# Patient Record
Sex: FEMALE | Race: WHITE | NOT HISPANIC OR LATINO | Employment: OTHER | URBAN - METROPOLITAN AREA
[De-identification: names, ages, dates, MRNs, and addresses within clinical notes are randomized per-mention and may not be internally consistent; named-entity substitution may affect disease eponyms.]

---

## 2017-01-02 ENCOUNTER — HOSPITAL ENCOUNTER (OUTPATIENT)
Dept: CARDIAC REHAB | Facility: CLINIC | Age: 82
End: 2017-01-02
Attending: PHYSICIAN ASSISTANT
Payer: COMMERCIAL

## 2017-01-02 PROCEDURE — 40000116 ZZH STATISTIC OP CR VISIT

## 2017-01-02 PROCEDURE — 93798 PHYS/QHP OP CAR RHAB W/ECG: CPT

## 2017-01-03 ENCOUNTER — TELEPHONE (OUTPATIENT)
Dept: INTERNAL MEDICINE | Facility: CLINIC | Age: 82
End: 2017-01-03

## 2017-01-03 DIAGNOSIS — R30.0 DYSURIA: Primary | ICD-10-CM

## 2017-01-03 NOTE — TELEPHONE ENCOUNTER
Reason for Call:  Other Specimen cup & a hat    Detailed comments: Pts daughter Radha is requesting a specimen cup & hat, please advise    Phone Number Patient can be reached at: Cell number on file:    Telephone Information:   Mobile [phone]         Best Time:    Can we leave a detailed message on this number? YES    Call taken on 1/3/2017 at 2:58 PM by Cristy Beltran

## 2017-01-03 NOTE — TELEPHONE ENCOUNTER
Patient started Bactrim yesterday, a Rx that Dr. Meyer prescribed earlier.  They are seeing Dr. Meyer next week and want to be able to pick a hat and urine cup for her to leave a specimen at home as she has a hard time urinating here.  Daughter will  hat and cup at lab as that is what they have done before.

## 2017-01-04 DIAGNOSIS — E03.9 ACQUIRED HYPOTHYROIDISM: Primary | ICD-10-CM

## 2017-01-04 RX ORDER — LEVOTHYROXINE SODIUM 50 UG/1
50 TABLET ORAL DAILY
Qty: 90 TABLET | Refills: 1 | Status: SHIPPED | OUTPATIENT
Start: 2017-01-04 | End: 2017-07-11

## 2017-01-04 RX ORDER — LEVOTHYROXINE SODIUM 50 UG/1
TABLET ORAL
Qty: 1 TABLET | Refills: 0 | Status: SHIPPED | OUTPATIENT
Start: 2017-01-04 | End: 2017-01-12

## 2017-01-06 ENCOUNTER — HOSPITAL ENCOUNTER (OUTPATIENT)
Dept: CARDIAC REHAB | Facility: CLINIC | Age: 82
End: 2017-01-06
Attending: PHYSICIAN ASSISTANT
Payer: COMMERCIAL

## 2017-01-06 PROCEDURE — 93798 PHYS/QHP OP CAR RHAB W/ECG: CPT

## 2017-01-06 PROCEDURE — 40000116 ZZH STATISTIC OP CR VISIT

## 2017-01-09 ENCOUNTER — HOSPITAL ENCOUNTER (OUTPATIENT)
Dept: CARDIAC REHAB | Facility: CLINIC | Age: 82
End: 2017-01-09
Attending: PHYSICIAN ASSISTANT
Payer: COMMERCIAL

## 2017-01-09 PROCEDURE — 40000116 ZZH STATISTIC OP CR VISIT: Performed by: REHABILITATION PRACTITIONER

## 2017-01-09 PROCEDURE — 93798 PHYS/QHP OP CAR RHAB W/ECG: CPT | Performed by: REHABILITATION PRACTITIONER

## 2017-01-11 ENCOUNTER — HOSPITAL ENCOUNTER (OUTPATIENT)
Dept: CARDIAC REHAB | Facility: CLINIC | Age: 82
End: 2017-01-11
Attending: PHYSICIAN ASSISTANT
Payer: COMMERCIAL

## 2017-01-11 PROCEDURE — 93798 PHYS/QHP OP CAR RHAB W/ECG: CPT

## 2017-01-11 PROCEDURE — 40000116 ZZH STATISTIC OP CR VISIT

## 2017-01-12 ENCOUNTER — OFFICE VISIT (OUTPATIENT)
Dept: INTERNAL MEDICINE | Facility: CLINIC | Age: 82
End: 2017-01-12
Payer: COMMERCIAL

## 2017-01-12 VITALS
HEIGHT: 67 IN | HEART RATE: 83 BPM | WEIGHT: 156.6 LBS | DIASTOLIC BLOOD PRESSURE: 62 MMHG | TEMPERATURE: 97 F | OXYGEN SATURATION: 99 % | BODY MASS INDEX: 24.58 KG/M2 | SYSTOLIC BLOOD PRESSURE: 122 MMHG

## 2017-01-12 DIAGNOSIS — R30.0 DYSURIA: ICD-10-CM

## 2017-01-12 DIAGNOSIS — M05.9 SEROPOSITIVE RHEUMATOID ARTHRITIS (H): Primary | ICD-10-CM

## 2017-01-12 LAB
ALBUMIN UR-MCNC: NEGATIVE MG/DL
APPEARANCE UR: CLEAR
BILIRUB UR QL STRIP: NEGATIVE
COLOR UR AUTO: YELLOW
GLUCOSE UR STRIP-MCNC: NEGATIVE MG/DL
HGB UR QL STRIP: NEGATIVE
KETONES UR STRIP-MCNC: NEGATIVE MG/DL
LEUKOCYTE ESTERASE UR QL STRIP: NEGATIVE
NITRATE UR QL: NEGATIVE
PH UR STRIP: 6 PH (ref 5–7)
SP GR UR STRIP: 1.01 (ref 1–1.03)
URN SPEC COLLECT METH UR: NORMAL
UROBILINOGEN UR STRIP-ACNC: 0.2 EU/DL (ref 0.2–1)

## 2017-01-12 PROCEDURE — 99213 OFFICE O/P EST LOW 20 MIN: CPT | Performed by: INTERNAL MEDICINE

## 2017-01-12 PROCEDURE — 81003 URINALYSIS AUTO W/O SCOPE: CPT | Performed by: INTERNAL MEDICINE

## 2017-01-12 RX ORDER — MINOCYCLINE HYDROCHLORIDE 100 MG/1
100 CAPSULE ORAL DAILY
Qty: 30 CAPSULE | Refills: 0 | Status: SHIPPED | OUTPATIENT
Start: 2017-01-12 | End: 2017-01-31

## 2017-01-12 ASSESSMENT — PAIN SCALES - GENERAL: PAINLEVEL: NO PAIN (0)

## 2017-01-12 NOTE — PROGRESS NOTES
CHIEF COMPLAINT:    The patient is a pleasant 89-year-old female presents today following conclusion of a course of antibiotics for recurrent urinary tract infection. Repeat urinary analysis is negative. She notes that she has is quite frequently and records to confirm this. We had previously discussed chronic suppressive antibiotic therapy. She cannot use salt flow due to her concurrent use of methotrexate. We've decided to pursue minocycline. She does have a history of seropositive rheumatoid arthritis in the therapy does seem to be maintaining. With respect to her heart disease, she's had a previousTAVR with excellent results. She has no edema or shortness of breath with exertion. She denies any recurrent cardiac arrhythmia at this time.                         PAST, FAMILY,SOCIAL HISTORY:     Medical  History:   has a past medical history of Seropositive rheumatoid arthritis (H); Secondary Sjogren's syndrome (H); Chronic migraine; Stroke (H) (05/2013); Osteoarthritis; Osteopenia; Hypothyroidism; PAC (premature atrial contraction); PVC (premature ventricular contraction); Hypertension; Anxiety; Hyperlipidaemia; Anxiety; PUD (peptic ulcer disease); Syncope (2014); Orthostatic hypotension; Shortness of breath; Difficulty in walking(719.7); Numbness and tingling; Seizures (H); Aortic stenosis; Need for SBE (subacute bacterial endocarditis) prophylaxis; and Myocardial infarction (H).     Surgical History:   has past surgical history that includes Mouth surgery (2011); Eye surgery (1999); cataract iol, rt/lt (Bilateral, 2000); Hysterectomy total abdominal (1970); TOTAL HIP ARTHROPLASTY (Right, 2010); TOTAL HIP ARTHROPLASTY (Left, 2014); TRANSCATHETER AORTIC VALVE IMPLANT ANEST (N/A, 8/3/2016); and Heart Cath Femoral Cannulization With Open Standby Repair Aortic Valve (N/A, 8/3/2016).     Social History:   reports that she has never smoked. She has never used smokeless tobacco. She reports that she does not drink  alcohol or use illicit drugs.     Family History:  family history includes Hyperlipidemia in her mother. There is no history of Family History Negative.            MEDICATIONS  Current Outpatient Prescriptions   Medication Sig Dispense Refill     minocycline (MINOCIN/DYNACIN) 100 MG capsule Take 1 capsule (100 mg) by mouth daily 30 capsule 0     levothyroxine (SYNTHROID/LEVOTHROID) 50 MCG tablet Take 1 tablet (50 mcg) by mouth daily Except tuesday 90 tablet 1     mirtazapine (REMERON) 30 MG tablet Take 1 pills at bedtime 90 tablet 0     levETIRAcetam (KEPPRA) 500 MG tablet Take 1 tablet (500 mg) by mouth daily take 1000 mg at night 1 tablet 0     atorvastatin (LIPITOR) 40 MG tablet Take 1 tablet (40 mg) by mouth daily Reports taking 1 tablet 0     Cranberry 200 MG CAPS Take 3 capsules (600 mg) by mouth 2 times daily 1 capsule 0     famotidine (PEPCID) 40 MG tablet TAKE ONE TABLET BY MOUTH AT BEDTIME 30 tablet 9     metoprolol (LOPRESSOR) 25 MG tablet Reports taking 0.5 tablet  tablet 1     isosorbide mononitrate (IMDUR) 30 MG 24 hr tablet Pt reports taking 30 mg daily 30 tablet 1     nitroglycerin (NITROSTAT) 0.4 MG SL tablet Pt reports taking - -  -For chest pain place 1 tablet under the tongue every 5 minutes for 3 doses. If symptoms persist 5 minutes after 1st dose call 911. 25 tablet 0     folic acid (FOLVITE) 1 MG tablet TAKE ONE TABLET BY MOUTH ONCE DAILY 90 tablet 1     aspirin EC 81 MG EC tablet Take 1 tablet (81 mg) by mouth daily       methotrexate 2.5 MG tablet Take 4 tablets (10 mg) by mouth once a week Wed 52 tablet 3     clopidogrel (PLAVIX) 75 MG tablet TAKE ONE TABLET BY MOUTH ONCE DAILY 90 tablet 3     predniSONE (DELTASONE) 5 MG tablet TAKE 1 TABLET BY MOUTH EVERY OTHER DAY, ALTERNATING WITH ONE-HALF TABLET EVERY OTHER DAY. 80 tablet 3     acetaminophen (TYLENOL ARTHRITIS PAIN) 650 MG CR tablet Take 2 tablets (1,300 mg) by mouth every 8 hours as needed for mild pain       calcium carbonate  (TUMS) 500 MG chewable tablet Take 2-4 tablets (1,000-2,000 mg) by mouth as needed for heartburn       docusate sodium (COLACE) 100 MG capsule Take 200 mg by mouth daily 2 capsules       multivitamin, therapeutic with minerals (MULTI-VITAMIN) TABS Take 1 tablet by mouth daily       Lactobacillus (FLORAJEN ACIDOPHILUS) CAPS Take 1 capsule by mouth daily       [DISCONTINUED] levothyroxine (SYNTHROID/LEVOTHROID) 50 MCG tablet Reports taking 50 mcg daily 1 tablet 0         --------------------------------------------------------------------------------------------------------------------                          REVIEW OF SYSTEMS:         LUNGS: Pt denies: cough,excess sputum, hemoptysis, or shortness of breath.   HEART: Pt denies: chest pain, arrythmia, syncope, tachy or bradyarrhythmia or excess edema.   GI: Pt denies: nausea, vomitting, diarrhea, constipation, melena, or hematochezia.   NEURO: Pt denies: seizures, strokes, diplopia, weakness, paraesthesias, or paralysis.                          EXAMINATION:          Vital Signs:  B/P: 122/62, T: 97, P: 83, R: Data Unavailable, BMI: Body mass index is 24.72 kg/(m^2).   Constitutional: The patient appears to be in no acute distress. The patient appears to be adequately hydrated. No acute respiratory or hemodynamic distress is noted at this time.   LUNGS: clear bilaterally, airflow is brisk, no intercostal retraction or stridor is noted. No coughing is noted during visit.   HEART:  regular without rubs, clicks, gallops, or murmurs. PMI is nondisplaced. Upstrokes are brisk. S1,S2 are heard.   GI: Abdomen is soft, without rebound, guarding or tenderness. Bowel sounds are appropriate. No renal bruits are heard.    URINARY: Be's punch is negative. No suprapubic tenderness is noted with palpation.                        DECISION MAKING:       #1 seropositive rheumatoid arthritis   Restart methotrexate   Follow-up with rheumatology as scheduled  #2 recurrent urinary tract  infection/cystitis   Minocycline 100 mg once daily  #3 status post aortic valve replacement   Continue Plavix and aspirin  #4                         FOLLOW UP    I have asked the patient to make an appointment for follow up with me in 3 months or sooner as needed  I have carefully explained the diagnosis and treatment options with the patient. The patient has displayed an understanding of the above, and all subsequent questions were answered.     DO DELMA Pineda    Portions of this note were produced using Tanium  Although every attempt at real-time proof reading has been made, occasional grammar/syntax errors may have been missed.

## 2017-01-13 ENCOUNTER — HOSPITAL ENCOUNTER (OUTPATIENT)
Dept: CARDIAC REHAB | Facility: CLINIC | Age: 82
End: 2017-01-13
Attending: PHYSICIAN ASSISTANT
Payer: COMMERCIAL

## 2017-01-13 VITALS — WEIGHT: 157 LBS | HEIGHT: 67 IN | BODY MASS INDEX: 24.64 KG/M2

## 2017-01-13 PROCEDURE — 40000116 ZZH STATISTIC OP CR VISIT

## 2017-01-13 PROCEDURE — 93798 PHYS/QHP OP CAR RHAB W/ECG: CPT

## 2017-01-13 ASSESSMENT — 6 MINUTE WALK TEST (6MWT)
FEMALE CALC: 1137.51
PREDICTED: 1325.71
MALE CALC: 1317.67
GENDER SELECTION: FEMALE
TOTAL DISTANCE WALKED (FT): 350
TOTAL DISTANCE WALKED (FT): 450

## 2017-01-13 NOTE — PROGRESS NOTES
01/13/17 1500   Session   Session Discharge Note   Certified through this date 01/13/17   Cardiac Rehab Assessment   Cardiac Rehab Assessment Your patient was discharged from the cardiac rehab program today because wished to be done. During cardiac rehab this patient made significant gains in exercise tolerance. Initially patient tolerated 19 minutes at a peak of 2.4METs, and is now tolerating 26 minutes at a peak of 2.8 METs. Patient also increased 6-minute walk test by 28% (an increase of 100 feet). The patient improved his/her weight by 5%, PHQ-9 depression screening by 100%, [facility] quality of life screening by 5% and Rate your Plate dietary screening by 13%. Please see progress that was made towards goals at the bottom of this treatment plan.  Barriers to progression were none. Today the patient was given instructions on frequency, intensity, and duration for continued home exercise.  Your patient plans to continue using the biodex at the apartment complex 6x a week for 20 minutes as tolerated by fatigue for continued aerobic home exercise.    General Information  Gillian Burk  3/19/1927  I have established, reviewed and made necessary changes to the individualized treatment plan and exercise prescription for this patient.    Physician Name (printed): ________________________   Date: _______  Time: ______    Physician Signature: ___________________________________________       Treatment Diagnosis [id number]   Date of Treatment Diagnosis 08/03/16   Significant Past CV History None   Comorbidities None   Other Medical History Past Medical History   Diagnosis Date     Seropositive rheumatoid arthritis (H)      Secondary Sjogren's syndrome (H)      Chronic migraine      Stroke (H) 05/2013     with visual field cut, left occipital lobe     Osteoarthritis      axial, and preipheral     Osteopenia      Hypothyroidism      PAC (premature atrial contraction)      PVC (premature ventricular contraction)       "Hypertension      Anxiety      Hyperlipidaemia      Anxiety      PUD (peptic ulcer disease)      Syncope 2014     due to orthostatic hypotension     Orthostatic hypotension      wears compression stockings     Shortness of breath      Difficulty in walking(719.7)      Numbness and tingling      decreased sensation in toes     Seizures (H)      after stroke (partial onset)     Aortic stenosis      s/p TAVR 8/17/16     Need for SBE (subacute bacterial endocarditis) prophylaxis      Myocardial infarction (H)         Lead up symptoms Chest pain and SOB on exertion   Hospital Location [facility]   Hospital Discharge Date 08/06/16   Signs and Symptoms Post Hospital Discharge Angina  (Angina is better)   Outpatient Cardiac Rehab Start Date 09/30/16   Primary Physician Dr. Meyer   Primary Physician Follow Up Completed   Surgeon Owen Schultz   Cardiologist Owen Schultz   Cardiologist Follow Up Scheduled   Ejection Fraction 60-65%   Risk Stratification Low   Summary of Cath Report   Summary of Cath Report Not Applicable   Living and Work Status    Living Arrangements and Social Status assisted living   Support System Live alone;Live alone, family in area;Check-in help as needed   Return to Employment Retired   Preventative Medications   CMS recommended medications Antiplatelets;Influenza vaccination;Pneumonia vaccination;Lipid Lowering;Anticoagulants   Falls Screen   Have you fallen two or more times in the past year? No   Pain   Patient Currently in Pain No   Physical Assessments   Incisions Not assessed   Edema None   Right Lung Sounds not assessed   Left Lung Sounds not assessed   Limitations Arthritis   Individualized Treatment Plan   Monitored Sessions Scheduled 36   Monitored Sessions Attended 24   Oxygen   Supplemental Oxygen needed No   Nutrition Management - Weight Management   Assessment Discharge   Age 89   Weight 71.215 kg (157 lb)   Height 1.695 m (5' 6.73\")   BMI (Calculated) 24.84 "   Initial Rate Your Plate Score. Dietary tool to assess eating patterns. Scores range from 24 to 72. The higher the score the healthier the eating pattern. 56   Discharge Rate Your Plate Score 63   Nutrition Management - Lipids   Lipids Labs Not Available  (7/15)   Prescribed Lipid Medication Yes   Statin Intensity Low Intensity   Lipid Comments Labs from 7/2015   Nutrition Management - Diabetes   Diabetes No   Nutrition Management Summary   Dietary Recommendations Low Fat;Low Cholesterol;Low Sodium   Stages of Change for Diet Compliance Action   Interventions Planned Attend Nutrition Education Class(es)   Interventions In Progress or Completed Attended Nutrition Class(es)   Nutrition Summary Comments Pt has attended 1/2 classes   Nutrition Target Outcome Weight loss .5-1 lb/week (if BMI > 25)   Psychosocial Management   Psychosocial Assessment Discharge   Is there history of clinical depression or increased risk of depression? No previous history   Current Level of Stress per Patient Report Mild   Current Coping Skills Uses Stress Management/Relaxation Techniques;Has Positive Support System   Initial Patient Health Questionnaire -9 Score (PHQ-9) for depression. 5-9 Minimal symptoms, 10-14 Minor depression, 15-19 Major depression, moderately severe, > 20 Major depression, severe  2   Discharge PHQ-9 Score for Depression 0   Initial [employer] COOP Survey score.  Quality of Life:   If total score > 25 review individual areas where patient rated a 4 or 5.  Consider patients current medical condition and what role that plays on the score.   Adjust treatment protocol to improve areas of concern.  Consider the following:  PHQ9 score, DASI, and re-assessment within the next 30 days to assist with developing treatments.  22   Reassessment [employer] COOP Survey Score 21   Stages of Change Action   Interventions Planned Patient to verbalize understanding of behavioral assessment results;Patient to verbalize understanding of  negative impact of stress to personal health   Interventions In Progress or Completed Patient verbalizes understanding of behavioral assessment scores;Patient verbalizes understanding of negative impact of stress to personal health   Patient Goal No   Psychosocial Comments Pt has attended the class on stress and relaxation   Psychosocial Target Outcome Identify absence or presence of depression using valid screening tool   Other Core Components - Hypertension   History of or Diagnosis of Hypertension No   Other Core Components - Tobacco   History of Tobacco Use Never   Other Core Components Summary   Interventions Planned None   Activity/Exercise History   Activity/Exercise Assessment Discharge   Activity/Exercise Status prior to event? Sedentary  (Tries to be as active as she can)   Number of Days Currently participating in Moderate Physical Activity? 3   Number of Days Currently performing  Aerobic Exercise (including rehab)? 6   Number of Minutes per Session Currently of Aerobic Exercise (average)? 20   Current Stage of Change (Physical Activity) Preparation   Current Stage of Change (Aerobic Exercise) Preparation   Patient Goals Goal #1;Goal #2   Goal #1 Description Pt would like to be able to walk down to cardiac rehab without SOB or fatigue.   Goal #1 Target Date 12/05/16   Goal #1 Date Met 01/13/17   Goal #1 Progress Towards Goal 12/20 only mild dyspnea when patient reaches cardiac rehab facility.  1/13 Pt is able to walk down to rehab with little to no SOB   Goal #2 Description Pt wants to increase the amount of time she spends on the NS at from to 20 minutes   Goal #2 Target Date 11/14/16   Goal #2 Date Met 01/13/17   Goal #2 Progress Towards Goal 12/20 completing 15 minutes in 2 bouts (10 minutes and 5 minutes) 1/18 Pt has been able to demonstrate that she was able to successful demonstrate that she was able to complete 20 minutes on the NS on a number of occassions.   Activity/Exercise Target Outcome An  Accumulation of 150  Minutes of Aerobic Activity per Week   Exercise Assessment   6 Minute Walk Predicted - Gender Selection Female   6 Minute Walk Predicted (Male) 1317.67   6 Minute Walk Predicted (Female) 1137.51   Initial 6 Minute Walk Distance (Feet) 350 ft   Discharge 6 Minute Walk Distance (Feet) 450   Resting HR 77 bpm   Exercise  bpm   Post Exercise HR 73 bpm   Resting /60 mmHg   Exercise /62 mmHg   Post Exercise /60 mmHg   Effort Rating 5   Current MET Level 2.8   MET Level Goal 4+   ECG Rhythm Sinus rhythm   Ectopy PACs   Current Symptoms Fatigue   Limitations/Restrictions None   Exercise Prescription   Mode Treadmill;Nustep;Weights   Duration/Time 30-45 min   Frequency 3 days week   THR (85% of age predicted max HR) 111.35   OMNI Effort Rating (0-10 Scale) 4-6/10   Progression Total exercise time of 20-30 minutes;Progress peak intensity by 1/2 MET per week;Continuous bouts   Recommended Home Exercise   Type of Exercise Walking;Other (comments)  (Nustep)   Frequency (days per week) 2-3   Duration (minutes per session) 15-30 min   Effort Rating Recommended 4-6/10   30 Day Exercise Plan Continue to walk to the fitness room and use the NuStep for 5 minutes    Current Home Exercise   Type of Exercise Walking  (NuStep)   Frequency (days per week) 6   Duration (minutes per session) 20   Follow-up/On-going Support   Provider follow-up needed on the following No follow-up needed   Learning Assessment   Learner Patient;Spouse/Significant Other  (Daughter )   Primary Language English   Preferred Learning Style Listening;Reading;Demonstration;Pictures/Video   Barriers to Learning No barriers noted   Patient Education   Education recommended Anatomy and Physiology of the Heart;Exercise Principles;Medication Overview;Nutrition;Risk Factors   Education classes attended Risk Factors;Nutrition;Stress Management;Blood Pressure

## 2017-01-17 NOTE — ADDENDUM NOTE
Encounter addended by: Linda Steel  on: 1/17/2017 11:26 AM<BR>     Documentation filed: Charges VN, Episodes

## 2017-01-19 NOTE — PROGRESS NOTES
Quick Note:    Dear Gillian, your recent test results are attached.   Your urine sample was normal.    Feel free to contact me via the office or My Chart if you have any questions regarding the above.    Sincerely,  DO DELMA Pineda    ______

## 2017-01-23 DIAGNOSIS — G40.909 SEIZURE DISORDER (H): Primary | ICD-10-CM

## 2017-01-23 RX ORDER — LEVETIRACETAM 500 MG/1
500 TABLET ORAL DAILY
Qty: 1 TABLET | Refills: 0 | Status: SHIPPED | OUTPATIENT
Start: 2017-01-23 | End: 2017-02-28

## 2017-01-23 NOTE — TELEPHONE ENCOUNTER
keppra      Last Office Visit with [facility], [facility] or [facility] prescribing provider: 1/12/17  Future Office visit:       Routing refill request to provider for review/approval because:  Medication is reported/historical

## 2017-01-26 DIAGNOSIS — R55 SYNCOPE AND COLLAPSE: Primary | ICD-10-CM

## 2017-01-26 PROCEDURE — 80177 DRUG SCRN QUAN LEVETIRACETAM: CPT | Mod: 90 | Performed by: INTERNAL MEDICINE

## 2017-01-26 PROCEDURE — 99000 SPECIMEN HANDLING OFFICE-LAB: CPT | Performed by: INTERNAL MEDICINE

## 2017-01-26 PROCEDURE — 36415 COLL VENOUS BLD VENIPUNCTURE: CPT | Performed by: INTERNAL MEDICINE

## 2017-01-27 LAB — LEVETIRACETAM SERPL-MCNC: 30.7 UG/ML

## 2017-02-01 NOTE — PROGRESS NOTES
Quick Note:    Dear Gillian, your recent test results are attached.   Your Keppra level was normal.    Feel free to contact me via the office or My Chart if you have any questions regarding the above.    Sincerely,  Augusto eMyer,  FACOI    ______

## 2017-02-06 DIAGNOSIS — R30.0 DYSURIA: Primary | ICD-10-CM

## 2017-02-06 NOTE — TELEPHONE ENCOUNTER
minocycline      Last Written Prescription Date: 02/06/17  Last Fill Quantity: 30,  # refills: 0   Last Office Visit with [facility], [facility] or [facility] prescribing provider: 01/12/17

## 2017-02-09 ENCOUNTER — HOSPITAL ENCOUNTER (OUTPATIENT)
Dept: CARDIOLOGY | Facility: CLINIC | Age: 82
Discharge: HOME | End: 2017-02-09
Attending: PHYSICIAN ASSISTANT | Admitting: PHYSICIAN ASSISTANT
Payer: COMMERCIAL

## 2017-02-09 ENCOUNTER — OFFICE VISIT (OUTPATIENT)
Dept: CARDIOLOGY | Facility: CLINIC | Age: 82
End: 2017-02-09
Attending: PHYSICIAN ASSISTANT
Payer: COMMERCIAL

## 2017-02-09 VITALS
SYSTOLIC BLOOD PRESSURE: 132 MMHG | HEART RATE: 84 BPM | WEIGHT: 152 LBS | DIASTOLIC BLOOD PRESSURE: 62 MMHG | HEIGHT: 66 IN | BODY MASS INDEX: 24.43 KG/M2

## 2017-02-09 DIAGNOSIS — Z95.2 S/P TAVR (TRANSCATHETER AORTIC VALVE REPLACEMENT): ICD-10-CM

## 2017-02-09 DIAGNOSIS — R07.9 CHEST PAIN, UNSPECIFIED TYPE: ICD-10-CM

## 2017-02-09 DIAGNOSIS — Z95.2 S/P TAVR (TRANSCATHETER AORTIC VALVE REPLACEMENT): Primary | ICD-10-CM

## 2017-02-09 PROCEDURE — 99214 OFFICE O/P EST MOD 30 MIN: CPT | Mod: 25 | Performed by: PHYSICIAN ASSISTANT

## 2017-02-09 PROCEDURE — 93306 TTE W/DOPPLER COMPLETE: CPT | Mod: 26 | Performed by: INTERNAL MEDICINE

## 2017-02-09 PROCEDURE — 93306 TTE W/DOPPLER COMPLETE: CPT

## 2017-02-09 PROCEDURE — 93000 ELECTROCARDIOGRAM COMPLETE: CPT | Performed by: PHYSICIAN ASSISTANT

## 2017-02-09 RX ORDER — ISOSORBIDE MONONITRATE 60 MG/1
60 TABLET, EXTENDED RELEASE ORAL DAILY
Qty: 90 TABLET | Refills: 3 | Status: SHIPPED | OUTPATIENT
Start: 2017-02-09 | End: 2018-01-05 | Stop reason: DRUGHIGH

## 2017-02-09 RX ORDER — MINOCYCLINE HYDROCHLORIDE 100 MG/1
CAPSULE ORAL
Qty: 30 CAPSULE | Refills: 0 | Status: SHIPPED | OUTPATIENT
Start: 2017-02-09 | End: 2017-04-04

## 2017-02-09 NOTE — TELEPHONE ENCOUNTER
Routing refill request to provider for review/approval because:  Drug not on the [facility] refill protocol for this diagnosis  Gala Woods RN

## 2017-02-09 NOTE — MR AVS SNAPSHOT
After Visit Summary   2/9/2017    Gillian Burk    MRN: [id number]           Patient Information     Date Of Birth          3/19/1927        Visit Information        Provider Department      2/9/2017 2:30 PM Nita Shafer PA-C [facility]        Today's Diagnoses     Chest pain, unspecified type    -  1     S/P TAVR (transcatheter aortic valve replacement)           Care Instructions    Thank you for your  Heart Care visit today. Your provider has recommended the following:    Medication Changes:  INCREASE your Imdur to 60mg daily. You can use up your 30mg tabs by taking two at the same time if you wish. The new bottle will be 60mg tabs so just take one tabs daily.  Recommendations:  Breathing/lung test.  Please call [phone] to scheduled at [facility]  Follow-up:  At the 1 year tamara, we will remind you.                [facility] HEART CARE        Follow-ups after your visit        Future tests that were ordered for you today     Open Future Orders        Priority Expected Expires Ordered    General PFT Lab (Please always keep checked) Routine 2/13/2017 2/9/2018 2/9/2017    Pulmonary Function Test Routine 2/9/2017 2/9/2018 2/9/2017            Who to contact     If you have questions or need follow up information about today's clinic visit or your schedule please contact [facility] directly at [phone].  Normal or non-critical lab and imaging results will be communicated to you by MyChart, letter or phone within 4 business days after the clinic has received the results. If you do not hear from us within 7 days, please contact the clinic through MyChart or phone. If you have a critical or abnormal lab result, we will notify you by phone as soon as possible.  Submit refill requests through [employer] or call your pharmacy and they will forward the refill request to us. Please allow 3  "business days for your refill to be completed.          Additional Information About Your Visit        MyChart Information     [employer] gives you secure access to your electronic health record. If you see a primary care provider, you can also send messages to your care team and make appointments. If you have questions, please call your primary care clinic.  If you do not have a primary care provider, please call [phone] and they will assist you.        Care EveryWhere ID     This is your Care EveryWhere ID. This could be used by other organizations to access your [place] medical records  [id number]        Your Vitals Were     Pulse Height BMI (Body Mass Index)             84 1.676 m (5' 6\") 24.55 kg/m2          Blood Pressure from Last 3 Encounters:   02/09/17 132/62   01/12/17 122/62   12/19/16 118/62    Weight from Last 3 Encounters:   02/09/17 68.947 kg (152 lb)   01/13/17 71.215 kg (157 lb)   01/12/17 71.033 kg (156 lb 9.6 oz)              We Performed the Following     EKG 12-lead complete w/read - Clinics     Follow-Up with Cardiac Advanced Practice Provider          Today's Medication Changes          These changes are accurate as of: 2/9/17  3:15 PM.  If you have any questions, ask your nurse or doctor.               These medicines have changed or have updated prescriptions.        Dose/Directions    isosorbide mononitrate 60 MG 24 hr tablet   Commonly known as:  IMDUR   This may have changed:    - medication strength  - how much to take  - how to take this  - when to take this  - additional instructions   Used for:  Chest pain, unspecified type   Changed by:  Nita Shafer PA-C        Dose:  60 mg   Take 1 tablet (60 mg) by mouth daily   Quantity:  90 tablet   Refills:  3            Where to get your medicines      These medications were sent to [facility] [address] [place] [address] - [address]  [address] [facility] [address]     Phone:  [phone]    - isosorbide mononitrate " 60 MG 24 hr tablet             Primary Care Provider Office Phone # Fax #    Augusto Meyer [phone]       [address] [place] [address]        Thank you!     Thank you for choosing [facility] [place]  for your care. Our goal is always to provide you with excellent care. Hearing back from our patients is one way we can continue to improve our services. Please take a few minutes to complete the written survey that you may receive in the mail after your visit with us. Thank you!             Your Updated Medication List - Protect others around you: Learn how to safely use, store and throw away your medicines at www.disposemymeds.org.          This list is accurate as of: 2/9/17  3:15 PM.  Always use your most recent med list.                   Brand Name Dispense Instructions for use    aspirin 81 MG EC tablet      Take 1 tablet (81 mg) by mouth daily       atorvastatin 40 MG tablet    LIPITOR    1 tablet    Take 1 tablet (40 mg) by mouth daily Reports taking       calcium carbonate 500 MG chewable tablet    TUMS     Take 2-4 tablets (1,000-2,000 mg) by mouth as needed for heartburn       clopidogrel 75 MG tablet    PLAVIX    90 tablet    TAKE ONE TABLET BY MOUTH ONCE DAILY       Cranberry 200 MG Caps     1 capsule    Take 3 capsules (600 mg) by mouth 2 times daily       docusate sodium 100 MG capsule    COLACE     Take 200 mg by mouth daily 2 capsules       famotidine 40 MG tablet    PEPCID    30 tablet    TAKE ONE TABLET BY MOUTH AT BEDTIME       FLORAJEN ACIDOPHILUS Caps      Take 1 capsule by mouth daily       folic acid 1 MG tablet    FOLVITE    90 tablet    TAKE ONE TABLET BY MOUTH ONCE DAILY       isosorbide mononitrate 60 MG 24 hr tablet    IMDUR    90 tablet    Take 1 tablet (60 mg) by mouth daily       levETIRAcetam 500 MG tablet    KEPPRA    1 tablet    Take 1 tablet (500 mg) by mouth daily take 1000 mg at night        levothyroxine 50 MCG tablet    SYNTHROID/LEVOTHROID    90 tablet    Take 1 tablet (50 mcg) by mouth daily Except tuesday       methotrexate 2.5 MG tablet CHEMO     52 tablet    Take 4 tablets (10 mg) by mouth once a week Wed       metoprolol 25 MG tablet    LOPRESSOR    180 tablet    Reports taking 0.5 tablet BID       minocycline 100 MG capsule    MINOCIN/DYNACIN    30 capsule    TAKE ONE CAPSULE BY MOUTH ONCE DAILY       mirtazapine 30 MG tablet    REMERON    90 tablet    Take 1 pills at bedtime       Multi-vitamin Tabs tablet      Take 1 tablet by mouth daily       nitroglycerin 0.4 MG sublingual tablet    NITROSTAT    25 tablet    Pt reports taking - -  -For chest pain place 1 tablet under the tongue every 5 minutes for 3 doses. If symptoms persist 5 minutes after 1st dose call 911.       predniSONE 5 MG tablet    DELTASONE    80 tablet    TAKE 1 TABLET BY MOUTH EVERY OTHER DAY, ALTERNATING WITH ONE-HALF TABLET EVERY OTHER DAY.       TYLENOL ARTHRITIS PAIN 650 MG CR tablet   Generic drug:  acetaminophen      Take 2 tablets (1,300 mg) by mouth every 8 hours as needed for mild pain

## 2017-02-09 NOTE — PROGRESS NOTES
HISTORY OF PRESENT ILLNESS:  Ms. Burk is a pleasant 89-year-old female who presents to the Cardiology office today for a 6-month post-TAVR followup.      The patient's cardiovascular history includes hypertension, hyperlipidemia, history of CVAs and TIAs, severe aortic stenosis, status post TAVR with a [employer] Treva valve in 08/2016.  She also has a known chronic left bundle branch block and first degree AV block.        Since I last evaluated the patient in September, she unfortunately had a prolonged episode of chest discomfort for which she sought evaluation (at an outside hospital) and was found to have a non-STEMI.  She underwent repeat coronary angiography and again was found to have mild to moderate nonobstructive coronary artery disease.  She was placed on Imdur and reportedly initially had an improvement in her chest discomfort.  She followed up with Dr. Farfan in December and no medication changes were made at that time.        The patient tells me today that she continues to have chest discomfort on a regular basis.  Before her TAVR, she had chest heaviness and dyspnea with exertion.  Now she does not get chest discomfort or dyspnea with exertion but does notice that after she does more moderate strenuous exertion, she feels the chest discomfort once she sits down to rest.  She also tells me that she has noticed it when she wakes up in the middle of night and uses the restroom and comes back to bed, the discomfort again starts once she is again lying down (not with activity).  For the past 3 nights she is propping herself up when she gets back to bed and feels that this has prevented the chest discomfort from occurring.  Again, she does not complain of any dyspnea or shortness of breath.        Earlier this month, she also had 1 episode of presumed syncope.  This was in the setting of an upper respiratory illness which she had been battling.  She approximately forgot to take her medications  for a couple of days, and on the third day when she took her medications, shortly after taking the medication and she had an episode where she believes she transiently lost consciousness while sitting, although she did not fall off of her chair.  She called the nurse at her living facility and they decided that it was most likely she was dehydrated.  She did not seek any evaluation at that time.  She has not had any further syncopal or presyncopal type episodes.  The patient does state in the more distant past, she had had recurrent syncope, has not had any episodes for about the past 3 years or so.        Her other concern today is that she feels as though she has been having anxiety/panic attacks.  Sometimes these are related to the chest discomfort she gets but can occur independently as well.      CURRENT CARDIAC MEDICATIONS:   1.  Imdur 30 mg daily.   2.  Atorvastatin 40 mg daily.   3.  Metoprolol 25 mg half tablet b.i.d.   4.  Sublingual nitro p.r.n.   5.  Aspirin 81 mg daily.   6.  Plavix 75 mg daily.      The remainder of her medications, allergies and review of systems were reviewed and as are documented separately.      PHYSICAL EXAMINATION:   GENERAL:  The patient is a pleasant 89-year-old female who appears her stated age.  She is in no apparent distress.   VITAL SIGNS:  Her blood pressure is 132/62, pulse 84, weight is 152 pounds.   LUNGS:  Clear to auscultation bilaterally, although she does seem to have some decreased air movement.   CARDIAC:  Reveals a regular rate and rhythm.  An occasional ectopic beat was noted.  She did have a 2/6 systolic ejection murmur heard best at the upper left sternal border.   ABDOMEN:  Soft, nontender, nondistended.   LOWER EXTREMITIES:  Show no evidence of edema.   NEUROLOGIC:  Alert and oriented.      She did have an echocardiogram which showed a normal LV size.  Her ejection fraction is estimated at 50%-55%.  No regional wall motion abnormalities were noted.  She has  mild to moderate mitral regurgitation along with mild to moderate mitral stenosis.  She is noted to have a prosthetic aortic valve which appears to be opening well.  The mean gradient is 9.  The echo reader states that compared to September, the aortic gradient along with the MR and MS are stable.  It is noted that the LV function may has decreased slightly.      She had an EKG performed during today's office visit.  This showed sinus rhythm with a left bundle branch block.      ASSESSMENT:   1.  History of severe aortic stenosis, status post TAVR with a 27 mm Treva tissue valve on 08/03/2016.  Her followup echocardiogram shows stable gradients across the valve.  I would say her overall functional class is consistent with New York Heart Association class II.   2.  Ongoing chest discomfort which is quite atypical.  Initially I was concerned that maybe her discomfort is related to GERD.  However, she states that she has had GERD in the past and this is not similar to her symptoms.  I do see that she is on Pepcid on a daily basis.  She did have some response to Imdur after being placed on this after her myocardial infarction.  At this point, I have recommended trying an increased dose to see if this helps her symptomatology at all.  I also recommended that we do PFTs just to ensure that we are not missing any pulmonary disease that may be causing the symptomatology.  My other thought would be that this may be related to anxiety/panic attacks; however, these symptoms do not always occur with her other symptoms which are more typical for anxiety/panic attacks.   3.  Hypertension, well controlled.   4.  History of frequent PACs and PVCs.  I think it is most likely that her recent episode of syncope occurred in the setting of poor p.o. intake; however, if the patient has any further presyncopal or syncopal type symptoms, I would not hesitate to reassess her heart rhythm with an event monitor.      PLAN:   1.  The patient  was instructed to increase her isosorbide mononitrate to 60 mg daily.   2.  PFTs were ordered for the patient to perform in the near future.   3.  The patient was instructed to follow up in the TAVR Clinic in 2017.  In the interim, we will contact her after we have the results of her pulmonary function tests to see if there has been any benefit the increased dose of Imdur and give her any further recommendations in regards to followup at that time.  She has been seen at our [facility] office and we could certainly arrange closer follow up there as necessary.  4.  I also asked her to follow up closely with her PCP to discuss her anxiety concerns.        JUNG OTERO PA-C             D: 2017 15:58   T: 2017 16:46   MT: [id number]      Name:     LUCIO HANNON   MRN:      [id number]        Account:      [id number]   :      1927           Service Date: 2017      Document: [id number]

## 2017-02-09 NOTE — LETTER
2/9/2017    Augusto Meyer, [address] [place] [address]    RE: Gillian Burk       Dear Colleague,    I had the pleasure of seeing Gillian Burk in the [facility].    Ms. Burk is a pleasant 89-year-old female who presents to the Cardiology office today for a 6-month post-TAVR followup.      The patient's cardiovascular history includes hypertension, hyperlipidemia, history of CVAs and TIAs, severe aortic stenosis, status post TAVR with a [employer] Treva valve in 08/2016.  She also has a known chronic left bundle branch block and first degree AV block.        Since I last evaluated the patient in September, she unfortunately had a prolonged episode of chest discomfort for which she sought evaluation (at an outside hospital) and was found to have a non-STEMI.  She underwent repeat coronary angiography and again was found to have mild to moderate nonobstructive coronary artery disease.  She was placed on Imdur and reportedly initially had an improvement in her chest discomfort.  She followed up with Dr. Farfan in December and no medication changes were made at that time.        The patient tells me today that she continues to have chest discomfort on a regular basis.  Before her TAVR, she had chest heaviness and dyspnea with exertion.  Now she does not get chest discomfort or dyspnea with exertion but does notice that after she does more moderate strenuous exertion, she feels the chest discomfort once she sits down to rest.  She also tells me that she has noticed it when she wakes up in the middle of night and uses the restroom and comes back to bed, the discomfort again starts once she is again lying down (not with activity).  For the past 3 nights she is propping herself up when she gets back to bed and feels that this has prevented the chest discomfort from occurring.  Again, she does not complain of any dyspnea or  shortness of breath.        Earlier this month, she also had 1 episode of presumed syncope.  This was in the setting of an upper respiratory illness which she had been battling.  She approximately forgot to take her medications for a couple of days, and on the third day when she took her medications, shortly after taking the medication and she had an episode where she believes she transiently lost consciousness while sitting, although she did not fall off of her chair.  She called the nurse at her living facility and they decided that it was most likely she was dehydrated.  She did not seek any evaluation at that time.  She has not had any further syncopal or presyncopal type episodes.  The patient does state in the more distant past, she had had recurrent syncope, has not had any episodes for about the past 3 years or so.        Her other concern today is that she feels as though she has been having anxiety/panic attacks.  Sometimes these are related to the chest discomfort she gets but can occur independently as well.      CURRENT CARDIAC MEDICATIONS:   1.  Imdur 30 mg daily.   2.  Atorvastatin 40 mg daily.   3.  Metoprolol 25 mg half tablet b.i.d.   4.  Sublingual nitro p.r.n.   5.  Aspirin 81 mg daily.   6.  Plavix 75 mg daily.      The remainder of her medications, allergies and review of systems were reviewed and as are documented separately.      PHYSICAL EXAMINATION:   GENERAL:  The patient is a pleasant 89-year-old female who appears her stated age.  She is in no apparent distress.   VITAL SIGNS:  Her blood pressure is 132/62, pulse 84, weight is 152 pounds.   LUNGS:  Clear to auscultation bilaterally, although she does seem to have some decreased air movement.   CARDIAC:  Reveals a regular rate and rhythm.  An occasional ectopic beat was noted.  She did have a 2/6 systolic ejection murmur heard best at the upper left sternal border.   ABDOMEN:  Soft, nontender, nondistended.   LOWER EXTREMITIES:  Show no  evidence of edema.   NEUROLOGIC:  Alert and oriented.      She did have an echocardiogram which showed a normal LV size.  Her ejection fraction is estimated at 50%-55%.  No regional wall motion abnormalities were noted.  She has mild to moderate mitral regurgitation along with mild to moderate mitral stenosis.  She is noted to have a prosthetic aortic valve which appears to be opening well.  The mean gradient is 9.  The echo reader states that compared to September, the aortic gradient along with the MR and MS are stable.  It is noted that the LV function may has decreased slightly.      She had an EKG performed during today's office visit.  This showed sinus rhythm with a left bundle branch block.      ASSESSMENT:   1.  History of severe aortic stenosis, status post TAVR with a 27 mm Treva tissue valve on 08/03/2016.  Her followup echocardiogram shows stable gradients across the valve.  I would say her overall functional class is consistent with New York Heart Association class II.   2.  Ongoing chest discomfort which is quite atypical.  Initially I was concerned that maybe her discomfort is related to GERD.  However, she states that she has had GERD in the past and this is not similar to her symptoms.  I do see that she is on Pepcid on a daily basis.  She did have some response to Imdur after being placed on this after her myocardial infarction.  At this point, I have recommended trying an increased dose to see if this helps her symptomatology at all.  I also recommended that we do PFTs just to ensure that we are not missing any pulmonary disease that may be causing the symptomatology.  My other thought would be that this may be related to anxiety/panic attacks; however, these symptoms do not always occur with her other symptoms which are more typical for anxiety/panic attacks.   3.  Hypertension, well controlled.   4.  History of frequent PACs and PVCs.  I think it is most likely that her recent episode of  syncope occurred in the setting of poor p.o. intake; however, if the patient has any further presyncopal or syncopal type symptoms, I would not hesitate to reassess her heart rhythm with an event monitor.      PLAN:   1.  The patient was instructed to increase her isosorbide mononitrate to 60 mg daily.   2.  PFTs were ordered for the patient to perform in the near future.   3.  The patient was instructed to follow up in the TAVR Clinic in 08/2017.  In the interim, we will contact her after we have the results of her pulmonary function tests to see if there has been any benefit the increased dose of Imdur and give her any further recommendations in regards to followup at that time.  She has been seen at our [facility] office and we could certainly arrange closer follow up there as necessary.  4.  I also asked her to follow up closely with her PCP to discuss her anxiety concerns.    Again, thank you for allowing me to participate in the care of your patient.      Sincerely,    Nita Shafer PA-C     [facility]

## 2017-02-09 NOTE — PATIENT INSTRUCTIONS
Thank you for your [facility] visit today. Your provider has recommended the following:    Medication Changes:  INCREASE your Imdur to 60mg daily. You can use up your 30mg tabs by taking two at the same time if you wish. The new bottle will be 60mg tabs so just take one tabs daily.  Recommendations:  Breathing/lung test.  Please call [phone] to scheduled at [facility]  Follow-up:  At the 1 year tamara, we will remind you.                [facility]

## 2017-02-10 ENCOUNTER — RESEARCH ENCOUNTER (OUTPATIENT)
Dept: CARDIOLOGY | Facility: CLINIC | Age: 82
End: 2017-02-10

## 2017-02-11 NOTE — PROGRESS NOTES
"REPRISE III TAVR Trial: 09 FEB 2017. Met with Subject and daughter for 6 MO Visit following ECHO. Arrived walking with a cane in good spirits. Quality of Life questionnaires completed without assistance. Has had recurrent UTIs and 2 hospitalizations since 30 day visit. Describes a fall on the ice about 2 weeks ago. Bruise on back resulted, but otherwise no injuries. States that she had a cold and the flu last week. She admits not taking her pills for 2 days, then, \"passed out\" for a few seconds while sitting and brushing her teeth. She got herself back to bed, then called the staff at the assisted living center. She recovered without issues and decided not to go to the hospital; has had no further episodes. Next visit at 12 months between July and 02 SEP 2017.  "

## 2017-02-14 NOTE — PROGRESS NOTES
Please see separate dictation for HPI, PHYSICAL EXAM AND IMPRESSION/PLAN.    CURRENT MEDICATIONS:  Current Outpatient Prescriptions   Medication Sig Dispense Refill     minocycline (MINOCIN/DYNACIN) 100 MG capsule TAKE ONE CAPSULE BY MOUTH ONCE DAILY 30 capsule 0     isosorbide mononitrate (IMDUR) 30 MG 24 hr tablet Take 1 tablet (30 mg) by mouth daily 90 tablet 3     levETIRAcetam (KEPPRA) 500 MG tablet Take 1 tablet (500 mg) by mouth daily take 1000 mg at night 1 tablet 0     levothyroxine (SYNTHROID/LEVOTHROID) 50 MCG tablet Take 1 tablet (50 mcg) by mouth daily Except tuesday 90 tablet 1     mirtazapine (REMERON) 30 MG tablet Take 1 pills at bedtime 90 tablet 0     atorvastatin (LIPITOR) 40 MG tablet Take 1 tablet (40 mg) by mouth daily Reports taking 1 tablet 0     Cranberry 200 MG CAPS Take 3 capsules (600 mg) by mouth 2 times daily 1 capsule 0     famotidine (PEPCID) 40 MG tablet TAKE ONE TABLET BY MOUTH AT BEDTIME 30 tablet 9     metoprolol (LOPRESSOR) 25 MG tablet Reports taking 0.5 tablet  tablet 1     nitroglycerin (NITROSTAT) 0.4 MG SL tablet Pt reports taking - -  -For chest pain place 1 tablet under the tongue every 5 minutes for 3 doses. If symptoms persist 5 minutes after 1st dose call 911. 25 tablet 0     folic acid (FOLVITE) 1 MG tablet TAKE ONE TABLET BY MOUTH ONCE DAILY 90 tablet 1     aspirin EC 81 MG EC tablet Take 1 tablet (81 mg) by mouth daily       methotrexate 2.5 MG tablet Take 4 tablets (10 mg) by mouth once a week Wed 52 tablet 3     clopidogrel (PLAVIX) 75 MG tablet TAKE ONE TABLET BY MOUTH ONCE DAILY 90 tablet 3     predniSONE (DELTASONE) 5 MG tablet TAKE 1 TABLET BY MOUTH EVERY OTHER DAY, ALTERNATING WITH ONE-HALF TABLET EVERY OTHER DAY. 80 tablet 3     acetaminophen (TYLENOL ARTHRITIS PAIN) 650 MG CR tablet Take 2 tablets (1,300 mg) by mouth every 8 hours as needed for mild pain       calcium carbonate (TUMS) 500 MG chewable tablet Take 2-4 tablets (1,000-2,000 mg) by mouth  as needed for heartburn       docusate sodium (COLACE) 100 MG capsule Take 200 mg by mouth daily 2 capsules       multivitamin, therapeutic with minerals (MULTI-VITAMIN) TABS Take 1 tablet by mouth daily       Lactobacillus (FLORAJEN ACIDOPHILUS) CAPS Take 1 capsule by mouth daily         ALLERGIES:     Allergies   Allergen Reactions     Caffeine Other (See Comments)     Triggers your Meniere's disease     Hydrocodone Other (See Comments)     hallucinations     Ibuprofen GI Disturbance     Penicillins Hives     Tramadol Other (See Comments)     Seizure, was taking remeron along with tramadol     Metal [Staples] Rash       PAST MEDICAL HISTORY:  Past Medical History   Diagnosis Date     Anxiety      Anxiety      Aortic stenosis      s/p TAVR 8/17/16     Chronic migraine      Difficulty in walking(719.7)      Hyperlipidaemia      Hypertension      Hypothyroidism      Myocardial infarction (H)      Need for SBE (subacute bacterial endocarditis) prophylaxis      Numbness and tingling      decreased sensation in toes     Orthostatic hypotension      wears compression stockings     Osteoarthritis      axial, and preipheral     Osteopenia      PAC (premature atrial contraction)      PUD (peptic ulcer disease)      PVC (premature ventricular contraction)      Secondary Sjogren's syndrome (H)      Seizures (H)      after stroke (partial onset)     Seropositive rheumatoid arthritis (H)      Shortness of breath      Stroke (H) 05/2013     with visual field cut, left occipital lobe     Syncope 2014     due to orthostatic hypotension       PAST SURGICAL HISTORY:  Past Surgical History   Procedure Laterality Date     Mouth surgery  2011     jaw surgery due to fracture     Eye surgery  1999     macular pucker eye surgery     Cataract iol, rt/lt Bilateral 2000     Hysterectomy total abdominal  1970     ovaries out     C total hip arthroplasty Right 2010     C total hip arthroplasty Left 2014     Transcatheter aortic valve implant  anesthesia N/A 8/3/2016     Procedure: TRANSCATHETER AORTIC VALVE IMPLANT ANESTHESIA;  Surgeon: GENERIC ANESTHESIA PROVIDER;  Location: [facility] OR     Heart cath femoral cannulization with open standby repair aortic valve N/A 8/3/2016     Procedure: HEART CATH FEMORAL CANNULIZATION WITH OPEN STANDBY REPAIR AORTIC VALVE;  Surgeon: Owen Schultz MD;  Location: [facility] OR       SOCIAL HISTORY:  Social History     Social History     Marital status:      Spouse name: N/A     Number of children: 4     Years of education: N/A     Occupational History      Retired     Social History Main Topics     Smoking status: Never Smoker     Smokeless tobacco: Never Used     Alcohol use No     Drug use: No     Sexual activity: No     Other Topics Concern     Special Diet Yes     low salt      Exercise Yes     semi recument bike 15 mins daily      Social History Narrative    .  Lives in assisted living. Independent. Has help with making bed and changing sheets.    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 .     4 children - 1 with RA           FAMILY HISTORY:  Family History   Problem Relation Age of Onset     Family History Negative No family hx of      Hyperlipidemia Mother        Review of Systems:  Skin:  Negative       Eyes:  Positive for glasses    ENT:  Positive for hearing loss    Respiratory:  Positive for dyspnea on exertion;shortness of breath     Cardiovascular:    Positive for;palpitations;chest pain;fatigue  (occasional pain/pressure in center of chest)  Gastroenterology: Negative      Genitourinary:  not assessed      Musculoskeletal:  Positive for arthritis    Neurologic:  Negative      Psychiatric:  Negative      Heme/Lymph/Imm:  Negative      Endocrine:  Negative         Reviewed. Remainder of the note dictated.    Nita Shafer PA-C

## 2017-02-20 ENCOUNTER — OFFICE VISIT (OUTPATIENT)
Dept: INTERNAL MEDICINE | Facility: CLINIC | Age: 82
End: 2017-02-20
Payer: COMMERCIAL

## 2017-02-20 VITALS
WEIGHT: 156.8 LBS | BODY MASS INDEX: 25.31 KG/M2 | DIASTOLIC BLOOD PRESSURE: 54 MMHG | OXYGEN SATURATION: 100 % | HEART RATE: 94 BPM | TEMPERATURE: 97.4 F | SYSTOLIC BLOOD PRESSURE: 122 MMHG

## 2017-02-20 DIAGNOSIS — F41.9 ANXIETY: Primary | ICD-10-CM

## 2017-02-20 DIAGNOSIS — Z86.73 HISTORY OF CVA (CEREBROVASCULAR ACCIDENT): ICD-10-CM

## 2017-02-20 DIAGNOSIS — I25.10 CORONARY ARTERY DISEASE INVOLVING NATIVE CORONARY ARTERY OF NATIVE HEART WITHOUT ANGINA PECTORIS: ICD-10-CM

## 2017-02-20 DIAGNOSIS — Z95.2 S/P TAVR (TRANSCATHETER AORTIC VALVE REPLACEMENT): ICD-10-CM

## 2017-02-20 PROCEDURE — 99214 OFFICE O/P EST MOD 30 MIN: CPT | Performed by: INTERNAL MEDICINE

## 2017-02-20 RX ORDER — ALPRAZOLAM 0.25 MG
0.25 TABLET ORAL 2 TIMES DAILY PRN
Qty: 28 TABLET | Refills: 0 | Status: SHIPPED | OUTPATIENT
Start: 2017-02-20 | End: 2018-04-12

## 2017-02-20 ASSESSMENT — ANXIETY QUESTIONNAIRES
IF YOU CHECKED OFF ANY PROBLEMS ON THIS QUESTIONNAIRE, HOW DIFFICULT HAVE THESE PROBLEMS MADE IT FOR YOU TO DO YOUR WORK, TAKE CARE OF THINGS AT HOME, OR GET ALONG WITH OTHER PEOPLE: NOT DIFFICULT AT ALL
6. BECOMING EASILY ANNOYED OR IRRITABLE: SEVERAL DAYS
GAD7 TOTAL SCORE: 4
2. NOT BEING ABLE TO STOP OR CONTROL WORRYING: NOT AT ALL
3. WORRYING TOO MUCH ABOUT DIFFERENT THINGS: SEVERAL DAYS
5. BEING SO RESTLESS THAT IT IS HARD TO SIT STILL: NOT AT ALL
7. FEELING AFRAID AS IF SOMETHING AWFUL MIGHT HAPPEN: NOT AT ALL
1. FEELING NERVOUS, ANXIOUS, OR ON EDGE: SEVERAL DAYS

## 2017-02-20 ASSESSMENT — PATIENT HEALTH QUESTIONNAIRE - PHQ9: 5. POOR APPETITE OR OVEREATING: SEVERAL DAYS

## 2017-02-20 ASSESSMENT — PAIN SCALES - GENERAL: PAINLEVEL: MODERATE PAIN (4)

## 2017-02-20 NOTE — NURSING NOTE
"Chief Complaint   Patient presents with     Anxiety       Initial /54 (BP Location: Right arm, Patient Position: Chair, Cuff Size: Adult Regular)  Pulse 94  Temp 97.4  F (36.3  C) (Temporal)  Wt 156 lb 12.8 oz (71.1 kg)  SpO2 100%  BMI 25.31 kg/m2 Estimated body mass index is 25.31 kg/(m^2) as calculated from the following:    Height as of 2/9/17: 5' 6\" (1.676 m).    Weight as of this encounter: 156 lb 12.8 oz (71.1 kg).  Medication Reconciliation: complete   Lela HOLLEY      "

## 2017-02-20 NOTE — PROGRESS NOTES
Chief Complaint   Patient presents with     Anxiety     CHIEF COMPLAINT:    The patient is a very pleasant 89-year-old female who is anticipating a flight to [place]. She is quite concerned about the entire situation. This is making her very nervous. She is not really afraid of flying which she is worried about missing the flight and all the trials and tribulations associated with periportal activity. She is also worried that she might go down there and have a heart attack. She has gone down there before and had a heart attack. I've explained to her that is not relieved [place] the usual heart attack it which is the timing. After a lengthy discussion, she notes that she really would like something to use just on an as needed basis for anxiety. Her daughter who is with her states that this would be quite helpful. After some discussion, we've decided to use a very low dose benzodiazepine on a very infrequent basis. She will take the first dose while supervised by her family members. She'll continue the Remeron at bedtime for her anxiety and sleep at night. She does have several questions regarding her medications. She has read the package inserts that the pharmacist was helpful enough to give her. They are highlighted in yellow and all the discrepancies have been explained.                         PAST, FAMILY,SOCIAL HISTORY:     Medical  History:   has a past medical history of Anxiety; Anxiety; Aortic stenosis; Chronic migraine; Difficulty in walking(719.7); Hyperlipidaemia; Hypertension; Hypothyroidism; Myocardial infarction (H); Need for SBE (subacute bacterial endocarditis) prophylaxis; Numbness and tingling; Orthostatic hypotension; Osteoarthritis; Osteopenia; PAC (premature atrial contraction); PUD (peptic ulcer disease); PVC (premature ventricular contraction); Secondary Sjogren's syndrome (H); Seizures (H); Seropositive rheumatoid arthritis (H); Shortness of breath; Stroke (H) (05/2013); and Syncope  (2014).     Surgical History:   has a past surgical history that includes Mouth surgery (2011); Eye surgery (1999); cataract iol, rt/lt (Bilateral, 2000); Hysterectomy total abdominal (1970); TOTAL HIP ARTHROPLASTY (Right, 2010); TOTAL HIP ARTHROPLASTY (Left, 2014); TRANSCATHETER AORTIC VALVE IMPLANT ANEST (N/A, 8/3/2016); and Heart Cath Femoral Cannulization With Open Standby Repair Aortic Valve (N/A, 8/3/2016).     Social History:   reports that she has never smoked. She has never used smokeless tobacco. She reports that she does not drink alcohol or use illicit drugs.     Family History:  family history includes Hyperlipidemia in her mother. There is no history of Family History Negative.            MEDICATIONS  Current Outpatient Prescriptions   Medication Sig Dispense Refill     ALPRAZolam (XANAX) 0.25 MG tablet Take 1 tablet (0.25 mg) by mouth 2 times daily as needed for anxiety 28 tablet 0     minocycline (MINOCIN/DYNACIN) 100 MG capsule TAKE ONE CAPSULE BY MOUTH ONCE DAILY 30 capsule 0     isosorbide mononitrate (IMDUR) 60 MG 24 hr tablet Take 1 tablet (60 mg) by mouth daily 90 tablet 3     levETIRAcetam (KEPPRA) 500 MG tablet Take 1 tablet (500 mg) by mouth daily take 1000 mg at night 1 tablet 0     levothyroxine (SYNTHROID/LEVOTHROID) 50 MCG tablet Take 1 tablet (50 mcg) by mouth daily Except tuesday 90 tablet 1     mirtazapine (REMERON) 30 MG tablet Take 1 pills at bedtime 90 tablet 0     atorvastatin (LIPITOR) 40 MG tablet Take 1 tablet (40 mg) by mouth daily Reports taking 1 tablet 0     Cranberry 200 MG CAPS Take 3 capsules (600 mg) by mouth 2 times daily 1 capsule 0     famotidine (PEPCID) 40 MG tablet TAKE ONE TABLET BY MOUTH AT BEDTIME 30 tablet 9     metoprolol (LOPRESSOR) 25 MG tablet Reports taking 0.5 tablet  tablet 1     nitroglycerin (NITROSTAT) 0.4 MG SL tablet Pt reports taking - -  -For chest pain place 1 tablet under the tongue every 5 minutes for 3 doses. If symptoms persist 5  minutes after 1st dose call 911. 25 tablet 0     folic acid (FOLVITE) 1 MG tablet TAKE ONE TABLET BY MOUTH ONCE DAILY 90 tablet 1     aspirin EC 81 MG EC tablet Take 1 tablet (81 mg) by mouth daily       methotrexate 2.5 MG tablet Take 4 tablets (10 mg) by mouth once a week Wed 52 tablet 3     clopidogrel (PLAVIX) 75 MG tablet TAKE ONE TABLET BY MOUTH ONCE DAILY 90 tablet 3     predniSONE (DELTASONE) 5 MG tablet TAKE 1 TABLET BY MOUTH EVERY OTHER DAY, ALTERNATING WITH ONE-HALF TABLET EVERY OTHER DAY. 80 tablet 3     acetaminophen (TYLENOL ARTHRITIS PAIN) 650 MG CR tablet Take 2 tablets (1,300 mg) by mouth every 8 hours as needed for mild pain       calcium carbonate (TUMS) 500 MG chewable tablet Take 2-4 tablets (1,000-2,000 mg) by mouth as needed for heartburn       docusate sodium (COLACE) 100 MG capsule Take 200 mg by mouth daily 2 capsules       multivitamin, therapeutic with minerals (MULTI-VITAMIN) TABS Take 1 tablet by mouth daily       Lactobacillus (FLORAJEN ACIDOPHILUS) CAPS Take 1 capsule by mouth daily           --------------------------------------------------------------------------------------------------------------------                          REVIEW OF SYSTEMS:         LUNGS: Pt denies: cough,excess sputum, hemoptysis, or shortness of breath.   HEART: Pt denies: chest pain, arrythmia, syncope, tachy or bradyarrhythmia or excess edema.   GI: Pt denies: nausea, vomitting, diarrhea, constipation, melena, or hematochezia.   NEURO: Pt denies: seizures, strokes, diplopia, weakness, paraesthesias, or paralysis.   SKIN: Pt denies: itching, rashes, discoloration, or specific lesions of concern. Denies recent hair loss.   NEURO: Pt denies: seizures, strokes, diplopia, weakness, paraesthesias, or paralysis.                          EXAMINATION:       /54 (BP Location: Right arm, Patient Position: Chair, Cuff Size: Adult Regular)  Pulse 94  Temp 97.4  F (36.3  C) (Temporal)  Wt 156 lb 12.8 oz (71.1  kg)  SpO2 100%  BMI 25.31 kg/m2   Constitutional: The patient appears to be in no acute distress. The patient appears to be adequately hydrated. No acute respiratory or hemodynamic distress is noted at this time.   LUNGS: clear bilaterally, airflow is brisk, no intercostal retraction or stridor is noted. No coughing is noted during visit.   HEART:  regular without rubs, clicks, gallops, or murmurs. PMI is nondisplaced. Upstrokes are brisk. S1,S2 are heard.   GI: Abdomen is soft, without rebound, guarding or tenderness. Bowel sounds are appropriate. No renal bruits are heard.    NEURO: Pt is alert and appropriate. No neurologic lateralization is noted. Cranial nerves 2-12 are intact. Peripheral sensory and motor function are grossly normal   PSYCH: The patient appears grossly appropriate. Maintains good eye contact, does not have any jittery or atypical motion. Displays appropriate affect.                        DECISION MAKIN. Anxiety  Recommend the occasional use of the alprazolam. She will take the first dose under close supervision.  - ALPRAZolam (XANAX) 0.25 MG tablet; Take 1 tablet (0.25 mg) by mouth 2 times daily as needed for anxiety  Dispense: 28 tablet; Refill: 0    2. Coronary artery disease involving native coronary artery of native heart without angina pectoris  Continue current medications including the isosorbide mononitrate, aspirin, atorvastatin,  Beta blocker due to occasional hypotension    3. S/P TAVR (transcatheter aortic valve replacement)  Follow closely. As above      4. History of CVA (cerebrovascular accident) -   Continue aspirin and statin                           FOLLOW UP    I have asked the patient to make an appointment for follow up with me in a couple weeks after she determines the response to the medication        I have carefully explained the diagnosis and treatment options with the patient. The patient has displayed an understanding of the above, and all subsequent  questions were answered.     35  minutes were spent with patient and greater than 50% of the time was spent counseling patient and coordinating,as well as educating the patient on their disease/health    DO DELMA Pineda    Portions of this note were produced using [employer] App  Although every attempt at real-time proof reading has been made, occasional grammar/syntax errors may have been missed.

## 2017-02-20 NOTE — MR AVS SNAPSHOT
After Visit Summary   2/20/2017    Gillian Burk    MRN: [id number]           Patient Information     Date Of Birth          3/19/1927        Visit Information        Provider Department      2/20/2017 1:20 PM Augusto Meyer DO [facility]        Today's Diagnoses     Anxiety    -  1    Coronary artery disease involving native coronary artery of native heart without angina pectoris        S/P TAVR (transcatheter aortic valve replacement)        History of CVA (cerebrovascular accident) - 2013           Follow-ups after your visit        Your next 10 appointments already scheduled     Mar 01, 2017  2:00 PM CST   Pulmonary Function with [facility] RESPIRATORY THERAPY   [facility] ([facility])    [address] [place] [id number] [phone]           No Inhalers for 6 hours prior to test No Smoking 2 hours prior to test              Who to contact     If you have questions or need follow up information about today's clinic visit or your schedule please contact [facility] directly at [phone].  Normal or non-critical lab and imaging results will be communicated to you by [employer], letter or phone within 4 business days after the clinic has received the results. If you do not hear from us within 7 days, please contact the clinic through [employer] or phone. If you have a critical or abnormal lab result, we will notify you by phone as soon as possible.  Submit refill requests through [employer] or call your pharmacy and they will forward the refill request to us. Please allow 3 business days for your refill to be completed.          Additional Information About Your Visit        [employer] Information     [employer] gives you secure access to your electronic health record. If you see a primary care provider, you can also send messages to your care team and make appointments. If you have questions, please call your  primary care clinic.  If you do not have a primary care provider, please call [phone] and they will assist you.        Care EveryWhere ID     This is your Care EveryWhere ID. This could be used by other organizations to access your [facility] medical records  [id number]        Your Vitals Were     Pulse Temperature Pulse Oximetry BMI (Body Mass Index)          94 97.4  F (36.3  C) (Temporal) 100% 25.31 kg/m2         Blood Pressure from Last 3 Encounters:   02/20/17 122/54   02/09/17 132/62   01/12/17 122/62    Weight from Last 3 Encounters:   02/20/17 156 lb 12.8 oz (71.1 kg)   02/09/17 152 lb (68.9 kg)   01/13/17 157 lb (71.2 kg)              Today, you had the following     No orders found for display         Today's Medication Changes          These changes are accurate as of: 2/20/17  2:02 PM.  If you have any questions, ask your nurse or doctor.               Start taking these medicines.        Dose/Directions    ALPRAZolam 0.25 MG tablet   Commonly known as:  XANAX   Used for:  Anxiety   Started by:  Augusto Meyer DO        Dose:  0.25 mg   Take 1 tablet (0.25 mg) by mouth 2 times daily as needed for anxiety   Quantity:  28 tablet   Refills:  0            Where to get your medicines      Some of these will need a paper prescription and others can be bought over the counter.  Ask your nurse if you have questions.     Bring a paper prescription for each of these medications     ALPRAZolam 0.25 MG tablet                Primary Care Provider Office Phone # Fax #    Augusto Meyer [phone]       [address] [place] [address]        Thank you!     Thank you for choosing [facility]  for your care. Our goal is always to provide you with excellent care. Hearing back from our patients is one way we can continue to improve our services. Please take a few minutes to complete the written survey that you may receive in the mail  after your visit with us. Thank you!             Your Updated Medication List - Protect others around you: Learn how to safely use, store and throw away your medicines at www.disposemymeds.org.          This list is accurate as of: 2/20/17  2:02 PM.  Always use your most recent med list.                   Brand Name Dispense Instructions for use    ALPRAZolam 0.25 MG tablet    XANAX    28 tablet    Take 1 tablet (0.25 mg) by mouth 2 times daily as needed for anxiety       aspirin 81 MG EC tablet      Take 1 tablet (81 mg) by mouth daily       atorvastatin 40 MG tablet    LIPITOR    1 tablet    Take 1 tablet (40 mg) by mouth daily Reports taking       calcium carbonate 500 MG chewable tablet    TUMS     Take 2-4 tablets (1,000-2,000 mg) by mouth as needed for heartburn       clopidogrel 75 MG tablet    PLAVIX    90 tablet    TAKE ONE TABLET BY MOUTH ONCE DAILY       Cranberry 200 MG Caps     1 capsule    Take 3 capsules (600 mg) by mouth 2 times daily       docusate sodium 100 MG capsule    COLACE     Take 200 mg by mouth daily 2 capsules       famotidine 40 MG tablet    PEPCID    30 tablet    TAKE ONE TABLET BY MOUTH AT BEDTIME       FLORAJEN ACIDOPHILUS Caps      Take 1 capsule by mouth daily       folic acid 1 MG tablet    FOLVITE    90 tablet    TAKE ONE TABLET BY MOUTH ONCE DAILY       isosorbide mononitrate 60 MG 24 hr tablet    IMDUR    90 tablet    Take 1 tablet (60 mg) by mouth daily       levETIRAcetam 500 MG tablet    KEPPRA    1 tablet    Take 1 tablet (500 mg) by mouth daily take 1000 mg at night       levothyroxine 50 MCG tablet    SYNTHROID/LEVOTHROID    90 tablet    Take 1 tablet (50 mcg) by mouth daily Except tuesday       methotrexate 2.5 MG tablet CHEMO     52 tablet    Take 4 tablets (10 mg) by mouth once a week Wed       metoprolol 25 MG tablet    LOPRESSOR    180 tablet    Reports taking 0.5 tablet BID       minocycline 100 MG capsule    MINOCIN/DYNACIN    30 capsule    TAKE ONE CAPSULE BY MOUTH  ONCE DAILY       mirtazapine 30 MG tablet    REMERON    90 tablet    Take 1 pills at bedtime       Multi-vitamin Tabs tablet      Take 1 tablet by mouth daily       nitroglycerin 0.4 MG sublingual tablet    NITROSTAT    25 tablet    Pt reports taking - -  -For chest pain place 1 tablet under the tongue every 5 minutes for 3 doses. If symptoms persist 5 minutes after 1st dose call 911.       predniSONE 5 MG tablet    DELTASONE    80 tablet    TAKE 1 TABLET BY MOUTH EVERY OTHER DAY, ALTERNATING WITH ONE-HALF TABLET EVERY OTHER DAY.       TYLENOL ARTHRITIS PAIN 650 MG CR tablet   Generic drug:  acetaminophen      Take 2 tablets (1,300 mg) by mouth every 8 hours as needed for mild pain

## 2017-02-21 ASSESSMENT — ANXIETY QUESTIONNAIRES: GAD7 TOTAL SCORE: 4

## 2017-02-28 DIAGNOSIS — G40.909 SEIZURE DISORDER (H): ICD-10-CM

## 2017-03-01 ENCOUNTER — HOSPITAL ENCOUNTER (OUTPATIENT)
Dept: RESPIRATORY THERAPY | Facility: CLINIC | Age: 82
Discharge: HOME | End: 2017-03-01
Attending: PHYSICIAN ASSISTANT | Admitting: PHYSICIAN ASSISTANT
Payer: COMMERCIAL

## 2017-03-01 DIAGNOSIS — R07.9 CHEST PAIN, UNSPECIFIED TYPE: ICD-10-CM

## 2017-03-01 DIAGNOSIS — M05.79 RHEUMATOID ARTHRITIS INVOLVING MULTIPLE SITES WITH POSITIVE RHEUMATOID FACTOR (H): ICD-10-CM

## 2017-03-01 DIAGNOSIS — Z79.899 ENCOUNTER FOR LONG-TERM (CURRENT) USE OF MEDICATIONS: Primary | ICD-10-CM

## 2017-03-01 LAB
ALT SERPL W P-5'-P-CCNC: 18 U/L (ref 0–50)
AST SERPL W P-5'-P-CCNC: 19 U/L (ref 0–45)
BASOPHILS # BLD AUTO: 0 10E9/L (ref 0–0.2)
BASOPHILS NFR BLD AUTO: 0.5 %
CREAT SERPL-MCNC: 1.09 MG/DL (ref 0.52–1.04)
CRP SERPL-MCNC: <2.9 MG/L (ref 0–8)
DIFFERENTIAL METHOD BLD: ABNORMAL
EOSINOPHIL # BLD AUTO: 0 10E9/L (ref 0–0.7)
EOSINOPHIL NFR BLD AUTO: 0.5 %
ERYTHROCYTE [DISTWIDTH] IN BLOOD BY AUTOMATED COUNT: 17.1 % (ref 10–15)
GFR SERPL CREATININE-BSD FRML MDRD: 47 ML/MIN/1.7M2
HCT VFR BLD AUTO: 24.6 % (ref 35–47)
HGB BLD-MCNC: 7.6 G/DL (ref 11.7–15.7)
IMM GRANULOCYTES # BLD: 0 10E9/L (ref 0–0.4)
IMM GRANULOCYTES NFR BLD: 0.2 %
LYMPHOCYTES # BLD AUTO: 0.9 10E9/L (ref 0.8–5.3)
LYMPHOCYTES NFR BLD AUTO: 14.4 %
MCH RBC QN AUTO: 27.2 PG (ref 26.5–33)
MCHC RBC AUTO-ENTMCNC: 30.9 G/DL (ref 31.5–36.5)
MCV RBC AUTO: 88 FL (ref 78–100)
MONOCYTES # BLD AUTO: 0.4 10E9/L (ref 0–1.3)
MONOCYTES NFR BLD AUTO: 5.5 %
NEUTROPHILS # BLD AUTO: 5.1 10E9/L (ref 1.6–8.3)
NEUTROPHILS NFR BLD AUTO: 78.9 %
PLATELET # BLD AUTO: 305 10E9/L (ref 150–450)
RBC # BLD AUTO: 2.79 10E12/L (ref 3.8–5.2)
WBC # BLD AUTO: 6.5 10E9/L (ref 4–11)

## 2017-03-01 PROCEDURE — 82565 ASSAY OF CREATININE: CPT | Performed by: INTERNAL MEDICINE

## 2017-03-01 PROCEDURE — 84460 ALANINE AMINO (ALT) (SGPT): CPT | Performed by: INTERNAL MEDICINE

## 2017-03-01 PROCEDURE — 85025 COMPLETE CBC W/AUTO DIFF WBC: CPT | Performed by: INTERNAL MEDICINE

## 2017-03-01 PROCEDURE — 94010 BREATHING CAPACITY TEST: CPT

## 2017-03-01 PROCEDURE — 86140 C-REACTIVE PROTEIN: CPT | Performed by: INTERNAL MEDICINE

## 2017-03-01 PROCEDURE — 84450 TRANSFERASE (AST) (SGOT): CPT | Performed by: INTERNAL MEDICINE

## 2017-03-01 PROCEDURE — 94729 DIFFUSING CAPACITY: CPT

## 2017-03-01 PROCEDURE — 36415 COLL VENOUS BLD VENIPUNCTURE: CPT | Performed by: INTERNAL MEDICINE

## 2017-03-01 PROCEDURE — 94726 PLETHYSMOGRAPHY LUNG VOLUMES: CPT

## 2017-03-01 NOTE — PROGRESS NOTES
The FVC, FEV1, FEV1/FVC ratio and LZZ25-28% are within normal limits.  The inspiratory flow rates are reduced.  The airway resistance is normal.  Lung volumes are within normal limits.  The reduced diffusing capacity indicates a moderate loss of functional alveolar capillary surface.  However, the diffusing capacity was not corrected for the patient's hemoglobin.    The diffusion defect is consistent with a pulmonary vascular process.  Anemia cannot be excluded as a potential cause of the diffusion defect without correcting the observed diffusing capacity for hemoglobin.  In view of the severity of the diffusion defect, studies with exercise would be helpful to evaluate the presence of hypoxemia.    IMPRESSION:  Moderate Diffusion Defect -Pulmonary Vascular        This preliminary report should not be used clinically unless reviewed and signed by a physician.

## 2017-03-01 NOTE — PROGRESS NOTES
Pre-Bronch    Post-Bronch      Actual Pred %Pred  Actual %Pred %Chng    ---- SPIROMETRY ----                    FVC (L)  2.37 2.45 96              FEV1 (L)  1.94 1.82 106              FEV1/FVC (%) 82 76                FEV1/SVC (%) 87 74                FEV1/FEV6 (%) 82 76                FEF Max (L/sec) 4.46 4.05 110              FEF 25-75% (L/sec) 2.21 1.37 160              FIVC (L)  2.09                  FIF Max (L/sec) 2.16 4.11 52              Expiratory Time (sec) 7.94                  ----LUNG MECHANICS                     MVV (L/min)   79                MEP (cmH2O)                    MIP (cmH2O)                    ---- LUNG VOLUMES ----                    SVC (L) 2.24 2.45 91              IC (L) 2.03 2.37 85              ERV (L) 0.22 0.08 271              FRC(Pleth) (L) 2.81 2.85 98              RV (Pleth) (L) 2.59 2.47 104              TLC (Pleth) (L) 4.83 5.27 91              RV/TLC (Pleth) (%) 54 50                ---- DIFFUSION ----                    DLCOunc (ml/min/mmHg) 9.48 19.12 49              DLCOcor (ml/min/mmHg)   19.12                DL/VA (ml/min/mmHg/L) 2.55 3.55                VA (L) 3.72 5.39 68              IVC (L) 2.16                  Hgb (gm/dL)   12-18                ---- BLOOD GASES ----

## 2017-03-01 NOTE — TELEPHONE ENCOUNTER
Keppra       Last Written Prescription Date: 1/23/2017  Last Fill Quantity: 1,  # refills: 0   Last Office Visit with G, [facility] or [facility] prescribing provider: 2/20/2017

## 2017-03-02 NOTE — DISCHARGE INSTRUCTIONS
Thank you for completing pulmonary function testing today.  All results will be scanned into your epic results for your doctor to review.  Please resume taking all your current prescribed medications and diet as directed by your provider.   If you have not heard from your provider about your testing within two weeks and do not have a follow-up appointment scheduled with them please contact your provider about any questions you have concerning your testing.   Thank you  [facility]

## 2017-03-03 ENCOUNTER — APPOINTMENT (OUTPATIENT)
Dept: GENERAL RADIOLOGY | Facility: CLINIC | Age: 82
End: 2017-03-03
Attending: FAMILY MEDICINE
Payer: COMMERCIAL

## 2017-03-03 ENCOUNTER — HOSPITAL ENCOUNTER (OUTPATIENT)
Facility: CLINIC | Age: 82
Setting detail: OBSERVATION
Discharge: HOME | End: 2017-03-04
Attending: EMERGENCY MEDICINE | Admitting: FAMILY MEDICINE
Payer: COMMERCIAL

## 2017-03-03 DIAGNOSIS — K92.2 GASTROINTESTINAL HEMORRHAGE, UNSPECIFIED GASTROINTESTINAL HEMORRHAGE TYPE: ICD-10-CM

## 2017-03-03 DIAGNOSIS — K92.2 GI BLEED: ICD-10-CM

## 2017-03-03 DIAGNOSIS — R42 LIGHTHEADEDNESS: ICD-10-CM

## 2017-03-03 DIAGNOSIS — R07.9 ACUTE CHEST PAIN: ICD-10-CM

## 2017-03-03 DIAGNOSIS — D50.9 IRON DEFICIENCY ANEMIA, UNSPECIFIED IRON DEFICIENCY ANEMIA TYPE: ICD-10-CM

## 2017-03-03 LAB
ALBUMIN SERPL-MCNC: 3.5 G/DL (ref 3.4–5)
ALBUMIN UR-MCNC: NEGATIVE MG/DL
ALP SERPL-CCNC: 85 U/L (ref 40–150)
ALT SERPL W P-5'-P-CCNC: 24 U/L (ref 0–50)
ANION GAP SERPL CALCULATED.3IONS-SCNC: 12 MMOL/L (ref 3–14)
APPEARANCE UR: CLEAR
AST SERPL W P-5'-P-CCNC: 22 U/L (ref 0–45)
BASE EXCESS BLDV CALC-SCNC: 3.5 MMOL/L
BASOPHILS # BLD AUTO: 0 10E9/L (ref 0–0.2)
BASOPHILS NFR BLD AUTO: 0.6 %
BILIRUB SERPL-MCNC: 0.3 MG/DL (ref 0.2–1.3)
BILIRUB UR QL STRIP: NEGATIVE
BUN SERPL-MCNC: 15 MG/DL (ref 7–30)
CALCIUM SERPL-MCNC: 8.5 MG/DL (ref 8.5–10.1)
CHLORIDE SERPL-SCNC: 104 MMOL/L (ref 94–109)
CO2 SERPL-SCNC: 25 MMOL/L (ref 20–32)
COLOR UR AUTO: YELLOW
CREAT SERPL-MCNC: 0.9 MG/DL (ref 0.52–1.04)
DIFFERENTIAL METHOD BLD: ABNORMAL
EOSINOPHIL # BLD AUTO: 0.1 10E9/L (ref 0–0.7)
EOSINOPHIL NFR BLD AUTO: 1.6 %
ERYTHROCYTE [DISTWIDTH] IN BLOOD BY AUTOMATED COUNT: 17.2 % (ref 10–15)
GFR SERPL CREATININE-BSD FRML MDRD: 59 ML/MIN/1.7M2
GLUCOSE SERPL-MCNC: 96 MG/DL (ref 70–99)
GLUCOSE UR STRIP-MCNC: NEGATIVE MG/DL
HCO3 BLDV-SCNC: 26 MMOL/L (ref 21–28)
HCT VFR BLD AUTO: 26.2 % (ref 35–47)
HEMOCCULT STL QL: POSITIVE
HGB BLD-MCNC: 8.3 G/DL (ref 11.7–15.7)
HGB UR QL STRIP: NEGATIVE
IMM GRANULOCYTES # BLD: 0 10E9/L (ref 0–0.4)
IMM GRANULOCYTES NFR BLD: 0.1 %
INR PPP: 0.97 (ref 0.86–1.14)
KETONES UR STRIP-MCNC: NEGATIVE MG/DL
LEUKOCYTE ESTERASE UR QL STRIP: NEGATIVE
LYMPHOCYTES # BLD AUTO: 1.9 10E9/L (ref 0.8–5.3)
LYMPHOCYTES NFR BLD AUTO: 28 %
MCH RBC QN AUTO: 27.8 PG (ref 26.5–33)
MCHC RBC AUTO-ENTMCNC: 31.7 G/DL (ref 31.5–36.5)
MCV RBC AUTO: 88 FL (ref 78–100)
MONOCYTES # BLD AUTO: 0.5 10E9/L (ref 0–1.3)
MONOCYTES NFR BLD AUTO: 7.2 %
NEUTROPHILS # BLD AUTO: 4.3 10E9/L (ref 1.6–8.3)
NEUTROPHILS NFR BLD AUTO: 62.5 %
NITRATE UR QL: NEGATIVE
NON-SQ EPI CELLS #/AREA URNS LPF: ABNORMAL /LPF
O2/TOTAL GAS SETTING VFR VENT: 26 %
PCO2 BLDV: 31 MM HG (ref 40–50)
PH BLDV: 7.54 PH (ref 7.32–7.43)
PH UR STRIP: 7.5 PH (ref 5–7)
PLATELET # BLD AUTO: 349 10E9/L (ref 150–450)
PO2 BLDV: 17 MM HG (ref 25–47)
POTASSIUM SERPL-SCNC: 3.7 MMOL/L (ref 3.4–5.3)
PROT SERPL-MCNC: 7.3 G/DL (ref 6.8–8.8)
RBC # BLD AUTO: 2.99 10E12/L (ref 3.8–5.2)
RBC #/AREA URNS AUTO: ABNORMAL /HPF (ref 0–2)
SODIUM SERPL-SCNC: 141 MMOL/L (ref 133–144)
SP GR UR STRIP: 1.01 (ref 1–1.03)
TROPONIN I SERPL-MCNC: 0.06 UG/L (ref 0–0.04)
TROPONIN I SERPL-MCNC: 0.07 UG/L (ref 0–0.04)
TROPONIN I SERPL-MCNC: 0.07 UG/L (ref 0–0.04)
URN SPEC COLLECT METH UR: ABNORMAL
UROBILINOGEN UR STRIP-ACNC: 0.2 EU/DL (ref 0.2–1)
WBC # BLD AUTO: 6.8 10E9/L (ref 4–11)
WBC #/AREA URNS AUTO: ABNORMAL /HPF (ref 0–2)

## 2017-03-03 PROCEDURE — 84484 ASSAY OF TROPONIN QUANT: CPT | Performed by: EMERGENCY MEDICINE

## 2017-03-03 PROCEDURE — 81001 URINALYSIS AUTO W/SCOPE: CPT | Performed by: EMERGENCY MEDICINE

## 2017-03-03 PROCEDURE — 99285 EMERGENCY DEPT VISIT HI MDM: CPT | Mod: 25

## 2017-03-03 PROCEDURE — 93010 ELECTROCARDIOGRAM REPORT: CPT | Performed by: EMERGENCY MEDICINE

## 2017-03-03 PROCEDURE — G0378 HOSPITAL OBSERVATION PER HR: HCPCS

## 2017-03-03 PROCEDURE — 93005 ELECTROCARDIOGRAM TRACING: CPT

## 2017-03-03 PROCEDURE — 36415 COLL VENOUS BLD VENIPUNCTURE: CPT | Performed by: FAMILY MEDICINE

## 2017-03-03 PROCEDURE — 85610 PROTHROMBIN TIME: CPT | Performed by: FAMILY MEDICINE

## 2017-03-03 PROCEDURE — 71010 XR CHEST PORT 1 VW: CPT | Mod: TC

## 2017-03-03 PROCEDURE — 99285 EMERGENCY DEPT VISIT HI MDM: CPT | Mod: 25 | Performed by: EMERGENCY MEDICINE

## 2017-03-03 PROCEDURE — 84484 ASSAY OF TROPONIN QUANT: CPT | Performed by: FAMILY MEDICINE

## 2017-03-03 PROCEDURE — 84484 ASSAY OF TROPONIN QUANT: CPT | Mod: 91 | Performed by: FAMILY MEDICINE

## 2017-03-03 PROCEDURE — 82272 OCCULT BLD FECES 1-3 TESTS: CPT | Performed by: EMERGENCY MEDICINE

## 2017-03-03 PROCEDURE — 82803 BLOOD GASES ANY COMBINATION: CPT | Performed by: EMERGENCY MEDICINE

## 2017-03-03 PROCEDURE — 80053 COMPREHEN METABOLIC PANEL: CPT | Performed by: FAMILY MEDICINE

## 2017-03-03 PROCEDURE — 25000132 ZZH RX MED GY IP 250 OP 250 PS 637: Performed by: FAMILY MEDICINE

## 2017-03-03 PROCEDURE — 99219 ZZC INITIAL OBSERVATION CARE,LEVL II: CPT | Performed by: FAMILY MEDICINE

## 2017-03-03 PROCEDURE — 85025 COMPLETE CBC W/AUTO DIFF WBC: CPT | Performed by: FAMILY MEDICINE

## 2017-03-03 RX ORDER — LORAZEPAM 2 MG/ML
.5-1 INJECTION INTRAMUSCULAR EVERY 4 HOURS PRN
Status: DISCONTINUED | OUTPATIENT
Start: 2017-03-03 | End: 2017-03-04 | Stop reason: HOSPADM

## 2017-03-03 RX ORDER — NITROGLYCERIN 0.4 MG/1
0.4 TABLET SUBLINGUAL EVERY 5 MIN PRN
Status: DISCONTINUED | OUTPATIENT
Start: 2017-03-03 | End: 2017-03-04 | Stop reason: HOSPADM

## 2017-03-03 RX ORDER — NALOXONE HYDROCHLORIDE 0.4 MG/ML
.1-.4 INJECTION, SOLUTION INTRAMUSCULAR; INTRAVENOUS; SUBCUTANEOUS
Status: DISCONTINUED | OUTPATIENT
Start: 2017-03-03 | End: 2017-03-04 | Stop reason: HOSPADM

## 2017-03-03 RX ORDER — FAMOTIDINE 10 MG
40 TABLET ORAL AT BEDTIME
Status: DISCONTINUED | OUTPATIENT
Start: 2017-03-03 | End: 2017-03-04 | Stop reason: HOSPADM

## 2017-03-03 RX ORDER — LEVETIRACETAM 500 MG/1
500 TABLET ORAL EVERY MORNING
Status: DISCONTINUED | OUTPATIENT
Start: 2017-03-04 | End: 2017-03-04 | Stop reason: HOSPADM

## 2017-03-03 RX ORDER — LORAZEPAM 2 MG/ML
1 INJECTION INTRAMUSCULAR ONCE
Status: DISCONTINUED | OUTPATIENT
Start: 2017-03-03 | End: 2017-03-03

## 2017-03-03 RX ORDER — LORAZEPAM 0.5 MG/1
.5-1 TABLET ORAL EVERY 4 HOURS PRN
Status: DISCONTINUED | OUTPATIENT
Start: 2017-03-03 | End: 2017-03-04 | Stop reason: HOSPADM

## 2017-03-03 RX ORDER — CALCIUM CARBONATE 500 MG/1
TABLET, CHEWABLE ORAL 3 TIMES DAILY PRN
Status: DISCONTINUED | OUTPATIENT
Start: 2017-03-03 | End: 2017-03-04 | Stop reason: HOSPADM

## 2017-03-03 RX ORDER — LEVOTHYROXINE SODIUM 50 UG/1
50 TABLET ORAL DAILY
Status: DISCONTINUED | OUTPATIENT
Start: 2017-03-04 | End: 2017-03-04 | Stop reason: HOSPADM

## 2017-03-03 RX ORDER — ACETAMINOPHEN 325 MG/1
650 TABLET ORAL EVERY 4 HOURS PRN
Status: DISCONTINUED | OUTPATIENT
Start: 2017-03-03 | End: 2017-03-04 | Stop reason: HOSPADM

## 2017-03-03 RX ORDER — ISOSORBIDE MONONITRATE 30 MG/1
60 TABLET, EXTENDED RELEASE ORAL DAILY
Status: DISCONTINUED | OUTPATIENT
Start: 2017-03-04 | End: 2017-03-04 | Stop reason: HOSPADM

## 2017-03-03 RX ORDER — LEVETIRACETAM 500 MG/1
1000 TABLET ORAL AT BEDTIME
Status: DISCONTINUED | OUTPATIENT
Start: 2017-03-03 | End: 2017-03-04 | Stop reason: HOSPADM

## 2017-03-03 RX ORDER — PREDNISONE 5 MG/1
5 TABLET ORAL DAILY
Status: DISCONTINUED | OUTPATIENT
Start: 2017-03-04 | End: 2017-03-04 | Stop reason: HOSPADM

## 2017-03-03 RX ORDER — LEVETIRACETAM 500 MG/1
TABLET ORAL
Qty: 90 TABLET | Refills: 6 | Status: SHIPPED | OUTPATIENT
Start: 2017-03-03 | End: 2017-03-13

## 2017-03-03 RX ORDER — ASPIRIN 81 MG/1
324 TABLET, CHEWABLE ORAL ONCE
Status: DISCONTINUED | OUTPATIENT
Start: 2017-03-03 | End: 2017-03-03

## 2017-03-03 RX ORDER — ALPRAZOLAM 0.25 MG
0.25 TABLET ORAL 2 TIMES DAILY PRN
Status: DISCONTINUED | OUTPATIENT
Start: 2017-03-03 | End: 2017-03-04 | Stop reason: HOSPADM

## 2017-03-03 RX ORDER — MIRTAZAPINE 15 MG/1
30 TABLET, FILM COATED ORAL AT BEDTIME
Status: DISCONTINUED | OUTPATIENT
Start: 2017-03-03 | End: 2017-03-04 | Stop reason: HOSPADM

## 2017-03-03 RX ADMIN — METOPROLOL TARTRATE 12.5 MG: 25 TABLET ORAL at 21:17

## 2017-03-03 RX ADMIN — LEVETIRACETAM 1000 MG: 500 TABLET ORAL at 21:17

## 2017-03-03 RX ADMIN — FAMOTIDINE 40 MG: 10 TABLET, FILM COATED ORAL at 21:17

## 2017-03-03 RX ADMIN — MIRTAZAPINE 30 MG: 15 TABLET, FILM COATED ORAL at 21:16

## 2017-03-03 NOTE — ED NOTES
Brought to ED via EMS with concerns for chest pain; dizziness, sudden onset at 1030 at [facility]. Elda Vu RN

## 2017-03-03 NOTE — IP AVS SNAPSHOT
` ` Patient Information     Patient Name Sex     Gillian Burk ([id number]) Female 3/19/1927       Room Bed    269 269-01      Patient Demographics     Address Phone E-mail Address    [address] [place] [address] [address]  [facility] [address] [phone] (Home)  [phone] (Work)  [phone] (Mobile) [email or web address]      Patient Ethnicity & Race     Ethnic Group Patient Race    American White      Emergency Contact(s)     Name Relation Home Work Mobile    Hanna Vera Daughter   [phone]    vonda vera Relative [phone] none [phone]    nevaeh vera Grandchild [phone] none [phone]    filipe vera Grandchild [phone] none [phone]      Documents on File        Status Date Received Description       Documents for the Patient    Consent for Services - Hospital/Clinic Received () 06/10/14     Consent for EHR Access Received 06/10/14     Privacy Notice - [place] Received 06/10/14     Insurance Card Received () 06/10/14 BCBS    External Medication Information Consent Accepted 06/10/14     Patient ID Received 06/10/14 [place] LIFET[place]    [facility] Specified Other       Advance Directives and Living Will Received 14 LIVING WILL 14    Advance Directives and Living Will Received 14 VALIDATION OF AD 14    Advance Directives and Living Will Received 14 HEALTH CARE POWER OF  14    Advance Directives and Living Will Received 14 VALIDATION OF AD 14    Insurance Card Received 06/10/14 MEDICARE    HIM GORDO Authorization - File Only   [id number]    HIM GORDO Authorization  14 [facility] 14    HIM [place]ORDO Authorization - File Only  14 SELF - 14    Business/Insurance/Care Coordination/Health Form - Patient   MedicareBlue-Approval-12/03/14    Insurance Card Received () 01/27/15 bcbs     Business/Insurance/Care Coordination/Health Form - Patient    BCBS-SecureBlue-Care Coordination Note-01/29/15    Business/Insurance/Care Coordination/Health Form - Patient  05/21/15 BCBS- APPROVAL OF MEDICARE RX - 05/20/15    Business/Insurance/Care Coordination/Health Form - Patient  06/15/15 BCBS- HIGH RISK MED/NITROFURANTN- 05/27/15    Consent for Services - Hospital/Clinic Received () 07/07/15     Business/Insurance/Care Coordination/Health Form - Patient  07/28/15 BCBS- HIGH RISK MED NOTED/NITROFURANTN- 07/22/15    HIM [place]ORDO Authorization  08/04/15 BCBS [place]    Business/Insurance/Care Coordination/Health Form - Patient  10/05/15 BCBS- SECUREBLUE PLAN- 08/27/15    Advance Directives and Living Will Received 12/17/15 POLST 12/15/15    Consent for Services/Privacy Notice - Hospital/Clinic Received () 16     Business/Insurance/Care Coordination/Health Form - Patient  16 BCBS- APPROVAL/NITROFURANTOIN- 16 - 17    Insurance Card Received 16 BCBS    Consent for Services - [facility] Received 16     Consent for Services/Privacy Notice - Hospital/Clinic-Esign Received 17     HIM [place]ORDO Authorization - File Only  16 [facility] - 6/10/16    HIM GORDO Authorization  07/15/16 [facility]    HIM GORDO Authorization  16     Business/Insurance/Care Coordination/Health Form - Patient  08/15/16 [place]BS OF [place], CARE TRANSITION PROVIDER NOTIFICATION, 2016    HIM GORDO Authorization  16     Business/Insurance/Care Coordination/Health Form - Patient  16 BCBS - SECUREBLUE PROGRAM - 16    HIM GORDO Authorization  16     Insurance Card Received 17 [facility]     Insurance Card Received (Deleted) 16        Documents for the Encounter    CMS IM for Patient Signature       Discharge Attachment   IRON SUCROSE SOLUTION FOR INJECTION (ENGLISH)    Discharge Attachment   GASTROINTESTINAL (GI) BLEEDING, WHEN YOU HAVE (ENGLISH)    ECG   ECG Report      Admission Information     Attending Provider  Admitting Provider Admission Type Admission Date/Time     Anselmo Valentine MD Emergency 03/03/17  1114    Discharge Date Hospital Service Auth/Cert Status Service Area    03/04/17 Hospitalist [facility]    Unit Room/Bed Admission Status       PH 2A MEDICAL SURGICAL 269/269-01 Discharged (Confirmed)       Admission     Complaint    GI bleed      Hospital Account     Name Acct ID Class Status Primary Coverage    Gillian Burk [id number] Observation Discharged/Not Billed BLUE PLUS - BLUE PLUS SECURE BLUE            Guarantor Account (for Hospital Account #[id number])     Name Relation to Pt Service Area Active? Acct Type    Gillian Burk Self FCS Yes Personal/Family    Address Phone          [address]  [place] [address] [phone](H)              Coverage Information (for Hospital Account #[id number])     F/O Payor/Plan Precert #    BLUE PLUS/BLUE PLUS SECURE BLUE     Subscriber Subscriber #    Gillian Burk [id number]    Address Phone    [address] [address]  [place] [address] [phone]

## 2017-03-03 NOTE — ED NOTES
"PT BIB ambulance c/o chest pressure/SOB this am.  Just prior to ambulance arrival pain changed to a pain 7/10 that resolved once ambulance arrived.  Pt was given 1 NTg and ASA 324mg enroute by medics w/IV to LAC.  Pt a/o x 3.  W/o c/o pain upon arrival to ED.  MD present.  Denies any n/v/diaphoresis/f/c/radiation to arm-neck-jaw.  Pt states she has had this pain b/4 \"many times.\"  Daughter present.     "

## 2017-03-03 NOTE — IP AVS SNAPSHOT
"          [address] MEDICAL SURGICAL: [phone]                                              INTERAGENCY TRANSFER FORM - PHYSICIAN ORDERS   3/3/2017                    Hospital Admission Date: 3/3/2017  LUCIO HANNON   : 3/19/1927  Sex: Female        Attending Provider: (none)    Allergies:  Caffeine, Hydrocodone, Ibuprofen, Penicillins, Tramadol, Metal [Staples]    Infection:  None   Service:  HOSPITALIST    Ht:  1.676 m (5' 6\")   Wt:  68.5 kg (151 lb 0.2 oz)   Admission Wt:  69.4 kg (153 lb)    BMI:  24.37 kg/m 2   BSA:  1.79 m 2            Patient PCP Information     Provider PCP Type    Augusto Meyer DO General      ED Clinical Impression     Diagnosis Description Comment Added By Time Added    Iron deficiency anemia, unspecified iron deficiency anemia type [D50.9] Iron deficiency anemia, unspecified iron deficiency anemia type [D50.9]  Terrance Lea MD 3/3/2017  4:45 PM    Gastrointestinal hemorrhage, unspecified gastrointestinal hemorrhage type [K92.2] Gastrointestinal hemorrhage, unspecified gastrointestinal hemorrhage type [K92.2]  Terrance Lea MD 3/3/2017  4:45 PM    Lightheadedness [R42] Lightheadedness [R42]  Terrance Lea MD 3/3/2017  4:45 PM    Acute chest pain [R07.9] Acute chest pain [R07.9]  Terrance Lea MD 3/3/2017  4:45 PM    GI bleed [K92.2] GI bleed [K92.2]  Terrance Lea MD 3/3/2017  4:58 PM      Hospital Problems as of 3/4/2017              Priority Class Noted POA    Hypertension goal BP (blood pressure) < 140/90   2014 Yes    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   2014 Yes    History of CVA (cerebrovascular accident) - 2014 Yes    Esophageal reflux   2014 Yes    Hypothyroidism due to acquired atrophy of thyroid   2016 Yes    S/P TAVR (transcatheter aortic valve replacement)   2016 Yes    Coronary artery disease involving native coronary artery - NSTEMI  with moderate RCA disease treated " medically   12/3/2016 Yes    Iron deficiency anemia   12/3/2016 Yes    * (Principal)GI bleed   3/3/2017 Yes      Non-Hospital Problems as of 3/4/2017              Priority Class Noted    Transient ischemic attack (TIA), and cerebral infarction without residual deficits   6/18/2014    Seropositive rheumatoid arthritis (H)   Unknown    PAC (premature atrial contraction)   7/24/2014    PVC's (premature ventricular contractions)   7/24/2014    Anxiety   7/24/2014    Hyperlipidemia with target LDL less than 130   4/6/2015    Hyponatremia   7/7/2015    Intermediate coronary syndrome   9/1/2015    Aortic valve disorder   9/2/2015    Orthostatic hypotension   10/7/2015    Aortic stenosis, severe - s/p TAVR   8/3/2016    Advanced directives, counseling/discussion   9/22/2016    Aortic stenosis   Unknown    Need for SBE (subacute bacterial endocarditis) prophylaxis   Unknown    Acquired hypothyroidism   11/24/2016    TIA (transient ischemic attack)   12/3/2016    Non-rheumatic mitral valve stenosis - mild to moderate 11/16 echo   12/3/2016    CKD (chronic kidney disease) stage 3, GFR 30-59 ml/min   12/3/2016    Hypokalemia   12/3/2016    Elevated troponin   12/3/2016    Acute cystitis without hematuria   12/4/2016    Hypotension   12/4/2016      Code Status History     Date Active Date Inactive Code Status Order ID Comments User Context    3/4/2017  6:12 PM  DNR [id number]  Alicia Anthony MD Outpatient    3/3/2017  8:19 PM 3/4/2017  6:12 PM DNR [id number]  Anselmo Valentine MD Inpatient    12/4/2016  2:12 PM 3/3/2017  8:19 PM DNR [id number]  Tom Moncada MD Outpatient    12/3/2016 10:10 PM 12/4/2016  2:12 PM DNR [id number]  Johan Reaves MD Inpatient    8/3/2016  2:20 PM 8/6/2016  6:54 PM Full Code [id number]  Ryan Wilson MD Inpatient         Medication Review      START taking        Dose / Directions Comments    omeprazole 40 MG capsule   Commonly known as:  priLOSEC   Used for:   Gastrointestinal hemorrhage, unspecified gastrointestinal hemorrhage type        Dose:  40 mg   Take 1 capsule (40 mg) by mouth daily Take 30-60 minutes before a meal.   Quantity:  30 capsule   Refills:  0          CONTINUE these medications which may have CHANGED, or have new prescriptions. If we are uncertain of the size of tablets/capsules you have at home, strength may be listed as something that might have changed.        Dose / Directions Comments    levETIRAcetam 500 MG tablet   Commonly known as:  KEPPRA   This may have changed:  See the new instructions.   Used for:  Seizure disorder (H)        TAKE ONE TABLET BY MOUTH ONCE DAILY AND TWO AT BEDTIME   Quantity:  90 tablet   Refills:  6          CONTINUE these medications which have NOT CHANGED        Dose / Directions Comments    ALPRAZolam 0.25 MG tablet   Commonly known as:  XANAX   Used for:  Anxiety        Dose:  0.25 mg   Take 1 tablet (0.25 mg) by mouth 2 times daily as needed for anxiety   Quantity:  28 tablet   Refills:  0        aspirin 81 MG EC tablet   Used for:  S/P TAVR (transcatheter aortic valve replacement)        Dose:  81 mg   Take 1 tablet (81 mg) by mouth daily   Refills:  0        atorvastatin 40 MG tablet   Commonly known as:  LIPITOR   Used for:  Hyperlipidemia with target LDL less than 130        Dose:  40 mg   Take 1 tablet (40 mg) by mouth daily Reports taking   Quantity:  1 tablet   Refills:  0        calcium carbonate 500 MG chewable tablet   Commonly known as:  TUMS        Dose:  2-4 chew tab   Take 2-4 tablets (1,000-2,000 mg) by mouth as needed for heartburn   Refills:  0        clopidogrel 75 MG tablet   Commonly known as:  PLAVIX   Used for:  History of CVA (cerebrovascular accident)        TAKE ONE TABLET BY MOUTH ONCE DAILY   Quantity:  90 tablet   Refills:  3        Cranberry 200 MG Caps        Dose:  3 capsule   Take 3 capsules (600 mg) by mouth 2 times daily   Quantity:  1 capsule   Refills:  0        docusate sodium 100  MG capsule   Commonly known as:  COLACE        Dose:  200 mg   Take 200 mg by mouth daily 2 capsules   Refills:  0        FLORAJEN ACIDOPHILUS Caps        Dose:  1 capsule   Take 1 capsule by mouth daily   Refills:  0        folic acid 1 MG tablet   Commonly known as:  FOLVITE   Used for:  Seropositive rheumatoid arthritis (H)        TAKE ONE TABLET BY MOUTH ONCE DAILY   Quantity:  90 tablet   Refills:  1        isosorbide mononitrate 60 MG 24 hr tablet   Commonly known as:  IMDUR   Used for:  Chest pain, unspecified type        Dose:  60 mg   Take 1 tablet (60 mg) by mouth daily   Quantity:  90 tablet   Refills:  3        levothyroxine 50 MCG tablet   Commonly known as:  SYNTHROID/LEVOTHROID   Used for:  Acquired hypothyroidism        Dose:  50 mcg   Take 1 tablet (50 mcg) by mouth daily Except tuesday   Quantity:  90 tablet   Refills:  1        methotrexate 2.5 MG tablet CHEMO   Used for:  Seropositive rheumatoid arthritis (H)        Dose:  10 mg   Take 4 tablets (10 mg) by mouth once a week Wed   Quantity:  52 tablet   Refills:  3        metoprolol 25 MG tablet   Commonly known as:  LOPRESSOR        Reports taking 0.5 tablet BID   Quantity:  180 tablet   Refills:  1    Pt reports       minocycline 100 MG capsule   Commonly known as:  MINOCIN/DYNACIN   Used for:  Dysuria        TAKE ONE CAPSULE BY MOUTH ONCE DAILY   Quantity:  30 capsule   Refills:  0        mirtazapine 30 MG tablet   Commonly known as:  REMERON   Used for:  Sleep initiation disorder        Take 1 pills at bedtime   Quantity:  90 tablet   Refills:  0        Multi-vitamin Tabs tablet        Dose:  1 tablet   Take 1 tablet by mouth daily   Refills:  0        nitroglycerin 0.4 MG sublingual tablet   Commonly known as:  NITROSTAT        Pt reports taking - -  -For chest pain place 1 tablet under the tongue every 5 minutes for 3 doses. If symptoms persist 5 minutes after 1st dose call 911.   Quantity:  25 tablet   Refills:  0    Pt reports        predniSONE 5 MG tablet   Commonly known as:  DELTASONE   Used for:  Seropositive rheumatoid arthritis (H)        TAKE 1 TABLET BY MOUTH EVERY OTHER DAY, ALTERNATING WITH ONE-HALF TABLET EVERY OTHER DAY.   Quantity:  80 tablet   Refills:  3        TYLENOL ARTHRITIS PAIN 650 MG CR tablet   Generic drug:  acetaminophen        Dose:  1300 mg   Take 2 tablets (1,300 mg) by mouth every 8 hours as needed for mild pain   Refills:  0          STOP taking     famotidine 40 MG tablet   Commonly known as:  PEPCID                   Summary of Visit     Reason for your hospital stay       Chest pain which has improved and you have not had any further episodes during this hospitalization.  It was found that you had lost a lot of blood over the past three months and your story and a stool sample has shown us that the blood is being lost in your GI system.  You are not losing any large amounts of blood right now and will be able to return home today after you are done getting your IV iron to help your hemoglobin (amount of blood in your body) recover.  You will need to have two procedures performed (upper endoscopy which is also called an EGD as well as a colonoscopy) to look at your GI system further and hopefully figure out where the bleeding is coming from.  You should receive a call on Monday as to when these tests will happen next week.      Please remember that if you have more of the black or maroon stools and are having symptoms such as chest pain or lightheadedness as well, you do need to come back to the emergency department to be rechecked sooner.             After Care     Activity       Your activity upon discharge: activity as tolerated       Diet       Follow this diet upon discharge: Regular             Referrals     GASTROENTEROLOGY ADULT REF PROCEDURE ONLY       Referral to [facility]    Last Lab Result: Creatinine (mg/dL)       Date                     Value                 03/03/2017                0.90             ----------  Body mass index is 24.37 kg/(m^2).     Needed:  No  Language:  English    Patient will be contacted to schedule procedure.     Please be aware that coverage of these services is subject to the terms and limitations of your health insurance plan.  Call member services at your health plan with any benefit or coverage questions.  Any procedures must be performed at a [facility] OR coordinated by your clinic's referral office.    Please bring the following with you to your appointment:    (1) Any X-Rays, CTs or MRIs which have been performed.  Contact the facility where they were done to arrange for  prior to your scheduled appointment.    (2) List of current medications   (3) This referral request   (4) Any documents/labs given to you for this referral             Your next 10 appointments already scheduled     Mar 08, 2017  3:20 PM CST   Office Visit with Augusto Meyer,    [facility] ([facility])    [address] [facility] [address] [phone]           Bring a current list of meds and any records pertaining to this visit.  For Physicals, please bring immunization records and any forms needing to be filled out.  Please arrive 10 minutes early to complete paperwork.              Follow-Up Appointment Instructions     Future Labs/Procedures    Follow-up and recommended labs and tests      Comments:    Follow up with primary care provider, Augusto Meyer, within 7 days for hospital follow- up.    Follow up this upcoming week with EGD and colonoscopy - you should receive a call from [facility] on Monday with a time and date.      Follow-Up Appointment Instructions     Follow-up and recommended labs and tests        Follow up with primary care provider, Augusto Meyer, within 7 days for hospital follow- up.    Follow up this upcoming week with EGD and colonoscopy - you should  receive a call from [facility] on Monday with a time and date.             Statement of Approval     Ordered          03/04/17 1814  I have reviewed and agree with all the recommendations and orders detailed in this document.  EFFECTIVE NOW     Approved and electronically signed by:  Alicia Anthony MD

## 2017-03-03 NOTE — ED PROVIDER NOTES
History     Chief Complaint   Patient presents with     Chest Pain     HPI  Gillian Burk is a 89 year old female who presents from [facility] with complaints of chest pressure and dizziness.  Symptoms began around 10:30 this morning and her mild to moderate in intensity.  Patient denies headache.  No visual change.  No difficulty speech or swallowing.  She denies neck pain.  She denies cough, fever or chills.  She does feel short of breath.  No abdominal pain, nausea or vomiting.  No diarrhea or dysuria.  Patient denies significant weight gain.  She does wear compressive leg stockings for peripheral edema.  She does have significant cardiac disease, hypertension, hyperlipidemia, has had aortic valve replacement.  She did have a cardiac echo approximately 3 weeks ago.  She never smoked.  Denies history of COPD.  She does have anxiety.  Patient was given aspirin.  Nitroglycerin in route with no change in her symptoms.    Patient recently was noted to be anemic with a hemoglobin of 7.6.  She did have iron studies which showed low iron but normal iron-binding capacity.  She also normal ferritin, B12, and folate.  Previous hemoglobin in 12/3 was 10.5.  Patient was given oral iron supplementation but due to GI upset this was discontinued.  Try to eat more red meat.  Patient did have a coronary angiogram 11/9/16 showing mild diffuse disease in the LAD of 20-30%, left circumflex with less than 10% lesions, and the right coronary artery with questionable proximal lesion but no obstructive flow.  Please see the report for complete details.  Patient was admitted December 3 for TIA-like symptoms.  At that time her troponin was similar to today at 0.069    I have reviewed the Medications, Allergies, Past Medical and Surgical History, and Social History in the Epic system.    Review of Systems all other systems were reviewed and are negative.  Past Medical History   Diagnosis Date     Anxiety      Anxiety      Aortic  stenosis      s/p TAVR 8/17/16     Chronic migraine      Difficulty in walking(719.7)      Hyperlipidaemia      Hypertension      Hypothyroidism      Myocardial infarction (H)      Need for SBE (subacute bacterial endocarditis) prophylaxis      Numbness and tingling      decreased sensation in toes     Orthostatic hypotension      wears compression stockings     Osteoarthritis      axial, and preipheral     Osteopenia      PAC (premature atrial contraction)      PUD (peptic ulcer disease)      PVC (premature ventricular contraction)      Secondary Sjogren's syndrome (H)      Seizures (H)      after stroke (partial onset)     Seropositive rheumatoid arthritis (H)      Shortness of breath      Stroke (H) 05/2013     with visual field cut, left occipital lobe     Syncope 2014     due to orthostatic hypotension     Patient Active Problem List   Diagnosis     Seropositive rheumatoid arthritis (H)     Hypothyroidism     Hypertension goal BP (blood pressure) < 140/90     Anxiety     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     History of CVA (cerebrovascular accident) - 2013     Esophageal reflux     PAC (premature atrial contraction)     PVC's (premature ventricular contractions)     Hyperlipidemia with target LDL less than 130     Hyponatremia     Orthostatic hypotension     Aortic valve disorder     Intermediate coronary syndrome     Transient ischemic attack (TIA), and cerebral infarction without residual deficits     Hypothyroidism due to acquired atrophy of thyroid     Aortic stenosis, severe - s/p TAVR     S/P TAVR (transcatheter aortic valve replacement)     Advanced directives, counseling/discussion     Aortic stenosis     Need for SBE (subacute bacterial endocarditis) prophylaxis     Acquired hypothyroidism     TIA (transient ischemic attack)     Non-rheumatic mitral valve stenosis - mild to moderate 11/16 echo     Coronary artery disease involving native coronary artery - NSTEMI 11/16 with  moderate RCA disease treated medically     CKD (chronic kidney disease) stage 3, GFR 30-59 ml/min     Hypokalemia     Elevated troponin     Anemia     Acute cystitis without hematuria     Hypotension     Current Facility-Administered Medications   Medication     sodium chloride (PF) 0.9% PF flush 3 mL     sodium chloride (PF) 0.9% PF flush 3 mL     aspirin chewable tablet 324 mg     LORazepam (ATIVAN) injection 1 mg     Current Outpatient Prescriptions   Medication     isosorbide mononitrate (IMDUR) 60 MG 24 hr tablet     levothyroxine (SYNTHROID/LEVOTHROID) 50 MCG tablet     mirtazapine (REMERON) 30 MG tablet     atorvastatin (LIPITOR) 40 MG tablet     Cranberry 200 MG CAPS     famotidine (PEPCID) 40 MG tablet     metoprolol (LOPRESSOR) 25 MG tablet     nitroglycerin (NITROSTAT) 0.4 MG SL tablet     folic acid (FOLVITE) 1 MG tablet     aspirin EC 81 MG EC tablet     methotrexate 2.5 MG tablet     clopidogrel (PLAVIX) 75 MG tablet     predniSONE (DELTASONE) 5 MG tablet     acetaminophen (TYLENOL ARTHRITIS PAIN) 650 MG CR tablet     calcium carbonate (TUMS) 500 MG chewable tablet     docusate sodium (COLACE) 100 MG capsule     multivitamin, therapeutic with minerals (MULTI-VITAMIN) TABS     Lactobacillus (FLORAJEN ACIDOPHILUS) CAPS     levETIRAcetam (KEPPRA) 500 MG tablet     ALPRAZolam (XANAX) 0.25 MG tablet     minocycline (MINOCIN/DYNACIN) 100 MG capsule        Allergies   Allergen Reactions     Caffeine Other (See Comments)     Triggers your Meniere's disease     Hydrocodone Other (See Comments)     hallucinations     Ibuprofen GI Disturbance     Penicillins Hives     Tramadol Other (See Comments)     Seizure, was taking remeron along with tramadol     Metal [Staples] Rash     Social History     Social History     Marital status:      Spouse name: N/A     Number of children: 4     Years of education: N/A     Occupational History      Retired     Social History Main Topics     Smoking status: Never Smoker      Smokeless tobacco: Never Used     Alcohol use No     Drug use: No     Sexual activity: No     Other Topics Concern     Special Diet Yes     low salt      Exercise Yes     semi recument bike 15 mins daily      Social History Narrative    .  Lives in assisted living. Independent. Has help with making bed and changing sheets.    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 .     4 children - 1 with RA         Family History   Problem Relation Age of Onset     Family History Negative No family hx of      Hyperlipidemia Mother      Physical Exam   BP: (!) 174/96 (Simultaneous filing. User may not have seen previous data.)  Pulse: 96  Heart Rate: 89  Temp: 97.5  F (36.4  C)  Resp: 18  Weight: 69.4 kg (153 lb)  SpO2: 98 %  Physical Exam general alert anxious female in moderate distress.  HEENT shows no scleral injection.  Patient wears bilateral hearing needs.  Nasal passes are patent.  Orally there is no lesion and speech is clear.  Neck is supple without JVD or stridor.  Lungs reveal basilar crackles otherwise no adventitious sounds.  Cardiac regular rate without murmur.  Patient's abdomen is obese and has active bowel sounds.  On palpation she does not have localizing tenderness, masses, or organomegaly.  Extremities wearing AVIS stockings without pitting edema.  Neurologically nonfocal motor and sensory standpoint.  Rectal exam reveals no tender lesions.  She has maroon-colored guaiac-positive stool    ED Course     ED Course     Procedures       I personally reviewed the patient's EKG.  It shows a left bundle branch block.  Rate of 92 bpm.  Conduction defect consistent with a left bundle branch.  Compared to EKG done February this 9th no acute changes are noted.  Final diagnosis: left bundle branch block     Results for orders placed or performed during the hospital encounter of 03/03/17   XR Chest Port 1 View    Narrative    CHEST PORTABLE ONE VIEW  3/3/2017 11:43 AM     HISTORY: Extreme  dyspnea.    COMPARISON: Chest x-rays dated 12/3/2016.    FINDINGS:  Probable cardiac valve replacement is projected over the  heart. Cardiac silhouette remains mildly enlarged. Prominence of the  thoracic aorta is again noted with mild ectasia of the ascending  aorta. This could represent post stenotic dilatation. Lungs are  grossly clear. No pneumothorax or significant pleural fluid  collection. No infiltrate.      Impression    IMPRESSION:   1. 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  2. No evidence for pulmonary edema or significant pleural fluid  collection to suggest congestive heart failure.  3. No pneumonia is identified. Etiology for patient's dyspnea is  otherwise not definitely seen.         Critical Care time:  none               Labs Ordered and Resulted from Time of ED Arrival Up to the Time of Departure from the ED   CBC WITH PLATELETS DIFFERENTIAL - Abnormal; Notable for the following:        Result Value    RBC Count 2.99 (*)     Hemoglobin 8.3 (*)     Hematocrit 26.2 (*)     RDW 17.2 (*)     All other components within normal limits   TROPONIN I - Abnormal; Notable for the following:     Troponin I ES 0.070 (*)     All other components within normal limits   COMPREHENSIVE METABOLIC PANEL - Abnormal; Notable for the following:     GFR Estimate 59 (*)     All other components within normal limits   BLOOD GAS VENOUS - Abnormal; Notable for the following:     Ph Venous 7.54 (*)     PCO2 Venous 31 (*)     PO2 Venous 17 (*)     All other components within normal limits   UA WITH MICROSCOPIC - Abnormal; Notable for the following:     pH Urine 7.5 (*)     All other components within normal limits   TROPONIN I - Abnormal; Notable for the following:     Troponin I ES 0.067 (*)     All other components within normal limits   OCCULT BLOOD STOOL - Abnormal; Notable for the following:     Occult Blood Positive (*)     All other components within normal limits   INR     IV was established  "and blood work was obtained.  EKG was reviewed as above.  Patient had no change in her symptoms with nitroglycerin.  She had O2 placed and was given some Ativan due to her history of anxiety.  Before the patient received the Ativan she was quite calm and this was not given. Awaiting blood work.  Chest x-ray was ordered.  Discussed results patient's blood work, EKG and chest x-ray.  Assessments & Plan (with Medical Decision Making)   Patient is a 89 -year-old female brought from [facility] after a sudden onset of generalized weakness, lightheadedness, chest pressure and shortness of breath.  No vomiting or diarrhea.  No fever or chills.  Patient does have a complex medical history including cardiovascular disease.  She had an NSTEMI last year with angiogram described above.  Question if she had a right coronary lesion causing her symptoms but there is no significant stenosis.  She is on Plavix and aspirin.  She also has aortic stenosis and is status post valve replacement.  She's also been recently evaluated for anemia with iron studies showing iron deficiency but otherwise normal B12, folate, and iron binding capacity.  Patient states she had a colonoscopy a number of years ago which she thought was normal.  She feels this is been more than 10 years ago.  She denies unusual bleeding or bruising.  Her stools have been dark at times.  She is on daily folic acid.  She does take daily Pepcid.  On exam today the patient was afebrile and not hypoxic.  She did not have orthostatic changes in pulse or blood pressure.  She is however on beta blocker so this may be masking some of the effects on her pulse.  She had a nonsurgical abdomen but rectal exam revealed maroon-colored stool that was guaiac positive.  No other significant bruising is noted on her skin exam.  Patient had a \"spell\" and was worked up for TIA in December of last year and imaging study showed an old occipital infarct otherwise no acute findings.  " Patient's EKG shows a left bundle branch block and is unchanged from previous.  She does have anemia with hemoglobin 8.3 and this is down from 10.5 and December.  Venous blood gas was abnormal showing a low CO2 and O2 levels but she was not hypoxic with oximetry.  Patient was given nitroglycerin in route by paramedics without improvement in her symptoms.  With 02 therapy in the department she had resolution of her chest tightness and shortness of breath.  Her symptoms today may be multifactorial including cardiac disease, anemia and GI bleed.  Doubt TIA or CVA.  Patient will be admitted for further observation and cardiac monitoring.  Serial troponins and hemoglobins.  She'll probably need colonoscopy at some point to assess for GI bleeding.  Spoke with Dr. Valentine from the hospital service and he will assume care on admission.   I have reviewed the nursing notes.    I have reviewed the findings, diagnosis, plan and need for follow up with the patient.    New Prescriptions    No medications on file       Final diagnoses:   Iron deficiency anemia, unspecified iron deficiency anemia type   Gastrointestinal hemorrhage, unspecified gastrointestinal hemorrhage type   Lightheadedness   Acute chest pain       3/3/2017   [facility] EMERGENCY DEPARTMENT     Terrance Lea MD  03/03/17 2251

## 2017-03-03 NOTE — TELEPHONE ENCOUNTER
Prescription approved per [facility] Refill Protocol.  Madison Kulkarni RN

## 2017-03-03 NOTE — IP AVS SNAPSHOT
` `           [address] SURGICAL: [phone]            Medication Administration Report for Gillian Burk as of 03/06/17 1114   Legend:    Given Hold Not Given Due Canceled Entry Other Actions    Time Time (Time) Time  Time-Action       Inactive    Active    Linked        Medications 02/26/17 02/27/17 02/28/17 03/01/17 03/02/17 03/03/17 03/04/17   Completed Medications      Dose: 300 mg Freq: ONCE Route: IV  Last Dose: 300 mg (03/04/17 1659)  Start: 03/04/17 1515   End: 03/04/17 1829          1659 (300 mg)-New Bag          Discontinued Medications  Medications 02/26/17 02/27/17 02/28/17 03/01/17 03/02/17 03/03/17 03/04/17         Dose: 650 mg Freq: EVERY 4 HOURS PRN Route: PO  PRN Reason: mild pain  Start: 03/03/17 2019   End: 03/04/17 2200   Admin Instructions: Maximum acetaminophen dose from all sources = 75 mg/kg/day not to exceed 4 grams/day.           2200-Med Discontinued         Dose: 0.25 mg Freq: 2 TIMES DAILY PRN Route: PO  PRN Reason: anxiety  Start: 03/03/17 2015   End: 03/04/17 2200   Admin Instructions: Avoid taking with grapefruit juice           2200-Med Discontinued         Dose: 324 mg Freq: ONCE Route: PO  Start: 03/03/17 1114   End: 03/03/17 2019   Admin Instructions: Give if patient has not received today and no allergy to ASA or NSAIDS          (1145)-Not Given [C]       2019-Med Discontinued          Dose: 1,000-2,000 mg Freq: 3 TIMES DAILY PRN Route: PO  PRN Reason: heartburn  Start: 03/03/17 2015   End: 03/04/17 2200 2200-Med Discontinued         Dose: 50 mg Freq: ONCE PRN Route: IV  PRN Reason: other  PRN Comment: hives, itching, rash, flushing, swollen lips/tongue, or respiratory distress associated with IV iron hypersensitivity.  Start: 03/04/17 1512   End: 03/04/17 2200   Admin Instructions: Can be given with ranitidine. Discontinue when parenteral iron therapy complete.           2200-Med Discontinued         Dose: 0.3 mg Freq: EVERY 3 MIN PRN  Route: IM  PRN Comment: hypotension or airway obstruction associated with IV iron hypersensitivity.  Start: 03/04/17 1512   End: 03/04/17 2200   Admin Instructions: Up to a maximum of 2 doses.  Administer in the anterior or lateral thigh.  Discontinue when IV iron therapy complete.  Not for direct undiluted intravenous injection. (1 mg/mL = 1:1,000 concentration). Protect from light.           2200-Med Discontinued         Dose: 40 mg Freq: AT BEDTIME Route: PO  Start: 03/03/17 2100   End: 03/04/17 2200         2117 (40 mg)-Given        2200-Med Discontinued         Dose: 60 mg Freq: DAILY Route: PO  Start: 03/04/17 0900   End: 03/04/17 2200   Admin Instructions: DO NOT CRUSH. Can split tablet in half along score tamara.           0803 (60 mg)-Given       2200-Med Discontinued         Dose: 1,000 mg Freq: AT BEDTIME Route: PO  Start: 03/03/17 2100   End: 03/04/17 2200         2117 (1,000 mg)-Given        2200-Med Discontinued         Dose: 500 mg Freq: EVERY MORNING Route: PO  Start: 03/04/17 0900   End: 03/04/17 2200          0803 (500 mg)-Given       2200-Med Discontinued         Dose: 50 mcg Freq: DAILY Route: PO  Start: 03/04/17 0730   End: 03/04/17 2200          0659 (50 mcg)-Given       2200-Med Discontinued         Dose: 0.5-1 mg Freq: EVERY 4 HOURS PRN Route: IV  PRN Reason: anxiety  Start: 03/03/17 1821   End: 03/04/17 2200   Admin Instructions: For IV PUSH: Dilute with equal volume of NS.           2200-Med Discontinued      Or    Dose: 0.5-1 mg Freq: EVERY 4 HOURS PRN Route: PO  PRN Reason: anxiety  Start: 03/03/17 1821   End: 03/04/17 2200          2200-Med Discontinued         Dose: 1 mg Freq: ONCE Route: IV  Start: 03/03/17 1128   End: 03/03/17 2019   Admin Instructions: For IV PUSH: Dilute with equal volume of NS.          (0920)-Not Given [C]       2019-Med Discontinued          Dose: 125 mg Freq: ONCE PRN Route: IV  PRN Comment: respiratory distress associated with hypersensitivity reaction to  iron.  Start: 03/04/17 1512   End: 03/04/17 2200   Admin Instructions: Discontinue when parenteral iron therapy complete  Doses greater than 125 mg need to be in at least 50 mL IVPB           2200-Med Discontinued         Dose: 12.5 mg Freq: 2 TIMES DAILY Route: PO  Start: 03/03/17 2100   End: 03/04/17 2200         2117 (12.5 mg)-Given        0803 (12.5 mg)-Given       2200-Med Discontinued         Dose: 30 mg Freq: AT BEDTIME Route: PO  Start: 03/03/17 2100   End: 03/04/17 2200         2116 (30 mg)-Given        2200-Med Discontinued         Dose: 0.1-0.4 mg Freq: EVERY 2 MIN PRN Route: IV  PRN Reason: opioid reversal  Start: 03/03/17 1821   End: 03/04/17 2200   Admin Instructions: For respiratory rate LESS than or EQUAL to 8.  Partial reversal dose:  0.1 mg titrated q 2 minutes for Analgesia Side Effects Monitoring Sedation Level of 3 (frequently drowsy, arousable, drifts to sleep during conversation).Full reversal dose:  0.4 mg bolus for Analgesia Side Effects Monitoring Sedation Level of 4 (somnolent, minimal or no response to stimulation).           2200-Med Discontinued         Dose: 0.4 mg Freq: EVERY 5 MIN PRN Route: SL  PRN Reason: chest pain  Start: 03/03/17 2017   End: 03/04/17 2200 2200-Med Discontinued         Dose: 5 mg Freq: DAILY Route: PO  Start: 03/04/17 0900   End: 03/04/17 2200          0803 (5 mg)-Given       2200-Med Discontinued         Dose: 50 mg Freq: ONCE PRN Route: IV  PRN Comment: hives, itching, rash associated with IV iron hypersensitivity.    Start: 03/04/17 1512   End: 03/04/17 2200   Admin Instructions: Can be given with diphenhydramine. Discontinue when parenteral iron therapy complete.           2200-Med Discontinued         Dose: 3 mL Freq: EVERY 8 HOURS Route: IK  Start: 03/03/17 1114   End: 03/04/17 2200   Admin Instructions: And Q1H PRN, to lock peripheral IV dormant line.            2116 (3 mL)-Given               0536 (3 mL)-Given       (1200)-Not Given               2200-Med Discontinued         Dose: 3 mL Freq: EVERY 1 HOUR PRN Route: IK  PRN Reason: line flush  Start: 03/03/17 1112   End: 03/04/17 2200   Admin Instructions: for peripheral IV flush post IV meds           2200-Med Discontinued    Medications 02/26/17 02/27/17 02/28/17 03/01/17 03/02/17 03/03/17 03/04/17

## 2017-03-03 NOTE — IP AVS SNAPSHOT
MRN:[id number]                      After Visit Summary   3/3/2017    Gillian Burk    MRN: [id number]           Thank you!     Thank you for choosing [facility] for your care. Our goal is always to provide you with excellent care. Hearing back from our patients is one way we can continue to improve our services. Please take a few minutes to complete the written survey that you may receive in the mail after you visit with us. Thank you!        Patient Information     Date Of Birth          3/19/1927        About your hospital stay     You were admitted on:  March 3, 2017 You last received care in the:  [address]    You were discharged on:  March 4, 2017        Reason for your hospital stay       Chest pain which has improved and you have not had any further episodes during this hospitalization.  It was found that you had lost a lot of blood over the past three months and your story and a stool sample has shown us that the blood is being lost in your GI system.  You are not losing any large amounts of blood right now and will be able to return home today after you are done getting your IV iron to help your hemoglobin (amount of blood in your body) recover.  You will need to have two procedures performed (upper endoscopy which is also called an EGD as well as a colonoscopy) to look at your GI system further and hopefully figure out where the bleeding is coming from.  You should receive a call on Monday as to when these tests will happen next week.      Please remember that if you have more of the black or maroon stools and are having symptoms such as chest pain or lightheadedness as well, you do need to come back to the emergency department to be rechecked sooner.                  Who to Call     For medical emergencies, please call 911.  For non-urgent questions about your medical care, please call your primary care provider or clinic, [phone]          Attending  Provider     Provider Specialty    Terrance Lea MD Emergency Medicine    Valentine, Anselmo Dinh MD Family Practice       Primary Care Provider Office Phone # Fax #    Augusto Meyer [phone]       [address] [place] [address]        After Care Instructions     Activity       Your activity upon discharge: activity as tolerated            Diet       Follow this diet upon discharge: Regular                  Follow-up Appointments     Follow-up and recommended labs and tests        Follow up with primary care provider, Augusto Meyer, within 7 days for hospital follow- up.    Follow up this upcoming week with EGD and colonoscopy - you should receive a call from [facility] on Monday with a time and date.                  Additional Services     GASTROENTEROLOGY ADULT REF PROCEDURE ONLY       Referral to [facility]    Last Lab Result: Creatinine (mg/dL)       Date                     Value                 03/03/2017               0.90             ----------  Body mass index is 24.37 kg/(m^2).     Needed:  No  Language:  English    Patient will be contacted to schedule procedure.     Please be aware that coverage of these services is subject to the terms and limitations of your health insurance plan.  Call member services at your health plan with any benefit or coverage questions.  Any procedures must be performed at a [facility] facility OR coordinated by your clinic's referral office.    Please bring the following with you to your appointment:    (1) Any X-Rays, CTs or MRIs which have been performed.  Contact the facility where they were done to arrange for  prior to your scheduled appointment.    (2) List of current medications   (3) This referral request   (4) Any documents/labs given to you for this referral                  Pending Results     No orders found for last 3 day(s).           "  Statement of Approval     Ordered          03/04/17 1814  I have reviewed and agree with all the recommendations and orders detailed in this document.  EFFECTIVE NOW     Approved and electronically signed by:  Alicia Anthony MD             Admission Information     Date & Time Provider Department Dept. Phone    3/3/2017 Anselmo Valentine MD [address] [facility] [phone]      Your Vitals Were     Blood Pressure Pulse Temperature Respirations Height Weight    150/65 (BP Location: Left arm) 78 96  F (35.6  C) (Oral) 18 1.676 m (5' 6\") 68.5 kg (151 lb 0.2 oz)    Pulse Oximetry BMI (Body Mass Index)                97% 24.37 kg/m2          [employer] Information     [employer] gives you secure access to your electronic health record. If you see a primary care provider, you can also send messages to your care team and make appointments. If you have questions, please call your primary care clinic.  If you do not have a primary care provider, please call [phone] and they will assist you.        Care EveryWhere ID     This is your Care EveryWhere ID. This could be used by other organizations to access your [facility] records  [id number]           Review of your medicines      START taking        Dose / Directions    omeprazole 40 MG capsule   Commonly known as:  priLOSEC   Used for:  Gastrointestinal hemorrhage, unspecified gastrointestinal hemorrhage type        Dose:  40 mg   Start taking on:  3/5/2017   Take 1 capsule (40 mg) by mouth daily Take 30-60 minutes before a meal.   Quantity:  30 capsule   Refills:  0         CONTINUE these medicines which may have CHANGED, or have new prescriptions. If we are uncertain of the size of tablets/capsules you have at home, strength may be listed as something that might have changed.        Dose / Directions    levETIRAcetam 500 MG tablet   Commonly known as:  KEPPRA   This may have changed:  See the new instructions.   Used for:  " Seizure disorder (H)        TAKE ONE TABLET BY MOUTH ONCE DAILY AND TWO AT BEDTIME   Quantity:  90 tablet   Refills:  6         CONTINUE these medicines which have NOT CHANGED        Dose / Directions    ALPRAZolam 0.25 MG tablet   Commonly known as:  XANAX   Used for:  Anxiety        Dose:  0.25 mg   Take 1 tablet (0.25 mg) by mouth 2 times daily as needed for anxiety   Quantity:  28 tablet   Refills:  0       aspirin 81 MG EC tablet   Used for:  S/P TAVR (transcatheter aortic valve replacement)        Dose:  81 mg   Take 1 tablet (81 mg) by mouth daily   Refills:  0       atorvastatin 40 MG tablet   Commonly known as:  LIPITOR   Used for:  Hyperlipidemia with target LDL less than 130        Dose:  40 mg   Take 1 tablet (40 mg) by mouth daily Reports taking   Quantity:  1 tablet   Refills:  0       calcium carbonate 500 MG chewable tablet   Commonly known as:  TUMS        Dose:  2-4 chew tab   Take 2-4 tablets (1,000-2,000 mg) by mouth as needed for heartburn   Refills:  0       clopidogrel 75 MG tablet   Commonly known as:  PLAVIX   Used for:  History of CVA (cerebrovascular accident)        TAKE ONE TABLET BY MOUTH ONCE DAILY   Quantity:  90 tablet   Refills:  3       Cranberry 200 MG Caps        Dose:  3 capsule   Take 3 capsules (600 mg) by mouth 2 times daily   Quantity:  1 capsule   Refills:  0       docusate sodium 100 MG capsule   Commonly known as:  COLACE        Dose:  200 mg   Take 200 mg by mouth daily 2 capsules   Refills:  0       FLORAJEN ACIDOPHILUS Caps        Dose:  1 capsule   Take 1 capsule by mouth daily   Refills:  0       folic acid 1 MG tablet   Commonly known as:  FOLVITE   Used for:  Seropositive rheumatoid arthritis (H)        TAKE ONE TABLET BY MOUTH ONCE DAILY   Quantity:  90 tablet   Refills:  1       isosorbide mononitrate 60 MG 24 hr tablet   Commonly known as:  IMDUR   Used for:  Chest pain, unspecified type        Dose:  60 mg   Take 1 tablet (60 mg) by mouth daily   Quantity:  90  tablet   Refills:  3       levothyroxine 50 MCG tablet   Commonly known as:  SYNTHROID/LEVOTHROID   Used for:  Acquired hypothyroidism        Dose:  50 mcg   Take 1 tablet (50 mcg) by mouth daily Except tuesday   Quantity:  90 tablet   Refills:  1       methotrexate 2.5 MG tablet CHEMO   Used for:  Seropositive rheumatoid arthritis (H)        Dose:  10 mg   Take 4 tablets (10 mg) by mouth once a week Wed   Quantity:  52 tablet   Refills:  3       metoprolol 25 MG tablet   Commonly known as:  LOPRESSOR        Reports taking 0.5 tablet BID   Quantity:  180 tablet   Refills:  1       minocycline 100 MG capsule   Commonly known as:  MINOCIN/DYNACIN   Used for:  Dysuria        TAKE ONE CAPSULE BY MOUTH ONCE DAILY   Quantity:  30 capsule   Refills:  0       mirtazapine 30 MG tablet   Commonly known as:  REMERON   Used for:  Sleep initiation disorder        Take 1 pills at bedtime   Quantity:  90 tablet   Refills:  0       Multi-vitamin Tabs tablet        Dose:  1 tablet   Take 1 tablet by mouth daily   Refills:  0       nitroglycerin 0.4 MG sublingual tablet   Commonly known as:  NITROSTAT        Pt reports taking - -  -For chest pain place 1 tablet under the tongue every 5 minutes for 3 doses. If symptoms persist 5 minutes after 1st dose call 911.   Quantity:  25 tablet   Refills:  0       predniSONE 5 MG tablet   Commonly known as:  DELTASONE   Used for:  Seropositive rheumatoid arthritis (H)        TAKE 1 TABLET BY MOUTH EVERY OTHER DAY, ALTERNATING WITH ONE-HALF TABLET EVERY OTHER DAY.   Quantity:  80 tablet   Refills:  3       TYLENOL ARTHRITIS PAIN 650 MG CR tablet   Generic drug:  acetaminophen        Dose:  1300 mg   Take 2 tablets (1,300 mg) by mouth every 8 hours as needed for mild pain   Refills:  0         STOP taking     famotidine 40 MG tablet   Commonly known as:  PEPCID                Where to get your medicines      These medications were sent to [facility] [address] [place] [address] - [address]   [address] [facility] [address]     Phone:  [phone]     levETIRAcetam 500 MG tablet    omeprazole 40 MG capsule                Protect others around you: Learn how to safely use, store and throw away your medicines at www.disposemymeds.org.             Medication List: This is a list of all your medications and when to take them. Check marks below indicate your daily home schedule. Keep this list as a reference.      Medications           Morning Afternoon Evening Bedtime As Needed    ALPRAZolam 0.25 MG tablet   Commonly known as:  XANAX   Take 1 tablet (0.25 mg) by mouth 2 times daily as needed for anxiety                                   aspirin 81 MG EC tablet   Take 1 tablet (81 mg) by mouth daily                                   atorvastatin 40 MG tablet   Commonly known as:  LIPITOR   Take 1 tablet (40 mg) by mouth daily Reports taking                                   calcium carbonate 500 MG chewable tablet   Commonly known as:  TUMS   Take 2-4 tablets (1,000-2,000 mg) by mouth as needed for heartburn                                   clopidogrel 75 MG tablet   Commonly known as:  PLAVIX   TAKE ONE TABLET BY MOUTH ONCE DAILY                                   Cranberry 200 MG Caps   Take 3 capsules (600 mg) by mouth 2 times daily                                      docusate sodium 100 MG capsule   Commonly known as:  COLACE   Take 200 mg by mouth daily 2 capsules                                   FLORAJEN ACIDOPHILUS Caps   Take 1 capsule by mouth daily                                   folic acid 1 MG tablet   Commonly known as:  FOLVITE   TAKE ONE TABLET BY MOUTH ONCE DAILY                                   isosorbide mononitrate 60 MG 24 hr tablet   Commonly known as:  IMDUR   Take 1 tablet (60 mg) by mouth daily   Last time this was given:  60 mg on 3/4/2017  8:03 AM                                   levETIRAcetam 500 MG tablet   Commonly known as:  KEPPRA   TAKE ONE TABLET BY MOUTH ONCE DAILY  AND TWO AT BEDTIME   Last time this was given:  500 mg on 3/4/2017  8:03 AM                                      levothyroxine 50 MCG tablet   Commonly known as:  SYNTHROID/LEVOTHROID   Take 1 tablet (50 mcg) by mouth daily Except tuesday   Last time this was given:  50 mcg on 3/4/2017  6:59 AM                                   methotrexate 2.5 MG tablet CHEMO   Take 4 tablets (10 mg) by mouth once a week Wed                                metoprolol 25 MG tablet   Commonly known as:  LOPRESSOR   Reports taking 0.5 tablet BID   Last time this was given:  12.5 mg on 3/4/2017  8:03 AM                                   minocycline 100 MG capsule   Commonly known as:  MINOCIN/DYNACIN   TAKE ONE CAPSULE BY MOUTH ONCE DAILY                                   mirtazapine 30 MG tablet   Commonly known as:  REMERON   Take 1 pills at bedtime   Last time this was given:  30 mg on 3/3/2017  9:16 PM                                   Multi-vitamin Tabs tablet   Take 1 tablet by mouth daily                                   nitroglycerin 0.4 MG sublingual tablet   Commonly known as:  NITROSTAT   Pt reports taking - -  -For chest pain place 1 tablet under the tongue every 5 minutes for 3 doses. If symptoms persist 5 minutes after 1st dose call 911.                                   omeprazole 40 MG capsule   Commonly known as:  priLOSEC   Take 1 capsule (40 mg) by mouth daily Take 30-60 minutes before a meal.   Start taking on:  3/5/2017                                   predniSONE 5 MG tablet   Commonly known as:  DELTASONE   TAKE 1 TABLET BY MOUTH EVERY OTHER DAY, ALTERNATING WITH ONE-HALF TABLET EVERY OTHER DAY.   Last time this was given:  5 mg on 3/4/2017  8:03 AM                                   TYLENOL ARTHRITIS PAIN 650 MG CR tablet   Take 2 tablets (1,300 mg) by mouth every 8 hours as needed for mild pain   Generic drug:  acetaminophen                                             More Information        Patient  Education    Iron Sucrose Chewable tablet    Iron Sucrose Solution for injection  Iron Sucrose Solution for injection  What is this medicine?  IRON SUCROSE (LIVIA rojas) is an iron complex. Iron is used to make healthy red blood cells, which carry oxygen and nutrients throughout the body. This medicine is used to treat iron deficiency anemia in people with chronic kidney disease.  This medicine may be used for other purposes; ask your health care provider or pharmacist if you have questions.  What should I tell my health care provider before I take this medicine?  They need to know if you have any of these conditions:    anemia not caused by low iron levels    heart disease    high levels of iron in the blood    kidney disease    liver disease    an unusual or allergic reaction to iron, other medicines, foods, dyes, or preservatives    pregnant or trying to get pregnant    breast-feeding  How should I use this medicine?  This medicine is for infusion into a vein. It is given by a health care professional in a hospital or clinic setting.  Talk to your pediatrician regarding the use of this medicine in children. While this drug may be prescribed for children as young as 2 years for selected conditions, precautions do apply.  Overdosage: If you think you have taken too much of this medicine contact a poison control center or emergency room at once.  NOTE: This medicine is only for you. Do not share this medicine with others.  What if I miss a dose?  It is important not to miss your dose. Call your doctor or health care professional if you are unable to keep an appointment.  What may interact with this medicine?  Do not take this medicine with any of the following medications:    deferoxamine    dimercaprol    other iron products  This medicine may also interact with the following medications:    chloramphenicol    deferasirox  This list may not describe all possible interactions. Give your health care provider a  list of all the medicines, herbs, non-prescription drugs, or dietary supplements you use. Also tell them if you smoke, drink alcohol, or use illegal drugs. Some items may interact with your medicine.  What should I watch for while using this medicine?  Visit your doctor or healthcare professional regularly. Tell your doctor or healthcare professional if your symptoms do not start to get better or if they get worse. You may need blood work done while you are taking this medicine.  You may need to follow a special diet. Talk to your doctor. Foods that contain iron include: whole grains/cereals, dried fruits, beans, or peas, leafy green vegetables, and organ meats (liver, kidney).  What side effects may I notice from receiving this medicine?  Side effects that you should report to your doctor or health care professional as soon as possible:    allergic reactions like skin rash, itching or hives, swelling of the face, lips, or tongue    breathing problems    changes in blood pressure    cough    fast, irregular heartbeat    feeling faint or lightheaded, falls    fever or chills    flushing, sweating, or hot feelings    joint or muscle aches/pains    seizures    swelling of the ankles or feet    unusually weak or tired  Side effects that usually do not require medical attention (report to your doctor or health care professional if they continue or are bothersome):    diarrhea    feeling achy    headache    irritation at site where injected    nausea, vomiting    stomach upset    tiredness  This list may not describe all possible side effects. Call your doctor for medical advice about side effects. You may report side effects to FDA at 4-851-FDA-6592.  Where should I keep my medicine?  This drug is given in a hospital or clinic and will not be stored at home.  NOTE:This sheet is a summary. It may not cover all possible information. If you have questions about this medicine, talk to your doctor, pharmacist, or health care  provider. Copyright  2016 [employer]                When You Have Gastrointestinal (GI) Bleeding  Blood in vomit or stool can be a sign of gastrointestinal (GI) bleeding. GI bleeding can be scary, though the cause of the bleeding may not be serious. You should always see a doctor if GI bleeding occurs.  The GI tract    The GI tract is the path through which food travels in the body. Food passes from the mouth down the esophagus (the tube from the mouth to the stomach). Food begins to break down in the stomach. It then moves through the duodenum, the first part of the small intestine. Nutrients are absorbed as food travels through the small intestine. What is left passes into the colon (large intestine) as waste. The colon removes water from the waste. Waste continues from the colon to the rectum (where stool is stored). Waste then leaves the body through the anus.  Causes of GI bleeding  GI bleeding can be caused by many different problems. Some of the more common causes include:    Hemorrhoids (swollen veins in the anus)    Varices (swollen veins in the esophagus)    Ulcer (sore on the lining of the GI tract)    Cuts or scrapes in the mouth or throat    Infection (bacteria or parasites)    Food allergies, such as gluten    Medications    Inflammation (swelling or irritation of the lining of the GI tract, such as gastritis or esophagitis)    Colitis (Crohn's disease or ulcerative colitis)    Cancer (tumors or polyps)    Diverticula (abnormal pouches in the colon)    Tears in the esophagus or anus    Nosebleed    Angiodysplasia, abnormal blood vessels in the GI tract  Diagnosing the cause of blood in stool  If blood is coming out in your stool, it may signal a lower GI tract problem. Bleeding from the GI tract can be bright red, or it may look dark and tarry. Occult blood can t be seen with the eye, but can be found in the stool on tests. To determine the cause, tests that may be ordered include:    Blood  tests    Hemoccult test: checks a stool sample for blood    Stool culture: checks a stool sample for bacteria or parasites    X-ray, ultrasound, or CT scan: imaging tests that take pictures of the digestive tract    Colonoscopy or sigmoidoscopy: a test during which a flexible tube with a camera is inserted through the anus into the rectum to view the inside of your colon. This lets the doctor do a biopsy (take a tiny tissue sample and treat a bleeding source).  Diagnosing the cause of blood in vomit  Vomiting blood or a coffee ground material may signal an upper GI tract problem. To find the cause, tests that may be done include:    Upper Endoscopy: a test during which a flexible tube with a camera is inserted through the mouth and throat to see inside the upper GI tract. This lets the doctor do a biopsy (take a tiny tissue sample and treat a bleeding source).    Nasogastric lavage: which can distinguish upper versus lower GI bleeding    X-ray, ultrasound, or CT scan: tests that take pictures of the digestive tract    Upper GI series: X-rays of the upper part of the GI tract taken from inside the body    Enteroscopy: sending a flexible tube or a small, swallowed capsule camer into the small intestine      Call your health care provider right away if you have any of the following:    Bleeding from the mouth or anus that can t be stopped    Fever of 100.4 F (38.0 ) or higher    Bleeding accompanied by lightheadedness or dizziness    Signs of dehydration (dry, sticky mouth; decreased urine output; very dark urine)    Abdominal pain     7221-5421 [employer]. [address] [place], [place] [id number]. All rights reserved. This information is not intended as a substitute for professional medical care. Always follow your healthcare professional's instructions.

## 2017-03-03 NOTE — IP AVS SNAPSHOT
[address] [facility]    [address] [place] [address]    Phone:  [phone]                                       After Visit Summary   3/3/2017    Gillian Burk    MRN: [id number]           After Visit Summary Signature Page     I have received my discharge instructions, and my questions have been answered. I have discussed any challenges I see with this plan with the nurse or doctor.    ..........................................................................................................................................  Patient/Patient Representative Signature      ..........................................................................................................................................  Patient Representative Print Name and Relationship to Patient    ..................................................               ................................................  Date                                            Time    ..........................................................................................................................................  Reviewed by Signature/Title    ...................................................              ..............................................  Date                                                            Time

## 2017-03-03 NOTE — ED NOTES
Belongings:  Shoes/support socks, pants/shirt, bra/underpants, watch, hearing aides both ears, glasses, dentures uppers/lowers.

## 2017-03-03 NOTE — ED NOTES
ED Nursing criteria listed below was addressed during verbal handoff:     Abnormal vitals: No  Abnormal results: Yes-hemoglobin/trop.  Med Reconciliation completed: Yes  Meds given in ED: No  Any Overdue Meds: No  Core Measures: N/A  Isolation: N/A  Special needs: Yes-hearing aides/dentures up/down. Stand by assist to BR.   Skin assessment: Yes-buttocks w/o inflammation/breakdown.    Observation Patient  Education provided: Yes@[id number]

## 2017-03-03 NOTE — IP AVS SNAPSHOT
"    [address] SURGICAL: [phone]                                              INTERAGENCY TRANSFER FORM - LAB / IMAGING / EKG / EMG RESULTS   3/3/2017                    Hospital Admission Date: 3/3/2017  LUCIO HANNON   : 3/19/1927  Sex: Female        Attending Provider: (none)    Allergies:  Caffeine, Hydrocodone, Ibuprofen, Penicillins, Tramadol, Metal [Staples]    Infection:  None   Service:  HOSPITALIST    Ht:  1.676 m (5' 6\")   Wt:  68.5 kg (151 lb 0.2 oz)   Admission Wt:  69.4 kg (153 lb)    BMI:  24.37 kg/m 2   BSA:  1.79 m 2            Patient PCP Information     Provider PCP Type    Augusto Meyer DO General         Lab Results - 3 Days      Hemoglobin [[id number]] (Abnormal)  Resulted: 17 1222, Result status: Final result    Ordering provider: Alicia Anthony MD  17 0930 Resulting lab: [facility]    Specimen Information    Type Source Collected On   Blood  17 1211          Components       Value Reference Range Flag Lab   Hemoglobin 7.7 11.7 - 15.7 g/dL L [facility]            CBC with platelets differential [[id number]] (Abnormal)  Resulted: 17 0656, Result status: Final result    Ordering provider: Terrance Lea MD  17 0001 Resulting lab: [facility]    Specimen Information    Type Source Collected On   Blood  17 0615          Components       Value Reference Range Flag Lab   WBC 4.5 4.0 - 11.0 10e9/L  [facility]   RBC Count 2.65 3.8 - 5.2 10e12/L L [facility]   Hemoglobin 7.4 11.7 - 15.7 g/dL L [facility]   Hematocrit 23.5 35.0 - 47.0 % L [facility]   MCV 89 78 - 100 fl  [facility]   MCH 27.9 26.5 - 33.0 pg  [facility]   MCHC 31.5 31.5 - 36.5 g/dL  [facility]   RDW 17.4 10.0 - 15.0 % H [facility]   Platelet Count 303 150 - 450 10e9/L  [facility]   Diff Method Automated Method   [facility]   % Neutrophils 45.1 %  [facility]   % Lymphocytes 34.8 %  [facility]   % Monocytes 15.5 %  [facility]   % Eosinophils 3.5 " %  [facility]   % Basophils 0.9 %  [facility]   % Immature Granulocytes 0.2 %  [facility]   Absolute Neutrophil 2.0 1.6 - 8.3 10e9/L  [facility]   Absolute Lymphocytes 1.6 0.8 - 5.3 10e9/L  [facility]   Absolute Monocytes 0.7 0.0 - 1.3 10e9/L  [facility]   Absolute Eosinophils 0.2 0.0 - 0.7 10e9/L  [facility]   Absolute Basophils 0.0 0.0 - 0.2 10e9/L  [facility]   Abs Immature Granulocytes 0.0 0 - 0.4 10e9/L  [facility]            Troponin I [[id number]] (Abnormal)  Resulted: 03/04/17 0408, Result status: Final result    Ordering provider: Anselmo Valentine MD  03/03/17 2237 Resulting lab: [facility]    Specimen Information    Type Source Collected On   Blood  03/04/17 0334          Components       Value Reference Range Flag Lab   Troponin I ES 0.054 0.000 - 0.045 ug/L H [facility]   Comment:         The 99th percentile for upper reference range is 0.045 ug/L.  Troponin values   in   the range of 0.045 - 0.120 ug/L may be associated with risks of adverse   clinical events.              Troponin I [[id number]] (Abnormal)  Resulted: 03/03/17 2208, Result status: Final result    Ordering provider: Anselmo Valentine MD  03/03/17 1919 Resulting lab: [facility]    Specimen Information    Type Source Collected On   Blood  03/03/17 2141          Components       Value Reference Range Flag Lab   Troponin I ES 0.059 0.000 - 0.045 ug/L H [facility]   Comment:         The 99th percentile for upper reference range is 0.045 ug/L.  Troponin values   in   the range of 0.045 - 0.120 ug/L may be associated with risks of adverse   clinical events.              Occult blood stool [[id number]] (Abnormal)  Resulted: 03/03/17 1621, Result status: Final result    Ordering provider: Terrance Lea MD  03/03/17 1615 Resulting lab: [facility]    Specimen Information    Type Source Collected On   Stool Stool 03/03/17 1615          Components       Value Reference Range Flag Lab   Occult Blood Positive  NEG A [facility]            Troponin I (second draw) [[id number]] (Abnormal)  Resulted: 03/03/17 1606, Result status: Final result    Ordering provider: Terrance Lea MD  03/03/17 1510 Resulting lab: [facility]    Specimen Information    Type Source Collected On   Blood  03/03/17 1515          Components       Value Reference Range Flag Lab   Troponin I ES 0.067 0.000 - 0.045 ug/L H [facility]   Comment:         The 99th percentile for upper reference range is 0.045 ug/L.  Troponin values   in   the range of 0.045 - 0.120 ug/L may be associated with risks of adverse   clinical events.              UA with Microscopic [[id number]] (Abnormal)  Resulted: 03/03/17 1224, Result status: Final result    Ordering provider: Terrance Lea MD  03/03/17 1214 Resulting lab: [facility]    Specimen Information    Type Source Collected On   Urine  03/03/17 1139          Components       Value Reference Range Flag Lab   Color Urine Yellow   [facility]   Appearance Urine Clear   [facility]   Glucose Urine Negative NEG mg/dL  [facility]   Bilirubin Urine Negative NEG  [facility]   Ketones Urine Negative NEG mg/dL  [facility]   Specific [place] Urine 1.010 1.003 - 1.035  [facility]   pH Urine 7.5 5.0 - 7.0 pH H [facility]   Protein Albumin Urine Negative NEG mg/dL  [facility]   Urobilinogen Urine 0.2 0.2 - 1.0 EU/dL  [facility]   Nitrite Urine Negative NEG  [facility]   Blood Urine Negative NEG  [facility]   Leukocyte Esterase Urine Negative NEG  [facility]   Source Unspecified Urine   [facility]   WBC Urine -- 0 - 2 /HPF  [facility]   Result:         O - 2  Urine was tested unconcentrated because <10 ml was received.     RBC Urine -- 0 - 2 /HPF  [facility]   Result:         O - 2  Urine was tested unconcentrated because <10 ml was received.     Squamous Epithelial /LPF Urine -- FEW /LPF  [facility]   Result:         Few  Urine was tested unconcentrated because <10 ml was received.              Troponin I [[id number]] (Abnormal)  Resulted:  03/03/17 1207, Result status: Final result    Ordering provider: Shad Martin MD  03/03/17 1113 Resulting lab: [facility]    Specimen Information    Type Source Collected On   Blood  03/03/17 1123          Components       Value Reference Range Flag Lab   Troponin I ES 0.070 0.000 - 0.045 ug/L H [facility]   Comment:         The 99th percentile for upper reference range is 0.045 ug/L.  Troponin values   in   the range of 0.045 - 0.120 ug/L may be associated with risks of adverse   clinical events.              Comprehensive metabolic panel [[id number]] (Abnormal)  Resulted: 03/03/17 1152, Result status: Final result    Ordering provider: Shad Martin MD  03/03/17 1113 Resulting lab: [facility]    Specimen Information    Type Source Collected On   Blood  03/03/17 1123          Components       Value Reference Range Flag Lab   Sodium 141 133 - 144 mmol/L  [facility]   Potassium 3.7 3.4 - 5.3 mmol/L  [facility]   Chloride 104 94 - 109 mmol/L  [facility]   Carbon Dioxide 25 20 - 32 mmol/L  [facility]   Anion Gap 12 3 - 14 mmol/L  [facility]   Glucose 96 70 - 99 mg/dL  [facility]   Urea Nitrogen 15 7 - 30 mg/dL  [facility]   Creatinine 0.90 0.52 - 1.04 mg/dL  [facility]   GFR Estimate 59 >60 mL/min/1.7m2 L [facility]   Comment:  Non  GFR Calc   GFR Estimate If Black 71 >60 mL/min/1.7m2  [facility]   Comment:  African American GFR Calc   Calcium 8.5 8.5 - 10.1 mg/dL  [facility]   Bilirubin Total 0.3 0.2 - 1.3 mg/dL  [facility]   Albumin 3.5 3.4 - 5.0 g/dL  [facility]   Protein Total 7.3 6.8 - 8.8 g/dL  [facility]   Alkaline Phosphatase 85 40 - 150 U/L  [facility]   ALT 24 0 - 50 U/L  [facility]   AST 22 0 - 45 U/L  [facility]            INR [[id number]]  Resulted: 03/03/17 1142, Result status: Final result    Ordering provider: Shad Martin MD  03/03/17 1113 Resulting lab: [facility]    Specimen Information    Type Source Collected On   Blood  03/03/17 1121           Components       Value Reference Range Flag Lab   INR 0.97 0.86 - 1.14  [facility]            Blood gas venous [[id number]] (Abnormal)  Resulted: 03/03/17 1133, Result status: Final result    Ordering provider: Terrance Lea MD  03/03/17 1124 Resulting lab: [facility]    Specimen Information    Type Source Collected On   Blood  03/03/17 1123          Components       Value Reference Range Flag Lab   Ph Venous 7.54 7.32 - 7.43 pH H [facility]   PCO2 Venous 31 40 - 50 mm Hg L [facility]   PO2 Venous 17 25 - 47 mm Hg L [facility]   Bicarbonate Venous 26 21 - 28 mmol/L  [facility]   Base Excess Venous 3.5 mmol/L  [facility]   Comment:  Abnormal Result, Ref range: -7.7 to 1.9   FIO2 26   [facility]            CBC with platelets differential [[id number]] (Abnormal)  Resulted: 03/03/17 1133, Result status: Final result    Ordering provider: Shad Martin MD  03/03/17 1113 Resulting lab: [facility]    Specimen Information    Type Source Collected On   Blood  03/03/17 1123          Components       Value Reference Range Flag Lab   WBC 6.8 4.0 - 11.0 10e9/L  [facility]   RBC Count 2.99 3.8 - 5.2 10e12/L L [facility]   Hemoglobin 8.3 11.7 - 15.7 g/dL L [facility]   Hematocrit 26.2 35.0 - 47.0 % L [facility]   MCV 88 78 - 100 fl  [facility]   MCH 27.8 26.5 - 33.0 pg  [facility]   MCHC 31.7 31.5 - 36.5 g/dL  [facility]   RDW 17.2 10.0 - 15.0 % H [facility]   Platelet Count 349 150 - 450 10e9/L  [facility]   Diff Method Automated Method   [facility]   % Neutrophils 62.5 %  [facility]   % Lymphocytes 28.0 %  [facility]   % Monocytes 7.2 %  [facility]   % Eosinophils 1.6 %  [facility]   % Basophils 0.6 %  [facility]   % Immature Granulocytes 0.1 %  [facility]   Absolute Neutrophil 4.3 1.6 - 8.3 10e9/L  [facility]   Absolute Lymphocytes 1.9 0.8 - 5.3 10e9/L  [facility]   Absolute Monocytes 0.5 0.0 - 1.3 10e9/L  [facility]   Absolute Eosinophils 0.1 0.0 - 0.7 10e9/L  [facility]   Absolute Basophils 0.0 0.0 - 0.2 10e9/L  [facility]   Abs Immature  Granulocytes 0.0 0 - 0.4 10e9/L  [facility]            Testing Performed By     Lab - Abbreviation Name Director Address Valid Date Range    22 - [facility] Unknown [address]   [place] [address] 05/08/15 1057 - Present            Unresulted Labs     None         Imaging Results - 3 Days      XR Chest Port 1 View [[id number]]  Resulted: 03/03/17 1233, Result status: Final result    Ordering provider: Shad Martin MD  03/03/17 1113 Resulted by: Maximino Friedman MD    Performed: 03/03/17 1137 - 03/03/17 1143 Resulting lab: RADIOLOGY RESULTS    Narrative:       CHEST PORTABLE ONE VIEW  3/3/2017 11:43 AM     HISTORY: Extreme dyspnea.    COMPARISON: Chest x-rays dated 12/3/2016.    FINDINGS:  Probable cardiac valve replacement is projected over the  heart. Cardiac silhouette remains mildly enlarged. Prominence of the  thoracic aorta is again noted with mild ectasia of the ascending  aorta. This could represent post stenotic dilatation. Lungs are  grossly clear. No pneumothorax or significant pleural fluid  collection. No infiltrate.      Impression:       IMPRESSION:   1. 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  2. No evidence for pulmonary edema or significant pleural fluid  collection to suggest congestive heart failure.  3. No pneumonia is identified. Etiology for patient's dyspnea is  otherwise not definitely seen.    MAXIMINO FRIEDMAN MD      Testing Performed By     Lab - Abbreviation Name Director Address Valid Date Range    104 - Rad [facility] RADIOLOGY RESULTS Unknown Unknown 02/16/05 1553 - Present            Encounter-Level Documents:     There are no encounter-level documents.      Order-Level Documents:     There are no order-level documents.

## 2017-03-04 VITALS
OXYGEN SATURATION: 97 % | BODY MASS INDEX: 24.27 KG/M2 | WEIGHT: 151.01 LBS | HEIGHT: 66 IN | RESPIRATION RATE: 18 BRPM | HEART RATE: 78 BPM | SYSTOLIC BLOOD PRESSURE: 152 MMHG | TEMPERATURE: 96 F | DIASTOLIC BLOOD PRESSURE: 80 MMHG

## 2017-03-04 LAB
BASOPHILS # BLD AUTO: 0 10E9/L (ref 0–0.2)
BASOPHILS NFR BLD AUTO: 0.9 %
DIFFERENTIAL METHOD BLD: ABNORMAL
EOSINOPHIL # BLD AUTO: 0.2 10E9/L (ref 0–0.7)
EOSINOPHIL NFR BLD AUTO: 3.5 %
ERYTHROCYTE [DISTWIDTH] IN BLOOD BY AUTOMATED COUNT: 17.4 % (ref 10–15)
HCT VFR BLD AUTO: 23.5 % (ref 35–47)
HGB BLD-MCNC: 7.4 G/DL (ref 11.7–15.7)
HGB BLD-MCNC: 7.7 G/DL (ref 11.7–15.7)
IMM GRANULOCYTES # BLD: 0 10E9/L (ref 0–0.4)
IMM GRANULOCYTES NFR BLD: 0.2 %
LYMPHOCYTES # BLD AUTO: 1.6 10E9/L (ref 0.8–5.3)
LYMPHOCYTES NFR BLD AUTO: 34.8 %
MCH RBC QN AUTO: 27.9 PG (ref 26.5–33)
MCHC RBC AUTO-ENTMCNC: 31.5 G/DL (ref 31.5–36.5)
MCV RBC AUTO: 89 FL (ref 78–100)
MONOCYTES # BLD AUTO: 0.7 10E9/L (ref 0–1.3)
MONOCYTES NFR BLD AUTO: 15.5 %
NEUTROPHILS # BLD AUTO: 2 10E9/L (ref 1.6–8.3)
NEUTROPHILS NFR BLD AUTO: 45.1 %
PLATELET # BLD AUTO: 303 10E9/L (ref 150–450)
RBC # BLD AUTO: 2.65 10E12/L (ref 3.8–5.2)
TROPONIN I SERPL-MCNC: 0.05 UG/L (ref 0–0.04)
WBC # BLD AUTO: 4.5 10E9/L (ref 4–11)

## 2017-03-04 PROCEDURE — 36415 COLL VENOUS BLD VENIPUNCTURE: CPT | Performed by: FAMILY MEDICINE

## 2017-03-04 PROCEDURE — 25000128 H RX IP 250 OP 636: Performed by: FAMILY MEDICINE

## 2017-03-04 PROCEDURE — 96374 THER/PROPH/DIAG INJ IV PUSH: CPT

## 2017-03-04 PROCEDURE — 25000132 ZZH RX MED GY IP 250 OP 250 PS 637: Performed by: FAMILY MEDICINE

## 2017-03-04 PROCEDURE — 85018 HEMOGLOBIN: CPT | Performed by: FAMILY MEDICINE

## 2017-03-04 PROCEDURE — 84484 ASSAY OF TROPONIN QUANT: CPT | Performed by: FAMILY MEDICINE

## 2017-03-04 PROCEDURE — 25000125 ZZHC RX 250: Performed by: FAMILY MEDICINE

## 2017-03-04 PROCEDURE — 85025 COMPLETE CBC W/AUTO DIFF WBC: CPT | Performed by: FAMILY MEDICINE

## 2017-03-04 PROCEDURE — 99217 ZZC OBSERVATION CARE DISCHARGE: CPT | Performed by: FAMILY MEDICINE

## 2017-03-04 PROCEDURE — G0378 HOSPITAL OBSERVATION PER HR: HCPCS

## 2017-03-04 RX ORDER — OMEPRAZOLE 40 MG/1
40 CAPSULE, DELAYED RELEASE ORAL DAILY
Qty: 30 CAPSULE | Refills: 0 | Status: SHIPPED | OUTPATIENT
Start: 2017-03-05 | End: 2017-07-18

## 2017-03-04 RX ORDER — METHYLPREDNISOLONE SODIUM SUCCINATE 125 MG/2ML
125 INJECTION, POWDER, LYOPHILIZED, FOR SOLUTION INTRAMUSCULAR; INTRAVENOUS
Status: DISCONTINUED | OUTPATIENT
Start: 2017-03-04 | End: 2017-03-04 | Stop reason: HOSPADM

## 2017-03-04 RX ORDER — DIPHENHYDRAMINE HYDROCHLORIDE 50 MG/ML
50 INJECTION INTRAMUSCULAR; INTRAVENOUS
Status: DISCONTINUED | OUTPATIENT
Start: 2017-03-04 | End: 2017-03-04 | Stop reason: HOSPADM

## 2017-03-04 RX ADMIN — LEVETIRACETAM 500 MG: 500 TABLET ORAL at 08:03

## 2017-03-04 RX ADMIN — METOPROLOL TARTRATE 12.5 MG: 25 TABLET ORAL at 08:03

## 2017-03-04 RX ADMIN — PREDNISONE 5 MG: 5 TABLET ORAL at 08:03

## 2017-03-04 RX ADMIN — IRON SUCROSE 300 MG: 20 INJECTION, SOLUTION INTRAVENOUS at 16:59

## 2017-03-04 RX ADMIN — LEVOTHYROXINE SODIUM 50 MCG: 50 TABLET ORAL at 06:59

## 2017-03-04 RX ADMIN — ISOSORBIDE MONONITRATE 60 MG: 30 TABLET, EXTENDED RELEASE ORAL at 08:03

## 2017-03-04 NOTE — PLAN OF CARE
Problem: Goal Outcome Summary  Goal: Goal Outcome Summary  Outcome: Improving  VSS on RA.  Afebrile.  Denying any chest discomfort/pressure.  LS clear.  A&Ox4.  Bruising on arms.  Saline locked .  Tele continues to be BBB.  Troponins trending down. Ate toast this evening tolerated well- advanced to reg tray for Bfast.

## 2017-03-04 NOTE — PROGRESS NOTES
.S-(situation): Patient registered to Observation. Patient arrived to room 269 via cat from ED    B-(background): Chest pain    A-(assessment): Pt up to room 269 via cart from ED, ambulated self to bed.  Pt accompanied by daughter Hanna.  AO x 4.  Skin intact, no open areas noted.  Currently denies any chest pain/pressure. Pt states she gets chest pain frequently after activity while at rest.  She was told that nitro wouldn't relieve her pain and that is they started imdur. Tele placed.    R-(recommendations): Orders and observation goals reviewed with patient and daughter    Nursing Observation criteria listed below was met:    Skin issues/needs documented:NA  Isolation needs addressed, if appropriate: NA  Fall Prevention: Education given and documented: NA  Education Assessment documented:Yes  Education Documented (Pre-existing chronic infection such as, MRSA/VRE need education on admission): Yes  New medication patient education completed and documented (Possible Side Effects of Common Medications handout): Yes  Home medications if not able to send immediately home with family stored here: NA  Reminder note placed in discharge instructions: NA  Patient has discharge needs (If yes, please explain): No

## 2017-03-04 NOTE — PLAN OF CARE
Problem: Goal Outcome Summary  Goal: Goal Outcome Summary  Outcome: Improving  VSS, denies any discomfort, adequate I and O, up with SBA, possible D/C today.

## 2017-03-04 NOTE — H&P
[facility]    History and Physical  Hospitalist       Date of Admission:  3/3/2017  Date of Service (when I saw the patient): 03/03/17    Assessment & Plan   Gillian Burk is a 89 year old female who presents with chest pain and dizziness that occurred at home. She is a resident at [facility] living and the pain was reminiscent of pain that she's had in the past with cardiac ischemia. He's had a number of these episodes over the past few months. History is significant for aortic valve replacement (TAVR) in August of last year. In November 2016 had angiogram showing mild diffuse disease being treated medically. Revelation in the emergency department initially left bundle branch block and her troponin is mildly elevated at 0.070 which is chronic for her. Of some new concern is hemoglobin which is lower than baseline. It was noted to be 7.6, 2 days ago and today is 8.3 with her describing having maroon colored stools. Patient is taking Plavix chronically with a history of a stroke and recently has been started on aspirin. Lab work in December was consistent with iron deficiency anemia, and she was unable to tolerate oral iron. Patient will be registered observation to follow her hemoglobin overnight make sure that this is stable. Well hold her aspirin and Plavix at this time. She will need some type of scoping in the future to look at are: possibly her stomach looking for source of bleed. Continue the Pepcid that she is taking. Will monitor on cardiac telemetry overnight with troponin studies to rule out any cardiac ischemia. She will need ongoing outpatient management by cardiology.    Principal Problem:    GI bleed    Assessment: presenting with weakness dizziness and maroon colored stools. Hemoglobin 2 days ago was down 2 1/2 g from previous. She is currently on Plavix and aspirin with history of cardiac problems and previous stroke. At this time is medically stable  and the bleeding does not appear to be acutely worse.    Plan: hold the Plavix and aspirin at this time. We'll need outpatient management and probable colonoscopy, possible EGD in the future. Continue Said that she is currently on.  Active Problems:    Iron deficiency anemia    Assessment: likely due to blood loss secondary to G.I. bleed    Plan: when able, will start I replacement therapy. Follow hemoglobin overnight    Hypertension goal BP (blood pressure) < 140/90    Assessment: stable    Plan: continue her Lopressor and Imdur    Esophageal reflux    Assessment: noted in past, taking Pepcid    Plan: continue, may need EGD in future    Hypothyroidism due to acquired atrophy of thyroid    Assessment: stable taking replacement    Plan: continue    S/P TAVR (transcatheter aortic valve replacement)    Assessment: stable    Plan: continue cardiac follow-up    Coronary artery disease involving native coronary artery - NSTEMI 11/16 with moderate RCA disease treated medically    Assessment: as above. 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    Anselmo Valentine MD    Primary Care Physician   Augusto Meyer    Chief Complaint   Chest pain this evening    History is obtained from the patient, electronic health record, emergency department physician and patient's daughter    History of Present Illness   Gillian Burk is a 89 year old female  who presents with some chest discomfort that she experienced over at [facility]. Describe underneath the breast area radiating into the back. She's felt a little bit dizzy, mildly short of breath. It was not associate with nausea vomiting or diaphoresis. States that it's similar to pain she's had in the past when she's had ischemic pain. Past history significant for CAD, with recent angiogram in 11 2016 showing diffuse disease being treated medically. She's also status post TAVR in August 2016 with history of severe aortic stenosis. She then followed by cardiology with several episodes of chest pain over the past couple of months. They've recently increased her Imdur dosing. She also states his she's been having loose dark stools, almost maroon in color. She's had some mild left upper quadrant abdominal discomfort. She denies any history of previous G.I. bleeding. Hemoglobin 2 days ago was low its 7.6 with her baseline hemoglobin at 10.5. She had lab work in December showing low iron and iron saturation consistent with iron deficiency anemia. At that time could not tolerate oral iron placement. Patient is taking Plavix chronically with history of stroke in the past. Recently aspirin low dose has been added to her regimen. Chest pain is resolved now with a single dose of nitroglycerin. She states she otherwise feels well. She denies any fever, chills, cough. She denies any urinary symptoms..    Past Medical History    I have reviewed this patient's medical history and updated it with pertinent information if needed.   Past Medical History   Diagnosis Date     Anxiety      Anxiety      Aortic stenosis      s/p TAVR 8/17/16     Chronic migraine      Difficulty in walking(719.7)      Hyperlipidaemia      Hypertension      Hypothyroidism      Myocardial infarction (H)      Need for SBE (subacute bacterial endocarditis) prophylaxis      Numbness and tingling      decreased sensation in toes      Orthostatic hypotension      wears compression stockings     Osteoarthritis      axial, and preipheral     Osteopenia      PAC (premature atrial contraction)      PUD (peptic ulcer disease)      PVC (premature ventricular contraction)      Secondary Sjogren's syndrome (H)      Seizures (H)      after stroke (partial onset)     Seropositive rheumatoid arthritis (H)      Shortness of breath      Stroke (H) 05/2013     with visual field cut, left occipital lobe     Syncope 2014     due to orthostatic hypotension       Past Surgical History   I have reviewed this patient's surgical history and updated it with pertinent information if needed.  Past Surgical History   Procedure Laterality Date     Mouth surgery  2011     jaw surgery due to fracture     Eye surgery  1999     macular pucker eye surgery     Cataract iol, rt/lt Bilateral 2000     Hysterectomy total abdominal  1970     ovaries out     C total hip arthroplasty Right 2010     C total hip arthroplasty Left 2014     Transcatheter aortic valve implant anesthesia N/A 8/3/2016     Procedure: TRANSCATHETER AORTIC VALVE IMPLANT ANESTHESIA;  Surgeon: GENERIC ANESTHESIA PROVIDER;  Location: [facility] OR     Heart cath femoral cannulization with open standby repair aortic valve N/A 8/3/2016     Procedure: HEART CATH FEMORAL CANNULIZATION WITH OPEN STANDBY REPAIR AORTIC VALVE;  Surgeon: Owen Schultz MD;  Location: [facility] OR       Prior to Admission Medications   Prior to Admission Medications   Prescriptions Last Dose Informant Patient Reported? Taking?   ALPRAZolam (XANAX) 0.25 MG tablet   No No   Sig: Take 1 tablet (0.25 mg) by mouth 2 times daily as needed for anxiety   Cranberry 200 MG CAPS 3/3/2017 at 8am  Yes Yes   Sig: Take 3 capsules (600 mg) by mouth 2 times daily   Lactobacillus (FLORAJEN ACIDOPHILUS) CAPS 3/3/2017 at 8am  Yes Yes   Sig: Take 1 capsule by mouth daily   acetaminophen (TYLENOL ARTHRITIS PAIN) 650 MG CR tablet Past Week at Unknown time  Yes Yes    Sig: Take 2 tablets (1,300 mg) by mouth every 8 hours as needed for mild pain   aspirin EC 81 MG EC tablet 3/3/2017 at 8am  No Yes   Sig: Take 1 tablet (81 mg) by mouth daily   atorvastatin (LIPITOR) 40 MG tablet 3/3/2017 at 8am  Yes Yes   Sig: Take 1 tablet (40 mg) by mouth daily Reports taking   calcium carbonate (TUMS) 500 MG chewable tablet Past Week at Unknown time  Yes Yes   Sig: Take 2-4 tablets (1,000-2,000 mg) by mouth as needed for heartburn   clopidogrel (PLAVIX) 75 MG tablet 3/3/2017 at 8am  No Yes   Sig: TAKE ONE TABLET BY MOUTH ONCE DAILY   docusate sodium (COLACE) 100 MG capsule 3/2/2017 at 9pm  Yes Yes   Sig: Take 200 mg by mouth daily 2 capsules   famotidine (PEPCID) 40 MG tablet 3/2/2017 at 9pm  No Yes   Sig: TAKE ONE TABLET BY MOUTH AT BEDTIME   folic acid (FOLVITE) 1 MG tablet 3/2/2017 at 9pm  No Yes   Sig: TAKE ONE TABLET BY MOUTH ONCE DAILY   isosorbide mononitrate (IMDUR) 60 MG 24 hr tablet 3/3/2017 at 8am  No Yes   Sig: Take 1 tablet (60 mg) by mouth daily   levETIRAcetam (KEPPRA) 500 MG tablet   No No   Sig: TAKE ONE TABLET BY MOUTH ONCE DAILY AND TWO AT BEDTIME   levothyroxine (SYNTHROID/LEVOTHROID) 50 MCG tablet 3/3/2017 at 8am  No Yes   Sig: Take 1 tablet (50 mcg) by mouth daily Except tuesday   methotrexate 2.5 MG tablet 3/3/2017 at 8am  No Yes   Sig: Take 4 tablets (10 mg) by mouth once a week Wed   metoprolol (LOPRESSOR) 25 MG tablet 3/3/2017 at 8am  Yes Yes   Sig: Reports taking 0.5 tablet BID   minocycline (MINOCIN/DYNACIN) 100 MG capsule Unknown at Unknown time  No No   Sig: TAKE ONE CAPSULE BY MOUTH ONCE DAILY   mirtazapine (REMERON) 30 MG tablet 3/2/2017 at 9pm  No Yes   Sig: Take 1 pills at bedtime   multivitamin, therapeutic with minerals (MULTI-VITAMIN) TABS 3/2/2017 at 9pm  Yes Yes   Sig: Take 1 tablet by mouth daily   nitroglycerin (NITROSTAT) 0.4 MG SL tablet 3/3/2017 at 8am  Yes Yes   Sig: Pt reports taking - -  -For chest pain place 1 tablet under the tongue every 5  minutes for 3 doses. If symptoms persist 5 minutes after 1st dose call 911.   predniSONE (DELTASONE) 5 MG tablet 3/3/2017 at 8am  No Yes   Sig: TAKE 1 TABLET BY MOUTH EVERY OTHER DAY, ALTERNATING WITH ONE-HALF TABLET EVERY OTHER DAY.      Facility-Administered Medications: None     Allergies   Allergies   Allergen Reactions     Caffeine Other (See Comments)     Triggers your Meniere's disease     Hydrocodone Other (See Comments)     hallucinations     Ibuprofen GI Disturbance     Penicillins Hives     Tramadol Other (See Comments)     Seizure, was taking remeron along with tramadol     Metal [Staples] Rash       Social History   I have reviewed this patient's social history and updated it with pertinent information if needed. Gillian Burk  reports that she has never smoked. She has never used smokeless tobacco. She reports that she does not drink alcohol or use illicit drugs.    Family History   I have reviewed this patient's family history and updated it with pertinent information if needed.   Family History   Problem Relation Age of Onset     Family History Negative No family hx of      Hyperlipidemia Mother        Review of Systems   The 10 point Review of Systems is negative other than noted in the HPI or here.     Physical Exam   Temp: 96.7  F (35.9  C) Temp src: Oral BP: 163/77 Pulse: 81 Heart Rate: 81 Resp: 16 SpO2: 99 % O2 Device: None (Room air)    Vital Signs with Ranges  Temp:  [96.7  F (35.9  C)-97.5  F (36.4  C)] 96.7  F (35.9  C)  Pulse:  [81-96] 81  Heart Rate:  [67-91] 81  Resp:  [9-53] 16  BP: (134-180)/(63-96) 163/77  SpO2:  [96 %-100 %] 99 %  151 lbs .24 oz    EXAM:  Constitutional: alert, no distress and cooperative   Cardiovascular: PMI normal. No lifts, heaves, or thrills. RRR. No murmurs, clicks gallops or rub  Respiratory: Percussion normal. Good diaphragmatic excursion. Lungs clear  Psychiatric: mentation appears normal and affect normal/bright  Head: Normocephalic. No masses,  lesions, tenderness or abnormalities  Neck: Neck supple. No adenopathy. Thyroid symmetric, normal size,  ENT: ENT exam normal, no neck nodes or sinus tenderness  Abdomen: Abdomen soft, non-tender. BS normal. No masses, organomegaly  NEURO:Reflexes normal and symmetric. Sensation grossly WNL.  SKIN: from bruising bilaterally on both arms  LYMPH: Normal cervical lymph nodes  JOINT/EXTREMITIES: extremities normal- no gross deformities noted and normal muscle tone     Data   Data reviewed today:  I personally reviewed the EKG tracing showing Left bundle branch block, heart rate 82 and the chest x-ray image(s) showing No signs of infiltrate..    Recent Labs  Lab 03/03/17  1515 03/03/17  1123 03/01/17  1428   WBC  --  6.8 6.5   HGB  --  8.3* 7.6*   MCV  --  88 88   PLT  --  349 305   INR  --  0.97  --    NA  --  141  --    POTASSIUM  --  3.7  --    CHLORIDE  --  104  --    CO2  --  25  --    BUN  --  15  --    CR  --  0.90 1.09*   ANIONGAP  --  12  --    CHEYANNE  --  8.5  --    GLC  --  96  --    ALBUMIN  --  3.5  --    PROTTOTAL  --  7.3  --    BILITOTAL  --  0.3  --    ALKPHOS  --  85  --    ALT  --  24 18   AST  --  22 19   TROPI 0.067* 0.070*  --        Recent Results (from the past 24 hour(s))   XR Chest Port 1 View    Narrative    CHEST PORTABLE ONE VIEW  3/3/2017 11:43 AM     HISTORY: Extreme dyspnea.    COMPARISON: Chest x-rays dated 12/3/2016.    FINDINGS:  Probable cardiac valve replacement is projected over the  heart. Cardiac silhouette remains mildly enlarged. Prominence of the  thoracic aorta is again noted with mild ectasia of the ascending  aorta. This could represent post stenotic dilatation. Lungs are  grossly clear. No pneumothorax or significant pleural fluid  collection. No infiltrate.      Impression    IMPRESSION:   1. 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  2. No evidence for pulmonary edema or significant pleural fluid  collection to suggest congestive heart failure.  3. No  pneumonia is identified. Etiology for patient's dyspnea is  otherwise not definitely seen.    CA FRIEDMAN MD

## 2017-03-05 NOTE — DISCHARGE SUMMARY
[facility] Discharge Summary    Gillian Burk MRN# [id number]   Age: 89 year old YOB: 1927     Date of Admission:  3/3/2017  Date of Discharge::  3/4/2017  Admitting Physician:  Anselmo [facility] MD  Discharge Physician:  Alicia [facility] MD    Home clinic: [facility]          Admission Diagnoses:   Lightheadedness [R42]  GI bleed [K92.2]  Acute chest pain [R07.9]  Gastrointestinal hemorrhage, unspecified gastrointestinal hemorrhage type [K92.2]  Iron deficiency anemia, unspecified iron deficiency anemia type [D50.9]          Discharge Diagnosis:   Principal Problem:    GI bleed    Assessment: Patient presented with a 3 g hemoglobin drop over the past 3 months and was monitored for serial hemoglobins. Hemoglobins have remained stable in the mid 7 range and patient has not had any significant symptoms of acute GI bleed, however occult blood stool is positive.  Patient is hemodynamically stable. She has received a one-time dose of IV Venofer during this observation stay.    Plan:  Patient will be discharged back to her assisted living facility and has been set up for an EGD and colonoscopy to be performed at [facility] next week secondary to patient's cardiac history.  After discussion with Dr. Keating, GI provider on-call from [facility], patient will continue with her aspirin and Plavix secondary to her heart and stroke history. Patient will also continue omeprazole daily at time of discharge. If patient does have any recurrent bleeding associated with lightheadedness, dizziness, chest pain, or other symptoms and presents to the emergency department, would need to consider transfer to [facility] for more urgent evaluation.     Active Problems:    Hypothyroidism due to acquired atrophy of thyroid    Assessment:  chronic and stable    Plan:  discharged without changed home regimen       S/P TAVR (transcatheter aortic valve replacement)     Assessment:  stable, sees cardiology regularly    Plan:  continuing aspirin and Plavix at time of discharge along with chronic medications.       Coronary artery disease involving native coronary artery - NSTEMI 11/16 with moderate RCA disease treated medically    Assessment:  previous history, follow with cardiology    Plan:  discharge without changed home regimen.       Iron deficiency anemia    Assessment:  Likely secondary to GI bleed as above    Plan:  IV Venofer x1 was given. No further oral iron will be given until after GI evaluation complete.      Hypertension goal BP (blood pressure) < 140/90    Assessment:  Blood pressure stable    Plan:  discharge but changed home regimen      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    Assessment:  on Keppra    Plan:  continue without change at time of discharge      History of CVA (cerebrovascular accident) - 2013    Assessment:  occurred while on aspirin alone, therefore Plavix was added    Plan:  continue with both aspirin and Plavix at time of discharge       Esophageal reflux    Assessment:  normally on Prevacid, now with acute GI bleed    Plan:  Discharge with omeprazole and close GI follow-up as above           Procedures:   No procedures performed during this admission          Medications Prior to Admission:     Prescriptions Prior to Admission   Medication Sig Dispense Refill Last Dose     isosorbide mononitrate (IMDUR) 60 MG 24 hr tablet Take 1 tablet (60 mg) by mouth daily 90 tablet 3 3/3/2017 at 8am     levothyroxine (SYNTHROID/LEVOTHROID) 50 MCG tablet Take 1 tablet (50 mcg) by mouth daily Except tuesday 90 tablet 1 3/3/2017 at 8am     mirtazapine (REMERON) 30 MG tablet Take 1 pills at bedtime 90 tablet 0 3/2/2017 at 9pm     atorvastatin (LIPITOR) 40 MG tablet Take 1 tablet (40 mg) by mouth daily Reports taking 1 tablet 0 3/3/2017 at 8am     Cranberry 200 MG CAPS Take 3 capsules (600 mg) by mouth 2 times daily 1 capsule 0 3/3/2017 at 8am      metoprolol (LOPRESSOR) 25 MG tablet Reports taking 0.5 tablet  tablet 1 3/3/2017 at 8am     nitroglycerin (NITROSTAT) 0.4 MG SL tablet Pt reports taking - -  -For chest pain place 1 tablet under the tongue every 5 minutes for 3 doses. If symptoms persist 5 minutes after 1st dose call 911. 25 tablet 0 3/3/2017 at 8am     folic acid (FOLVITE) 1 MG tablet TAKE ONE TABLET BY MOUTH ONCE DAILY 90 tablet 1 3/2/2017 at 9pm     aspirin EC 81 MG EC tablet Take 1 tablet (81 mg) by mouth daily   3/3/2017 at 8am     methotrexate 2.5 MG tablet Take 4 tablets (10 mg) by mouth once a week Wed 52 tablet 3 3/3/2017 at 8am     clopidogrel (PLAVIX) 75 MG tablet TAKE ONE TABLET BY MOUTH ONCE DAILY 90 tablet 3 3/3/2017 at 8am     predniSONE (DELTASONE) 5 MG tablet TAKE 1 TABLET BY MOUTH EVERY OTHER DAY, ALTERNATING WITH ONE-HALF TABLET EVERY OTHER DAY. 80 tablet 3 3/3/2017 at 8am     acetaminophen (TYLENOL ARTHRITIS PAIN) 650 MG CR tablet Take 2 tablets (1,300 mg) by mouth every 8 hours as needed for mild pain   Past Week at Unknown time     calcium carbonate (TUMS) 500 MG chewable tablet Take 2-4 tablets (1,000-2,000 mg) by mouth as needed for heartburn   Past Week at Unknown time     docusate sodium (COLACE) 100 MG capsule Take 200 mg by mouth daily 2 capsules   3/2/2017 at 9pm     multivitamin, therapeutic with minerals (MULTI-VITAMIN) TABS Take 1 tablet by mouth daily   3/2/2017 at 9pm     Lactobacillus (FLORAJEN ACIDOPHILUS) CAPS Take 1 capsule by mouth daily   3/3/2017 at 8am     levETIRAcetam (KEPPRA) 500 MG tablet TAKE ONE TABLET BY MOUTH ONCE DAILY AND TWO AT BEDTIME 90 tablet 6      ALPRAZolam (XANAX) 0.25 MG tablet Take 1 tablet (0.25 mg) by mouth 2 times daily as needed for anxiety 28 tablet 0      minocycline (MINOCIN/DYNACIN) 100 MG capsule TAKE ONE CAPSULE BY MOUTH ONCE DAILY 30 capsule 0 Unknown at Unknown time     [DISCONTINUED] famotidine (PEPCID) 40 MG tablet TAKE ONE TABLET BY MOUTH AT BEDTIME 30 tablet 9  3/2/2017 at 9pm             Discharge Medications:     Current Discharge Medication List      START taking these medications    Details   omeprazole (PRILOSEC) 40 MG capsule Take 1 capsule (40 mg) by mouth daily Take 30-60 minutes before a meal.  Qty: 30 capsule, Refills: 0    Associated Diagnoses: Gastrointestinal hemorrhage, unspecified gastrointestinal hemorrhage type         CONTINUE these medications which have NOT CHANGED    Details   isosorbide mononitrate (IMDUR) 60 MG 24 hr tablet Take 1 tablet (60 mg) by mouth daily  Qty: 90 tablet, Refills: 3    Associated Diagnoses: Chest pain, unspecified type      levothyroxine (SYNTHROID/LEVOTHROID) 50 MCG tablet Take 1 tablet (50 mcg) by mouth daily Except tuesday  Qty: 90 tablet, Refills: 1    Associated Diagnoses: Acquired hypothyroidism      mirtazapine (REMERON) 30 MG tablet Take 1 pills at bedtime  Qty: 90 tablet, Refills: 0    Associated Diagnoses: Sleep initiation disorder      atorvastatin (LIPITOR) 40 MG tablet Take 1 tablet (40 mg) by mouth daily Reports taking  Qty: 1 tablet, Refills: 0    Associated Diagnoses: Hyperlipidemia with target LDL less than 130      Cranberry 200 MG CAPS Take 3 capsules (600 mg) by mouth 2 times daily  Qty: 1 capsule, Refills: 0      metoprolol (LOPRESSOR) 25 MG tablet Reports taking 0.5 tablet BID  Qty: 180 tablet, Refills: 1    Comments: Pt reports      nitroglycerin (NITROSTAT) 0.4 MG SL tablet Pt reports taking - -  -For chest pain place 1 tablet under the tongue every 5 minutes for 3 doses. If symptoms persist 5 minutes after 1st dose call 911.  Qty: 25 tablet, Refills: 0    Comments: Pt reports      folic acid (FOLVITE) 1 MG tablet TAKE ONE TABLET BY MOUTH ONCE DAILY  Qty: 90 tablet, Refills: 1    Associated Diagnoses: Seropositive rheumatoid arthritis (H)      aspirin EC 81 MG EC tablet Take 1 tablet (81 mg) by mouth daily    Associated Diagnoses: S/P TAVR (transcatheter aortic valve replacement)      methotrexate 2.5 MG  tablet Take 4 tablets (10 mg) by mouth once a week Wed  Qty: 52 tablet, Refills: 3    Associated Diagnoses: Seropositive rheumatoid arthritis (H)      clopidogrel (PLAVIX) 75 MG tablet TAKE ONE TABLET BY MOUTH ONCE DAILY  Qty: 90 tablet, Refills: 3    Associated Diagnoses: History of CVA (cerebrovascular accident)      predniSONE (DELTASONE) 5 MG tablet TAKE 1 TABLET BY MOUTH EVERY OTHER DAY, ALTERNATING WITH ONE-HALF TABLET EVERY OTHER DAY.  Qty: 80 tablet, Refills: 3    Associated Diagnoses: Seropositive rheumatoid arthritis (H)      acetaminophen (TYLENOL ARTHRITIS PAIN) 650 MG CR tablet Take 2 tablets (1,300 mg) by mouth every 8 hours as needed for mild pain      calcium carbonate (TUMS) 500 MG chewable tablet Take 2-4 tablets (1,000-2,000 mg) by mouth as needed for heartburn      docusate sodium (COLACE) 100 MG capsule Take 200 mg by mouth daily 2 capsules      multivitamin, therapeutic with minerals (MULTI-VITAMIN) TABS Take 1 tablet by mouth daily      Lactobacillus (FLORAJEN ACIDOPHILUS) CAPS Take 1 capsule by mouth daily      levETIRAcetam (KEPPRA) 500 MG tablet TAKE ONE TABLET BY MOUTH ONCE DAILY AND TWO AT BEDTIME  Qty: 90 tablet, Refills: 6    Associated Diagnoses: Seizure disorder (H)      ALPRAZolam (XANAX) 0.25 MG tablet Take 1 tablet (0.25 mg) by mouth 2 times daily as needed for anxiety  Qty: 28 tablet, Refills: 0    Associated Diagnoses: Anxiety      minocycline (MINOCIN/DYNACIN) 100 MG capsule TAKE ONE CAPSULE BY MOUTH ONCE DAILY  Qty: 30 capsule, Refills: 0    Associated Diagnoses: Dysuria         STOP taking these medications       famotidine (PEPCID) 40 MG tablet Comments:   Reason for Stopping:                     Consultations:   Consultation during this admission received from Dr. Keating, GI provider           Brief History of Illness:   Patient is an 89-year-old female who presented to the emergency room with an episode of chest discomfort and a known cardiac history. In the emergency room  she was not found to have any EKG changes and initial troponin was negative, however on evaluation it was noted that she had had a 3 g drop in her hemoglobin over the past 3 months and occult was positive for blood. Decision was made to observe the patient for serial hemoglobins as well as serial troponins to evaluate urgency of workup further.          Hospital Course:    During the patient's hospitalization she had no further episodes of chest pain even with exertion. Hemoglobin remained stable in the mid 7 range and vitals remained stable. Patient did have 1 formed nonbloody stool. Serial troponins showed patient's ongoing mild chronic elevation but no significant change concerning for non-STEMI. Following discussion with GI provider, patient was given a one-time dose of IV Venofer and will have close follow-up with EGD and colonoscopy in the [place] setting given her cardiac history. She is discharged back to her assisted living facility in stable condition.          Physical Exam:   Vitals were reviewed  Constitutional:   awake, alert, cooperative, no apparent distress, and appears stated age     Lungs:   No increased work of breathing, good air exchange, clear to auscultation bilaterally, no crackles or wheezing     Cardiovascular:   Normal apical impulse, regular rate and rhythm, normal S1 and S2, no S3 or S4, and no murmur noted     Abdomen:   Bowel sounds present, abdomen soft.  Mild tenderness on palpation of the left upper quadrant with small amount of tenderness in epigastric region.  No guarding or rebound tenderness noted.  No other areas of tenderness noted     Musculoskeletal:   no lower extremity pitting edema present     Neurologic:   Awake, alert, oriented to name, place and time.     Skin:   Patient does appear slightly pale but no diaphoresis and unchanged from time of admission.  No redness, warmth, or swelling of skin noted              Discharge Instructions and Follow-Up:   Discharge diet:  Regular   Discharge activity: Activity as tolerated   Discharge follow-up: Follow up with primary care provider within 7 days  Follow up for EGD and colonoscopy next week           Discharge Disposition:   Discharged back to [facility]      Attestation:  I have reviewed today's vital signs, notes, medications, labs and imaging.    More than 30 minutes was spent on this discharge.      Alicia Anthony MD    Note: Chart documentation done in part with Dragon Voice Recognition software. Although reviewed after completion, some word and grammatical errors may remain.

## 2017-03-05 NOTE — PROGRESS NOTES
S-(situation): Patient discharged to [facility] via wheelchair with daughter.    B-(background): Patient admitted to [facility] for GIB.    A-(assessment): AOx3. VSSA. Sating WNL RA. HGB stable. IV Iron given per order.     R-(recommendations): Discharge instructions reviewed with patient and daughter. Listed belongings gathered and returned to patient.          Discharge Nursing Criteria:     Care Plan and Patient education resolved: Yes    New Medications- pt has been educated about purpose and side effects: Not Applicable    Vaccines  Pneumonia Vaccine verified at discharge: Yes  Influenza status verified at discharge:  Yes      MISC  Prescriptions if needed, hard copies sent with patient  NA  Home and hospital aquired medications returned to patient: NA  Medication Bin checked and emptied on discharge Yes  Patient reports post-discharge pain management plan is effective: Yes

## 2017-03-06 ENCOUNTER — TELEPHONE (OUTPATIENT)
Dept: INTERNAL MEDICINE | Facility: CLINIC | Age: 82
End: 2017-03-06

## 2017-03-06 NOTE — TELEPHONE ENCOUNTER
Inpatient Visit Date: 3/04/17  Diagnosis / Reason for Visit: Iron Deficiency Anemia, Unspecified Iron Deficiency Anemia Type

## 2017-03-06 NOTE — TELEPHONE ENCOUNTER
"Hospital/TCU/ED for chronic condition Discharge Protocol    \"Hi, my name is Madison Kulkarni, a registered nurse, and I am calling from [facility].  I am calling to follow up and see how things are going for you after your recent emergency visit/hospital/TCU stay.\"    Tell me how you are doing now that you are home?\" Spoke with daughter, Gillian is doing well.      Discharge Instructions    \"Let's review your discharge instructions.  What is/are the follow-up recommendations?  Pt. Response: No questions    \"Has an appointment with your primary care provider been scheduled?\"   Yes. (confirm)    \"When you see the provider, I would recommend that you bring your medications with you.\"    Medications    \"Tell me what changed about your medicines when you discharged?\"    Changes to chronic meds?    0-1    \"What questions do you have about your medications?\"    None     New diagnoses of heart failure, COPD, diabetes, or MI?    No              Medication reconciliation completed? No, due to Daughter refused, she did not have any questions and is managing the meds herself.  Was MTM referral placed (*Make sure to put transitions as reason for referral)?   No    Call Summary    \"What questions or concerns do you have about your recent visit and your follow-up care?\"     none    \"If you have questions or things don't continue to improve, we encourage you contact us through the main clinic number (give number).  Even if the clinic is not open, triage nurses are available 24/7 to help you.     We would like you to know that our clinic has extended hours (provide information).  We also have urgent care (provide details on closest location and hours/contact info)\"      \"Thank you for your time and take care!\"             "

## 2017-03-13 ENCOUNTER — OFFICE VISIT (OUTPATIENT)
Dept: INTERNAL MEDICINE | Facility: CLINIC | Age: 82
End: 2017-03-13
Payer: COMMERCIAL

## 2017-03-13 VITALS
DIASTOLIC BLOOD PRESSURE: 64 MMHG | OXYGEN SATURATION: 98 % | WEIGHT: 155 LBS | TEMPERATURE: 97.4 F | HEART RATE: 98 BPM | BODY MASS INDEX: 25.02 KG/M2 | SYSTOLIC BLOOD PRESSURE: 152 MMHG

## 2017-03-13 DIAGNOSIS — D50.0 IRON DEFICIENCY ANEMIA DUE TO CHRONIC BLOOD LOSS: ICD-10-CM

## 2017-03-13 DIAGNOSIS — M05.9 SEROPOSITIVE RHEUMATOID ARTHRITIS (H): Primary | ICD-10-CM

## 2017-03-13 DIAGNOSIS — G40.909 SEIZURE DISORDER (H): ICD-10-CM

## 2017-03-13 DIAGNOSIS — K92.2 GASTROINTESTINAL HEMORRHAGE, UNSPECIFIED GASTROINTESTINAL HEMORRHAGE TYPE: ICD-10-CM

## 2017-03-13 DIAGNOSIS — N18.30 CKD (CHRONIC KIDNEY DISEASE) STAGE 3, GFR 30-59 ML/MIN (H): ICD-10-CM

## 2017-03-13 LAB
ERYTHROCYTE [DISTWIDTH] IN BLOOD BY AUTOMATED COUNT: 20.1 % (ref 10–15)
FERRITIN SERPL-MCNC: 192 NG/ML (ref 8–252)
HCT VFR BLD AUTO: 29.6 % (ref 35–47)
HGB BLD-MCNC: 8.9 G/DL (ref 11.7–15.7)
MCH RBC QN AUTO: 27.7 PG (ref 26.5–33)
MCHC RBC AUTO-ENTMCNC: 30.1 G/DL (ref 31.5–36.5)
MCV RBC AUTO: 92 FL (ref 78–100)
PLATELET # BLD AUTO: 302 10E9/L (ref 150–450)
RBC # BLD AUTO: 3.21 10E12/L (ref 3.8–5.2)
WBC # BLD AUTO: 6.4 10E9/L (ref 4–11)

## 2017-03-13 PROCEDURE — 82728 ASSAY OF FERRITIN: CPT | Performed by: INTERNAL MEDICINE

## 2017-03-13 PROCEDURE — 36415 COLL VENOUS BLD VENIPUNCTURE: CPT | Performed by: INTERNAL MEDICINE

## 2017-03-13 PROCEDURE — 85027 COMPLETE CBC AUTOMATED: CPT | Performed by: INTERNAL MEDICINE

## 2017-03-13 PROCEDURE — 99495 TRANSJ CARE MGMT MOD F2F 14D: CPT | Performed by: INTERNAL MEDICINE

## 2017-03-13 RX ORDER — FERROUS SULFATE 325(65) MG
325 TABLET ORAL
Qty: 30 TABLET | Refills: 0 | Status: SHIPPED | OUTPATIENT
Start: 2017-03-13 | End: 2017-04-11

## 2017-03-13 RX ORDER — LEVETIRACETAM 500 MG/1
TABLET ORAL
Qty: 180 TABLET | Refills: 0 | COMMUNITY
Start: 2017-03-13 | End: 2018-02-26

## 2017-03-13 ASSESSMENT — PAIN SCALES - GENERAL: PAINLEVEL: NO PAIN (0)

## 2017-03-13 NOTE — MR AVS SNAPSHOT
After Visit Summary   3/13/2017    Gillian Burk    MRN: [id number]           Patient Information     Date Of Birth          3/19/1927        Visit Information        Provider Department      3/13/2017 3:40 PM Augusto Meyer DO [facility]        Today's Diagnoses     Seropositive rheumatoid arthritis (H)    -  1    Gastrointestinal hemorrhage, unspecified gastrointestinal hemorrhage type        Iron deficiency anemia due to chronic blood loss        Seizure disorder (H)        CKD (chronic kidney disease) stage 3, GFR 30-59 ml/min           Follow-ups after your visit        Who to contact     If you have questions or need follow up information about today's clinic visit or your schedule please contact [facility] directly at [phone].  Normal or non-critical lab and imaging results will be communicated to you by U[employer], letter or phone within 4 business days after the clinic has received the results. If you do not hear from us within 7 days, please contact the clinic through Uanbai[employer] or phone. If you have a critical or abnormal lab result, we will notify you by phone as soon as possible.  Submit refill requests through [employer] or call your pharmacy and they will forward the refill request to us. Please allow 3 business days for your refill to be completed.          Additional Information About Your Visit        MyChart Information     [employer] gives you secure access to your electronic health record. If you see a primary care provider, you can also send messages to your care team and make appointments. If you have questions, please call your primary care clinic.  If you do not have a primary care provider, please call [phone] and they will assist you.        Care EveryWhere ID     This is your Care EveryWhere ID. This could be used by other organizations to access your [place] medical records  [id number]        Your Vitals Were     Pulse  Temperature Pulse Oximetry BMI (Body Mass Index)          98 97.4  F (36.3  C) (Temporal) 98% 25.02 kg/m2         Blood Pressure from Last 3 Encounters:   03/13/17 152/64   03/04/17 152/80   02/20/17 122/54    Weight from Last 3 Encounters:   03/13/17 155 lb (70.3 kg)   03/03/17 151 lb 0.2 oz (68.5 kg)   02/20/17 156 lb 12.8 oz (71.1 kg)              We Performed the Following     CBC with platelets     Ferritin          Today's Medication Changes          These changes are accurate as of: 3/13/17 11:59 PM.  If you have any questions, ask your nurse or doctor.               These medicines have changed or have updated prescriptions.        Dose/Directions    ferrous sulfate 325 (65 FE) MG tablet   Commonly known as:  IRON SUPPLEMENT   This may have changed:    - medication strength  - when to take this   Used for:  Gastrointestinal hemorrhage, unspecified gastrointestinal hemorrhage type   Changed by:  Augusto Meyer DO        Dose:  325 mg   Take 1 tablet (325 mg) by mouth daily (with breakfast)   Quantity:  30 tablet   Refills:  0       levETIRAcetam 500 MG tablet   Commonly known as:  KEPPRA   This may have changed:  See the new instructions.   Used for:  Seizure disorder (H)   Changed by:  Augusto Meyer DO        TAKE ONE TABLET BY MOUTH ONCE DAILY AND one AT BEDTIME   Quantity:  180 tablet   Refills:  0            Where to get your medicines      These medications were sent to [facility] [address] [place] [address] [address]  [address] [facility] [address]     Phone:  [phone]     ferrous sulfate 325 (65 FE) MG tablet                Primary Care Provider Office Phone # Fax #    Augusto Meyer [phone]       [address]   [facility] [address]        Thank you!     Thank you for choosing [facility]  for your care. Our goal is always to provide you with excellent care. Hearing back from our patients is  one way we can continue to improve our services. Please take a few minutes to complete the written survey that you may receive in the mail after your visit with us. Thank you!             Your Updated Medication List - Protect others around you: Learn how to safely use, store and throw away your medicines at www.disposemymeds.org.          This list is accurate as of: 3/13/17 11:59 PM.  Always use your most recent med list.                   Brand Name Dispense Instructions for use    ALPRAZolam 0.25 MG tablet    XANAX    28 tablet    Take 1 tablet (0.25 mg) by mouth 2 times daily as needed for anxiety       aspirin 81 MG EC tablet      Take 1 tablet (81 mg) by mouth daily       atorvastatin 40 MG tablet    LIPITOR    1 tablet    Take 1 tablet (40 mg) by mouth daily Reports taking       calcium carbonate 500 MG chewable tablet    TUMS     Take 2-4 tablets (1,000-2,000 mg) by mouth as needed for heartburn       clopidogrel 75 MG tablet    PLAVIX    90 tablet    TAKE ONE TABLET BY MOUTH ONCE DAILY       Cranberry 200 MG Caps     1 capsule    Take 3 capsules (600 mg) by mouth 2 times daily       docusate sodium 100 MG capsule    COLACE     Take 200 mg by mouth daily 2 capsules       ferrous sulfate 325 (65 FE) MG tablet    IRON SUPPLEMENT    30 tablet    Take 1 tablet (325 mg) by mouth daily (with breakfast)       FLORAJEN ACIDOPHILUS Caps      Take 1 capsule by mouth daily       folic acid 1 MG tablet    FOLVITE    90 tablet    TAKE ONE TABLET BY MOUTH ONCE DAILY       isosorbide mononitrate 60 MG 24 hr tablet    IMDUR    90 tablet    Take 1 tablet (60 mg) by mouth daily       levETIRAcetam 500 MG tablet    KEPPRA    180 tablet    TAKE ONE TABLET BY MOUTH ONCE DAILY AND one AT BEDTIME       levothyroxine 50 MCG tablet    SYNTHROID/LEVOTHROID    90 tablet    Take 1 tablet (50 mcg) by mouth daily Except tuesday       methotrexate 2.5 MG tablet CHEMO     52 tablet    Take 4 tablets (10 mg) by mouth once a week Wed        metoprolol 25 MG tablet    LOPRESSOR    180 tablet    Reports taking 0.5 tablet BID       minocycline 100 MG capsule    MINOCIN/DYNACIN    30 capsule    TAKE ONE CAPSULE BY MOUTH ONCE DAILY       mirtazapine 30 MG tablet    REMERON    90 tablet    TAKE ONE TABLET BY MOUTH AT BEDTIME       Multi-vitamin Tabs tablet      Take 1 tablet by mouth daily       nitroglycerin 0.4 MG sublingual tablet    NITROSTAT    25 tablet    Pt reports taking - -  -For chest pain place 1 tablet under the tongue every 5 minutes for 3 doses. If symptoms persist 5 minutes after 1st dose call 911.       omeprazole 40 MG capsule    priLOSEC    30 capsule    Take 1 capsule (40 mg) by mouth daily Take 30-60 minutes before a meal.       predniSONE 5 MG tablet    DELTASONE    80 tablet    TAKE 1 TABLET BY MOUTH EVERY OTHER DAY, ALTERNATING WITH ONE-HALF TABLET EVERY OTHER DAY.       TYLENOL ARTHRITIS PAIN 650 MG CR tablet   Generic drug:  acetaminophen      Take 2 tablets (1,300 mg) by mouth every 8 hours as needed for mild pain

## 2017-03-13 NOTE — NURSING NOTE
"Chief Complaint   Patient presents with     Hospital F/U       Initial /64 (BP Location: Left arm, Patient Position: Chair, Cuff Size: Adult Regular)  Pulse 98  Temp 97.4  F (36.3  C) (Temporal)  Wt 155 lb (70.3 kg)  SpO2 98%  BMI 25.02 kg/m2 Estimated body mass index is 25.02 kg/(m^2) as calculated from the following:    Height as of 3/3/17: 5' 6\" (1.676 m).    Weight as of this encounter: 155 lb (70.3 kg).  Medication Reconciliation: complete   Lela HOLLEY      "

## 2017-03-13 NOTE — PROGRESS NOTES
SUBJECTIVE:                                                    Gillian Burk is a 89 year old female who presents to clinic today for the following health issues:        Hospital Follow-up Visit:    Hospital/Nursing Home/IP Rehab Facility: [facility]  Date of Admission: 3/3/2017  Date of Discharge: 3/4/17  Reason(s) for Admission: Iron Anemia  The patient was recently hospitalized with iron deficiency anemia. At the time of her admission, the hemoglobin had dropped the 7.6 and she was minimally symptomatic the time of her presentation.  She did have a little bit of lightheadedness.  she was given IV iron during her hospitalization and stabilize.  She is subsequently discharged home with instructions to set up gastrointestinal endoscopy.          Problems taking medications regularly:  None       Medication changes since discharge: None       Problems adhering to non-medication therapy:  None    Summary of hospitalization:  [facility] discharge summary reviewed  Diagnostic Tests/Treatments reviewed.  Follow up needed: none  Other Healthcare Providers Involved in Patient s Care:         None  Update since discharge: improved.     Post Discharge Medication Reconciliation: discharge medications reconciled, continue medications without change.  Plan of care communicated with patient     Coding guidelines for this visit:  Type of Medical   Decision Making Face-to-Face Visit       within 7 Days of discharge Face-to-Face Visit        within 14 days of discharge   Moderate Complexity [id number]   High Complexity [id number]              Problem list and histories reviewed & adjusted, as indicated.  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     History of CVA (cerebrovascular accident) - 2013      Esophageal reflux     PAC (premature atrial contraction)     PVC's (premature ventricular contractions)     Hyperlipidemia with target LDL less than 130     Hyponatremia     Orthostatic hypotension     Aortic valve disorder     Intermediate coronary syndrome     Transient ischemic attack (TIA), and cerebral infarction without residual deficits     Hypothyroidism due to acquired atrophy of thyroid     Aortic stenosis, severe - s/p TAVR     S/P TAVR (transcatheter aortic valve replacement)     Advanced directives, counseling/discussion     Aortic stenosis     Need for SBE (subacute bacterial endocarditis) prophylaxis     Acquired hypothyroidism     TIA (transient ischemic attack)     Non-rheumatic mitral valve stenosis - mild to moderate 11/16 echo     Coronary artery disease involving native coronary artery - NSTEMI 11/16 with moderate RCA disease treated medically     CKD (chronic kidney disease) stage 3, GFR 30-59 ml/min     Hypokalemia     Elevated troponin     Iron deficiency anemia     Acute cystitis without hematuria     Hypotension     GI bleed     Past Surgical History:   Procedure Laterality Date     C TOTAL HIP ARTHROPLASTY Right 2010     C TOTAL HIP ARTHROPLASTY Left 2014     CATARACT IOL, RT/LT Bilateral 2000     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     HEART CATH FEMORAL CANNULIZATION WITH OPEN STANDBY REPAIR AORTIC VALVE N/A 8/3/2016    Procedure: HEART CATH FEMORAL CANNULIZATION WITH OPEN STANDBY REPAIR AORTIC VALVE;  Surgeon: Owen Schultz MD;  Location: [facility] OR     HYSTERECTOMY TOTAL ABDOMINAL  1970    ovaries out     MOUTH SURGERY  2011    jaw surgery due to fracture     TRANSCATHETER AORTIC VALVE IMPLANT ANESTHESIA N/A 8/3/2016    Procedure: TRANSCATHETER AORTIC VALVE IMPLANT ANESTHESIA;  Surgeon: GENERIC ANESTHESIA PROVIDER;  Location: [facility]       Social History   Substance Use Topics     Smoking status: Never Smoker     Smokeless tobacco: Never Used     Alcohol use No      Family History   Problem Relation Age of Onset     Family History Negative No family hx of      Hyperlipidemia Mother          Current Outpatient Prescriptions   Medication Sig Dispense Refill     levETIRAcetam (KEPPRA) 500 MG tablet TAKE ONE TABLET BY MOUTH ONCE DAILY AND one AT BEDTIME 180 tablet 0     ferrous sulfate (IRON SUPPLEMENT) 325 (65 FE) MG tablet Take 1 tablet (325 mg) by mouth daily (with breakfast) 30 tablet 0     mirtazapine (REMERON) 30 MG tablet TAKE ONE TABLET BY MOUTH AT BEDTIME 90 tablet 0     omeprazole (PRILOSEC) 40 MG capsule Take 1 capsule (40 mg) by mouth daily Take 30-60 minutes before a meal. 30 capsule 0     ALPRAZolam (XANAX) 0.25 MG tablet Take 1 tablet (0.25 mg) by mouth 2 times daily as needed for anxiety 28 tablet 0     minocycline (MINOCIN/DYNACIN) 100 MG capsule TAKE ONE CAPSULE BY MOUTH ONCE DAILY 30 capsule 0     isosorbide mononitrate (IMDUR) 60 MG 24 hr tablet Take 1 tablet (60 mg) by mouth daily 90 tablet 3     levothyroxine (SYNTHROID/LEVOTHROID) 50 MCG tablet Take 1 tablet (50 mcg) by mouth daily Except tuesday 90 tablet 1     atorvastatin (LIPITOR) 40 MG tablet Take 1 tablet (40 mg) by mouth daily Reports taking 1 tablet 0     Cranberry 200 MG CAPS Take 3 capsules (600 mg) by mouth 2 times daily 1 capsule 0     metoprolol (LOPRESSOR) 25 MG tablet Reports taking 0.5 tablet  tablet 1     nitroglycerin (NITROSTAT) 0.4 MG SL tablet Pt reports taking - -  -For chest pain place 1 tablet under the tongue every 5 minutes for 3 doses. If symptoms persist 5 minutes after 1st dose call 911. 25 tablet 0     aspirin EC 81 MG EC tablet Take 1 tablet (81 mg) by mouth daily       methotrexate 2.5 MG tablet Take 4 tablets (10 mg) by mouth once a week Wed 52 tablet 3     clopidogrel (PLAVIX) 75 MG tablet TAKE ONE TABLET BY MOUTH ONCE DAILY 90 tablet 3     predniSONE (DELTASONE) 5 MG tablet TAKE 1 TABLET BY MOUTH EVERY OTHER DAY, ALTERNATING WITH ONE-HALF TABLET EVERY OTHER DAY. 80  tablet 3     acetaminophen (TYLENOL ARTHRITIS PAIN) 650 MG CR tablet Take 2 tablets (1,300 mg) by mouth every 8 hours as needed for mild pain       calcium carbonate (TUMS) 500 MG chewable tablet Take 2-4 tablets (1,000-2,000 mg) by mouth as needed for heartburn       docusate sodium (COLACE) 100 MG capsule Take 200 mg by mouth daily 2 capsules       multivitamin, therapeutic with minerals (MULTI-VITAMIN) TABS Take 1 tablet by mouth daily       Lactobacillus (FLORAJEN ACIDOPHILUS) CAPS Take 1 capsule by mouth daily       folic acid (FOLVITE) 1 MG tablet TAKE ONE TABLET BY MOUTH ONCE DAILY 90 tablet 1       Reviewed and updated as needed this visit by clinical staff  Tobacco  Allergies  Soc Hx      Reviewed and updated as needed this visit by Provider         ROS:  C: NEGATIVE for fever, chills, change in weight  I: NEGATIVE for worrisome rashes, moles or lesions  E: NEGATIVE for vision changes or irritation  E/M: NEGATIVE for ear, mouth and throat problems  R: NEGATIVE for significant cough or SOB  B: NEGATIVE for masses, tenderness or discharge  CV: NEGATIVE for chest pain, palpitations or peripheral edema  GI: NEGATIVE for nausea, abdominal pain, heartburn, or change in bowel habits  : NEGATIVE for frequency, dysuria, or hematuria  M: NEGATIVE for significant arthralgias or myalgia  NEURO: POSITIVE for lightheadedness and occasional unsteady on her feet  E: NEGATIVE for temperature intolerance, skin/hair changes  H: NEGATIVE for bleeding problems  P: NEGATIVE for changes in mood or affect    OBJECTIVE:                                                    /64 (BP Location: Left arm, Patient Position: Chair, Cuff Size: Adult Regular)  Pulse 98  Temp 97.4  F (36.3  C) (Temporal)  Wt 155 lb (70.3 kg)  SpO2 98%  BMI 25.02 kg/m2  Body mass index is 25.02 kg/(m^2).  GENERAL: healthy, alert and no distress  EYES: Eyes grossly normal to inspection, PERRL and conjunctivae and sclerae normal  HENT: ear canals  and TM's normal, nose and mouth without ulcers or lesions  RESP: lungs clear to auscultation - no rales, rhonchi or wheezes  CV: regular rate and rhythm, normal S1 S2, no S3 or S4, no murmur, click or rub, no peripheral edema and peripheral pulses strong  ABDOMEN: soft, nontender, no hepatosplenomegaly, no masses and bowel sounds normal  MS: no gross musculoskeletal defects noted, no edema  SKIN: no suspicious lesions or rashes  NEURO: Normal strength and tone, mentation intact and speech normal  PSYCH: mentation appears normal, affect normal/bright    Diagnostic Test Results:  As noted in the EMR including CBC, ferritin, Keppra level.     ASSESSMENT/PLAN:                                                          ICD-10-CM    1. Seropositive rheumatoid arthritis (H) M05.9    2. Gastrointestinal hemorrhage, unspecified gastrointestinal hemorrhage type K92.2 CBC with platelets     ferrous sulfate (IRON SUPPLEMENT) 325 (65 FE) MG tablet     Ferritin   3. Iron deficiency anemia due to chronic blood loss D50.0    4. Seizure disorder (H) G40.909 levETIRAcetam (KEPPRA) 500 MG tablet   5. CKD (chronic kidney disease) stage 3, GFR 30-59 ml/min N18.3      Will take the opportunity start patient on oral iron supplementation.  Will check repeat CBC and ferritin for baseline on her iron status.  Endoscopy's have been scheduled.   At this time she is stable and in no acute distress.                        FOLLOW UP   I have asked the patient to make an appointment for followup with me In one week.    Augusto Meyer,   [facility]

## 2017-03-15 NOTE — PROGRESS NOTES
Dear Gillian, your recent test results are attached.   Your hemoglobin is improving and is now up to 8.9 from the previous 7.7.              Feel free to contact me via the office or My Chart if you have any questions regarding the above.    Sincerely,  Augusto Meyer, DO [facility]

## 2017-03-20 LAB
DLCOUNC-%PRED-PRE: 49 %
DLCOUNC-PRE: 9.48 ML/MIN/MMHG
DLCOUNC-PRED: 19.12 ML/MIN/MMHG
ERV-%PRED-PRE: 271 %
ERV-PRE: 0.22 L
ERV-PRED: 0.08 L
EXPTIME-PRE: 7.1 SEC
FEF2575-%PRED-PRE: 164 %
FEF2575-PRE: 2.26 L/SEC
FEF2575-PRED: 1.37 L/SEC
FEFMAX-%PRED-PRE: 110 %
FEFMAX-PRE: 4.46 L/SEC
FEFMAX-PRED: 4.05 L/SEC
FEV1-%PRED-PRE: 103 %
FEV1-PRE: 1.89 L
FEV1FEV6-PRE: 84 %
FEV1FEV6-PRED: 76 %
FEV1FVC-PRE: 84 %
FEV1FVC-PRED: 76 %
FEV1SVC-PRE: 84 %
FEV1SVC-PRED: 74 %
FIFMAX-PRE: 1.91 L/SEC
FRCPLETH-%PRED-PRE: 98 %
FRCPLETH-PRE: 2.81 L
FRCPLETH-PRED: 2.85 L
FVC-%PRED-PRE: 91 %
FVC-PRE: 2.24 L
FVC-PRED: 2.45 L
GAW-%PRED-PRE: 58 %
GAW-PRE: 0.61 L/S/CMH2O
GAW-PRED: 1.03 L/S/CMH2O
IC-%PRED-PRE: 85 %
IC-PRE: 2.03 L
IC-PRED: 2.37 L
RVPLETH-%PRED-PRE: 104 %
RVPLETH-PRE: 2.59 L
RVPLETH-PRED: 2.47 L
SGAW-%PRED-PRE: 243 %
SGAW-PRE: 0.24 1/CMH2O*S
SGAW-PRED: 0.1 1/CMH2O*S
SRAW-%PRED-PRE: 86 %
SRAW-PRE: 4.14 CMH2O*S
SRAW-PRED: 4.76 CMH2O*S
TLCPLETH-%PRED-PRE: 91 %
TLCPLETH-PRE: 4.83 L
TLCPLETH-PRED: 5.27 L
VA-%PRED-PRE: 68 %
VA-PRE: 3.72 L
VC-%PRED-PRE: 91 %
VC-PRE: 2.24 L
VC-PRED: 2.45 L

## 2017-03-29 DIAGNOSIS — M05.9 SEROPOSITIVE RHEUMATOID ARTHRITIS (H): ICD-10-CM

## 2017-03-30 RX ORDER — FOLIC ACID 1 MG/1
TABLET ORAL
Qty: 90 TABLET | Refills: 1 | Status: SHIPPED | OUTPATIENT
Start: 2017-03-30 | End: 2017-09-21

## 2017-03-30 NOTE — TELEPHONE ENCOUNTER
Prescription approved per [facility] Refill Protocol.    Martina Kamara RN

## 2017-03-30 NOTE — TELEPHONE ENCOUNTER
folic acid (FOLVITE) 1 MG tablet      Last Written Prescription Date: 9/14/16  Last Fill Quantity: 90,  # refills: 1   Last Office Visit with [facility], [facility] or [facility] prescribing provider: 3/13/17

## 2017-04-04 ENCOUNTER — TELEPHONE (OUTPATIENT)
Dept: INTERNAL MEDICINE | Facility: CLINIC | Age: 82
End: 2017-04-04

## 2017-04-04 DIAGNOSIS — K92.2 GI BLEED: Primary | ICD-10-CM

## 2017-04-04 DIAGNOSIS — R30.0 DYSURIA: ICD-10-CM

## 2017-04-04 NOTE — TELEPHONE ENCOUNTER
minocycline (MINOCIN/DYNACIN) 100 MG capsule      Last Written Prescription Date: 2/9/17  Last Fill Quantity: 30,  # refills: 0   Last Office Visit with [facility], [facility] or [facility] prescribing provider: 3/13/17

## 2017-04-04 NOTE — TELEPHONE ENCOUNTER
Reason for Call: Request for an order or referral:    Order or referral being requested: Hemaglobin    Date needed: as soon as possible    Has the patient been seen by the PCP for this problem? YES    Additional comments: Pt was seen a couple weeks ago and hasn't heard when she should do her hemaglobin again? Please call and advise on when that should be.     Phone number Patient can be reached at:  Other phone number:      Best Time:  anytime    Can we leave a detailed message on this number?  YES    Call taken on 4/4/2017 at 4:03 PM by Leighann Kulkarni

## 2017-04-05 RX ORDER — MINOCYCLINE HYDROCHLORIDE 100 MG/1
CAPSULE ORAL
Qty: 30 CAPSULE | Refills: 10 | Status: SHIPPED | OUTPATIENT
Start: 2017-04-05 | End: 2017-06-15

## 2017-04-05 NOTE — TELEPHONE ENCOUNTER
Prescription approved per [facility] Refill Protocol.    Bradly Newell RN, BSN

## 2017-04-10 ENCOUNTER — OFFICE VISIT (OUTPATIENT)
Dept: INTERNAL MEDICINE | Facility: CLINIC | Age: 82
End: 2017-04-10
Payer: COMMERCIAL

## 2017-04-10 VITALS
DIASTOLIC BLOOD PRESSURE: 72 MMHG | SYSTOLIC BLOOD PRESSURE: 142 MMHG | WEIGHT: 156 LBS | BODY MASS INDEX: 25.18 KG/M2 | TEMPERATURE: 97.4 F | OXYGEN SATURATION: 96 % | HEART RATE: 89 BPM

## 2017-04-10 DIAGNOSIS — E03.4 HYPOTHYROIDISM DUE TO ACQUIRED ATROPHY OF THYROID: ICD-10-CM

## 2017-04-10 DIAGNOSIS — I25.10 CORONARY ARTERY DISEASE INVOLVING NATIVE CORONARY ARTERY OF NATIVE HEART WITHOUT ANGINA PECTORIS: ICD-10-CM

## 2017-04-10 DIAGNOSIS — E03.9 ACQUIRED HYPOTHYROIDISM: ICD-10-CM

## 2017-04-10 DIAGNOSIS — K27.4 GASTROINTESTINAL HEMORRHAGE ASSOCIATED WITH PEPTIC ULCER: ICD-10-CM

## 2017-04-10 DIAGNOSIS — Z95.2 S/P TAVR (TRANSCATHETER AORTIC VALVE REPLACEMENT): Primary | ICD-10-CM

## 2017-04-10 LAB
BASOPHILS # BLD AUTO: 0 10E9/L (ref 0–0.2)
BASOPHILS NFR BLD AUTO: 0.3 %
DIFFERENTIAL METHOD BLD: ABNORMAL
EOSINOPHIL # BLD AUTO: 0.1 10E9/L (ref 0–0.7)
EOSINOPHIL NFR BLD AUTO: 1.8 %
ERYTHROCYTE [DISTWIDTH] IN BLOOD BY AUTOMATED COUNT: 21.2 % (ref 10–15)
FERRITIN SERPL-MCNC: 111 NG/ML (ref 8–252)
HCT VFR BLD AUTO: 30.9 % (ref 35–47)
HGB BLD-MCNC: 9.6 G/DL (ref 11.7–15.7)
IMM GRANULOCYTES # BLD: 0 10E9/L (ref 0–0.4)
IMM GRANULOCYTES NFR BLD: 0.3 %
LYMPHOCYTES # BLD AUTO: 1.9 10E9/L (ref 0.8–5.3)
LYMPHOCYTES NFR BLD AUTO: 26 %
MCH RBC QN AUTO: 28.8 PG (ref 26.5–33)
MCHC RBC AUTO-ENTMCNC: 31.1 G/DL (ref 31.5–36.5)
MCV RBC AUTO: 93 FL (ref 78–100)
MONOCYTES # BLD AUTO: 0.6 10E9/L (ref 0–1.3)
MONOCYTES NFR BLD AUTO: 8.8 %
NEUTROPHILS # BLD AUTO: 4.6 10E9/L (ref 1.6–8.3)
NEUTROPHILS NFR BLD AUTO: 62.8 %
PLATELET # BLD AUTO: 310 10E9/L (ref 150–450)
RBC # BLD AUTO: 3.33 10E12/L (ref 3.8–5.2)
WBC # BLD AUTO: 7.2 10E9/L (ref 4–11)

## 2017-04-10 PROCEDURE — 36415 COLL VENOUS BLD VENIPUNCTURE: CPT | Performed by: INTERNAL MEDICINE

## 2017-04-10 PROCEDURE — 99214 OFFICE O/P EST MOD 30 MIN: CPT | Performed by: INTERNAL MEDICINE

## 2017-04-10 PROCEDURE — 82728 ASSAY OF FERRITIN: CPT | Performed by: INTERNAL MEDICINE

## 2017-04-10 PROCEDURE — 85025 COMPLETE CBC W/AUTO DIFF WBC: CPT | Performed by: INTERNAL MEDICINE

## 2017-04-10 ASSESSMENT — PAIN SCALES - GENERAL: PAINLEVEL: NO PAIN (0)

## 2017-04-10 NOTE — MR AVS SNAPSHOT
After Visit Summary   4/10/2017    Gillian Burk    MRN: [id number]           Patient Information     Date Of Birth          3/19/1927        Visit Information        Provider Department      4/10/2017 10:40 AM Augusto Meyer DO [facility]        Today's Diagnoses     S/P TAVR (transcatheter aortic valve replacement)    -  1    Gastrointestinal hemorrhage associated with peptic ulcer        Coronary artery disease involving native coronary artery of native heart without angina pectoris        Hypothyroidism due to acquired atrophy of thyroid        Acquired hypothyroidism           Follow-ups after your visit        Who to contact     If you have questions or need follow up information about today's clinic visit or your schedule please contact [facility] directly at [phone].  Normal or non-critical lab and imaging results will be communicated to you by MyChart, letter or phone within 4 business days after the clinic has received the results. If you do not hear from us within 7 days, please contact the clinic through [employer] or phone. If you have a critical or abnormal lab result, we will notify you by phone as soon as possible.  Submit refill requests through [employer] or call your pharmacy and they will forward the refill request to us. Please allow 3 business days for your refill to be completed.          Additional Information About Your Visit        MyChart Information     [employer] gives you secure access to your electronic health record. If you see a primary care provider, you can also send messages to your care team and make appointments. If you have questions, please call your primary care clinic.  If you do not have a primary care provider, please call [phone] and they will assist you.        Care EveryWhere ID     This is your Care EveryWhere ID. This could be used by other organizations to access your [facility]  records  [id number]        Your Vitals Were     Pulse Temperature Pulse Oximetry BMI (Body Mass Index)          89 97.4  F (36.3  C) (Temporal) 96% 25.18 kg/m2         Blood Pressure from Last 3 Encounters:   04/10/17 142/72   03/13/17 152/64   03/04/17 152/80    Weight from Last 3 Encounters:   04/10/17 156 lb (70.8 kg)   03/13/17 155 lb (70.3 kg)   03/03/17 151 lb 0.2 oz (68.5 kg)              We Performed the Following     CBC with platelets differential     Ferritin        Primary Care Provider Office Phone # Fax #    Augusto Meyer,  [phone]       [address] [place] [address]        Thank you!     Thank you for choosing [facility]  for your care. Our goal is always to provide you with excellent care. Hearing back from our patients is one way we can continue to improve our services. Please take a few minutes to complete the written survey that you may receive in the mail after your visit with us. Thank you!             Your Updated Medication List - Protect others around you: Learn how to safely use, store and throw away your medicines at www.disposemymeds.org.          This list is accurate as of: 4/10/17 11:59 PM.  Always use your most recent med list.                   Brand Name Dispense Instructions for use    ALPRAZolam 0.25 MG tablet    XANAX    28 tablet    Take 1 tablet (0.25 mg) by mouth 2 times daily as needed for anxiety       aspirin 81 MG EC tablet      Take 1 tablet (81 mg) by mouth daily       atorvastatin 40 MG tablet    LIPITOR    1 tablet    Take 1 tablet (40 mg) by mouth daily Reports taking       calcium carbonate 500 MG chewable tablet    TUMS     Take 2-4 tablets (1,000-2,000 mg) by mouth as needed for heartburn       clopidogrel 75 MG tablet    PLAVIX    90 tablet    TAKE ONE TABLET BY MOUTH ONCE DAILY       Cranberry 200 MG Caps     1 capsule    Take 3 capsules (600 mg) by mouth 2 times daily       docusate  sodium 100 MG capsule    COLACE     Take 200 mg by mouth daily 2 capsules       FLORAJEN ACIDOPHILUS Caps      Take 1 capsule by mouth daily       folic acid 1 MG tablet    FOLVITE    90 tablet    TAKE ONE TABLET BY MOUTH ONCE DAILY       isosorbide mononitrate 60 MG 24 hr tablet    IMDUR    90 tablet    Take 1 tablet (60 mg) by mouth daily       levETIRAcetam 500 MG tablet    KEPPRA    180 tablet    TAKE ONE TABLET BY MOUTH ONCE DAILY AND one AT BEDTIME       levothyroxine 50 MCG tablet    SYNTHROID/LEVOTHROID    90 tablet    Take 1 tablet (50 mcg) by mouth daily Except tuesday       methotrexate 2.5 MG tablet CHEMO     52 tablet    Take 4 tablets (10 mg) by mouth once a week Wed       metoprolol 25 MG tablet    LOPRESSOR    180 tablet    Reports taking 0.5 tablet BID       minocycline 100 MG capsule    MINOCIN/DYNACIN    30 capsule    TAKE ONE CAPSULE BY MOUTH ONCE DAILY       mirtazapine 30 MG tablet    REMERON    90 tablet    TAKE ONE TABLET BY MOUTH AT BEDTIME       Multi-vitamin Tabs tablet      Take 1 tablet by mouth daily       nitroglycerin 0.4 MG sublingual tablet    NITROSTAT    25 tablet    Pt reports taking - -  -For chest pain place 1 tablet under the tongue every 5 minutes for 3 doses. If symptoms persist 5 minutes after 1st dose call 911.       omeprazole 40 MG capsule    priLOSEC    30 capsule    Take 1 capsule (40 mg) by mouth daily Take 30-60 minutes before a meal.       predniSONE 5 MG tablet    DELTASONE    80 tablet    TAKE 1 TABLET BY MOUTH EVERY OTHER DAY, ALTERNATING WITH ONE-HALF TABLET EVERY OTHER DAY.       TYLENOL ARTHRITIS PAIN 650 MG CR tablet   Generic drug:  acetaminophen      Take 2 tablets (1,300 mg) by mouth every 8 hours as needed for mild pain

## 2017-04-10 NOTE — NURSING NOTE
"Chief Complaint   Patient presents with     RECHECK       Initial /72 (BP Location: Right arm, Patient Position: Chair, Cuff Size: Adult Regular)  Pulse 89  Temp 97.4  F (36.3  C) (Temporal)  Wt 156 lb (70.8 kg)  SpO2 96%  BMI 25.18 kg/m2 Estimated body mass index is 25.18 kg/(m^2) as calculated from the following:    Height as of 3/3/17: 5' 6\" (1.676 m).    Weight as of this encounter: 156 lb (70.8 kg).  Medication Reconciliation: complete   Lela HOLLEY      "

## 2017-04-10 NOTE — PROGRESS NOTES
Chief Complaint   Patient presents with     RECHECK                   Chief Complaint           The patient is a pleasant 89-year-old female who presents today for follow-up of a recent G.I. Bleed. Her most recent hemoglobin was 8.9.  She denies any significant dyspnea on exertion.  Her pallor has improved.  She denies any melena or hematochezia. She's eating foods of the complication at this time.  She has no epigastric pain or emesis.  She has a concurrent history of rheumatoid arthritis, prior CVA with minimal sequelae, hyperlipidemia,andshe is status post transcatheter aortic valve replacement.  She's done quite well from this and has had no complications.                       PAST, FAMILY,SOCIAL HISTORY:     Medical  History:   has a past medical history of Anxiety; Anxiety; Aortic stenosis; Chronic migraine; Difficulty in walking(719.7); Hyperlipidaemia; Hypertension; Hypothyroidism; Myocardial infarction (H); Need for SBE (subacute bacterial endocarditis) prophylaxis; Numbness and tingling; Orthostatic hypotension; Osteoarthritis; Osteopenia; PAC (premature atrial contraction); PUD (peptic ulcer disease); PVC (premature ventricular contraction); Secondary Sjogren's syndrome (H); Seizures (H); Seropositive rheumatoid arthritis (H); Shortness of breath; Stroke (H) (05/2013); and Syncope (2014).     Surgical History:   has a past surgical history that includes Mouth surgery (2011); Eye surgery (1999); cataract iol, rt/lt (Bilateral, 2000); Hysterectomy total abdominal (1970); TOTAL HIP ARTHROPLASTY (Right, 2010); TOTAL HIP ARTHROPLASTY (Left, 2014); TRANSCATHETER AORTIC VALVE IMPLANT ANEST (N/A, 8/3/2016); and Heart Cath Femoral Cannulization With Open Standby Repair Aortic Valve (N/A, 8/3/2016).     Social History:   reports that she has never smoked. She has never used smokeless tobacco. She reports that she does not drink alcohol or use illicit drugs.     Family History:  family history includes  Hyperlipidemia in her mother. There is no history of Family History Negative.            MEDICATIONS  Current Outpatient Prescriptions   Medication Sig Dispense Refill     minocycline (MINOCIN/DYNACIN) 100 MG capsule TAKE ONE CAPSULE BY MOUTH ONCE DAILY 30 capsule 10     folic acid (FOLVITE) 1 MG tablet TAKE ONE TABLET BY MOUTH ONCE DAILY 90 tablet 1     levETIRAcetam (KEPPRA) 500 MG tablet TAKE ONE TABLET BY MOUTH ONCE DAILY AND one AT BEDTIME 180 tablet 0     mirtazapine (REMERON) 30 MG tablet TAKE ONE TABLET BY MOUTH AT BEDTIME 90 tablet 0     omeprazole (PRILOSEC) 40 MG capsule Take 1 capsule (40 mg) by mouth daily Take 30-60 minutes before a meal. 30 capsule 0     ALPRAZolam (XANAX) 0.25 MG tablet Take 1 tablet (0.25 mg) by mouth 2 times daily as needed for anxiety 28 tablet 0     isosorbide mononitrate (IMDUR) 60 MG 24 hr tablet Take 1 tablet (60 mg) by mouth daily 90 tablet 3     levothyroxine (SYNTHROID/LEVOTHROID) 50 MCG tablet Take 1 tablet (50 mcg) by mouth daily Except tuesday 90 tablet 1     atorvastatin (LIPITOR) 40 MG tablet Take 1 tablet (40 mg) by mouth daily Reports taking 1 tablet 0     Cranberry 200 MG CAPS Take 3 capsules (600 mg) by mouth 2 times daily 1 capsule 0     metoprolol (LOPRESSOR) 25 MG tablet Reports taking 0.5 tablet  tablet 1     nitroglycerin (NITROSTAT) 0.4 MG SL tablet Pt reports taking - -  -For chest pain place 1 tablet under the tongue every 5 minutes for 3 doses. If symptoms persist 5 minutes after 1st dose call 911. 25 tablet 0     aspirin EC 81 MG EC tablet Take 1 tablet (81 mg) by mouth daily       methotrexate 2.5 MG tablet Take 4 tablets (10 mg) by mouth once a week Wed 52 tablet 3     clopidogrel (PLAVIX) 75 MG tablet TAKE ONE TABLET BY MOUTH ONCE DAILY 90 tablet 3     predniSONE (DELTASONE) 5 MG tablet TAKE 1 TABLET BY MOUTH EVERY OTHER DAY, ALTERNATING WITH ONE-HALF TABLET EVERY OTHER DAY. 80 tablet 3     acetaminophen (TYLENOL ARTHRITIS PAIN) 650 MG CR tablet  Take 2 tablets (1,300 mg) by mouth every 8 hours as needed for mild pain       calcium carbonate (TUMS) 500 MG chewable tablet Take 2-4 tablets (1,000-2,000 mg) by mouth as needed for heartburn       docusate sodium (COLACE) 100 MG capsule Take 200 mg by mouth daily 2 capsules       multivitamin, therapeutic with minerals (MULTI-VITAMIN) TABS Take 1 tablet by mouth daily       Lactobacillus (FLORAJEN ACIDOPHILUS) CAPS Take 1 capsule by mouth daily       sulfamethoxazole-trimethoprim (BACTRIM DS/SEPTRA DS) 800-160 MG per tablet Take 1 tablet by mouth 2 times daily 14 tablet 0     Ferrous Sulfate (IRON) 325 (65 FE) MG tablet TAKE ONE TABLET BY MOUTH ONCE DAILY WITH BREAKFAST 30 tablet 11         --------------------------------------------------------------------------------------------------------------------                  Review of Systems     LUNGS: Pt denies: cough,excess sputum, hemoptysis, or shortness of breath.   HEART: Pt denies: chest pain, arrythmia, syncope, tachy or bradyarrhythmia.   GI: Pt denies: nausea, vomitting, diarrhea, constipation, melena, or hematochezia.   NEURO: Pt denies: seizures, strokes, diplopia, weakness, paraesthesias, or paralysis.   SKIN: Pt denies: itching, rashes, discoloration, or specific lesions of concern. Denies recent hair loss.                        Examination       Vital Signs:  B/P: 142/72, T: 97.4, P: 89, R: Data Unavailable, BMI: Body mass index is 25.18 kg/(m^2).   Constitutional: The patient appears to be in no acute distress. The patient appears to be adequately hydrated. No acute respiratory or hemodynamic distress is noted at this time.   LUNGS: clear bilaterally, airflow is brisk, no intercostal retraction or stridor is noted. No coughing is noted during visit.   HEART:  regular without rubs, clicks, gallops, or murmurs. PMI is nondisplaced. Upstrokes are brisk. S1,S2 are heard.   GI: Abdomen is soft, without rebound, guarding or tenderness. Bowel sounds are  appropriate. No renal bruits are heard.    NEURO: Pt is alert and appropriate. No neurologic lateralization is noted. Cranial nerves 2-12 are intact. Peripheral sensory and motor function are grossly normal.    SKIN:  warm and dry. No erythema, or rashes are noted. No specific lesions of concern are noted.                        Decision-Making        1. GI bleed  Check CBC and our status  - CBC with platelets differential  - Ferritin    2. Coronary artery disease involving native coronary artery of native heart without angina pectoris  Currently chest pain free, continue long-acting nitrates, statin, beta-blocker    3. S/P TAVR (transcatheter aortic valve replacement)  Doing well.    4. Hypothyroidism due to acquired atrophy of thyroid  stable                             FOLLOW UP   I have asked the patient to make an appointment for followup with me in three months or as needed.  Will check hemoglobin in the interim.        I have carefully explained the diagnosis and treatment options with the patient. The patient has displayed an understanding of the above, and all subsequent questions were answered.               DO DELMA Pineda    Portions of this note were produced using [employer]  Although every attempt at real-time proof reading has been made, occasional grammar/syntax errors may have been missed.

## 2017-04-11 DIAGNOSIS — K92.2 GASTROINTESTINAL HEMORRHAGE, UNSPECIFIED GASTROINTESTINAL HEMORRHAGE TYPE: ICD-10-CM

## 2017-04-12 RX ORDER — PNV NO.95/FERROUS FUM/FOLIC AC 28MG-0.8MG
TABLET ORAL
Qty: 30 TABLET | Refills: 11 | Status: SHIPPED | OUTPATIENT
Start: 2017-04-12 | End: 2018-10-12

## 2017-04-12 NOTE — TELEPHONE ENCOUNTER
Iron        Last Written Prescription Date: 3/13/17  Last Fill Quantity: 30,    # refills: 0  Last Office Visit with [facility] or [facility] prescribing provider:  4/10/16      Lab Results   Component Value Date    WBC 7.2 04/10/2017     Lab Results   Component Value Date    RBC 3.33 04/10/2017     Lab Results   Component Value Date    HGB 9.6 04/10/2017     Lab Results   Component Value Date    HCT 30.9 04/10/2017     No components found for: MCT  Lab Results   Component Value Date    MCV 93 04/10/2017     Lab Results   Component Value Date    MCH 28.8 04/10/2017     Lab Results   Component Value Date    MCHC 31.1 04/10/2017     Lab Results   Component Value Date    RDW 21.2 04/10/2017     Lab Results   Component Value Date     04/10/2017     Lab Results   Component Value Date    AST 22 03/03/2017     Lab Results   Component Value Date    ALT 24 03/03/2017     Creatinine   Date Value Ref Range Status   03/03/2017 0.90 0.52 - 1.04 mg/dL Final

## 2017-04-12 NOTE — TELEPHONE ENCOUNTER
Prescription approved per [facility].  Madison Kulkarni RN

## 2017-04-12 NOTE — PROGRESS NOTES
Dear Gillian, your recent test results are attached.  Your hemoglobin has improved and is now up to 9.8.  Your iron studies are improving as well.              Feel free to contact me via the office or My Chart if you have any questions regarding the above.    Sincerely,  DO SATHISH PinedaOI

## 2017-04-17 ENCOUNTER — TELEPHONE (OUTPATIENT)
Dept: INTERNAL MEDICINE | Facility: CLINIC | Age: 82
End: 2017-04-17

## 2017-04-17 DIAGNOSIS — N30.00 ACUTE CYSTITIS WITHOUT HEMATURIA: Primary | ICD-10-CM

## 2017-04-17 RX ORDER — SULFAMETHOXAZOLE/TRIMETHOPRIM 800-160 MG
1 TABLET ORAL 2 TIMES DAILY
Qty: 14 TABLET | Refills: 0 | Status: SHIPPED | OUTPATIENT
Start: 2017-04-17 | End: 2017-06-15

## 2017-04-17 NOTE — TELEPHONE ENCOUNTER
The patient should not take her methotrexate this week and should have a urine sample checked again next week.    Thank you    Betsy

## 2017-04-17 NOTE — TELEPHONE ENCOUNTER
Reason for call:  Patient reporting a symptom    Symptom or request: UTI - Cloudy urine and feeling lousy    Duration (how long have symptoms been present): over weekend    Have you been treated for this before? Yes    Additional comments: Would like antibiotic sent to [facility]    Phone Number patient can be reached at:  [phone] (N)    Best Time:  any    Can we leave a detailed message on this number:  YES    Call taken on 4/17/2017 at 10:47 AM by Laura Yeager

## 2017-04-25 ENCOUNTER — MYC MEDICAL ADVICE (OUTPATIENT)
Dept: INTERNAL MEDICINE | Facility: CLINIC | Age: 82
End: 2017-04-25

## 2017-04-25 DIAGNOSIS — N30.00 ACUTE CYSTITIS WITHOUT HEMATURIA: ICD-10-CM

## 2017-04-25 LAB
ALBUMIN UR-MCNC: NEGATIVE MG/DL
APPEARANCE UR: CLEAR
BILIRUB UR QL STRIP: NEGATIVE
COLOR UR AUTO: NORMAL
GLUCOSE UR STRIP-MCNC: NEGATIVE MG/DL
HGB UR QL STRIP: NEGATIVE
KETONES UR STRIP-MCNC: NEGATIVE MG/DL
LEUKOCYTE ESTERASE UR QL STRIP: NEGATIVE
NITRATE UR QL: NEGATIVE
PH UR STRIP: 5 PH (ref 5–7)
SP GR UR STRIP: 1.01 (ref 1–1.03)
URN SPEC COLLECT METH UR: NORMAL
UROBILINOGEN UR STRIP-MCNC: 0 MG/DL (ref 0–2)

## 2017-04-25 PROCEDURE — 81003 URINALYSIS AUTO W/O SCOPE: CPT | Performed by: INTERNAL MEDICINE

## 2017-04-27 NOTE — PROGRESS NOTES
Dear Gillian, your recent test results are attached.  Your urine sample was normal.    Feel free to contact me via the office or My Chart if you have any questions regarding the above.    Sincerely,  Auugsto Meyer DO FACOI

## 2017-05-02 ENCOUNTER — TELEPHONE (OUTPATIENT)
Dept: CARDIOLOGY | Facility: CLINIC | Age: 82
End: 2017-05-02

## 2017-05-02 DIAGNOSIS — Z95.2 S/P TAVR (TRANSCATHETER AORTIC VALVE REPLACEMENT): Primary | ICD-10-CM

## 2017-05-03 ENCOUNTER — TELEPHONE (OUTPATIENT)
Dept: INTERNAL MEDICINE | Facility: CLINIC | Age: 82
End: 2017-05-03

## 2017-05-03 NOTE — TELEPHONE ENCOUNTER
Per Dr. Meyer she should have her blood pressure checked twice weekly. Home care notified.Lela HOLLEY

## 2017-05-03 NOTE — TELEPHONE ENCOUNTER
Reason for Call:  Other call back    Detailed comments:  from [facility] called stating she has faxed over blood pressure order for Dr. Meyer to clarify how patient should be getting her Blood pressure checked/how often.  states Dr. Meyer is signing the order, but not clarifying; which she needs his to clarify.  Please advise.     Phone Number Patient can be reached at: Other phone number:   # [phone]    Best Time: anytime    Can we leave a detailed message on this number? YES    Call taken on 5/3/2017 at 1:55 PM by Maira Broussard

## 2017-05-09 DIAGNOSIS — D50.0 IRON DEFICIENCY ANEMIA DUE TO CHRONIC BLOOD LOSS: Primary | ICD-10-CM

## 2017-05-09 LAB — HGB BLD-MCNC: 9 G/DL (ref 11.7–15.7)

## 2017-05-09 PROCEDURE — 36415 COLL VENOUS BLD VENIPUNCTURE: CPT | Performed by: INTERNAL MEDICINE

## 2017-05-09 PROCEDURE — 85018 HEMOGLOBIN: CPT | Performed by: INTERNAL MEDICINE

## 2017-05-15 NOTE — PROGRESS NOTES
Dear Gillian, your recent test results are attached.   Your hemoglobin is stable at 9.0.    Feel free to contact me via the office or My Chart if you have any questions regarding the above.  Sincerely,  Augusto Meyer,  FACOI

## 2017-06-05 DIAGNOSIS — N30.00 ACUTE CYSTITIS WITHOUT HEMATURIA: Primary | ICD-10-CM

## 2017-06-05 RX ORDER — SULFAMETHOXAZOLE AND TRIMETHOPRIM 400; 80 MG/1; MG/1
1 TABLET ORAL 2 TIMES DAILY
Qty: 14 TABLET | Refills: 0 | Status: SHIPPED | OUTPATIENT
Start: 2017-06-05 | End: 2017-06-15

## 2017-06-05 NOTE — TELEPHONE ENCOUNTER
The patient will need to stop her methotrexate while on the Bactrim.    She will also need to have a repeat urine test if she does not have complete resolution of her symptoms upon completion of the antibiotic.        Betsy

## 2017-06-05 NOTE — TELEPHONE ENCOUNTER
Reason for Call:  Medication or medication refill:    Do you use a [facility]?  Name of the pharmacy and phone number for the current request:  [facility] in [place]    Name of the medication requested: anything for UTI    Other request: patient's daughter called stating patient has cloudy urine, and is not feeling well; wondering if Dr. pan can send an RX for this to pharmacy for the patient. Please advise.     Can we leave a detailed message on this number? YES    Phone number patient can be reached at: Other phone number: Davina  # [phone]    Best Time: anytime    Call taken on 6/5/2017 at 12:07 PM by Maira Broussard

## 2017-06-13 DIAGNOSIS — N30.00 ACUTE CYSTITIS WITHOUT HEMATURIA: ICD-10-CM

## 2017-06-13 LAB
ALBUMIN UR-MCNC: NEGATIVE MG/DL
APPEARANCE UR: CLEAR
BILIRUB UR QL STRIP: NEGATIVE
COLOR UR AUTO: NORMAL
GLUCOSE UR STRIP-MCNC: NEGATIVE MG/DL
HGB UR QL STRIP: NEGATIVE
KETONES UR STRIP-MCNC: NEGATIVE MG/DL
LEUKOCYTE ESTERASE UR QL STRIP: NEGATIVE
NITRATE UR QL: NEGATIVE
PH UR STRIP: 5 PH (ref 5–7)
SP GR UR STRIP: 1 (ref 1–1.03)
URN SPEC COLLECT METH UR: NORMAL
UROBILINOGEN UR STRIP-MCNC: 0 MG/DL (ref 0–2)

## 2017-06-13 PROCEDURE — 81003 URINALYSIS AUTO W/O SCOPE: CPT | Performed by: INTERNAL MEDICINE

## 2017-06-15 ENCOUNTER — OFFICE VISIT (OUTPATIENT)
Dept: INTERNAL MEDICINE | Facility: CLINIC | Age: 82
End: 2017-06-15
Payer: COMMERCIAL

## 2017-06-15 VITALS
DIASTOLIC BLOOD PRESSURE: 72 MMHG | SYSTOLIC BLOOD PRESSURE: 138 MMHG | OXYGEN SATURATION: 97 % | WEIGHT: 152 LBS | RESPIRATION RATE: 14 BRPM | HEART RATE: 95 BPM | TEMPERATURE: 96.7 F | BODY MASS INDEX: 24.53 KG/M2

## 2017-06-15 DIAGNOSIS — Z95.2 S/P TAVR (TRANSCATHETER AORTIC VALVE REPLACEMENT): ICD-10-CM

## 2017-06-15 DIAGNOSIS — Z86.73 HISTORY OF CVA (CEREBROVASCULAR ACCIDENT): ICD-10-CM

## 2017-06-15 DIAGNOSIS — G56.21 LESION OF RIGHT ULNAR NERVE: Primary | ICD-10-CM

## 2017-06-15 PROCEDURE — 99214 OFFICE O/P EST MOD 30 MIN: CPT | Performed by: INTERNAL MEDICINE

## 2017-06-15 RX ORDER — PREDNISONE 20 MG/1
TABLET ORAL
Qty: 20 TABLET | Refills: 0 | Status: SHIPPED | OUTPATIENT
Start: 2017-06-15 | End: 2017-08-17

## 2017-06-15 ASSESSMENT — PAIN SCALES - GENERAL: PAINLEVEL: MODERATE PAIN (5)

## 2017-06-15 NOTE — PROGRESS NOTES
SUBJECTIVE:                                                    Gillian Burk is a 89 year old female who presents to clinic today for the following health issues:    Joint Pain     Onset: Friday    Description:   Location: Right forarm and hand and 2 fingers  Character: Dull ache    Intensity: 5/10    Progression of Symptoms: same    Accompanying Signs & Symptoms:  Other symptoms: numbness and swelling   History:   Previous similar pain: no       Precipitating factors:   Trauma or overuse: YES    Alleviating factors:  Improved by: acetaminophen       Therapies Tried and outcome: one tylenol a day helped alittle didnt take one today          CHIEF COMPLAINT:    The patient is a pleasant 89-year-old female who presents today with discomfort in her fourth and fifth finger of the right arm. She also trauma or injury to the arm. It is somewhat reproducible with palpation over the ulnar groove. She notes that she does sleep with her arm tucked underneath her. The symptoms have only been present since last Friday. The contralateral side is unaffected. No other neurologic compromise or lateralization is present. Patient does have a history of previous stroke. No significant sequelae were noted at that time. She also has a history of aortic stenosis status post TAPVR and doing quite well. She's had no chest pain or symptoms of endocarditis.                       PAST, FAMILY,SOCIAL HISTORY:     Medical  History:   has a past medical history of Anxiety; Anxiety; Aortic stenosis; Chronic migraine; Difficulty in walking(719.7); Hyperlipidaemia; Hypertension; Hypothyroidism; Myocardial infarction (H); Need for SBE (subacute bacterial endocarditis) prophylaxis; Numbness and tingling; Orthostatic hypotension; Osteoarthritis; Osteopenia; PAC (premature atrial contraction); PUD (peptic ulcer disease); PVC (premature ventricular contraction); Secondary Sjogren's syndrome (H); Seizures (H); Seropositive rheumatoid arthritis  (H); Shortness of breath; Stroke (H) (05/2013); and Syncope (2014).     Surgical History:   has a past surgical history that includes Mouth surgery (2011); Eye surgery (1999); cataract iol, rt/lt (Bilateral, 2000); Hysterectomy total abdominal (1970); TOTAL HIP ARTHROPLASTY (Right, 2010); TOTAL HIP ARTHROPLASTY (Left, 2014); TRANSCATHETER AORTIC VALVE IMPLANT ANEST (N/A, 8/3/2016); and Heart Cath Femoral Cannulization With Open Standby Repair Aortic Valve (N/A, 8/3/2016).     Social History:   reports that she has never smoked. She has never used smokeless tobacco. She reports that she does not drink alcohol or use illicit drugs.     Family History:  family history includes Hyperlipidemia in her mother. There is no history of Family History Negative.            MEDICATIONS  Current Outpatient Prescriptions   Medication Sig Dispense Refill     predniSONE (DELTASONE) 20 MG tablet Take 3 tabs (60 mg) by mouth daily x 3 days, 2 tabs (40 mg) daily x 3 days, 1 tab (20 mg) daily x 3 days, then 1/2 tab (10 mg) x 3 days. 20 tablet 0     mirtazapine (REMERON) 30 MG tablet TAKE ONE TABLET BY MOUTH AT BEDTIME 90 tablet 2     Ferrous Sulfate (IRON) 325 (65 FE) MG tablet TAKE ONE TABLET BY MOUTH ONCE DAILY WITH BREAKFAST 30 tablet 11     folic acid (FOLVITE) 1 MG tablet TAKE ONE TABLET BY MOUTH ONCE DAILY 90 tablet 1     levETIRAcetam (KEPPRA) 500 MG tablet TAKE ONE TABLET BY MOUTH ONCE DAILY AND one AT BEDTIME 180 tablet 0     ALPRAZolam (XANAX) 0.25 MG tablet Take 1 tablet (0.25 mg) by mouth 2 times daily as needed for anxiety 28 tablet 0     isosorbide mononitrate (IMDUR) 60 MG 24 hr tablet Take 1 tablet (60 mg) by mouth daily 90 tablet 3     levothyroxine (SYNTHROID/LEVOTHROID) 50 MCG tablet Take 1 tablet (50 mcg) by mouth daily Except tuesday 90 tablet 1     atorvastatin (LIPITOR) 40 MG tablet Take 1 tablet (40 mg) by mouth daily Reports taking 1 tablet 0     Cranberry 200 MG CAPS Take 3 capsules (600 mg) by mouth 2 times  daily 1 capsule 0     metoprolol (LOPRESSOR) 25 MG tablet Reports taking 0.5 tablet  tablet 1     aspirin EC 81 MG EC tablet Take 1 tablet (81 mg) by mouth daily       methotrexate 2.5 MG tablet Take 4 tablets (10 mg) by mouth once a week Wed 52 tablet 3     clopidogrel (PLAVIX) 75 MG tablet TAKE ONE TABLET BY MOUTH ONCE DAILY 90 tablet 3     predniSONE (DELTASONE) 5 MG tablet TAKE 1 TABLET BY MOUTH EVERY OTHER DAY, ALTERNATING WITH ONE-HALF TABLET EVERY OTHER DAY. 80 tablet 3     acetaminophen (TYLENOL ARTHRITIS PAIN) 650 MG CR tablet Take 2 tablets (1,300 mg) by mouth every 8 hours as needed for mild pain       calcium carbonate (TUMS) 500 MG chewable tablet Take 2-4 tablets (1,000-2,000 mg) by mouth as needed for heartburn       docusate sodium (COLACE) 100 MG capsule Take 200 mg by mouth daily 2 capsules       multivitamin, therapeutic with minerals (MULTI-VITAMIN) TABS Take 1 tablet by mouth daily       Lactobacillus (FLORAJEN ACIDOPHILUS) CAPS Take 1 capsule by mouth daily       omeprazole (PRILOSEC) 40 MG capsule Take 1 capsule (40 mg) by mouth daily Take 30-60 minutes before a meal. (Patient not taking: Reported on 6/15/2017) 30 capsule 0     nitroglycerin (NITROSTAT) 0.4 MG SL tablet Pt reports taking - -  -For chest pain place 1 tablet under the tongue every 5 minutes for 3 doses. If symptoms persist 5 minutes after 1st dose call 911. 25 tablet 0         --------------------------------------------------------------------------------------------------------------------                          REVIEW OF SYSTEMS:         LUNGS: Pt denies: cough,excess sputum, hemoptysis, or shortness of breath.   HEART: Pt denies: chest pain, arrythmia, syncope, tachy or bradyarrhythmia or excess edema.   GI: Pt denies: nausea, vomitting, diarrhea, constipation, melena, or hematochezia.   NEURO: Pt denies: seizures, recurrent strokes, diplopia, weakness,  or paralysis. Does have the ulnar paresthesias on the hand as  noted.   SKIN: Pt denies: itching, rashes, discoloration, or specific lesions of concern. Denies recent hair loss.                          EXAMINATION:         /72 (BP Location: Left arm, Patient Position: Chair, Cuff Size: Adult Regular)  Pulse 95  Temp 96.7  F (35.9  C) (Temporal)  Resp 14  Wt 152 lb (68.9 kg)  SpO2 97%  BMI 24.53 kg/m2   Constitutional: The patient appears to be in no acute distress. The patient appears to be adequately hydrated. No acute respiratory or hemodynamic distress is noted at this time.   LUNGS: clear bilaterally, airflow is brisk, no intercostal retraction or stridor is noted. No coughing is noted during visit.   HEART:  regular without rubs, clicks, gallops, or murmurs. PMI is nondisplaced. Upstrokes are brisk. S1,S2 are heard.   GI: Abdomen is soft, without rebound, guarding or tenderness. Bowel sounds are appropriate. No renal bruits are heard.    NEURO: Pt is alert and appropriate. No neurologic lateralization is noted. Cranial nerves 2-12 are intact. Peripheral sensory and motor function are grossly normal. Minimal weakness to the ulnar distribution of the right hand is noted. Some mild paresthesias are present. Symptoms are reproducible with bending of the elbow for more than a couple minutes                          DECISION MAKIN. Lesion of right ulnar nerve  Patient has some mild neuritis. Would recommend night splints to the elbow to avoid bending for extended periods of time and will do a short course of prednisone for the inflammation  - predniSONE (DELTASONE) 20 MG tablet; Take 3 tabs (60 mg) by mouth daily x 3 days, 2 tabs (40 mg) daily x 3 days, 1 tab (20 mg) daily x 3 days, then 1/2 tab (10 mg) x 3 days.  Dispense: 20 tablet; Refill: 0    2. S/P TAVR (transcatheter aortic valve replacement)  Currently stable and doing well    3. History of CVA (cerebrovascular accident)  No signs of recurrence or significant sequela                              FOLLOW UP    I have asked the patient to make an appointment for follow up with me in 2 weeks            I have carefully explained the diagnosis and treatment options with the patient. The patient has displayed an understanding of the above, and all subsequent questions were answered.         DO DELMA Pineda    Portions of this note were produced using [employer]  Although every attempt at real-time proof reading has been made, occasional grammar/syntax errors may have been missed.

## 2017-06-15 NOTE — MR AVS SNAPSHOT
After Visit Summary   6/15/2017    Gillian Burk    MRN: [id number]           Patient Information     Date Of Birth          3/19/1927        Visit Information        Provider Department      6/15/2017 3:00 PM Augusto Meyer DO [facility]        Today's Diagnoses     Lesion of right ulnar nerve    -  1    S/P TAVR (transcatheter aortic valve replacement)        History of CVA (cerebrovascular accident)           Follow-ups after your visit        Your next 10 appointments already scheduled     Aug 17, 2017 10:10 AM CDT   LAB with [facility]   [facility] ([facility])    [address]  [place] [address] [phone]           Patient must bring picture ID.  Patient should be prepared to give a urine specimen  Please do not eat 10-12 hours before your appointment if you are coming in fasting for labs on lipids, cholesterol, or glucose (sugar).  Pregnant women should follow their Care Team instructions. Water with medications is okay. Do not drink coffee or other fluids.   If you have concerns about taking  your medications, please ask at office or if scheduling via "[employer] send a message by clicking on Secure Messaging, Message Your Care Team.            Aug 17, 2017 10:30 AM CDT   Ech Complete with [id number]   [facility] CV Echocardiography ([facility])    [address]  [address]  [place] [id number] [phone]           1.  Please bring or wear a comfortable two-piece outfit. 2.  You may eat, drink and take your normal medicines. 3.  For any questions that cannot be answered, please contact the ordering physician            Aug 17, 2017 12:00 PM CDT   Heart Cath Heart Valve Fluoro with SHCVR1   [facility] Cardiac Catheterization Lab ([facility])    [address]  [place] [facility] [id number] [phone]             Aug 17, 2017  1:50 PM CDT   TAVR Return with Matt Mccracken PA-C   [facility] [place] ([facility])    [address] [place], [place] [id number]  [facility] [address] [phone]              Who to contact     If you have questions or need follow up information about today's clinic visit or your schedule please contact [facility] directly at [phone].  Normal or non-critical lab and imaging results will be communicated to you by [employer], letter or phone within 4 business days after the clinic has received the results. If you do not hear from us within 7 days, please contact the clinic through [employer] or phone. If you have a critical or abnormal lab result, we will notify you by phone as soon as possible.  Submit refill requests through [employer] or call your pharmacy and they will forward the refill request to us. Please allow 3 business days for your refill to be completed.          Additional Information About Your Visit        [email or web address] Information     [employer] gives you secure access to your electronic health record. If you see a primary care provider, you can also send messages to your care team and make appointments. If you have questions, please call your primary care clinic.  If you do not have a primary care provider, please call [phone] and they will assist you.        Care EveryWhere ID     This is your Care EveryWhere ID. This could be used by other organizations to access your [facility] medical records  [id number]        Your Vitals Were     Pulse Temperature Respirations Pulse Oximetry BMI (Body Mass Index)       95 96.7  F (35.9  C) (Temporal) 14 97% 24.53 kg/m2        Blood Pressure from Last 3 Encounters:   06/15/17 138/72   04/10/17 142/72   03/13/17 152/64    Weight from Last 3 Encounters:   06/15/17 152 lb (68.9 kg)   04/10/17 156 lb (70.8 kg)   03/13/17 155 lb (70.3 kg)              Today, you had the following     No orders found  for display         Today's Medication Changes          These changes are accurate as of: 6/15/17 11:59 PM.  If you have any questions, ask your nurse or doctor.               These medicines have changed or have updated prescriptions.        Dose/Directions    * predniSONE 5 MG tablet   Commonly known as:  DELTASONE   This may have changed:  Another medication with the same name was added. Make sure you understand how and when to take each.   Used for:  Seropositive rheumatoid arthritis (H)   Changed by:  Monika Iqbal MD        TAKE 1 TABLET BY MOUTH EVERY OTHER DAY, ALTERNATING WITH ONE-HALF TABLET EVERY OTHER DAY.   Quantity:  80 tablet   Refills:  3       * predniSONE 20 MG tablet   Commonly known as:  DELTASONE   This may have changed:  You were already taking a medication with the same name, and this prescription was added. Make sure you understand how and when to take each.   Used for:  Lesion of right ulnar nerve   Changed by:  Augusto Meyer, DO        Take 3 tabs (60 mg) by mouth daily x 3 days, 2 tabs (40 mg) daily x 3 days, 1 tab (20 mg) daily x 3 days, then 1/2 tab (10 mg) x 3 days.   Quantity:  20 tablet   Refills:  0       * Notice:  This list has 2 medication(s) that are the same as other medications prescribed for you. Read the directions carefully, and ask your doctor or other care provider to review them with you.      Stop taking these medicines if you haven't already. Please contact your care team if you have questions.     minocycline 100 MG capsule   Commonly known as:  MINOCIN/DYNACIN   Stopped by:  Augusto Meyer DO           sulfamethoxazole-trimethoprim 400-80 MG per tablet   Commonly known as:  BACTRIM/SEPTRA   Stopped by:  Augusto Meyer DO           sulfamethoxazole-trimethoprim 800-160 MG per tablet   Commonly known as:  BACTRIM DS/SEPTRA DS   Stopped by:  Augusto Meyer DO                Where to get your medicines       These medications were sent to [facility] [address] [place], [place] - [address]  [address], [facility] [address]     Phone:  [phone]     predniSONE 20 MG tablet                Primary Care Provider Office Phone # Fax #    Augusto Meyer [phone]       [address]   [place] [address]        Thank you!     Thank you for choosing [facility]  for your care. Our goal is always to provide you with excellent care. Hearing back from our patients is one way we can continue to improve our services. Please take a few minutes to complete the written survey that you may receive in the mail after your visit with us. Thank you!             Your Updated Medication List - Protect others around you: Learn how to safely use, store and throw away your medicines at www.disposemymeds.org.          This list is accurate as of: 6/15/17 11:59 PM.  Always use your most recent med list.                   Brand Name Dispense Instructions for use    ALPRAZolam 0.25 MG tablet    XANAX    28 tablet    Take 1 tablet (0.25 mg) by mouth 2 times daily as needed for anxiety       aspirin 81 MG EC tablet      Take 1 tablet (81 mg) by mouth daily       atorvastatin 40 MG tablet    LIPITOR    1 tablet    Take 1 tablet (40 mg) by mouth daily Reports taking       calcium carbonate 500 MG chewable tablet    TUMS     Take 2-4 tablets (1,000-2,000 mg) by mouth as needed for heartburn       clopidogrel 75 MG tablet    PLAVIX    90 tablet    TAKE ONE TABLET BY MOUTH ONCE DAILY       Cranberry 200 MG Caps     1 capsule    Take 3 capsules (600 mg) by mouth 2 times daily       docusate sodium 100 MG capsule    COLACE     Take 200 mg by mouth daily 2 capsules       FLORAJEN ACIDOPHILUS Caps      Take 1 capsule by mouth daily       folic acid 1 MG tablet    FOLVITE    90 tablet    TAKE ONE TABLET BY MOUTH ONCE DAILY       iron 325 (65 FE) MG tablet     30 tablet    TAKE ONE  TABLET BY MOUTH ONCE DAILY WITH BREAKFAST       isosorbide mononitrate 60 MG 24 hr tablet    IMDUR    90 tablet    Take 1 tablet (60 mg) by mouth daily       levETIRAcetam 500 MG tablet    KEPPRA    180 tablet    TAKE ONE TABLET BY MOUTH ONCE DAILY AND one AT BEDTIME       levothyroxine 50 MCG tablet    SYNTHROID/LEVOTHROID    90 tablet    Take 1 tablet (50 mcg) by mouth daily Except tuesday       methotrexate 2.5 MG tablet CHEMO     52 tablet    Take 4 tablets (10 mg) by mouth once a week Wed       metoprolol 25 MG tablet    LOPRESSOR    180 tablet    Reports taking 0.5 tablet BID       mirtazapine 30 MG tablet    REMERON    90 tablet    TAKE ONE TABLET BY MOUTH AT BEDTIME       Multi-vitamin Tabs tablet      Take 1 tablet by mouth daily       nitroglycerin 0.4 MG sublingual tablet    NITROSTAT    25 tablet    Pt reports taking - -  -For chest pain place 1 tablet under the tongue every 5 minutes for 3 doses. If symptoms persist 5 minutes after 1st dose call 911.       omeprazole 40 MG capsule    priLOSEC    30 capsule    Take 1 capsule (40 mg) by mouth daily Take 30-60 minutes before a meal.       * predniSONE 5 MG tablet    DELTASONE    80 tablet    TAKE 1 TABLET BY MOUTH EVERY OTHER DAY, ALTERNATING WITH ONE-HALF TABLET EVERY OTHER DAY.       * predniSONE 20 MG tablet    DELTASONE    20 tablet    Take 3 tabs (60 mg) by mouth daily x 3 days, 2 tabs (40 mg) daily x 3 days, 1 tab (20 mg) daily x 3 days, then 1/2 tab (10 mg) x 3 days.       TYLENOL ARTHRITIS PAIN 650 MG CR tablet   Generic drug:  acetaminophen      Take 2 tablets (1,300 mg) by mouth every 8 hours as needed for mild pain       * Notice:  This list has 2 medication(s) that are the same as other medications prescribed for you. Read the directions carefully, and ask your doctor or other care provider to review them with you.

## 2017-06-15 NOTE — NURSING NOTE
"Chief Complaint   Patient presents with     Arm Injury       Initial /72 (BP Location: Left arm, Patient Position: Chair, Cuff Size: Adult Regular)  Pulse 95  Temp 96.7  F (35.9  C) (Temporal)  Resp 14  Wt 152 lb (68.9 kg)  SpO2 97%  BMI 24.53 kg/m2 Estimated body mass index is 24.53 kg/(m^2) as calculated from the following:    Height as of 3/3/17: 5' 6\" (1.676 m).    Weight as of this encounter: 152 lb (68.9 kg).  Medication Reconciliation: complete     Cathy Anderson MA 6/15/2017        "

## 2017-06-19 NOTE — PROGRESS NOTES
Dear Gillian, your recent test results are attached.  Your urine sample was normal.      Feel free to contact me via the office or My Chart if you have any questions regarding the above.  Sincerely,  Augusto Meyer DO [facility]

## 2017-06-30 ENCOUNTER — TELEPHONE (OUTPATIENT)
Dept: INTERNAL MEDICINE | Facility: CLINIC | Age: 82
End: 2017-06-30

## 2017-06-30 NOTE — TELEPHONE ENCOUNTER
She gets chest pains when she lays down. Mylanta does help. Right now it is not helping as well. Tums also does not help. They are wanting something stronger. She is currently out of town. Lela HOLLEY

## 2017-06-30 NOTE — TELEPHONE ENCOUNTER
The patient should have just concluded her prednisone burst recently. This will help significantly. She should continue to use the times her Mylanta as needed. She should restart her omeprazole 20 mg twice daily. This can be purchased over-the-counter at any local drugstore.    Thank you      Betsy

## 2017-06-30 NOTE — TELEPHONE ENCOUNTER
Daughter called back. Info given. Nurse was not able to take call at this time. She would still like to speak with her. Please call back when possible. She can be reached at [phone].  Thank you,  Renée Matthews   for [facility]

## 2017-06-30 NOTE — TELEPHONE ENCOUNTER
Reason for Call:  Medication or medication refill:    Do you use a [facility]?  Name of [place] [facility] [place] [place]    Name of the medication requested:     Other request: patient is currently taking Mylanta at night and is not sleeping very well. Daughter is requesting something stronger or if there is a suggestion for an OTC please call, currently they are at a family reunion requesting to [facility] to [facility] in [place], [place]    Can we leave a detailed message on this number? YES    Phone number patient can be reached at: Cell number on file:    Telephone Information:   Mobile [phone]       Best Time: any    Call taken on 6/30/2017 at 8:36 AM by Ade Sousa

## 2017-07-11 DIAGNOSIS — E03.9 ACQUIRED HYPOTHYROIDISM: ICD-10-CM

## 2017-07-11 NOTE — TELEPHONE ENCOUNTER
levothyroxine (SYNTHROID/LEVOTHROID) 50 MCG tablet     Last Written Prescription Date: 1/4/17  Last Quantity: 90, # refills: 1  Last Office Visit with [facility], [facility] or [facility] prescribing provider: 6/13/17        TSH   Date Value Ref Range Status   05/03/2016 3.37 0.40 - 4.00 mU/L Final

## 2017-07-13 ENCOUNTER — TELEPHONE (OUTPATIENT)
Dept: INTERNAL MEDICINE | Facility: CLINIC | Age: 82
End: 2017-07-13

## 2017-07-13 RX ORDER — LEVOTHYROXINE SODIUM 50 UG/1
TABLET ORAL
Qty: 30 TABLET | Refills: 0 | Status: SHIPPED | OUTPATIENT
Start: 2017-07-13 | End: 2017-08-08

## 2017-07-13 NOTE — TELEPHONE ENCOUNTER
Reason for Call:  Same Day Appointment, Requested Provider:  Augusto Meyer D.O.    PCP: Augusto Meyer    Reason for visit: stomach issue f/u    Duration of symptoms: a bit ago    Have you been treated for this in the past? Yes    Additional comments: Pt was supposed to f/u with Betsy after she was home from vacation. Can you work her in on Monday 7/17/17 in [place]? [employer]yt[employer] works. Please call back and advise.     Can we leave a detailed message on this number? YES    Phone number patient can be reached at: Other phone number:      Best Time: anytime    Call taken on 7/13/2017 at 8:03 AM by Leighann Tavarez

## 2017-07-13 NOTE — TELEPHONE ENCOUNTER
Routing refill request to provider for review/approval because:  Labs not current:  TSH    Simin Bueno, RN  [facility]

## 2017-07-17 ENCOUNTER — TELEPHONE (OUTPATIENT)
Dept: INTERNAL MEDICINE | Facility: CLINIC | Age: 82
End: 2017-07-17

## 2017-07-17 DIAGNOSIS — R30.0 DYSURIA: Primary | ICD-10-CM

## 2017-07-17 DIAGNOSIS — R30.0 DYSURIA: ICD-10-CM

## 2017-07-17 LAB
ALBUMIN UR-MCNC: NEGATIVE MG/DL
APPEARANCE UR: CLEAR
BILIRUB UR QL STRIP: NEGATIVE
COLOR UR AUTO: ABNORMAL
GLUCOSE UR STRIP-MCNC: NEGATIVE MG/DL
HGB UR QL STRIP: NEGATIVE
KETONES UR STRIP-MCNC: NEGATIVE MG/DL
LEUKOCYTE ESTERASE UR QL STRIP: ABNORMAL
NITRATE UR QL: NEGATIVE
PH UR STRIP: 8 PH (ref 5–7)
RBC #/AREA URNS AUTO: <1 /HPF (ref 0–2)
SP GR UR STRIP: 1 (ref 1–1.03)
URN SPEC COLLECT METH UR: ABNORMAL
UROBILINOGEN UR STRIP-MCNC: 0 MG/DL (ref 0–2)
WBC #/AREA URNS AUTO: 47 /HPF (ref 0–2)

## 2017-07-17 PROCEDURE — 87086 URINE CULTURE/COLONY COUNT: CPT | Performed by: INTERNAL MEDICINE

## 2017-07-17 PROCEDURE — 81001 URINALYSIS AUTO W/SCOPE: CPT | Performed by: INTERNAL MEDICINE

## 2017-07-17 RX ORDER — SULFAMETHOXAZOLE AND TRIMETHOPRIM 400; 80 MG/1; MG/1
1 TABLET ORAL 2 TIMES DAILY
Qty: 14 TABLET | Refills: 0 | Status: SHIPPED | OUTPATIENT
Start: 2017-07-17 | End: 2017-07-18

## 2017-07-17 NOTE — TELEPHONE ENCOUNTER
Reason for call:  Patient reporting a symptom    Symptom or request: UTI symptoms, daughter states Gillian didn't want to wait until appt Tues, please advise if something could be prescribed?      Duration (how long have symptoms been present): unsure    Have you been treated for this before? Yes    Additional comments:     Phone Number patient can be reached at:  Home number on file [phone] (home)    Best Time:      Can we leave a detailed message on this number:  YES    Call taken on 7/17/2017 at 10:53 AM by Cristy Beltran

## 2017-07-18 ENCOUNTER — OFFICE VISIT (OUTPATIENT)
Dept: FAMILY MEDICINE | Facility: OTHER | Age: 82
End: 2017-07-18
Payer: COMMERCIAL

## 2017-07-18 VITALS
BODY MASS INDEX: 24.75 KG/M2 | HEIGHT: 66 IN | HEART RATE: 80 BPM | RESPIRATION RATE: 16 BRPM | SYSTOLIC BLOOD PRESSURE: 138 MMHG | WEIGHT: 154 LBS | DIASTOLIC BLOOD PRESSURE: 80 MMHG

## 2017-07-18 DIAGNOSIS — I25.10 CORONARY ARTERY DISEASE INVOLVING NATIVE CORONARY ARTERY OF NATIVE HEART WITHOUT ANGINA PECTORIS: ICD-10-CM

## 2017-07-18 DIAGNOSIS — M05.9 SEROPOSITIVE RHEUMATOID ARTHRITIS (H): ICD-10-CM

## 2017-07-18 DIAGNOSIS — R30.0 DYSURIA: ICD-10-CM

## 2017-07-18 DIAGNOSIS — Z95.2 S/P TAVR (TRANSCATHETER AORTIC VALVE REPLACEMENT): ICD-10-CM

## 2017-07-18 DIAGNOSIS — K21.00 GASTROESOPHAGEAL REFLUX DISEASE WITH ESOPHAGITIS: Primary | ICD-10-CM

## 2017-07-18 DIAGNOSIS — I10 HYPERTENSION GOAL BP (BLOOD PRESSURE) < 140/90: ICD-10-CM

## 2017-07-18 PROCEDURE — 99214 OFFICE O/P EST MOD 30 MIN: CPT | Performed by: INTERNAL MEDICINE

## 2017-07-18 RX ORDER — SULFAMETHOXAZOLE AND TRIMETHOPRIM 400; 80 MG/1; MG/1
1 TABLET ORAL 2 TIMES DAILY
Qty: 14 TABLET | Refills: 0 | Status: SHIPPED | OUTPATIENT
Start: 2017-07-18 | End: 2017-08-17

## 2017-07-18 RX ORDER — LANSOPRAZOLE 30 MG/1
30 CAPSULE, DELAYED RELEASE ORAL DAILY
Qty: 30 CAPSULE | Refills: 3 | Status: SHIPPED | OUTPATIENT
Start: 2017-07-18 | End: 2018-04-06

## 2017-07-18 ASSESSMENT — PAIN SCALES - GENERAL: PAINLEVEL: NO PAIN (0)

## 2017-07-18 NOTE — PROGRESS NOTES
SUBJECTIVE:                                                    Gillian Burk is a 89 year old female who presents to clinic today for the following health issues:      ABDOMINAL PAIN     Onset: several months    Description:   Character: Burning  Location: epigastric region  Radiation: None    Intensity: moderate, severe    Progression of Symptoms:  worsening    Accompanying Signs & Symptoms:  Fever/Chills?: no   Gas/Bloating: no   Nausea: no   Vomitting: no   Diarrhea?: no   Constipation:no   Dysuria or Hematuria: no    History:   Trauma: no   Previous similar pain: no    Previous tests done: none    Precipitating factors:   Does the pain change with:     Food: no      BM: no     Urination: no     Alleviating factors:  None    Therapies Tried and outcome: Omeprazole does help    LMP:  not applicable         CHIEF COMPLAINT:    The patient is a pleasant 89-year-old female is been having some epigastric pain. She was recently placed on Prilosec and this does seem to have helped significantly. She is on Plavix and as such, we're concerned regarding possible interactions. I've recommended using an H2 blocker but this did not seem to help at all. Subsequently, I will recommend another proton pump inhibitor though these are not covered on her insurance, we will have to see what the price is and if it is affordable. She is under a great deal of stress, her grandson recently committed suicide and she is mourning this. She does have good family support in the family has good support from outside. Finally, she's recently had a urinary tract infection and notes that she's had some frequency and dysuria. It has improved today, she has increased her fluid intake. She is questioning whether or not she will need to take the antibiotic. I have suggested that we await the culture results. She does have frequent urinary tract infections.                       PAST, FAMILY,SOCIAL HISTORY:     Medical  History:   has a past  medical history of Anxiety; Anxiety; Aortic stenosis; Chronic migraine; Difficulty in walking(719.7); Hyperlipidaemia; Hypertension; Hypothyroidism; Myocardial infarction (H); Need for SBE (subacute bacterial endocarditis) prophylaxis; Numbness and tingling; Orthostatic hypotension; Osteoarthritis; Osteopenia; PAC (premature atrial contraction); PUD (peptic ulcer disease); PVC (premature ventricular contraction); Secondary Sjogren's syndrome (H); Seizures (H); Seropositive rheumatoid arthritis (H); Shortness of breath; Stroke (H) (05/2013); and Syncope (2014).     Surgical History:   has a past surgical history that includes Mouth surgery (2011); Eye surgery (1999); cataract iol, rt/lt (Bilateral, 2000); Hysterectomy total abdominal (1970); TOTAL HIP ARTHROPLASTY (Right, 2010); TOTAL HIP ARTHROPLASTY (Left, 2014); TRANSCATHETER AORTIC VALVE IMPLANT ANEST (N/A, 8/3/2016); and Heart Cath Femoral Cannulization With Open Standby Repair Aortic Valve (N/A, 8/3/2016).     Social History:   reports that she has never smoked. She has never used smokeless tobacco. She reports that she does not drink alcohol or use illicit drugs.     Family History:  family history includes Hyperlipidemia in her mother. There is no history of Family History Negative.            MEDICATIONS  Current Outpatient Prescriptions   Medication Sig Dispense Refill     sulfamethoxazole-trimethoprim (BACTRIM/SEPTRA) 400-80 MG per tablet Take 1 tablet by mouth 2 times daily 14 tablet 0     LANsoprazole (PREVACID) 30 MG CR capsule Take 1 capsule (30 mg) by mouth daily Take 30-60 minutes before a meal. 30 capsule 3     levothyroxine (SYNTHROID/LEVOTHROID) 50 MCG tablet TAKE ONE TABLET BY MOUTH ONCE DAILY EXCEPT  TUESDAY 30 tablet 0     predniSONE (DELTASONE) 20 MG tablet Take 3 tabs (60 mg) by mouth daily x 3 days, 2 tabs (40 mg) daily x 3 days, 1 tab (20 mg) daily x 3 days, then 1/2 tab (10 mg) x 3 days. 20 tablet 0     mirtazapine (REMERON) 30 MG tablet  TAKE ONE TABLET BY MOUTH AT BEDTIME 90 tablet 2     Ferrous Sulfate (IRON) 325 (65 FE) MG tablet TAKE ONE TABLET BY MOUTH ONCE DAILY WITH BREAKFAST 30 tablet 11     folic acid (FOLVITE) 1 MG tablet TAKE ONE TABLET BY MOUTH ONCE DAILY 90 tablet 1     levETIRAcetam (KEPPRA) 500 MG tablet TAKE ONE TABLET BY MOUTH ONCE DAILY AND one AT BEDTIME 180 tablet 0     isosorbide mononitrate (IMDUR) 60 MG 24 hr tablet Take 1 tablet (60 mg) by mouth daily 90 tablet 3     atorvastatin (LIPITOR) 40 MG tablet Take 1 tablet (40 mg) by mouth daily Reports taking 1 tablet 0     Cranberry 200 MG CAPS Take 3 capsules (600 mg) by mouth 2 times daily 1 capsule 0     metoprolol (LOPRESSOR) 25 MG tablet Reports taking 0.5 tablet  tablet 1     aspirin EC 81 MG EC tablet Take 1 tablet (81 mg) by mouth daily       methotrexate 2.5 MG tablet Take 4 tablets (10 mg) by mouth once a week Wed 52 tablet 3     clopidogrel (PLAVIX) 75 MG tablet TAKE ONE TABLET BY MOUTH ONCE DAILY 90 tablet 3     predniSONE (DELTASONE) 5 MG tablet TAKE 1 TABLET BY MOUTH EVERY OTHER DAY, ALTERNATING WITH ONE-HALF TABLET EVERY OTHER DAY. 80 tablet 3     docusate sodium (COLACE) 100 MG capsule Take 200 mg by mouth daily 2 capsules       multivitamin, therapeutic with minerals (MULTI-VITAMIN) TABS Take 1 tablet by mouth daily       Lactobacillus (FLORAJEN ACIDOPHILUS) CAPS Take 1 capsule by mouth daily       ALPRAZolam (XANAX) 0.25 MG tablet Take 1 tablet (0.25 mg) by mouth 2 times daily as needed for anxiety 28 tablet 0     nitroglycerin (NITROSTAT) 0.4 MG SL tablet Pt reports taking - -  -For chest pain place 1 tablet under the tongue every 5 minutes for 3 doses. If symptoms persist 5 minutes after 1st dose call 911. 25 tablet 0     acetaminophen (TYLENOL ARTHRITIS PAIN) 650 MG CR tablet Take 2 tablets (1,300 mg) by mouth every 8 hours as needed for mild pain       calcium carbonate (TUMS) 500 MG chewable tablet Take 2-4 tablets (1,000-2,000 mg) by mouth as needed  "for heartburn           --------------------------------------------------------------------------------------------------------------------                          REVIEW OF SYSTEMS:       LUNGS: Pt denies: cough,excess sputum, hemoptysis, or shortness of breath.   HEART: Pt denies: chest pain, arrythmia, syncope, tachy or bradyarrhythmia or excess edema.   GI: Pt denies: nausea, vomitting, diarrhea, constipation, melena, or hematochezia. She does have some epigastric pain when she reclines at night. This is improved significantly with use of proton pump inhibitor.   NEURO: Pt denies: seizures, strokes, diplopia, weakness, paraesthesias, or paralysis.   SKIN: Pt denies: itching, rashes, discoloration, or specific lesions of concern. Denies recent hair loss.                            EXAMINATION:         /80  Pulse 80  Resp 16  Ht 5' 6\" (1.676 m)  Wt 154 lb (69.9 kg)  BMI 24.86 kg/m2   Constitutional: The patient appears to be in no acute distress. The patient appears to be adequately hydrated. No acute respiratory or hemodynamic distress is noted at this time.   LUNGS: clear bilaterally, airflow is brisk, no intercostal retraction or stridor is noted. No coughing is noted during visit.   HEART:  regular without rubs, clicks, gallops, or murmurs. PMI is nondisplaced. Upstrokes are brisk. S1,S2 are heard. She has a history of a TAVR and this is working quite well. She has no significant murmurs at this time.   GI: Abdomen is soft, without rebound, guarding. Bowel sounds are appropriate. No renal bruits are heard. Some minor epigastric tenderness is noted with palpation.   NEURO: Pt is alert and appropriate. No neurologic lateralization is noted. Cranial nerves 2-12 are intact. Peripheral sensory and motor function are grossly normal   MS: Moderate crepitance is noted in the old joints. No acute deformity is present. Muscle strength is appropriate and equal bilaterally. No acute joint erythema or swelling " is present. Evidence of chronic rheumatoid arthritis at present.                          DECISION MAKIN. Dysuria  Patient will continue fluid intake and start the antibiotic  - sulfamethoxazole-trimethoprim (BACTRIM/SEPTRA) 400-80 MG per tablet; Take 1 tablet by mouth 2 times daily  Dispense: 14 tablet; Refill: 0    2. Gastroesophageal reflux disease with esophagitis  Discontinue Prilosec in favor of Prevacid  - LANsoprazole (PREVACID) 30 MG CR capsule; Take 1 capsule (30 mg) by mouth daily Take 30-60 minutes before a meal.  Dispense: 30 capsule; Refill: 3    3. Hypertension goal BP (blood pressure) < 140/90  Continue current medication    4. Seropositive rheumatoid arthritis (H)  Support, hold off on methotrexate while on soft    5. S/P TAVR (transcatheter aortic valve replacement)  Doing well    6. Coronary artery disease involving native coronary artery of native heart without angina pectoris  Currently stable with the use of long-acting nitrate                               FOLLOW UP    I have asked the patient to make an appointment for follow up with me in 3 months or sooner as needed    Have offered the patient and her daughter counseling. They note that they have adequate support in place for their recent loss    I have carefully explained the diagnosis and treatment options with the patient. The patient has displayed an understanding of the above, and all subsequent questions were answered.               DO DELMA Pineda    Portions of this note were produced using [employer]  Although every attempt at real-time proof reading has been made, occasional grammar/syntax errors may have been missed.

## 2017-07-18 NOTE — MR AVS SNAPSHOT
After Visit Summary   7/18/2017    Gillian Burk    MRN: [id number]           Patient Information     Date Of Birth          3/19/1927        Visit Information        Provider Department      7/18/2017 11:00 AM Augusto Meyer DO [facility]        Today's Diagnoses     Gastroesophageal reflux disease with esophagitis    -  1    Dysuria        Hypertension goal BP (blood pressure) < 140/90        Seropositive rheumatoid arthritis (H)        S/P TAVR (transcatheter aortic valve replacement)        Coronary artery disease involving native coronary artery of native heart without angina pectoris           Follow-ups after your visit        Your next 10 appointments already scheduled     Aug 17, 2017 10:10 AM CDT   LAB with [facility]   [facility]YS[facility] ([facility])    [address]  [facility] [id number] [phone]           Patient must bring picture ID. Patient should be prepared to give a urine specimen  Please do not eat 10-12 hours before your appointment if you are coming in fasting for labs on lipids, cholesterol, or glucose (sugar). Pregnant women should follow their Care Team instructions. Water with medications is okay. Do not drink coffee or other fluids. If you have concerns about taking  your medications, please ask at office or if scheduling via [employer], send a message by clicking on Secure Messaging, Message Your Care Team.            Aug 17, 2017 10:30 AM CDT   Ech Complete with [id number]   [facility] CV Echocardiography (Cardiovascular Imaging at [facility])    [address]  [address]  [place] [facility] [id number] [phone]           1. Please bring or wear a comfortable two-piece outfit. 2. You may eat, drink and take your normal medicines. 3. For any questions that cannot be answered, please contact the ordering physician            Aug 17, 2017 12:00 PM CDT   Heart  "Cath Heart Valve Fluoro with SHCVR1   [facility] Cardiac Catheter[facility] Lab ([facility])    [address]  [place] [address] [phone]            Aug 17, 2017  1:50 PM CDT   TAVR Return with Matt Mccracken PA-C   [facility] PHYSICIANS HEART AT [place] ([facility])    [address]  [place] [address] [phone]              Who to contact     If you have questions or need follow up information about today's clinic visit or your schedule please contact [facility] directly at [phone].  Normal or non-critical lab and imaging results will be communicated to you by MyChart, letter or phone within 4 business days after the clinic has received the results. If you do not hear from us within 7 days, please contact the clinic through [employer] or phone. If you have a critical or abnormal lab result, we will notify you by phone as soon as possible.  Submit refill requests through [employer] or call your pharmacy and they will forward the refill request to us. Please allow 3 business days for your refill to be completed.          Additional Information About Your Visit        [employer] Information     [employer] gives you secure access to your electronic health record. If you see a primary care provider, you can also send messages to your care team and make appointments. If you have questions, please call your primary care clinic.  If you do not have a primary care provider, please call [phone] and they will assist you.        Care EveryWhere ID     This is your Care EveryWhere ID. This could be used by other organizations to access your [facility] medical records  [id number]        Your Vitals Were     Pulse Respirations Height BMI (Body Mass Index)          80 16 5' 6\" (1.676 m) 24.86 kg/m2         Blood Pressure from Last 3 Encounters:   07/18/17 138/80   06/15/17 138/72   04/10/17 142/72    Weight from Last 3 Encounters: "   07/18/17 154 lb (69.9 kg)   06/15/17 152 lb (68.9 kg)   04/10/17 156 lb (70.8 kg)              Today, you had the following     No orders found for display         Today's Medication Changes          These changes are accurate as of: 7/18/17 11:59 PM.  If you have any questions, ask your nurse or doctor.               Start taking these medicines.        Dose/Directions    LANsoprazole 30 MG CR capsule   Commonly known as:  PREVACID   Used for:  Gastroesophageal reflux disease with esophagitis   Started by:  Augusto Meyer DO        Dose:  30 mg   Take 1 capsule (30 mg) by mouth daily Take 30-60 minutes before a meal.   Quantity:  30 capsule   Refills:  3         Stop taking these medicines if you haven't already. Please contact your care team if you have questions.     omeprazole 40 MG capsule   Commonly known as:  priLOSEC   Stopped by:  Augusto Meyer DO                Where to get your medicines      These medications were sent to [facility] [address] [place] [address] [address]  [address] [place] [address]     Phone:  [phone]     LANsoprazole 30 MG CR capsule    sulfamethoxazole-trimethoprim 400-80 MG per tablet                Primary Care Provider Office Phone # Fax #    Augusto Meyer [phone]       [address] [address]        Equal Access to Services     [place] AH: Hadii veronica kim hadasho Soomaali, waaxda luqadaha, qaybta kaalmada adeegyada, zack ayers. So [facility] [place] [phone].    ATENCIÓN: Si habla español, tiene a granger disposición servicios gratuitos de asistencia lingüística. Geraldine al [phone].    We comply with applicable federal civil rights laws and [place] laws. We do not discriminate on the basis of race, color, national origin, age, disability sex, sexual orientation or gender identity.            Thank you!     Thank you for choosing [facility]  [facility]  for your care. Our goal is always to provide you with excellent care. Hearing back from our patients is one way we can continue to improve our services. Please take a few minutes to complete the written survey that you may receive in the mail after your visit with us. Thank you!             Your Updated Medication List - Protect others around you: Learn how to safely use, store and throw away your medicines at www.disposemymeds.org.          This list is accurate as of: 7/18/17 11:59 PM.  Always use your most recent med list.                   Brand Name Dispense Instructions for use Diagnosis    ALPRAZolam 0.25 MG tablet    XANAX    28 tablet    Take 1 tablet (0.25 mg) by mouth 2 times daily as needed for anxiety    Anxiety       aspirin 81 MG EC tablet      Take 1 tablet (81 mg) by mouth daily    S/P TAVR (transcatheter aortic valve replacement)       atorvastatin 40 MG tablet    LIPITOR    1 tablet    Take 1 tablet (40 mg) by mouth daily Reports taking    Hyperlipidemia with target LDL less than 130       calcium carbonate 500 MG chewable tablet    TUMS     Take 2-4 tablets (1,000-2,000 mg) by mouth as needed for heartburn        Cranberry 200 MG Caps     1 capsule    Take 3 capsules (600 mg) by mouth 2 times daily        docusate sodium 100 MG capsule    COLACE     Take 200 mg by mouth daily 2 capsules        FLORAJEN ACIDOPHILUS Caps      Take 1 capsule by mouth daily        folic acid 1 MG tablet    FOLVITE    90 tablet    TAKE ONE TABLET BY MOUTH ONCE DAILY    Seropositive rheumatoid arthritis (H)       iron 325 (65 FE) MG tablet     30 tablet    TAKE ONE TABLET BY MOUTH ONCE DAILY WITH BREAKFAST    Gastrointestinal hemorrhage, unspecified gastrointestinal hemorrhage type       isosorbide mononitrate 60 MG 24 hr tablet    IMDUR    90 tablet    Take 1 tablet (60 mg) by mouth daily    Chest pain, unspecified type       LANsoprazole 30 MG CR capsule    PREVACID    30 capsule    Take 1 capsule (30 mg) by  mouth daily Take 30-60 minutes before a meal.    Gastroesophageal reflux disease with esophagitis       levETIRAcetam 500 MG tablet    KEPPRA    180 tablet    TAKE ONE TABLET BY MOUTH ONCE DAILY AND one AT BEDTIME    Seizure disorder (H)       levothyroxine 50 MCG tablet    SYNTHROID/LEVOTHROID    30 tablet    TAKE ONE TABLET BY MOUTH ONCE DAILY EXCEPT  TUESDAY    Acquired hypothyroidism       methotrexate 2.5 MG tablet CHEMO     52 tablet    Take 4 tablets (10 mg) by mouth once a week Wed    Seropositive rheumatoid arthritis (H)       metoprolol 25 MG tablet    LOPRESSOR    180 tablet    Reports taking 0.5 tablet BID        mirtazapine 30 MG tablet    REMERON    90 tablet    TAKE ONE TABLET BY MOUTH AT BEDTIME    Sleep initiation disorder       Multi-vitamin Tabs tablet      Take 1 tablet by mouth daily        nitroGLYcerin 0.4 MG sublingual tablet    NITROSTAT    25 tablet    Pt reports taking - -  -For chest pain place 1 tablet under the tongue every 5 minutes for 3 doses. If symptoms persist 5 minutes after 1st dose call 911.        * predniSONE 5 MG tablet    DELTASONE    80 tablet    TAKE 1 TABLET BY MOUTH EVERY OTHER DAY, ALTERNATING WITH ONE-HALF TABLET EVERY OTHER DAY.    Seropositive rheumatoid arthritis (H)       * predniSONE 20 MG tablet    DELTASONE    20 tablet    Take 3 tabs (60 mg) by mouth daily x 3 days, 2 tabs (40 mg) daily x 3 days, 1 tab (20 mg) daily x 3 days, then 1/2 tab (10 mg) x 3 days.    Lesion of right ulnar nerve       sulfamethoxazole-trimethoprim 400-80 MG per tablet    BACTRIM/SEPTRA    14 tablet    Take 1 tablet by mouth 2 times daily    Dysuria       TYLENOL ARTHRITIS PAIN 650 MG CR tablet   Generic drug:  acetaminophen      Take 2 tablets (1,300 mg) by mouth every 8 hours as needed for mild pain        * Notice:  This list has 2 medication(s) that are the same as other medications prescribed for you. Read the directions carefully, and ask your doctor or other care provider to  review them with you.

## 2017-07-18 NOTE — NURSING NOTE
"Chief Complaint   Patient presents with     Abdominal Pain     Several months       Initial /80  Pulse 80  Resp 16  Ht 5' 6\" (1.676 m)  Wt 154 lb (69.9 kg)  BMI 24.86 kg/m2 Estimated body mass index is 24.86 kg/(m^2) as calculated from the following:    Height as of this encounter: 5' 6\" (1.676 m).    Weight as of this encounter: 154 lb (69.9 kg).  Medication Reconciliation: complete   Lynda Wagner CMA (AAMA)   "

## 2017-07-19 LAB
BACTERIA SPEC CULT: NO GROWTH
MICRO REPORT STATUS: NORMAL
SPECIMEN SOURCE: NORMAL

## 2017-07-25 DIAGNOSIS — Z86.73 HISTORY OF CVA (CEREBROVASCULAR ACCIDENT): ICD-10-CM

## 2017-07-25 DIAGNOSIS — M05.79 RHEUMATOID ARTHRITIS OF MULTIPLE SITES WITHOUT ORGAN OR SYSTEM INVOLVEMENT WITH POSITIVE RHEUMATOID FACTOR (H): ICD-10-CM

## 2017-07-25 DIAGNOSIS — K27.4 GASTROINTESTINAL HEMORRHAGE ASSOCIATED WITH PEPTIC ULCER: Primary | ICD-10-CM

## 2017-07-25 DIAGNOSIS — Z79.899 LONG TERM USE OF DRUG: ICD-10-CM

## 2017-07-25 LAB
ALT SERPL W P-5'-P-CCNC: 21 U/L (ref 0–50)
AST SERPL W P-5'-P-CCNC: 18 U/L (ref 0–45)
BASOPHILS # BLD AUTO: 0 10E9/L (ref 0–0.2)
BASOPHILS NFR BLD AUTO: 0.5 %
CREAT SERPL-MCNC: 0.97 MG/DL (ref 0.52–1.04)
CRP SERPL-MCNC: <2.9 MG/L (ref 0–8)
DIFFERENTIAL METHOD BLD: ABNORMAL
EOSINOPHIL # BLD AUTO: 0.1 10E9/L (ref 0–0.7)
EOSINOPHIL NFR BLD AUTO: 1.3 %
ERYTHROCYTE [DISTWIDTH] IN BLOOD BY AUTOMATED COUNT: 15.9 % (ref 10–15)
ERYTHROCYTE [SEDIMENTATION RATE] IN BLOOD BY WESTERGREN METHOD: 60 MM/H (ref 0–30)
GFR SERPL CREATININE-BSD FRML MDRD: 54 ML/MIN/1.7M2
HCT VFR BLD AUTO: 34.2 % (ref 35–47)
HGB BLD-MCNC: 10.5 G/DL (ref 11.7–15.7)
HGB BLD-MCNC: 10.5 G/DL (ref 11.7–15.7)
IMM GRANULOCYTES # BLD: 0 10E9/L (ref 0–0.4)
IMM GRANULOCYTES NFR BLD: 0.3 %
LYMPHOCYTES # BLD AUTO: 1.1 10E9/L (ref 0.8–5.3)
LYMPHOCYTES NFR BLD AUTO: 17.5 %
MCH RBC QN AUTO: 29.8 PG (ref 26.5–33)
MCHC RBC AUTO-ENTMCNC: 30.7 G/DL (ref 31.5–36.5)
MCV RBC AUTO: 97 FL (ref 78–100)
MONOCYTES # BLD AUTO: 0.6 10E9/L (ref 0–1.3)
MONOCYTES NFR BLD AUTO: 9.6 %
NEUTROPHILS # BLD AUTO: 4.3 10E9/L (ref 1.6–8.3)
NEUTROPHILS NFR BLD AUTO: 70.8 %
PLATELET # BLD AUTO: 272 10E9/L (ref 150–450)
RBC # BLD AUTO: 3.52 10E12/L (ref 3.8–5.2)
WBC # BLD AUTO: 6.1 10E9/L (ref 4–11)

## 2017-07-25 PROCEDURE — 86140 C-REACTIVE PROTEIN: CPT | Performed by: INTERNAL MEDICINE

## 2017-07-25 PROCEDURE — 85025 COMPLETE CBC W/AUTO DIFF WBC: CPT | Performed by: INTERNAL MEDICINE

## 2017-07-25 PROCEDURE — 36415 COLL VENOUS BLD VENIPUNCTURE: CPT | Performed by: INTERNAL MEDICINE

## 2017-07-25 PROCEDURE — 85652 RBC SED RATE AUTOMATED: CPT | Performed by: INTERNAL MEDICINE

## 2017-07-25 PROCEDURE — 84450 TRANSFERASE (AST) (SGOT): CPT | Performed by: INTERNAL MEDICINE

## 2017-07-25 PROCEDURE — 82565 ASSAY OF CREATININE: CPT | Performed by: INTERNAL MEDICINE

## 2017-07-25 PROCEDURE — 84460 ALANINE AMINO (ALT) (SGPT): CPT | Performed by: INTERNAL MEDICINE

## 2017-07-25 RX ORDER — CLOPIDOGREL BISULFATE 75 MG/1
75 TABLET ORAL DAILY
Qty: 90 TABLET | Refills: 3 | Status: SHIPPED | OUTPATIENT
Start: 2017-07-25 | End: 2018-08-21

## 2017-07-25 NOTE — PROGRESS NOTES
Dear Gillian, your recent test results are attached.  Your urine culture was negative.  No bacteria growth was noted.  The urine sample demonstrated findings consistent with a modest infection.  This may be contamination.  You should complete the antibiotics as prescribed.    Feel free to contact me via the office or My Chart if you have any questions regarding the above.  Sincerely,  Augusto Meyer, DO FACOI

## 2017-07-25 NOTE — TELEPHONE ENCOUNTER
clopidogrel (PLAVIX) 75 MG tablet           Last Written Prescription Date: 7/13/16  Last Fill Quantity: 90, # refills: 3    Last Office Visit with [facility] or [facility] prescribing provider:  7/18/17   Future Office Visit:       Lab Results   Component Value Date    WBC 6.1 07/25/2017     Lab Results   Component Value Date    RBC 3.52 07/25/2017     Lab Results   Component Value Date    HGB 10.5 07/25/2017    HGB 10.5 07/25/2017     Lab Results   Component Value Date    HCT 34.2 07/25/2017     No components found for: MCT  Lab Results   Component Value Date    MCV 97 07/25/2017     Lab Results   Component Value Date    MCH 29.8 07/25/2017     Lab Results   Component Value Date    MCHC 30.7 07/25/2017     Lab Results   Component Value Date    RDW 15.9 07/25/2017     Lab Results   Component Value Date     07/25/2017     Lab Results   Component Value Date    AST 18 07/25/2017     Lab Results   Component Value Date    ALT 21 07/25/2017     Creatinine   Date Value Ref Range Status   07/25/2017 0.97 0.52 - 1.04 mg/dL Final   ]

## 2017-07-25 NOTE — TELEPHONE ENCOUNTER
"CC: follow-up hypertension    History:  Edilma Oseguera is a 73 y.o. female who presents today for follow-up for evaluation of the above:  She reports she has been doing well without any acute illness. She does need refills of her BP meds, which she report have been doing well for her. She reports no headache, dizziness or side effects. She also continues on her Lipitor for her lipids and Vitamin D for her deficiency.    ROS:  Review of Systems   Constitutional: Negative for chills and fever.   HENT: Negative for congestion and sore throat.    Respiratory: Negative for cough and shortness of breath.    Cardiovascular: Negative for chest pain and palpitations.   Gastrointestinal: Negative for abdominal pain, constipation and nausea.   Musculoskeletal: Negative for back pain and gait problem.       Ms. Oseguera  reports that she has never smoked. She does not have any smokeless tobacco history on file. She reports that she does not drink alcohol or use illicit drugs.      Current Outpatient Prescriptions:   •  atenolol-chlorthalidone (TENORETIC) 50-25 MG per tablet, Take 0.5-1 tablets by mouth Daily., Disp: 30 tablet, Rfl: 5  •  atorvastatin (LIPITOR) 40 MG tablet, Take 1 tablet by mouth Daily., Disp: 30 tablet, Rfl: 5  •  Cholecalciferol (VITAMIN D3) 5000 UNITS capsule capsule, Take 1 capsule by mouth daily., Disp: 30 capsule, Rfl: 3  •  lisinopril (PRINIVIL,ZESTRIL) 20 MG tablet, Take 1 tablet by mouth Daily., Disp: 30 tablet, Rfl: 5  •  potassium chloride (K-DUR) 10 MEQ CR tablet, Take 1 tablet by mouth Daily., Disp: 30 tablet, Rfl: 5      OBJECTIVE:  Visit Vitals   • /82 (BP Location: Left arm)   • Pulse 69   • Resp 16   • Ht 63\" (160 cm)   • Wt 126 lb 1.6 oz (57.2 kg)   • SpO2 98%   • BMI 22.34 kg/m2      Physical Exam   Constitutional: She is oriented to person, place, and time. She appears well-nourished. No distress.   Cardiovascular: Normal rate, regular rhythm and normal heart sounds.    No murmur " Prescription approved per [facility] Refill Protocol.    Martina Kamara RN    heard.  Pulmonary/Chest: Effort normal and breath sounds normal. She has no wheezes.   Abdominal: Soft. There is no tenderness.   Neurological: She is alert and oriented to person, place, and time. Gait normal.   Psychiatric: She has a normal mood and affect.       Assessment/Plan    Diagnoses and all orders for this visit:    Essential hypertension  -     atenolol-chlorthalidone (TENORETIC) 50-25 MG per tablet; Take 0.5-1 tablets by mouth Daily.  -     lisinopril (PRINIVIL,ZESTRIL) 20 MG tablet; Take 1 tablet by mouth Daily.  -     Comprehensive Metabolic Panel; Future  -     Lipid Panel; Future  Fair control, BP goal for age is <150/90 per JNC 8 guidelines and continue current medications. Her BP has been well controlled at previous appointments on review of paper records (130s/80s). She also reports similar numbers when she checks on a regular basis at [facility]. We will continue to monitor clinically and adjust pharmacologic therapy as needed in the future if readings remain uncontrolled.    Mixed hyperlipidemia  -     atorvastatin (LIPITOR) 40 MG tablet; Take 1 tablet by mouth Daily.  -     Lipid Panel; Future  Continue Lipitor, which represents moderate-high intensity therapy per ACC/AHA guidelines.    Vitamin D deficiency  -     Vitamin D 25 Hydroxy; Future  Continue supplement and we will recheck value at her f/u appt.    Hypokalemia  -     potassium chloride (K-DUR) 10 MEQ CR tablet; Take 1 tablet by mouth Daily.  Continue K+ supplement. K+ was normal when last checked in 7/2016.    We did discuss items per health maintenance tab. She declines all immunizations, Mammogram, colonoscopy (& Stool cards). She plans to get a Tdap at her pharmacy.    An After Visit Summary was printed and given to the patient at discharge.  Return in about 6 months (around 7/4/2017) for Recheck. Sooner if problems arise.         David Browne D.O. 1/4/2017

## 2017-07-26 NOTE — PROGRESS NOTES
Dear Gillian, your recent test results are attached.   Your hemoglobin is up from the previous 9.0 up to 10.5. This is a marked improvement.    Feel free to contact me via the office or My Chart if you have any questions regarding the above.  Sincerely,  Augusto Meyer, DO BOWEROI

## 2017-08-01 ENCOUNTER — TRANSFERRED RECORDS (OUTPATIENT)
Dept: HEALTH INFORMATION MANAGEMENT | Facility: CLINIC | Age: 82
End: 2017-08-01

## 2017-08-03 ENCOUNTER — RADIANT APPOINTMENT (OUTPATIENT)
Dept: GENERAL RADIOLOGY | Facility: CLINIC | Age: 82
End: 2017-08-03
Attending: ORTHOPAEDIC SURGERY
Payer: COMMERCIAL

## 2017-08-03 ENCOUNTER — OFFICE VISIT (OUTPATIENT)
Dept: ORTHOPEDICS | Facility: CLINIC | Age: 82
End: 2017-08-03
Payer: COMMERCIAL

## 2017-08-03 VITALS — WEIGHT: 154 LBS | HEIGHT: 66 IN | TEMPERATURE: 97.9 F | BODY MASS INDEX: 24.75 KG/M2

## 2017-08-03 DIAGNOSIS — M25.561 RIGHT KNEE PAIN, UNSPECIFIED CHRONICITY: Primary | ICD-10-CM

## 2017-08-03 DIAGNOSIS — M17.11 PRIMARY OSTEOARTHRITIS OF RIGHT KNEE: ICD-10-CM

## 2017-08-03 DIAGNOSIS — M25.561 RIGHT KNEE PAIN, UNSPECIFIED CHRONICITY: ICD-10-CM

## 2017-08-03 PROCEDURE — 73564 X-RAY EXAM KNEE 4 OR MORE: CPT | Mod: TC

## 2017-08-03 PROCEDURE — 99213 OFFICE O/P EST LOW 20 MIN: CPT | Mod: 25 | Performed by: ORTHOPAEDIC SURGERY

## 2017-08-03 PROCEDURE — 20610 DRAIN/INJ JOINT/BURSA W/O US: CPT | Mod: RT | Performed by: ORTHOPAEDIC SURGERY

## 2017-08-03 ASSESSMENT — PAIN SCALES - GENERAL: PAINLEVEL: NO PAIN (0)

## 2017-08-03 NOTE — MR AVS SNAPSHOT
After Visit Summary   8/3/2017    Gillian Burk    MRN: [id number]           Patient Information     Date Of Birth          3/19/1927        Visit Information        Provider Department      8/3/2017 1:20 PM Mateusz Copeland MD [facility]        Today's Diagnoses     Right knee pain, unspecified chronicity    -  1    Primary osteoarthritis of right knee           Follow-ups after your visit        Your next 10 appointments already scheduled     Aug 17, 2017 10:10 AM CDT   LAB with [facility]   [facility] ([facility])    [address]  [facility] [address] [phone]           Patient must bring picture ID. Patient should be prepared to give a urine specimen  Please do not eat 10-12 hours before your appointment if you are coming in fasting for labs on lipids, cholesterol, or glucose (sugar). Pregnant women should follow their Care Team instructions. Water with medications is okay. Do not drink coffee or other fluids. If you have concerns about taking  your medications, please ask at office or if scheduling via [employer], send a message by clicking on Secure Messaging, Message Your Care Team.            Aug 17, 2017 10:30 AM CDT   Ech Complete with [id number]   [facility]chocardi[facility] ([facility])    [address] [place] [address]  [address]  [facility] [id number] [phone]           1. Please bring or wear a comfortable two-piece outfit. 2. You may eat, drink and take your normal medicines. 3. For any questions that cannot be answered, please contact the ordering physician            Aug 17, 2017 12:00 PM CDT   Heart Cath Heart Valve Fluoro with SHCVR1   [facility] ([facility])    [facility] [address]  [facility] [address] [phone]            Aug 17, 2017  1:50 PM CDT   TAVR Return with Matt Mccracken  "KATTY   [facility] [place] ([facility])    [id number]  [place] [address] [phone]              Who to contact     If you have questions or need follow up information about today's clinic visit or your schedule please contact [facility] directly at [phone].  Normal or non-critical lab and imaging results will be communicated to you by MyChart, letter or phone within 4 business days after the clinic has received the results. If you do not hear from us within 7 days, please contact the clinic through [employer] or phone. If you have a critical or abnormal lab result, we will notify you by phone as soon as possible.  Submit refill requests through [employer] or call your pharmacy and they will forward the refill request to us. Please allow 3 business days for your refill to be completed.          Additional Information About Your Visit        [employer] Information     [employer] gives you secure access to your electronic health record. If you see a primary care provider, you can also send messages to your care team and make appointments. If you have questions, please call your primary care clinic.  If you do not have a primary care provider, please call [phone] and they will assist you.        Care EveryWhere ID     This is your Care EveryWhere ID. This could be used by other organizations to access your [facility] medical records  [id number]        Your Vitals Were     Temperature Height BMI (Body Mass Index)             97.9  F (36.6  C) (Temporal) 1.676 m (5' 6\") 24.86 kg/m2          Blood Pressure from Last 3 Encounters:   07/18/17 138/80   06/15/17 138/72   04/10/17 142/72    Weight from Last 3 Encounters:   08/03/17 69.9 kg (154 lb)   07/18/17 69.9 kg (154 lb)   06/15/17 68.9 kg (152 lb)              We Performed the Following     C SYNVISC PER 1 MG     Large Joint/Bursa injection and/or drainage (Shoulder, Knee)          Today's " Medication Changes          These changes are accurate as of: 8/3/17  3:02 PM.  If you have any questions, ask your nurse or doctor.               Start taking these medicines.        Dose/Directions    Hylan 16 MG/2ML Sosy   Commonly known as:  SYNVISC   Used for:  Primary osteoarthritis of right knee   Started by:  Mateusz Copeland MD        Dose:  16 mg   16 mg by INTRA-ARTICULAR route once for 1 dose   Quantity:  2 mL   Refills:  0            Where to get your medicines      Some of these will need a paper prescription and others can be bought over the counter.  Ask your nurse if you have questions.     You don't need a prescription for these medications     Hylan 16 MG/2ML Sosy                Primary Care Provider Office Phone # Fax #    Augusto Fish Meyer,  [phone]       [address] [place] [address]        Equal Access to Services     RIVER TRENT : Hadii aad ku hadasho Soomaali, waaxda luqadaha, qaybta kaalmada adeegyada, zack gillespie hayaraseli oglesby . So [facility] [phone].    ATENCIÓN: Si habla español, tiene a granger disposición servicios gratuitos de asistencia lingüística. Geraldine al [phone].    We comply with applicable federal civil rights laws and [place] laws. We do not discriminate on the basis of race, color, national origin, age, disability sex, sexual orientation or gender identity.            Thank you!     Thank you for choosing [facility]  for your care. Our goal is always to provide you with excellent care. Hearing back from our patients is one way we can continue to improve our services. Please take a few minutes to complete the written survey that you may receive in the mail after your visit with us. Thank you!             Your Updated Medication List - Protect others around you: Learn how to safely use, store and throw away your medicines at www.disposemymeds.org.          This list is accurate as of:  8/3/17  3:02 PM.  Always use your most recent med list.                   Brand Name Dispense Instructions for use Diagnosis    ALPRAZolam 0.25 MG tablet    XANAX    28 tablet    Take 1 tablet (0.25 mg) by mouth 2 times daily as needed for anxiety    Anxiety       aspirin 81 MG EC tablet      Take 1 tablet (81 mg) by mouth daily    S/P TAVR (transcatheter aortic valve replacement)       atorvastatin 40 MG tablet    LIPITOR    1 tablet    Take 1 tablet (40 mg) by mouth daily Reports taking    Hyperlipidemia with target LDL less than 130       calcium carbonate 500 MG chewable tablet    TUMS     Take 2-4 tablets (1,000-2,000 mg) by mouth as needed for heartburn        clopidogrel 75 MG tablet    PLAVIX    90 tablet    Take 1 tablet (75 mg) by mouth daily    History of CVA (cerebrovascular accident)       Cranberry 200 MG Caps     1 capsule    Take 3 capsules (600 mg) by mouth 2 times daily        docusate sodium 100 MG capsule    COLACE     Take 200 mg by mouth daily 2 capsules        FLORAJEN ACIDOPHILUS Caps      Take 1 capsule by mouth daily        folic acid 1 MG tablet    FOLVITE    90 tablet    TAKE ONE TABLET BY MOUTH ONCE DAILY    Seropositive rheumatoid arthritis (H)       Hylan 16 MG/2ML Sosy    SYNVISC    2 mL    16 mg by INTRA-ARTICULAR route once for 1 dose    Primary osteoarthritis of right knee       iron 325 (65 FE) MG tablet     30 tablet    TAKE ONE TABLET BY MOUTH ONCE DAILY WITH BREAKFAST    Gastrointestinal hemorrhage, unspecified gastrointestinal hemorrhage type       isosorbide mononitrate 60 MG 24 hr tablet    IMDUR    90 tablet    Take 1 tablet (60 mg) by mouth daily    Chest pain, unspecified type       LANsoprazole 30 MG CR capsule    PREVACID    30 capsule    Take 1 capsule (30 mg) by mouth daily Take 30-60 minutes before a meal.    Gastroesophageal reflux disease with esophagitis       levETIRAcetam 500 MG tablet    KEPPRA    180 tablet    TAKE ONE TABLET BY MOUTH ONCE DAILY AND one AT  BEDTIME    Seizure disorder (H)       levothyroxine 50 MCG tablet    SYNTHROID/LEVOTHROID    30 tablet    TAKE ONE TABLET BY MOUTH ONCE DAILY EXCEPT  TUESDAY    Acquired hypothyroidism       methotrexate 2.5 MG tablet CHEMO     52 tablet    Take 4 tablets (10 mg) by mouth once a week Wed    Seropositive rheumatoid arthritis (H)       metoprolol 25 MG tablet    LOPRESSOR    180 tablet    Reports taking 0.5 tablet BID        mirtazapine 30 MG tablet    REMERON    90 tablet    TAKE ONE TABLET BY MOUTH AT BEDTIME    Sleep initiation disorder       Multi-vitamin Tabs tablet      Take 1 tablet by mouth daily        nitroGLYcerin 0.4 MG sublingual tablet    NITROSTAT    25 tablet    Pt reports taking - -  -For chest pain place 1 tablet under the tongue every 5 minutes for 3 doses. If symptoms persist 5 minutes after 1st dose call 911.        * predniSONE 5 MG tablet    DELTASONE    80 tablet    TAKE 1 TABLET BY MOUTH EVERY OTHER DAY, ALTERNATING WITH ONE-HALF TABLET EVERY OTHER DAY.    Seropositive rheumatoid arthritis (H)       * predniSONE 20 MG tablet    DELTASONE    20 tablet    Take 3 tabs (60 mg) by mouth daily x 3 days, 2 tabs (40 mg) daily x 3 days, 1 tab (20 mg) daily x 3 days, then 1/2 tab (10 mg) x 3 days.    Lesion of right ulnar nerve       sulfamethoxazole-trimethoprim 400-80 MG per tablet    BACTRIM/SEPTRA    14 tablet    Take 1 tablet by mouth 2 times daily    Dysuria       TYLENOL ARTHRITIS PAIN 650 MG CR tablet   Generic drug:  acetaminophen      Take 2 tablets (1,300 mg) by mouth every 8 hours as needed for mild pain        * Notice:  This list has 2 medication(s) that are the same as other medications prescribed for you. Read the directions carefully, and ask your doctor or other care provider to review them with you.

## 2017-08-03 NOTE — LETTER
"    8/3/2017         RE: Gillian Burk  [address]  [place] [address]        Dear Colleague,    Thank you for referring your patient, Gillian Burk, to the [facility]. Please see a copy of my visit note below.    HISTORY OF PRESENT ILLNESS:    Gillian Burk is a 89 year old female who is seen in follow up for   Chief Complaint   Patient presents with     RECHECK     NEW ISSUE: Right knee.  She has had pain for many years.   Present symptoms: Patient is being seen for the first time in our facility for right knee osteoarthritis. Patient was seen a little more than 1 year ago to have her hip arthroplasty from 5 years ago re-x-rayed on routine visits. At that time patient stated that she had her rheumatologist routinely give her cortisone injections which are no longer effective. Patient is hopeful to get what she describes as the three-part injection that may help her. She reports due to her age she is not likely to obtain surgery.  Treatments tried to this point: Previous cortisone injections which have lost effectiveness  Family present: Daughter  Patient evaluation done with Dr. Copeland    Physical Exam:  Vitals: Temp 97.9  F (36.6  C) (Temporal)  Ht 1.676 m (5' 6\")  Wt 69.9 kg (154 lb)  BMI 24.86 kg/m2  BMI= Body mass index is 24.86 kg/(m^2).  Constitutional: healthy, alert and no acute distress   Psychiatric: mentation appears normal and affect normal/bright  NEURO: no focal deficits  SKIN: no excoriation or erythema. No signs of infection.  JOINT/EXTREMITIES:  Affected extremity pulses are easily palpable.  Right Knee Exam: Inspection: small effusion  Tender: medial joint line, lateral joint line at the anterior portion also tender but not as significant.  Active Range of Motion: Patient still maintains a full extension and flexion greater than 120   GAIT: Mildly antalgic.  Does utilize a cane for balance    IMAGING INTERPRETATION: X-ray images are " available which reveal of the right knee, significant bone-on-bone medial compartment osteoarthritis/degeneration. On the lateral view osteophytes are present and seen on the patella. The merchant view, patient does have some narrowing and slight lateral tilting irregularity of the kneecap  Independent visualization of the images was performed.     ASSESSMENT:  Chief Complaint   Patient presents with     RECHECK     NEW ISSUE: Right knee.  She has had pain for many years.       ICD-10-CM    1. Right knee pain, unspecified chronicity M25.561 XR Knee Right G/E 4 Views   2. Primary osteoarthritis of right knee M17.11      Patient with significant osteoarthritis of the right knee. She maintains good range of motion and relatively good function in her gait. She does utilize a cane    Plan:   Cortisone is no longer effective for the patient. Patient is advised that with this information it is unlikely that Synvisc will be of a long-lasting benefit either with the condition and severity of her osteoarthritis. With her age, surgery is not an option per the patient and she is hopeful to try the Synvisc injections. Patient was given explanation of both the one part and three-part series of Synvisc. With this information patient chooses the one part injection in hopes to see if it doesn't work for her. If it is helpful she may consider three-part in her future. Patient is advised she would not be able to get a repeat injection until 6 months from today's date. Patient is also advised may take up to 6 weeks for benefit to be realized. With this information patient would like to proceed    After risks and benefits were explained and questions answered, the patient agreed to undergo the recommended procedure .   Using aseptic technique the skin was prepped with betadine swabs, then sprayed with ethyl chloride for topical anesthesia.  The right  Knee  lateral infrapatellar: joint space was then infiltrated with 3cc of 1% Lidocaine  without epinephrine, 3 cc of Marcaine half percent and 6ml of Synvisc One.  There were no complications and the patient tolerated the procedure well.    Return to clinic as needed    Scribed by  Veronica Sorto PA-C   8/3/2017  1:57 PM      I attest I have seen and evaluated the patient.  I agree with above impression and plan.    Mateusz Copeland MD    Again, thank you for allowing me to participate in the care of your patient.        Sincerely,        Mateusz Copeland MD

## 2017-08-03 NOTE — PROGRESS NOTES
"HISTORY OF PRESENT ILLNESS:    Gillian Burk is a 89 year old female who is seen in follow up for   Chief Complaint   Patient presents with     RECHECK     NEW ISSUE: Right knee.  She has had pain for many years.   Present symptoms: Patient is being seen for the first time in our facility for right knee osteoarthritis. Patient was seen a little more than 1 year ago to have her hip arthroplasty from 5 years ago re-x-rayed on routine visits. At that time patient stated that she had her rheumatologist routinely give her cortisone injections which are no longer effective. Patient is hopeful to get what she describes as the three-part injection that may help her. She reports due to her age she is not likely to obtain surgery.  Treatments tried to this point: Previous cortisone injections which have lost effectiveness  Family present: Daughter  Patient evaluation done with Dr. Copeland    Physical Exam:  Vitals: Temp 97.9  F (36.6  C) (Temporal)  Ht 1.676 m (5' 6\")  Wt 69.9 kg (154 lb)  BMI 24.86 kg/m2  BMI= Body mass index is 24.86 kg/(m^2).  Constitutional: healthy, alert and no acute distress   Psychiatric: mentation appears normal and affect normal/bright  NEURO: no focal deficits  SKIN: no excoriation or erythema. No signs of infection.  JOINT/EXTREMITIES:  Affected extremity pulses are easily palpable.  Right Knee Exam: Inspection: small effusion  Tender: medial joint line, lateral joint line at the anterior portion also tender but not as significant.  Active Range of Motion: Patient still maintains a full extension and flexion greater than 120   GAIT: Mildly antalgic.  Does utilize a cane for balance    IMAGING INTERPRETATION: X-ray images are available which reveal of the right knee, significant bone-on-bone medial compartment osteoarthritis/degeneration. On the lateral view osteophytes are present and seen on the patella. The merchant view, patient does have some narrowing and slight lateral tilting " irregularity of the kneecap  Independent visualization of the images was performed.     ASSESSMENT:  Chief Complaint   Patient presents with     RECHECK     NEW ISSUE: Right knee.  She has had pain for many years.       ICD-10-CM    1. Right knee pain, unspecified chronicity M25.561 XR Knee Right G/E 4 Views   2. Primary osteoarthritis of right knee M17.11      Patient with significant osteoarthritis of the right knee. She maintains good range of motion and relatively good function in her gait. She does utilize a cane    Plan:   Cortisone is no longer effective for the patient. Patient is advised that with this information it is unlikely that Synvisc will be of a long-lasting benefit either with the condition and severity of her osteoarthritis. With her age, surgery is not an option per the patient and she is hopeful to try the Synvisc injections. Patient was given explanation of both the one part and three-part series of Synvisc. With this information patient chooses the one part injection in hopes to see if it doesn't work for her. If it is helpful she may consider three-part in her future. Patient is advised she would not be able to get a repeat injection until 6 months from today's date. Patient is also advised may take up to 6 weeks for benefit to be realized. With this information patient would like to proceed    After risks and benefits were explained and questions answered, the patient agreed to undergo the recommended procedure .   Using aseptic technique the skin was prepped with betadine swabs, then sprayed with ethyl chloride for topical anesthesia.  The right  Knee  lateral infrapatellar: joint space was then infiltrated with 3cc of 1% Lidocaine without epinephrine, 3 cc of Marcaine half percent and 6ml of Synvisc One.  There were no complications and the patient tolerated the procedure well.    Return to clinic as needed    Scribed by  Veronica Sorto PA-C   8/3/2017  1:57 PM      I attest I have seen and  evaluated the patient.  I agree with above impression and plan.    Mateusz Copeland MD

## 2017-08-03 NOTE — PROGRESS NOTES
Appropriate assistive devices provided during their visit. yes (Yes, No, N/A) cane (list device)    Exam table and/or cart  placed in the lowest position. yes (Yes, No, N/A)    Brakes on tables/carts/wheelchairs used at all times. yes (Yes, No, N/A)    Non slip footwear applied. na (Yes, No, NA)    Patient was accompanied by staff throughout visit. eys (Yes, No, N/A)    Equipment safety straps used. na (Yes, No, N/A)    Assist with toileting. na (Yes, No, N/A)  Beata Wakefield  8/3/2017  1:15 PM

## 2017-08-03 NOTE — NURSING NOTE
"Chief Complaint   Patient presents with     RECHECK     NEW ISSUE: Right knee.  She has had pain for many years.       Initial Temp 97.9  F (36.6  C) (Temporal)  Ht 1.676 m (5' 6\")  Wt 69.9 kg (154 lb)  BMI 24.86 kg/m2 Estimated body mass index is 24.86 kg/(m^2) as calculated from the following:    Height as of this encounter: 1.676 m (5' 6\").    Weight as of this encounter: 69.9 kg (154 lb).  Medication Reconciliation: complete   LEYDI Claros  "

## 2017-08-08 DIAGNOSIS — M05.9 SEROPOSITIVE RHEUMATOID ARTHRITIS (H): ICD-10-CM

## 2017-08-08 DIAGNOSIS — E03.9 ACQUIRED HYPOTHYROIDISM: ICD-10-CM

## 2017-08-08 RX ORDER — PREDNISONE 5 MG/1
TABLET ORAL
Qty: 23 TABLET | Refills: 13 | Status: SHIPPED | OUTPATIENT
Start: 2017-08-08 | End: 2023-01-01

## 2017-08-08 NOTE — TELEPHONE ENCOUNTER
Synthroid     Last Written Prescription Date: 7-  Last Quantity: 30, # refills: 0  Last Office Visit with [facility] or [facility] prescribing provider: 7-        TSH   Date Value Ref Range Status   05/03/2016 3.37 0.40 - 4.00 mU/L Final

## 2017-08-08 NOTE — TELEPHONE ENCOUNTER
predniSONE (DELTASONE) 20 MG tablet      Last Written Prescription Date:  6/15/17  Last Fill Quantity: 20,   # refills: 0  Last Office Visit with [facility], [facility] or [facility] prescribing provider: 7/18/17  Future Office visit:       Routing refill request to provider for review/approval because:  Drug not on the [facility], [facility] or [facility] refill protocol or controlled substance

## 2017-08-09 RX ORDER — LEVOTHYROXINE SODIUM 50 UG/1
TABLET ORAL
Qty: 30 TABLET | Refills: 0 | Status: SHIPPED | OUTPATIENT
Start: 2017-08-09 | End: 2017-08-17 | Stop reason: DRUGHIGH

## 2017-08-09 NOTE — TELEPHONE ENCOUNTER
Routing refill request to provider for review/approval because:  Labs not current:  TSH    Simin Bueno, RN  [employer]

## 2017-08-10 ENCOUNTER — TELEPHONE (OUTPATIENT)
Dept: INTERNAL MEDICINE | Facility: CLINIC | Age: 82
End: 2017-08-10

## 2017-08-10 DIAGNOSIS — R30.0 DYSURIA: Primary | ICD-10-CM

## 2017-08-10 PROCEDURE — 81003 URINALYSIS AUTO W/O SCOPE: CPT | Performed by: INTERNAL MEDICINE

## 2017-08-10 NOTE — TELEPHONE ENCOUNTER
Reason for Call: Request for an order or referral:    Order or referral being requested: lab-UA-kale UTI    Date needed: as soon as possible    Has the patient been seen by the PCP for this problem? YES    Additional comments: [facility] is bringing a UA sample to [facility] lab now. Please place order.    Phone number Patient can be reached at:      Best Time:      Can we leave a detailed message on this number?  YES    Call taken on 8/10/2017 at 2:01 PM by Mercy Cooper

## 2017-08-11 DIAGNOSIS — R30.0 DYSURIA: ICD-10-CM

## 2017-08-11 LAB
ALBUMIN UR-MCNC: NEGATIVE MG/DL
APPEARANCE UR: CLEAR
BILIRUB UR QL STRIP: NEGATIVE
COLOR UR AUTO: NORMAL
GLUCOSE UR STRIP-MCNC: NEGATIVE MG/DL
HGB UR QL STRIP: NEGATIVE
KETONES UR STRIP-MCNC: NEGATIVE MG/DL
LEUKOCYTE ESTERASE UR QL STRIP: NEGATIVE
NITRATE UR QL: NEGATIVE
PH UR STRIP: 6 PH (ref 5–7)
SP GR UR STRIP: 1.01 (ref 1–1.03)
URN SPEC COLLECT METH UR: NORMAL
UROBILINOGEN UR STRIP-MCNC: 0 MG/DL (ref 0–2)

## 2017-08-17 ENCOUNTER — HOSPITAL ENCOUNTER (OUTPATIENT)
Dept: CARDIOLOGY | Facility: CLINIC | Age: 82
End: 2017-08-17
Attending: INTERNAL MEDICINE
Payer: COMMERCIAL

## 2017-08-17 ENCOUNTER — RESEARCH ENCOUNTER (OUTPATIENT)
Dept: CARDIOLOGY | Facility: CLINIC | Age: 82
End: 2017-08-17

## 2017-08-17 ENCOUNTER — HOSPITAL ENCOUNTER (OUTPATIENT)
Dept: CARDIOLOGY | Facility: CLINIC | Age: 82
Discharge: HOME | End: 2017-08-17
Attending: INTERNAL MEDICINE | Admitting: INTERNAL MEDICINE
Payer: COMMERCIAL

## 2017-08-17 ENCOUNTER — OFFICE VISIT (OUTPATIENT)
Dept: CARDIOLOGY | Facility: CLINIC | Age: 82
End: 2017-08-17
Attending: INTERNAL MEDICINE

## 2017-08-17 VITALS
DIASTOLIC BLOOD PRESSURE: 64 MMHG | HEART RATE: 78 BPM | SYSTOLIC BLOOD PRESSURE: 138 MMHG | WEIGHT: 150.1 LBS | BODY MASS INDEX: 24.12 KG/M2 | HEIGHT: 66 IN

## 2017-08-17 DIAGNOSIS — Z95.2 S/P TAVR (TRANSCATHETER AORTIC VALVE REPLACEMENT): ICD-10-CM

## 2017-08-17 DIAGNOSIS — E78.5 HYPERLIPIDEMIA WITH TARGET LDL LESS THAN 130: ICD-10-CM

## 2017-08-17 DIAGNOSIS — I42.9 CARDIOMYOPATHY, UNSPECIFIED (H): ICD-10-CM

## 2017-08-17 DIAGNOSIS — I25.10 CORONARY ARTERY DISEASE INVOLVING NATIVE CORONARY ARTERY OF NATIVE HEART WITHOUT ANGINA PECTORIS: ICD-10-CM

## 2017-08-17 DIAGNOSIS — Z95.2 S/P TAVR (TRANSCATHETER AORTIC VALVE REPLACEMENT): Primary | ICD-10-CM

## 2017-08-17 LAB
ANION GAP SERPL CALCULATED.3IONS-SCNC: 10.9 MMOL/L (ref 6–17)
BUN SERPL-MCNC: 17 MG/DL (ref 7–30)
CALCIUM SERPL-MCNC: 8.8 MG/DL (ref 8.5–10.5)
CHLORIDE SERPL-SCNC: 98 MMOL/L (ref 98–107)
CO2 SERPL-SCNC: 28 MMOL/L (ref 23–29)
CREAT SERPL-MCNC: 1.14 MG/DL (ref 0.7–1.3)
ERYTHROCYTE [DISTWIDTH] IN BLOOD BY AUTOMATED COUNT: 15.3 % (ref 10–15)
GFR SERPL CREATININE-BSD FRML MDRD: 45 ML/MIN/1.7M2
GLUCOSE SERPL-MCNC: 101 MG/DL (ref 70–105)
HCT VFR BLD AUTO: 33.2 % (ref 35–47)
HGB BLD-MCNC: 11 G/DL (ref 11.7–15.7)
MCH RBC QN AUTO: 31.1 PG (ref 26.5–33)
MCHC RBC AUTO-ENTMCNC: 33.1 G/DL (ref 31.5–36.5)
MCV RBC AUTO: 94 FL (ref 78–100)
PLATELET # BLD AUTO: 234 10E9/L (ref 150–450)
POTASSIUM SERPL-SCNC: 3.9 MMOL/L (ref 3.5–5.1)
RBC # BLD AUTO: 3.54 10E12/L (ref 3.8–5.2)
SODIUM SERPL-SCNC: 133 MMOL/L (ref 136–145)
WBC # BLD AUTO: 6.9 10E9/L (ref 4–11)

## 2017-08-17 PROCEDURE — 85027 COMPLETE CBC AUTOMATED: CPT | Performed by: INTERNAL MEDICINE

## 2017-08-17 PROCEDURE — 76000 FLUOROSCOPY <1 HR PHYS/QHP: CPT

## 2017-08-17 PROCEDURE — 93000 ELECTROCARDIOGRAM COMPLETE: CPT | Performed by: PHYSICIAN ASSISTANT

## 2017-08-17 PROCEDURE — 80048 BASIC METABOLIC PNL TOTAL CA: CPT | Performed by: INTERNAL MEDICINE

## 2017-08-17 PROCEDURE — 99214 OFFICE O/P EST MOD 30 MIN: CPT | Mod: 25 | Performed by: PHYSICIAN ASSISTANT

## 2017-08-17 PROCEDURE — 93306 TTE W/DOPPLER COMPLETE: CPT | Mod: 26 | Performed by: INTERNAL MEDICINE

## 2017-08-17 PROCEDURE — 93306 TTE W/DOPPLER COMPLETE: CPT

## 2017-08-17 PROCEDURE — 36415 COLL VENOUS BLD VENIPUNCTURE: CPT | Performed by: INTERNAL MEDICINE

## 2017-08-17 RX ORDER — MINOCYCLINE HYDROCHLORIDE 100 MG/1
CAPSULE ORAL DAILY
COMMUNITY
End: 2017-09-06

## 2017-08-17 RX ORDER — FAMOTIDINE 40 MG/1
40 TABLET, FILM COATED ORAL DAILY
Status: ON HOLD | COMMUNITY
End: 2017-11-16

## 2017-08-17 RX ORDER — METOPROLOL SUCCINATE 25 MG/1
25 TABLET, EXTENDED RELEASE ORAL DAILY
Qty: 90 TABLET | Refills: 3 | Status: SHIPPED | OUTPATIENT
Start: 2017-08-17 | End: 2017-11-13

## 2017-08-17 RX ORDER — LEVOTHYROXINE SODIUM 50 UG/1
50 TABLET ORAL DAILY
COMMUNITY
End: 2017-08-29

## 2017-08-17 NOTE — PROGRESS NOTES
History of Present Illness:   This is a very delightful 89-year-old female who presents to [facility] clinic for one year post TAVR follow-up.    The patient's cardiovascular history includes hypertension, hyperlipidemia, GERD, CVA in 2013 for which she's been on chronic Plavix therapy, severe aortic stenosis, status post TAVR with a [employer] Treva valve in each/2016.  She also has a known chronic left bundle branch block and first-degree AV block.    In September 2016 she had prolonged episodes of chest discomfort for which she sought evaluation at an outside hospital and was found to have a non-STEMI.  Repeat coronary angiogram at that time demonstrated mild-to-moderate nonobstructive coronary artery disease.  She was placed on Imdur which improved her symptoms greatly.    She was seen by my colleague Nita for 6 month post TAVR follow-up in 2/2017 at which time she had reports of continued chest discomfort.  Her Imdur was up titrated to 60 mg daily.  This felt to be somewhat atypical in presentation and there is question of whether this represented an exacerbation of her reflux as she has history of GERD.     More recently in 3/2017 she was seen in emergency department with recurrent chest discomfort.  Her troponins were trended and they remained negative.  Her hemoglobin was noted to be low.  She was also having reports black stools.  Patient received iron transfusion.  She was referred to gastroenterology through [facility] for colonoscopy.  This was normal.  Patient was started on oral iron supplement by her primary care provider with improvement of her hemoglobin.    Since that time the patient has done very well.  She denies any shortness of breath, orthopnea, PND, weight gain, or abdominal distention.     Patient was also started on omeprazole for her chest discomfort when she was admitted in March 2017 which patient states has helped quite a bit with her chest discomfort.    Physical  examination:  General-NAD  Neck- Normal JVP  Resp- CTA, rafael  Cardiac-RRR with 2/6 ROSARIO over the aortic area   Abdomen- Soft nontender   Extremities- no edema, wearing compression stockings    Assessment and Plan:   This is a delightful 89-year-old female who presents to structural heart clinic for one year post TAVR follow-up.    Her past medical history is notable for hypertension, hyperlipidemia, GERD, CVA in 2013 for which she's been on chronic Plavix therapy, severe aortic stenosis, status post TAVR with a [employer] Treva valve in 8/2016, known chronic left bundle branch block, and intermittent atypical chest discomfort.    She pursue be doing quite well from a cardiac standpoint.  She denies any symptoms of recurrent chest discomfort.  Her chest discomfort has resolved significantly with the initiation of omeprazole suggesting this may have been more GERD related.  She will continue her Plavix as this has been placed due to her CVA in 2013 following multiple TIAs.    Her echocardiogram from today demonstrates stable aortic valve function with normal gradients.  Her left ventricular ejection fraction unfortunately has decreased significantly to 35-40% which had been 50-55% in 2/2017.  We will switch her Lopressor to Toprol-XL for cardiac myopathy therapy.  I will discuss her case with Dr. Farfan for further recommendations.    The patient's functional status is most consistent with NYHA class II.    Her 12-lead ECG demonstrates normal sinus rhythm with left bundle branch block.    Her hemoglobin is stable.  She has mild renal insufficiency and borderline hyponatremia based on today's basic metabolic panel.    Thank you for allowing me to participate in the care of this patient today.    This note was completed in part using Dragon voice recognition software. Although reviewed after completion, some word and grammatical errors may occur.    Orders this Visit:  Orders Placed This Encounter   Procedures      EKG 12-lead complete w/read - Clinics (performed today)     Orders Placed This Encounter   Medications     famotidine (PEPCID) 40 MG tablet     Sig: Take 40 mg by mouth daily     levothyroxine (SYNTHROID) 50 MCG tablet     Sig: Take 50 mcg by mouth daily     minocycline (MINOCIN/DYNACIN) 100 MG capsule     Sig: Take by mouth daily     Calcium & Magnesium Carbonates (MYLANTA PO)     Si tblsp every 4 hours as needed     Medications Discontinued During This Encounter   Medication Reason     levothyroxine (SYNTHROID/LEVOTHROID) 50 MCG tablet Dose adjustment     predniSONE (DELTASONE) 20 MG tablet Therapy completed     sulfamethoxazole-trimethoprim (BACTRIM/SEPTRA) 400-80 MG per tablet Therapy completed         Encounter Diagnoses   Name Primary?     S/P TAVR (transcatheter aortic valve replacement)      CAD (coronary artery disease) Yes     Hyperlipidemia with target LDL less than 130        CURRENT MEDICATIONS:  Current Outpatient Prescriptions   Medication Sig Dispense Refill     famotidine (PEPCID) 40 MG tablet Take 40 mg by mouth daily       levothyroxine (SYNTHROID) 50 MCG tablet Take 50 mcg by mouth daily       minocycline (MINOCIN/DYNACIN) 100 MG capsule Take by mouth daily       Calcium & Magnesium Carbonates (MYLANTA PO) 2 tblsp every 4 hours as needed       predniSONE (DELTASONE) 5 MG tablet TAKE ONE TABLET BY MOUTH EVERY OTHER DAY ALTERNATING  WITH  1/2  TABLET  EVERY  OTHER  DAY 23 tablet 13     clopidogrel (PLAVIX) 75 MG tablet Take 1 tablet (75 mg) by mouth daily 90 tablet 3     LANsoprazole (PREVACID) 30 MG CR capsule Take 1 capsule (30 mg) by mouth daily Take 30-60 minutes before a meal. 30 capsule 3     mirtazapine (REMERON) 30 MG tablet TAKE ONE TABLET BY MOUTH AT BEDTIME 90 tablet 2     Ferrous Sulfate (IRON) 325 (65 FE) MG tablet TAKE ONE TABLET BY MOUTH ONCE DAILY WITH BREAKFAST 30 tablet 11     folic acid (FOLVITE) 1 MG tablet TAKE ONE TABLET BY MOUTH ONCE DAILY 90 tablet 1     levETIRAcetam  (KEPPRA) 500 MG tablet TAKE ONE TABLET BY MOUTH ONCE DAILY AND one AT BEDTIME 180 tablet 0     ALPRAZolam (XANAX) 0.25 MG tablet Take 1 tablet (0.25 mg) by mouth 2 times daily as needed for anxiety 28 tablet 0     isosorbide mononitrate (IMDUR) 60 MG 24 hr tablet Take 1 tablet (60 mg) by mouth daily 90 tablet 3     atorvastatin (LIPITOR) 40 MG tablet Take 1 tablet (40 mg) by mouth daily Reports taking 1 tablet 0     Cranberry 200 MG CAPS Take 3 capsules (600 mg) by mouth 2 times daily 1 capsule 0     metoprolol (LOPRESSOR) 25 MG tablet Reports taking 0.5 tablet  tablet 1     nitroglycerin (NITROSTAT) 0.4 MG SL tablet Pt reports taking - -  -For chest pain place 1 tablet under the tongue every 5 minutes for 3 doses. If symptoms persist 5 minutes after 1st dose call 911. 25 tablet 0     aspirin EC 81 MG EC tablet Take 1 tablet (81 mg) by mouth daily       methotrexate 2.5 MG tablet Take 4 tablets (10 mg) by mouth once a week Wed 52 tablet 3     acetaminophen (TYLENOL ARTHRITIS PAIN) 650 MG CR tablet Take 2 tablets (1,300 mg) by mouth every 8 hours as needed for mild pain       calcium carbonate (TUMS) 500 MG chewable tablet Take 2-4 tablets (1,000-2,000 mg) by mouth as needed for heartburn       docusate sodium (COLACE) 100 MG capsule Take 200 mg by mouth daily 2 capsules       multivitamin, therapeutic with minerals (MULTI-VITAMIN) TABS Take 1 tablet by mouth daily       Lactobacillus (FLORAJEN ACIDOPHILUS) CAPS Take 1 capsule by mouth daily       [DISCONTINUED] levothyroxine (SYNTHROID/LEVOTHROID) 50 MCG tablet TAKE ONE TABLET BY MOUTH ONCE DAILY EXCEPT  TUESDAY (Patient taking differently: No sig reported) 30 tablet 0       ALLERGIES     Allergies   Allergen Reactions     Caffeine Other (See Comments)     Triggers your Meniere's disease     Hydrocodone Other (See Comments)     hallucinations     Ibuprofen GI Disturbance     Penicillins Hives     Tramadol Other (See Comments)     Seizure, was taking remeron  along with tramadol     Metal [Staples] Rash       PAST MEDICAL HISTORY:  Past Medical History:   Diagnosis Date     Anxiety      Anxiety      Aortic stenosis     s/p TAVR 8/17/16     Chronic migraine      Difficulty in walking(719.7)      Hyperlipidaemia      Hypertension      Hypothyroidism      Myocardial infarction (H)      Need for SBE (subacute bacterial endocarditis) prophylaxis      Numbness and tingling     decreased sensation in toes     Orthostatic hypotension     wears compression stockings     Osteoarthritis     axial, and preipheral     Osteopenia      PAC (premature atrial contraction)      PUD (peptic ulcer disease)      PVC (premature ventricular contraction)      Secondary Sjogren's syndrome (H)      Seizures (H)     after stroke (partial onset)     Seropositive rheumatoid arthritis (H)      Shortness of breath      Stroke (H) 05/2013    with visual field cut, left occipital lobe     Syncope 2014    due to orthostatic hypotension       PAST SURGICAL HISTORY:  Past Surgical History:   Procedure Laterality Date     C TOTAL HIP ARTHROPLASTY Right 2010     C TOTAL HIP ARTHROPLASTY Left 2014     CATARACT IOL, RT/LT Bilateral 2000     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     HEART CATH FEMORAL CANNULIZATION WITH OPEN STANDBY REPAIR AORTIC VALVE N/A 8/3/2016    Procedure: HEART CATH FEMORAL CANNULIZATION WITH OPEN STANDBY REPAIR AORTIC VALVE;  Surgeon: Owen Schultz MD;  Location: [facility] OR     HYSTERECTOMY TOTAL ABDOMINAL  1970    ovaries out     MOUTH SURGERY  2011    jaw surgery due to fracture     TRANSCATHETER AORTIC VALVE IMPLANT ANESTHESIA N/A 8/3/2016    Procedure: TRANSCATHETER AORTIC VALVE IMPLANT ANESTHESIA;  Surgeon: GENERIC ANESTHESIA PROVIDER;  Location: [facility] OR       FAMILY HISTORY:  Family History   Problem Relation Age of Onset     Hyperlipidemia Mother      Family History Negative No family hx of        SOCIAL HISTORY:  Social History     Social History     Marital  "status:      Spouse name: N/A     Number of children: 4     Years of education: N/A     Occupational History      Retired     Social History Main Topics     Smoking status: Never Smoker     Smokeless tobacco: Never Used     Alcohol use No     Drug use: No     Sexual activity: No     Other Topics Concern     Special Diet Yes     low salt      Exercise Yes     semi recument bike 15 mins daily      Social History Narrative    .  Lives in assisted living. Independent. Has help with making bed and changing sheets.    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 .     4 children - 1 with RA           Review of Systems:  Skin:  Negative     Eyes:  Positive for glasses  ENT:  Negative    Respiratory:  Negative    Cardiovascular:    edema;chest pain;Positive for  Gastroenterology: Positive for heartburn  Genitourinary:  not assessed    Musculoskeletal:  Positive for arthritis  Neurologic:  Negative    Psychiatric:  Positive for anxiety  Heme/Lymph/Imm:  Negative    Endocrine:  Positive for thyroid disorder    Physical Exam:  Vitals: /64  Pulse 78  Ht 1.676 m (5' 6\")  Wt 68.1 kg (150 lb 1.6 oz)  BMI 24.23 kg/m2   Please refer to dictation for physical exam    Recent Lab Results:  LIPID RESULTS:  Lab Results   Component Value Date    CHOL 172 07/07/2015    HDL 75 07/07/2015    LDL 69 07/07/2015    TRIG 142 07/07/2015    CHOLHDLRATIO 2.3 07/07/2015       LIVER ENZYME RESULTS:  Lab Results   Component Value Date    AST 18 07/25/2017    ALT 21 07/25/2017       CBC RESULTS:  Lab Results   Component Value Date    WBC 6.9 08/17/2017    RBC 3.54 (L) 08/17/2017    HGB 11.0 (L) 08/17/2017    HCT 33.2 (L) 08/17/2017    MCV 94 08/17/2017    MCH 31.1 08/17/2017    MCHC 33.1 08/17/2017    RDW 15.3 (H) 08/17/2017     08/17/2017       BMP RESULTS:  Lab Results   Component Value Date     (L) 08/17/2017    POTASSIUM 3.9 08/17/2017    CHLORIDE 98 08/17/2017    CO2 28 08/17/2017    ANIONGAP " 10.9 08/17/2017     08/17/2017    BUN 17 08/17/2017    CR 1.14 08/17/2017    GFRESTIMATED 45 (L) 08/17/2017    GFRESTBLACK 54 (L) 08/17/2017    CHEYANNE 8.8 08/17/2017        A1C RESULTS:  No results found for: A1C    INR RESULTS:  Lab Results   Component Value Date    INR 0.97 03/03/2017    INR 1.05 07/29/2016           Matt Mccracken PA-C   August 17, 2017

## 2017-08-17 NOTE — LETTER
8/17/2017    Augusto Meyer, DO  [address] [place] [address]    RE: Gillian Burk       Dear Colleague,    I had the pleasure of seeing Gillian Burk in the [facility].    History of Present Illness:   This is a very delightful 89-year-old female who presents to structural heart clinic for one year post TAVR follow-up.    The patient's cardiovascular history includes hypertension, hyperlipidemia, GERD, CVA in 2013 for which she's been on chronic Plavix therapy, severe aortic stenosis, status post TAVR with a [employer] Treva valve in each/2016.  She also has a known chronic left bundle branch block and first-degree AV block.    In September 2016 she had prolonged episodes of chest discomfort for which she sought evaluation at an outside hospital and was found to have a non-STEMI.  Repeat coronary angiogram at that time demonstrated mild-to-moderate nonobstructive coronary artery disease.  She was placed on Imdur which improved her symptoms greatly.    She was seen by my colleague Nita for 6 month post TAVR follow-up in 2/2017 at which time she had reports of continued chest discomfort.  Her Imdur was up titrated to 60 mg daily.  This felt to be somewhat atypical in presentation and there is question of whether this represented an exacerbation of her reflux as she has history of GERD.     More recently in 3/2017 she was seen in emergency department with recurrent chest discomfort.  Her troponins were trended and they remained negative.  Her hemoglobin was noted to be low.  She was also having reports black stools.  Patient received iron transfusion.  She was referred to gastroenterology through [facility] for colonoscopy.  This was normal.  Patient was started on oral iron supplement by her primary care provider with improvement of her hemoglobin.    Since that time the patient has done very well.  She denies any shortness of breath,  orthopnea, PND, weight gain, or abdominal distention.     Patient was also started on omeprazole for her chest discomfort when she was admitted in March 2017 which patient states has helped quite a bit with her chest discomfort.    Physical examination:  General-NAD  Neck- Normal JVP  Resp- CTA, rafael  Cardiac-RRR with 2/6 ROSARIO over the aortic area   Abdomen- Soft nontender   Extremities- no edema, wearing compression stockings    Assessment and Plan:   This is a delightful 89-year-old female who presents to structural heart clinic for one year post TAVR follow-up.    Her past medical history is notable for hypertension, hyperlipidemia, GERD, CVA in 2013 for which she's been on chronic Plavix therapy, severe aortic stenosis, status post TAVR with a [employer] Treva valve in 8/2016, known chronic left bundle branch block, and intermittent atypical chest discomfort.    She pursue be doing quite well from a cardiac standpoint.  She denies any symptoms of recurrent chest discomfort.  Her chest discomfort has resolved significantly with the initiation of omeprazole suggesting this may have been more GERD related.  She will continue her Plavix as this has been placed due to her CVA in 2013 following multiple TIAs.    Her echocardiogram from today demonstrates stable aortic valve function with normal gradients.  Her left ventricular ejection fraction unfortunately has decreased significantly to 35-40% which had been 50-55% in 2/2017.  We will switch her Lopressor to Toprol-XL for cardiac myopathy therapy.  I will discuss her case with Dr. Farfan for further recommendations.    The patient's functional status is most consistent with NYHA class II.    Her 12-lead ECG demonstrates normal sinus rhythm with left bundle branch block.    Her hemoglobin is stable.  She has mild renal insufficiency and borderline hyponatremia based on today's basic metabolic panel.    Thank you for allowing me to participate in the care of this  patient today.    This note was completed in part using Dragon voice recognition software. Although reviewed after completion, some word and grammatical errors may occur.    Orders this Visit:  Orders Placed This Encounter   Procedures     EKG 12-lead complete w/read - Clinics (performed today)     Orders Placed This Encounter   Medications     famotidine (PEPCID) 40 MG tablet     Sig: Take 40 mg by mouth daily     levothyroxine (SYNTHROID) 50 MCG tablet     Sig: Take 50 mcg by mouth daily     minocycline (MINOCIN/DYNACIN) 100 MG capsule     Sig: Take by mouth daily     Calcium & Magnesium Carbonates (MYLANTA PO)     Si tblsp every 4 hours as needed     Medications Discontinued During This Encounter   Medication Reason     levothyroxine (SYNTHROID/LEVOTHROID) 50 MCG tablet Dose adjustment     predniSONE (DELTASONE) 20 MG tablet Therapy completed     sulfamethoxazole-trimethoprim (BACTRIM/SEPTRA) 400-80 MG per tablet Therapy completed         Encounter Diagnoses   Name Primary?     S/P TAVR (transcatheter aortic valve replacement)      CAD (coronary artery disease) Yes     Hyperlipidemia with target LDL less than 130        CURRENT MEDICATIONS:  Current Outpatient Prescriptions   Medication Sig Dispense Refill     famotidine (PEPCID) 40 MG tablet Take 40 mg by mouth daily       levothyroxine (SYNTHROID) 50 MCG tablet Take 50 mcg by mouth daily       minocycline (MINOCIN/DYNACIN) 100 MG capsule Take by mouth daily       Calcium & Magnesium Carbonates (MYLANTA PO) 2 tblsp every 4 hours as needed       predniSONE (DELTASONE) 5 MG tablet TAKE ONE TABLET BY MOUTH EVERY OTHER DAY ALTERNATING  WITH  1/2  TABLET  EVERY  OTHER  DAY 23 tablet 13     clopidogrel (PLAVIX) 75 MG tablet Take 1 tablet (75 mg) by mouth daily 90 tablet 3     LANsoprazole (PREVACID) 30 MG CR capsule Take 1 capsule (30 mg) by mouth daily Take 30-60 minutes before a meal. 30 capsule 3     mirtazapine (REMERON) 30 MG tablet TAKE ONE TABLET BY MOUTH  AT BEDTIME 90 tablet 2     Ferrous Sulfate (IRON) 325 (65 FE) MG tablet TAKE ONE TABLET BY MOUTH ONCE DAILY WITH BREAKFAST 30 tablet 11     folic acid (FOLVITE) 1 MG tablet TAKE ONE TABLET BY MOUTH ONCE DAILY 90 tablet 1     levETIRAcetam (KEPPRA) 500 MG tablet TAKE ONE TABLET BY MOUTH ONCE DAILY AND one AT BEDTIME 180 tablet 0     ALPRAZolam (XANAX) 0.25 MG tablet Take 1 tablet (0.25 mg) by mouth 2 times daily as needed for anxiety 28 tablet 0     isosorbide mononitrate (IMDUR) 60 MG 24 hr tablet Take 1 tablet (60 mg) by mouth daily 90 tablet 3     atorvastatin (LIPITOR) 40 MG tablet Take 1 tablet (40 mg) by mouth daily Reports taking 1 tablet 0     Cranberry 200 MG CAPS Take 3 capsules (600 mg) by mouth 2 times daily 1 capsule 0     metoprolol (LOPRESSOR) 25 MG tablet Reports taking 0.5 tablet  tablet 1     nitroglycerin (NITROSTAT) 0.4 MG SL tablet Pt reports taking - -  -For chest pain place 1 tablet under the tongue every 5 minutes for 3 doses. If symptoms persist 5 minutes after 1st dose call 911. 25 tablet 0     aspirin EC 81 MG EC tablet Take 1 tablet (81 mg) by mouth daily       methotrexate 2.5 MG tablet Take 4 tablets (10 mg) by mouth once a week Wed 52 tablet 3     acetaminophen (TYLENOL ARTHRITIS PAIN) 650 MG CR tablet Take 2 tablets (1,300 mg) by mouth every 8 hours as needed for mild pain       calcium carbonate (TUMS) 500 MG chewable tablet Take 2-4 tablets (1,000-2,000 mg) by mouth as needed for heartburn       docusate sodium (COLACE) 100 MG capsule Take 200 mg by mouth daily 2 capsules       multivitamin, therapeutic with minerals (MULTI-VITAMIN) TABS Take 1 tablet by mouth daily       Lactobacillus (FLORAJEN ACIDOPHILUS) CAPS Take 1 capsule by mouth daily       [DISCONTINUED] levothyroxine (SYNTHROID/LEVOTHROID) 50 MCG tablet TAKE ONE TABLET BY MOUTH ONCE DAILY EXCEPT  TUESDAY (Patient taking differently: No sig reported) 30 tablet 0       ALLERGIES     Allergies   Allergen Reactions      Caffeine Other (See Comments)     Triggers your Meniere's disease     Hydrocodone Other (See Comments)     hallucinations     Ibuprofen GI Disturbance     Penicillins Hives     Tramadol Other (See Comments)     Seizure, was taking remeron along with tramadol     Metal [Staples] Rash       PAST MEDICAL HISTORY:  Past Medical History:   Diagnosis Date     Anxiety      Anxiety      Aortic stenosis     s/p TAVR 8/17/16     Chronic migraine      Difficulty in walking(719.7)      Hyperlipidaemia      Hypertension      Hypothyroidism      Myocardial infarction (H)      Need for SBE (subacute bacterial endocarditis) prophylaxis      Numbness and tingling     decreased sensation in toes     Orthostatic hypotension     wears compression stockings     Osteoarthritis     axial, and preipheral     Osteopenia      PAC (premature atrial contraction)      PUD (peptic ulcer disease)      PVC (premature ventricular contraction)      Secondary Sjogren's syndrome (H)      Seizures (H)     after stroke (partial onset)     Seropositive rheumatoid arthritis (H)      Shortness of breath      Stroke (H) 05/2013    with visual field cut, left occipital lobe     Syncope 2014    due to orthostatic hypotension       PAST SURGICAL HISTORY:  Past Surgical History:   Procedure Laterality Date     C TOTAL HIP ARTHROPLASTY Right 2010     C TOTAL HIP ARTHROPLASTY Left 2014     CATARACT IOL, RT/LT Bilateral 2000     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     HEART CATH FEMORAL CANNULIZATION WITH OPEN STANDBY REPAIR AORTIC VALVE N/A 8/3/2016    Procedure: HEART CATH FEMORAL CANNULIZATION WITH OPEN STANDBY REPAIR AORTIC VALVE;  Surgeon: Owen Schultz MD;  Location: [facility] OR     HYSTERECTOMY TOTAL ABDOMINAL  1970    ovaries out     MOUTH SURGERY  2011    jaw surgery due to fracture     TRANSCATHETER AORTIC VALVE IMPLANT ANESTHESIA N/A 8/3/2016    Procedure: TRANSCATHETER AORTIC VALVE IMPLANT ANESTHESIA;  Surgeon: GENERIC ANESTHESIA  "PROVIDER;  Location:  OR       FAMILY HISTORY:  Family History   Problem Relation Age of Onset     Hyperlipidemia Mother      Family History Negative No family hx of        SOCIAL HISTORY:  Social History     Social History     Marital status:      Spouse name: N/A     Number of children: 4     Years of education: N/A     Occupational History      Retired     Social History Main Topics     Smoking status: Never Smoker     Smokeless tobacco: Never Used     Alcohol use No     Drug use: No     Sexual activity: No     Other Topics Concern     Special Diet Yes     low salt      Exercise Yes     semi recument bike 15 mins daily      Social History Narrative    .  Lives in assisted living. Independent. Has help with making bed and changing sheets.    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 .     4 children - 1 with RA           Review of Systems:  Skin:  Negative     Eyes:  Positive for glasses  ENT:  Negative    Respiratory:  Negative    Cardiovascular:    edema;chest pain;Positive for  Gastroenterology: Positive for heartburn  Genitourinary:  not assessed    Musculoskeletal:  Positive for arthritis  Neurologic:  Negative    Psychiatric:  Positive for anxiety  Heme/Lymph/Imm:  Negative    Endocrine:  Positive for thyroid disorder    Physical Exam:  Vitals: /64  Pulse 78  Ht 1.676 m (5' 6\")  Wt 68.1 kg (150 lb 1.6 oz)  BMI 24.23 kg/m2   Please refer to dictation for physical exam    Recent Lab Results:  LIPID RESULTS:  Lab Results   Component Value Date    CHOL 172 07/07/2015    HDL 75 07/07/2015    LDL 69 07/07/2015    TRIG 142 07/07/2015    CHOLHDLRATIO 2.3 07/07/2015       LIVER ENZYME RESULTS:  Lab Results   Component Value Date    AST 18 07/25/2017    ALT 21 07/25/2017       CBC RESULTS:  Lab Results   Component Value Date    WBC 6.9 08/17/2017    RBC 3.54 (L) 08/17/2017    HGB 11.0 (L) 08/17/2017    HCT 33.2 (L) 08/17/2017    MCV 94 08/17/2017    MCH 31.1 08/17/2017 "    MCHC 33.1 08/17/2017    RDW 15.3 (H) 08/17/2017     08/17/2017       BMP RESULTS:  Lab Results   Component Value Date     (L) 08/17/2017    POTASSIUM 3.9 08/17/2017    CHLORIDE 98 08/17/2017    CO2 28 08/17/2017    ANIONGAP 10.9 08/17/2017     08/17/2017    BUN 17 08/17/2017    CR 1.14 08/17/2017    GFRESTIMATED 45 (L) 08/17/2017    GFRESTBLACK 54 (L) 08/17/2017    CHEYANNE 8.8 08/17/2017        A1C RESULTS:  No results found for: A1C    INR RESULTS:  Lab Results   Component Value Date    INR 0.97 03/03/2017    INR 1.05 07/29/2016     Thank you for allowing me to participate in the care of your patient.    Sincerely,     Matt Mccracken PA-C     [facility]

## 2017-08-17 NOTE — MR AVS SNAPSHOT
After Visit Summary   8/17/2017    Gillian Burk    MRN: [id number]           Patient Information     Date Of Birth          3/19/1927        Visit Information        Provider Department      8/17/2017 1:50 PM Matt Mccracken PA-C [facility] [place]        Today's Diagnoses     Coronary artery disease involving native coronary artery of native heart without angina pectoris    -  1    S/P TAVR (transcatheter aortic valve replacement)        Hyperlipidemia with target LDL less than 130        Cardiomyopathy, unspecified (H)          Care Instructions    Today's Plan:   1) Stop current Metoprolol.   2) Start Metoprolol (Toprol)- this is a once a day medication. Take one tablet one time daily.   3) Follow up in [place] with Dr. Lee.     If you have questions or concerns please call my nurse at [phone].     Scheduling phone number: [phone]  Reminder: Please bring in all current medications, over the counter supplements and vitamin bottles to your next appointment.    It was a pleasure seeing you today!     Matt Mccracken  8/17/2017              Follow-ups after your visit        Who to contact     If you have questions or need follow up information about today's clinic visit or your schedule please contact [facility] directly at [phone].  Normal or non-critical lab and imaging results will be communicated to you by MyChart, letter or phone within 4 business days after the clinic has received the results. If you do not hear from us within 7 days, please contact the clinic through MyChart or phone. If you have a critical or abnormal lab result, we will notify you by phone as soon as possible.  Submit refill requests through fishfishme or call your pharmacy and they will forward the refill request to us. Please allow 3 business days for your refill to be completed.          Additional Information  "About Your Visit        IDINCUhart Information     [employer]book gives you secure access to your electronic health record. If you see a primary care provider, you can also send messages to your care team and make appointments. If you have questions, please call your primary care clinic.  If you do not have a primary care provider, please call [phone] and they will assist you.        Care EveryWhere ID     This is your Care EveryWhere ID. This could be used by other organizations to access your [place] medical records  [id number]        Your Vitals Were     Pulse Height BMI (Body Mass Index)             78 1.676 m (5' 6\") 24.23 kg/m2          Blood Pressure from Last 3 Encounters:   08/17/17 138/64   07/18/17 138/80   06/15/17 138/72    Weight from Last 3 Encounters:   08/17/17 68.1 kg (150 lb 1.6 oz)   08/03/17 69.9 kg (154 lb)   07/18/17 69.9 kg (154 lb)              We Performed the Following     EKG 12-lead complete w/read - Clinics (performed today)     Follow-Up with Cardiac Advanced Practice Provider          Today's Medication Changes          These changes are accurate as of: 8/17/17  2:46 PM.  If you have any questions, ask your nurse or doctor.               Start taking these medicines.        Dose/Directions    metoprolol 25 MG 24 hr tablet   Commonly known as:  TOPROL-XL   Used for:  Cardiomyopathy, unspecified (H)   Started by:  Matt Mccracken PA-C        Dose:  25 mg   Take 1 tablet (25 mg) by mouth daily   Quantity:  90 tablet   Refills:  3         These medicines have changed or have updated prescriptions.        Dose/Directions    SYNTHROID 50 MCG tablet   This may have changed:  Another medication with the same name was removed. Continue taking this medication, and follow the directions you see here.   Generic drug:  levothyroxine   Changed by:  Matt Mccracken PA-C        Dose:  50 mcg   Take 50 mcg by mouth daily   Refills:  0         Stop taking these medicines if you haven't " already. Please contact your care team if you have questions.     metoprolol 25 MG tablet   Commonly known as:  LOPRESSOR   Stopped by:  Matt Mccracken PA-C                Where to get your medicines      These medications were sent to [facility] [address] [place] [address] - [address]  [address], [facility] [address]     Phone:  [phone]     metoprolol 25 MG 24 hr tablet                Primary Care Provider Office Phone # Fax #    Augusto Meyer,  [phone]       [address]   [facility] [address]        Equal Access to Services     [facility]: Hadii aad ku hadasho Soomaali, waaxda luqadaha, qaybta kaalmada adeegyada, waxsriram gillespie hayaraseli oglesby . So [facility] [phone].    ATENCIÓN: Si habla español, tiene a granger disposición servicios gratuitos de asistencia lingüística. [facility] [phone].    We comply with applicable federal civil rights laws and [place] laws. We do not discriminate on the basis of race, color, national origin, age, disability sex, sexual orientation or gender identity.            Thank you!     Thank you for choosing [facility] [place]  for your care. Our goal is always to provide you with excellent care. Hearing back from our patients is one way we can continue to improve our services. Please take a few minutes to complete the written survey that you may receive in the mail after your visit with us. Thank you!             Your Updated Medication List - Protect others around you: Learn how to safely use, store and throw away your medicines at www.disposemymeds.org.          This list is accurate as of: 8/17/17  2:46 PM.  Always use your most recent med list.                   Brand Name Dispense Instructions for use Diagnosis    ALPRAZolam 0.25 MG tablet    XANAX    28 tablet    Take 1 tablet (0.25 mg) by mouth 2 times daily as needed for anxiety    Anxiety       aspirin 81 MG EC tablet       Take 1 tablet (81 mg) by mouth daily    S/P TAVR (transcatheter aortic valve replacement)       atorvastatin 40 MG tablet    LIPITOR    1 tablet    Take 1 tablet (40 mg) by mouth daily Reports taking    Hyperlipidemia with target LDL less than 130       calcium carbonate 500 MG chewable tablet    TUMS     Take 2-4 tablets (1,000-2,000 mg) by mouth as needed for heartburn        clopidogrel 75 MG tablet    PLAVIX    90 tablet    Take 1 tablet (75 mg) by mouth daily    History of CVA (cerebrovascular accident)       Cranberry 200 MG Caps     1 capsule    Take 3 capsules (600 mg) by mouth 2 times daily        docusate sodium 100 MG capsule    COLACE     Take 200 mg by mouth daily 2 capsules        famotidine 40 MG tablet    PEPCID     Take 40 mg by mouth daily        FLORAJEN ACIDOPHILUS Caps      Take 1 capsule by mouth daily        folic acid 1 MG tablet    FOLVITE    90 tablet    TAKE ONE TABLET BY MOUTH ONCE DAILY    Seropositive rheumatoid arthritis (H)       iron 325 (65 FE) MG tablet     30 tablet    TAKE ONE TABLET BY MOUTH ONCE DAILY WITH BREAKFAST    Gastrointestinal hemorrhage, unspecified gastrointestinal hemorrhage type       isosorbide mononitrate 60 MG 24 hr tablet    IMDUR    90 tablet    Take 1 tablet (60 mg) by mouth daily    Chest pain, unspecified type       LANsoprazole 30 MG CR capsule    PREVACID    30 capsule    Take 1 capsule (30 mg) by mouth daily Take 30-60 minutes before a meal.    Gastroesophageal reflux disease with esophagitis       levETIRAcetam 500 MG tablet    KEPPRA    180 tablet    TAKE ONE TABLET BY MOUTH ONCE DAILY AND one AT BEDTIME    Seizure disorder (H)       methotrexate 2.5 MG tablet CHEMO     52 tablet    Take 4 tablets (10 mg) by mouth once a week Wed    Seropositive rheumatoid arthritis (H)       metoprolol 25 MG 24 hr tablet    TOPROL-XL    90 tablet    Take 1 tablet (25 mg) by mouth daily    Cardiomyopathy, unspecified (H)       minocycline 100 MG capsule     MINOCIN/DYNACIN     Take by mouth daily        mirtazapine 30 MG tablet    REMERON    90 tablet    TAKE ONE TABLET BY MOUTH AT BEDTIME    Sleep initiation disorder       Multi-vitamin Tabs tablet      Take 1 tablet by mouth daily        MYLANTA PO      2 tblsp every 4 hours as needed        nitroGLYcerin 0.4 MG sublingual tablet    NITROSTAT    25 tablet    Pt reports taking - -  -For chest pain place 1 tablet under the tongue every 5 minutes for 3 doses. If symptoms persist 5 minutes after 1st dose call 911.        predniSONE 5 MG tablet    DELTASONE    23 tablet    TAKE ONE TABLET BY MOUTH EVERY OTHER DAY ALTERNATING  WITH  1/2  TABLET  EVERY  OTHER  DAY    Seropositive rheumatoid arthritis (H)       SYNTHROID 50 MCG tablet   Generic drug:  levothyroxine      Take 50 mcg by mouth daily        TYLENOL ARTHRITIS PAIN 650 MG CR tablet   Generic drug:  acetaminophen      Take 2 tablets (1,300 mg) by mouth every 8 hours as needed for mild pain

## 2017-08-17 NOTE — PROGRESS NOTES
"One Year REPRISE III TAVR Follow-Up Visit:  Arrived in wheelchair with daughter. Looks well. Explained that wheelchair is used for long distances, but walks with a cane otherwise. No complaints of pain or discomfort at the moment. Has been dealing with GERD, much better now. Recently had Synvisc for right knee (8/3/2017) with improvement. No dysuria complaints. No hospitalizations since 03-04 March 2017 for GI issues and iron deficiency. Hemoglobin was 10.5 on 7/25/2017 and 11.0 today. Had labs, ECHO, Rotational Xray in the Cath Lab to check for valve placement, Quality of Life Questionnaires, Gait Speed to walk 5 meters, ECG, VS, Ht and Wt, visit with CAROL Gutierrez and finally, the one-year neuro exam with Dr. Ching. Very pleasant and cooperative. Is glad to be feeling better at this time point; no dyspnea unless doing something \"strenuous.\" The next visit is the 2 Year Follow-Up to be scheduled closer to the visit window.   "

## 2017-08-17 NOTE — PATIENT INSTRUCTIONS
Today's Plan:   1) Stop current Metoprolol.   2) Start Metoprolol (Toprol)- this is a once a day medication. Take one tablet one time daily.   3) Follow up in [place] with Dr. Lee.     If you have questions or concerns please call my nurse at [phone].     Scheduling phone number: [phone]  Reminder: Please bring in all current medications, over the counter supplements and vitamin bottles to your next appointment.    It was a pleasure seeing you today!     Matt Mccracken  8/17/2017

## 2017-08-19 NOTE — PROGRESS NOTES
Dear Gillian, your recent test results are attached.   the year and sample is normal.    Feel free to contact me via the office or My Chart if you have any questions regarding the above.  Sincerely,  Augusto Meyer,  FACOI

## 2017-08-22 ENCOUNTER — DOCUMENTATION ONLY (OUTPATIENT)
Dept: CARDIOLOGY | Facility: CLINIC | Age: 82
End: 2017-08-22

## 2017-08-22 DIAGNOSIS — I50.22 CHRONIC SYSTOLIC CONGESTIVE HEART FAILURE (H): Primary | ICD-10-CM

## 2017-08-22 NOTE — PROGRESS NOTES
I discussed patient's case with Dr. Farfan who recommends EP referral for consideration of right ventricular pacing given patient's recent EF reduction. Please update patient.     Thanks!     Aybike

## 2017-08-23 NOTE — PROGRESS NOTES
Attempted to call pt with recommendations from Matt Mccracken PA-C, left message for pt to call back. [place] RN

## 2017-08-24 ENCOUNTER — HOSPITAL LABORATORY (OUTPATIENT)
Facility: OTHER | Age: 82
End: 2017-08-24

## 2017-08-24 DIAGNOSIS — N30.01 ACUTE CYSTITIS WITH HEMATURIA: Primary | ICD-10-CM

## 2017-08-25 LAB
ALBUMIN UR-MCNC: NEGATIVE MG/DL
APPEARANCE UR: ABNORMAL
BACTERIA #/AREA URNS HPF: ABNORMAL /HPF
BILIRUB UR QL STRIP: NEGATIVE
COLOR UR AUTO: YELLOW
GLUCOSE UR STRIP-MCNC: NEGATIVE MG/DL
HGB UR QL STRIP: ABNORMAL
KETONES UR STRIP-MCNC: NEGATIVE MG/DL
LEUKOCYTE ESTERASE UR QL STRIP: ABNORMAL
NITRATE UR QL: NEGATIVE
PH UR STRIP: 5 PH (ref 5–7)
RBC #/AREA URNS AUTO: 1 /HPF (ref 0–2)
SOURCE: ABNORMAL
SP GR UR STRIP: 1.01 (ref 1–1.03)
UROBILINOGEN UR STRIP-MCNC: 0 MG/DL (ref 0–2)
WBC #/AREA URNS AUTO: >182 /HPF (ref 0–2)
WBC CLUMPS #/AREA URNS HPF: PRESENT /HPF

## 2017-08-25 NOTE — PROGRESS NOTES
Called pt with recommendations from Matt Mccracken PA-C. Pt requests that we call her daughter, Hanna Gonzalez to schedule apt as she will need to bring her. Will message scheduling to call pt's daughter to schedule. LP[place] RN

## 2017-08-26 RX ORDER — CIPROFLOXACIN 250 MG/1
250 TABLET, FILM COATED ORAL 2 TIMES DAILY
Qty: 14 TABLET | Refills: 0 | OUTPATIENT
Start: 2017-08-26 | End: 2017-09-06

## 2017-08-26 NOTE — PROGRESS NOTES
Dear Gillian, your recent test results are attached.   your urine samples show the presence of an infection.  I have sent a prescription for an antibiotic to the [facility] pharmacy.    Feel free to contact me via the office or My Chart if you have any questions regarding the above.  Sincerely,  DO DELMA Pineda

## 2017-08-27 ENCOUNTER — TELEPHONE (OUTPATIENT)
Dept: FAMILY MEDICINE | Facility: CLINIC | Age: 82
End: 2017-08-27

## 2017-08-27 LAB
BACTERIA SPEC CULT: NORMAL
SPECIMEN SOURCE: NORMAL

## 2017-08-27 NOTE — TELEPHONE ENCOUNTER
I see Rx in her med list but [facility] never received it, so called it in today as written by Dr. Meyer.   [facility] notified.   Monika Iqbal MD

## 2017-08-29 DIAGNOSIS — E03.4 HYPOTHYROIDISM DUE TO ACQUIRED ATROPHY OF THYROID: Primary | ICD-10-CM

## 2017-08-29 RX ORDER — LEVOTHYROXINE SODIUM 50 UG/1
50 TABLET ORAL DAILY
Qty: 30 TABLET | Refills: 3 | Status: SHIPPED | OUTPATIENT
Start: 2017-08-29 | End: 2018-01-02

## 2017-08-29 NOTE — TELEPHONE ENCOUNTER
Levothyroxine      Last Written Prescription Date:  Pt reported  Last Fill Quantity: n/a,   # refills: n/a  Last Office Visit with [facility], [facility] or [facility] prescribing provider: 7/18/2017  Future Office visit:       Routing refill request to provider for review/approval because:  Medication is reported/historical    Instructions on medication says: take one tablet by mouth once daily except Tuesday

## 2017-09-06 ENCOUNTER — OFFICE VISIT (OUTPATIENT)
Dept: FAMILY MEDICINE | Facility: OTHER | Age: 82
End: 2017-09-06
Payer: COMMERCIAL

## 2017-09-06 VITALS
SYSTOLIC BLOOD PRESSURE: 130 MMHG | HEART RATE: 93 BPM | WEIGHT: 149 LBS | TEMPERATURE: 97.2 F | DIASTOLIC BLOOD PRESSURE: 92 MMHG | BODY MASS INDEX: 24.05 KG/M2 | OXYGEN SATURATION: 97 %

## 2017-09-06 DIAGNOSIS — K21.00 GASTROESOPHAGEAL REFLUX DISEASE WITH ESOPHAGITIS: ICD-10-CM

## 2017-09-06 DIAGNOSIS — I49.3 PVC'S (PREMATURE VENTRICULAR CONTRACTIONS): Primary | ICD-10-CM

## 2017-09-06 DIAGNOSIS — I20.0 INTERMEDIATE CORONARY SYNDROME (H): ICD-10-CM

## 2017-09-06 DIAGNOSIS — Z95.2 S/P TAVR (TRANSCATHETER AORTIC VALVE REPLACEMENT): ICD-10-CM

## 2017-09-06 PROCEDURE — 99213 OFFICE O/P EST LOW 20 MIN: CPT | Performed by: INTERNAL MEDICINE

## 2017-09-06 ASSESSMENT — PAIN SCALES - GENERAL: PAINLEVEL: NO PAIN (0)

## 2017-09-06 NOTE — PROGRESS NOTES
SUBJECTIVE:   Gillian Burk is a 89 year old female who presents to clinic today for the following health issues:    GERD/Heartburn      Duration: since June    Description (location/character/radiation): middle of chest, sometimes down further    Intensity:  moderate, severe    Accompanying signs and symptoms:  food getting stuck: no   nausea/vomiting/blood: no   abdominal pain: YES  black/tarry or bloody stools: no :    History (similar episodes/previous evaluation): None    Precipitating or alleviating factors:  worse with fatty foods and spicy foods.  current NSAID/Aspirin use: no     Therapies tried and outcome: Prevacid, antacids and mylanta- helps sometimes        Problem list and histories reviewed & adjusted, as indicated.  Additional history: as documented      Reviewed and updated as needed this visit by clinical staffTobacco  Allergies  Soc Hx      Reviewed and updated as needed this visit by Provider    CHIEF COMPLAINT:    The patient is a pleasant 89-year-old female who we've been treating for some epigastric discomfort. She is currently on a proton pump inhibitor as well as an H2 blocker. She notes that the epigastric pain seems to be somewhat worse. Initially starting the Protonix, she was pain-free. She notes that it's more toward the evening. Her daughter is present with her and notes that she's also had some discomfort radiating into the arm. This is atypical for her. It is noted that she is currently being evaluated by cardiology. She does have a history of prior TAVR and has been doing quite well. She does have a history of a non-ST elevated myocardial infarction last year. She underwent heart catheter at that time which demonstrated a less than 50% right coronary artery. She notes that her chest discomfort is usually in the evening. We have discussed moving her Imdur to a little later in the day. She will now try taking it at noon. She will also move her Protonix to about that same  time as she notes that taking it prior to her evening meal does not seem to be as helpful.                         PAST, FAMILY,SOCIAL HISTORY:     Medical  History:   has a past medical history of Anxiety; Anxiety; Aortic stenosis; Chronic migraine; Difficulty in walking(719.7); Hyperlipidaemia; Hypertension; Hypothyroidism; Myocardial infarction (H); Need for SBE (subacute bacterial endocarditis) prophylaxis; Numbness and tingling; Orthostatic hypotension; Osteoarthritis; Osteopenia; PAC (premature atrial contraction); PUD (peptic ulcer disease); PVC (premature ventricular contraction); Secondary Sjogren's syndrome (H); Seizures (H); Seropositive rheumatoid arthritis (H); Shortness of breath; Stroke (H) (05/2013); and Syncope (2014).     Surgical History:   has a past surgical history that includes Mouth surgery (2011); Eye surgery (1999); cataract iol, rt/lt (Bilateral, 2000); Hysterectomy total abdominal (1970); TOTAL HIP ARTHROPLASTY (Right, 2010); TOTAL HIP ARTHROPLASTY (Left, 2014); TRANSCATHETER AORTIC VALVE IMPLANT ANEST (N/A, 8/3/2016); and Heart Cath Femoral Cannulization With Open Standby Repair Aortic Valve (N/A, 8/3/2016).     Social History:   reports that she has never smoked. She has never used smokeless tobacco. She reports that she does not drink alcohol or use illicit drugs.     Family History:  family history includes Hyperlipidemia in her mother. There is no history of Family History Negative.            MEDICATIONS  Current Outpatient Prescriptions   Medication Sig Dispense Refill     levothyroxine (SYNTHROID) 50 MCG tablet Take 1 tablet (50 mcg) by mouth daily 30 tablet 3     famotidine (PEPCID) 40 MG tablet Take 40 mg by mouth daily       Calcium & Magnesium Carbonates (MYLANTA PO) 2 tblsp every 4 hours as needed       metoprolol (TOPROL-XL) 25 MG 24 hr tablet Take 1 tablet (25 mg) by mouth daily 90 tablet 3     predniSONE (DELTASONE) 5 MG tablet TAKE ONE TABLET BY MOUTH EVERY OTHER DAY  ALTERNATING  WITH  1/2  TABLET  EVERY  OTHER  DAY 23 tablet 13     clopidogrel (PLAVIX) 75 MG tablet Take 1 tablet (75 mg) by mouth daily 90 tablet 3     LANsoprazole (PREVACID) 30 MG CR capsule Take 1 capsule (30 mg) by mouth daily Take 30-60 minutes before a meal. 30 capsule 3     mirtazapine (REMERON) 30 MG tablet TAKE ONE TABLET BY MOUTH AT BEDTIME 90 tablet 2     Ferrous Sulfate (IRON) 325 (65 FE) MG tablet TAKE ONE TABLET BY MOUTH ONCE DAILY WITH BREAKFAST 30 tablet 11     folic acid (FOLVITE) 1 MG tablet TAKE ONE TABLET BY MOUTH ONCE DAILY 90 tablet 1     levETIRAcetam (KEPPRA) 500 MG tablet TAKE ONE TABLET BY MOUTH ONCE DAILY AND one AT BEDTIME 180 tablet 0     ALPRAZolam (XANAX) 0.25 MG tablet Take 1 tablet (0.25 mg) by mouth 2 times daily as needed for anxiety 28 tablet 0     isosorbide mononitrate (IMDUR) 60 MG 24 hr tablet Take 1 tablet (60 mg) by mouth daily 90 tablet 3     atorvastatin (LIPITOR) 40 MG tablet Take 1 tablet (40 mg) by mouth daily Reports taking 1 tablet 0     nitroglycerin (NITROSTAT) 0.4 MG SL tablet Pt reports taking - -  -For chest pain place 1 tablet under the tongue every 5 minutes for 3 doses. If symptoms persist 5 minutes after 1st dose call 911. 25 tablet 0     aspirin EC 81 MG EC tablet Take 1 tablet (81 mg) by mouth daily       methotrexate 2.5 MG tablet Take 4 tablets (10 mg) by mouth once a week Wed 52 tablet 3     acetaminophen (TYLENOL ARTHRITIS PAIN) 650 MG CR tablet Take 2 tablets (1,300 mg) by mouth every 8 hours as needed for mild pain       calcium carbonate (TUMS) 500 MG chewable tablet Take 2-4 tablets (1,000-2,000 mg) by mouth as needed for heartburn       docusate sodium (COLACE) 100 MG capsule Take 200 mg by mouth daily 2 capsules       multivitamin, therapeutic with minerals (MULTI-VITAMIN) TABS Take 1 tablet by mouth daily       Lactobacillus (FLORAJEN ACIDOPHILUS) CAPS Take 1 capsule by mouth daily            --------------------------------------------------------------------------------------------------------------------                          REVIEW OF SYSTEMS:         LUNGS: Pt denies: cough,excess sputum, hemoptysis, or shortness of breath.   HEART: Pt denies:  arrythmia, syncope, tachy or bradyarrhythmia or excess edema. Does have questionable chest discomfort. Minimal radiation to the shoulder and arms is noted.   GI: Pt denies: nausea, vomitting, diarrhea, constipation, melena, or hematochezia. Does have epigastric tenderness intermittently as noted.   NEURO: Pt denies: seizures, strokes, diplopia, weakness, paraesthesias, or paralysis.   SKIN: Pt denies: itching, rashes, discoloration, or specific lesions of concern. Denies recent hair loss.                          EXAMINATION:         BP (!) 130/92 (BP Location: Right arm, Patient Position: Chair, Cuff Size: Adult Regular)  Pulse 93  Temp 97.2  F (36.2  C) (Temporal)  Wt 149 lb (67.6 kg)  SpO2 97%  BMI 24.05 kg/m2   Constitutional: The patient appears to be in no acute distress. The patient appears to be adequately hydrated. No acute respiratory or hemodynamic distress is noted at this time.   LUNGS: clear bilaterally, airflow is brisk, no intercostal retraction or stridor is noted. No coughing is noted during visit.   HEART:  regular without rubs, clicks, gallops, or murmurs. PMI is nondisplaced. Upstrokes are brisk. S1,S2 are heard.   GI: Abdomen is soft, without rebound, guarding or tenderness. Bowel sounds are appropriate. No renal bruits are heard.                         DECISION MAKIN. Gastroesophageal reflux disease with esophagitis  Continue PPI, H2 blocker  Take PPI earlier in the day  Avoid heavy meals  Avoid laying down after meals  Discontinue minocycline, may upset stomach  2. PVC's (premature ventricular contractions)  Follow-up with cardiology/electrophysiology    3. Intermediate coronary syndrome  Move nitrate to  noon  Continue beta blocker, do not escalate dose for fear of orthostatic hypotension/bradycardia    Await electrophysiology input    4. S/P TAVR (transcatheter aortic valve replacement)  Follow up with cardiology, currently stable, echocardiogram reviewed                               FOLLOW UP    I have asked the patient to make an appointment for follow up with me following her cardiac workup        I have carefully explained the diagnosis and treatment options with the patient. The patient has displayed an understanding of the above, and all subsequent questions were answered.         DO DELMA Pineda    Portions of this note were produced using [employer]  Although every attempt at real-time proof reading has been made, occasional grammar/syntax errors may have been missed.

## 2017-09-06 NOTE — NURSING NOTE
"Chief Complaint   Patient presents with     Gastric Problem       Initial BP (!) 130/92 (BP Location: Right arm, Patient Position: Chair, Cuff Size: Adult Regular)  Pulse 93  Temp 97.2  F (36.2  C) (Temporal)  Wt 149 lb (67.6 kg)  SpO2 97%  BMI 24.05 kg/m2 Estimated body mass index is 24.05 kg/(m^2) as calculated from the following:    Height as of 8/17/17: 5' 6\" (1.676 m).    Weight as of this encounter: 149 lb (67.6 kg).  Medication Reconciliation: complete  Lela HOLLEY    "

## 2017-09-06 NOTE — MR AVS SNAPSHOT
After Visit Summary   9/6/2017    Gillian Burk    MRN: [id number]           Patient Information     Date Of Birth          3/19/1927        Visit Information        Provider Department      9/6/2017 2:00 PM Augusto Meyer DO [facility]        Today's Diagnoses     PVC's (premature ventricular contractions)    -  1    Gastroesophageal reflux disease with esophagitis        Intermediate coronary syndrome        S/P TAVR (transcatheter aortic valve replacement)           Follow-ups after your visit        Who to contact     If you have questions or need follow up information about today's clinic visit or your schedule please contact [facility] directly at [phone].  Normal or non-critical lab and imaging results will be communicated to you by MyChart, letter or phone within 4 business days after the clinic has received the results. If you do not hear from us within 7 days, please contact the clinic through [employer] or phone. If you have a critical or abnormal lab result, we will notify you by phone as soon as possible.  Submit refill requests through [employer] or call your pharmacy and they will forward the refill request to us. Please allow 3 business days for your refill to be completed.          Additional Information About Your Visit        MyChart Information     [employer] gives you secure access to your electronic health record. If you see a primary care provider, you can also send messages to your care team and make appointments. If you have questions, please call your primary care clinic.  If you do not have a primary care provider, please call [phone] and they will assist you.        Care EveryWhere ID     This is your Care EveryWhere ID. This could be used by other organizations to access your [facility] medical records  [id number]        Your Vitals Were     Pulse Temperature Pulse Oximetry BMI (Body Mass Index)          93 97.2  F (36.2  C)  (Temporal) 97% 24.05 kg/m2         Blood Pressure from Last 3 Encounters:   09/21/17 124/58   09/06/17 (!) 130/92   08/17/17 138/64    Weight from Last 3 Encounters:   09/21/17 145 lb 12.8 oz (66.1 kg)   09/06/17 149 lb (67.6 kg)   08/17/17 150 lb 1.6 oz (68.1 kg)              Today, you had the following     No orders found for display         Today's Medication Changes          These changes are accurate as of: 9/6/17 11:59 PM.  If you have any questions, ask your nurse or doctor.               Stop taking these medicines if you haven't already. Please contact your care team if you have questions.     minocycline 100 MG capsule   Commonly known as:  MINOCIN/DYNACIN   Stopped by:  Augusto Meyer DO                    Primary Care Provider Office Phone # Fax #    Augusto Meyer [phone]        [facility] [address] [place] [address]        Equal Access to Services     [facility]: Hadii veronica ku hadasho Soomaali, waaxda luqadaha, qaybta kaalmada adeegyada, waxay idiin hayaraseli oglesby . So [facility] [phone].    ATENCIÓN: Si habla español, tiene a granger disposición servicios gratuitos de asistencia lingüística. Llame al [phone].    We comply with applicable federal civil rights laws and [place] laws. We do not discriminate on the basis of race, color, national origin, age, disability, sex, sexual orientation, or gender identity.            Thank you!     Thank you for choosing [facility]  for your care. Our goal is always to provide you with excellent care. Hearing back from our patients is one way we can continue to improve our services. Please take a few minutes to complete the written survey that you may receive in the mail after your visit with us. Thank you!             Your Updated Medication List - Protect others around you: Learn how to safely use, store and throw away your medicines at www.disposemymeds.org.          This list is accurate  as of: 9/6/17 11:59 PM.  Always use your most recent med list.                   Brand Name Dispense Instructions for use Diagnosis    ALPRAZolam 0.25 MG tablet    XANAX    28 tablet    Take 1 tablet (0.25 mg) by mouth 2 times daily as needed for anxiety    Anxiety       aspirin 81 MG EC tablet      Take 1 tablet (81 mg) by mouth daily    S/P TAVR (transcatheter aortic valve replacement)       atorvastatin 40 MG tablet    LIPITOR    1 tablet    Take 1 tablet (40 mg) by mouth daily Reports taking    Hyperlipidemia with target LDL less than 130       calcium carbonate 500 MG chewable tablet    TUMS     Take 2-4 tablets (1,000-2,000 mg) by mouth as needed for heartburn        clopidogrel 75 MG tablet    PLAVIX    90 tablet    Take 1 tablet (75 mg) by mouth daily    History of CVA (cerebrovascular accident)       docusate sodium 100 MG capsule    COLACE     Take 200 mg by mouth daily 2 capsules        famotidine 40 MG tablet    PEPCID     Take 40 mg by mouth daily        FLORAJEN ACIDOPHILUS Caps      Take 1 capsule by mouth daily        iron 325 (65 FE) MG tablet     30 tablet    TAKE ONE TABLET BY MOUTH ONCE DAILY WITH BREAKFAST    Gastrointestinal hemorrhage, unspecified gastrointestinal hemorrhage type       isosorbide mononitrate 60 MG 24 hr tablet    IMDUR    90 tablet    Take 1 tablet (60 mg) by mouth daily    Chest pain, unspecified type       LANsoprazole 30 MG CR capsule    PREVACID    30 capsule    Take 1 capsule (30 mg) by mouth daily Take 30-60 minutes before a meal.    Gastroesophageal reflux disease with esophagitis       levETIRAcetam 500 MG tablet    KEPPRA    180 tablet    TAKE ONE TABLET BY MOUTH ONCE DAILY AND one AT BEDTIME    Seizure disorder (H)       levothyroxine 50 MCG tablet    SYNTHROID    30 tablet    Take 1 tablet (50 mcg) by mouth daily    Hypothyroidism due to acquired atrophy of thyroid       methotrexate 2.5 MG tablet CHEMO     52 tablet    Take 4 tablets (10 mg) by mouth once a week  Wed    Seropositive rheumatoid arthritis (H)       metoprolol 25 MG 24 hr tablet    TOPROL-XL    90 tablet    Take 1 tablet (25 mg) by mouth daily    Cardiomyopathy, unspecified       mirtazapine 30 MG tablet    REMERON    90 tablet    TAKE ONE TABLET BY MOUTH AT BEDTIME    Sleep initiation disorder       Multi-vitamin Tabs tablet      Take 1 tablet by mouth daily        MYLANTA PO      2 tblsp every 4 hours as needed        nitroGLYcerin 0.4 MG sublingual tablet    NITROSTAT    25 tablet    Pt reports taking - -  -For chest pain place 1 tablet under the tongue every 5 minutes for 3 doses. If symptoms persist 5 minutes after 1st dose call 911.        predniSONE 5 MG tablet    DELTASONE    23 tablet    TAKE ONE TABLET BY MOUTH EVERY OTHER DAY ALTERNATING  WITH  1/2  TABLET  EVERY  OTHER  DAY    Seropositive rheumatoid arthritis (H)       TYLENOL ARTHRITIS PAIN 650 MG CR tablet   Generic drug:  acetaminophen      Take 2 tablets (1,300 mg) by mouth every 8 hours as needed for mild pain

## 2017-09-08 ENCOUNTER — TELEPHONE (OUTPATIENT)
Dept: INTERNAL MEDICINE | Facility: CLINIC | Age: 82
End: 2017-09-08

## 2017-09-08 DIAGNOSIS — R07.9 CHEST PAIN, UNSPECIFIED TYPE: ICD-10-CM

## 2017-09-08 RX ORDER — ISOSORBIDE MONONITRATE 30 MG/1
30 TABLET, EXTENDED RELEASE ORAL DAILY
Qty: 30 TABLET | Refills: 0 | Status: SHIPPED | OUTPATIENT
Start: 2017-09-08 | End: 2017-11-13

## 2017-09-08 NOTE — TELEPHONE ENCOUNTER
Reason for Call:  Medication or medication refill:    Do you use a [facility]?  Name of the pharmacy and phone number for the current request:  [facility] - [phone]    Name of the medication requested: IMDUR    Other request: patients daughter Tayler is calling stating that Dr. Meyer changed the way she is taking her IMDUR and is having fairly severe chest pains-states that they have had the nurse at [facility] in there to check on her, declined triage and adamant to speak with Dr. Meyer about what plan b is for the IMDUR     Can we leave a detailed message on this number? YES    Phone number patient can be reached at: Other phone number:  [phone]    Best Time: any    Call taken on 9/8/2017 at 8:09 AM by Ade Sousa

## 2017-09-08 NOTE — TELEPHONE ENCOUNTER
"Will increase isosorbide mononitrate 90 mg. Blood pressure should tolerate this. Patient is having breakthrough chest pain. Instructed family that twice daily dosing is not recommended as nitroglycerin requires a \"vacation.\". I believe they understand this reasoning.    Betsy  "

## 2017-09-08 NOTE — TELEPHONE ENCOUNTER
She started taking Imdur later, 1st day she stayed in bed until she took her imdur and it went well. 2nd day she delayed and had severe chest before she took it. They are wondering if she can get twice a day dosing of Imdur or try something else? .  Are there concerns for her taking twice a day dose? Lela HOLLEY      Per Dr. Meyer if she takes it twice a day it may stop working. He will increase her dose to 90 mg total ( he is adding a 30 mg dose) she can take this in the morning when she gets up.  If the pain continues she should go to the ER.     Tayler notified. Lela HOLLEY

## 2017-09-08 NOTE — TELEPHONE ENCOUNTER
Tayler would like a call back asap. She is heading back home to [place]. Would like to address this issue before she leaves.

## 2017-09-20 ENCOUNTER — TELEPHONE (OUTPATIENT)
Dept: FAMILY MEDICINE | Facility: OTHER | Age: 82
End: 2017-09-20

## 2017-09-20 ENCOUNTER — MYC MEDICAL ADVICE (OUTPATIENT)
Dept: FAMILY MEDICINE | Facility: OTHER | Age: 82
End: 2017-09-20

## 2017-09-20 DIAGNOSIS — N30.00 ACUTE CYSTITIS WITHOUT HEMATURIA: Primary | ICD-10-CM

## 2017-09-20 DIAGNOSIS — R30.0 DYSURIA: ICD-10-CM

## 2017-09-20 DIAGNOSIS — R30.0 DYSURIA: Primary | ICD-10-CM

## 2017-09-20 LAB
ALBUMIN UR-MCNC: NEGATIVE MG/DL
APPEARANCE UR: ABNORMAL
BACTERIA #/AREA URNS HPF: ABNORMAL /HPF
BILIRUB UR QL STRIP: NEGATIVE
COLOR UR AUTO: ABNORMAL
GLUCOSE UR STRIP-MCNC: NEGATIVE MG/DL
HGB UR QL STRIP: ABNORMAL
KETONES UR STRIP-MCNC: NEGATIVE MG/DL
LEUKOCYTE ESTERASE UR QL STRIP: ABNORMAL
MUCOUS THREADS #/AREA URNS LPF: PRESENT /LPF
NITRATE UR QL: NEGATIVE
PH UR STRIP: 7 PH (ref 5–7)
RBC #/AREA URNS AUTO: 5 /HPF (ref 0–2)
SOURCE: ABNORMAL
SP GR UR STRIP: 1 (ref 1–1.03)
SQUAMOUS #/AREA URNS AUTO: <1 /HPF (ref 0–1)
UROBILINOGEN UR STRIP-MCNC: 0 MG/DL (ref 0–2)
WBC #/AREA URNS AUTO: 146 /HPF (ref 0–2)
WBC CLUMPS #/AREA URNS HPF: PRESENT /HPF

## 2017-09-20 PROCEDURE — 81001 URINALYSIS AUTO W/SCOPE: CPT | Performed by: INTERNAL MEDICINE

## 2017-09-20 PROCEDURE — 87186 SC STD MICRODIL/AGAR DIL: CPT | Performed by: INTERNAL MEDICINE

## 2017-09-20 PROCEDURE — 87185 SC STD ENZYME DETCJ PER NZM: CPT | Performed by: INTERNAL MEDICINE

## 2017-09-20 PROCEDURE — 87088 URINE BACTERIA CULTURE: CPT | Performed by: INTERNAL MEDICINE

## 2017-09-20 PROCEDURE — 87086 URINE CULTURE/COLONY COUNT: CPT | Performed by: INTERNAL MEDICINE

## 2017-09-20 RX ORDER — SULFAMETHOXAZOLE/TRIMETHOPRIM 800-160 MG
1 TABLET ORAL 2 TIMES DAILY
Qty: 28 TABLET | Refills: 0 | Status: SHIPPED | OUTPATIENT
Start: 2017-09-20 | End: 2017-10-04

## 2017-09-20 NOTE — TELEPHONE ENCOUNTER
I have called a prescription for an antibiotic pharmacy. The patient should discontinue her methotrexate for the next 2 weeks. She should repeat the urine sample upon completion of the antibiotics.        Thank you Betsy

## 2017-09-21 ENCOUNTER — OFFICE VISIT (OUTPATIENT)
Dept: CARDIOLOGY | Facility: CLINIC | Age: 82
End: 2017-09-21
Payer: COMMERCIAL

## 2017-09-21 VITALS
OXYGEN SATURATION: 97 % | DIASTOLIC BLOOD PRESSURE: 58 MMHG | WEIGHT: 145.8 LBS | SYSTOLIC BLOOD PRESSURE: 124 MMHG | HEART RATE: 98 BPM | RESPIRATION RATE: 13 BRPM | HEIGHT: 66 IN | BODY MASS INDEX: 23.43 KG/M2

## 2017-09-21 DIAGNOSIS — M05.9 SEROPOSITIVE RHEUMATOID ARTHRITIS (H): ICD-10-CM

## 2017-09-21 DIAGNOSIS — I50.22 CHRONIC SYSTOLIC CONGESTIVE HEART FAILURE (H): ICD-10-CM

## 2017-09-21 PROCEDURE — 99214 OFFICE O/P EST MOD 30 MIN: CPT | Performed by: INTERNAL MEDICINE

## 2017-09-21 RX ORDER — FOLIC ACID 1 MG/1
1000 TABLET ORAL DAILY
Qty: 90 TABLET | Refills: 1 | Status: SHIPPED | OUTPATIENT
Start: 2017-09-21 | End: 2018-03-20

## 2017-09-21 ASSESSMENT — PAIN SCALES - GENERAL: PAINLEVEL: NO PAIN (0)

## 2017-09-21 NOTE — MR AVS SNAPSHOT
After Visit Summary   9/21/2017    Gillian Burk    MRN: [id number]           Patient Information     Date Of Birth          3/19/1927        Visit Information        Provider Department      9/21/2017 1:00 PM Maki Griffith MD [facility]        Today's Diagnoses     Chronic systolic congestive heart failure (H)           Follow-ups after your visit        Future tests that were ordered for you today     Open Future Orders        Priority Expected Expires Ordered    *UA reflex to Microscopic and Culture ([place]; [facility]; [facility]; [facility]; [facility]; [facility]; [place]; [place]) Routine  10/20/2017 9/20/2017            Who to contact     If you have questions or need follow up information about today's clinic visit or your schedule please contact [facility] directly at [phone].  Normal or non-critical lab and imaging results will be communicated to you by MyChart, letter or phone within 4 business days after the clinic has received the results. If you do not hear from us within 7 days, please contact the clinic through [employer] or phone. If you have a critical or abnormal lab result, we will notify you by phone as soon as possible.  Submit refill requests through [employer] or call your pharmacy and they will forward the refill request to us. Please allow 3 business days for your refill to be completed.          Additional Information About Your Visit        MyChart Information     [employer] gives you secure access to your electronic health record. If you see a primary care provider, you can also send messages to your care team and make appointments. If you have questions, please call your primary care clinic.  If you do not have a primary care provider, please call [phone] and they will assist you.        Care EveryWhere ID     This is your Care EveryWhere ID. This could be used by other organizations to access your [facility]  "medical records  [id number]        Your Vitals Were     Pulse Respirations Height Pulse Oximetry BMI (Body Mass Index)       98 13 1.676 m (5' 6\") 97% 23.53 kg/m2        Blood Pressure from Last 3 Encounters:   09/21/17 124/58   09/06/17 (!) 130/92   08/17/17 138/64    Weight from Last 3 Encounters:   09/21/17 66.1 kg (145 lb 12.8 oz)   09/06/17 67.6 kg (149 lb)   08/17/17 68.1 kg (150 lb 1.6 oz)              We Performed the Following     Follow-Up with Electrophysiologist        Primary Care Provider Office Phone # Fax #    Augusto Fish Meyer,  [phone] [address] [place] [address]        Equal Access to Services     LUPE TRENT : Hadii aad ku hadasho Soalfali, waaxda luqadaha, qaybta kaalmada adeegyaponcho, zack oglesby . [facility] [phone].    ATENCIÓN: Si habla español, tiene a granger disposición servicios gratuitos de asistencia lingüística. Geraldine al [phone].    We comply with applicable federal civil rights laws and [place] laws. We do not discriminate on the basis of race, color, national origin, age, disability sex, sexual orientation or gender identity.            Thank you!     Thank you for choosing [facility]  for your care. Our goal is always to provide you with excellent care. Hearing back from our patients is one way we can continue to improve our services. Please take a few minutes to complete the written survey that you may receive in the mail after your visit with us. Thank you!             Your Updated Medication List - Protect others around you: Learn how to safely use, store and throw away your medicines at www.disposemymeds.org.          This list is accurate as of: 9/21/17  1:27 PM.  Always use your most recent med list.                   Brand Name Dispense Instructions for use Diagnosis    ALPRAZolam 0.25 MG tablet    XANAX    28 tablet    Take 1 tablet (0.25 mg) by mouth 2 times daily as needed for " anxiety    Anxiety       aspirin 81 MG EC tablet      Take 1 tablet (81 mg) by mouth daily    S/P TAVR (transcatheter aortic valve replacement)       atorvastatin 40 MG tablet    LIPITOR    1 tablet    Take 1 tablet (40 mg) by mouth daily Reports taking    Hyperlipidemia with target LDL less than 130       calcium carbonate 500 MG chewable tablet    TUMS     Take 2-4 tablets (1,000-2,000 mg) by mouth as needed for heartburn        clopidogrel 75 MG tablet    PLAVIX    90 tablet    Take 1 tablet (75 mg) by mouth daily    History of CVA (cerebrovascular accident)       docusate sodium 100 MG capsule    COLACE     Take 200 mg by mouth daily 2 capsules        famotidine 40 MG tablet    PEPCID     Take 40 mg by mouth daily        FLORAJEN ACIDOPHILUS Caps      Take 1 capsule by mouth daily        folic acid 1 MG tablet    FOLVITE    90 tablet    TAKE ONE TABLET BY MOUTH ONCE DAILY    Seropositive rheumatoid arthritis (H)       iron 325 (65 FE) MG tablet     30 tablet    TAKE ONE TABLET BY MOUTH ONCE DAILY WITH BREAKFAST    Gastrointestinal hemorrhage, unspecified gastrointestinal hemorrhage type       * isosorbide mononitrate 60 MG 24 hr tablet    IMDUR    90 tablet    Take 1 tablet (60 mg) by mouth daily    Chest pain, unspecified type       * isosorbide mononitrate 30 MG 24 hr tablet    IMDUR    30 tablet    Take 1 tablet (30 mg) by mouth daily    Chest pain, unspecified type       LANsoprazole 30 MG CR capsule    PREVACID    30 capsule    Take 1 capsule (30 mg) by mouth daily Take 30-60 minutes before a meal.    Gastroesophageal reflux disease with esophagitis       levETIRAcetam 500 MG tablet    KEPPRA    180 tablet    TAKE ONE TABLET BY MOUTH ONCE DAILY AND one AT BEDTIME    Seizure disorder (H)       levothyroxine 50 MCG tablet    SYNTHROID    30 tablet    Take 1 tablet (50 mcg) by mouth daily    Hypothyroidism due to acquired atrophy of thyroid       methotrexate 2.5 MG tablet CHEMO     52 tablet    Take 4 tablets  (10 mg) by mouth once a week Wed    Seropositive rheumatoid arthritis (H)       metoprolol 25 MG 24 hr tablet    TOPROL-XL    90 tablet    Take 1 tablet (25 mg) by mouth daily    Cardiomyopathy, unspecified (H)       mirtazapine 30 MG tablet    REMERON    90 tablet    TAKE ONE TABLET BY MOUTH AT BEDTIME    Sleep initiation disorder       Multi-vitamin Tabs tablet      Take 1 tablet by mouth daily        MYLANTA PO      2 tblsp every 4 hours as needed        nitroGLYcerin 0.4 MG sublingual tablet    NITROSTAT    25 tablet    Pt reports taking - -  -For chest pain place 1 tablet under the tongue every 5 minutes for 3 doses. If symptoms persist 5 minutes after 1st dose call 911.        predniSONE 5 MG tablet    DELTASONE    23 tablet    TAKE ONE TABLET BY MOUTH EVERY OTHER DAY ALTERNATING  WITH  1/2  TABLET  EVERY  OTHER  DAY    Seropositive rheumatoid arthritis (H)       sulfamethoxazole-trimethoprim 800-160 MG per tablet    BACTRIM DS/SEPTRA DS    28 tablet    Take 1 tablet by mouth 2 times daily for 14 days    Acute cystitis without hematuria       TYLENOL ARTHRITIS PAIN 650 MG CR tablet   Generic drug:  acetaminophen      Take 2 tablets (1,300 mg) by mouth every 8 hours as needed for mild pain        * Notice:  This list has 2 medication(s) that are the same as other medications prescribed for you. Read the directions carefully, and ask your doctor or other care provider to review them with you.

## 2017-09-21 NOTE — TELEPHONE ENCOUNTER
Last Written Prescription Date: 3/30/2017  Last Fill Quantity: 90,  # refills: 0   Last Office Visit with G, [facility] or [facility] prescribing provider: 9/6/2017

## 2017-09-21 NOTE — PROGRESS NOTES
HISTORY OF PRESENT ILLNESS:  I saw Ms. Burk for evaluation of possible BiV pacemaker implantation.  She is a 89-year-old white female with a history of aortic valve stenosis.  She had transcatheter aortic valve replacement last year.  It is my understanding that her LV ejection fraction was about 50% and now is about 40%.  The patient came in with her 2 daughters who stated that she was admitted to a hospital heart attack in [place] in 11/2016.  She underwent cardiac catheterization with intervention.  Unfortunately, I could not find the procedure reports.      For the last few weeks, the patient has been complaining of intermittent chest discomfort.  The chest discomfort appeared to improve with Prevacid for some time but now she is complaining recurrence of chest discomfort.  She is currently taking Imdur 60 mg once a day.  It is my understanding she had a coronary angiography on 08/17 but I could not find the procedure report.      Overall, at the present time she has no shortness of breath.  With walking, she does not develop chest pain.  She has no dizziness, syncope or near-syncope in the recent past.  She has not had any ER visit or hospitalization for CHF decompensation.  She stated that she is somewhat fatigued but she is still able to walk with a cane without difficulty.      The patient had a ZIO Patch monitor in early August that showed 29 episodes of atrial tachycardia with the longest lasting up to 21 seconds.      She did not show any evidence of AV block or asystole.      PHYSICAL EXAMINATION:   VITAL SIGNS:  Blood pressure was 124/58, heart rate 98 beats per minute, body weight 145 pounds.   HEENT:  She wears eyeglasses and hearing aids.   LUNGS:  Clear.   CARDIAC:  Rhythm was regular and heart sounds were normal without murmur.   ABDOMEN:  Examination showed no hepatomegaly.   EXTREMITIES:  There was no pedal edema.      Her recent echocardiography reported ejection fraction of 35%-40%.  She  was found to have severe mitral valve calcification with mild mitral stenosis and moderate regurgitation.      ASSESSMENT AND RECOMMENDATIONS:  Ms. Burk is a 89-year-old white female with a history of coronary artery disease and post TAVR placement.  Her ejection fraction is 35%-40% per echocardiography and the EKG does show left bundle branch block.  She has some mild fatigue but no shortness of breath or other symptoms of congestive heart failure.  At this point, I am not sure how much benefit she will receive from a BiV pacing pacemaker.  Since the BiV pacemaker implantation is an invasive procedure, I have recommended observation for a few more months.  If she remains stable, I would continue medical therapy.  If she has CHF decompensation, I may then reconsider BiV pacemaker implantation.      cc:      [place] [address]         LOREE LOCK MD             D: 2017 13:34   T: 2017 14:11   MT: [id number]      Name:     LUCIO BURK   MRN:      [id number]        Account:      [id number]   :      1927           Service Date: 2017      Document: [id number]

## 2017-09-21 NOTE — LETTER
9/21/2017      RE: Gillian TIFFANY Wm  [address]    [place] [address]       Dear Colleague,    Thank you for the opportunity to participate in the care of your patient, Gillian Burk, at the [facility]. Please see a copy of my visit note below.    HPI and Plan:     No orders of the defined types were placed in this encounter.      No orders of the defined types were placed in this encounter.      There are no discontinued medications.      Encounter Diagnosis   Name Primary?     Chronic systolic congestive heart failure (H)        CURRENT MEDICATIONS:  Current Outpatient Prescriptions   Medication Sig Dispense Refill     sulfamethoxazole-trimethoprim (BACTRIM DS/SEPTRA DS) 800-160 MG per tablet Take 1 tablet by mouth 2 times daily for 14 days 28 tablet 0     isosorbide mononitrate (IMDUR) 30 MG 24 hr tablet Take 1 tablet (30 mg) by mouth daily 30 tablet 0     levothyroxine (SYNTHROID) 50 MCG tablet Take 1 tablet (50 mcg) by mouth daily 30 tablet 3     famotidine (PEPCID) 40 MG tablet Take 40 mg by mouth daily       Calcium & Magnesium Carbonates (MYLANTA PO) 2 tblsp every 4 hours as needed       metoprolol (TOPROL-XL) 25 MG 24 hr tablet Take 1 tablet (25 mg) by mouth daily 90 tablet 3     predniSONE (DELTASONE) 5 MG tablet TAKE ONE TABLET BY MOUTH EVERY OTHER DAY ALTERNATING  WITH  1/2  TABLET  EVERY  OTHER  DAY 23 tablet 13     clopidogrel (PLAVIX) 75 MG tablet Take 1 tablet (75 mg) by mouth daily 90 tablet 3     LANsoprazole (PREVACID) 30 MG CR capsule Take 1 capsule (30 mg) by mouth daily Take 30-60 minutes before a meal. 30 capsule 3     mirtazapine (REMERON) 30 MG tablet TAKE ONE TABLET BY MOUTH AT BEDTIME 90 tablet 2     Ferrous Sulfate (IRON) 325 (65 FE) MG tablet TAKE ONE TABLET BY MOUTH ONCE DAILY WITH BREAKFAST 30 tablet 11     folic acid (FOLVITE) 1 MG tablet TAKE ONE TABLET BY MOUTH ONCE DAILY 90 tablet 1      levETIRAcetam (KEPPRA) 500 MG tablet TAKE ONE TABLET BY MOUTH ONCE DAILY AND one AT BEDTIME 180 tablet 0     isosorbide mononitrate (IMDUR) 60 MG 24 hr tablet Take 1 tablet (60 mg) by mouth daily 90 tablet 3     atorvastatin (LIPITOR) 40 MG tablet Take 1 tablet (40 mg) by mouth daily Reports taking 1 tablet 0     nitroglycerin (NITROSTAT) 0.4 MG SL tablet Pt reports taking - -  -For chest pain place 1 tablet under the tongue every 5 minutes for 3 doses. If symptoms persist 5 minutes after 1st dose call 911. 25 tablet 0     aspirin EC 81 MG EC tablet Take 1 tablet (81 mg) by mouth daily       methotrexate 2.5 MG tablet Take 4 tablets (10 mg) by mouth once a week Wed 52 tablet 3     acetaminophen (TYLENOL ARTHRITIS PAIN) 650 MG CR tablet Take 2 tablets (1,300 mg) by mouth every 8 hours as needed for mild pain       calcium carbonate (TUMS) 500 MG chewable tablet Take 2-4 tablets (1,000-2,000 mg) by mouth as needed for heartburn       docusate sodium (COLACE) 100 MG capsule Take 200 mg by mouth daily 2 capsules       multivitamin, therapeutic with minerals (MULTI-VITAMIN) TABS Take 1 tablet by mouth daily       Lactobacillus (FLORAJEN ACIDOPHILUS) CAPS Take 1 capsule by mouth daily       ALPRAZolam (XANAX) 0.25 MG tablet Take 1 tablet (0.25 mg) by mouth 2 times daily as needed for anxiety 28 tablet 0       ALLERGIES     Allergies   Allergen Reactions     Caffeine Other (See Comments)     Triggers your Meniere's disease     Hydrocodone Other (See Comments)     hallucinations     Ibuprofen GI Disturbance     Penicillins Hives     Tramadol Other (See Comments)     Seizure, was taking remeron along with tramadol     Metal [Staples] Rash       PAST MEDICAL HISTORY:  Past Medical History:   Diagnosis Date     Aortic stenosis     s/p TAVR 8/17/16     Chronic migraine      Hyperlipidaemia      Hypertension      Hypothyroidism      Myocardial infarction (H)      Need for SBE (subacute bacterial endocarditis) prophylaxis       Orthostatic hypotension     wears compression stockings     PUD (peptic ulcer disease)      Secondary Sjogren's syndrome (H)      Seizures (H)     after stroke (partial onset)     Seropositive rheumatoid arthritis (H)      Stroke (H) 05/2013    with visual field cut, left occipital lobe     Syncope 2014    due to orthostatic hypotension       PAST SURGICAL HISTORY:  Past Surgical History:   Procedure Laterality Date     C TOTAL HIP ARTHROPLASTY Right 2010     C TOTAL HIP ARTHROPLASTY Left 2014     CATARACT IOL, RT/LT Bilateral 2000     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     HEART CATH FEMORAL CANNULIZATION WITH OPEN STANDBY REPAIR AORTIC VALVE N/A 8/3/2016    Procedure: HEART CATH FEMORAL CANNULIZATION WITH OPEN STANDBY REPAIR AORTIC VALVE;  Surgeon: Owen Schultz MD;  Location: [facility] OR     HYSTERECTOMY TOTAL ABDOMINAL  1970    ovaries out     MOUTH SURGERY  2011    jaw surgery due to fracture     TRANSCATHETER AORTIC VALVE IMPLANT ANESTHESIA N/A 8/3/2016    Procedure: TRANSCATHETER AORTIC VALVE IMPLANT ANESTHESIA;  Surgeon: GENERIC ANESTHESIA PROVIDER;  Location: [facility] OR       FAMILY HISTORY:  Family History   Problem Relation Age of Onset     Hyperlipidemia Mother      Family History Negative No family hx of        SOCIAL HISTORY:  Social History     Social History     Marital status:      Spouse name: N/A     Number of children: 4     Years of education: N/A     Occupational History      Retired     Social History Main Topics     Smoking status: Never Smoker     Smokeless tobacco: Never Used     Alcohol use No     Drug use: No     Sexual activity: No     Other Topics Concern     Special Diet Yes     low salt      Exercise Yes     semi recument bike 15 mins daily      Social History Narrative    .  Lives in assisted living. Independent. Has help with making bed and changing sheets.    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 .     4 children - 1 with RA      "      Review of Systems:  Skin:  Positive for bruising     Eyes:  Positive for glasses    ENT:  Negative hearing loss wears hearing aids in both ears  Respiratory:  Positive for dyspnea on exertion;shortness of breath     Cardiovascular:    edema;chest pain;Positive for;lightheadedness;dizziness;palpitations    Gastroenterology: Positive for heartburn protonix, tums  ( more frequently having episodes)  Genitourinary:  not assessed      Musculoskeletal:  Positive for arthritis low back pain   Neurologic:  Negative numbness or tingling of feet toes,  has had this since 2006  Psychiatric:  Positive for anxiety    Heme/Lymph/Imm:  Negative easy bruising    Endocrine:  Positive for thyroid disorder      Physical Exam:  Vitals: /58 (BP Location: Right arm, Patient Position: Right side, Cuff Size: Adult Regular)  Pulse 98  Resp 13  Ht 1.676 m (5' 6\")  Wt 66.1 kg (145 lb 12.8 oz)  SpO2 97%  BMI 23.53 kg/m2    Constitutional:  cooperative;alert and oriented;well developed;well nourished        Skin:  warm and dry to the touch        Head:  normocephalic        Eyes:  pupils equal and round;sclera white        ENT:  no pallor or cyanosis        Neck:  carotid pulses are full and equal bilaterally;JVP normal transmitted murmur      Chest:  clear to auscultation;normal symmetry          Cardiac: regular rhythm       systolic ejection murmur;grade 3;RUSB          Abdomen:  not assessed this visit        Vascular: pulses full and equal, no bruits auscultated                                        Extremities and Back:  no edema;no deformities, clubbing, cyanosis, erythema observed   bilateral LE edema;trace     Compression stockings in place    Neurological:  no gross motor deficits;affect appropriate          HISTORY OF PRESENT ILLNESS:  I saw Ms. Burk for evaluation of possible BiV pacemaker implantation.  She is a 89-year-old white female with a history of aortic valve stenosis.  She had transcatheter aortic " valve replacement last year.  It is my understanding that her LV ejection fraction was about 50% and now is about 40%.  The patient came in with her 2 daughters who stated that she was admitted to a hospital heart attack in [place] in 11/2016.  She underwent cardiac catheterization with intervention.  Unfortunately, I could not find the procedure reports.      For the last few weeks, the patient has been complaining of intermittent chest discomfort.  The chest discomfort appeared to improve with Prevacid for some time but now she is complaining recurrence of chest discomfort.  She is currently taking Imdur 60 mg once a day.  It is my understanding she had a coronary angiography on 08/17 but I could not find the procedure report.      Overall, at the present time she has no shortness of breath.  With walking, she does not develop chest pain.  She has no dizziness, syncope or near-syncope in the recent past.  She has not had any ER visit or hospitalization for CHF decompensation.  She stated that she is somewhat fatigued but she is still able to walk with a cane without difficulty.      The patient had a ZIO Patch monitor in early August that showed 29 episodes of atrial tachycardia with the longest lasting up to 21 seconds.      She did not show any evidence of AV block or asystole.      PHYSICAL EXAMINATION:   VITAL SIGNS:  Blood pressure was 124/58, heart rate 98 beats per minute, body weight 145 pounds.   HEENT:  She wears eyeglasses and hearing aids.   LUNGS:  Clear.   CARDIAC:  Rhythm was regular and heart sounds were normal without murmur.   ABDOMEN:  Examination showed no hepatomegaly.   EXTREMITIES:  There was no pedal edema.      Her recent echocardiography reported ejection fraction of 35%-40%.  She was found to have severe mitral valve calcification with mild mitral stenosis and moderate regurgitation.      ASSESSMENT AND RECOMMENDATIONS:  Ms. Burk is a 89-year-old white female with a history of  coronary artery disease and post TAVR placement.  Her ejection fraction is 35%-40% per echocardiography and the EKG does show left bundle branch block.  She has some mild fatigue but no shortness of breath or other symptoms of congestive heart failure.  At this point, I am not sure how much benefit she will receive from a BiV pacing pacemaker.  Since the BiV pacemaker implantation is an invasive procedure, I have recommended observation for a few more months.  If she remains stable, I would continue medical therapy.  If she has CHF decompensation, I may then reconsider BiV pacemaker implantation.      Maki Griffith MD    CC:      Augusto Meyer DO    [facility]   [address] [place] [address]      Matt Mccracken PA-C   HEART [facility]  [address] [place] [address]

## 2017-09-21 NOTE — LETTER
9/21/2017    Augusto Meyer, DO  [address] [place] [address]      RE: Gillian ALLEN Wm       Dear Colleague,    I had the pleasure of seeing Gillian Burk in the [facility].    I saw Ms. Burk for evaluation of possible BiV pacemaker implantation.  She is a 89-year-old white female with a history of aortic valve stenosis.  She had transcatheter aortic valve replacement last year.  It is my understanding that her LV ejection fraction was about 50% and now is about 40%.  The patient came in with her 2 daughters who stated that she was admitted to a hospital heart attack in [place] in 11/2016.  She underwent cardiac catheterization with intervention.  Unfortunately, I could not find the procedure reports.      For the last few weeks, the patient has been complaining of intermittent chest discomfort.  The chest discomfort appeared to improve with Prevacid for some time but now she is complaining recurrence of chest discomfort.  She is currently taking Imdur 60 mg once a day.  It is my understanding she had a coronary angiography on 08/17 but I could not find the procedure report.      Overall, at the present time she has no shortness of breath.  With walking, she does not develop chest pain.  She has no dizziness, syncope or near-syncope in the recent past.  She has not had any ER visit or hospitalization for CHF decompensation.  She stated that she is somewhat fatigued but she is still able to walk with a cane without difficulty.      The patient had a ZIO Patch monitor in early August that showed 29 episodes of atrial tachycardia with the longest lasting up to 21 seconds.      She did not show any evidence of AV block or asystole.      PHYSICAL EXAMINATION:   VITAL SIGNS:  Blood pressure was 124/58, heart rate 98 beats per minute, body weight 145 pounds.   HEENT:  She wears eyeglasses and hearing aids.   LUNGS:  Clear.   CARDIAC:  Rhythm was regular and  heart sounds were normal without murmur.   ABDOMEN:  Examination showed no hepatomegaly.   EXTREMITIES:  There was no pedal edema.      Her recent echocardiography reported ejection fraction of 35%-40%.  She was found to have severe mitral valve calcification with mild mitral stenosis and moderate regurgitation.     Outpatient Encounter Prescriptions as of 9/21/2017   Medication Sig Dispense Refill     sulfamethoxazole-trimethoprim (BACTRIM DS/SEPTRA DS) 800-160 MG per tablet Take 1 tablet by mouth 2 times daily for 14 days 28 tablet 0     isosorbide mononitrate (IMDUR) 30 MG 24 hr tablet Take 1 tablet (30 mg) by mouth daily 30 tablet 0     levothyroxine (SYNTHROID) 50 MCG tablet Take 1 tablet (50 mcg) by mouth daily 30 tablet 3     famotidine (PEPCID) 40 MG tablet Take 40 mg by mouth daily       Calcium & Magnesium Carbonates (MYLANTA PO) 2 tblsp every 4 hours as needed       metoprolol (TOPROL-XL) 25 MG 24 hr tablet Take 1 tablet (25 mg) by mouth daily 90 tablet 3     predniSONE (DELTASONE) 5 MG tablet TAKE ONE TABLET BY MOUTH EVERY OTHER DAY ALTERNATING  WITH  1/2  TABLET  EVERY  OTHER  DAY 23 tablet 13     clopidogrel (PLAVIX) 75 MG tablet Take 1 tablet (75 mg) by mouth daily 90 tablet 3     LANsoprazole (PREVACID) 30 MG CR capsule Take 1 capsule (30 mg) by mouth daily Take 30-60 minutes before a meal. 30 capsule 3     mirtazapine (REMERON) 30 MG tablet TAKE ONE TABLET BY MOUTH AT BEDTIME 90 tablet 2     Ferrous Sulfate (IRON) 325 (65 FE) MG tablet TAKE ONE TABLET BY MOUTH ONCE DAILY WITH BREAKFAST 30 tablet 11     [DISCONTINUED] folic acid (FOLVITE) 1 MG tablet TAKE ONE TABLET BY MOUTH ONCE DAILY 90 tablet 1     levETIRAcetam (KEPPRA) 500 MG tablet TAKE ONE TABLET BY MOUTH ONCE DAILY AND one AT BEDTIME 180 tablet 0     isosorbide mononitrate (IMDUR) 60 MG 24 hr tablet Take 1 tablet (60 mg) by mouth daily 90 tablet 3     atorvastatin (LIPITOR) 40 MG tablet Take 1 tablet (40 mg) by mouth daily Reports taking 1  tablet 0     nitroglycerin (NITROSTAT) 0.4 MG SL tablet Pt reports taking - -  -For chest pain place 1 tablet under the tongue every 5 minutes for 3 doses. If symptoms persist 5 minutes after 1st dose call 911. 25 tablet 0     aspirin EC 81 MG EC tablet Take 1 tablet (81 mg) by mouth daily       methotrexate 2.5 MG tablet Take 4 tablets (10 mg) by mouth once a week Wed 52 tablet 3     acetaminophen (TYLENOL ARTHRITIS PAIN) 650 MG CR tablet Take 2 tablets (1,300 mg) by mouth every 8 hours as needed for mild pain       calcium carbonate (TUMS) 500 MG chewable tablet Take 2-4 tablets (1,000-2,000 mg) by mouth as needed for heartburn       docusate sodium (COLACE) 100 MG capsule Take 200 mg by mouth daily 2 capsules       multivitamin, therapeutic with minerals (MULTI-VITAMIN) TABS Take 1 tablet by mouth daily       Lactobacillus (FLORAJEN ACIDOPHILUS) CAPS Take 1 capsule by mouth daily       ALPRAZolam (XANAX) 0.25 MG tablet Take 1 tablet (0.25 mg) by mouth 2 times daily as needed for anxiety 28 tablet 0     No facility-administered encounter medications on file as of 9/21/2017.       ASSESSMENT AND RECOMMENDATIONS:  Ms. Burk is a 89-year-old white female with a history of coronary artery disease and post TAVR placement.  Her ejection fraction is 35%-40% per echocardiography and the EKG does show left bundle branch block.  She has some mild fatigue but no shortness of breath or other symptoms of congestive heart failure.  At this point, I am not sure how much benefit she will receive from a BiV pacing pacemaker.  Since the BiV pacemaker implantation is an invasive procedure, I have recommended observation for a few more months.  If she remains stable, I would continue medical therapy.  If she has CHF decompensation, I may then reconsider BiV pacemaker implantation.      Again, thank you for allowing me to participate in the care of your patient.      Sincerely,    Maki Griffith MD     [facility]  [facility]

## 2017-09-21 NOTE — PROGRESS NOTES
HPI and Plan:   See dictation    No orders of the defined types were placed in this encounter.      No orders of the defined types were placed in this encounter.      There are no discontinued medications.      Encounter Diagnosis   Name Primary?     Chronic systolic congestive heart failure (H)        CURRENT MEDICATIONS:  Current Outpatient Prescriptions   Medication Sig Dispense Refill     sulfamethoxazole-trimethoprim (BACTRIM DS/SEPTRA DS) 800-160 MG per tablet Take 1 tablet by mouth 2 times daily for 14 days 28 tablet 0     isosorbide mononitrate (IMDUR) 30 MG 24 hr tablet Take 1 tablet (30 mg) by mouth daily 30 tablet 0     levothyroxine (SYNTHROID) 50 MCG tablet Take 1 tablet (50 mcg) by mouth daily 30 tablet 3     famotidine (PEPCID) 40 MG tablet Take 40 mg by mouth daily       Calcium & Magnesium Carbonates (MYLANTA PO) 2 tblsp every 4 hours as needed       metoprolol (TOPROL-XL) 25 MG 24 hr tablet Take 1 tablet (25 mg) by mouth daily 90 tablet 3     predniSONE (DELTASONE) 5 MG tablet TAKE ONE TABLET BY MOUTH EVERY OTHER DAY ALTERNATING  WITH  1/2  TABLET  EVERY  OTHER  DAY 23 tablet 13     clopidogrel (PLAVIX) 75 MG tablet Take 1 tablet (75 mg) by mouth daily 90 tablet 3     LANsoprazole (PREVACID) 30 MG CR capsule Take 1 capsule (30 mg) by mouth daily Take 30-60 minutes before a meal. 30 capsule 3     mirtazapine (REMERON) 30 MG tablet TAKE ONE TABLET BY MOUTH AT BEDTIME 90 tablet 2     Ferrous Sulfate (IRON) 325 (65 FE) MG tablet TAKE ONE TABLET BY MOUTH ONCE DAILY WITH BREAKFAST 30 tablet 11     folic acid (FOLVITE) 1 MG tablet TAKE ONE TABLET BY MOUTH ONCE DAILY 90 tablet 1     levETIRAcetam (KEPPRA) 500 MG tablet TAKE ONE TABLET BY MOUTH ONCE DAILY AND one AT BEDTIME 180 tablet 0     isosorbide mononitrate (IMDUR) 60 MG 24 hr tablet Take 1 tablet (60 mg) by mouth daily 90 tablet 3     atorvastatin (LIPITOR) 40 MG tablet Take 1 tablet (40 mg) by mouth daily Reports taking 1 tablet 0     nitroglycerin  (NITROSTAT) 0.4 MG SL tablet Pt reports taking - -  -For chest pain place 1 tablet under the tongue every 5 minutes for 3 doses. If symptoms persist 5 minutes after 1st dose call 911. 25 tablet 0     aspirin EC 81 MG EC tablet Take 1 tablet (81 mg) by mouth daily       methotrexate 2.5 MG tablet Take 4 tablets (10 mg) by mouth once a week Wed 52 tablet 3     acetaminophen (TYLENOL ARTHRITIS PAIN) 650 MG CR tablet Take 2 tablets (1,300 mg) by mouth every 8 hours as needed for mild pain       calcium carbonate (TUMS) 500 MG chewable tablet Take 2-4 tablets (1,000-2,000 mg) by mouth as needed for heartburn       docusate sodium (COLACE) 100 MG capsule Take 200 mg by mouth daily 2 capsules       multivitamin, therapeutic with minerals (MULTI-VITAMIN) TABS Take 1 tablet by mouth daily       Lactobacillus (FLORAJEN ACIDOPHILUS) CAPS Take 1 capsule by mouth daily       ALPRAZolam (XANAX) 0.25 MG tablet Take 1 tablet (0.25 mg) by mouth 2 times daily as needed for anxiety 28 tablet 0       ALLERGIES     Allergies   Allergen Reactions     Caffeine Other (See Comments)     Triggers your Meniere's disease     Hydrocodone Other (See Comments)     hallucinations     Ibuprofen GI Disturbance     Penicillins Hives     Tramadol Other (See Comments)     Seizure, was taking remeron along with tramadol     Metal [Staples] Rash       PAST MEDICAL HISTORY:  Past Medical History:   Diagnosis Date     Aortic stenosis     s/p TAVR 8/17/16     Chronic migraine      Hyperlipidaemia      Hypertension      Hypothyroidism      Myocardial infarction (H)      Need for SBE (subacute bacterial endocarditis) prophylaxis      Orthostatic hypotension     wears compression stockings     PUD (peptic ulcer disease)      Secondary Sjogren's syndrome (H)      Seizures (H)     after stroke (partial onset)     Seropositive rheumatoid arthritis (H)      Stroke (H) 05/2013    with visual field cut, left occipital lobe     Syncope 2014    due to orthostatic  hypotension       PAST SURGICAL HISTORY:  Past Surgical History:   Procedure Laterality Date     C TOTAL HIP ARTHROPLASTY Right 2010     C TOTAL HIP ARTHROPLASTY Left 2014     CATARACT IOL, RT/LT Bilateral 2000     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     HEART CATH FEMORAL CANNULIZATION WITH OPEN STANDBY REPAIR AORTIC VALVE N/A 8/3/2016    Procedure: HEART CATH FEMORAL CANNULIZATION WITH OPEN STANDBY REPAIR AORTIC VALVE;  Surgeon: Owen Schultz MD;  Location: [facility] OR     HYSTERECTOMY TOTAL ABDOMINAL  1970    ovaries out     MOUTH SURGERY  2011    jaw surgery due to fracture     TRANSCATHETER AORTIC VALVE IMPLANT ANESTHESIA N/A 8/3/2016    Procedure: TRANSCATHETER AORTIC VALVE IMPLANT ANESTHESIA;  Surgeon: GENERIC ANESTHESIA PROVIDER;  Location: [facility] OR       FAMILY HISTORY:  Family History   Problem Relation Age of Onset     Hyperlipidemia Mother      Family History Negative No family hx of        SOCIAL HISTORY:  Social History     Social History     Marital status:      Spouse name: N/A     Number of children: 4     Years of education: N/A     Occupational History      Retired     Social History Main Topics     Smoking status: Never Smoker     Smokeless tobacco: Never Used     Alcohol use No     Drug use: No     Sexual activity: No     Other Topics Concern     Special Diet Yes     low salt      Exercise Yes     semi recument bike 15 mins daily      Social History Narrative    .  Lives in assisted living. Independent. Has help with making bed and changing sheets.    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 .     4 children - 1 with RA           Review of Systems:  Skin:  Positive for bruising     Eyes:  Positive for glasses    ENT:  Negative hearing loss wears hearing aids in both ears  Respiratory:  Positive for dyspnea on exertion;shortness of breath     Cardiovascular:    edema;chest pain;Positive for;lightheadedness;dizziness;palpitations   "  Gastroenterology: Positive for heartburn protonix, tums  ( more frequently having episodes)  Genitourinary:  not assessed      Musculoskeletal:  Positive for arthritis low back pain   Neurologic:  Negative numbness or tingling of feet toes,  has had this since 2006  Psychiatric:  Positive for anxiety    Heme/Lymph/Imm:  Negative easy bruising    Endocrine:  Positive for thyroid disorder      Physical Exam:  Vitals: /58 (BP Location: Right arm, Patient Position: Right side, Cuff Size: Adult Regular)  Pulse 98  Resp 13  Ht 1.676 m (5' 6\")  Wt 66.1 kg (145 lb 12.8 oz)  SpO2 97%  BMI 23.53 kg/m2    Constitutional:  cooperative;alert and oriented;well developed;well nourished        Skin:  warm and dry to the touch        Head:  normocephalic        Eyes:  pupils equal and round;sclera white        ENT:  no pallor or cyanosis        Neck:  carotid pulses are full and equal bilaterally;JVP normal transmitted murmur      Chest:  clear to auscultation;normal symmetry          Cardiac: regular rhythm       systolic ejection murmur;grade 3;RUSB          Abdomen:  not assessed this visit        Vascular: pulses full and equal, no bruits auscultated                                        Extremities and Back:  no edema;no deformities, clubbing, cyanosis, erythema observed   bilateral LE edema;trace     Compression stockings in place    Neurological:  no gross motor deficits;affect appropriate              CC  Matt Mccracken PA-C   HE[facility]ARE  [id number] [id number]              "

## 2017-09-22 LAB
BACTERIA SPEC CULT: ABNORMAL
SPECIMEN SOURCE: ABNORMAL

## 2017-09-25 RX ORDER — NITROFURANTOIN 25; 75 MG/1; MG/1
100 CAPSULE ORAL 2 TIMES DAILY
Qty: 14 CAPSULE | Refills: 0 | Status: SHIPPED | OUTPATIENT
Start: 2017-09-25 | End: 2017-11-13

## 2017-09-26 ENCOUNTER — MYC MEDICAL ADVICE (OUTPATIENT)
Dept: FAMILY MEDICINE | Facility: OTHER | Age: 82
End: 2017-09-26

## 2017-09-26 NOTE — PROGRESS NOTES
Dear Gillian, your recent test results are attached.  The culture of your ear and suggest that it may be resistant to the antibiotic you are on.  A prescription for a different antibiotics/nitrofurantoin has been called to the [facility] pharmacy.  You should start this as soon as possible and we should recheck your urine in about 10 days.    Feel free to contact me via the office or My Chart if you have any questions regarding the above.  Sincerely,  Augusto Meyer DO FACOI

## 2017-09-26 NOTE — PROGRESS NOTES
A prescription for a different antibiotic has been called to the [facility] pharmacy.  Cultures suggest that she may be resistant to the current antibiotic.  She should recheck the urine test in about 10 days.  Betsy

## 2017-10-06 DIAGNOSIS — R30.0 DYSURIA: ICD-10-CM

## 2017-10-06 LAB
ALBUMIN UR-MCNC: NEGATIVE MG/DL
APPEARANCE UR: CLEAR
BILIRUB UR QL STRIP: NEGATIVE
COLOR UR AUTO: YELLOW
GLUCOSE UR STRIP-MCNC: NEGATIVE MG/DL
HGB UR QL STRIP: NEGATIVE
KETONES UR STRIP-MCNC: NEGATIVE MG/DL
LEUKOCYTE ESTERASE UR QL STRIP: NEGATIVE
NITRATE UR QL: NEGATIVE
PH UR STRIP: 6 PH (ref 5–7)
SOURCE: NORMAL
SP GR UR STRIP: 1.01 (ref 1–1.03)
UROBILINOGEN UR STRIP-MCNC: 0 MG/DL (ref 0–2)

## 2017-10-06 PROCEDURE — 81003 URINALYSIS AUTO W/O SCOPE: CPT | Performed by: INTERNAL MEDICINE

## 2017-10-10 DIAGNOSIS — K21.9 GASTROESOPHAGEAL REFLUX DISEASE WITHOUT ESOPHAGITIS: ICD-10-CM

## 2017-10-10 RX ORDER — FAMOTIDINE 40 MG/1
TABLET, FILM COATED ORAL
Qty: 30 TABLET | Refills: 9 | Status: SHIPPED | OUTPATIENT
Start: 2017-10-10 | End: 2018-04-06

## 2017-10-10 NOTE — TELEPHONE ENCOUNTER
famotidine (PEPCID) 40 MG tablet      Last Written Prescription Date:  ?  Last Fill Quantity: ?,   # refills: ?  Last Office Visit with [facility], [facility] or [facility] prescribing provider: 9/6/17  Future Office visit:       Routing refill request to provider for review/approval because:  Medication is reported/historical

## 2017-10-11 ENCOUNTER — TELEPHONE (OUTPATIENT)
Dept: INTERNAL MEDICINE | Facility: CLINIC | Age: 82
End: 2017-10-11

## 2017-10-11 ENCOUNTER — MYC MEDICAL ADVICE (OUTPATIENT)
Dept: FAMILY MEDICINE | Facility: OTHER | Age: 82
End: 2017-10-11

## 2017-10-11 DIAGNOSIS — N30.00 ACUTE CYSTITIS WITHOUT HEMATURIA: Primary | ICD-10-CM

## 2017-10-11 RX ORDER — SULFAMETHOXAZOLE/TRIMETHOPRIM 800-160 MG
1 TABLET ORAL 2 TIMES DAILY
Qty: 20 TABLET | Refills: 0 | Status: SHIPPED | OUTPATIENT
Start: 2017-10-11 | End: 2017-11-13

## 2017-10-11 NOTE — TELEPHONE ENCOUNTER
Patient called and gave verbal consent to speak to her daughter Hanna about the following issue.  Patient was prescribed Bactrim due to new symptoms of a UTI today.  They would like to know why Bactrim when the last UTI, 3 weeks ago,the patient had was found to be resistant to Bactrim.  Provider please advise.  Franklyn Archuleta MA

## 2017-10-11 NOTE — TELEPHONE ENCOUNTER
I have replied to pt's [employer] message with message below. Rocío Burch CMA ([facility])

## 2017-10-11 NOTE — TELEPHONE ENCOUNTER
Since the patient's urinary symptoms resolved completely, I'm assuming that this is a second infection. Additionally, we will need culture results to request preauthorization for the nitrofurantoin. If the patient would like to come in for a urinary analysis and culture, that would be a good idea. Otherwise, I would recommend the Bactrim as stated.    Betsy

## 2017-10-11 NOTE — TELEPHONE ENCOUNTER
I have sent a prescription for an antibiotic to the pharmacy in [place]. The patient should not take her methotrexate this week while she is on the antibiotic.  She should check her urine upon completion of the antibiotic.  Orders have been placed.  viviane

## 2017-10-12 DIAGNOSIS — N30.00 ACUTE CYSTITIS WITHOUT HEMATURIA: ICD-10-CM

## 2017-10-12 LAB
ALBUMIN UR-MCNC: 100 MG/DL
AMORPH CRY #/AREA URNS HPF: ABNORMAL /HPF
APPEARANCE UR: ABNORMAL
BACTERIA #/AREA URNS HPF: ABNORMAL /HPF
BILIRUB UR QL STRIP: NEGATIVE
COLOR UR AUTO: ABNORMAL
GLUCOSE UR STRIP-MCNC: NEGATIVE MG/DL
HGB UR QL STRIP: ABNORMAL
KETONES UR STRIP-MCNC: NEGATIVE MG/DL
LEUKOCYTE ESTERASE UR QL STRIP: ABNORMAL
NITRATE UR QL: POSITIVE
PH UR STRIP: 5 PH (ref 5–7)
RBC #/AREA URNS AUTO: 4 /HPF (ref 0–2)
SOURCE: ABNORMAL
SP GR UR STRIP: 1.01 (ref 1–1.03)
TRANS CELLS #/AREA URNS HPF: 6 /HPF
UROBILINOGEN UR STRIP-MCNC: 0 MG/DL (ref 0–2)
WBC #/AREA URNS AUTO: >182 /HPF (ref 0–2)
WBC CLUMPS #/AREA URNS HPF: PRESENT /HPF

## 2017-10-12 PROCEDURE — 87086 URINE CULTURE/COLONY COUNT: CPT | Performed by: INTERNAL MEDICINE

## 2017-10-12 PROCEDURE — 87186 SC STD MICRODIL/AGAR DIL: CPT | Performed by: INTERNAL MEDICINE

## 2017-10-12 PROCEDURE — 87088 URINE BACTERIA CULTURE: CPT | Performed by: INTERNAL MEDICINE

## 2017-10-12 PROCEDURE — 81001 URINALYSIS AUTO W/SCOPE: CPT | Performed by: INTERNAL MEDICINE

## 2017-10-15 LAB
BACTERIA SPEC CULT: ABNORMAL
Lab: ABNORMAL
SPECIMEN SOURCE: ABNORMAL

## 2017-10-19 ENCOUNTER — MYC MEDICAL ADVICE (OUTPATIENT)
Dept: FAMILY MEDICINE | Facility: OTHER | Age: 82
End: 2017-10-19

## 2017-10-19 DIAGNOSIS — N39.0 RECURRENT UTI: Primary | ICD-10-CM

## 2017-10-19 NOTE — PROGRESS NOTES
Dear Gillian, your recent test results are attached.  Your urine culture suggests an infection with bacteria that is sensitive to the antibiotics that you were started on.    Feel free to contact me via the office or My Chart if you have any questions regarding the above.  Sincerely,  DO DELMA Pineda

## 2017-10-20 NOTE — TELEPHONE ENCOUNTER
I have replied to pt's Qian Xiaoâ€™er message. Rocío Burch CMA ([facility])

## 2017-10-30 ENCOUNTER — TRANSFERRED RECORDS (OUTPATIENT)
Dept: HEALTH INFORMATION MANAGEMENT | Facility: CLINIC | Age: 82
End: 2017-10-30

## 2017-10-31 ENCOUNTER — CARE COORDINATION (OUTPATIENT)
Dept: CARDIOLOGY | Facility: CLINIC | Age: 82
End: 2017-10-31

## 2017-10-31 NOTE — PROGRESS NOTES
"Per YVON Mcgregor, \"     I think I provided the names of the [place] cardiologists, so I would recommend any of them. Thanks!     Matt      Reviewed this information with patient/daughter who state understanding.   "

## 2017-11-01 DIAGNOSIS — E78.5 HYPERLIPIDEMIA WITH TARGET LDL LESS THAN 130: ICD-10-CM

## 2017-11-02 RX ORDER — ATORVASTATIN CALCIUM 40 MG/1
40 TABLET, FILM COATED ORAL DAILY
Qty: 90 TABLET | Refills: 0 | Status: SHIPPED | OUTPATIENT
Start: 2017-11-02 | End: 2018-01-30

## 2017-11-02 NOTE — TELEPHONE ENCOUNTER
Routing refill request to provider for review/approval because:  Labs not current:  FLP  Medication is reported/historical    Simin Bueno RN  [facility]

## 2017-11-02 NOTE — TELEPHONE ENCOUNTER
Requested Prescriptions   Pending Prescriptions Disp Refills     atorvastatin (LIPITOR) 40 MG tablet 1 tablet 0     Sig: Take 1 tablet (40 mg) by mouth daily Reports taking    Statins Protocol Failed    11/1/2017  9:40 AM       Failed - LDL on file in past 12 months    Recent Labs   Lab Test  07/07/15   1202   LDL  69            Passed - No abnormal creatine kinase in past 12 months    No lab results found.         Passed - Recent or future visit with authorizing provider    Patient had office visit in the last year or has a visit in the next 30 days with authorizing provider.  See chart review.              Passed - Patient is age 18 or older       Passed - No active pregnancy on record       Passed - No positive pregnancy test in past 12 months

## 2017-11-13 ENCOUNTER — OFFICE VISIT (OUTPATIENT)
Dept: CARDIOLOGY | Facility: CLINIC | Age: 82
End: 2017-11-13
Payer: COMMERCIAL

## 2017-11-13 VITALS
HEIGHT: 66 IN | BODY MASS INDEX: 23.29 KG/M2 | OXYGEN SATURATION: 96 % | SYSTOLIC BLOOD PRESSURE: 140 MMHG | WEIGHT: 144.9 LBS | HEART RATE: 86 BPM | DIASTOLIC BLOOD PRESSURE: 64 MMHG

## 2017-11-13 DIAGNOSIS — Z23 NEED FOR PROPHYLACTIC VACCINATION AND INOCULATION AGAINST INFLUENZA: ICD-10-CM

## 2017-11-13 DIAGNOSIS — E78.5 HYPERLIPIDEMIA WITH TARGET LDL LESS THAN 130: ICD-10-CM

## 2017-11-13 DIAGNOSIS — I10 BENIGN ESSENTIAL HYPERTENSION: ICD-10-CM

## 2017-11-13 DIAGNOSIS — Z95.2 S/P TAVR (TRANSCATHETER AORTIC VALVE REPLACEMENT): ICD-10-CM

## 2017-11-13 DIAGNOSIS — I25.10 CORONARY ARTERY DISEASE INVOLVING NATIVE CORONARY ARTERY OF NATIVE HEART WITHOUT ANGINA PECTORIS: Primary | ICD-10-CM

## 2017-11-13 DIAGNOSIS — I50.22 CHRONIC SYSTOLIC CONGESTIVE HEART FAILURE (H): ICD-10-CM

## 2017-11-13 DIAGNOSIS — R07.9 CHEST PAIN, UNSPECIFIED TYPE: ICD-10-CM

## 2017-11-13 LAB
ALBUMIN SERPL-MCNC: 3.6 G/DL (ref 3.4–5)
ALP SERPL-CCNC: 67 U/L (ref 40–150)
ALT SERPL W P-5'-P-CCNC: 24 U/L (ref 0–50)
ANION GAP SERPL CALCULATED.3IONS-SCNC: 7 MMOL/L (ref 3–14)
AST SERPL W P-5'-P-CCNC: 21 U/L (ref 0–45)
BILIRUB SERPL-MCNC: 0.2 MG/DL (ref 0.2–1.3)
BUN SERPL-MCNC: 17 MG/DL (ref 7–30)
CALCIUM SERPL-MCNC: 8.4 MG/DL (ref 8.5–10.1)
CHLORIDE SERPL-SCNC: 102 MMOL/L (ref 94–109)
CO2 SERPL-SCNC: 28 MMOL/L (ref 20–32)
CREAT SERPL-MCNC: 1.15 MG/DL (ref 0.52–1.04)
ERYTHROCYTE [DISTWIDTH] IN BLOOD BY AUTOMATED COUNT: 15.9 % (ref 10–15)
GFR SERPL CREATININE-BSD FRML MDRD: 44 ML/MIN/1.7M2
GLUCOSE SERPL-MCNC: 103 MG/DL (ref 70–99)
HCT VFR BLD AUTO: 35.3 % (ref 35–47)
HGB BLD-MCNC: 11.3 G/DL (ref 11.7–15.7)
INR PPP: 0.91 (ref 0.86–1.14)
MCH RBC QN AUTO: 31 PG (ref 26.5–33)
MCHC RBC AUTO-ENTMCNC: 32 G/DL (ref 31.5–36.5)
MCV RBC AUTO: 97 FL (ref 78–100)
PLATELET # BLD AUTO: 238 10E9/L (ref 150–450)
POTASSIUM SERPL-SCNC: 4.2 MMOL/L (ref 3.4–5.3)
PROT SERPL-MCNC: 7.2 G/DL (ref 6.8–8.8)
RBC # BLD AUTO: 3.65 10E12/L (ref 3.8–5.2)
SODIUM SERPL-SCNC: 137 MMOL/L (ref 133–144)
WBC # BLD AUTO: 6.7 10E9/L (ref 4–11)

## 2017-11-13 PROCEDURE — 36415 COLL VENOUS BLD VENIPUNCTURE: CPT | Performed by: INTERNAL MEDICINE

## 2017-11-13 PROCEDURE — 90662 IIV NO PRSV INCREASED AG IM: CPT | Performed by: INTERNAL MEDICINE

## 2017-11-13 PROCEDURE — 85610 PROTHROMBIN TIME: CPT | Performed by: INTERNAL MEDICINE

## 2017-11-13 PROCEDURE — 85027 COMPLETE CBC AUTOMATED: CPT | Performed by: INTERNAL MEDICINE

## 2017-11-13 PROCEDURE — G0008 ADMIN INFLUENZA VIRUS VAC: HCPCS | Performed by: INTERNAL MEDICINE

## 2017-11-13 PROCEDURE — 99214 OFFICE O/P EST MOD 30 MIN: CPT | Mod: 25 | Performed by: INTERNAL MEDICINE

## 2017-11-13 PROCEDURE — 80053 COMPREHEN METABOLIC PANEL: CPT | Performed by: INTERNAL MEDICINE

## 2017-11-13 RX ORDER — ISOSORBIDE MONONITRATE 30 MG/1
60 TABLET, EXTENDED RELEASE ORAL DAILY
Qty: 60 TABLET | Refills: 11 | Status: ON HOLD | OUTPATIENT
Start: 2017-11-13 | End: 2017-11-16

## 2017-11-13 RX ORDER — METOPROLOL SUCCINATE 25 MG/1
50 TABLET, EXTENDED RELEASE ORAL DAILY
Qty: 180 TABLET | Refills: 3 | Status: SHIPPED | OUTPATIENT
Start: 2017-11-13 | End: 2017-12-14

## 2017-11-13 NOTE — LETTER
11/13/2017    Augusto Meyer, DO  [address] [place] [address]      RE: Gillian Burk       Dear Colleague,    I had the pleasure of seeing Gillian Burk in the [facility].    HISTORY OF PRESENT ILLNESS:  I had the opportunity to see Ms. Gillian Burk in Cardiology Clinic today at the [facility] in [place] for evaluation of progressive chest pain symptoms.  As you recall, Ms. Burk is a 89-year-old woman who had progressively severe aortic stenosis with symptoms of exertional chest pain and shortness of breath who eventually underwent a transcatheter aortic valve replacement in 08/2016.  Prior to that, she had undergone a coronary angiogram in 06/2016 which showed mild nonocclusive coronary artery disease.  She continued to have some chest discomfort after her TAVR procedure which accelerated in 11/2016, resulting in a hospitalization in [place], [place] where she ruled in for a nontransmural myocardial infarction.  She underwent coronary angiography there and I have reviewed that report, dated 11/09/2016.  The LAD and circumflex had only mild nonocclusive disease.  The right coronary artery was difficult to engage and difficult to visualize because of shadowing from the TAVR valve.  It was a moderate-sized codominant vessel with some haziness at the bifurcation of an acute marginal branch where there was thought to be no significant obstruction to flow with only about a 50% narrowing at most.  ASHLEY 3 flow was present and no intervention was performed.  She was started on long-acting nitrates at that point and her chest pain improved.  Since then, her nitrates have been progressively increased and she is now on 90 mg of isosorbide mononitrate daily.  Her chest pain continues to progress as well.  It has now become so frequent that it is occurring multiple times a day.      She describes her chest discomfort as a central  retrosternal chest aching and pressure with radiation down both arms and into her hands.  Her chest discomfort occurs after she gets up to walk and takes more than 5 minutes to resolve after she sits down.  For that reason, she commonly uses a nitroglycerin sublingual tablet which relieves the pain immediately.  The duration of exercise required to produce these episodes of chest discomfort have become progressively less over the course of the last several months.  She now has been experiencing onset of chest discomfort after walking from her chair to the bathroom and back at home.  These episodes typically occur in the evening and she takes her 90 mg of Imdur first thing in the morning at 6:00 a.m.  She has no problems with shortness of breath, lightheadedness, palpitations or syncope.  She does have some chest discomfort at night after lying down flat.  Typically this resolves after sitting up.  She does not have any of this discomfort after meals specifically.      She is on longstanding aspirin and clopidogrel because of a history of stroke in 2013.  She is not on anticoagulation.  She has been taking metoprolol XL 25 mg daily in addition to the isosorbide mononitrate 90 mg daily and has had blood pressures typically in the range of 130/80 with heart rates in the 80s-90s.      PHYSICAL EXAMINATION:  On examination today her blood pressure is 140/64, heart rate 86 and weight 144 pounds.  Her lungs are clear.  Heart rhythm is regular.  She has perhaps a very soft 1/6 systolic ejection type murmur at the base without radiation.  She has no carotid bruits and she has no edema with lower extremity compression stockings in place.      IMPRESSIONS:  Ms. Gillian Burk is a 89-year-old woman who is extremely fit and active for her age with no signs of any cognitive slowing who has undergone a TAVR procedure for treatment of aortic stenosis in 08/2016 and suffered a small nontransmural infarction in 11/2016, possibly  due to a thrombotic lesion in the right coronary artery which was treated medically at the time.  She has  cardiomyopathy with an ejection fraction of 35-40%, although I do not see a specific reason for that.  It appears that this is primarily due to anterior and anteroseptal wall motion abnormality, but she has never had any infarction that has been recognized other than perhaps a small nontransmural infarction in 11/2016.        Her primary complaint now is of steadily progressive typical exertional angina which has become so frequent that she is requiring multiple nitroglycerin tablets per day just to walk around her home.  Based on her description of this chest discomfort, I believe this is classic angina.  I do not think that doing noninvasive testing is appropriate.  I have suggested that she pursue coronary angiography for further evaluation and described that procedure to her and the risks in detail including the possibilities of renal failure, bleeding, heart attack, stroke, emergency surgery and death.  She understands these issues and agrees to proceed.  She understands the possibility of implantation of an intracoronary stent.      If no coronary artery disease is identified on that study, then I will have her return to visit with Dr. Meyer to explore the possibility that this might be a GI related issue.      In the meantime, I have increased her Imdur to 60 mg p.o. b.i.d. taken at 6:00 a.m. and 2:00 p.m.  I have also increased her metoprolol succinate to 50 mg daily.  She will continue to take her nitroglycerin sublingual tablets as needed.  I repeated some laboratory studies which have not been done since August, including a comprehensive metabolic panel and CBC.      I will have her return to follow up with us here in [place] in 1 month following that procedure.        Outpatient Encounter Prescriptions as of 11/13/2017   Medication Sig Dispense Refill     [DISCONTINUED] metoprolol (TOPROL-XL)  25 MG 24 hr tablet Take 2 tablets (50 mg) by mouth daily 180 tablet 3     [DISCONTINUED] isosorbide mononitrate (IMDUR) 30 MG 24 hr tablet Take 2 tablets (60 mg) by mouth daily At 2 pm 60 tablet 11     atorvastatin (LIPITOR) 40 MG tablet Take 1 tablet (40 mg) by mouth daily Reports taking 90 tablet 0     famotidine (PEPCID) 40 MG tablet TAKE ONE TABLET BY MOUTH AT BEDTIME 30 tablet 9     folic acid (FOLVITE) 1 MG tablet Take 1 tablet (1,000 mcg) by mouth daily 90 tablet 1     levothyroxine (SYNTHROID) 50 MCG tablet Take 1 tablet (50 mcg) by mouth daily 30 tablet 3     Calcium & Magnesium Carbonates (MYLANTA PO) 2 tblsp every 4 hours as needed       [DISCONTINUED] famotidine (PEPCID) 40 MG tablet Take 40 mg by mouth daily       predniSONE (DELTASONE) 5 MG tablet TAKE ONE TABLET BY MOUTH EVERY OTHER DAY ALTERNATING  WITH  1/2  TABLET  EVERY  OTHER  DAY 23 tablet 13     clopidogrel (PLAVIX) 75 MG tablet Take 1 tablet (75 mg) by mouth daily 90 tablet 3     LANsoprazole (PREVACID) 30 MG CR capsule Take 1 capsule (30 mg) by mouth daily Take 30-60 minutes before a meal. 30 capsule 3     mirtazapine (REMERON) 30 MG tablet TAKE ONE TABLET BY MOUTH AT BEDTIME 90 tablet 2     Ferrous Sulfate (IRON) 325 (65 FE) MG tablet TAKE ONE TABLET BY MOUTH ONCE DAILY WITH BREAKFAST 30 tablet 11     levETIRAcetam (KEPPRA) 500 MG tablet TAKE ONE TABLET BY MOUTH ONCE DAILY AND one AT BEDTIME 180 tablet 0     ALPRAZolam (XANAX) 0.25 MG tablet Take 1 tablet (0.25 mg) by mouth 2 times daily as needed for anxiety 28 tablet 0     isosorbide mononitrate (IMDUR) 60 MG 24 hr tablet Take 1 tablet (60 mg) by mouth daily 90 tablet 3     [DISCONTINUED] nitroglycerin (NITROSTAT) 0.4 MG SL tablet Pt reports taking - -  -For chest pain place 1 tablet under the tongue every 5 minutes for 3 doses. If symptoms persist 5 minutes after 1st dose call 911. 25 tablet 0     aspirin EC 81 MG EC tablet Take 1 tablet (81 mg) by mouth daily       methotrexate 2.5 MG  tablet Take 4 tablets (10 mg) by mouth once a week Wed 52 tablet 3     acetaminophen (TYLENOL ARTHRITIS PAIN) 650 MG CR tablet Take 2 tablets (1,300 mg) by mouth every 8 hours as needed for mild pain       calcium carbonate (TUMS) 500 MG chewable tablet Take 2-4 tablets (1,000-2,000 mg) by mouth as needed for heartburn       docusate sodium (COLACE) 100 MG capsule Take 200 mg by mouth daily 2 capsules       multivitamin, therapeutic with minerals (MULTI-VITAMIN) TABS Take 1 tablet by mouth daily       Lactobacillus (FLORAJEN ACIDOPHILUS) CAPS Take 1 capsule by mouth daily       [DISCONTINUED] sulfamethoxazole-trimethoprim (BACTRIM DS/SEPTRA DS) 800-160 MG per tablet Take 1 tablet by mouth 2 times daily 20 tablet 0     [DISCONTINUED] nitroFURantoin, macrocrystal-monohydrate, (MACROBID) 100 MG capsule Take 1 capsule (100 mg) by mouth 2 times daily 14 capsule 0     [DISCONTINUED] isosorbide mononitrate (IMDUR) 30 MG 24 hr tablet Take 1 tablet (30 mg) by mouth daily 30 tablet 0     [DISCONTINUED] metoprolol (TOPROL-XL) 25 MG 24 hr tablet Take 1 tablet (25 mg) by mouth daily 90 tablet 3     No facility-administered encounter medications on file as of 11/13/2017.        Again, thank you for allowing me to participate in the care of your patient.      Sincerely,    MARGAUX WILLIAMSON MD     [facility]

## 2017-11-13 NOTE — PROGRESS NOTES
HISTORY OF PRESENT ILLNESS:  I had the opportunity to see Ms. Gillian Burk in Cardiology Clinic today at the [facility] in [place] for evaluation of progressive chest pain symptoms.  As you recall, Ms. Burk is a 89-year-old woman who had progressively severe aortic stenosis with symptoms of exertional chest pain and shortness of breath who eventually underwent a transcatheter aortic valve replacement in 08/2016.  Prior to that, she had undergone a coronary angiogram in 06/2016 which showed mild nonocclusive coronary artery disease.  She continued to have some chest discomfort after her TAVR procedure which accelerated in 11/2016, resulting in a hospitalization in [place], [place] where she ruled in for a nontransmural myocardial infarction.  She underwent coronary angiography there and I have reviewed that report, dated 11/09/2016.  The LAD and circumflex had only mild nonocclusive disease.  The right coronary artery was difficult to engage and difficult to visualize because of shadowing from the TAVR valve.  It was a moderate-sized codominant vessel with some haziness at the bifurcation of an acute marginal branch where there was thought to be no significant obstruction to flow with only about a 50% narrowing at most.  ASHLEY 3 flow was present and no intervention was performed.  She was started on long-acting nitrates at that point and her chest pain improved.  Since then, her nitrates have been progressively increased and she is now on 90 mg of isosorbide mononitrate daily.  Her chest pain continues to progress as well.  It has now become so frequent that it is occurring multiple times a day.      She describes her chest discomfort as a central retrosternal chest aching and pressure with radiation down both arms and into her hands.  Her chest discomfort occurs after she gets up to walk and takes more than 5 minutes to resolve after she sits down.  For that reason, she commonly  uses a nitroglycerin sublingual tablet which relieves the pain immediately.  The duration of exercise required to produce these episodes of chest discomfort have become progressively less over the course of the last several months.  She now has been experiencing onset of chest discomfort after walking from her chair to the bathroom and back at home.  These episodes typically occur in the evening and she takes her 90 mg of Imdur first thing in the morning at 6:00 a.m.  She has no problems with shortness of breath, lightheadedness, palpitations or syncope.  She does have some chest discomfort at night after lying down flat.  Typically this resolves after sitting up.  She does not have any of this discomfort after meals specifically.      She is on longstanding aspirin and clopidogrel because of a history of stroke in 2013.  She is not on anticoagulation.  She has been taking metoprolol XL 25 mg daily in addition to the isosorbide mononitrate 90 mg daily and has had blood pressures typically in the range of 130/80 with heart rates in the 80s-90s.      PHYSICAL EXAMINATION:  On examination today her blood pressure is 140/64, heart rate 86 and weight 144 pounds.  Her lungs are clear.  Heart rhythm is regular.  She has perhaps a very soft 1/6 systolic ejection type murmur at the base without radiation.  She has no carotid bruits and she has no edema with lower extremity compression stockings in place.      IMPRESSIONS:  Ms. Gillian Burk is a 89-year-old woman who is extremely fit and active for her age with no signs of any cognitive slowing who has undergone a TAVR procedure for treatment of aortic stenosis in 08/2016 and suffered a small nontransmural infarction in 11/2016, possibly due to a thrombotic lesion in the right coronary artery which was treated medically at the time.  She has  cardiomyopathy with an ejection fraction of 35-40%, although I do not see a specific reason for that.  It appears that this is  primarily due to anterior and anteroseptal wall motion abnormality, but she has never had any infarction that has been recognized other than perhaps a small nontransmural infarction in 2016.        Her primary complaint now is of steadily progressive typical exertional angina which has become so frequent that she is requiring multiple nitroglycerin tablets per day just to walk around her home.  Based on her description of this chest discomfort, I believe this is classic angina.  I do not think that doing noninvasive testing is appropriate.  I have suggested that she pursue coronary angiography for further evaluation and described that procedure to her and the risks in detail including the possibilities of renal failure, bleeding, heart attack, stroke, emergency surgery and death.  She understands these issues and agrees to proceed.  She understands the possibility of implantation of an intracoronary stent.      If no coronary artery disease is identified on that study, then I will have her return to visit with Dr. Meyer to explore the possibility that this might be a GI related issue.      In the meantime, I have increased her Imdur to 60 mg p.o. b.i.d. taken at 6:00 a.m. and 2:00 p.m.  I have also increased her metoprolol succinate to 50 mg daily.  She will continue to take her nitroglycerin sublingual tablets as needed.  I repeated some laboratory studies which have not been done since August, including a comprehensive metabolic panel and CBC.      I will have her return to follow up with us here in [place] in 1 month following that procedure.      cc:   Augusto Meyer, [place] [address]         MARGAUX WILLIAMSON MD, [facility]             D: 2017 15:09   T: 2017 16:06   MT: MIRELLA      Name:     LUCIO HANNON   MRN:      [id number]        Account:      [id number]   :      1927           Service Date: 2017       Document: [id number]

## 2017-11-13 NOTE — PROGRESS NOTES

## 2017-11-13 NOTE — PROGRESS NOTES
HPI and Plan:   See dictation    Orders Placed This Encounter   Procedures     Comprehensive metabolic panel     CBC with platelets     INR     Follow-Up with Cardiologist       Orders Placed This Encounter   Medications     metoprolol (TOPROL-XL) 25 MG 24 hr tablet     Sig: Take 2 tablets (50 mg) by mouth daily     Dispense:  180 tablet     Refill:  3     isosorbide mononitrate (IMDUR) 30 MG 24 hr tablet     Sig: Take 2 tablets (60 mg) by mouth daily At 2 pm     Dispense:  60 tablet     Refill:  11       Medications Discontinued During This Encounter   Medication Reason     nitroFURantoin, macrocrystal-monohydrate, (MACROBID) 100 MG capsule Therapy completed     sulfamethoxazole-trimethoprim (BACTRIM DS/SEPTRA DS) 800-160 MG per tablet Therapy completed     metoprolol (TOPROL-XL) 25 MG 24 hr tablet Reorder     isosorbide mononitrate (IMDUR) 30 MG 24 hr tablet Reorder         Encounter Diagnoses   Name Primary?     Chronic systolic congestive heart failure (H)      Hyperlipidemia with target LDL less than 130      S/P TAVR (transcatheter aortic valve replacement)      Chest pain, unspecified type      Coronary artery disease involving native coronary artery of native heart without angina pectoris Yes     Benign essential hypertension        CURRENT MEDICATIONS:  Current Outpatient Prescriptions   Medication Sig Dispense Refill     metoprolol (TOPROL-XL) 25 MG 24 hr tablet Take 2 tablets (50 mg) by mouth daily 180 tablet 3     isosorbide mononitrate (IMDUR) 30 MG 24 hr tablet Take 2 tablets (60 mg) by mouth daily At 2 pm 60 tablet 11     atorvastatin (LIPITOR) 40 MG tablet Take 1 tablet (40 mg) by mouth daily Reports taking 90 tablet 0     famotidine (PEPCID) 40 MG tablet TAKE ONE TABLET BY MOUTH AT BEDTIME 30 tablet 9     folic acid (FOLVITE) 1 MG tablet Take 1 tablet (1,000 mcg) by mouth daily 90 tablet 1     levothyroxine (SYNTHROID) 50 MCG tablet Take 1 tablet (50 mcg) by mouth daily 30 tablet 3     famotidine  (PEPCID) 40 MG tablet Take 40 mg by mouth daily       Calcium & Magnesium Carbonates (MYLANTA PO) 2 tblsp every 4 hours as needed       predniSONE (DELTASONE) 5 MG tablet TAKE ONE TABLET BY MOUTH EVERY OTHER DAY ALTERNATING  WITH  1/2  TABLET  EVERY  OTHER  DAY 23 tablet 13     clopidogrel (PLAVIX) 75 MG tablet Take 1 tablet (75 mg) by mouth daily 90 tablet 3     LANsoprazole (PREVACID) 30 MG CR capsule Take 1 capsule (30 mg) by mouth daily Take 30-60 minutes before a meal. 30 capsule 3     mirtazapine (REMERON) 30 MG tablet TAKE ONE TABLET BY MOUTH AT BEDTIME 90 tablet 2     Ferrous Sulfate (IRON) 325 (65 FE) MG tablet TAKE ONE TABLET BY MOUTH ONCE DAILY WITH BREAKFAST 30 tablet 11     levETIRAcetam (KEPPRA) 500 MG tablet TAKE ONE TABLET BY MOUTH ONCE DAILY AND one AT BEDTIME 180 tablet 0     ALPRAZolam (XANAX) 0.25 MG tablet Take 1 tablet (0.25 mg) by mouth 2 times daily as needed for anxiety 28 tablet 0     isosorbide mononitrate (IMDUR) 60 MG 24 hr tablet Take 1 tablet (60 mg) by mouth daily 90 tablet 3     nitroglycerin (NITROSTAT) 0.4 MG SL tablet Pt reports taking - -  -For chest pain place 1 tablet under the tongue every 5 minutes for 3 doses. If symptoms persist 5 minutes after 1st dose call 911. 25 tablet 0     aspirin EC 81 MG EC tablet Take 1 tablet (81 mg) by mouth daily       methotrexate 2.5 MG tablet Take 4 tablets (10 mg) by mouth once a week Wed 52 tablet 3     acetaminophen (TYLENOL ARTHRITIS PAIN) 650 MG CR tablet Take 2 tablets (1,300 mg) by mouth every 8 hours as needed for mild pain       calcium carbonate (TUMS) 500 MG chewable tablet Take 2-4 tablets (1,000-2,000 mg) by mouth as needed for heartburn       docusate sodium (COLACE) 100 MG capsule Take 200 mg by mouth daily 2 capsules       multivitamin, therapeutic with minerals (MULTI-VITAMIN) TABS Take 1 tablet by mouth daily       Lactobacillus (FLORAJEN ACIDOPHILUS) CAPS Take 1 capsule by mouth daily       [DISCONTINUED] isosorbide  mononitrate (IMDUR) 30 MG 24 hr tablet Take 1 tablet (30 mg) by mouth daily 30 tablet 0     [DISCONTINUED] metoprolol (TOPROL-XL) 25 MG 24 hr tablet Take 1 tablet (25 mg) by mouth daily 90 tablet 3       ALLERGIES     Allergies   Allergen Reactions     Caffeine Other (See Comments)     Triggers your Meniere's disease     Hydrocodone Other (See Comments)     hallucinations     Ibuprofen GI Disturbance     Penicillins Hives     Tramadol Other (See Comments)     Seizure, was taking remeron along with tramadol     Metal [Staples] Rash       PAST MEDICAL HISTORY:  Past Medical History:   Diagnosis Date     Aortic stenosis     s/p TAVR 8/17/16     Chronic migraine      Hyperlipidaemia      Hypertension      Hypothyroidism      Myocardial infarction      Need for SBE (subacute bacterial endocarditis) prophylaxis      Orthostatic hypotension     wears compression stockings     PUD (peptic ulcer disease)      Secondary Sjogren's syndrome (H)      Seizures (H)     after stroke (partial onset)     Seropositive rheumatoid arthritis (H)      Stroke (H) 05/2013    with visual field cut, left occipital lobe     Syncope 2014    due to orthostatic hypotension       PAST SURGICAL HISTORY:  Past Surgical History:   Procedure Laterality Date     C TOTAL HIP ARTHROPLASTY Right 2010     C TOTAL HIP ARTHROPLASTY Left 2014     CATARACT IOL, RT/LT Bilateral 2000     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     HEART CATH FEMORAL CANNULIZATION WITH OPEN STANDBY REPAIR AORTIC VALVE N/A 8/3/2016    Procedure: HEART CATH FEMORAL CANNULIZATION WITH OPEN STANDBY REPAIR AORTIC VALVE;  Surgeon: Owen Schultz MD;  Location: [facility] OR     HYSTERECTOMY TOTAL ABDOMINAL  1970    ovaries out     MOUTH SURGERY  2011    jaw surgery due to fracture     TRANSCATHETER AORTIC VALVE IMPLANT ANESTHESIA N/A 8/3/2016    Procedure: TRANSCATHETER AORTIC VALVE IMPLANT ANESTHESIA;  Surgeon: GENERIC ANESTHESIA PROVIDER;  Location: [facility] OR       [facility]  "HISTORY:  Family History   Problem Relation Age of Onset     Hyperlipidemia Mother      Family History Negative No family hx of        SOCIAL HISTORY:  Social History     Social History     Marital status:      Spouse name: N/A     Number of children: 4     Years of education: N/A     Occupational History      Retired     Social History Main Topics     Smoking status: Never Smoker     Smokeless tobacco: Never Used     Alcohol use No     Drug use: No     Sexual activity: No     Other Topics Concern     Special Diet Yes     low salt      Exercise Yes     semi recument bike 15 mins daily      Social History Narrative    .  Lives in assisted living. Independent. Has help with making bed and changing sheets.    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 .     4 children - 1 with RA           Review of Systems:  Skin:  Positive for bruising     Eyes:  Positive for glasses    ENT:  Negative      Respiratory:  Positive for dyspnea on exertion;shortness of breath hasn't been doing a lot to tell if shortness of breath is better or worse    Cardiovascular:  lightheadedness;dizziness;Negative for;palpitations Positive for;chest pain;fatigue;edema;palpitations a lot of chest discomfort and more frequent, in november has used nitro x6, each month nitro use has increased, \"chest pain, along with tightness and heaviness\" Pain in both arms with the chest heaviness,   Gastroenterology: Negative      Genitourinary:  Negative      Musculoskeletal:  Positive for arthritis low back pain   Neurologic:  Positive for numbness or tingling of feet toes,  has had this since 2006  Psychiatric:  Positive for   occ.  Heme/Lymph/Imm:  Negative      Endocrine:  Positive for thyroid disorder      Physical Exam:  Vitals: /64 (BP Location: Right arm, Patient Position: Fowlers, Cuff Size: Adult Large)  Pulse 86  Ht 1.676 m (5' 6\")  Wt 65.7 kg (144 lb 14.4 oz)  SpO2 96%  BMI 23.39 kg/m2    Constitutional:  " cooperative, alert and oriented, well developed, well nourished, in no acute distress        Skin:  warm and dry to the touch, no apparent skin lesions or masses noted          Head:  normocephalic, no masses or lesions        Eyes:  pupils equal and round;conjunctivae and lids unremarkable        Lymph:      ENT:  no pallor or cyanosis        Neck:  carotid pulses are full and equal bilaterally, JVP normal, no carotid bruit        Respiratory:  clear to auscultation         Cardiac: regular rhythm       systolic murmur;LUSB;grade 1                                                 GI:  abdomen soft;BS normoactive        Extremities and Muscular Skeletal:  no deformities, clubbing, cyanosis, erythema observed;no edema              Neurological:  no gross motor deficits;affect appropriate        Psych:  affect appropriate, oriented to time, person and place        CC  No referring provider defined for this encounter.

## 2017-11-13 NOTE — LETTER
11/13/2017      RE: Gillian TIFFANY Wm  [address] [facility] [place]    [facility] [address]       Dear Colleague,    Thank you for the opportunity to participate in the care of your patient, Gillian Burk, at the [facility]. Please see a copy of my visit note below.    HPI and Plan:   See dictation    Orders Placed This Encounter   Procedures     Comprehensive metabolic panel     CBC with platelets     INR     Follow-Up with Cardiologist       Orders Placed This Encounter   Medications     metoprolol (TOPROL-XL) 25 MG 24 hr tablet     Sig: Take 2 tablets (50 mg) by mouth daily     Dispense:  180 tablet     Refill:  3     isosorbide mononitrate (IMDUR) 30 MG 24 hr tablet     Sig: Take 2 tablets (60 mg) by mouth daily At 2 pm     Dispense:  60 tablet     Refill:  11       Medications Discontinued During This Encounter   Medication Reason     nitroFURantoin, macrocrystal-monohydrate, (MACROBID) 100 MG capsule Therapy completed     sulfamethoxazole-trimethoprim (BACTRIM DS/SEPTRA DS) 800-160 MG per tablet Therapy completed     metoprolol (TOPROL-XL) 25 MG 24 hr tablet Reorder     isosorbide mononitrate (IMDUR) 30 MG 24 hr tablet Reorder         Encounter Diagnoses   Name Primary?     Chronic systolic congestive heart failure (H)      Hyperlipidemia with target LDL less than 130      S/P TAVR (transcatheter aortic valve replacement)      Chest pain, unspecified type      Coronary artery disease involving native coronary artery of native heart without angina pectoris Yes     Benign essential hypertension        CURRENT MEDICATIONS:  Current Outpatient Prescriptions   Medication Sig Dispense Refill     metoprolol (TOPROL-XL) 25 MG 24 hr tablet Take 2 tablets (50 mg) by mouth daily 180 tablet 3     isosorbide mononitrate (IMDUR) 30 MG 24 hr tablet Take 2 tablets (60 mg) by mouth daily At 2 pm 60 tablet 11     atorvastatin  (LIPITOR) 40 MG tablet Take 1 tablet (40 mg) by mouth daily Reports taking 90 tablet 0     famotidine (PEPCID) 40 MG tablet TAKE ONE TABLET BY MOUTH AT BEDTIME 30 tablet 9     folic acid (FOLVITE) 1 MG tablet Take 1 tablet (1,000 mcg) by mouth daily 90 tablet 1     levothyroxine (SYNTHROID) 50 MCG tablet Take 1 tablet (50 mcg) by mouth daily 30 tablet 3     famotidine (PEPCID) 40 MG tablet Take 40 mg by mouth daily       Calcium & Magnesium Carbonates (MYLANTA PO) 2 tblsp every 4 hours as needed       predniSONE (DELTASONE) 5 MG tablet TAKE ONE TABLET BY MOUTH EVERY OTHER DAY ALTERNATING  WITH  1/2  TABLET  EVERY  OTHER  DAY 23 tablet 13     clopidogrel (PLAVIX) 75 MG tablet Take 1 tablet (75 mg) by mouth daily 90 tablet 3     LANsoprazole (PREVACID) 30 MG CR capsule Take 1 capsule (30 mg) by mouth daily Take 30-60 minutes before a meal. 30 capsule 3     mirtazapine (REMERON) 30 MG tablet TAKE ONE TABLET BY MOUTH AT BEDTIME 90 tablet 2     Ferrous Sulfate (IRON) 325 (65 FE) MG tablet TAKE ONE TABLET BY MOUTH ONCE DAILY WITH BREAKFAST 30 tablet 11     levETIRAcetam (KEPPRA) 500 MG tablet TAKE ONE TABLET BY MOUTH ONCE DAILY AND one AT BEDTIME 180 tablet 0     ALPRAZolam (XANAX) 0.25 MG tablet Take 1 tablet (0.25 mg) by mouth 2 times daily as needed for anxiety 28 tablet 0     isosorbide mononitrate (IMDUR) 60 MG 24 hr tablet Take 1 tablet (60 mg) by mouth daily 90 tablet 3     nitroglycerin (NITROSTAT) 0.4 MG SL tablet Pt reports taking - -  -For chest pain place 1 tablet under the tongue every 5 minutes for 3 doses. If symptoms persist 5 minutes after 1st dose call 911. 25 tablet 0     aspirin EC 81 MG EC tablet Take 1 tablet (81 mg) by mouth daily       methotrexate 2.5 MG tablet Take 4 tablets (10 mg) by mouth once a week Wed 52 tablet 3     acetaminophen (TYLENOL ARTHRITIS PAIN) 650 MG CR tablet Take 2 tablets (1,300 mg) by mouth every 8 hours as needed for mild pain       calcium carbonate (TUMS) 500 MG chewable  tablet Take 2-4 tablets (1,000-2,000 mg) by mouth as needed for heartburn       docusate sodium (COLACE) 100 MG capsule Take 200 mg by mouth daily 2 capsules       multivitamin, therapeutic with minerals (MULTI-VITAMIN) TABS Take 1 tablet by mouth daily       Lactobacillus (FLORAJEN ACIDOPHILUS) CAPS Take 1 capsule by mouth daily       [DISCONTINUED] isosorbide mononitrate (IMDUR) 30 MG 24 hr tablet Take 1 tablet (30 mg) by mouth daily 30 tablet 0     [DISCONTINUED] metoprolol (TOPROL-XL) 25 MG 24 hr tablet Take 1 tablet (25 mg) by mouth daily 90 tablet 3       ALLERGIES     Allergies   Allergen Reactions     Caffeine Other (See Comments)     Triggers your Meniere's disease     Hydrocodone Other (See Comments)     hallucinations     Ibuprofen GI Disturbance     Penicillins Hives     Tramadol Other (See Comments)     Seizure, was taking remeron along with tramadol     Metal [Staples] Rash       PAST MEDICAL HISTORY:  Past Medical History:   Diagnosis Date     Aortic stenosis     s/p TAVR 8/17/16     Chronic migraine      Hyperlipidaemia      Hypertension      Hypothyroidism      Myocardial infarction      Need for SBE (subacute bacterial endocarditis) prophylaxis      Orthostatic hypotension     wears compression stockings     PUD (peptic ulcer disease)      Secondary Sjogren's syndrome (H)      Seizures (H)     after stroke (partial onset)     Seropositive rheumatoid arthritis (H)      Stroke (H) 05/2013    with visual field cut, left occipital lobe     Syncope 2014    due to orthostatic hypotension       PAST SURGICAL HISTORY:  Past Surgical History:   Procedure Laterality Date     C TOTAL HIP ARTHROPLASTY Right 2010     C TOTAL HIP ARTHROPLASTY Left 2014     CATARACT IOL, RT/LT Bilateral 2000     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     HEART CATH FEMORAL CANNULIZATION WITH OPEN STANDBY REPAIR AORTIC VALVE N/A 8/3/2016    Procedure: HEART CATH FEMORAL CANNULIZATION WITH OPEN STANDBY REPAIR AORTIC VALVE;  " Surgeon: Owen Schultz MD;  Location: [facility] OR     HYSTERECTOMY TOTAL ABDOMINAL  1970    ovaries out     MOUTH SURGERY  2011    jaw surgery due to fracture     TRANSCATHETER AORTIC VALVE IMPLANT ANESTHESIA N/A 8/3/2016    Procedure: TRANSCATHETER AORTIC VALVE IMPLANT ANESTHESIA;  Surgeon: GENERIC ANESTHESIA PROVIDER;  Location: [facility] OR       FAMILY HISTORY:  Family History   Problem Relation Age of Onset     Hyperlipidemia Mother      Family History Negative No family hx of        SOCIAL HISTORY:  Social History     Social History     Marital status:      Spouse name: N/A     Number of children: 4     Years of education: N/A     Occupational History      Retired     Social History Main Topics     Smoking status: Never Smoker     Smokeless tobacco: Never Used     Alcohol use No     Drug use: No     Sexual activity: No     Other Topics Concern     Special Diet Yes     low salt      Exercise Yes     semi recument bike 15 mins daily      Social History Narrative    .  Lives in assisted living. Independent. Has help with making bed and changing sheets.    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 .     4 children - 1 with RA           Review of Systems:  Skin:  Positive for bruising     Eyes:  Positive for glasses    ENT:  Negative      Respiratory:  Positive for dyspnea on exertion;shortness of breath hasn't been doing a lot to tell if shortness of breath is better or worse    Cardiovascular:  lightheadedness;dizziness;Negative for;palpitations Positive for;chest pain;fatigue;edema;palpitations a lot of chest discomfort and more frequent, in november has used nitro x6, each month nitro use has increased, \"chest pain, along with tightness and heaviness\" Pain in both arms with the chest heaviness,   Gastroenterology: Negative      Genitourinary:  Negative      Musculoskeletal:  Positive for arthritis low back pain   Neurologic:  Positive for numbness or tingling of feet toes,  " "has had this since 2006  Psychiatric:  Positive for   occ.  Heme/Lymph/Imm:  Negative      Endocrine:  Positive for thyroid disorder      Physical Exam:  Vitals: /64 (BP Location: Right arm, Patient Position: Fowlers, Cuff Size: Adult Large)  Pulse 86  Ht 1.676 m (5' 6\")  Wt 65.7 kg (144 lb 14.4 oz)  SpO2 96%  BMI 23.39 kg/m2    Constitutional:  cooperative, alert and oriented, well developed, well nourished, in no acute distress        Skin:  warm and dry to the touch, no apparent skin lesions or masses noted          Head:  normocephalic, no masses or lesions        Eyes:  pupils equal and round;conjunctivae and lids unremarkable        Lymph:      ENT:  no pallor or cyanosis        Neck:  carotid pulses are full and equal bilaterally, JVP normal, no carotid bruit        Respiratory:  clear to auscultation         Cardiac: regular rhythm       systolic murmur;LUSB;grade 1                                                 GI:  abdomen soft;BS normoactive        Extremities and Muscular Skeletal:  no deformities, clubbing, cyanosis, erythema observed;no edema              Neurological:  no gross motor deficits;affect appropriate        Psych:  affect appropriate, oriented to time, person and place        CC  No referring provider defined for this encounter.                HISTORY OF PRESENT ILLNESS:  I had the opportunity to see Ms. Gillian Burk in Cardiology Clinic today at the [facility] in [place] for evaluation of progressive chest pain symptoms.  As you recall, Ms. Burk is a 89-year-old woman who had progressively severe aortic stenosis with symptoms of exertional chest pain and shortness of breath who eventually underwent a transcatheter aortic valve replacement in 08/2016.  Prior to that, she had undergone a coronary angiogram in 06/2016 which showed mild nonocclusive coronary artery disease.  She continued to have some chest discomfort after her TAVR procedure " which accelerated in 11/2016, resulting in a hospitalization in [place], [place] where she ruled in for a nontransmural myocardial infarction.  She underwent coronary angiography there and I have reviewed that report, dated 11/09/2016.  The LAD and circumflex had only mild nonocclusive disease.  The right coronary artery was difficult to engage and difficult to visualize because of shadowing from the TAVR valve.  It was a moderate-sized codominant vessel with some haziness at the bifurcation of an acute marginal branch where there was thought to be no significant obstruction to flow with only about a 50% narrowing at most.  ASHLEY 3 flow was present and no intervention was performed.  She was started on long-acting nitrates at that point and her chest pain improved.  Since then, her nitrates have been progressively increased and she is now on 90 mg of isosorbide mononitrate daily.  Her chest pain continues to progress as well.  It has now become so frequent that it is occurring multiple times a day.      She describes her chest discomfort as a central retrosternal chest aching and pressure with radiation down both arms and into her hands.  Her chest discomfort occurs after she gets up to walk and takes more than 5 minutes to resolve after she sits down.  For that reason, she commonly uses a nitroglycerin sublingual tablet which relieves the pain immediately.  The duration of exercise required to produce these episodes of chest discomfort have become progressively less over the course of the last several months.  She now has been experiencing onset of chest discomfort after walking from her chair to the bathroom and back at home.  These episodes typically occur in the evening and she takes her 90 mg of Imdur first thing in the morning at 6:00 a.m.  She has no problems with shortness of breath, lightheadedness, palpitations or syncope.  She does have some chest discomfort at night after lying down flat.  Typically this  resolves after sitting up.  She does not have any of this discomfort after meals specifically.      She is on longstanding aspirin and clopidogrel because of a history of stroke in 2013.  She is not on anticoagulation.  She has been taking metoprolol XL 25 mg daily in addition to the isosorbide mononitrate 90 mg daily and has had blood pressures typically in the range of 130/80 with heart rates in the 80s-90s.      PHYSICAL EXAMINATION:  On examination today her blood pressure is 140/64, heart rate 86 and weight 144 pounds.  Her lungs are clear.  Heart rhythm is regular.  She has perhaps a very soft 1/6 systolic ejection type murmur at the base without radiation.  She has no carotid bruits and she has no edema with lower extremity compression stockings in place.      IMPRESSIONS:  Ms. Gillian Burk is a 89-year-old woman who is extremely fit and active for her age with no signs of any cognitive slowing who has undergone a TAVR procedure for treatment of aortic stenosis in 08/2016 and suffered a small nontransmural infarction in 11/2016, possibly due to a thrombotic lesion in the right coronary artery which was treated medically at the time.  She has  cardiomyopathy with an ejection fraction of 35-40%, although I do not see a specific reason for that.  It appears that this is primarily due to anterior and anteroseptal wall motion abnormality, but she has never had any infarction that has been recognized other than perhaps a small nontransmural infarction in 11/2016.        Her primary complaint now is of steadily progressive typical exertional angina which has become so frequent that she is requiring multiple nitroglycerin tablets per day just to walk around her home.  Based on her description of this chest discomfort, I believe this is classic angina.  I do not think that doing noninvasive testing is appropriate.  I have suggested that she pursue coronary angiography for further evaluation and described that  procedure to her and the risks in detail including the possibilities of renal failure, bleeding, heart attack, stroke, emergency surgery and death.  She understands these issues and agrees to proceed.  She understands the possibility of implantation of an intracoronary stent.      If no coronary artery disease is identified on that study, then I will have her return to visit with Dr. Meyer to explore the possibility that this might be a GI related issue.      In the meantime, I have increased her Imdur to 60 mg p.o. b.i.d. taken at 6:00 a.m. and 2:00 p.m.  I have also increased her metoprolol succinate to 50 mg daily.  She will continue to take her nitroglycerin sublingual tablets as needed.  I repeated some laboratory studies which have not been done since August, including a comprehensive metabolic panel and CBC.      I will have her return to follow up with us here in [place] in 1 month following that procedure.      cc:   Augusto Meyer, [place] [address]         MARGAUX WILLIAMSON MD, Merged with [facility]             D: 2017 15:09   T: 2017 16:06   MT: MIRELLA      Name:     LUCIO HANNON   MRN:      [id number]        Account:      [id number]   :      [id number]           Service Date: 2017      Document: [id number]        Injectable Influenza Immunization Documentation    1.  Is the person to be vaccinated sick today?   No    2. Does the person to be vaccinated have an allergy to a component   of the vaccine?   No  Egg Allergy Algorithm Link    3. Has the person to be vaccinated ever had a serious reaction   to influenza vaccine in the past?   No    4. Has the person to be vaccinated ever had Guillain-Barré syndrome?   No    Form completed by Zuleyma Sebastian RN           Please do not hesitate to contact me if you have any questions/concerns.     Sincerely,     MARGAUX WILLIAMSON MD

## 2017-11-13 NOTE — PATIENT INSTRUCTIONS
Increase metoprolol XL from 25 mg daily to 50 mg daily.     Increase Isosorbide mononitrate (Imdur) to 60 mg in am and 60 mg at about 2 pm.

## 2017-11-13 NOTE — MR AVS SNAPSHOT
After Visit Summary   11/13/2017    Gillian Burk    MRN: [id number]           Patient Information     Date Of Birth          3/19/1927        Visit Information        Provider Department      11/13/2017 2:00 PM Jones Lee MD [facility]        Today's Diagnoses     Coronary artery disease involving native coronary artery of native heart without angina pectoris    -  1    Chronic systolic congestive heart failure (H)        Hyperlipidemia with target LDL less than 130        S/P TAVR (transcatheter aortic valve replacement)        Chest pain, unspecified type        Benign essential hypertension          Care Instructions    Increase metoprolol XL from 25 mg daily to 50 mg daily.     Increase Isosorbide mononitrate (Imdur) to 60 mg in am and 60 mg at about 2 pm.               Follow-ups after your visit        Additional Services     Follow-Up with Cardiologist                 Future tests that were ordered for you today     Open Future Orders        Priority Expected Expires Ordered    Comprehensive metabolic panel Routine 11/13/2017 11/13/2018 11/13/2017    CBC with platelets Routine 11/13/2017 11/13/2018 11/13/2017    Follow-Up with Cardiologist Routine 12/13/2017 11/13/2018 11/13/2017    Cardiac Cath: Coronary Angiography Adult Order Routine 11/15/2017 11/13/2018 11/13/2017            Who to contact     If you have questions or need follow up information about today's clinic visit or your schedule please contact [facility] directly at [phone].  Normal or non-critical lab and imaging results will be communicated to you by MyChart, letter or phone within 4 business days after the clinic has received the results. If you do not hear from us within 7 days, please contact the clinic through MyChart or phone. If you have a critical or abnormal lab result, we will notify you by phone as soon as  "possible.  Submit refill requests through [facility] or call your pharmacy and they will forward the refill request to us. Please allow 3 business days for your refill to be completed.          Additional Information About Your Visit        [email or web address] Information     [facility] gives you secure access to your electronic health record. If you see a primary care provider, you can also send messages to your care team and make appointments. If you have questions, please call your primary care clinic.  If you do not have a primary care provider, please call [phone] and they will assist you.        Care EveryWhere ID     This is your Care EveryWhere ID. This could be used by other organizations to access your [place] medical records  [id number]        Your Vitals Were     Pulse Height Pulse Oximetry BMI (Body Mass Index)          86 1.676 m (5' 6\") 96% 23.39 kg/m2         Blood Pressure from Last 3 Encounters:   11/13/17 140/64   09/21/17 124/58   09/06/17 (!) 130/92    Weight from Last 3 Encounters:   11/13/17 65.7 kg (144 lb 14.4 oz)   09/21/17 66.1 kg (145 lb 12.8 oz)   09/06/17 67.6 kg (149 lb)              We Performed the Following     INR          Today's Medication Changes          These changes are accurate as of: 11/13/17  2:55 PM.  If you have any questions, ask your nurse or doctor.               These medicines have changed or have updated prescriptions.        Dose/Directions    * isosorbide mononitrate 60 MG 24 hr tablet   Commonly known as:  IMDUR   This may have changed:  Another medication with the same name was changed. Make sure you understand how and when to take each.   Used for:  Chest pain, unspecified type        Dose:  60 mg   Take 1 tablet (60 mg) by mouth daily   Quantity:  90 tablet   Refills:  3       * isosorbide mononitrate 30 MG 24 hr tablet   Commonly known as:  IMDUR   This may have changed:    - how much to take  - additional instructions   Used for:  Chest pain, unspecified type        " Dose:  60 mg   Take 2 tablets (60 mg) by mouth daily At 2 pm   Quantity:  60 tablet   Refills:  11       metoprolol 25 MG 24 hr tablet   Commonly known as:  TOPROL-XL   This may have changed:  how much to take   Used for:  Chest pain, unspecified type        Dose:  50 mg   Take 2 tablets (50 mg) by mouth daily   Quantity:  180 tablet   Refills:  3       * Notice:  This list has 2 medication(s) that are the same as other medications prescribed for you. Read the directions carefully, and ask your doctor or other care provider to review them with you.         Where to get your medicines      These medications were sent to [facility] [address] [place] [address] - [address]  [address], [facility] [address]     Phone:  [phone]     isosorbide mononitrate 30 MG 24 hr tablet    metoprolol 25 MG 24 hr tablet                Primary Care Provider Office Phone # Fax #    Augusto Meridalaina,  [phone]       [address] [facility]   [facility] [address]        Equal Access to Services     LUPE TRENT AH: Hadii veronica ku hadasho Soomaali, waaxda luqadaha, qaybta kaalmada adeegyada, waxay idiin hayjusn blanche oglesby . So [facility] [phone].    ATENCIÓN: Si habla español, tiene a granger disposición servicios gratuitos de asistencia lingüística. [facility] [phone].    We comply with applicable federal civil rights laws and [place] laws. We do not discriminate on the basis of race, color, national origin, age, disability, sex, sexual orientation, or gender identity.            Thank you!     Thank you for choosing [facility]  for your care. Our goal is always to provide you with excellent care. Hearing back from our patients is one way we can continue to improve our services. Please take a few minutes to complete the written survey that you may receive in the mail after your visit with us. Thank you!             Your Updated Medication List - Protect others  around you: Learn how to safely use, store and throw away your medicines at www.disposemymeds.org.          This list is accurate as of: 11/13/17  2:55 PM.  Always use your most recent med list.                   Brand Name Dispense Instructions for use Diagnosis    ALPRAZolam 0.25 MG tablet    XANAX    28 tablet    Take 1 tablet (0.25 mg) by mouth 2 times daily as needed for anxiety    Anxiety       aspirin 81 MG EC tablet      Take 1 tablet (81 mg) by mouth daily    S/P TAVR (transcatheter aortic valve replacement)       atorvastatin 40 MG tablet    LIPITOR    90 tablet    Take 1 tablet (40 mg) by mouth daily Reports taking    Hyperlipidemia with target LDL less than 130       calcium carbonate 500 MG chewable tablet    TUMS     Take 2-4 tablets (1,000-2,000 mg) by mouth as needed for heartburn        clopidogrel 75 MG tablet    PLAVIX    90 tablet    Take 1 tablet (75 mg) by mouth daily    History of CVA (cerebrovascular accident)       docusate sodium 100 MG capsule    COLACE     Take 200 mg by mouth daily 2 capsules        * famotidine 40 MG tablet    PEPCID     Take 40 mg by mouth daily        * famotidine 40 MG tablet    PEPCID    30 tablet    TAKE ONE TABLET BY MOUTH AT BEDTIME    Gastroesophageal reflux disease without esophagitis       FLORAJEN ACIDOPHILUS Caps      Take 1 capsule by mouth daily        folic acid 1 MG tablet    FOLVITE    90 tablet    Take 1 tablet (1,000 mcg) by mouth daily    Seropositive rheumatoid arthritis (H)       iron 325 (65 FE) MG tablet     30 tablet    TAKE ONE TABLET BY MOUTH ONCE DAILY WITH BREAKFAST    Gastrointestinal hemorrhage, unspecified gastrointestinal hemorrhage type       * isosorbide mononitrate 60 MG 24 hr tablet    IMDUR    90 tablet    Take 1 tablet (60 mg) by mouth daily    Chest pain, unspecified type       * isosorbide mononitrate 30 MG 24 hr tablet    IMDUR    60 tablet    Take 2 tablets (60 mg) by mouth daily At 2 pm    Chest pain, unspecified type        LANsoprazole 30 MG CR capsule    PREVACID    30 capsule    Take 1 capsule (30 mg) by mouth daily Take 30-60 minutes before a meal.    Gastroesophageal reflux disease with esophagitis       levETIRAcetam 500 MG tablet    KEPPRA    180 tablet    TAKE ONE TABLET BY MOUTH ONCE DAILY AND one AT BEDTIME    Seizure disorder (H)       levothyroxine 50 MCG tablet    SYNTHROID    30 tablet    Take 1 tablet (50 mcg) by mouth daily    Hypothyroidism due to acquired atrophy of thyroid       methotrexate 2.5 MG tablet CHEMO     52 tablet    Take 4 tablets (10 mg) by mouth once a week Wed    Seropositive rheumatoid arthritis (H)       metoprolol 25 MG 24 hr tablet    TOPROL-XL    180 tablet    Take 2 tablets (50 mg) by mouth daily    Chest pain, unspecified type       mirtazapine 30 MG tablet    REMERON    90 tablet    TAKE ONE TABLET BY MOUTH AT BEDTIME    Sleep initiation disorder       Multi-vitamin Tabs tablet      Take 1 tablet by mouth daily        MYLANTA PO      2 tblsp every 4 hours as needed        nitroGLYcerin 0.4 MG sublingual tablet    NITROSTAT    25 tablet    Pt reports taking - -  -For chest pain place 1 tablet under the tongue every 5 minutes for 3 doses. If symptoms persist 5 minutes after 1st dose call 911.        predniSONE 5 MG tablet    DELTASONE    23 tablet    TAKE ONE TABLET BY MOUTH EVERY OTHER DAY ALTERNATING  WITH  1/2  TABLET  EVERY  OTHER  DAY    Seropositive rheumatoid arthritis (H)       TYLENOL ARTHRITIS PAIN 650 MG CR tablet   Generic drug:  acetaminophen      Take 2 tablets (1,300 mg) by mouth every 8 hours as needed for mild pain        * Notice:  This list has 4 medication(s) that are the same as other medications prescribed for you. Read the directions carefully, and ask your doctor or other care provider to review them with you.

## 2017-11-15 ENCOUNTER — TELEPHONE (OUTPATIENT)
Dept: CARDIOLOGY | Facility: CLINIC | Age: 82
End: 2017-11-15

## 2017-11-15 RX ORDER — LIDOCAINE 40 MG/G
CREAM TOPICAL
Status: CANCELLED | OUTPATIENT
Start: 2017-11-15

## 2017-11-15 RX ORDER — POTASSIUM CHLORIDE 1500 MG/1
20 TABLET, EXTENDED RELEASE ORAL
Status: CANCELLED | OUTPATIENT
Start: 2017-11-15

## 2017-11-15 RX ORDER — SODIUM CHLORIDE 9 MG/ML
INJECTION, SOLUTION INTRAVENOUS CONTINUOUS
Status: CANCELLED | OUTPATIENT
Start: 2017-11-15

## 2017-11-15 NOTE — TELEPHONE ENCOUNTER
Contacted patient to go over angiogram prep instructions. She is scheduled for a left heart cath on 11/16/17 for a diagnoses of chest pain. She will be NPO after midnight tonight, 11/15/17. She will take her medications (to include asa) with small sips of water the morning of the procedure. She is not on any anticoagulant or diuretics and is not diabetic.She will hold her NTG the morning of the procedure but will take her Imdur. She denies any allergies to contrast dye. Her daughters will bring her to the procedure and will remain with her 24 hours post procedure. Confirmed arrival/procedure times as well as location ([facility]). Pt is aware of where to check in at first floor check in desk. She verbalized understanding and had no further questions. I gave her my name and CORE number should she have any questions before 4:30 today. Cath orders entered in EPIC. Alicia Pisano RN 2:52 PM 11/15/17

## 2017-11-16 ENCOUNTER — APPOINTMENT (OUTPATIENT)
Dept: CARDIOLOGY | Facility: CLINIC | Age: 82
End: 2017-11-16
Attending: INTERNAL MEDICINE
Payer: COMMERCIAL

## 2017-11-16 ENCOUNTER — HOSPITAL ENCOUNTER (OUTPATIENT)
Facility: CLINIC | Age: 82
Discharge: HOME | End: 2017-11-16
Attending: INTERNAL MEDICINE | Admitting: INTERNAL MEDICINE
Payer: COMMERCIAL

## 2017-11-16 VITALS
DIASTOLIC BLOOD PRESSURE: 82 MMHG | WEIGHT: 141 LBS | HEIGHT: 66 IN | OXYGEN SATURATION: 97 % | TEMPERATURE: 96.9 F | HEART RATE: 75 BPM | RESPIRATION RATE: 18 BRPM | SYSTOLIC BLOOD PRESSURE: 123 MMHG | BODY MASS INDEX: 22.66 KG/M2

## 2017-11-16 DIAGNOSIS — R07.9 CHEST PAIN, UNSPECIFIED TYPE: ICD-10-CM

## 2017-11-16 DIAGNOSIS — I25.10 CORONARY ARTERY DISEASE INVOLVING NATIVE CORONARY ARTERY OF NATIVE HEART WITHOUT ANGINA PECTORIS: ICD-10-CM

## 2017-11-16 PROCEDURE — 27210946 ZZH KIT HC TOTES DISP CR8

## 2017-11-16 PROCEDURE — 40000065 ZZH STATISTIC EKG NON-CHARGEABLE

## 2017-11-16 PROCEDURE — 93571 IV DOP VEL&/PRESS C FLO 1ST: CPT | Mod: 52

## 2017-11-16 PROCEDURE — 27211089 ZZH KIT ACIST INJECTOR CR3

## 2017-11-16 PROCEDURE — 40000235 ZZH STATISTIC TELEMETRY

## 2017-11-16 PROCEDURE — C1769 GUIDE WIRE: HCPCS

## 2017-11-16 PROCEDURE — 25000125 ZZHC RX 250: Performed by: INTERNAL MEDICINE

## 2017-11-16 PROCEDURE — 25000128 H RX IP 250 OP 636: Performed by: INTERNAL MEDICINE

## 2017-11-16 PROCEDURE — 93005 ELECTROCARDIOGRAM TRACING: CPT

## 2017-11-16 PROCEDURE — 27210856 ZZH ACCESS HEART CATH CR2

## 2017-11-16 PROCEDURE — 27210795 ZZH PAD DEFIB QUICK CR4

## 2017-11-16 PROCEDURE — 93454 CORONARY ARTERY ANGIO S&I: CPT | Mod: 26 | Performed by: INTERNAL MEDICINE

## 2017-11-16 PROCEDURE — B2111ZZ FLUOROSCOPY OF MULTIPLE CORONARY ARTERIES USING LOW OSMOLAR CONTRAST: ICD-10-PCS | Performed by: INTERNAL MEDICINE

## 2017-11-16 PROCEDURE — 93571 IV DOP VEL&/PRESS C FLO 1ST: CPT | Mod: 26 | Performed by: INTERNAL MEDICINE

## 2017-11-16 PROCEDURE — 4A033BC MEASUREMENT OF ARTERIAL PRESSURE, CORONARY, PERCUTANEOUS APPROACH: ICD-10-PCS | Performed by: INTERNAL MEDICINE

## 2017-11-16 PROCEDURE — 93454 CORONARY ARTERY ANGIO S&I: CPT

## 2017-11-16 PROCEDURE — 27210910 ZZH NEEDLE CR6

## 2017-11-16 PROCEDURE — 99152 MOD SED SAME PHYS/QHP 5/>YRS: CPT | Mod: GC | Performed by: INTERNAL MEDICINE

## 2017-11-16 PROCEDURE — 27210914 ZZH SHEATH CR8

## 2017-11-16 PROCEDURE — 93010 ELECTROCARDIOGRAM REPORT: CPT | Performed by: INTERNAL MEDICINE

## 2017-11-16 PROCEDURE — 27210787 ZZH MANIFOLD CR2

## 2017-11-16 PROCEDURE — 40000852 ZZH STATISTIC HEART CATH LAB OR EP LAB

## 2017-11-16 PROCEDURE — C1887 CATHETER, GUIDING: HCPCS

## 2017-11-16 PROCEDURE — 99152 MOD SED SAME PHYS/QHP 5/>YRS: CPT

## 2017-11-16 PROCEDURE — 99153 MOD SED SAME PHYS/QHP EA: CPT

## 2017-11-16 RX ORDER — MORPHINE SULFATE 2 MG/ML
1-2 INJECTION, SOLUTION INTRAMUSCULAR; INTRAVENOUS EVERY 5 MIN PRN
Status: DISCONTINUED | OUTPATIENT
Start: 2017-11-16 | End: 2017-11-16 | Stop reason: HOSPADM

## 2017-11-16 RX ORDER — LIDOCAINE 40 MG/G
CREAM TOPICAL
Status: DISCONTINUED | OUTPATIENT
Start: 2017-11-16 | End: 2017-11-16 | Stop reason: HOSPADM

## 2017-11-16 RX ORDER — ADENOSINE 3 MG/ML
12-12000 INJECTION, SOLUTION INTRAVENOUS
Status: DISCONTINUED | OUTPATIENT
Start: 2017-11-16 | End: 2017-11-16 | Stop reason: HOSPADM

## 2017-11-16 RX ORDER — CLOPIDOGREL BISULFATE 75 MG/1
75 TABLET ORAL
Status: DISCONTINUED | OUTPATIENT
Start: 2017-11-16 | End: 2017-11-16 | Stop reason: HOSPADM

## 2017-11-16 RX ORDER — NIFEDIPINE 10 MG/1
10 CAPSULE ORAL
Status: DISCONTINUED | OUTPATIENT
Start: 2017-11-16 | End: 2017-11-16 | Stop reason: HOSPADM

## 2017-11-16 RX ORDER — NALOXONE HYDROCHLORIDE 0.4 MG/ML
0.4 INJECTION, SOLUTION INTRAMUSCULAR; INTRAVENOUS; SUBCUTANEOUS EVERY 5 MIN PRN
Status: DISCONTINUED | OUTPATIENT
Start: 2017-11-16 | End: 2017-11-16 | Stop reason: HOSPADM

## 2017-11-16 RX ORDER — METOPROLOL TARTRATE 1 MG/ML
5 INJECTION, SOLUTION INTRAVENOUS EVERY 5 MIN PRN
Status: DISCONTINUED | OUTPATIENT
Start: 2017-11-16 | End: 2017-11-16 | Stop reason: HOSPADM

## 2017-11-16 RX ORDER — HYDRALAZINE HYDROCHLORIDE 20 MG/ML
10-20 INJECTION INTRAMUSCULAR; INTRAVENOUS
Status: DISCONTINUED | OUTPATIENT
Start: 2017-11-16 | End: 2017-11-16 | Stop reason: HOSPADM

## 2017-11-16 RX ORDER — FLUMAZENIL 0.1 MG/ML
0.2 INJECTION, SOLUTION INTRAVENOUS
Status: DISCONTINUED | OUTPATIENT
Start: 2017-11-16 | End: 2017-11-16 | Stop reason: HOSPADM

## 2017-11-16 RX ORDER — PROMETHAZINE HYDROCHLORIDE 25 MG/ML
6.25-25 INJECTION, SOLUTION INTRAMUSCULAR; INTRAVENOUS EVERY 4 HOURS PRN
Status: DISCONTINUED | OUTPATIENT
Start: 2017-11-16 | End: 2017-11-16 | Stop reason: HOSPADM

## 2017-11-16 RX ORDER — PROTAMINE SULFATE 10 MG/ML
25-100 INJECTION, SOLUTION INTRAVENOUS EVERY 5 MIN PRN
Status: DISCONTINUED | OUTPATIENT
Start: 2017-11-16 | End: 2017-11-16 | Stop reason: HOSPADM

## 2017-11-16 RX ORDER — PROTAMINE SULFATE 10 MG/ML
1-5 INJECTION, SOLUTION INTRAVENOUS
Status: DISCONTINUED | OUTPATIENT
Start: 2017-11-16 | End: 2017-11-16 | Stop reason: HOSPADM

## 2017-11-16 RX ORDER — LIDOCAINE HYDROCHLORIDE 10 MG/ML
1-10 INJECTION, SOLUTION EPIDURAL; INFILTRATION; INTRACAUDAL; PERINEURAL
Status: COMPLETED | OUTPATIENT
Start: 2017-11-16 | End: 2017-11-16

## 2017-11-16 RX ORDER — SODIUM CHLORIDE 9 MG/ML
INJECTION, SOLUTION INTRAVENOUS CONTINUOUS
Status: DISCONTINUED | OUTPATIENT
Start: 2017-11-16 | End: 2017-11-16 | Stop reason: HOSPADM

## 2017-11-16 RX ORDER — FENTANYL CITRATE 50 UG/ML
25-50 INJECTION, SOLUTION INTRAMUSCULAR; INTRAVENOUS
Status: DISCONTINUED | OUTPATIENT
Start: 2017-11-16 | End: 2017-11-16 | Stop reason: HOSPADM

## 2017-11-16 RX ORDER — POTASSIUM CHLORIDE 1500 MG/1
20 TABLET, EXTENDED RELEASE ORAL
Status: DISCONTINUED | OUTPATIENT
Start: 2017-11-16 | End: 2017-11-16 | Stop reason: HOSPADM

## 2017-11-16 RX ORDER — BUPIVACAINE HYDROCHLORIDE 2.5 MG/ML
1-10 INJECTION, SOLUTION EPIDURAL; INFILTRATION; INTRACAUDAL
Status: DISCONTINUED | OUTPATIENT
Start: 2017-11-16 | End: 2017-11-16 | Stop reason: HOSPADM

## 2017-11-16 RX ORDER — EPINEPHRINE 1 MG/ML
0.3 INJECTION, SOLUTION, CONCENTRATE INTRAVENOUS
Status: DISCONTINUED | OUTPATIENT
Start: 2017-11-16 | End: 2017-11-16 | Stop reason: HOSPADM

## 2017-11-16 RX ORDER — IOPAMIDOL 755 MG/ML
170 INJECTION, SOLUTION INTRAVASCULAR ONCE
Status: COMPLETED | OUTPATIENT
Start: 2017-11-16 | End: 2017-11-16

## 2017-11-16 RX ORDER — PRASUGREL 10 MG/1
10-60 TABLET, FILM COATED ORAL
Status: DISCONTINUED | OUTPATIENT
Start: 2017-11-16 | End: 2017-11-16 | Stop reason: HOSPADM

## 2017-11-16 RX ORDER — CLOPIDOGREL 300 MG/1
300-600 TABLET, FILM COATED ORAL
Status: DISCONTINUED | OUTPATIENT
Start: 2017-11-16 | End: 2017-11-16 | Stop reason: HOSPADM

## 2017-11-16 RX ORDER — POTASSIUM CHLORIDE 7.45 MG/ML
10 INJECTION INTRAVENOUS
Status: DISCONTINUED | OUTPATIENT
Start: 2017-11-16 | End: 2017-11-16 | Stop reason: HOSPADM

## 2017-11-16 RX ORDER — NALOXONE HYDROCHLORIDE 0.4 MG/ML
.2-.4 INJECTION, SOLUTION INTRAMUSCULAR; INTRAVENOUS; SUBCUTANEOUS
Status: DISCONTINUED | OUTPATIENT
Start: 2017-11-16 | End: 2017-11-16 | Stop reason: HOSPADM

## 2017-11-16 RX ORDER — ENALAPRILAT 1.25 MG/ML
1.25-2.5 INJECTION INTRAVENOUS
Status: DISCONTINUED | OUTPATIENT
Start: 2017-11-16 | End: 2017-11-16 | Stop reason: HOSPADM

## 2017-11-16 RX ORDER — LORAZEPAM 2 MG/ML
.5-2 INJECTION INTRAMUSCULAR EVERY 4 HOURS PRN
Status: DISCONTINUED | OUTPATIENT
Start: 2017-11-16 | End: 2017-11-16 | Stop reason: HOSPADM

## 2017-11-16 RX ORDER — METHYLPREDNISOLONE SODIUM SUCCINATE 125 MG/2ML
125 INJECTION, POWDER, LYOPHILIZED, FOR SOLUTION INTRAMUSCULAR; INTRAVENOUS
Status: DISCONTINUED | OUTPATIENT
Start: 2017-11-16 | End: 2017-11-16 | Stop reason: HOSPADM

## 2017-11-16 RX ORDER — DOBUTAMINE HYDROCHLORIDE 200 MG/100ML
2-20 INJECTION INTRAVENOUS CONTINUOUS PRN
Status: DISCONTINUED | OUTPATIENT
Start: 2017-11-16 | End: 2017-11-16 | Stop reason: HOSPADM

## 2017-11-16 RX ORDER — NITROGLYCERIN 20 MG/100ML
.07-2 INJECTION INTRAVENOUS CONTINUOUS PRN
Status: DISCONTINUED | OUTPATIENT
Start: 2017-11-16 | End: 2017-11-16 | Stop reason: HOSPADM

## 2017-11-16 RX ORDER — ONDANSETRON 2 MG/ML
4 INJECTION INTRAMUSCULAR; INTRAVENOUS EVERY 4 HOURS PRN
Status: DISCONTINUED | OUTPATIENT
Start: 2017-11-16 | End: 2017-11-16 | Stop reason: HOSPADM

## 2017-11-16 RX ORDER — ATROPINE SULFATE 0.1 MG/ML
.5-1 INJECTION INTRAVENOUS
Status: DISCONTINUED | OUTPATIENT
Start: 2017-11-16 | End: 2017-11-16 | Stop reason: HOSPADM

## 2017-11-16 RX ORDER — ASPIRIN 81 MG/1
81-324 TABLET, CHEWABLE ORAL
Status: DISCONTINUED | OUTPATIENT
Start: 2017-11-16 | End: 2017-11-16 | Stop reason: HOSPADM

## 2017-11-16 RX ORDER — SODIUM NITROPRUSSIDE 25 MG/ML
100-200 INJECTION INTRAVENOUS
Status: DISCONTINUED | OUTPATIENT
Start: 2017-11-16 | End: 2017-11-16 | Stop reason: HOSPADM

## 2017-11-16 RX ORDER — ASPIRIN 325 MG
325 TABLET ORAL
Status: DISCONTINUED | OUTPATIENT
Start: 2017-11-16 | End: 2017-11-16 | Stop reason: HOSPADM

## 2017-11-16 RX ORDER — NITROGLYCERIN 0.4 MG/1
0.4 TABLET SUBLINGUAL EVERY 5 MIN PRN
Status: DISCONTINUED | OUTPATIENT
Start: 2017-11-16 | End: 2017-11-16 | Stop reason: HOSPADM

## 2017-11-16 RX ORDER — AMLODIPINE BESYLATE 2.5 MG/1
2.5 TABLET ORAL DAILY
Qty: 90 TABLET | Refills: 1 | Status: SHIPPED | OUTPATIENT
Start: 2017-11-16 | End: 2017-12-14

## 2017-11-16 RX ORDER — NITROGLYCERIN 5 MG/ML
100-200 VIAL (ML) INTRAVENOUS
Status: DISCONTINUED | OUTPATIENT
Start: 2017-11-16 | End: 2017-11-16 | Stop reason: HOSPADM

## 2017-11-16 RX ORDER — NITROGLYCERIN 5 MG/ML
100-500 VIAL (ML) INTRAVENOUS
Status: COMPLETED | OUTPATIENT
Start: 2017-11-16 | End: 2017-11-16

## 2017-11-16 RX ORDER — HYDROCODONE BITARTRATE AND ACETAMINOPHEN 5; 325 MG/1; MG/1
1-2 TABLET ORAL EVERY 4 HOURS PRN
Status: DISCONTINUED | OUTPATIENT
Start: 2017-11-16 | End: 2017-11-16 | Stop reason: HOSPADM

## 2017-11-16 RX ORDER — VERAPAMIL HYDROCHLORIDE 2.5 MG/ML
1-2.5 INJECTION, SOLUTION INTRAVENOUS
Status: COMPLETED | OUTPATIENT
Start: 2017-11-16 | End: 2017-11-16

## 2017-11-16 RX ORDER — DOPAMINE HYDROCHLORIDE 160 MG/100ML
2-20 INJECTION, SOLUTION INTRAVENOUS CONTINUOUS PRN
Status: DISCONTINUED | OUTPATIENT
Start: 2017-11-16 | End: 2017-11-16 | Stop reason: HOSPADM

## 2017-11-16 RX ORDER — NICARDIPINE HYDROCHLORIDE 2.5 MG/ML
100 INJECTION INTRAVENOUS
Status: DISCONTINUED | OUTPATIENT
Start: 2017-11-16 | End: 2017-11-16 | Stop reason: HOSPADM

## 2017-11-16 RX ORDER — ACETAMINOPHEN 325 MG/1
325-650 TABLET ORAL EVERY 4 HOURS PRN
Status: DISCONTINUED | OUTPATIENT
Start: 2017-11-16 | End: 2017-11-16 | Stop reason: HOSPADM

## 2017-11-16 RX ORDER — POTASSIUM CHLORIDE 29.8 MG/ML
20 INJECTION INTRAVENOUS
Status: DISCONTINUED | OUTPATIENT
Start: 2017-11-16 | End: 2017-11-16 | Stop reason: HOSPADM

## 2017-11-16 RX ORDER — PHENYLEPHRINE HCL IN 0.9% NACL 1 MG/10 ML
20-100 SYRINGE (ML) INTRAVENOUS
Status: DISCONTINUED | OUTPATIENT
Start: 2017-11-16 | End: 2017-11-16 | Stop reason: HOSPADM

## 2017-11-16 RX ORDER — FUROSEMIDE 10 MG/ML
20-100 INJECTION INTRAMUSCULAR; INTRAVENOUS
Status: DISCONTINUED | OUTPATIENT
Start: 2017-11-16 | End: 2017-11-16 | Stop reason: HOSPADM

## 2017-11-16 RX ORDER — LIDOCAINE HYDROCHLORIDE 10 MG/ML
30 INJECTION, SOLUTION EPIDURAL; INFILTRATION; INTRACAUDAL; PERINEURAL
Status: DISCONTINUED | OUTPATIENT
Start: 2017-11-16 | End: 2017-11-16 | Stop reason: HOSPADM

## 2017-11-16 RX ORDER — HEPARIN SODIUM 1000 [USP'U]/ML
1000-10000 INJECTION, SOLUTION INTRAVENOUS; SUBCUTANEOUS EVERY 5 MIN PRN
Status: DISCONTINUED | OUTPATIENT
Start: 2017-11-16 | End: 2017-11-16 | Stop reason: HOSPADM

## 2017-11-16 RX ORDER — ATROPINE SULFATE 0.1 MG/ML
0.5 INJECTION INTRAVENOUS EVERY 5 MIN PRN
Status: DISCONTINUED | OUTPATIENT
Start: 2017-11-16 | End: 2017-11-16 | Stop reason: HOSPADM

## 2017-11-16 RX ORDER — NALOXONE HYDROCHLORIDE 0.4 MG/ML
.1-.4 INJECTION, SOLUTION INTRAMUSCULAR; INTRAVENOUS; SUBCUTANEOUS
Status: DISCONTINUED | OUTPATIENT
Start: 2017-11-16 | End: 2017-11-16 | Stop reason: HOSPADM

## 2017-11-16 RX ORDER — DIPHENHYDRAMINE HYDROCHLORIDE 50 MG/ML
25-50 INJECTION INTRAMUSCULAR; INTRAVENOUS
Status: DISCONTINUED | OUTPATIENT
Start: 2017-11-16 | End: 2017-11-16 | Stop reason: HOSPADM

## 2017-11-16 RX ORDER — DEXTROSE MONOHYDRATE 25 G/50ML
12.5-5 INJECTION, SOLUTION INTRAVENOUS EVERY 30 MIN PRN
Status: DISCONTINUED | OUTPATIENT
Start: 2017-11-16 | End: 2017-11-16 | Stop reason: HOSPADM

## 2017-11-16 RX ADMIN — Medication 5000 UNITS: at 08:58

## 2017-11-16 RX ADMIN — SODIUM CHLORIDE: 9 INJECTION, SOLUTION INTRAVENOUS at 07:15

## 2017-11-16 RX ADMIN — NITROGLYCERIN 200 MCG: 5 INJECTION, SOLUTION INTRAVENOUS at 09:49

## 2017-11-16 RX ADMIN — LIDOCAINE HYDROCHLORIDE 10 MG: 10 INJECTION, SOLUTION EPIDURAL; INFILTRATION; INTRACAUDAL; PERINEURAL at 08:50

## 2017-11-16 RX ADMIN — IOPAMIDOL 170 ML: 755 INJECTION, SOLUTION INTRAVASCULAR at 10:15

## 2017-11-16 RX ADMIN — VERAPAMIL HYDROCHLORIDE 2.5 MG: 2.5 INJECTION, SOLUTION INTRAVENOUS at 08:56

## 2017-11-16 RX ADMIN — FENTANYL CITRATE 100 MCG: 50 INJECTION, SOLUTION INTRAMUSCULAR; INTRAVENOUS at 10:00

## 2017-11-16 RX ADMIN — Medication 1000 UNITS: at 09:44

## 2017-11-16 RX ADMIN — NITROGLYCERIN 200 MCG: 5 INJECTION, SOLUTION INTRAVENOUS at 08:57

## 2017-11-16 RX ADMIN — MIDAZOLAM HYDROCHLORIDE 2 MG: 1 INJECTION, SOLUTION INTRAMUSCULAR; INTRAVENOUS at 10:00

## 2017-11-16 NOTE — DISCHARGE INSTRUCTIONS
Cardiac Angiogram Discharge Instructions - Radial    After you go home:      Have an adult stay with you until tomorrow.    Drink extra fluids for 2 days.    You may resume your normal diet.    No smoking       For 24 hours - due to the sedation you received:    Relax and take it easy.    Do NOT make any important or legal decisions.    Do NOT drive or operate machines at home or at work.    Do NOT drink alcohol.    Care of Wrist Puncture Site:      For the first 24 hrs - check the puncture site every 1-2 hours while awake.    It is normal to have soreness at the puncture site and mild tingling in your hand for up to 3 days.    Remove the bandaid after 24 hours. If there is minor oozing, apply another bandaid and remove it after 12 hours.    You may shower tomorrow.  Do NOT take a bath, or use a hot tub or pool for at least 3 days. Do NOT scrub the site. Do not use lotion or powder near the puncture site.           Activity:        For 2 days:     do not use your hand or arm to support your weight (such as rising from a chair)     do not bend your wrist (such as lifting a garage door).    do not lift more than 5 pounds or exercise your arm (such as tennis, golf or bowling).    Do NOT do any heavy activity such as exercise, lifting, or straining.     Bleeding:      If you start bleeding from the site in your wrist, sit down and press firmly on/above the site for 10 minutes.     Once bleeding stops, keep arm still for 2 hours.     Call [facility] as soon as you can.       Call 911 right away if you have heavy bleeding or bleeding that does not stop.      Medicines:      If you are taking antiplatelet medications such as Plavix, Brilinta or Effient, do not stop taking it until you talk to your cardiologist.        If you are on Metformin (Glucophage), do not restart it until you have blood tests (within 2 to 3 days after discharge).  After you have your blood drawn, you may restart the Metformin.     Take your  medications, including blood thinners, unless your provider tells you not to.  If you take Coumadin (Warfarin), have your INR checked by your provider in  3-5 days. Call your clinic to schedule this.    If you have stopped any medicines, check with your provider about when to restart them.    Follow Up Appointments:      Follow up with [facility] Nurse Practitioner at [facility] of patient preference in 7-10 days.    Call the clinic if:      You have a large or growing hard lump around the site.    The site is red, swollen, hot or tender.    Blood or fluid is draining from the site.    You have chills or a fever greater than 101 F (38 C).    Your arm turns feels numb, cool or changes color.    You have hives, a rash or unusual itching.    Any questions or concerns.          [facility] [place]:    [phone] [facility] (7 days a week)

## 2017-11-16 NOTE — PROCEDURES
CARDIAC CATH REPORT:     PROCEDURES PERFORMED:   Retrograde left radial artery access  Coronary Angiography  Physiologic Assessment (iFR) of the LAD  Attempted iFR of the RCA    PHYSICIANS:  Attending Physician: Mal Workman MD  Interventional Cardiology Fellow: John Velez MD    INDICATION:  Gillian Burk is a 89 year old female with H/o non-obstructive CAD, AS s/p aortic stenosis, HTN and CHF coming in for evaluation of chest pain. She presents on an elective basis. The clinical presentation was Stable Angina (CCS III).      DESCRIPTION:  1. Consent obtained with discussion of risks.  All questions were answered.  2. Sterile prep and procedure.  3. Location with Sheaths:   Lf Radial Arterial  6 Fr  10 cm [short]  4. Access: Local anesthetic with lidocaine.  A micropuncture 21 guage needle was used to establish vascular access using a modified Seldinger technique.  5. Diagnostic Catheters:   4 Fr  JR4, JL 4 and AR 1 . It was difficult to selectively engage the RCA. Also there was difficulty to pass the wire into the RCA due to possible strut from the angy valve.  It was difficult to selectively   6. Guiding Catheters:  5 F ERAD left  6. Estimated blood loss: < 25 ml    MEDICATIONS:  The procedure was performed under conscious sedation for 75 minutes from 08:50 to 10:00.  The patient was assessed immediately before the first sedation medication was administered.  Midazolam 2 mg and Fentanyl 100 mcg were administered.  Heart rate, BP, respiration, oxygen saturation and patient responses were monitored throughout the procedure with the assistance of the RN under my supervision.  >> IV UFH   U was administered to achieve anticoagulation.  >> Antiplatelet Therapy: ASA 81 mg     Procedures:    CORONARY ANGIOGRAM:   1. Both coronary arteries arise from their respective cusps.  2. Dominance: Right  3. The LM gives  is without angiographic evidence of disease.   4. LAD: Type 3 [LAD supplies the entire apex].. The  LAD gives rise to septal perforators. There is 60-70% stenosis of the mid to distal LAD.    5. LCX gives rise to medium to large caliber OM1 which bifurcates to 2 medium size branches. There is minimal irregularities in the circumflex artery and its branches.  6. RCA gives rise to  very high medium size acute marginal branch ( close to the ostium), PL branches and supplies PDA.  There is 40% stenosis of the ostium of the RCA.  7. Ramus: There is a medium caliber and long ramus with multiple tiny branches along the course. There is 40% stenosis of the mid ramus.     FUNCTIONAL ASSESSMENT:   Adequate anticoagulation was was obtained with IV UFH.   RCA ostium: A [place] V[place]ata wire attempted to cross into the RCA. It was unsuccessful due to angulation of the catheter.  Mid LAD: A [place] Verrata wire was passed across the mid LAD lesion.  iFR was performed which was 0.80.      Sheath Removal:  The left radial sheath was manually removed in the cardiac catheterization laboratory. TR band applied.    Contrast: Isovue, 170 ml     Fluoroscopy Time: 29 min    COMPLICATIONS:  1. None    SUMMARY:   Single vessel obstructive CAD - LAD  - positive iFR of mid LAD  - 40% stenosis of ostial RCA    PLAN:   >> ASA 81 mg qd and Plavix 75 mg qd   >> Due to contrast limit, will schedule PCI of the mid LAD on a later date.  >> To add norvasc for antianginal. Patient is already metoprolol and Imdur  >> Due to difficulty in passing 6F catheter through the left radial due to arterial tortuosity at the level of elbow, recommend femoral approach  >> Bedrest per protocol.  >> Continued medical management and lifestyle modification for cardiovascular risk factor optimization.   >>. Discharge today per protocol    The attending interventional cardiologist was present and supervised all critical aspects the procedure.    John Velez MD  Interventional cardiology Fellow    Mal Workman MD  Cardiology Staff

## 2017-11-16 NOTE — IP AVS SNAPSHOT
[place] [address]    [place] [address]    Phone:  [phone]                                       After Visit Summary   11/16/2017    Gillian Burk    MRN: [id number]           After Visit Summary Signature Page     I have received my discharge instructions, and my questions have been answered. I have discussed any challenges I see with this plan with the nurse or doctor.    ..........................................................................................................................................  Patient/Patient Representative Signature      ..........................................................................................................................................  Patient Representative Print Name and Relationship to Patient    ..................................................               ................................................  Date                                            Time    ..........................................................................................................................................  Reviewed by Signature/Title    ...................................................              ..............................................  Date                                                            Time

## 2017-11-16 NOTE — PROGRESS NOTES
Care Suites Discharge Summary    Discharge Criteria:   Discharge Criteria met per MD orders: Yes.   Vital signs stable.     Pt demonstrates ability to ambulate safely: Yes.  (See discharge questionnaire for additional information)    Discharge instructions & education:   Discharge instructions reviewed with patient and family. Patient verbalizes understanding.   Patient education provided:  Left radial home care instructions for cardiac angiogram provided    Medications:   Patient will be discharging on new medications- Yes. Rx for Norvasc provided, Patient verbalizes reason for use, start date, and side effects Yes.    Items returned to patient:   Home and hospital acquired medications returned to patient Yes   Listed belongings gathered and returned to patient: Yes    Patient discharged to home with DTR.    Fabrizio Brown

## 2017-11-16 NOTE — IP AVS SNAPSHOT
MRN:[id number]                      After Visit Summary   11/16/2017    Gillian Burk    MRN: [id number]           Visit Information        Department      11/16/2017  6:39 AM [facility]          Review of your medicines      START taking        Dose / Directions    amLODIPine 2.5 MG tablet   Commonly known as:  NORVASC   Used for:  Chest pain, unspecified type        Dose:  2.5 mg   Take 1 tablet (2.5 mg) by mouth daily   Quantity:  90 tablet   Refills:  1         CONTINUE these medicines which may have CHANGED, or have new prescriptions. If we are uncertain of the size of tablets/capsules you have at home, strength may be listed as something that might have changed.        Dose / Directions    isosorbide mononitrate 60 MG 24 hr tablet   Commonly known as:  IMDUR   This may have changed:  Another medication with the same name was removed. Continue taking this medication, and follow the directions you see here.   Used for:  Chest pain, unspecified type        Dose:  60 mg   Take 1 tablet (60 mg) by mouth daily   Quantity:  90 tablet   Refills:  3         CONTINUE these medicines which have NOT CHANGED        Dose / Directions    ALPRAZolam 0.25 MG tablet   Commonly known as:  XANAX   Used for:  Anxiety        Dose:  0.25 mg   Take 1 tablet (0.25 mg) by mouth 2 times daily as needed for anxiety   Quantity:  28 tablet   Refills:  0       aspirin 81 MG EC tablet   Used for:  S/P TAVR (transcatheter aortic valve replacement)        Dose:  81 mg   Take 1 tablet (81 mg) by mouth daily   Refills:  0       atorvastatin 40 MG tablet   Commonly known as:  LIPITOR   Used for:  Hyperlipidemia with target LDL less than 130        Dose:  40 mg   Take 1 tablet (40 mg) by mouth daily Reports taking   Quantity:  90 tablet   Refills:  0       calcium carbonate 500 MG chewable tablet   Commonly known as:  TUMS        Dose:  2-4 chew tab   Take 2-4 tablets (1,000-2,000 mg) by mouth as needed  for heartburn   Refills:  0       clopidogrel 75 MG tablet   Commonly known as:  PLAVIX   Used for:  History of CVA (cerebrovascular accident)        Dose:  75 mg   Take 1 tablet (75 mg) by mouth daily   Quantity:  90 tablet   Refills:  3       docusate sodium 100 MG capsule   Commonly known as:  COLACE        Dose:  200 mg   Take 200 mg by mouth daily 2 capsules   Refills:  0       famotidine 40 MG tablet   Commonly known as:  PEPCID   Used for:  Gastroesophageal reflux disease without esophagitis        TAKE ONE TABLET BY MOUTH AT BEDTIME   Quantity:  30 tablet   Refills:  9       FLORAJEN ACIDOPHILUS Caps        Dose:  1 capsule   Take 1 capsule by mouth daily   Refills:  0       folic acid 1 MG tablet   Commonly known as:  FOLVITE   Used for:  Seropositive rheumatoid arthritis (H)        Dose:  1000 mcg   Take 1 tablet (1,000 mcg) by mouth daily   Quantity:  90 tablet   Refills:  1       iron 325 (65 FE) MG tablet   Used for:  Gastrointestinal hemorrhage, unspecified gastrointestinal hemorrhage type        TAKE ONE TABLET BY MOUTH ONCE DAILY WITH BREAKFAST   Quantity:  30 tablet   Refills:  11       LANsoprazole 30 MG CR capsule   Commonly known as:  PREVACID   Used for:  Gastroesophageal reflux disease with esophagitis        Dose:  30 mg   Take 1 capsule (30 mg) by mouth daily Take 30-60 minutes before a meal.   Quantity:  30 capsule   Refills:  3       levETIRAcetam 500 MG tablet   Commonly known as:  KEPPRA   Used for:  Seizure disorder (H)        TAKE ONE TABLET BY MOUTH ONCE DAILY AND one AT BEDTIME   Quantity:  180 tablet   Refills:  0       levothyroxine 50 MCG tablet   Commonly known as:  SYNTHROID   Used for:  Hypothyroidism due to acquired atrophy of thyroid        Dose:  50 mcg   Take 1 tablet (50 mcg) by mouth daily   Quantity:  30 tablet   Refills:  3       methotrexate 2.5 MG tablet CHEMO   Used for:  Seropositive rheumatoid arthritis (H)        Dose:  10 mg   Take 4 tablets (10 mg) by mouth once  a week Wed   Quantity:  52 tablet   Refills:  3       metoprolol 25 MG 24 hr tablet   Commonly known as:  TOPROL-XL   Used for:  Chest pain, unspecified type        Dose:  50 mg   Take 2 tablets (50 mg) by mouth daily   Quantity:  180 tablet   Refills:  3       mirtazapine 30 MG tablet   Commonly known as:  REMERON   Used for:  Sleep initiation disorder        TAKE ONE TABLET BY MOUTH AT BEDTIME   Quantity:  90 tablet   Refills:  2       Multi-vitamin Tabs tablet        Dose:  1 tablet   Take 1 tablet by mouth daily   Refills:  0       MYLANTA PO        2 tblsp every 4 hours as needed   Refills:  0       nitroGLYcerin 0.4 MG sublingual tablet   Commonly known as:  NITROSTAT        Pt reports taking - -  -For chest pain place 1 tablet under the tongue every 5 minutes for 3 doses. If symptoms persist 5 minutes after 1st dose call 911.   Quantity:  25 tablet   Refills:  0       predniSONE 5 MG tablet   Commonly known as:  DELTASONE   Used for:  Seropositive rheumatoid arthritis (H)        TAKE ONE TABLET BY MOUTH EVERY OTHER DAY ALTERNATING  WITH  1/2  TABLET  EVERY  OTHER  DAY   Quantity:  23 tablet   Refills:  13       TYLENOL ARTHRITIS PAIN 650 MG CR tablet   Generic drug:  acetaminophen        Dose:  1300 mg   Take 2 tablets (1,300 mg) by mouth every 8 hours as needed for mild pain   Refills:  0            Where to get your medicines      Some of these will need a paper prescription and others can be bought over the counter. Ask your nurse if you have questions.     Bring a paper prescription for each of these medications     amLODIPine 2.5 MG tablet               Prescriptions were sent or printed at these locations (1 Prescription)                   [place] [address]   [facility] [address] [facility] [address]    Telephone:  [phone]   Fax:  [phone]   Hours:                  Printed at Department/Unit printer (1 of 1)         amLODIPine (NORVASC) 2.5 MG tablet                  Protect others around you: Learn how to safely use, store and throw away your medicines at www.disposemymeds.org.         Follow-ups after your visit        Your next 10 appointments already scheduled     Nov 28, 2017  1:15 PM CST   Return Visit with ADRIÁN Minor CNP   [facility] ([facility])    [address] [place] [id number]   [phone]               Care Instructions        After Care Instructions     Discharge Instructions - IF on Metformin (Glucophage or Glucovance) or Metformin containing medications       IF on Metformin (Glucophage or Glucovance) or Metformin containing medications , schedule a Basic Metabolic Panel at [facility] or Primary Clinic in 48 - 72 hours post procedure and PRIOR TO resuming the Metformin or Metformin containing medications.  Hold Metformin (Glucophage or Glucovance) or Metformin containing medications until after the Basic Metabolic Panel on the 2nd or 3rd day following the procedure.  May resume after blood draw is complete.                  Further instructions from your care team       Cardiac Angiogram Discharge Instructions - Radial    After you go home:      Have an adult stay with you until tomorrow.    Drink extra fluids for 2 days.    You may resume your normal diet.    No smoking       For 24 hours - due to the sedation you received:    Relax and take it easy.    Do NOT make any important or legal decisions.    Do NOT drive or operate machines at home or at work.    Do NOT drink alcohol.    Care of Wrist Puncture Site:      For the first 24 hrs - check the puncture site every 1-2 hours while awake.    It is normal to have soreness at the puncture site and mild tingling in your hand for up to 3 days.    Remove the bandaid after 24 hours. If there is minor oozing, apply another bandaid and remove it after 12 hours.    You may shower tomorrow.  Do NOT take a bath, or use a hot tub or pool  for at least 3 days. Do NOT scrub the site. Do not use lotion or powder near the puncture site.           Activity:        For 2 days:     do not use your hand or arm to support your weight (such as rising from a chair)     do not bend your wrist (such as lifting a garage door).    do not lift more than 5 pounds or exercise your arm (such as tennis, golf or bowling).    Do NOT do any heavy activity such as exercise, lifting, or straining.     Bleeding:      If you start bleeding from the site in your wrist, sit down and press firmly on/above the site for 10 minutes.     Once bleeding stops, keep arm still for 2 hours.     Call [facility] as soon as you can.       Call 911 right away if you have heavy bleeding or bleeding that does not stop.      Medicines:      If you are taking antiplatelet medications such as Plavix, Brilinta or Effient, do not stop taking it until you talk to your cardiologist.        If you are on Metformin (Glucophage), do not restart it until you have blood tests (within 2 to 3 days after discharge).  After you have your blood drawn, you may restart the Metformin.     Take your medications, including blood thinners, unless your provider tells you not to.  If you take Coumadin (Warfarin), have your INR checked by your provider in  3-5 days. Call your clinic to schedule this.    If you have stopped any medicines, check with your provider about when to restart them.    Follow Up Appointments:      Follow up with [facility] Nurse Practitioner at [facility] of patient preference in 7-10 days.    Call the clinic if:      You have a large or growing hard lump around the site.    The site is red, swollen, hot or tender.    Blood or fluid is draining from the site.    You have chills or a fever greater than 101 F (38 C).    Your arm turns feels numb, cool or changes color.    You have hives, a rash or unusual itching.    Any questions or concerns.          [facility]  "at [place]:    [phone] [facility] (7 days a week)             Additional Information About Your Visit        [employer] Information     [employer] gives you secure access to your electronic health record. If you see a primary care provider, you can also send messages to your care team and make appointments. If you have questions, please call your primary care clinic.  If you do not have a primary care provider, please call [phone] and they will assist you.        Care EveryWhere ID     This is your Care EveryWhere ID. This could be used by other organizations to access your [facility] medical records  [id number]        Your Vitals Were     Blood Pressure Pulse Temperature Respirations Height Weight    148/77 75 96.9  F (36.1  C) (Oral) 18 1.676 m (5' 6\") 64 kg (141 lb)    Pulse Oximetry BMI (Body Mass Index)                97% 22.76 kg/m2           Primary Care Provider Office Phone # Fax #    Augusto Fish DO [place] [phone]      Equal Access to Services     RIVER TRENT : Hadii aad ku hadasho Soomaali, waaxda luqadaha, qaybta kaalmada adeegyada, waxay idiin hayjusn blanche oglesby . So [facility] [phone].    ATENCIÓN: Si habla español, tiene a granger disposición servicios gratuitos de asistencia lingüística. Llame al [phone].    We comply with applicable federal civil rights laws and [place] laws. We do not discriminate on the basis of race, color, national origin, age, disability, sex, sexual orientation, or gender identity.            Thank you!     Thank you for choosing [facility] for your care. Our goal is always to provide you with excellent care. Hearing back from our patients is one way we can continue to improve our services. Please take a few minutes to complete the written survey that you may receive in the mail after you visit with us. Thank you!             Medication List: This is a list of all your medications and when to take them. Check marks below indicate your daily " home schedule. Keep this list as a reference.      Medications           Morning Afternoon Evening Bedtime As Needed    ALPRAZolam 0.25 MG tablet   Commonly known as:  XANAX   Take 1 tablet (0.25 mg) by mouth 2 times daily as needed for anxiety                                amLODIPine 2.5 MG tablet   Commonly known as:  NORVASC   Take 1 tablet (2.5 mg) by mouth daily                                aspirin 81 MG EC tablet   Take 1 tablet (81 mg) by mouth daily                                atorvastatin 40 MG tablet   Commonly known as:  LIPITOR   Take 1 tablet (40 mg) by mouth daily Reports taking                                calcium carbonate 500 MG chewable tablet   Commonly known as:  TUMS   Take 2-4 tablets (1,000-2,000 mg) by mouth as needed for heartburn                                clopidogrel 75 MG tablet   Commonly known as:  PLAVIX   Take 1 tablet (75 mg) by mouth daily                                docusate sodium 100 MG capsule   Commonly known as:  COLACE   Take 200 mg by mouth daily 2 capsules                                famotidine 40 MG tablet   Commonly known as:  PEPCID   TAKE ONE TABLET BY MOUTH AT BEDTIME                                FLORAJEN ACIDOPHILUS Caps   Take 1 capsule by mouth daily                                folic acid 1 MG tablet   Commonly known as:  FOLVITE   Take 1 tablet (1,000 mcg) by mouth daily                                iron 325 (65 FE) MG tablet   TAKE ONE TABLET BY MOUTH ONCE DAILY WITH BREAKFAST                                isosorbide mononitrate 60 MG 24 hr tablet   Commonly known as:  IMDUR   Take 1 tablet (60 mg) by mouth daily                                LANsoprazole 30 MG CR capsule   Commonly known as:  PREVACID   Take 1 capsule (30 mg) by mouth daily Take 30-60 minutes before a meal.                                levETIRAcetam 500 MG tablet   Commonly known as:  KEPPRA   TAKE ONE TABLET BY MOUTH ONCE DAILY AND one AT BEDTIME                                 levothyroxine 50 MCG tablet   Commonly known as:  SYNTHROID   Take 1 tablet (50 mcg) by mouth daily                                methotrexate 2.5 MG tablet CHEMO   Take 4 tablets (10 mg) by mouth once a week Wed                                metoprolol 25 MG 24 hr tablet   Commonly known as:  TOPROL-XL   Take 2 tablets (50 mg) by mouth daily                                mirtazapine 30 MG tablet   Commonly known as:  REMERON   TAKE ONE TABLET BY MOUTH AT BEDTIME                                Multi-vitamin Tabs tablet   Take 1 tablet by mouth daily                                MYLANTA PO   2 tblsp every 4 hours as needed                                nitroGLYcerin 0.4 MG sublingual tablet   Commonly known as:  NITROSTAT   Pt reports taking - -  -For chest pain place 1 tablet under the tongue every 5 minutes for 3 doses. If symptoms persist 5 minutes after 1st dose call 911.                                predniSONE 5 MG tablet   Commonly known as:  DELTASONE   TAKE ONE TABLET BY MOUTH EVERY OTHER DAY ALTERNATING  WITH  1/2  TABLET  EVERY  OTHER  DAY                                TYLENOL ARTHRITIS PAIN 650 MG CR tablet   Take 2 tablets (1,300 mg) by mouth every 8 hours as needed for mild pain   Generic drug:  acetaminophen

## 2017-11-17 DIAGNOSIS — I25.10 CORONARY ARTERY DISEASE INVOLVING NATIVE CORONARY ARTERY OF NATIVE HEART WITHOUT ANGINA PECTORIS: Primary | ICD-10-CM

## 2017-11-17 RX ORDER — NITROGLYCERIN 0.4 MG/1
TABLET SUBLINGUAL
Qty: 25 TABLET | Refills: 0 | Status: SHIPPED | OUTPATIENT
Start: 2017-11-17 | End: 2017-11-27

## 2017-11-17 NOTE — TELEPHONE ENCOUNTER
Received message from pt's daughter (Carley) wanting to speak with a nurse about pt's access site. Pt had Angiogram for dx of chest pain on 11/16/17. Contacted patient's daughter, she said the top of her mother's hand is swollen. Pt was instructed yesterday prior to discharge that if she had any discomfort she could alternate heat and cold to access site. Dtr said pt denies any s/s of infection. No oozing or lump at site. Site is intact but reveals lots of bruising from her knuckles to half up her to left elbow. Daughter mentioned that they initially had a hard time accessing the site which they both knew to expect bruising. The nurse had marked the pt's arm from left wrist to mid arm. I advised her to apply cold compress 20 minutes on/off. No heavy lifting. If swelling worsens or if she develops s/s of infection then go to the ED. Daughter verbalized understanding. Alicia Pisano RN 12:43 PM 11/17/17

## 2017-11-17 NOTE — TELEPHONE ENCOUNTER
Nitroglycer 0.4 MG      Last Written Prescription Date:  11-14-16  Last Fill Quantity: 25,   # refills: 0  Future Office visit:    Next 5 appointments (look out 90 days)     Nov 28, 2017  1:15 PM CST   Return Visit with ADRIÁN Minor CNP   [facility] ([facility])    [address] [place] [id number]   [phone]                   Routing refill request to provider for review/approval because:  Drug not on the [facility], [facility] or  Health refill protocol or controlled substance

## 2017-11-21 ENCOUNTER — MYC MEDICAL ADVICE (OUTPATIENT)
Dept: FAMILY MEDICINE | Facility: OTHER | Age: 82
End: 2017-11-21

## 2017-11-21 DIAGNOSIS — Z79.899 LONG TERM USE OF DRUG: ICD-10-CM

## 2017-11-21 DIAGNOSIS — M05.79 RHEUMATOID ARTHRITIS WITH RHEUMATOID FACTOR OF MULTIPLE SITES WITHOUT ORGAN OR SYSTEMS INVOLVEMENT (H): Primary | ICD-10-CM

## 2017-11-21 LAB
ALT SERPL W P-5'-P-CCNC: 21 U/L (ref 0–50)
AST SERPL W P-5'-P-CCNC: 20 U/L (ref 0–45)
BASOPHILS # BLD AUTO: 0.1 10E9/L (ref 0–0.2)
BASOPHILS NFR BLD AUTO: 0.6 %
CREAT SERPL-MCNC: 1.01 MG/DL (ref 0.52–1.04)
CRP SERPL-MCNC: <2.9 MG/L (ref 0–8)
DIFFERENTIAL METHOD BLD: ABNORMAL
EOSINOPHIL # BLD AUTO: 0.2 10E9/L (ref 0–0.7)
EOSINOPHIL NFR BLD AUTO: 2.4 %
ERYTHROCYTE [DISTWIDTH] IN BLOOD BY AUTOMATED COUNT: 15.9 % (ref 10–15)
GFR SERPL CREATININE-BSD FRML MDRD: 51 ML/MIN/1.7M2
HCT VFR BLD AUTO: 35 % (ref 35–47)
HGB BLD-MCNC: 11.7 G/DL (ref 11.7–15.7)
IMM GRANULOCYTES # BLD: 0 10E9/L (ref 0–0.4)
IMM GRANULOCYTES NFR BLD: 0.3 %
INTERPRETATION ECG - MUSE: NORMAL
LYMPHOCYTES # BLD AUTO: 2 10E9/L (ref 0.8–5.3)
LYMPHOCYTES NFR BLD AUTO: 22.8 %
MCH RBC QN AUTO: 32.4 PG (ref 26.5–33)
MCHC RBC AUTO-ENTMCNC: 33.4 G/DL (ref 31.5–36.5)
MCV RBC AUTO: 97 FL (ref 78–100)
MONOCYTES # BLD AUTO: 0.8 10E9/L (ref 0–1.3)
MONOCYTES NFR BLD AUTO: 9.2 %
NEUTROPHILS # BLD AUTO: 5.7 10E9/L (ref 1.6–8.3)
NEUTROPHILS NFR BLD AUTO: 64.7 %
PLATELET # BLD AUTO: 279 10E9/L (ref 150–450)
RBC # BLD AUTO: 3.61 10E12/L (ref 3.8–5.2)
WBC # BLD AUTO: 8.8 10E9/L (ref 4–11)

## 2017-11-21 PROCEDURE — 84450 TRANSFERASE (AST) (SGOT): CPT | Performed by: INTERNAL MEDICINE

## 2017-11-21 PROCEDURE — 36415 COLL VENOUS BLD VENIPUNCTURE: CPT | Performed by: INTERNAL MEDICINE

## 2017-11-21 PROCEDURE — 84460 ALANINE AMINO (ALT) (SGPT): CPT | Performed by: INTERNAL MEDICINE

## 2017-11-21 PROCEDURE — 86140 C-REACTIVE PROTEIN: CPT | Performed by: INTERNAL MEDICINE

## 2017-11-21 PROCEDURE — 82565 ASSAY OF CREATININE: CPT | Performed by: INTERNAL MEDICINE

## 2017-11-21 PROCEDURE — 85025 COMPLETE CBC W/AUTO DIFF WBC: CPT | Performed by: INTERNAL MEDICINE

## 2017-11-27 DIAGNOSIS — I25.10 CORONARY ARTERY DISEASE INVOLVING NATIVE CORONARY ARTERY OF NATIVE HEART WITHOUT ANGINA PECTORIS: ICD-10-CM

## 2017-11-28 ENCOUNTER — OFFICE VISIT (OUTPATIENT)
Dept: CARDIOLOGY | Facility: CLINIC | Age: 82
End: 2017-11-28
Payer: COMMERCIAL

## 2017-11-28 VITALS
OXYGEN SATURATION: 97 % | DIASTOLIC BLOOD PRESSURE: 68 MMHG | HEIGHT: 66 IN | HEART RATE: 80 BPM | SYSTOLIC BLOOD PRESSURE: 142 MMHG | WEIGHT: 144.8 LBS | BODY MASS INDEX: 23.27 KG/M2

## 2017-11-28 DIAGNOSIS — R07.9 CHEST PAIN, UNSPECIFIED TYPE: ICD-10-CM

## 2017-11-28 DIAGNOSIS — I25.10 CORONARY ARTERY DISEASE INVOLVING NATIVE CORONARY ARTERY OF NATIVE HEART WITHOUT ANGINA PECTORIS: ICD-10-CM

## 2017-11-28 PROCEDURE — 99214 OFFICE O/P EST MOD 30 MIN: CPT | Performed by: NURSE PRACTITIONER

## 2017-11-28 NOTE — MR AVS SNAPSHOT
After Visit Summary   11/28/2017    Gillian Burk    MRN: [id number]           Patient Information     Date Of Birth          3/19/1927        Visit Information        Provider Department      11/28/2017 1:15 PM Estelle Martinez APRN CNP [facility]        Today's Diagnoses     Chest pain, unspecified type        Coronary artery disease involving native coronary artery of native heart without angina pectoris          Care Instructions    1) Return for staged intervention of the LAD  2) Follow up post procedure          Follow-ups after your visit        Additional Services     Follow-Up with Cardiac Advanced Practice Provider                 Future tests that were ordered for you today     Open Future Orders        Priority Expected Expires Ordered    Follow-Up with Cardiac Advanced Practice Provider Routine 12/19/2017 11/28/2018 11/28/2017    Cardiac Cath: Coronary Angiography Adult Order Routine 12/5/2017 11/28/2018 11/28/2017            Who to contact     If you have questions or need follow up information about today's clinic visit or your schedule please contact [facility] directly at [phone].  Normal or non-critical lab and imaging results will be communicated to you by [employer], letter or phone within 4 business days after the clinic has received the results. If you do not hear from us within 7 days, please contact the clinic through [employer] or phone. If you have a critical or abnormal lab result, we will notify you by phone as soon as possible.  Submit refill requests through [employer] or call your pharmacy and they will forward the refill request to us. Please allow 3 business days for your refill to be completed.          Additional Information About Your Visit        MyChart Information     [employer] gives you secure access to your electronic health record. If you see a primary care provider, you can  "also send messages to your care team and make appointments. If you have questions, please call your primary care clinic.  If you do not have a primary care provider, please call [phone] and they will assist you.        Care EveryWhere ID     This is your Care EveryWhere ID. This could be used by other organizations to access your [place] medical records  [id number]        Your Vitals Were     Pulse Height Pulse Oximetry BMI (Body Mass Index)          80 1.676 m (5' 6\") 97% 23.37 kg/m2         Blood Pressure from Last 3 Encounters:   11/28/17 142/68   11/16/17 123/82   11/13/17 140/64    Weight from Last 3 Encounters:   11/28/17 65.7 kg (144 lb 12.8 oz)   11/16/17 64 kg (141 lb)   11/13/17 65.7 kg (144 lb 14.4 oz)              We Performed the Following     Follow-Up with Cardiologist        Primary Care Provider Office Phone # Fax #    Augusto Fish Meyer [phone]       [address] [place] [address]        Equal Access to Services     LUPE TRENT : Hadii aad ku hadasho Soomaali, waaxda luqadaha, qaybta kaalmada adeshamiryaponcho, zack ayers. So [facility] [phone].    ATENCIÓN: Si habla español, tiene a granger disposición servicios gratuitos de asistencia lingüística. [facility] [phone].    We comply with applicable federal civil rights laws and [place] laws. We do not discriminate on the basis of race, color, national origin, age, disability, sex, sexual orientation, or gender identity.            Thank you!     Thank you for choosing [facility]  for your care. Our goal is always to provide you with excellent care. Hearing back from our patients is one way we can continue to improve our services. Please take a few minutes to complete the written survey that you may receive in the mail after your visit with us. Thank you!             Your Updated Medication List - Protect others around you: Learn how to safely " use, store and throw away your medicines at www.disposemymeds.org.          This list is accurate as of: 11/28/17  1:53 PM.  Always use your most recent med list.                   Brand Name Dispense Instructions for use Diagnosis    ALPRAZolam 0.25 MG tablet    XANAX    28 tablet    Take 1 tablet (0.25 mg) by mouth 2 times daily as needed for anxiety    Anxiety       amLODIPine 2.5 MG tablet    NORVASC    90 tablet    Take 1 tablet (2.5 mg) by mouth daily    Chest pain, unspecified type       aspirin 81 MG EC tablet      Take 1 tablet (81 mg) by mouth daily    S/P TAVR (transcatheter aortic valve replacement)       atorvastatin 40 MG tablet    LIPITOR    90 tablet    Take 1 tablet (40 mg) by mouth daily Reports taking    Hyperlipidemia with target LDL less than 130       calcium carbonate 500 MG chewable tablet    TUMS     Take 2-4 tablets (1,000-2,000 mg) by mouth as needed for heartburn        clopidogrel 75 MG tablet    PLAVIX    90 tablet    Take 1 tablet (75 mg) by mouth daily    History of CVA (cerebrovascular accident)       docusate sodium 100 MG capsule    COLACE     Take 200 mg by mouth daily 2 capsules        famotidine 40 MG tablet    PEPCID    30 tablet    TAKE ONE TABLET BY MOUTH AT BEDTIME    Gastroesophageal reflux disease without esophagitis       FLORAJEN ACIDOPHILUS Caps      Take 1 capsule by mouth daily        folic acid 1 MG tablet    FOLVITE    90 tablet    Take 1 tablet (1,000 mcg) by mouth daily    Seropositive rheumatoid arthritis (H)       iron 325 (65 FE) MG tablet     30 tablet    TAKE ONE TABLET BY MOUTH ONCE DAILY WITH BREAKFAST    Gastrointestinal hemorrhage, unspecified gastrointestinal hemorrhage type       isosorbide mononitrate 60 MG 24 hr tablet    IMDUR    90 tablet    Take 1 tablet (60 mg) by mouth daily    Chest pain, unspecified type       LANsoprazole 30 MG CR capsule    PREVACID    30 capsule    Take 1 capsule (30 mg) by mouth daily Take 30-60 minutes before a meal.     Gastroesophageal reflux disease with esophagitis       levETIRAcetam 500 MG tablet    KEPPRA    180 tablet    TAKE ONE TABLET BY MOUTH ONCE DAILY AND one AT BEDTIME    Seizure disorder (H)       levothyroxine 50 MCG tablet    SYNTHROID    30 tablet    Take 1 tablet (50 mcg) by mouth daily    Hypothyroidism due to acquired atrophy of thyroid       methotrexate 2.5 MG tablet CHEMO     52 tablet    Take 4 tablets (10 mg) by mouth once a week Wed    Seropositive rheumatoid arthritis (H)       metoprolol 25 MG 24 hr tablet    TOPROL-XL    180 tablet    Take 2 tablets (50 mg) by mouth daily    Chest pain, unspecified type       mirtazapine 30 MG tablet    REMERON    90 tablet    TAKE ONE TABLET BY MOUTH AT BEDTIME    Sleep initiation disorder       Multi-vitamin Tabs tablet      Take 1 tablet by mouth daily        MYLANTA PO      2 tblsp every 4 hours as needed        nitroGLYcerin 0.4 MG sublingual tablet    NITROSTAT    25 tablet    Pt reports taking - -  -For chest pain place 1 tablet under the tongue every 5 minutes for 3 doses. If symptoms persist 5 minutes after 1st dose call 911.    Coronary artery disease involving native coronary artery of native heart without angina pectoris       predniSONE 5 MG tablet    DELTASONE    23 tablet    TAKE ONE TABLET BY MOUTH EVERY OTHER DAY ALTERNATING  WITH  1/2  TABLET  EVERY  OTHER  DAY    Seropositive rheumatoid arthritis (H)       TYLENOL ARTHRITIS PAIN 650 MG CR tablet   Generic drug:  acetaminophen      Take 2 tablets (1,300 mg) by mouth every 8 hours as needed for mild pain

## 2017-11-28 NOTE — LETTER
11/28/2017    Augusto Meyer, DO  [address] [place] [address]    RE: Gillian Burk       Dear Colleague,    I had the pleasure of seeing Gillian Burk in the [facility].    Cardiology Clinic Progress Note  Gillian Burk MRN# [id number]   YOB: 1927 Age: 89 year old     Reason for visit: Follow up coronary angiogram           Assessment and Plan:     1. Coronary artery disease    Recent coronary angiogram revealed a 60-70% hemodynamically significant mid to distal LAD lesion    Due to contrast dye load, it is recommended that she return  for a staged intervention of her LAD    She very little chest discomfort since the increase of her metoprolol, imdur and additional of amlodipine    Return for staged intervention with Dr. Workman    2. Severe aortic stenosis    S/p TAVR in August of 2016    3. Cardiomyopathy, presumed ischemic    EF 35-40%    Compensated    4. Hyperlipidemia, LDL goal <70    Will need repeat FLP prior to next visit as last one was in 2015    Continue Lipitor 40 mg daily         History of Presenting Illness:    Gillian Burk is a very pleasant 89 year old patient of Dr. Lee who presents today follow-up today post coronary angiogram. She has a past medical history significant for severe aortic stenosis status post transcatheter aortic valve replacement in August 2016.  Her coronary angiogram prior to the valve replacement showed mild nonocclusive disease. She had a nontransmural myocardial infarction in November 2016 possibly due to a thrombotic lesion of the RCA that was treated medically in [place]. The LAD and the circumflex were found to have mild nonocclusive disease.  The RCA was difficult to engage and visualize due to the shadowing of the TAVR. Since that time she has been progressively titrating up nitrates and requiring sublingual nitroglycerin. She also has a cardiomyopathy with an ejection fraction  of 35-40%.     When she saw Dr. Lee due to this steadily progressive typical exertional anginal symptoms requiring multiple nitroglycerin tablet she recommended invasive coronary angiography. He also increased Imdur to 60 mg BID to be taken at 6 AM and 2 PM he also increased her metoprolol.     She underwent a coronary angiogram on 11/16/2017 by a left radial approach. She was found to have a 60-70% stenosis of the mid to distal LAD.  An iFR was obtained which was positive at 0.80. She was also noted to have a 40% and 40% stenosis of the mid ramus. Due to the contrast limit, the LAD was not intervened upon.  It was recommended that she return for a staged PCI of the mid LAD.  She was discharged on Plavix and aspirin and Norvasc was added as an additional anti-anginal. There was difficulty engaging the  RCA and there was difficulty passing due to a possible strut from the Treva valve. Due to the difficultly of passing the left radial artery due to arterial tortuosity upon her return a femoral approach is recommended for her staged intervention.    Today in clinic she reports that she has had very little chest discomfort since her coronary angiogram and the addition of Norvasc. Discussed risks and benefits of repeat coronary angiogram with including the possibilities of renal failure, bleeding, heart attack, stroke, emergency surgery and death. She understands the risks and is willing to proceed.           This note was completed in part using Dragon voice recognition software. Although reviewed after completion, some word and grammatical errors may occur       Review of Systems:   Review of Systems:  Skin:  Positive for bruising   Eyes:  Positive for glasses  ENT:  Negative    Respiratory:  Negative    Cardiovascular:    Positive for;chest pain  Gastroenterology: Negative    Genitourinary:  Negative    Musculoskeletal:  Positive for arthritis  Neurologic:  Positive for numbness or tingling of feet  Psychiatric:   "Positive for anxiety  Heme/Lymph/Imm:  Negative    Endocrine:  Positive for thyroid disorder              Physical Exam:     Vitals: /68 (BP Location: Right arm, Patient Position: Fowlers, Cuff Size: Adult Large)  Pulse 80  Ht 1.676 m (5' 6\")  Wt 65.7 kg (144 lb 12.8 oz)  SpO2 97%  BMI 23.37 kg/m2  Constitutional:  cooperative, alert and oriented, well developed, well nourished, in no acute distress        Skin:  warm and dry to the touch, no apparent skin lesions or masses noted        Head:  normocephalic, no masses or lesions        Eyes:  pupils equal and round;conjunctivae and lids unremarkable        ENT:  no pallor or cyanosis        Neck:           Chest:  clear to auscultation        Cardiac: regular rhythm       systolic murmur;LUSB;grade 1          Abdomen:  abdomen soft        Vascular: pulses full and equal, no bruits auscultated                                      Extremities and Back:  no edema   left upper extremity diffusely ecchymotic with tenderness on palpation. No bruit     Neurological:  no gross motor deficits;affect appropriate                 Medications:     Current Outpatient Prescriptions   Medication Sig Dispense Refill     amLODIPine (NORVASC) 2.5 MG tablet Take 1 tablet (2.5 mg) by mouth daily 90 tablet 1     metoprolol (TOPROL-XL) 25 MG 24 hr tablet Take 2 tablets (50 mg) by mouth daily 180 tablet 3     atorvastatin (LIPITOR) 40 MG tablet Take 1 tablet (40 mg) by mouth daily Reports taking 90 tablet 0     famotidine (PEPCID) 40 MG tablet TAKE ONE TABLET BY MOUTH AT BEDTIME 30 tablet 9     folic acid (FOLVITE) 1 MG tablet Take 1 tablet (1,000 mcg) by mouth daily 90 tablet 1     levothyroxine (SYNTHROID) 50 MCG tablet Take 1 tablet (50 mcg) by mouth daily 30 tablet 3     Calcium & Magnesium Carbonates (MYLANTA PO) 2 tblsp every 4 hours as needed       predniSONE (DELTASONE) 5 MG tablet TAKE ONE TABLET BY MOUTH EVERY OTHER DAY ALTERNATING  WITH  1/2  TABLET  EVERY  OTHER  DAY " 23 tablet 13     clopidogrel (PLAVIX) 75 MG tablet Take 1 tablet (75 mg) by mouth daily 90 tablet 3     LANsoprazole (PREVACID) 30 MG CR capsule Take 1 capsule (30 mg) by mouth daily Take 30-60 minutes before a meal. 30 capsule 3     mirtazapine (REMERON) 30 MG tablet TAKE ONE TABLET BY MOUTH AT BEDTIME 90 tablet 2     Ferrous Sulfate (IRON) 325 (65 FE) MG tablet TAKE ONE TABLET BY MOUTH ONCE DAILY WITH BREAKFAST 30 tablet 11     levETIRAcetam (KEPPRA) 500 MG tablet TAKE ONE TABLET BY MOUTH ONCE DAILY AND one AT BEDTIME 180 tablet 0     ALPRAZolam (XANAX) 0.25 MG tablet Take 1 tablet (0.25 mg) by mouth 2 times daily as needed for anxiety 28 tablet 0     isosorbide mononitrate (IMDUR) 60 MG 24 hr tablet Take 1 tablet (60 mg) by mouth daily 90 tablet 3     aspirin EC 81 MG EC tablet Take 1 tablet (81 mg) by mouth daily       methotrexate 2.5 MG tablet Take 4 tablets (10 mg) by mouth once a week Wed 52 tablet 3     acetaminophen (TYLENOL ARTHRITIS PAIN) 650 MG CR tablet Take 2 tablets (1,300 mg) by mouth every 8 hours as needed for mild pain       calcium carbonate (TUMS) 500 MG chewable tablet Take 2-4 tablets (1,000-2,000 mg) by mouth as needed for heartburn       docusate sodium (COLACE) 100 MG capsule Take 200 mg by mouth daily 2 capsules       multivitamin, therapeutic with minerals (MULTI-VITAMIN) TABS Take 1 tablet by mouth daily       Lactobacillus (FLORAJEN ACIDOPHILUS) CAPS Take 1 capsule by mouth daily       nitroGLYcerin (NITROSTAT) 0.4 MG sublingual tablet Pt reports taking - -  -For chest pain place 1 tablet under the tongue every 5 minutes for 3 doses. If symptoms persist 5 minutes after 1st dose call 911. (Patient not taking: Reported on 11/28/2017) 25 tablet 0           Family History   Problem Relation Age of Onset     Hyperlipidemia Mother      Family History Negative No family hx of        Social History     Social History     Marital status:      Spouse name: N/A     Number of children: 4      Years of education: N/A     Occupational History      Retired     Social History Main Topics     Smoking status: Never Smoker     Smokeless tobacco: Never Used     Alcohol use No     Drug use: No     Sexual activity: No     Other Topics Concern     Special Diet Yes     low salt      Exercise Yes     semi recument bike 15 mins daily      Social History Narrative    .  Lives in assisted living. Independent. Has help with making bed and changing sheets.    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 .     4 children - 1 with RA                Past Medical History:     Past Medical History:   Diagnosis Date     Aortic stenosis     s/p TAVR 8/17/16     Chronic migraine      Hyperlipidaemia      Hypertension      Hypothyroidism      Myocardial infarction      Need for SBE (subacute bacterial endocarditis) prophylaxis      Orthostatic hypotension     wears compression stockings     PUD (peptic ulcer disease)      Secondary Sjogren's syndrome (H)      Seizures (H)     after stroke (partial onset)     Seropositive rheumatoid arthritis (H)      Stroke (H) 05/2013    with visual field cut, left occipital lobe     Syncope 2014    due to orthostatic hypotension              Past Surgical History:     Past Surgical History:   Procedure Laterality Date     C TOTAL HIP ARTHROPLASTY Right 2010     C TOTAL HIP ARTHROPLASTY Left 2014     CATARACT IOL, RT/LT Bilateral 2000     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     HEART CATH FEMORAL CANNULIZATION WITH OPEN STANDBY REPAIR AORTIC VALVE N/A 8/3/2016    Procedure: HEART CATH FEMORAL CANNULIZATION WITH OPEN STANDBY REPAIR AORTIC VALVE;  Surgeon: Owen Schultz MD;  Location: [facility] OR     HYSTERECTOMY TOTAL ABDOMINAL  1970    ovaries out     MOUTH SURGERY  2011    jaw surgery due to fracture     TRANSCATHETER AORTIC VALVE IMPLANT ANESTHESIA N/A 8/3/2016    Procedure: TRANSCATHETER AORTIC VALVE IMPLANT ANESTHESIA;  Surgeon: GENERIC  ANESTHESIA PROVIDER;  Location: [facility] OR              Allergies:   Caffeine; Hydrocodone; Ibuprofen; Penicillins; Tramadol; and Metal [staples]       Data:   All laboratory data reviewed:    LAST CHOLESTEROL:  Lab Results   Component Value Date    CHOL 172 07/07/2015     Lab Results   Component Value Date    HDL 75 07/07/2015     Lab Results   Component Value Date    LDL 69 07/07/2015     Lab Results   Component Value Date    TRIG 142 07/07/2015     Lab Results   Component Value Date    CHOLHDLRATIO 2.3 07/07/2015       LAST BMP:  Lab Results   Component Value Date     11/13/2017      Lab Results   Component Value Date    POTASSIUM 4.2 11/13/2017     Lab Results   Component Value Date    CHLORIDE 102 11/13/2017     Lab Results   Component Value Date    CHEYANNE 8.4 11/13/2017     Lab Results   Component Value Date    CO2 28 11/13/2017     Lab Results   Component Value Date    BUN 17 11/13/2017     Lab Results   Component Value Date    CR 1.01 11/21/2017     Lab Results   Component Value Date     11/13/2017       LAST CBC:  Lab Results   Component Value Date    WBC 8.8 11/21/2017     Lab Results   Component Value Date    RBC 3.61 11/21/2017     Lab Results   Component Value Date    HGB 11.7 11/21/2017     Lab Results   Component Value Date    HCT 35.0 11/21/2017     Lab Results   Component Value Date    MCV 97 11/21/2017     Lab Results   Component Value Date    MCH 32.4 11/21/2017     Lab Results   Component Value Date    MCHC 33.4 11/21/2017     Lab Results   Component Value Date    RDW 15.9 11/21/2017     Lab Results   Component Value Date     11/21/2017       Thank you for allowing me to participate in the care of your patient.    Sincerely,     ADRIÁN LAM [facility]

## 2017-11-28 NOTE — PROGRESS NOTES
Cardiology Clinic Progress Note  Gillian Burk MRN# [id number]   YOB: 1927 Age: 89 year old     Reason for visit: Follow up coronary angiogram           Assessment and Plan:     1. Coronary artery disease    Recent coronary angiogram revealed a 60-70% hemodynamically significant mid to distal LAD lesion    Due to contrast dye load, it is recommended that she return  for a staged intervention of her LAD    She very little chest discomfort since the increase of her metoprolol, imdur and additional of amlodipine    Return for staged intervention with Dr. Workman    2. Severe aortic stenosis    S/p TAVR in August of 2016    3. Cardiomyopathy, presumed ischemic    EF 35-40%    Compensated    4. Hyperlipidemia, LDL goal <70    Will need repeat FLP prior to next visit as last one was in 2015    Continue Lipitor 40 mg daily         History of Presenting Illness:    Gillian Burk is a very pleasant 89 year old patient of Dr. Lee who presents today follow-up today post coronary angiogram. She has a past medical history significant for severe aortic stenosis status post transcatheter aortic valve replacement in August 2016.  Her coronary angiogram prior to the valve replacement showed mild nonocclusive disease. She had a nontransmural myocardial infarction in November 2016 possibly due to a thrombotic lesion of the RCA that was treated medically in [place]. The LAD and the circumflex were found to have mild nonocclusive disease.  The RCA was difficult to engage and visualize due to the shadowing of the TAVR. Since that time she has been progressively titrating up nitrates and requiring sublingual nitroglycerin. She also has a cardiomyopathy with an ejection fraction of 35-40%.     When she saw Dr. Lee due to this steadily progressive typical exertional anginal symptoms requiring multiple nitroglycerin tablet she recommended invasive coronary angiography. He also increased Imdur to 60 mg BID to be  taken at 6 AM and 2 PM he also increased her metoprolol.     She underwent a coronary angiogram on 11/16/2017 by a left radial approach. She was found to have a 60-70% stenosis of the mid to distal LAD.  An iFR was obtained which was positive at 0.80. She was also noted to have a 40% and 40% stenosis of the mid ramus. Due to the contrast limit, the LAD was not intervened upon.  It was recommended that she return for a staged PCI of the mid LAD.  She was discharged on Plavix and aspirin and Norvasc was added as an additional anti-anginal. There was difficulty engaging the  RCA and there was difficulty passing due to a possible strut from the Treva valve. Due to the difficultly of passing the left radial artery due to arterial tortuosity upon her return a femoral approach is recommended for her staged intervention.    Today in clinic she reports that she has had very little chest discomfort since her coronary angiogram and the addition of Norvasc. Discussed risks and benefits of repeat coronary angiogram with including the possibilities of renal failure, bleeding, heart attack, stroke, emergency surgery and death. She understands the risks and is willing to proceed.           This note was completed in part using Dragon voice recognition software. Although reviewed after completion, some word and grammatical errors may occur       Review of Systems:   Review of Systems:  Skin:  Positive for bruising   Eyes:  Positive for glasses  ENT:  Negative    Respiratory:  Negative    Cardiovascular:    Positive for;chest pain  Gastroenterology: Negative    Genitourinary:  Negative    Musculoskeletal:  Positive for arthritis  Neurologic:  Positive for numbness or tingling of feet  Psychiatric:  Positive for anxiety  Heme/Lymph/Imm:  Negative    Endocrine:  Positive for thyroid disorder              Physical Exam:     Vitals: /68 (BP Location: Right arm, Patient Position: Fowlers, Cuff Size: Adult Large)  Pulse 80  Ht  "1.676 m (5' 6\")  Wt 65.7 kg (144 lb 12.8 oz)  SpO2 97%  BMI 23.37 kg/m2  Constitutional:  cooperative, alert and oriented, well developed, well nourished, in no acute distress        Skin:  warm and dry to the touch, no apparent skin lesions or masses noted        Head:  normocephalic, no masses or lesions        Eyes:  pupils equal and round;conjunctivae and lids unremarkable        ENT:  no pallor or cyanosis        Neck:           Chest:  clear to auscultation        Cardiac: regular rhythm       systolic murmur;LUSB;grade 1          Abdomen:  abdomen soft        Vascular: pulses full and equal, no bruits auscultated                                      Extremities and Back:  no edema   left upper extremity diffusely ecchymotic with tenderness on palpation. No bruit     Neurological:  no gross motor deficits;affect appropriate                 Medications:     Current Outpatient Prescriptions   Medication Sig Dispense Refill     amLODIPine (NORVASC) 2.5 MG tablet Take 1 tablet (2.5 mg) by mouth daily 90 tablet 1     metoprolol (TOPROL-XL) 25 MG 24 hr tablet Take 2 tablets (50 mg) by mouth daily 180 tablet 3     atorvastatin (LIPITOR) 40 MG tablet Take 1 tablet (40 mg) by mouth daily Reports taking 90 tablet 0     famotidine (PEPCID) 40 MG tablet TAKE ONE TABLET BY MOUTH AT BEDTIME 30 tablet 9     folic acid (FOLVITE) 1 MG tablet Take 1 tablet (1,000 mcg) by mouth daily 90 tablet 1     levothyroxine (SYNTHROID) 50 MCG tablet Take 1 tablet (50 mcg) by mouth daily 30 tablet 3     Calcium & Magnesium Carbonates (MYLANTA PO) 2 tblsp every 4 hours as needed       predniSONE (DELTASONE) 5 MG tablet TAKE ONE TABLET BY MOUTH EVERY OTHER DAY ALTERNATING  WITH  1/2  TABLET  EVERY  OTHER  DAY 23 tablet 13     clopidogrel (PLAVIX) 75 MG tablet Take 1 tablet (75 mg) by mouth daily 90 tablet 3     LANsoprazole (PREVACID) 30 MG CR capsule Take 1 capsule (30 mg) by mouth daily Take 30-60 minutes before a meal. 30 capsule 3     " mirtazapine (REMERON) 30 MG tablet TAKE ONE TABLET BY MOUTH AT BEDTIME 90 tablet 2     Ferrous Sulfate (IRON) 325 (65 FE) MG tablet TAKE ONE TABLET BY MOUTH ONCE DAILY WITH BREAKFAST 30 tablet 11     levETIRAcetam (KEPPRA) 500 MG tablet TAKE ONE TABLET BY MOUTH ONCE DAILY AND one AT BEDTIME 180 tablet 0     ALPRAZolam (XANAX) 0.25 MG tablet Take 1 tablet (0.25 mg) by mouth 2 times daily as needed for anxiety 28 tablet 0     isosorbide mononitrate (IMDUR) 60 MG 24 hr tablet Take 1 tablet (60 mg) by mouth daily 90 tablet 3     aspirin EC 81 MG EC tablet Take 1 tablet (81 mg) by mouth daily       methotrexate 2.5 MG tablet Take 4 tablets (10 mg) by mouth once a week Wed 52 tablet 3     acetaminophen (TYLENOL ARTHRITIS PAIN) 650 MG CR tablet Take 2 tablets (1,300 mg) by mouth every 8 hours as needed for mild pain       calcium carbonate (TUMS) 500 MG chewable tablet Take 2-4 tablets (1,000-2,000 mg) by mouth as needed for heartburn       docusate sodium (COLACE) 100 MG capsule Take 200 mg by mouth daily 2 capsules       multivitamin, therapeutic with minerals (MULTI-VITAMIN) TABS Take 1 tablet by mouth daily       Lactobacillus (FLORAJEN ACIDOPHILUS) CAPS Take 1 capsule by mouth daily       nitroGLYcerin (NITROSTAT) 0.4 MG sublingual tablet Pt reports taking - -  -For chest pain place 1 tablet under the tongue every 5 minutes for 3 doses. If symptoms persist 5 minutes after 1st dose call 911. (Patient not taking: Reported on 11/28/2017) 25 tablet 0           Family History   Problem Relation Age of Onset     Hyperlipidemia Mother      Family History Negative No family hx of        Social History     Social History     Marital status:      Spouse name: N/A     Number of children: 4     Years of education: N/A     Occupational History      Retired     Social History Main Topics     Smoking status: Never Smoker     Smokeless tobacco: Never Used     Alcohol use No     Drug use: No     Sexual activity: No     Other  Topics Concern     Special Diet Yes     low salt      Exercise Yes     semi recument bike 15 mins daily      Social History Narrative    .  Lives in assisted living. Independent. Has help with making bed and changing sheets.    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 .     4 children - 1 with RA                Past Medical History:     Past Medical History:   Diagnosis Date     Aortic stenosis     s/p TAVR 8/17/16     Chronic migraine      Hyperlipidaemia      Hypertension      Hypothyroidism      Myocardial infarction      Need for SBE (subacute bacterial endocarditis) prophylaxis      Orthostatic hypotension     wears compression stockings     PUD (peptic ulcer disease)      Secondary Sjogren's syndrome (H)      Seizures (H)     after stroke (partial onset)     Seropositive rheumatoid arthritis (H)      Stroke (H) 05/2013    with visual field cut, left occipital lobe     Syncope 2014    due to orthostatic hypotension              Past Surgical History:     Past Surgical History:   Procedure Laterality Date     C TOTAL HIP ARTHROPLASTY Right 2010     C TOTAL HIP ARTHROPLASTY Left 2014     CATARACT IOL, RT/LT Bilateral 2000     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     HEART CATH FEMORAL CANNULIZATION WITH OPEN STANDBY REPAIR AORTIC VALVE N/A 8/3/2016    Procedure: HEART CATH FEMORAL CANNULIZATION WITH OPEN STANDBY REPAIR AORTIC VALVE;  Surgeon: Owen Schultz MD;  Location: [facility] OR     HYSTERECTOMY TOTAL ABDOMINAL  1970    ovaries out     MOUTH SURGERY  2011    jaw surgery due to fracture     TRANSCATHETER AORTIC VALVE IMPLANT ANESTHESIA N/A 8/3/2016    Procedure: TRANSCATHETER AORTIC VALVE IMPLANT ANESTHESIA;  Surgeon: GENERIC ANESTHESIA PROVIDER;  Location: [facility] OR              Allergies:   Caffeine; Hydrocodone; Ibuprofen; Penicillins; Tramadol; and Metal [staples]       Data:   All laboratory data reviewed:    LAST CHOLESTEROL:  Lab Results   Component Value Date     CHOL 172 07/07/2015     Lab Results   Component Value Date    HDL 75 07/07/2015     Lab Results   Component Value Date    LDL 69 07/07/2015     Lab Results   Component Value Date    TRIG 142 07/07/2015     Lab Results   Component Value Date    CHOLHDLRATIO 2.3 07/07/2015       LAST BMP:  Lab Results   Component Value Date     11/13/2017      Lab Results   Component Value Date    POTASSIUM 4.2 11/13/2017     Lab Results   Component Value Date    CHLORIDE 102 11/13/2017     Lab Results   Component Value Date    CHEYANNE 8.4 11/13/2017     Lab Results   Component Value Date    CO2 28 11/13/2017     Lab Results   Component Value Date    BUN 17 11/13/2017     Lab Results   Component Value Date    CR 1.01 11/21/2017     Lab Results   Component Value Date     11/13/2017       LAST CBC:  Lab Results   Component Value Date    WBC 8.8 11/21/2017     Lab Results   Component Value Date    RBC 3.61 11/21/2017     Lab Results   Component Value Date    HGB 11.7 11/21/2017     Lab Results   Component Value Date    HCT 35.0 11/21/2017     Lab Results   Component Value Date    MCV 97 11/21/2017     Lab Results   Component Value Date    MCH 32.4 11/21/2017     Lab Results   Component Value Date    MCHC 33.4 11/21/2017     Lab Results   Component Value Date    RDW 15.9 11/21/2017     Lab Results   Component Value Date     11/21/2017         ADRIÁN LAM, CNP

## 2017-11-29 RX ORDER — NITROGLYCERIN 0.4 MG/1
TABLET SUBLINGUAL
Qty: 25 TABLET | Refills: 0 | Status: SHIPPED | OUTPATIENT
Start: 2017-11-29 | End: 2018-12-24

## 2017-11-29 NOTE — TELEPHONE ENCOUNTER
Routing refill request to provider for review/approval because:  Labs out of range:  BP    Simin Bueno, RN  [facility]

## 2017-11-29 NOTE — TELEPHONE ENCOUNTER
Requested Prescriptions   Pending Prescriptions Disp Refills     nitroGLYcerin (NITROSTAT) 0.4 MG sublingual tablet [Pharmacy Med Name: NITROGLYCER 0.4MG   SUB] 25 tablet 0     Sig: DISSOLVE ONE TABLET UNDER THE TONGUE EVERY 5 MINUTES AS NEEDED FOR CHEST PAIN.  DO NOT EXCEED A TOTAL OF 3 DOSES IN 15 MINUTES    Nitrates Failed    11/27/2017  3:54 PM       Failed - Sublingual nitro order needs review    If refill exceeds 1 bottle per month, please forward request to provider.          Passed - Blood pressure under 140/90    BP Readings from Last 3 Encounters:   11/28/17 142/68   11/16/17 123/82   11/13/17 140/64                Passed - Pt is not on erectile dysfunction medications       Passed - Recent or future visit with authorizing provider's specialty    Patient had office visit in the last year or has a visit in the next 30 days with authorizing provider.  See chart review.              Passed - Patient is age 18 or older

## 2017-11-30 ENCOUNTER — MYC MEDICAL ADVICE (OUTPATIENT)
Dept: FAMILY MEDICINE | Facility: OTHER | Age: 82
End: 2017-11-30

## 2017-11-30 ENCOUNTER — TELEPHONE (OUTPATIENT)
Dept: INTERNAL MEDICINE | Facility: CLINIC | Age: 82
End: 2017-11-30

## 2017-11-30 DIAGNOSIS — R30.0 DYSURIA: ICD-10-CM

## 2017-11-30 DIAGNOSIS — R30.0 DYSURIA: Primary | ICD-10-CM

## 2017-11-30 LAB
ALBUMIN UR-MCNC: NEGATIVE MG/DL
APPEARANCE UR: ABNORMAL
BACTERIA #/AREA URNS HPF: ABNORMAL /HPF
BILIRUB UR QL STRIP: NEGATIVE
COLOR UR AUTO: ABNORMAL
GLUCOSE UR STRIP-MCNC: NEGATIVE MG/DL
HGB UR QL STRIP: NEGATIVE
KETONES UR STRIP-MCNC: NEGATIVE MG/DL
LEUKOCYTE ESTERASE UR QL STRIP: ABNORMAL
MUCOUS THREADS #/AREA URNS LPF: PRESENT /LPF
NITRATE UR QL: NEGATIVE
PH UR STRIP: 7 PH (ref 5–7)
RBC #/AREA URNS AUTO: 2 /HPF (ref 0–2)
SOURCE: ABNORMAL
SP GR UR STRIP: 1 (ref 1–1.03)
UROBILINOGEN UR STRIP-MCNC: 0 MG/DL (ref 0–2)
WBC #/AREA URNS AUTO: >182 /HPF (ref 0–2)

## 2017-11-30 PROCEDURE — 87186 SC STD MICRODIL/AGAR DIL: CPT | Performed by: INTERNAL MEDICINE

## 2017-11-30 PROCEDURE — 87086 URINE CULTURE/COLONY COUNT: CPT | Performed by: INTERNAL MEDICINE

## 2017-11-30 PROCEDURE — 87088 URINE BACTERIA CULTURE: CPT | Performed by: INTERNAL MEDICINE

## 2017-11-30 PROCEDURE — 81001 URINALYSIS AUTO W/SCOPE: CPT | Performed by: INTERNAL MEDICINE

## 2017-11-30 RX ORDER — LEVOFLOXACIN 250 MG/1
250 TABLET, FILM COATED ORAL DAILY
Qty: 7 TABLET | Refills: 0 | Status: SHIPPED | OUTPATIENT
Start: 2017-11-30 | End: 2017-12-07

## 2017-11-30 NOTE — TELEPHONE ENCOUNTER
Reason for Call: Request for an order or referral:    Order or referral being requested: UA    Date needed: as soon as possible    Has the patient been seen by the PCP for this problem? NO    Additional comments: Hanna is wondering if you can order a UA for Gillian to see if she has a UTI. She is having symptoms. Dr. Winters is out of office. Please Hanna back and advise when the order's been placed.     Phone number Patient can be reached at: [phone]      Best Time:  anytime    Can we leave a detailed message on this number?  YES    Call taken on 11/30/2017 at 1:16 PM by Leighann Tavarez

## 2017-11-30 NOTE — PROGRESS NOTES
Dear Gillian, your recent test results are attached.  Your urine sample demonstrates a urinary tract infection.  I have sent an antibiotic prescription to [facility] pharmacy.  You should recheck the urine sample upon completion of the antibiotic.    Feel free to contact me via the office or My Chart if you have any questions regarding the above.  Sincerely,  Augusto Meyer,  FACOI

## 2017-12-03 LAB
BACTERIA SPEC CULT: ABNORMAL
Lab: ABNORMAL
SPECIMEN SOURCE: ABNORMAL

## 2017-12-06 ENCOUNTER — CARE COORDINATION (OUTPATIENT)
Dept: CARDIOLOGY | Facility: CLINIC | Age: 82
End: 2017-12-06

## 2017-12-06 RX ORDER — POTASSIUM CHLORIDE 1500 MG/1
20 TABLET, EXTENDED RELEASE ORAL
Status: CANCELLED | OUTPATIENT
Start: 2017-12-06

## 2017-12-06 RX ORDER — SODIUM CHLORIDE 9 MG/ML
INJECTION, SOLUTION INTRAVENOUS CONTINUOUS
Status: CANCELLED | OUTPATIENT
Start: 2017-12-06

## 2017-12-06 RX ORDER — LIDOCAINE 40 MG/G
CREAM TOPICAL
Status: CANCELLED | OUTPATIENT
Start: 2017-12-06

## 2017-12-06 NOTE — PROGRESS NOTES
Contacted patient to go over angiogram prep. Pt is scheduled for left heart cath on 12/7/17 for a staged intervention of LAD. Prep instructions reviewed. Denies contrast allergies. Pt is not diabetic and is not take any anticoagulants or diuretics. She has a family member that will bring her to the procedure and remain with her 24 hours post procedure. Confirm arrival/procedure times and location ([facility]). She had no further questions. C.O.R.E. number given if she has any further questions or concerns. Angiogram orders entered in EPIC. Alicia Pisano RN 4:11 PM 12/06/17

## 2017-12-07 ENCOUNTER — HOSPITAL ENCOUNTER (OUTPATIENT)
Facility: CLINIC | Age: 82
Discharge: HOME | End: 2017-12-07
Attending: INTERNAL MEDICINE | Admitting: INTERNAL MEDICINE
Payer: COMMERCIAL

## 2017-12-07 VITALS
WEIGHT: 143.2 LBS | HEIGHT: 66 IN | DIASTOLIC BLOOD PRESSURE: 86 MMHG | BODY MASS INDEX: 23.01 KG/M2 | RESPIRATION RATE: 16 BRPM | OXYGEN SATURATION: 99 % | SYSTOLIC BLOOD PRESSURE: 156 MMHG | HEART RATE: 93 BPM | TEMPERATURE: 97.6 F

## 2017-12-07 DIAGNOSIS — I25.10 CORONARY ARTERY DISEASE INVOLVING NATIVE CORONARY ARTERY OF NATIVE HEART WITHOUT ANGINA PECTORIS: ICD-10-CM

## 2017-12-07 LAB
ANION GAP SERPL CALCULATED.3IONS-SCNC: 6 MMOL/L (ref 3–14)
APTT PPP: 27 SEC (ref 22–37)
BUN SERPL-MCNC: 19 MG/DL (ref 7–30)
CALCIUM SERPL-MCNC: 8.7 MG/DL (ref 8.5–10.1)
CHLORIDE SERPL-SCNC: 107 MMOL/L (ref 94–109)
CO2 SERPL-SCNC: 27 MMOL/L (ref 20–32)
CREAT SERPL-MCNC: 1 MG/DL (ref 0.52–1.04)
ERYTHROCYTE [DISTWIDTH] IN BLOOD BY AUTOMATED COUNT: 16 % (ref 10–15)
GFR SERPL CREATININE-BSD FRML MDRD: 52 ML/MIN/1.7M2
GLUCOSE SERPL-MCNC: 95 MG/DL (ref 70–99)
HCT VFR BLD AUTO: 34.7 % (ref 35–47)
HGB BLD-MCNC: 11.5 G/DL (ref 11.7–15.7)
INR PPP: 1.04 (ref 0.86–1.14)
INTERPRETATION ECG - MUSE: NORMAL
MCH RBC QN AUTO: 31.2 PG (ref 26.5–33)
MCHC RBC AUTO-ENTMCNC: 33.1 G/DL (ref 31.5–36.5)
MCV RBC AUTO: 94 FL (ref 78–100)
PLATELET # BLD AUTO: 238 10E9/L (ref 150–450)
POTASSIUM SERPL-SCNC: 3.9 MMOL/L (ref 3.4–5.3)
RBC # BLD AUTO: 3.69 10E12/L (ref 3.8–5.2)
SODIUM SERPL-SCNC: 140 MMOL/L (ref 133–144)
WBC # BLD AUTO: 6.7 10E9/L (ref 4–11)

## 2017-12-07 PROCEDURE — 93005 ELECTROCARDIOGRAM TRACING: CPT

## 2017-12-07 PROCEDURE — 85027 COMPLETE CBC AUTOMATED: CPT | Performed by: INTERNAL MEDICINE

## 2017-12-07 PROCEDURE — 93010 ELECTROCARDIOGRAM REPORT: CPT | Performed by: INTERNAL MEDICINE

## 2017-12-07 PROCEDURE — 36415 COLL VENOUS BLD VENIPUNCTURE: CPT | Performed by: INTERNAL MEDICINE

## 2017-12-07 PROCEDURE — 40000852 ZZH STATISTIC HEART CATH LAB OR EP LAB

## 2017-12-07 PROCEDURE — 40000235 ZZH STATISTIC TELEMETRY

## 2017-12-07 PROCEDURE — 27210914 ZZH SHEATH CR8

## 2017-12-07 PROCEDURE — 25000132 ZZH RX MED GY IP 250 OP 250 PS 637: Performed by: INTERNAL MEDICINE

## 2017-12-07 PROCEDURE — 85730 THROMBOPLASTIN TIME PARTIAL: CPT | Performed by: INTERNAL MEDICINE

## 2017-12-07 PROCEDURE — 25000128 H RX IP 250 OP 636: Performed by: INTERNAL MEDICINE

## 2017-12-07 PROCEDURE — 85610 PROTHROMBIN TIME: CPT | Performed by: INTERNAL MEDICINE

## 2017-12-07 PROCEDURE — 27210856 ZZH ACCESS HEART CATH CR2

## 2017-12-07 PROCEDURE — 80048 BASIC METABOLIC PNL TOTAL CA: CPT | Performed by: INTERNAL MEDICINE

## 2017-12-07 RX ORDER — LIDOCAINE 40 MG/G
CREAM TOPICAL
Status: DISCONTINUED | OUTPATIENT
Start: 2017-12-07 | End: 2017-12-07 | Stop reason: HOSPADM

## 2017-12-07 RX ORDER — SODIUM CHLORIDE 9 MG/ML
INJECTION, SOLUTION INTRAVENOUS CONTINUOUS
Status: DISCONTINUED | OUTPATIENT
Start: 2017-12-07 | End: 2017-12-07 | Stop reason: HOSPADM

## 2017-12-07 RX ORDER — POTASSIUM CHLORIDE 1500 MG/1
20 TABLET, EXTENDED RELEASE ORAL
Status: COMPLETED | OUTPATIENT
Start: 2017-12-07 | End: 2017-12-07

## 2017-12-07 RX ORDER — NITROFURANTOIN 25; 75 MG/1; MG/1
100 CAPSULE ORAL 2 TIMES DAILY
Qty: 14 CAPSULE | Refills: 0 | Status: SHIPPED | OUTPATIENT
Start: 2017-12-07 | End: 2018-01-16

## 2017-12-07 RX ADMIN — SODIUM CHLORIDE: 9 INJECTION, SOLUTION INTRAVENOUS at 07:27

## 2017-12-07 RX ADMIN — POTASSIUM CHLORIDE 20 MEQ: 1500 TABLET, EXTENDED RELEASE ORAL at 08:22

## 2017-12-07 NOTE — PROGRESS NOTES
Received VM from pt that was dated 12/6/17 at 1609.    Note that she is currently at [facility] for LHC. Called pt and LVM asking her to call us back if she has any questions/concerns.    Starla Le [facility] RN 9:05 AM 12/07/17  [phone]

## 2017-12-07 NOTE — PROGRESS NOTES
Procedure cancelled, Dr. Workman her to discuss with pt and family. Pt dc'd to home,  belongings sent with pt. Escorted ir to dc door.

## 2017-12-08 NOTE — PROGRESS NOTES
Dear Gillian, your recent test results are attached.  The year and sample demonstrates an infection with Staphylococcus.  It would appear that the bacteria may be resistant to the antibiotic that you were placed on.  I would like to change that antibiotic two nitrofurantoin.  I have sent a prescription for this to your pharmacy.  Again, please recheck your urine upon completing this antibiotic.  Feel free to contact me via the office or My Chart if you have any questions regarding the above.  Sincerely,  Augusto Meyer DO FACOI

## 2017-12-12 ENCOUNTER — OFFICE VISIT (OUTPATIENT)
Dept: FAMILY MEDICINE | Facility: OTHER | Age: 82
End: 2017-12-12
Payer: COMMERCIAL

## 2017-12-12 VITALS
WEIGHT: 141.7 LBS | OXYGEN SATURATION: 92 % | HEART RATE: 106 BPM | BODY MASS INDEX: 22.87 KG/M2 | TEMPERATURE: 98.1 F | RESPIRATION RATE: 16 BRPM | SYSTOLIC BLOOD PRESSURE: 118 MMHG | DIASTOLIC BLOOD PRESSURE: 64 MMHG

## 2017-12-12 DIAGNOSIS — J01.01 ACUTE RECURRENT MAXILLARY SINUSITIS: Primary | ICD-10-CM

## 2017-12-12 DIAGNOSIS — I25.10 CORONARY ARTERY DISEASE INVOLVING NATIVE CORONARY ARTERY OF NATIVE HEART WITHOUT ANGINA PECTORIS: ICD-10-CM

## 2017-12-12 DIAGNOSIS — I10 HYPERTENSION GOAL BP (BLOOD PRESSURE) < 140/90: ICD-10-CM

## 2017-12-12 DIAGNOSIS — I35.0 AORTIC STENOSIS, SEVERE: ICD-10-CM

## 2017-12-12 PROCEDURE — 99214 OFFICE O/P EST MOD 30 MIN: CPT | Performed by: INTERNAL MEDICINE

## 2017-12-12 RX ORDER — AZITHROMYCIN 250 MG/1
TABLET, FILM COATED ORAL
Qty: 6 TABLET | Refills: 0 | Status: SHIPPED | OUTPATIENT
Start: 2017-12-12 | End: 2017-12-19

## 2017-12-12 ASSESSMENT — PAIN SCALES - GENERAL: PAINLEVEL: NO PAIN (0)

## 2017-12-12 NOTE — PROGRESS NOTES
SUBJECTIVE:   Gillian Burk is a 89 year old female who presents to clinic today for the following health issues:    Acute Illness   Acute illness concerns: cough, fever  Onset: x 3 days    Fever: YES    Chills/Sweats: YES    Headache (location?): no    Sinus Pressure:no    Conjunctivitis:  no    Ear Pain: no    Rhinorrhea: YES    Congestion: YES    Sore Throat: no-scratchy     Cough: YES    Wheeze: no    Decreased Appetite: YES    Nausea: YES    Vomiting: no    Diarrhea:  no    Dysuria/Freq.: no    Fatigue/Achiness: YES    Sick/Strep Exposure: no     Therapies Tried and outcome: tylenol      CHIEF COMPLAINT:    The patient is a pleasant 89-year-old female who presents today with her daughter. The patient notes that she has been feeling poor for about 3 days. She has nasal congestion, significant posterior nasal drainage and occasional cough. Assessment increased fatigue and has had a slight decrease in her appetite. She notes that she is taking fluids adequately. She notes that the sinus drainage occurs more when she is trying to lay down. She's had some intermittent chills but no sweating. A little bit of nausea is associated with the posterior nasal drainage. She notes that she did have one episode where she coughed up some blood-tinged sputum but this was first thing in the morning and appears to have been primarily sinus in origin.                       PAST, FAMILY,SOCIAL HISTORY:     Medical  History:   has a past medical history of Aortic stenosis; Chronic migraine; Hyperlipidaemia; Hypertension; Hypothyroidism; Myocardial infarction; Need for SBE (subacute bacterial endocarditis) prophylaxis; Orthostatic hypotension; PUD (peptic ulcer disease); Secondary Sjogren's syndrome (H); Seizures (H); Seropositive rheumatoid arthritis (H); Stroke (H) (05/2013); and Syncope (2014).     Surgical History:   has a past surgical history that includes Mouth surgery (2011); Eye surgery (1999); cataract iol, rt/lt  (Bilateral, 2000); Hysterectomy total abdominal (1970); TOTAL HIP ARTHROPLASTY (Right, 2010); TOTAL HIP ARTHROPLASTY (Left, 2014); TRANSCATHETER AORTIC VALVE IMPLANT ANEST (N/A, 8/3/2016); and Heart Cath Femoral Cannulization With Open Standby Repair Aortic Valve (N/A, 8/3/2016).     Social History:   reports that she has never smoked. She has never used smokeless tobacco. She reports that she does not drink alcohol or use illicit drugs.     Family History:  family history includes Hyperlipidemia in her mother. There is no history of Family History Negative.            MEDICATIONS  Current Outpatient Prescriptions   Medication Sig Dispense Refill     azithromycin (ZITHROMAX) 250 MG tablet Two tablets first day, then one tablet daily for four days. 6 tablet 0     TRIMETHOPRIM PO        nitroFURantoin, macrocrystal-monohydrate, (MACROBID) 100 MG capsule Take 1 capsule (100 mg) by mouth 2 times daily 14 capsule 0     amLODIPine (NORVASC) 2.5 MG tablet Take 1 tablet (2.5 mg) by mouth daily 90 tablet 1     metoprolol (TOPROL-XL) 25 MG 24 hr tablet Take 2 tablets (50 mg) by mouth daily 180 tablet 3     atorvastatin (LIPITOR) 40 MG tablet Take 1 tablet (40 mg) by mouth daily Reports taking 90 tablet 0     famotidine (PEPCID) 40 MG tablet TAKE ONE TABLET BY MOUTH AT BEDTIME 30 tablet 9     folic acid (FOLVITE) 1 MG tablet Take 1 tablet (1,000 mcg) by mouth daily 90 tablet 1     levothyroxine (SYNTHROID) 50 MCG tablet Take 1 tablet (50 mcg) by mouth daily 30 tablet 3     predniSONE (DELTASONE) 5 MG tablet TAKE ONE TABLET BY MOUTH EVERY OTHER DAY ALTERNATING  WITH  1/2  TABLET  EVERY  OTHER  DAY 23 tablet 13     clopidogrel (PLAVIX) 75 MG tablet Take 1 tablet (75 mg) by mouth daily 90 tablet 3     LANsoprazole (PREVACID) 30 MG CR capsule Take 1 capsule (30 mg) by mouth daily Take 30-60 minutes before a meal. 30 capsule 3     mirtazapine (REMERON) 30 MG tablet TAKE ONE TABLET BY MOUTH AT BEDTIME 90 tablet 2     Ferrous Sulfate  (IRON) 325 (65 FE) MG tablet TAKE ONE TABLET BY MOUTH ONCE DAILY WITH BREAKFAST 30 tablet 11     levETIRAcetam (KEPPRA) 500 MG tablet TAKE ONE TABLET BY MOUTH ONCE DAILY AND one AT BEDTIME 180 tablet 0     ALPRAZolam (XANAX) 0.25 MG tablet Take 1 tablet (0.25 mg) by mouth 2 times daily as needed for anxiety 28 tablet 0     isosorbide mononitrate (IMDUR) 60 MG 24 hr tablet Take 1 tablet (60 mg) by mouth daily 90 tablet 3     aspirin EC 81 MG EC tablet Take 1 tablet (81 mg) by mouth daily       methotrexate 2.5 MG tablet Take 4 tablets (10 mg) by mouth once a week Wed 52 tablet 3     acetaminophen (TYLENOL ARTHRITIS PAIN) 650 MG CR tablet Take 2 tablets (1,300 mg) by mouth every 8 hours as needed for mild pain       calcium carbonate (TUMS) 500 MG chewable tablet Take 2-4 tablets (1,000-2,000 mg) by mouth as needed for heartburn       docusate sodium (COLACE) 100 MG capsule Take 200 mg by mouth daily 2 capsules       multivitamin, therapeutic with minerals (MULTI-VITAMIN) TABS Take 1 tablet by mouth daily       Lactobacillus (FLORAJEN ACIDOPHILUS) CAPS Take 1 capsule by mouth daily       nitroGLYcerin (NITROSTAT) 0.4 MG sublingual tablet DISSOLVE ONE TABLET UNDER THE TONGUE EVERY 5 MINUTES AS NEEDED FOR CHEST PAIN.  DO NOT EXCEED A TOTAL OF 3 DOSES IN 15 MINUTES (Patient not taking: Reported on 12/12/2017) 25 tablet 0     Calcium & Magnesium Carbonates (MYLANTA PO) 2 tblsp every 4 hours as needed           --------------------------------------------------------------------------------------------------------------------                          REVIEW OF SYSTEMS:         LUNGS: Pt denies:  shortness of breath at rest. She does have dyspnea upon exertion.   HEART: Pt denies: chest pain, arrythmia, syncope, tachy or bradyarrhythmia or excess edema.   GI: Pt denies: nausea, vomitting, diarrhea, constipation, melena, or hematochezia.   NEURO: Pt denies: seizures, strokes, diplopia, weakness, paraesthesias, or  paralysis.   SKIN: Pt denies: itching, rashes, discoloration, or specific lesions of concern. Denies recent hair loss.                          EXAMINATION:         /64  Pulse 106  Temp 98.1  F (36.7  C) (Temporal)  Resp 16  Wt 141 lb 11.2 oz (64.3 kg)  SpO2 92%  BMI 22.87 kg/m2   Constitutional: The patient appears to be in mild to moderate  acute distress. The patient appears to be adequately hydrated. No acute respiratory or hemodynamic distress is noted at this time.   LUNGS: clear bilaterally, airflow is brisk, no intercostal retraction or stridor is noted. No coughing is noted during visit.   HEART:  regular without rubs, clicks, gallops, or murmurs. PMI is nondisplaced. Upstrokes are brisk. S1,S2 are heard.   GI: Abdomen is soft, without rebound, guarding or tenderness. Bowel sounds are appropriate. No renal bruits are heard.               ENT: Nasal mucosa is markedly edematous, thickened yellow exudates are seen. Incomplete obstruction is present. Pharynx is clear of exudates, no significant cervical adenopathy is present. Tympanic membranes show no infection or evidence of perforation. Thyroid is not nodular or enlarged.                        DECISION MAKIN. Acute recurrent maxillary sinusitis  Recommend fluids, rest, over-the-counter nonsedating antihistamine as needed  - azithromycin (ZITHROMAX) 250 MG tablet; Two tablets first day, then one tablet daily for four days.  Dispense: 6 tablet; Refill: 0    2. Hypertension goal BP (blood pressure) < 140/90  Currently controlled, continue current medications    3. Aortic stenosis, severe - s/p TAVR  Stable without signs of congestive heart failure    4. Coronary artery disease involving native coronary artery of native heart without angina pectoris  Stable at this time. No recent chest pain                               FOLLOW UP    I have asked the patient to make an appointment for follow up with me in one week if not markedly  improved        I have carefully explained the diagnosis and treatment options with the patient. The patient has displayed an understanding of the above, and all subsequent questions were answered.         DO DELMA Pineda    Portions of this note were produced using [employer]  Although every attempt at real-time proof reading has been made, occasional grammar/syntax errors may have been missed.

## 2017-12-12 NOTE — MR AVS SNAPSHOT
After Visit Summary   12/12/2017    Gillian Burk    MRN: [id number]           Patient Information     Date Of Birth          3/19/1927        Visit Information        Provider Department      12/12/2017 8:40 AM Augusto Meyer DO [facility]        Today's Diagnoses     Acute recurrent maxillary sinusitis    -  1    Hypertension goal BP (blood pressure) < 140/90        Aortic stenosis, severe - s/p TAVR        Coronary artery disease involving native coronary artery of native heart without angina pectoris           Follow-ups after your visit        Who to contact     If you have questions or need follow up information about today's clinic visit or your schedule please contact [facility] directly at [phone].  Normal or non-critical lab and imaging results will be communicated to you by Aloric[employer], letter or phone within 4 business days after the clinic has received the results. If you do not hear from us within 7 days, please contact the clinic through Aloric[employer] or phone. If you have a critical or abnormal lab result, we will notify you by phone as soon as possible.  Submit refill requests through [employer] or call your pharmacy and they will forward the refill request to us. Please allow 3 business days for your refill to be completed.          Additional Information About Your Visit        MyChart Information     [employer] gives you secure access to your electronic health record. If you see a primary care provider, you can also send messages to your care team and make appointments. If you have questions, please call your primary care clinic.  If you do not have a primary care provider, please call [phone] and they will assist you.        Care EveryWhere ID     This is your Care EveryWhere ID. This could be used by other organizations to access your [facility] medical records  [id number]        Your Vitals Were     Pulse Temperature Respirations Pulse  Oximetry BMI (Body Mass Index)       106 98.1  F (36.7  C) (Temporal) 16 92% 22.87 kg/m2        Blood Pressure from Last 3 Encounters:   12/14/17 136/74   12/12/17 118/64   12/07/17 156/86    Weight from Last 3 Encounters:   12/14/17 142 lb (64.4 kg)   12/12/17 141 lb 11.2 oz (64.3 kg)   12/07/17 143 lb 3.2 oz (65 kg)              Today, you had the following     No orders found for display         Today's Medication Changes          These changes are accurate as of: 12/12/17 11:59 PM.  If you have any questions, ask your nurse or doctor.               Start taking these medicines.        Dose/Directions    azithromycin 250 MG tablet   Commonly known as:  ZITHROMAX   Used for:  Acute recurrent maxillary sinusitis   Started by:  Augusto Meyer, DO        Two tablets first day, then one tablet daily for four days.   Quantity:  6 tablet   Refills:  0            Where to get your medicines      These medications were sent to [address]  [address] [facility] [address]     Phone:  [phone]     azithromycin 250 MG tablet                Primary Care Provider Office Phone # Fax #    Augusto Meyer [phone]       [address] [facility]   [facility] [place] [address]        Equal Access to Services     RIVER TRENT AH: Hadivana kim hadasho Soalfali, waaxda luqadaha, qaybta kaalmada adeegyada, zack ayers. So [facility] [phone].    ATENCIÓN: Si habla español, tiene a granger disposición servicios gratuitos de asistencia lingüística. Geraldine al [phone].    We comply with applicable federal civil rights laws and [place] laws. We do not discriminate on the basis of race, color, national origin, age, disability, sex, sexual orientation, or gender identity.            Thank you!     Thank you for choosing [facility]  for your care. Our goal is always to provide you with excellent care. Hearing back from our  patients is one way we can continue to improve our services. Please take a few minutes to complete the written survey that you may receive in the mail after your visit with us. Thank you!             Your Updated Medication List - Protect others around you: Learn how to safely use, store and throw away your medicines at www.disposemymeds.org.          This list is accurate as of: 12/12/17 11:59 PM.  Always use your most recent med list.                   Brand Name Dispense Instructions for use Diagnosis    ALPRAZolam 0.25 MG tablet    XANAX    28 tablet    Take 1 tablet (0.25 mg) by mouth 2 times daily as needed for anxiety    Anxiety       aspirin 81 MG EC tablet      Take 1 tablet (81 mg) by mouth daily    S/P TAVR (transcatheter aortic valve replacement)       atorvastatin 40 MG tablet    LIPITOR    90 tablet    Take 1 tablet (40 mg) by mouth daily Reports taking    Hyperlipidemia with target LDL less than 130       azithromycin 250 MG tablet    ZITHROMAX    6 tablet    Two tablets first day, then one tablet daily for four days.    Acute recurrent maxillary sinusitis       calcium carbonate 500 MG chewable tablet    TUMS     Take 2-4 tablets (1,000-2,000 mg) by mouth as needed for heartburn        clopidogrel 75 MG tablet    PLAVIX    90 tablet    Take 1 tablet (75 mg) by mouth daily    History of CVA (cerebrovascular accident)       docusate sodium 100 MG capsule    COLACE     Take 200 mg by mouth daily 2 capsules        famotidine 40 MG tablet    PEPCID    30 tablet    TAKE ONE TABLET BY MOUTH AT BEDTIME    Gastroesophageal reflux disease without esophagitis       FLORAJEN ACIDOPHILUS Caps      Take 1 capsule by mouth daily        folic acid 1 MG tablet    FOLVITE    90 tablet    Take 1 tablet (1,000 mcg) by mouth daily    Seropositive rheumatoid arthritis (H)       iron 325 (65 FE) MG tablet     30 tablet    TAKE ONE TABLET BY MOUTH ONCE DAILY WITH BREAKFAST    Gastrointestinal hemorrhage, unspecified  gastrointestinal hemorrhage type       isosorbide mononitrate 60 MG 24 hr tablet    IMDUR    90 tablet    Take 1 tablet (60 mg) by mouth daily    Chest pain, unspecified type       LANsoprazole 30 MG CR capsule    PREVACID    30 capsule    Take 1 capsule (30 mg) by mouth daily Take 30-60 minutes before a meal.    Gastroesophageal reflux disease with esophagitis       levETIRAcetam 500 MG tablet    KEPPRA    180 tablet    TAKE ONE TABLET BY MOUTH ONCE DAILY AND one AT BEDTIME    Seizure disorder (H)       levothyroxine 50 MCG tablet    SYNTHROID    30 tablet    Take 1 tablet (50 mcg) by mouth daily    Hypothyroidism due to acquired atrophy of thyroid       methotrexate 2.5 MG tablet CHEMO     52 tablet    Take 4 tablets (10 mg) by mouth once a week Wed    Seropositive rheumatoid arthritis (H)       mirtazapine 30 MG tablet    REMERON    90 tablet    TAKE ONE TABLET BY MOUTH AT BEDTIME    Sleep initiation disorder       Multi-vitamin Tabs tablet      Take 1 tablet by mouth daily        MYLANTA PO      2 tblsp every 4 hours as needed        nitroFURantoin (macrocrystal-monohydrate) 100 MG capsule    MACROBID    14 capsule    Take 1 capsule (100 mg) by mouth 2 times daily    Dysuria       nitroGLYcerin 0.4 MG sublingual tablet    NITROSTAT    25 tablet    DISSOLVE ONE TABLET UNDER THE TONGUE EVERY 5 MINUTES AS NEEDED FOR CHEST PAIN.  DO NOT EXCEED A TOTAL OF 3 DOSES IN 15 MINUTES    Coronary artery disease involving native coronary artery of native heart without angina pectoris       predniSONE 5 MG tablet    DELTASONE    23 tablet    TAKE ONE TABLET BY MOUTH EVERY OTHER DAY ALTERNATING  WITH  1/2  TABLET  EVERY  OTHER  DAY    Seropositive rheumatoid arthritis (H)       TRIMETHOPRIM PO           TYLENOL ARTHRITIS PAIN 650 MG CR tablet   Generic drug:  acetaminophen      Take 2 tablets (1,300 mg) by mouth every 8 hours as needed for mild pain

## 2017-12-12 NOTE — NURSING NOTE
"Chief Complaint   Patient presents with     Cough     x 3 days     Fever       Initial /64  Pulse 106  Temp 98.1  F (36.7  C) (Temporal)  Resp 16  Wt 141 lb 11.2 oz (64.3 kg)  SpO2 92%  BMI 22.87 kg/m2 Estimated body mass index is 22.87 kg/(m^2) as calculated from the following:    Height as of 12/7/17: 5' 6\" (1.676 m).    Weight as of this encounter: 141 lb 11.2 oz (64.3 kg).  Medication Reconciliation: complete  Rocío Burch CMA (AAMA)      "
20.47

## 2017-12-14 ENCOUNTER — OFFICE VISIT (OUTPATIENT)
Dept: CARDIOLOGY | Facility: CLINIC | Age: 82
End: 2017-12-14
Payer: COMMERCIAL

## 2017-12-14 VITALS
BODY MASS INDEX: 22.82 KG/M2 | SYSTOLIC BLOOD PRESSURE: 136 MMHG | OXYGEN SATURATION: 92 % | HEIGHT: 66 IN | DIASTOLIC BLOOD PRESSURE: 74 MMHG | HEART RATE: 106 BPM | WEIGHT: 142 LBS

## 2017-12-14 DIAGNOSIS — I10 BENIGN ESSENTIAL HYPERTENSION: ICD-10-CM

## 2017-12-14 DIAGNOSIS — Z95.2 S/P TAVR (TRANSCATHETER AORTIC VALVE REPLACEMENT): ICD-10-CM

## 2017-12-14 DIAGNOSIS — I25.10 CORONARY ARTERY DISEASE INVOLVING NATIVE CORONARY ARTERY OF NATIVE HEART WITHOUT ANGINA PECTORIS: Primary | ICD-10-CM

## 2017-12-14 DIAGNOSIS — I20.89 CHRONIC STABLE ANGINA (H): ICD-10-CM

## 2017-12-14 DIAGNOSIS — R07.9 CHEST PAIN, UNSPECIFIED TYPE: ICD-10-CM

## 2017-12-14 DIAGNOSIS — E78.5 HYPERLIPIDEMIA WITH TARGET LDL LESS THAN 130: ICD-10-CM

## 2017-12-14 PROCEDURE — 99214 OFFICE O/P EST MOD 30 MIN: CPT | Performed by: INTERNAL MEDICINE

## 2017-12-14 RX ORDER — METOPROLOL SUCCINATE 25 MG/1
TABLET, EXTENDED RELEASE ORAL
Qty: 270 TABLET | Refills: 3 | Status: SHIPPED | OUTPATIENT
Start: 2017-12-14 | End: 2018-06-12

## 2017-12-14 RX ORDER — AMLODIPINE BESYLATE 2.5 MG/1
2.5 TABLET ORAL DAILY
Qty: 90 TABLET | Refills: 3 | Status: SHIPPED | OUTPATIENT
Start: 2017-12-14 | End: 2018-06-12

## 2017-12-14 NOTE — PROGRESS NOTES
HPI and Plan:   See dictation    Orders Placed This Encounter   Procedures     Follow-Up with Cardiologist     Follow-Up with Cardiac Advanced Practice Provider       Orders Placed This Encounter   Medications     metoprolol (TOPROL-XL) 25 MG 24 hr tablet     Sig: Take 2 tablets (50 mg) po qam and 1 tablet (25 mg) po qpm     Dispense:  270 tablet     Refill:  3     amLODIPine (NORVASC) 2.5 MG tablet     Sig: Take 1 tablet (2.5 mg) by mouth daily     Dispense:  90 tablet     Refill:  3       Medications Discontinued During This Encounter   Medication Reason     metoprolol (TOPROL-XL) 25 MG 24 hr tablet Reorder     amLODIPine (NORVASC) 2.5 MG tablet Reorder         Encounter Diagnoses   Name Primary?     Coronary artery disease involving native coronary artery of native heart without angina pectoris Yes     Benign essential hypertension      Hyperlipidemia with target LDL less than 130      S/P TAVR (transcatheter aortic valve replacement)      Chronic stable angina (H)      Chest pain, unspecified type        CURRENT MEDICATIONS:  Current Outpatient Prescriptions   Medication Sig Dispense Refill     metoprolol (TOPROL-XL) 25 MG 24 hr tablet Take 2 tablets (50 mg) po qam and 1 tablet (25 mg) po qpm 270 tablet 3     amLODIPine (NORVASC) 2.5 MG tablet Take 1 tablet (2.5 mg) by mouth daily 90 tablet 3     azithromycin (ZITHROMAX) 250 MG tablet Two tablets first day, then one tablet daily for four days. 6 tablet 0     TRIMETHOPRIM PO        nitroFURantoin, macrocrystal-monohydrate, (MACROBID) 100 MG capsule Take 1 capsule (100 mg) by mouth 2 times daily 14 capsule 0     atorvastatin (LIPITOR) 40 MG tablet Take 1 tablet (40 mg) by mouth daily Reports taking 90 tablet 0     folic acid (FOLVITE) 1 MG tablet Take 1 tablet (1,000 mcg) by mouth daily 90 tablet 1     levothyroxine (SYNTHROID) 50 MCG tablet Take 1 tablet (50 mcg) by mouth daily 30 tablet 3     Calcium & Magnesium Carbonates (MYLANTA PO) 2 tblsp every 4 hours as  needed       predniSONE (DELTASONE) 5 MG tablet TAKE ONE TABLET BY MOUTH EVERY OTHER DAY ALTERNATING  WITH  1/2  TABLET  EVERY  OTHER  DAY 23 tablet 13     clopidogrel (PLAVIX) 75 MG tablet Take 1 tablet (75 mg) by mouth daily 90 tablet 3     LANsoprazole (PREVACID) 30 MG CR capsule Take 1 capsule (30 mg) by mouth daily Take 30-60 minutes before a meal. 30 capsule 3     mirtazapine (REMERON) 30 MG tablet TAKE ONE TABLET BY MOUTH AT BEDTIME 90 tablet 2     Ferrous Sulfate (IRON) 325 (65 FE) MG tablet TAKE ONE TABLET BY MOUTH ONCE DAILY WITH BREAKFAST 30 tablet 11     levETIRAcetam (KEPPRA) 500 MG tablet TAKE ONE TABLET BY MOUTH ONCE DAILY AND one AT BEDTIME 180 tablet 0     ALPRAZolam (XANAX) 0.25 MG tablet Take 1 tablet (0.25 mg) by mouth 2 times daily as needed for anxiety 28 tablet 0     isosorbide mononitrate (IMDUR) 60 MG 24 hr tablet Take 1 tablet (60 mg) by mouth daily 90 tablet 3     aspirin EC 81 MG EC tablet Take 1 tablet (81 mg) by mouth daily       methotrexate 2.5 MG tablet Take 4 tablets (10 mg) by mouth once a week Wed 52 tablet 3     acetaminophen (TYLENOL ARTHRITIS PAIN) 650 MG CR tablet Take 2 tablets (1,300 mg) by mouth every 8 hours as needed for mild pain       calcium carbonate (TUMS) 500 MG chewable tablet Take 2-4 tablets (1,000-2,000 mg) by mouth as needed for heartburn       docusate sodium (COLACE) 100 MG capsule Take 200 mg by mouth daily 2 capsules       multivitamin, therapeutic with minerals (MULTI-VITAMIN) TABS Take 1 tablet by mouth daily       Lactobacillus (FLORAJEN ACIDOPHILUS) CAPS Take 1 capsule by mouth daily       nitroGLYcerin (NITROSTAT) 0.4 MG sublingual tablet DISSOLVE ONE TABLET UNDER THE TONGUE EVERY 5 MINUTES AS NEEDED FOR CHEST PAIN.  DO NOT EXCEED A TOTAL OF 3 DOSES IN 15 MINUTES (Patient not taking: Reported on 12/12/2017) 25 tablet 0     [DISCONTINUED] amLODIPine (NORVASC) 2.5 MG tablet Take 1 tablet (2.5 mg) by mouth daily 90 tablet 1     [DISCONTINUED] metoprolol  (TOPROL-XL) 25 MG 24 hr tablet Take 2 tablets (50 mg) by mouth daily 180 tablet 3     famotidine (PEPCID) 40 MG tablet TAKE ONE TABLET BY MOUTH AT BEDTIME 30 tablet 9       ALLERGIES     Allergies   Allergen Reactions     Caffeine Other (See Comments)     Triggers your Meniere's disease     Hydrocodone Other (See Comments)     hallucinations     Ibuprofen GI Disturbance     Penicillins Hives     Tramadol Other (See Comments)     Seizure, was taking remeron along with tramadol     Metal [Staples] Rash       PAST MEDICAL HISTORY:  Past Medical History:   Diagnosis Date     Aortic stenosis     s/p TAVR 8/17/16     Chronic migraine      Hyperlipidaemia      Hypertension      Hypothyroidism      Myocardial infarction      Need for SBE (subacute bacterial endocarditis) prophylaxis      Orthostatic hypotension     wears compression stockings     PUD (peptic ulcer disease)      Secondary Sjogren's syndrome (H)      Seizures (H)     after stroke (partial onset)     Seropositive rheumatoid arthritis (H)      Stroke (H) 05/2013    with visual field cut, left occipital lobe     Syncope 2014    due to orthostatic hypotension       PAST SURGICAL HISTORY:  Past Surgical History:   Procedure Laterality Date     C TOTAL HIP ARTHROPLASTY Right 2010     C TOTAL HIP ARTHROPLASTY Left 2014     CATARACT IOL, RT/LT Bilateral 2000     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     HEART CATH FEMORAL CANNULIZATION WITH OPEN STANDBY REPAIR AORTIC VALVE N/A 8/3/2016    Procedure: HEART CATH FEMORAL CANNULIZATION WITH OPEN STANDBY REPAIR AORTIC VALVE;  Surgeon: Owen Schultz MD;  Location: [facility] OR     HYSTERECTOMY TOTAL ABDOMINAL  1970    ovaries out     MOUTH SURGERY  2011    jaw surgery due to fracture     TRANSCATHETER AORTIC VALVE IMPLANT ANESTHESIA N/A 8/3/2016    Procedure: TRANSCATHETER AORTIC VALVE IMPLANT ANESTHESIA;  Surgeon: GENERIC ANESTHESIA PROVIDER;  Location: [facility] OR       FAMILY HISTORY:  Family History   Problem  "Relation Age of Onset     Hyperlipidemia Mother      Family History Negative No family hx of        SOCIAL HISTORY:  Social History     Social History     Marital status:      Spouse name: N/A     Number of children: 4     Years of education: N/A     Occupational History      Retired     Social History Main Topics     Smoking status: Never Smoker     Smokeless tobacco: Never Used     Alcohol use No     Drug use: No     Sexual activity: No     Other Topics Concern     Special Diet Yes     low salt      Exercise Yes     semi recument bike 15 mins daily      Social History Narrative    .  Lives in assisted living. Independent. Has help with making bed and changing sheets.    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 .     4 children - 1 with RA           Review of Systems:  Skin:  Positive for bruising     Eyes:  Positive for glasses    ENT:  Positive for hearing loss    Respiratory:  Positive for dyspnea on exertion;shortness of breath;cough     Cardiovascular:  lightheadedness;dizziness;Negative for;palpitations;chest pain      Gastroenterology: Negative      Genitourinary:  Positive for urinary tract infection    Musculoskeletal:  Positive for arthritis low back pain   Neurologic:  Positive for numbness or tingling of feet    Psychiatric:  Positive for      Heme/Lymph/Imm:  Negative      Endocrine:  Positive for thyroid disorder      Physical Exam:  Vitals: /74 (BP Location: Right arm, Patient Position: Fowlers, Cuff Size: Adult Regular)  Pulse 106  Ht 1.676 m (5' 6\")  Wt 64.4 kg (142 lb)  SpO2 92%  BMI 22.92 kg/m2    Constitutional:  cooperative, alert and oriented, well developed, well nourished, in no acute distress        Skin:  warm and dry to the touch, no apparent skin lesions or masses noted          Head:  normocephalic, no masses or lesions        Eyes:  pupils equal and round;conjunctivae and lids unremarkable        Lymph:      ENT:  no pallor or cyanosis    "     Neck:  carotid pulses are full and equal bilaterally, JVP normal, no carotid bruit        Respiratory:    fine rales;basal rales       Cardiac: regular rhythm frequent premature beats     systolic murmur;LUSB;grade 1                                                 GI:  abdomen soft;BS normoactive        Extremities and Muscular Skeletal:  no deformities, clubbing, cyanosis, erythema observed;no edema              Neurological:  no gross motor deficits;affect appropriate        Psych:  affect appropriate, oriented to time, person and place        CC  No referring provider defined for this encounter.

## 2017-12-14 NOTE — LETTER
2017         Augusto Meyer DO    [address] [address]      RE:   Gillian Burk    MRN:  [id number]   : 1927      Dear Dr. Meyer:       I had the opportunity to see Ms. Burk in Cardiology Clinic today at the [facility] in [place] for reevaluation of chronic stable angina.  She was having frequent episodes of exertional chest discomfort and a typical description of angina.  I saw her for consultation on 2017 and recommended coronary angiography.  This was completed on 2017 and demonstrated a moderately severe blockage within the nop-bu-[id number] LAD.  Because she is 89 years old and our dye limit is quite low, she was brought back for a staged procedure for angioplasty and stenting a few weeks later.  However, in the meantime, she had started amlodipine and increased some other medications and was not having any recurrent angina at that point.  We decided that she should not undergo stenting of her LAD if she is not having any recurring angina symptoms.  She was sent back home again with the same medications and is now following up with me today.      She seems to be doing pretty well from a cardiac standpoint.  She has an upper respiratory infection currently but she is not having any problems with breathing or chest discomfort.  She has had only 1 episode of chest discomfort since I saw her in early November.  That occurred when she was walking for a fairly long distance outside in the cold air on the icy parking lot and she developed some brief chest discomfort that resolved when she sat down to rest.  She did not require nitroglycerin.      Her history also includes severe aortic stenosis and transcatheter aortic valve replacement on 2016.  She has hypertension and dyslipidemia.  Her cholesterol numbers have been under excellent control.      PHYSICAL EXAMINATION:     VITALS SIGNS:   Today, her blood pressure was 136/74, heart rate 106 and weight 142 pounds.     HEART:  Rhythm is regular with frequent extrasystoles and soft systolic cardiac murmur.     LUNGS:  Demonstrate some fibrotic-sounding crackles in the bases     IMPRESSIONS:  Ms. Gillian Burk is a 89-year-old woman with coronary artery disease and symptoms of chronic stable angina.  By cardiac catheterization, she had a moderately severe stenosis within the lbc-[id number] LAD of about 70%-80%.  It was on a sharp bend and it was a bit difficult to evaluate conclusively.  Her medications were adjusted and her angina resolved.  She did not undergo a stenting procedure.      Her heart rates are running a bit high today and they generally run in the 80s to 90s.  Her blood pressure has not been too low either.  I think we still have room to continue to adjust her medications.  I suggested that she increase her metoprolol to try to gain better control of her heart rate and to help maximize the benefit for treatment of her cardiomyopathy.  Her ejection fraction was last estimated at 40% by echocardiogram in 08/2017.  I will have her take 50 mg of metoprolol XL in the morning and 25 mg at night.  She will continue taking amlodipine 2.5 mg a day and isosorbide mononitrate twice daily as well, 30 mg in the morning and 60 mg in the afternoon.      I will have her follow up with me again in Cardiology Clinic in about three months for reevaluation.     Outpatient Encounter Prescriptions as of 12/14/2017   Medication Sig Dispense Refill     metoprolol (TOPROL-XL) 25 MG 24 hr tablet Take 2 tablets (50 mg) po qam and 1 tablet (25 mg) po qpm 270 tablet 3     amLODIPine (NORVASC) 2.5 MG tablet Take 1 tablet (2.5 mg) by mouth daily 90 tablet 3     azithromycin (ZITHROMAX) 250 MG tablet Two tablets first day, then one tablet daily for four days. 6 tablet 0     TRIMETHOPRIM PO        nitroFURantoin, macrocrystal-monohydrate, (MACROBID) 100 MG capsule Take  1 capsule (100 mg) by mouth 2 times daily 14 capsule 0     atorvastatin (LIPITOR) 40 MG tablet Take 1 tablet (40 mg) by mouth daily Reports taking 90 tablet 0     folic acid (FOLVITE) 1 MG tablet Take 1 tablet (1,000 mcg) by mouth daily 90 tablet 1     levothyroxine (SYNTHROID) 50 MCG tablet Take 1 tablet (50 mcg) by mouth daily 30 tablet 3     Calcium & Magnesium Carbonates (MYLANTA PO) 2 tblsp every 4 hours as needed       predniSONE (DELTASONE) 5 MG tablet TAKE ONE TABLET BY MOUTH EVERY OTHER DAY ALTERNATING  WITH  1/2  TABLET  EVERY  OTHER  DAY 23 tablet 13     clopidogrel (PLAVIX) 75 MG tablet Take 1 tablet (75 mg) by mouth daily 90 tablet 3     LANsoprazole (PREVACID) 30 MG CR capsule Take 1 capsule (30 mg) by mouth daily Take 30-60 minutes before a meal. 30 capsule 3     mirtazapine (REMERON) 30 MG tablet TAKE ONE TABLET BY MOUTH AT BEDTIME 90 tablet 2     Ferrous Sulfate (IRON) 325 (65 FE) MG tablet TAKE ONE TABLET BY MOUTH ONCE DAILY WITH BREAKFAST 30 tablet 11     levETIRAcetam (KEPPRA) 500 MG tablet TAKE ONE TABLET BY MOUTH ONCE DAILY AND one AT BEDTIME 180 tablet 0     ALPRAZolam (XANAX) 0.25 MG tablet Take 1 tablet (0.25 mg) by mouth 2 times daily as needed for anxiety 28 tablet 0     isosorbide mononitrate (IMDUR) 60 MG 24 hr tablet Take 1 tablet (60 mg) by mouth daily 90 tablet 3     aspirin EC 81 MG EC tablet Take 1 tablet (81 mg) by mouth daily       methotrexate 2.5 MG tablet Take 4 tablets (10 mg) by mouth once a week Wed 52 tablet 3     acetaminophen (TYLENOL ARTHRITIS PAIN) 650 MG CR tablet Take 2 tablets (1,300 mg) by mouth every 8 hours as needed for mild pain       calcium carbonate (TUMS) 500 MG chewable tablet Take 2-4 tablets (1,000-2,000 mg) by mouth as needed for heartburn       docusate sodium (COLACE) 100 MG capsule Take 200 mg by mouth daily 2 capsules       multivitamin, therapeutic with minerals (MULTI-VITAMIN) TABS Take 1 tablet by mouth daily       Lactobacillus (FLORAJEN  ACIDOPHILUS) CAPS Take 1 capsule by mouth daily       nitroGLYcerin (NITROSTAT) 0.4 MG sublingual tablet DISSOLVE ONE TABLET UNDER THE TONGUE EVERY 5 MINUTES AS NEEDED FOR CHEST PAIN.  DO NOT EXCEED A TOTAL OF 3 DOSES IN 15 MINUTES (Patient not taking: Reported on 12/12/2017) 25 tablet 0     [DISCONTINUED] amLODIPine (NORVASC) 2.5 MG tablet Take 1 tablet (2.5 mg) by mouth daily 90 tablet 1     [DISCONTINUED] metoprolol (TOPROL-XL) 25 MG 24 hr tablet Take 2 tablets (50 mg) by mouth daily 180 tablet 3     famotidine (PEPCID) 40 MG tablet TAKE ONE TABLET BY MOUTH AT BEDTIME 30 tablet 9     No facility-administered encounter medications on file as of 12/14/2017.        Again, thank you for allowing me to participate in the care of your patient.      Sincerely,    MARGAUX WILLIAMSON MD     [facility]

## 2017-12-14 NOTE — MR AVS SNAPSHOT
After Visit Summary   12/14/2017    Gillian Burk    MRN: [id number]           Patient Information     Date Of Birth          3/19/1927        Visit Information        Provider Department      12/14/2017 1:00 PM Jones Lee MD [facility]        Today's Diagnoses     Coronary artery disease involving native coronary artery of native heart without angina pectoris    -  1    Benign essential hypertension        Hyperlipidemia with target LDL less than 130        S/P TAVR (transcatheter aortic valve replacement)        Chronic stable angina (H)        Chest pain, unspecified type           Follow-ups after your visit        Additional Services     Follow-Up with Cardiac Advanced Practice Provider           Follow-Up with Cardiologist                 Future tests that were ordered for you today     Open Future Orders        Priority Expected Expires Ordered    Follow-Up with Cardiologist Routine 6/12/2018 12/14/2018 12/14/2017    Follow-Up with Cardiac Advanced Practice Provider Routine 3/14/2018 12/14/2018 12/14/2017            Who to contact     If you have questions or need follow up information about today's clinic visit or your schedule please contact [facility] directly at [phone].  Normal or non-critical lab and imaging results will be communicated to you by [employer] [facility] [employer] [facility], letter or phone within 4 business days after the clinic has received the results. If you do not hear from us within 7 days, please contact the clinic through [employer] [facility] [employer] [facility] or phone. If you have a critical or abnormal lab result, we will notify you by phone as soon as possible.  Submit refill requests through [employer] or call your pharmacy and they will forward the refill request to us. Please allow 3 business days for your refill to be completed.          Additional Information About Your Visit        MyChart Information     [employer] gives you  "secure access to your electronic health record. If you see a primary care provider, you can also send messages to your care team and make appointments. If you have questions, please call your primary care clinic.  If you do not have a primary care provider, please call [phone] and they will assist you.        Care EveryWhere ID     This is your Care EveryWhere ID. This could be used by other organizations to access your [facility] medical records  [id number]        Your Vitals Were     Pulse Height Pulse Oximetry BMI (Body Mass Index)          106 1.676 m (5' 6\") 92% 22.92 kg/m2         Blood Pressure from Last 3 Encounters:   12/14/17 136/74   12/12/17 118/64   12/07/17 156/86    Weight from Last 3 Encounters:   12/14/17 64.4 kg (142 lb)   12/12/17 64.3 kg (141 lb 11.2 oz)   12/07/17 65 kg (143 lb 3.2 oz)              We Performed the Following     Follow-Up with Cardiac Advanced Practice Provider          Today's Medication Changes          These changes are accurate as of: 12/14/17  1:29 PM.  If you have any questions, ask your nurse or doctor.               These medicines have changed or have updated prescriptions.        Dose/Directions    metoprolol 25 MG 24 hr tablet   Commonly known as:  TOPROL-XL   This may have changed:    - how much to take  - how to take this  - when to take this  - additional instructions   Used for:  Chest pain, unspecified type        Take 2 tablets (50 mg) po qam and 1 tablet (25 mg) po qpm   Quantity:  270 tablet   Refills:  3            Where to get your medicines      These medications were sent to [facility] [address] [place] [address] - [address]  [address] [facility] [address]     Phone:  [phone]     amLODIPine 2.5 MG tablet    metoprolol 25 MG 24 hr tablet                Primary Care Provider Office Phone # Fax #    Augusto Meyer [phone]       [address] [place] [address]        Equal Access to Services     FIIRO " GAAR : Hadii aad ku hadchrystalo Loganali, waaxda luqadaha, qaybta kaalmada adelester, zack coriein hayaajamar mancia renaldotai oglesby . So [facility] [phone].    ATENCIÓN: Si habla español, tiene a granger disposición servicios gratuitos de asistencia lingüística. Llame al [phone].    We comply with applicable federal civil rights laws and [place] laws. We do not discriminate on the basis of race, color, national origin, age, disability, sex, sexual orientation, or gender identity.            Thank you!     Thank you for choosing [facility]  for your care. Our goal is always to provide you with excellent care. Hearing back from our patients is one way we can continue to improve our services. Please take a few minutes to complete the written survey that you may receive in the mail after your visit with us. Thank you!             Your Updated Medication List - Protect others around you: Learn how to safely use, store and throw away your medicines at www.disposemymeds.org.          This list is accurate as of: 12/14/17  1:29 PM.  Always use your most recent med list.                   Brand Name Dispense Instructions for use Diagnosis    ALPRAZolam 0.25 MG tablet    XANAX    28 tablet    Take 1 tablet (0.25 mg) by mouth 2 times daily as needed for anxiety    Anxiety       amLODIPine 2.5 MG tablet    NORVASC    90 tablet    Take 1 tablet (2.5 mg) by mouth daily    Chest pain, unspecified type       aspirin 81 MG EC tablet      Take 1 tablet (81 mg) by mouth daily    S/P TAVR (transcatheter aortic valve replacement)       atorvastatin 40 MG tablet    LIPITOR    90 tablet    Take 1 tablet (40 mg) by mouth daily Reports taking    Hyperlipidemia with target LDL less than 130       azithromycin 250 MG tablet    ZITHROMAX    6 tablet    Two tablets first day, then one tablet daily for four days.    Acute recurrent maxillary sinusitis       calcium carbonate 500 MG chewable tablet    TUMS      Take 2-4 tablets (1,000-2,000 mg) by mouth as needed for heartburn        clopidogrel 75 MG tablet    PLAVIX    90 tablet    Take 1 tablet (75 mg) by mouth daily    History of CVA (cerebrovascular accident)       docusate sodium 100 MG capsule    COLACE     Take 200 mg by mouth daily 2 capsules        famotidine 40 MG tablet    PEPCID    30 tablet    TAKE ONE TABLET BY MOUTH AT BEDTIME    Gastroesophageal reflux disease without esophagitis       FLORAJEN ACIDOPHILUS Caps      Take 1 capsule by mouth daily        folic acid 1 MG tablet    FOLVITE    90 tablet    Take 1 tablet (1,000 mcg) by mouth daily    Seropositive rheumatoid arthritis (H)       iron 325 (65 FE) MG tablet     30 tablet    TAKE ONE TABLET BY MOUTH ONCE DAILY WITH BREAKFAST    Gastrointestinal hemorrhage, unspecified gastrointestinal hemorrhage type       isosorbide mononitrate 60 MG 24 hr tablet    IMDUR    90 tablet    Take 1 tablet (60 mg) by mouth daily    Chest pain, unspecified type       LANsoprazole 30 MG CR capsule    PREVACID    30 capsule    Take 1 capsule (30 mg) by mouth daily Take 30-60 minutes before a meal.    Gastroesophageal reflux disease with esophagitis       levETIRAcetam 500 MG tablet    KEPPRA    180 tablet    TAKE ONE TABLET BY MOUTH ONCE DAILY AND one AT BEDTIME    Seizure disorder (H)       levothyroxine 50 MCG tablet    SYNTHROID    30 tablet    Take 1 tablet (50 mcg) by mouth daily    Hypothyroidism due to acquired atrophy of thyroid       methotrexate 2.5 MG tablet CHEMO     52 tablet    Take 4 tablets (10 mg) by mouth once a week Wed    Seropositive rheumatoid arthritis (H)       metoprolol 25 MG 24 hr tablet    TOPROL-XL    270 tablet    Take 2 tablets (50 mg) po qam and 1 tablet (25 mg) po qpm    Chest pain, unspecified type       mirtazapine 30 MG tablet    REMERON    90 tablet    TAKE ONE TABLET BY MOUTH AT BEDTIME    Sleep initiation disorder       Multi-vitamin Tabs tablet      Take 1 tablet by mouth daily         MYLANTA PO      2 tblsp every 4 hours as needed        nitroFURantoin (macrocrystal-monohydrate) 100 MG capsule    MACROBID    14 capsule    Take 1 capsule (100 mg) by mouth 2 times daily    Dysuria       nitroGLYcerin 0.4 MG sublingual tablet    NITROSTAT    25 tablet    DISSOLVE ONE TABLET UNDER THE TONGUE EVERY 5 MINUTES AS NEEDED FOR CHEST PAIN.  DO NOT EXCEED A TOTAL OF 3 DOSES IN 15 MINUTES    Coronary artery disease involving native coronary artery of native heart without angina pectoris       predniSONE 5 MG tablet    DELTASONE    23 tablet    TAKE ONE TABLET BY MOUTH EVERY OTHER DAY ALTERNATING  WITH  1/2  TABLET  EVERY  OTHER  DAY    Seropositive rheumatoid arthritis (H)       TRIMETHOPRIM PO           TYLENOL ARTHRITIS PAIN 650 MG CR tablet   Generic drug:  acetaminophen      Take 2 tablets (1,300 mg) by mouth every 8 hours as needed for mild pain

## 2017-12-14 NOTE — PROGRESS NOTES
2017         Augusto Meyer DO    [address] [address]      RE:   Gillian Burk    MRN:  [id number]   : 1927      Dear Dr. Meyer:       I had the opportunity to see Ms. Burk in Cardiology Clinic today at the [facility] in [place] for reevaluation of chronic stable angina.  She was having frequent episodes of exertional chest discomfort and a typical description of angina.  I saw her for consultation on 2017 and recommended coronary angiography.  This was completed on 2017 and demonstrated a moderately severe blockage within the kli-xb-fqbncy LAD.  Because she is 89 years old and our dye limit is quite low, she was brought back for a staged procedure for angioplasty and stenting a few weeks later.  However, in the meantime, she had started amlodipine and increased some other medications and was not having any recurrent angina at that point.  We decided that she should not undergo stenting of her LAD if she is not having any recurring angina symptoms.  She was sent back home again with the same medications and is now following up with me today.      She seems to be doing pretty well from a cardiac standpoint.  She has an upper respiratory infection currently but she is not having any problems with breathing or chest discomfort.  She has had only 1 episode of chest discomfort since I saw her in early November.  That occurred when she was walking for a fairly long distance outside in the cold air on the icy parking lot and she developed some brief chest discomfort that resolved when she sat down to rest.  She did not require nitroglycerin.      Her history also includes severe aortic stenosis and transcatheter aortic valve replacement on 2016.  She has hypertension and dyslipidemia.  Her cholesterol numbers have been under excellent control.      PHYSICAL EXAMINATION:     VITALS SIGNS:   Today, her blood pressure was 136/74, heart rate 106 and weight 142 pounds.     HEART:  Rhythm is regular with frequent extrasystoles and soft systolic cardiac murmur.     LUNGS:  Demonstrate some fibrotic-sounding crackles in the bases     IMPRESSIONS:  Ms. Lucio Burk is a 89-year-old woman with coronary artery disease and symptoms of chronic stable angina.  By cardiac catheterization, she had a moderately severe stenosis within the kpq-nk-chpfrc LAD of about 70%-80%.  It was on a sharp bend and it was a bit difficult to evaluate conclusively.  Her medications were adjusted and her angina resolved.  She did not undergo a stenting procedure.      Her heart rates are running a bit high today and they generally run in the 80s to 90s.  Her blood pressure has not been too low either.  I think we still have room to continue to adjust her medications.  I suggested that she increase her metoprolol to try to gain better control of her heart rate and to help maximize the benefit for treatment of her cardiomyopathy.  Her ejection fraction was last estimated at 40% by echocardiogram in 2017.  I will have her take 50 mg of metoprolol XL in the morning and 25 mg at night.  She will continue taking amlodipine 2.5 mg a day and isosorbide mononitrate twice daily as well, 30 mg in the morning and 60 mg in the afternoon.      I will have her follow up with me again in Cardiology Clinic in about three months for reevaluation.      Sincerely,          Margaux Lee MD, FACC         MARGAUX LEE MD, FACC             D: 2017 13:37   T: 2017 14:26   MT: VERONIKA      Name:     LUCIO BURK   MRN:      [id number]        Account:      [id number]   :      1927           Service Date: 2017      Document: [id number]

## 2017-12-19 ENCOUNTER — TELEPHONE (OUTPATIENT)
Dept: FAMILY MEDICINE | Facility: OTHER | Age: 82
End: 2017-12-19

## 2017-12-19 DIAGNOSIS — J01.01 ACUTE RECURRENT MAXILLARY SINUSITIS: ICD-10-CM

## 2017-12-19 RX ORDER — AZITHROMYCIN 250 MG/1
TABLET, FILM COATED ORAL
Qty: 6 TABLET | Refills: 0 | Status: SHIPPED | OUTPATIENT
Start: 2017-12-19 | End: 2018-01-16

## 2017-12-19 NOTE — TELEPHONE ENCOUNTER
Reason for Call:  Other call back    Detailed comments: Hanna is calling wanting to give update. Gillian was starting to feel better after Dr. Meyer prescribed Zpak, but now again she is not feeling real hot. She has the same symptoms. She is leaving on Thursday to [place] and hoping to feel better. Hanna is hoping maybe for another Zpak or whatever you think. Please call her back and advise. They use [facility] in [place] for pharmacy.     Phone Number Patient can be reached at: Other phone number:      Best Time: anytime    Can we leave a detailed message on this number? YES    Call taken on 12/19/2017 at 11:05 AM by Leighann Tavarez

## 2017-12-19 NOTE — TELEPHONE ENCOUNTER
Pt notified of message below. Looks like script needs to be signed by provider. Please sign order. Rocío Burch CMA ([facility])

## 2017-12-19 NOTE — TELEPHONE ENCOUNTER
azithromycin (ZITHROMAX) 250 MG tablet 6 tablet 0 12/19/2017  No      Sig: Take 1 pill daily until gone.     Script filled today already.  Franklyn Archuleta MA

## 2017-12-19 NOTE — TELEPHONE ENCOUNTER
Called to follow up on medication, at Pharmacy.  Advised of message below.  Thank you,  Cristy Beltran  Patient Representative

## 2017-12-20 NOTE — TELEPHONE ENCOUNTER
Routing refill request to provider for review/approval because:  Drug not on the [facility] refill protocol     Bradly Newell RN, BSN

## 2017-12-21 RX ORDER — AZITHROMYCIN 250 MG/1
TABLET, FILM COATED ORAL
Qty: 6 TABLET | Refills: 0 | Status: SHIPPED | OUTPATIENT
Start: 2017-12-21 | End: 2018-01-16

## 2017-12-27 ENCOUNTER — CARE COORDINATION (OUTPATIENT)
Dept: CARDIOLOGY | Facility: CLINIC | Age: 82
End: 2017-12-27

## 2017-12-27 DIAGNOSIS — R07.9 CHEST PAIN: Primary | ICD-10-CM

## 2017-12-27 NOTE — PROGRESS NOTES
"Received VM from pt's daughter, Carley, who reports that pt is taking Imdur 60mg, one tab at 6:00am and Imdur 30mg two tabs at 2:00pm.  She reports it is easier for pt to swallow the 30mg tabs and is wondering if it would be possible for pt to take two 30mg tabs for the AM dose as well.     Per chart review, pt was seen most recently by Dr. Lee on 12/14/17 and OV note states, \"She will continue taking amlodipine 2.5 mg a day and isosorbide mononitrate twice daily as well, 30 mg in the morning and 60 mg in the afternoon.\"  However, Dr. Lee's previous OV note from 11/13/17 states, \"I have increased her Imdur to 60 mg p.o. b.i.d. taken at 6:00 a.m. and 2:00 p.m.\"  I do not find any notes between 11/13 and 12/14 indicating that pt was advised to decrease Imdur to 30mg in the Am and 60mg in the PM, as OV note from 12/14 suggests.      Called and spoke with pt.  Reviewed that Dr. Lee's most recent OV note from 12/14 she is to continue Imdur 30mg in the AM and 60mg in the PM.  Pt states she does not remember Dr. Lee changing the Imdur dose on 12/14.  Reviewed with pt that Imdur dose she is currently taking (60mg BID) appears to be correct and will confirm with Dr. Lee when he is back in clinic next week if pt should continue taking Imdur 60mg BID before sending update Rx for 30mg tabs, 2 tabs BID.  Pt verbalized understanding and agrees with this plan.  Reviewed that it is OK for her to take two 30mg Imdur tabs BID instead of one 60mg tab in the AM and two 30mg tabs in the PM if it is easier for her to swallow the 30mg tabs.  Pt states she has enough 30mg Imdur pills to last for a while so will await response.     Routed to Dr. Lee for review. SHERRY Ramos 12:20 PM 12/27/2017    "

## 2018-01-02 DIAGNOSIS — E03.4 HYPOTHYROIDISM DUE TO ACQUIRED ATROPHY OF THYROID: ICD-10-CM

## 2018-01-03 ENCOUNTER — MYC MEDICAL ADVICE (OUTPATIENT)
Dept: FAMILY MEDICINE | Facility: OTHER | Age: 83
End: 2018-01-03

## 2018-01-03 RX ORDER — LEVOTHYROXINE SODIUM 50 UG/1
TABLET ORAL
Qty: 30 TABLET | Refills: 3 | Status: SHIPPED | OUTPATIENT
Start: 2018-01-03 | End: 2018-05-15

## 2018-01-03 NOTE — TELEPHONE ENCOUNTER
Routing refill request to provider for review/approval because:  Labs not current:  TSH    Simin Bueno, RN  [facility]

## 2018-01-03 NOTE — TELEPHONE ENCOUNTER
Last Written Prescription Date:  8/29/17  Last Fill Quantity: 30,  # refills: 3   Last Office Visit with [facility] or [facility] prescribing provider:  12/12/17   Future Office Visit:     Requested Prescriptions   Pending Prescriptions Disp Refills     levothyroxine (SYNTHROID/LEVOTHROID) 50 MCG tablet [Pharmacy Med Name: LEVOTHYROXIN 50MCG  TAB] 30 tablet 3     Sig: TAKE ONE TABLET BY MOUTH ONCE DAILY    Thyroid Protocol Failed    1/2/2018  6:50 PM       Failed - Normal TSH on file in past 12 months    Recent Labs   Lab Test  05/03/16   1427   TSH  3.37             Passed - Patient is 12 years or older       Passed - Recent or future visit with authorizing provider's specialty    Patient had office visit in the last year or has a visit in the next 30 days with authorizing provider.  See chart review.              Passed - No active pregnancy on record    If patient is pregnant or has had a positive pregnancy test, please check TSH.         Passed - No positive pregnancy test in past 12 months    If patient is pregnant or has had a positive pregnancy test, please check TSH.

## 2018-01-04 NOTE — PROGRESS NOTES
Received VM from pt's daughter, Carley, who states they are still waiting to hear back regarding if pt can take two 30mg Imdur tabs rather than one 60mg tab.      Called Carley, no answer, LVM informing her that spoke with pt on 12/27 and advised that taking two 30mg Imdur tabs is OK; however, still waiting on reponse from Dr. Lee to clarify dose, that was stated as 30mg in AM and 60mg in PM from 12/14 OV note, but was 60mg BID previously.  Discussed that will plan to reach out to Dr. Lee tomorrow for clarification and call Carley back with update.  Richard, RN 4:48 PM 1/4/2018

## 2018-01-05 RX ORDER — ISOSORBIDE MONONITRATE 30 MG/1
60 TABLET, EXTENDED RELEASE ORAL 2 TIMES DAILY
Qty: 360 TABLET | Refills: 3 | Status: ON HOLD | OUTPATIENT
Start: 2018-01-05 | End: 2018-07-25

## 2018-01-05 NOTE — PROGRESS NOTES
Received call from pt's daughter, Carley, who reports that pt will run out of Imdur 30mg tabs next week and request a refill sent to [facility] in [facility].  Discussed that did not have a chance to clarify with Dr. Heard today, but will sent requested Imdur 30mg Rx for 2 tabs BID to pharmacy, as it appears Imdur dose stated in most recent OV note is an error.  Carley states her sister Jennifer, who was at most recent Dr. Lee OV with pt, did not recall Imdur dose change either.  They both believe, along with pt, that correct dose is Imdur 60mg BID.  Discussed with Carley that will call if Imdur dose change is recommended by Dr. Lee when he reviews.  Carley requested that any update be called to her Maggie.  SHERRY Ramos 5:08 PM 1/5/2018

## 2018-01-08 NOTE — TELEPHONE ENCOUNTER
Much ado about nothing. I wanted her on the same dose that she was taking, since it was working. It was reported to me wrong, or I misunderstood. It doesn't matter. She should stay on the same dose. 60 mg bid. Doesn't matter whether it is one 60mg tab or two 30 mg tabs twice daily. Please refill her Rx. Thank you. EE

## 2018-01-09 NOTE — PROGRESS NOTES
Called and spoke with pt's daughter, Jennifer.  Reviewed that Dr. Lee confirmed he would like pt to take Imdur 60mg BID and taking two, 30mg Imdur tabs BID.  Jennifer confirms that she already picked up Imdur 30mg Rx that was sent last week.  SHERRY Ramos 8:27 AM 1/9/2018

## 2018-01-16 ENCOUNTER — MYC MEDICAL ADVICE (OUTPATIENT)
Dept: FAMILY MEDICINE | Facility: OTHER | Age: 83
End: 2018-01-16

## 2018-01-16 DIAGNOSIS — N30.00 ACUTE CYSTITIS WITHOUT HEMATURIA: Primary | ICD-10-CM

## 2018-01-16 RX ORDER — CEFUROXIME AXETIL 250 MG/1
250 TABLET ORAL 2 TIMES DAILY
Qty: 20 TABLET | Refills: 0 | Status: SHIPPED | OUTPATIENT
Start: 2018-01-16 | End: 2018-02-20

## 2018-01-16 NOTE — TELEPHONE ENCOUNTER
Dr. Meyer, the RX has not been signed. It is still pended. Can you sign the RX?    Will route to provider to advise.     Simin Bueno RN  [facility]

## 2018-01-16 NOTE — TELEPHONE ENCOUNTER
A prescription for an antibiotic has been called to the [facility] pharmacy in [place].  She should return and had a urinary analysis performed upon conclusion of the antibiotic.    Betsy

## 2018-01-23 DIAGNOSIS — G40.909 SEIZURE DISORDER (H): ICD-10-CM

## 2018-01-23 RX ORDER — LEVETIRACETAM 500 MG/1
TABLET ORAL
Qty: 90 TABLET | Refills: 6 | Status: SHIPPED | OUTPATIENT
Start: 2018-01-23 | End: 2018-02-26

## 2018-01-23 NOTE — TELEPHONE ENCOUNTER
Routing refill request to provider for review/approval because:  Medication is reported/historical    Simin Bueno, RN  [facility]

## 2018-01-23 NOTE — TELEPHONE ENCOUNTER
"Requested Prescriptions   Pending Prescriptions Disp Refills     levETIRAcetam (KEPPRA) 500 MG tablet [Pharmacy Med Name: LevETIRAcetam 500MG TAB] 90 tablet 6     Sig: TAKE ONE TABLET BY MOUTH ONCE DAILY AND TWO AT BEDTIME    Anticonvulsants Protocol  Failed    1/23/2018  1:13 PM       Failed - Review Authorizing provider's last note.     Refer to last progress notes: confirm request is for original authorizing provider (cannot be through other providers).         Passed - No active pregnancy on record       Passed - No positive pregnancy test in last 12 months       Passed - Recent or future visit with authorizing provider    Patient had office visit in the last 6 months or has a visit in the next 30 days with authorizing provider.  See \"Patient Info\" tab in inbasket, or \"Choose Columns\" in Meds & Orders section of the refill encounter.              Last Written Prescription Date:  3/13/17  Last Fill Quantity: 180,  # refills: 0   Last Office Visit with [facility] or [facility] prescribing provider:  12/12/17   Future Office Visit:       "

## 2018-01-30 ENCOUNTER — MYC MEDICAL ADVICE (OUTPATIENT)
Dept: FAMILY MEDICINE | Facility: OTHER | Age: 83
End: 2018-01-30

## 2018-01-30 DIAGNOSIS — E78.5 HYPERLIPIDEMIA WITH TARGET LDL LESS THAN 130: ICD-10-CM

## 2018-01-30 DIAGNOSIS — G40.909 SEIZURE DISORDER (H): Primary | ICD-10-CM

## 2018-01-30 NOTE — TELEPHONE ENCOUNTER
"Requested Prescriptions   Pending Prescriptions Disp Refills     atorvastatin (LIPITOR) 40 MG tablet [Pharmacy Med Name: ATORVASTATIN 40MG   TAB] 90 tablet 0     Sig: TAKE ONE TABLET BY MOUTH ONCE DAILY    Statins Protocol Failed    1/30/2018 11:57 AM       Failed - LDL on file in past 12 months    Recent Labs   Lab Test  07/07/15   1202   LDL  69            Passed - No abnormal creatine kinase in past 12 months    No lab results found.         Passed - Recent or future visit with authorizing provider    Patient had office visit in the last year or has a visit in the next 30 days with authorizing provider.  See \"Patient Info\" tab in inbasket, or \"Choose Columns\" in Meds & Orders section of the refill encounter.            Passed - Patient is age 18 or older       Passed - No active pregnancy on record       Passed - No positive pregnancy test in past 12 months          Last Written Prescription Date:  11/2/17  Last Fill Quantity: 90,  # refills: 0   Last Office Visit with [facility] prescribing provider:  12/12/17   Future Office Visit:       "

## 2018-01-31 RX ORDER — ATORVASTATIN CALCIUM 40 MG/1
TABLET, FILM COATED ORAL
Qty: 90 TABLET | Refills: 0 | Status: SHIPPED | OUTPATIENT
Start: 2018-01-31 | End: 2018-08-07

## 2018-01-31 NOTE — TELEPHONE ENCOUNTER
Routing refill request to provider for review/approval because:  Labs not current:  MICHELET Bueno, RN  [facility]

## 2018-02-05 DIAGNOSIS — G40.909 SEIZURE DISORDER (H): ICD-10-CM

## 2018-02-05 PROCEDURE — 80177 DRUG SCRN QUAN LEVETIRACETAM: CPT | Mod: 90 | Performed by: INTERNAL MEDICINE

## 2018-02-05 PROCEDURE — 99000 SPECIMEN HANDLING OFFICE-LAB: CPT | Performed by: INTERNAL MEDICINE

## 2018-02-05 PROCEDURE — 36415 COLL VENOUS BLD VENIPUNCTURE: CPT | Performed by: INTERNAL MEDICINE

## 2018-02-07 LAB — LEVETIRACETAM SERPL-MCNC: 26 UG/ML (ref 12–46)

## 2018-02-12 ENCOUNTER — OFFICE VISIT (OUTPATIENT)
Dept: ORTHOPEDICS | Facility: CLINIC | Age: 83
End: 2018-02-12
Payer: COMMERCIAL

## 2018-02-12 ENCOUNTER — TELEPHONE (OUTPATIENT)
Dept: FAMILY MEDICINE | Facility: OTHER | Age: 83
End: 2018-02-12

## 2018-02-12 VITALS — WEIGHT: 146 LBS | HEIGHT: 66 IN | BODY MASS INDEX: 23.46 KG/M2

## 2018-02-12 DIAGNOSIS — N39.0 RECURRENT URINARY TRACT INFECTION: Primary | ICD-10-CM

## 2018-02-12 DIAGNOSIS — M17.11 PRIMARY OSTEOARTHRITIS OF RIGHT KNEE: Primary | ICD-10-CM

## 2018-02-12 PROCEDURE — 20610 DRAIN/INJ JOINT/BURSA W/O US: CPT | Mod: RT | Performed by: ORTHOPAEDIC SURGERY

## 2018-02-12 ASSESSMENT — PAIN SCALES - GENERAL: PAINLEVEL: SEVERE PAIN (6)

## 2018-02-12 NOTE — MR AVS SNAPSHOT
After Visit Summary   2/12/2018    Gillian Burk    MRN: [id number]           Patient Information     Date Of Birth          3/19/1927        Visit Information        Provider Department      2/12/2018 2:00 PM Mateusz Copeland MD [facility]        Today's Diagnoses     Primary osteoarthritis of right knee    -  1       Follow-ups after your visit        Your next 10 appointments already scheduled     Feb 19, 2018  2:10 PM CST   Return Visit with Mateusz Copeland MD   [facility]    [address] [place] [id number] [phone]            Feb 26, 2018  1:50 PM CST   Return Visit with Mateusz Copeland MD   [facility] ([address] [address] [phone]              Who to contact     If you have questions or need follow up information about today's clinic visit or your schedule please contact [facility] directly at [phone].  Normal or non-critical lab and imaging results will be communicated to you by Mebelramahart, letter or phone within 4 business days after the clinic has received the results. If you do not hear from us within 7 days, please contact the clinic through Link_[employer] or phone. If you have a critical or abnormal lab result, we will notify you by phone as soon as possible.  Submit refill requests through [employer] or call your pharmacy and they will forward the refill request to us. Please allow 3 business days for your refill to be completed.          Additional Information About Your Visit        Mebelramahart Information     [employer] gives you secure access to your electronic health record. If you see a primary care provider, you can also send messages to your care team and make appointments. If you have questions, please call your primary care clinic.  If you do not have a primary care provider, please call [phone] and  "they will assist you.        Care EveryWhere ID     This is your Care EveryWhere ID. This could be used by other organizations to access your [place] medical records  [id number]        Your Vitals Were     Height BMI (Body Mass Index)                1.676 m (5' 6\") 23.57 kg/m2           Blood Pressure from Last 3 Encounters:   12/14/17 136/74   12/12/17 118/64   12/07/17 156/86    Weight from Last 3 Encounters:   02/12/18 66.2 kg (146 lb)   12/14/17 64.4 kg (142 lb)   12/12/17 64.3 kg (141 lb 11.2 oz)              We Performed the Following     HC ARTHROCENTESIS ASPIR&/INJ MAJOR JT/BURSA W/US        Primary Care Provider Office Phone # Fax #    Augusto MeridaDO laina [phone] [address] [place] [address]        Equal Access to Services     LUPE TRENT : Hadii aad ku hadasho Soomaali, waaxda luqadaha, qaybta kaalmada adeegyada, waxay coriein hayjusn blanche oglesby . So [facility] [phone].    ATENCIÓN: Si habla español, tiene a granger disposición servicios gratuitos de asistencia lingüística. Llame al [phone].    We comply with applicable federal civil rights laws and [place] laws. We do not discriminate on the basis of race, color, national origin, age, disability, sex, sexual orientation, or gender identity.            Thank you!     Thank you for choosing [facility]  for your care. Our goal is always to provide you with excellent care. Hearing back from our patients is one way we can continue to improve our services. Please take a few minutes to complete the written survey that you may receive in the mail after your visit with us. Thank you!             Your Updated Medication List - Protect others around you: Learn how to safely use, store and throw away your medicines at www.disposemymeds.org.          This list is accurate as of 2/12/18  2:21 PM.  Always use your most recent med list.                   Brand Name Dispense Instructions for use " Diagnosis    ALPRAZolam 0.25 MG tablet    XANAX    28 tablet    Take 1 tablet (0.25 mg) by mouth 2 times daily as needed for anxiety    Anxiety       amLODIPine 2.5 MG tablet    NORVASC    90 tablet    Take 1 tablet (2.5 mg) by mouth daily    Chest pain, unspecified type       aspirin 81 MG EC tablet      Take 1 tablet (81 mg) by mouth daily    S/P TAVR (transcatheter aortic valve replacement)       atorvastatin 40 MG tablet    LIPITOR    90 tablet    TAKE ONE TABLET BY MOUTH ONCE DAILY    Hyperlipidemia with target LDL less than 130       calcium carbonate 500 MG chewable tablet    TUMS     Take 2-4 tablets (1,000-2,000 mg) by mouth as needed for heartburn        cefuroxime 250 MG tablet    CEFTIN    20 tablet    Take 1 tablet (250 mg) by mouth 2 times daily    Acute cystitis without hematuria       clopidogrel 75 MG tablet    PLAVIX    90 tablet    Take 1 tablet (75 mg) by mouth daily    History of CVA (cerebrovascular accident)       docusate sodium 100 MG capsule    COLACE     Take 200 mg by mouth daily 2 capsules        famotidine 40 MG tablet    PEPCID    30 tablet    TAKE ONE TABLET BY MOUTH AT BEDTIME    Gastroesophageal reflux disease without esophagitis       FLORAJEN ACIDOPHILUS Caps      Take 1 capsule by mouth daily        folic acid 1 MG tablet    FOLVITE    90 tablet    Take 1 tablet (1,000 mcg) by mouth daily    Seropositive rheumatoid arthritis (H)       iron 325 (65 FE) MG tablet     30 tablet    TAKE ONE TABLET BY MOUTH ONCE DAILY WITH BREAKFAST    Gastrointestinal hemorrhage, unspecified gastrointestinal hemorrhage type       isosorbide mononitrate 30 MG 24 hr tablet    IMDUR    360 tablet    Take 2 tablets (60 mg) by mouth 2 times daily    Chest pain       LANsoprazole 30 MG CR capsule    PREVACID    30 capsule    Take 1 capsule (30 mg) by mouth daily Take 30-60 minutes before a meal.    Gastroesophageal reflux disease with esophagitis       * levETIRAcetam 500 MG tablet    KEPPRA    180 tablet     TAKE ONE TABLET BY MOUTH ONCE DAILY AND one AT BEDTIME    Seizure disorder (H)       * levETIRAcetam 500 MG tablet    KEPPRA    90 tablet    TAKE ONE TABLET BY MOUTH ONCE DAILY AND TWO AT BEDTIME    Seizure disorder (H)       levothyroxine 50 MCG tablet    SYNTHROID/LEVOTHROID    30 tablet    TAKE ONE TABLET BY MOUTH ONCE DAILY    Hypothyroidism due to acquired atrophy of thyroid       methotrexate 2.5 MG tablet CHEMO     52 tablet    Take 4 tablets (10 mg) by mouth once a week Wed    Seropositive rheumatoid arthritis (H)       metoprolol succinate 25 MG 24 hr tablet    TOPROL-XL    270 tablet    Take 2 tablets (50 mg) po qam and 1 tablet (25 mg) po qpm    Chest pain, unspecified type       mirtazapine 30 MG tablet    REMERON    90 tablet    TAKE ONE TABLET BY MOUTH AT BEDTIME    Sleep initiation disorder       Multi-vitamin Tabs tablet      Take 1 tablet by mouth daily        MYLANTA PO      2 tblsp every 4 hours as needed        nitroGLYcerin 0.4 MG sublingual tablet    NITROSTAT    25 tablet    DISSOLVE ONE TABLET UNDER THE TONGUE EVERY 5 MINUTES AS NEEDED FOR CHEST PAIN.  DO NOT EXCEED A TOTAL OF 3 DOSES IN 15 MINUTES    Coronary artery disease involving native coronary artery of native heart without angina pectoris       predniSONE 5 MG tablet    DELTASONE    23 tablet    TAKE ONE TABLET BY MOUTH EVERY OTHER DAY ALTERNATING  WITH  1/2  TABLET  EVERY  OTHER  DAY    Seropositive rheumatoid arthritis (H)       TRIMETHOPRIM PO           TYLENOL ARTHRITIS PAIN 650 MG CR tablet   Generic drug:  acetaminophen      Take 2 tablets (1,300 mg) by mouth every 8 hours as needed for mild pain        * Notice:  This list has 2 medication(s) that are the same as other medications prescribed for you. Read the directions carefully, and ask your doctor or other care provider to review them with you.

## 2018-02-12 NOTE — LETTER
2/12/2018         RE: Gillian Burk  [address]  [place] [address]        Dear Colleague,    Thank you for referring your patient, Gillain Burk, to the [facility]. Please see a copy of my visit note below.    Patient presented today with a request for a Synvisc 3 series of injections.  She has had a Synvisc 1 injection which worked very well but for only a short time.  She understood that Synvisc 3 may work longer.    Her x-ray was briefly reviewed.    She was accompanied by her daughter.    We thought it was appropriate to proceed.    After risks and benefits were explained and questions answered, the patient agreed to undergo the recommended proceedure .     Using aseptic technique the left  Knee  lateral infrapatellar: joint space was infiltrated with a 3 mL Synvisc and 5cc of 1% Lidocaine without.  There were no complications and the patient tolerated the procedure well.      She will return in 1 week for repeat injection.    Again, thank you for allowing me to participate in the care of your patient.        Sincerely,        Mateusz Copeland MD

## 2018-02-12 NOTE — NURSING NOTE
"Chief Complaint   Patient presents with     RECHECK     Right knee pain.  Would like Synvisc today, 3 part.       Initial Ht 1.676 m (5' 6\")  Wt 66.2 kg (146 lb)  BMI 23.57 kg/m2 Estimated body mass index is 23.57 kg/(m^2) as calculated from the following:    Height as of this encounter: 1.676 m (5' 6\").    Weight as of this encounter: 66.2 kg (146 lb).  Medication Reconciliation: complete   LEYDI Claros  "

## 2018-02-14 NOTE — PROGRESS NOTES
Dear Gillian, your recent test results are attached.  The Keppra level is normal at 26.  No medication adjustments are necessary.  Feel free to contact me via the office or My Chart if you have any questions regarding the above.  Sincerely,  Augusto Meyer,  FACOI

## 2018-02-15 DIAGNOSIS — N39.0 RECURRENT URINARY TRACT INFECTION: ICD-10-CM

## 2018-02-15 LAB
ALBUMIN UR-MCNC: NEGATIVE MG/DL
APPEARANCE UR: ABNORMAL
BACTERIA #/AREA URNS HPF: ABNORMAL /HPF
BILIRUB UR QL STRIP: NEGATIVE
COLOR UR AUTO: YELLOW
GLUCOSE UR STRIP-MCNC: NEGATIVE MG/DL
HGB UR QL STRIP: NEGATIVE
KETONES UR STRIP-MCNC: NEGATIVE MG/DL
LEUKOCYTE ESTERASE UR QL STRIP: ABNORMAL
MUCOUS THREADS #/AREA URNS LPF: PRESENT /LPF
NITRATE UR QL: NEGATIVE
PH UR STRIP: 7 PH (ref 5–7)
RBC #/AREA URNS AUTO: 4 /HPF (ref 0–2)
SOURCE: ABNORMAL
SP GR UR STRIP: 1 (ref 1–1.03)
UROBILINOGEN UR STRIP-MCNC: 0 MG/DL (ref 0–2)
WBC #/AREA URNS AUTO: >182 /HPF (ref 0–2)
WBC CLUMPS #/AREA URNS HPF: PRESENT /HPF

## 2018-02-15 PROCEDURE — 87186 SC STD MICRODIL/AGAR DIL: CPT | Performed by: UROLOGY

## 2018-02-15 PROCEDURE — 81001 URINALYSIS AUTO W/SCOPE: CPT | Performed by: UROLOGY

## 2018-02-15 PROCEDURE — 87088 URINE BACTERIA CULTURE: CPT | Performed by: UROLOGY

## 2018-02-15 PROCEDURE — 87086 URINE CULTURE/COLONY COUNT: CPT | Performed by: UROLOGY

## 2018-02-18 LAB
BACTERIA SPEC CULT: ABNORMAL
Lab: ABNORMAL
SPECIMEN SOURCE: ABNORMAL

## 2018-02-19 ENCOUNTER — OFFICE VISIT (OUTPATIENT)
Dept: ORTHOPEDICS | Facility: CLINIC | Age: 83
End: 2018-02-19
Payer: COMMERCIAL

## 2018-02-19 ENCOUNTER — TRANSFERRED RECORDS (OUTPATIENT)
Dept: HEALTH INFORMATION MANAGEMENT | Facility: CLINIC | Age: 83
End: 2018-02-19

## 2018-02-19 VITALS — BODY MASS INDEX: 23.46 KG/M2 | HEIGHT: 66 IN | WEIGHT: 146 LBS

## 2018-02-19 DIAGNOSIS — Z79.899 LONG TERM USE OF DRUG: ICD-10-CM

## 2018-02-19 DIAGNOSIS — M05.79 RHEUMATOID ARTHRITIS WITH RHEUMATOID FACTOR OF MULTIPLE SITES WITHOUT ORGAN OR SYSTEMS INVOLVEMENT (H): ICD-10-CM

## 2018-02-19 DIAGNOSIS — M17.11 PRIMARY OSTEOARTHRITIS OF RIGHT KNEE: Primary | ICD-10-CM

## 2018-02-19 LAB
ALT SERPL W P-5'-P-CCNC: 24 U/L (ref 0–50)
AST SERPL W P-5'-P-CCNC: 22 U/L (ref 0–45)
BASOPHILS # BLD AUTO: 0.1 10E9/L (ref 0–0.2)
BASOPHILS NFR BLD AUTO: 0.7 %
CREAT SERPL-MCNC: 1.15 MG/DL (ref 0.52–1.04)
CRP SERPL-MCNC: <2.9 MG/L (ref 0–8)
DIFFERENTIAL METHOD BLD: ABNORMAL
EOSINOPHIL # BLD AUTO: 0.1 10E9/L (ref 0–0.7)
EOSINOPHIL NFR BLD AUTO: 1.6 %
ERYTHROCYTE [DISTWIDTH] IN BLOOD BY AUTOMATED COUNT: 15.2 % (ref 10–15)
GFR SERPL CREATININE-BSD FRML MDRD: 44 ML/MIN/1.7M2
HCT VFR BLD AUTO: 37.8 % (ref 35–47)
HGB BLD-MCNC: 11.9 G/DL (ref 11.7–15.7)
IMM GRANULOCYTES # BLD: 0 10E9/L (ref 0–0.4)
IMM GRANULOCYTES NFR BLD: 0.3 %
LYMPHOCYTES # BLD AUTO: 1 10E9/L (ref 0.8–5.3)
LYMPHOCYTES NFR BLD AUTO: 14.3 %
MCH RBC QN AUTO: 30.7 PG (ref 26.5–33)
MCHC RBC AUTO-ENTMCNC: 31.5 G/DL (ref 31.5–36.5)
MCV RBC AUTO: 98 FL (ref 78–100)
MONOCYTES # BLD AUTO: 0.5 10E9/L (ref 0–1.3)
MONOCYTES NFR BLD AUTO: 7.4 %
NEUTROPHILS # BLD AUTO: 5.1 10E9/L (ref 1.6–8.3)
NEUTROPHILS NFR BLD AUTO: 75.7 %
PLATELET # BLD AUTO: 225 10E9/L (ref 150–450)
RBC # BLD AUTO: 3.87 10E12/L (ref 3.8–5.2)
WBC # BLD AUTO: 6.7 10E9/L (ref 4–11)

## 2018-02-19 PROCEDURE — 20610 DRAIN/INJ JOINT/BURSA W/O US: CPT | Mod: RT | Performed by: PHYSICIAN ASSISTANT

## 2018-02-19 PROCEDURE — 85025 COMPLETE CBC W/AUTO DIFF WBC: CPT | Performed by: INTERNAL MEDICINE

## 2018-02-19 PROCEDURE — 84450 TRANSFERASE (AST) (SGOT): CPT | Performed by: INTERNAL MEDICINE

## 2018-02-19 PROCEDURE — 84460 ALANINE AMINO (ALT) (SGPT): CPT | Performed by: INTERNAL MEDICINE

## 2018-02-19 PROCEDURE — 36415 COLL VENOUS BLD VENIPUNCTURE: CPT | Performed by: INTERNAL MEDICINE

## 2018-02-19 PROCEDURE — 86140 C-REACTIVE PROTEIN: CPT | Performed by: INTERNAL MEDICINE

## 2018-02-19 PROCEDURE — 82565 ASSAY OF CREATININE: CPT | Performed by: INTERNAL MEDICINE

## 2018-02-19 NOTE — PROGRESS NOTES
Risks and benefits were explained and questions answered.  Patient reports she is beginning to feel relief from the Synvisc series already.  Patient agreed to undergo the recommended procedure and will be receiving part 2 of 3 part series.   Using aseptic technique the skin was prepped with betadine swabs, then sprayed with ethyl chloride for topical anesthesia.  The right  Knee  lateral infrapatellar: joint space was then infiltrated with 5cc of 1% Lidocaine without epinephrine and 2ml of Synvisc.  There were no complications and the patient tolerated the procedure well.

## 2018-02-19 NOTE — NURSING NOTE
"Chief Complaint   Patient presents with     RECHECK     Right knee pain.  Synvisc #2.       Initial Ht 1.676 m (5' 6\")  Wt 66.2 kg (146 lb)  BMI 23.57 kg/m2 Estimated body mass index is 23.57 kg/(m^2) as calculated from the following:    Height as of this encounter: 1.676 m (5' 6\").    Weight as of this encounter: 66.2 kg (146 lb).  Medication Reconciliation: complete   LEYDI Claros  "

## 2018-02-19 NOTE — LETTER
2/19/2018         RE: Gillian Burk  [address]  [place] [address]        Dear Colleague,    Thank you for referring your patient, Gillian Burk, to the [facility]. Please see a copy of my visit note below.    Risks and benefits were explained and questions answered.  Patient reports she is beginning to feel relief from the Synvisc series already.  Patient agreed to undergo the recommended procedure and will be receiving part 2 of 3 part series.   Using aseptic technique the skin was prepped with betadine swabs, then sprayed with ethyl chloride for topical anesthesia.  The right  Knee  lateral infrapatellar: joint space was then infiltrated with 5cc of 1% Lidocaine without epinephrine and 2ml of Synvisc.  There were no complications and the patient tolerated the procedure well.    Again, thank you for allowing me to participate in the care of your patient.        Sincerely,        Mateusz Copeland MD

## 2018-02-19 NOTE — MR AVS SNAPSHOT
After Visit Summary   2/19/2018    Gillian Burk    MRN: [id number]           Patient Information     Date Of Birth          3/19/1927        Visit Information        Provider Department      2/19/2018 2:10 PM Mateusz Copeland MD [facility]        Today's Diagnoses     Primary osteoarthritis of right knee    -  1       Follow-ups after your visit        Your next 10 appointments already scheduled     Feb 26, 2018  1:50 PM CST   Return Visit with Mateusz Copeland MD   [facility] ([facility]    [address] [place] [id number] [phone]              Who to contact     If you have questions or need follow up information about today's clinic visit or your schedule please contact [facility] directly at [phone].  Normal or non-critical lab and imaging results will be communicated to you by MyChart, letter or phone within 4 business days after the clinic has received the results. If you do not hear from us within 7 days, please contact the clinic through MyChart or phone. If you have a critical or abnormal lab result, we will notify you by phone as soon as possible.  Submit refill requests through [employer] or call your pharmacy and they will forward the refill request to us. Please allow 3 business days for your refill to be completed.          Additional Information About Your Visit        MyChart Information     [employer] gives you secure access to your electronic health record. If you see a primary care provider, you can also send messages to your care team and make appointments. If you have questions, please call your primary care clinic.  If you do not have a primary care provider, please call [phone] and they will assist you.        Care EveryWhere ID     This is your Care EveryWhere ID. This could be used by other organizations to access your [place] medical records  [id number]        Your Vitals  "Were     Height BMI (Body Mass Index)                1.676 m (5' 6\") 23.57 kg/m2           Blood Pressure from Last 3 Encounters:   12/14/17 136/74   12/12/17 118/64   12/07/17 156/86    Weight from Last 3 Encounters:   02/19/18 66.2 kg (146 lb)   02/12/18 66.2 kg (146 lb)   12/14/17 64.4 kg (142 lb)              We Performed the Following     C Synvisc per 1 MG  []     Large Joint/Bursa injection and/or drainage (Shoulder, Knee)          Today's Medication Changes          These changes are accurate as of 2/19/18  3:19 PM.  If you have any questions, ask your nurse or doctor.               These medicines have changed or have updated prescriptions.        Dose/Directions    * SYNVISC 16 MG/2ML Sosy   This may have changed:  Another medication with the same name was added. Make sure you understand how and when to take each.   Used for:  Primary osteoarthritis of right knee   Generic drug:  Hylan   Changed by:  Mateusz Copeland MD        Dose:  16 mg   16 mg by INTRA-ARTICULAR route once   Quantity:  2 mL   Refills:  0       * Hylan 16 MG/2ML Sosy   Commonly known as:  SYNVISC   This may have changed:  You were already taking a medication with the same name, and this prescription was added. Make sure you understand how and when to take each.   Used for:  Primary osteoarthritis of right knee   Changed by:  Mateusz Copeland MD        Dose:  16 mg   16 mg by INTRA-ARTICULAR route once for 1 dose   Quantity:  2 mL   Refills:  0       * Notice:  This list has 2 medication(s) that are the same as other medications prescribed for you. Read the directions carefully, and ask your doctor or other care provider to review them with you.         Where to get your medicines      Some of these will need a paper prescription and others can be bought over the counter.  Ask your nurse if you have questions.     You don't need a prescription for these medications     Hylan 16 MG/2ML Sosy                Primary Care " Provider Office Phone # Fax #    Augusto Meyer [phone] [address] [place] [address]        Equal Access to Services     RIVER TRENT : Hadii veronica ku hadchrystalo Soomaali, waaxda luqadaha, qaybta kaalmada adeegyada, zack stockton laHortenciaaraseli ayers. So [facility] [phone].    ATENCIÓN: Si habla español, tiene a granger disposición servicios gratuitos de asistencia lingüística. Llame al [phone].    We comply with applicable federal civil rights laws and [place] laws. We do not discriminate on the basis of race, color, national origin, age, disability, sex, sexual orientation, or gender identity.            Thank you!     Thank you for choosing [facility]  for your care. Our goal is always to provide you with excellent care. Hearing back from our patients is one way we can continue to improve our services. Please take a few minutes to complete the written survey that you may receive in the mail after your visit with us. Thank you!             Your Updated Medication List - Protect others around you: Learn how to safely use, store and throw away your medicines at www.disposemymeds.org.          This list is accurate as of 2/19/18  3:19 PM.  Always use your most recent med list.                   Brand Name Dispense Instructions for use Diagnosis    ALPRAZolam 0.25 MG tablet    XANAX    28 tablet    Take 1 tablet (0.25 mg) by mouth 2 times daily as needed for anxiety    Anxiety       amLODIPine 2.5 MG tablet    NORVASC    90 tablet    Take 1 tablet (2.5 mg) by mouth daily    Chest pain, unspecified type       aspirin 81 MG EC tablet      Take 1 tablet (81 mg) by mouth daily    S/P TAVR (transcatheter aortic valve replacement)       atorvastatin 40 MG tablet    LIPITOR    90 tablet    TAKE ONE TABLET BY MOUTH ONCE DAILY    Hyperlipidemia with target LDL less than 130       calcium carbonate 500 MG chewable tablet    TUMS     Take 2-4 tablets (1,000-2,000  mg) by mouth as needed for heartburn        cefuroxime 250 MG tablet    CEFTIN    20 tablet    Take 1 tablet (250 mg) by mouth 2 times daily    Acute cystitis without hematuria       clopidogrel 75 MG tablet    PLAVIX    90 tablet    Take 1 tablet (75 mg) by mouth daily    History of CVA (cerebrovascular accident)       docusate sodium 100 MG capsule    COLACE     Take 200 mg by mouth daily 2 capsules        famotidine 40 MG tablet    PEPCID    30 tablet    TAKE ONE TABLET BY MOUTH AT BEDTIME    Gastroesophageal reflux disease without esophagitis       FLORAJEN ACIDOPHILUS Caps      Take 1 capsule by mouth daily        folic acid 1 MG tablet    FOLVITE    90 tablet    Take 1 tablet (1,000 mcg) by mouth daily    Seropositive rheumatoid arthritis (H)       iron 325 (65 FE) MG tablet     30 tablet    TAKE ONE TABLET BY MOUTH ONCE DAILY WITH BREAKFAST    Gastrointestinal hemorrhage, unspecified gastrointestinal hemorrhage type       isosorbide mononitrate 30 MG 24 hr tablet    IMDUR    360 tablet    Take 2 tablets (60 mg) by mouth 2 times daily    Chest pain       LANsoprazole 30 MG CR capsule    PREVACID    30 capsule    Take 1 capsule (30 mg) by mouth daily Take 30-60 minutes before a meal.    Gastroesophageal reflux disease with esophagitis       * levETIRAcetam 500 MG tablet    KEPPRA    180 tablet    TAKE ONE TABLET BY MOUTH ONCE DAILY AND one AT BEDTIME    Seizure disorder (H)       * levETIRAcetam 500 MG tablet    KEPPRA    90 tablet    TAKE ONE TABLET BY MOUTH ONCE DAILY AND TWO AT BEDTIME    Seizure disorder (H)       levothyroxine 50 MCG tablet    SYNTHROID/LEVOTHROID    30 tablet    TAKE ONE TABLET BY MOUTH ONCE DAILY    Hypothyroidism due to acquired atrophy of thyroid       methotrexate 2.5 MG tablet CHEMO     52 tablet    Take 4 tablets (10 mg) by mouth once a week Wed    Seropositive rheumatoid arthritis (H)       metoprolol succinate 25 MG 24 hr tablet    TOPROL-XL    270 tablet    Take 2 tablets (50 mg)  po qam and 1 tablet (25 mg) po qpm    Chest pain, unspecified type       mirtazapine 30 MG tablet    REMERON    90 tablet    TAKE ONE TABLET BY MOUTH AT BEDTIME    Sleep initiation disorder       Multi-vitamin Tabs tablet      Take 1 tablet by mouth daily        MYLANTA PO      2 tblsp every 4 hours as needed        nitroGLYcerin 0.4 MG sublingual tablet    NITROSTAT    25 tablet    DISSOLVE ONE TABLET UNDER THE TONGUE EVERY 5 MINUTES AS NEEDED FOR CHEST PAIN.  DO NOT EXCEED A TOTAL OF 3 DOSES IN 15 MINUTES    Coronary artery disease involving native coronary artery of native heart without angina pectoris       predniSONE 5 MG tablet    DELTASONE    23 tablet    TAKE ONE TABLET BY MOUTH EVERY OTHER DAY ALTERNATING  WITH  1/2  TABLET  EVERY  OTHER  DAY    Seropositive rheumatoid arthritis (H)       * SYNVISC 16 MG/2ML Sosy   Generic drug:  Hylan     2 mL    16 mg by INTRA-ARTICULAR route once    Primary osteoarthritis of right knee       * Hylan 16 MG/2ML Sosy    SYNVISC    2 mL    16 mg by INTRA-ARTICULAR route once for 1 dose    Primary osteoarthritis of right knee       TRIMETHOPRIM PO           TYLENOL ARTHRITIS PAIN 650 MG CR tablet   Generic drug:  acetaminophen      Take 2 tablets (1,300 mg) by mouth every 8 hours as needed for mild pain        * Notice:  This list has 4 medication(s) that are the same as other medications prescribed for you. Read the directions carefully, and ask your doctor or other care provider to review them with you.

## 2018-02-20 ENCOUNTER — OFFICE VISIT (OUTPATIENT)
Dept: FAMILY MEDICINE | Facility: OTHER | Age: 83
End: 2018-02-20
Payer: COMMERCIAL

## 2018-02-20 VITALS
WEIGHT: 142.5 LBS | HEART RATE: 100 BPM | RESPIRATION RATE: 16 BRPM | DIASTOLIC BLOOD PRESSURE: 58 MMHG | TEMPERATURE: 100.6 F | OXYGEN SATURATION: 90 % | SYSTOLIC BLOOD PRESSURE: 108 MMHG | BODY MASS INDEX: 23 KG/M2

## 2018-02-20 DIAGNOSIS — R05.9 COUGH: ICD-10-CM

## 2018-02-20 DIAGNOSIS — J06.9 VIRAL UPPER RESPIRATORY TRACT INFECTION: Primary | ICD-10-CM

## 2018-02-20 LAB
FLUAV+FLUBV AG SPEC QL: NEGATIVE
FLUAV+FLUBV AG SPEC QL: NEGATIVE
SPECIMEN SOURCE: NORMAL

## 2018-02-20 PROCEDURE — 87804 INFLUENZA ASSAY W/OPTIC: CPT | Performed by: INTERNAL MEDICINE

## 2018-02-20 PROCEDURE — 99213 OFFICE O/P EST LOW 20 MIN: CPT | Performed by: INTERNAL MEDICINE

## 2018-02-20 ASSESSMENT — PAIN SCALES - GENERAL: PAINLEVEL: NO PAIN (0)

## 2018-02-20 NOTE — PROGRESS NOTES
SUBJECTIVE:   Gillian Burk is a 89 year old female who presents to clinic today for the following health issues:    Acute Illness   Acute illness concerns: fever, cough  Onset: last night    Fever: YES    Chills/Sweats: YES    Headache (location?): no    Sinus Pressure:no    Conjunctivitis:  no    Ear Pain: no    Rhinorrhea: YES    Congestion: YES  Sore Throat: no- once in a while   Cough: YES    Wheeze: no    Decreased Appetite: YES    Nausea: YES    Vomiting: no    Diarrhea:  no    Dysuria/Freq.: YES-is on Macrobid    Fatigue/Achiness: YES    Sick/Strep Exposure: YES     Therapies Tried and outcome: none          Chief Complaint:  Gillian Burk is a 89 year old female, who presents today for evaluation of fever, cough and myalgias. Briefly, patient developed some dysuria consistent with previous UTI infections a few days ago. She started yesterday on Macrobid. Last night she woke up with fever, chills or myalgia and nonproductive cough this continued throughout the day today and is concerned about influenza. She has not had any chest pain or shortness of breath. Appetite has been good. She denies any abdominal pain nausea vomiting. She does not have any back pain. Patient does not report any other concerns or complaints at this time.    Allergies:     Allergies   Allergen Reactions     Caffeine Other (See Comments)     Triggers your Meniere's disease     Hydrocodone Other (See Comments)     hallucinations     Ibuprofen GI Disturbance     Penicillins Hives     Tramadol Other (See Comments)     Seizure, was taking remeron along with tramadol     Metal [Staples] Rash       Medical History:   Past Medical History:   Diagnosis Date     Aortic stenosis     s/p TAVR 8/17/16     Chronic migraine      Hyperlipidaemia      Hypertension      Hypothyroidism      Myocardial infarction      Need for SBE (subacute bacterial endocarditis) prophylaxis      Orthostatic hypotension     wears compression stockings      PUD (peptic ulcer disease)      Secondary Sjogren's syndrome (H)      Seizures (H)     after stroke (partial onset)     Seropositive rheumatoid arthritis (H)      Stroke (H) 05/2013    with visual field cut, left occipital lobe     Syncope 2014    due to orthostatic hypotension       Current Medications:   Current Outpatient Prescriptions   Medication Sig Dispense Refill     Nitrofurantoin Monohyd Macro (MACROBID PO)        atorvastatin (LIPITOR) 40 MG tablet TAKE ONE TABLET BY MOUTH ONCE DAILY 90 tablet 0     isosorbide mononitrate (IMDUR) 30 MG 24 hr tablet Take 2 tablets (60 mg) by mouth 2 times daily 360 tablet 3     levothyroxine (SYNTHROID/LEVOTHROID) 50 MCG tablet TAKE ONE TABLET BY MOUTH ONCE DAILY 30 tablet 3     metoprolol (TOPROL-XL) 25 MG 24 hr tablet Take 2 tablets (50 mg) po qam and 1 tablet (25 mg) po qpm 270 tablet 3     amLODIPine (NORVASC) 2.5 MG tablet Take 1 tablet (2.5 mg) by mouth daily 90 tablet 3     TRIMETHOPRIM PO        famotidine (PEPCID) 40 MG tablet TAKE ONE TABLET BY MOUTH AT BEDTIME 30 tablet 9     folic acid (FOLVITE) 1 MG tablet Take 1 tablet (1,000 mcg) by mouth daily 90 tablet 1     predniSONE (DELTASONE) 5 MG tablet TAKE ONE TABLET BY MOUTH EVERY OTHER DAY ALTERNATING  WITH  1/2  TABLET  EVERY  OTHER  DAY 23 tablet 13     clopidogrel (PLAVIX) 75 MG tablet Take 1 tablet (75 mg) by mouth daily 90 tablet 3     mirtazapine (REMERON) 30 MG tablet TAKE ONE TABLET BY MOUTH AT BEDTIME 90 tablet 2     Ferrous Sulfate (IRON) 325 (65 FE) MG tablet TAKE ONE TABLET BY MOUTH ONCE DAILY WITH BREAKFAST 30 tablet 11     levETIRAcetam (KEPPRA) 500 MG tablet TAKE ONE TABLET BY MOUTH ONCE DAILY AND one AT BEDTIME 180 tablet 0     aspirin EC 81 MG EC tablet Take 1 tablet (81 mg) by mouth daily       methotrexate 2.5 MG tablet Take 4 tablets (10 mg) by mouth once a week Wed 52 tablet 3     acetaminophen (TYLENOL ARTHRITIS PAIN) 650 MG CR tablet Take 2 tablets (1,300 mg) by mouth every 8 hours as  needed for mild pain       calcium carbonate (TUMS) 500 MG chewable tablet Take 2-4 tablets (1,000-2,000 mg) by mouth as needed for heartburn       docusate sodium (COLACE) 100 MG capsule Take 200 mg by mouth daily 2 capsules       multivitamin, therapeutic with minerals (MULTI-VITAMIN) TABS Take 1 tablet by mouth daily       Lactobacillus (FLORAJEN ACIDOPHILUS) CAPS Take 1 capsule by mouth daily       Hylan (SYNVISC) 16 MG/2ML SOSY 16 mg by INTRA-ARTICULAR route once 2 mL 0     levETIRAcetam (KEPPRA) 500 MG tablet TAKE ONE TABLET BY MOUTH ONCE DAILY AND TWO AT BEDTIME 90 tablet 6     nitroGLYcerin (NITROSTAT) 0.4 MG sublingual tablet DISSOLVE ONE TABLET UNDER THE TONGUE EVERY 5 MINUTES AS NEEDED FOR CHEST PAIN.  DO NOT EXCEED A TOTAL OF 3 DOSES IN 15 MINUTES (Patient not taking: Reported on 2/20/2018) 25 tablet 0     Calcium & Magnesium Carbonates (MYLANTA PO) 2 tblsp every 4 hours as needed       LANsoprazole (PREVACID) 30 MG CR capsule Take 1 capsule (30 mg) by mouth daily Take 30-60 minutes before a meal. (Patient not taking: Reported on 2/20/2018) 30 capsule 3     ALPRAZolam (XANAX) 0.25 MG tablet Take 1 tablet (0.25 mg) by mouth 2 times daily as needed for anxiety (Patient not taking: Reported on 2/20/2018) 28 tablet 0       Surgical History:  Past Surgical History:   Procedure Laterality Date     C TOTAL HIP ARTHROPLASTY Right 2010     C TOTAL HIP ARTHROPLASTY Left 2014     CATARACT IOL, RT/LT Bilateral 2000     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     HEART CATH FEMORAL CANNULIZATION WITH OPEN STANDBY REPAIR AORTIC VALVE N/A 8/3/2016    Procedure: HEART CATH FEMORAL CANNULIZATION WITH OPEN STANDBY REPAIR AORTIC VALVE;  Surgeon: Owen Schultz MD;  Location: [facility] OR     HYSTERECTOMY TOTAL ABDOMINAL  1970    ovaries out     MOUTH SURGERY  2011    jaw surgery due to fracture     TRANSCATHETER AORTIC VALVE IMPLANT ANESTHESIA N/A 8/3/2016    Procedure: TRANSCATHETER AORTIC VALVE IMPLANT  ANESTHESIA;  Surgeon: GENERIC ANESTHESIA PROVIDER;  Location: [facility] OR       Social/Family History:  Social History   Substance Use Topics     Smoking status: Never Smoker     Smokeless tobacco: Never Used     Alcohol use No     Family History   Problem Relation Age of Onset     Hyperlipidemia Mother      Family History Negative No family hx of        ROS:   Constitutional: The patient denies significant weight gain, weight loss, or cold intolerance.  SKIN: Pt denies: itching, rashes, discoloration, or specific lesions of concern. Denies recent hair loss.   ENT: Pt denies: sore throat, significant nasal congestion, epistaxis, difficulty swallowing.    HEART: Pt denies: chest pain, arrhythmia, syncope, tachy or bradyarrhythmia or excess edema.   LUNGS: Pt denies: excess sputum, hemoptysis, or shortness of breath.   GI: Pt denies: nausea, vomiting, diarrhea.    Physical Exam:  /58  Pulse 100  Temp 100.6  F (38.1  C) (Temporal)  Resp 16  Wt 142 lb 8 oz (64.6 kg)  SpO2 90%  BMI 23 kg/m2     Constitutional: The patient appears to be in no acute distress. The patient appears to be adequately hydrated. No acute respiratory or hemodynamic distress is noted at this time.              ENT: Pharynx is clear of exudates, no significant cervical adenopathy is present.    LUNGS: right lung field is clear to auscultation. Left lower lobe with slight crackles otherwise clear. No coughing is noted during visit.   HEART:  regular without rubs, clicks, gallops, or murmurs. Upstrokes are brisk. S1,S2 are heard.   GI: Abdomen is soft, without rebound, guarding or tenderness.     Decision Makin. Viral upper respiratory tract infection  Tylenol, fluids, rest.  - Influenza A/B antigen  2. Cough  Most likely viral in etiology. I discussed with patient and daughter that if her cough worsens, she becomes short of breath, she develops chest pain, or develops any new or concerning symptoms to return for evaluation.   - Influenza  A/B antigen    Follow up:  I have asked the patient to schedule a follow up appointment with me in 3 months, or sooner if conditions fail to improve as expected.      I have carefully explained the diagnosis and treatment options with the patient. The patient has displayed an understanding of the above, and all subsequent questions were answered.          This patient has been interviewed, examined, diagnosed, and informed of the above by me personally.  Medical records and available pertinent information has been reviewed by me personally.  All decisions and discussion have been between myself and the patient/family.  This was done in the presence of Jose shah , who acted as a medical scribe and recorded the events above.  No diagnosis or decision making was made by the above-mentioned scribe.  The patient, and or his/her ensurors will not be billed for the presence or actions of this scribe.  The information recorded by the scribe has been reviewed by me and found to be accurate.            DO DELMA Pineda    Portions of this note were produced using [employer]  Although every attempt at real-time proof reading has been made, occasional grammar/syntax errors may have been missed.

## 2018-02-20 NOTE — MR AVS SNAPSHOT
After Visit Summary   2/20/2018    Gillian Burk    MRN: [id number]           Patient Information     Date Of Birth          3/19/1927        Visit Information        Provider Department      2/20/2018 10:20 AM Augusto Meyer DO [facility]        Today's Diagnoses     Viral upper respiratory tract infection    -  1    Cough           Follow-ups after your visit        Your next 10 appointments already scheduled     Feb 27, 2018  9:40 AM CST   [facility] Visit with Mateusz Copeland MD   [facility]    [address] [place] [id number] [phone]              Who to contact     If you have questions or need follow up information about today's clinic visit or your schedule please contact [facility] directly at [phone].  Normal or non-critical lab and imaging results will be communicated to you by MyChart, letter or phone within 4 business days after the clinic has received the results. If you do not hear from us within 7 days, please contact the clinic through MyChart or phone. If you have a critical or abnormal lab result, we will notify you by phone as soon as possible.  Submit refill requests through [employer] or call your pharmacy and they will forward the refill request to us. Please allow 3 business days for your refill to be completed.          Additional Information About Your Visit        MyChart Information     [employer] gives you secure access to your electronic health record. If you see a primary care provider, you can also send messages to your care team and make appointments. If you have questions, please call your primary care clinic.  If you do not have a primary care provider, please call [phone] and they will assist you.        Care EveryWhere ID     This is your Care EveryWhere ID. This could be used by other organizations to access your [facility] medical records  [id number]         Your Vitals Were     Pulse Temperature Respirations Pulse Oximetry BMI (Body Mass Index)       100 100.6  F (38.1  C) (Temporal) 16 90% 23 kg/m2        Blood Pressure from Last 3 Encounters:   02/26/18 132/82   02/20/18 108/58   12/14/17 136/74    Weight from Last 3 Encounters:   02/26/18 144 lb (65.3 kg)   02/20/18 142 lb 8 oz (64.6 kg)   02/19/18 146 lb (66.2 kg)              We Performed the Following     Influenza A/B antigen        Primary Care Provider Office Phone # Fax #    Augustopérez Meridalaina, [phone] [address] [place] [address]        Equal Access to Services     [place] : Regino de la pazo Soshayy, waaxda luqadaha, qaybta kaalmada adeegyada, waxsriram oglesby . So [facility] [phone].    ATENCIÓN: Si habla español, tiene a granger disposición servicios gratuitos de asistencia lingüística. [facility] [phone].    We comply with applicable federal civil rights laws and [place] laws. We do not discriminate on the basis of race, color, national origin, age, disability, sex, sexual orientation, or gender identity.            Thank you!     Thank you for choosing [facility]  for your care. Our goal is always to provide you with excellent care. Hearing back from our patients is one way we can continue to improve our services. Please take a few minutes to complete the written survey that you may receive in the mail after your visit with us. Thank you!             Your Updated Medication List - Protect others around you: Learn how to safely use, store and throw away your medicines at www.disposemymeds.org.          This list is accurate as of 2/20/18 11:59 PM.  Always use your most recent med list.                   Brand Name Dispense Instructions for use Diagnosis    ALPRAZolam 0.25 MG tablet    XANAX    28 tablet    Take 1 tablet (0.25 mg) by mouth 2 times daily as needed for anxiety    Anxiety       amLODIPine 2.5 MG  tablet    NORVASC    90 tablet    Take 1 tablet (2.5 mg) by mouth daily    Chest pain, unspecified type       aspirin 81 MG EC tablet      Take 1 tablet (81 mg) by mouth daily    S/P TAVR (transcatheter aortic valve replacement)       atorvastatin 40 MG tablet    LIPITOR    90 tablet    TAKE ONE TABLET BY MOUTH ONCE DAILY    Hyperlipidemia with target LDL less than 130       calcium carbonate 500 MG chewable tablet    TUMS     Take 2-4 tablets (1,000-2,000 mg) by mouth as needed for heartburn        clopidogrel 75 MG tablet    PLAVIX    90 tablet    Take 1 tablet (75 mg) by mouth daily    History of CVA (cerebrovascular accident)       docusate sodium 100 MG capsule    COLACE     Take 200 mg by mouth daily 2 capsules        famotidine 40 MG tablet    PEPCID    30 tablet    TAKE ONE TABLET BY MOUTH AT BEDTIME    Gastroesophageal reflux disease without esophagitis       FLORAJEN ACIDOPHILUS Caps      Take 1 capsule by mouth daily        folic acid 1 MG tablet    FOLVITE    90 tablet    Take 1 tablet (1,000 mcg) by mouth daily    Seropositive rheumatoid arthritis (H)       iron 325 (65 FE) MG tablet     30 tablet    TAKE ONE TABLET BY MOUTH ONCE DAILY WITH BREAKFAST    Gastrointestinal hemorrhage, unspecified gastrointestinal hemorrhage type       isosorbide mononitrate 30 MG 24 hr tablet    IMDUR    360 tablet    Take 2 tablets (60 mg) by mouth 2 times daily    Chest pain       LANsoprazole 30 MG CR capsule    PREVACID    30 capsule    Take 1 capsule (30 mg) by mouth daily Take 30-60 minutes before a meal.    Gastroesophageal reflux disease with esophagitis       levothyroxine 50 MCG tablet    SYNTHROID/LEVOTHROID    30 tablet    TAKE ONE TABLET BY MOUTH ONCE DAILY    Hypothyroidism due to acquired atrophy of thyroid       methotrexate 2.5 MG tablet CHEMO     52 tablet    Take 4 tablets (10 mg) by mouth once a week Wed    Seropositive rheumatoid arthritis (H)       metoprolol succinate 25 MG 24 hr tablet    TOPROL-XL     270 tablet    Take 2 tablets (50 mg) po qam and 1 tablet (25 mg) po qpm    Chest pain, unspecified type       mirtazapine 30 MG tablet    REMERON    90 tablet    TAKE ONE TABLET BY MOUTH AT BEDTIME    Sleep initiation disorder       Multi-vitamin Tabs tablet      Take 1 tablet by mouth daily        MYLANTA PO      2 tblsp every 4 hours as needed        nitroGLYcerin 0.4 MG sublingual tablet    NITROSTAT    25 tablet    DISSOLVE ONE TABLET UNDER THE TONGUE EVERY 5 MINUTES AS NEEDED FOR CHEST PAIN.  DO NOT EXCEED A TOTAL OF 3 DOSES IN 15 MINUTES    Coronary artery disease involving native coronary artery of native heart without angina pectoris       predniSONE 5 MG tablet    DELTASONE    23 tablet    TAKE ONE TABLET BY MOUTH EVERY OTHER DAY ALTERNATING  WITH  1/2  TABLET  EVERY  OTHER  DAY    Seropositive rheumatoid arthritis (H)       TRIMETHOPRIM PO           TYLENOL ARTHRITIS PAIN 650 MG CR tablet   Generic drug:  acetaminophen      Take 2 tablets (1,300 mg) by mouth every 8 hours as needed for mild pain

## 2018-02-20 NOTE — NURSING NOTE
"Chief Complaint   Patient presents with     Fever     started last night     Cough       Initial /58  Pulse 100  Temp 100.6  F (38.1  C) (Temporal)  Resp 16  Wt 142 lb 8 oz (64.6 kg)  SpO2 90%  BMI 23 kg/m2 Estimated body mass index is 23 kg/(m^2) as calculated from the following:    Height as of 2/19/18: 5' 6\" (1.676 m).    Weight as of this encounter: 142 lb 8 oz (64.6 kg).  Medication Reconciliation: complete  Rocío Burch CMA (AAMA)    "

## 2018-02-23 ENCOUNTER — MYC MEDICAL ADVICE (OUTPATIENT)
Dept: FAMILY MEDICINE | Facility: OTHER | Age: 83
End: 2018-02-23

## 2018-02-23 NOTE — TELEPHONE ENCOUNTER
Gillian Burk is a 89 year old female who calls with blood in her stool x1.    NURSING ASSESSMENT:  Description:  Blood in her stool today x1. Stool is loose but not diarrhea.  Onset/duration:  today  Precip. factors:  Denies abd pain. Had had cdiff before and feels it may have returned.  Associated symptoms:  Denies black tarry, or dark stools. Denies lightheadedness or dizziness today.  Improves/worsens symptoms:     Pain scale (0-10)   0/10  LMP/preg/breast feeding:     Last exam/Treatment:     Allergies:   Allergies   Allergen Reactions     Caffeine Other (See Comments)     Triggers your Meniere's disease     Hydrocodone Other (See Comments)     hallucinations     Ibuprofen GI Disturbance     Penicillins Hives     Tramadol Other (See Comments)     Seizure, was taking remeron along with tramadol     Metal [Staples] Rash       RECOMMENDED DISPOSITION:  See in 72 hours - appt made for Monday.   Will comply with recommendation: Yes  If further questions/concerns or if symptoms do not improve, worsen or new symptoms develop, call your PCP or [facility] as soon as possible.      Guideline used:  Telephone Triage Protocols for Nurses, Fifth Edition, Charisse Kulkarni RN

## 2018-02-26 ENCOUNTER — OFFICE VISIT (OUTPATIENT)
Dept: FAMILY MEDICINE | Facility: OTHER | Age: 83
End: 2018-02-26
Payer: COMMERCIAL

## 2018-02-26 VITALS
DIASTOLIC BLOOD PRESSURE: 82 MMHG | SYSTOLIC BLOOD PRESSURE: 132 MMHG | OXYGEN SATURATION: 94 % | BODY MASS INDEX: 23.24 KG/M2 | WEIGHT: 144 LBS | HEART RATE: 80 BPM | TEMPERATURE: 98 F

## 2018-02-26 DIAGNOSIS — K92.1 BLOOD IN STOOL: ICD-10-CM

## 2018-02-26 DIAGNOSIS — Z95.2 S/P TAVR (TRANSCATHETER AORTIC VALVE REPLACEMENT): ICD-10-CM

## 2018-02-26 DIAGNOSIS — N30.00 ACUTE CYSTITIS WITHOUT HEMATURIA: Primary | ICD-10-CM

## 2018-02-26 DIAGNOSIS — G40.909 SEIZURE DISORDER (H): ICD-10-CM

## 2018-02-26 PROCEDURE — 99214 OFFICE O/P EST MOD 30 MIN: CPT | Performed by: INTERNAL MEDICINE

## 2018-02-26 RX ORDER — LEVETIRACETAM 500 MG/1
500 TABLET ORAL 2 TIMES DAILY
Qty: 90 TABLET | Refills: 6 | COMMUNITY
Start: 2018-02-26 | End: 2020-07-08

## 2018-02-26 RX ORDER — NITROFURANTOIN 25; 75 MG/1; MG/1
100 CAPSULE ORAL 2 TIMES DAILY
Qty: 14 CAPSULE | Refills: 0 | Status: SHIPPED | OUTPATIENT
Start: 2018-02-26 | End: 2019-03-20

## 2018-02-26 NOTE — MR AVS SNAPSHOT
After Visit Summary   2/26/2018    Gillian Burk    MRN: [id number]           Patient Information     Date Of Birth          3/19/1927        Visit Information        Provider Department      2/26/2018 3:00 PM Augusto Meyer DO [facility]        Today's Diagnoses     Acute cystitis without hematuria    -  1    Blood in stool        Seizure disorder (H)        S/P TAVR (transcatheter aortic valve replacement)           Follow-ups after your visit        Follow-up notes from your care team     Return in about 1 month (around 3/26/2018).      Your next 10 appointments already scheduled     Mar 27, 2018  2:15 PM CDT   Return Visit with Matt Mccracken PA-C   [facility]    [address] [place] [address] [phone]              Who to contact     If you have questions or need follow up information about today's clinic visit or your schedule please contact [facility] directly at [phone].  Normal or non-critical lab and imaging results will be communicated to you by [employer], letter or phone within 4 business days after the clinic has received the results. If you do not hear from us within 7 days, please contact the clinic through [employer] or phone. If you have a critical or abnormal lab result, we will notify you by phone as soon as possible.  Submit refill requests through [employer] or call your pharmacy and they will forward the refill request to us. Please allow 3 business days for your refill to be completed.          Additional Information About Your Visit        [employer] Information     [employer] gives you secure access to your electronic health record. If you see a primary care provider, you can also send messages to your care team and make appointments. If you have questions, please call your primary care clinic.  If you do not have a primary care provider, please  call [phone] and they will assist you.        Care EveryWhere ID     This is your Care EveryWhere ID. This could be used by other organizations to access your [place] medical records  [id number]        Your Vitals Were     Pulse Temperature Pulse Oximetry BMI (Body Mass Index)          80 98  F (36.7  C) (Tympanic) 94% 23.24 kg/m2         Blood Pressure from Last 3 Encounters:   02/26/18 132/82   02/20/18 108/58   12/14/17 136/74    Weight from Last 3 Encounters:   02/27/18 144 lb (65.3 kg)   02/26/18 144 lb (65.3 kg)   02/20/18 142 lb 8 oz (64.6 kg)              We Performed the Following     HEART FAILURE ACTION PLAN          Today's Medication Changes          These changes are accurate as of 2/26/18 11:59 PM.  If you have any questions, ask your nurse or doctor.               Start taking these medicines.        Dose/Directions    nitroFURantoin (macrocrystal-monohydrate) 100 MG capsule   Commonly known as:  MACROBID   Used for:  Acute cystitis without hematuria   Started by:  Augusto Meyer DO        Dose:  100 mg   Take 1 capsule (100 mg) by mouth 2 times daily   Quantity:  14 capsule   Refills:  0         These medicines have changed or have updated prescriptions.        Dose/Directions    levETIRAcetam 500 MG tablet   Commonly known as:  KEPPRA   This may have changed:  See the new instructions.   Used for:  Seizure disorder (H)   Changed by:  Augusto Meyer DO        Dose:  500 mg   Take 1 tablet (500 mg) by mouth 2 times daily   Quantity:  90 tablet   Refills:  6            Where to get your medicines      These medications were sent to [facility] [address] [place], [place] - [address]  [address] [facility] [address]     Phone:  [phone]     nitroFURantoin (macrocrystal-monohydrate) 100 MG capsule                Primary Care Provider Office Phone # Fax #    Augusto Meyer [phone]       [address]   [facility] [address]         Equal Access to Services     [facility] : Hadii aad ku hadchrystalcl Loganali, waaxda luqadaha, qaybta kaalmaponcho coelho, zack ayers. So [facility] [phone].    ATENCIÓN: Si habla adi, tiene a granger disposición servicios gratuitos de asistencia lingüística. [facility] al [phone].    We comply with applicable federal civil rights laws and [place] laws. We do not discriminate on the basis of race, color, national origin, age, disability, sex, sexual orientation, or gender identity.            Thank you!     Thank you for choosing [facility]  for your care. Our goal is always to provide you with excellent care. Hearing back from our patients is one way we can continue to improve our services. Please take a few minutes to complete the written survey that you may receive in the mail after your visit with us. Thank you!             Your Updated Medication List - Protect others around you: Learn how to safely use, store and throw away your medicines at www.disposemymeds.org.          This list is accurate as of 2/26/18 11:59 PM.  Always use your most recent med list.                   Brand Name Dispense Instructions for use Diagnosis    ALPRAZolam 0.25 MG tablet    XANAX    28 tablet    Take 1 tablet (0.25 mg) by mouth 2 times daily as needed for anxiety    Anxiety       amLODIPine 2.5 MG tablet    NORVASC    90 tablet    Take 1 tablet (2.5 mg) by mouth daily    Chest pain, unspecified type       aspirin 81 MG EC tablet      Take 1 tablet (81 mg) by mouth daily    S/P TAVR (transcatheter aortic valve replacement)       atorvastatin 40 MG tablet    LIPITOR    90 tablet    TAKE ONE TABLET BY MOUTH ONCE DAILY    Hyperlipidemia with target LDL less than 130       calcium carbonate 500 MG chewable tablet    TUMS     Take 2-4 tablets (1,000-2,000 mg) by mouth as needed for heartburn        clopidogrel 75 MG tablet    PLAVIX    90 tablet    Take 1 tablet (75 mg) by mouth daily     History of CVA (cerebrovascular accident)       docusate sodium 100 MG capsule    COLACE     Take 200 mg by mouth daily 2 capsules        famotidine 40 MG tablet    PEPCID    30 tablet    TAKE ONE TABLET BY MOUTH AT BEDTIME    Gastroesophageal reflux disease without esophagitis       FLORAJEN ACIDOPHILUS Caps      Take 1 capsule by mouth daily        iron 325 (65 FE) MG tablet     30 tablet    TAKE ONE TABLET BY MOUTH ONCE DAILY WITH BREAKFAST    Gastrointestinal hemorrhage, unspecified gastrointestinal hemorrhage type       isosorbide mononitrate 30 MG 24 hr tablet    IMDUR    360 tablet    Take 2 tablets (60 mg) by mouth 2 times daily    Chest pain       LANsoprazole 30 MG CR capsule    PREVACID    30 capsule    Take 1 capsule (30 mg) by mouth daily Take 30-60 minutes before a meal.    Gastroesophageal reflux disease with esophagitis       levETIRAcetam 500 MG tablet    KEPPRA    90 tablet    Take 1 tablet (500 mg) by mouth 2 times daily    Seizure disorder (H)       levothyroxine 50 MCG tablet    SYNTHROID/LEVOTHROID    30 tablet    TAKE ONE TABLET BY MOUTH ONCE DAILY    Hypothyroidism due to acquired atrophy of thyroid       methotrexate 2.5 MG tablet CHEMO     52 tablet    Take 4 tablets (10 mg) by mouth once a week Wed    Seropositive rheumatoid arthritis (H)       metoprolol succinate 25 MG 24 hr tablet    TOPROL-XL    270 tablet    Take 2 tablets (50 mg) po qam and 1 tablet (25 mg) po qpm    Chest pain, unspecified type       Multi-vitamin Tabs tablet      Take 1 tablet by mouth daily        MYLANTA PO      2 tblsp every 4 hours as needed        nitroFURantoin (macrocrystal-monohydrate) 100 MG capsule    MACROBID    14 capsule    Take 1 capsule (100 mg) by mouth 2 times daily    Acute cystitis without hematuria       nitroGLYcerin 0.4 MG sublingual tablet    NITROSTAT    25 tablet    DISSOLVE ONE TABLET UNDER THE TONGUE EVERY 5 MINUTES AS NEEDED FOR CHEST PAIN.  DO NOT EXCEED A TOTAL OF 3 DOSES IN 15 MINUTES     Coronary artery disease involving native coronary artery of native heart without angina pectoris       predniSONE 5 MG tablet    DELTASONE    23 tablet    TAKE ONE TABLET BY MOUTH EVERY OTHER DAY ALTERNATING  WITH  1/2  TABLET  EVERY  OTHER  DAY    Seropositive rheumatoid arthritis (H)       TRIMETHOPRIM PO           TYLENOL ARTHRITIS PAIN 650 MG CR tablet   Generic drug:  acetaminophen      Take 2 tablets (1,300 mg) by mouth every 8 hours as needed for mild pain

## 2018-02-26 NOTE — LETTER
My Heart Failure Action Plan   Name: Gillian Burk    YOB: 1927   Date: 2/26/2018    My doctor: Augusto Meyer     [address] [address] [phone]  My Diagnosis:    My Ejection Fraction:     My Exercise Goal: 30 minutes daily  .     My Weight Goal:   Wt Readings from Last 2 Encounters:   02/26/18 144 lb (65.3 kg)   02/20/18 142 lb 8 oz (64.6 kg)     Weigh yourself daily using the same scale. If you gain more than 2 pounds in 24 hours or 5 pounds in a week     My Diet Goal:   Emergency Room Visits:    Our goal is to improve your quality of life and help you avoid a visit to the emergency room or hospital.  If we work together, we can achieve this goal. But, if you feel you need to call 911 or go to the emergency room, please do so.  If you go to the emergency room, please bring your list of medicines and your daily weight chart with you.       GREEN ZONE     Doing well today    Weight gained is no more than 2 pounds a day or 5 pounds a week.    No swelling in feet, ankles, legs or stomach.    No more swelling than usual.    No more trouble breathing than usual.    No change in my sleep.    No other problems. Actions:    I am doing fine.  I will take my medicine, follow my diet, see my doctor, exercise, and watch for symptoms.           YELLOW ZONE         Having a bad day or flare up    Weight gain of more than 2 pounds in one day or 5 pounds in one week.    New swelling in ankle, leg, knee or thigh.    Bloating in belly, pants feel tighter.    Swelling in hands or face.    Coughing or trouble breathing while walking or talking.    Harder to breathe last night.    Have trouble sleeping, wake up short of breath.    Much more tired than usual.    Not eating.    Pain in my chest or bad leg cramps.    Feel weak or dizzy. Actions:    I need to take action and call my doctor or nurse today.                 RED ZONE         Need medical  care now    Weight gain of 5 pounds overnight.    Chest pain or pressure that does not go away.    Feel less alert.    Wheezing or have trouble breathing when at rest.    Cannot sleep lying down.    Cannot take my water pill.    Pass out or faint. Actions:    I need to call my doctor or nurse now!    Call 911 if I have chest pain or cannot breathe.        Electronically signed by: Lela Canela, February 26, 2018

## 2018-02-26 NOTE — PROGRESS NOTES
Chief Complaint   Patient presents with     RECHECK     Chief Complaint:  Gillian Burk is a 89 year old female who presents today for evaluation of hematochezia. Briefly, the patient was seen here last week for evaluation of fever and cough after recent diagnosis of UTI. Her influenza came back negative and the etiology of her fever was thought to be due to her UTI with secondary URI. Since being seen, the patient completed her course of antibiotics and was feeling much better but still complained of some fatigue. On Friday 2/23 she noted having one episode of hematochezia described as passing a small clot of blood. She has not had any recurrent episodes since that time. She has not had any abdominal pain, nausea or vomiting. She denies any dysuria or hematuria. She did have a similar episode of hematochezia in January 2016 where she underwent thorough evaluation which did not find a source of her bleeding. She has not had any repeated episodes until Friday. She denies any diarrhea or melena recently. Ms. Burk main concern today is that she is healthy enough to take a car trip at the end of the week to [place] with her sister.     Allergies:  Allergies   Allergen Reactions     Caffeine Other (See Comments)     Triggers your Meniere's disease     Hydrocodone Other (See Comments)     hallucinations     Ibuprofen GI Disturbance     Penicillins Hives     Tramadol Other (See Comments)     Seizure, was taking remeron along with tramadol     Metal [Staples] Rash       Medical History:   Past Medical History:   Diagnosis Date     Aortic stenosis     s/p TAVR 8/17/16     Chronic migraine      Hyperlipidaemia      Hypertension      Hypothyroidism      Myocardial infarction      Need for SBE (subacute bacterial endocarditis) prophylaxis      Orthostatic hypotension     wears compression stockings     PUD (peptic ulcer disease)      Secondary Sjogren's syndrome (H)      Seizures (H)     after stroke (partial onset)      Seropositive rheumatoid arthritis (H)      Stroke (H) 05/2013    with visual field cut, left occipital lobe     Syncope 2014    due to orthostatic hypotension       Current Medications:   Current Outpatient Prescriptions   Medication Sig Dispense Refill     levETIRAcetam (KEPPRA) 500 MG tablet Take 1 tablet (500 mg) by mouth 2 times daily 90 tablet 6     nitroFURantoin, macrocrystal-monohydrate, (MACROBID) 100 MG capsule Take 1 capsule (100 mg) by mouth 2 times daily 14 capsule 0     atorvastatin (LIPITOR) 40 MG tablet TAKE ONE TABLET BY MOUTH ONCE DAILY 90 tablet 0     isosorbide mononitrate (IMDUR) 30 MG 24 hr tablet Take 2 tablets (60 mg) by mouth 2 times daily 360 tablet 3     levothyroxine (SYNTHROID/LEVOTHROID) 50 MCG tablet TAKE ONE TABLET BY MOUTH ONCE DAILY 30 tablet 3     metoprolol (TOPROL-XL) 25 MG 24 hr tablet Take 2 tablets (50 mg) po qam and 1 tablet (25 mg) po qpm 270 tablet 3     amLODIPine (NORVASC) 2.5 MG tablet Take 1 tablet (2.5 mg) by mouth daily 90 tablet 3     TRIMETHOPRIM PO        nitroGLYcerin (NITROSTAT) 0.4 MG sublingual tablet DISSOLVE ONE TABLET UNDER THE TONGUE EVERY 5 MINUTES AS NEEDED FOR CHEST PAIN.  DO NOT EXCEED A TOTAL OF 3 DOSES IN 15 MINUTES 25 tablet 0     famotidine (PEPCID) 40 MG tablet TAKE ONE TABLET BY MOUTH AT BEDTIME 30 tablet 9     folic acid (FOLVITE) 1 MG tablet Take 1 tablet (1,000 mcg) by mouth daily 90 tablet 1     Calcium & Magnesium Carbonates (MYLANTA PO) 2 tblsp every 4 hours as needed       predniSONE (DELTASONE) 5 MG tablet TAKE ONE TABLET BY MOUTH EVERY OTHER DAY ALTERNATING  WITH  1/2  TABLET  EVERY  OTHER  DAY 23 tablet 13     clopidogrel (PLAVIX) 75 MG tablet Take 1 tablet (75 mg) by mouth daily 90 tablet 3     LANsoprazole (PREVACID) 30 MG CR capsule Take 1 capsule (30 mg) by mouth daily Take 30-60 minutes before a meal. 30 capsule 3     mirtazapine (REMERON) 30 MG tablet TAKE ONE TABLET BY MOUTH AT BEDTIME 90 tablet 2     Ferrous Sulfate (IRON)  325 (65 FE) MG tablet TAKE ONE TABLET BY MOUTH ONCE DAILY WITH BREAKFAST 30 tablet 11     ALPRAZolam (XANAX) 0.25 MG tablet Take 1 tablet (0.25 mg) by mouth 2 times daily as needed for anxiety 28 tablet 0     aspirin EC 81 MG EC tablet Take 1 tablet (81 mg) by mouth daily       methotrexate 2.5 MG tablet Take 4 tablets (10 mg) by mouth once a week Wed 52 tablet 3     acetaminophen (TYLENOL ARTHRITIS PAIN) 650 MG CR tablet Take 2 tablets (1,300 mg) by mouth every 8 hours as needed for mild pain       calcium carbonate (TUMS) 500 MG chewable tablet Take 2-4 tablets (1,000-2,000 mg) by mouth as needed for heartburn       docusate sodium (COLACE) 100 MG capsule Take 200 mg by mouth daily 2 capsules       multivitamin, therapeutic with minerals (MULTI-VITAMIN) TABS Take 1 tablet by mouth daily       Lactobacillus (FLORAJEN ACIDOPHILUS) CAPS Take 1 capsule by mouth daily       [DISCONTINUED] levETIRAcetam (KEPPRA) 500 MG tablet TAKE ONE TABLET BY MOUTH ONCE DAILY AND TWO AT BEDTIME 90 tablet 6     [DISCONTINUED] levETIRAcetam (KEPPRA) 500 MG tablet TAKE ONE TABLET BY MOUTH ONCE DAILY AND one AT BEDTIME 180 tablet 0       Surgical History:  Past Surgical History:   Procedure Laterality Date     C TOTAL HIP ARTHROPLASTY Right 2010     C TOTAL HIP ARTHROPLASTY Left 2014     CATARACT IOL, RT/LT Bilateral 2000     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     HEART CATH FEMORAL CANNULIZATION WITH OPEN STANDBY REPAIR AORTIC VALVE N/A 8/3/2016    Procedure: HEART CATH FEMORAL CANNULIZATION WITH OPEN STANDBY REPAIR AORTIC VALVE;  Surgeon: Owen Schultz MD;  Location: [facility] OR     HYSTERECTOMY TOTAL ABDOMINAL  1970    ovaries out     MOUTH SURGERY  2011    jaw surgery due to fracture     TRANSCATHETER AORTIC VALVE IMPLANT ANESTHESIA N/A 8/3/2016    Procedure: TRANSCATHETER AORTIC VALVE IMPLANT ANESTHESIA;  Surgeon: GENERIC ANESTHESIA PROVIDER;  Location: [facility]       Social/Family History:  Social History   Substance  Use Topics     Smoking status: Never Smoker     Smokeless tobacco: Never Used     Alcohol use No     Family History   Problem Relation Age of Onset     Hyperlipidemia Mother      Family History Negative No family hx of        ROS:   Constitutional: The patient denies significant weight gain, weight loss.   ENT: Pt denies: sore throat, significant nasal congestion, epistaxis, difficulty swallowing.    HEART: Pt denies: chest pain, arrythmia, syncope, tachy or bradyarrhythmia or excess edema.   LUNGS: Pt denies: excess sputum, hemoptysis, or shortness of breath.   GI: Pt denies: nausea, vomiting, diarrhea, constipation or melena.   NEURO: Pt denies: paraesthesias, or paralysis.    Physical Exam:  /82 (BP Location: Left arm, Patient Position: Chair, Cuff Size: Adult Regular)  Pulse 80  Temp 98  F (36.7  C) (Tympanic)  Wt 144 lb (65.3 kg)  SpO2 94%  BMI 23.24 kg/m2     Constitutional: The patient appears to be in no acute distress. The patient appears to be adequately hydrated. No acute respiratory or hemodynamic distress is noted at this time.              ENT:  Pharynx is clear of exudates.   LUNGS: clear bilaterally, airflow is brisk, no intercostal retraction or stridor is noted. No coughing is noted during visit.   HEART:  regular without rubs, clicks, gallops, or murmurs. Upstrokes are brisk. S1,S2 are heard.   GI: Abdomen is soft, without rebound, guarding or tenderness.    Decision Makin. Blood in stool   Given that the patient had only one episode of a small amount of blood, we will continue to observe. I do not feel laboratory or imaging studies are warranted at this time. Patient will return if she has any repeated episodes. Patient voiced her understanding of this.     2. Acute cystitis without hematuria  Patient's symptoms have largely improved, however she is still feeling somewhat fatigued. Given that she will be leaving for [place] at the end of the week, I will provide her with a  prescription for another course of Macrobid for which she can take if her symptoms return while she is in [place].   - nitroFURantoin, macrocrystal-monohydrate, (MACROBID) 100 MG capsule; Take 1 capsule (100 mg) by mouth 2 times daily  Dispense: 14 capsule; Refill: 0    3. Seizure disorder (H)  Stable. No changes, refill sent.   - levETIRAcetam (KEPPRA) 500 MG tablet; Take 1 tablet (500 mg) by mouth 2 times daily  Dispense: 90 tablet; Refill: 6    4. S/P TAVR (transcatheter aortic valve replacement)  Stable. Heart failure action plan given to patient.   - HEART FAILURE ACTION PLAN      Follow up:  I have asked the patient to schedule a follow up appointment with me in 4-6 weeks, or sooner if conditions fail to improve as expected.      I have carefully explained the diagnosis and treatment options with the patient. The patient has displayed an understanding of the above, and all subsequent questions were answered.    This patient has been interviewed, examined, diagnosed, and informed of the above by me personally.  Medical records and available pertinent information has been reviewed by me personally.  All decisions and discussion have been between myself and the patient/family.  This was done in the presence of Mau Dowling  , who acted as a medical scribe and recorded the events above.  No diagnosis or decision making was made by the above-mentioned scribe.  The patient, and or his/her ensurors will not be billed for the presence or actions of this scribe.  The information recorded by the scribe has been reviewed by me and found to be accurate.            DO DELMA Pineda    Portions of this note were produced using [employer]  Although every attempt at real-time proof reading has been made, occasional grammar/syntax errors may have been missed.

## 2018-02-26 NOTE — NURSING NOTE
"Chief Complaint   Patient presents with     RECHECK       Initial /82 (BP Location: Left arm, Patient Position: Chair, Cuff Size: Adult Regular)  Pulse 80  Temp 98  F (36.7  C) (Tympanic)  Wt 144 lb (65.3 kg)  SpO2 94%  BMI 23.24 kg/m2 Estimated body mass index is 23.24 kg/(m^2) as calculated from the following:    Height as of 2/19/18: 5' 6\" (1.676 m).    Weight as of this encounter: 144 lb (65.3 kg).  Medication Reconciliation: complete  Lela HOLLEY    "

## 2018-02-27 ENCOUNTER — OFFICE VISIT (OUTPATIENT)
Dept: ORTHOPEDICS | Facility: CLINIC | Age: 83
End: 2018-02-27
Payer: COMMERCIAL

## 2018-02-27 VITALS — HEIGHT: 66 IN | WEIGHT: 144 LBS | BODY MASS INDEX: 23.14 KG/M2

## 2018-02-27 DIAGNOSIS — M17.11 PRIMARY OSTEOARTHRITIS OF RIGHT KNEE: Primary | ICD-10-CM

## 2018-02-27 DIAGNOSIS — G47.09 SLEEP INITIATION DISORDER: ICD-10-CM

## 2018-02-27 PROCEDURE — 20610 DRAIN/INJ JOINT/BURSA W/O US: CPT | Mod: RT | Performed by: PHYSICIAN ASSISTANT

## 2018-02-27 NOTE — LETTER
2/27/2018         RE: Gillian Burk  [address]  [place] [address]        Dear Colleague,    Thank you for referring your patient, Gillian Burk, to the [facility]. Please see a copy of my visit note below.    Risks and benefits were explained and questions answered.  Patient agreed to undergo the recommended procedure and will be receiving part 3 of 3 part series.   Using aseptic technique the skin was prepped with betadine swabs, then sprayed with ethyl chloride for topical anesthesia.  The right  Knee  lateral infrapatellar: joint space was then infiltrated with 5cc of 1% Lidocaine without epinephrine and 2ml of Synvisc.  There were no complications and the patient tolerated the procedure well.    Again, thank you for allowing me to participate in the care of your patient.        Sincerely,        Mateusz Copeland MD

## 2018-02-27 NOTE — MR AVS SNAPSHOT
"              After Visit Summary   2/27/2018    Gillian Burk    MRN: [id number]           Patient Information     Date Of Birth          3/19/1927        Visit Information        Provider Department      2/27/2018 9:40 AM Mateusz Copeland MD [facility]        Today's Diagnoses     Primary osteoarthritis of right knee    -  1       Follow-ups after your visit        Who to contact     If you have questions or need follow up information about today's clinic visit or your schedule please contact [facility] directly at [phone].  Normal or non-critical lab and imaging results will be communicated to you by Conformityhart, letter or phone within 4 business days after the clinic has received the results. If you do not hear from us within 7 days, please contact the clinic through Booxmediat or phone. If you have a critical or abnormal lab result, we will notify you by phone as soon as possible.  Submit refill requests through [employer] or call your pharmacy and they will forward the refill request to us. Please allow 3 business days for your refill to be completed.          Additional Information About Your Visit        MyChart Information     [employer] gives you secure access to your electronic health record. If you see a primary care provider, you can also send messages to your care team and make appointments. If you have questions, please call your primary care clinic.  If you do not have a primary care provider, please call [phone] and they will assist you.        Care EveryWhere ID     This is your Care EveryWhere ID. This could be used by other organizations to access your [facility] medical records  [id number]        Your Vitals Were     Height BMI (Body Mass Index)                1.676 m (5' 6\") 23.24 kg/m2           Blood Pressure from Last 3 Encounters:   02/26/18 132/82   02/20/18 108/58   12/14/17 136/74    Weight from Last 3 Encounters:   02/27/18 65.3 kg (144 lb) "   02/26/18 65.3 kg (144 lb)   02/20/18 64.6 kg (142 lb 8 oz)              Today, you had the following     No orders found for display       Primary Care Provider Office Phone # Fax #    Augusto Meyer [phone] [address] [place] [address]        Equal Access to Services     [facility] : Hadii aad ku hadasho Soomaali, waaxda luqadaha, qaybta kaalmada adeegyada, waxay idiin hayaan adeeg kharash la'aan . So [facility] [phone].    ATENCIÓN: Si habla español, tiene a granger disposición servicios gratuitos de asistencia lingüística. Llame al [phone].    We comply with applicable federal civil rights laws and [place] laws. We do not discriminate on the basis of race, color, national origin, age, disability, sex, sexual orientation, or gender identity.            Thank you!     Thank you for choosing [facility]  for your care. Our goal is always to provide you with excellent care. Hearing back from our patients is one way we can continue to improve our services. Please take a few minutes to complete the written survey that you may receive in the mail after your visit with us. Thank you!             Your Updated Medication List - Protect others around you: Learn how to safely use, store and throw away your medicines at www.disposemymeds.org.          This list is accurate as of 2/27/18 12:56 PM.  Always use your most recent med list.                   Brand Name Dispense Instructions for use Diagnosis    ALPRAZolam 0.25 MG tablet    XANAX    28 tablet    Take 1 tablet (0.25 mg) by mouth 2 times daily as needed for anxiety    Anxiety       amLODIPine 2.5 MG tablet    NORVASC    90 tablet    Take 1 tablet (2.5 mg) by mouth daily    Chest pain, unspecified type       aspirin 81 MG EC tablet      Take 1 tablet (81 mg) by mouth daily    S/P TAVR (transcatheter aortic valve replacement)       atorvastatin 40 MG tablet    LIPITOR    90 tablet    TAKE ONE TABLET BY  MOUTH ONCE DAILY    Hyperlipidemia with target LDL less than 130       calcium carbonate 500 MG chewable tablet    TUMS     Take 2-4 tablets (1,000-2,000 mg) by mouth as needed for heartburn        clopidogrel 75 MG tablet    PLAVIX    90 tablet    Take 1 tablet (75 mg) by mouth daily    History of CVA (cerebrovascular accident)       docusate sodium 100 MG capsule    COLACE     Take 200 mg by mouth daily 2 capsules        famotidine 40 MG tablet    PEPCID    30 tablet    TAKE ONE TABLET BY MOUTH AT BEDTIME    Gastroesophageal reflux disease without esophagitis       FLORAJEN ACIDOPHILUS Caps      Take 1 capsule by mouth daily        folic acid 1 MG tablet    FOLVITE    90 tablet    Take 1 tablet (1,000 mcg) by mouth daily    Seropositive rheumatoid arthritis (H)       iron 325 (65 FE) MG tablet     30 tablet    TAKE ONE TABLET BY MOUTH ONCE DAILY WITH BREAKFAST    Gastrointestinal hemorrhage, unspecified gastrointestinal hemorrhage type       isosorbide mononitrate 30 MG 24 hr tablet    IMDUR    360 tablet    Take 2 tablets (60 mg) by mouth 2 times daily    Chest pain       LANsoprazole 30 MG CR capsule    PREVACID    30 capsule    Take 1 capsule (30 mg) by mouth daily Take 30-60 minutes before a meal.    Gastroesophageal reflux disease with esophagitis       levETIRAcetam 500 MG tablet    KEPPRA    90 tablet    Take 1 tablet (500 mg) by mouth 2 times daily    Seizure disorder (H)       levothyroxine 50 MCG tablet    SYNTHROID/LEVOTHROID    30 tablet    TAKE ONE TABLET BY MOUTH ONCE DAILY    Hypothyroidism due to acquired atrophy of thyroid       methotrexate 2.5 MG tablet CHEMO     52 tablet    Take 4 tablets (10 mg) by mouth once a week Wed    Seropositive rheumatoid arthritis (H)       metoprolol succinate 25 MG 24 hr tablet    TOPROL-XL    270 tablet    Take 2 tablets (50 mg) po qam and 1 tablet (25 mg) po qpm    Chest pain, unspecified type       mirtazapine 30 MG tablet    REMERON    90 tablet    TAKE ONE  TABLET BY MOUTH AT BEDTIME    Sleep initiation disorder       Multi-vitamin Tabs tablet      Take 1 tablet by mouth daily        MYLANTA PO      2 tblsp every 4 hours as needed        nitroFURantoin (macrocrystal-monohydrate) 100 MG capsule    MACROBID    14 capsule    Take 1 capsule (100 mg) by mouth 2 times daily    Acute cystitis without hematuria       nitroGLYcerin 0.4 MG sublingual tablet    NITROSTAT    25 tablet    DISSOLVE ONE TABLET UNDER THE TONGUE EVERY 5 MINUTES AS NEEDED FOR CHEST PAIN.  DO NOT EXCEED A TOTAL OF 3 DOSES IN 15 MINUTES    Coronary artery disease involving native coronary artery of native heart without angina pectoris       predniSONE 5 MG tablet    DELTASONE    23 tablet    TAKE ONE TABLET BY MOUTH EVERY OTHER DAY ALTERNATING  WITH  1/2  TABLET  EVERY  OTHER  DAY    Seropositive rheumatoid arthritis (H)       TRIMETHOPRIM PO           TYLENOL ARTHRITIS PAIN 650 MG CR tablet   Generic drug:  acetaminophen      Take 2 tablets (1,300 mg) by mouth every 8 hours as needed for mild pain

## 2018-02-27 NOTE — PROGRESS NOTES
Risks and benefits were explained and questions answered.  Patient agreed to undergo the recommended procedure and will be receiving part 3 of 3 part series.   Using aseptic technique the skin was prepped with betadine swabs, then sprayed with ethyl chloride for topical anesthesia.  The right  Knee  lateral infrapatellar: joint space was then infiltrated with 5cc of 1% Lidocaine without epinephrine and 2ml of Synvisc.  There were no complications and the patient tolerated the procedure well.

## 2018-02-27 NOTE — NURSING NOTE
"Chief Complaint   Patient presents with     RECHECK     Right knee.  Synvisc #3       Initial Ht 1.676 m (5' 6\")  Wt 65.3 kg (144 lb)  BMI 23.24 kg/m2 Estimated body mass index is 23.24 kg/(m^2) as calculated from the following:    Height as of this encounter: 1.676 m (5' 6\").    Weight as of this encounter: 65.3 kg (144 lb).  Medication Reconciliation: complete   LEYDI Claros  "

## 2018-02-27 NOTE — TELEPHONE ENCOUNTER
"Requested Prescriptions   Pending Prescriptions Disp Refills     mirtazapine (REMERON) 30 MG tablet [Pharmacy Med Name: MIRTAZAPINE 30MG    TAB] 90 tablet 2     Sig: TAKE ONE TABLET BY MOUTH AT BEDTIME    Atypical Antidepressants Protocol Failed    2/27/2018  3:46 PM       Failed - No positive pregnancy test in past 12 mos       Passed - Recent or future visit with authorizing provider's specialty    Patient had office visit in the last year or has a visit in the next 30 days with authorizing provider.  See \"Patient Info\" tab in inbasket, or \"Choose Columns\" in Meds & Orders section of the refill encounter.            Passed - Patient is age 18 or older       Passed - No active pregnancy on record          Last Written Prescription Date:  6-9-17  Last Fill Quantity: 90,  # refills: 2   Last Office Visit with [facility], P or [facility] prescribing provider:  2/26/17   Future Office Visit:       "

## 2018-03-01 RX ORDER — MIRTAZAPINE 30 MG/1
TABLET, FILM COATED ORAL
Qty: 90 TABLET | Refills: 2 | Status: SHIPPED | OUTPATIENT
Start: 2018-03-01 | End: 2018-12-11

## 2018-03-20 DIAGNOSIS — M05.9 SEROPOSITIVE RHEUMATOID ARTHRITIS (H): ICD-10-CM

## 2018-03-20 NOTE — TELEPHONE ENCOUNTER
"Requested Prescriptions   Pending Prescriptions Disp Refills     folic acid (FOLVITE) 1 MG tablet [Pharmacy Med Name: FOLIC ACID 1MG      TAB] 90 tablet 1     Sig: TAKE ONE TABLET BY MOUTH ONCE DAILY    Vitamin Supplements (Adult) Protocol Passed    3/20/2018  9:23 AM       Passed - High dose Vitamin D not ordered       Passed - Recent (12 mo) or future (30 days) visit within the authorizing provider's specialty    Patient had office visit in the last 12 months or has a visit in the next 30 days with authorizing provider or within the authorizing provider's specialty.  See \"Patient Info\" tab in inbasket, or \"Choose Columns\" in Meds & Orders section of the refill encounter.            Last Written Prescription Date:  9/21/17  Last Fill Quantity: 90,  # refills: 1   Last office visit: 2/26/18     Future Office Visit:   Next 5 appointments (look out 90 days)     Mar 27, 2018  2:15 PM CDT   Return Visit with Matt Mccracken PA-C   [facility] ([address] [address] [phone]                   "

## 2018-03-21 RX ORDER — FOLIC ACID 1 MG/1
TABLET ORAL
Qty: 90 TABLET | Refills: 1 | Status: SHIPPED | OUTPATIENT
Start: 2018-03-21 | End: 2018-09-25

## 2018-03-22 ENCOUNTER — TRANSFERRED RECORDS (OUTPATIENT)
Dept: HEALTH INFORMATION MANAGEMENT | Facility: CLINIC | Age: 83
End: 2018-03-22

## 2018-03-22 DIAGNOSIS — N30.00 ACUTE CYSTITIS WITHOUT HEMATURIA: Primary | ICD-10-CM

## 2018-03-23 ENCOUNTER — HOSPITAL ENCOUNTER (OUTPATIENT)
Dept: ULTRASOUND IMAGING | Facility: CLINIC | Age: 83
Discharge: HOME | End: 2018-03-23
Attending: UROLOGY | Admitting: UROLOGY
Payer: COMMERCIAL

## 2018-03-23 DIAGNOSIS — N30.00 ACUTE CYSTITIS WITHOUT HEMATURIA: ICD-10-CM

## 2018-03-23 PROCEDURE — 76770 US EXAM ABDO BACK WALL COMP: CPT

## 2018-03-27 ENCOUNTER — OFFICE VISIT (OUTPATIENT)
Dept: CARDIOLOGY | Facility: CLINIC | Age: 83
End: 2018-03-27
Payer: COMMERCIAL

## 2018-03-27 VITALS
HEIGHT: 66 IN | DIASTOLIC BLOOD PRESSURE: 72 MMHG | OXYGEN SATURATION: 93 % | WEIGHT: 143.6 LBS | HEART RATE: 82 BPM | SYSTOLIC BLOOD PRESSURE: 144 MMHG | BODY MASS INDEX: 23.08 KG/M2

## 2018-03-27 DIAGNOSIS — E78.5 HYPERLIPIDEMIA WITH TARGET LDL LESS THAN 130: ICD-10-CM

## 2018-03-27 DIAGNOSIS — I50.22 CHRONIC SYSTOLIC CONGESTIVE HEART FAILURE (H): ICD-10-CM

## 2018-03-27 DIAGNOSIS — I25.10 CORONARY ARTERY DISEASE INVOLVING NATIVE CORONARY ARTERY OF NATIVE HEART WITHOUT ANGINA PECTORIS: ICD-10-CM

## 2018-03-27 DIAGNOSIS — I25.5 ISCHEMIC CARDIOMYOPATHY: Primary | ICD-10-CM

## 2018-03-27 DIAGNOSIS — Z95.2 S/P TAVR (TRANSCATHETER AORTIC VALVE REPLACEMENT): ICD-10-CM

## 2018-03-27 DIAGNOSIS — I10 BENIGN ESSENTIAL HYPERTENSION: ICD-10-CM

## 2018-03-27 PROCEDURE — 99214 OFFICE O/P EST MOD 30 MIN: CPT | Performed by: PHYSICIAN ASSISTANT

## 2018-03-27 RX ORDER — LISINOPRIL 2.5 MG/1
2.5 TABLET ORAL DAILY
Qty: 30 TABLET | Refills: 1 | Status: SHIPPED | OUTPATIENT
Start: 2018-03-27 | End: 2018-05-15

## 2018-03-27 NOTE — MR AVS SNAPSHOT
After Visit Summary   3/27/2018    Gillian Burk    MRN: [id number]           Patient Information     Date Of Birth          3/19/1927        Visit Information        Provider Department      3/27/2018 2:15 PM Matt Mccracken PA-C [facility]        Today's Diagnoses     Coronary artery disease involving native coronary artery of native heart without angina pectoris          Care Instructions    Today's Plan:   1) Continue with current dose of metoprolol. Your heart rate/pulse is much better with the increase in metoprolol.   2) Follow up with Dr. Lee in June.       If you have questions or concerns please call clinic at [phone].    Please call [phone] for scheduling.       It was a pleasure seeing you today!     Reminder: Please bring in all current medications, over the counter supplements and vitamin bottles to your next appointment.    Matt Mccracken  3/27/2018            Follow-ups after your visit        Who to contact     If you have questions or need follow up information about today's clinic visit or your schedule please contact [facility] directly at [phone].  Normal or non-critical lab and imaging results will be communicated to you by MyChart, letter or phone within 4 business days after the clinic has received the results. If you do not hear from us within 7 days, please contact the clinic through [employer] or phone. If you have a critical or abnormal lab result, we will notify you by phone as soon as possible.  Submit refill requests through [email or web address] or call your pharmacy and they will forward the refill request to us. Please allow 3 business days for your refill to be completed.          Additional Information About Your Visit        MyChart Information     [email or web address] gives you secure access to your electronic health record. If you see a primary care provider, you can also  "send messages to your care team and make appointments. If you have questions, please call your primary care clinic.  If you do not have a primary care provider, please call [phone] and they will assist you.        Care EveryWhere ID     This is your Care EveryWhere ID. This could be used by other organizations to access your [facility] medical records  [id number]        Your Vitals Were     Pulse Height Pulse Oximetry BMI (Body Mass Index)          82 1.676 m (5' 6\") 93% 23.18 kg/m2         Blood Pressure from Last 3 Encounters:   03/27/18 144/72   02/26/18 132/82   02/20/18 108/58    Weight from Last 3 Encounters:   03/27/18 65.1 kg (143 lb 9.6 oz)   02/27/18 65.3 kg (144 lb)   02/26/18 65.3 kg (144 lb)              We Performed the Following     Follow-Up with Cardiac Advanced Practice Provider        Primary Care Provider Office Phone # Fax #    Augusto Fish Meyer,  [phone] [address] [place] [address]        Equal Access to Services     [facility] : Hadii veronica ku hadasho Soomaali, waaxda luqadaha, qaybta kaalmada adeshamiryaponcho, zack ayers. So [facility] [phone].    ATENCIÓN: Si habla español, tiene a granger disposición servicios gratuitos de asistencia lingüística. [facility] [phone].    We comply with applicable federal civil rights laws and [place] laws. We do not discriminate on the basis of race, color, national origin, age, disability, sex, sexual orientation, or gender identity.            Thank you!     Thank you for choosing [facility]  for your care. Our goal is always to provide you with excellent care. Hearing back from our patients is one way we can continue to improve our services. Please take a few minutes to complete the written survey that you may receive in the mail after your visit with us. Thank you!             Your Updated Medication List - Protect others around you: Learn how " to safely use, store and throw away your medicines at www.disposemymeds.org.          This list is accurate as of 3/27/18  2:22 PM.  Always use your most recent med list.                   Brand Name Dispense Instructions for use Diagnosis    ALPRAZolam 0.25 MG tablet    XANAX    28 tablet    Take 1 tablet (0.25 mg) by mouth 2 times daily as needed for anxiety    Anxiety       amLODIPine 2.5 MG tablet    NORVASC    90 tablet    Take 1 tablet (2.5 mg) by mouth daily    Chest pain, unspecified type       aspirin 81 MG EC tablet      Take 1 tablet (81 mg) by mouth daily    S/P TAVR (transcatheter aortic valve replacement)       atorvastatin 40 MG tablet    LIPITOR    90 tablet    TAKE ONE TABLET BY MOUTH ONCE DAILY    Hyperlipidemia with target LDL less than 130       calcium carbonate 500 MG chewable tablet    TUMS     Take 2-4 tablets (1,000-2,000 mg) by mouth as needed for heartburn        clopidogrel 75 MG tablet    PLAVIX    90 tablet    Take 1 tablet (75 mg) by mouth daily    History of CVA (cerebrovascular accident)       docusate sodium 100 MG capsule    COLACE     Take 200 mg by mouth daily 2 capsules        famotidine 40 MG tablet    PEPCID    30 tablet    TAKE ONE TABLET BY MOUTH AT BEDTIME    Gastroesophageal reflux disease without esophagitis       FLORAJEN ACIDOPHILUS Caps      Take 1 capsule by mouth daily        folic acid 1 MG tablet    FOLVITE    90 tablet    TAKE ONE TABLET BY MOUTH ONCE DAILY    Seropositive rheumatoid arthritis (H)       iron 325 (65 FE) MG tablet     30 tablet    TAKE ONE TABLET BY MOUTH ONCE DAILY WITH BREAKFAST    Gastrointestinal hemorrhage, unspecified gastrointestinal hemorrhage type       isosorbide mononitrate 30 MG 24 hr tablet    IMDUR    360 tablet    Take 2 tablets (60 mg) by mouth 2 times daily    Chest pain       LANsoprazole 30 MG CR capsule    PREVACID    30 capsule    Take 1 capsule (30 mg) by mouth daily Take 30-60 minutes before a meal.    Gastroesophageal reflux  disease with esophagitis       levETIRAcetam 500 MG tablet    KEPPRA    90 tablet    Take 1 tablet (500 mg) by mouth 2 times daily    Seizure disorder (H)       levothyroxine 50 MCG tablet    SYNTHROID/LEVOTHROID    30 tablet    TAKE ONE TABLET BY MOUTH ONCE DAILY    Hypothyroidism due to acquired atrophy of thyroid       methotrexate 2.5 MG tablet CHEMO     52 tablet    Take 4 tablets (10 mg) by mouth once a week Wed    Seropositive rheumatoid arthritis (H)       metoprolol succinate 25 MG 24 hr tablet    TOPROL-XL    270 tablet    Take 2 tablets (50 mg) po qam and 1 tablet (25 mg) po qpm    Chest pain, unspecified type       mirtazapine 30 MG tablet    REMERON    90 tablet    TAKE ONE TABLET BY MOUTH AT BEDTIME    Sleep initiation disorder       Multi-vitamin Tabs tablet      Take 1 tablet by mouth daily        MYLANTA PO      2 tblsp every 4 hours as needed        nitroFURantoin (macrocrystal-monohydrate) 100 MG capsule    MACROBID    14 capsule    Take 1 capsule (100 mg) by mouth 2 times daily    Acute cystitis without hematuria       nitroGLYcerin 0.4 MG sublingual tablet    NITROSTAT    25 tablet    DISSOLVE ONE TABLET UNDER THE TONGUE EVERY 5 MINUTES AS NEEDED FOR CHEST PAIN.  DO NOT EXCEED A TOTAL OF 3 DOSES IN 15 MINUTES    Coronary artery disease involving native coronary artery of native heart without angina pectoris       predniSONE 5 MG tablet    DELTASONE    23 tablet    TAKE ONE TABLET BY MOUTH EVERY OTHER DAY ALTERNATING  WITH  1/2  TABLET  EVERY  OTHER  DAY    Seropositive rheumatoid arthritis (H)       SYNVISC 16 MG/2ML Sosy   Generic drug:  Hylan     2 mL    16 mg by INTRA-ARTICULAR route once    Primary osteoarthritis of right knee       TRIMETHOPRIM PO           TYLENOL ARTHRITIS PAIN 650 MG CR tablet   Generic drug:  acetaminophen      Take 2 tablets (1,300 mg) by mouth every 8 hours as needed for mild pain

## 2018-03-27 NOTE — PATIENT INSTRUCTIONS
Today's Plan:   1) Continue with current dose of metoprolol. Your heart rate/pulse is much better with the increase in metoprolol.   2) Follow up with Dr. Lee in June.   3) We will add lisinopril to help your heart pump better. Come back in 1-2 weeks for non-fasting lab prior.       If you have questions or concerns please call clinic at [phone].    Please call [phone] for scheduling.       It was a pleasure seeing you today!     Reminder: Please bring in all current medications, over the counter supplements and vitamin bottles to your next appointment.    Matt Mccracken  3/27/2018

## 2018-03-27 NOTE — PROGRESS NOTES
Primary Cardiologist: Dr. Lee/Estelle Martinez NP    History of Present Illness:   This is a very pleasant 89-year-old female who presents to cardiology clinic for three-month follow-up.  Her past medical history is notable for coronary artery disease (coronary angiogram revealed 60-70% hemodynamically significant mid to distal LAD lesion, due to contrast dye load she was recommended to return for staged intervention of this lesion.  She however had resolution of her symptoms with the addition of amlodipine and therefore staged intervention was not performed), history of severe aortic valve stenosis (status post TAVR in 8/2016 ([place] scientific Treva Valve), most recent echocardiogram shows normal gradients and no evidence of significant AI), ischemic cardiomyopathy (EF 35-40%, switch to long-acting beta-blocker during TAVR follow-up visit, not on ACE inhibitor), history of CVA in 2013 (on chronic Plavix therapy), hypertension, hyperlipidemia, GERD, known left bundle branch block, and hypothyroidism.    During her last visit with Dr. Lee her daytime metoprolol dose was increased to 50 mg.  She returns to clinic today for reevaluation of her symptoms.  She tells me that she is doing well.  She denies any chest discomfort, shortness of breath, palpitations, significant weight gain, peripheral edema.  She states that they check her blood pressure regularly at her residence and her systolic blood pressures consistently in the 110s with diastolic in the 70s.  Today's blood pressure was unusually high for her.    Assessment and plan:  This is a very pleasant 89-year-old female who presents to cardiology clinic for three-month follow-up.  Her past medical history is notable for coronary artery disease (coronary angiogram revealed 60-70% hemodynamically significant mid to distal LAD lesion, due to contrast dye load she was recommended to return for staged intervention of this lesion.  She however had resolution of her  symptoms with the addition of amlodipine and therefore staged intervention was not performed), history of severe aortic valve stenosis (status post TAVR in 8/2016 ([place] scientific Treva Valve), most recent echocardiogram shows normal gradients and no evidence of significant AI), ischemic cardiomyopathy (EF 35-40%, switch to long-acting beta-blocker during TAVR follow-up visit, not on ACE inhibitor), history of CVA in 2013 (on chronic Plavix therapy), hypertension, hyperlipidemia, GERD, known left bundle branch block, and hypothyroidism.    Her pulse is much better on the higher dose of metoprolol.  We will continue with this.  Reviewing her medication list I noticed that she is not on ACE or ARB.  Given her reduced ejection fraction she would benefit from this.  I believe she has room in her blood pressure to tolerate this.  I will send a prescription for lisinopril 2.5 mg daily.  I did discuss this in great detail with her and her daughter.  She will come back in 1-2 weeks for repeat basic metabolic panel.    Thank you for allowing me to participate in the care of this pleasant patient today.    This note was completed in part using Dragon voice recognition software. Although reviewed after completion, some word and grammatical errors may occur.    Orders this Visit:  No orders of the defined types were placed in this encounter.    No orders of the defined types were placed in this encounter.    There are no discontinued medications.      Encounter Diagnosis   Name Primary?     Coronary artery disease involving native coronary artery of native heart without angina pectoris        CURRENT MEDICATIONS:  Current Outpatient Prescriptions   Medication Sig Dispense Refill     folic acid (FOLVITE) 1 MG tablet TAKE ONE TABLET BY MOUTH ONCE DAILY 90 tablet 1     mirtazapine (REMERON) 30 MG tablet TAKE ONE TABLET BY MOUTH AT BEDTIME 90 tablet 2     Hylan (SYNVISC) 16 MG/2ML SOSY 16 mg by INTRA-ARTICULAR route once 2 mL 0      levETIRAcetam (KEPPRA) 500 MG tablet Take 1 tablet (500 mg) by mouth 2 times daily 90 tablet 6     nitroFURantoin, macrocrystal-monohydrate, (MACROBID) 100 MG capsule Take 1 capsule (100 mg) by mouth 2 times daily 14 capsule 0     atorvastatin (LIPITOR) 40 MG tablet TAKE ONE TABLET BY MOUTH ONCE DAILY 90 tablet 0     isosorbide mononitrate (IMDUR) 30 MG 24 hr tablet Take 2 tablets (60 mg) by mouth 2 times daily 360 tablet 3     levothyroxine (SYNTHROID/LEVOTHROID) 50 MCG tablet TAKE ONE TABLET BY MOUTH ONCE DAILY 30 tablet 3     metoprolol (TOPROL-XL) 25 MG 24 hr tablet Take 2 tablets (50 mg) po qam and 1 tablet (25 mg) po qpm 270 tablet 3     amLODIPine (NORVASC) 2.5 MG tablet Take 1 tablet (2.5 mg) by mouth daily 90 tablet 3     famotidine (PEPCID) 40 MG tablet TAKE ONE TABLET BY MOUTH AT BEDTIME 30 tablet 9     Calcium & Magnesium Carbonates (MYLANTA PO) 2 tblsp every 4 hours as needed       predniSONE (DELTASONE) 5 MG tablet TAKE ONE TABLET BY MOUTH EVERY OTHER DAY ALTERNATING  WITH  1/2  TABLET  EVERY  OTHER  DAY 23 tablet 13     clopidogrel (PLAVIX) 75 MG tablet Take 1 tablet (75 mg) by mouth daily 90 tablet 3     LANsoprazole (PREVACID) 30 MG CR capsule Take 1 capsule (30 mg) by mouth daily Take 30-60 minutes before a meal. 30 capsule 3     Ferrous Sulfate (IRON) 325 (65 FE) MG tablet TAKE ONE TABLET BY MOUTH ONCE DAILY WITH BREAKFAST 30 tablet 11     ALPRAZolam (XANAX) 0.25 MG tablet Take 1 tablet (0.25 mg) by mouth 2 times daily as needed for anxiety 28 tablet 0     aspirin EC 81 MG EC tablet Take 1 tablet (81 mg) by mouth daily       methotrexate 2.5 MG tablet Take 4 tablets (10 mg) by mouth once a week Wed 52 tablet 3     acetaminophen (TYLENOL ARTHRITIS PAIN) 650 MG CR tablet Take 2 tablets (1,300 mg) by mouth every 8 hours as needed for mild pain       calcium carbonate (TUMS) 500 MG chewable tablet Take 2-4 tablets (1,000-2,000 mg) by mouth as needed for heartburn       docusate sodium (COLACE) 100  MG capsule Take 200 mg by mouth daily 2 capsules       multivitamin, therapeutic with minerals (MULTI-VITAMIN) TABS Take 1 tablet by mouth daily       Lactobacillus (FLORAJEN ACIDOPHILUS) CAPS Take 1 capsule by mouth daily       TRIMETHOPRIM PO        nitroGLYcerin (NITROSTAT) 0.4 MG sublingual tablet DISSOLVE ONE TABLET UNDER THE TONGUE EVERY 5 MINUTES AS NEEDED FOR CHEST PAIN.  DO NOT EXCEED A TOTAL OF 3 DOSES IN 15 MINUTES (Patient not taking: Reported on 3/27/2018) 25 tablet 0       ALLERGIES     Allergies   Allergen Reactions     Caffeine Other (See Comments)     Triggers your Meniere's disease     Hydrocodone Other (See Comments)     hallucinations     Ibuprofen GI Disturbance     Penicillins Hives     Tramadol Other (See Comments)     Seizure, was taking remeron along with tramadol     Metal [Staples] Rash       PAST MEDICAL HISTORY:  Past Medical History:   Diagnosis Date     Aortic stenosis     s/p TAVR 8/17/16     Chronic migraine      Hyperlipidaemia      Hypertension      Hypothyroidism      Myocardial infarction      Need for SBE (subacute bacterial endocarditis) prophylaxis      Orthostatic hypotension     wears compression stockings     PUD (peptic ulcer disease)      Secondary Sjogren's syndrome (H)      Seizures (H)     after stroke (partial onset)     Seropositive rheumatoid arthritis (H)      Stroke (H) 05/2013    with visual field cut, left occipital lobe     Syncope 2014    due to orthostatic hypotension       PAST SURGICAL HISTORY:  Past Surgical History:   Procedure Laterality Date     C TOTAL HIP ARTHROPLASTY Right 2010     C TOTAL HIP ARTHROPLASTY Left 2014     CATARACT IOL, RT/LT Bilateral 2000     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     HEART CATH FEMORAL CANNULIZATION WITH OPEN STANDBY REPAIR AORTIC VALVE N/A 8/3/2016    Procedure: HEART CATH FEMORAL CANNULIZATION WITH OPEN STANDBY REPAIR AORTIC VALVE;  Surgeon: Owen Schultz MD;  Location: [facility] OR     HYSTERECTOMY  "TOTAL ABDOMINAL  1970    ovaries out     MOUTH SURGERY  2011    jaw surgery due to fracture     TRANSCATHETER AORTIC VALVE IMPLANT ANESTHESIA N/A 8/3/2016    Procedure: TRANSCATHETER AORTIC VALVE IMPLANT ANESTHESIA;  Surgeon: GENERIC ANESTHESIA PROVIDER;  Location: [facility]       FAMILY HISTORY:  Family History   Problem Relation Age of Onset     Hyperlipidemia Mother      Family History Negative No family hx of        SOCIAL HISTORY:  Social History     Social History     Marital status:      Spouse name: N/A     Number of children: 4     Years of education: N/A     Occupational History      Retired     Social History Main Topics     Smoking status: Never Smoker     Smokeless tobacco: Never Used     Alcohol use No     Drug use: No     Sexual activity: No     Other Topics Concern     Special Diet Yes     low salt      Exercise Yes     semi recument bike 15 mins daily      Social History Narrative    .  Lives in assisted living. Independent. Has help with making bed and changing sheets.    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 .     4 children - 1 with RA           Review of Systems:  Skin:  Negative     Eyes:  Positive for glasses  ENT:  Positive for hearing loss  Respiratory:  Negative    Cardiovascular:  Negative for;palpitations;lightheadedness;dizziness;edema Positive for;chest pain  Gastroenterology: Negative    Genitourinary:  Positive for urinary tract infection  Musculoskeletal:  Positive for arthritis  Neurologic:  Positive for numbness or tingling of feet  Psychiatric:  Positive for anxiety  Heme/Lymph/Imm:  Negative    Endocrine:  Positive for thyroid disorder    Physical Exam:  Vitals: /72 (BP Location: Right arm, Patient Position: Fowlers, Cuff Size: Adult Regular)  Pulse 82  Ht 1.676 m (5' 6\")  Wt 65.1 kg (143 lb 9.6 oz)  SpO2 93%  BMI 23.18 kg/m2     GEN:  NAD  NECK: No JVD  C/V:  Regular rate and rhythm with 1/6 ROSARIO at RUSB.  RESP: Clear to auscultation " bilaterally without wheezing, rales, or rhonchi.  GI: Abdomen soft, nontender, nondistended.    EXTREM: No LE edema.   NEURO: Alert and oriented, cooperative. No obvious focal deficits.   PSYCH: Normal affect.  SKIN: Warm and dry.       Recent Lab Results:  LIPID RESULTS:  Lab Results   Component Value Date    CHOL 172 07/07/2015    HDL 75 07/07/2015    LDL 69 07/07/2015    TRIG 142 07/07/2015    CHOLHDLRATIO 2.3 07/07/2015       LIVER ENZYME RESULTS:  Lab Results   Component Value Date    AST 22 02/19/2018    ALT 24 02/19/2018       CBC RESULTS:  Lab Results   Component Value Date    WBC 6.7 02/19/2018    RBC 3.87 02/19/2018    HGB 11.9 02/19/2018    HCT 37.8 02/19/2018    MCV 98 02/19/2018    MCH 30.7 02/19/2018    MCHC 31.5 02/19/2018    RDW 15.2 (H) 02/19/2018     02/19/2018       BMP RESULTS:  Lab Results   Component Value Date     12/07/2017    POTASSIUM 3.9 12/07/2017    CHLORIDE 107 12/07/2017    CO2 27 12/07/2017    ANIONGAP 6 12/07/2017    GLC 95 12/07/2017    BUN 19 12/07/2017    CR 1.15 (H) 02/19/2018    GFRESTIMATED 44 (L) 02/19/2018    GFRESTBLACK 54 (L) 02/19/2018    CHEYANNE 8.7 12/07/2017        A1C RESULTS:  No results found for: A1C    INR RESULTS:  Lab Results   Component Value Date    INR 1.04 12/07/2017    INR 0.91 11/13/2017           Matt Mccracken PA-C   March 27, 2018

## 2018-03-27 NOTE — LETTER
3/27/2018    Augusto Fish Betsy, DO  [address] [place] [address]    RE: Gillian Burk       Dear Colleague,    I had the pleasure of seeing Gillian Burk in the [facility].    Primary Cardiologist: Dr. Lee/Estelle Martinez NP    History of Present Illness:   This is a very pleasant 89-year-old female who presents to cardiology clinic for three-month follow-up.  Her past medical history is notable for coronary artery disease (coronary angiogram revealed 60-70% hemodynamically significant mid to distal LAD lesion, due to contrast dye load she was recommended to return for staged intervention of this lesion.  She however had resolution of her symptoms with the addition of amlodipine and therefore staged intervention was not performed), history of severe aortic valve stenosis (status post TAVR in 8/2016 ([place] scientific Treva Valve), most recent echocardiogram shows normal gradients and no evidence of significant AI), ischemic cardiomyopathy (EF 35-40%, switch to long-acting beta-blocker during TAVR follow-up visit, not on ACE inhibitor), history of CVA in 2013 (on chronic Plavix therapy), hypertension, hyperlipidemia, GERD, known left bundle branch block, and hypothyroidism.    During her last visit with Dr. Lee her daytime metoprolol dose was increased to 50 mg.  She returns to clinic today for reevaluation of her symptoms.  She tells me that she is doing well.  She denies any chest discomfort, shortness of breath, palpitations, significant weight gain, peripheral edema.  She states that they check her blood pressure regularly at her residence and her systolic blood pressures consistently in the 110s with diastolic in the 70s.  Today's blood pressure was unusually high for her.    Assessment and plan:  This is a very pleasant 89-year-old female who presents to cardiology clinic for three-month follow-up.  Her past medical history is notable for coronary  artery disease (coronary angiogram revealed 60-70% hemodynamically significant mid to distal LAD lesion, due to contrast dye load she was recommended to return for staged intervention of this lesion.  She however had resolution of her symptoms with the addition of amlodipine and therefore staged intervention was not performed), history of severe aortic valve stenosis (status post TAVR in 8/2016 ([place] scientific Treva Valve), most recent echocardiogram shows normal gradients and no evidence of significant AI), ischemic cardiomyopathy (EF 35-40%, switch to long-acting beta-blocker during TAVR follow-up visit, not on ACE inhibitor), history of CVA in 2013 (on chronic Plavix therapy), hypertension, hyperlipidemia, GERD, known left bundle branch block, and hypothyroidism.    Her pulse is much better on the higher dose of metoprolol.  We will continue with this.  Reviewing her medication list I noticed that she is not on ACE or ARB.  Given her reduced ejection fraction she would benefit from this.  I believe she has room in her blood pressure to tolerate this.  I will send a prescription for lisinopril 2.5 mg daily.  I did discuss this in great detail with her and her daughter.  She will come back in 1-2 weeks for repeat basic metabolic panel.    Thank you for allowing me to participate in the care of this pleasant patient today.    This note was completed in part using Dragon voice recognition software. Although reviewed after completion, some word and grammatical errors may occur.    Orders this Visit:  No orders of the defined types were placed in this encounter.    No orders of the defined types were placed in this encounter.    There are no discontinued medications.      Encounter Diagnosis   Name Primary?     Coronary artery disease involving native coronary artery of native heart without angina pectoris        CURRENT MEDICATIONS:  Current Outpatient Prescriptions   Medication Sig Dispense Refill     folic acid  (FOLVITE) 1 MG tablet TAKE ONE TABLET BY MOUTH ONCE DAILY 90 tablet 1     mirtazapine (REMERON) 30 MG tablet TAKE ONE TABLET BY MOUTH AT BEDTIME 90 tablet 2     Hylan (SYNVISC) 16 MG/2ML SOSY 16 mg by INTRA-ARTICULAR route once 2 mL 0     levETIRAcetam (KEPPRA) 500 MG tablet Take 1 tablet (500 mg) by mouth 2 times daily 90 tablet 6     nitroFURantoin, macrocrystal-monohydrate, (MACROBID) 100 MG capsule Take 1 capsule (100 mg) by mouth 2 times daily 14 capsule 0     atorvastatin (LIPITOR) 40 MG tablet TAKE ONE TABLET BY MOUTH ONCE DAILY 90 tablet 0     isosorbide mononitrate (IMDUR) 30 MG 24 hr tablet Take 2 tablets (60 mg) by mouth 2 times daily 360 tablet 3     levothyroxine (SYNTHROID/LEVOTHROID) 50 MCG tablet TAKE ONE TABLET BY MOUTH ONCE DAILY 30 tablet 3     metoprolol (TOPROL-XL) 25 MG 24 hr tablet Take 2 tablets (50 mg) po qam and 1 tablet (25 mg) po qpm 270 tablet 3     amLODIPine (NORVASC) 2.5 MG tablet Take 1 tablet (2.5 mg) by mouth daily 90 tablet 3     famotidine (PEPCID) 40 MG tablet TAKE ONE TABLET BY MOUTH AT BEDTIME 30 tablet 9     Calcium & Magnesium Carbonates (MYLANTA PO) 2 tblsp every 4 hours as needed       predniSONE (DELTASONE) 5 MG tablet TAKE ONE TABLET BY MOUTH EVERY OTHER DAY ALTERNATING  WITH  1/2  TABLET  EVERY  OTHER  DAY 23 tablet 13     clopidogrel (PLAVIX) 75 MG tablet Take 1 tablet (75 mg) by mouth daily 90 tablet 3     LANsoprazole (PREVACID) 30 MG CR capsule Take 1 capsule (30 mg) by mouth daily Take 30-60 minutes before a meal. 30 capsule 3     Ferrous Sulfate (IRON) 325 (65 FE) MG tablet TAKE ONE TABLET BY MOUTH ONCE DAILY WITH BREAKFAST 30 tablet 11     ALPRAZolam (XANAX) 0.25 MG tablet Take 1 tablet (0.25 mg) by mouth 2 times daily as needed for anxiety 28 tablet 0     aspirin EC 81 MG EC tablet Take 1 tablet (81 mg) by mouth daily       methotrexate 2.5 MG tablet Take 4 tablets (10 mg) by mouth once a week Wed 52 tablet 3     acetaminophen (TYLENOL ARTHRITIS PAIN) 650 MG CR  tablet Take 2 tablets (1,300 mg) by mouth every 8 hours as needed for mild pain       calcium carbonate (TUMS) 500 MG chewable tablet Take 2-4 tablets (1,000-2,000 mg) by mouth as needed for heartburn       docusate sodium (COLACE) 100 MG capsule Take 200 mg by mouth daily 2 capsules       multivitamin, therapeutic with minerals (MULTI-VITAMIN) TABS Take 1 tablet by mouth daily       Lactobacillus (FLORAJEN ACIDOPHILUS) CAPS Take 1 capsule by mouth daily       TRIMETHOPRIM PO        nitroGLYcerin (NITROSTAT) 0.4 MG sublingual tablet DISSOLVE ONE TABLET UNDER THE TONGUE EVERY 5 MINUTES AS NEEDED FOR CHEST PAIN.  DO NOT EXCEED A TOTAL OF 3 DOSES IN 15 MINUTES (Patient not taking: Reported on 3/27/2018) 25 tablet 0       ALLERGIES     Allergies   Allergen Reactions     Caffeine Other (See Comments)     Triggers your Meniere's disease     Hydrocodone Other (See Comments)     hallucinations     Ibuprofen GI Disturbance     Penicillins Hives     Tramadol Other (See Comments)     Seizure, was taking remeron along with tramadol     Metal [Staples] Rash       PAST MEDICAL HISTORY:  Past Medical History:   Diagnosis Date     Aortic stenosis     s/p TAVR 8/17/16     Chronic migraine      Hyperlipidaemia      Hypertension      Hypothyroidism      Myocardial infarction      Need for SBE (subacute bacterial endocarditis) prophylaxis      Orthostatic hypotension     wears compression stockings     PUD (peptic ulcer disease)      Secondary Sjogren's syndrome (H)      Seizures (H)     after stroke (partial onset)     Seropositive rheumatoid arthritis (H)      Stroke (H) 05/2013    with visual field cut, left occipital lobe     Syncope 2014    due to orthostatic hypotension       PAST SURGICAL HISTORY:  Past Surgical History:   Procedure Laterality Date     C TOTAL HIP ARTHROPLASTY Right 2010     C TOTAL HIP ARTHROPLASTY Left 2014     CATARACT IOL, RT/LT Bilateral 2000     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     HEART  CATH FEMORAL CANNULIZATION WITH OPEN STANDBY REPAIR AORTIC VALVE N/A 8/3/2016    Procedure: HEART CATH FEMORAL CANNULIZATION WITH OPEN STANDBY REPAIR AORTIC VALVE;  Surgeon: Owen Schultz MD;  Location: [facility] OR     HYSTERECTOMY TOTAL ABDOMINAL  1970    ovaries out     MOUTH SURGERY  2011    jaw surgery due to fracture     TRANSCATHETER AORTIC VALVE IMPLANT ANESTHESIA N/A 8/3/2016    Procedure: TRANSCATHETER AORTIC VALVE IMPLANT ANESTHESIA;  Surgeon: GENERIC ANESTHESIA PROVIDER;  Location: [facility] OR       FAMILY HISTORY:  Family History   Problem Relation Age of Onset     Hyperlipidemia Mother      Family History Negative No family hx of        SOCIAL HISTORY:  Social History     Social History     Marital status:      Spouse name: N/A     Number of children: 4     Years of education: N/A     Occupational History      Retired     Social History Main Topics     Smoking status: Never Smoker     Smokeless tobacco: Never Used     Alcohol use No     Drug use: No     Sexual activity: No     Other Topics Concern     Special Diet Yes     low salt      Exercise Yes     semi recument bike 15 mins daily      Social History Narrative    .  Lives in assisted living. Independent. Has help with making bed and changing sheets.    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 .     4 children - 1 with RA           Review of Systems:  Skin:  Negative     Eyes:  Positive for glasses  ENT:  Positive for hearing loss  Respiratory:  Negative    Cardiovascular:  Negative for;palpitations;lightheadedness;dizziness;edema Positive for;chest pain  Gastroenterology: Negative    Genitourinary:  Positive for urinary tract infection  Musculoskeletal:  Positive for arthritis  Neurologic:  Positive for numbness or tingling of feet  Psychiatric:  Positive for anxiety  Heme/Lymph/Imm:  Negative    Endocrine:  Positive for thyroid disorder    Physical Exam:  Vitals: /72 (BP Location: Right arm, Patient  "Position: Fowlers, Cuff Size: Adult Regular)  Pulse 82  Ht 1.676 m (5' 6\")  Wt 65.1 kg (143 lb 9.6 oz)  SpO2 93%  BMI 23.18 kg/m2     GEN:  NAD  NECK: No JVD  C/V:  Regular rate and rhythm with 1/6 ROSARIO at RUSB.  RESP: Clear to auscultation bilaterally without wheezing, rales, or rhonchi.  GI: Abdomen soft, nontender, nondistended.    EXTREM: No LE edema.   NEURO: Alert and oriented, cooperative. No obvious focal deficits.   PSYCH: Normal affect.  SKIN: Warm and dry.       Recent Lab Results:  LIPID RESULTS:  Lab Results   Component Value Date    CHOL 172 07/07/2015    HDL 75 07/07/2015    LDL 69 07/07/2015    TRIG 142 07/07/2015    CHOLHDLRATIO 2.3 07/07/2015       LIVER ENZYME RESULTS:  Lab Results   Component Value Date    AST 22 02/19/2018    ALT 24 02/19/2018       CBC RESULTS:  Lab Results   Component Value Date    WBC 6.7 02/19/2018    RBC 3.87 02/19/2018    HGB 11.9 02/19/2018    HCT 37.8 02/19/2018    MCV 98 02/19/2018    MCH 30.7 02/19/2018    MCHC 31.5 02/19/2018    RDW 15.2 (H) 02/19/2018     02/19/2018       BMP RESULTS:  Lab Results   Component Value Date     12/07/2017    POTASSIUM 3.9 12/07/2017    CHLORIDE 107 12/07/2017    CO2 27 12/07/2017    ANIONGAP 6 12/07/2017    GLC 95 12/07/2017    BUN 19 12/07/2017    CR 1.15 (H) 02/19/2018    GFRESTIMATED 44 (L) 02/19/2018    GFRESTBLACK 54 (L) 02/19/2018    CHEYANNE 8.7 12/07/2017        A1C RESULTS:  No results found for: A1C    INR RESULTS:  Lab Results   Component Value Date    INR 1.04 12/07/2017    INR 0.91 11/13/2017           Matt Mccracken PA-C   March 27, 2018       Thank you for allowing me to participate in the care of your patient.      Sincerely,     Matt Mccracken PA-C     [facility]        "

## 2018-03-30 ENCOUNTER — TELEPHONE (OUTPATIENT)
Dept: FAMILY MEDICINE | Facility: OTHER | Age: 83
End: 2018-03-30

## 2018-03-30 ENCOUNTER — RADIANT APPOINTMENT (OUTPATIENT)
Dept: GENERAL RADIOLOGY | Facility: OTHER | Age: 83
End: 2018-03-30
Attending: INTERNAL MEDICINE
Payer: COMMERCIAL

## 2018-03-30 ENCOUNTER — NURSE TRIAGE (OUTPATIENT)
Dept: NURSING | Facility: CLINIC | Age: 83
End: 2018-03-30

## 2018-03-30 ENCOUNTER — OFFICE VISIT (OUTPATIENT)
Dept: FAMILY MEDICINE | Facility: OTHER | Age: 83
End: 2018-03-30
Payer: COMMERCIAL

## 2018-03-30 VITALS
BODY MASS INDEX: 22.44 KG/M2 | TEMPERATURE: 98.3 F | OXYGEN SATURATION: 94 % | HEART RATE: 94 BPM | SYSTOLIC BLOOD PRESSURE: 118 MMHG | WEIGHT: 139 LBS | DIASTOLIC BLOOD PRESSURE: 60 MMHG

## 2018-03-30 DIAGNOSIS — Z86.73 TRANSIENT ISCHEMIC ATTACK (TIA), AND CEREBRAL INFARCTION WITHOUT RESIDUAL DEFICITS(V12.54): ICD-10-CM

## 2018-03-30 DIAGNOSIS — I25.10 CORONARY ARTERY DISEASE INVOLVING NATIVE CORONARY ARTERY OF NATIVE HEART WITHOUT ANGINA PECTORIS: ICD-10-CM

## 2018-03-30 DIAGNOSIS — Z95.2 S/P TAVR (TRANSCATHETER AORTIC VALVE REPLACEMENT): ICD-10-CM

## 2018-03-30 DIAGNOSIS — R05.9 COUGH: Primary | ICD-10-CM

## 2018-03-30 DIAGNOSIS — R06.02 SOB (SHORTNESS OF BREATH): ICD-10-CM

## 2018-03-30 DIAGNOSIS — M05.9 SEROPOSITIVE RHEUMATOID ARTHRITIS (H): ICD-10-CM

## 2018-03-30 DIAGNOSIS — R05.9 COUGH: ICD-10-CM

## 2018-03-30 PROCEDURE — 99214 OFFICE O/P EST MOD 30 MIN: CPT | Performed by: INTERNAL MEDICINE

## 2018-03-30 PROCEDURE — 71046 X-RAY EXAM CHEST 2 VIEWS: CPT | Mod: FY

## 2018-03-30 RX ORDER — TORSEMIDE 10 MG/1
10 TABLET ORAL DAILY
Qty: 10 TABLET | Refills: 0 | Status: SHIPPED | OUTPATIENT
Start: 2018-03-30 | End: 2018-10-12

## 2018-03-30 NOTE — TELEPHONE ENCOUNTER
Reason for call:  Patient reporting a symptom    Symptom or request: Temperature per nurses Julio Césarevita Hagen, lungs sound congested, hasn't made it out of bed last few day    Duration (how long have symptoms been present): 2 days    Have you been treated for this before? No    Additional comments: Per daughter - Julio César Hagen nurses think she may have pneumonia    Phone Number patient can be reached at:  [phone]    Best Time:  any    Can we leave a detailed message on this number:  YES    Call taken on 3/30/2018 at 8:45 AM by Laura Yeager

## 2018-03-30 NOTE — PROGRESS NOTES
SUBJECTIVE:   Gillian Burk is a 89 year old female who presents to clinic today for the following health issues:    RESPIRATORY SYMPTOMS      Duration: 4 days    Description  nasal congestion, cough, wheezing and nausea    Severity: moderate    Accompanying signs and symptoms: None    History (predisposing factors):  none    Precipitating or alleviating factors: None    Therapies tried and outcome:  none                       Chief Complaint    The patient is a pleasant 89-year-old female who presents today with persistent cough over the last 4 days.  She has had some wheezing and some mild shortness of breath.  She denies any upper respiratory symptoms.  She denies any purulence in her sputum.  She does have a concurrent history of rheumatoid arthritis but notes that this has been fairly well controlled.  She continues to take low-dose prednisone for management of rheumatoid arthritis as well as methotrexate.  These both being relative immune suppressants.  She has had a previous history of TIA and stroke.  This is left with very little sequelae.  She is appropriately on Plavix.  She has history of cardiac ischemia.  She is on beta-blockers as well as nitrates.                       PAST, FAMILY,SOCIAL HISTORY:     Medical  History:   has a past medical history of Aortic stenosis; Chronic migraine; Hyperlipidaemia; Hypertension; Hypothyroidism; Myocardial infarction; Need for SBE (subacute bacterial endocarditis) prophylaxis; Orthostatic hypotension; PUD (peptic ulcer disease); Secondary Sjogren's syndrome (H); Seizures (H); Seropositive rheumatoid arthritis (H); Stroke (H) (05/2013); and Syncope (2014).     Surgical History:   has a past surgical history that includes Mouth surgery (2011); Eye surgery (1999); cataract iol, rt/lt (Bilateral, 2000); Hysterectomy total abdominal (1970); TOTAL HIP ARTHROPLASTY (Right, 2010); TOTAL HIP ARTHROPLASTY (Left, 2014); TRANSCATHETER AORTIC VALVE IMPLANT ANEST (N/A,  8/3/2016); and Heart Cath Femoral Cannulization With Open Standby Repair Aortic Valve (N/A, 8/3/2016).     Social History:   reports that she has never smoked. She has never used smokeless tobacco. She reports that she does not drink alcohol or use illicit drugs.     Family History:  family history includes Hyperlipidemia in her mother. There is no history of Family History Negative.            MEDICATIONS  Current Outpatient Prescriptions   Medication Sig Dispense Refill     acetaminophen (TYLENOL ARTHRITIS PAIN) 650 MG CR tablet Take 2 tablets (1,300 mg) by mouth every 8 hours as needed for mild pain       amLODIPine (NORVASC) 2.5 MG tablet Take 1 tablet (2.5 mg) by mouth daily 90 tablet 3     aspirin EC 81 MG EC tablet Take 1 tablet (81 mg) by mouth daily       atorvastatin (LIPITOR) 40 MG tablet TAKE ONE TABLET BY MOUTH ONCE DAILY 90 tablet 0     Calcium & Magnesium Carbonates (MYLANTA PO) 2 tblsp every 4 hours as needed       calcium carbonate (TUMS) 500 MG chewable tablet Take 2-4 tablets (1,000-2,000 mg) by mouth as needed for heartburn       clopidogrel (PLAVIX) 75 MG tablet Take 1 tablet (75 mg) by mouth daily 90 tablet 3     docusate sodium (COLACE) 100 MG capsule Take 200 mg by mouth daily 2 capsules       Ferrous Sulfate (IRON) 325 (65 FE) MG tablet TAKE ONE TABLET BY MOUTH ONCE DAILY WITH BREAKFAST 30 tablet 11     folic acid (FOLVITE) 1 MG tablet TAKE ONE TABLET BY MOUTH ONCE DAILY 90 tablet 1     Hylan (SYNVISC) 16 MG/2ML SOSY 16 mg by INTRA-ARTICULAR route once 2 mL 0     isosorbide mononitrate (IMDUR) 30 MG 24 hr tablet Take 2 tablets (60 mg) by mouth 2 times daily 360 tablet 3     Lactobacillus (FLORAJEN ACIDOPHILUS) CAPS Take 1 capsule by mouth daily       levETIRAcetam (KEPPRA) 500 MG tablet Take 1 tablet (500 mg) by mouth 2 times daily 90 tablet 6     levothyroxine (SYNTHROID/LEVOTHROID) 50 MCG tablet TAKE ONE TABLET BY MOUTH ONCE DAILY 30 tablet 3     lisinopril (PRINIVIL/ZESTRIL) 2.5 MG tablet  Take 1 tablet (2.5 mg) by mouth daily 30 tablet 1     methotrexate 2.5 MG tablet Take 4 tablets (10 mg) by mouth once a week Wed 52 tablet 3     metoprolol (TOPROL-XL) 25 MG 24 hr tablet Take 2 tablets (50 mg) po qam and 1 tablet (25 mg) po qpm 270 tablet 3     mirtazapine (REMERON) 30 MG tablet TAKE ONE TABLET BY MOUTH AT BEDTIME 90 tablet 2     multivitamin, therapeutic with minerals (MULTI-VITAMIN) TABS Take 1 tablet by mouth daily       nitroFURantoin, macrocrystal-monohydrate, (MACROBID) 100 MG capsule Take 1 capsule (100 mg) by mouth 2 times daily 14 capsule 0     nitroGLYcerin (NITROSTAT) 0.4 MG sublingual tablet DISSOLVE ONE TABLET UNDER THE TONGUE EVERY 5 MINUTES AS NEEDED FOR CHEST PAIN.  DO NOT EXCEED A TOTAL OF 3 DOSES IN 15 MINUTES 25 tablet 0     predniSONE (DELTASONE) 5 MG tablet TAKE ONE TABLET BY MOUTH EVERY OTHER DAY ALTERNATING  WITH  1/2  TABLET  EVERY  OTHER  DAY 23 tablet 13     torsemide (DEMADEX) 10 MG tablet Take 1 tablet (10 mg) by mouth daily 10 tablet 0     ALPRAZolam (XANAX) 0.25 MG tablet TAKE ONE TABLET BY MOUTH TWICE DAILY AS NEEDED FOR ANXIETY 28 tablet 0         --------------------------------------------------------------------------------------------------------------------                              Review of Systems     LUNGS: Pt denies: Hemoptysis.  She has had some shortness of breath, increased cough, but no sputum production is noted.   HEART: Pt denies: chest pain, arrythmia, syncope, tachy or bradyarrhythmia.  History of TAVR noted.  Also has a history of coronary artery disease which is stable.  She has had no recent chest pain   GI: Pt denies: nausea, vomitting, diarrhea, constipation, melena, or hematochezia.   NEURO: Pt denies: seizures, strokes, diplopia, weakness, paraesthesias, or paralysis.   SKIN: Pt denies: itching, rashes, discoloration, or specific lesions of concern. Denies recent hair loss.                                     Examination      /60 (BP  Location: Left arm, Patient Position: Chair, Cuff Size: Adult Regular)  Pulse 94  Temp 98.3  F (36.8  C) (Tympanic)  Wt 139 lb (63 kg)  SpO2 94%  BMI 22.44 kg/m2   Constitutional: The patient appears to be in mild to moderate acute distress. The patient appears to be adequately hydrated. No acute respiratory or hemodynamic distress is noted at this time.   LUNGS: Some dependent rales are noted with decreased breath sounds bilaterally, airflow is brisk, no intercostal retraction or stridor is noted.  Frequent coughing is noted during visit.   HEART:  regular without rubs, clicks, gallops, or murmurs. PMI is nondisplaced. Upstrokes are brisk. S1,S2 are heard.   GI: Abdomen is soft, without rebound, guarding or tenderness. Bowel sounds are appropriate. No renal bruits are heard.   NEURO: Pt is alert and appropriate. No neurologic lateralization is noted. Cranial nerves 2-12 are intact. Peripheral sensory and motor function are grossly normal.    SKIN:  warm and dry. No erythema, or rashes are noted. No specific lesions of concern are noted.                                           Decision Making    1. Cough  Suspect mild congestive heart failure  - XR Chest 2 Views; Future    2. SOB (shortness of breath)  Increase diuretic  - torsemide (DEMADEX) 10 MG tablet; Take 1 tablet (10 mg) by mouth daily  Dispense: 10 tablet; Refill: 0    3. Seropositive rheumatoid arthritis (H)  Follow with rheumatology and continue current medication    4. Transient ischemic attack (TIA), and cerebral infarction without residual deficits  Continue Plavix    5. S/P TAVR (transcatheter aortic valve replacement)  Currently doing well.    6. Coronary artery disease involving native coronary artery of native heart without angina pectoris  Continue medication including beta-blocker, diuretic and nitrates                        FOLLOW UP   I have asked the patient to make an appointment for followup with me in 2 weeks        I have carefully  explained the diagnosis and treatment options to the patient.  The patient has displayed an understanding of the above, and all subsequent questions were answered.      DO DELMA Pineda    Portions of this note were produced using [employer]  Although every attempt at real-time proof reading has been made, occasional grammar/syntax errors may have been missed.

## 2018-03-30 NOTE — TELEPHONE ENCOUNTER
Rn called daughter.  Information same as what what given to appointments.  No further info available. Appt made.  Madison Kulkarni RN

## 2018-03-30 NOTE — MR AVS SNAPSHOT
After Visit Summary   3/30/2018    Gillian Burk    MRN: [id number]           Patient Information     Date Of Birth          3/19/1927        Visit Information        Provider Department      3/30/2018 3:20 PM Augusto Meyer DO [facility]        Today's Diagnoses     Cough    -  1    SOB (shortness of breath)        Seropositive rheumatoid arthritis (H)        Transient ischemic attack (TIA), and cerebral infarction without residual deficits        S/P TAVR (transcatheter aortic valve replacement)        Coronary artery disease involving native coronary artery of native heart without angina pectoris           Follow-ups after your visit        Your next 10 appointments already scheduled     Jun 12, 2018 11:30 AM CDT   Return Visit with Jones Lee MD   [facility] ([facility])    [address] [place] [address] [phone]              Who to contact     If you have questions or need follow up information about today's clinic visit or your schedule please contact [facility] directly at [phone].  Normal or non-critical lab and imaging results will be communicated to you by [email or web address], letter or phone within 4 business days after the clinic has received the results. If you do not hear from us within 7 days, please contact the clinic through SocialBrowset or phone. If you have a critical or abnormal lab result, we will notify you by phone as soon as possible.  Submit refill requests through SCONTO DIGITALE or call your pharmacy and they will forward the refill request to us. Please allow 3 business days for your refill to be completed.          Additional Information About Your Visit        [email or web address] Information     SCONTO DIGITALE gives you secure access to your electronic health record. If you see a primary care provider, you can also send messages to your care team and make appointments. If you have  questions, please call your primary care clinic.  If you do not have a primary care provider, please call [phone] and they will assist you.        Care EveryWhere ID     This is your Care EveryWhere ID. This could be used by other organizations to access your [place] medical records  [id number]        Your Vitals Were     Pulse Temperature Pulse Oximetry BMI (Body Mass Index)          94 98.3  F (36.8  C) (Tympanic) 94% 22.44 kg/m2         Blood Pressure from Last 3 Encounters:   04/06/18 130/70   03/30/18 118/60   03/27/18 144/72    Weight from Last 3 Encounters:   04/06/18 137 lb (62.1 kg)   03/30/18 139 lb (63 kg)   03/27/18 143 lb 9.6 oz (65.1 kg)                 Today's Medication Changes          These changes are accurate as of 3/30/18 11:59 PM.  If you have any questions, ask your nurse or doctor.               Start taking these medicines.        Dose/Directions    torsemide 10 MG tablet   Commonly known as:  DEMADEX   Used for:  SOB (shortness of breath)   Started by:  Augusto Meyer DO        Dose:  10 mg   Take 1 tablet (10 mg) by mouth daily   Quantity:  10 tablet   Refills:  0         Stop taking these medicines if you haven't already. Please contact your care team if you have questions.     famotidine 40 MG tablet   Commonly known as:  PEPCID   Stopped by:  Augusto Meyer DO           LANsoprazole 30 MG CR capsule   Commonly known as:  PREVACID   Stopped by:  Augusto Meyer DO           TRIMETHOPRIM PO   Stopped by:  Augusto Meyer DO                Where to get your medicines      These medications were sent to [facility] [address] [place] [address] - [address]  [address]  [facility] [address]     Phone:  [phone]     torsemide 10 MG tablet                Primary Care Provider Office Phone # Fax #    Augusto Meyer [phone] [address] [place] [address]        Equal Access to Services      [facility]: Hadii aad ku sowmya Goodrich, waaxda luqadaha, qaybta kaalmada adelester, zack verna herminiajamar gonzaleserictai oglesby . So [facility] [phone].    ATENCIÓN: Si habla español, tiene a granger disposición servicios gratuitos de asistencia lingüística. Llame al [phone].    We comply with applicable federal civil rights laws and [place] laws. We do not discriminate on the basis of race, color, national origin, age, disability, sex, sexual orientation, or gender identity.            Thank you!     Thank you for choosing [facility]  for your care. Our goal is always to provide you with excellent care. Hearing back from our patients is one way we can continue to improve our services. Please take a few minutes to complete the written survey that you may receive in the mail after your visit with us. Thank you!             Your Updated Medication List - Protect others around you: Learn how to safely use, store and throw away your medicines at www.disposemymeds.org.          This list is accurate as of 3/30/18 11:59 PM.  Always use your most recent med list.                   Brand Name Dispense Instructions for use Diagnosis    amLODIPine 2.5 MG tablet    NORVASC    90 tablet    Take 1 tablet (2.5 mg) by mouth daily    Chest pain, unspecified type       aspirin 81 MG EC tablet      Take 1 tablet (81 mg) by mouth daily    S/P TAVR (transcatheter aortic valve replacement)       atorvastatin 40 MG tablet    LIPITOR    90 tablet    TAKE ONE TABLET BY MOUTH ONCE DAILY    Hyperlipidemia with target LDL less than 130       calcium carbonate 500 MG chewable tablet    TUMS     Take 2-4 tablets (1,000-2,000 mg) by mouth as needed for heartburn        clopidogrel 75 MG tablet    PLAVIX    90 tablet    Take 1 tablet (75 mg) by mouth daily    History of CVA (cerebrovascular accident)       docusate sodium 100 MG capsule    COLACE     Take 200 mg by mouth daily 2 capsules        FLORAJEN ACIDOPHILUS Caps       Take 1 capsule by mouth daily        folic acid 1 MG tablet    FOLVITE    90 tablet    TAKE ONE TABLET BY MOUTH ONCE DAILY    Seropositive rheumatoid arthritis (H)       iron 325 (65 Fe) MG tablet     30 tablet    TAKE ONE TABLET BY MOUTH ONCE DAILY WITH BREAKFAST    Gastrointestinal hemorrhage, unspecified gastrointestinal hemorrhage type       isosorbide mononitrate 30 MG 24 hr tablet    IMDUR    360 tablet    Take 2 tablets (60 mg) by mouth 2 times daily    Chest pain       levETIRAcetam 500 MG tablet    KEPPRA    90 tablet    Take 1 tablet (500 mg) by mouth 2 times daily    Seizure disorder (H)       levothyroxine 50 MCG tablet    SYNTHROID/LEVOTHROID    30 tablet    TAKE ONE TABLET BY MOUTH ONCE DAILY    Hypothyroidism due to acquired atrophy of thyroid       lisinopril 2.5 MG tablet    PRINIVIL/Zestril    30 tablet    Take 1 tablet (2.5 mg) by mouth daily    Coronary artery disease involving native coronary artery of native heart without angina pectoris, Ischemic cardiomyopathy       methotrexate 2.5 MG tablet CHEMO     52 tablet    Take 4 tablets (10 mg) by mouth once a week Wed    Seropositive rheumatoid arthritis (H)       metoprolol succinate 25 MG 24 hr tablet    TOPROL-XL    270 tablet    Take 2 tablets (50 mg) po qam and 1 tablet (25 mg) po qpm    Chest pain, unspecified type       mirtazapine 30 MG tablet    REMERON    90 tablet    TAKE ONE TABLET BY MOUTH AT BEDTIME    Sleep initiation disorder       Multi-vitamin Tabs tablet      Take 1 tablet by mouth daily        MYLANTA PO      2 tblsp every 4 hours as needed        nitroFURantoin (macrocrystal-monohydrate) 100 MG capsule    MACROBID    14 capsule    Take 1 capsule (100 mg) by mouth 2 times daily    Acute cystitis without hematuria       nitroGLYcerin 0.4 MG sublingual tablet    NITROSTAT    25 tablet    DISSOLVE ONE TABLET UNDER THE TONGUE EVERY 5 MINUTES AS NEEDED FOR CHEST PAIN.  DO NOT EXCEED A TOTAL OF 3 DOSES IN 15 MINUTES    Coronary artery  disease involving native coronary artery of native heart without angina pectoris       predniSONE 5 MG tablet    DELTASONE    23 tablet    TAKE ONE TABLET BY MOUTH EVERY OTHER DAY ALTERNATING  WITH  1/2  TABLET  EVERY  OTHER  DAY    Seropositive rheumatoid arthritis (H)       SYNVISC 16 MG/2ML Sosy   Generic drug:  Hylan     2 mL    16 mg by INTRA-ARTICULAR route once    Primary osteoarthritis of right knee       torsemide 10 MG tablet    DEMADEX    10 tablet    Take 1 tablet (10 mg) by mouth daily    SOB (shortness of breath)       TYLENOL ARTHRITIS PAIN 650 MG CR tablet   Generic drug:  acetaminophen      Take 2 tablets (1,300 mg) by mouth every 8 hours as needed for mild pain

## 2018-03-30 NOTE — TELEPHONE ENCOUNTER
"Daughter Jennifer called in for patient, consent to communicate on file, she states patient was seen in clinic today and she was supposed to get a water pill and antibiotic sent to the pharmacy, only the water pill is at the pharmacy now, she also had message from clinic staff at 5:02 pm to call them back, triage nurse attempted clinic number and back line with no success. Notes in epic are note completed/signed off at this time. Paged on call Dr. Matthews via smart web at 5:19 pm \" Call Mya POWELL -[phone] re: Gillian Burk, [birth year], seen in clinic today and note not yet completed and daughter states water pill and abx were to be sent abx is not yet at pharmacy, not in epic\" Dr. Matthews called back at 5:24 pm and stated it does not appear that provider has any reason or documentation about sending antibiotic, nothing in the chart and he will not prescribe at this time. He states just the Demadex at this time. Called mir Rodriguez back and left message to call back to update her on findings.     Reason for Disposition    [1] Prescription not at pharmacy AND [2] was prescribed today by PCP    Additional Information    Negative: Drug overdose and nurse unable to answer question    Negative: Caller requesting information not related to medicine    Negative: Caller requesting a prescription for Strep throat and has a positive culture result    Negative: Rash while taking a medication or within 3 days of stopping it    Negative: Immunization reaction suspected    Negative: [1] Asthma and [2] having symptoms of asthma (cough, wheezing, etc)    Negative: MORE THAN A DOUBLE DOSE of a prescription or over-the-counter (OTC) drug    Negative: [1] DOUBLE DOSE (an extra dose or lesser amount) of over-the-counter (OTC) drug AND [2] any symptoms (e.g., dizziness, nausea, pain, sleepiness)    Negative: [1] DOUBLE DOSE (an extra dose or lesser amount) of prescription drug AND [2] any symptoms (e.g., dizziness, nausea, " "pain, sleepiness)    Negative: Took another person's prescription drug    Negative: [1] DOUBLE DOSE (an extra dose or lesser amount) of prescription drug AND [2] NO symptoms (Exception: a double dose of antibiotics)    Negative: Diabetes drug error or overdose (e.g., insulin or extra dose)    Negative: [1] Request for URGENT new prescription or refill of \"essential\" medication (i.e., likelihood of harm to patient if not taken) AND [2] triager unable to fill per unit policy    Protocols used: MEDICATION QUESTION CALL-ADULT-    Mya Loaiza RN, BSN  [facility]    "

## 2018-03-30 NOTE — NURSING NOTE
"Chief Complaint   Patient presents with     URI       Initial /60 (BP Location: Left arm, Patient Position: Chair, Cuff Size: Adult Regular)  Pulse 94  Temp 98.3  F (36.8  C) (Tympanic)  Wt 139 lb (63 kg)  SpO2 94%  BMI 22.44 kg/m2 Estimated body mass index is 22.44 kg/(m^2) as calculated from the following:    Height as of 3/27/18: 5' 6\" (1.676 m).    Weight as of this encounter: 139 lb (63 kg).  Medication Reconciliation: complete  Lela HOLLEY    "

## 2018-04-02 ENCOUNTER — MYC MEDICAL ADVICE (OUTPATIENT)
Dept: FAMILY MEDICINE | Facility: OTHER | Age: 83
End: 2018-04-02

## 2018-04-04 DIAGNOSIS — I25.10 CORONARY ARTERY DISEASE INVOLVING NATIVE CORONARY ARTERY OF NATIVE HEART WITHOUT ANGINA PECTORIS: ICD-10-CM

## 2018-04-04 DIAGNOSIS — I25.5 ISCHEMIC CARDIOMYOPATHY: ICD-10-CM

## 2018-04-04 LAB
ANION GAP SERPL CALCULATED.3IONS-SCNC: 7 MMOL/L (ref 3–14)
BUN SERPL-MCNC: 14 MG/DL (ref 7–30)
CALCIUM SERPL-MCNC: 8.6 MG/DL (ref 8.5–10.1)
CHLORIDE SERPL-SCNC: 98 MMOL/L (ref 94–109)
CO2 SERPL-SCNC: 30 MMOL/L (ref 20–32)
CREAT SERPL-MCNC: 0.82 MG/DL (ref 0.52–1.04)
GFR SERPL CREATININE-BSD FRML MDRD: 65 ML/MIN/1.7M2
GLUCOSE SERPL-MCNC: 92 MG/DL (ref 70–99)
POTASSIUM SERPL-SCNC: 3.7 MMOL/L (ref 3.4–5.3)
SODIUM SERPL-SCNC: 135 MMOL/L (ref 133–144)

## 2018-04-04 PROCEDURE — 80048 BASIC METABOLIC PNL TOTAL CA: CPT | Performed by: PHYSICIAN ASSISTANT

## 2018-04-04 PROCEDURE — 36415 COLL VENOUS BLD VENIPUNCTURE: CPT | Performed by: PHYSICIAN ASSISTANT

## 2018-04-05 NOTE — PROGRESS NOTES
Dear Gillian, your recent test results are attached.  Chest x-ray mild congestive heart failure.  Feel free to contact me via the office or My Chart if you have any questions regarding the above.  Sincerely,  Augusto Meyer, DO FACOI

## 2018-04-06 ENCOUNTER — OFFICE VISIT (OUTPATIENT)
Dept: FAMILY MEDICINE | Facility: OTHER | Age: 83
End: 2018-04-06
Payer: COMMERCIAL

## 2018-04-06 VITALS
SYSTOLIC BLOOD PRESSURE: 130 MMHG | OXYGEN SATURATION: 94 % | BODY MASS INDEX: 22.11 KG/M2 | TEMPERATURE: 98 F | WEIGHT: 137 LBS | DIASTOLIC BLOOD PRESSURE: 70 MMHG | HEART RATE: 60 BPM

## 2018-04-06 DIAGNOSIS — I10 HYPERTENSION GOAL BP (BLOOD PRESSURE) < 140/90: Primary | ICD-10-CM

## 2018-04-06 DIAGNOSIS — R53.83 FATIGUE, UNSPECIFIED TYPE: ICD-10-CM

## 2018-04-06 PROCEDURE — 99213 OFFICE O/P EST LOW 20 MIN: CPT | Performed by: INTERNAL MEDICINE

## 2018-04-06 NOTE — MR AVS SNAPSHOT
After Visit Summary   4/6/2018    Gillian Burk    MRN: [id number]           Patient Information     Date Of Birth          3/19/1927        Visit Information        Provider Department      4/6/2018 9:20 AM Augusto Meyer DO [facility]        Today's Diagnoses     Hypertension goal BP (blood pressure) < 140/90    -  1    Fatigue, unspecified type           Follow-ups after your visit        Follow-up notes from your care team     Return in about 4 months (around 8/6/2018).      Your next 10 appointments already scheduled     Jun 12, 2018 11:30 AM CDT   Return Visit with Jones Lee MD   [facility] ([facility])    [address] [place] [address] [phone]              Who to contact     If you have questions or need follow up information about today's clinic visit or your schedule please contact [facility] directly at [phone].  Normal or non-critical lab and imaging results will be communicated to you by Appeon Corporationhart, letter or phone within 4 business days after the clinic has received the results. If you do not hear from us within 7 days, please contact the clinic through SplashCastt or phone. If you have a critical or abnormal lab result, we will notify you by phone as soon as possible.  Submit refill requests through Zipnosis or call your pharmacy and they will forward the refill request to us. Please allow 3 business days for your refill to be completed.          Additional Information About Your Visit        MyChart Information     Zipnosis gives you secure access to your electronic health record. If you see a primary care provider, you can also send messages to your care team and make appointments. If you have questions, please call your primary care clinic.  If you do not have a primary care provider, please call [phone] and they will assist you.        Care EveryWhere ID      This is your Care EveryWhere ID. This could be used by other organizations to access your [place] medical records  [id number]        Your Vitals Were     Pulse Temperature Pulse Oximetry BMI (Body Mass Index)          60 98  F (36.7  C) (Temporal) 94% 22.11 kg/m2         Blood Pressure from Last 3 Encounters:   04/28/18 148/70   04/06/18 130/70   03/30/18 118/60    Weight from Last 3 Encounters:   04/06/18 137 lb (62.1 kg)   03/30/18 139 lb (63 kg)   03/27/18 143 lb 9.6 oz (65.1 kg)              Today, you had the following     No orders found for display       Primary Care Provider Office Phone # Fax #    Augusto Meridalaina,  [phone]        [address] [place] [address]        Equal Access to Services     [facility]: Hadii aad ku hadasho Soshayy, waaxda luqadaha, qaybta kaalmada adeegyada, zack oglesby . So [facility] [phone].    ATENCIÓN: Si habla español, tiene a granger disposición servicios gratuitos de asistencia lingüística. Llame al [phone].    We comply with applicable federal civil rights laws and [place] laws. We do not discriminate on the basis of race, color, national origin, age, disability, sex, sexual orientation, or gender identity.            Thank you!     Thank you for choosing [facility]  for your care. Our goal is always to provide you with excellent care. Hearing back from our patients is one way we can continue to improve our services. Please take a few minutes to complete the written survey that you may receive in the mail after your visit with us. Thank you!             Your Updated Medication List - Protect others around you: Learn how to safely use, store and throw away your medicines at www.disposemymeds.org.          This list is accurate as of 4/6/18 11:59 PM.  Always use your most recent med list.                   Brand Name Dispense Instructions for use Diagnosis    amLODIPine 2.5 MG tablet     NORVASC    90 tablet    Take 1 tablet (2.5 mg) by mouth daily    Chest pain, unspecified type       aspirin 81 MG EC tablet      Take 1 tablet (81 mg) by mouth daily    S/P TAVR (transcatheter aortic valve replacement)       atorvastatin 40 MG tablet    LIPITOR    90 tablet    TAKE ONE TABLET BY MOUTH ONCE DAILY    Hyperlipidemia with target LDL less than 130       calcium carbonate 500 MG chewable tablet    TUMS     Take 2-4 tablets (1,000-2,000 mg) by mouth as needed for heartburn        clopidogrel 75 MG tablet    PLAVIX    90 tablet    Take 1 tablet (75 mg) by mouth daily    History of CVA (cerebrovascular accident)       docusate sodium 100 MG capsule    COLACE     Take 200 mg by mouth daily 2 capsules        FLORAJEN ACIDOPHILUS Caps      Take 1 capsule by mouth daily        folic acid 1 MG tablet    FOLVITE    90 tablet    TAKE ONE TABLET BY MOUTH ONCE DAILY    Seropositive rheumatoid arthritis (H)       iron 325 (65 Fe) MG tablet     30 tablet    TAKE ONE TABLET BY MOUTH ONCE DAILY WITH BREAKFAST    Gastrointestinal hemorrhage, unspecified gastrointestinal hemorrhage type       isosorbide mononitrate 30 MG 24 hr tablet    IMDUR    360 tablet    Take 2 tablets (60 mg) by mouth 2 times daily    Chest pain       levETIRAcetam 500 MG tablet    KEPPRA    90 tablet    Take 1 tablet (500 mg) by mouth 2 times daily    Seizure disorder (H)       levothyroxine 50 MCG tablet    SYNTHROID/LEVOTHROID    30 tablet    TAKE ONE TABLET BY MOUTH ONCE DAILY    Hypothyroidism due to acquired atrophy of thyroid       lisinopril 2.5 MG tablet    PRINIVIL/Zestril    30 tablet    Take 1 tablet (2.5 mg) by mouth daily    Coronary artery disease involving native coronary artery of native heart without angina pectoris, Ischemic cardiomyopathy       methotrexate 2.5 MG tablet CHEMO     52 tablet    Take 4 tablets (10 mg) by mouth once a week Wed    Seropositive rheumatoid arthritis (H)       metoprolol succinate 25 MG 24 hr tablet     TOPROL-XL    270 tablet    Take 2 tablets (50 mg) po qam and 1 tablet (25 mg) po qpm    Chest pain, unspecified type       mirtazapine 30 MG tablet    REMERON    90 tablet    TAKE ONE TABLET BY MOUTH AT BEDTIME    Sleep initiation disorder       Multi-vitamin Tabs tablet      Take 1 tablet by mouth daily        MYLANTA PO      2 tblsp every 4 hours as needed        nitroFURantoin (macrocrystal-monohydrate) 100 MG capsule    MACROBID    14 capsule    Take 1 capsule (100 mg) by mouth 2 times daily    Acute cystitis without hematuria       nitroGLYcerin 0.4 MG sublingual tablet    NITROSTAT    25 tablet    DISSOLVE ONE TABLET UNDER THE TONGUE EVERY 5 MINUTES AS NEEDED FOR CHEST PAIN.  DO NOT EXCEED A TOTAL OF 3 DOSES IN 15 MINUTES    Coronary artery disease involving native coronary artery of native heart without angina pectoris       predniSONE 5 MG tablet    DELTASONE    23 tablet    TAKE ONE TABLET BY MOUTH EVERY OTHER DAY ALTERNATING  WITH  1/2  TABLET  EVERY  OTHER  DAY    Seropositive rheumatoid arthritis (H)       SYNVISC 16 MG/2ML Sosy   Generic drug:  Hylan     2 mL    16 mg by INTRA-ARTICULAR route once    Primary osteoarthritis of right knee       torsemide 10 MG tablet    DEMADEX    10 tablet    Take 1 tablet (10 mg) by mouth daily    SOB (shortness of breath)       TYLENOL ARTHRITIS PAIN 650 MG CR tablet   Generic drug:  acetaminophen      Take 2 tablets (1,300 mg) by mouth every 8 hours as needed for mild pain

## 2018-04-06 NOTE — PROGRESS NOTES
Chief Complaint   Patient presents with     Cough     f/u   CHIEF COMPLAINT:    The patient is a pleasant 89-year-old female who presents today for follow-up of her cough. She is doing better. Her breathing is improved, her weight is down slightly and this may be the result of a short course of loop diuretic. She is on an antibiotic and has no further purulent symptoms. Overall, she is feeling better. In the interim, she saw her cardiologist group and was placed on lisinopril. We will watch this for possibly causing cough as she had been previously on an ARB. She notes that she is now sleeping adequately, the Remeron does seem to be helping with that. She's been on for some time but questions whether it may be causing her some daytime fatigue. I have recommended she discontinues it briefly. If she has withdrawal symptoms, she will reinitiated.                         PAST, FAMILY,SOCIAL HISTORY:     Medical  History:   has a past medical history of Aortic stenosis; Chronic migraine; Hyperlipidaemia; Hypertension; Hypothyroidism; Myocardial infarction; Need for SBE (subacute bacterial endocarditis) prophylaxis; Orthostatic hypotension; PUD (peptic ulcer disease); Secondary Sjogren's syndrome (H); Seizures (H); Seropositive rheumatoid arthritis (H); Stroke (H) (05/2013); and Syncope (2014).     Surgical History:   has a past surgical history that includes Mouth surgery (2011); Eye surgery (1999); cataract iol, rt/lt (Bilateral, 2000); Hysterectomy total abdominal (1970); TOTAL HIP ARTHROPLASTY (Right, 2010); TOTAL HIP ARTHROPLASTY (Left, 2014); TRANSCATHETER AORTIC VALVE IMPLANT ANEST (N/A, 8/3/2016); and Heart Cath Femoral Cannulization With Open Standby Repair Aortic Valve (N/A, 8/3/2016).     Social History:   reports that she has never smoked. She has never used smokeless tobacco. She reports that she does not drink alcohol or use illicit drugs.     Family History:  family history includes Hyperlipidemia in her  mother. There is no history of Family History Negative.            MEDICATIONS  Current Outpatient Prescriptions   Medication Sig Dispense Refill     torsemide (DEMADEX) 10 MG tablet Take 1 tablet (10 mg) by mouth daily 10 tablet 0     lisinopril (PRINIVIL/ZESTRIL) 2.5 MG tablet Take 1 tablet (2.5 mg) by mouth daily 30 tablet 1     folic acid (FOLVITE) 1 MG tablet TAKE ONE TABLET BY MOUTH ONCE DAILY 90 tablet 1     mirtazapine (REMERON) 30 MG tablet TAKE ONE TABLET BY MOUTH AT BEDTIME 90 tablet 2     Hylan (SYNVISC) 16 MG/2ML SOSY 16 mg by INTRA-ARTICULAR route once 2 mL 0     levETIRAcetam (KEPPRA) 500 MG tablet Take 1 tablet (500 mg) by mouth 2 times daily 90 tablet 6     nitroFURantoin, macrocrystal-monohydrate, (MACROBID) 100 MG capsule Take 1 capsule (100 mg) by mouth 2 times daily 14 capsule 0     atorvastatin (LIPITOR) 40 MG tablet TAKE ONE TABLET BY MOUTH ONCE DAILY 90 tablet 0     isosorbide mononitrate (IMDUR) 30 MG 24 hr tablet Take 2 tablets (60 mg) by mouth 2 times daily 360 tablet 3     levothyroxine (SYNTHROID/LEVOTHROID) 50 MCG tablet TAKE ONE TABLET BY MOUTH ONCE DAILY 30 tablet 3     metoprolol (TOPROL-XL) 25 MG 24 hr tablet Take 2 tablets (50 mg) po qam and 1 tablet (25 mg) po qpm 270 tablet 3     amLODIPine (NORVASC) 2.5 MG tablet Take 1 tablet (2.5 mg) by mouth daily 90 tablet 3     nitroGLYcerin (NITROSTAT) 0.4 MG sublingual tablet DISSOLVE ONE TABLET UNDER THE TONGUE EVERY 5 MINUTES AS NEEDED FOR CHEST PAIN.  DO NOT EXCEED A TOTAL OF 3 DOSES IN 15 MINUTES 25 tablet 0     Calcium & Magnesium Carbonates (MYLANTA PO) 2 tblsp every 4 hours as needed       predniSONE (DELTASONE) 5 MG tablet TAKE ONE TABLET BY MOUTH EVERY OTHER DAY ALTERNATING  WITH  1/2  TABLET  EVERY  OTHER  DAY 23 tablet 13     clopidogrel (PLAVIX) 75 MG tablet Take 1 tablet (75 mg) by mouth daily 90 tablet 3     Ferrous Sulfate (IRON) 325 (65 FE) MG tablet TAKE ONE TABLET BY MOUTH ONCE DAILY WITH BREAKFAST 30 tablet 11      ALPRAZolam (XANAX) 0.25 MG tablet Take 1 tablet (0.25 mg) by mouth 2 times daily as needed for anxiety 28 tablet 0     aspirin EC 81 MG EC tablet Take 1 tablet (81 mg) by mouth daily       methotrexate 2.5 MG tablet Take 4 tablets (10 mg) by mouth once a week Wed 52 tablet 3     acetaminophen (TYLENOL ARTHRITIS PAIN) 650 MG CR tablet Take 2 tablets (1,300 mg) by mouth every 8 hours as needed for mild pain       calcium carbonate (TUMS) 500 MG chewable tablet Take 2-4 tablets (1,000-2,000 mg) by mouth as needed for heartburn       docusate sodium (COLACE) 100 MG capsule Take 200 mg by mouth daily 2 capsules       multivitamin, therapeutic with minerals (MULTI-VITAMIN) TABS Take 1 tablet by mouth daily       Lactobacillus (FLORAJEN ACIDOPHILUS) CAPS Take 1 capsule by mouth daily           --------------------------------------------------------------------------------------------------------------------                          REVIEW OF SYSTEMS:         LUNGS: Pt denies: excess sputum, hemoptysis, or shortness of breath. Cough is markedly improved.   HEART: Pt denies: chest pain, arrythmia, syncope, tachy or bradyarrhythmia or excess edema.   GI: Pt denies: nausea, vomitting, diarrhea, constipation, melena, or hematochezia.   NEURO: Pt denies: seizures, strokes, diplopia, weakness, paraesthesias, or paralysis.   SKIN: Pt denies: itching, rashes, discoloration, or specific lesions of concern. Denies recent hair loss.                            EXAMINATION:         /70 (BP Location: Left arm, Patient Position: Chair, Cuff Size: Adult Regular)  Pulse 60  Temp 98  F (36.7  C) (Temporal)  Wt 137 lb (62.1 kg)  SpO2 94%  BMI 22.11 kg/m2   Constitutional: The patient appears to be in no acute distress. The patient appears to be adequately hydrated. No acute respiratory or hemodynamic distress is noted at this time.   LUNGS: clear bilaterally, airflow is brisk, no intercostal retraction or stridor is noted. No  coughing is noted during visit.   HEART:  regular without rubs, clicks, gallops, or murmurs. PMI is nondisplaced. Upstrokes are brisk. S1,S2 are heard.   GI: Abdomen is soft, without rebound, guarding or tenderness. Bowel sounds are appropriate. No renal bruits are heard.    NEURO: Pt is alert and appropriate. No neurologic lateralization is noted. Cranial nerves 2-12 are intact. Peripheral sensory and motor function are grossly normal   SKIN:  warm and dry. No erythema, or rashes are noted. No specific lesions of concern are noted.                           DECISION MAKIN. Hypertension goal BP (blood pressure) < 140/90  Continue current medication    2. Fatigue, unspecified type  Follow closely                               FOLLOW UP    I have asked the patient to make an appointment for follow up with me in 3 months or sooner as needed        I have carefully explained the diagnosis and treatment options with the patient. The patient has displayed an understanding of the above, and all subsequent questions were answered.         DO DELMA Pineda    Portions of this note were produced using [employer]  Although every attempt at real-time proof reading has been made, occasional grammar/syntax errors may have been missed.

## 2018-04-06 NOTE — NURSING NOTE
"Chief Complaint   Patient presents with     Cough     f/u       Initial /70 (BP Location: Left arm, Patient Position: Chair, Cuff Size: Adult Regular)  Pulse 60  Temp 98  F (36.7  C) (Temporal)  Wt 137 lb (62.1 kg)  SpO2 94%  BMI 22.11 kg/m2 Estimated body mass index is 22.11 kg/(m^2) as calculated from the following:    Height as of 3/27/18: 5' 6\" (1.676 m).    Weight as of this encounter: 137 lb (62.1 kg).  Medication Reconciliation: complete  Lela HOLLEY    "

## 2018-04-12 DIAGNOSIS — F41.9 ANXIETY: ICD-10-CM

## 2018-04-12 NOTE — TELEPHONE ENCOUNTER
ALPRAZolam       Last Written Prescription Date:  2/20/17  Last Fill Quantity: 28,   # refills: 0  Last Office Visit: 4/6/18  Future Office visit:    Next 5 appointments (look out 90 days)     Jun 12, 2018 11:30 AM CDT   Return Visit with Jones Lee MD   [facility] ([facility])    [address] [place] [id number] [phone]                   Routing refill request to provider for review/approval because:  Drug not on the [facility], [facility] or  Health refill protocol or controlled substance'

## 2018-04-13 RX ORDER — ALPRAZOLAM 0.25 MG
TABLET ORAL
Qty: 28 TABLET | Refills: 0 | Status: SHIPPED | OUTPATIENT
Start: 2018-04-13 | End: 2018-12-24

## 2018-04-25 ENCOUNTER — MYC MEDICAL ADVICE (OUTPATIENT)
Dept: CARDIOLOGY | Facility: CLINIC | Age: 83
End: 2018-04-25

## 2018-04-25 NOTE — TELEPHONE ENCOUNTER
I left message for pt to call back to discuss my chart message. Vijay POWELL April 25, 2018, 11:17 AM

## 2018-04-26 NOTE — TELEPHONE ENCOUNTER
Pt called back and said she has not had any further episodes of chest pain and is starting to feel improved from her flu like illness. Pt denied any shortness of breath. I told her that she should call or mychart with update if she has any further episodes of chest pain. Vijay POWELL April 26, 2018, 2:05 PM

## 2018-04-28 ENCOUNTER — OFFICE VISIT (OUTPATIENT)
Dept: URGENT CARE | Facility: RETAIL CLINIC | Age: 83
End: 2018-04-28
Payer: COMMERCIAL

## 2018-04-28 VITALS
OXYGEN SATURATION: 95 % | TEMPERATURE: 97.2 F | SYSTOLIC BLOOD PRESSURE: 148 MMHG | HEART RATE: 89 BPM | DIASTOLIC BLOOD PRESSURE: 70 MMHG

## 2018-04-28 DIAGNOSIS — Z87.440 H/O RECURRENT URINARY TRACT INFECTION: ICD-10-CM

## 2018-04-28 DIAGNOSIS — N39.0 RECURRENT UTI: ICD-10-CM

## 2018-04-28 DIAGNOSIS — R30.0 DYSURIA: Primary | ICD-10-CM

## 2018-04-28 LAB
APPEARANCE UR: ABNORMAL
BILIRUB UR QL: ABNORMAL
COLOR UR: YELLOW
GLUCOSE URINE: ABNORMAL MG/DL
HGB UR QL: ABNORMAL
KETONES UR QL: ABNORMAL MG/DL
LEUKOCYTE ESTERASE URINE: ABNORMAL
NITRITE UR QL STRIP: ABNORMAL
PH UR STRIP: 7 PH (ref 5–7)
PROTEIN ALBUMIN URINE: ABNORMAL MG/DL
SOURCE: ABNORMAL
SP GR UR STRIP: 1.01 (ref 1–1.03)
UROBILINOGEN UR QL STRIP: 0.2 EU/DL (ref 0.2–1)

## 2018-04-28 PROCEDURE — 87186 SC STD MICRODIL/AGAR DIL: CPT | Performed by: NURSE PRACTITIONER

## 2018-04-28 PROCEDURE — 87086 URINE CULTURE/COLONY COUNT: CPT | Performed by: NURSE PRACTITIONER

## 2018-04-28 PROCEDURE — 87088 URINE BACTERIA CULTURE: CPT | Performed by: NURSE PRACTITIONER

## 2018-04-28 PROCEDURE — 99203 OFFICE O/P NEW LOW 30 MIN: CPT | Performed by: NURSE PRACTITIONER

## 2018-04-28 PROCEDURE — 81002 URINALYSIS NONAUTO W/O SCOPE: CPT | Mod: QW | Performed by: NURSE PRACTITIONER

## 2018-04-28 RX ORDER — CIPROFLOXACIN 250 MG/1
250 TABLET, FILM COATED ORAL 2 TIMES DAILY
Qty: 14 TABLET | Refills: 0 | Status: SHIPPED | OUTPATIENT
Start: 2018-04-28 | End: 2018-05-05

## 2018-04-28 NOTE — PROGRESS NOTES
SUBJECTIVE:  Gillian Burk is a 89 year old female  who  presents today for a possible UTI.   Symptoms of dysuria, urgency, frequency, suprapubic pain and pressure and voiding in small amounts have been going on for 3-4 day(s).  Hematuria no.  sudden onsetand mild and moderate.       This patient does have a history of urinary tract infections.  Is currently on Macrobid.  Patient denies long duration, rigors, flank pain, temperature > 101 degrees F. and Vomiting, significant nausea or diarrhea or vaginal discharge, vaginal odor, vaginal itching and dyspareunia (pain in labia/pelvis).    PAST MEDICAL HISTORY:   Past Medical History:   Diagnosis Date     Aortic stenosis     s/p TAVR 8/17/16     Chronic migraine      Hyperlipidaemia      Hypertension      Hypothyroidism      Myocardial infarction      Need for SBE (subacute bacterial endocarditis) prophylaxis      Orthostatic hypotension     wears compression stockings     PUD (peptic ulcer disease)      Secondary Sjogren's syndrome (H)      Seizures (H)     after stroke (partial onset)     Seropositive rheumatoid arthritis (H)      Stroke (H) 05/2013    with visual field cut, left occipital lobe     Syncope 2014    due to orthostatic hypotension       PAST SURGICAL HISTORY:   Past Surgical History:   Procedure Laterality Date     C TOTAL HIP ARTHROPLASTY Right 2010     C TOTAL HIP ARTHROPLASTY Left 2014     CATARACT IOL, RT/LT Bilateral 2000     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     HEART CATH FEMORAL CANNULIZATION WITH OPEN STANDBY REPAIR AORTIC VALVE N/A 8/3/2016    Procedure: HEART CATH FEMORAL CANNULIZATION WITH OPEN STANDBY REPAIR AORTIC VALVE;  Surgeon: Owen Schultz MD;  Location: [facility] OR     HYSTERECTOMY TOTAL ABDOMINAL  1970    ovaries out     MOUTH SURGERY  2011    jaw surgery due to fracture     TRANSCATHETER AORTIC VALVE IMPLANT ANESTHESIA N/A 8/3/2016    Procedure: TRANSCATHETER AORTIC VALVE IMPLANT ANESTHESIA;  Surgeon:  GENERIC ANESTHESIA PROVIDER;  Location: [facility] OR       FAMILY HISTORY:   Family History   Problem Relation Age of Onset     Hyperlipidemia Mother      Family History Negative No family hx of        SOCIAL HISTORY:   Social History   Substance Use Topics     Smoking status: Never Smoker     Smokeless tobacco: Never Used     Alcohol use No         Current Outpatient Prescriptions   Medication     acetaminophen (TYLENOL ARTHRITIS PAIN) 650 MG CR tablet     ALPRAZolam (XANAX) 0.25 MG tablet     amLODIPine (NORVASC) 2.5 MG tablet     aspirin EC 81 MG EC tablet     atorvastatin (LIPITOR) 40 MG tablet     Calcium & Magnesium Carbonates (MYLANTA PO)     calcium carbonate (TUMS) 500 MG chewable tablet     clopidogrel (PLAVIX) 75 MG tablet     docusate sodium (COLACE) 100 MG capsule     Ferrous Sulfate (IRON) 325 (65 FE) MG tablet     folic acid (FOLVITE) 1 MG tablet     isosorbide mononitrate (IMDUR) 30 MG 24 hr tablet     Lactobacillus (FLORAJEN ACIDOPHILUS) CAPS     levETIRAcetam (KEPPRA) 500 MG tablet     levothyroxine (SYNTHROID/LEVOTHROID) 50 MCG tablet     lisinopril (PRINIVIL/ZESTRIL) 2.5 MG tablet     methotrexate 2.5 MG tablet     metoprolol (TOPROL-XL) 25 MG 24 hr tablet     mirtazapine (REMERON) 30 MG tablet     multivitamin, therapeutic with minerals (MULTI-VITAMIN) TABS     nitroFURantoin, macrocrystal-monohydrate, (MACROBID) 100 MG capsule     predniSONE (DELTASONE) 5 MG tablet     torsemide (DEMADEX) 10 MG tablet     Hylan (SYNVISC) 16 MG/2ML SOSY     nitroGLYcerin (NITROSTAT) 0.4 MG sublingual tablet     No current facility-administered medications for this visit.         ROS:   Review of systems negative except as stated above.    OBJECTIVE:  Vitals:    04/28/18 1144   BP: 148/70   Pulse: 89   Temp: 97.2  F (36.2  C)   TempSrc: Temporal   SpO2: 95%     GENERAL APPEARANCE: healthy, alert and no distress  RESP: lungs clear to auscultation - no rales, rhonchi or wheezes  CV: regular rates and rhythm, normal S1 S2,  no murmur noted  ABDOMEN: soft, normal bowel sounds, tenderness moderate LLQ  BACK: No CVA tenderness  SKIN: no suspicious lesions or rashes    ASSESSMENT:    Dysuria  Recurrent UTI  H/O recurrent urinary tract infection      PLAN:  Current Outpatient Prescriptions   Medication     acetaminophen (TYLENOL ARTHRITIS PAIN) 650 MG CR tablet     ALPRAZolam (XANAX) 0.25 MG tablet     amLODIPine (NORVASC) 2.5 MG tablet     aspirin EC 81 MG EC tablet     atorvastatin (LIPITOR) 40 MG tablet     Calcium & Magnesium Carbonates (MYLANTA PO)     calcium carbonate (TUMS) 500 MG chewable tablet     ciprofloxacin (CIPRO) 250 MG tablet     clopidogrel (PLAVIX) 75 MG tablet     docusate sodium (COLACE) 100 MG capsule     Ferrous Sulfate (IRON) 325 (65 FE) MG tablet     folic acid (FOLVITE) 1 MG tablet     isosorbide mononitrate (IMDUR) 30 MG 24 hr tablet     Lactobacillus (FLORAJEN ACIDOPHILUS) CAPS     levETIRAcetam (KEPPRA) 500 MG tablet     levothyroxine (SYNTHROID/LEVOTHROID) 50 MCG tablet     lisinopril (PRINIVIL/ZESTRIL) 2.5 MG tablet     methotrexate 2.5 MG tablet     metoprolol (TOPROL-XL) 25 MG 24 hr tablet     mirtazapine (REMERON) 30 MG tablet     multivitamin, therapeutic with minerals (MULTI-VITAMIN) TABS     nitroFURantoin, macrocrystal-monohydrate, (MACROBID) 100 MG capsule     predniSONE (DELTASONE) 5 MG tablet     torsemide (DEMADEX) 10 MG tablet     Hylan (SYNVISC) 16 MG/2ML SOSY     nitroGLYcerin (NITROSTAT) 0.4 MG sublingual tablet     No current facility-administered medications for this visit.      Drink plenty of fluids.    Prevention and treatment of UTI's discussed.  Signs and symptoms of pyelonephritis mentioned.    Discussed affect of antibiotics on birth control & the need to use alternative forms of birth control.  Handouts offered & plan of care reviewed.  Take a probiotic or eat yogurt while on antibiotics.  Urine culture pending. Pt will be contacted if change in treatment plan is indicated.  If  symptoms do not improve in a few days or if at anytime she is worse she will follow up with her primary care provider and enc appointment to eval vag discharge and these symptoms.    Tom Choudhary MSN, APRN, Family NP-C  [facility]

## 2018-04-28 NOTE — NURSING NOTE
"Chief Complaint   Patient presents with     Dysuria     dysuria x 3-4 days she states she has frequent UTI's       Initial /70  Pulse 89  Temp 97.2  F (36.2  C) (Temporal)  SpO2 95% Estimated body mass index is 22.11 kg/(m^2) as calculated from the following:    Height as of 3/27/18: 5' 6\" (1.676 m).    Weight as of 4/6/18: 137 lb (62.1 kg).  Medication Reconciliation: complete     Jessica Sundet      "

## 2018-04-28 NOTE — MR AVS SNAPSHOT
After Visit Summary   4/28/2018    Gillian Burk    MRN: [id number]           Patient Information     Date Of Birth          3/19/1927        Visit Information        Provider Department      4/28/2018 12:00 PM Tom Choudhary APRN [facility]        Today's Diagnoses     Dysuria    -  1    Recurrent UTI        H/O recurrent urinary tract infection           Follow-ups after your visit        Your next 10 appointments already scheduled     Jun 12, 2018 11:30 AM CDT   Return Visit with Jones Lee MD   [facility] ([facility])    [address] [place] [id number] [phone]              Who to contact     You can reach your care team any time of the day by calling [phone].  Notification of test results:  If you have an abnormal lab result, we will notify you by phone as soon as possible.         Additional Information About Your Visit        MyChart Information     [employer] gives you secure access to your electronic health record. If you see a primary care provider, you can also send messages to your care team and make appointments. If you have questions, please call your primary care clinic.  If you do not have a primary care provider, please call [phone] and they will assist you.        Care EveryWhere ID     This is your Care EveryWhere ID. This could be used by other organizations to access your [place] [facility] medical records  [id number]        Your Vitals Were     Pulse Temperature Pulse Oximetry             89 97.2  F (36.2  C) (Temporal) 95%          Blood Pressure from Last 3 Encounters:   04/28/18 148/70   04/06/18 130/70   03/30/18 118/60    Weight from Last 3 Encounters:   04/06/18 137 lb (62.1 kg)   03/30/18 139 lb (63 kg)   03/27/18 143 lb 9.6 oz (65.1 kg)              We Performed the Following     HCL U/A, W/O MICRO, NON AUTO     Urine Culture Aerobic Bacterial           Today's Medication Changes          These changes are accurate as of 4/28/18 12:03 PM.  If you have any questions, ask your nurse or doctor.               Start taking these medicines.        Dose/Directions    ciprofloxacin 250 MG tablet   Commonly known as:  CIPRO   Used for:  Recurrent UTI, H/O recurrent urinary tract infection   Started by:  Tom Choudhary APRN CNP        Dose:  250 mg   Take 1 tablet (250 mg) by mouth 2 times daily for 7 days   Quantity:  14 tablet   Refills:  0            Where to get your medicines      These medications were sent to [facility] [address] [place] [address] - [address]  [address], [facility] [address]     Phone:  [phone]     ciprofloxacin 250 MG tablet                Primary Care Provider Office Phone # Fax #    Augusto Fish Meyer,  [phone] [address] [facility]   [facility] [place] [place] [address]        Equal Access to Services     RIVER [facility] : Hadii veronica kim hadasho Soomaali, waaxda luqadaha, qaybta kaalmada adeegyada, zack oglesby . So [facility] [phone].    ATENCIÓN: Si habla español, tiene a granger disposición servicios gratuitos de asistencia lingüística. Llame al [phone].    We comply with applicable federal civil rights laws and [place] laws. We do not discriminate on the basis of race, color, national origin, age, disability, sex, sexual orientation, or gender identity.            Thank you!     Thank you for choosing [facility]  for your care. Our goal is always to provide you with excellent care. Hearing back from our patients is one way we can continue to improve our services. Please take a few minutes to complete the written survey that you may receive in the mail after your visit with us. Thank you!             Your Updated Medication List - Protect others around you: Learn how to safely use, store and throw away your medicines at www.disposemymeds.org.          This list is accurate  as of 4/28/18 12:03 PM.  Always use your most recent med list.                   Brand Name Dispense Instructions for use Diagnosis    ALPRAZolam 0.25 MG tablet    XANAX    28 tablet    TAKE ONE TABLET BY MOUTH TWICE DAILY AS NEEDED FOR ANXIETY    Anxiety       amLODIPine 2.5 MG tablet    NORVASC    90 tablet    Take 1 tablet (2.5 mg) by mouth daily    Chest pain, unspecified type       aspirin 81 MG EC tablet      Take 1 tablet (81 mg) by mouth daily    S/P TAVR (transcatheter aortic valve replacement)       atorvastatin 40 MG tablet    LIPITOR    90 tablet    TAKE ONE TABLET BY MOUTH ONCE DAILY    Hyperlipidemia with target LDL less than 130       calcium carbonate 500 MG chewable tablet    TUMS     Take 2-4 tablets (1,000-2,000 mg) by mouth as needed for heartburn        ciprofloxacin 250 MG tablet    CIPRO    14 tablet    Take 1 tablet (250 mg) by mouth 2 times daily for 7 days    Recurrent UTI, H/O recurrent urinary tract infection       clopidogrel 75 MG tablet    PLAVIX    90 tablet    Take 1 tablet (75 mg) by mouth daily    History of CVA (cerebrovascular accident)       docusate sodium 100 MG capsule    COLACE     Take 200 mg by mouth daily 2 capsules        FLORAJEN ACIDOPHILUS Caps      Take 1 capsule by mouth daily        folic acid 1 MG tablet    FOLVITE    90 tablet    TAKE ONE TABLET BY MOUTH ONCE DAILY    Seropositive rheumatoid arthritis (H)       iron 325 (65 Fe) MG tablet     30 tablet    TAKE ONE TABLET BY MOUTH ONCE DAILY WITH BREAKFAST    Gastrointestinal hemorrhage, unspecified gastrointestinal hemorrhage type       isosorbide mononitrate 30 MG 24 hr tablet    IMDUR    360 tablet    Take 2 tablets (60 mg) by mouth 2 times daily    Chest pain       levETIRAcetam 500 MG tablet    KEPPRA    90 tablet    Take 1 tablet (500 mg) by mouth 2 times daily    Seizure disorder (H)       levothyroxine 50 MCG tablet    SYNTHROID/LEVOTHROID    30 tablet    TAKE ONE TABLET BY MOUTH ONCE DAILY     Hypothyroidism due to acquired atrophy of thyroid       lisinopril 2.5 MG tablet    PRINIVIL/Zestril    30 tablet    Take 1 tablet (2.5 mg) by mouth daily    Coronary artery disease involving native coronary artery of native heart without angina pectoris, Ischemic cardiomyopathy       methotrexate 2.5 MG tablet CHEMO     52 tablet    Take 4 tablets (10 mg) by mouth once a week Wed    Seropositive rheumatoid arthritis (H)       metoprolol succinate 25 MG 24 hr tablet    TOPROL-XL    270 tablet    Take 2 tablets (50 mg) po qam and 1 tablet (25 mg) po qpm    Chest pain, unspecified type       mirtazapine 30 MG tablet    REMERON    90 tablet    TAKE ONE TABLET BY MOUTH AT BEDTIME    Sleep initiation disorder       Multi-vitamin Tabs tablet      Take 1 tablet by mouth daily        MYLANTA PO      2 tblsp every 4 hours as needed        nitroFURantoin (macrocrystal-monohydrate) 100 MG capsule    MACROBID    14 capsule    Take 1 capsule (100 mg) by mouth 2 times daily    Acute cystitis without hematuria       nitroGLYcerin 0.4 MG sublingual tablet    NITROSTAT    25 tablet    DISSOLVE ONE TABLET UNDER THE TONGUE EVERY 5 MINUTES AS NEEDED FOR CHEST PAIN.  DO NOT EXCEED A TOTAL OF 3 DOSES IN 15 MINUTES    Coronary artery disease involving native coronary artery of native heart without angina pectoris       predniSONE 5 MG tablet    DELTASONE    23 tablet    TAKE ONE TABLET BY MOUTH EVERY OTHER DAY ALTERNATING  WITH  1/2  TABLET  EVERY  OTHER  DAY    Seropositive rheumatoid arthritis (H)       SYNVISC 16 MG/2ML Sosy   Generic drug:  Hylan     2 mL    16 mg by INTRA-ARTICULAR route once    Primary osteoarthritis of right knee       torsemide 10 MG tablet    DEMADEX    10 tablet    Take 1 tablet (10 mg) by mouth daily    SOB (shortness of breath)       TYLENOL ARTHRITIS PAIN 650 MG CR tablet   Generic drug:  acetaminophen      Take 2 tablets (1,300 mg) by mouth every 8 hours as needed for mild pain

## 2018-04-30 LAB
BACTERIA SPEC CULT: ABNORMAL
Lab: ABNORMAL
SPECIMEN SOURCE: ABNORMAL

## 2018-05-03 ENCOUNTER — TRANSFERRED RECORDS (OUTPATIENT)
Dept: HEALTH INFORMATION MANAGEMENT | Facility: CLINIC | Age: 83
End: 2018-05-03

## 2018-05-15 DIAGNOSIS — I25.5 ISCHEMIC CARDIOMYOPATHY: ICD-10-CM

## 2018-05-15 DIAGNOSIS — E03.4 HYPOTHYROIDISM DUE TO ACQUIRED ATROPHY OF THYROID: ICD-10-CM

## 2018-05-15 DIAGNOSIS — I25.10 CORONARY ARTERY DISEASE INVOLVING NATIVE CORONARY ARTERY OF NATIVE HEART WITHOUT ANGINA PECTORIS: ICD-10-CM

## 2018-05-15 RX ORDER — LEVOTHYROXINE SODIUM 50 UG/1
TABLET ORAL
Qty: 30 TABLET | Refills: 3 | Status: SHIPPED | OUTPATIENT
Start: 2018-05-15 | End: 2018-09-11

## 2018-05-15 RX ORDER — LISINOPRIL 2.5 MG/1
2.5 TABLET ORAL DAILY
Qty: 30 TABLET | Refills: 11 | Status: SHIPPED | OUTPATIENT
Start: 2018-05-15 | End: 2018-06-12

## 2018-05-15 NOTE — TELEPHONE ENCOUNTER
"Last Written Prescription Date:  1/03/2018  Last Fill Quantity: 30,  # refills: 3  Last office visit: 4/6/2018 with prescribing provider:  Dr. Meyer   Future Office Visit:   Next 5 appointments (look out 90 days)     Jun 12, 2018 11:30 AM CDT   Return Visit with Jones Lee MD   [facility] ([facility])    [address] [place] [id number] [phone]                 Requested Prescriptions   Pending Prescriptions Disp Refills     levothyroxine (SYNTHROID/LEVOTHROID) 50 MCG tablet [Pharmacy Med Name: LEVOTHYROXIN 50MCG  TAB] 30 tablet 3     Sig: TAKE ONE TABLET BY MOUTH ONCE DAILY    Thyroid Protocol Failed    5/15/2018  9:59 AM       Failed - Normal TSH on file in past 12 months    Recent Labs   Lab Test  05/03/16   1427   TSH  3.37             Passed - Patient is 12 years or older       Passed - Recent (12 mo) or future (30 days) visit within the authorizing provider's specialty    Patient had office visit in the last 12 months or has a visit in the next 30 days with authorizing provider or within the authorizing provider's specialty.  See \"Patient Info\" tab in inbasket, or \"Choose Columns\" in Meds & Orders section of the refill encounter.           Passed - No active pregnancy on record    If patient is pregnant or has had a positive pregnancy test, please check TSH.         Passed - No positive pregnancy test in past 12 months    If patient is pregnant or has had a positive pregnancy test, please check TSH.            "

## 2018-05-15 NOTE — TELEPHONE ENCOUNTER
Routing refill request to provider for review/approval because:  Labs not current:  TSH  Patient needs to be seen because it has been more than 1 year since last office visit where this med/dx were addressed     Laura Dyer RN

## 2018-05-18 ENCOUNTER — MYC MEDICAL ADVICE (OUTPATIENT)
Dept: FAMILY MEDICINE | Facility: OTHER | Age: 83
End: 2018-05-18

## 2018-05-18 DIAGNOSIS — I10 HYPERTENSION GOAL BP (BLOOD PRESSURE) < 140/90: Primary | ICD-10-CM

## 2018-05-21 RX ORDER — LOSARTAN POTASSIUM 25 MG/1
12.5 TABLET ORAL DAILY
Qty: 30 TABLET | Refills: 1 | Status: SHIPPED | OUTPATIENT
Start: 2018-05-21 | End: 2018-09-13

## 2018-05-29 DIAGNOSIS — Z95.2 S/P TAVR (TRANSCATHETER AORTIC VALVE REPLACEMENT): Primary | ICD-10-CM

## 2018-05-30 ENCOUNTER — TRANSFERRED RECORDS (OUTPATIENT)
Dept: HEALTH INFORMATION MANAGEMENT | Facility: CLINIC | Age: 83
End: 2018-05-30

## 2018-06-12 ENCOUNTER — OFFICE VISIT (OUTPATIENT)
Dept: CARDIOLOGY | Facility: CLINIC | Age: 83
End: 2018-06-12
Payer: COMMERCIAL

## 2018-06-12 VITALS
HEIGHT: 66 IN | HEART RATE: 76 BPM | WEIGHT: 133.1 LBS | DIASTOLIC BLOOD PRESSURE: 80 MMHG | BODY MASS INDEX: 21.39 KG/M2 | SYSTOLIC BLOOD PRESSURE: 140 MMHG | OXYGEN SATURATION: 93 %

## 2018-06-12 DIAGNOSIS — I20.89 CHRONIC STABLE ANGINA (H): Primary | ICD-10-CM

## 2018-06-12 DIAGNOSIS — E78.5 HYPERLIPIDEMIA WITH TARGET LDL LESS THAN 130: ICD-10-CM

## 2018-06-12 DIAGNOSIS — I25.10 CORONARY ARTERY DISEASE INVOLVING NATIVE CORONARY ARTERY OF NATIVE HEART WITHOUT ANGINA PECTORIS: ICD-10-CM

## 2018-06-12 DIAGNOSIS — I10 BENIGN ESSENTIAL HYPERTENSION: ICD-10-CM

## 2018-06-12 DIAGNOSIS — R07.9 CHEST PAIN, UNSPECIFIED TYPE: ICD-10-CM

## 2018-06-12 DIAGNOSIS — I35.9 AORTIC VALVE DISORDER: ICD-10-CM

## 2018-06-12 PROCEDURE — 99214 OFFICE O/P EST MOD 30 MIN: CPT | Performed by: INTERNAL MEDICINE

## 2018-06-12 RX ORDER — AMLODIPINE BESYLATE 5 MG/1
5 TABLET ORAL DAILY
Qty: 90 TABLET | Refills: 3 | Status: SHIPPED | OUTPATIENT
Start: 2018-06-12 | End: 2019-06-12

## 2018-06-12 RX ORDER — METOPROLOL SUCCINATE 50 MG/1
50 TABLET, EXTENDED RELEASE ORAL 2 TIMES DAILY
Qty: 180 TABLET | Refills: 3 | Status: SHIPPED | OUTPATIENT
Start: 2018-06-12 | End: 2019-06-19

## 2018-06-12 NOTE — LETTER
2018      Augusto Meyer,   [address] [place] [id number]      RE: Gillian Burk       Dear Colleague,    I had the pleasure of seeing Gillian Burk in the [facility].    Service Date: 2018      Augustopérez Whitten DO B[place]y    [address] [address]      RE: Gillian Burk   MRN: [id number]   : 1927      Dear Dr. Meyer:       I had the opportunity to see Ms. Gillian Burk in Cardiology Clinic today at the [facility] in [place] for reevaluation of aortic valve disease and coronary artery disease.  As you know, she is a 89-year-old woman who had severe aortic stenosis and underwent transcatheter aortic valve replacement in .  She developed frequent episodes of exertional angina in  and I saw her for consultation in 2017.  I recommended coronary angiography which was completed on 2017 and demonstrated a moderately severe blockage within the [id number] LAD.  Unfortunately, revascularization could not be performed on the same day due to restrictions regarding the allowable dye load.  A staged procedure was scheduled for a few weeks later and she was started on increased antianginal medical therapy.  With those medication changes, her angina improved significantly and she decided not to pursue additional angiogram procedures and stenting.  Since then, we have continued to manage her coronary disease medically and she has done very well.  On her current medication program, she does not have angina as long as she remembers to take her medications as prescribed.  She was having some angina when she was taking Imdur 90 mg in the morning but when we split that dose and gave her 60 mg in the morning and another 60 mg before 2 p.m. in the afternoon, she had complete resolution of her angina.  She now only has angina if she forgets the afternoon  dose and even then sometimes does not experience the angina.  She only forgets her afternoon dose once a month or so.  Her angina seems quite rare.  Overall, she otherwise seems to be doing well.  She is not experiencing any shortness of breath, lightheadedness, palpitations or syncope issues.      PHYSICAL EXAMINATION:    VITALS SIGNS:  She tells me her blood pressure usually runs in the 130s/70s.  Today, it was 140/80 with a heart rate of 76 and weight 133 pounds.     LUNGS:  Clear.     CARDIAC:  Heart rhythm is regular.  She has a very soft systolic ejection murmur at the base and no carotid bruits.      IMPRESSIONS:  Ms. Lucio Burk is a 89-year-old woman who has undergone transcatheter aortic valve replacement for treatment of aortic stenosis in 2016 with a 27 mm Treva tissue valve.  She is doing very well now with no recurrent aortic stenosis symptoms.  However, she had development of exertional angina in  and was found to have a moderately severe mid-LAD stenosis which we have been treating medically.  She has been doing very well for the most part without significant angina on her current medication program.  I suggested that we increase her amlodipine from 2.5 to 5 mg daily and increase her metoprolol XL from 75 mg a day to 100 mg a day in divided doses, 50 mg b.i.d.  Hopefully, that will help prevent angina even if she forgets her afternoon isosorbide dose.      She has TAVR follow up in July and I will plan to see her back again in Cardiology Clinic in [place] in 1 year.      Sincerely,          Margaux Lee MD, FACC         MARGAUX LEE MD, FACC             D: 2018   T: 2018   MT: VERONIKA      Name:     LUCIO BURK   MRN:      [id number]        Account:      [id number]   :      1927           Service Date: 2018      Document: [id number]         Outpatient Encounter Prescriptions as of 2018   Medication Sig Dispense Refill     acetaminophen (TYLENOL  ARTHRITIS PAIN) 650 MG CR tablet Take 2 tablets (1,300 mg) by mouth every 8 hours as needed for mild pain       ALPRAZolam (XANAX) 0.25 MG tablet TAKE ONE TABLET BY MOUTH TWICE DAILY AS NEEDED FOR ANXIETY 28 tablet 0     amLODIPine (NORVASC) 5 MG tablet Take 1 tablet (5 mg) by mouth daily 90 tablet 3     aspirin EC 81 MG EC tablet Take 1 tablet (81 mg) by mouth daily       atorvastatin (LIPITOR) 40 MG tablet TAKE ONE TABLET BY MOUTH ONCE DAILY 90 tablet 0     Calcium & Magnesium Carbonates (MYLANTA PO) 2 tblsp every 4 hours as needed       calcium carbonate (TUMS) 500 MG chewable tablet Take 2-4 tablets (1,000-2,000 mg) by mouth as needed for heartburn       clopidogrel (PLAVIX) 75 MG tablet Take 1 tablet (75 mg) by mouth daily 90 tablet 3     Cranberry 1000 MG CAPS        docusate sodium (COLACE) 100 MG capsule Take 200 mg by mouth daily 2 capsules       Ferrous Sulfate (IRON) 325 (65 FE) MG tablet TAKE ONE TABLET BY MOUTH ONCE DAILY WITH BREAKFAST 30 tablet 11     folic acid (FOLVITE) 1 MG tablet TAKE ONE TABLET BY MOUTH ONCE DAILY 90 tablet 1     Hylan (SYNVISC) 16 MG/2ML SOSY 16 mg by INTRA-ARTICULAR route once 2 mL 0     isosorbide mononitrate (IMDUR) 30 MG 24 hr tablet Take 2 tablets (60 mg) by mouth 2 times daily 360 tablet 3     Lactobacillus (FLORAJEN ACIDOPHILUS) CAPS Take 1 capsule by mouth daily       levETIRAcetam (KEPPRA) 500 MG tablet Take 1 tablet (500 mg) by mouth 2 times daily 90 tablet 6     levothyroxine (SYNTHROID/LEVOTHROID) 50 MCG tablet TAKE ONE TABLET BY MOUTH ONCE DAILY 30 tablet 3     losartan (COZAAR) 25 MG tablet Take 0.5 tablets (12.5 mg) by mouth daily 30 tablet 1     methotrexate 2.5 MG tablet Take 4 tablets (10 mg) by mouth once a week Wed 52 tablet 3     metoprolol succinate (TOPROL-XL) 50 MG 24 hr tablet Take 1 tablet (50 mg) by mouth 2 times daily 180 tablet 3     mirtazapine (REMERON) 30 MG tablet TAKE ONE TABLET BY MOUTH AT BEDTIME 90 tablet 2     multivitamin, therapeutic with  minerals (MULTI-VITAMIN) TABS Take 1 tablet by mouth daily       nitroFURantoin, macrocrystal-monohydrate, (MACROBID) 100 MG capsule Take 1 capsule (100 mg) by mouth 2 times daily 14 capsule 0     predniSONE (DELTASONE) 5 MG tablet TAKE ONE TABLET BY MOUTH EVERY OTHER DAY ALTERNATING  WITH  1/2  TABLET  EVERY  OTHER  DAY 23 tablet 13     torsemide (DEMADEX) 10 MG tablet Take 1 tablet (10 mg) by mouth daily 10 tablet 0     nitroGLYcerin (NITROSTAT) 0.4 MG sublingual tablet DISSOLVE ONE TABLET UNDER THE TONGUE EVERY 5 MINUTES AS NEEDED FOR CHEST PAIN.  DO NOT EXCEED A TOTAL OF 3 DOSES IN 15 MINUTES (Patient not taking: Reported on 4/28/2018) 25 tablet 0     [DISCONTINUED] amLODIPine (NORVASC) 2.5 MG tablet Take 1 tablet (2.5 mg) by mouth daily 90 tablet 3     [DISCONTINUED] lisinopril (PRINIVIL/ZESTRIL) 2.5 MG tablet Take 1 tablet (2.5 mg) by mouth daily 30 tablet 11     [DISCONTINUED] metoprolol (TOPROL-XL) 25 MG 24 hr tablet Take 2 tablets (50 mg) po qam and 1 tablet (25 mg) po qpm 270 tablet 3     No facility-administered encounter medications on file as of 6/12/2018.        Again, thank you for allowing me to participate in the care of your patient.      Sincerely,    MARGAUX WILLIAMSON MD     [facility]

## 2018-06-12 NOTE — PROGRESS NOTES
Service Date: 2018      Augusto Meyer, [place] [address]      RE: Gillian Burk   MRN: [id number]   : 1927      Dear Dr. Meyer:       I had the opportunity to see Ms. Gillian Burk in Cardiology Clinic today at the [facility] in [place] for reevaluation of aortic valve disease and coronary artery disease.  As you know, she is a 89-year-old woman who had severe aortic stenosis and underwent transcatheter aortic valve replacement in .  She developed frequent episodes of exertional angina in  and I saw her for consultation in 2017.  I recommended coronary angiography which was completed on 2017 and demonstrated a moderately severe blockage within the [id number] LAD.  Unfortunately, revascularization could not be performed on the same day due to restrictions regarding the allowable dye load.  A staged procedure was scheduled for a few weeks later and she was started on increased antianginal medical therapy.  With those medication changes, her angina improved significantly and she decided not to pursue additional angiogram procedures and stenting.  Since then, we have continued to manage her coronary disease medically and she has done very well.  On her current medication program, she does not have angina as long as she remembers to take her medications as prescribed.  She was having some angina when she was taking Imdur 90 mg in the morning but when we split that dose and gave her 60 mg in the morning and another 60 mg before 2 p.m. in the afternoon, she had complete resolution of her angina.  She now only has angina if she forgets the afternoon dose and even then sometimes does not experience the angina.  She only forgets her afternoon dose once a month or so.  Her angina seems quite rare.  Overall, she otherwise seems to be doing well.  She is not experiencing any shortness of  breath, lightheadedness, palpitations or syncope issues.      PHYSICAL EXAMINATION:    VITALS SIGNS:  She tells me her blood pressure usually runs in the 130s/70s.  Today, it was 140/80 with a heart rate of 76 and weight 133 pounds.     LUNGS:  Clear.     CARDIAC:  Heart rhythm is regular.  She has a very soft systolic ejection murmur at the base and no carotid bruits.      IMPRESSIONS:  Ms. Lucio Burk is a 89-year-old woman who has undergone transcatheter aortic valve replacement for treatment of aortic stenosis in 2016 with a 27 mm Terva tissue valve.  She is doing very well now with no recurrent aortic stenosis symptoms.  However, she had development of exertional angina in 2017 and was found to have a moderately severe mid-LAD stenosis which we have been treating medically.  She has been doing very well for the most part without significant angina on her current medication program.  I suggested that we increase her amlodipine from 2.5 to 5 mg daily and increase her metoprolol XL from 75 mg a day to 100 mg a day in divided doses, 50 mg b.i.d.  Hopefully, that will help prevent angina even if she forgets her afternoon isosorbide dose.      She has TAVR follow up in July and I will plan to see her back again in Cardiology Clinic in [place] in 1 year.      Sincerely,          Margaux Lee MD, FACC         MARGAUX LEE MD, FACC             D: 2018   T: 2018   MT: VERONIKA      Name:     LUCIO BURK   MRN:      [id number]        Account:      [id number]   :      1927           Service Date: 2018      Document: [id number]

## 2018-06-12 NOTE — PROGRESS NOTES
HPI and Plan:   See dictation    Orders Placed This Encounter   Procedures     Basic metabolic panel     Lipid Profile     ALT     Follow-Up with Cardiologist       Orders Placed This Encounter   Medications     Cranberry 1000 MG CAPS     amLODIPine (NORVASC) 5 MG tablet     Sig: Take 1 tablet (5 mg) by mouth daily     Dispense:  90 tablet     Refill:  3     metoprolol succinate (TOPROL-XL) 50 MG 24 hr tablet     Sig: Take 1 tablet (50 mg) by mouth 2 times daily     Dispense:  180 tablet     Refill:  3       Medications Discontinued During This Encounter   Medication Reason     lisinopril (PRINIVIL/ZESTRIL) 2.5 MG tablet Stopped by Patient     amLODIPine (NORVASC) 2.5 MG tablet Reorder     metoprolol (TOPROL-XL) 25 MG 24 hr tablet Reorder         Encounter Diagnoses   Name Primary?     Coronary artery disease involving native coronary artery of native heart without angina pectoris      Chest pain, unspecified type      Benign essential hypertension      Hyperlipidemia with target LDL less than 130      Chronic stable angina (H) Yes     Aortic valve disorder        CURRENT MEDICATIONS:  Current Outpatient Prescriptions   Medication Sig Dispense Refill     acetaminophen (TYLENOL ARTHRITIS PAIN) 650 MG CR tablet Take 2 tablets (1,300 mg) by mouth every 8 hours as needed for mild pain       ALPRAZolam (XANAX) 0.25 MG tablet TAKE ONE TABLET BY MOUTH TWICE DAILY AS NEEDED FOR ANXIETY 28 tablet 0     amLODIPine (NORVASC) 5 MG tablet Take 1 tablet (5 mg) by mouth daily 90 tablet 3     aspirin EC 81 MG EC tablet Take 1 tablet (81 mg) by mouth daily       atorvastatin (LIPITOR) 40 MG tablet TAKE ONE TABLET BY MOUTH ONCE DAILY 90 tablet 0     Calcium & Magnesium Carbonates (MYLANTA PO) 2 tblsp every 4 hours as needed       calcium carbonate (TUMS) 500 MG chewable tablet Take 2-4 tablets (1,000-2,000 mg) by mouth as needed for heartburn       clopidogrel (PLAVIX) 75 MG tablet Take 1 tablet (75 mg) by mouth daily 90 tablet 3      Cranberry 1000 MG CAPS        docusate sodium (COLACE) 100 MG capsule Take 200 mg by mouth daily 2 capsules       Ferrous Sulfate (IRON) 325 (65 FE) MG tablet TAKE ONE TABLET BY MOUTH ONCE DAILY WITH BREAKFAST 30 tablet 11     folic acid (FOLVITE) 1 MG tablet TAKE ONE TABLET BY MOUTH ONCE DAILY 90 tablet 1     Hylan (SYNVISC) 16 MG/2ML SOSY 16 mg by INTRA-ARTICULAR route once 2 mL 0     isosorbide mononitrate (IMDUR) 30 MG 24 hr tablet Take 2 tablets (60 mg) by mouth 2 times daily 360 tablet 3     Lactobacillus (FLORAJEN ACIDOPHILUS) CAPS Take 1 capsule by mouth daily       levETIRAcetam (KEPPRA) 500 MG tablet Take 1 tablet (500 mg) by mouth 2 times daily 90 tablet 6     levothyroxine (SYNTHROID/LEVOTHROID) 50 MCG tablet TAKE ONE TABLET BY MOUTH ONCE DAILY 30 tablet 3     losartan (COZAAR) 25 MG tablet Take 0.5 tablets (12.5 mg) by mouth daily 30 tablet 1     methotrexate 2.5 MG tablet Take 4 tablets (10 mg) by mouth once a week Wed 52 tablet 3     metoprolol succinate (TOPROL-XL) 50 MG 24 hr tablet Take 1 tablet (50 mg) by mouth 2 times daily 180 tablet 3     mirtazapine (REMERON) 30 MG tablet TAKE ONE TABLET BY MOUTH AT BEDTIME 90 tablet 2     multivitamin, therapeutic with minerals (MULTI-VITAMIN) TABS Take 1 tablet by mouth daily       nitroFURantoin, macrocrystal-monohydrate, (MACROBID) 100 MG capsule Take 1 capsule (100 mg) by mouth 2 times daily 14 capsule 0     predniSONE (DELTASONE) 5 MG tablet TAKE ONE TABLET BY MOUTH EVERY OTHER DAY ALTERNATING  WITH  1/2  TABLET  EVERY  OTHER  DAY 23 tablet 13     torsemide (DEMADEX) 10 MG tablet Take 1 tablet (10 mg) by mouth daily 10 tablet 0     nitroGLYcerin (NITROSTAT) 0.4 MG sublingual tablet DISSOLVE ONE TABLET UNDER THE TONGUE EVERY 5 MINUTES AS NEEDED FOR CHEST PAIN.  DO NOT EXCEED A TOTAL OF 3 DOSES IN 15 MINUTES (Patient not taking: Reported on 4/28/2018) 25 tablet 0     [DISCONTINUED] amLODIPine (NORVASC) 2.5 MG tablet Take 1 tablet (2.5 mg) by mouth daily 90  tablet 3     [DISCONTINUED] metoprolol (TOPROL-XL) 25 MG 24 hr tablet Take 2 tablets (50 mg) po qam and 1 tablet (25 mg) po qpm 270 tablet 3       ALLERGIES     Allergies   Allergen Reactions     Caffeine Other (See Comments)     Triggers your Meniere's disease     Hydrocodone Other (See Comments)     hallucinations     Ibuprofen GI Disturbance     Penicillins Hives     Tramadol Other (See Comments)     Seizure, was taking remeron along with tramadol     Metal [Staples] Rash       PAST MEDICAL HISTORY:  Past Medical History:   Diagnosis Date     Aortic stenosis     s/p TAVR 8/17/16     Chronic migraine      Hyperlipidaemia      Hypertension      Hypothyroidism      Myocardial infarction      Need for SBE (subacute bacterial endocarditis) prophylaxis      Orthostatic hypotension     wears compression stockings     PUD (peptic ulcer disease)      Secondary Sjogren's syndrome (H)      Seizures (H)     after stroke (partial onset)     Seropositive rheumatoid arthritis (H)      Stroke (H) 05/2013    with visual field cut, left occipital lobe     Syncope 2014    due to orthostatic hypotension       PAST SURGICAL HISTORY:  Past Surgical History:   Procedure Laterality Date     C TOTAL HIP ARTHROPLASTY Right 2010     C TOTAL HIP ARTHROPLASTY Left 2014     CATARACT IOL, RT/LT Bilateral 2000     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     HEART CATH FEMORAL CANNULIZATION WITH OPEN STANDBY REPAIR AORTIC VALVE N/A 8/3/2016    Procedure: HEART CATH FEMORAL CANNULIZATION WITH OPEN STANDBY REPAIR AORTIC VALVE;  Surgeon: Owen Schultz MD;  Location: [facility] OR     HYSTERECTOMY TOTAL ABDOMINAL  1970    ovaries out     MOUTH SURGERY  2011    jaw surgery due to fracture     TRANSCATHETER AORTIC VALVE IMPLANT ANESTHESIA N/A 8/3/2016    Procedure: TRANSCATHETER AORTIC VALVE IMPLANT ANESTHESIA;  Surgeon: GENERIC ANESTHESIA PROVIDER;  Location: [facility] OR       FAMILY HISTORY:  Family History   Problem Relation Age of Onset      "Hyperlipidemia Mother      Family History Negative No family hx of        SOCIAL HISTORY:  Social History     Social History     Marital status:      Spouse name: N/A     Number of children: 4     Years of education: N/A     Occupational History      Retired     Social History Main Topics     Smoking status: Never Smoker     Smokeless tobacco: Never Used     Alcohol use No     Drug use: No     Sexual activity: No     Other Topics Concern     Special Diet Yes     low salt      Exercise Yes     semi recument bike 15 mins daily      Social History Narrative    .  Lives in assisted living. Independent. Has help with making bed and changing sheets.    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 .     4 children - 1 with RA           Review of Systems:  Skin:  Negative       Eyes:  Positive for glasses    ENT:  Positive for hearing loss    Respiratory:  Positive for cough;mucoid expectorant     Cardiovascular:  Negative for;palpitations;chest pain;lightheadedness;dizziness edema;Positive for    Gastroenterology: Negative      Genitourinary:  Negative      Musculoskeletal:  Positive for arthritis low back pain   Neurologic:  Positive for numbness or tingling of feet toes,  has had this since 2006  Psychiatric:  Positive for anxiety occ.  Heme/Lymph/Imm:  Negative      Endocrine:  Positive for thyroid disorder      Physical Exam:  Vitals: /80 (BP Location: Right arm, Patient Position: Fowlers, Cuff Size: Adult Regular)  Pulse 76  Ht 1.676 m (5' 6\")  Wt 60.4 kg (133 lb 1.6 oz)  SpO2 93%  BMI 21.48 kg/m2    Constitutional:  cooperative, alert and oriented, well developed, well nourished, in no acute distress        Skin:  warm and dry to the touch, no apparent skin lesions or masses noted          Head:  normocephalic, no masses or lesions        Eyes:  pupils equal and round;conjunctivae and lids unremarkable        Lymph:No Cervical lymphadenopathy present     ENT:  no pallor or " cyanosis        Neck:  carotid pulses are full and equal bilaterally, JVP normal, no carotid bruit        Respiratory:    fine rales;basal rales       Cardiac: regular rhythm frequent premature beats     systolic murmur;LUSB;grade 1                                                 GI:  abdomen soft;BS normoactive        Extremities and Muscular Skeletal:  no deformities, clubbing, cyanosis, erythema observed;no edema              Neurological:  no gross motor deficits;affect appropriate        Psych:  affect appropriate, oriented to time, person and place        CC  Jones Lee MD  [address]  [place] [id number]

## 2018-06-12 NOTE — MR AVS SNAPSHOT
After Visit Summary   6/12/2018    Gillian Burk    MRN: [id number]           Patient Information     Date Of Birth          3/19/1927        Visit Information        Provider Department      6/12/2018 11:30 AM Jones Lee MD [facility]        Today's Diagnoses     Chronic stable angina (H)    -  1    Coronary artery disease involving native coronary artery of native heart without angina pectoris        Chest pain, unspecified type        Benign essential hypertension        Hyperlipidemia with target LDL less than 130        Aortic valve disorder           Follow-ups after your visit        Additional Services     Follow-Up with Cardiac Advanced Practice Provider           Follow-Up with Cardiologist                 Your next 10 appointments already scheduled     Jul 25, 2018 10:00 AM CDT   Ech Complete with [id number]   [facility] CV Echocardiography (Cardiovascular Imaging at [facility])    [address]  [address]  [facility] [id number] [phone]           1. Please bring or wear a comfortable two-piece outfit. 2. You may eat, drink and take your normal medicines. 3. For any questions that cannot be answered, please contact the ordering physician            Jul 25, 2018 12:00 PM CDT   Heart Cath Heart Valve Fluoro with SHCVR1   [facility] Cardiac Catheterization Lab ([facility])    [address] [facility] [id number] [phone]            Jul 25, 2018 12:30 PM CDT   Return Visit with ADRIÁN Millard CNP   [facility] ([facility])    [address]  [facility] [id number] [phone] OPT 2              Future tests that were ordered for you today     Open Future Orders        Priority Expected Expires Ordered    Follow-Up with Cardiac Advanced Practice Provider Routine 12/9/2018 6/12/2019 6/12/2018    Basic  "metabolic panel Routine 6/12/2019 6/13/2019 6/12/2018    Lipid Profile Routine 6/12/2019 6/12/2019 6/12/2018    ALT Routine 6/12/2019 6/12/2019 6/12/2018    Follow-Up with Cardiologist Routine 6/12/2019 6/13/2019 6/12/2018    Echocardiogram Routine 12/9/2018 6/12/2019 6/12/2018            Who to contact     If you have questions or need follow up information about today's clinic visit or your schedule please contact [facility] directly at [phone].  Normal or non-critical lab and imaging results will be communicated to you by MyChart, letter or phone within 4 business days after the clinic has received the results. If you do not hear from us within 7 days, please contact the clinic through thinkingphonest or phone. If you have a critical or abnormal lab result, we will notify you by phone as soon as possible.  Submit refill requests through [employer] or call your pharmacy and they will forward the refill request to us. Please allow 3 business days for your refill to be completed.          Additional Information About Your Visit        [employer] Information     [employer] gives you secure access to your electronic health record. If you see a primary care provider, you can also send messages to your care team and make appointments. If you have questions, please call your primary care clinic.  If you do not have a primary care provider, please call [phone] and they will assist you.        Care EveryWhere ID     This is your Care EveryWhere ID. This could be used by other organizations to access your [place] medical records  [id number]        Your Vitals Were     Pulse Height Pulse Oximetry BMI (Body Mass Index)          76 1.676 m (5' 6\") 93% 21.48 kg/m2         Blood Pressure from Last 3 Encounters:   06/12/18 140/80   04/28/18 148/70   04/06/18 130/70    Weight from Last 3 Encounters:   06/12/18 60.4 kg (133 lb 1.6 oz)   04/06/18 62.1 kg (137 lb)   03/30/18 63 kg (139 lb)    "           We Performed the Following     Follow-Up with Cardiologist          Today's Medication Changes          These changes are accurate as of 6/12/18 12:14 PM.  If you have any questions, ask your nurse or doctor.               These medicines have changed or have updated prescriptions.        Dose/Directions    amLODIPine 5 MG tablet   Commonly known as:  NORVASC   This may have changed:    - medication strength  - how much to take   Used for:  Chest pain, unspecified type        Dose:  5 mg   Take 1 tablet (5 mg) by mouth daily   Quantity:  90 tablet   Refills:  3       metoprolol succinate 50 MG 24 hr tablet   Commonly known as:  TOPROL-XL   This may have changed:    - medication strength  - how much to take  - how to take this  - when to take this  - additional instructions   Used for:  Chest pain, unspecified type        Dose:  50 mg   Take 1 tablet (50 mg) by mouth 2 times daily   Quantity:  180 tablet   Refills:  3            Where to get your medicines      These medications were sent to [facility] [address] [place] [address] [address]  [address] [facility] [address]     Phone:  [phone]     amLODIPine 5 MG tablet    metoprolol succinate 50 MG 24 hr tablet                Primary Care Provider Office Phone # Fax #    Augustopérez Meyer,  [phone]       [address] [facility] [facility]   [facility] [address]        Equal Access to Services     RIVER TRENT AH: Regino de la pazo Soomaali, waaxda luqadaha, qaybta kaalmada adeegyada, zack ayers. So [facility] [phone].    ATENCIÓN: Si habla español, tiene a grangre disposición servicios gratuitos de asistencia lingüística. Geraldine al [phone].    We comply with applicable federal civil rights laws and [place] laws. We do not discriminate on the basis of race, color, national origin, age, disability, sex, sexual orientation, or gender identity.            Thank you!     Thank you for choosing [facility]  [facility]  for your care. Our goal is always to provide you with excellent care. Hearing back from our patients is one way we can continue to improve our services. Please take a few minutes to complete the written survey that you may receive in the mail after your visit with us. Thank you!             Your Updated Medication List - Protect others around you: Learn how to safely use, store and throw away your medicines at www.disposemymeds.org.          This list is accurate as of 6/12/18 12:14 PM.  Always use your most recent med list.                   Brand Name Dispense Instructions for use Diagnosis    ALPRAZolam 0.25 MG tablet    XANAX    28 tablet    TAKE ONE TABLET BY MOUTH TWICE DAILY AS NEEDED FOR ANXIETY    Anxiety       amLODIPine 5 MG tablet    NORVASC    90 tablet    Take 1 tablet (5 mg) by mouth daily    Chest pain, unspecified type       aspirin 81 MG EC tablet      Take 1 tablet (81 mg) by mouth daily    S/P TAVR (transcatheter aortic valve replacement)       atorvastatin 40 MG tablet    LIPITOR    90 tablet    TAKE ONE TABLET BY MOUTH ONCE DAILY    Hyperlipidemia with target LDL less than 130       calcium carbonate 500 MG chewable tablet    TUMS     Take 2-4 tablets (1,000-2,000 mg) by mouth as needed for heartburn        clopidogrel 75 MG tablet    PLAVIX    90 tablet    Take 1 tablet (75 mg) by mouth daily    History of CVA (cerebrovascular accident)       Cranberry 1000 MG Caps           docusate sodium 100 MG capsule    COLACE     Take 200 mg by mouth daily 2 capsules        FLORAJEN ACIDOPHILUS Caps      Take 1 capsule by mouth daily        folic acid 1 MG tablet    FOLVITE    90 tablet    TAKE ONE TABLET BY MOUTH ONCE DAILY    Seropositive rheumatoid arthritis (H)       iron 325 (65 Fe) MG tablet     30 tablet    TAKE ONE TABLET BY MOUTH ONCE DAILY WITH BREAKFAST    Gastrointestinal hemorrhage, unspecified gastrointestinal hemorrhage type       isosorbide  mononitrate 30 MG 24 hr tablet    IMDUR    360 tablet    Take 2 tablets (60 mg) by mouth 2 times daily    Chest pain       levETIRAcetam 500 MG tablet    KEPPRA    90 tablet    Take 1 tablet (500 mg) by mouth 2 times daily    Seizure disorder (H)       levothyroxine 50 MCG tablet    SYNTHROID/LEVOTHROID    30 tablet    TAKE ONE TABLET BY MOUTH ONCE DAILY    Hypothyroidism due to acquired atrophy of thyroid       losartan 25 MG tablet    COZAAR    30 tablet    Take 0.5 tablets (12.5 mg) by mouth daily    Hypertension goal BP (blood pressure) < 140/90       methotrexate 2.5 MG tablet CHEMO     52 tablet    Take 4 tablets (10 mg) by mouth once a week Wed    Seropositive rheumatoid arthritis (H)       metoprolol succinate 50 MG 24 hr tablet    TOPROL-XL    180 tablet    Take 1 tablet (50 mg) by mouth 2 times daily    Chest pain, unspecified type       mirtazapine 30 MG tablet    REMERON    90 tablet    TAKE ONE TABLET BY MOUTH AT BEDTIME    Sleep initiation disorder       Multi-vitamin Tabs tablet      Take 1 tablet by mouth daily        MYLANTA PO      2 tblsp every 4 hours as needed        nitroFURantoin (macrocrystal-monohydrate) 100 MG capsule    MACROBID    14 capsule    Take 1 capsule (100 mg) by mouth 2 times daily    Acute cystitis without hematuria       nitroGLYcerin 0.4 MG sublingual tablet    NITROSTAT    25 tablet    DISSOLVE ONE TABLET UNDER THE TONGUE EVERY 5 MINUTES AS NEEDED FOR CHEST PAIN.  DO NOT EXCEED A TOTAL OF 3 DOSES IN 15 MINUTES    Coronary artery disease involving native coronary artery of native heart without angina pectoris       predniSONE 5 MG tablet    DELTASONE    23 tablet    TAKE ONE TABLET BY MOUTH EVERY OTHER DAY ALTERNATING  WITH  1/2  TABLET  EVERY  OTHER  DAY    Seropositive rheumatoid arthritis (H)       SYNVISC 16 MG/2ML Sosy   Generic drug:  Hylan     2 mL    16 mg by INTRA-ARTICULAR route once    Primary osteoarthritis of right knee       torsemide 10 MG tablet    DEMADEX     10 tablet    Take 1 tablet (10 mg) by mouth daily    SOB (shortness of breath)       TYLENOL ARTHRITIS PAIN 650 MG CR tablet   Generic drug:  acetaminophen      Take 2 tablets (1,300 mg) by mouth every 8 hours as needed for mild pain

## 2018-06-12 NOTE — LETTER
6/12/2018    Augusto Meyer, DO  [address] [place] [address]    RE: Gillian Burk       Dear Colleague,    I had the pleasure of seeing Gillian Burk in the [facility].    HPI and Plan:   See dictation    Orders Placed This Encounter   Procedures     Basic metabolic panel     Lipid Profile     ALT     Follow-Up with Cardiologist       Orders Placed This Encounter   Medications     Cranberry 1000 MG CAPS     amLODIPine (NORVASC) 5 MG tablet     Sig: Take 1 tablet (5 mg) by mouth daily     Dispense:  90 tablet     Refill:  3     metoprolol succinate (TOPROL-XL) 50 MG 24 hr tablet     Sig: Take 1 tablet (50 mg) by mouth 2 times daily     Dispense:  180 tablet     Refill:  3       Medications Discontinued During This Encounter   Medication Reason     lisinopril (PRINIVIL/ZESTRIL) 2.5 MG tablet Stopped by Patient     amLODIPine (NORVASC) 2.5 MG tablet Reorder     metoprolol (TOPROL-XL) 25 MG 24 hr tablet Reorder         Encounter Diagnoses   Name Primary?     Coronary artery disease involving native coronary artery of native heart without angina pectoris      Chest pain, unspecified type      Benign essential hypertension      Hyperlipidemia with target LDL less than 130      Chronic stable angina (H) Yes     Aortic valve disorder        CURRENT MEDICATIONS:  Current Outpatient Prescriptions   Medication Sig Dispense Refill     acetaminophen (TYLENOL ARTHRITIS PAIN) 650 MG CR tablet Take 2 tablets (1,300 mg) by mouth every 8 hours as needed for mild pain       ALPRAZolam (XANAX) 0.25 MG tablet TAKE ONE TABLET BY MOUTH TWICE DAILY AS NEEDED FOR ANXIETY 28 tablet 0     amLODIPine (NORVASC) 5 MG tablet Take 1 tablet (5 mg) by mouth daily 90 tablet 3     aspirin EC 81 MG EC tablet Take 1 tablet (81 mg) by mouth daily       atorvastatin (LIPITOR) 40 MG tablet TAKE ONE TABLET BY MOUTH ONCE DAILY 90 tablet 0     Calcium & Magnesium Carbonates (MYLANTA PO) 2 tblsp  every 4 hours as needed       calcium carbonate (TUMS) 500 MG chewable tablet Take 2-4 tablets (1,000-2,000 mg) by mouth as needed for heartburn       clopidogrel (PLAVIX) 75 MG tablet Take 1 tablet (75 mg) by mouth daily 90 tablet 3     Cranberry 1000 MG CAPS        docusate sodium (COLACE) 100 MG capsule Take 200 mg by mouth daily 2 capsules       Ferrous Sulfate (IRON) 325 (65 FE) MG tablet TAKE ONE TABLET BY MOUTH ONCE DAILY WITH BREAKFAST 30 tablet 11     folic acid (FOLVITE) 1 MG tablet TAKE ONE TABLET BY MOUTH ONCE DAILY 90 tablet 1     Hylan (SYNVISC) 16 MG/2ML SOSY 16 mg by INTRA-ARTICULAR route once 2 mL 0     isosorbide mononitrate (IMDUR) 30 MG 24 hr tablet Take 2 tablets (60 mg) by mouth 2 times daily 360 tablet 3     Lactobacillus (FLORAJEN ACIDOPHILUS) CAPS Take 1 capsule by mouth daily       levETIRAcetam (KEPPRA) 500 MG tablet Take 1 tablet (500 mg) by mouth 2 times daily 90 tablet 6     levothyroxine (SYNTHROID/LEVOTHROID) 50 MCG tablet TAKE ONE TABLET BY MOUTH ONCE DAILY 30 tablet 3     losartan (COZAAR) 25 MG tablet Take 0.5 tablets (12.5 mg) by mouth daily 30 tablet 1     methotrexate 2.5 MG tablet Take 4 tablets (10 mg) by mouth once a week Wed 52 tablet 3     metoprolol succinate (TOPROL-XL) 50 MG 24 hr tablet Take 1 tablet (50 mg) by mouth 2 times daily 180 tablet 3     mirtazapine (REMERON) 30 MG tablet TAKE ONE TABLET BY MOUTH AT BEDTIME 90 tablet 2     multivitamin, therapeutic with minerals (MULTI-VITAMIN) TABS Take 1 tablet by mouth daily       nitroFURantoin, macrocrystal-monohydrate, (MACROBID) 100 MG capsule Take 1 capsule (100 mg) by mouth 2 times daily 14 capsule 0     predniSONE (DELTASONE) 5 MG tablet TAKE ONE TABLET BY MOUTH EVERY OTHER DAY ALTERNATING  WITH  1/2  TABLET  EVERY  OTHER  DAY 23 tablet 13     torsemide (DEMADEX) 10 MG tablet Take 1 tablet (10 mg) by mouth daily 10 tablet 0     nitroGLYcerin (NITROSTAT) 0.4 MG sublingual tablet DISSOLVE ONE TABLET UNDER THE TONGUE  EVERY 5 MINUTES AS NEEDED FOR CHEST PAIN.  DO NOT EXCEED A TOTAL OF 3 DOSES IN 15 MINUTES (Patient not taking: Reported on 4/28/2018) 25 tablet 0     [DISCONTINUED] amLODIPine (NORVASC) 2.5 MG tablet Take 1 tablet (2.5 mg) by mouth daily 90 tablet 3     [DISCONTINUED] metoprolol (TOPROL-XL) 25 MG 24 hr tablet Take 2 tablets (50 mg) po qam and 1 tablet (25 mg) po qpm 270 tablet 3       ALLERGIES     Allergies   Allergen Reactions     Caffeine Other (See Comments)     Triggers your Meniere's disease     Hydrocodone Other (See Comments)     hallucinations     Ibuprofen GI Disturbance     Penicillins Hives     Tramadol Other (See Comments)     Seizure, was taking remeron along with tramadol     Metal [Staples] Rash       PAST MEDICAL HISTORY:  Past Medical History:   Diagnosis Date     Aortic stenosis     s/p TAVR 8/17/16     Chronic migraine      Hyperlipidaemia      Hypertension      Hypothyroidism      Myocardial infarction      Need for SBE (subacute bacterial endocarditis) prophylaxis      Orthostatic hypotension     wears compression stockings     PUD (peptic ulcer disease)      Secondary Sjogren's syndrome (H)      Seizures (H)     after stroke (partial onset)     Seropositive rheumatoid arthritis (H)      Stroke (H) 05/2013    with visual field cut, left occipital lobe     Syncope 2014    due to orthostatic hypotension       PAST SURGICAL HISTORY:  Past Surgical History:   Procedure Laterality Date     C TOTAL HIP ARTHROPLASTY Right 2010     C TOTAL HIP ARTHROPLASTY Left 2014     CATARACT IOL, RT/LT Bilateral 2000     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     HEART CATH FEMORAL CANNULIZATION WITH OPEN STANDBY REPAIR AORTIC VALVE N/A 8/3/2016    Procedure: HEART CATH FEMORAL CANNULIZATION WITH OPEN STANDBY REPAIR AORTIC VALVE;  Surgeon: Owen Schultz MD;  Location: [facility] OR     HYSTERECTOMY TOTAL ABDOMINAL  1970    ovaries out     MOUTH SURGERY  2011    jaw surgery due to fracture      "TRANSCATHETER AORTIC VALVE IMPLANT ANESTHESIA N/A 8/3/2016    Procedure: TRANSCATHETER AORTIC VALVE IMPLANT ANESTHESIA;  Surgeon: GENERIC ANESTHESIA PROVIDER;  Location: [facility] OR       FAMILY HISTORY:  Family History   Problem Relation Age of Onset     Hyperlipidemia Mother      Family History Negative No family hx of        SOCIAL HISTORY:  Social History     Social History     Marital status:      Spouse name: N/A     Number of children: 4     Years of education: N/A     Occupational History      Retired     Social History Main Topics     Smoking status: Never Smoker     Smokeless tobacco: Never Used     Alcohol use No     Drug use: No     Sexual activity: No     Other Topics Concern     Special Diet Yes     low salt      Exercise Yes     semi recument bike 15 mins daily      Social History Narrative    .  Lives in assisted living. Independent. Has help with making bed and changing sheets.    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 .     4 children - 1 with RA           Review of Systems:  Skin:  Negative       Eyes:  Positive for glasses    ENT:  Positive for hearing loss    Respiratory:  Positive for cough;mucoid expectorant     Cardiovascular:  Negative for;palpitations;chest pain;lightheadedness;dizziness edema;Positive for    Gastroenterology: Negative      Genitourinary:  Negative      Musculoskeletal:  Positive for arthritis low back pain   Neurologic:  Positive for numbness or tingling of feet toes,  has had this since 2006  Psychiatric:  Positive for anxiety occ.  Heme/Lymph/Imm:  Negative      Endocrine:  Positive for thyroid disorder      Physical Exam:  Vitals: /80 (BP Location: Right arm, Patient Position: Fowlers, Cuff Size: Adult Regular)  Pulse 76  Ht 1.676 m (5' 6\")  Wt 60.4 kg (133 lb 1.6 oz)  SpO2 93%  BMI 21.48 kg/m2    Constitutional:  cooperative, alert and oriented, well developed, well nourished, in no acute distress        Skin:  warm and dry " to the touch, no apparent skin lesions or masses noted          Head:  normocephalic, no masses or lesions        Eyes:  pupils equal and round;conjunctivae and lids unremarkable        Lymph:No Cervical lymphadenopathy present     ENT:  no pallor or cyanosis        Neck:  carotid pulses are full and equal bilaterally, JVP normal, no carotid bruit        Respiratory:    fine rales;basal rales       Cardiac: regular rhythm frequent premature beats     systolic murmur;LUSB;grade 1                                                 GI:  abdomen soft;BS normoactive        Extremities and Muscular Skeletal:  no deformities, clubbing, cyanosis, erythema observed;no edema              Neurological:  no gross motor deficits;affect appropriate        Psych:  affect appropriate, oriented to time, person and place        CC  Jones Lee MD  [address]  [place] [address]                Thank you for allowing me to participate in the care of your patient.      Sincerely,     JONES LEE MD     [facility]    cc:   Jones Lee MD  [address]  [place] [address]

## 2018-07-24 DIAGNOSIS — Z53.9 ERRONEOUS ENCOUNTER--DISREGARD: Primary | ICD-10-CM

## 2018-07-25 ENCOUNTER — OFFICE VISIT (OUTPATIENT)
Dept: CARDIOLOGY | Facility: CLINIC | Age: 83
End: 2018-07-25
Payer: COMMERCIAL

## 2018-07-25 ENCOUNTER — HOSPITAL ENCOUNTER (OUTPATIENT)
Dept: CARDIOLOGY | Facility: CLINIC | Age: 83
Discharge: HOME | End: 2018-07-25
Attending: INTERNAL MEDICINE | Admitting: INTERNAL MEDICINE
Payer: COMMERCIAL

## 2018-07-25 ENCOUNTER — APPOINTMENT (OUTPATIENT)
Dept: CARDIOLOGY | Facility: CLINIC | Age: 83
End: 2018-07-25
Attending: INTERNAL MEDICINE
Payer: COMMERCIAL

## 2018-07-25 ENCOUNTER — HOSPITAL ENCOUNTER (OUTPATIENT)
Facility: CLINIC | Age: 83
Discharge: HOME | End: 2018-07-25
Attending: INTERNAL MEDICINE | Admitting: INTERNAL MEDICINE
Payer: COMMERCIAL

## 2018-07-25 VITALS
BODY MASS INDEX: 21.05 KG/M2 | WEIGHT: 131 LBS | HEIGHT: 66 IN | SYSTOLIC BLOOD PRESSURE: 138 MMHG | DIASTOLIC BLOOD PRESSURE: 74 MMHG | HEART RATE: 80 BPM

## 2018-07-25 DIAGNOSIS — Z53.9 ERRONEOUS ENCOUNTER--DISREGARD: Primary | ICD-10-CM

## 2018-07-25 DIAGNOSIS — E78.5 HYPERLIPIDEMIA WITH TARGET LDL LESS THAN 130: ICD-10-CM

## 2018-07-25 DIAGNOSIS — I35.0 NONRHEUMATIC AORTIC VALVE STENOSIS: Primary | ICD-10-CM

## 2018-07-25 DIAGNOSIS — Z95.2 S/P TAVR (TRANSCATHETER AORTIC VALVE REPLACEMENT): ICD-10-CM

## 2018-07-25 DIAGNOSIS — I25.10 CORONARY ARTERY DISEASE INVOLVING NATIVE CORONARY ARTERY OF NATIVE HEART WITHOUT ANGINA PECTORIS: ICD-10-CM

## 2018-07-25 DIAGNOSIS — I10 HYPERTENSION GOAL BP (BLOOD PRESSURE) < 140/90: ICD-10-CM

## 2018-07-25 PROCEDURE — 93306 TTE W/DOPPLER COMPLETE: CPT

## 2018-07-25 PROCEDURE — 93306 TTE W/DOPPLER COMPLETE: CPT | Mod: 26 | Performed by: INTERNAL MEDICINE

## 2018-07-25 PROCEDURE — 76000 FLUOROSCOPY <1 HR PHYS/QHP: CPT | Mod: XE

## 2018-07-25 PROCEDURE — 99214 OFFICE O/P EST MOD 30 MIN: CPT | Performed by: NURSE PRACTITIONER

## 2018-07-25 RX ORDER — ISOSORBIDE MONONITRATE 30 MG/1
60 TABLET, EXTENDED RELEASE ORAL DAILY
Qty: 360 TABLET | Refills: 3 | Status: ON HOLD | OUTPATIENT
Start: 2018-07-25 | End: 2019-08-06 | Stop reason: DRUGHIGH

## 2018-07-25 NOTE — PATIENT INSTRUCTIONS
Thanks for coming into [facility] clinic today.    Doing well on a TAVR standpoint  Echo confirms mean gradient of 8mm Hg, normal for your valve.   Continue on current medical therapy.   May take additional Imdur in the pm if needed for chest pain  Follow up with Dr. Lee as scheduled.         Please call my nurse at [phone] with any questions or concerns.    Scheduling phone number: [phone]  Reminder: Please bring in all current medications, over the counter supplements and vitamin bottles to your next appointment.

## 2018-07-25 NOTE — MR AVS SNAPSHOT
After Visit Summary   7/25/2018    Gillian Burk    MRN: [id number]           Patient Information     Date Of Birth          3/19/1927        Visit Information        Provider Department      7/25/2018 9:45 AM STEPHANE RESEARCH NURSE [facility]        Today's Diagnoses     ERRONEOUS ENCOUNTER--DISREGARD    -  1       Follow-ups after your visit        Who to contact     If you have questions or need follow up information about today's clinic visit or your schedule please contact [facility]   [facility] directly at [phone].  Normal or non-critical lab and imaging results will be communicated to you by Pinguphart, letter or phone within 4 business days after the clinic has received the results. If you do not hear from us within 7 days, please contact the clinic through Twirl TV or phone. If you have a critical or abnormal lab result, we will notify you by phone as soon as possible.  Submit refill requests through Twirl TV or call your pharmacy and they will forward the refill request to us. Please allow 3 business days for your refill to be completed.          Additional Information About Your Visit        MyChart Information     Twirl TV gives you secure access to your electronic health record. If you see a primary care provider, you can also send messages to your care team and make appointments. If you have questions, please call your primary care clinic.  If you do not have a primary care provider, please call [phone] and they will assist you.        Care EveryWhere ID     This is your Care EveryWhere ID. This could be used by other organizations to access your [place] medical records  [id number]         Blood Pressure from Last 3 Encounters:   07/25/18 138/74   06/12/18 140/80   04/28/18 148/70    Weight from Last 3 Encounters:   07/25/18 59.4 kg (131 lb)   06/12/18 60.4 kg (133 lb 1.6 oz)   04/06/18 62.1 kg (137 lb)               Today, you had the following     No orders found for display       Primary Care Provider Office Phone # Fax #    Augusto Meyer, [phone]       [address] [place] [address]        Equal Access to Services     RIVER TRENT : Hadii aad ku hadasho Soomaali, waaxda luqadaha, qaybta kaalmada adeegyada, waxay idiin hayaan adeshamir stock[place] lamando . So [facility] [phone].    ATENCIÓN: Si habla español, tiene a granger disposición servicios gratuitos de asistencia lingüística. Llame al [phone].    We comply with applicable federal civil rights laws and [place] laws. We do not discriminate on the basis of race, color, national origin, age, disability, sex, sexual orientation, or gender identity.            Thank you!     Thank you for choosing [facility]  for your care. Our goal is always to provide you with excellent care. Hearing back from our patients is one way we can continue to improve our services. Please take a few minutes to complete the written survey that you may receive in the mail after your visit with us. Thank you!             Your Updated Medication List - Protect others around you: Learn how to safely use, store and throw away your medicines at www.disposemymeds.org.          This list is accurate as of 7/25/18 10:56 AM.  Always use your most recent med list.                   Brand Name Dispense Instructions for use Diagnosis    ALPRAZolam 0.25 MG tablet    XANAX    28 tablet    TAKE ONE TABLET BY MOUTH TWICE DAILY AS NEEDED FOR ANXIETY    Anxiety       amLODIPine 5 MG tablet    NORVASC    90 tablet    Take 1 tablet (5 mg) by mouth daily    Chest pain, unspecified type       aspirin 81 MG EC tablet      Take 1 tablet (81 mg) by mouth daily    S/P TAVR (transcatheter aortic valve replacement)       atorvastatin 40 MG tablet    LIPITOR    90 tablet    TAKE ONE TABLET BY MOUTH ONCE DAILY    Hyperlipidemia with target LDL less than  130       calcium carbonate 500 MG chewable tablet    TUMS     Take 2-4 tablets (1,000-2,000 mg) by mouth as needed for heartburn        clopidogrel 75 MG tablet    PLAVIX    90 tablet    Take 1 tablet (75 mg) by mouth daily    History of CVA (cerebrovascular accident)       Cranberry 1000 MG Caps           docusate sodium 100 MG capsule    COLACE     Take 200 mg by mouth daily 2 capsules        FLORAJEN ACIDOPHILUS Caps      Take 1 capsule by mouth daily        folic acid 1 MG tablet    FOLVITE    90 tablet    TAKE ONE TABLET BY MOUTH ONCE DAILY    Seropositive rheumatoid arthritis (H)       iron 325 (65 Fe) MG tablet     30 tablet    TAKE ONE TABLET BY MOUTH ONCE DAILY WITH BREAKFAST    Gastrointestinal hemorrhage, unspecified gastrointestinal hemorrhage type       isosorbide mononitrate 30 MG 24 hr tablet    IMDUR    360 tablet    Take 2 tablets (60 mg) by mouth daily        levETIRAcetam 500 MG tablet    KEPPRA    90 tablet    Take 1 tablet (500 mg) by mouth 2 times daily    Seizure disorder (H)       levothyroxine 50 MCG tablet    SYNTHROID/LEVOTHROID    30 tablet    TAKE ONE TABLET BY MOUTH ONCE DAILY    Hypothyroidism due to acquired atrophy of thyroid       losartan 25 MG tablet    COZAAR    30 tablet    Take 0.5 tablets (12.5 mg) by mouth daily    Hypertension goal BP (blood pressure) < 140/90       methotrexate 2.5 MG tablet CHEMO     52 tablet    Take 4 tablets (10 mg) by mouth once a week Wed    Seropositive rheumatoid arthritis (H)       metoprolol succinate 50 MG 24 hr tablet    TOPROL-XL    180 tablet    Take 1 tablet (50 mg) by mouth 2 times daily    Chest pain, unspecified type       mirtazapine 30 MG tablet    REMERON    90 tablet    TAKE ONE TABLET BY MOUTH AT BEDTIME    Sleep initiation disorder       Multi-vitamin Tabs tablet      Take 1 tablet by mouth daily        MYLANTA PO      2 tblsp every 4 hours as needed        nitroFURantoin (macrocrystal-monohydrate) 100 MG capsule    MACROBID    14  capsule    Take 1 capsule (100 mg) by mouth 2 times daily    Acute cystitis without hematuria       nitroGLYcerin 0.4 MG sublingual tablet    NITROSTAT    25 tablet    DISSOLVE ONE TABLET UNDER THE TONGUE EVERY 5 MINUTES AS NEEDED FOR CHEST PAIN.  DO NOT EXCEED A TOTAL OF 3 DOSES IN 15 MINUTES    Coronary artery disease involving native coronary artery of native heart without angina pectoris       predniSONE 5 MG tablet    DELTASONE    23 tablet    TAKE ONE TABLET BY MOUTH EVERY OTHER DAY ALTERNATING  WITH  1/2  TABLET  EVERY  OTHER  DAY    Seropositive rheumatoid arthritis (H)       SYNVISC 16 MG/2ML Sosy   Generic drug:  Hylan     2 mL    16 mg by INTRA-ARTICULAR route once    Primary osteoarthritis of right knee       torsemide 10 MG tablet    DEMADEX    10 tablet    Take 1 tablet (10 mg) by mouth daily    SOB (shortness of breath)       TYLENOL ARTHRITIS PAIN 650 MG CR tablet   Generic drug:  acetaminophen      Take 2 tablets (1,300 mg) by mouth every 8 hours as needed for mild pain

## 2018-07-25 NOTE — MR AVS SNAPSHOT
After Visit Summary   7/25/2018    Gillian Burk    MRN: [id number]           Patient Information     Date Of Birth          3/19/1927        Visit Information        Provider Department      7/25/2018 12:30 PM Elizabeth Gonzalez APRN CNP [facility]   [place]        Today's Diagnoses     Other chest pain    -  1      Care Instructions    Thanks for coming into [facility] today.    Doing well on a TAVR standpoint  Echo confirms mean gradient of 8mm Hg, normal for your valve.   Continue on current medical therapy.   May take additional Imdur in the pm if needed for chest pain  Follow up with Dr. Lee as scheduled.         Please call my nurse at [phone] with any questions or concerns.    Scheduling phone number: [phone]  Reminder: Please bring in all current medications, over the counter supplements and vitamin bottles to your next appointment.                Follow-ups after your visit        Who to contact     If you have questions or need follow up information about today's clinic visit or your schedule please contact [facility] directly at [phone].  Normal or non-critical lab and imaging results will be communicated to you by Synergy Hubhart, letter or phone within 4 business days after the clinic has received the results. If you do not hear from us within 7 days, please contact the clinic through Zafut or phone. If you have a critical or abnormal lab result, we will notify you by phone as soon as possible.  Submit refill requests through Ankota or call your pharmacy and they will forward the refill request to us. Please allow 3 business days for your refill to be completed.          Additional Information About Your Visit        MyChart Information     Ankota gives you secure access to your electronic health record. If you see a primary care provider, you can also send messages to your care  "team and make appointments. If you have questions, please call your primary care clinic.  If you do not have a primary care provider, please call [phone] and they will assist you.        Care EveryWhere ID     This is your Care EveryWhere ID. This could be used by other organizations to access your [place] medical records  [id number]        Your Vitals Were     Pulse Height BMI (Body Mass Index)             80 1.676 m (5' 6\") 21.14 kg/m2          Blood Pressure from Last 3 Encounters:   07/25/18 138/74   06/12/18 140/80   04/28/18 148/70    Weight from Last 3 Encounters:   07/25/18 59.4 kg (131 lb)   06/12/18 60.4 kg (133 lb 1.6 oz)   04/06/18 62.1 kg (137 lb)              Today, you had the following     No orders found for display         Today's Medication Changes      Notice     This visit is during an admission. Changes to the med list made in this visit will be reflected in the After Visit Summary of the admission.             Primary Care Provider Office Phone # Fax #    Augustoyusuf Meridalaina,  [phone]       [address]   [place] [address]        Equal Access to Services     RIVER TRENT AH: Hadii veronica de la pazo Soshayy, waaxda luqadaha, qaybta kaalmada adeegyada, zack ayers. So [facility] [phone].    ATENCIÓN: Si habla español, tiene a granger disposición servicios gratuitos de asistencia lingüística. Llame al [phone].    We comply with applicable federal civil rights laws and [place] laws. We do not discriminate on the basis of race, color, national origin, age, disability, sex, sexual orientation, or gender identity.            Thank you!     Thank you for choosing [facility]  for your care. Our goal is always to provide you with excellent care. Hearing back from our patients is one way we can continue to improve our services. Please take a few minutes to complete the written survey that you may " receive in the mail after your visit with us. Thank you!             Your Updated Medication List - Protect others around you: Learn how to safely use, store and throw away your medicines at www.disposemymeds.org.      Notice     This visit is during an admission. Changes to the med list made in this visit will be reflected in the After Visit Summary of the admission.

## 2018-07-25 NOTE — PROGRESS NOTES
[facility]  Post-TAVR Clinic Visit      HPI    Gillian Burk is a very pleasant 89-year-old woman who comes to the office today accompanied by her daughter for her annual transcatheter aortic valve replacement follow up.     She carries a past medical history notable for coronary artery disease, known mid to distal LAD lesion 60-70% medically managed due to significant symptom improvement after the increase in isosorbide, aortic stenosis status post TAVR (2016) with a 27 mm angy tissue valve, CVA in 2013 (on chronic Plavix and aspirin), ischemic cardiomyopathy, hypertension, hyperlipidemia, GERD, left bundle branch block, and hypothyroidism.    She is feeling well on a cardiac standpoint, denies chest pain, shortness of breath, palpitations, PND, orthopnea, syncope, presyncope, or lower extremity edema.  She states her chest pain has resolved since increasing her isosorbide to 60 mg in the a.m. and 60 mg at 2 PM.  Over the past 3 weeks, she has started to taper her p.m. isosorbide dose to every 2 days, without any reoccurrence of chest pain.  She plans to stop taking the pm dose and use only as needed.     Today for follow up echo showed   The left ventricle is normal in size.  The visual ejection fraction is estimated at 35-40%.  There is moderate global hypokinesia of the left ventricle.  Septal motion is consistent with conduction abnormality.  Grade I or early diastolic dysfunction.  There is moderate to severe mitral annular calcification.  There is mild to moderate (1-2+) mitral regurgitation.  There is mild mitral stenosis, mean gradient 4.5 mmHg.  There is mild (1+) tricuspid regurgitation.  The right ventricular systolic pressure is approximated at 30mmHg plus the  right atrial pressure.  There is a bioprosthetic aortic valve post TAVR, mean gradient 8 mmHg, normal.   Compared to prior study, there is no significant change.    Her blood pressure is well controlled at 138/74, heart rate  "80.  She lives in an assisted living facility who reports stable blood pressures in the 130s over 70s.  She is well controlled on amlodipine 5 mg, isosorbide, losartan 12.5 mg, and metoprolol XL 50 mg twice daily.  She continues on low-dose torsemide, creatinine stable at 0.82  She is due for a fasting lipid panel, last LDL at goal 69.  Continues on 40 mg of Lipitor daily.    She admits her activity level has significantly reduced over the past few months due to her \"lack of ambition\".    She states she does have a recumbent bicycle in her basement which she plans to start using on a regular basis.  Eating well, sleeping well, compliant with all medications.    NYHA CLASS: Class 2      Assessment and Plan   1.  Aortic stenosis   - S/p TAVR (8/2016) with a 27 mm Treva tissue valve.  Follow-up echo confirms a mean gradient of 8 mmHg, SEAN 3.0 cm   -Continue on Plavix, aspirin, Toprol, losartan, isosorbide, diuretics, and statin.  -Encourage activity  -Annual echo for evaluation    2.  Coronary artery disease  -Stable mid to distal LAD lesion (70%)  -Angina symptoms well controlled on isosorbide  -Continue on current medical therapy    3.  Ischemic cardiomyopathy  -Echo confirms EF 35-40%, unchanged from previous  -Continue on torsemide, BB, and ARB    4.  Hypertension  -Mildly elevated today at 138/74, home pressures are stable in the low 130s over 70s.  -Continue current medical therapy    5.  Hyperlipidemia  -Last LDL at goal, due for lipids, continue on statin        PAST MEDICAL HISTORY:  Past Medical History:   Diagnosis Date     Aortic stenosis     s/p TAVR 8/17/16     Chronic migraine      Hyperlipidaemia      Hypertension      Hypothyroidism      Myocardial infarction      Need for SBE (subacute bacterial endocarditis) prophylaxis      Orthostatic hypotension     wears compression stockings     PUD (peptic ulcer disease)      Secondary Sjogren's syndrome (H)      Seizures (H)     after stroke (partial onset) "     Seropositive rheumatoid arthritis (H)      Stroke (H) 05/2013    with visual field cut, left occipital lobe     Syncope 2014    due to orthostatic hypotension       CURRENT MEDICATIONS:  Current Outpatient Prescriptions   Medication Sig Dispense Refill     isosorbide mononitrate (IMDUR) 30 MG 24 hr tablet Take 2 tablets (60 mg) by mouth daily 360 tablet 3       PAST SURGICAL HISTORY:  Past Surgical History:   Procedure Laterality Date     C TOTAL HIP ARTHROPLASTY Right 2010     C TOTAL HIP ARTHROPLASTY Left 2014     CATARACT IOL, RT/LT Bilateral 2000     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     HEART CATH FEMORAL CANNULIZATION WITH OPEN STANDBY REPAIR AORTIC VALVE N/A 8/3/2016    Procedure: HEART CATH FEMORAL CANNULIZATION WITH OPEN STANDBY REPAIR AORTIC VALVE;  Surgeon: Owen Schultz MD;  Location: [facility] OR     HYSTERECTOMY TOTAL ABDOMINAL  1970    ovaries out     MOUTH SURGERY  2011    jaw surgery due to fracture     TRANSCATHETER AORTIC VALVE IMPLANT ANESTHESIA N/A 8/3/2016    Procedure: TRANSCATHETER AORTIC VALVE IMPLANT ANESTHESIA;  Surgeon: GENERIC ANESTHESIA PROVIDER;  Location: [facility] OR       ALLERGIES     Allergies   Allergen Reactions     Caffeine Other (See Comments)     Triggers your Meniere's disease     Hydrocodone Other (See Comments)     hallucinations     Ibuprofen GI Disturbance     Penicillins Hives     Tramadol Other (See Comments)     Seizure, was taking remeron along with tramadol     Metal [Staples] Rash       FAMILY HISTORY:  Family History   Problem Relation Age of Onset     Hyperlipidemia Mother      Family History Negative No family hx of        SOCIAL HISTORY:  Social History     Social History     Marital status:      Spouse name: N/A     Number of children: 4     Years of education: N/A     Occupational History      Retired     Social History Main Topics     Smoking status: Never Smoker     Smokeless tobacco: Never Used     Alcohol use No     Drug use: No      "Sexual activity: No     Other Topics Concern     Special Diet Yes     low salt      Exercise Yes     semi recument bike 15 mins daily      Social History Narrative    .  Lives in assisted living. Independent. Has help with making bed and changing sheets.    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 .     4 children - 1 with RA             Review of Systems:  Skin:  Negative     Eyes:  Positive for glasses  ENT:  Positive for hearing loss  Respiratory:  Negative    Cardiovascular:  Negative for;palpitations;lightheadedness;dizziness;edema;chest pain    Gastroenterology: Negative    Genitourinary:  Positive for urinary tract infection  Musculoskeletal:  Positive for arthritis  Neurologic:  Positive for numbness or tingling of feet  Psychiatric:  Positive for anxiety  Heme/Lymph/Imm:  Negative    Endocrine:  Positive for thyroid disorder    EXAM:  /74  Pulse 80  Ht 1.676 m (5' 6\")  Wt 59.4 kg (131 lb)  BMI 21.14 kg/m2  Physical Exam:  Vitals: /74  Pulse 80  Ht 1.676 m (5' 6\")  Wt 59.4 kg (131 lb)  BMI 21.14 kg/m2    Constitutional:  cooperative, alert and oriented, well developed, well nourished, in no acute distress        Skin:  warm and dry to the touch, no apparent skin lesions or masses noted        Head:  normocephalic, no masses or lesions        Eyes:  pupils equal and round, conjunctivae and lids unremarkable, sclera white, no xanthalasma, EOMS intact, no nystagmus        ENT:  no pallor or cyanosis, dentition good        Neck:  carotid pulses are full and equal bilaterally, JVP normal, no carotid bruit        Chest:  normal breath sounds, clear to auscultation, normal A-P diameter, normal symmetry, normal respiratory excursion, no use of accessory muscles        Cardiac: regular rhythm;normal S1 and S2       systolic murmur          Abdomen:  abdomen soft;non-tender        Vascular:                                        Extremities and Back:  no deformities, " clubbing, cyanosis, erythema observed;no edema        Neurological:  no gross motor deficits   walks with cane    Labs:  LIPID RESULTS:  Lab Results   Component Value Date    CHOL 172 07/07/2015    HDL 75 07/07/2015    LDL 69 07/07/2015    TRIG 142 07/07/2015    CHOLHDLRATIO 2.3 07/07/2015       LIVER ENZYME RESULTS:  Lab Results   Component Value Date    AST 22 02/19/2018    ALT 24 02/19/2018       CBC RESULTS:  Lab Results   Component Value Date    WBC 6.7 02/19/2018    RBC 3.87 02/19/2018    HGB 11.9 02/19/2018    HCT 37.8 02/19/2018    MCV 98 02/19/2018    MCH 30.7 02/19/2018    MCHC 31.5 02/19/2018    RDW 15.2 (H) 02/19/2018     02/19/2018       BMP RESULTS:  Lab Results   Component Value Date     04/04/2018    POTASSIUM 3.7 04/04/2018    CHLORIDE 98 04/04/2018    CO2 30 04/04/2018    ANIONGAP 7 04/04/2018    GLC 92 04/04/2018    BUN 14 04/04/2018    CR 0.82 04/04/2018    GFRESTIMATED 65 04/04/2018    GFRESTBLACK 79 04/04/2018    CHEYANNE 8.6 04/04/2018        A1C RESULTS:  No results found for: A1C    INR RESULTS:  Lab Results   Component Value Date    INR 1.04 12/07/2017    INR 0.91 11/13/2017       ADRIÁN Millard, CNP  Pager [phone]  7/25/2018

## 2018-07-25 NOTE — LETTER
7/25/2018    Augusto Meyer, DO  [address] [place] [address]    RE: Gillian Burk       Dear Colleague,    I had the pleasure of seeing Gillian Burk in the [facility].        STRUCTURAL HEART CARE  Post-TAVR Clinic Visit      HPI    Gillian Burk is a very pleasant 89-year-old woman who comes to the office today accompanied by her daughter for her annual transcatheter aortic valve replacement follow up.     She carries a past medical history notable for coronary artery disease, known mid to distal LAD lesion 60-70% medically managed due to significant symptom improvement after the increase in isosorbide, aortic stenosis status post TAVR (2016) with a 27 mm angy tissue valve, CVA in 2013 (on chronic Plavix and aspirin), ischemic cardiomyopathy, hypertension, hyperlipidemia, GERD, left bundle branch block, and hypothyroidism.    She is feeling well on a cardiac standpoint, denies chest pain, shortness of breath, palpitations, PND, orthopnea, syncope, presyncope, or lower extremity edema.  She states her chest pain has resolved since increasing her isosorbide to 60 mg in the a.m. and 60 mg at 2 PM.  Over the past 3 weeks, she has started to taper her p.m. isosorbide dose to every 2 days, without any reoccurrence of chest pain.  She plans to stop taking the pm dose and use only as needed.     Today for follow up echo showed   The left ventricle is normal in size.  The visual ejection fraction is estimated at 35-40%.  There is moderate global hypokinesia of the left ventricle.  Septal motion is consistent with conduction abnormality.  Grade I or early diastolic dysfunction.  There is moderate to severe mitral annular calcification.  There is mild to moderate (1-2+) mitral regurgitation.  There is mild mitral stenosis, mean gradient 4.5 mmHg.  There is mild (1+) tricuspid regurgitation.  The right ventricular systolic pressure is approximated at 30mmHg  "plus the  right atrial pressure.  There is a bioprosthetic aortic valve post TAVR, mean gradient 8 mmHg, normal.   Compared to prior study, there is no significant change.    Her blood pressure is well controlled at 138/74, heart rate 80.  She lives in an assisted living facility who reports stable blood pressures in the 130s over 70s.  She is well controlled on amlodipine 5 mg, isosorbide, losartan 12.5 mg, and metoprolol XL 50 mg twice daily.  She continues on low-dose torsemide, creatinine stable at 0.82  She is due for a fasting lipid panel, last LDL at goal 69.  Continues on 40 mg of Lipitor daily.    She admits her activity level has significantly reduced over the past few months due to her \"lack of ambition\".    She states she does have a recumbent bicycle in her basement which she plans to start using on a regular basis.  Eating well, sleeping well, compliant with all medications.    NYHA CLASS: Class 2      Assessment and Plan   1.  Aortic stenosis   - S/p TAVR (8/2016) with a 27 mm Treva tissue valve.  Follow-up echo confirms a mean gradient of 8 mmHg, SEAN 3.0 cm   -Continue on Plavix, aspirin, Toprol, losartan, isosorbide, diuretics, and statin.  -Encourage activity  -Annual echo for evaluation    2.  Coronary artery disease  -Stable mid to distal LAD lesion (70%)  -Angina symptoms well controlled on isosorbide  -Continue on current medical therapy    3.  Ischemic cardiomyopathy  -Echo confirms EF 35-40%, unchanged from previous  -Continue on torsemide, BB, and ARB    4.  Hypertension  -Mildly elevated today at 138/74, home pressures are stable in the low 130s over 70s.  -Continue current medical therapy    5.  Hyperlipidemia  -Last LDL at goal, due for lipids, continue on statin        PAST MEDICAL HISTORY:  Past Medical History:   Diagnosis Date     Aortic stenosis     s/p TAVR 8/17/16     Chronic migraine      Hyperlipidaemia      Hypertension      Hypothyroidism      Myocardial infarction      Need " for SBE (subacute bacterial endocarditis) prophylaxis      Orthostatic hypotension     wears compression stockings     PUD (peptic ulcer disease)      Secondary Sjogren's syndrome (H)      Seizures (H)     after stroke (partial onset)     Seropositive rheumatoid arthritis (H)      Stroke (H) 05/2013    with visual field cut, left occipital lobe     Syncope 2014    due to orthostatic hypotension       CURRENT MEDICATIONS:  Current Outpatient Prescriptions   Medication Sig Dispense Refill     isosorbide mononitrate (IMDUR) 30 MG 24 hr tablet Take 2 tablets (60 mg) by mouth daily 360 tablet 3       PAST SURGICAL HISTORY:  Past Surgical History:   Procedure Laterality Date     C TOTAL HIP ARTHROPLASTY Right 2010     C TOTAL HIP ARTHROPLASTY Left 2014     CATARACT IOL, RT/LT Bilateral 2000     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     HEART CATH FEMORAL CANNULIZATION WITH OPEN STANDBY REPAIR AORTIC VALVE N/A 8/3/2016    Procedure: HEART CATH FEMORAL CANNULIZATION WITH OPEN STANDBY REPAIR AORTIC VALVE;  Surgeon: Owen Schultz MD;  Location: [facility] OR     HYSTERECTOMY TOTAL ABDOMINAL  1970    ovaries out     MOUTH SURGERY  2011    jaw surgery due to fracture     TRANSCATHETER AORTIC VALVE IMPLANT ANESTHESIA N/A 8/3/2016    Procedure: TRANSCATHETER AORTIC VALVE IMPLANT ANESTHESIA;  Surgeon: GENERIC ANESTHESIA PROVIDER;  Location: [facility] OR       ALLERGIES     Allergies   Allergen Reactions     Caffeine Other (See Comments)     Triggers your Meniere's disease     Hydrocodone Other (See Comments)     hallucinations     Ibuprofen GI Disturbance     Penicillins Hives     Tramadol Other (See Comments)     Seizure, was taking remeron along with tramadol     Metal [Staples] Rash       FAMILY HISTORY:  Family History   Problem Relation Age of Onset     Hyperlipidemia Mother      Family History Negative No family hx of        SOCIAL HISTORY:  Social History     Social History     Marital status:      Spouse name:  "N/A     Number of children: 4     Years of education: N/A     Occupational History      Retired     Social History Main Topics     Smoking status: Never Smoker     Smokeless tobacco: Never Used     Alcohol use No     Drug use: No     Sexual activity: No     Other Topics Concern     Special Diet Yes     low salt      Exercise Yes     semi recument bike 15 mins daily      Social History Narrative    .  Lives in assisted living. Independent. Has help with making bed and changing sheets.    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 .     4 children - 1 with RA             Review of Systems:  Skin:  Negative     Eyes:  Positive for glasses  ENT:  Positive for hearing loss  Respiratory:  Negative    Cardiovascular:  Negative for;palpitations;lightheadedness;dizziness;edema;chest pain    Gastroenterology: Negative    Genitourinary:  Positive for urinary tract infection  Musculoskeletal:  Positive for arthritis  Neurologic:  Positive for numbness or tingling of feet  Psychiatric:  Positive for anxiety  Heme/Lymph/Imm:  Negative    Endocrine:  Positive for thyroid disorder    EXAM:  /74  Pulse 80  Ht 1.676 m (5' 6\")  Wt 59.4 kg (131 lb)  BMI 21.14 kg/m2  Physical Exam:  Vitals: /74  Pulse 80  Ht 1.676 m (5' 6\")  Wt 59.4 kg (131 lb)  BMI 21.14 kg/m2    Constitutional:  cooperative, alert and oriented, well developed, well nourished, in no acute distress        Skin:  warm and dry to the touch, no apparent skin lesions or masses noted        Head:  normocephalic, no masses or lesions        Eyes:  pupils equal and round, conjunctivae and lids unremarkable, sclera white, no xanthalasma, EOMS intact, no nystagmus        ENT:  no pallor or cyanosis, dentition good        Neck:  carotid pulses are full and equal bilaterally, JVP normal, no carotid bruit        Chest:  normal breath sounds, clear to auscultation, normal A-P diameter, normal symmetry, normal respiratory excursion, no " use of accessory muscles        Cardiac: regular rhythm;normal S1 and S2       systolic murmur          Abdomen:  abdomen soft;non-tender        Vascular:                                        Extremities and Back:  no deformities, clubbing, cyanosis, erythema observed;no edema        Neurological:  no gross motor deficits   walks with cane    Labs:  LIPID RESULTS:  Lab Results   Component Value Date    CHOL 172 07/07/2015    HDL 75 07/07/2015    LDL 69 07/07/2015    TRIG 142 07/07/2015    CHOLHDLRATIO 2.3 07/07/2015       LIVER ENZYME RESULTS:  Lab Results   Component Value Date    AST 22 02/19/2018    ALT 24 02/19/2018       CBC RESULTS:  Lab Results   Component Value Date    WBC 6.7 02/19/2018    RBC 3.87 02/19/2018    HGB 11.9 02/19/2018    HCT 37.8 02/19/2018    MCV 98 02/19/2018    MCH 30.7 02/19/2018    MCHC 31.5 02/19/2018    RDW 15.2 (H) 02/19/2018     02/19/2018       BMP RESULTS:  Lab Results   Component Value Date     04/04/2018    POTASSIUM 3.7 04/04/2018    CHLORIDE 98 04/04/2018    CO2 30 04/04/2018    ANIONGAP 7 04/04/2018    GLC 92 04/04/2018    BUN 14 04/04/2018    CR 0.82 04/04/2018    GFRESTIMATED 65 04/04/2018    GFRESTBLACK 79 04/04/2018    CHEYANNE 8.6 04/04/2018        A1C RESULTS:  No results found for: A1C    INR RESULTS:  Lab Results   Component Value Date    INR 1.04 12/07/2017    INR 0.91 11/13/2017       ADRIÁN Millard, CNP  Pager [phone]  7/25/2018    Thank you for allowing me to participate in the care of your patient.    Sincerely,     ADRIÁN Millard CNP     [facility]

## 2018-07-27 ENCOUNTER — ALLIED HEALTH/NURSE VISIT (OUTPATIENT)
Dept: RESEARCH | Facility: CLINIC | Age: 83
End: 2018-07-27
Payer: COMMERCIAL

## 2018-07-27 DIAGNOSIS — Z00.6 EXAMINATION OF PARTICIPANT IN CLINICAL TRIAL: Primary | ICD-10-CM

## 2018-07-27 NOTE — PROGRESS NOTES
"Subject seen for M24 FU Reprise III study on 7/25/2018  . Doing well. Wt. 131 #, Ht 5'6\". TTE and rotational Xray done, mRS completed by Abi Garza. NYHA class II. No new SAEs, SADE's or device deficiencies since last visit.     9/6/17 Subject reported intermittent CP radiating to her arms since 6/2017, Imdur dose was changed to 12n.   11/3/17 metoprolol increased to 50 mg every day. Imdur increased to 60mg BID.   12/14/17 metoprolol increased to 50mg QAM and 25mg at bedtime.  11/16/17 angiogram done-60-70% stenosis note in mid to distal LAD. Referred for staged PCI secondary to ongoing CP. Sxs resolved and PCI was cancelled.   Recurrent UTIs with + UCs requiring ABX on 8/24/17, 9/20/17, 10/12/17, 11/30/17, 2/15/18 and 4/28/18. Evaluated by urology-cysto done 10/17-referred for renal US. Dx with chronic cystitis.   2/19/18 started series of Synvisc to R knee secondary to arthritic pain-completed 2/27/17.  3/30/18-seen for c/o cough X 4 days, CXR done-dx with mild CHF, no change in medications.  Subject to FU in 1 year per protocol.  "

## 2018-07-27 NOTE — PROGRESS NOTES
"Subject seen for M24 FU Reprise III study. Doing well. Wt. 131 #, Ht 5'6\". TTE and rotational Xray done, mRS completed by Abi Garza. NYHA class II. No new SAEs, SADE's or device deficiencies since last visit.     9/6/17 Subject reported intermittent CP radiating to her arms since 6/2017, Imdur dose was changed to 12n.   11/3/17 metoprolol increased to 50 mg every day. Imdur increased to 60mg BID.   12/14/17 metoprolol increased to 50mg QAM and 25mg at bedtime.  11/16/17 angiogram done-60-70% stenosis note in mid to distal LAD. Referred for staged PCI secondary to ongoing CP. Sxs resolved and PCI was cancelled.   Recurrent UTIs with + UCs requiring ABX on 8/24/17, 9/20/17, 10/12/17, 11/30/17, 2/15/18 and 4/28/18. Evaluated by urology-cysto done 10/17-referred for renal US. Dx with chronic cystitis.   2/19/18 started series of Synvisc to R knee secondary to arthritic pain-completed 2/27/17.  3/30/18-seen for c/o cough X 4 days, CXR done-dx with mild CHF, no change in medications.  Subject to FU in 1 year per protocol.  "

## 2018-07-27 NOTE — MR AVS SNAPSHOT
After Visit Summary   7/27/2018    Gillian Burk    MRN: [id number]           Patient Information     Date Of Birth          3/19/1927        Visit Information        Provider Department      7/27/2018 9:53 AM Specialty, Provider [facility], [place],  Clinical Research        Today's Diagnoses     Examination of participant in clinical trial    -  1       Follow-ups after your visit        Who to contact     If you have questions or need follow up information about today's clinic visit or your schedule please contact [facility],  CLINICAL RESEARCH directly at [phone].  Normal or non-critical lab and imaging results will be communicated to you by [employer], letter or phone within 4 business days after the clinic has received the results. If you do not hear from us within 7 days, please contact the clinic through needmade or phone. If you have a critical or abnormal lab result, we will notify you by phone as soon as possible.  Submit refill requests through needmade or call your pharmacy and they will forward the refill request to us. Please allow 3 business days for your refill to be completed.          Additional Information About Your Visit        MyChart Information     needmade gives you secure access to your electronic health record. If you see a primary care provider, you can also send messages to your care team and make appointments. If you have questions, please call your primary care clinic.  If you do not have a primary care provider, please call [phone] and they will assist you.        Care EveryWhere ID     This is your Care EveryWhere ID. This could be used by other organizations to access your [place] medical records  [id number]         Blood Pressure from Last 3 Encounters:   07/25/18 138/74   06/12/18 140/80   04/28/18 148/70    Weight from Last 3 Encounters:   07/25/18 59.4 kg (131 lb)   06/12/18 60.4 kg (133 lb 1.6 oz)   04/06/18 62.1 kg (137 lb)              Today, you  had the following     No orders found for display         Today's Medication Changes      Notice     This visit is during an admission. Changes to the med list made in this visit will be reflected in the After Visit Summary of the admission.             Primary Care Provider Office Phone # Fax #    Augusto Meyer,  [phone]       [address] [place] [address]        Equal Access to Services     [facility]: Hadii aad ku hadasho Soomaali, waaxda luqadaha, qaybta kaalmada adeegyada, waxay idiin hayaan adeeg kharash la'aan . So [facility] [phone].    ATENCIÓN: Si habla español, tiene a granger disposición servicios gratuitos de asistencia lingüística. Llame al [phone].    We comply with applicable federal civil rights laws and [place] laws. We do not discriminate on the basis of race, color, national origin, age, disability, sex, sexual orientation, or gender identity.            Thank you!     Thank you for choosing [facility]  for your care. Our goal is always to provide you with excellent care. Hearing back from our patients is one way we can continue to improve our services. Please take a few minutes to complete the written survey that you may receive in the mail after your visit with us. Thank you!             Your Updated Medication List - Protect others around you: Learn how to safely use, store and throw away your medicines at www.disposemymeds.org.      Notice     This visit is during an admission. Changes to the med list made in this visit will be reflected in the After Visit Summary of the admission.

## 2018-08-03 ENCOUNTER — MYC MEDICAL ADVICE (OUTPATIENT)
Dept: FAMILY MEDICINE | Facility: OTHER | Age: 83
End: 2018-08-03

## 2018-08-03 DIAGNOSIS — N30.00 ACUTE CYSTITIS WITHOUT HEMATURIA: Primary | ICD-10-CM

## 2018-08-03 RX ORDER — CIPROFLOXACIN 250 MG/1
250 TABLET, FILM COATED ORAL 2 TIMES DAILY
Qty: 14 TABLET | Refills: 0 | Status: SHIPPED | OUTPATIENT
Start: 2018-08-03 | End: 2018-10-12

## 2018-08-07 DIAGNOSIS — E78.5 HYPERLIPIDEMIA WITH TARGET LDL LESS THAN 130: ICD-10-CM

## 2018-08-07 DIAGNOSIS — K21.9 GASTROESOPHAGEAL REFLUX DISEASE WITHOUT ESOPHAGITIS: ICD-10-CM

## 2018-08-07 RX ORDER — FAMOTIDINE 40 MG/1
TABLET, FILM COATED ORAL
Qty: 30 TABLET | Refills: 9 | Status: SHIPPED | OUTPATIENT
Start: 2018-08-07 | End: 2019-06-25

## 2018-08-07 RX ORDER — ATORVASTATIN CALCIUM 40 MG/1
TABLET, FILM COATED ORAL
Qty: 90 TABLET | Refills: 0 | Status: SHIPPED | OUTPATIENT
Start: 2018-08-07 | End: 2018-11-13

## 2018-08-07 NOTE — TELEPHONE ENCOUNTER
"Requested Prescriptions   Pending Prescriptions Disp Refills     famotidine (PEPCID) 40 MG tablet [Pharmacy Med Name: FAMOTIDINE 40MG TAB] 30 tablet 9        Last Written Prescription Date:  10/10/2017  Last Fill Quantity: 30,   # refills: 9  Last Office Visit: 4/06/2018  Future Office visit:       Routing refill request to provider for review/approval because:  Drug not active on patient's medication list   Sig: TAKE ONE TABLET BY MOUTH AT BEDTIME    H2 Blockers Protocol Passed    8/7/2018  9:57 AM       Passed - Patient is age 12 or older       Passed - Recent (12 mo) or future (30 days) visit within the authorizing provider's specialty    Patient had office visit in the last 12 months or has a visit in the next 30 days with authorizing provider or within the authorizing provider's specialty.  See \"Patient Info\" tab in inbasket, or \"Choose Columns\" in Meds & Orders section of the refill encounter.            atorvastatin (LIPITOR) 40 MG tablet [Pharmacy Med Name: ATORVASTATIN 40MG   TAB] 90 tablet 0    Last Written Prescription Date:  1/31/2018  Last Fill Quantity: 90,  # refills: 0   Last office visit: 4/6/2018 with prescribing provider:  Dr. Meyer   Future Office Visit:     Sig: TAKE ONE TABLET BY MOUTH ONCE DAILY    Statins Protocol Failed    8/7/2018  9:57 AM       Failed - LDL on file in past 12 months    Recent Labs   Lab Test  07/07/15   1202   LDL  69            Passed - No abnormal creatine kinase in past 12 months    Recent Labs   Lab Test  08/04/16   0012   CKT  127               Passed - Recent (12 mo) or future (30 days) visit within the authorizing provider's specialty    Patient had office visit in the last 12 months or has a visit in the next 30 days with authorizing provider or within the authorizing provider's specialty.  See \"Patient Info\" tab in inbasket, or \"Choose Columns\" in Meds & Orders section of the refill encounter.           Passed - Patient is age 18 or older       Passed - No " active pregnancy on record       Passed - No positive pregnancy test in past 12 months

## 2018-08-13 ENCOUNTER — CARE COORDINATION (OUTPATIENT)
Dept: CARDIOLOGY | Facility: CLINIC | Age: 83
End: 2018-08-13

## 2018-08-13 NOTE — PROGRESS NOTES
Patient's daughter Carley Loco called and left  stating that pt has been off her 2pm dose of Imdur but is having breakthrough chest pain during the night. Returned the call, daughter states that pt has been at her house in [place] for the past week. Reports that pt quit taking 2pm dose of Imdur 60 mg on 7/23.  Pt told daughter she did fine with just the 60 mg QAM for a couple of weeks. Then on 8/9, while at daughter's house, she awoke during the night with chest pain and took a SL nitroglycerin which relieved her chest pain. States this morning pt woke up around 0400 with chest pain and left arm pain, took a SL nitroglycerin.  At 0515 patient felt like she needed another so she awoke Carley. She took a 2nd nitroglycerin at 0520 and BP 5 minutes after was 185/92. Recheck another 5 minutes later BP was 179/94. Patient then took Imdur 60 mg at 0620 and her BP at that time was 180/92. She laid down to rest, then awoke at 0750 and BP was then 134/72 and she stated she felt much better. Instructed daughter to have pt go back on Imdur 60 mg BID. Pt to continue to take SL nitroglycerin PRN as well, and if she needs a 3rd dose to go directly to ED or call 911. Pt has not been weighing herself but they do not notice any signs/sx of swelling or edema. Said no SOB during the chest pain episodes and can sleep with usual amount of pillows. I told daughter we will call at the end of this week for an update on sx after going back to the BID dosing of Imdur. If pt continues to have breakthrough chest pain and/or needs PRN Nitroglycerin we may need to see if Dr. Lee wants pt to go back to the cath lab for the LAD lesion. Daughter verbalized understanding and is in agreement with plan. Dtr states pt will be at her house until Friday and then going back to her home in [place] over this weekend.

## 2018-08-16 NOTE — PROGRESS NOTES
Pt called back and stated that she has not had any further episodes of chest pain since 8/16 and she is feeling better. She did resume taking the imdur 60 mg BID. Pt will call us if she has any further episodes. Vijay POWELL August 16, 2018, 4:11 PM

## 2018-08-16 NOTE — PROGRESS NOTES
I left message for pt to call back with an update on how she is feeling and whether she has had any further episodes of chest pain. Vijay POWELL August 16, 2018, 11:31 AM

## 2018-08-21 DIAGNOSIS — Z86.73 HISTORY OF CVA (CEREBROVASCULAR ACCIDENT): ICD-10-CM

## 2018-08-21 NOTE — TELEPHONE ENCOUNTER
"Requested Prescriptions   Pending Prescriptions Disp Refills     clopidogrel (PLAVIX) 75 MG tablet [Pharmacy Med Name: CLOPIDOGREL 75MG    TAB] 30 tablet 11    Last Written Prescription Date:  7/25/17  Last Fill Quantity: 90,  # refills: 3   Last office visit: 4/6/2018 with prescribing provider:  4/6/18   Future Office Visit:     Sig: TAKE ONE TABLET BY MOUTH ONCE DAILY    Plavix Failed    8/21/2018 12:06 PM       Failed - Recent (12 mo) or future (30 days) visit within the authorizing provider's specialty    Patient had office visit in the last 12 months or has a visit in the next 30 days with authorizing provider or within the authorizing provider's specialty.  See \"Patient Info\" tab in inbasket, or \"Choose Columns\" in Meds & Orders section of the refill encounter.           Passed - No active PPI on record unless is Protonix       Passed - Normal HGB on file in past 12 months    Recent Labs   Lab Test  02/19/18   1414   HGB  11.9              Passed - Normal Platelets on file in past 12 months    Recent Labs   Lab Test  02/19/18   1414   PLT  225              Passed - Patient is age 18 or older       Passed - No active pregnancy on record       Passed - No positive pregnancy test in past 12 months          "

## 2018-08-22 NOTE — TELEPHONE ENCOUNTER
Routing refill request to provider for review/approval because:  Patient needs to be seen because:  Per last office visit note.    Noelle Serrano RN

## 2018-08-23 RX ORDER — CLOPIDOGREL BISULFATE 75 MG/1
TABLET ORAL
Qty: 30 TABLET | Refills: 11 | Status: SHIPPED | OUTPATIENT
Start: 2018-08-23 | End: 2019-09-03

## 2018-08-27 ENCOUNTER — OFFICE VISIT (OUTPATIENT)
Dept: FAMILY MEDICINE | Facility: OTHER | Age: 83
End: 2018-08-27
Payer: COMMERCIAL

## 2018-08-27 ENCOUNTER — MYC MEDICAL ADVICE (OUTPATIENT)
Dept: FAMILY MEDICINE | Facility: OTHER | Age: 83
End: 2018-08-27

## 2018-08-27 VITALS
TEMPERATURE: 97.7 F | SYSTOLIC BLOOD PRESSURE: 122 MMHG | HEART RATE: 64 BPM | WEIGHT: 130 LBS | DIASTOLIC BLOOD PRESSURE: 68 MMHG | OXYGEN SATURATION: 94 % | BODY MASS INDEX: 20.98 KG/M2 | RESPIRATION RATE: 20 BRPM

## 2018-08-27 DIAGNOSIS — T14.8XXA BRUISE: Primary | ICD-10-CM

## 2018-08-27 PROCEDURE — 99207 ZZC NO BILLABLE SERVICE THIS VISIT: CPT | Performed by: INTERNAL MEDICINE

## 2018-08-27 ASSESSMENT — PAIN SCALES - GENERAL: PAINLEVEL: NO PAIN (0)

## 2018-08-27 NOTE — MR AVS SNAPSHOT
After Visit Summary   8/27/2018    Gillian Burk    MRN: [id number]           Patient Information     Date Of Birth          3/19/1927        Visit Information        Provider Department      8/27/2018 9:00 AM Augusto Meyer DO [facility]        Today's Diagnoses     Bruise    -  1       Follow-ups after your visit        Who to contact     If you have questions or need follow up information about today's clinic visit or your schedule please contact [facility] directly at [phone].  Normal or non-critical lab and imaging results will be communicated to you by [employer], letter or phone within 4 business days after the clinic has received the results. If you do not hear from us within 7 days, please contact the clinic through [employer] or phone. If you have a critical or abnormal lab result, we will notify you by phone as soon as possible.  Submit refill requests through [facility] or call your pharmacy and they will forward the refill request to us. Please allow 3 business days for your refill to be completed.          Additional Information About Your Visit        MyChart Information     [facility] gives you secure access to your electronic health record. If you see a primary care provider, you can also send messages to your care team and make appointments. If you have questions, please call your primary care clinic.  If you do not have a primary care provider, please call [phone] and they will assist you.        Care EveryWhere ID     This is your Care EveryWhere ID. This could be used by other organizations to access your [place] medical records  [id number]        Your Vitals Were     Pulse Temperature Respirations Pulse Oximetry Breastfeeding? BMI (Body Mass Index)    64 97.7  F (36.5  C) (Temporal) 20 94% No 20.98 kg/m2       Blood Pressure from Last 3 Encounters:   08/27/18 122/68   07/25/18 138/74   06/12/18 140/80    Weight from Last 3 Encounters:    08/27/18 130 lb (59 kg)   07/25/18 131 lb (59.4 kg)   06/12/18 133 lb 1.6 oz (60.4 kg)              Today, you had the following     No orders found for display       Primary Care Provider Office Phone # Fax #    Augusto Meyer [phone]        [address] [place] [address]        Equal Access to Services     RIVER TRENT : Hadii aad ku hadasho Soomaali, waaxda luqadaha, qaybta kaalmada adeegyada, waxay idiin hayaan adeeg kharash la'araseli . So [facility] [phone].    ATENCIÓN: Si habla español, tiene a granger disposición servicios gratuitos de asistencia lingüística. Llame al [phone].    We comply with applicable federal civil rights laws and [place] laws. We do not discriminate on the basis of race, color, national origin, age, disability, sex, sexual orientation, or gender identity.            Thank you!     Thank you for choosing [facility]  for your care. Our goal is always to provide you with excellent care. Hearing back from our patients is one way we can continue to improve our services. Please take a few minutes to complete the written survey that you may receive in the mail after your visit with us. Thank you!             Your Updated Medication List - Protect others around you: Learn how to safely use, store and throw away your medicines at www.disposemymeds.org.          This list is accurate as of 8/27/18 10:08 AM.  Always use your most recent med list.                   Brand Name Dispense Instructions for use Diagnosis    ALPRAZolam 0.25 MG tablet    XANAX    28 tablet    TAKE ONE TABLET BY MOUTH TWICE DAILY AS NEEDED FOR ANXIETY    Anxiety       amLODIPine 5 MG tablet    NORVASC    90 tablet    Take 1 tablet (5 mg) by mouth daily    Chest pain, unspecified type       aspirin 81 MG EC tablet      Take 1 tablet (81 mg) by mouth daily    S/P TAVR (transcatheter aortic valve replacement)       atorvastatin 40 MG tablet    LIPITOR    90 tablet    TAKE  ONE TABLET BY MOUTH ONCE DAILY    Hyperlipidemia with target LDL less than 130       calcium carbonate 500 MG chewable tablet    TUMS     Take 2-4 tablets (1,000-2,000 mg) by mouth as needed for heartburn        ciprofloxacin 250 MG tablet    CIPRO    14 tablet    Take 1 tablet (250 mg) by mouth 2 times daily    Acute cystitis without hematuria       clopidogrel 75 MG tablet    PLAVIX    30 tablet    TAKE ONE TABLET BY MOUTH ONCE DAILY    History of CVA (cerebrovascular accident)       Cranberry 1000 MG Caps           docusate sodium 100 MG capsule    COLACE     Take 200 mg by mouth daily 2 capsules        famotidine 40 MG tablet    PEPCID    30 tablet    TAKE ONE TABLET BY MOUTH AT BEDTIME    Gastroesophageal reflux disease without esophagitis       FLORAJEN ACIDOPHILUS Caps      Take 1 capsule by mouth daily        folic acid 1 MG tablet    FOLVITE    90 tablet    TAKE ONE TABLET BY MOUTH ONCE DAILY    Seropositive rheumatoid arthritis (H)       iron 325 (65 Fe) MG tablet     30 tablet    TAKE ONE TABLET BY MOUTH ONCE DAILY WITH BREAKFAST    Gastrointestinal hemorrhage, unspecified gastrointestinal hemorrhage type       isosorbide mononitrate 30 MG 24 hr tablet    IMDUR    360 tablet    Take 2 tablets (60 mg) by mouth daily        levETIRAcetam 500 MG tablet    KEPPRA    90 tablet    Take 1 tablet (500 mg) by mouth 2 times daily    Seizure disorder (H)       levothyroxine 50 MCG tablet    SYNTHROID/LEVOTHROID    30 tablet    TAKE ONE TABLET BY MOUTH ONCE DAILY    Hypothyroidism due to acquired atrophy of thyroid       losartan 25 MG tablet    COZAAR    30 tablet    Take 0.5 tablets (12.5 mg) by mouth daily    Hypertension goal BP (blood pressure) < 140/90       methotrexate 2.5 MG tablet CHEMO     52 tablet    Take 4 tablets (10 mg) by mouth once a week Wed    Seropositive rheumatoid arthritis (H)       metoprolol succinate 50 MG 24 hr tablet    TOPROL-XL    180 tablet    Take 1 tablet (50 mg) by mouth 2 times  daily    Chest pain, unspecified type       mirtazapine 30 MG tablet    REMERON    90 tablet    TAKE ONE TABLET BY MOUTH AT BEDTIME    Sleep initiation disorder       Multi-vitamin Tabs tablet      Take 1 tablet by mouth daily        MYLANTA PO      2 tblsp every 4 hours as needed        nitroFURantoin (macrocrystal-monohydrate) 100 MG capsule    MACROBID    14 capsule    Take 1 capsule (100 mg) by mouth 2 times daily    Acute cystitis without hematuria       nitroGLYcerin 0.4 MG sublingual tablet    NITROSTAT    25 tablet    DISSOLVE ONE TABLET UNDER THE TONGUE EVERY 5 MINUTES AS NEEDED FOR CHEST PAIN.  DO NOT EXCEED A TOTAL OF 3 DOSES IN 15 MINUTES    Coronary artery disease involving native coronary artery of native heart without angina pectoris       predniSONE 5 MG tablet    DELTASONE    23 tablet    TAKE ONE TABLET BY MOUTH EVERY OTHER DAY ALTERNATING  WITH  1/2  TABLET  EVERY  OTHER  DAY    Seropositive rheumatoid arthritis (H)       SYNVISC 16 MG/2ML injection   Generic drug:  hylan     2 mL    16 mg by INTRA-ARTICULAR route once    Primary osteoarthritis of right knee       torsemide 10 MG tablet    DEMADEX    10 tablet    Take 1 tablet (10 mg) by mouth daily    SOB (shortness of breath)       TYLENOL ARTHRITIS PAIN 650 MG CR tablet   Generic drug:  acetaminophen      Take 2 tablets (1,300 mg) by mouth every 8 hours as needed for mild pain

## 2018-08-27 NOTE — PROGRESS NOTES
"Chief Complaint   Patient presents with     Rash     left arm     Patient was seen in the hallway. Has a small bruise on her arm. It is not a \"bulls eye rash\".    No charge for this visit.    Medication  "

## 2018-09-11 DIAGNOSIS — E03.4 HYPOTHYROIDISM DUE TO ACQUIRED ATROPHY OF THYROID: ICD-10-CM

## 2018-09-11 NOTE — TELEPHONE ENCOUNTER
"Requested Prescriptions   Pending Prescriptions Disp Refills     levothyroxine (SYNTHROID/LEVOTHROID) 50 MCG tablet [Pharmacy Med Name: LEVOTHYROXIN 50MCG  TAB] 30 tablet 3    Last Written Prescription Date:  5/15/18  Last Fill Quantity: 30,  # refills: 3   Last office visit: 8/27/2018 with prescribing provider:  8/27/18   Future Office Visit:     Sig: TAKE 1 TABLET BY MOUTH ONCE DAILY    Thyroid Protocol Failed    9/11/2018  3:55 PM       Failed - Normal TSH on file in past 12 months    Recent Labs   Lab Test  05/03/16   1427   TSH  3.37             Passed - Patient is 12 years or older       Passed - Recent (12 mo) or future (30 days) visit within the authorizing provider's specialty    Patient had office visit in the last 12 months or has a visit in the next 30 days with authorizing provider or within the authorizing provider's specialty.  See \"Patient Info\" tab in inbasket, or \"Choose Columns\" in Meds & Orders section of the refill encounter.           Passed - No active pregnancy on record    If patient is pregnant or has had a positive pregnancy test, please check TSH.         Passed - No positive pregnancy test in past 12 months    If patient is pregnant or has had a positive pregnancy test, please check TSH.            "

## 2018-09-12 RX ORDER — LEVOTHYROXINE SODIUM 50 UG/1
TABLET ORAL
Qty: 30 TABLET | Refills: 3 | Status: SHIPPED | OUTPATIENT
Start: 2018-09-12 | End: 2019-01-15

## 2018-09-12 NOTE — TELEPHONE ENCOUNTER
Routing refill request to provider for review/approval because:  Labs not current:  TSH    TSH   Date Value Ref Range Status   05/03/2016 3.37 0.40 - 4.00 mU/L Final     Noelle Serrano RN

## 2018-09-13 DIAGNOSIS — I10 HYPERTENSION GOAL BP (BLOOD PRESSURE) < 140/90: ICD-10-CM

## 2018-09-13 RX ORDER — LOSARTAN POTASSIUM 25 MG/1
12.5 TABLET ORAL DAILY
Qty: 30 TABLET | Refills: 1 | Status: SHIPPED | OUTPATIENT
Start: 2018-09-13 | End: 2019-01-15

## 2018-09-13 NOTE — TELEPHONE ENCOUNTER
Losartan       Last Written Prescription Date:  5/21/18  Last Fill Quantity: 30,   # refills: 1  Last Office Visit: 8/27/18  Future Office visit:       Routing refill request to provider for review/approval because:  Drug not on the [facility], P or [facility] refill protocol or controlled substance

## 2018-09-18 DIAGNOSIS — Z79.899 OTHER LONG TERM (CURRENT) DRUG THERAPY: ICD-10-CM

## 2018-09-18 DIAGNOSIS — M05.79 RHEUMATOID ARTHRITIS WITH RHEUMATOID FACTOR OF MULTIPLE SITES WITHOUT ORGAN OR SYSTEMS INVOLVEMENT (H): Primary | ICD-10-CM

## 2018-09-18 LAB
ALT SERPL W P-5'-P-CCNC: 18 U/L (ref 0–50)
AST SERPL W P-5'-P-CCNC: 21 U/L (ref 0–45)
BASOPHILS # BLD AUTO: 0.1 10E9/L (ref 0–0.2)
BASOPHILS NFR BLD AUTO: 0.7 %
CRP SERPL-MCNC: 11.3 MG/L (ref 0–8)
DIFFERENTIAL METHOD BLD: ABNORMAL
EOSINOPHIL NFR BLD AUTO: 1.6 %
ERYTHROCYTE [DISTWIDTH] IN BLOOD BY AUTOMATED COUNT: 15.6 % (ref 10–15)
HCT VFR BLD AUTO: 36.2 % (ref 35–47)
HGB BLD-MCNC: 11.5 G/DL (ref 11.7–15.7)
IMM GRANULOCYTES # BLD: 0 10E9/L (ref 0–0.4)
IMM GRANULOCYTES NFR BLD: 0.3 %
LYMPHOCYTES # BLD AUTO: 1.3 10E9/L (ref 0.8–5.3)
LYMPHOCYTES NFR BLD AUTO: 17.8 %
MCH RBC QN AUTO: 30.3 PG (ref 26.5–33)
MCHC RBC AUTO-ENTMCNC: 31.8 G/DL (ref 31.5–36.5)
MCV RBC AUTO: 95 FL (ref 78–100)
MONOCYTES # BLD AUTO: 0.5 10E9/L (ref 0–1.3)
MONOCYTES NFR BLD AUTO: 6.8 %
NEUTROPHILS # BLD AUTO: 5.5 10E9/L (ref 1.6–8.3)
NEUTROPHILS NFR BLD AUTO: 72.8 %
NRBC # BLD AUTO: 0 10*3/UL
NRBC BLD AUTO-RTO: 0 /100
PLATELET # BLD AUTO: 263 10E9/L (ref 150–450)
RBC # BLD AUTO: 3.8 10E12/L (ref 3.8–5.2)
WBC # BLD AUTO: 7.5 10E9/L (ref 4–11)

## 2018-09-18 PROCEDURE — 36415 COLL VENOUS BLD VENIPUNCTURE: CPT | Performed by: INTERNAL MEDICINE

## 2018-09-18 PROCEDURE — 86140 C-REACTIVE PROTEIN: CPT | Performed by: INTERNAL MEDICINE

## 2018-09-18 PROCEDURE — 84460 ALANINE AMINO (ALT) (SGPT): CPT | Performed by: INTERNAL MEDICINE

## 2018-09-18 PROCEDURE — 85025 COMPLETE CBC W/AUTO DIFF WBC: CPT | Performed by: INTERNAL MEDICINE

## 2018-09-18 PROCEDURE — 84450 TRANSFERASE (AST) (SGOT): CPT | Performed by: INTERNAL MEDICINE

## 2018-09-25 DIAGNOSIS — M05.9 SEROPOSITIVE RHEUMATOID ARTHRITIS (H): ICD-10-CM

## 2018-09-25 NOTE — TELEPHONE ENCOUNTER
"Requested Prescriptions   Pending Prescriptions Disp Refills     folic acid (FOLVITE) 1 MG tablet [Pharmacy Med Name: FOLIC ACID 1MG      TAB] 90 tablet 1     Sig: TAKE 1 TABLET BY MOUTH ONCE DAILY    Vitamin Supplements (Adult) Protocol Passed    9/25/2018  1:31 PM       Passed - High dose Vitamin D not ordered       Passed - Recent (12 mo) or future (30 days) visit within the authorizing provider's specialty    Patient had office visit in the last 12 months or has a visit in the next 30 days with authorizing provider or within the authorizing provider's specialty.  See \"Patient Info\" tab in inbasket, or \"Choose Columns\" in Meds & Orders section of the refill encounter.            Last Written Prescription Date:  3/21/18  Last Fill Quantity: 90,  # refills: 1   Last office visit: 8/27/2018 with prescribing provider:  8/27/18   Future Office Visit:      "

## 2018-09-26 RX ORDER — FOLIC ACID 1 MG/1
TABLET ORAL
Qty: 90 TABLET | Refills: 1 | Status: SHIPPED | OUTPATIENT
Start: 2018-09-26 | End: 2019-04-02

## 2018-09-26 NOTE — TELEPHONE ENCOUNTER
Prescription approved per [facility] Refill Protocol.    AUTUMN BustilloN, RN  [facility]

## 2018-10-12 ENCOUNTER — TRANSFERRED RECORDS (OUTPATIENT)
Dept: HEALTH INFORMATION MANAGEMENT | Facility: CLINIC | Age: 83
End: 2018-10-12

## 2018-10-12 ENCOUNTER — HOSPITAL ENCOUNTER (EMERGENCY)
Facility: CLINIC | Age: 83
Discharge: ANOTHER HOSPITAL | End: 2018-10-12
Attending: EMERGENCY MEDICINE | Admitting: EMERGENCY MEDICINE
Payer: COMMERCIAL

## 2018-10-12 VITALS
DIASTOLIC BLOOD PRESSURE: 94 MMHG | OXYGEN SATURATION: 97 % | BODY MASS INDEX: 22.08 KG/M2 | RESPIRATION RATE: 20 BRPM | SYSTOLIC BLOOD PRESSURE: 166 MMHG | WEIGHT: 136.8 LBS | TEMPERATURE: 98 F

## 2018-10-12 DIAGNOSIS — H34.11 CENTRAL RETINAL ARTERY OCCLUSION OF RIGHT EYE: ICD-10-CM

## 2018-10-12 LAB
ALBUMIN SERPL-MCNC: 2.8 G/DL (ref 3.4–5)
ALP SERPL-CCNC: 73 U/L (ref 40–150)
ALT SERPL W P-5'-P-CCNC: 20 U/L (ref 0–50)
ANION GAP SERPL CALCULATED.3IONS-SCNC: 6 MMOL/L (ref 3–14)
APTT PPP: 26 SEC (ref 22–37)
AST SERPL W P-5'-P-CCNC: 21 U/L (ref 0–45)
BASOPHILS # BLD AUTO: 0.1 10E9/L (ref 0–0.2)
BASOPHILS NFR BLD AUTO: 0.7 %
BILIRUB SERPL-MCNC: 0.2 MG/DL (ref 0.2–1.3)
BUN SERPL-MCNC: 15 MG/DL (ref 7–30)
CALCIUM SERPL-MCNC: 8.1 MG/DL (ref 8.5–10.1)
CHLORIDE SERPL-SCNC: 104 MMOL/L (ref 94–109)
CO2 SERPL-SCNC: 26 MMOL/L (ref 20–32)
CREAT SERPL-MCNC: 0.67 MG/DL (ref 0.52–1.04)
CRP SERPL-MCNC: 12.4 MG/L (ref 0–8)
DIFFERENTIAL METHOD BLD: ABNORMAL
EOSINOPHIL NFR BLD AUTO: 1.3 %
ERYTHROCYTE [DISTWIDTH] IN BLOOD BY AUTOMATED COUNT: 15.3 % (ref 10–15)
ERYTHROCYTE [SEDIMENTATION RATE] IN BLOOD BY WESTERGREN METHOD: 95 MM/H (ref 0–30)
GFR SERPL CREATININE-BSD FRML MDRD: 83 ML/MIN/1.7M2
GLUCOSE SERPL-MCNC: 94 MG/DL (ref 70–99)
HCT VFR BLD AUTO: 33.8 % (ref 35–47)
HGB BLD-MCNC: 11.2 G/DL (ref 11.7–15.7)
IMM GRANULOCYTES # BLD: 0 10E9/L (ref 0–0.4)
IMM GRANULOCYTES NFR BLD: 0.3 %
INR PPP: 1.08 (ref 0.86–1.14)
LYMPHOCYTES # BLD AUTO: 1.4 10E9/L (ref 0.8–5.3)
LYMPHOCYTES NFR BLD AUTO: 20.4 %
MCH RBC QN AUTO: 31 PG (ref 26.5–33)
MCHC RBC AUTO-ENTMCNC: 33.1 G/DL (ref 31.5–36.5)
MCV RBC AUTO: 94 FL (ref 78–100)
MONOCYTES # BLD AUTO: 0.6 10E9/L (ref 0–1.3)
MONOCYTES NFR BLD AUTO: 8 %
NEUTROPHILS # BLD AUTO: 4.8 10E9/L (ref 1.6–8.3)
NEUTROPHILS NFR BLD AUTO: 69.3 %
NRBC # BLD AUTO: 0 10*3/UL
NRBC BLD AUTO-RTO: 0 /100
PLATELET # BLD AUTO: 260 10E9/L (ref 150–450)
POTASSIUM SERPL-SCNC: 3.5 MMOL/L (ref 3.4–5.3)
PROT SERPL-MCNC: 8.6 G/DL (ref 6.8–8.8)
RBC # BLD AUTO: 3.61 10E12/L (ref 3.8–5.2)
SODIUM SERPL-SCNC: 136 MMOL/L (ref 133–144)
WBC # BLD AUTO: 6.9 10E9/L (ref 4–11)

## 2018-10-12 PROCEDURE — 86140 C-REACTIVE PROTEIN: CPT | Performed by: EMERGENCY MEDICINE

## 2018-10-12 PROCEDURE — 99285 EMERGENCY DEPT VISIT HI MDM: CPT | Mod: Z6 | Performed by: EMERGENCY MEDICINE

## 2018-10-12 PROCEDURE — 85652 RBC SED RATE AUTOMATED: CPT | Performed by: EMERGENCY MEDICINE

## 2018-10-12 PROCEDURE — 85025 COMPLETE CBC W/AUTO DIFF WBC: CPT | Performed by: EMERGENCY MEDICINE

## 2018-10-12 PROCEDURE — 80053 COMPREHEN METABOLIC PANEL: CPT | Performed by: EMERGENCY MEDICINE

## 2018-10-12 PROCEDURE — 99285 EMERGENCY DEPT VISIT HI MDM: CPT | Mod: 25 | Performed by: EMERGENCY MEDICINE

## 2018-10-12 PROCEDURE — 85610 PROTHROMBIN TIME: CPT | Performed by: EMERGENCY MEDICINE

## 2018-10-12 PROCEDURE — 85730 THROMBOPLASTIN TIME PARTIAL: CPT | Performed by: EMERGENCY MEDICINE

## 2018-10-12 ASSESSMENT — ENCOUNTER SYMPTOMS: HEADACHES: 0

## 2018-10-12 NOTE — ED PROVIDER NOTES
"  History     Chief Complaint   Patient presents with     Eye Problem     The history is provided by the patient and a relative.     Gillian Burk is a 89 year old female who presents to the emergency department with acute onset of right eye vision loss. The patient went to [facility] [employer] today to get new glasses and when she was witting in the waiting room she says \"my eyes started to go funny\". She thought that it was just because the mirror in the lobby was reflecting the light into her eye. She got her new glasses and went home. While at home, her vision wasn't getting any better. She then noticed that she lost the central vision in her right eye. She is only able to see \"around the edges\". She put her old glasses back on because she thought it was from the new glasses. The patient then called her optometrist back and told him about her symptoms. He told her that she needed to come to the ED.    Problem List:    Patient Active Problem List    Diagnosis Date Noted     Primary osteoarthritis of right knee 08/03/2017     Priority: Medium     GI bleed 03/03/2017     Priority: Medium     Acute cystitis without hematuria 12/04/2016     Priority: Medium     Hypotension 12/04/2016     Priority: Medium     TIA (transient ischemic attack) 12/03/2016     Priority: Medium     Non-rheumatic mitral valve stenosis - mild to moderate 11/16 echo 12/03/2016     Priority: Medium     Coronary artery disease involving native coronary artery - NSTEMI 11/16 with moderate RCA disease treated medically 12/03/2016     Priority: Medium     CKD (chronic kidney disease) stage 3, GFR 30-59 ml/min (H) 12/03/2016     Priority: Medium     Hypokalemia 12/03/2016     Priority: Medium     Elevated troponin 12/03/2016     Priority: Medium     Iron deficiency anemia 12/03/2016     Priority: Medium     Acquired hypothyroidism 11/24/2016     Priority: Medium     Aortic stenosis      Priority: Medium     s/p TAVR 8/17/16       Need for SBE " (subacute bacterial endocarditis) prophylaxis      Priority: Medium     Advanced directives, counseling/discussion 09/22/2016     Priority: Medium     Was addressed at appt. Will bring in and will have scanned. 9/22/2016  Cathy Anderson MA         S/P TAVR (transcatheter aortic valve replacement) 08/05/2016     Priority: Medium     Aortic stenosis, severe - s/p TAVR 08/03/2016     Priority: Medium     Hypothyroidism due to acquired atrophy of thyroid 05/25/2016     Priority: Medium     Orthostatic hypotension 10/07/2015     Priority: Medium     2013 with syncope       Aortic valve disorder 09/02/2015     Priority: Medium     Intermediate coronary syndrome 09/01/2015     Priority: Medium     Hyponatremia 07/07/2015     Priority: Medium     Hyperlipidemia with target LDL less than 130 04/06/2015     Priority: Medium     Anxiety 07/24/2014     Priority: Medium     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 07/24/2014     Priority: Medium     History of CVA (cerebrovascular accident) - 2013 07/24/2014     Priority: Medium     Esophageal reflux 07/24/2014     Priority: Medium     PAC (premature atrial contraction) 07/24/2014     Priority: Medium     PVC's (premature ventricular contractions) 07/24/2014     Priority: Medium     Hypertension goal BP (blood pressure) < 140/90 07/01/2014     Priority: Medium     Seropositive rheumatoid arthritis (H)      Priority: Medium     Transient ischemic attack (TIA), and cerebral infarction without residual deficits 06/18/2014     Priority: Medium        Past Medical History:    Past Medical History:   Diagnosis Date     Aortic stenosis      Chronic migraine      Hyperlipidaemia      Hypertension      Hypothyroidism      Myocardial infarction (H)      Need for SBE (subacute bacterial endocarditis) prophylaxis      Orthostatic hypotension      PUD (peptic ulcer disease)      Secondary Sjogren's syndrome (H)      Seizures (H)      Seropositive rheumatoid arthritis (H)       Stroke (H) 05/2013     Syncope 2014       Past Surgical History:    Past Surgical History:   Procedure Laterality Date     C TOTAL HIP ARTHROPLASTY Right 2010     C TOTAL HIP ARTHROPLASTY Left 2014     CATARACT IOL, RT/LT Bilateral 2000     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     HEART CATH FEMORAL CANNULIZATION WITH OPEN STANDBY REPAIR AORTIC VALVE N/A 8/3/2016    Procedure: HEART CATH FEMORAL CANNULIZATION WITH OPEN STANDBY REPAIR AORTIC VALVE;  Surgeon: Owen Schultz MD;  Location: [facility] OR     HYSTERECTOMY TOTAL ABDOMINAL  1970    ovaries out     MOUTH SURGERY  2011    jaw surgery due to fracture     TRANSCATHETER AORTIC VALVE IMPLANT ANESTHESIA N/A 8/3/2016    Procedure: TRANSCATHETER AORTIC VALVE IMPLANT ANESTHESIA;  Surgeon: GENERIC ANESTHESIA PROVIDER;  Location: [facility] OR       Family History:    Family History   Problem Relation Age of Onset     Hyperlipidemia Mother      Family History Negative No family hx of        Social History:  Marital Status:   [5]  Social History   Substance Use Topics     Smoking status: Never Smoker     Smokeless tobacco: Never Used     Alcohol use No        Medications:      acetaminophen (TYLENOL ARTHRITIS PAIN) 650 MG CR tablet   ALPRAZolam (XANAX) 0.25 MG tablet   amLODIPine (NORVASC) 5 MG tablet   aspirin EC 81 MG EC tablet   atorvastatin (LIPITOR) 40 MG tablet   Calcium & Magnesium Carbonates (MYLANTA PO)   calcium carbonate (TUMS) 500 MG chewable tablet   clopidogrel (PLAVIX) 75 MG tablet   Cranberry 1000 MG CAPS   docusate sodium (COLACE) 100 MG capsule   famotidine (PEPCID) 40 MG tablet   folic acid (FOLVITE) 1 MG tablet   Hylan (SYNVISC) 16 MG/2ML SOSY   isosorbide mononitrate (IMDUR) 30 MG 24 hr tablet   Lactobacillus (FLORAJEN ACIDOPHILUS) CAPS   levETIRAcetam (KEPPRA) 500 MG tablet   levothyroxine (SYNTHROID/LEVOTHROID) 50 MCG tablet   losartan (COZAAR) 25 MG tablet   methotrexate 2.5 MG tablet   metoprolol succinate (TOPROL-XL) 50 MG 24  hr tablet   mirtazapine (REMERON) 30 MG tablet   multivitamin, therapeutic with minerals (MULTI-VITAMIN) TABS   nitroFURantoin, macrocrystal-monohydrate, (MACROBID) 100 MG capsule   nitroGLYcerin (NITROSTAT) 0.4 MG sublingual tablet   predniSONE (DELTASONE) 5 MG tablet         Review of Systems   Eyes: Positive for visual disturbance (Acute loss of central vision in the right eye.  Minimal peripheral vision at this time.).   Neurological: Negative for headaches.   All other systems reviewed and are negative.      Physical Exam   BP: (!) 175/97  Heart Rate: 89  Temp: 98  F (36.7  C)  Resp: 20  Weight: 62.1 kg (136 lb 12.8 oz)  SpO2: 95 %      Physical Exam   Constitutional: She is oriented to person, place, and time. She appears well-developed. No distress.   HENT:   Head: Atraumatic.   Mouth/Throat: Oropharynx is clear and moist.   Eyes: EOM are normal.   Acute loss of arterial flow in the right eye.  There is one vein evident but no evidence of arterial vasculature at this time.  The left eye has some AV narrowing but otherwise normal flow.   Neck: Normal range of motion. Neck supple.   Cardiovascular: Normal rate, normal heart sounds and intact distal pulses.    Pulmonary/Chest: Effort normal and breath sounds normal.   Musculoskeletal: Normal range of motion.   Neurological: She is alert and oriented to person, place, and time. No cranial nerve deficit. She exhibits normal muscle tone. Coordination normal.   Skin: Skin is warm.   Psychiatric: She has a normal mood and affect.   Nursing note and vitals reviewed.      ED Course     ED Course     Procedures          The patient has stroke symptoms:           ED Stroke specific documentation           NIHSS PDF          Protocol PDF     Patient last known well time: 1600   ED Provider first to bedside at: 1638      National Institutes of Health Stroke Scale (Baseline)  Time Performed: 1640      Score    Level of consciousness: (0)   Alert, keenly responsive    LOC  questions: (0)   Answers both questions correctly    LOC commands: (0)   Performs both tasks correctly    Best gaze: (0)   Normal    Visual: (1) acute central vision loss in the right eye    Facial palsy: (0)   Normal symmetrical movements    Motor arm (left): (0)   No drift    Motor arm (right): (0)   No drift    Motor leg (left): (0)   No drift    Motor leg (right): (0)   No drift    Limb ataxia: (0)   Absent    Sensory: (0)   Normal- no sensory loss    Best language: (0)   Normal- no aphasia    Dysarthria: (0)   Normal    Extinction and inattention: (0)   No abnormality        Total Score:  1        Stroke Mimics were considered (including migraine headache, seizure disorder, hypoglycemia (or hyperglycemia), head or spinal trauma, CNS infection, Toxin ingestion and shock state (e.g. sepsis) .    1705: I discussed the case with  from [facility] who basically stated that the patient was going to be blind in that eye and to work her up for giant cell arteritis.  He does not think that there is any salvageable measure that can be done at this point.    1710: I spoke with Dr. Cordell Kulkarni from neurology at the [facility] who recommended I talk to the stroke service as this type of occlusion would be considered a stroke.    1712: I spoke with Dr. Tejada from the [facility] Stroke Service who recommended against doing thrombolytics as the patient is 89 and has an aortic valve replacement but instead stated that thrombolytics typically are not very beneficial and acute central retinal artery occlusion.  Rather, he recommended hyperbaric oxygen therapy which is apparently the mainstay for this.  He recommended contacting [facility] as they are the only facility in the area that has hyperbaric therapy.    1712: I spoke with Dr. Lynn from [facility] emergency room who accepted the patient in transfer recommended to get her down to the center as soon as possible so that this therapy can be  initiated expeditiously.  We will have her go by air care to facilitate therapy.             Results for orders placed or performed during the hospital encounter of 10/12/18 (from the past 24 hour(s))   CBC with platelets differential   Result Value Ref Range    WBC 6.9 4.0 - 11.0 10e9/L    RBC Count 3.61 (L) 3.8 - 5.2 10e12/L    Hemoglobin 11.2 (L) 11.7 - 15.7 g/dL    Hematocrit 33.8 (L) 35.0 - 47.0 %    MCV 94 78 - 100 fl    MCH 31.0 26.5 - 33.0 pg    MCHC 33.1 31.5 - 36.5 g/dL    RDW 15.3 (H) 10.0 - 15.0 %    Platelet Count 260 150 - 450 10e9/L    Diff Method Automated Method     % Neutrophils 69.3 %    % Lymphocytes 20.4 %    % Monocytes 8.0 %    % Eosinophils 1.3 %    % Basophils 0.7 %    % Immature Granulocytes 0.3 %    Nucleated RBCs 0 0 /100    Absolute Neutrophil 4.8 1.6 - 8.3 10e9/L    Absolute Lymphocytes 1.4 0.8 - 5.3 10e9/L    Absolute Monocytes 0.6 0.0 - 1.3 10e9/L    Absolute Basophils 0.1 0.0 - 0.2 10e9/L    Abs Immature Granulocytes 0.0 0 - 0.4 10e9/L    Absolute Nucleated RBC 0.0    Erythrocyte sedimentation rate auto   Result Value Ref Range    Sed Rate 95 (H) 0 - 30 mm/h   INR   Result Value Ref Range    INR 1.08 0.86 - 1.14   Partial thromboplastin time   Result Value Ref Range    PTT 26 22 - 37 sec       Medications - No data to display    Assessments & Plan (with Medical Decision Making)  Gillian Burk is a 89-year-old female presenting to the ED for evaluation of acute onset of loss of central vision in the right eye.  She was at her optometrist when this occurred getting fitted for new glasses.  She denies any headache, weakness, tingling, or other neurologic deficits.  Her exam is significant for loss of perfusion to the retina with complete occlusion of the central retinal artery on the right.  She has no central vision in that eye and minimal at best peripheral vision.  The patient is 2 years out from an aortic valve replacement that was performed by transcatheter placement.  She  has been on Plavix for this at that time.  The plan is to get her to [facility] for expedited care and hyperbaric oxygen therapy which is the mainstay for acute central retinal artery occlusion.patient and family are in agreement with this plan.     I have reviewed the nursing notes.    I have reviewed the findings, diagnosis, plan and need for follow up with the patient.       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 It was created on their behalf by Yuni Hermosillo, a trained medical scribe. The creation of this record is based on the provider's personal observations and the statements of the patient. This document has been checked and approved by the attending provider.  Note: Chart documentation done in part with Dragon Voice Recognition software. Although reviewed after completion, some word and grammatical errors may remain.  10/12/2018   [facility] EMERGENCY DEPARTMENT     Mick Alvarez MD  10/12/18 1743       Mick Alvarez MD  10/12/18 1742

## 2018-10-12 NOTE — ED NOTES
Blood drawn with IV start. Blood erroneously recorded as collected at 1310. Blood was actually collected at 1710. Lab notified.

## 2018-10-12 NOTE — ED TRIAGE NOTES
Pt comes in with complaints of R eye vision loss. Pt was a the eye doctor when this happened. Pt denies HA or weakness.

## 2018-10-13 LAB
ALT SERPL-CCNC: 14 IU/L
AST SERPL-CCNC: 20 IU/L (ref 5–40)
CHOLEST SERPL-MCNC: 129 MG/DL (ref 0–200)
CREAT SERPL-MCNC: 0.97 MG/DL (ref 0.5–1)
GFR SERPL CREATININE-BSD FRML MDRD: 54 ML/MIN/1.73M2
GLUCOSE SERPL-MCNC: 89 MG/DL (ref 70–100)
HBA1C MFR BLD: 5.5 % (ref 4–6)
HDLC SERPL-MCNC: 55 MG/DL (ref 40–60)
INR PPP: 1.1 (ref 0.8–1.2)
LDLC SERPL CALC-MCNC: 59 MG/DL
POTASSIUM SERPL-SCNC: 3.5 MMOL/L (ref 3.5–5.3)
TRIGL SERPL-MCNC: 73 MG/DL

## 2018-10-16 ENCOUNTER — TELEPHONE (OUTPATIENT)
Dept: FAMILY MEDICINE | Facility: OTHER | Age: 83
End: 2018-10-16

## 2018-10-16 ENCOUNTER — MEDICAL CORRESPONDENCE (OUTPATIENT)
Dept: HEALTH INFORMATION MANAGEMENT | Facility: CLINIC | Age: 83
End: 2018-10-16

## 2018-10-16 NOTE — TELEPHONE ENCOUNTER
Patient called to schedule an appointment for a hospital follow-up or appeared on a report showing that they were recently discharged from the hospital.    Patient was admitted to:  [facility]   Discharged date: 10/17/18  Reason for hospital admission:  Stroke   Does patient have future appointment scheduled with provider? Yes    Date of future appointment:  10/23/18    This information will be used to help the care team plan for the patients upcoming visit.  The triage RN may determine that a follow up call is necessary and reach out to the patient via the phone number listed in the chart.     Please route this message on routine priority to the Triage RN pool.

## 2018-10-16 NOTE — TELEPHONE ENCOUNTER
Reason for Call: Work in Oct 23rd  Appointment, Requested Provider:  Augusto Meyer DO    PCP: Augusto Meyer    Reason for visit: Patient had stroke on 10.12 and was hospitalized, will need to be worked in for follow up visit on Tuesday, Oct 23rd.    Duration of symptoms:     Have you been treated for this in the past? No    Additional comments:     Can we leave a detailed message on this number? YES    Phone number patient can be reached at: [phone]    Best Time:     Call taken on 10/16/2018 at 1:32 PM by Tanya Henry

## 2018-10-16 NOTE — TELEPHONE ENCOUNTER
I have attempted to contact this patient by phone with the following results: left message to return my call on answering machine.  Natasha Canales MA     10/16/2018

## 2018-10-17 NOTE — TELEPHONE ENCOUNTER
Called and spoke to the patient. She said she is still in the hospital. She said they told her she had a stroke in her eye. She has been getting hyperbaric treatments and had her last one this morning. Patient said she is doing pretty good. She said they want her to have an echo done before she leaves. She said she will be getting that done and will hopefully be discharged this afternoon. She is scheduled for a follow up next week and will call with any questions or concerns in the meantime.     ERNSETINE Bustillo, RN  [facility]

## 2018-10-23 ENCOUNTER — OFFICE VISIT (OUTPATIENT)
Dept: FAMILY MEDICINE | Facility: OTHER | Age: 83
End: 2018-10-23
Payer: COMMERCIAL

## 2018-10-23 VITALS
TEMPERATURE: 98.7 F | OXYGEN SATURATION: 96 % | DIASTOLIC BLOOD PRESSURE: 68 MMHG | BODY MASS INDEX: 21.48 KG/M2 | HEART RATE: 86 BPM | RESPIRATION RATE: 16 BRPM | WEIGHT: 133.1 LBS | SYSTOLIC BLOOD PRESSURE: 134 MMHG

## 2018-10-23 DIAGNOSIS — I16.0 HYPERTENSIVE URGENCY: Primary | ICD-10-CM

## 2018-10-23 DIAGNOSIS — M05.9 SEROPOSITIVE RHEUMATOID ARTHRITIS (H): ICD-10-CM

## 2018-10-23 DIAGNOSIS — I25.10 CORONARY ARTERY DISEASE INVOLVING NATIVE CORONARY ARTERY OF NATIVE HEART WITHOUT ANGINA PECTORIS: ICD-10-CM

## 2018-10-23 DIAGNOSIS — Z95.2 S/P TAVR (TRANSCATHETER AORTIC VALVE REPLACEMENT): ICD-10-CM

## 2018-10-23 DIAGNOSIS — I63.9 CEREBROVASCULAR ACCIDENT (CVA), UNSPECIFIED MECHANISM (H): ICD-10-CM

## 2018-10-23 DIAGNOSIS — N18.30 CKD (CHRONIC KIDNEY DISEASE) STAGE 3, GFR 30-59 ML/MIN (H): ICD-10-CM

## 2018-10-23 PROCEDURE — 99495 TRANSJ CARE MGMT MOD F2F 14D: CPT | Performed by: INTERNAL MEDICINE

## 2018-10-23 ASSESSMENT — PAIN SCALES - GENERAL: PAINLEVEL: NO PAIN (0)

## 2018-10-23 NOTE — PROGRESS NOTES
SUBJECTIVE:   Gillian Burk is a 89 year old female who presents to clinic today for the following health issues:  Patient was recently hospitalized for evaluation of CVA.  He had significant right eye visual changes which appear to be the result of a posterior infarct.  She has no other significant neurologic sequelae at this time.    Hospital Follow-up Visit:    Hospital/Nursing Home/IP Rehab Facility: [facility]  Date of Admission: 10/12/2018  Date of Discharge: 10/17/2018  Reason(s) for Admission: stroke            Problems taking medications regularly:  None       Medication changes since discharge: None       Problems adhering to non-medication therapy:  None    Summary of hospitalization:  [facility] discharge summary reviewed  CareEverywhere information obtained and reviewed  Diagnostic Tests/Treatments reviewed.  Follow up needed: none  Other Healthcare Providers Involved in Patient s Care:         None  Update since discharge: stable.     Post Discharge Medication Reconciliation: discharge medications reconciled, continue medications without change.  Plan of care communicated with patient and family     Coding guidelines for this visit:  Type of Medical   Decision Making Face-to-Face Visit       within 7 Days of discharge Face-to-Face Visit        within 14 days of discharge   Moderate Complexity [id number]   High Complexity [id number]                  Problem list and histories reviewed & adjusted, as indicated.  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     History of CVA (cerebrovascular accident) - 2013     Esophageal reflux     PAC (premature atrial contraction)     PVC's (premature ventricular contractions)     Hyperlipidemia with target LDL less than 130     Hyponatremia     Orthostatic  hypotension     Aortic valve disorder     Intermediate coronary syndrome     Transient ischemic attack (TIA), and cerebral infarction without residual deficits     Hypothyroidism due to acquired atrophy of thyroid     Aortic stenosis, severe - s/p TAVR     S/P TAVR (transcatheter aortic valve replacement)     Advanced directives, counseling/discussion     Aortic stenosis     Need for SBE (subacute bacterial endocarditis) prophylaxis     Acquired hypothyroidism     TIA (transient ischemic attack)     Non-rheumatic mitral valve stenosis - mild to moderate 11/16 echo     Coronary artery disease involving native coronary artery - NSTEMI 11/16 with moderate RCA disease treated medically     CKD (chronic kidney disease) stage 3, GFR 30-59 ml/min (H)     Hypokalemia     Elevated troponin     Iron deficiency anemia     Acute cystitis without hematuria     Hypotension     GI bleed     Primary osteoarthritis of right knee     Past Surgical History:   Procedure Laterality Date     C TOTAL HIP ARTHROPLASTY Right 2010     C TOTAL HIP ARTHROPLASTY Left 2014     CATARACT IOL, RT/LT Bilateral 2000     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     HEART CATH FEMORAL CANNULIZATION WITH OPEN STANDBY REPAIR AORTIC VALVE N/A 8/3/2016    Procedure: HEART CATH FEMORAL CANNULIZATION WITH OPEN STANDBY REPAIR AORTIC VALVE;  Surgeon: Owen Schultz MD;  Location: [facility] OR     HYSTERECTOMY TOTAL ABDOMINAL  1970    ovaries out     MOUTH SURGERY  2011    jaw surgery due to fracture     TRANSCATHETER AORTIC VALVE IMPLANT ANESTHESIA N/A 8/3/2016    Procedure: TRANSCATHETER AORTIC VALVE IMPLANT ANESTHESIA;  Surgeon: GENERIC ANESTHESIA PROVIDER;  Location: [facility] OR       Social History   Substance Use Topics     Smoking status: Never Smoker     Smokeless tobacco: Never Used     Alcohol use No     Family History   Problem Relation Age of Onset     Hyperlipidemia Mother      Family History Negative No family hx of          Current Outpatient  Prescriptions   Medication Sig Dispense Refill     acetaminophen (TYLENOL ARTHRITIS PAIN) 650 MG CR tablet Take 2 tablets (1,300 mg) by mouth every 8 hours as needed for mild pain       ALPRAZolam (XANAX) 0.25 MG tablet TAKE ONE TABLET BY MOUTH TWICE DAILY AS NEEDED FOR ANXIETY 28 tablet 0     amLODIPine (NORVASC) 5 MG tablet Take 1 tablet (5 mg) by mouth daily 90 tablet 3     aspirin EC 81 MG EC tablet Take 1 tablet (81 mg) by mouth daily       atorvastatin (LIPITOR) 40 MG tablet TAKE ONE TABLET BY MOUTH ONCE DAILY 90 tablet 0     Calcium & Magnesium Carbonates (MYLANTA PO) 2 tblsp every 4 hours as needed       calcium carbonate (TUMS) 500 MG chewable tablet Take 2-4 tablets (1,000-2,000 mg) by mouth as needed for heartburn       clopidogrel (PLAVIX) 75 MG tablet TAKE ONE TABLET BY MOUTH ONCE DAILY 30 tablet 11     Cranberry 1000 MG CAPS        docusate sodium (COLACE) 100 MG capsule Take 200 mg by mouth daily 2 capsules       famotidine (PEPCID) 40 MG tablet TAKE ONE TABLET BY MOUTH AT BEDTIME 30 tablet 9     folic acid (FOLVITE) 1 MG tablet TAKE 1 TABLET BY MOUTH ONCE DAILY 90 tablet 1     Hylan (SYNVISC) 16 MG/2ML SOSY 16 mg by INTRA-ARTICULAR route once 2 mL 0     isosorbide mononitrate (IMDUR) 30 MG 24 hr tablet Take 2 tablets (60 mg) by mouth daily 360 tablet 3     Lactobacillus (FLORAJEN ACIDOPHILUS) CAPS Take 1 capsule by mouth daily       levETIRAcetam (KEPPRA) 500 MG tablet Take 1 tablet (500 mg) by mouth 2 times daily 90 tablet 6     levothyroxine (SYNTHROID/LEVOTHROID) 50 MCG tablet TAKE 1 TABLET BY MOUTH ONCE DAILY 30 tablet 3     losartan (COZAAR) 25 MG tablet Take 0.5 tablets (12.5 mg) by mouth daily 30 tablet 1     methotrexate 2.5 MG tablet Take 4 tablets (10 mg) by mouth once a week Wed 52 tablet 3     metoprolol succinate (TOPROL-XL) 50 MG 24 hr tablet Take 1 tablet (50 mg) by mouth 2 times daily 180 tablet 3     mirtazapine (REMERON) 30 MG tablet TAKE ONE TABLET BY MOUTH AT BEDTIME 90 tablet 2      multivitamin, therapeutic with minerals (MULTI-VITAMIN) TABS Take 1 tablet by mouth daily       nitroFURantoin, macrocrystal-monohydrate, (MACROBID) 100 MG capsule Take 1 capsule (100 mg) by mouth 2 times daily (Patient taking differently: Take 100 mg by mouth daily ) 14 capsule 0     nitroGLYcerin (NITROSTAT) 0.4 MG sublingual tablet DISSOLVE ONE TABLET UNDER THE TONGUE EVERY 5 MINUTES AS NEEDED FOR CHEST PAIN.  DO NOT EXCEED A TOTAL OF 3 DOSES IN 15 MINUTES 25 tablet 0     predniSONE (DELTASONE) 5 MG tablet TAKE ONE TABLET BY MOUTH EVERY OTHER DAY ALTERNATING  WITH  1/2  TABLET  EVERY  OTHER  DAY 23 tablet 13     Allergies   Allergen Reactions     Caffeine Other (See Comments)     Triggers your Meniere's disease     Hydrocodone Other (See Comments)     hallucinations     Ibuprofen GI Disturbance     Penicillins Hives     Tramadol Other (See Comments)     Seizure, was taking remeron along with tramadol     Metal [Staples] Rash       Reviewed and updated as needed this visit by clinical staff  Tobacco  Allergies  Meds  Med Hx  Surg Hx  Fam Hx  Soc Hx      Reviewed and updated as needed this visit by Provider         ROS:  CONSTITUTIONAL: NEGATIVE for fever, chills, change in weight  INTEGUMENTARY/SKIN: NEGATIVE for worrisome rashes, moles or lesions  EYES: Patient has near complete vision loss in her right eye.  ENT/MOUTH: NEGATIVE for ear, mouth and throat problems  RESP: NEGATIVE for significant cough or SOB  CV: NEGATIVE for chest pain, palpitations or peripheral edema  GI: NEGATIVE for nausea, abdominal pain, heartburn, or change in bowel habits  : NEGATIVE for frequency, dysuria, or hematuria  MUSCULOSKELETAL: NEGATIVE for significant arthralgias or myalgia  NEURO: NEGATIVE for weakness, dizziness or paresthesias  ENDOCRINE: NEGATIVE for temperature intolerance, skin/hair changes  HEME: NEGATIVE for bleeding problems  PSYCHIATRIC: NEGATIVE for changes in mood or affect    OBJECTIVE:     /68   Pulse 86  Temp 98.7  F (37.1  C) (Temporal)  Resp 16  Wt 133 lb 1.6 oz (60.4 kg)  SpO2 96%  BMI 21.48 kg/m2  Body mass index is 21.48 kg/(m^2).  GENERAL: healthy, alert and no distress  EYES: Pupils equal round and reactive.  Patient denies perceivable vision on the right.  Fundal examination is unremarkable.  HENT: ear canals and TM's normal, nose and mouth without ulcers or lesions  NECK: no adenopathy, no asymmetry, masses, or scars and thyroid normal to palpation  RESP: lungs clear to auscultation - no rales, rhonchi or wheezes  CV: regular rate and rhythm, normal S1 S2, no S3 or S4, no murmur, click or rub, no peripheral edema and peripheral pulses strong  ABDOMEN: soft, nontender, no hepatosplenomegaly, no masses and bowel sounds normal  MS: no gross musculoskeletal defects noted, no edema  SKIN: no suspicious lesions or rashes  NEURO: Normal strength and tone, mentation intact and speech normal  PSYCH: mentation appears normal, affect normal/bright    Diagnostic Test Results:  none     ASSESSMENT/PLAN:           ICD-10-CM    1. Hypertensive urgency I16.0    2. Cerebrovascular accident (CVA), unspecified mechanism (H) I63.9    3. CKD (chronic kidney disease) stage 3, GFR 30-59 ml/min (H) N18.3    4. Coronary artery disease involving native coronary artery of native heart without angina pectoris I25.10    5. S/P TAVR (transcatheter aortic valve replacement) Z95.2    6. Seropositive rheumatoid arthritis (H) M05.9                                 FOLLOW UP   I have asked the patient to make an appointment for followup with me in 1 month    [facility]

## 2018-10-24 NOTE — PROGRESS NOTES
ENT Consultation    Gillian Burk who is a 89 year old female seen in consultation that is self referred.      History of Present Illness - Gillian Burk is a 89 year old female who presents with a possible scab on the tympanic membrane. She had a hyperbaric chamber treatment for a blood clot in the eye. Patient is unable to hear from the right ear.       Gillian IS NOT a smoker/uses chewing tobacco.       Past Medical History -   Past Medical History:   Diagnosis Date     Aortic stenosis     s/p TAVR 8/17/16     Chronic migraine      Hyperlipidaemia      Hypertension      Hypothyroidism      Myocardial infarction (H)      Need for SBE (subacute bacterial endocarditis) prophylaxis      Orthostatic hypotension     wears compression stockings     PUD (peptic ulcer disease)      Secondary Sjogren's syndrome (H)      Seizures (H)     after stroke (partial onset)     Seropositive rheumatoid arthritis (H)      Stroke (H) 05/2013    with visual field cut, left occipital lobe     Syncope 2014    due to orthostatic hypotension       Current Medications -   Current Outpatient Prescriptions:      acetaminophen (TYLENOL ARTHRITIS PAIN) 650 MG CR tablet, Take 2 tablets (1,300 mg) by mouth every 8 hours as needed for mild pain, Disp: , Rfl:      ALPRAZolam (XANAX) 0.25 MG tablet, TAKE ONE TABLET BY MOUTH TWICE DAILY AS NEEDED FOR ANXIETY, Disp: 28 tablet, Rfl: 0     amLODIPine (NORVASC) 5 MG tablet, Take 1 tablet (5 mg) by mouth daily, Disp: 90 tablet, Rfl: 3     aspirin EC 81 MG EC tablet, Take 1 tablet (81 mg) by mouth daily, Disp: , Rfl:      atorvastatin (LIPITOR) 40 MG tablet, TAKE ONE TABLET BY MOUTH ONCE DAILY, Disp: 90 tablet, Rfl: 0     Calcium & Magnesium Carbonates (MYLANTA PO), 2 tblsp every 4 hours as needed, Disp: , Rfl:      calcium carbonate (TUMS) 500 MG chewable tablet, Take 2-4 tablets (1,000-2,000 mg) by mouth as needed for heartburn, Disp: , Rfl:      clopidogrel (PLAVIX) 75 MG tablet, TAKE ONE  TABLET BY MOUTH ONCE DAILY, Disp: 30 tablet, Rfl: 11     Cranberry 1000 MG CAPS, , Disp: , Rfl:      docusate sodium (COLACE) 100 MG capsule, Take 200 mg by mouth daily 2 capsules, Disp: , Rfl:      famotidine (PEPCID) 40 MG tablet, TAKE ONE TABLET BY MOUTH AT BEDTIME, Disp: 30 tablet, Rfl: 9     folic acid (FOLVITE) 1 MG tablet, TAKE 1 TABLET BY MOUTH ONCE DAILY, Disp: 90 tablet, Rfl: 1     Hylan (SYNVISC) 16 MG/2ML SOSY, 16 mg by INTRA-ARTICULAR route once, Disp: 2 mL, Rfl: 0     isosorbide mononitrate (IMDUR) 30 MG 24 hr tablet, Take 2 tablets (60 mg) by mouth daily, Disp: 360 tablet, Rfl: 3     Lactobacillus (FLORAJEN ACIDOPHILUS) CAPS, Take 1 capsule by mouth daily, Disp: , Rfl:      levETIRAcetam (KEPPRA) 500 MG tablet, Take 1 tablet (500 mg) by mouth 2 times daily, Disp: 90 tablet, Rfl: 6     levothyroxine (SYNTHROID/LEVOTHROID) 50 MCG tablet, TAKE 1 TABLET BY MOUTH ONCE DAILY, Disp: 30 tablet, Rfl: 3     losartan (COZAAR) 25 MG tablet, Take 0.5 tablets (12.5 mg) by mouth daily, Disp: 30 tablet, Rfl: 1     methotrexate 2.5 MG tablet, Take 4 tablets (10 mg) by mouth once a week Wed, Disp: 52 tablet, Rfl: 3     metoprolol succinate (TOPROL-XL) 50 MG 24 hr tablet, Take 1 tablet (50 mg) by mouth 2 times daily, Disp: 180 tablet, Rfl: 3     mirtazapine (REMERON) 30 MG tablet, TAKE ONE TABLET BY MOUTH AT BEDTIME, Disp: 90 tablet, Rfl: 2     multivitamin, therapeutic with minerals (MULTI-VITAMIN) TABS, Take 1 tablet by mouth daily, Disp: , Rfl:      nitroFURantoin, macrocrystal-monohydrate, (MACROBID) 100 MG capsule, Take 1 capsule (100 mg) by mouth 2 times daily (Patient taking differently: Take 100 mg by mouth daily ), Disp: 14 capsule, Rfl: 0     nitroGLYcerin (NITROSTAT) 0.4 MG sublingual tablet, DISSOLVE ONE TABLET UNDER THE TONGUE EVERY 5 MINUTES AS NEEDED FOR CHEST PAIN.  DO NOT EXCEED A TOTAL OF 3 DOSES IN 15 MINUTES, Disp: 25 tablet, Rfl: 0     predniSONE (DELTASONE) 5 MG tablet, TAKE ONE TABLET BY MOUTH  EVERY OTHER DAY ALTERNATING  WITH  1/2  TABLET  EVERY  OTHER  DAY, Disp: 23 tablet, Rfl: 13    Allergies -   Allergies   Allergen Reactions     Caffeine Other (See Comments)     Triggers your Meniere's disease     Hydrocodone Other (See Comments)     hallucinations     Ibuprofen GI Disturbance     Penicillins Hives     Tramadol Other (See Comments)     Seizure, was taking remeron along with tramadol     Metal [Staples] Rash       Social History -   Social History     Social History     Marital status:      Spouse name: N/A     Number of children: 4     Years of education: N/A     Occupational History      Retired     Social History Main Topics     Smoking status: Never Smoker     Smokeless tobacco: Never Used     Alcohol use No     Drug use: No     Sexual activity: No     Other Topics Concern     Special Diet Yes     low salt      Exercise Yes     semi recument bike 15 mins daily      Social History Narrative    .  Lives in assisted living. Independent. Has help with making bed and changing sheets.    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 .     4 children - 1 with RA           Family History -   Family History   Problem Relation Age of Onset     Hyperlipidemia Mother      Family History Negative No family hx of        Review of Systems - As per HPI and PMHx, otherwise review of system review of the head and neck negative.    Physical Exam  /74 (BP Location: Right arm, Cuff Size: Adult Regular)  Pulse 82  Wt 59.9 kg (132 lb)  SpO2 92%  BMI 21.31 kg/m2  BMI: Body mass index is 21.31 kg/(m^2).    General - The patient is well nourished and well developed, and appears to have good nutritional status.  Alert and oriented to person and place, answers questions and cooperates with examination appropriately.    SKIN - No suspicious lesions or rashes.  Respiration - No respiratory distress.  Head and Face - Normocephalic and atraumatic, with no gross asymmetry noted of the  contour of the facial features.  The facial nerve is intact, with strong symmetric movements.    Voice and Breathing - The patient was breathing comfortably without the use of accessory muscles. The patients voice was clear and strong, and had appropriate pitch and quality.    Ears - Bilateral pinna and EACs with normal appearing overlying skin.  Bony landmarks of the ossicular chain are normal. The tympanic membrane on the left is normal in appearance. There is a scab of dried blood in the right ear.  No retraction, perforation, or masses.  No fluid or purulence was seen in the external canal or the middle ear.     Eyes - Extraocular movements intact.  Sclera were not icteric or injected, conjunctiva were pink and moist.    Mouth - Examination of the oral cavity showed pink, healthy oral mucosa. No lesions or ulcerations noted.  The tongue was mobile and midline, and the dentition were in good condition.      Throat - The walls of the oropharynx were smooth, pink, moist, symmetric, and had no lesions or ulcerations.  The tonsillar pillars and soft palate were symmetric.  The uvula was midline on elevation.    Neck - Normal midline excursion of the laryngotracheal complex during swallowing.  Full range of motion on passive movement.  Palpation of the occipital, submental, submandibular, internal jugular chain, and supraclavicular nodes did not demonstrate any abnormal lymph nodes or masses.  The carotid pulse was palpable bilaterally.  Palpation of the thyroid was soft and smooth, with no nodules or goiter appreciated.  The trachea was mobile and midline.    Nose - External contour is symmetric, no gross deflection or scars.  Nasal mucosa is pink and moist with no abnormal mucus.  The septum was midline and non-obstructive, turbinates of normal size and position.  No polyps, masses, or purulence noted on examination.    Neuro - Nonfocal neuro exam is normal, CN 2 through 12 intact, normal gait and muscle  tone.      Performed in clinic today:  Ears - On examination of the ears, I found that both ears were impacted with cerumen. I positioned the patient in the examination chair in a semi-supine position. I used the magnified speculumOn the right side, I began by using a cerumen loop to gently lift the edges of the cerumen mass away from the walls of the external canal.  Once I did this, I was able to use curette to  pull/suction away fragments of wax and debris. I removed all the wax and debri.  The tympanic membrane was intact, no sign of perforation or middle ear effusion.    Procedure - Myringotomy with aspiration    Procedure - After discussion of the risks and benefits of myringotomy, informed consent was signed and placed in the chart.  I began with the left side.  I proceeded to position the patient in a semi-supine position in the examination chair.  Using the binocular surgical microscope, I then proceeded to clean the canal of cerumen and squamous debris.  I was able to see the tympanic membrane.  Using a small suction tip, I applied a tiny coating of phenol onto the tympanic membrane.  After visualizing a good sharee, I then proceeded to use a myringotomy knife to make a radially oriented incision in the tympanic membrane.  The middle ear was clear. I then began on the right side. Using the binocular surgical microscope, I then proceeded to clean the canal of cerumen and squamous debris.  I was able to see the tympanic membrane.  Using a small suction tip, I applied a tiny coating of phenol onto the tympanic membrane.  After visualizing a good sharee, I then proceeded to use a myringotomy knife to make a radially oriented incision in the tympanic membrane.  The middle ear was clear.      Assessment/Plan - The patient was instructed on water precautions and given a prescription for otic antibiotic drops to use for three days.  I will see them in 4 weeks for follow up and repeat audiogram.  The patient was  instructed to call in or return sooner if there was any drainage from the ear.      This document serves as a record of the services and decisions personally performed and made by Dr. Keith Muñoz MD. It was created on his behalf by Vidhi Spring, a trained medical scribe. The creation of this document is based the provider's statements to the medical scribe.  Vidhi Spring 10/25/2018    Provider:   The information in this document, created by the medical scribe for me, accurately reflects the services I personally performed and the decisions made by me. I have reviewed and approved this document for accuracy prior to leaving the patient care area.  Dr. Keith Muñoz MD 10/25/2018    Keith Muñoz MD.

## 2018-10-25 ENCOUNTER — OFFICE VISIT (OUTPATIENT)
Dept: OTOLARYNGOLOGY | Facility: CLINIC | Age: 83
End: 2018-10-25
Payer: COMMERCIAL

## 2018-10-25 VITALS
HEART RATE: 82 BPM | WEIGHT: 132 LBS | DIASTOLIC BLOOD PRESSURE: 74 MMHG | BODY MASS INDEX: 21.31 KG/M2 | OXYGEN SATURATION: 92 % | SYSTOLIC BLOOD PRESSURE: 144 MMHG

## 2018-10-25 DIAGNOSIS — H65.03 BILATERAL ACUTE SEROUS OTITIS MEDIA, RECURRENCE NOT SPECIFIED: ICD-10-CM

## 2018-10-25 DIAGNOSIS — H61.21 IMPACTED CERUMEN OF RIGHT EAR: Primary | ICD-10-CM

## 2018-10-25 PROCEDURE — 69420 INCISION OF EARDRUM: CPT | Mod: 50 | Performed by: OTOLARYNGOLOGY

## 2018-10-25 PROCEDURE — 99213 OFFICE O/P EST LOW 20 MIN: CPT | Mod: 25 | Performed by: OTOLARYNGOLOGY

## 2018-10-25 RX ORDER — OFLOXACIN 3 MG/ML
5 SOLUTION AURICULAR (OTIC) 2 TIMES DAILY
Qty: 4 ML | Refills: 0 | Status: SHIPPED | OUTPATIENT
Start: 2018-10-25 | End: 2019-03-20

## 2018-10-25 NOTE — PATIENT INSTRUCTIONS
General Scheduling Information  To schedule your CT/MRI scan, please contact [facility] at [phone] OR [facility] at [phone]    To schedule your Surgery, please contact our Specialty Schedulers at [phone]      ENT Clinic Locations Clinic Hours Telephone Number     [facility] [place]  [address] [facility]  [place] [address]     2nd & 4th Thursday:           8:00am - 12:00pm   To schedule/reschedule an appointment with   Dr. Muñoz,   please contact our   Specialty Scheduling Department at:     [phone]       [facility]  [address] [place] [address] [place] [address]   Monday:             8:00am -- 4:30 pm      1st, 3rd & 5th Thursday:           8:00am - 12:00pm      Maggy [address] [address]   Wednesday:       9:00 -- 4:30 pm

## 2018-10-25 NOTE — MR AVS SNAPSHOT
After Visit Summary   10/25/2018    Gillian Burk    MRN: [id number]           Patient Information     Date Of Birth          3/19/1927        Visit Information        Provider Department      10/25/2018 9:45 AM Keith Muñoz MD [facility] [place]        Today's Diagnoses     Impacted cerumen of right ear    -  1      Care Instructions    General Scheduling Information  To schedule your CT/MRI scan, please contact [facility] at [phone] OR [facility] at [phone]    To schedule your Surgery, please contact our Specialty Schedulers at [phone]      ENT Clinic Locations Clinic Hours Telephone Number     [facility]  [address]  [place] [address]     2nd & 4th Thursday:           8:00am - 12:00pm   To schedule/reschedule an appointment with   Dr. Muñoz,   please contact our   Specialty Scheduling Department at:     [phone]       [facility]  [address] [place] [address] [place] [address]   Monday:             8:00am -- 4:30 pm      1st, 3rd & 5th Thursday:           8:00am - 12:00pm      [address] [place] [address]   Wednesday:       9:00 -- 4:30 pm                    Follow-ups after your visit        Follow-up notes from your care team     Return in about 2 weeks (around 11/8/2018) for Follow up.      Who to contact     If you have questions or need follow up information about today's clinic visit or your schedule please contact [facility] directly at [phone].  Normal or non-critical lab and imaging results will be communicated to you by MyChart, letter or phone within 4 business days after the clinic has received the results. If you do not hear from us within 7 days, please contact the clinic through MyChart or phone. If you have a critical or abnormal lab result, we will notify you by phone as soon as possible.  Submit refill requests through [employer] or call your pharmacy and they will  forward the refill request to us. Please allow 3 business days for your refill to be completed.          Additional Information About Your Visit        [employer] Information     [employer] gives you secure access to your electronic health record. If you see a primary care provider, you can also send messages to your care team and make appointments. If you have questions, please call your primary care clinic.  If you do not have a primary care provider, please call [phone] and they will assist you.        Care EveryWhere ID     This is your Care EveryWhere ID. This could be used by other organizations to access your [facility] medical records  [id number]        Your Vitals Were     Pulse Pulse Oximetry BMI (Body Mass Index)             82 92% 21.31 kg/m2          Blood Pressure from Last 3 Encounters:   10/25/18 144/74   10/23/18 134/68   10/12/18 (!) 166/94    Weight from Last 3 Encounters:   10/25/18 59.9 kg (132 lb)   10/23/18 60.4 kg (133 lb 1.6 oz)   10/12/18 62.1 kg (136 lb 12.8 oz)              Today, you had the following     No orders found for display         Today's Medication Changes          These changes are accurate as of 10/25/18 10:52 AM.  If you have any questions, ask your nurse or doctor.               These medicines have changed or have updated prescriptions.        Dose/Directions    nitroFURantoin (macrocrystal-monohydrate) 100 MG capsule   Commonly known as:  MACROBID   This may have changed:  when to take this   Used for:  Acute cystitis without hematuria        Dose:  100 mg   Take 1 capsule (100 mg) by mouth 2 times daily   Quantity:  14 capsule   Refills:  0                Primary Care Provider Office Phone # Fax #    Augusto Fish Meyer [phone]       [address] [place] [address]        Equal Access to Services     [place] AH: Regino Goodrich, kash rodriguez, jimmy kajennifer coelho, zack ayers. So  [facility] [phone].    ATENCIÓN: Si enrico joshi, tiene a granger disposición servicios gratuitos de asistencia lingüística. Geraldine luna [phone].    We comply with applicable federal civil rights laws and [place] laws. We do not discriminate on the basis of race, color, national origin, age, disability, sex, sexual orientation, or gender identity.            Thank you!     Thank you for choosing [facility]  for your care. Our goal is always to provide you with excellent care. Hearing back from our patients is one way we can continue to improve our services. Please take a few minutes to complete the written survey that you may receive in the mail after your visit with us. Thank you!             Your Updated Medication List - Protect others around you: Learn how to safely use, store and throw away your medicines at www.disposemymeds.org.          This list is accurate as of 10/25/18 10:52 AM.  Always use your most recent med list.                   Brand Name Dispense Instructions for use Diagnosis    ALPRAZolam 0.25 MG tablet    XANAX    28 tablet    TAKE ONE TABLET BY MOUTH TWICE DAILY AS NEEDED FOR ANXIETY    Anxiety       amLODIPine 5 MG tablet    NORVASC    90 tablet    Take 1 tablet (5 mg) by mouth daily    Chest pain, unspecified type       aspirin 81 MG EC tablet      Take 1 tablet (81 mg) by mouth daily    S/P TAVR (transcatheter aortic valve replacement)       atorvastatin 40 MG tablet    LIPITOR    90 tablet    TAKE ONE TABLET BY MOUTH ONCE DAILY    Hyperlipidemia with target LDL less than 130       calcium carbonate 500 MG chewable tablet    TUMS     Take 2-4 tablets (1,000-2,000 mg) by mouth as needed for heartburn        clopidogrel 75 MG tablet    PLAVIX    30 tablet    TAKE ONE TABLET BY MOUTH ONCE DAILY    History of CVA (cerebrovascular accident)       Cranberry 1000 MG Caps           docusate sodium 100 MG capsule    COLACE     Take 200 mg by mouth daily 2 capsules        famotidine 40  MG tablet    PEPCID    30 tablet    TAKE ONE TABLET BY MOUTH AT BEDTIME    Gastroesophageal reflux disease without esophagitis       FLORAJEN ACIDOPHILUS Caps      Take 1 capsule by mouth daily        folic acid 1 MG tablet    FOLVITE    90 tablet    TAKE 1 TABLET BY MOUTH ONCE DAILY    Seropositive rheumatoid arthritis (H)       isosorbide mononitrate 30 MG 24 hr tablet    IMDUR    360 tablet    Take 2 tablets (60 mg) by mouth daily        levETIRAcetam 500 MG tablet    KEPPRA    90 tablet    Take 1 tablet (500 mg) by mouth 2 times daily    Seizure disorder (H)       levothyroxine 50 MCG tablet    SYNTHROID/LEVOTHROID    30 tablet    TAKE 1 TABLET BY MOUTH ONCE DAILY    Hypothyroidism due to acquired atrophy of thyroid       losartan 25 MG tablet    COZAAR    30 tablet    Take 0.5 tablets (12.5 mg) by mouth daily    Hypertension goal BP (blood pressure) < 140/90       methotrexate 2.5 MG tablet CHEMO     52 tablet    Take 4 tablets (10 mg) by mouth once a week Wed    Seropositive rheumatoid arthritis (H)       metoprolol succinate 50 MG 24 hr tablet    TOPROL-XL    180 tablet    Take 1 tablet (50 mg) by mouth 2 times daily    Chest pain, unspecified type       mirtazapine 30 MG tablet    REMERON    90 tablet    TAKE ONE TABLET BY MOUTH AT BEDTIME    Sleep initiation disorder       Multi-vitamin Tabs tablet      Take 1 tablet by mouth daily        MYLANTA PO      2 tblsp every 4 hours as needed        nitroFURantoin (macrocrystal-monohydrate) 100 MG capsule    MACROBID    14 capsule    Take 1 capsule (100 mg) by mouth 2 times daily    Acute cystitis without hematuria       nitroGLYcerin 0.4 MG sublingual tablet    NITROSTAT    25 tablet    DISSOLVE ONE TABLET UNDER THE TONGUE EVERY 5 MINUTES AS NEEDED FOR CHEST PAIN.  DO NOT EXCEED A TOTAL OF 3 DOSES IN 15 MINUTES    Coronary artery disease involving native coronary artery of native heart without angina pectoris       predniSONE 5 MG tablet    DELTASONE    23 tablet     TAKE ONE TABLET BY MOUTH EVERY OTHER DAY ALTERNATING  WITH  1/2  TABLET  EVERY  OTHER  DAY    Seropositive rheumatoid arthritis (H)       SYNVISC 16 MG/2ML injection   Generic drug:  hylan     2 mL    16 mg by INTRA-ARTICULAR route once    Primary osteoarthritis of right knee       TYLENOL ARTHRITIS PAIN 650 MG CR tablet   Generic drug:  acetaminophen      Take 2 tablets (1,300 mg) by mouth every 8 hours as needed for mild pain

## 2018-10-25 NOTE — LETTER
10/25/2018         RE: Gillian Burk  [address]  [place] [address]        Dear Colleague,    Thank you for referring your patient, Gillian Burk, to the [facility]. Please see a copy of my visit note below.    ENT Consultation    Gillian Burk who is a 89 year old female seen in consultation that is self referred.      History of Present Illness - Gillian Burk is a 89 year old female who presents with a possible scab on the tympanic membrane. She had a hyperbaric chamber treatment for a blood clot in the eye. Patient is unable to hear from the right ear.       Gillian IS NOT a smoker/uses chewing tobacco.       Past Medical History -   Past Medical History:   Diagnosis Date     Aortic stenosis     s/p TAVR 8/17/16     Chronic migraine      Hyperlipidaemia      Hypertension      Hypothyroidism      Myocardial infarction (H)      Need for SBE (subacute bacterial endocarditis) prophylaxis      Orthostatic hypotension     wears compression stockings     PUD (peptic ulcer disease)      Secondary Sjogren's syndrome (H)      Seizures (H)     after stroke (partial onset)     Seropositive rheumatoid arthritis (H)      Stroke (H) 05/2013    with visual field cut, left occipital lobe     Syncope 2014    due to orthostatic hypotension       Current Medications -   Current Outpatient Prescriptions:      acetaminophen (TYLENOL ARTHRITIS PAIN) 650 MG CR tablet, Take 2 tablets (1,300 mg) by mouth every 8 hours as needed for mild pain, Disp: , Rfl:      ALPRAZolam (XANAX) 0.25 MG tablet, TAKE ONE TABLET BY MOUTH TWICE DAILY AS NEEDED FOR ANXIETY, Disp: 28 tablet, Rfl: 0     amLODIPine (NORVASC) 5 MG tablet, Take 1 tablet (5 mg) by mouth daily, Disp: 90 tablet, Rfl: 3     aspirin EC 81 MG EC tablet, Take 1 tablet (81 mg) by mouth daily, Disp: , Rfl:      atorvastatin (LIPITOR) 40 MG tablet, TAKE ONE TABLET BY MOUTH ONCE DAILY, Disp: 90 tablet, Rfl: 0     Calcium &  Magnesium Carbonates (MYLANTA PO), 2 tblsp every 4 hours as needed, Disp: , Rfl:      calcium carbonate (TUMS) 500 MG chewable tablet, Take 2-4 tablets (1,000-2,000 mg) by mouth as needed for heartburn, Disp: , Rfl:      clopidogrel (PLAVIX) 75 MG tablet, TAKE ONE TABLET BY MOUTH ONCE DAILY, Disp: 30 tablet, Rfl: 11     Cranberry 1000 MG CAPS, , Disp: , Rfl:      docusate sodium (COLACE) 100 MG capsule, Take 200 mg by mouth daily 2 capsules, Disp: , Rfl:      famotidine (PEPCID) 40 MG tablet, TAKE ONE TABLET BY MOUTH AT BEDTIME, Disp: 30 tablet, Rfl: 9     folic acid (FOLVITE) 1 MG tablet, TAKE 1 TABLET BY MOUTH ONCE DAILY, Disp: 90 tablet, Rfl: 1     Hylan (SYNVISC) 16 MG/2ML SOSY, 16 mg by INTRA-ARTICULAR route once, Disp: 2 mL, Rfl: 0     isosorbide mononitrate (IMDUR) 30 MG 24 hr tablet, Take 2 tablets (60 mg) by mouth daily, Disp: 360 tablet, Rfl: 3     Lactobacillus (FLORAJEN ACIDOPHILUS) CAPS, Take 1 capsule by mouth daily, Disp: , Rfl:      levETIRAcetam (KEPPRA) 500 MG tablet, Take 1 tablet (500 mg) by mouth 2 times daily, Disp: 90 tablet, Rfl: 6     levothyroxine (SYNTHROID/LEVOTHROID) 50 MCG tablet, TAKE 1 TABLET BY MOUTH ONCE DAILY, Disp: 30 tablet, Rfl: 3     losartan (COZAAR) 25 MG tablet, Take 0.5 tablets (12.5 mg) by mouth daily, Disp: 30 tablet, Rfl: 1     methotrexate 2.5 MG tablet, Take 4 tablets (10 mg) by mouth once a week Wed, Disp: 52 tablet, Rfl: 3     metoprolol succinate (TOPROL-XL) 50 MG 24 hr tablet, Take 1 tablet (50 mg) by mouth 2 times daily, Disp: 180 tablet, Rfl: 3     mirtazapine (REMERON) 30 MG tablet, TAKE ONE TABLET BY MOUTH AT BEDTIME, Disp: 90 tablet, Rfl: 2     multivitamin, therapeutic with minerals (MULTI-VITAMIN) TABS, Take 1 tablet by mouth daily, Disp: , Rfl:      nitroFURantoin, macrocrystal-monohydrate, (MACROBID) 100 MG capsule, Take 1 capsule (100 mg) by mouth 2 times daily (Patient taking differently: Take 100 mg by mouth daily ), Disp: 14 capsule, Rfl: 0      nitroGLYcerin (NITROSTAT) 0.4 MG sublingual tablet, DISSOLVE ONE TABLET UNDER THE TONGUE EVERY 5 MINUTES AS NEEDED FOR CHEST PAIN.  DO NOT EXCEED A TOTAL OF 3 DOSES IN 15 MINUTES, Disp: 25 tablet, Rfl: 0     predniSONE (DELTASONE) 5 MG tablet, TAKE ONE TABLET BY MOUTH EVERY OTHER DAY ALTERNATING  WITH  1/2  TABLET  EVERY  OTHER  DAY, Disp: 23 tablet, Rfl: 13    Allergies -   Allergies   Allergen Reactions     Caffeine Other (See Comments)     Triggers your Meniere's disease     Hydrocodone Other (See Comments)     hallucinations     Ibuprofen GI Disturbance     Penicillins Hives     Tramadol Other (See Comments)     Seizure, was taking remeron along with tramadol     Metal [Staples] Rash       Social History -   Social History     Social History     Marital status:      Spouse name: N/A     Number of children: 4     Years of education: N/A     Occupational History      Retired     Social History Main Topics     Smoking status: Never Smoker     Smokeless tobacco: Never Used     Alcohol use No     Drug use: No     Sexual activity: No     Other Topics Concern     Special Diet Yes     low salt      Exercise Yes     semi recument bike 15 mins daily      Social History Narrative    .  Lives in assisted living. Independent. Has help with making bed and changing sheets.    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 .     4 children - 1 with RA           Family History -   Family History   Problem Relation Age of Onset     Hyperlipidemia Mother      Family History Negative No family hx of        Review of Systems - As per HPI and PMHx, otherwise review of system review of the head and neck negative.    Physical Exam  /74 (BP Location: Right arm, Cuff Size: Adult Regular)  Pulse 82  Wt 59.9 kg (132 lb)  SpO2 92%  BMI 21.31 kg/m2  BMI: Body mass index is 21.31 kg/(m^2).    General - The patient is well nourished and well developed, and appears to have good nutritional status.  Alert  and oriented to person and place, answers questions and cooperates with examination appropriately.    SKIN - No suspicious lesions or rashes.  Respiration - No respiratory distress.  Head and Face - Normocephalic and atraumatic, with no gross asymmetry noted of the contour of the facial features.  The facial nerve is intact, with strong symmetric movements.    Voice and Breathing - The patient was breathing comfortably without the use of accessory muscles. The patients voice was clear and strong, and had appropriate pitch and quality.    Ears - Bilateral pinna and EACs with normal appearing overlying skin.  Bony landmarks of the ossicular chain are normal. The tympanic membrane on the left is normal in appearance. There is a scab of dried blood in the right ear.  No retraction, perforation, or masses.  No fluid or purulence was seen in the external canal or the middle ear.     Eyes - Extraocular movements intact.  Sclera were not icteric or injected, conjunctiva were pink and moist.    Mouth - Examination of the oral cavity showed pink, healthy oral mucosa. No lesions or ulcerations noted.  The tongue was mobile and midline, and the dentition were in good condition.      Throat - The walls of the oropharynx were smooth, pink, moist, symmetric, and had no lesions or ulcerations.  The tonsillar pillars and soft palate were symmetric.  The uvula was midline on elevation.    Neck - Normal midline excursion of the laryngotracheal complex during swallowing.  Full range of motion on passive movement.  Palpation of the occipital, submental, submandibular, internal jugular chain, and supraclavicular nodes did not demonstrate any abnormal lymph nodes or masses.  The carotid pulse was palpable bilaterally.  Palpation of the thyroid was soft and smooth, with no nodules or goiter appreciated.  The trachea was mobile and midline.    Nose - External contour is symmetric, no gross deflection or scars.  Nasal mucosa is pink and  moist with no abnormal mucus.  The septum was midline and non-obstructive, turbinates of normal size and position.  No polyps, masses, or purulence noted on examination.    Neuro - Nonfocal neuro exam is normal, CN 2 through 12 intact, normal gait and muscle tone.      Performed in clinic today:  Ears - On examination of the ears, I found that both ears were impacted with cerumen. I positioned the patient in the examination chair in a semi-supine position. I used the magnified speculumOn the right side, I began by using a cerumen loop to gently lift the edges of the cerumen mass away from the walls of the external canal.  Once I did this, I was able to use curette to  pull/suction away fragments of wax and debris. I removed all the wax and debri.  The tympanic membrane was intact, no sign of perforation or middle ear effusion.    Procedure - Myringotomy with aspiration    Procedure - After discussion of the risks and benefits of myringotomy, informed consent was signed and placed in the chart.  I began with the left side.  I proceeded to position the patient in a semi-supine position in the examination chair.  Using the binocular surgical microscope, I then proceeded to clean the canal of cerumen and squamous debris.  I was able to see the tympanic membrane.  Using a small suction tip, I applied a tiny coating of phenol onto the tympanic membrane.  After visualizing a good sharee, I then proceeded to use a myringotomy knife to make a radially oriented incision in the tympanic membrane.  The middle ear was clear. I then began on the right side. Using the binocular surgical microscope, I then proceeded to clean the canal of cerumen and squamous debris.  I was able to see the tympanic membrane.  Using a small suction tip, I applied a tiny coating of phenol onto the tympanic membrane.  After visualizing a good sharee, I then proceeded to use a myringotomy knife to make a radially oriented incision in the tympanic  membrane.  The middle ear was clear.      Assessment/Plan - The patient was instructed on water precautions and given a prescription for otic antibiotic drops to use for three days.  I will see them in 4 weeks for follow up and repeat audiogram.  The patient was instructed to call in or return sooner if there was any drainage from the ear.      This document serves as a record of the services and decisions personally performed and made by Dr. Keith Muñoz MD. It was created on his behalf by Vidhi Spring, a trained medical scribe. The creation of this document is based the provider's statements to the medical scribe.  Vidhi Spring 10/25/2018    Provider:   The information in this document, created by the medical scribe for me, accurately reflects the services I personally performed and the decisions made by me. I have reviewed and approved this document for accuracy prior to leaving the patient care area.  Dr. Keith Muñoz MD 10/25/2018    Keith Muñoz MD.      Again, thank you for allowing me to participate in the care of your patient.        Sincerely,        Keith Muñoz MD, MD

## 2018-11-09 NOTE — PROGRESS NOTES
History of Present Illness - Gillian Burk is a 89 year old female presenting in clinic today for a recheck on Patient presents with:  RECHECK  Ear Problem    Patient is feeling much better and states that she is hearing better since the myringotomy without tube placement. She currently does not have any symptoms.       Body mass index is 21.47 kg/(m^2).    BP Readings from Last 1 Encounters:   11/12/18 144/74     BP noted to be well controlled today in office.     Gillian IS NOT a smoker/uses chewing tobacco.    Past Medical History -   Past Medical History:   Diagnosis Date     Aortic stenosis     s/p TAVR 8/17/16     Chronic migraine      Hyperlipidaemia      Hypertension      Hypothyroidism      Myocardial infarction (H)      Need for SBE (subacute bacterial endocarditis) prophylaxis      Orthostatic hypotension     wears compression stockings     PUD (peptic ulcer disease)      Secondary Sjogren's syndrome (H)      Seizures (H)     after stroke (partial onset)     Seropositive rheumatoid arthritis (H)      Stroke (H) 05/2013    with visual field cut, left occipital lobe     Syncope 2014    due to orthostatic hypotension       Current Medications -   Current Outpatient Prescriptions:      acetaminophen (TYLENOL ARTHRITIS PAIN) 650 MG CR tablet, Take 2 tablets (1,300 mg) by mouth every 8 hours as needed for mild pain, Disp: , Rfl:      ALPRAZolam (XANAX) 0.25 MG tablet, TAKE ONE TABLET BY MOUTH TWICE DAILY AS NEEDED FOR ANXIETY, Disp: 28 tablet, Rfl: 0     amLODIPine (NORVASC) 5 MG tablet, Take 1 tablet (5 mg) by mouth daily, Disp: 90 tablet, Rfl: 3     aspirin EC 81 MG EC tablet, Take 1 tablet (81 mg) by mouth daily, Disp: , Rfl:      atorvastatin (LIPITOR) 40 MG tablet, TAKE ONE TABLET BY MOUTH ONCE DAILY, Disp: 90 tablet, Rfl: 0     Calcium & Magnesium Carbonates (MYLANTA PO), 2 tblsp every 4 hours as needed, Disp: , Rfl:      calcium carbonate (TUMS) 500 MG chewable tablet, Take 2-4 tablets (1,000-2,000  mg) by mouth as needed for heartburn, Disp: , Rfl:      clopidogrel (PLAVIX) 75 MG tablet, TAKE ONE TABLET BY MOUTH ONCE DAILY, Disp: 30 tablet, Rfl: 11     Cranberry 1000 MG CAPS, , Disp: , Rfl:      docusate sodium (COLACE) 100 MG capsule, Take 200 mg by mouth daily 2 capsules, Disp: , Rfl:      famotidine (PEPCID) 40 MG tablet, TAKE ONE TABLET BY MOUTH AT BEDTIME, Disp: 30 tablet, Rfl: 9     folic acid (FOLVITE) 1 MG tablet, TAKE 1 TABLET BY MOUTH ONCE DAILY, Disp: 90 tablet, Rfl: 1     Hylan (SYNVISC) 16 MG/2ML SOSY, 16 mg by INTRA-ARTICULAR route once, Disp: 2 mL, Rfl: 0     isosorbide mononitrate (IMDUR) 30 MG 24 hr tablet, Take 2 tablets (60 mg) by mouth daily, Disp: 360 tablet, Rfl: 3     Lactobacillus (FLORAJEN ACIDOPHILUS) CAPS, Take 1 capsule by mouth daily, Disp: , Rfl:      levETIRAcetam (KEPPRA) 500 MG tablet, Take 1 tablet (500 mg) by mouth 2 times daily, Disp: 90 tablet, Rfl: 6     levothyroxine (SYNTHROID/LEVOTHROID) 50 MCG tablet, TAKE 1 TABLET BY MOUTH ONCE DAILY, Disp: 30 tablet, Rfl: 3     losartan (COZAAR) 25 MG tablet, Take 0.5 tablets (12.5 mg) by mouth daily, Disp: 30 tablet, Rfl: 1     methotrexate 2.5 MG tablet, Take 4 tablets (10 mg) by mouth once a week Wed, Disp: 52 tablet, Rfl: 3     metoprolol succinate (TOPROL-XL) 50 MG 24 hr tablet, Take 1 tablet (50 mg) by mouth 2 times daily, Disp: 180 tablet, Rfl: 3     mirtazapine (REMERON) 30 MG tablet, TAKE ONE TABLET BY MOUTH AT BEDTIME, Disp: 90 tablet, Rfl: 2     multivitamin, therapeutic with minerals (MULTI-VITAMIN) TABS, Take 1 tablet by mouth daily, Disp: , Rfl:      nitroFURantoin, macrocrystal-monohydrate, (MACROBID) 100 MG capsule, Take 1 capsule (100 mg) by mouth 2 times daily (Patient taking differently: Take 100 mg by mouth daily ), Disp: 14 capsule, Rfl: 0     nitroGLYcerin (NITROSTAT) 0.4 MG sublingual tablet, DISSOLVE ONE TABLET UNDER THE TONGUE EVERY 5 MINUTES AS NEEDED FOR CHEST PAIN.  DO NOT EXCEED A TOTAL OF 3 DOSES IN 15  MINUTES, Disp: 25 tablet, Rfl: 0     predniSONE (DELTASONE) 5 MG tablet, TAKE ONE TABLET BY MOUTH EVERY OTHER DAY ALTERNATING  WITH  1/2  TABLET  EVERY  OTHER  DAY, Disp: 23 tablet, Rfl: 13    Allergies -   Allergies   Allergen Reactions     Caffeine Other (See Comments)     Triggers your Meniere's disease     Hydrocodone Other (See Comments)     hallucinations     Ibuprofen GI Disturbance     Penicillins Hives     Tramadol Other (See Comments)     Seizure, was taking remeron along with tramadol     Metal [Staples] Rash       Social History -   Social History     Social History     Marital status:      Spouse name: N/A     Number of children: 4     Years of education: N/A     Occupational History      Retired     Social History Main Topics     Smoking status: Never Smoker     Smokeless tobacco: Never Used     Alcohol use No     Drug use: No     Sexual activity: No     Other Topics Concern     Special Diet Yes     low salt      Exercise Yes     semi recument bike 15 mins daily      Social History Narrative    .  Lives in assisted living. Independent. Has help with making bed and changing sheets.    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 .     4 children - 1 with RA           Family History -   Family History   Problem Relation Age of Onset     Hyperlipidemia Mother      Family History Negative No family hx of        Review of Systems - As per HPI and PMHx, otherwise review of system review of the head and neck negative.    Physical Exam  /74  Pulse 85  Wt 60.3 kg (133 lb)  SpO2 94%  BMI 21.47 kg/m2  BMI: Body mass index is 21.47 kg/(m^2).    General - The patient is well nourished and well developed, and appears to have good nutritional status.  Alert and oriented to person and place, answers questions and cooperates with examination appropriately.    SKIN - No suspicious lesions or rashes.  Respiration - No respiratory distress.  Head and Face - Normocephalic and  atraumatic, with no gross asymmetry noted of the contour of the facial features.  The facial nerve is intact, with strong symmetric movements.    Voice and Breathing - The patient was breathing comfortably without the use of accessory muscles. The patients voice was clear and strong, and had appropriate pitch and quality.    Ears - Bilateral pinna and EACs with normal appearing overlying skin.  Bony landmarks of the ossicular chain are normal. The tympanic membranes are normal in appearance. No retraction, perforation, or masses.  No fluid or purulence was seen in the external canal or the middle ear. The right tympanic membrane healed well after the myringotomy.     Eyes - Extraocular movements intact.  Sclera were not icteric or injected, conjunctiva were pink and moist.    Mouth - Examination of the oral cavity showed pink, healthy oral mucosa. No lesions or ulcerations noted.  The tongue was mobile and midline, and the dentition were in good condition.      Throat - The walls of the oropharynx were smooth, pink, moist, symmetric, and had no lesions or ulcerations.  The tonsillar pillars and soft palate were symmetric. The uvula was midline on elevation.    Neck - Normal midline excursion of the laryngotracheal complex during swallowing.  Full range of motion on passive movement.  Palpation of the occipital, submental, submandibular, internal jugular chain, and supraclavicular nodes did not demonstrate any abnormal lymph nodes or masses.  The carotid pulse was palpable bilaterally.  Palpation of the thyroid was soft and smooth, with no nodules or goiter appreciated.  The trachea was mobile and midline.    Nose - External contour is symmetric, no gross deflection or scars.  Nasal mucosa is pink and moist with no abnormal mucus.  The septum was midline and non-obstructive, turbinates of normal size and position.  No polyps, masses, or purulence noted on examination.    Neuro - Nonfocal neuro exam is normal, CN 2  through 12 intact, normal gait and muscle tone.      Performed in clinic today:  none      A/P - Gillian Burk is a 89 year old female Patient presents with:  RECHECK  Ear Problem  Patient is doing well after her myringotomy fluid was resolved she is asymptomatic.  Gillian should follow up as needed.      At Gillian next appointment they will not need a hearing test.      This document serves as a record of the services and decisions personally performed and made by Dr. Keith Muñoz MD. It was created on his behalf by Vidhi Spring, a trained medical scribe. The creation of this document is based the provider's statements to the medical scribe.  Vidhi Spring 11/12/2018    Provider:   The information in this document, created by the medical scribe for me, accurately reflects the services I personally performed and the decisions made by me. I have reviewed and approved this document for accuracy prior to leaving the patient care area.  Dr. Keith Muñoz MD 11/12/2018    Keith Muñoz MD

## 2018-11-12 ENCOUNTER — OFFICE VISIT (OUTPATIENT)
Dept: AUDIOLOGY | Facility: CLINIC | Age: 83
End: 2018-11-12
Payer: COMMERCIAL

## 2018-11-12 ENCOUNTER — OFFICE VISIT (OUTPATIENT)
Dept: OTOLARYNGOLOGY | Facility: CLINIC | Age: 83
End: 2018-11-12
Payer: COMMERCIAL

## 2018-11-12 VITALS
BODY MASS INDEX: 21.47 KG/M2 | DIASTOLIC BLOOD PRESSURE: 74 MMHG | WEIGHT: 133 LBS | SYSTOLIC BLOOD PRESSURE: 144 MMHG | OXYGEN SATURATION: 94 % | HEART RATE: 85 BPM

## 2018-11-12 DIAGNOSIS — Z98.890 HISTORY OF MYRINGOTOMY: Primary | ICD-10-CM

## 2018-11-12 DIAGNOSIS — H90.3 SENSORINEURAL HEARING LOSS, BILATERAL: ICD-10-CM

## 2018-11-12 PROCEDURE — 92557 COMPREHENSIVE HEARING TEST: CPT | Performed by: AUDIOLOGIST

## 2018-11-12 PROCEDURE — 99207 ZZC NO CHARGE LOS: CPT | Performed by: AUDIOLOGIST

## 2018-11-12 PROCEDURE — 92567 TYMPANOMETRY: CPT | Performed by: AUDIOLOGIST

## 2018-11-12 PROCEDURE — 99213 OFFICE O/P EST LOW 20 MIN: CPT | Performed by: OTOLARYNGOLOGY

## 2018-11-12 NOTE — MR AVS SNAPSHOT
After Visit Summary   11/12/2018    Gillian Burk    MRN: [id number]           Patient Information     Date Of Birth          3/19/1927        Visit Information        Provider Department      11/12/2018 10:15 AM Keith Muñoz MD [facility]        Today's Diagnoses     Unilateral sensorineural hearing loss    -  1       Follow-ups after your visit        Additional Services     AUDIOLOGY ADULT REFERRAL                 Your next 10 appointments already scheduled     Nov 29, 2018  9:45 AM CST   L/Vision Eval with Nita Everett OT   [facility] ([facility])    [address] [place] [id number] [phone]              Who to contact     If you have questions or need follow up information about today's clinic visit or your schedule please contact [facility] directly at [phone].  Normal or non-critical lab and imaging results will be communicated to you by [employer], letter or phone within 4 business days after the clinic has received the results. If you do not hear from us within 7 days, please contact the clinic through [employer] or phone. If you have a critical or abnormal lab result, we will notify you by phone as soon as possible.  Submit refill requests through [facility] [employer] [facility] or call your pharmacy and they will forward the refill request to us. Please allow 3 business days for your refill to be completed.          Additional Information About Your Visit        MyChart Information     [facility] gives you secure access to your electronic health record. If you see a primary care provider, you can also send messages to your care team and make appointments. If you have questions, please call your primary care clinic.  If you do not have a primary care provider, please call [phone] and they will assist you.        Care EveryWhere ID     This is your Care EveryWhere ID. This could be used by other  organizations to access your [facility] medical records  [id number]        Your Vitals Were     Pulse Pulse Oximetry BMI (Body Mass Index)             85 94% 21.47 kg/m2          Blood Pressure from Last 3 Encounters:   11/12/18 144/74   10/25/18 144/74   10/23/18 134/68    Weight from Last 3 Encounters:   11/12/18 60.3 kg (133 lb)   10/25/18 59.9 kg (132 lb)   10/23/18 60.4 kg (133 lb 1.6 oz)              We Performed the Following     AUDIOLOGY ADULT REFERRAL          Today's Medication Changes          These changes are accurate as of 11/12/18 11:48 AM.  If you have any questions, ask your nurse or doctor.               These medicines have changed or have updated prescriptions.        Dose/Directions    nitroFURantoin (macrocrystal-monohydrate) 100 MG capsule   Commonly known as:  MACROBID   This may have changed:  when to take this   Used for:  Acute cystitis without hematuria        Dose:  100 mg   Take 1 capsule (100 mg) by mouth 2 times daily   Quantity:  14 capsule   Refills:  0                Primary Care Provider Office Phone # Fax #    Augusto Meridalaina,  [phone]       [address] [place] [address]        Equal Access to Services     RIVER TRENT AH: Hadii veronica kim hadasho Soomaali, waaxda luqadaha, qaybta kaalmada adeegyada, zack ayers. So [facility] [phone].    ATENCIÓN: Si habla español, tiene a granger disposición servicios gratuitos de asistencia lingüística. Llame al [phone].    We comply with applicable federal civil rights laws and [place] laws. We do not discriminate on the basis of race, color, national origin, age, disability, sex, sexual orientation, or gender identity.            Thank you!     Thank you for choosing [facility]  for your care. Our goal is always to provide you with excellent care. Hearing back from our patients is one way we can continue to improve our services. Please take a few minutes to  complete the written survey that you may receive in the mail after your visit with us. Thank you!             Your Updated Medication List - Protect others around you: Learn how to safely use, store and throw away your medicines at www.disposemymeds.org.          This list is accurate as of 11/12/18 11:48 AM.  Always use your most recent med list.                   Brand Name Dispense Instructions for use Diagnosis    ALPRAZolam 0.25 MG tablet    XANAX    28 tablet    TAKE ONE TABLET BY MOUTH TWICE DAILY AS NEEDED FOR ANXIETY    Anxiety       amLODIPine 5 MG tablet    NORVASC    90 tablet    Take 1 tablet (5 mg) by mouth daily    Chest pain, unspecified type       aspirin 81 MG EC tablet      Take 1 tablet (81 mg) by mouth daily    S/P TAVR (transcatheter aortic valve replacement)       atorvastatin 40 MG tablet    LIPITOR    90 tablet    TAKE ONE TABLET BY MOUTH ONCE DAILY    Hyperlipidemia with target LDL less than 130       calcium carbonate 500 MG chewable tablet    TUMS     Take 2-4 tablets (1,000-2,000 mg) by mouth as needed for heartburn        clopidogrel 75 MG tablet    PLAVIX    30 tablet    TAKE ONE TABLET BY MOUTH ONCE DAILY    History of CVA (cerebrovascular accident)       Cranberry 1000 MG Caps           docusate sodium 100 MG capsule    COLACE     Take 200 mg by mouth daily 2 capsules        famotidine 40 MG tablet    PEPCID    30 tablet    TAKE ONE TABLET BY MOUTH AT BEDTIME    Gastroesophageal reflux disease without esophagitis       FLORAJEN ACIDOPHILUS Caps      Take 1 capsule by mouth daily        folic acid 1 MG tablet    FOLVITE    90 tablet    TAKE 1 TABLET BY MOUTH ONCE DAILY    Seropositive rheumatoid arthritis (H)       isosorbide mononitrate 30 MG 24 hr tablet    IMDUR    360 tablet    Take 2 tablets (60 mg) by mouth daily        levETIRAcetam 500 MG tablet    KEPPRA    90 tablet    Take 1 tablet (500 mg) by mouth 2 times daily    Seizure disorder (H)       levothyroxine 50 MCG tablet     SYNTHROID/LEVOTHROID    30 tablet    TAKE 1 TABLET BY MOUTH ONCE DAILY    Hypothyroidism due to acquired atrophy of thyroid       losartan 25 MG tablet    COZAAR    30 tablet    Take 0.5 tablets (12.5 mg) by mouth daily    Hypertension goal BP (blood pressure) < 140/90       methotrexate 2.5 MG tablet CHEMO     52 tablet    Take 4 tablets (10 mg) by mouth once a week Wed    Seropositive rheumatoid arthritis (H)       metoprolol succinate 50 MG 24 hr tablet    TOPROL-XL    180 tablet    Take 1 tablet (50 mg) by mouth 2 times daily    Chest pain, unspecified type       mirtazapine 30 MG tablet    REMERON    90 tablet    TAKE ONE TABLET BY MOUTH AT BEDTIME    Sleep initiation disorder       Multi-vitamin Tabs tablet      Take 1 tablet by mouth daily        MYLANTA PO      2 tblsp every 4 hours as needed        nitroFURantoin (macrocrystal-monohydrate) 100 MG capsule    MACROBID    14 capsule    Take 1 capsule (100 mg) by mouth 2 times daily    Acute cystitis without hematuria       nitroGLYcerin 0.4 MG sublingual tablet    NITROSTAT    25 tablet    DISSOLVE ONE TABLET UNDER THE TONGUE EVERY 5 MINUTES AS NEEDED FOR CHEST PAIN.  DO NOT EXCEED A TOTAL OF 3 DOSES IN 15 MINUTES    Coronary artery disease involving native coronary artery of native heart without angina pectoris       predniSONE 5 MG tablet    DELTASONE    23 tablet    TAKE ONE TABLET BY MOUTH EVERY OTHER DAY ALTERNATING  WITH  1/2  TABLET  EVERY  OTHER  DAY    Seropositive rheumatoid arthritis (H)       SYNVISC 16 MG/2ML injection   Generic drug:  hylan     2 mL    16 mg by INTRA-ARTICULAR route once    Primary osteoarthritis of right knee       TYLENOL ARTHRITIS PAIN 650 MG CR tablet   Generic drug:  acetaminophen      Take 2 tablets (1,300 mg) by mouth every 8 hours as needed for mild pain

## 2018-11-12 NOTE — LETTER
11/12/2018         RE: Gillian Burk  [address]  [address]  [place] [facility] [address]        Dear Colleague,    Thank you for referring your patient, Gillian Burk, to the [facility]. Please see a copy of my visit note below.    History of Present Illness - Gillian Burk is a 89 year old female presenting in clinic today for a recheck on Patient presents with:  RECHECK  Ear Problem    Patient is feeling much better and states that she is hearing better since the myringotomy without tube placement. She currently does not have any symptoms.       Body mass index is 21.47 kg/(m^2).    BP Readings from Last 1 Encounters:   11/12/18 144/74     BP noted to be well controlled today in office.     Gillian IS NOT a smoker/uses chewing tobacco.    Past Medical History -   Past Medical History:   Diagnosis Date     Aortic stenosis     s/p TAVR 8/17/16     Chronic migraine      Hyperlipidaemia      Hypertension      Hypothyroidism      Myocardial infarction (H)      Need for SBE (subacute bacterial endocarditis) prophylaxis      Orthostatic hypotension     wears compression stockings     PUD (peptic ulcer disease)      Secondary Sjogren's syndrome (H)      Seizures (H)     after stroke (partial onset)     Seropositive rheumatoid arthritis (H)      Stroke (H) 05/2013    with visual field cut, left occipital lobe     Syncope 2014    due to orthostatic hypotension       Current Medications -   Current Outpatient Prescriptions:      acetaminophen (TYLENOL ARTHRITIS PAIN) 650 MG CR tablet, Take 2 tablets (1,300 mg) by mouth every 8 hours as needed for mild pain, Disp: , Rfl:      ALPRAZolam (XANAX) 0.25 MG tablet, TAKE ONE TABLET BY MOUTH TWICE DAILY AS NEEDED FOR ANXIETY, Disp: 28 tablet, Rfl: 0     amLODIPine (NORVASC) 5 MG tablet, Take 1 tablet (5 mg) by mouth daily, Disp: 90 tablet, Rfl: 3     aspirin EC 81 MG EC tablet, Take 1 tablet (81 mg) by mouth daily, Disp: , Rfl:      atorvastatin  (LIPITOR) 40 MG tablet, TAKE ONE TABLET BY MOUTH ONCE DAILY, Disp: 90 tablet, Rfl: 0     Calcium & Magnesium Carbonates (MYLANTA PO), 2 tblsp every 4 hours as needed, Disp: , Rfl:      calcium carbonate (TUMS) 500 MG chewable tablet, Take 2-4 tablets (1,000-2,000 mg) by mouth as needed for heartburn, Disp: , Rfl:      clopidogrel (PLAVIX) 75 MG tablet, TAKE ONE TABLET BY MOUTH ONCE DAILY, Disp: 30 tablet, Rfl: 11     Cranberry 1000 MG CAPS, , Disp: , Rfl:      docusate sodium (COLACE) 100 MG capsule, Take 200 mg by mouth daily 2 capsules, Disp: , Rfl:      famotidine (PEPCID) 40 MG tablet, TAKE ONE TABLET BY MOUTH AT BEDTIME, Disp: 30 tablet, Rfl: 9     folic acid (FOLVITE) 1 MG tablet, TAKE 1 TABLET BY MOUTH ONCE DAILY, Disp: 90 tablet, Rfl: 1     Hylan (SYNVISC) 16 MG/2ML SOSY, 16 mg by INTRA-ARTICULAR route once, Disp: 2 mL, Rfl: 0     isosorbide mononitrate (IMDUR) 30 MG 24 hr tablet, Take 2 tablets (60 mg) by mouth daily, Disp: 360 tablet, Rfl: 3     Lactobacillus (FLORAJEN ACIDOPHILUS) CAPS, Take 1 capsule by mouth daily, Disp: , Rfl:      levETIRAcetam (KEPPRA) 500 MG tablet, Take 1 tablet (500 mg) by mouth 2 times daily, Disp: 90 tablet, Rfl: 6     levothyroxine (SYNTHROID/LEVOTHROID) 50 MCG tablet, TAKE 1 TABLET BY MOUTH ONCE DAILY, Disp: 30 tablet, Rfl: 3     losartan (COZAAR) 25 MG tablet, Take 0.5 tablets (12.5 mg) by mouth daily, Disp: 30 tablet, Rfl: 1     methotrexate 2.5 MG tablet, Take 4 tablets (10 mg) by mouth once a week Wed, Disp: 52 tablet, Rfl: 3     metoprolol succinate (TOPROL-XL) 50 MG 24 hr tablet, Take 1 tablet (50 mg) by mouth 2 times daily, Disp: 180 tablet, Rfl: 3     mirtazapine (REMERON) 30 MG tablet, TAKE ONE TABLET BY MOUTH AT BEDTIME, Disp: 90 tablet, Rfl: 2     multivitamin, therapeutic with minerals (MULTI-VITAMIN) TABS, Take 1 tablet by mouth daily, Disp: , Rfl:      nitroFURantoin, macrocrystal-monohydrate, (MACROBID) 100 MG capsule, Take 1 capsule (100 mg) by mouth 2 times  daily (Patient taking differently: Take 100 mg by mouth daily ), Disp: 14 capsule, Rfl: 0     nitroGLYcerin (NITROSTAT) 0.4 MG sublingual tablet, DISSOLVE ONE TABLET UNDER THE TONGUE EVERY 5 MINUTES AS NEEDED FOR CHEST PAIN.  DO NOT EXCEED A TOTAL OF 3 DOSES IN 15 MINUTES, Disp: 25 tablet, Rfl: 0     predniSONE (DELTASONE) 5 MG tablet, TAKE ONE TABLET BY MOUTH EVERY OTHER DAY ALTERNATING  WITH  1/2  TABLET  EVERY  OTHER  DAY, Disp: 23 tablet, Rfl: 13    Allergies -   Allergies   Allergen Reactions     Caffeine Other (See Comments)     Triggers your Meniere's disease     Hydrocodone Other (See Comments)     hallucinations     Ibuprofen GI Disturbance     Penicillins Hives     Tramadol Other (See Comments)     Seizure, was taking remeron along with tramadol     Metal [Staples] Rash       Social History -   Social History     Social History     Marital status:      Spouse name: N/A     Number of children: 4     Years of education: N/A     Occupational History      Retired     Social History Main Topics     Smoking status: Never Smoker     Smokeless tobacco: Never Used     Alcohol use No     Drug use: No     Sexual activity: No     Other Topics Concern     Special Diet Yes     low salt      Exercise Yes     semi recument bike 15 mins daily      Social History Narrative    .  Lives in assisted living. Independent. Has help with making bed and changing sheets.    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 .     4 children - 1 with RA           Family History -   Family History   Problem Relation Age of Onset     Hyperlipidemia Mother      Family History Negative No family hx of        Review of Systems - As per HPI and PMHx, otherwise review of system review of the head and neck negative.    Physical Exam  /74  Pulse 85  Wt 60.3 kg (133 lb)  SpO2 94%  BMI 21.47 kg/m2  BMI: Body mass index is 21.47 kg/(m^2).    General - The patient is well nourished and well developed, and  appears to have good nutritional status.  Alert and oriented to person and place, answers questions and cooperates with examination appropriately.    SKIN - No suspicious lesions or rashes.  Respiration - No respiratory distress.  Head and Face - Normocephalic and atraumatic, with no gross asymmetry noted of the contour of the facial features.  The facial nerve is intact, with strong symmetric movements.    Voice and Breathing - The patient was breathing comfortably without the use of accessory muscles. The patients voice was clear and strong, and had appropriate pitch and quality.    Ears - Bilateral pinna and EACs with normal appearing overlying skin.  Bony landmarks of the ossicular chain are normal. The tympanic membranes are normal in appearance. No retraction, perforation, or masses.  No fluid or purulence was seen in the external canal or the middle ear. The right tympanic membrane healed well after the myringotomy.     Eyes - Extraocular movements intact.  Sclera were not icteric or injected, conjunctiva were pink and moist.    Mouth - Examination of the oral cavity showed pink, healthy oral mucosa. No lesions or ulcerations noted.  The tongue was mobile and midline, and the dentition were in good condition.      Throat - The walls of the oropharynx were smooth, pink, moist, symmetric, and had no lesions or ulcerations.  The tonsillar pillars and soft palate were symmetric. The uvula was midline on elevation.    Neck - Normal midline excursion of the laryngotracheal complex during swallowing.  Full range of motion on passive movement.  Palpation of the occipital, submental, submandibular, internal jugular chain, and supraclavicular nodes did not demonstrate any abnormal lymph nodes or masses.  The carotid pulse was palpable bilaterally.  Palpation of the thyroid was soft and smooth, with no nodules or goiter appreciated.  The trachea was mobile and midline.    Nose - External contour is symmetric, no gross  deflection or scars.  Nasal mucosa is pink and moist with no abnormal mucus.  The septum was midline and non-obstructive, turbinates of normal size and position.  No polyps, masses, or purulence noted on examination.    Neuro - Nonfocal neuro exam is normal, CN 2 through 12 intact, normal gait and muscle tone.      Performed in clinic today:  none      A/P - Gillian Burk is a 89 year old female Patient presents with:  RECHECK  Ear Problem  Patient is doing well after her myringotomy fluid was resolved she is asymptomatic.  Gillian should follow up as needed.      At Gillian next appointment they will not need a hearing test.      This document serves as a record of the services and decisions personally performed and made by Dr. Keith Muñoz MD. It was created on his behalf by Vidhi Spring, a trained medical scribe. The creation of this document is based the provider's statements to the medical scribe.  Vidhi Spring 11/12/2018    Provider:   The information in this document, created by the medical scribe for me, accurately reflects the services I personally performed and the decisions made by me. I have reviewed and approved this document for accuracy prior to leaving the patient care area.  Dr. Keith Muñoz MD 11/12/2018    Keith Muñoz MD          Again, thank you for allowing me to participate in the care of your patient.        Sincerely,        Keith Muñoz MD, MD

## 2018-11-12 NOTE — MR AVS SNAPSHOT
After Visit Summary   11/12/2018    Gillian Burk    MRN: [id number]           Patient Information     Date Of Birth          3/19/1927        Visit Information        Provider Department      11/12/2018 9:30 AM Niurka Estrada [facility]        Today's Diagnoses     Sensorineural hearing loss, bilateral           Follow-ups after your visit        Your next 10 appointments already scheduled     Nov 29, 2018  9:45 AM CST   L/Vision Eval with Nita Everett, OT   [facility] ([facility])    [address] [place] [id number] [phone]              Who to contact     If you have questions or need follow up information about today's clinic visit or your schedule please contact [facility] directly at [phone].  Normal or non-critical lab and imaging results will be communicated to you by [facility], letter or phone within 4 business days after the clinic has received the results. If you do not hear from us within 7 days, please contact the clinic through [employer] or phone. If you have a critical or abnormal lab result, we will notify you by phone as soon as possible.  Submit refill requests through [facility] or call your pharmacy and they will forward the refill request to us. Please allow 3 business days for your refill to be completed.          Additional Information About Your Visit        MyChart Information     [facility] gives you secure access to your electronic health record. If you see a primary care provider, you can also send messages to your care team and make appointments. If you have questions, please call your primary care clinic.  If you do not have a primary care provider, please call [phone] and they will assist you.        Care EveryWhere ID     This is your Care EveryWhere ID. This could be used by other organizations to access your [facility] medical records  [id number]          Blood Pressure from Last 3 Encounters:   10/25/18 144/74   10/23/18 134/68   10/12/18 (!) 166/94    Weight from Last 3 Encounters:   10/25/18 132 lb (59.9 kg)   10/23/18 133 lb 1.6 oz (60.4 kg)   10/12/18 136 lb 12.8 oz (62.1 kg)              We Performed the Following     AUDIOGRAM/TYMPANOGRAM - INTERFACE     COMPREHENSIVE HEARING TEST     TYMPANOMETRY          Today's Medication Changes          These changes are accurate as of 11/12/18 10:37 AM.  If you have any questions, ask your nurse or doctor.               These medicines have changed or have updated prescriptions.        Dose/Directions    nitroFURantoin (macrocrystal-monohydrate) 100 MG capsule   Commonly known as:  MACROBID   This may have changed:  when to take this   Used for:  Acute cystitis without hematuria        Dose:  100 mg   Take 1 capsule (100 mg) by mouth 2 times daily   Quantity:  14 capsule   Refills:  0                Primary Care Provider Office Phone # Fax #    Augusto Fish Meyer,  [phone]       [address] [place] [address] [address] [place] [address]        Equal Access to Services     LUPE TRENT AH: Hadii veronica kim hadasho Soomaali, waaxda luqadaha, qaybta kaalmada adeegyaponcho, zack oglesby . So [facility] [phone].    ATENCIÓN: Si habla español, tiene a granger disposición servicios gratuitos de asistencia lingüística. [facility] [phone].    We comply with applicable federal civil rights laws and [place] laws. We do not discriminate on the basis of race, color, national origin, age, disability, sex, sexual orientation, or gender identity.            Thank you!     Thank you for choosing [facility]  for your care. Our goal is always to provide you with excellent care. Hearing back from our patients is one way we can continue to improve our services. Please take a few minutes to complete the written survey that you may receive in the mail after your visit with us. Thank you!              Your Updated Medication List - Protect others around you: Learn how to safely use, store and throw away your medicines at www.disposemymeds.org.          This list is accurate as of 11/12/18 10:37 AM.  Always use your most recent med list.                   Brand Name Dispense Instructions for use Diagnosis    ALPRAZolam 0.25 MG tablet    XANAX    28 tablet    TAKE ONE TABLET BY MOUTH TWICE DAILY AS NEEDED FOR ANXIETY    Anxiety       amLODIPine 5 MG tablet    NORVASC    90 tablet    Take 1 tablet (5 mg) by mouth daily    Chest pain, unspecified type       aspirin 81 MG EC tablet      Take 1 tablet (81 mg) by mouth daily    S/P TAVR (transcatheter aortic valve replacement)       atorvastatin 40 MG tablet    LIPITOR    90 tablet    TAKE ONE TABLET BY MOUTH ONCE DAILY    Hyperlipidemia with target LDL less than 130       calcium carbonate 500 MG chewable tablet    TUMS     Take 2-4 tablets (1,000-2,000 mg) by mouth as needed for heartburn        clopidogrel 75 MG tablet    PLAVIX    30 tablet    TAKE ONE TABLET BY MOUTH ONCE DAILY    History of CVA (cerebrovascular accident)       Cranberry 1000 MG Caps           docusate sodium 100 MG capsule    COLACE     Take 200 mg by mouth daily 2 capsules        famotidine 40 MG tablet    PEPCID    30 tablet    TAKE ONE TABLET BY MOUTH AT BEDTIME    Gastroesophageal reflux disease without esophagitis       FLORAJEN ACIDOPHILUS Caps      Take 1 capsule by mouth daily        folic acid 1 MG tablet    FOLVITE    90 tablet    TAKE 1 TABLET BY MOUTH ONCE DAILY    Seropositive rheumatoid arthritis (H)       isosorbide mononitrate 30 MG 24 hr tablet    IMDUR    360 tablet    Take 2 tablets (60 mg) by mouth daily        levETIRAcetam 500 MG tablet    KEPPRA    90 tablet    Take 1 tablet (500 mg) by mouth 2 times daily    Seizure disorder (H)       levothyroxine 50 MCG tablet    SYNTHROID/LEVOTHROID    30 tablet    TAKE 1 TABLET BY MOUTH ONCE DAILY    Hypothyroidism due to acquired  atrophy of thyroid       losartan 25 MG tablet    COZAAR    30 tablet    Take 0.5 tablets (12.5 mg) by mouth daily    Hypertension goal BP (blood pressure) < 140/90       methotrexate 2.5 MG tablet CHEMO     52 tablet    Take 4 tablets (10 mg) by mouth once a week Wed    Seropositive rheumatoid arthritis (H)       metoprolol succinate 50 MG 24 hr tablet    TOPROL-XL    180 tablet    Take 1 tablet (50 mg) by mouth 2 times daily    Chest pain, unspecified type       mirtazapine 30 MG tablet    REMERON    90 tablet    TAKE ONE TABLET BY MOUTH AT BEDTIME    Sleep initiation disorder       Multi-vitamin Tabs tablet      Take 1 tablet by mouth daily        MYLANTA PO      2 tblsp every 4 hours as needed        nitroFURantoin (macrocrystal-monohydrate) 100 MG capsule    MACROBID    14 capsule    Take 1 capsule (100 mg) by mouth 2 times daily    Acute cystitis without hematuria       nitroGLYcerin 0.4 MG sublingual tablet    NITROSTAT    25 tablet    DISSOLVE ONE TABLET UNDER THE TONGUE EVERY 5 MINUTES AS NEEDED FOR CHEST PAIN.  DO NOT EXCEED A TOTAL OF 3 DOSES IN 15 MINUTES    Coronary artery disease involving native coronary artery of native heart without angina pectoris       predniSONE 5 MG tablet    DELTASONE    23 tablet    TAKE ONE TABLET BY MOUTH EVERY OTHER DAY ALTERNATING  WITH  1/2  TABLET  EVERY  OTHER  DAY    Seropositive rheumatoid arthritis (H)       SYNVISC 16 MG/2ML injection   Generic drug:  hylan     2 mL    16 mg by INTRA-ARTICULAR route once    Primary osteoarthritis of right knee       TYLENOL ARTHRITIS PAIN 650 MG CR tablet   Generic drug:  acetaminophen      Take 2 tablets (1,300 mg) by mouth every 8 hours as needed for mild pain

## 2018-11-12 NOTE — PROGRESS NOTES
AUDIOLOGY REPORT     SUMMARY: Audiology visit completed. See audiogram for results.     RECOMMENDATIONS: Follow-up with ENT    Lisa Domínguez Licensed Audiologist #[id number]

## 2018-11-13 DIAGNOSIS — E78.5 HYPERLIPIDEMIA WITH TARGET LDL LESS THAN 130: ICD-10-CM

## 2018-11-14 RX ORDER — ATORVASTATIN CALCIUM 40 MG/1
TABLET, FILM COATED ORAL
Qty: 90 TABLET | Refills: 0 | Status: SHIPPED | OUTPATIENT
Start: 2018-11-14 | End: 2019-02-12

## 2018-11-14 NOTE — TELEPHONE ENCOUNTER
"Requested Prescriptions   Pending Prescriptions Disp Refills     atorvastatin (LIPITOR) 40 MG tablet [Pharmacy Med Name: ATORVASTATIN 40MG   TAB] 90 tablet 0    Last Written Prescription Date:  8/7/2018  Last Fill Quantity: 90,  # refills: 0   Last office visit: 10/23/2018 with prescribing provider:  Betsy   Future Office Visit:     Sig: TAKE 1 TABLET BY MOUTH ONCE DAILY    Statins Protocol Passed    11/13/2018 11:00 AM       Passed - LDL on file in past 12 months    Recent Labs   Lab Test 10/13/18   LDL  59          Passed - No abnormal creatine kinase in past 12 months    Recent Labs   Lab Test  08/04/16   0012   CKT  127               Passed - Recent (12 mo) or future (30 days) visit within the authorizing provider's specialty    Patient had office visit in the last 12 months or has a visit in the next 30 days with authorizing provider or within the authorizing provider's specialty.  See \"Patient Info\" tab in inbasket, or \"Choose Columns\" in Meds & Orders section of the refill encounter.           Passed - Patient is age 18 or older       Passed - No active pregnancy on record       Passed - No positive pregnancy test in past 12 months          "

## 2018-11-14 NOTE — TELEPHONE ENCOUNTER
Prescription approved per [facility] Refill Protocol.    Erica Cuadra R.N. Primary Care

## 2018-11-29 ENCOUNTER — HOSPITAL ENCOUNTER (OUTPATIENT)
Dept: OCCUPATIONAL THERAPY | Facility: CLINIC | Age: 83
Setting detail: THERAPIES SERIES
End: 2018-11-29
Attending: NURSE PRACTITIONER
Payer: COMMERCIAL

## 2018-11-29 PROCEDURE — 97535 SELF CARE MNGMENT TRAINING: CPT | Mod: GO | Performed by: OCCUPATIONAL THERAPIST

## 2018-11-29 PROCEDURE — 40000249 ZZH STATISTIC VISIT LOW VISION CLINIC: Performed by: OCCUPATIONAL THERAPIST

## 2018-11-29 PROCEDURE — 97167 OT EVAL HIGH COMPLEX 60 MIN: CPT | Mod: GO | Performed by: OCCUPATIONAL THERAPIST

## 2018-11-29 ASSESSMENT — ACTIVITIES OF DAILY LIVING (ADL)
ADL_EQUIPMENT_USED: GRAB BAR
PRIOR_ADL/IADL_STATUS: INDEPENDENT PRIOR TO ONSET

## 2018-12-01 NOTE — PROGRESS NOTES
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  Patient reported at first unable to see anything with the right eye.  She reported was very excited when she could see the ceiling light in her hospital room at end of stay.  She does report this date only being able to see contrast (light and dark), shapes and frames in doorways /tv, but not identify objects within her space using the right eye only.  Gillian reported she is having difficulty with locating items and objects on her right side and at times central vision.  She stated has frequently poor contents outside of a glass (when pouring liquids).  Overall decreased safety and functional independence with BADL/IADLs due to visual loss.   Prior ADL/IADL status Independent prior to onset   Others present at visit Child (hilton)  (daughter, Yasmeen)   Patient/family Goals Statement \"Increase my function; due to vision loss\"   General information comments Patient appeared with no distress this date, able to ambulate with safety CGA down the rehab hallway.  Cooperative and pleasant with all OT tasks.  PMH: syncope, seizures, stroke, RA, Sjogren's Syndrome, orthostatic hypotension, HTN, myocardial infraction, chronic " migraine, aortic stenosis.   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  Family drives pt to appointments and community needs.  Patient did not drive IND.  Gillian enjoys: reading, use of computer (Facebook and email), reading from a Matt, hand work ie; croteching and embroidery.  Likes playing cards with family.  Has done wordsearch and crosswords (not her favorite).  In the past she has played the piano and has a keyboard.   Pain Assessment   Pain Reported No   Fall Risk Screen   Fall screen completed by OT   Have you fallen 2 or more times in the past year? No  (denied)   Cognitive/Behavioral   Communication Intact   Cognitive Status Intact for evaluation process   Behavior Appropriate   Patient/family aware of diagnosis Yes   How well do you understand your eye condition? Well   Vision related restrictions on visiting with family/friends None   Reported emotional impact of visual impairment Moderate   Adjustment to disability Fair   Cognitive/Behavioral Comment age appropraite level   Physical Status/Equipment   Physical Status Weakness   Mobility equipment used Support cane   ADL Equipment used Grab bar  (walk in shower and folding shower bench)   Physical Status/Equipment comments Pt does not have a walker, or wheelchair   Visual Report   Functional Complaints Reading;Writing;Homemaking;Safety in mobility   Visual Complaints Phantom vision;Visual fatigue;Difficulty maintaining focus;Light sensitivity   Complaints comments Gillian reported; reading is very difficult  for her and she has not read since stroke occurred (she enjoys reading mysteries and romanse fictional books).   Calin Pavon Symptoms? No   Magnifier (strength and type) yes, handheld and standing; with LED Unknown magnification   Technology Computer   Lighting and Glare   Is your lighting adequate? No/ at home   Is glare a problem? Yes/ indoors;Yes/ outdoors   Are you satisfied with your sunglasses? Yes   Sunglass filter color Gray   Visual Acuity   Acuity both eyes together Defer to physician note findings for recent acuity   Contrast Sensitivity   Contrast sensitivity (score/25) 20 slightly below norm   Preferred Retinal Locus   Right eye eccentric viewing position Left;Inferior   Left eye eccentric viewing position Left;Right;Inferior   MN Read   Smallest print size read 1.25 M   Critical print size enlarged font size ie; 24+ and bold print   Words per minute at critical print size norm at base line with normal font size of [employer] materials   Visual Skills for Reading   Test print size 1.5M   Reading accuracy unable at 1.5M   Reading speed baseline  (language skills, decreased visual attention and endurance)   Functional Reading Screen   Current optical aids used Magnifier   Identifies medication bottles Yes  (with magnification use)   Reads bills No   Reads recipes No   Reads directions No   Reading screen comments Further reading screen to be completed at another OT session   Dynavision: Evaluation of visual skills/search of extra personal space via 5X4 foot computerized light board with 64 stimuli.  The user reacts as quickly as possible by striking the lights as they turn on in random succession.   Dynavision Cascade (not tested this date)   Education   Learner Patient;Family   Readiness Eager   Method Booklet/handout;Explanation;Demonstration   Response Verbalizes understanding   Education Notes Further education for visual loss and improve safety and function for daily tasks/activities to be completed    Clinical Impression, OT Eval   Criteria for Skilled Therapeutic Interventions Met yes;treatment indicated   Therapy  Diagnosis: Impaired ADL/IADL with deficits in Reading based ADL;Written communication;Eating;Home management;Financial management;Dressing;Grooming   Assessment of Occupational Performance 5 or more Performance Deficits   Identified Performance Deficits dressing,grooming, home simple mgmt, meal prep/clean up (drinks, am meal), financial mgmt, laundry   Clinical Decision Making (Complexity) High complexity   Clinical Impression Comments Gillian will benefit from OT services for education and training on compensatory strategies for visual environment and safety, along with resources and assitive devices to implement for right eye functional loss.   OT Visual Rehabilitation Evaluation Plan   Therapy Plan Occupational therapy intervention   Planned Interventions Visual field loss awareness;Visual skills training for safety in mobility;Visual skills training for location of objects;Visual skills training for near tasks;Instruction in environmental adaptations for glare;Instruction in environmental adaptations for contrast;Instruction in environmental adaptations for lighting;Computer modifications;Instruction in community resources;Optical device/ADL device instruction and training   Frequency / Duration 1x week x 12 weeks   Risks and Benefits of Treatment have been explained. Yes   Patient, Family in agreement with plan of care Yes   GOALS   Goals Visual Field;Near Vision;Environmental Modification;Resource Education;7   Goal 1 - Near Vision   Goal Description: Near Vision Patient will verbalize awareness of visual field Loss and demonstrate improved use of visual skills/adaptive equipment for increased independence in reading-based activities of daily living, written communication and detail ADL tasks.   Target Date 03/01/19   Goal 2 - Visual field   Goal Description: Visual field Patient will  verbalize awareness of visual field loss and demonstrate improved use of visual skills for increased safety/ independence in locating objects/obstacles and in navigation   Target Date 03/01/19   Goal 3 - Environmental modification   Goal Description: Environment modification Patient will demonstrate understanding of the impact of lighting, contrast and glare on ADL activities, in conjunction with environmentally-based ADL modifications   Target Date 03/01/19   Goal 4 - Resource education   Goal Description: Resource education Patient will verbalize awareness of community resources for the following:;Access to large print materials;Access to low vision devices   Target Date 03/01/19   Total Evaluation Time   Total Evaluation Time 30       Thank you for referring Gillian  To OT services to assist with improve strategies and functional visual awareness for daily tasks/activities.    If you have any questions or concerns, please contact me at [phone].    Nita Everett MA, OTR/L  [facility]

## 2018-12-11 ENCOUNTER — TRANSFERRED RECORDS (OUTPATIENT)
Dept: HEALTH INFORMATION MANAGEMENT | Facility: CLINIC | Age: 83
End: 2018-12-11

## 2018-12-11 DIAGNOSIS — G40.909 SEIZURE DISORDER (H): ICD-10-CM

## 2018-12-11 DIAGNOSIS — G47.09 SLEEP INITIATION DISORDER: ICD-10-CM

## 2018-12-12 ENCOUNTER — HOSPITAL ENCOUNTER (OUTPATIENT)
Dept: OCCUPATIONAL THERAPY | Facility: CLINIC | Age: 83
Setting detail: THERAPIES SERIES
End: 2018-12-12
Attending: NURSE PRACTITIONER
Payer: COMMERCIAL

## 2018-12-12 PROCEDURE — 97112 NEUROMUSCULAR REEDUCATION: CPT | Mod: GO | Performed by: OCCUPATIONAL THERAPIST

## 2018-12-12 PROCEDURE — 97535 SELF CARE MNGMENT TRAINING: CPT | Mod: GO | Performed by: OCCUPATIONAL THERAPIST

## 2018-12-13 RX ORDER — LEVETIRACETAM 500 MG/1
TABLET ORAL
Qty: 270 TABLET | Refills: 1 | Status: SHIPPED | OUTPATIENT
Start: 2018-12-13 | End: 2019-03-20

## 2018-12-13 RX ORDER — MIRTAZAPINE 30 MG/1
TABLET, FILM COATED ORAL
Qty: 90 TABLET | Refills: 1 | Status: SHIPPED | OUTPATIENT
Start: 2018-12-13 | End: 2019-06-25

## 2018-12-13 NOTE — TELEPHONE ENCOUNTER
Routing refill request to provider for review/approval because:  Labs out of range:  CBC  Medication is reported/historical    AUTUMN BustilloN, RN  [facility]

## 2018-12-13 NOTE — TELEPHONE ENCOUNTER
"Requested Prescriptions   Pending Prescriptions Disp Refills     levETIRAcetam (KEPPRA) 500 MG tablet [Pharmacy Med Name: LevETIRAcetam 500MG TAB] 90 tablet 6    Last Written Prescription Date:  NA  Last Fill Quantity: NA,  # refills: NA   Last office visit: 6/15/2017 with prescribing provider:  10/23/18   Future Office Visit:     Sig: TAKE ONE TABLET BY MOUTH ONCE DAILY AND TWO AT BEDTIME    Anti-Seizure Meds Protocol  Failed - 12/11/2018  3:25 PM       Failed - Review Authorizing provider's last note.     Refer to last progress notes: confirm request is for original authorizing provider (cannot be through other providers).         Passed - Recent (12 mo) or future (30 days) visit within the authorizing provider's specialty    Patient had office visit in the last 12 months or has a visit in the next 30 days with authorizing provider or within the authorizing provider's specialty.  See \"Patient Info\" tab in inbasket, or \"Choose Columns\" in Meds & Orders section of the refill encounter.             Passed - Normal CBC on file in past 26 months    Recent Labs   Lab Test 10/12/18  1710   WBC 6.9   RBC 3.61*   HGB 11.2*   HCT 33.8*                   Passed - Normal serum creatinine on file in past 26 months    Recent Labs   Lab Test 10/13/18  06/09/16  1455   CR 0.97   < >  --    CREAT  --   --  1.0    < > = values in this interval not displayed.            Passed - Normal ALT or AST on file in past 26 months    Recent Labs   Lab Test 10/13/18   ALT 14     Recent Labs   Lab Test 10/13/18   AST 20            Passed - Normal platelet count on file in past 26 months    Recent Labs   Lab Test 10/12/18  1710                 Passed - No active pregnancy on record       Passed - No positive pregnancy test in last 12 months        mirtazapine (REMERON) 30 MG tablet [Pharmacy Med Name: MIRTAZAPINE 30MG    TAB] 90 tablet 2    Last Written Prescription Date:  3/1/18  Last Fill Quantity: 90,  # refills: 2   Last " "office visit: 6/15/2017 with prescribing provider:  10/23/18   Future Office Visit:     Sig: TAKE 1 TABLET BY MOUTH ONCE DAILY AT BEDTIME    Atypical Antidepressants Protocol Passed - 12/11/2018  3:25 PM       Passed - Recent (12 mo) or future (30 days) visit within the authorizing provider's specialty    Patient had office visit in the last 12 months or has a visit in the next 30 days with authorizing provider or within the authorizing provider's specialty.  See \"Patient Info\" tab in inbasket, or \"Choose Columns\" in Meds & Orders section of the refill encounter.             Passed - Patient is age 18 or older       Passed - No active pregnancy on record       Passed - No positive pregnancy test in past 12 mos        "

## 2018-12-19 ENCOUNTER — HOSPITAL ENCOUNTER (OUTPATIENT)
Dept: OCCUPATIONAL THERAPY | Facility: CLINIC | Age: 83
Setting detail: THERAPIES SERIES
End: 2018-12-19
Attending: NURSE PRACTITIONER
Payer: COMMERCIAL

## 2018-12-19 PROCEDURE — 97530 THERAPEUTIC ACTIVITIES: CPT | Mod: GO | Performed by: OCCUPATIONAL THERAPIST

## 2018-12-19 PROCEDURE — 97535 SELF CARE MNGMENT TRAINING: CPT | Mod: GO | Performed by: OCCUPATIONAL THERAPIST

## 2018-12-24 DIAGNOSIS — F41.9 ANXIETY: ICD-10-CM

## 2018-12-24 DIAGNOSIS — I25.10 CORONARY ARTERY DISEASE INVOLVING NATIVE CORONARY ARTERY OF NATIVE HEART WITHOUT ANGINA PECTORIS: ICD-10-CM

## 2018-12-27 NOTE — TELEPHONE ENCOUNTER
Routing refill request to provider for review/approval because:  Labs out of range:  BP    Xanax      Last Written Prescription Date:  4/13/18  Last Fill Quantity: 28,   # refills: 0  Last Office Visit: 10/23/18  Future Office visit:       Routing refill request to provider for review/approval because:  Drug not on the [facility], P or [facility] refill protocol or controlled substance

## 2018-12-27 NOTE — TELEPHONE ENCOUNTER
"Requested Prescriptions   Pending Prescriptions Disp Refills     nitroGLYcerin (NITROSTAT) 0.4 MG sublingual tablet [Pharmacy Med Name: NITROGLYCER 0.4MG   SUB] 25 tablet 0    Last Written Prescription Date:  11/29/17  Last Fill Quantity: 25,  # refills: 0   Last office visit: 6/15/2017 with prescribing provider:  10/23/18   Future Office Visit:     Sig: DISSOLVE ONE TABLET UNDER THE TONGUE EVERY 5 MINUTES AS NEEDED FOR CHEST PAIN.  DO NOT EXCEED A TOTAL OF 3 DOSES IN 15 MINUTES    Nitrates Failed - 12/24/2018  9:02 AM       Failed - Blood pressure under 140/90 in past 12 months    BP Readings from Last 3 Encounters:   11/12/18 144/74   10/25/18 144/74   10/23/18 134/68                Failed - Sublingual nitro order needs review    If refill exceeds 1 bottle per month, please forward request to provider.          Passed - Pt is not on erectile dysfunction medications       Passed - Recent (12 mo) or future (30 days) visit within the authorizing provider's specialty    Patient had office visit in the last 12 months or has a visit in the next 30 days with authorizing provider or within the authorizing provider's specialty.  See \"Patient Info\" tab in inbasket, or \"Choose Columns\" in Meds & Orders section of the refill encounter.             Passed - Patient is age 18 or older        ALPRAZolam (XANAX) 0.25 MG tablet [Pharmacy Med Name: ALPRAZolam 0.25MG   TAB] 28 tablet 0     Sig: TAKE 1 TABLET BY MOUTH TWICE DAILY AS NEEDED FOR ANXIETY    There is no refill protocol information for this order        "

## 2018-12-28 RX ORDER — NITROGLYCERIN 0.4 MG/1
TABLET SUBLINGUAL
Qty: 25 TABLET | Refills: 0 | Status: SHIPPED | OUTPATIENT
Start: 2018-12-28 | End: 2019-07-02

## 2018-12-28 RX ORDER — ALPRAZOLAM 0.25 MG
TABLET ORAL
Qty: 28 TABLET | Refills: 0 | Status: SHIPPED | OUTPATIENT
Start: 2018-12-28 | End: 2019-07-02

## 2019-01-04 ENCOUNTER — HOSPITAL ENCOUNTER (OUTPATIENT)
Dept: OCCUPATIONAL THERAPY | Facility: CLINIC | Age: 84
Setting detail: THERAPIES SERIES
End: 2019-01-04
Attending: NURSE PRACTITIONER
Payer: COMMERCIAL

## 2019-01-04 PROCEDURE — 97530 THERAPEUTIC ACTIVITIES: CPT | Mod: GO | Performed by: OCCUPATIONAL THERAPIST

## 2019-01-09 ENCOUNTER — HOSPITAL ENCOUNTER (OUTPATIENT)
Dept: OCCUPATIONAL THERAPY | Facility: CLINIC | Age: 84
Setting detail: THERAPIES SERIES
End: 2019-01-09
Attending: NURSE PRACTITIONER
Payer: COMMERCIAL

## 2019-01-09 PROCEDURE — 97530 THERAPEUTIC ACTIVITIES: CPT | Mod: GO | Performed by: OCCUPATIONAL THERAPIST

## 2019-01-15 DIAGNOSIS — E03.4 HYPOTHYROIDISM DUE TO ACQUIRED ATROPHY OF THYROID: ICD-10-CM

## 2019-01-15 DIAGNOSIS — I10 HYPERTENSION GOAL BP (BLOOD PRESSURE) < 140/90: ICD-10-CM

## 2019-01-16 ENCOUNTER — HOSPITAL ENCOUNTER (OUTPATIENT)
Dept: OCCUPATIONAL THERAPY | Facility: CLINIC | Age: 84
Setting detail: THERAPIES SERIES
End: 2019-01-16
Attending: NURSE PRACTITIONER
Payer: COMMERCIAL

## 2019-01-16 PROCEDURE — 97530 THERAPEUTIC ACTIVITIES: CPT | Mod: GO | Performed by: OCCUPATIONAL THERAPIST

## 2019-01-16 RX ORDER — LEVOTHYROXINE SODIUM 50 UG/1
TABLET ORAL
Qty: 90 TABLET | Refills: 1 | Status: SHIPPED | OUTPATIENT
Start: 2019-01-16 | End: 2019-07-16

## 2019-01-16 RX ORDER — LOSARTAN POTASSIUM 25 MG/1
TABLET ORAL
Qty: 45 TABLET | Refills: 1 | Status: SHIPPED | OUTPATIENT
Start: 2019-01-16 | End: 2019-06-04

## 2019-01-16 NOTE — TELEPHONE ENCOUNTER
"Requested Prescriptions   Pending Prescriptions Disp Refills     levothyroxine (SYNTHROID/LEVOTHROID) 50 MCG tablet [Pharmacy Med Name: LEVOTHYROXIN 50MCG  TAB] 30 tablet 3    Last Written Prescription Date:  9/12/18  Last Fill Quantity: 30,  # refills: 3   Last office visit: 10/23/2018 with prescribing provider:  10/23/18   Future Office Visit:     Sig: TAKE 1 TABLET BY MOUTH ONCE DAILY    Thyroid Protocol Failed - 1/15/2019  9:31 AM       Failed - Normal TSH on file in past 12 months    Recent Labs   Lab Test 05/03/16  1427   TSH 3.37             Passed - Patient is 12 years or older       Passed - Recent (12 mo) or future (30 days) visit within the authorizing provider's specialty    Patient had office visit in the last 12 months or has a visit in the next 30 days with authorizing provider or within the authorizing provider's specialty.  See \"Patient Info\" tab in inbasket, or \"Choose Columns\" in Meds & Orders section of the refill encounter.             Passed - Medication is active on med list       Passed - No active pregnancy on record    If patient is pregnant or has had a positive pregnancy test, please check TSH.         Passed - No positive pregnancy test in past 12 months    If patient is pregnant or has had a positive pregnancy test, please check TSH.          losartan (COZAAR) 25 MG tablet [Pharmacy Med Name: LOSARTAN 25MG   TAB] 30 tablet 1    Last Written Prescription Date:  9/13/18  Last Fill Quantity: 30,  # refills: 1   Last office visit: 10/23/2018 with prescribing provider:  10/23/18   Future Office Visit:     Sig: TAKE 1/2 (ONE-HALF) TABLET BY MOUTH ONCE DAILY    Angiotensin-II Receptors Failed - 1/15/2019  9:31 AM       Failed - Blood pressure under 140/90 in past 12 months    BP Readings from Last 3 Encounters:   11/12/18 144/74   10/25/18 144/74   10/23/18 134/68                Passed - Recent (12 mo) or future (30 days) visit within the authorizing provider's specialty    Patient had office " "visit in the last 12 months or has a visit in the next 30 days with authorizing provider or within the authorizing provider's specialty.  See \"Patient Info\" tab in inbasket, or \"Choose Columns\" in Meds & Orders section of the refill encounter.             Passed - Medication is active on med list       Passed - Patient is age 18 or older       Passed - No active pregnancy on record       Passed - Normal serum creatinine on file in past 12 months    Recent Labs   Lab Test 10/13/18  06/09/16  1455   CR 0.97   < >  --    CREAT  --   --  1.0    < > = values in this interval not displayed.            Passed - Normal serum potassium on file in past 12 months    Recent Labs   Lab Test 10/13/18   POTASSIUM 3.5                   Passed - No positive pregnancy test in past 12 months        "

## 2019-01-16 NOTE — TELEPHONE ENCOUNTER
Routing refill request to provider for review/approval because:  Labs out of range:  BP  Labs not current:  TSH    AUTUMN BustilloN, RN  [facility]

## 2019-01-16 NOTE — PROGRESS NOTES
Outpatient Occupational Therapy Discharge Note     Patient: Gillian Burk  : 3/19/1927    Beginning/End Dates of Reporting Period:  18 to 2019  Patient was seen for 6 OT treatment sessions since SOC.    Referring Provider: Dr Bismark Ruiz    Therapy Diagnosis: central retinal artery occulation    Client Self Report:  Patient agreed to OT discharge this date.  She will continue with all visual accommodations and visual activities per recommendations.    Objective Measurements:          No formal discharge assessments completed.                Goals:   Refer to OT visual daily note for details and findings of goal objectives and outcomes    Plan:  Discharge from therapy.      Reason for Discharge: Patient has met all goals.  No further expectation of progress.      Discharge Plan: Patient to continue home program.      Thank you for referring Gillian  To OT services to assist with visual rehab and safety.    If you have any questions or concerns, please contact me at [phone].    Nita Everett MA, OTR/L  [facility]

## 2019-02-12 DIAGNOSIS — E78.5 HYPERLIPIDEMIA WITH TARGET LDL LESS THAN 130: ICD-10-CM

## 2019-02-13 RX ORDER — ATORVASTATIN CALCIUM 40 MG/1
40 TABLET, FILM COATED ORAL DAILY
Qty: 90 TABLET | Refills: 0 | Status: SHIPPED | OUTPATIENT
Start: 2019-02-13 | End: 2019-05-14

## 2019-02-13 NOTE — TELEPHONE ENCOUNTER
"Requested Prescriptions   Pending Prescriptions Disp Refills     atorvastatin (LIPITOR) 40 MG tablet [Pharmacy Med Name: ATORVASTATIN 40MG   TAB] 90 tablet 0    Last Written Prescription Date:  11/14/18  Last Fill Quantity: 90,  # refills: 0   Last office visit: 10/23/2018 with prescribing provider:     Future Office Visit:     Sig: TAKE 1 TABLET BY MOUTH ONCE DAILY    Statins Protocol Passed - 2/12/2019  3:57 PM       Passed - LDL on file in past 12 months    Recent Labs   Lab Test 10/13/18   LDL 59          Passed - No abnormal creatine kinase in past 12 months    Recent Labs   Lab Test 08/04/16  0012              Passed - Recent (12 mo) or future (30 days) visit within the authorizing provider's specialty    Patient had office visit in the last 12 months or has a visit in the next 30 days with authorizing provider or within the authorizing provider's specialty.  See \"Patient Info\" tab in inbasket, or \"Choose Columns\" in Meds & Orders section of the refill encounter.             Passed - Medication is active on med list       Passed - Patient is age 18 or older       Passed - No active pregnancy on record       Passed - No positive pregnancy test in past 12 months      Prescription approved per [facility] Refill Protocol.  Erica Cuadra RN        "

## 2019-02-19 ENCOUNTER — MYC MEDICAL ADVICE (OUTPATIENT)
Dept: FAMILY MEDICINE | Facility: OTHER | Age: 84
End: 2019-02-19

## 2019-02-19 DIAGNOSIS — G40.909 SEIZURE DISORDER (H): Primary | ICD-10-CM

## 2019-02-26 DIAGNOSIS — G40.909 SEIZURE DISORDER (H): ICD-10-CM

## 2019-02-26 DIAGNOSIS — M05.79 SEROPOSITIVE RHEUMATOID ARTHRITIS OF MULTIPLE SITES (H): Primary | ICD-10-CM

## 2019-02-26 DIAGNOSIS — Z79.899 DRUG THERAPY: ICD-10-CM

## 2019-02-26 LAB
ALT SERPL W P-5'-P-CCNC: 19 U/L (ref 0–50)
AST SERPL W P-5'-P-CCNC: 23 U/L (ref 0–45)
BASOPHILS # BLD AUTO: 0.1 10E9/L (ref 0–0.2)
BASOPHILS NFR BLD AUTO: 0.8 %
CREAT SERPL-MCNC: 0.98 MG/DL (ref 0.52–1.04)
CRP SERPL-MCNC: 7.2 MG/L (ref 0–8)
DIFFERENTIAL METHOD BLD: ABNORMAL
EOSINOPHIL NFR BLD AUTO: 1.8 %
ERYTHROCYTE [DISTWIDTH] IN BLOOD BY AUTOMATED COUNT: 16.4 % (ref 10–15)
GFR SERPL CREATININE-BSD FRML MDRD: 50 ML/MIN/{1.73_M2}
HCT VFR BLD AUTO: 34.3 % (ref 35–47)
HGB BLD-MCNC: 11 G/DL (ref 11.7–15.7)
IMM GRANULOCYTES # BLD: 0 10E9/L (ref 0–0.4)
IMM GRANULOCYTES NFR BLD: 0.5 %
LYMPHOCYTES # BLD AUTO: 1.5 10E9/L (ref 0.8–5.3)
LYMPHOCYTES NFR BLD AUTO: 21 %
MCH RBC QN AUTO: 29.8 PG (ref 26.5–33)
MCHC RBC AUTO-ENTMCNC: 32.1 G/DL (ref 31.5–36.5)
MCV RBC AUTO: 93 FL (ref 78–100)
MONOCYTES # BLD AUTO: 0.5 10E9/L (ref 0–1.3)
MONOCYTES NFR BLD AUTO: 7.3 %
NEUTROPHILS # BLD AUTO: 5 10E9/L (ref 1.6–8.3)
NEUTROPHILS NFR BLD AUTO: 68.6 %
NRBC # BLD AUTO: 0 10*3/UL
NRBC BLD AUTO-RTO: 0 /100
PLATELET # BLD AUTO: 273 10E9/L (ref 150–450)
RBC # BLD AUTO: 3.69 10E12/L (ref 3.8–5.2)
WBC # BLD AUTO: 7.3 10E9/L (ref 4–11)

## 2019-02-26 PROCEDURE — 85025 COMPLETE CBC W/AUTO DIFF WBC: CPT | Performed by: INTERNAL MEDICINE

## 2019-02-26 PROCEDURE — 82565 ASSAY OF CREATININE: CPT | Performed by: INTERNAL MEDICINE

## 2019-02-26 PROCEDURE — 84460 ALANINE AMINO (ALT) (SGPT): CPT | Performed by: INTERNAL MEDICINE

## 2019-02-26 PROCEDURE — 80177 DRUG SCRN QUAN LEVETIRACETAM: CPT | Mod: 90 | Performed by: INTERNAL MEDICINE

## 2019-02-26 PROCEDURE — 36415 COLL VENOUS BLD VENIPUNCTURE: CPT | Performed by: INTERNAL MEDICINE

## 2019-02-26 PROCEDURE — 84450 TRANSFERASE (AST) (SGOT): CPT | Performed by: INTERNAL MEDICINE

## 2019-02-26 PROCEDURE — 86140 C-REACTIVE PROTEIN: CPT | Performed by: INTERNAL MEDICINE

## 2019-02-26 PROCEDURE — 99000 SPECIMEN HANDLING OFFICE-LAB: CPT | Performed by: INTERNAL MEDICINE

## 2019-02-28 LAB — LEVETIRACETAM SERPL-MCNC: 27 UG/ML (ref 12–46)

## 2019-03-03 NOTE — RESULT ENCOUNTER NOTE
Dear Gillian, your recent test results are attached.    The Keppra level is normal.    Feel free to contact me via the office or My Chart if you have any questions regarding the above.  Sincerely,  Augusto Meyer,  FACOI

## 2019-03-15 ENCOUNTER — TELEPHONE (OUTPATIENT)
Dept: FAMILY MEDICINE | Facility: OTHER | Age: 84
End: 2019-03-15

## 2019-03-15 DIAGNOSIS — M13.80 SERONEGATIVE ARTHRITIS: Primary | ICD-10-CM

## 2019-03-15 RX ORDER — PREDNISONE 50 MG/1
50 TABLET ORAL DAILY
Qty: 5 TABLET | Refills: 0 | Status: SHIPPED | OUTPATIENT
Start: 2019-03-15 | End: 2020-09-15

## 2019-03-15 NOTE — TELEPHONE ENCOUNTER
I have taken the liberty of sending a prescription to the [facility] pharmacy in [place] for a short prednisone burst.  This should alleviate her immediate symptoms.    Betsy

## 2019-03-15 NOTE — TELEPHONE ENCOUNTER
Reason for call:  Patient reporting a symptom    Symptom or request: arthritis flare up     Duration (how long have symptoms been present): 2 days     Have you been treated for this before? No    Additional comments: Pt cannot get a hold of her rheumatologist. She states they are closed today. She is wondering if you can do anything for her today?     Phone Number patient can be reached at:  Home number on file [phone] (home)    Best Time:  Any     Can we leave a detailed message on this number:  YES    Call taken on 3/15/2019 at 8:39 AM by Massiel Prieto

## 2019-03-15 NOTE — TELEPHONE ENCOUNTER
Spoke with patient and informed of message below. Patient understood and will  script.     Dee Benitez MA

## 2019-03-19 ENCOUNTER — TELEPHONE (OUTPATIENT)
Dept: FAMILY MEDICINE | Facility: OTHER | Age: 84
End: 2019-03-19

## 2019-03-19 NOTE — TELEPHONE ENCOUNTER
Patients daughter called back and scheduled an appt for Gillian tomorrow with Dr. Meyer,       Thank you,  Tomasa Dodd   for [facility]

## 2019-03-19 NOTE — TELEPHONE ENCOUNTER
I have attempted to contact pt to assist with scheduling an appt. I left a message for her to return my call. Okay to use Dr Cristina smith. Rocío Burch CMA ([facility])

## 2019-03-19 NOTE — TELEPHONE ENCOUNTER
Reason for Call:  Work in Appointment, Requested Provider:  Augusto Meyer DO    PCP: Augusto Meyer    Reason for visit: Follow up back pain    Duration of symptoms: 6 days    Have you been treated for this in the past? Yes    Additional comments: Please advise if patient can be worked in to follow up on pt's back pain. It is not getting any better.     Can we leave a detailed message on this number? YES    Phone number patient can be reached at: Home number on file [phone] (home)    Best Time: any    Call taken on 3/19/2019 at 3:00 PM by Tanya Henry

## 2019-03-20 ENCOUNTER — OFFICE VISIT (OUTPATIENT)
Dept: FAMILY MEDICINE | Facility: OTHER | Age: 84
End: 2019-03-20
Payer: COMMERCIAL

## 2019-03-20 ENCOUNTER — ANCILLARY PROCEDURE (OUTPATIENT)
Dept: GENERAL RADIOLOGY | Facility: OTHER | Age: 84
End: 2019-03-20
Attending: INTERNAL MEDICINE
Payer: COMMERCIAL

## 2019-03-20 VITALS
SYSTOLIC BLOOD PRESSURE: 126 MMHG | TEMPERATURE: 98.5 F | OXYGEN SATURATION: 97 % | DIASTOLIC BLOOD PRESSURE: 62 MMHG | HEART RATE: 86 BPM | RESPIRATION RATE: 16 BRPM

## 2019-03-20 DIAGNOSIS — M54.50 LUMBAR PAIN: Primary | ICD-10-CM

## 2019-03-20 DIAGNOSIS — Z95.2 S/P TAVR (TRANSCATHETER AORTIC VALVE REPLACEMENT): ICD-10-CM

## 2019-03-20 DIAGNOSIS — G40.909 SEIZURE DISORDER (H): ICD-10-CM

## 2019-03-20 DIAGNOSIS — I20.89 CHRONIC STABLE ANGINA (H): ICD-10-CM

## 2019-03-20 DIAGNOSIS — I50.22 CHRONIC SYSTOLIC CONGESTIVE HEART FAILURE (H): ICD-10-CM

## 2019-03-20 DIAGNOSIS — I10 HYPERTENSION GOAL BP (BLOOD PRESSURE) < 140/90: ICD-10-CM

## 2019-03-20 DIAGNOSIS — M05.9 SEROPOSITIVE RHEUMATOID ARTHRITIS (H): ICD-10-CM

## 2019-03-20 PROCEDURE — 99214 OFFICE O/P EST MOD 30 MIN: CPT | Performed by: INTERNAL MEDICINE

## 2019-03-20 PROCEDURE — 72100 X-RAY EXAM L-S SPINE 2/3 VWS: CPT | Mod: FY

## 2019-03-20 RX ORDER — GABAPENTIN 100 MG/1
100 CAPSULE ORAL 3 TIMES DAILY
Qty: 42 CAPSULE | Refills: 0 | Status: SHIPPED | OUTPATIENT
Start: 2019-03-20 | End: 2019-03-25

## 2019-03-20 ASSESSMENT — PAIN SCALES - GENERAL: PAINLEVEL: EXTREME PAIN (8)

## 2019-03-20 NOTE — NURSING NOTE
Height and weight were not taken today because patient was in a wheelchair. Natasha Canales MA     3/20/2019

## 2019-03-20 NOTE — PROGRESS NOTES
SUBJECTIVE:   Gillian Burk is a 89 year old female who presents to clinic today for the following health issues:      Chief Complaint   Patient presents with     Follow Up     back pain. Completed prednisone, still having pain                        Chief Complaint         The patient is a pleasant 89-year-old female who presents today with some back pain in the upper lumbar area.  She recently concluded a course of prednisone which brought about only modest improvement.  She does have a history of seropositive rheumatoid arthritis in multiple sites and this did help her peripheral arthritis somewhat.  She has no radicular findings or weakness in the legs.  She notes that the pain is specifically isolated to her mid back.  She has an extensive medical history commensurate with her age.  Certainly surgical intervention is not an option.                       PAST, FAMILY,SOCIAL HISTORY:     Medical  History:   has a past medical history of Aortic stenosis, Chronic migraine, Hyperlipidaemia, Hypertension, Hypothyroidism, Myocardial infarction (H), Need for SBE (subacute bacterial endocarditis) prophylaxis, Orthostatic hypotension, PUD (peptic ulcer disease), Secondary Sjogren's syndrome (H), Seizures (H), Seropositive rheumatoid arthritis (H), Stroke (H) (05/2013), and Syncope (2014).     Surgical History:   has a past surgical history that includes Mouth surgery (2011); Eye surgery (1999); cataract iol, rt/lt (Bilateral, 2000); Hysterectomy total abdominal (1970); TOTAL HIP ARTHROPLASTY (Right, 2010); TOTAL HIP ARTHROPLASTY (Left, 2014); TRANSCATHETER AORTIC VALVE IMPLANT ANEST (N/A, 8/3/2016); and Heart Cath Femoral Cannulization With Open Standby Repair Aortic Valve (N/A, 8/3/2016).     Social History:   reports that  has never smoked. she has never used smokeless tobacco. She reports that she does not drink alcohol or use drugs.     Family History:  family history includes Hyperlipidemia in her  mother.            MEDICATIONS  Current Outpatient Medications   Medication Sig Dispense Refill     acetaminophen (TYLENOL ARTHRITIS PAIN) 650 MG CR tablet Take 2 tablets (1,300 mg) by mouth every 8 hours as needed for mild pain       ALPRAZolam (XANAX) 0.25 MG tablet TAKE 1 TABLET BY MOUTH TWICE DAILY AS NEEDED FOR ANXIETY 28 tablet 0     amLODIPine (NORVASC) 5 MG tablet Take 1 tablet (5 mg) by mouth daily 90 tablet 3     aspirin EC 81 MG EC tablet Take 1 tablet (81 mg) by mouth daily       atorvastatin (LIPITOR) 40 MG tablet Take 1 tablet (40 mg) by mouth daily Follow up visit due per provider. 90 tablet 0     calcium carbonate (TUMS) 500 MG chewable tablet Take 2-4 tablets (1,000-2,000 mg) by mouth as needed for heartburn       clopidogrel (PLAVIX) 75 MG tablet TAKE ONE TABLET BY MOUTH ONCE DAILY 30 tablet 11     Cranberry 1000 MG CAPS        docusate sodium (COLACE) 100 MG capsule Take 200 mg by mouth daily 2 capsules       famotidine (PEPCID) 40 MG tablet TAKE ONE TABLET BY MOUTH AT BEDTIME 30 tablet 9     folic acid (FOLVITE) 1 MG tablet TAKE 1 TABLET BY MOUTH ONCE DAILY 90 tablet 1     gabapentin (NEURONTIN) 100 MG capsule Take 1 capsule (100 mg) by mouth 3 times daily 42 capsule 0     Lactobacillus (FLORAJEN ACIDOPHILUS) CAPS Take 1 capsule by mouth daily       levETIRAcetam (KEPPRA) 500 MG tablet Take 1 tablet (500 mg) by mouth 2 times daily 90 tablet 6     levothyroxine (SYNTHROID/LEVOTHROID) 50 MCG tablet TAKE 1 TABLET BY MOUTH ONCE DAILY 90 tablet 1     losartan (COZAAR) 25 MG tablet TAKE 1/2 (ONE-HALF) TABLET BY MOUTH ONCE DAILY 45 tablet 1     methotrexate 2.5 MG tablet Take 4 tablets (10 mg) by mouth once a week Wed 52 tablet 3     metoprolol succinate (TOPROL-XL) 50 MG 24 hr tablet Take 1 tablet (50 mg) by mouth 2 times daily 180 tablet 3     mirtazapine (REMERON) 30 MG tablet TAKE 1 TABLET BY MOUTH ONCE DAILY AT BEDTIME 90 tablet 1     predniSONE (DELTASONE) 5 MG tablet TAKE ONE TABLET BY MOUTH  EVERY OTHER DAY ALTERNATING  WITH  1/2  TABLET  EVERY  OTHER  DAY 23 tablet 13     predniSONE (DELTASONE) 50 MG tablet Take 50 mg by mouth daily. 5 tablet 0     Hylan (SYNVISC) 16 MG/2ML SOSY 16 mg by INTRA-ARTICULAR route once 2 mL 0     isosorbide mononitrate (IMDUR) 30 MG 24 hr tablet Take 2 tablets (60 mg) by mouth daily 360 tablet 3     multivitamin, therapeutic with minerals (MULTI-VITAMIN) TABS Take 1 tablet by mouth daily       nitroGLYcerin (NITROSTAT) 0.4 MG sublingual tablet DISSOLVE ONE TABLET UNDER THE TONGUE EVERY 5 MINUTES AS NEEDED FOR CHEST PAIN.  DO NOT EXCEED A TOTAL OF 3 DOSES IN 15 MINUTES (Patient not taking: Reported on 3/20/2019) 25 tablet 0         --------------------------------------------------------------------------------------------------------------------                              Review of Systems     LUNGS: Pt denies: cough, excess sputum, hemoptysis, or shortness of breath.   HEART: Pt denies: chest pain, arrhythmia, syncope, tachy or bradyarrhythmia.   GI: Pt denies: nausea, vomiting, diarrhea, constipation, melena, or hematochezia.   NEURO: Pt denies: seizures, strokes, diplopia, weakness, paraesthesias, or paralysis.                                     Examination      /62 (BP Location: Left arm, Patient Position: Sitting, Cuff Size: Adult Regular)   Pulse 86   Temp 98.5  F (36.9  C) (Temporal)   Resp 16   SpO2 97%    Constitutional: The patient appears to be in no acute distress. The patient appears to be adequately hydrated. No acute respiratory or hemodynamic distress is noted at this time.   LUNGS: clear bilaterally, airflow is brisk, no intercostal retraction or stridor is noted. No coughing is noted during visit.   HEART:  regular without rubs, clicks, gallops, or murmurs. PMI is nondisplaced. Upstrokes are brisk. S1,S2 are heard.   GI: Abdomen is soft, without rebound, guarding or tenderness. Bowel sounds are appropriate. No renal bruits are heard.   NEURO:  Pt is alert and appropriate. No neurologic lateralization is noted. Cranial nerves 2-12 are intact. Peripheral sensory and motor function are grossly normal.    MS: Moderate crepitance is noted in the extremities. No deformity is present. Muscle strength is appropriate and equal bilaterally. No acute joint erythema or swelling is present.  Point tenderness in the upper lumbar area is noted.  Decreased paraspinal spasm is now noted.                                             Decision Making  1. Lumbar pain  Suspect probable compression fracture.  X-rays will be obtained.  We will adjust gabapentin dosage.  If pain continues, neurosurgical consultation for possible injection will be entertained.  Kyphoplasty???  - XR Lumbar Spine 2/3 Views  - gabapentin (NEURONTIN) 100 MG capsule; Take 1 capsule (100 mg) by mouth 3 times daily  Dispense: 42 capsule; Refill: 0    2. Hypertension goal BP (blood pressure) < 140/90  Currently controlled   3. Seizure disorder (H)  Seizure-free, continueKeppra and now gabapentin      4. Chronic stable angina (H)  Stable.    5. Chronic systolic congestive heart failure (H)  Stable.    6. Seropositive rheumatoid arthritis (H)  Briefly improved with steroid burst    7. S/P TAVR (transcatheter aortic valve replacement)  Working well.                             FOLLOW UP   I have asked the patient to make an appointment for followup with me as predicated by her pain.            I have carefully explained the diagnosis and treatment options to the patient.  The patient has displayed an understanding of the above, and all subsequent questions were answered.      DO DELMA Pineda    Portions of this note were produced using [employer]  Although every attempt at real-time proof reading has been made, occasional grammar/syntax errors may have been missed.

## 2019-03-24 PROBLEM — I50.22 CHRONIC SYSTOLIC CONGESTIVE HEART FAILURE (H): Status: ACTIVE | Noted: 2019-03-24

## 2019-03-24 PROBLEM — I20.89 CHRONIC STABLE ANGINA (H): Status: ACTIVE | Noted: 2019-03-24

## 2019-03-24 NOTE — RESULT ENCOUNTER NOTE
Dear Gillian, your recent test results are attached.  X-ray of the lumbar spine demonstrates multilevel degenerative disc disease, scoliosis, hardening of the arteries, and a new anterior compression fracture at T11 that was not seen previously.    Feel free to contact me via the office or My Chart if you have any questions regarding the above.  Sincerely,  Augusto Meyer, DO FACOI

## 2019-03-25 ENCOUNTER — TELEPHONE (OUTPATIENT)
Dept: FAMILY MEDICINE | Facility: OTHER | Age: 84
End: 2019-03-25

## 2019-03-25 DIAGNOSIS — M54.50 LUMBAR PAIN: ICD-10-CM

## 2019-03-25 RX ORDER — GABAPENTIN 100 MG/1
200 CAPSULE ORAL 3 TIMES DAILY
Qty: 180 CAPSULE | Refills: 0 | Status: SHIPPED | OUTPATIENT
Start: 2019-03-25 | End: 2019-04-16

## 2019-03-25 NOTE — TELEPHONE ENCOUNTER
I have sent a prescription for an increased dose of gabapentin to her pharmacy.  If this does not work, I would recommend neurosurgical consultation.    Betsy

## 2019-03-25 NOTE — TELEPHONE ENCOUNTER
Reason for Call:  Other FYI     Detailed comments: Hanna calling stating the medication has giver her some relief but not a lot. Still experiencing back pain. Seems clear in her mind still. Wondering where you would like her to go from here. Please call and advise    Phone Number Patient can be reached at: Cell number on file:    Telephone Information:   Mobile [phone]       Best Time: any     Can we leave a detailed message on this number? YES    Call taken on 3/25/2019 at 8:43 AM by Ania Kulkarni

## 2019-04-02 DIAGNOSIS — M05.9 SEROPOSITIVE RHEUMATOID ARTHRITIS (H): ICD-10-CM

## 2019-04-03 RX ORDER — FOLIC ACID 1 MG/1
TABLET ORAL
Qty: 90 TABLET | Refills: 1 | Status: SHIPPED | OUTPATIENT
Start: 2019-04-03 | End: 2019-10-08

## 2019-04-03 NOTE — TELEPHONE ENCOUNTER
"Requested Prescriptions   Pending Prescriptions Disp Refills     folic acid (FOLVITE) 1 MG tablet [Pharmacy Med Name: FOLIC ACID 1MG      TAB] 90 tablet 1    Last Written Prescription Date:  9/26/18  Last Fill Quantity: 90,  # refills: 1   Last office visit: 3/20/2019 with prescribing provider:  3/20/19   Future Office Visit:     Sig: TAKE 1 TABLET BY MOUTH ONCE DAILY    Vitamin Supplements (Adult) Protocol Passed - 4/2/2019  1:30 PM       Passed - High dose Vitamin D not ordered       Passed - Recent (12 mo) or future (30 days) visit within the authorizing provider's specialty    Patient had office visit in the last 12 months or has a visit in the next 30 days with authorizing provider or within the authorizing provider's specialty.  See \"Patient Info\" tab in inbasket, or \"Choose Columns\" in Meds & Orders section of the refill encounter.             Passed - Medication is active on med list        "

## 2019-04-03 NOTE — TELEPHONE ENCOUNTER
Prescription approved per [facility] Refill Protocol.    AUTUMN BustilloN, RN  [facility]

## 2019-04-11 ENCOUNTER — TELEPHONE (OUTPATIENT)
Dept: FAMILY MEDICINE | Facility: OTHER | Age: 84
End: 2019-04-11

## 2019-04-11 NOTE — TELEPHONE ENCOUNTER
The patient has a tender bump on the dorsal aspect of her foot, it is probably not a DVT.  It is probably a small hematoma.  I would recommend:  Moist heat and elevation.  Tylenol for discomfort.  Office visit if symptoms worsen or continue.    Thank you.  Betsy

## 2019-04-11 NOTE — TELEPHONE ENCOUNTER
Reason for call:  Patient reporting a symptom    Symptom or request: Dhara -nurse from [facility] called. Stated pt has a bump/bruise on top part of right foot. Tender to the touch. Good circulation. Denies any injury. Pt is nervous she has a blood clot.     Duration (how long have symptoms been present): reported to nurse today    Have you been treated for this before? No    Additional comments:      Phone Number patient can be reached at:  Other phone number:  [phone]    Best Time:  anytime    Can we leave a detailed message on this number:  YES    Call taken on 4/11/2019 at 9:18 AM by Mercy Cooper

## 2019-04-16 DIAGNOSIS — M54.50 LUMBAR PAIN: ICD-10-CM

## 2019-04-16 RX ORDER — GABAPENTIN 100 MG/1
CAPSULE ORAL
Qty: 180 CAPSULE | Refills: 0 | Status: SHIPPED | OUTPATIENT
Start: 2019-04-16 | End: 2019-09-12

## 2019-04-16 NOTE — TELEPHONE ENCOUNTER
Gabapentin 100 MG       Last Written Prescription Date:  3/25/19  Last Fill Quantity: 180,   # refills: 0  Last Office Visit: 3/20/19  Future Office visit:       Routing refill request to provider for review/approval because:  Drug not on the [facility], [facility] or [facility] refill protocol or controlled substance

## 2019-04-26 ENCOUNTER — HOSPITAL ENCOUNTER (OUTPATIENT)
Facility: CLINIC | Age: 84
Setting detail: OBSERVATION
Discharge: HOME | End: 2019-04-27
Attending: NURSE PRACTITIONER | Admitting: FAMILY MEDICINE
Payer: COMMERCIAL

## 2019-04-26 ENCOUNTER — APPOINTMENT (OUTPATIENT)
Dept: CT IMAGING | Facility: CLINIC | Age: 84
End: 2019-04-26
Attending: NURSE PRACTITIONER
Payer: COMMERCIAL

## 2019-04-26 DIAGNOSIS — R82.90 NONSPECIFIC FINDING ON EXAMINATION OF URINE: ICD-10-CM

## 2019-04-26 DIAGNOSIS — G40.109 LOCALIZATION-RELATED FOCAL EPILEPSY WITH SIMPLE PARTIAL SEIZURES (H): ICD-10-CM

## 2019-04-26 DIAGNOSIS — R01.1 UNDIAGNOSED CARDIAC MURMURS: ICD-10-CM

## 2019-04-26 DIAGNOSIS — I44.7 BLOCK, BUNDLE BRANCH, LEFT: ICD-10-CM

## 2019-04-26 DIAGNOSIS — R79.89 ELEVATED TROPONIN: ICD-10-CM

## 2019-04-26 DIAGNOSIS — R79.89 ELEVATED TROPONIN LEVEL: Primary | ICD-10-CM

## 2019-04-26 DIAGNOSIS — I20.89 CHRONIC STABLE ANGINA (H): ICD-10-CM

## 2019-04-26 DIAGNOSIS — R32 URINARY INCONTINENCE, UNSPECIFIED TYPE: ICD-10-CM

## 2019-04-26 DIAGNOSIS — G40.109 LOCALIZATION-RELATED EPILEPSY (H): ICD-10-CM

## 2019-04-26 DIAGNOSIS — Z79.899 DRUG THERAPY: ICD-10-CM

## 2019-04-26 PROBLEM — N39.42 URINARY INCONTINENCE WITHOUT SENSORY AWARENESS: Status: ACTIVE | Noted: 2019-04-26

## 2019-04-26 LAB
ALBUMIN SERPL-MCNC: 2.9 G/DL (ref 3.4–5)
ALBUMIN UR-MCNC: NEGATIVE MG/DL
ALP SERPL-CCNC: 101 U/L (ref 40–150)
ALT SERPL W P-5'-P-CCNC: 18 U/L (ref 0–50)
ANION GAP SERPL CALCULATED.3IONS-SCNC: 8 MMOL/L (ref 3–14)
APPEARANCE UR: ABNORMAL
APTT PPP: 27 SEC (ref 22–37)
AST SERPL W P-5'-P-CCNC: 21 U/L (ref 0–45)
BASOPHILS # BLD AUTO: 0.1 10E9/L (ref 0–0.2)
BASOPHILS NFR BLD AUTO: 0.5 %
BILIRUB SERPL-MCNC: 0.4 MG/DL (ref 0.2–1.3)
BILIRUB UR QL STRIP: NEGATIVE
BUN SERPL-MCNC: 18 MG/DL (ref 7–30)
CALCIUM SERPL-MCNC: 7.9 MG/DL (ref 8.5–10.1)
CHLORIDE SERPL-SCNC: 105 MMOL/L (ref 94–109)
CO2 SERPL-SCNC: 26 MMOL/L (ref 20–32)
COLOR UR AUTO: YELLOW
CREAT SERPL-MCNC: 0.77 MG/DL (ref 0.52–1.04)
DIFFERENTIAL METHOD BLD: ABNORMAL
EOSINOPHIL NFR BLD AUTO: 0.8 %
ERYTHROCYTE [DISTWIDTH] IN BLOOD BY AUTOMATED COUNT: 16.4 % (ref 10–15)
GFR SERPL CREATININE-BSD FRML MDRD: 67 ML/MIN/{1.73_M2}
GLUCOSE SERPL-MCNC: 105 MG/DL (ref 70–99)
GLUCOSE UR STRIP-MCNC: NEGATIVE MG/DL
HCT VFR BLD AUTO: 35.4 % (ref 35–47)
HGB BLD-MCNC: 11.5 G/DL (ref 11.7–15.7)
HGB UR QL STRIP: NEGATIVE
HYALINE CASTS #/AREA URNS LPF: 1 /LPF (ref 0–2)
IMM GRANULOCYTES # BLD: 0 10E9/L (ref 0–0.4)
IMM GRANULOCYTES NFR BLD: 0.3 %
INR PPP: 1.11 (ref 0.86–1.14)
KETONES UR STRIP-MCNC: NEGATIVE MG/DL
LEUKOCYTE ESTERASE UR QL STRIP: ABNORMAL
LIPASE SERPL-CCNC: 166 U/L (ref 73–393)
LYMPHOCYTES # BLD AUTO: 1.2 10E9/L (ref 0.8–5.3)
LYMPHOCYTES NFR BLD AUTO: 13.2 %
MCH RBC QN AUTO: 30.3 PG (ref 26.5–33)
MCHC RBC AUTO-ENTMCNC: 32.5 G/DL (ref 31.5–36.5)
MCV RBC AUTO: 93 FL (ref 78–100)
MONOCYTES # BLD AUTO: 0.5 10E9/L (ref 0–1.3)
MONOCYTES NFR BLD AUTO: 5.5 %
MUCOUS THREADS #/AREA URNS LPF: PRESENT /LPF
NEUTROPHILS # BLD AUTO: 7.4 10E9/L (ref 1.6–8.3)
NEUTROPHILS NFR BLD AUTO: 79.7 %
NITRATE UR QL: NEGATIVE
NRBC # BLD AUTO: 0 10*3/UL
NRBC BLD AUTO-RTO: 0 /100
PH UR STRIP: 7 PH (ref 5–7)
PLATELET # BLD AUTO: 306 10E9/L (ref 150–450)
POTASSIUM SERPL-SCNC: 3.5 MMOL/L (ref 3.4–5.3)
PROT SERPL-MCNC: 8.7 G/DL (ref 6.8–8.8)
RBC # BLD AUTO: 3.8 10E12/L (ref 3.8–5.2)
RBC #/AREA URNS AUTO: 2 /HPF (ref 0–2)
SODIUM SERPL-SCNC: 139 MMOL/L (ref 133–144)
SOURCE: ABNORMAL
SP GR UR STRIP: 1.01 (ref 1–1.03)
SQUAMOUS #/AREA URNS AUTO: <1 /HPF (ref 0–1)
TROPONIN I SERPL-MCNC: 0.05 UG/L (ref 0–0.04)
TROPONIN I SERPL-MCNC: 0.05 UG/L (ref 0–0.04)
TROPONIN I SERPL-MCNC: 0.06 UG/L (ref 0–0.04)
TROPONIN I SERPL-MCNC: 0.08 UG/L (ref 0–0.04)
UROBILINOGEN UR STRIP-MCNC: 0 MG/DL (ref 0–2)
WBC # BLD AUTO: 9.3 10E9/L (ref 4–11)
WBC #/AREA URNS AUTO: 220 /HPF (ref 0–5)
WBC CLUMPS #/AREA URNS HPF: PRESENT /HPF

## 2019-04-26 PROCEDURE — 84484 ASSAY OF TROPONIN QUANT: CPT | Performed by: NURSE PRACTITIONER

## 2019-04-26 PROCEDURE — G0378 HOSPITAL OBSERVATION PER HR: HCPCS

## 2019-04-26 PROCEDURE — 70450 CT HEAD/BRAIN W/O DYE: CPT

## 2019-04-26 PROCEDURE — 85025 COMPLETE CBC W/AUTO DIFF WBC: CPT | Performed by: NURSE PRACTITIONER

## 2019-04-26 PROCEDURE — 93005 ELECTROCARDIOGRAM TRACING: CPT | Performed by: FAMILY MEDICINE

## 2019-04-26 PROCEDURE — 99285 EMERGENCY DEPT VISIT HI MDM: CPT | Mod: 25 | Performed by: FAMILY MEDICINE

## 2019-04-26 PROCEDURE — 25000132 ZZH RX MED GY IP 250 OP 250 PS 637: Performed by: NURSE PRACTITIONER

## 2019-04-26 PROCEDURE — 83690 ASSAY OF LIPASE: CPT | Performed by: NURSE PRACTITIONER

## 2019-04-26 PROCEDURE — 36415 COLL VENOUS BLD VENIPUNCTURE: CPT | Performed by: NURSE PRACTITIONER

## 2019-04-26 PROCEDURE — 80053 COMPREHEN METABOLIC PANEL: CPT | Performed by: NURSE PRACTITIONER

## 2019-04-26 PROCEDURE — 99220 ZZC INITIAL OBSERVATION CARE,LEVL III: CPT | Performed by: NURSE PRACTITIONER

## 2019-04-26 PROCEDURE — 93010 ELECTROCARDIOGRAM REPORT: CPT | Mod: Z6 | Performed by: FAMILY MEDICINE

## 2019-04-26 PROCEDURE — 87086 URINE CULTURE/COLONY COUNT: CPT | Performed by: FAMILY MEDICINE

## 2019-04-26 PROCEDURE — 85610 PROTHROMBIN TIME: CPT | Performed by: NURSE PRACTITIONER

## 2019-04-26 PROCEDURE — 85730 THROMBOPLASTIN TIME PARTIAL: CPT | Performed by: NURSE PRACTITIONER

## 2019-04-26 PROCEDURE — 81001 URINALYSIS AUTO W/SCOPE: CPT | Performed by: NURSE PRACTITIONER

## 2019-04-26 RX ORDER — LEVOTHYROXINE SODIUM 50 UG/1
50 TABLET ORAL
Status: DISCONTINUED | OUTPATIENT
Start: 2019-04-27 | End: 2019-04-27 | Stop reason: HOSPADM

## 2019-04-26 RX ORDER — LIDOCAINE 40 MG/G
CREAM TOPICAL
Status: DISCONTINUED | OUTPATIENT
Start: 2019-04-26 | End: 2019-04-27 | Stop reason: HOSPADM

## 2019-04-26 RX ORDER — CLOPIDOGREL BISULFATE 75 MG/1
75 TABLET ORAL DAILY
Status: DISCONTINUED | OUTPATIENT
Start: 2019-04-27 | End: 2019-04-27 | Stop reason: HOSPADM

## 2019-04-26 RX ORDER — ALUMINA, MAGNESIA, AND SIMETHICONE 2400; 2400; 240 MG/30ML; MG/30ML; MG/30ML
30 SUSPENSION ORAL EVERY 4 HOURS PRN
Status: DISCONTINUED | OUTPATIENT
Start: 2019-04-26 | End: 2019-04-27 | Stop reason: HOSPADM

## 2019-04-26 RX ORDER — ISOSORBIDE MONONITRATE 30 MG/1
60 TABLET, EXTENDED RELEASE ORAL 2 TIMES DAILY
Status: DISCONTINUED | OUTPATIENT
Start: 2019-04-26 | End: 2019-04-27 | Stop reason: HOSPADM

## 2019-04-26 RX ORDER — NITROGLYCERIN 0.4 MG/1
0.4 TABLET SUBLINGUAL EVERY 5 MIN PRN
Status: DISCONTINUED | OUTPATIENT
Start: 2019-04-26 | End: 2019-04-26

## 2019-04-26 RX ORDER — MIRTAZAPINE 15 MG/1
30 TABLET, FILM COATED ORAL AT BEDTIME
Status: DISCONTINUED | OUTPATIENT
Start: 2019-04-26 | End: 2019-04-27 | Stop reason: HOSPADM

## 2019-04-26 RX ORDER — ACETAMINOPHEN 650 MG/1
650 SUPPOSITORY RECTAL EVERY 4 HOURS PRN
Status: DISCONTINUED | OUTPATIENT
Start: 2019-04-26 | End: 2019-04-27 | Stop reason: HOSPADM

## 2019-04-26 RX ORDER — AMLODIPINE BESYLATE 5 MG/1
5 TABLET ORAL DAILY
Status: DISCONTINUED | OUTPATIENT
Start: 2019-04-27 | End: 2019-04-27 | Stop reason: HOSPADM

## 2019-04-26 RX ORDER — ISOSORBIDE MONONITRATE 30 MG/1
60 TABLET, EXTENDED RELEASE ORAL DAILY
Status: DISCONTINUED | OUTPATIENT
Start: 2019-04-27 | End: 2019-04-26

## 2019-04-26 RX ORDER — LEVETIRACETAM 500 MG/1
500 TABLET ORAL 2 TIMES DAILY
Status: DISCONTINUED | OUTPATIENT
Start: 2019-04-26 | End: 2019-04-27 | Stop reason: HOSPADM

## 2019-04-26 RX ORDER — PREDNISONE 5 MG/1
5 TABLET ORAL DAILY
Status: DISCONTINUED | OUTPATIENT
Start: 2019-04-27 | End: 2019-04-27 | Stop reason: HOSPADM

## 2019-04-26 RX ORDER — NITROGLYCERIN 0.4 MG/1
0.4 TABLET SUBLINGUAL EVERY 5 MIN PRN
Status: DISCONTINUED | OUTPATIENT
Start: 2019-04-26 | End: 2019-04-27 | Stop reason: HOSPADM

## 2019-04-26 RX ORDER — ACETAMINOPHEN 325 MG/1
650 TABLET ORAL EVERY 4 HOURS PRN
Status: DISCONTINUED | OUTPATIENT
Start: 2019-04-26 | End: 2019-04-27 | Stop reason: HOSPADM

## 2019-04-26 RX ORDER — NITROFURANTOIN 25; 75 MG/1; MG/1
100 CAPSULE ORAL AT BEDTIME
Status: DISCONTINUED | OUTPATIENT
Start: 2019-04-26 | End: 2019-04-27 | Stop reason: HOSPADM

## 2019-04-26 RX ORDER — METOPROLOL SUCCINATE 50 MG/1
50 TABLET, EXTENDED RELEASE ORAL 2 TIMES DAILY
Status: DISCONTINUED | OUTPATIENT
Start: 2019-04-26 | End: 2019-04-27 | Stop reason: HOSPADM

## 2019-04-26 RX ORDER — GABAPENTIN 100 MG/1
100 CAPSULE ORAL 3 TIMES DAILY
Status: DISCONTINUED | OUTPATIENT
Start: 2019-04-26 | End: 2019-04-27 | Stop reason: HOSPADM

## 2019-04-26 RX ORDER — NITROFURANTOIN 25; 75 MG/1; MG/1
100 CAPSULE ORAL ONCE
COMMUNITY
End: 2020-10-29

## 2019-04-26 RX ORDER — FAMOTIDINE 20 MG/1
40 TABLET, FILM COATED ORAL AT BEDTIME
Status: DISCONTINUED | OUTPATIENT
Start: 2019-04-26 | End: 2019-04-27 | Stop reason: HOSPADM

## 2019-04-26 RX ADMIN — GABAPENTIN 100 MG: 100 CAPSULE ORAL at 20:38

## 2019-04-26 RX ADMIN — FAMOTIDINE 40 MG: 20 TABLET ORAL at 20:38

## 2019-04-26 RX ADMIN — LEVETIRACETAM 500 MG: 500 TABLET, FILM COATED ORAL at 20:43

## 2019-04-26 RX ADMIN — MIRTAZAPINE 30 MG: 15 TABLET, FILM COATED ORAL at 20:38

## 2019-04-26 RX ADMIN — METOPROLOL SUCCINATE 50 MG: 50 TABLET, EXTENDED RELEASE ORAL at 20:38

## 2019-04-26 RX ADMIN — NITROFURANTOIN MONOHYDRATE/MACROCRYSTALLINE 100 MG: 25; 75 CAPSULE ORAL at 20:38

## 2019-04-26 ASSESSMENT — ENCOUNTER SYMPTOMS
DIAPHORESIS: 0
HEMATURIA: 0
ARTHRALGIAS: 1
BLOOD IN STOOL: 0
BRUISES/BLEEDS EASILY: 1
FACIAL ASYMMETRY: 0
RESPIRATORY NEGATIVE: 1
DIZZINESS: 0
LIGHT-HEADEDNESS: 0
NUMBNESS: 0
HEADACHES: 0
CONSTIPATION: 0
CONFUSION: 0
FEVER: 0
CHILLS: 0
CARDIOVASCULAR NEGATIVE: 1
WEAKNESS: 0
BACK PAIN: 1
SPEECH DIFFICULTY: 0
ALLERGIC/IMMUNOLOGIC COMMENTS: AGE
TREMORS: 0
FLANK PAIN: 0
DIARRHEA: 0
DYSURIA: 0
NAUSEA: 0

## 2019-04-26 ASSESSMENT — MIFFLIN-ST. JEOR
SCORE: 1052.71
SCORE: 1020.51

## 2019-04-26 NOTE — ED TRIAGE NOTES
"Lives at [place]. States did not feel well this am. Not able to explain any better. States was sitting on edge of bed and noted she was incont of urine at 0800. States \" that's when I know I must have blacked out\". No fall or injury. Pt alert and oriented. No pain. No dizziness or lightheadedness. Pt weaning off gapapentin.  "

## 2019-04-26 NOTE — LETTER
Transition Communication Hand-off for Care Transitions to Next Level of Care Provider    Name: Gillian Burk  : 3/19/1927  MRN #: [id number]  Primary Care Provider: Augusto Meyer  Primary Care MD Name: Dr. 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]  Elevated troponin level [R74.8]  Admit Date/Time: 2019 10:29 AM  Discharge Date: 2019  Payor Source: Payor: BLUE PLUS / Plan: BLUE PLUS ADVANTAGE DUAL [employer] / Product Type: Medicare /     Readmission Assessment Measure (COLIN) Risk Score/category: high         Reason for Communication Hand-off Referral: Fragility    Concern for non-adherence with plan of care:   Y/N no  Discharge Needs Assessment:  Needs      Most Recent Value   Equipment Currently Used at Home  cane, straight        Follow-up specialty is recommended: Yes  Cardiology    Follow-up plan:    Future Appointments   Date Time Provider Department Center   2019  1:00 PM Augusto Meyer DO [facility] MEDIC         Referrals     Future Labs/Procedures    CARDIOLOGY EVAL ADULT REFERRAL     Comments:    Preferred location:  [facility] [phone]   [email or web address]    Please be aware that coverage of these services is subject to the terms and limitations of your health insurance plan.  Call member services at your health plan with any benefit or coverage questions.      Please bring the following to your appointment:  Any x-rays, CTs or MRIs which have been performed. Contact the facility where they were done to arrange for  prior to your scheduled appointment.    List of current medications  This referral request   Any documents/labs given to you for this referral          Key  Recommendations:  Pt lives at [facility] and is independent. She does have the standard housekeeping and meals provided, however, her daughter sets up all her medications in a pill box and she takes them accordingly. Pt does use a cane to get around. She does also have a life line which she wears around her neck. Daughter states she will be bringing pt back home and does provide transportation to appts.  No current needs at this time per pt.       Tonya LEYVAN, RN, PHN  RN Care Coordinator  Care Transitions Department  [facility] [phone]  [facility] [phone]    AVS/Discharge Summary is the source of truth; this is a helpful guide for improved communication of patient story

## 2019-04-26 NOTE — ED PROVIDER NOTES
History     Chief Complaint   Patient presents with     Syncope     HPI  Gillian Burk is a 89 year old female who lives independently at senior assisted [facility] facility presenting with her daughter via EMS post ?near syncopal/black out episode at approx 0800 this morning. Patient reports she was sitting at the side of her bed getting herself dressed when she noted she was incontinent of urine.  She does not recall having the urge to go and does not recall sensing she was urinating. All of a sudden she was soaked of urine. She does have cramping pressure in lower abdomen as if she has to defecate but is not able.  She denies headache, change in vision, extremity weakness, dizziness, chest pain, dyspnea.  She has resolving lower back pain from T11 compression fracture which occurred in March and reports 3 days ago she started to wean herself off of Gabapentin from 200mg TID to 100mg BID.     She reports her lower back pain is getting better and she denies foot drop and perineal sensation changes.   PMHx + TIA and CVAs, partial seizures post stroke, CHF, Chronic stable angina, CKD, and hx orthostatic hypotension.     PCP: Augusto Meyer     Allergies:  Allergies   Allergen Reactions     Penicillins Hives     Caffeine Other (See Comments) and Unknown     Triggers your Meniere's disease     Hydrocodone Other (See Comments) and Unknown     hallucinations     Ibuprofen GI Disturbance and Unknown     Tramadol Other (See Comments)     Seizure, was taking remeron along with tramadol     Metal [Staples] Rash       Problem List:    Patient Active Problem List    Diagnosis Date Noted     Chronic systolic congestive heart failure (H) 03/24/2019     Priority: Medium     Chronic stable angina (H) 03/24/2019     Priority: Medium     Primary osteoarthritis of right knee 08/03/2017     Priority: Medium     GI bleed 03/03/2017     Priority: Medium     Acute cystitis without hematuria 12/04/2016     Priority: Medium      Hypotension 12/04/2016     Priority: Medium     TIA (transient ischemic attack) 12/03/2016     Priority: Medium     Non-rheumatic mitral valve stenosis - mild to moderate 11/16 echo 12/03/2016     Priority: Medium     Coronary artery disease involving native coronary artery - NSTEMI 11/16 with moderate RCA disease treated medically 12/03/2016     Priority: Medium     CKD (chronic kidney disease) stage 3, GFR 30-59 ml/min (H) 12/03/2016     Priority: Medium     Hypokalemia 12/03/2016     Priority: Medium     Elevated troponin 12/03/2016     Priority: Medium     Iron deficiency anemia 12/03/2016     Priority: Medium     Acquired hypothyroidism 11/24/2016     Priority: Medium     Aortic stenosis      Priority: Medium     s/p TAVR 8/17/16       Need for SBE (subacute bacterial endocarditis) prophylaxis      Priority: Medium     Advanced directives, counseling/discussion 09/22/2016     Priority: Medium     Was addressed at appt. Will bring in and will have scanned. 9/22/2016  Cathy Anderson MA         S/P TAVR (transcatheter aortic valve replacement) 08/05/2016     Priority: Medium     Aortic stenosis, severe - s/p TAVR 08/03/2016     Priority: Medium     Hypothyroidism due to acquired atrophy of thyroid 05/25/2016     Priority: Medium     Orthostatic hypotension 10/07/2015     Priority: Medium     2013 with syncope       Aortic valve disorder 09/02/2015     Priority: Medium     Intermediate coronary syndrome 09/01/2015     Priority: Medium     Hyponatremia 07/07/2015     Priority: Medium     Hyperlipidemia with target LDL less than 130 04/06/2015     Priority: Medium     Anxiety 07/24/2014     Priority: Medium     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 07/24/2014     Priority: Medium     History of CVA (cerebrovascular accident) - 2013 07/24/2014     Priority: Medium     Esophageal reflux 07/24/2014     Priority: Medium     PAC (premature atrial contraction) 07/24/2014     Priority: Medium      PVC's (premature ventricular contractions) 07/24/2014     Priority: Medium     Hypertension goal BP (blood pressure) < 140/90 07/01/2014     Priority: Medium     Seropositive rheumatoid arthritis (H)      Priority: Medium     Transient ischemic attack (TIA), and cerebral infarction without residual deficits 06/18/2014     Priority: Medium        Past Medical History:    Past Medical History:   Diagnosis Date     Aortic stenosis      Chronic migraine      Hyperlipidaemia      Hypertension      Hypothyroidism      Myocardial infarction (H)      Need for SBE (subacute bacterial endocarditis) prophylaxis      Orthostatic hypotension      PUD (peptic ulcer disease)      Secondary Sjogren's syndrome (H)      Seizures (H)      Seropositive rheumatoid arthritis (H)      Stroke (H) 05/2013     Syncope 2014       Past Surgical History:    Past Surgical History:   Procedure Laterality Date     C TOTAL HIP ARTHROPLASTY Right 2010     C TOTAL HIP ARTHROPLASTY Left 2014     CATARACT IOL, RT/LT Bilateral 2000     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     HEART CATH FEMORAL CANNULIZATION WITH OPEN STANDBY REPAIR AORTIC VALVE N/A 8/3/2016    Procedure: HEART CATH FEMORAL CANNULIZATION WITH OPEN STANDBY REPAIR AORTIC VALVE;  Surgeon: Owen Schultz MD;  Location: [facility] OR     HYSTERECTOMY TOTAL ABDOMINAL  1970    ovaries out     MOUTH SURGERY  2011    jaw surgery due to fracture     TRANSCATHETER AORTIC VALVE IMPLANT ANESTHESIA N/A 8/3/2016    Procedure: TRANSCATHETER AORTIC VALVE IMPLANT ANESTHESIA;  Surgeon: GENERIC ANESTHESIA PROVIDER;  Location: [facility] OR       Family History:    Family History   Problem Relation Age of Onset     Hyperlipidemia Mother      Family History Negative No family hx of        Social History:  Marital Status:   [5]  Social History     Tobacco Use     Smoking status: Never Smoker     Smokeless tobacco: Never Used   Substance Use Topics     Alcohol use: No     Alcohol/week: 0.0 oz      Drug use: No        Medications:      acetaminophen (TYLENOL ARTHRITIS PAIN) 650 MG CR tablet   ALPRAZolam (XANAX) 0.25 MG tablet   amLODIPine (NORVASC) 5 MG tablet   aspirin EC 81 MG EC tablet   atorvastatin (LIPITOR) 40 MG tablet   calcium carbonate (TUMS) 500 MG chewable tablet   clopidogrel (PLAVIX) 75 MG tablet   Cranberry 1000 MG CAPS   docusate sodium (COLACE) 100 MG capsule   famotidine (PEPCID) 40 MG tablet   folic acid (FOLVITE) 1 MG tablet   gabapentin (NEURONTIN) 100 MG capsule   Hylan (SYNVISC) 16 MG/2ML SOSY   isosorbide mononitrate (IMDUR) 30 MG 24 hr tablet   Lactobacillus (FLORAJEN ACIDOPHILUS) CAPS   levETIRAcetam (KEPPRA) 500 MG tablet   levothyroxine (SYNTHROID/LEVOTHROID) 50 MCG tablet   losartan (COZAAR) 25 MG tablet   methotrexate 2.5 MG tablet   metoprolol succinate (TOPROL-XL) 50 MG 24 hr tablet   mirtazapine (REMERON) 30 MG tablet   multivitamin, therapeutic with minerals (MULTI-VITAMIN) TABS   nitroGLYcerin (NITROSTAT) 0.4 MG sublingual tablet   predniSONE (DELTASONE) 5 MG tablet   predniSONE (DELTASONE) 50 MG tablet         Review of Systems   Constitutional: Negative for chills, diaphoresis and fever.   HENT: Negative.    Eyes: Negative for visual disturbance.   Respiratory: Negative.    Cardiovascular: Negative.    Gastrointestinal: Negative for blood in stool, constipation, diarrhea and nausea.   Endocrine: Positive for cold intolerance.   Genitourinary: Negative for dysuria, flank pain and hematuria.   Musculoskeletal: Positive for arthralgias, back pain and gait problem.        Uses cane to assist with ambulation   Skin: Negative.    Allergic/Immunologic: Positive for immunocompromised state.        Age   Neurological: Negative for dizziness, tremors, facial asymmetry, speech difficulty, weakness, light-headedness, numbness and headaches.   Hematological: Bruises/bleeds easily.   Psychiatric/Behavioral: Negative for confusion.       Physical Exam   BP: 175/89  Pulse: 74  Temp: 96.3  " F (35.7  C)  Resp: 16  Height: 167.6 cm (5' 6\")  Weight: 62.6 kg (138 lb)  SpO2: 93 %      Physical Exam   Constitutional: She is oriented to person, place, and time. She appears well-developed and well-nourished. She is active and cooperative.  Non-toxic appearance. She does not have a sickly appearance. She does not appear ill. No distress.   Very pleasant 89 yo female whom appears quite healthy for her age   HENT:   Head: Normocephalic and atraumatic.   Mouth/Throat: Oropharynx is clear and moist.   Eyes: Pupils are equal, round, and reactive to light. Conjunctivae and EOM are normal.   Neck: Normal range of motion and phonation normal. Neck supple. Normal carotid pulses and no JVD present. Carotid bruit is not present. No thyromegaly present.   Cardiovascular: Normal rate, regular rhythm, S1 normal, S2 normal and intact distal pulses.  Occasional extrasystoles are present.   Murmur heard.   Systolic murmur is present with a grade of 3/6.  Pulmonary/Chest: Effort normal and breath sounds normal.   Abdominal: Soft. Normal appearance and bowel sounds are normal. There is tenderness in the suprapubic area.       Genitourinary: Rectum normal.   Musculoskeletal: She exhibits no edema.   Neurological: She is alert and oriented to person, place, and time. She has normal strength. No cranial nerve deficit or sensory deficit. Coordination normal. GCS eye subscore is 4. GCS verbal subscore is 5. GCS motor subscore is 6.   Reflex Scores:       Patellar reflexes are 1+ on the right side and 1+ on the left side.  Rectal tone intact; perineal sensation intact   Skin: Skin is warm and dry. She is not diaphoretic.   Psychiatric: She has a normal mood and affect. Her behavior is normal.   Nursing note and vitals reviewed.      ED Course  Discussed with patient symptoms could have been a partial seizure as she has had in the past which could have been caused by drastic weaning from Gabapentin. Also possible bladder spasm related " to UTI. She currently is neurologically intact.  Will proceed with EKG, labs, head CT and evaluate urine to eval for possible stroke, UTI, cardiac event, electrolyte diisturbance. Patient and daughter amenable to POC.     1210: Discussed with patient and her daughter troponin is not completely negative at 0.046 (norm limit 0.045) and in chart review her last elevated troponin was 3/4/18 when had occular stroke and was 0.054.  Patient without chest pain. Will repeat in 2 hours from initial draw. Her EKG is consistent with EKG 10/13/2018.  She is a patient of [facility] and see's Dr. Lee here at [facility].  Appears she was to have cardiac cath end of 2018 for LAD lesion but was not performed as her chest pain resolved via medical management.   Also discussed pyuria. Patient is on daily Macrobid in such do not suspect infection as no significant bacteria noted on microscopy and would consider urinalysis as sterile pyuria.     1425: Troponin 0.052. Discussed with patient. Will page her cardiology group regarding troponin as it may be troponin leak. Patient is asymptomatic for chest pain, nausea, diaphoresis.     1450: Discussed with Cardiologist, Dr. JESSICA Chavarria and he recommends observation stay with serial troponin. If remain stable and does not significantly increase suspect troponin leak.  If significantly elevated recommends heparin and transfer for evaluation and management.  Otherwise suspect troponin leak and can follow up with Dr. Lee as outpatient.    1515: Discussed with ANJU Lisa and she will admit to observation stay here at [facility].     Patient and her daughter amenable to current POC.  Suspect she may have had partial seizure this morning as cause of sudden incontinence vs significant bladder spasm.         EKG rhythm strip    Date/Time: 4/26/2019 3:19 PM  Performed by: Pushpa Baldwin APRN CNP  Authorized by: Pushpa Baldwin APRN CNP    Comparison: compared with previous ECG from 10/13/2018  Comparison to previous ECG: LBBB  Rhythm: bundle branch block  Rhythm comments: LBBB  Rate: normal  BPM: 73  Conduction: left bundle branch block  ST Depression: aVL  Clinical impression: abnormal ECG                     Critical Care time:  none               Results for orders placed or performed during the hospital encounter of 04/26/19 (from the past 24 hour(s))   CBC with platelets differential   Result Value Ref Range    WBC 9.3 4.0 - 11.0 10e9/L    RBC Count 3.80 3.8 - 5.2 10e12/L    Hemoglobin 11.5 (L) 11.7 - 15.7 g/dL    Hematocrit 35.4 35.0 - 47.0 %    MCV 93 78 - 100 fl    MCH 30.3 26.5 - 33.0 pg    MCHC 32.5 31.5 - 36.5 g/dL    RDW 16.4 (H) 10.0 - 15.0 %    Platelet Count 306 150 - 450 10e9/L    Diff Method Automated Method     % Neutrophils 79.7 %    % Lymphocytes 13.2 %    % Monocytes 5.5 %    % Eosinophils 0.8 %    % Basophils 0.5 %    % Immature Granulocytes 0.3 %    Nucleated RBCs 0 0 /100    Absolute Neutrophil 7.4 1.6 - 8.3 10e9/L    Absolute Lymphocytes 1.2 0.8 - 5.3 10e9/L    Absolute Monocytes 0.5 0.0 - 1.3 10e9/L    Absolute Basophils 0.1 0.0 - 0.2 10e9/L    Abs Immature Granulocytes 0.0 0 - 0.4 10e9/L    Absolute Nucleated RBC 0.0    Comprehensive metabolic panel   Result Value Ref Range    Sodium 139 133 - 144 mmol/L    Potassium 3.5 3.4 - 5.3 mmol/L    Chloride 105 94 - 109 mmol/L    Carbon Dioxide 26 20 - 32 mmol/L    Anion Gap 8 3 - 14 mmol/L    Glucose 105 (H) 70 - 99 mg/dL    Urea Nitrogen 18 7 - 30 mg/dL    Creatinine 0.77 0.52 - 1.04 mg/dL    GFR Estimate 67 >60 mL/min/[1.73_m2]    GFR Estimate If Black 77 >60 mL/min/[1.73_m2]    Calcium 7.9 (L) 8.5 - 10.1 mg/dL    Bilirubin Total 0.4 0.2 - 1.3 mg/dL    Albumin 2.9 (L) 3.4 - 5.0 g/dL    Protein Total 8.7 6.8 - 8.8 g/dL    Alkaline Phosphatase 101 40 - 150 U/L    ALT 18 0 - 50 U/L    AST 21 0 - 45 U/L   Lipase   Result Value Ref Range    Lipase 166 73 - 393 U/L   INR   Result  Value Ref Range    INR 1.11 0.86 - 1.14   Partial thromboplastin time   Result Value Ref Range    PTT 27 22 - 37 sec   Troponin I   Result Value Ref Range    Troponin I ES 0.046 (H) 0.000 - 0.045 ug/L   UA reflex to Microscopic and Culture   Result Value Ref Range    Color Urine Yellow     Appearance Urine Slightly Cloudy     Glucose Urine Negative NEG^Negative mg/dL    Bilirubin Urine Negative NEG^Negative    Ketones Urine Negative NEG^Negative mg/dL    Specific Gravity Urine 1.011 1.003 - 1.035    Blood Urine Negative NEG^Negative    pH Urine 7.0 5.0 - 7.0 pH    Protein Albumin Urine Negative NEG^Negative mg/dL    Urobilinogen mg/dL 0.0 0.0 - 2.0 mg/dL    Nitrite Urine Negative NEG^Negative    Leukocyte Esterase Urine Large (A) NEG^Negative    Source Catheterized Urine     RBC Urine 2 0 - 2 /HPF    WBC Urine 220 (H) 0 - 5 /HPF    WBC Clumps Present (A) NEG^Negative /HPF    Squamous Epithelial /HPF Urine <1 0 - 1 /HPF    Mucous Urine Present (A) NEG^Negative /LPF    Hyaline Casts 1 0 - 2 /LPF   CT Head w/o Contrast    Narrative    CT SCAN OF THE HEAD WITHOUT CONTRAST   4/26/2019 12:13 PM     HISTORY: Altered mental status, unexplained; hx strokes; blacked out  while sitting    TECHNIQUE:  Axial images of the head and coronal reformations without  IV contrast material. Radiation dose for this scan was reduced using  automated exposure control, adjustment of the mA and/or kV according  to patient size, or iterative reconstruction technique.    COMPARISON: MR scan dated 12/4/2016    FINDINGS:     Intracranial contents: There is diffuse parenchymal volume loss.   White matter changes are present in the cerebral hemispheres that are  consistent with small vessel ischemic disease in this age patient.  There is a chronic area of encephalomalacia in the left occipital  lobe. There is no evidence of intracranial hemorrhage, mass, acute  infarct or anomaly.    Visualized orbits/sinuses/mastoids:  The visualized portions of  the  sinuses and mastoids appear normal.    Osseous structures/soft tissues:  There is no evidence of trauma.      Impression    IMPRESSION:   1. No acute pathology. No bleed, mass, or acute infarcts are seen.  2. There is diffuse parenchymal volume loss.  White matter changes are  present in the cerebral hemispheres that are consistent with small  vessel ischemic disease in this age patient..  3. Chronic left occipital infarct.      JACK BLAIR MD   Troponin I (now)   Result Value Ref Range    Troponin I ES 0.052 (H) 0.000 - 0.045 ug/L       Medications - No data to display         I have reviewed the nursing notes.    I have reviewed the findings, diagnosis, plan and need for follow up with the patient.          Medication List      There are no discharge medications for this visit.         Final diagnoses:   Elevated troponin level   Localization-related focal epilepsy with simple partial seizures (H)       4/26/2019   [facility] EMERGENCY DEPARTMENT     Pushpa Baldwin APRN CNP  04/26/19 1525

## 2019-04-26 NOTE — H&P
"[facility]    History and Physical - Hospitalist Service       Date of Admission:  4/26/2019    Assessment & Plan   Gillian Burk is a 89 year old female admitted on 4/26/2019. She was sitting at the side of her bed getting herself dressed when she noted she was incontinent of urine. Patient had no other symptoms, no chest pain, no postictal phase, no loss of consciousness. Patient was completely asymptomatic after this episode and since then. Patient has a history of \" episodes\" and seizure activity after a stroke. She has been on keppra since.         Urinary incontinence without sensory awareness  Unclear etiology and unusual presentation, She was sitting at the side of her bed getting herself dressed when she noted she was incontinent of urine. Patient had no other symptoms, no chest pain, no postictal phase, no loss of consciousness. Patient was completely asymptomatic after this episode and since then. Patient has a history of \" episodes\" and seizure activity after a stroke. She has been on keppra since.   Will monitor on telemetry, trend troponin.   Patient was on gabapentin and had titrated her dose down recently.   Plan to increase gabapentin to 100 TID  No other neurologic symptoms  Monitor closely  Urine culture pending  Check keppra level      Elevated troponin  Noted in work up  Patient with a history of elevated troponin  Will need to trend labs and evaluate/treat as needed  Will monitor on telemetry        Chronic medical conditions, all stable. Will continue home medications.   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  Entered: Khloe Serrano CNP 04/26/2019, 3:58 PM     The patient's care was discussed with the Attending Physician, Dr. Anthony, Bedside Nurse, Patient and Patient's Family.    Khloe Serrano CNP  [facility]    ______________________________________________________________________    Chief Complaint   Incontinence of urine, possible seizure    History is obtained from the patient, electronic health record, emergency department physician and patient's daughter    History of Present Illness   Gillian Burk is a 89 year old female with a past medical history of TIA and CVAs, partial seizures post stroke, CHF, Chronic stable angina, CKD, and hx orthostatic hypotension  who lives independently at [facility] living [facility] presenting with her daughter via EMS post near syncopal/black out episode at approx 0800 this morning. Patient reports she was sitting at the side of her bed getting herself dressed when she noted she was incontinent of urine.  She does not recall having the urge to go and does not recall sensing she was urinating. All of a sudden she was soaked of urine. She does have cramping pressure in lower abdomen as if she has to defecate but is not able.  She denies headache, change in vision, extremity weakness, dizziness, chest pain, dyspnea.  She has resolving lower back pain from T11 compression fracture which occurred in March and reports 3 days ago she started to wean herself off of Gabapentin from 200mg TID to 100mg BID.  She reports her lower back pain is getting better and she denies foot drop and perineal sensation changes.         Review of Systems    The 10 point Review of Systems is negative other than noted in the HPI  or here.     Past Medical History    I have reviewed this patient's medical history and updated it with pertinent information if needed.   Past Medical History:   Diagnosis Date     Aortic stenosis     s/p TAVR 8/17/16     Chronic migraine      Hyperlipidaemia      Hypertension      Hypothyroidism      Myocardial infarction (H)      Need for SBE (subacute bacterial endocarditis) prophylaxis      Orthostatic hypotension     wears compression stockings     PUD (peptic ulcer disease)      Secondary Sjogren's syndrome (H)      Seizures (H)     after stroke (partial onset)     Seropositive rheumatoid arthritis (H)      Stroke (H) 05/2013    with visual field cut, left occipital lobe     Syncope 2014    due to orthostatic hypotension       Past Surgical History   I have reviewed this patient's surgical history and updated it with pertinent information if needed.  Past Surgical History:   Procedure Laterality Date     C TOTAL HIP ARTHROPLASTY Right 2010     C TOTAL HIP ARTHROPLASTY Left 2014     CATARACT IOL, RT/LT Bilateral 2000     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     HEART CATH FEMORAL CANNULIZATION WITH OPEN STANDBY REPAIR AORTIC VALVE N/A 8/3/2016    Procedure: HEART CATH FEMORAL CANNULIZATION WITH OPEN STANDBY REPAIR AORTIC VALVE;  Surgeon: Owen Schultz MD;  Location: [facility] OR     HYSTERECTOMY TOTAL ABDOMINAL  1970    ovaries out     MOUTH SURGERY  2011    jaw surgery due to fracture     TRANSCATHETER AORTIC VALVE IMPLANT ANESTHESIA N/A 8/3/2016    Procedure: TRANSCATHETER AORTIC VALVE IMPLANT ANESTHESIA;  Surgeon: GENERIC ANESTHESIA PROVIDER;  Location: [facility] OR       Social History   I have reviewed this patient's social history and updated it with pertinent information if needed.  Social History     Tobacco Use     Smoking status: Never Smoker     Smokeless tobacco: Never Used   Substance Use Topics     Alcohol use: No     Alcohol/week: 0.0 oz     Drug use: No       Family History   I have  reviewed this patient's family history and updated it with pertinent information if needed.   Family History   Problem Relation Age of Onset     Hyperlipidemia Mother      Family History Negative No family hx of        Prior to Admission Medications   Prior to Admission Medications   Prescriptions Last Dose Informant Patient Reported? Taking?   ALPRAZolam (XANAX) 0.25 MG tablet More than a month at Unknown time  No No   Sig: TAKE 1 TABLET BY MOUTH TWICE DAILY AS NEEDED FOR ANXIETY   Cranberry 1000 MG CAPS 2019 at 0900  Yes Yes   Hylan (SYNVISC) 16 MG/2ML SOSY Unknown at Unknown time  Yes No   Si mg by INTRA-ARTICULAR route once   Lactobacillus (FLORAJEN ACIDOPHILUS) CAPS 2019 at 0900  Yes Yes   Sig: Take 1 capsule by mouth daily   acetaminophen (TYLENOL ARTHRITIS PAIN) 650 MG CR tablet Unknown at Unknown time  Yes No   Sig: Take 2 tablets (1,300 mg) by mouth every 8 hours as needed for mild pain   amLODIPine (NORVASC) 5 MG tablet 2019 at 0900  No Yes   Sig: Take 1 tablet (5 mg) by mouth daily   aspirin EC 81 MG EC tablet 2019 at 0900  No Yes   Sig: Take 1 tablet (81 mg) by mouth daily   atorvastatin (LIPITOR) 40 MG tablet 2019 at 0900  No Yes   Sig: Take 1 tablet (40 mg) by mouth daily Follow up visit due per provider.   calcium carbonate (TUMS) 500 MG chewable tablet Unknown at Unknown time  Yes No   Sig: Take 2-4 tablets (1,000-2,000 mg) by mouth as needed for heartburn   clopidogrel (PLAVIX) 75 MG tablet 2019 at 0900  No Yes   Sig: TAKE ONE TABLET BY MOUTH ONCE DAILY   docusate sodium (COLACE) 100 MG capsule 2019 at 0900  Yes Yes   Sig: Take 200 mg by mouth daily 2 capsules   famotidine (PEPCID) 40 MG tablet Unknown at Unknown time  No No   Sig: TAKE ONE TABLET BY MOUTH AT BEDTIME   folic acid (FOLVITE) 1 MG tablet 2019 at 0900  No Yes   Sig: TAKE 1 TABLET BY MOUTH ONCE DAILY   gabapentin (NEURONTIN) 100 MG capsule 2019 at 0600  No Yes   Sig: TAKE 2 CAPSULES BY  MOUTH THREE TIMES DAILY   isosorbide mononitrate (IMDUR) 30 MG 24 hr tablet 4/26/2019 at 0600  No Yes   Sig: Take 2 tablets (60 mg) by mouth daily   levETIRAcetam (KEPPRA) 500 MG tablet 4/26/2019 at 0900  Yes Yes   Sig: Take 1 tablet (500 mg) by mouth 2 times daily   levothyroxine (SYNTHROID/LEVOTHROID) 50 MCG tablet 4/26/2019 at 0600  No Yes   Sig: TAKE 1 TABLET BY MOUTH ONCE DAILY   losartan (COZAAR) 25 MG tablet 4/26/2019 at 0900  No Yes   Sig: TAKE 1/2 (ONE-HALF) TABLET BY MOUTH ONCE DAILY   methotrexate 2.5 MG tablet Past Week at Unknown time  No Yes   Sig: Take 4 tablets (10 mg) by mouth once a week Wed   metoprolol succinate (TOPROL-XL) 50 MG 24 hr tablet 4/26/2019 at 0900  No Yes   Sig: Take 1 tablet (50 mg) by mouth 2 times daily   mirtazapine (REMERON) 30 MG tablet 4/25/2019 at 2200  No Yes   Sig: TAKE 1 TABLET BY MOUTH ONCE DAILY AT BEDTIME   multivitamin, therapeutic with minerals (MULTI-VITAMIN) TABS Unknown at Unknown time  Yes No   Sig: Take 1 tablet by mouth daily   nitroFURantoin macrocrystal-monohydrate (MACROBID) 100 MG capsule   Yes No   Sig: Take 100 mg by mouth once   nitroGLYcerin (NITROSTAT) 0.4 MG sublingual tablet Unknown at Unknown time  No No   Sig: DISSOLVE ONE TABLET UNDER THE TONGUE EVERY 5 MINUTES AS NEEDED FOR CHEST PAIN.  DO NOT EXCEED A TOTAL OF 3 DOSES IN 15 MINUTES   Patient not taking: Reported on 3/20/2019   predniSONE (DELTASONE) 5 MG tablet 4/26/2019 at 0900  No Yes   Sig: TAKE ONE TABLET BY MOUTH EVERY OTHER DAY ALTERNATING  WITH  1/2  TABLET  EVERY  OTHER  DAY   predniSONE (DELTASONE) 50 MG tablet Unknown at Unknown time  No No   Sig: Take 50 mg by mouth daily.      Facility-Administered Medications: None     Allergies   Allergies   Allergen Reactions     Penicillins Hives     Caffeine Other (See Comments) and Unknown     Triggers your Meniere's disease     Hydrocodone Other (See Comments) and Unknown     hallucinations     Ibuprofen GI Disturbance and Unknown     Tramadol  Other (See Comments)     Seizure, was taking remeron along with tramadol     Metal [Staples] Rash       Physical Exam   Vital Signs: Temp: 96.3  F (35.7  C) Temp src: Oral BP: 152/81 Pulse: 78 Heart Rate: 73 Resp: 17 SpO2: 94 % O2 Device: None (Room air)    Weight: 138 lbs 0 oz    Exam:  Constitutional: healthy, alert and no distress  Cardiovascular: PMI normal. No lifts, heaves, or thrills. RRR.    Respiratory:  Good diaphragmatic excursion. Lungs clear  Gastrointestinal: Abdomen soft, non-tender. BS normal.   Musculoskeletal: extremities normal- no gross deformities noted  Skin: no suspicious lesions or rashes  Neurologic:  Sensation grossly WNL.  Psychiatric: mentation appears normal and affect normal/bright        Data   Data reviewed today: I reviewed all medications, new labs and imaging results over the last 24 hours.     Recent Labs   Lab 04/26/19  1745 04/26/19  1339 04/26/19  1124   WBC  --   --  9.3   HGB  --   --  11.5*   MCV  --   --  93   PLT  --   --  306   INR  --   --  1.11   NA  --   --  139   POTASSIUM  --   --  3.5   CHLORIDE  --   --  105   CO2  --   --  26   BUN  --   --  18   CR  --   --  0.77   ANIONGAP  --   --  8   CHEYANNE  --   --  7.9*   GLC  --   --  105*   ALBUMIN  --   --  2.9*   PROTTOTAL  --   --  8.7   BILITOTAL  --   --  0.4   ALKPHOS  --   --  101   ALT  --   --  18   AST  --   --  21   LIPASE  --   --  166   TROPI 0.058* 0.052* 0.046*     Recent Results (from the past 24 hour(s))   CT Head w/o Contrast    Narrative    CT SCAN OF THE HEAD WITHOUT CONTRAST   4/26/2019 12:13 PM     HISTORY: Altered mental status, unexplained; hx strokes; blacked out  while sitting    TECHNIQUE:  Axial images of the head and coronal reformations without  IV contrast material. Radiation dose for this scan was reduced using  automated exposure control, adjustment of the mA and/or kV according  to patient size, or iterative reconstruction technique.    COMPARISON: MR scan dated 12/4/2016    FINDINGS:      Intracranial contents: There is diffuse parenchymal volume loss.   White matter changes are present in the cerebral hemispheres that are  consistent with small vessel ischemic disease in this age patient.  There is a chronic area of encephalomalacia in the left occipital  lobe. There is no evidence of intracranial hemorrhage, mass, acute  infarct or anomaly.    Visualized orbits/sinuses/mastoids:  The visualized portions of the  sinuses and mastoids appear normal.    Osseous structures/soft tissues:  There is no evidence of trauma.      Impression    IMPRESSION:   1. No acute pathology. No bleed, mass, or acute infarcts are seen.  2. There is diffuse parenchymal volume loss.  White matter changes are  present in the cerebral hemispheres that are consistent with small  vessel ischemic disease in this age patient..  3. Chronic left occipital infarct.      JACK BLAIR MD

## 2019-04-26 NOTE — PROGRESS NOTES
"S-(situation): Patient registered to Observation. Patient arrived to room 265 via cart from ED    B-(background): syncope    A-(assessment): Vital signs:  Temp: 96  F (35.6  C) Temp src: Oral BP: 169/78 Pulse: 72 Heart Rate: 78 Resp: 16 SpO2: 95 % O2 Device: None (Room air)   Height: 167.6 cm (5' 6\") Weight: 59.4 kg (130 lb 14.4 oz)  Estimated body mass index is 21.13 kg/m  as calculated from the following:    Height as of this encounter: 1.676 m (5' 6\").    Weight as of this encounter: 59.4 kg (130 lb 14.4 oz).      Pt alert oriented, resp even and unlabored. Denies SOB, chest pain, Denies N/V. No signs of distress noted. Skin intact.     R-(recommendations): Orders and observation goals reviewed with patient and daughter    Nursing Observation criteria listed below was met:    Skin issues/needs documented:NA  Isolation needs addressed, if appropriate: NA  Fall Prevention: Education given and documented: NA  Education Assessment documented:Yes  Education Documented (Pre-existing chronic infection such as, MRSA/VRE need education on admission): No  OBS video/handout Reviewed & DocumentedYes  Medication Reconciliation Complete: Yes  New medication patient education completed and documented (Possible Side Effects of Common Medications handout): Yes  Home medications if not able to send immediately home with family stored here: NA  Reminder note placed in discharge instructions: NA  Patient has discharge needs (If yes, please explain): No            "

## 2019-04-26 NOTE — ED NOTES
ED Nursing criteria listed below was addressed during verbal handoff:     Abnormal vitals: No  Abnormal results: Yes  Med Reconciliation completed: Yes  Meds given in ED: No  Any Overdue Meds: No  Core Measures: N/A  Isolation: N/A  Special needs: Yes, assist of one.  Skin assessment: Yes, bruises on arms.    Observation Patient  Education provided: Yes

## 2019-04-27 VITALS
RESPIRATION RATE: 18 BRPM | HEART RATE: 74 BPM | SYSTOLIC BLOOD PRESSURE: 105 MMHG | HEIGHT: 66 IN | DIASTOLIC BLOOD PRESSURE: 53 MMHG | BODY MASS INDEX: 21.04 KG/M2 | OXYGEN SATURATION: 93 % | WEIGHT: 130.9 LBS | TEMPERATURE: 96.5 F

## 2019-04-27 LAB
ANION GAP SERPL CALCULATED.3IONS-SCNC: 9 MMOL/L (ref 3–14)
BUN SERPL-MCNC: 23 MG/DL (ref 7–30)
CALCIUM SERPL-MCNC: 7.9 MG/DL (ref 8.5–10.1)
CHLORIDE SERPL-SCNC: 106 MMOL/L (ref 94–109)
CO2 SERPL-SCNC: 25 MMOL/L (ref 20–32)
CREAT SERPL-MCNC: 0.94 MG/DL (ref 0.52–1.04)
GFR SERPL CREATININE-BSD FRML MDRD: 53 ML/MIN/{1.73_M2}
GLUCOSE SERPL-MCNC: 80 MG/DL (ref 70–99)
HGB BLD-MCNC: 11.1 G/DL (ref 11.7–15.7)
POTASSIUM SERPL-SCNC: 3.3 MMOL/L (ref 3.4–5.3)
SODIUM SERPL-SCNC: 140 MMOL/L (ref 133–144)
TROPONIN I SERPL-MCNC: 0.09 UG/L (ref 0–0.04)
TROPONIN I SERPL-MCNC: 0.1 UG/L (ref 0–0.04)
TROPONIN I SERPL-MCNC: 0.1 UG/L (ref 0–0.04)

## 2019-04-27 PROCEDURE — 36415 COLL VENOUS BLD VENIPUNCTURE: CPT | Performed by: FAMILY MEDICINE

## 2019-04-27 PROCEDURE — 84484 ASSAY OF TROPONIN QUANT: CPT | Performed by: NURSE PRACTITIONER

## 2019-04-27 PROCEDURE — G0378 HOSPITAL OBSERVATION PER HR: HCPCS

## 2019-04-27 PROCEDURE — 99217 ZZC OBSERVATION CARE DISCHARGE: CPT | Performed by: NURSE PRACTITIONER

## 2019-04-27 PROCEDURE — 84484 ASSAY OF TROPONIN QUANT: CPT | Mod: 91 | Performed by: FAMILY MEDICINE

## 2019-04-27 PROCEDURE — 25000131 ZZH RX MED GY IP 250 OP 636 PS 637: Performed by: NURSE PRACTITIONER

## 2019-04-27 PROCEDURE — 25000132 ZZH RX MED GY IP 250 OP 250 PS 637: Performed by: FAMILY MEDICINE

## 2019-04-27 PROCEDURE — 36415 COLL VENOUS BLD VENIPUNCTURE: CPT | Performed by: NURSE PRACTITIONER

## 2019-04-27 PROCEDURE — 80048 BASIC METABOLIC PNL TOTAL CA: CPT | Performed by: NURSE PRACTITIONER

## 2019-04-27 PROCEDURE — 80177 DRUG SCRN QUAN LEVETIRACETAM: CPT | Performed by: NURSE PRACTITIONER

## 2019-04-27 PROCEDURE — 25000132 ZZH RX MED GY IP 250 OP 250 PS 637: Performed by: NURSE PRACTITIONER

## 2019-04-27 PROCEDURE — 85018 HEMOGLOBIN: CPT | Performed by: NURSE PRACTITIONER

## 2019-04-27 RX ORDER — POTASSIUM CHLORIDE 7.45 MG/ML
10 INJECTION INTRAVENOUS
Status: DISCONTINUED | OUTPATIENT
Start: 2019-04-27 | End: 2019-04-27 | Stop reason: HOSPADM

## 2019-04-27 RX ORDER — POTASSIUM CHLORIDE 1500 MG/1
20-40 TABLET, EXTENDED RELEASE ORAL
Status: DISCONTINUED | OUTPATIENT
Start: 2019-04-27 | End: 2019-04-27 | Stop reason: HOSPADM

## 2019-04-27 RX ORDER — POTASSIUM CHLORIDE 1.5 G/1.58G
20-40 POWDER, FOR SOLUTION ORAL
Status: DISCONTINUED | OUTPATIENT
Start: 2019-04-27 | End: 2019-04-27 | Stop reason: HOSPADM

## 2019-04-27 RX ORDER — POTASSIUM CL/LIDO/0.9 % NACL 10MEQ/0.1L
10 INTRAVENOUS SOLUTION, PIGGYBACK (ML) INTRAVENOUS
Status: DISCONTINUED | OUTPATIENT
Start: 2019-04-27 | End: 2019-04-27 | Stop reason: HOSPADM

## 2019-04-27 RX ORDER — POTASSIUM CHLORIDE 29.8 MG/ML
20 INJECTION INTRAVENOUS
Status: DISCONTINUED | OUTPATIENT
Start: 2019-04-27 | End: 2019-04-27 | Stop reason: HOSPADM

## 2019-04-27 RX ADMIN — AMLODIPINE BESYLATE 5 MG: 5 TABLET ORAL at 08:25

## 2019-04-27 RX ADMIN — LEVETIRACETAM 500 MG: 500 TABLET, FILM COATED ORAL at 08:27

## 2019-04-27 RX ADMIN — LOSARTAN POTASSIUM 12.5 MG: 25 TABLET, FILM COATED ORAL at 08:32

## 2019-04-27 RX ADMIN — METOPROLOL SUCCINATE 50 MG: 50 TABLET, EXTENDED RELEASE ORAL at 08:26

## 2019-04-27 RX ADMIN — POTASSIUM CHLORIDE 20 MEQ: 1500 TABLET, EXTENDED RELEASE ORAL at 10:32

## 2019-04-27 RX ADMIN — CLOPIDOGREL BISULFATE 75 MG: 75 TABLET ORAL at 08:26

## 2019-04-27 RX ADMIN — PREDNISONE 5 MG: 5 TABLET ORAL at 08:27

## 2019-04-27 RX ADMIN — GABAPENTIN 100 MG: 100 CAPSULE ORAL at 08:26

## 2019-04-27 RX ADMIN — LEVOTHYROXINE SODIUM 50 MCG: 50 TABLET ORAL at 06:46

## 2019-04-27 RX ADMIN — POTASSIUM CHLORIDE 40 MEQ: 1500 TABLET, EXTENDED RELEASE ORAL at 08:26

## 2019-04-27 RX ADMIN — ISOSORBIDE MONONITRATE 60 MG: 30 TABLET, EXTENDED RELEASE ORAL at 08:26

## 2019-04-27 NOTE — PROGRESS NOTES
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     Follow-Up Appt:   Future Appointments   Date Time Provider Department Center   5/6/2019  1:00 PM Augusto Meyer DO [facility] MEDIC       Other Providers (Care Coordinator, County Services, PCA services etc): Yes: cc    Discharge Disposition: [facility]      Tonya LEYVAN, RN, PHN  RN Care Coordinator  Care Transitions Department  [facility] [phone]  [facility] [phone]

## 2019-04-27 NOTE — PLAN OF CARE
VSS. Afebrile. Denies any pain/discomfort this shift. No c/o chest pain/SOB. LS clear throughout. Denies any nausea/vomiting.  No complaints.

## 2019-04-27 NOTE — DISCHARGE SUMMARY
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  The following labs/tests are recommended:   BMP, Tropinin.             Unresulted Labs Ordered in the Past 30 Days of this Admission     Date and Time Order Name Status Description    4/27/2019 0002 Keppra (Levetiracetam) Level In process     4/26/2019 1152 Urine Culture Aerobic Bacterial Preliminary       These results will be followed up by primary care     Discharge Disposition   Discharged to assisted living  Condition at discharge: Stable    Hospital Course       Urinary incontinence without sensory awareness   Gillian Burk is a 89 year old female admitted on 4/26/2019. She was sitting at the side of her bed getting herself dressed when she noted she was incontinent of urine. Patient had no other symptoms, no chest pain, no postictal phase, no loss of consciousness. Patient was completely asymptomatic after this episode and since then. Patient has a history of \" episodes\" and seizure activity after a stroke. She has been on keppra since with no dose changes. Patient did have a new " medication started, Gabapentin, in March and she was currently weaning down. She had gone from 200 TID to 100 TID.  Patient with and unclear etiology and unusual presentation.  She was monitored on telemetry with no changes and also monitored troponin trend drawn every 4 hours. Troponin peaked at 0.097 with the next level at 0.096. Patient did not have any other neurological symptoms or noted changes. Patient was stable overnight with no changes, hemodynamically stable, afebrile. No loss of bladder control.  Her urine culture pending and her keppra level is also pending. Discussed plan with patient and her daughter, will continue her gabapentin and Keppra dose. She can slowly wean the gabapentin down by 100 mg per week. Will need to have close monitoring of symptoms and evaluate/treat as needed. Patient was stable on discharge.       Elevated troponin  Noted in work up for her episode. Patient with a history of elevated troponin and cardiac disease. Patient was monitored overnight on telemetry with trending of troponin as above. Will follow up with cardiology.         Chronic medical conditions, all stable. Will continue home medications.   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       Khloe Serrano, [facility]  "[facility]  ______________________________________________________________________    Physical Exam   Vital Signs: Temp: (P) 96.5  F (35.8  C) Temp src: (P) Oral BP: (P) 153/73 Pulse: 71 Heart Rate: 71 Resp: (P) 18 SpO2: (P) 93 % O2 Device: (P) None (Room air)    Weight: 130 lbs 14.4 oz  Exam:  Constitutional: healthy, alert and no distress  Cardiovascular: PMI normal. No lifts, heaves, or thrills. RRR.   Respiratory:  Good diaphragmatic excursion. Lungs clear  Gastrointestinal: Abdomen soft, non-tender. BS normal.    Musculoskeletal: extremities normal- no gross deformities noted and normal muscle tone  Skin: no suspicious lesions or rashes  Neurologic: Gait normal. Reflexes normal and symmetric. Sensation grossly WNL.    Primary Care Physician   Augusto Meyer    Discharge Orders      CARDIOLOGY EVAL ADULT REFERRAL      Reason for your hospital stay    You were in the hospital after an \"episode\" and elevated troponin level.     Follow-up and recommended labs and tests     Follow up with primary care provider, Augusto Meyer, within 7 days for hospital follow- up.  The following labs/tests are recommended: BMP, Tropinin.     Activity    Your activity upon discharge: activity as tolerated     Diet    Follow this diet upon discharge: Orders Placed This Encounter      Combination Diet Low Saturated Fat Na <2400mg Diet, No Caffeine Diet       Significant Results and Procedures   Most Recent 3 CBC's:  Recent Labs   Lab Test 04/27/19  0530 04/26/19  1124 02/26/19  1331 10/12/18  1710   WBC  --  9.3 7.3 6.9   HGB 11.1* 11.5* 11.0* 11.2*   MCV  --  93 93 94   PLT  --  306 273 260     Most Recent 3 BMP's:  Recent Labs   Lab Test 04/27/19  0530 04/26/19  1124 02/26/19  1331 10/13/18 10/12/18  1710    139  --   --  136   POTASSIUM 3.3* 3.5  --  3.5 3.5   CHLORIDE 106 105  --   --  104   CO2 25 26  --   --  26   BUN 23 18  --   --  15   CR 0.94 0.77 0.98 0.97 0.67   ANIONGAP 9 8  --   " --  6   CHEYANNE 7.9* 7.9*  --   --  8.1*   GLC 80 105*  --  89 94     Most Recent 3 Troponin's:  Recent Labs   Lab Test 04/27/19  1009 04/27/19  0531 04/27/19  0132   TROPI 0.096* 0.097* 0.085*     Most Recent 6 Bacteria Isolates From Any Culture (See EPIC Reports for Culture Details):  Recent Labs   Lab Test 04/26/19  1152 04/28/18  1152 02/15/18  1307 11/30/17  1445 10/12/17  0300 09/20/17  1220   CULT PENDING >100,000 colonies/mL  Klebsiella pneumoniae  * >100,000 colonies/mL  Coagulase negative Staphylococcus  * >100,000 colonies/mL  Coagulase negative Staphylococcus  * >100,000 colonies/mL  Citrobacter freundii complex  * 50,000 to 100,000 colonies/mL  Enterococcus faecalis  *     Most Recent Urinalysis:  Recent Labs   Lab Test 04/26/19  1152 04/28/18  1151   COLOR Yellow Yellow   APPEARANCE Slightly Cloudy Cloudy   URINEGLC Negative Neg   URINEBILI Negative Neg   URINEKETONE Negative Neg   SG 1.011 1.010   UBLD Negative Small   URINEPH 7.0 7.0   PROTEIN Negative Neg   UROBILINOGEN  --  0.2   NITRITE Negative Pos   LEUKEST Large* Large   RBCU 2  --    WBCU 220*  --    ,   Results for orders placed or performed during the hospital encounter of 04/26/19   CT Head w/o Contrast    Narrative    CT SCAN OF THE HEAD WITHOUT CONTRAST   4/26/2019 12:13 PM     HISTORY: Altered mental status, unexplained; hx strokes; blacked out  while sitting    TECHNIQUE:  Axial images of the head and coronal reformations without  IV contrast material. Radiation dose for this scan was reduced using  automated exposure control, adjustment of the mA and/or kV according  to patient size, or iterative reconstruction technique.    COMPARISON: MR scan dated 12/4/2016    FINDINGS:     Intracranial contents: There is diffuse parenchymal volume loss.   White matter changes are present in the cerebral hemispheres that are  consistent with small vessel ischemic disease in this age patient.  There is a chronic area of encephalomalacia in the left  occipital  lobe. There is no evidence of intracranial hemorrhage, mass, acute  infarct or anomaly.    Visualized orbits/sinuses/mastoids:  The visualized portions of the  sinuses and mastoids appear normal.    Osseous structures/soft tissues:  There is no evidence of trauma.      Impression    IMPRESSION:   1. No acute pathology. No bleed, mass, or acute infarcts are seen.  2. There is diffuse parenchymal volume loss.  White matter changes are  present in the cerebral hemispheres that are consistent with small  vessel ischemic disease in this age patient..  3. Chronic left occipital infarct.      JACK BLAIR MD       Discharge Medications   Current Discharge Medication List      CONTINUE these medications which have NOT CHANGED    Details   amLODIPine (NORVASC) 5 MG tablet Take 1 tablet (5 mg) by mouth daily  Qty: 90 tablet, Refills: 3    Associated Diagnoses: Chest pain, unspecified type      aspirin EC 81 MG EC tablet Take 1 tablet (81 mg) by mouth daily    Associated Diagnoses: S/P TAVR (transcatheter aortic valve replacement)      atorvastatin (LIPITOR) 40 MG tablet Take 1 tablet (40 mg) by mouth daily Follow up visit due per provider.  Qty: 90 tablet, Refills: 0    Associated Diagnoses: Hyperlipidemia with target LDL less than 130      clopidogrel (PLAVIX) 75 MG tablet TAKE ONE TABLET BY MOUTH ONCE DAILY  Qty: 30 tablet, Refills: 11    Comments: Please consider 90 day supplies to promote better adherence  Associated Diagnoses: History of CVA (cerebrovascular accident)      Cranberry 1000 MG CAPS       docusate sodium (COLACE) 100 MG capsule Take 200 mg by mouth daily 2 capsules      folic acid (FOLVITE) 1 MG tablet TAKE 1 TABLET BY MOUTH ONCE DAILY  Qty: 90 tablet, Refills: 1    Associated Diagnoses: Seropositive rheumatoid arthritis (H)      gabapentin (NEURONTIN) 100 MG capsule TAKE 2 CAPSULES BY MOUTH THREE TIMES DAILY  Qty: 180 capsule, Refills: 0    Comments: Please consider 90 day supplies to promote  better adherence  Associated Diagnoses: Lumbar pain      isosorbide mononitrate (IMDUR) 30 MG 24 hr tablet Take 2 tablets (60 mg) by mouth daily  Qty: 360 tablet, Refills: 3    Comments: May take additional 60mg in the pm if needed for angina.      Lactobacillus (FLORAJEN ACIDOPHILUS) CAPS Take 1 capsule by mouth daily      levETIRAcetam (KEPPRA) 500 MG tablet Take 1 tablet (500 mg) by mouth 2 times daily  Qty: 90 tablet, Refills: 6    Associated Diagnoses: Seizure disorder (H)      levothyroxine (SYNTHROID/LEVOTHROID) 50 MCG tablet TAKE 1 TABLET BY MOUTH ONCE DAILY  Qty: 90 tablet, Refills: 1    Associated Diagnoses: Hypothyroidism due to acquired atrophy of thyroid      losartan (COZAAR) 25 MG tablet TAKE 1/2 (ONE-HALF) TABLET BY MOUTH ONCE DAILY  Qty: 45 tablet, Refills: 1    Associated Diagnoses: Hypertension goal BP (blood pressure) < 140/90      methotrexate 2.5 MG tablet Take 4 tablets (10 mg) by mouth once a week Wed  Qty: 52 tablet, Refills: 3    Associated Diagnoses: Seropositive rheumatoid arthritis (H)      metoprolol succinate (TOPROL-XL) 50 MG 24 hr tablet Take 1 tablet (50 mg) by mouth 2 times daily  Qty: 180 tablet, Refills: 3    Associated Diagnoses: Chest pain, unspecified type      mirtazapine (REMERON) 30 MG tablet TAKE 1 TABLET BY MOUTH ONCE DAILY AT BEDTIME  Qty: 90 tablet, Refills: 1    Associated Diagnoses: Sleep initiation disorder      !! predniSONE (DELTASONE) 5 MG tablet TAKE ONE TABLET BY MOUTH EVERY OTHER DAY ALTERNATING  WITH  1/2  TABLET  EVERY  OTHER  DAY  Qty: 23 tablet, Refills: 13    Associated Diagnoses: Seropositive rheumatoid arthritis (H)      acetaminophen (TYLENOL ARTHRITIS PAIN) 650 MG CR tablet Take 2 tablets (1,300 mg) by mouth every 8 hours as needed for mild pain      ALPRAZolam (XANAX) 0.25 MG tablet TAKE 1 TABLET BY MOUTH TWICE DAILY AS NEEDED FOR ANXIETY  Qty: 28 tablet, Refills: 0    Associated Diagnoses: Anxiety      calcium carbonate (TUMS) 500 MG chewable tablet  Take 2-4 tablets (1,000-2,000 mg) by mouth as needed for heartburn      famotidine (PEPCID) 40 MG tablet TAKE ONE TABLET BY MOUTH AT BEDTIME  Qty: 30 tablet, Refills: 9    Comments: Please consider 90 day supplies to promote better adherence  Associated Diagnoses: Gastroesophageal reflux disease without esophagitis      Hylan (SYNVISC) 16 MG/2ML SOSY 16 mg by INTRA-ARTICULAR route once  Qty: 2 mL, Refills: 0    Comments: The following medication was given by Veronica Sorto PA-C:     MEDICATION: Synvisc # 3 of 3  ROUTE: IA  SITE: Right knee  LOT #: [id number]  :  [employer]  EXPIRATION DATE:  5/19  NDC#: [address]-9  Associated Diagnoses: Primary osteoarthritis of right knee      multivitamin, therapeutic with minerals (MULTI-VITAMIN) TABS Take 1 tablet by mouth daily      nitroFURantoin macrocrystal-monohydrate (MACROBID) 100 MG capsule Take 100 mg by mouth once      nitroGLYcerin (NITROSTAT) 0.4 MG sublingual tablet DISSOLVE ONE TABLET UNDER THE TONGUE EVERY 5 MINUTES AS NEEDED FOR CHEST PAIN.  DO NOT EXCEED A TOTAL OF 3 DOSES IN 15 MINUTES  Qty: 25 tablet, Refills: 0    Associated Diagnoses: Coronary artery disease involving native coronary artery of native heart without angina pectoris      !! predniSONE (DELTASONE) 50 MG tablet Take 50 mg by mouth daily.  Qty: 5 tablet, Refills: 0    Comments: Please include the quantity of tablets and (strength) per dose in sig.  Associated Diagnoses: Seronegative arthritis       !! - Potential duplicate medications found. Please discuss with provider.        Allergies   Allergies   Allergen Reactions     Penicillins Hives     Caffeine Other (See Comments) and Unknown     Triggers your Meniere's disease     Hydrocodone Other (See Comments) and Unknown     hallucinations     Ibuprofen GI Disturbance and Unknown     Tramadol Other (See Comments)     Seizure, was taking remeron along with tramadol     Metal [Staples] Rash

## 2019-04-27 NOTE — DISCHARGE INSTRUCTIONS
Referral made to [facility] Follow Up.They should call you in the next couple of day with an appointment time.

## 2019-04-27 NOTE — CONSULTS
Care Transition Initial Assessment - RN    Met with: Patient and Family.  DATA   Active Problems:    Seropositive rheumatoid arthritis (H)    Hypertension goal BP (blood pressure) < 140/90    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    History of CVA (cerebrovascular accident) - 2013    Esophageal reflux    Hyperlipidemia with target LDL less than 130    Hypothyroidism due to acquired atrophy of thyroid    Elevated troponin    Chronic systolic congestive heart failure (H)    Chronic stable angina (H)    Urinary incontinence without sensory awareness       Cognitive Status: awake, alert and oriented.  Primary Care Clinic Name: [facility]  Primary Care MD Name: Dr. Rossi Meyer  Contact information and PCP information verified: Yes  Lives With: alone [facility]    Description of Support System: Supportive, Involved   Who is your support system?: Children   Support Assessment: Adequate family and caregiver support, Adequate social supports   Insurance concerns: No Insurance issues identified  ASSESSMENT  Patient currently receives the following services:  Housekeeping, meals,         Identified issues/concerns regarding health management:  This writer met with pt and daughter in room, introduced self and role.  Discussed discharge planning and Medicare guidelines in regards to home care, TCU and LTC.  Patient states she lives alone at [facility] and is independent. She does have the standard housekeeping and meals provided, however, her daughter sets up all her medications in a pill box and she takes them accordingly. Pt does use a cane to get around. She does also have a life line which she wears around her neck. Daughter states she will be bringing pt back home and does provide transportation to appts.  No current needs at this time per pt.     PLAN  Financial costs for the patient include: unknown  Patient given options and choices for discharge yes .  Patient/family  is agreeable to the plan?  Yes: pt and daughter agreeable to discharge plan  Patient anticipates discharging to [facility] .     Patient anticipates needs for home equipment: No  Plan/Disposition: Home   Appointments: Dr. Meyer 5/6/19 1:00 PM will also need to follow up with Cardiology      Care  (CTS) will continue to follow as needed.    Tonya Celestin BSN, RN, PHN  RN Care Coordinator  Care Transitions Department  [facility] [phone]  [facility] [phone]

## 2019-04-27 NOTE — PROGRESS NOTES
Discharge Planner   Discharge Plans in progress: Pt plans to return home to [facility]. Daughter will transport pt. Via care  Barriers to discharge plan: medically stable  Follow up plan: Dr. Meyer 5/6/ 1:pm, also needs cardiology appt scheduled.        Entered by: Sierra Celestin 04/27/2019 9:54 AM

## 2019-04-27 NOTE — PLAN OF CARE
S-(situation): Patient discharged to home via wheelchair with daughter    B-(background): syncope    A-(assessment): Pt.discharged on same meds.Pt.will have a cardiology appt.as ordered.We were unable to make an appointment due to it being a weekend but message left with specialty clinic per charge nurse.    R-(recommendations): Discharge instructions reviewed with pt.and daughter. Listed belongings gathered and returned to patient.          Discharge Nursing Criteria:     Care Plan and Patient education resolved: Yes    New Medications- pt has been educated about purpose and side effects: No    Vaccines  Influenza status verified at discharge:  Yes          MISC  Prescriptions if needed, hard copies sent with patient none  Home and hospital aquired medications returned to patient: NA  Medication Bin checked and emptied on discharge Yes  Patient reports post-discharge pain management plan is effective: Yes

## 2019-04-28 ENCOUNTER — MYC MEDICAL ADVICE (OUTPATIENT)
Dept: FAMILY MEDICINE | Facility: OTHER | Age: 84
End: 2019-04-28

## 2019-04-28 LAB
BACTERIA SPEC CULT: NO GROWTH
LEVETIRACETAM SERPL-MCNC: 23 UG/ML (ref 12–46)
Lab: NORMAL
SPECIMEN SOURCE: NORMAL

## 2019-04-29 ENCOUNTER — PATIENT OUTREACH (OUTPATIENT)
Dept: CARE COORDINATION | Facility: CLINIC | Age: 84
End: 2019-04-29

## 2019-04-29 NOTE — LETTER
[facility]  Complex Care Plan  About Me:    Patient Name:  Gillian Burk    YOB: 1927  Age:         89 year old   [place] MRN:    [id number] Telephone Information:  Home Phone [phone]   Mobile [phone]       Address:  [address] [place]  [place] [address] Email address:  [email or web address]      Emergency Contact(s)    Name Relationship Lgl Grd Work Phone Home Phone Mobile Phone   1. BRAEDEN BARBOSA Daughter   [phone]   2. BILL BARBOSA Relative No  [phone]   3. CARLOS BARBOSA Grandchild No  [phone]   4. ANUP ABRBOSA Grandchild No  [phone]           Primary language:  English     needed? No   [place] [employer] [facility]:  [phone] op. 1  Other communication barriers: None, Glasses  Preferred Method of Communication:  Mail  Current living arrangement: I live in assisted living  Mobility Status/ Medical Equipment: Independent w/Device    Health Maintenance  Health Maintenance Reviewed: Due/Overdue     My Access Plan  Medical Emergency 911   Primary Clinic Line [facility] - [phone]   24 Hour Appointment Line [phone] or  [phone]) (toll-free)   24 Hour Nurse Line [phone] (toll-free)   Preferred Urgent Care     Preferred Hospital [facility]  [phone]   Preferred Pharmacy [facility] [address] [place] [address] [address]     Behavioral Health Crisis Line The National Suicide Prevention Lifeline at 1-584.336.4428 or 911       My Care Team Members  Patient Care Team       Relationship Specialty Notifications Start End    Augusto Meyer DO PCP - General Internal Medicine  8/15/16     Phone: [phone] Fax: [phone]         [address] [place] [address]    Augusto Meyer DO Assigned PCP   1/14/18     Phone: [phone] Fax: [phone]          [address] [facility] [address]    Sierra Celestin, RN Clinic Care Coordinator Primary Care - [facility] Admissions 4/29/19     Phone: [phone] Fax: [phone]                My Care Plans  Self Management and Treatment Plan  Goals and (Comments)  Goals        Lifestyle    I will attend excercise class three days a week.          Goal Statement: I will attend exercise class three days a week  Measure of Success: increased activity, lessen fatigue  Supportive Steps to Achieve: review schedule and establish a routine  Barriers: fatigue  Strengths: motivated, engaged  Date to Achieve By: May 13th  Patient expressed understanding of goal: yes           Action Plans on File:   Heart Failure    Advance Care Plans/Directives Type:   Type Advanced Care Plans/Directives: Advanced Directive - On File, POLST    My Medical and Care Information  Problem List   Patient Active Problem List   Diagnosis     Seropositive rheumatoid arthritis (H)     Hypertension goal BP (blood pressure) < 140/90     Anxiety     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     History of CVA (cerebrovascular accident) - 2013     Esophageal reflux     PAC (premature atrial contraction)     PVC's (premature ventricular contractions)     Hyperlipidemia with target LDL less than 130     Hyponatremia     Orthostatic hypotension     Aortic valve disorder     Intermediate coronary syndrome     Transient ischemic attack (TIA), and cerebral infarction without residual deficits     Hypothyroidism due to acquired atrophy of thyroid     Aortic stenosis, severe - s/p TAVR     S/P TAVR (transcatheter aortic valve replacement)     Advanced directives, counseling/discussion     Aortic stenosis     Need for SBE (subacute bacterial endocarditis) prophylaxis     Acquired hypothyroidism     TIA (transient ischemic attack)     Non-rheumatic mitral valve stenosis - mild to moderate 11/16 echo     Coronary artery disease involving native coronary artery - NSTEMI  11/16 with moderate RCA disease treated medically     CKD (chronic kidney disease) stage 3, GFR 30-59 ml/min (H)     Hypokalemia     Elevated troponin     Iron deficiency anemia     Acute cystitis without hematuria     Hypotension     GI bleed     Primary osteoarthritis of right knee     Chronic systolic congestive heart failure (H)     Chronic stable angina (H)     Urinary incontinence without sensory awareness      Current Medications and Allergies:  See printed Medication Report.    Care Coordination Start Date: 4/29/2019   Frequency of Care Coordination: 2 weeks   Form Last Updated: 05/01/2019

## 2019-04-29 NOTE — TELEPHONE ENCOUNTER
Please call daughter Hanna for more clear directions and dosing for her mother to decrease her Gabapentin. Hanna is not sure if Dr Meyer knows that they have already cut her dose in half last Tuesday, so he has already been taking 300mg for a week now.  Please call and advise.

## 2019-04-29 NOTE — PROGRESS NOTES
Clinic Care Coordination Contact    Clinic Care Coordination Contact  OUTREACH    Referral Information:  Referral Source: IP Handoff    Primary Diagnosis: Neurological Disorders    Chief Complaint   Patient presents with     Clinic Care Coordination - Post Hospital     RN assessment     Universal Utilization:   Clinic Utilization  Difficulty keeping appointments:: No  Compliance Concerns: No  No-Show Concerns: No  No PCP office visit in Past Year: No  Utilization    Last refreshed: 4/30/2019  3:11 PM:  Hospital Admissions 1           Last refreshed: 4/30/2019  3:11 PM:  ED Visits 1           Last refreshed: 4/30/2019  3:11 PM:  No Show Count (past year) 0              Current as of: 4/30/2019  3:11 PM            Clinical Concerns:  Current Medical Concerns:  Called and spoke with pt, introduced self and role. Pt states she is feeling ok but still fatigued and hoping to she will continue to feel better. States he daughter did come and set up her medications again for her and will continue to do so. Pt is aware of her follow up appt and her daughter will bring her.  She is asking if she can go to the [facility] exercise class at the [facility]. There are no restrictions noted, so would be ok, if she developed any symptoms she should stop.   Current Behavioral Concerns: none    Education Provided to patient: drink plenty of fluids and keep active as tolerated   Pain  Pain (GOAL):: No  Health Maintenance Reviewed: Due/Overdue  Not addressed  Clinical Pathway: None    Medication Management:  Daughter sets up medications     Functional Status:  Dependent ADLs:: Ambulation-cane  Dependent IADLs:: Cleaning, Medication Management, Money Management, Transportation  Bed or wheelchair confined:: No  Mobility Status: Independent w/Device  Fallen 2 or more times in the past year?: No  Any fall with injury in the past year?: No    Living Situation:  Current living arrangement:: I live in assisted living  Type of residence:: Apartment -  handicap accessible    Diet/Exercise/Sleep:  Diet:: Regular, No added salt  Inadequate nutrition (GOAL):: No  Food Insecurity: No  Tube Feeding: No  Exercise:: Currently not exercising  Inadequate activity/exercise (GOAL):: Yes  Significant changes in sleep pattern (GOAL): No    Transportation:  Transportation concerns (GOAL):: No  Transportation means:: Family, Regular car     Psychosocial:  Mental health DX:: No  Mental health management concern (GOAL):: No  Informal Support system:: Children, Family, Friends     Financial/Insurance:   Financial/Insurance concerns (GOAL):: No     Resources and Interventions:  Current Resources:    Community Resources: None  Supplies used at home:: None  Equipment Currently Used at Home: cane, straight    Advance Care Plan/Directive  Advanced Care Plans/Directives on file:: Yes  Type Advanced Care Plans/Directives: Advanced Directive - On File, POLST  Advanced Care Plan/Directive Status: Not Applicable    Referrals Placed: None     Goals: Go to exercise class three days a week.       Patient/Caregiver understanding: Pt verbalizes understanding of follow up instructions and when scheduled appt date and times.       Outreach Frequency: 2 weeks  Future Appointments              In 5 days Augusto Meyer DO [facility]    In 3 weeks Matt Mccracken PA-C [facility]          Plan:   Pt will follow up at scheduled appt  Pt will go to exercise class three times a week.   RN will outreach in 1-2 weeks      Tonya SINHA, RN, PHN  Care Coordination    [facility]  [address] [place] [address]  Office: [phone]  Fax [phone]   [facility]  [address] [place] [address]  Office: [phone] Fax [phone]  [email or web address]   [email or web address]   Connect with [facility] on social media.

## 2019-05-01 ENCOUNTER — MYC MEDICAL ADVICE (OUTPATIENT)
Dept: FAMILY MEDICINE | Facility: OTHER | Age: 84
End: 2019-05-01

## 2019-05-01 ASSESSMENT — ACTIVITIES OF DAILY LIVING (ADL): DEPENDENT_IADLS:: CLEANING;MEDICATION MANAGEMENT;MONEY MANAGEMENT;TRANSPORTATION

## 2019-05-06 ENCOUNTER — OFFICE VISIT (OUTPATIENT)
Dept: FAMILY MEDICINE | Facility: OTHER | Age: 84
End: 2019-05-06
Payer: COMMERCIAL

## 2019-05-06 VITALS
BODY MASS INDEX: 21.63 KG/M2 | DIASTOLIC BLOOD PRESSURE: 68 MMHG | TEMPERATURE: 98 F | OXYGEN SATURATION: 96 % | WEIGHT: 134 LBS | RESPIRATION RATE: 14 BRPM | SYSTOLIC BLOOD PRESSURE: 158 MMHG | HEART RATE: 84 BPM

## 2019-05-06 DIAGNOSIS — I95.1 ORTHOSTATIC HYPOTENSION: ICD-10-CM

## 2019-05-06 DIAGNOSIS — M05.9 SEROPOSITIVE RHEUMATOID ARTHRITIS (H): ICD-10-CM

## 2019-05-06 DIAGNOSIS — J30.2 SEASONAL ALLERGIC RHINITIS, UNSPECIFIED TRIGGER: Primary | ICD-10-CM

## 2019-05-06 DIAGNOSIS — Z95.2 S/P TAVR (TRANSCATHETER AORTIC VALVE REPLACEMENT): ICD-10-CM

## 2019-05-06 PROCEDURE — 99495 TRANSJ CARE MGMT MOD F2F 14D: CPT | Performed by: INTERNAL MEDICINE

## 2019-05-06 RX ORDER — LORATADINE 10 MG/1
10 TABLET ORAL DAILY
Qty: 30 TABLET | Refills: 0 | Status: SHIPPED | OUTPATIENT
Start: 2019-05-06 | End: 2019-05-23

## 2019-05-06 ASSESSMENT — PAIN SCALES - GENERAL: PAINLEVEL: NO PAIN (0)

## 2019-05-14 DIAGNOSIS — E78.5 HYPERLIPIDEMIA WITH TARGET LDL LESS THAN 130: ICD-10-CM

## 2019-05-15 RX ORDER — ATORVASTATIN CALCIUM 40 MG/1
40 TABLET, FILM COATED ORAL DAILY
Qty: 90 TABLET | Refills: 1 | Status: SHIPPED | OUTPATIENT
Start: 2019-05-15 | End: 2019-11-12

## 2019-05-15 NOTE — TELEPHONE ENCOUNTER
"Requested Prescriptions   Pending Prescriptions Disp Refills     atorvastatin (LIPITOR) 40 MG tablet [Pharmacy Med Name: ATORVASTATIN 40MG   TAB] 90 tablet 0     Sig: TAKE 1 TABLET BY MOUTH ONCE DAILY . APPOINTMENT REQUIRED FOR FUTURE REFILLS   Last Written Prescription Date:  2/13/19  Last Fill Quantity: 90,  # refills: 0   Last office visit: 5/6/2019 with prescribing provider:  5/6/19   Future Office Visit:   Next 5 appointments (look out 90 days)    May 28, 2019  9:45 AM CDT  Return Visit with Matt Mccracken PA-C  [facility] ([address] [address] [phone]           Statins Protocol Passed - 5/14/2019  9:40 AM        Passed - LDL on file in past 12 months     Recent Labs   Lab Test 10/13/18   LDL 59             Passed - No abnormal creatine kinase in past 12 months     Recent Labs   Lab Test 08/04/16  0012                   Passed - Recent (12 mo) or future (30 days) visit within the authorizing provider's specialty     Patient had office visit in the last 12 months or has a visit in the next 30 days with authorizing provider or within the authorizing provider's specialty.  See \"Patient Info\" tab in inbasket, or \"Choose Columns\" in Meds & Orders section of the refill encounter.              Passed - Medication is active on med list        Passed - Patient is age 18 or older        Passed - No active pregnancy on record        Passed - No positive pregnancy test in past 12 months        "

## 2019-05-15 NOTE — TELEPHONE ENCOUNTER
Prescription approved per [facility] Refill Protocol.    AUTUMN BustilloN, RN  [facility]

## 2019-05-23 DIAGNOSIS — J30.2 SEASONAL ALLERGIC RHINITIS, UNSPECIFIED TRIGGER: ICD-10-CM

## 2019-05-24 RX ORDER — LORATADINE 10 MG/1
TABLET ORAL
Qty: 90 TABLET | Refills: 1 | Status: SHIPPED | OUTPATIENT
Start: 2019-05-24 | End: 2019-08-22

## 2019-06-04 DIAGNOSIS — I10 HYPERTENSION GOAL BP (BLOOD PRESSURE) < 140/90: ICD-10-CM

## 2019-06-04 RX ORDER — LOSARTAN POTASSIUM 25 MG/1
TABLET ORAL
Qty: 45 TABLET | Refills: 1 | Status: SHIPPED | OUTPATIENT
Start: 2019-06-04 | End: 2019-11-26

## 2019-06-04 NOTE — TELEPHONE ENCOUNTER
Routing refill request to provider for review/approval because:  Labs out of range:  BP and Potassium    ERNSETINE Bustillo, RN  [facility]

## 2019-06-04 NOTE — TELEPHONE ENCOUNTER
"Requested Prescriptions   Pending Prescriptions Disp Refills     losartan (COZAAR) 25 MG tablet [Pharmacy Med Name: LOSARTAN 25MG   TAB] 45 tablet 1     Sig: TAKE 1/2 (ONE-HALF) TABLET BY MOUTH ONCE DAILY   Last Written Prescription Date:  1/16/19  Last Fill Quantity: 45,  # refills: 1   Last office visit: 5/6/2019 with prescribing provider:  5/6/19   Future Office Visit:   Next 5 appointments (look out 90 days)    Aug 22, 2019  9:30 AM CDT  Return Visit with Jones Lee MD  [facility] ([facility]) [address] [place] [id number] [phone]           Angiotensin-II Receptors Failed - 6/4/2019  9:33 AM        Failed - Blood pressure under 140/90 in past 12 months     BP Readings from Last 3 Encounters:   05/06/19 158/68   04/27/19 105/53   03/20/19 126/62                 Failed - Normal serum potassium on file in past 12 months     Recent Labs   Lab Test 04/27/19  0530   POTASSIUM 3.3*                    Passed - Recent (12 mo) or future (30 days) visit within the authorizing provider's specialty     Patient had office visit in the last 12 months or has a visit in the next 30 days with authorizing provider or within the authorizing provider's specialty.  See \"Patient Info\" tab in inbasket, or \"Choose Columns\" in Meds & Orders section of the refill encounter.              Passed - Medication is active on med list        Passed - Patient is age 18 or older        Passed - No active pregnancy on record        Passed - Normal serum creatinine on file in past 12 months     Recent Labs   Lab Test 04/27/19  0530  06/09/16  1455   CR 0.94   < >  --    CREAT  --   --  1.0    < > = values in this interval not displayed.             Passed - No positive pregnancy test in past 12 months        "

## 2019-06-06 ENCOUNTER — PATIENT OUTREACH (OUTPATIENT)
Dept: CARE COORDINATION | Facility: CLINIC | Age: 84
End: 2019-06-06

## 2019-06-06 ASSESSMENT — ACTIVITIES OF DAILY LIVING (ADL): DEPENDENT_IADLS:: CLEANING;MEDICATION MANAGEMENT;MONEY MANAGEMENT;TRANSPORTATION

## 2019-06-06 NOTE — PROGRESS NOTES
Clinic Care Coordination Contact    Clinic Care Coordination Contact  OUTREACH    Referral Information:  Referral Source: IP Handoff    Primary Diagnosis: Neurological Disorders    Chief Complaint   Patient presents with     Clinic Care Coordination - Follow-up     RN assessment     Universal Utilization:   Clinic Utilization  Difficulty keeping appointments:: No  Compliance Concerns: No  No-Show Concerns: No  No PCP office visit in Past Year: No  Utilization    Last refreshed: 6/5/2019 12:12 AM:  Hospital Admissions 1           Last refreshed: 6/5/2019 12:12 AM:  ED Visits 1           Last refreshed: 6/5/2019 12:12 AM:  No Show Count (past year) 1              Current as of: 6/5/2019 12:12 AM            Clinical Concerns:  Current Medical Concerns: Called and spoke with patient states she has been doing pretty well she is getting to exercise classes she has hoped.  She continues to have some pain in her back but manages it with Tylenol.  States her daughter continues to come to set up her medications.  There are no other goals she is working towards does not feel she needs any further follow-up from care coordination.  Current Behavioral Concerns: No current concerns  Education Provided to patient: Continue to monitor weight call with any increase or swelling  Pain  Pain (GOAL):: No  Health Maintenance Reviewed:    Clinical Pathway: HF    Medication Management:  Daughter sets up medications    Functional Status:  Dependent ADLs:: Ambulation-cane  Dependent IADLs:: Cleaning, Medication Management, Money Management, Transportation  Bed or wheelchair confined:: No  Mobility Status: Independent w/Device    Living Situation:  Current living arrangement:: I live in assisted living  Type of residence:: Apartment - handicap accessible    Diet/Exercise/Sleep:  Diet:: Regular, No added salt  Inadequate nutrition (GOAL):: No  Food Insecurity: No  Tube Feeding: No  Exercise:: Currently not exercising  Inadequate  activity/exercise (GOAL):: Yes  Significant changes in sleep pattern (GOAL): No    Transportation:  Transportation concerns (GOAL):: No  Transportation means:: Family, Regular car     Psychosocial:  Mental health DX:: No  Mental health management concern (GOAL):: No  Informal Support system:: Children, Family, Friends     Financial/Insurance concerns (GOAL):: No     Resources and Interventions:  Current Resources:   Community Resources: None  Supplies used at home:: None  Equipment Currently Used at Home: cane, straight    Advance Care Plan/Directive  Advanced Care Plans/Directives on file:: Yes  Type Advanced Care Plans/Directives: Advanced Directive - On File, POLST  Advanced Care Plan/Directive Status: Not Applicable    Referrals Placed: None     Goals: Goals of exercising completed    Patient/Caregiver understanding: Pt verbalizes understanding of follow up instructions and when scheduled appt date and times.       Future Appointments              In 2 months Jones Lee MD [facility]          Plan:   Patient will follow-up in clinic as recommended  Daughter will continue to set up patient's meds  Will close patient to care coordination declines any further needs        Tonya SINHA, RN, PHN  Care Coordination    [facility]  [address] [place] [address]  Office: [phone]  Fax [phone]   [facility]  [address] [place] [address]  Office: [phone] Fax [phone]  [email or web address]   [email or web address]   Connect with [facility] on social media.

## 2019-06-12 DIAGNOSIS — R07.9 CHEST PAIN, UNSPECIFIED TYPE: ICD-10-CM

## 2019-06-12 RX ORDER — AMLODIPINE BESYLATE 5 MG/1
5 TABLET ORAL DAILY
Qty: 90 TABLET | Refills: 3 | Status: SHIPPED | OUTPATIENT
Start: 2019-06-12 | End: 2020-05-19

## 2019-06-19 DIAGNOSIS — R07.9 CHEST PAIN, UNSPECIFIED TYPE: ICD-10-CM

## 2019-06-19 RX ORDER — METOPROLOL SUCCINATE 50 MG/1
50 TABLET, EXTENDED RELEASE ORAL 2 TIMES DAILY
Qty: 180 TABLET | Refills: 3 | Status: SHIPPED | OUTPATIENT
Start: 2019-06-19 | End: 2020-06-24

## 2019-06-25 ENCOUNTER — CARE COORDINATION (OUTPATIENT)
Dept: CARDIOLOGY | Facility: CLINIC | Age: 84
End: 2019-06-25

## 2019-06-25 DIAGNOSIS — K21.9 GASTROESOPHAGEAL REFLUX DISEASE WITHOUT ESOPHAGITIS: ICD-10-CM

## 2019-06-25 DIAGNOSIS — Z95.2 S/P TAVR (TRANSCATHETER AORTIC VALVE REPLACEMENT): Primary | ICD-10-CM

## 2019-06-25 DIAGNOSIS — Z00.6 EXAMINATION OF PARTICIPANT OR CONTROL IN CLINICAL RESEARCH: ICD-10-CM

## 2019-06-25 DIAGNOSIS — Z95.2 HISTORY OF AORTIC VALVE REPLACEMENT: Primary | ICD-10-CM

## 2019-06-25 DIAGNOSIS — G47.09 SLEEP INITIATION DISORDER: ICD-10-CM

## 2019-06-27 ENCOUNTER — HOSPITAL ENCOUNTER (OUTPATIENT)
Dept: CARDIOLOGY | Facility: CLINIC | Age: 84
Discharge: HOME | End: 2019-06-27
Admitting: INTERNAL MEDICINE
Payer: COMMERCIAL

## 2019-06-27 DIAGNOSIS — Z00.6 EXAMINATION OF PARTICIPANT OR CONTROL IN CLINICAL RESEARCH: ICD-10-CM

## 2019-06-27 DIAGNOSIS — Z95.2 HISTORY OF AORTIC VALVE REPLACEMENT: ICD-10-CM

## 2019-06-27 PROCEDURE — 93306 TTE W/DOPPLER COMPLETE: CPT

## 2019-06-27 PROCEDURE — 93306 TTE W/DOPPLER COMPLETE: CPT | Mod: 26 | Performed by: INTERNAL MEDICINE

## 2019-06-27 RX ORDER — FAMOTIDINE 40 MG/1
TABLET, FILM COATED ORAL
Qty: 30 TABLET | Refills: 9 | Status: SHIPPED | OUTPATIENT
Start: 2019-06-27 | End: 2023-01-01

## 2019-06-27 RX ORDER — MIRTAZAPINE 30 MG/1
TABLET, FILM COATED ORAL
Qty: 90 TABLET | Refills: 1 | Status: SHIPPED | OUTPATIENT
Start: 2019-06-27 | End: 2020-01-03

## 2019-06-27 NOTE — TELEPHONE ENCOUNTER
"Requested Prescriptions   Pending Prescriptions Disp Refills     mirtazapine (REMERON) 30 MG tablet [Pharmacy Med Name: MIRTAZAPINE 30MG    TAB] 90 tablet 1     Sig: TAKE 1 TABLET BY MOUTH ONCE DAILY AT BEDTIME   Last Written Prescription Date:  12/13/2018  Last Fill Quantity: 90,  # refills: 1   Last office visit: 5/6/2019 with prescribing provider:     Future Office Visit:   Next 5 appointments (look out 90 days)    Aug 22, 2019  9:30 AM CDT  Return Visit with Jones Lee MD  [address] [address]  [phone]             Atypical Antidepressants Protocol Passed - 6/25/2019  3:17 PM        Passed - Recent (12 mo) or future (30 days) visit within the authorizing provider's specialty     Patient had office visit in the last 12 months or has a visit in the next 30 days with authorizing provider or within the authorizing provider's specialty.  See \"Patient Info\" tab in inbasket, or \"Choose Columns\" in Meds & Orders section of the refill encounter.              Passed - Medication active on med list        Passed - Patient is age 18 or older        Passed - No active pregnancy on record        Passed - No positive pregnancy test in past 12 mos   Prescription approved per [facility] Refill Protocol.         famotidine (PEPCID) 40 MG tablet [Pharmacy Med Name: FAMOTIDINE 40MG TAB] 30 tablet 9     Sig: TAKE 1 TABLET BY MOUTH AT BEDTIME   Last Written Prescription Date:  8/7/2018  Last Fill Quantity: 30,  # refills: 9   Last office visit: 5/6/2019 with prescribing provider:     Future Office Visit:   Next 5 appointments (look out 90 days)    Aug 22, 2019  9:30 AM CDT  Return Visit with Jones Lee MD  [facility] ([address] [address]  [phone]             H2 Blockers Protocol Passed - 6/25/2019  3:17 PM        Passed " "- Patient is age 12 or older        Passed - Recent (12 mo) or future (30 days) visit within the authorizing provider's specialty     Patient had office visit in the last 12 months or has a visit in the next 30 days with authorizing provider or within the authorizing provider's specialty.  See \"Patient Info\" tab in inbasket, or \"Choose Columns\" in Meds & Orders section of the refill encounter.              Passed - Medication is active on med list      Prescription approved per [facility] Refill Protocol.  Erica Cuadra RN      "

## 2019-06-28 DIAGNOSIS — Z00.6 EXAMINATION OF PARTICIPANT OR CONTROL IN CLINICAL RESEARCH: Primary | ICD-10-CM

## 2019-06-28 DIAGNOSIS — Z95.2 S/P TAVR (TRANSCATHETER AORTIC VALVE REPLACEMENT): ICD-10-CM

## 2019-07-02 DIAGNOSIS — I25.10 CORONARY ARTERY DISEASE INVOLVING NATIVE CORONARY ARTERY OF NATIVE HEART WITHOUT ANGINA PECTORIS: ICD-10-CM

## 2019-07-02 DIAGNOSIS — F41.9 ANXIETY: ICD-10-CM

## 2019-07-03 RX ORDER — NITROGLYCERIN 0.4 MG/1
TABLET SUBLINGUAL
Qty: 25 TABLET | Refills: 0 | Status: SHIPPED | OUTPATIENT
Start: 2019-07-03 | End: 2020-02-25

## 2019-07-03 RX ORDER — ALPRAZOLAM 0.25 MG
TABLET ORAL
Qty: 28 TABLET | Refills: 0 | Status: SHIPPED | OUTPATIENT
Start: 2019-07-03 | End: 2019-07-17

## 2019-07-03 NOTE — TELEPHONE ENCOUNTER
Requested Prescriptions   Pending Prescriptions Disp Refills     ALPRAZolam (XANAX) 0.25 MG tablet [Pharmacy Med Name: ALPRAZolam 0.25MG   TAB] 28 tablet 0     Sig: TAKE 1 TABLET BY MOUTH TWICE DAILY AS NEEDED FOR ANXIETY       There is no refill protocol information for this order        nitroGLYcerin (NITROSTAT) 0.4 MG sublingual tablet [Pharmacy Med Name: NITROGLYCER 0.4MG   SUB] 25 tablet 0     Sig: DISSOLVE ONE TABLET UNDER THE TONGUE EVERY 5 MINUTES AS NEEDED FOR CHEST PAIN.  DO NOT EXCEED A TOTAL OF 3 DOSES IN 15 MINUTES   Last Written Prescription Date:  12/28/18  Last Fill Quantity: 25,  # refills: 0   Last office visit: 5/6/2019 with prescribing provider:  5/6/19   Future Office Visit:   Next 5 appointments (look out 90 days)    Jul 24, 2019 11:00 AM CDT  Return Visit with Hanh Research Nurse  [facility] ([facility]) [address] [place] [address]  [phone] OPT 2   Jul 24, 2019  1:10 PM CDT  Return Visit with ADRIÁN Millard CNP  [facility] ([facility]) [address] [place] [address]  [phone] OPT 2   Aug 22, 2019  9:30 AM CDT  Return Visit with Jones Lee MD  [facility] ([facility]) [address] [facility] [address]  [phone]           Nitrates Failed - 7/2/2019  3:44 PM        Failed - Blood pressure under 140/90 in past 12 months     BP Readings from Last 3 Encounters:   05/06/19 158/68   04/27/19 105/53   03/20/19 126/62                 Failed - Sublingual nitro order needs review     If refill exceeds 1 bottle per month, please forward request to provider.           Passed - Pt is not on erectile dysfunction medications        Passed - Recent (12 mo) or future (30 days) visit within the authorizing provider's specialty     Patient had office visit in the last 12 months or  "has a visit in the next 30 days with authorizing provider or within the authorizing provider's specialty.  See \"Patient Info\" tab in inbasket, or \"Choose Columns\" in Meds & Orders section of the refill encounter.              Passed - Medication is active on med list        Passed - Patient is age 18 or older        "

## 2019-07-03 NOTE — TELEPHONE ENCOUNTER
Xanax      Last Written Prescription Date:  12/28/18  Last Fill Quantity: 28,   # refills: 0  Last Office Visit: 5/6/19  Future Office visit:    Next 5 appointments (look out 90 days)    Jul 24, 2019 11:00 AM CDT  Return Visit with Hanh Research Nurse  [facility] [address] [facility] [address]  [facility] [address]  [phone] OPT 2   Jul 24, 2019  1:10 PM CDT  Return Visit with ADRIÁN Millard CNP  [facility]) [id number]  [facility] [address]  [phone] OPT 2   Aug 22, 2019  9:30 AM CDT  Return Visit with Jones Lee MD  [facility] ([facility]) [address] [place] [address]  [phone]           Routing refill request to provider for review/approval because:  Drug not on the [facility], [facility] or [facility] refill protocol or controlled substance

## 2019-07-09 ENCOUNTER — TELEPHONE (OUTPATIENT)
Dept: FAMILY MEDICINE | Facility: OTHER | Age: 84
End: 2019-07-09

## 2019-07-09 NOTE — TELEPHONE ENCOUNTER
Prior Authorization Retail Medication Request    Medication/Dose: ALPRAZolam (XANAX) 0.25 MG tablet  ICD code (if different than what is on RX):  Anxiety [F41.9]   Previously Tried and Failed:  na  Rationale:  na    Insurance Name:  [facility]  Insurance ID:  [id number]      Pharmacy Information (if different than what is on RX)  Name:  [facility]  Phone:  [phone]

## 2019-07-10 ENCOUNTER — TRANSFERRED RECORDS (OUTPATIENT)
Dept: HEALTH INFORMATION MANAGEMENT | Facility: CLINIC | Age: 84
End: 2019-07-10

## 2019-07-16 DIAGNOSIS — E03.4 HYPOTHYROIDISM DUE TO ACQUIRED ATROPHY OF THYROID: ICD-10-CM

## 2019-07-16 NOTE — TELEPHONE ENCOUNTER
Central Prior Authorization Team   Phone: [phone]      PA Initiation    Medication: ALPRAZolam (XANAX) 0.25 MG tablet-Initiated  Insurance Company: Other (see comments)Comment:  Prime [phone].  Pharmacy Filling the Rx: [facility] [address] [place] [address] [address]  Filling Pharmacy Phone: [phone]  Filling Pharmacy Fax:    Start Date: 7/16/2019

## 2019-07-17 DIAGNOSIS — F41.9 ANXIETY: ICD-10-CM

## 2019-07-17 RX ORDER — ALPRAZOLAM 0.25 MG
TABLET ORAL
Qty: 28 TABLET | Refills: 0 | Status: SHIPPED | OUTPATIENT
Start: 2019-07-17 | End: 2020-09-08

## 2019-07-17 RX ORDER — LEVOTHYROXINE SODIUM 50 UG/1
TABLET ORAL
Qty: 30 TABLET | Refills: 0 | Status: SHIPPED | OUTPATIENT
Start: 2019-07-17 | End: 2019-08-13

## 2019-07-17 NOTE — TELEPHONE ENCOUNTER
Xanax  Last Written Prescription Date:  07/03/2019  Last Fill Quantity: 28,  # refills: 0   Last office visit: 5/6/2019 with prescribing provider:  viviane Alcantara Office Visit:   Next 5 appointments (look out 90 days)    Aug 06, 2019  2:00 PM CDT  Return Visit with Hanh Research Nurse  [facility] ([facility]) [id number]  [facility] [address]  [phone] OPT 2   Aug 06, 2019  2:30 PM CDT  Return Visit with ADRIÁN Millard CNP  [facility] ([facility]) [id number]  [facility] [address] [phone] OPT 2   Aug 22, 2019  9:30 AM CDT  Return Visit with Jones Lee MD  [facility] ([facility]) [address] [place] [address] [phone]         Requested Prescriptions   Pending Prescriptions Disp Refills     ALPRAZolam (XANAX) 0.25 MG tablet [Pharmacy Med Name: ALPRAZolam 0.25MG   TAB] 28 tablet 0     Sig: TAKE 1 TABLET BY MOUTH TWICE DAILY AS NEEDED FOR ANXIETY       There is no refill protocol information for this order        Routing refill request to provider for review/approval because:  Drug not on the [facility] refill protocol     Martina Kamara RN

## 2019-07-17 NOTE — TELEPHONE ENCOUNTER
Prior Authorization Approval    Authorization Effective Date: 4/17/2019  Authorization Expiration Date: 7/16/2020  Medication: ALPRAZolam (XANAX) 0.25 MG tablet-APPROVED  Approved Dose/Quantity:   Reference #:     Insurance Company: Other (see comments)Comment:  Prime [phone].  Expected CoPay:       CoPay Card Available:      Foundation Assistance Needed:    Which Pharmacy is filling the prescription (Not needed for infusion/clinic administered): [facility] [address] [place] [address]  Pharmacy Notified: Yes  Patient Notified: No    Pharmacy will notify patient when medication is ready.

## 2019-07-17 NOTE — TELEPHONE ENCOUNTER
"Requested Prescriptions   Pending Prescriptions Disp Refills     levothyroxine (SYNTHROID/LEVOTHROID) 50 MCG tablet [Pharmacy Med Name: LEVOTHYROXIN 50MCG  TAB] 90 tablet 1     Sig: TAKE 1 TABLET BY MOUTH ONCE DAILY   Last Written Prescription Date:  1/16/2019  Last Fill Quantity: 90,  # refills: 1   Last office visit: 5/6/2019 with prescribing provider:     Future Office Visit:   Next 5 appointments (look out 90 days)    Aug 06, 2019  2:00 PM CDT  Return Visit with Hanh Research Nurse  [facility]) [address] [place] [address]  [phone] OPT 2   Aug 06, 2019  2:30 PM CDT  Return Visit with ADRIÁN Millard CNP  [facility] ([facility]) [address] [place] [address]  [phone] OPT 2   Aug 22, 2019  9:30 AM CDT  Return Visit with Jones Lee MD  [facility] ([facility]) [address] [place] [address]  [phone]             Thyroid Protocol Failed - 7/16/2019  4:34 PM        Failed - Normal TSH on file in past 12 months     Recent Labs   Lab Test 05/03/16  1427   TSH 3.37              Passed - Patient is 12 years or older        Passed - Recent (12 mo) or future (30 days) visit within the authorizing provider's specialty     Patient had office visit in the last 12 months or has a visit in the next 30 days with authorizing provider or within the authorizing provider's specialty.  See \"Patient Info\" tab in inbasket, or \"Choose Columns\" in Meds & Orders section of the refill encounter.              Passed - Medication is active on med list        Passed - No active pregnancy on record     If patient is pregnant or has had a positive pregnancy test, please check TSH.          Passed - No positive pregnancy test in past 12 months     If patient is pregnant or has had a positive " pregnancy test, please check TSH.        Routing refill request to provider for review/approval because:  Labs not current:  TSH    T'd up 1 month for provider review.  Erica Cuadra RN

## 2019-07-22 ENCOUNTER — MYC MEDICAL ADVICE (OUTPATIENT)
Dept: FAMILY MEDICINE | Facility: OTHER | Age: 84
End: 2019-07-22

## 2019-07-22 DIAGNOSIS — G40.909 SEIZURE DISORDER (H): Primary | ICD-10-CM

## 2019-07-23 DIAGNOSIS — G40.909 SEIZURE DISORDER (H): ICD-10-CM

## 2019-07-23 PROCEDURE — 99000 SPECIMEN HANDLING OFFICE-LAB: CPT | Performed by: INTERNAL MEDICINE

## 2019-07-23 PROCEDURE — 80177 DRUG SCRN QUAN LEVETIRACETAM: CPT | Mod: 90 | Performed by: INTERNAL MEDICINE

## 2019-07-23 PROCEDURE — 36415 COLL VENOUS BLD VENIPUNCTURE: CPT | Performed by: INTERNAL MEDICINE

## 2019-07-25 LAB — LEVETIRACETAM SERPL-MCNC: 26 UG/ML (ref 12–46)

## 2019-07-26 NOTE — RESULT ENCOUNTER NOTE
Dear Gillian, your recent test results are attached.  Keppra level is well within normal limits.    Feel free to contact me via the office or My Chart if you have any questions regarding the above.  Sincerely,  Augusto Meyer,  FACOI

## 2019-08-06 ENCOUNTER — OFFICE VISIT (OUTPATIENT)
Dept: CARDIOLOGY | Facility: CLINIC | Age: 84
End: 2019-08-06
Payer: COMMERCIAL

## 2019-08-06 ENCOUNTER — HOSPITAL ENCOUNTER (OUTPATIENT)
Facility: CLINIC | Age: 84
Discharge: HOME | End: 2019-08-06
Admitting: INTERNAL MEDICINE
Payer: COMMERCIAL

## 2019-08-06 ENCOUNTER — HOSPITAL ENCOUNTER (OUTPATIENT)
Dept: CARDIOLOGY | Facility: CLINIC | Age: 84
Discharge: HOME | End: 2019-08-06
Payer: COMMERCIAL

## 2019-08-06 ENCOUNTER — SURGERY (OUTPATIENT)
Age: 84
End: 2019-08-06
Payer: COMMERCIAL

## 2019-08-06 VITALS
HEIGHT: 66 IN | HEART RATE: 72 BPM | BODY MASS INDEX: 21.31 KG/M2 | DIASTOLIC BLOOD PRESSURE: 68 MMHG | WEIGHT: 132.6 LBS | SYSTOLIC BLOOD PRESSURE: 128 MMHG

## 2019-08-06 DIAGNOSIS — I25.5 ISCHEMIC CARDIOMYOPATHY: ICD-10-CM

## 2019-08-06 DIAGNOSIS — Z95.2 S/P TAVR (TRANSCATHETER AORTIC VALVE REPLACEMENT): Primary | ICD-10-CM

## 2019-08-06 DIAGNOSIS — I10 HYPERTENSION GOAL BP (BLOOD PRESSURE) < 140/90: ICD-10-CM

## 2019-08-06 DIAGNOSIS — I25.119 CORONARY ARTERY DISEASE INVOLVING NATIVE CORONARY ARTERY OF NATIVE HEART WITH ANGINA PECTORIS (H): ICD-10-CM

## 2019-08-06 DIAGNOSIS — I35.0 NONRHEUMATIC AORTIC VALVE STENOSIS: Primary | ICD-10-CM

## 2019-08-06 DIAGNOSIS — E78.5 HYPERLIPIDEMIA WITH TARGET LDL LESS THAN 130: ICD-10-CM

## 2019-08-06 DIAGNOSIS — Z00.6 EXAMINATION OF PARTICIPANT OR CONTROL IN CLINICAL RESEARCH: ICD-10-CM

## 2019-08-06 DIAGNOSIS — Z95.2 S/P TAVR (TRANSCATHETER AORTIC VALVE REPLACEMENT): ICD-10-CM

## 2019-08-06 PROCEDURE — 76000 FLUOROSCOPY <1 HR PHYS/QHP: CPT | Performed by: INTERNAL MEDICINE

## 2019-08-06 PROCEDURE — 76000 FLUOROSCOPY <1 HR PHYS/QHP: CPT | Mod: 26 | Performed by: INTERNAL MEDICINE

## 2019-08-06 PROCEDURE — 93306 TTE W/DOPPLER COMPLETE: CPT

## 2019-08-06 PROCEDURE — 99214 OFFICE O/P EST MOD 30 MIN: CPT | Performed by: NURSE PRACTITIONER

## 2019-08-06 PROCEDURE — 93306 TTE W/DOPPLER COMPLETE: CPT | Mod: 26 | Performed by: INTERNAL MEDICINE

## 2019-08-06 RX ORDER — ISOSORBIDE MONONITRATE 60 MG/1
120 TABLET, EXTENDED RELEASE ORAL DAILY
COMMUNITY
End: 2019-08-20

## 2019-08-06 ASSESSMENT — MIFFLIN-ST. JEOR: SCORE: 1032.19

## 2019-08-06 NOTE — PATIENT INSTRUCTIONS
Thanks for coming into [facility] today.    Doing well on a cardiac standpoint  Reviewed follow up echo results  Continue on current medical therapy  Monitor episodes of chest pain, if symptoms worsen notify office, if they become severe seek ER evaluation.   Follow up with Dr. Lee as scheduled.       Please call my nurse at [phone] with any questions or concerns.    Scheduling phone number: [phone]  Reminder: Please bring in all current medications, over the counter supplements and vitamin bottles to your next appointment.

## 2019-08-06 NOTE — PROGRESS NOTES
[facility]  Post-TAVR Clinic Visit      HPI  Gillian Burk is a very pleasant 89-year-old woman who comes to the office today accompanied by her daughter for her annual TAVR follow up. She carries a past medical history notable for coronary artery disease, known mid to distal LAD lesion 60-70% medically managed, aortic stenosis status post TAVR (2016) with a 27 mm angy tissue valve, CVA in 2013 (on chronic Plavix and aspirin), ischemic cardiomyopathy, hypertension, hyperlipidemia, GERD, left bundle branch block, and hypothyroidism.    She is feeling well on a cardiac standpoint with the exception of occasional angina pain, relieved by daily Imdur and PRN SL nitroglycerin. She denies shortness of breath, palpitations, PND, orthopnea, presyncope, syncope, or LE edema. Activity consists of walking to her dining facility and attending daily exercise class.  NYHA Class 2    Blood pressure is well controlled at 128/68, managed on amlodipine, isosorbide, losartan, and metoprolol.  BMI 21.24  She continues on chronic Plavix and aspirin without evidence of bleeding.    Echocardiogram today showed an ejection fraction of 50 to 55%, septal motion consistent with conduction abnormality, normal RV, moderately dilated left atrium, moderate to severe mitral annular calcification, mild mitral stenosis with a mean gradient of 6 mmHg, mild to moderate mitral regurgitation, well-seated 27 [employer] Angy valve, mean AOV pressure gradient of 9 mmHg, peak velocity 1.4 m/s, no valvular perivalvular regurgitation identified.    Eating and sleeping well  Compliant with all medications.       Assessment and Plan   1.  Aortic stenosis   - S/p TAVR (8/2016) with a 27 mm Angy tissue valve. Echocardiogram demonstrates a well-seated 27 [employer] Angy valve, mean AOV pressure gradient of 9 mmHg, peak velocity 1.4 m/s, no valvular perivalvular regurgitation identified.  -Continue on Plavix, aspirin, Toprol,  losartan, isosorbide, diuretics, and statin.  -Encourage activity  -Annual echo for evaluation     2.  Coronary artery disease  - Intermittent episodes of chest pain, managed on Imdur and PRN SL nitroglycerin  -Known mid to distal LAD lesion (70%)  -Continue on current medical therapy     3.  Ischemic cardiomyopathy  -Echo today demonstrated improvement in LV function from 35-40% to 50-55%  -Continue on current medical therapy      4.  Hypertension  -Well controlled, Continue current medical therapy     5.  Hyperlipidemia  -Last LDL at goal, due for lipids in October, continue on statin     PAST MEDICAL HISTORY:  Past Medical History:   Diagnosis Date     Aortic stenosis     s/p TAVR 8/17/16     Chronic migraine      Hyperlipidaemia      Hypertension      Hypothyroidism      Myocardial infarction (H)      Need for SBE (subacute bacterial endocarditis) prophylaxis      Orthostatic hypotension     wears compression stockings     PUD (peptic ulcer disease)      Secondary Sjogren's syndrome (H)      Seizures (H)     after stroke (partial onset)     Seropositive rheumatoid arthritis (H)      Stroke (H) 05/2013    with visual field cut, left occipital lobe     Syncope 2014    due to orthostatic hypotension       CURRENT MEDICATIONS:  No current outpatient medications on file.       PAST SURGICAL HISTORY:  Past Surgical History:   Procedure Laterality Date     C TOTAL HIP ARTHROPLASTY Right 2010     C TOTAL HIP ARTHROPLASTY Left 2014     CATARACT IOL, RT/LT Bilateral 2000     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     HEART CATH FEMORAL CANNULIZATION WITH OPEN STANDBY REPAIR AORTIC VALVE N/A 8/3/2016    Procedure: HEART CATH FEMORAL CANNULIZATION WITH OPEN STANDBY REPAIR AORTIC VALVE;  Surgeon: Owen Schultz MD;  Location: [facility] OR     HYSTERECTOMY TOTAL ABDOMINAL  1970    ovaries out     MOUTH SURGERY  2011    jaw surgery due to fracture     TRANSCATHETER AORTIC VALVE IMPLANT ANESTHESIA N/A 8/3/2016     Procedure: TRANSCATHETER AORTIC VALVE IMPLANT ANESTHESIA;  Surgeon: GENERIC ANESTHESIA PROVIDER;  Location: [facility] OR       ALLERGIES     Allergies   Allergen Reactions     Penicillins Hives     Caffeine Other (See Comments) and Unknown     Triggers your Meniere's disease     Hydrocodone Other (See Comments) and Unknown     hallucinations     Ibuprofen GI Disturbance and Unknown     Tramadol Other (See Comments)     Seizure, was taking remeron along with tramadol     Metal [Staples] Rash       FAMILY HISTORY:  Family History   Problem Relation Age of Onset     Hyperlipidemia Mother      Family History Negative No family hx of        SOCIAL HISTORY:  Social History     Socioeconomic History     Marital status:      Spouse name: Not on file     Number of children: 4     Years of education: Not on file     Highest education level: Not on file   Occupational History     Employer: RETIRED   Social Needs     Financial resource strain: Not on file     Food insecurity:     Worry: Not on file     Inability: Not on file     Transportation needs:     Medical: Not on file     Non-medical: Not on file   Tobacco Use     Smoking status: Never Smoker     Smokeless tobacco: Never Used   Substance and Sexual Activity     Alcohol use: No     Alcohol/week: 0.0 oz     Drug use: No     Sexual activity: Not Currently   Lifestyle     Physical activity:     Days per week: Not on file     Minutes per session: Not on file     Stress: Not on file   Relationships     Social connections:     Talks on phone: Not on file     Gets together: Not on file     Attends Zoroastrian service: Not on file     Active member of club or organization: Not on file     Attends meetings of clubs or organizations: Not on file     Relationship status: Not on file     Intimate partner violence:     Fear of current or ex partner: Not on file     Emotionally abused: Not on file     Physically abused: Not on file     Forced sexual activity: Not on file   Other Topics  "Concern     Parent/sibling w/ CABG, MI or angioplasty before 65F 55M? Not Asked      Service Not Asked     Blood Transfusions Not Asked     Caffeine Concern Not Asked     Occupational Exposure Not Asked     Hobby Hazards Not Asked     Sleep Concern Not Asked     Stress Concern Not Asked     Weight Concern Not Asked     Special Diet Yes     Comment: low salt      Back Care Not Asked     Exercise Yes     Comment: semi recument bike 15 mins daily      Bike Helmet Not Asked     Seat Belt Not Asked     Self-Exams Not Asked   Social History Narrative    .  Lives in assisted living. Independent. Has help with making bed and changing sheets.    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 .     4 children - 1 with RA         Review of Systems:  Skin:  Negative bruising   Eyes:  Positive for glasses  ENT:  Positive for hearing loss  Respiratory:  Negative cough;mucoid expectorant;dyspnea on exertion;shortness of breath  Cardiovascular:  Negative for;palpitations;lightheadedness;dizziness;edema;chest pain edema;Positive for  Gastroenterology: Negative heartburn  Genitourinary:  Positive for urinary tract infection  Musculoskeletal:  Positive for arthritis  Neurologic:  Positive for numbness or tingling of feet  Psychiatric:  Positive for anxiety  Heme/Lymph/Imm:  Negative easy bruising  Endocrine:  Positive for thyroid disorder    EXAM:  /68   Pulse 72   Ht 1.683 m (5' 6.25\")   Wt 60.1 kg (132 lb 9.6 oz)   BMI 21.24 kg/m    Physical Exam:  Vitals: /68   Pulse 72   Ht 1.683 m (5' 6.25\")   Wt 60.1 kg (132 lb 9.6 oz)   BMI 21.24 kg/m      Constitutional:  cooperative;in no acute distress thin      Skin:  warm and dry to the touch, no apparent skin lesions or masses noted        Head:  normocephalic, no masses or lesions        Eyes:  pupils equal and round, conjunctivae and lids unremarkable, sclera white, no xanthalasma, EOMS intact, no nystagmus        ENT:  no pallor or " cyanosis        Neck:  carotid pulses are full and equal bilaterally, JVP normal, no carotid bruit        Chest:  normal breath sounds, clear to auscultation, normal A-P diameter, normal symmetry, normal respiratory excursion, no use of accessory muscles        Cardiac: normal S1 and S2       systolic murmur          Abdomen:  abdomen soft, non-tender, BS normoactive, no mass, no HSM, no bruits        Vascular: pulses full and equal, no bruits auscultated                                      Extremities and Back:  no deformities, clubbing, cyanosis, erythema observed        Neurological:  no gross motor deficits            Labs:  LIPID RESULTS:  Lab Results   Component Value Date    CHOL 129 10/13/2018    HDL 55 10/13/2018    LDL 59 10/13/2018    TRIG 73 10/13/2018    CHOLHDLRATIO 2.3 07/07/2015       LIVER ENZYME RESULTS:  Lab Results   Component Value Date    AST 21 04/26/2019    ALT 18 04/26/2019       CBC RESULTS:  Lab Results   Component Value Date    WBC 9.3 04/26/2019    RBC 3.80 04/26/2019    HGB 11.1 (L) 04/27/2019    HCT 35.4 04/26/2019    MCV 93 04/26/2019    MCH 30.3 04/26/2019    MCHC 32.5 04/26/2019    RDW 16.4 (H) 04/26/2019     04/26/2019       BMP RESULTS:  Lab Results   Component Value Date     04/27/2019    POTASSIUM 3.3 (L) 04/27/2019    CHLORIDE 106 04/27/2019    CO2 25 04/27/2019    ANIONGAP 9 04/27/2019    GLC 80 04/27/2019    BUN 23 04/27/2019    CR 0.94 04/27/2019    GFRESTIMATED 53 (L) 04/27/2019    GFRESTBLACK 61 04/27/2019    CHEYANNE 7.9 (L) 04/27/2019        A1C RESULTS:  Lab Results   Component Value Date    A1C 5.5 10/13/2018       INR RESULTS:  Lab Results   Component Value Date    INR 1.11 04/26/2019    INR 1.1 10/13/2018

## 2019-08-07 NOTE — PROGRESS NOTES
"Subject seen for M36 FU Reprise III study.  Doing well. Wt. 60.1 kg , Ht 5'6.25\". TTE and rotational Xray done, mRS completed by Jeannie Ruiz. SF-12 and KCCQ also completed per protocol. NYHA class II. No new SADE's or device deficiencies since last visit.      10/12/18, patient C/O acute vision loss right eye while at the eye doctor.See in the ER and transferred to [facility] for hyperbaric treatment secondary to R central retinal artery occlusion. Seen by [email or web address], NIHSS-1 and mRS-3. Dx with a stroke- \"likely chronic infarct of the medial L occipital lobe:No other significant past medical history or family history. Evidence of intracranial hemorrhage. Discharged on 10/17/19.    4/26/10-seen in ER secondary to urinary incontinence, noted increased troponin on workup. Monitored for 24 hours. discharge 4/27/19.    No other reported SAEs.  Asked daughter to please let research staff know if subject has any other issues between appts.     Subject to FU in 1 year per protocol.  "

## 2019-08-13 DIAGNOSIS — E03.4 HYPOTHYROIDISM DUE TO ACQUIRED ATROPHY OF THYROID: ICD-10-CM

## 2019-08-14 RX ORDER — LEVOTHYROXINE SODIUM 50 UG/1
TABLET ORAL
Qty: 30 TABLET | Refills: 1 | Status: SHIPPED | OUTPATIENT
Start: 2019-08-14 | End: 2019-10-08

## 2019-08-14 NOTE — TELEPHONE ENCOUNTER
"Requested Prescriptions   Pending Prescriptions Disp Refills     EUTHYROX 50 MCG tablet [Pharmacy Med Name: EUTHYROX 50MCG TAB] 30 tablet 0     Sig: TAKE 1 TABLET BY MOUTH ONCE DAILY   Last Written Prescription Date:  7/17/19  Last Fill Quantity: 30,  # refills: 0   Last office visit: 5/6/2019 with prescribing provider:  5/6/19   Future Office Visit:   Next 5 appointments (look out 90 days)    Aug 22, 2019  9:30 AM CDT  Return Visit with Jones Lee MD  [facility] [address] [place] [id number] [phone]           Thyroid Protocol Failed - 8/13/2019 11:27 AM        Failed - Normal TSH on file in past 12 months     Recent Labs   Lab Test 05/03/16  1427   TSH 3.37              Passed - Patient is 12 years or older        Passed - Recent (12 mo) or future (30 days) visit within the authorizing provider's specialty     Patient had office visit in the last 12 months or has a visit in the next 30 days with authorizing provider or within the authorizing provider's specialty.  See \"Patient Info\" tab in inbasket, or \"Choose Columns\" in Meds & Orders section of the refill encounter.              Passed - Medication is active on med list        Passed - No active pregnancy on record     If patient is pregnant or has had a positive pregnancy test, please check TSH.          Passed - No positive pregnancy test in past 12 months     If patient is pregnant or has had a positive pregnancy test, please check TSH.          "

## 2019-08-14 NOTE — TELEPHONE ENCOUNTER
Routing refill request to provider for review/approval because:  Labs not current:  TSH    AUTUMN BustilloN, RN  [facility]

## 2019-08-20 DIAGNOSIS — I20.0 UNSTABLE ANGINA (H): Primary | ICD-10-CM

## 2019-08-20 RX ORDER — ISOSORBIDE MONONITRATE 60 MG/1
120 TABLET, EXTENDED RELEASE ORAL DAILY
Qty: 90 TABLET | Refills: 3 | Status: SHIPPED | OUTPATIENT
Start: 2019-08-20 | End: 2020-03-03

## 2019-08-22 ENCOUNTER — OFFICE VISIT (OUTPATIENT)
Dept: CARDIOLOGY | Facility: CLINIC | Age: 84
End: 2019-08-22
Payer: COMMERCIAL

## 2019-08-22 VITALS
HEIGHT: 66 IN | WEIGHT: 129.4 LBS | SYSTOLIC BLOOD PRESSURE: 108 MMHG | BODY MASS INDEX: 20.79 KG/M2 | OXYGEN SATURATION: 94 % | DIASTOLIC BLOOD PRESSURE: 64 MMHG | RESPIRATION RATE: 12 BRPM | HEART RATE: 80 BPM

## 2019-08-22 DIAGNOSIS — I35.0 NONRHEUMATIC AORTIC VALVE STENOSIS: Primary | ICD-10-CM

## 2019-08-22 DIAGNOSIS — E78.5 HYPERLIPIDEMIA WITH TARGET LDL LESS THAN 130: ICD-10-CM

## 2019-08-22 DIAGNOSIS — Z95.2 S/P TAVR (TRANSCATHETER AORTIC VALVE REPLACEMENT): ICD-10-CM

## 2019-08-22 DIAGNOSIS — I10 HYPERTENSION GOAL BP (BLOOD PRESSURE) < 140/90: ICD-10-CM

## 2019-08-22 DIAGNOSIS — I25.10 CORONARY ARTERY DISEASE INVOLVING NATIVE CORONARY ARTERY OF NATIVE HEART WITHOUT ANGINA PECTORIS: ICD-10-CM

## 2019-08-22 DIAGNOSIS — I20.89 STABLE ANGINA PECTORIS (H): ICD-10-CM

## 2019-08-22 DIAGNOSIS — I25.119 CORONARY ARTERY DISEASE INVOLVING NATIVE CORONARY ARTERY OF NATIVE HEART WITH ANGINA PECTORIS (H): ICD-10-CM

## 2019-08-22 LAB
ALT SERPL W P-5'-P-CCNC: 18 U/L (ref 0–50)
ANION GAP SERPL CALCULATED.3IONS-SCNC: 9 MMOL/L (ref 3–14)
BUN SERPL-MCNC: 17 MG/DL (ref 7–30)
CALCIUM SERPL-MCNC: 8.4 MG/DL (ref 8.5–10.1)
CHLORIDE SERPL-SCNC: 105 MMOL/L (ref 94–109)
CHOLEST SERPL-MCNC: 130 MG/DL
CO2 SERPL-SCNC: 27 MMOL/L (ref 20–32)
CREAT SERPL-MCNC: 0.93 MG/DL (ref 0.52–1.04)
GFR SERPL CREATININE-BSD FRML MDRD: 53 ML/MIN/{1.73_M2}
GLUCOSE SERPL-MCNC: 102 MG/DL (ref 70–99)
HDLC SERPL-MCNC: 64 MG/DL
LDLC SERPL CALC-MCNC: 45 MG/DL
NONHDLC SERPL-MCNC: 66 MG/DL
POTASSIUM SERPL-SCNC: 3.8 MMOL/L (ref 3.4–5.3)
SODIUM SERPL-SCNC: 141 MMOL/L (ref 133–144)
TRIGL SERPL-MCNC: 106 MG/DL

## 2019-08-22 PROCEDURE — 80048 BASIC METABOLIC PNL TOTAL CA: CPT | Performed by: INTERNAL MEDICINE

## 2019-08-22 PROCEDURE — 99214 OFFICE O/P EST MOD 30 MIN: CPT | Performed by: INTERNAL MEDICINE

## 2019-08-22 PROCEDURE — 84460 ALANINE AMINO (ALT) (SGPT): CPT | Performed by: INTERNAL MEDICINE

## 2019-08-22 PROCEDURE — 80061 LIPID PANEL: CPT | Performed by: INTERNAL MEDICINE

## 2019-08-22 PROCEDURE — 36415 COLL VENOUS BLD VENIPUNCTURE: CPT | Performed by: INTERNAL MEDICINE

## 2019-08-22 ASSESSMENT — MIFFLIN-ST. JEOR: SCORE: 1013.7

## 2019-08-22 ASSESSMENT — PAIN SCALES - GENERAL: PAINLEVEL: NO PAIN (0)

## 2019-08-22 NOTE — LETTER
8/22/2019    Augusto Meyer, DO  [address] [place] [address]    RE: Gillian Burk       Dear Colleague,    I had the pleasure of seeing Gillian Burk in the [facility].    HPI and Plan:   See dictation    Orders Placed This Encounter   Procedures     Follow-Up with Cardiac Advanced Practice Provider     Follow-Up with Cardiologist     Echocardiogram Complete       No orders of the defined types were placed in this encounter.      Medications Discontinued During This Encounter   Medication Reason     EQ ALLERGY RELIEF 10 MG tablet Stopped by Patient     multivitamin, therapeutic with minerals (MULTI-VITAMIN) TABS Stopped by Patient         Encounter Diagnoses   Name Primary?     Nonrheumatic aortic valve stenosis Yes     S/P TAVR (transcatheter aortic valve replacement)      Coronary artery disease involving native coronary artery of native heart with angina pectoris (H)      Hyperlipidemia with target LDL less than 130      Hypertension goal BP (blood pressure) < 140/90      Stable angina pectoris (H)        CURRENT MEDICATIONS:  Current Outpatient Medications   Medication Sig Dispense Refill     acetaminophen (TYLENOL ARTHRITIS PAIN) 650 MG CR tablet Take 1,300 mg by mouth every 8 hours as needed for mild pain        ALPRAZolam (XANAX) 0.25 MG tablet TAKE 1 TABLET BY MOUTH TWICE DAILY AS NEEDED FOR ANXIETY 28 tablet 0     amLODIPine (NORVASC) 5 MG tablet Take 1 tablet (5 mg) by mouth daily 90 tablet 3     aspirin EC 81 MG EC tablet Take 1 tablet (81 mg) by mouth daily       atorvastatin (LIPITOR) 40 MG tablet Take 1 tablet (40 mg) by mouth daily 90 tablet 1     calcium carbonate (TUMS) 500 MG chewable tablet Take 2-4 tablets (1,000-2,000 mg) by mouth as needed for heartburn       clopidogrel (PLAVIX) 75 MG tablet TAKE ONE TABLET BY MOUTH ONCE DAILY 30 tablet 11     Cranberry 1000 MG CAPS        docusate sodium (COLACE) 100 MG capsule Take 200 mg by  mouth daily 2 capsules       EUTHYROX 50 MCG tablet TAKE 1 TABLET BY MOUTH ONCE DAILY 30 tablet 1     famotidine (PEPCID) 40 MG tablet TAKE 1 TABLET BY MOUTH AT BEDTIME 30 tablet 9     folic acid (FOLVITE) 1 MG tablet TAKE 1 TABLET BY MOUTH ONCE DAILY 90 tablet 1     Hylan (SYNVISC) 16 MG/2ML SOSY 16 mg by INTRA-ARTICULAR route once 2 mL 0     isosorbide mononitrate (IMDUR) 60 MG 24 hr tablet Take 2 tablets (120 mg) by mouth daily 90 tablet 3     Lactobacillus (FLORAJEN ACIDOPHILUS) CAPS Take 1 capsule by mouth daily       levETIRAcetam (KEPPRA) 500 MG tablet Take 1 tablet (500 mg) by mouth 2 times daily 90 tablet 6     losartan (COZAAR) 25 MG tablet TAKE 1/2 (ONE-HALF) TABLET BY MOUTH ONCE DAILY 45 tablet 1     methotrexate 2.5 MG tablet Take 4 tablets (10 mg) by mouth once a week Wed 52 tablet 3     metoprolol succinate ER (TOPROL-XL) 50 MG 24 hr tablet Take 1 tablet (50 mg) by mouth 2 times daily 180 tablet 3     mirtazapine (REMERON) 30 MG tablet TAKE 1 TABLET BY MOUTH ONCE DAILY AT BEDTIME 90 tablet 1     nitroFURantoin macrocrystal-monohydrate (MACROBID) 100 MG capsule Take 100 mg by mouth once       nitroGLYcerin (NITROSTAT) 0.4 MG sublingual tablet DISSOLVE ONE TABLET UNDER THE TONGUE EVERY 5 MINUTES AS NEEDED FOR CHEST PAIN.  DO NOT EXCEED A TOTAL OF 3 DOSES IN 15 MINUTES 25 tablet 0     predniSONE (DELTASONE) 5 MG tablet TAKE ONE TABLET BY MOUTH EVERY OTHER DAY ALTERNATING  WITH  1/2  TABLET  EVERY  OTHER  DAY 23 tablet 13     predniSONE (DELTASONE) 50 MG tablet Take 50 mg by mouth daily. 5 tablet 0     gabapentin (NEURONTIN) 100 MG capsule TAKE 2 CAPSULES BY MOUTH THREE TIMES DAILY (Patient not taking: Reported on 8/6/2019) 180 capsule 0       ALLERGIES     Allergies   Allergen Reactions     Penicillins Hives     Caffeine Other (See Comments) and Unknown     Triggers your Meniere's disease     Hydrocodone Other (See Comments) and Unknown     hallucinations     Ibuprofen GI Disturbance and Unknown      Tramadol Other (See Comments)     Seizure, was taking remeron along with tramadol     Metal [Staples] Rash       PAST MEDICAL HISTORY:  Past Medical History:   Diagnosis Date     Aortic stenosis     s/p TAVR 8/17/16     Chronic migraine      Hyperlipidaemia      Hypertension      Hypothyroidism      Myocardial infarction (H)      Need for SBE (subacute bacterial endocarditis) prophylaxis      Orthostatic hypotension     wears compression stockings     PUD (peptic ulcer disease)      Secondary Sjogren's syndrome (H)      Seizures (H)     after stroke (partial onset)     Seropositive rheumatoid arthritis (H)      Stroke (H) 05/2013    with visual field cut, left occipital lobe     Syncope 2014    due to orthostatic hypotension       PAST SURGICAL HISTORY:  Past Surgical History:   Procedure Laterality Date     C TOTAL HIP ARTHROPLASTY Right 2010     C TOTAL HIP ARTHROPLASTY Left 2014     CATARACT IOL, RT/LT Bilateral 2000     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     HEART CATH FEMORAL CANNULIZATION WITH OPEN STANDBY REPAIR AORTIC VALVE N/A 8/3/2016    Procedure: HEART CATH FEMORAL CANNULIZATION WITH OPEN STANDBY REPAIR AORTIC VALVE;  Surgeon: Owen Schultz MD;  Location: [facility] OR     HYSTERECTOMY TOTAL ABDOMINAL  1970    ovaries out     MOUTH SURGERY  2011    jaw surgery due to fracture     TRANSCATHETER AORTIC VALVE IMPLANT ANESTHESIA N/A 8/3/2016    Procedure: TRANSCATHETER AORTIC VALVE IMPLANT ANESTHESIA;  Surgeon: GENERIC ANESTHESIA PROVIDER;  Location: [facility] OR       FAMILY HISTORY:  Family History   Problem Relation Age of Onset     Hyperlipidemia Mother      Family History Negative No family hx of        SOCIAL HISTORY:  Social History     Socioeconomic History     Marital status:      Spouse name: Not on file     Number of children: 4     Years of education: Not on file     Highest education level: Not on file   Occupational History     Employer: RETIRED   Social Needs     Financial  resource strain: Not on file     Food insecurity:     Worry: Not on file     Inability: Not on file     Transportation needs:     Medical: Not on file     Non-medical: Not on file   Tobacco Use     Smoking status: Never Smoker     Smokeless tobacco: Never Used   Substance and Sexual Activity     Alcohol use: No     Alcohol/week: 0.0 oz     Drug use: No     Sexual activity: Not Currently   Lifestyle     Physical activity:     Days per week: Not on file     Minutes per session: Not on file     Stress: Not on file   Relationships     Social connections:     Talks on phone: Not on file     Gets together: Not on file     Attends Denominational service: Not on file     Active member of club or organization: Not on file     Attends meetings of clubs or organizations: Not on file     Relationship status: Not on file     Intimate partner violence:     Fear of current or ex partner: Not on file     Emotionally abused: Not on file     Physically abused: Not on file     Forced sexual activity: Not on file   Other Topics Concern     Parent/sibling w/ CABG, MI or angioplasty before 65F 55M? Not Asked      Service Not Asked     Blood Transfusions Not Asked     Caffeine Concern Not Asked     Occupational Exposure Not Asked     Hobby Hazards Not Asked     Sleep Concern Not Asked     Stress Concern Not Asked     Weight Concern Not Asked     Special Diet Yes     Comment: low salt      Back Care Not Asked     Exercise Yes     Comment: semi recument bike 15 mins daily      Bike Helmet Not Asked     Seat Belt Not Asked     Self-Exams Not Asked   Social History Narrative    .  Lives in assisted living. Independent. Has help with making bed and changing sheets.    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 .     4 children - 1 with RA       Review of Systems:  Skin:  Negative bruising     Eyes:  Positive for glasses    ENT:  Positive for hearing loss wears hearing aids in both ears  Respiratory:  Negative  "cough;shortness of breath SOB occ   Cardiovascular:  Negative for;palpitations;lightheadedness;dizziness;edema;chest pain edema;Positive for .slight edema in ankles  Gastroenterology: Negative heartburn protonix, tums  ( more frequently having episodes)  Genitourinary:  Positive for      Musculoskeletal:  Positive for arthritis low back pain   Neurologic:  Positive for numbness or tingling of feet toes,  has had this since 2006  Psychiatric:  Positive for anxiety occ.  Heme/Lymph/Imm:  Negative easy bruising    Endocrine:  Positive for thyroid disorder      Physical Exam:  Vitals: /64 (BP Location: Left arm, Cuff Size: Adult Regular)   Pulse 80   Resp 12   Ht 1.676 m (5' 6\")   Wt 58.7 kg (129 lb 6.4 oz)   SpO2 94%   BMI 20.89 kg/m       Constitutional:  cooperative;in no acute distress thin      Skin:  warm and dry to the touch, no apparent skin lesions or masses noted          Head:  normocephalic, no masses or lesions        Eyes:  pupils equal and round, conjunctivae and lids unremarkable, sclera white, no xanthalasma, EOMS intact, no nystagmus        Lymph:No Cervical lymphadenopathy present     ENT:  no pallor or cyanosis        Neck:  carotid pulses are full and equal bilaterally, JVP normal, no carotid bruit        Respiratory:  normal breath sounds, clear to auscultation, normal A-P diameter, normal symmetry, normal respiratory excursion, no use of accessory muscles         Cardiac: normal S1 and S2       systolic murmur        pulses full and equal, no bruits auscultated                                        GI:  abdomen soft, non-tender, BS normoactive, no mass, no HSM, no bruits        Extremities and Muscular Skeletal:  no deformities, clubbing, cyanosis, erythema observed              Neurological:  no gross motor deficits        Psych:  Alert and Oriented x 3        CC  Khloe Serrano, CNP  [address] [place] [address]                Thank you for allowing me to participate in " the care of your patient.      Sincerely,     MARGAUX WILLIAMSON MD     [facility]    cc:   Khloe Serrano, CNP  [address] [place] [address]

## 2019-08-22 NOTE — PROGRESS NOTES
HPI and Plan:   See dictation    Orders Placed This Encounter   Procedures     Follow-Up with Cardiac Advanced Practice Provider     Follow-Up with Cardiologist     Echocardiogram Complete       No orders of the defined types were placed in this encounter.      Medications Discontinued During This Encounter   Medication Reason     EQ ALLERGY RELIEF 10 MG tablet Stopped by Patient     multivitamin, therapeutic with minerals (MULTI-VITAMIN) TABS Stopped by Patient         Encounter Diagnoses   Name Primary?     Nonrheumatic aortic valve stenosis Yes     S/P TAVR (transcatheter aortic valve replacement)      Coronary artery disease involving native coronary artery of native heart with angina pectoris (H)      Hyperlipidemia with target LDL less than 130      Hypertension goal BP (blood pressure) < 140/90      Stable angina pectoris (H)        CURRENT MEDICATIONS:  Current Outpatient Medications   Medication Sig Dispense Refill     acetaminophen (TYLENOL ARTHRITIS PAIN) 650 MG CR tablet Take 1,300 mg by mouth every 8 hours as needed for mild pain        ALPRAZolam (XANAX) 0.25 MG tablet TAKE 1 TABLET BY MOUTH TWICE DAILY AS NEEDED FOR ANXIETY 28 tablet 0     amLODIPine (NORVASC) 5 MG tablet Take 1 tablet (5 mg) by mouth daily 90 tablet 3     aspirin EC 81 MG EC tablet Take 1 tablet (81 mg) by mouth daily       atorvastatin (LIPITOR) 40 MG tablet Take 1 tablet (40 mg) by mouth daily 90 tablet 1     calcium carbonate (TUMS) 500 MG chewable tablet Take 2-4 tablets (1,000-2,000 mg) by mouth as needed for heartburn       clopidogrel (PLAVIX) 75 MG tablet TAKE ONE TABLET BY MOUTH ONCE DAILY 30 tablet 11     Cranberry 1000 MG CAPS        docusate sodium (COLACE) 100 MG capsule Take 200 mg by mouth daily 2 capsules       EUTHYROX 50 MCG tablet TAKE 1 TABLET BY MOUTH ONCE DAILY 30 tablet 1     famotidine (PEPCID) 40 MG tablet TAKE 1 TABLET BY MOUTH AT BEDTIME 30 tablet 9     folic acid (FOLVITE) 1 MG tablet TAKE 1 TABLET BY  MOUTH ONCE DAILY 90 tablet 1     Hylan (SYNVISC) 16 MG/2ML SOSY 16 mg by INTRA-ARTICULAR route once 2 mL 0     isosorbide mononitrate (IMDUR) 60 MG 24 hr tablet Take 2 tablets (120 mg) by mouth daily 90 tablet 3     Lactobacillus (FLORAJEN ACIDOPHILUS) CAPS Take 1 capsule by mouth daily       levETIRAcetam (KEPPRA) 500 MG tablet Take 1 tablet (500 mg) by mouth 2 times daily 90 tablet 6     losartan (COZAAR) 25 MG tablet TAKE 1/2 (ONE-HALF) TABLET BY MOUTH ONCE DAILY 45 tablet 1     methotrexate 2.5 MG tablet Take 4 tablets (10 mg) by mouth once a week Wed 52 tablet 3     metoprolol succinate ER (TOPROL-XL) 50 MG 24 hr tablet Take 1 tablet (50 mg) by mouth 2 times daily 180 tablet 3     mirtazapine (REMERON) 30 MG tablet TAKE 1 TABLET BY MOUTH ONCE DAILY AT BEDTIME 90 tablet 1     nitroFURantoin macrocrystal-monohydrate (MACROBID) 100 MG capsule Take 100 mg by mouth once       nitroGLYcerin (NITROSTAT) 0.4 MG sublingual tablet DISSOLVE ONE TABLET UNDER THE TONGUE EVERY 5 MINUTES AS NEEDED FOR CHEST PAIN.  DO NOT EXCEED A TOTAL OF 3 DOSES IN 15 MINUTES 25 tablet 0     predniSONE (DELTASONE) 5 MG tablet TAKE ONE TABLET BY MOUTH EVERY OTHER DAY ALTERNATING  WITH  1/2  TABLET  EVERY  OTHER  DAY 23 tablet 13     predniSONE (DELTASONE) 50 MG tablet Take 50 mg by mouth daily. 5 tablet 0     gabapentin (NEURONTIN) 100 MG capsule TAKE 2 CAPSULES BY MOUTH THREE TIMES DAILY (Patient not taking: Reported on 8/6/2019) 180 capsule 0       ALLERGIES     Allergies   Allergen Reactions     Penicillins Hives     Caffeine Other (See Comments) and Unknown     Triggers your Meniere's disease     Hydrocodone Other (See Comments) and Unknown     hallucinations     Ibuprofen GI Disturbance and Unknown     Tramadol Other (See Comments)     Seizure, was taking remeron along with tramadol     Metal [Staples] Rash       PAST MEDICAL HISTORY:  Past Medical History:   Diagnosis Date     Aortic stenosis     s/p TAVR 8/17/16     Chronic migraine       Hyperlipidaemia      Hypertension      Hypothyroidism      Myocardial infarction (H)      Need for SBE (subacute bacterial endocarditis) prophylaxis      Orthostatic hypotension     wears compression stockings     PUD (peptic ulcer disease)      Secondary Sjogren's syndrome (H)      Seizures (H)     after stroke (partial onset)     Seropositive rheumatoid arthritis (H)      Stroke (H) 05/2013    with visual field cut, left occipital lobe     Syncope 2014    due to orthostatic hypotension       PAST SURGICAL HISTORY:  Past Surgical History:   Procedure Laterality Date     C TOTAL HIP ARTHROPLASTY Right 2010     C TOTAL HIP ARTHROPLASTY Left 2014     CATARACT IOL, RT/LT Bilateral 2000     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     HEART CATH FEMORAL CANNULIZATION WITH OPEN STANDBY REPAIR AORTIC VALVE N/A 8/3/2016    Procedure: HEART CATH FEMORAL CANNULIZATION WITH OPEN STANDBY REPAIR AORTIC VALVE;  Surgeon: Owen Schultz MD;  Location: [facility] OR     HYSTERECTOMY TOTAL ABDOMINAL  1970    ovaries out     MOUTH SURGERY  2011    jaw surgery due to fracture     TRANSCATHETER AORTIC VALVE IMPLANT ANESTHESIA N/A 8/3/2016    Procedure: TRANSCATHETER AORTIC VALVE IMPLANT ANESTHESIA;  Surgeon: GENERIC ANESTHESIA PROVIDER;  Location: [facility] OR       FAMILY HISTORY:  Family History   Problem Relation Age of Onset     Hyperlipidemia Mother      Family History Negative No family hx of        SOCIAL HISTORY:  Social History     Socioeconomic History     Marital status:      Spouse name: Not on file     Number of children: 4     Years of education: Not on file     Highest education level: Not on file   Occupational History     Employer: RETIRED   Social Needs     Financial resource strain: Not on file     Food insecurity:     Worry: Not on file     Inability: Not on file     Transportation needs:     Medical: Not on file     Non-medical: Not on file   Tobacco Use     Smoking status: Never Smoker     Smokeless  tobacco: Never Used   Substance and Sexual Activity     Alcohol use: No     Alcohol/week: 0.0 oz     Drug use: No     Sexual activity: Not Currently   Lifestyle     Physical activity:     Days per week: Not on file     Minutes per session: Not on file     Stress: Not on file   Relationships     Social connections:     Talks on phone: Not on file     Gets together: Not on file     Attends [employer] service: Not on file     Active member of club or organization: Not on file     Attends meetings of clubs or organizations: Not on file     Relationship status: Not on file     Intimate partner violence:     Fear of current or ex partner: Not on file     Emotionally abused: Not on file     Physically abused: Not on file     Forced sexual activity: Not on file   Other Topics Concern     Parent/sibling w/ CABG, MI or angioplasty before 65F 55M? Not Asked      Service Not Asked     Blood Transfusions Not Asked     Caffeine Concern Not Asked     Occupational Exposure Not Asked     Hobby Hazards Not Asked     Sleep Concern Not Asked     Stress Concern Not Asked     Weight Concern Not Asked     Special Diet Yes     Comment: low salt      Back Care Not Asked     Exercise Yes     Comment: semi recument bike 15 mins daily      Bike Helmet Not Asked     Seat Belt Not Asked     Self-Exams Not Asked   Social History Narrative    .  Lives in assisted living. Independent. Has help with making bed and changing sheets.    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 .     4 children - 1 with RA       Review of Systems:  Skin:  Negative bruising     Eyes:  Positive for glasses    ENT:  Positive for hearing loss wears hearing aids in both ears  Respiratory:  Negative cough;shortness of breath SOB occ   Cardiovascular:  Negative for;palpitations;lightheadedness;dizziness;edema;chest pain edema;Positive for .slight edema in ankles  Gastroenterology: Negative heartburn protonix, tums  ( more frequently having  "episodes)  Genitourinary:  Positive for      Musculoskeletal:  Positive for arthritis low back pain   Neurologic:  Positive for numbness or tingling of feet toes,  has had this since 2006  Psychiatric:  Positive for anxiety occ.  Heme/Lymph/Imm:  Negative easy bruising    Endocrine:  Positive for thyroid disorder      Physical Exam:  Vitals: /64 (BP Location: Left arm, Cuff Size: Adult Regular)   Pulse 80   Resp 12   Ht 1.676 m (5' 6\")   Wt 58.7 kg (129 lb 6.4 oz)   SpO2 94%   BMI 20.89 kg/m      Constitutional:  cooperative;in no acute distress thin      Skin:  warm and dry to the touch, no apparent skin lesions or masses noted          Head:  normocephalic, no masses or lesions        Eyes:  pupils equal and round, conjunctivae and lids unremarkable, sclera white, no xanthalasma, EOMS intact, no nystagmus        Lymph:No Cervical lymphadenopathy present     ENT:  no pallor or cyanosis        Neck:  carotid pulses are full and equal bilaterally, JVP normal, no carotid bruit        Respiratory:  normal breath sounds, clear to auscultation, normal A-P diameter, normal symmetry, normal respiratory excursion, no use of accessory muscles         Cardiac: normal S1 and S2       systolic murmur        pulses full and equal, no bruits auscultated                                        GI:  abdomen soft, non-tender, BS normoactive, no mass, no HSM, no bruits        Extremities and Muscular Skeletal:  no deformities, clubbing, cyanosis, erythema observed              Neurological:  no gross motor deficits        Psych:  Alert and Oriented x 3        CC  Khloe Serrano, CNP  [address] [place] [address]              "

## 2019-08-22 NOTE — LETTER
8/22/2019      Augusto Meyer, DO  [address] [place] [address]      RE: Gillian Burk       Dear Colleague,    I had the pleasure of seeing Gillian Burk in the [facility].    Service Date: 08/22/2019      HISTORY OF PRESENT ILLNESS:  I had the opportunity to see Ms. Gillian Burk in Cardiology Clinic today at the [facility] in [place] for reevaluation of aortic valve disease and coronary artery disease.        She is a 89-year-old woman who had severe aortic stenosis treated with transcatheter aortic valve replacement in 2016.  She developed frequent episodes of exertional angina in 2017 and I recommended coronary angiography.  That study demonstrated a moderately severe blockage in the mid to distal LAD which we had planned to treat with a staged intervention.  However, medication changes seemed to help improve her anginal symptoms significantly and she decided not to go through with revascularization.  Since then, we have been managing that situation medically.  She still has occasional episodes of mild angina lasting less than 5 minutes and stopping when she rests.  These always occur with exertion and not at rest.  She has required only 1  nitroglycerin sublingual tablet in the recent past which was needed after she walked from her apartment to the lunch room at her senior complex.  One of the nursing home workers suggested that she take a nitroglycerin and it helped relieve her chest discomfort immediately.      Her primary complaint at this time is exertional fatigue and shortness of breath.  She cannot be more specific about which issue seems to be the limiting factor for her.  She seems frustrated that she cannot exert herself very much before having to stop due to these symptoms.      PHYSICAL EXAMINATION:  Her blood pressure is 108/64, heart rate 80 and weight 129 pounds.  Usually her blood pressures are up in the  120s/60s.  Her lungs are clear.  Heart rhythm is regular.  She has a very soft systolic ejection murmur at the base and no carotid bruits or edema.          IMPRESSIONS:  Ms. Lucio Hannon is a 89-year-old woman with history of severe aortic stenosis treated with transcatheter aortic valve replacement in 2016.  Her echocardiogram this year on 2019 continues to look good, showing normal or near normal left ventricular function and normally functioning bioprosthetic aortic valve.  She has mild to moderate mitral regurgitation.  I do not think abnormalities of her cardiac structure or function are likely causing her exertional fatigue and shortness of breath.  She has coronary artery disease with a mid to distal LAD stenosis of about 70%-75%, which causes occasional angina which I suspect is more likely the cause of her exertional shortness of breath as well.  I offered her an option to go forward with revascularization, hoping that it would improve the symptoms, but she declined.  She does not wish to have any invasive procedures at this time.  She does not feel like her symptoms are bad enough at this point.      I think her antianginal medications were maximized and I will not make any changes.  Her cholesterol numbers are excellent and her basic metabolic panel is normal.  We reviewed those numbers together.      I will have her follow up with me again in 1 year for reevaluation or sooner if she has any progressive cardiac symptoms.      cc:      u[place]ust[place] [address]         MARGAUX WILLIAMSON MD, [facility]             D: 2019   T: 2019   MT: DEBBIE      Name:     LUCIO HANNON   MRN:      [id number]        Account:      [id number]   :      1927           Service Date: 2019      Document: [id number]         Outpatient Encounter Medications as of 2019   Medication Sig Dispense Refill     acetaminophen  (TYLENOL ARTHRITIS PAIN) 650 MG CR tablet Take 1,300 mg by mouth every 8 hours as needed for mild pain        ALPRAZolam (XANAX) 0.25 MG tablet TAKE 1 TABLET BY MOUTH TWICE DAILY AS NEEDED FOR ANXIETY 28 tablet 0     amLODIPine (NORVASC) 5 MG tablet Take 1 tablet (5 mg) by mouth daily 90 tablet 3     aspirin EC 81 MG EC tablet Take 1 tablet (81 mg) by mouth daily       atorvastatin (LIPITOR) 40 MG tablet Take 1 tablet (40 mg) by mouth daily 90 tablet 1     calcium carbonate (TUMS) 500 MG chewable tablet Take 2-4 tablets (1,000-2,000 mg) by mouth as needed for heartburn       clopidogrel (PLAVIX) 75 MG tablet TAKE ONE TABLET BY MOUTH ONCE DAILY 30 tablet 11     Cranberry 1000 MG CAPS        docusate sodium (COLACE) 100 MG capsule Take 200 mg by mouth daily 2 capsules       EUTHYROX 50 MCG tablet TAKE 1 TABLET BY MOUTH ONCE DAILY 30 tablet 1     famotidine (PEPCID) 40 MG tablet TAKE 1 TABLET BY MOUTH AT BEDTIME 30 tablet 9     folic acid (FOLVITE) 1 MG tablet TAKE 1 TABLET BY MOUTH ONCE DAILY 90 tablet 1     Hylan (SYNVISC) 16 MG/2ML SOSY 16 mg by INTRA-ARTICULAR route once 2 mL 0     isosorbide mononitrate (IMDUR) 60 MG 24 hr tablet Take 2 tablets (120 mg) by mouth daily 90 tablet 3     Lactobacillus (FLORAJEN ACIDOPHILUS) CAPS Take 1 capsule by mouth daily       levETIRAcetam (KEPPRA) 500 MG tablet Take 1 tablet (500 mg) by mouth 2 times daily 90 tablet 6     losartan (COZAAR) 25 MG tablet TAKE 1/2 (ONE-HALF) TABLET BY MOUTH ONCE DAILY 45 tablet 1     methotrexate 2.5 MG tablet Take 4 tablets (10 mg) by mouth once a week Wed 52 tablet 3     metoprolol succinate ER (TOPROL-XL) 50 MG 24 hr tablet Take 1 tablet (50 mg) by mouth 2 times daily 180 tablet 3     mirtazapine (REMERON) 30 MG tablet TAKE 1 TABLET BY MOUTH ONCE DAILY AT BEDTIME 90 tablet 1     nitroFURantoin macrocrystal-monohydrate (MACROBID) 100 MG capsule Take 100 mg by mouth once       nitroGLYcerin (NITROSTAT) 0.4 MG sublingual tablet DISSOLVE ONE TABLET  UNDER THE TONGUE EVERY 5 MINUTES AS NEEDED FOR CHEST PAIN.  DO NOT EXCEED A TOTAL OF 3 DOSES IN 15 MINUTES 25 tablet 0     predniSONE (DELTASONE) 5 MG tablet TAKE ONE TABLET BY MOUTH EVERY OTHER DAY ALTERNATING  WITH  1/2  TABLET  EVERY  OTHER  DAY 23 tablet 13     predniSONE (DELTASONE) 50 MG tablet Take 50 mg by mouth daily. 5 tablet 0     gabapentin (NEURONTIN) 100 MG capsule TAKE 2 CAPSULES BY MOUTH THREE TIMES DAILY (Patient not taking: Reported on 8/6/2019) 180 capsule 0     [DISCONTINUED] EQ ALLERGY RELIEF 10 MG tablet TAKE 1 TABLET BY MOUTH ONCE DAILY (Patient not taking: Reported on 8/6/2019) 90 tablet 1     [DISCONTINUED] multivitamin, therapeutic with minerals (MULTI-VITAMIN) TABS Take 1 tablet by mouth daily       No facility-administered encounter medications on file as of 8/22/2019.        Again, thank you for allowing me to participate in the care of your patient.      Sincerely,    MARGAUX WILLIAMSON MD     [facility]

## 2019-08-22 NOTE — PROGRESS NOTES
Service Date: 08/22/2019      HISTORY OF PRESENT ILLNESS:  I had the opportunity to see Ms. Gillian Burk in Cardiology Clinic today at the [facility] in [place] for reevaluation of aortic valve disease and coronary artery disease.        She is a 89-year-old woman who had severe aortic stenosis treated with transcatheter aortic valve replacement in 2016.  She developed frequent episodes of exertional angina in 2017 and I recommended coronary angiography.  That study demonstrated a moderately severe blockage in the mid to distal LAD which we had planned to treat with a staged intervention.  However, medication changes seemed to help improve her anginal symptoms significantly and she decided not to go through with revascularization.  Since then, we have been managing that situation medically.  She still has occasional episodes of mild angina lasting less than 5 minutes and stopping when she rests.  These always occur with exertion and not at rest.  She has required only 1  nitroglycerin sublingual tablet in the recent past which was needed after she walked from her apartment to the lunch room at her senior complex.  One of the nursing home workers suggested that she take a nitroglycerin and it helped relieve her chest discomfort immediately.      Her primary complaint at this time is exertional fatigue and shortness of breath.  She cannot be more specific about which issue seems to be the limiting factor for her.  She seems frustrated that she cannot exert herself very much before having to stop due to these symptoms.      PHYSICAL EXAMINATION:  Her blood pressure is 108/64, heart rate 80 and weight 129 pounds.  Usually her blood pressures are up in the 120s/60s.  Her lungs are clear.  Heart rhythm is regular.  She has a very soft systolic ejection murmur at the base and no carotid bruits or edema.      IMPRESSIONS:  Ms. Gillian Burk is a 89-year-old woman with history of severe  aortic stenosis treated with transcatheter aortic valve replacement in 2016.  Her echocardiogram this year on 2019 continues to look good, showing normal or near normal left ventricular function and normally functioning bioprosthetic aortic valve.  She has mild to moderate mitral regurgitation.  I do not think abnormalities of her cardiac structure or function are likely causing her exertional fatigue and shortness of breath.  She has coronary artery disease with a mid to distal LAD stenosis of about 70%-75%, which causes occasional angina which I suspect is more likely the cause of her exertional shortness of breath as well.  I offered her an option to go forward with revascularization, hoping that it would improve the symptoms, but she declined.  She does not wish to have any invasive procedures at this time.  She does not feel like her symptoms are bad enough at this point.      I think her antianginal medications were maximized and I will not make any changes.  Her cholesterol numbers are excellent and her basic metabolic panel is normal.  We reviewed those numbers together.      I will have her follow up with me again in 1 year for reevaluation or sooner if she has any progressive cardiac symptoms.      cc:      [place] [address]         MARGAUX WILLIAMSON MD, [facility]             D: 2019   T: 2019   MT: DEBBIE      Name:     LUCIO HANNON   MRN:      [id number]        Account:      [id number]   :      1927           Service Date: 2019      Document: [id number]

## 2019-09-10 DIAGNOSIS — Z86.73 HISTORY OF CVA (CEREBROVASCULAR ACCIDENT): ICD-10-CM

## 2019-09-10 LAB
BASOPHILS # BLD AUTO: 0.1 10E9/L (ref 0–0.2)
BASOPHILS NFR BLD AUTO: 0.7 %
DIFFERENTIAL METHOD BLD: ABNORMAL
EOSINOPHIL NFR BLD AUTO: 1 %
ERYTHROCYTE [DISTWIDTH] IN BLOOD BY AUTOMATED COUNT: 16.5 % (ref 10–15)
HCT VFR BLD AUTO: 31.1 % (ref 35–47)
HGB BLD-MCNC: 9.9 G/DL (ref 11.7–15.7)
IMM GRANULOCYTES # BLD: 0.1 10E9/L (ref 0–0.4)
IMM GRANULOCYTES NFR BLD: 0.5 %
LYMPHOCYTES # BLD AUTO: 2.8 10E9/L (ref 0.8–5.3)
LYMPHOCYTES NFR BLD AUTO: 22.2 %
MCH RBC QN AUTO: 28.9 PG (ref 26.5–33)
MCHC RBC AUTO-ENTMCNC: 31.8 G/DL (ref 31.5–36.5)
MCV RBC AUTO: 91 FL (ref 78–100)
MONOCYTES # BLD AUTO: 1.2 10E9/L (ref 0–1.3)
MONOCYTES NFR BLD AUTO: 9.2 %
NEUTROPHILS # BLD AUTO: 8.5 10E9/L (ref 1.6–8.3)
NEUTROPHILS NFR BLD AUTO: 66.4 %
NRBC # BLD AUTO: 0 10*3/UL
NRBC BLD AUTO-RTO: 0 /100
PLATELET # BLD AUTO: 333 10E9/L (ref 150–450)
RBC # BLD AUTO: 3.43 10E12/L (ref 3.8–5.2)
WBC # BLD AUTO: 12.8 10E9/L (ref 4–11)

## 2019-09-10 PROCEDURE — 36415 COLL VENOUS BLD VENIPUNCTURE: CPT | Performed by: PHYSICIAN ASSISTANT

## 2019-09-10 PROCEDURE — 85025 COMPLETE CBC W/AUTO DIFF WBC: CPT | Performed by: PHYSICIAN ASSISTANT

## 2019-09-11 ENCOUNTER — TELEPHONE (OUTPATIENT)
Dept: FAMILY MEDICINE | Facility: OTHER | Age: 84
End: 2019-09-11

## 2019-09-11 NOTE — TELEPHONE ENCOUNTER
Spoke with patient and informed of results below. Patient understood and appointment was made.     Dee Benitez MA

## 2019-09-11 NOTE — TELEPHONE ENCOUNTER
----- Message from Augusto Meyer, DO sent at 9/11/2019  8:27 AM CDT -----  The patient's hemoglobin seems to be dropping somewhat.  I last saw her about 4 months ago.  If this is not being directed by another provider, I would like to see her so we may further evaluate her anemia.    Thank you very much.    Betsy

## 2019-09-12 ENCOUNTER — OFFICE VISIT (OUTPATIENT)
Dept: FAMILY MEDICINE | Facility: OTHER | Age: 84
End: 2019-09-12
Payer: COMMERCIAL

## 2019-09-12 VITALS
HEART RATE: 88 BPM | RESPIRATION RATE: 16 BRPM | DIASTOLIC BLOOD PRESSURE: 74 MMHG | TEMPERATURE: 98 F | WEIGHT: 131.3 LBS | BODY MASS INDEX: 21.19 KG/M2 | OXYGEN SATURATION: 97 % | SYSTOLIC BLOOD PRESSURE: 136 MMHG

## 2019-09-12 DIAGNOSIS — D64.9 ANEMIA, UNSPECIFIED TYPE: Primary | ICD-10-CM

## 2019-09-12 DIAGNOSIS — M05.9 SEROPOSITIVE RHEUMATOID ARTHRITIS (H): ICD-10-CM

## 2019-09-12 DIAGNOSIS — K27.4 GASTROINTESTINAL HEMORRHAGE ASSOCIATED WITH PEPTIC ULCER: ICD-10-CM

## 2019-09-12 DIAGNOSIS — Z95.2 S/P TAVR (TRANSCATHETER AORTIC VALVE REPLACEMENT): ICD-10-CM

## 2019-09-12 DIAGNOSIS — I25.5 ISCHEMIC CARDIOMYOPATHY: ICD-10-CM

## 2019-09-12 DIAGNOSIS — I10 HYPERTENSION GOAL BP (BLOOD PRESSURE) < 140/90: ICD-10-CM

## 2019-09-12 LAB
BASOPHILS # BLD AUTO: 0.1 10E9/L (ref 0–0.2)
BASOPHILS NFR BLD AUTO: 0.8 %
DIFFERENTIAL METHOD BLD: ABNORMAL
EOSINOPHIL # BLD AUTO: 0.1 10E9/L (ref 0–0.7)
EOSINOPHIL NFR BLD AUTO: 1.6 %
ERYTHROCYTE [DISTWIDTH] IN BLOOD BY AUTOMATED COUNT: 16.6 % (ref 10–15)
FERRITIN SERPL-MCNC: 24 NG/ML (ref 8–252)
HCT VFR BLD AUTO: 31.4 % (ref 35–47)
HGB BLD-MCNC: 9.8 G/DL (ref 11.7–15.7)
IRON SATN MFR SERPL: 15 % (ref 15–46)
IRON SERPL-MCNC: 43 UG/DL (ref 35–180)
LYMPHOCYTES # BLD AUTO: 1.8 10E9/L (ref 0.8–5.3)
LYMPHOCYTES NFR BLD AUTO: 28.8 %
MCH RBC QN AUTO: 28.9 PG (ref 26.5–33)
MCHC RBC AUTO-ENTMCNC: 31.2 G/DL (ref 31.5–36.5)
MCV RBC AUTO: 93 FL (ref 78–100)
MONOCYTES # BLD AUTO: 0.7 10E9/L (ref 0–1.3)
MONOCYTES NFR BLD AUTO: 11.9 %
NEUTROPHILS # BLD AUTO: 3.5 10E9/L (ref 1.6–8.3)
NEUTROPHILS NFR BLD AUTO: 56.9 %
PLATELET # BLD AUTO: 345 10E9/L (ref 150–450)
RBC # BLD AUTO: 3.39 10E12/L (ref 3.8–5.2)
RETICS # AUTO: 72.1 10E9/L (ref 25–95)
RETICS/RBC NFR AUTO: 2.2 % (ref 0.5–2)
TIBC SERPL-MCNC: 292 UG/DL (ref 240–430)
WBC # BLD AUTO: 6.1 10E9/L (ref 4–11)

## 2019-09-12 PROCEDURE — 83550 IRON BINDING TEST: CPT | Performed by: INTERNAL MEDICINE

## 2019-09-12 PROCEDURE — 85045 AUTOMATED RETICULOCYTE COUNT: CPT | Performed by: INTERNAL MEDICINE

## 2019-09-12 PROCEDURE — 83540 ASSAY OF IRON: CPT | Performed by: INTERNAL MEDICINE

## 2019-09-12 PROCEDURE — 82728 ASSAY OF FERRITIN: CPT | Performed by: INTERNAL MEDICINE

## 2019-09-12 PROCEDURE — 36415 COLL VENOUS BLD VENIPUNCTURE: CPT | Performed by: INTERNAL MEDICINE

## 2019-09-12 PROCEDURE — 82607 VITAMIN B-12: CPT | Performed by: INTERNAL MEDICINE

## 2019-09-12 PROCEDURE — 99214 OFFICE O/P EST MOD 30 MIN: CPT | Performed by: INTERNAL MEDICINE

## 2019-09-12 PROCEDURE — 85025 COMPLETE CBC W/AUTO DIFF WBC: CPT | Performed by: INTERNAL MEDICINE

## 2019-09-12 PROCEDURE — 82746 ASSAY OF FOLIC ACID SERUM: CPT | Performed by: INTERNAL MEDICINE

## 2019-09-12 ASSESSMENT — PAIN SCALES - GENERAL: PAINLEVEL: NO PAIN (0)

## 2019-09-12 NOTE — PROGRESS NOTES
Subjective     Gillian Burk is a 89 year old female who presents to clinic today for the following health issues:    HPI   Chief Complaint   Patient presents with     Anemia     follow up labs from 9/10/2019                         Chief Complaint         The patient is a pleasant 89-year-old female who presents today at my request.  Recent laboratory work suggests that she has developed some anemia.  Her most recent hemoglobin was 9.9.  This is down from the previous 11.1 in April.  She notes no gross bleeding or bruising.  She does have a previous history of iron deficiency anemia.  She also has a history of seropositive rheumatoid arthritis but does not have significant endorgan changes as of yet.  She is status post TAVR and tolerating this well.  Her aortic stenosis is markedly improved and her edema and signs of congestive heart failure have resolved.  She continues to have some dyspnea upon exertion which is believed to be the result of chronic cardiac changes.  She also has some coronary artery disease and is believe this is the cause of her dyspnea as well.  Previous recommendations for revascularization were declined by the patient based on her age.                       PAST, FAMILY,SOCIAL HISTORY:     Medical  History:   has a past medical history of Aortic stenosis, Chronic migraine, Hyperlipidaemia, Hypertension, Hypothyroidism, Myocardial infarction (H), Need for SBE (subacute bacterial endocarditis) prophylaxis, Orthostatic hypotension, PUD (peptic ulcer disease), Secondary Sjogren's syndrome (H), Seizures (H), Seropositive rheumatoid arthritis (H), Stroke (H) (05/2013), and Syncope (2014).     Surgical History:   has a past surgical history that includes Mouth surgery (2011); Eye surgery (1999); cataract iol, rt/lt (Bilateral, 2000); Hysterectomy total abdominal (1970); TOTAL HIP ARTHROPLASTY (Right, 2010); TOTAL HIP ARTHROPLASTY (Left, 2014); TRANSCATHETER AORTIC VALVE IMPLANT ANEST (N/A,  8/3/2016); and Heart Cath Femoral Cannulization With Open Standby Repair Aortic Valve (N/A, 8/3/2016).     Social History:   reports that she has never smoked. She has never used smokeless tobacco. She reports that she does not drink alcohol or use drugs.     Family History:  family history includes Hyperlipidemia in her mother.            MEDICATIONS  Current Outpatient Medications   Medication Sig Dispense Refill     acetaminophen (TYLENOL ARTHRITIS PAIN) 650 MG CR tablet Take 1,300 mg by mouth every 8 hours as needed for mild pain        ALPRAZolam (XANAX) 0.25 MG tablet TAKE 1 TABLET BY MOUTH TWICE DAILY AS NEEDED FOR ANXIETY 28 tablet 0     amLODIPine (NORVASC) 5 MG tablet Take 1 tablet (5 mg) by mouth daily 90 tablet 3     aspirin EC 81 MG EC tablet Take 1 tablet (81 mg) by mouth daily       atorvastatin (LIPITOR) 40 MG tablet Take 1 tablet (40 mg) by mouth daily 90 tablet 1     calcium carbonate (TUMS) 500 MG chewable tablet Take 2-4 tablets (1,000-2,000 mg) by mouth as needed for heartburn       clopidogrel (PLAVIX) 75 MG tablet TAKE 1 TABLET BY MOUTH ONCE DAILY 30 tablet 0     Cranberry 1000 MG CAPS        docusate sodium (COLACE) 100 MG capsule Take 200 mg by mouth daily 2 capsules       EUTHYROX 50 MCG tablet TAKE 1 TABLET BY MOUTH ONCE DAILY 30 tablet 1     famotidine (PEPCID) 40 MG tablet TAKE 1 TABLET BY MOUTH AT BEDTIME 30 tablet 9     folic acid (FOLVITE) 1 MG tablet TAKE 1 TABLET BY MOUTH ONCE DAILY 90 tablet 1     isosorbide mononitrate (IMDUR) 60 MG 24 hr tablet Take 2 tablets (120 mg) by mouth daily 90 tablet 3     Lactobacillus (FLORAJEN ACIDOPHILUS) CAPS Take 1 capsule by mouth daily       levETIRAcetam (KEPPRA) 500 MG tablet Take 1 tablet (500 mg) by mouth 2 times daily 90 tablet 6     losartan (COZAAR) 25 MG tablet TAKE 1/2 (ONE-HALF) TABLET BY MOUTH ONCE DAILY 45 tablet 1     methotrexate 2.5 MG tablet Take 4 tablets (10 mg) by mouth once a week Wed 52 tablet 3     metoprolol succinate ER  (TOPROL-XL) 50 MG 24 hr tablet Take 1 tablet (50 mg) by mouth 2 times daily 180 tablet 3     mirtazapine (REMERON) 30 MG tablet TAKE 1 TABLET BY MOUTH ONCE DAILY AT BEDTIME 90 tablet 1     nitroFURantoin macrocrystal-monohydrate (MACROBID) 100 MG capsule Take 100 mg by mouth once       nitroGLYcerin (NITROSTAT) 0.4 MG sublingual tablet DISSOLVE ONE TABLET UNDER THE TONGUE EVERY 5 MINUTES AS NEEDED FOR CHEST PAIN.  DO NOT EXCEED A TOTAL OF 3 DOSES IN 15 MINUTES 25 tablet 0     predniSONE (DELTASONE) 5 MG tablet TAKE ONE TABLET BY MOUTH EVERY OTHER DAY ALTERNATING  WITH  1/2  TABLET  EVERY  OTHER  DAY 23 tablet 13     predniSONE (DELTASONE) 50 MG tablet Take 50 mg by mouth daily. (Patient not taking: Reported on 9/12/2019) 5 tablet 0         --------------------------------------------------------------------------------------------------------------------                              Review of Systems       LUNGS: Pt denies: cough, excess sputum, hemoptysis, or shortness of breath.   HEART: Pt denies: chest pain, arrhythmia, syncope, tachy or bradyarrhythmia.   GI: Pt denies: nausea, vomiting, diarrhea, constipation, melena, or hematochezia.  She does have a prior history of gastric ulcer.   NEURO: Pt denies: seizures, strokes, diplopia, weakness, paraesthesias, or paralysis.   SKIN: Pt denies: itching, rashes, discoloration, or specific lesions of concern. Denies recent hair loss.   PSYCH: The patient denies significant depression, anxiety, mood imbalance. Specifically denies any suicidal ideation.                                     Examination    /74 (BP Location: Right arm, Patient Position: Sitting, Cuff Size: Adult Regular)   Pulse 88   Temp 98  F (36.7  C) (Temporal)   Resp 16   Wt 59.6 kg (131 lb 4.8 oz)   SpO2 97%   BMI 21.19 kg/m       Constitutional: The patient appears to be in no acute distress. The patient appears to be adequately hydrated. No acute respiratory or hemodynamic distress is  "noted at this time.   LUNGS: clear bilaterally, airflow is brisk, no intercostal retraction or stridor is noted. No coughing is noted during visit.   HEART:  regular without rubs, clicks, gallops. PMI is nondisplaced. Upstrokes are brisk. S1,S2 are heard.  The bioprosthetic valve is heard with only minimal regurgitant murmur.   GI: Abdomen is soft, without rebound, guarding or tenderness. Bowel sounds are appropriate. No renal bruits are heard.   NEURO: Pt is alert and appropriate. No neurologic lateralization is noted. Cranial nerves 2-12 are intact. Peripheral sensory and motor function are grossly normal.    SKIN:  warm and dry. No erythema, or rashes are noted. No specific lesions of concern are noted.  Patient is mildly pale.   PSYCH: The patient appears grossly appropriate. Maintains good eye contact, does not have any jittery or atypical motion. Displays appropriate affect.                                           Decision Making  1. Anemia, unspecified type  We will obtain iron studies as well as B12 and folic acid  - Folate  - CBC with platelets and differential  - Ferritin  - Iron and iron binding capacity  - Vitamin B12  - Reticulocyte count    2. Seropositive rheumatoid arthritis (H)  I believe this is essentially \"burned-out\" I do not believe her new onset anemia is a result of \"chronic disease\".    3. S/P TAVR (transcatheter aortic valve replacement)  Doing quite well.  Patient follows with cardiology    4. Ischemic cardiomyopathy  Refusing revascularization    5. Hypertension goal BP (blood pressure) < 140/90  Controlled without recent hypotension    6. Gastrointestinal hemorrhage associated with peptic ulcer  Historically noted.  Will follow hemoglobin and if necessary (absolutely necessary with no alternative) we will discuss upper and lower endoscopy.    Long talk with patient and daughter regarding endoscopic evaluation.  If she were to have a colon tumor, she most probably would not survive the " surgical excision and postoperative recovery.  Therefore if hemoglobin stabilizes, conservative measures are suggested and the patient and her daughter firmly agree        I have carefully explained the diagnosis and treatment options to the patient.  The patient has displayed an understanding of the above, and all subsequent questions were answered.          DO DELMA Pineda    Portions of this note were produced using [employer]  Although every attempt at real-time proof reading has been made, occasional grammar/syntax errors may have been missed.

## 2019-09-13 LAB
FOLATE SERPL-MCNC: 61.2 NG/ML
VIT B12 SERPL-MCNC: 508 PG/ML (ref 193–986)

## 2019-09-16 ENCOUNTER — TELEPHONE (OUTPATIENT)
Dept: FAMILY MEDICINE | Facility: OTHER | Age: 84
End: 2019-09-16

## 2019-09-16 RX ORDER — HEPARIN SODIUM (PORCINE) LOCK FLUSH IV SOLN 100 UNIT/ML 100 UNIT/ML
5 SOLUTION INTRAVENOUS
Status: CANCELLED | OUTPATIENT
Start: 2019-09-18

## 2019-09-16 RX ORDER — HEPARIN SODIUM,PORCINE 10 UNIT/ML
5 VIAL (ML) INTRAVENOUS
Status: CANCELLED | OUTPATIENT
Start: 2019-09-18

## 2019-09-16 NOTE — TELEPHONE ENCOUNTER
I have placed orders for IV iron.  Could you please help the patient set this up with the nice folks in the infusion department?      Thank you.    Betsy

## 2019-09-16 NOTE — RESULT ENCOUNTER NOTE
Dear Gillian, your recent test results are attached.  The folic acid level is normal.  The B12 level is normal.  The ferritin has decreased significantly but is still in the low normal range.  Iron saturation is low normal.  The reticulocyte count is up slightly.  Hemoglobin as tested was 9.8.  This is all consistent with mild iron deficiency anemia.  We will need to follow the hemoglobin closely.  If he continues to decrease, we will pursue the IV iron infusions as previously discussed.    Feel free to contact me via the office or My Chart if you have any questions regarding the above.  Sincerely,  DO SATHISH PinedaOI

## 2019-09-18 ENCOUNTER — INFUSION THERAPY VISIT (OUTPATIENT)
Dept: INFUSION THERAPY | Facility: CLINIC | Age: 84
End: 2019-09-18
Attending: INTERNAL MEDICINE
Payer: COMMERCIAL

## 2019-09-18 VITALS
BODY MASS INDEX: 21.11 KG/M2 | SYSTOLIC BLOOD PRESSURE: 131 MMHG | DIASTOLIC BLOOD PRESSURE: 58 MMHG | TEMPERATURE: 98.8 F | OXYGEN SATURATION: 95 % | HEART RATE: 72 BPM | RESPIRATION RATE: 14 BRPM | WEIGHT: 130.8 LBS

## 2019-09-18 DIAGNOSIS — D50.0 IRON DEFICIENCY ANEMIA DUE TO CHRONIC BLOOD LOSS: Primary | ICD-10-CM

## 2019-09-18 PROCEDURE — 96365 THER/PROPH/DIAG IV INF INIT: CPT

## 2019-09-18 PROCEDURE — 25800030 ZZH RX IP 258 OP 636: Performed by: INTERNAL MEDICINE

## 2019-09-18 PROCEDURE — 96366 THER/PROPH/DIAG IV INF ADDON: CPT

## 2019-09-18 PROCEDURE — 25000128 H RX IP 250 OP 636: Performed by: INTERNAL MEDICINE

## 2019-09-18 PROCEDURE — 96375 TX/PRO/DX INJ NEW DRUG ADDON: CPT

## 2019-09-18 RX ORDER — HEPARIN SODIUM (PORCINE) LOCK FLUSH IV SOLN 100 UNIT/ML 100 UNIT/ML
5 SOLUTION INTRAVENOUS
Status: CANCELLED | OUTPATIENT
Start: 2019-09-20

## 2019-09-18 RX ORDER — HEPARIN SODIUM,PORCINE 10 UNIT/ML
5 VIAL (ML) INTRAVENOUS
Status: DISCONTINUED | OUTPATIENT
Start: 2019-09-18 | End: 2019-09-18 | Stop reason: HOSPADM

## 2019-09-18 RX ORDER — METHYLPREDNISOLONE SODIUM SUCCINATE 125 MG/2ML
125 INJECTION, POWDER, LYOPHILIZED, FOR SOLUTION INTRAMUSCULAR; INTRAVENOUS ONCE
Status: CANCELLED
Start: 2019-09-20

## 2019-09-18 RX ORDER — METHYLPREDNISOLONE SODIUM SUCCINATE 125 MG/2ML
125 INJECTION, POWDER, LYOPHILIZED, FOR SOLUTION INTRAMUSCULAR; INTRAVENOUS ONCE
Status: COMPLETED | OUTPATIENT
Start: 2019-09-18 | End: 2019-09-18

## 2019-09-18 RX ORDER — HEPARIN SODIUM,PORCINE 10 UNIT/ML
5 VIAL (ML) INTRAVENOUS
Status: CANCELLED | OUTPATIENT
Start: 2019-09-20

## 2019-09-18 RX ORDER — HEPARIN SODIUM (PORCINE) LOCK FLUSH IV SOLN 100 UNIT/ML 100 UNIT/ML
5 SOLUTION INTRAVENOUS
Status: DISCONTINUED | OUTPATIENT
Start: 2019-09-18 | End: 2019-09-18 | Stop reason: HOSPADM

## 2019-09-18 RX ADMIN — METHYLPREDNISOLONE SODIUM SUCCINATE 125 MG: 125 INJECTION, POWDER, FOR SOLUTION INTRAMUSCULAR; INTRAVENOUS at 10:10

## 2019-09-18 RX ADMIN — SODIUM CHLORIDE 250 ML: 9 INJECTION, SOLUTION INTRAVENOUS at 10:08

## 2019-09-18 RX ADMIN — IRON SUCROSE 300 MG: 20 INJECTION, SOLUTION INTRAVENOUS at 10:37

## 2019-09-18 ASSESSMENT — PAIN SCALES - GENERAL: PAINLEVEL: MILD PAIN (2)

## 2019-09-18 NOTE — PROGRESS NOTES
Infusion Nursing Note:  Gillian TIFFANY Habersham presents today for Day 1 Venofer.    Patient seen by provider today: No   present during visit today: Not Applicable.    Note: Note on treatment plan stated patient could receive Solumedrol as a premedication if she has more than one drug allergy. Called Dr. Meyer to discuss and he instructed to give patient Solumedrol 125 mg IV prior to Venofer infusion. Discussed this with patient and daughter.     Intravenous Access:  Peripheral IV placed.    Treatment Conditions:  Not Applicable.      Post Infusion Assessment:  Patient tolerated infusion without incident. VSS, asymptomatic.  Patient observed for 15 minutes post Venofer per protocol.  Site patent and intact, free from redness, edema or discomfort.  No evidence of extravasations.  Access discontinued per protocol.       Discharge Plan:   Copy of AVS reviewed with patient and/or family.  Patient will return 9/25 for next appointment.  Patient discharged in stable condition accompanied by: daughter.  Departure Mode: Wheelchair.    Renetta Ordaz RN

## 2019-09-25 ENCOUNTER — INFUSION THERAPY VISIT (OUTPATIENT)
Dept: INFUSION THERAPY | Facility: CLINIC | Age: 84
End: 2019-09-25
Attending: INTERNAL MEDICINE
Payer: COMMERCIAL

## 2019-09-25 VITALS
RESPIRATION RATE: 16 BRPM | HEART RATE: 80 BPM | TEMPERATURE: 97.8 F | DIASTOLIC BLOOD PRESSURE: 70 MMHG | SYSTOLIC BLOOD PRESSURE: 133 MMHG | OXYGEN SATURATION: 94 %

## 2019-09-25 DIAGNOSIS — D50.0 IRON DEFICIENCY ANEMIA DUE TO CHRONIC BLOOD LOSS: Primary | ICD-10-CM

## 2019-09-25 PROCEDURE — 96375 TX/PRO/DX INJ NEW DRUG ADDON: CPT

## 2019-09-25 PROCEDURE — 96365 THER/PROPH/DIAG IV INF INIT: CPT

## 2019-09-25 PROCEDURE — 25800030 ZZH RX IP 258 OP 636: Performed by: INTERNAL MEDICINE

## 2019-09-25 PROCEDURE — 25000128 H RX IP 250 OP 636: Performed by: INTERNAL MEDICINE

## 2019-09-25 RX ORDER — METHYLPREDNISOLONE SODIUM SUCCINATE 125 MG/2ML
125 INJECTION, POWDER, LYOPHILIZED, FOR SOLUTION INTRAMUSCULAR; INTRAVENOUS ONCE
Status: COMPLETED | OUTPATIENT
Start: 2019-09-25 | End: 2019-09-25

## 2019-09-25 RX ORDER — HEPARIN SODIUM,PORCINE 10 UNIT/ML
5 VIAL (ML) INTRAVENOUS
Status: CANCELLED | OUTPATIENT
Start: 2019-09-26

## 2019-09-25 RX ORDER — HEPARIN SODIUM (PORCINE) LOCK FLUSH IV SOLN 100 UNIT/ML 100 UNIT/ML
5 SOLUTION INTRAVENOUS
Status: CANCELLED | OUTPATIENT
Start: 2019-09-26

## 2019-09-25 RX ORDER — METHYLPREDNISOLONE SODIUM SUCCINATE 125 MG/2ML
125 INJECTION, POWDER, LYOPHILIZED, FOR SOLUTION INTRAMUSCULAR; INTRAVENOUS ONCE
Status: CANCELLED
Start: 2019-09-26

## 2019-09-25 RX ADMIN — METHYLPREDNISOLONE SODIUM SUCCINATE 125 MG: 125 INJECTION, POWDER, FOR SOLUTION INTRAMUSCULAR; INTRAVENOUS at 11:54

## 2019-09-25 RX ADMIN — IRON SUCROSE 300 MG: 20 INJECTION, SOLUTION INTRAVENOUS at 12:11

## 2019-09-25 RX ADMIN — SODIUM CHLORIDE 250 ML: 9 INJECTION, SOLUTION INTRAVENOUS at 11:35

## 2019-09-25 ASSESSMENT — PAIN SCALES - GENERAL: PAINLEVEL: NO PAIN (0)

## 2019-09-25 NOTE — PROGRESS NOTES
Infusion Nursing Note:  Gillian Burk presents today for Venofer # 2.    Patient seen by provider today: No   present during visit today: Not Applicable.    Note: N/A.    Intravenous Access:  Peripheral IV placed.    Treatment Conditions:  Not Applicable.      Post Infusion Assessment:  Patient tolerated infusion without incident.  Patient observed for 15 minutes post venofer per protocol.  Blood return noted pre and post infusion.  Site patent and intact, free from redness, edema or discomfort.  No evidence of extravasations.  Access discontinued per protocol.       Discharge Plan:   Discharge instructions reviewed with: Patient and Family.  Patient and/or family verbalized understanding of discharge instructions and all questions answered.  Patient discharged in stable condition accompanied by: self and daughter.  Departure Mode: Ambulatory.    Nita Kulkarni RN

## 2019-10-01 ENCOUNTER — INFUSION THERAPY VISIT (OUTPATIENT)
Dept: INFUSION THERAPY | Facility: CLINIC | Age: 84
End: 2019-10-01
Attending: INTERNAL MEDICINE
Payer: COMMERCIAL

## 2019-10-01 VITALS
TEMPERATURE: 98.7 F | DIASTOLIC BLOOD PRESSURE: 78 MMHG | OXYGEN SATURATION: 95 % | RESPIRATION RATE: 18 BRPM | HEART RATE: 68 BPM | SYSTOLIC BLOOD PRESSURE: 149 MMHG

## 2019-10-01 DIAGNOSIS — Z86.73 HISTORY OF CVA (CEREBROVASCULAR ACCIDENT): ICD-10-CM

## 2019-10-01 DIAGNOSIS — G40.909 SEIZURE DISORDER (H): ICD-10-CM

## 2019-10-01 DIAGNOSIS — D50.0 IRON DEFICIENCY ANEMIA DUE TO CHRONIC BLOOD LOSS: Primary | ICD-10-CM

## 2019-10-01 PROCEDURE — 96375 TX/PRO/DX INJ NEW DRUG ADDON: CPT

## 2019-10-01 PROCEDURE — 96366 THER/PROPH/DIAG IV INF ADDON: CPT

## 2019-10-01 PROCEDURE — 96365 THER/PROPH/DIAG IV INF INIT: CPT

## 2019-10-01 PROCEDURE — 25800030 ZZH RX IP 258 OP 636: Performed by: INTERNAL MEDICINE

## 2019-10-01 PROCEDURE — 25000128 H RX IP 250 OP 636: Performed by: INTERNAL MEDICINE

## 2019-10-01 RX ORDER — HEPARIN SODIUM,PORCINE 10 UNIT/ML
5 VIAL (ML) INTRAVENOUS
Status: CANCELLED | OUTPATIENT
Start: 2019-10-01

## 2019-10-01 RX ORDER — METHYLPREDNISOLONE SODIUM SUCCINATE 125 MG/2ML
125 INJECTION, POWDER, LYOPHILIZED, FOR SOLUTION INTRAMUSCULAR; INTRAVENOUS ONCE
Status: COMPLETED | OUTPATIENT
Start: 2019-10-01 | End: 2019-10-01

## 2019-10-01 RX ORDER — METHYLPREDNISOLONE SODIUM SUCCINATE 125 MG/2ML
125 INJECTION, POWDER, LYOPHILIZED, FOR SOLUTION INTRAMUSCULAR; INTRAVENOUS ONCE
Status: CANCELLED
Start: 2019-10-01

## 2019-10-01 RX ORDER — HEPARIN SODIUM (PORCINE) LOCK FLUSH IV SOLN 100 UNIT/ML 100 UNIT/ML
5 SOLUTION INTRAVENOUS
Status: CANCELLED | OUTPATIENT
Start: 2019-10-01

## 2019-10-01 RX ADMIN — SODIUM CHLORIDE 250 ML: 9 INJECTION, SOLUTION INTRAVENOUS at 11:31

## 2019-10-01 RX ADMIN — METHYLPREDNISOLONE SODIUM SUCCINATE 125 MG: 125 INJECTION, POWDER, FOR SOLUTION INTRAMUSCULAR; INTRAVENOUS at 11:31

## 2019-10-01 RX ADMIN — IRON SUCROSE 300 MG: 20 INJECTION, SOLUTION INTRAVENOUS at 11:56

## 2019-10-01 NOTE — PROGRESS NOTES
Infusion Nursing Note:  Gillian Burk presents today for Day 3 Venofer.    Patient seen by provider today: No   present during visit today: Not Applicable.    Note: No new concerns today, VSS.    Intravenous Access:  Peripheral IV placed.    Treatment Conditions:  Not Applicable.      Post Infusion Assessment:  Patient tolerated infusion without incident.  Site patent and intact, free from redness, edema or discomfort.  No evidence of extravasations.   Peripheral IV access discontinued.      Discharge Plan:   Copy of AVS reviewed with patient and/or family.  Patient does not have upcoming appt scheduled at this time.  Patient discharged in stable condition accompanied by: daughter.  Departure Mode: Wheelchair.    Renetta Ordaz RN

## 2019-10-02 RX ORDER — LEVETIRACETAM 500 MG/1
TABLET ORAL
Qty: 270 TABLET | Refills: 1 | Status: SHIPPED | OUTPATIENT
Start: 2019-10-02 | End: 2019-10-10

## 2019-10-02 RX ORDER — CLOPIDOGREL BISULFATE 75 MG/1
TABLET ORAL
Qty: 90 TABLET | Refills: 1 | Status: SHIPPED | OUTPATIENT
Start: 2019-10-02 | End: 2020-03-31

## 2019-10-02 NOTE — TELEPHONE ENCOUNTER
Routing refill request to provider for review/approval because:  Labs out of range:  CBC  Medication is reported/historical    AUTUMN BustilloN, RN  [facility]

## 2019-10-02 NOTE — TELEPHONE ENCOUNTER
"Requested Prescriptions   Pending Prescriptions Disp Refills     clopidogrel (PLAVIX) 75 MG tablet [Pharmacy Med Name: CLOPIDOGREL 75MG    TAB] 30 tablet 0     Sig: TAKE 1 TABLET BY MOUTH ONCE DAILY   Last Written Prescription Date:  9/5/19  Last Fill Quantity: 30,  # refills: 0   Last office visit: 9/12/2019 with prescribing provider:  9/12/19   Future Office Visit:   Next 5 appointments (look out 90 days)    Oct 10, 2019  2:00 PM CDT  Office Visit with Augusto Meyer DO  [facility] ([facility]) [address] [facility] [id number] [phone]           Plavix Failed - 10/1/2019  4:02 PM        Failed - Normal HGB on file in past 12 months     Recent Labs   Lab Test 09/12/19  1538   HGB 9.8*               Passed - No active PPI on record unless is Protonix        Passed - Normal Platelets on file in past 12 months     Recent Labs   Lab Test 09/12/19  1538                  Passed - Recent (12 mo) or future (30 days) visit within the authorizing provider's specialty     Patient has had an office visit with the authorizing provider or a provider within the authorizing providers department within the previous 12 mos or has a future within next 30 days. See \"Patient Info\" tab in inbasket, or \"Choose Columns\" in Meds & Orders section of the refill encounter.              Passed - Medication is active on med list        Passed - Patient is age 18 or older        Passed - No active pregnancy on record        Passed - No positive pregnancy test in past 12 months        "

## 2019-10-02 NOTE — TELEPHONE ENCOUNTER
"Requested Prescriptions   Pending Prescriptions Disp Refills     levETIRAcetam (KEPPRA) 500 MG tablet [Pharmacy Med Name: LevETIRAcetam 500MG TAB] 270 tablet 1     Sig: TAKE 1 TABLET BY MOUTH ONCE DAILY AND 2 ONCE DAILY AT BEDTIME   Last Written Prescription Date:  NA  Last Fill Quantity: NA,  # refills: NA   Last office visit: 6/15/2017 with prescribing provider:  9/12/19   Future Office Visit:   Next 5 appointments (look out 90 days)    Oct 10, 2019  2:00 PM CDT  Office Visit with Augusto Meyer DO  [facility] [address] [facility] [id number] [phone]           Anti-Seizure Meds Protocol  Failed - 10/1/2019  4:02 PM        Failed - Review Authorizing provider's last note.      Refer to last progress notes: confirm request is for original authorizing provider (cannot be through other providers).          Failed - Normal CBC on file in past 26 months     Recent Labs   Lab Test 09/12/19  1538   WBC 6.1   RBC 3.39*   HGB 9.8*   HCT 31.4*                    Passed - Recent (12 mo) or future (30 days) visit within the authorizing provider's specialty     Patient has had an office visit with the authorizing provider or a provider within the authorizing providers department within the previous 12 mos or has a future within next 30 days. See \"Patient Info\" tab in inbasket, or \"Choose Columns\" in Meds & Orders section of the refill encounter.              Passed - Normal serum creatinine on file in past 26 months     Recent Labs   Lab Test 08/22/19  0851  06/09/16  1455   CR 0.93   < >  --    CREAT  --   --  1.0    < > = values in this interval not displayed.             Passed - Normal ALT or AST on file in past 26 months     Recent Labs   Lab Test 08/22/19  0851   ALT 18     Recent Labs   Lab Test 04/26/19  1124   AST 21             Passed - Normal platelet count on file in past 26 months     Recent Labs   Lab Test 09/12/19  1538                 "  Passed - Medication is active on med list        Passed - No active pregnancy on record        Passed - No positive pregnancy test in last 12 months

## 2019-10-02 NOTE — TELEPHONE ENCOUNTER
Routing refill request to provider for review/approval because:  Labs out of range:  Hemoglobin    AUTUMN BustilloN, RN  [facility]

## 2019-10-08 DIAGNOSIS — M05.9 SEROPOSITIVE RHEUMATOID ARTHRITIS (H): ICD-10-CM

## 2019-10-08 DIAGNOSIS — R30.0 DYSURIA: ICD-10-CM

## 2019-10-08 DIAGNOSIS — E03.4 HYPOTHYROIDISM DUE TO ACQUIRED ATROPHY OF THYROID: ICD-10-CM

## 2019-10-08 RX ORDER — LEVOTHYROXINE SODIUM 50 UG/1
TABLET ORAL
Qty: 90 TABLET | Refills: 1 | Status: SHIPPED | OUTPATIENT
Start: 2019-10-08 | End: 2020-03-31

## 2019-10-08 RX ORDER — FOLIC ACID 1 MG/1
TABLET ORAL
Qty: 90 TABLET | Refills: 1 | Status: SHIPPED | OUTPATIENT
Start: 2019-10-08 | End: 2020-03-31

## 2019-10-08 NOTE — TELEPHONE ENCOUNTER
"Requested Prescriptions   Pending Prescriptions Disp Refills     folic acid (FOLVITE) 1 MG tablet [Pharmacy Med Name: FOLIC ACID 1MG      TAB] 90 tablet 1     Sig: TAKE 1 TABLET BY MOUTH ONCE DAILY   Last Written Prescription Date:  4/3/19  Last Fill Quantity: 90,  # refills: 1   Last office visit: 9/12/2019 with prescribing provider:  9/12/19   Future Office Visit:   Next 5 appointments (look out 90 days)    Oct 10, 2019  2:00 PM CDT  [facility] [address] [facility] [address]  [phone]           Vitamin Supplements (Adult) Protocol Passed - 10/8/2019 12:17 PM        Passed - High dose Vitamin D not ordered        Passed - Recent (12 mo) or future (30 days) visit within the authorizing provider's specialty     Patient has had an office visit with the authorizing provider or a provider within the authorizing providers department within the previous 12 mos or has a future within next 30 days. See \"Patient Info\" tab in inbasket, or \"Choose Columns\" in Meds & Orders section of the refill encounter.              Passed - Medication is active on med list        levothyroxine (SYNTHROID/LEVOTHROID) 50 MCG tablet [Pharmacy Med Name: LEVOTHYROXIN 50MCG  TAB] 30 tablet 1     Sig: TAKE 1 TABLET BY MOUTH ONCE DAILY   Last Written Prescription Date:  8/14/19  Last Fill Quantity: 30,  # refills: 1   Last office visit: 9/12/2019 with prescribing provider:  9/12/19   Future Office Visit:   Next 5 appointments (look out 90 days)    Oct 10, 2019  2:00 PM CDT  [facility] [address] [facility] [address]  [phone]             Thyroid Protocol Failed - 10/8/2019 12:17 PM        Failed - Normal TSH on file in past 12 months     Recent Labs   Lab Test 05/03/16  1427   TSH 3.37              Passed - Patient is 12 years or older        Passed " "- Recent (12 mo) or future (30 days) visit within the authorizing provider's specialty     Patient has had an office visit with the authorizing provider or a provider within the authorizing providers department within the previous 12 mos or has a future within next 30 days. See \"Patient Info\" tab in inbasket, or \"Choose Columns\" in Meds & Orders section of the refill encounter.              Passed - Medication is active on med list        Passed - No active pregnancy on record     If patient is pregnant or has had a positive pregnancy test, please check TSH.          Passed - No positive pregnancy test in past 12 months     If patient is pregnant or has had a positive pregnancy test, please check TSH.          "

## 2019-10-08 NOTE — TELEPHONE ENCOUNTER
Routing refill request to provider for review/approval because:  Labs not current:  TSH    AUTUMN BustilloN, RN  [facility]

## 2019-10-10 ENCOUNTER — OFFICE VISIT (OUTPATIENT)
Dept: FAMILY MEDICINE | Facility: OTHER | Age: 84
End: 2019-10-10
Payer: COMMERCIAL

## 2019-10-10 VITALS
WEIGHT: 131.9 LBS | HEART RATE: 88 BPM | BODY MASS INDEX: 21.29 KG/M2 | SYSTOLIC BLOOD PRESSURE: 132 MMHG | DIASTOLIC BLOOD PRESSURE: 74 MMHG | OXYGEN SATURATION: 98 % | TEMPERATURE: 97.1 F | RESPIRATION RATE: 14 BRPM

## 2019-10-10 DIAGNOSIS — I10 HYPERTENSION GOAL BP (BLOOD PRESSURE) < 140/90: ICD-10-CM

## 2019-10-10 DIAGNOSIS — R30.0 DYSURIA: ICD-10-CM

## 2019-10-10 DIAGNOSIS — D64.9 ANEMIA, UNSPECIFIED TYPE: Primary | ICD-10-CM

## 2019-10-10 DIAGNOSIS — Z95.2 S/P TAVR (TRANSCATHETER AORTIC VALVE REPLACEMENT): ICD-10-CM

## 2019-10-10 DIAGNOSIS — Z00.00 MEDICARE ANNUAL WELLNESS VISIT, SUBSEQUENT: ICD-10-CM

## 2019-10-10 DIAGNOSIS — I25.10 CORONARY ARTERY DISEASE INVOLVING NATIVE CORONARY ARTERY OF NATIVE HEART WITHOUT ANGINA PECTORIS: ICD-10-CM

## 2019-10-10 DIAGNOSIS — K27.4 GASTROINTESTINAL HEMORRHAGE ASSOCIATED WITH PEPTIC ULCER: ICD-10-CM

## 2019-10-10 LAB
ERYTHROCYTE [DISTWIDTH] IN BLOOD BY AUTOMATED COUNT: 19.5 % (ref 10–15)
HCT VFR BLD AUTO: 33.2 % (ref 35–47)
HGB BLD-MCNC: 10.6 G/DL (ref 11.7–15.7)
MCH RBC QN AUTO: 29.5 PG (ref 26.5–33)
MCHC RBC AUTO-ENTMCNC: 31.9 G/DL (ref 31.5–36.5)
MCV RBC AUTO: 93 FL (ref 78–100)
PLATELET # BLD AUTO: 275 10E9/L (ref 150–450)
RBC # BLD AUTO: 3.59 10E12/L (ref 3.8–5.2)
WBC # BLD AUTO: 6.9 10E9/L (ref 4–11)

## 2019-10-10 PROCEDURE — 36415 COLL VENOUS BLD VENIPUNCTURE: CPT | Performed by: INTERNAL MEDICINE

## 2019-10-10 PROCEDURE — 99213 OFFICE O/P EST LOW 20 MIN: CPT | Mod: 25 | Performed by: INTERNAL MEDICINE

## 2019-10-10 PROCEDURE — 99397 PER PM REEVAL EST PAT 65+ YR: CPT | Performed by: INTERNAL MEDICINE

## 2019-10-10 PROCEDURE — 85027 COMPLETE CBC AUTOMATED: CPT | Performed by: INTERNAL MEDICINE

## 2019-10-10 ASSESSMENT — PAIN SCALES - GENERAL: PAINLEVEL: NO PAIN (0)

## 2019-10-10 NOTE — PROGRESS NOTES
Subjective     Gillian Burk is a 89 year old female who presents to clinic today for the following health issues:    HPI   Chief Complaint   Patient presents with     Anemia     follow up labs                      Chief Complaint         The patient is a pleasant 89-year-old female who presents today for follow-up of her anemia.  She was previously started on IV iron infusion as she was not tolerating the oral medication.  She states that she is feeling much better at this time and does have increased energy.  She denies any melena or hematochezia.  Her most recent hemoglobin was 9.8.  She denies any shortness of breath or dyspnea.  She does have a history of hypertension as well as coronary artery disease and previous TAVR.  She is doing quite well from a cardiac standpoint and does take her medications compliantly.  Patient has a history of recurrent lower extremity edema associated with her chronic systolic congestive heart failure and wear support hose with good results.  This helps limit the edema in her lower extremities and subsequent skin breakdown and discomfort.                         PAST, FAMILY,SOCIAL HISTORY:     Medical  History:   has a past medical history of Aortic stenosis, Chronic migraine, Hyperlipidaemia, Hypertension, Hypothyroidism, Myocardial infarction (H), Need for SBE (subacute bacterial endocarditis) prophylaxis, Orthostatic hypotension, PUD (peptic ulcer disease), Secondary Sjogren's syndrome (H), Seizures (H), Seropositive rheumatoid arthritis (H), Stroke (H) (05/2013), and Syncope (2014).     Surgical History:   has a past surgical history that includes Mouth surgery (2011); Eye surgery (1999); cataract iol, rt/lt (Bilateral, 2000); Hysterectomy total abdominal (1970); TOTAL HIP ARTHROPLASTY (Right, 2010); TOTAL HIP ARTHROPLASTY (Left, 2014); TRANSCATHETER AORTIC VALVE IMPLANT ANEST (N/A, 8/3/2016); and Heart Cath Femoral Cannulization With Open Standby Repair Aortic Valve  (N/A, 8/3/2016).     Social History:   reports that she has never smoked. She has never used smokeless tobacco. She reports that she does not drink alcohol or use drugs.     Family History:  family history includes Hyperlipidemia in her mother.            MEDICATIONS  Current Outpatient Medications   Medication Sig Dispense Refill     acetaminophen (TYLENOL ARTHRITIS PAIN) 650 MG CR tablet Take 1,300 mg by mouth every 8 hours as needed for mild pain        ALPRAZolam (XANAX) 0.25 MG tablet TAKE 1 TABLET BY MOUTH TWICE DAILY AS NEEDED FOR ANXIETY 28 tablet 0     amLODIPine (NORVASC) 5 MG tablet Take 1 tablet (5 mg) by mouth daily 90 tablet 3     aspirin EC 81 MG EC tablet Take 1 tablet (81 mg) by mouth daily       atorvastatin (LIPITOR) 40 MG tablet Take 1 tablet (40 mg) by mouth daily 90 tablet 1     calcium carbonate (TUMS) 500 MG chewable tablet Take 2-4 tablets (1,000-2,000 mg) by mouth as needed for heartburn       clopidogrel (PLAVIX) 75 MG tablet TAKE 1 TABLET BY MOUTH ONCE DAILY 90 tablet 1     Cranberry 1000 MG CAPS        docusate sodium (COLACE) 100 MG capsule Take 200 mg by mouth daily 2 capsules       famotidine (PEPCID) 40 MG tablet TAKE 1 TABLET BY MOUTH AT BEDTIME 30 tablet 9     folic acid (FOLVITE) 1 MG tablet TAKE 1 TABLET BY MOUTH ONCE DAILY 90 tablet 1     isosorbide mononitrate (IMDUR) 60 MG 24 hr tablet Take 2 tablets (120 mg) by mouth daily 90 tablet 3     Lactobacillus (FLORAJEN ACIDOPHILUS) CAPS Take 1 capsule by mouth daily       levETIRAcetam (KEPPRA) 500 MG tablet Take 1 tablet (500 mg) by mouth 2 times daily 90 tablet 6     levothyroxine (SYNTHROID/LEVOTHROID) 50 MCG tablet TAKE 1 TABLET BY MOUTH ONCE DAILY 90 tablet 1     losartan (COZAAR) 25 MG tablet TAKE 1/2 (ONE-HALF) TABLET BY MOUTH ONCE DAILY 45 tablet 1     methotrexate 2.5 MG tablet Take 4 tablets (10 mg) by mouth once a week Wed 52 tablet 3     metoprolol succinate ER (TOPROL-XL) 50 MG 24 hr tablet Take 1 tablet (50 mg) by  mouth 2 times daily 180 tablet 3     mirtazapine (REMERON) 30 MG tablet TAKE 1 TABLET BY MOUTH ONCE DAILY AT BEDTIME 90 tablet 1     nitroFURantoin macrocrystal-monohydrate (MACROBID) 100 MG capsule Take 100 mg by mouth once       predniSONE (DELTASONE) 5 MG tablet TAKE ONE TABLET BY MOUTH EVERY OTHER DAY ALTERNATING  WITH  1/2  TABLET  EVERY  OTHER  DAY 23 tablet 13     nitroGLYcerin (NITROSTAT) 0.4 MG sublingual tablet DISSOLVE ONE TABLET UNDER THE TONGUE EVERY 5 MINUTES AS NEEDED FOR CHEST PAIN.  DO NOT EXCEED A TOTAL OF 3 DOSES IN 15 MINUTES (Patient not taking: Reported on 10/10/2019) 25 tablet 0     predniSONE (DELTASONE) 50 MG tablet Take 50 mg by mouth daily. (Patient not taking: Reported on 9/18/2019) 5 tablet 0         --------------------------------------------------------------------------------------------------------------------                              Review of Systems       LUNGS: Pt denies: cough, excess sputum, hemoptysis, or shortness of breath.   HEART: Pt denies: chest pain, arrhythmia, syncope, tachy or bradyarrhythmia.   GI: Pt denies: nausea, vomiting, diarrhea, constipation, melena, or hematochezia.   NEURO: Pt denies: seizures, strokes, diplopia, weakness, paraesthesias, or paralysis.   SKIN: Pt denies: itching, rashes, discoloration, or specific lesions of concern. Denies recent hair loss.   PSYCH: The patient denies significant depression, anxiety, mood imbalance. Specifically denies any suicidal ideation.                                     Examination    /74 (BP Location: Left arm, Patient Position: Sitting, Cuff Size: Adult Regular)   Pulse 88   Temp 97.1  F (36.2  C) (Temporal)   Resp 14   Wt 59.8 kg (131 lb 14.4 oz)   SpO2 98%   BMI 21.29 kg/m       Constitutional: The patient appears to be in no acute distress. The patient appears to be adequately hydrated. No acute respiratory or hemodynamic distress is noted at this time.   LUNGS: clear bilaterally, airflow is  brisk, no intercostal retraction or stridor is noted. No coughing is noted during visit.   HEART:  regular without rubs, clicks, gallops, or murmurs. PMI is nondisplaced. Upstrokes are brisk. S1,S2 are heard.   GI: Abdomen is soft, without rebound, guarding or tenderness. Bowel sounds are appropriate. No renal bruits are heard.   NEURO: Pt is alert and appropriate. No neurologic lateralization is noted. Cranial nerves 2-12 are intact. Peripheral sensory and motor function are grossly normal.    SKIN:  warm and dry. No erythema, or rashes are noted. No specific lesions of concern are noted.    PSYCH: The patient appears grossly appropriate. Maintains good eye contact, does not have any jittery or atypical motion. Displays appropriate affect.                                                        Annual Medicare Visit    Are you in the first 12 months of your Medicare coverage?   No    HPI  Do you feel safe in your environment?                                       Yes    Do you have a Health Care Directive?                                         No    Fall risk                                                                                       Minimal       Cognitive Screening   1) Repeat 3 items (Leader, Season, Table)    2) Clock draw: NORMAL  3) 3 item recall: Recalls 3 objects  Results: 3 items recalled: COGNITIVE IMPAIRMENT LESS LIKELY    Mini-CogTM Copyright S Audra. Licensed by the author for use in [facility]; reprinted with permission ([email or web address]). All rights reserved.        Do you have sleep apnea, excessive snoring or daytime drowsiness?:            No      Tobacco Use    Smoking status:                                                       Never Smoker    Smokeless tobacco:                                                       Never Used  Substance Use Topics    Alcohol use:                                                          No        If you drink alcohol do you typically have >3  drinks per day or >7 drinks per week?                                                                                                                     No      End of Life Planning:  Patient currently has an advanced directive:                                  No.      I have verified the patient's ablity to prepare an advanced directive/make health care decisions.  Literature was provided to assist patient in preparing an advanced directive.      COUNSELING:  Reviewed preventive health counseling, as reflected in patient instructions       Regular exercise       Healthy diet/nutrition       Vision screening       Hearing screening       Dental care       Colon cancer screening                                               Decision Making  1. Anemia, unspecified type  Check new CBC*  - CBC with platelets    2. Hypertension goal BP (blood pressure) < 140/90  Continue current medications    3. Gastrointestinal hemorrhage associated with peptic ulcer  No signs of recurrence, continue Pepcid  - CBC with platelets    4. Dysuria  Check urinary analysis (-)  - *UA reflex to Microscopic and Culture ([facility] (except [place] and [place]); Future    5. S/P TAVR (transcatheter aortic valve replacement)  Stable and doing well.  Follow with cardiology.    6. Coronary artery disease involving native coronary artery of native heart without angina pectoris  As above     Continue support hose in addition to medications as listed.                        FOLLOW UP   I have asked the patient to make an appointment for followup with me in 3 months        I have carefully explained the diagnosis and treatment options to the patient.  The patient has displayed an understanding of the above, and all subsequent questions were answered.      DO DELMA Pineda    Portions of this note were produced using [employer]  Although every attempt at real-time proof reading has been made, occasional  grammar/syntax errors may have been missed.

## 2019-10-11 DIAGNOSIS — R30.0 DYSURIA: Primary | ICD-10-CM

## 2019-10-11 LAB
ALBUMIN UR-MCNC: NEGATIVE MG/DL
APPEARANCE UR: CLEAR
BILIRUB UR QL STRIP: NEGATIVE
COLOR UR AUTO: NORMAL
GLUCOSE UR STRIP-MCNC: NEGATIVE MG/DL
HGB UR QL STRIP: NEGATIVE
KETONES UR STRIP-MCNC: NEGATIVE MG/DL
LEUKOCYTE ESTERASE UR QL STRIP: NEGATIVE
NITRATE UR QL: NEGATIVE
PH UR STRIP: 6 PH (ref 5–7)
SOURCE: NORMAL
SP GR UR STRIP: 1 (ref 1–1.03)
UROBILINOGEN UR STRIP-MCNC: 0 MG/DL (ref 0–2)

## 2019-10-11 PROCEDURE — 81003 URINALYSIS AUTO W/O SCOPE: CPT | Performed by: INTERNAL MEDICINE

## 2019-10-15 NOTE — RESULT ENCOUNTER NOTE
Dear Gillian, your recent test results are attached.    Your blood cell count shows improvement in your anemia.  The hemoglobin is now 10.6.  Feel free to contact me via the office or My Chart if you have any questions regarding the above.  Sincerely,  Augusto Meyer, DO BOWEROI

## 2019-10-15 NOTE — RESULT ENCOUNTER NOTE
Dear Gillian, your recent test results are attached.  The urine sample demonstrates no residual findings consistent with infection.    Feel free to contact me via the office or My Chart if you have any questions regarding the above.  Sincerely,  Augusto Meyer,  FACOI

## 2019-11-12 DIAGNOSIS — E78.5 HYPERLIPIDEMIA WITH TARGET LDL LESS THAN 130: ICD-10-CM

## 2019-11-12 RX ORDER — ATORVASTATIN CALCIUM 40 MG/1
TABLET, FILM COATED ORAL
Qty: 90 TABLET | Refills: 1 | Status: SHIPPED | OUTPATIENT
Start: 2019-11-12 | End: 2020-05-20

## 2019-11-12 NOTE — TELEPHONE ENCOUNTER
[place] [facility] [place]    Lela Canela RN on 11/12/2019 at 3:46 PM

## 2019-11-12 NOTE — TELEPHONE ENCOUNTER
"Requested Prescriptions   Pending Prescriptions Disp Refills     atorvastatin (LIPITOR) 40 MG tablet [Pharmacy Med Name: ATORVASTATIN 40MG   TAB] 90 tablet 1     Sig: TAKE 1 TABLET BY MOUTH ONCE DAILY   Last Written Prescription Date:  5/15/19  Last Fill Quantity: 90,  # refills: 1   Last office visit: 10/10/2019 with prescribing provider:  Betsy   Future Office Visit:        Statins Protocol Passed - 11/12/2019  3:45 PM        Passed - LDL on file in past 12 months     Recent Labs   Lab Test 08/22/19  0851   LDL 45           Passed - No abnormal creatine kinase in past 12 months     Recent Labs   Lab Test 08/04/16  0012               Passed - Recent (12 mo) or future (30 days) visit within the authorizing provider's specialty     Patient has had an office visit with the authorizing provider or a provider within the authorizing providers department within the previous 12 mos or has a future within next 30 days. See \"Patient Info\" tab in inbasket, or \"Choose Columns\" in Meds & Orders section of the refill encounter.              Passed - Medication is active on med list        Passed - Patient is age 18 or older        Passed - No active pregnancy on record        Passed - No positive pregnancy test in past 12 months        "

## 2019-11-22 DIAGNOSIS — M05.79 SEROPOSITIVE RHEUMATOID ARTHRITIS OF MULTIPLE SITES (H): Primary | ICD-10-CM

## 2019-11-22 DIAGNOSIS — Z79.899 ENCOUNTER FOR LONG-TERM (CURRENT) USE OF OTHER MEDICATIONS: ICD-10-CM

## 2019-11-22 LAB
ALT SERPL W P-5'-P-CCNC: 19 U/L (ref 0–50)
AST SERPL W P-5'-P-CCNC: 19 U/L (ref 0–45)
BASOPHILS # BLD AUTO: 0.1 10E9/L (ref 0–0.2)
BASOPHILS NFR BLD AUTO: 0.6 %
CRP SERPL-MCNC: 8.9 MG/L (ref 0–8)
DIFFERENTIAL METHOD BLD: ABNORMAL
EOSINOPHIL NFR BLD AUTO: 1.1 %
ERYTHROCYTE [DISTWIDTH] IN BLOOD BY AUTOMATED COUNT: 18.6 % (ref 10–15)
HCT VFR BLD AUTO: 32.8 % (ref 35–47)
HGB BLD-MCNC: 10.5 G/DL (ref 11.7–15.7)
IMM GRANULOCYTES # BLD: 0.1 10E9/L (ref 0–0.4)
IMM GRANULOCYTES NFR BLD: 0.6 %
LYMPHOCYTES # BLD AUTO: 2.1 10E9/L (ref 0.8–5.3)
LYMPHOCYTES NFR BLD AUTO: 19.9 %
MCH RBC QN AUTO: 30.3 PG (ref 26.5–33)
MCHC RBC AUTO-ENTMCNC: 32 G/DL (ref 31.5–36.5)
MCV RBC AUTO: 95 FL (ref 78–100)
MONOCYTES # BLD AUTO: 0.5 10E9/L (ref 0–1.3)
MONOCYTES NFR BLD AUTO: 5 %
NEUTROPHILS # BLD AUTO: 7.7 10E9/L (ref 1.6–8.3)
NEUTROPHILS NFR BLD AUTO: 72.8 %
NRBC # BLD AUTO: 0 10*3/UL
NRBC BLD AUTO-RTO: 0 /100
PLATELET # BLD AUTO: 334 10E9/L (ref 150–450)
RBC # BLD AUTO: 3.46 10E12/L (ref 3.8–5.2)
WBC # BLD AUTO: 10.6 10E9/L (ref 4–11)

## 2019-11-22 PROCEDURE — 36415 COLL VENOUS BLD VENIPUNCTURE: CPT | Performed by: INTERNAL MEDICINE

## 2019-11-22 PROCEDURE — 84450 TRANSFERASE (AST) (SGOT): CPT | Performed by: INTERNAL MEDICINE

## 2019-11-22 PROCEDURE — 86140 C-REACTIVE PROTEIN: CPT | Performed by: INTERNAL MEDICINE

## 2019-11-22 PROCEDURE — 85025 COMPLETE CBC W/AUTO DIFF WBC: CPT | Performed by: INTERNAL MEDICINE

## 2019-11-22 PROCEDURE — 84460 ALANINE AMINO (ALT) (SGPT): CPT | Performed by: INTERNAL MEDICINE

## 2019-11-26 DIAGNOSIS — I10 HYPERTENSION GOAL BP (BLOOD PRESSURE) < 140/90: ICD-10-CM

## 2019-11-26 RX ORDER — LOSARTAN POTASSIUM 25 MG/1
TABLET ORAL
Qty: 45 TABLET | Refills: 1 | Status: SHIPPED | OUTPATIENT
Start: 2019-11-26 | End: 2020-05-20

## 2019-11-26 NOTE — TELEPHONE ENCOUNTER
"Requested Prescriptions   Pending Prescriptions Disp Refills     losartan (COZAAR) 25 MG tablet [Pharmacy Med Name: LOSARTAN 25MG   TAB]  0     Sig: TAKE 1/2 (ONE-HALF) TABLET BY MOUTH ONCE DAILY   Last Written Prescription Date:  6/4/19  Last Fill Quantity: 45,  # refills: 1   Last office visit: 10/10/2019 with prescribing provider:  10/10/19   Future Office Visit:        Angiotensin-II Receptors Passed - 11/26/2019  3:16 PM        Passed - Last blood pressure under 140/90 in past 12 months     BP Readings from Last 3 Encounters:   10/10/19 132/74   10/01/19 (!) 149/78   09/25/19 133/70                 Passed - Recent (12 mo) or future (30 days) visit within the authorizing provider's specialty     Patient has had an office visit with the authorizing provider or a provider within the authorizing providers department within the previous 12 mos or has a future within next 30 days. See \"Patient Info\" tab in inbasket, or \"Choose Columns\" in Meds & Orders section of the refill encounter.              Passed - Medication is active on med list        Passed - Patient is age 18 or older        Passed - No active pregnancy on record        Passed - Normal serum creatinine on file in past 12 months     Recent Labs   Lab Test 08/22/19  0851  06/09/16  1455   CR 0.93   < >  --    CREAT  --   --  1.0    < > = values in this interval not displayed.             Passed - Normal serum potassium on file in past 12 months     Recent Labs   Lab Test 08/22/19  0851   POTASSIUM 3.8                    Passed - No positive pregnancy test in past 12 months        "

## 2019-11-26 NOTE — TELEPHONE ENCOUNTER
Prescription approved per [facility] [place].    AUTUMN BustilloN, RN  [facility]

## 2019-12-10 ENCOUNTER — MYC MEDICAL ADVICE (OUTPATIENT)
Dept: FAMILY MEDICINE | Facility: OTHER | Age: 84
End: 2019-12-10

## 2019-12-10 DIAGNOSIS — G40.909 SEIZURE DISORDER (H): Primary | ICD-10-CM

## 2019-12-13 ENCOUNTER — MYC MEDICAL ADVICE (OUTPATIENT)
Dept: FAMILY MEDICINE | Facility: OTHER | Age: 84
End: 2019-12-13

## 2019-12-13 DIAGNOSIS — N30.00 ACUTE CYSTITIS WITHOUT HEMATURIA: Primary | ICD-10-CM

## 2019-12-16 RX ORDER — CIPROFLOXACIN 500 MG/1
500 TABLET, FILM COATED ORAL 2 TIMES DAILY
Qty: 14 TABLET | Refills: 0 | Status: SHIPPED | OUTPATIENT
Start: 2019-12-16 | End: 2020-02-25

## 2019-12-31 DIAGNOSIS — G47.09 SLEEP INITIATION DISORDER: ICD-10-CM

## 2019-12-31 NOTE — TELEPHONE ENCOUNTER
"Requested Prescriptions   Pending Prescriptions Disp Refills     mirtazapine (REMERON) 30 MG tablet [Pharmacy Med Name: Mirtazapine 30 MG Oral Tablet]  0     Sig: TAKE 1 TABLET BY MOUTH ONCE DAILY AT BEDTIME   Last Written Prescription Date:  06/27/2019  Last Fill Quantity: 90,  # refills: 1   Last office visit:  with prescribing provider:  10/10/2019   Future Office Visit:        Atypical Antidepressants Protocol Passed - 12/31/2019 11:09 AM        Passed - Recent (12 mo) or future (30 days) visit within the authorizing provider's specialty     Patient has had an office visit with the authorizing provider or a provider within the authorizing providers department within the previous 12 mos or has a future within next 30 days. See \"Patient Info\" tab in inbasket, or \"Choose Columns\" in Meds & Orders section of the refill encounter.            Passed - Medication active on med list        Passed - Patient is age 18 or older        Passed - No active pregnancy on record        Passed - No positive pregnancy test in past 12 mos          "

## 2020-01-03 RX ORDER — MIRTAZAPINE 30 MG/1
TABLET, FILM COATED ORAL
Qty: 90 TABLET | Refills: 1 | Status: SHIPPED | OUTPATIENT
Start: 2020-01-03 | End: 2020-07-08

## 2020-01-03 NOTE — TELEPHONE ENCOUNTER
mirtazapine (REMERON) 30 MG tablet [Pharmacy Med Name: Mirtazapine 30 MG Oral Tablet]  0    Sig: TAKE 1 TABLET BY MOUTH ONCE DAILY AT BEDTIME   Prescription approved per [facility] Refill Protocol.    Erica Cuadra RN

## 2020-01-14 DIAGNOSIS — G40.909 SEIZURE DISORDER (H): ICD-10-CM

## 2020-01-14 PROCEDURE — 36415 COLL VENOUS BLD VENIPUNCTURE: CPT | Performed by: INTERNAL MEDICINE

## 2020-01-14 PROCEDURE — 99000 SPECIMEN HANDLING OFFICE-LAB: CPT | Performed by: INTERNAL MEDICINE

## 2020-01-14 PROCEDURE — 80177 DRUG SCRN QUAN LEVETIRACETAM: CPT | Mod: 90 | Performed by: INTERNAL MEDICINE

## 2020-01-15 LAB — LEVETIRACETAM SERPL-MCNC: 28 UG/ML (ref 12–46)

## 2020-01-16 NOTE — RESULT ENCOUNTER NOTE
Dear Gillian, your recent test results are attached.  The Keppra level is within acceptable limits.    Feel free to contact me via the office or My Chart if you have any questions regarding the above.  Sincerely,  Augusto Meyer,  FACOI

## 2020-01-29 ENCOUNTER — TRANSFERRED RECORDS (OUTPATIENT)
Dept: HEALTH INFORMATION MANAGEMENT | Facility: CLINIC | Age: 85
End: 2020-01-29

## 2020-02-07 ENCOUNTER — MYC MEDICAL ADVICE (OUTPATIENT)
Dept: FAMILY MEDICINE | Facility: OTHER | Age: 85
End: 2020-02-07

## 2020-02-07 DIAGNOSIS — K27.4 GASTROINTESTINAL HEMORRHAGE ASSOCIATED WITH PEPTIC ULCER: Primary | ICD-10-CM

## 2020-02-07 RX ORDER — NIZATIDINE 150 MG/1
150 CAPSULE ORAL 2 TIMES DAILY
Qty: 90 CAPSULE | Refills: 0 | Status: SHIPPED | OUTPATIENT
Start: 2020-02-07 | End: 2020-02-25

## 2020-02-10 NOTE — TELEPHONE ENCOUNTER
Patient was prescribed Nizatidine 150MG on 2/7/2020. Per pharmacy this is on back order. Please send an alternative to [facility].     Dee Beintez MA

## 2020-02-10 NOTE — TELEPHONE ENCOUNTER
Given the current disarray with regards to Zantac/ranitidine, I would recommend she either gets some over-the-counter Pepcid if available, over-the-counter Axid if available, or over-the-counter Prilosec.    The availability of prescription H2 blockers is becoming limited due to the change in prescribing habits.  Over-the-counter medications should be easier to find.    Betsy

## 2020-02-21 ENCOUNTER — MYC MEDICAL ADVICE (OUTPATIENT)
Dept: FAMILY MEDICINE | Facility: OTHER | Age: 85
End: 2020-02-21

## 2020-02-21 DIAGNOSIS — D50.9 IRON DEFICIENCY ANEMIA, UNSPECIFIED IRON DEFICIENCY ANEMIA TYPE: Primary | ICD-10-CM

## 2020-02-23 DIAGNOSIS — I25.10 CORONARY ARTERY DISEASE INVOLVING NATIVE CORONARY ARTERY OF NATIVE HEART WITHOUT ANGINA PECTORIS: ICD-10-CM

## 2020-02-25 ENCOUNTER — OFFICE VISIT (OUTPATIENT)
Dept: CARDIOLOGY | Facility: CLINIC | Age: 85
End: 2020-02-25
Payer: COMMERCIAL

## 2020-02-25 VITALS
WEIGHT: 124.2 LBS | HEART RATE: 74 BPM | OXYGEN SATURATION: 96 % | BODY MASS INDEX: 19.96 KG/M2 | RESPIRATION RATE: 12 BRPM | HEIGHT: 66 IN | SYSTOLIC BLOOD PRESSURE: 128 MMHG | DIASTOLIC BLOOD PRESSURE: 76 MMHG

## 2020-02-25 DIAGNOSIS — I10 HYPERTENSION GOAL BP (BLOOD PRESSURE) < 140/90: ICD-10-CM

## 2020-02-25 DIAGNOSIS — I25.10 CORONARY ARTERY DISEASE INVOLVING NATIVE CORONARY ARTERY OF NATIVE HEART WITHOUT ANGINA PECTORIS: ICD-10-CM

## 2020-02-25 DIAGNOSIS — D50.9 IRON DEFICIENCY ANEMIA, UNSPECIFIED IRON DEFICIENCY ANEMIA TYPE: ICD-10-CM

## 2020-02-25 DIAGNOSIS — R06.09 DOE (DYSPNEA ON EXERTION): Primary | ICD-10-CM

## 2020-02-25 DIAGNOSIS — E78.5 HYPERLIPIDEMIA WITH TARGET LDL LESS THAN 130: ICD-10-CM

## 2020-02-25 DIAGNOSIS — Z95.2 S/P TAVR (TRANSCATHETER AORTIC VALVE REPLACEMENT): ICD-10-CM

## 2020-02-25 LAB
ERYTHROCYTE [DISTWIDTH] IN BLOOD BY AUTOMATED COUNT: 17 % (ref 10–15)
HCT VFR BLD AUTO: 31.9 % (ref 35–47)
HGB BLD-MCNC: 10 G/DL (ref 11.7–15.7)
MCH RBC QN AUTO: 29.1 PG (ref 26.5–33)
MCHC RBC AUTO-ENTMCNC: 31.3 G/DL (ref 31.5–36.5)
MCV RBC AUTO: 93 FL (ref 78–100)
PLATELET # BLD AUTO: 276 10E9/L (ref 150–450)
RBC # BLD AUTO: 3.44 10E12/L (ref 3.8–5.2)
WBC # BLD AUTO: 7.7 10E9/L (ref 4–11)

## 2020-02-25 PROCEDURE — 99214 OFFICE O/P EST MOD 30 MIN: CPT | Performed by: PHYSICIAN ASSISTANT

## 2020-02-25 PROCEDURE — 85027 COMPLETE CBC AUTOMATED: CPT | Performed by: INTERNAL MEDICINE

## 2020-02-25 PROCEDURE — 36415 COLL VENOUS BLD VENIPUNCTURE: CPT | Performed by: INTERNAL MEDICINE

## 2020-02-25 RX ORDER — NITROGLYCERIN 0.4 MG/1
TABLET SUBLINGUAL
Qty: 25 TABLET | Refills: 0 | Status: SHIPPED | OUTPATIENT
Start: 2020-02-25 | End: 2020-09-01

## 2020-02-25 ASSESSMENT — MIFFLIN-ST. JEOR: SCORE: 990.12

## 2020-02-25 ASSESSMENT — PAIN SCALES - GENERAL: PAINLEVEL: NO PAIN (0)

## 2020-02-25 NOTE — TELEPHONE ENCOUNTER
[place] [facility] [place].    AUTUMN BustilloN, RN  [facility]

## 2020-02-25 NOTE — PROGRESS NOTES
Primary Cardiologist: Dr. Lee    Reason For Visit: 6 month follow up    History of Present Illness:   This is a very pleasant 89-year-old female who presents to cardiology clinic for three-month follow-up.  Her past medical history is notable for coronary artery disease (coronary angiogram revealed 60-70% hemodynamically significant mid to distal LAD lesion, due to contrast dye load she was recommended to return for staged intervention of this lesion.  She however had resolution of her symptoms with the addition of amlodipine and therefore staged intervention was not performed), history of severe aortic valve stenosis (status post TAVR in 8/2016 ([place] scientific Treva Valve), most recent echocardiogram shows normal gradients and no evidence of significant AI), ischemic cardiomyopathy (EF 35-40%, switch to long-acting beta-blocker during TAVR follow-up visit, not on ACE inhibitor), history of CVA in 2013 (on chronic Plavix therapy), hypertension, hyperlipidemia, GERD, known left bundle branch block, and hypothyroidism.    She returns to clinic with her daughter, stating she feels about the same. She will notice some CONTRERAS but this is same as what she reported to Dr. Lee 6 months ago. She denies orthopnea, PND, peripheral edema, abdominal distension, chest discomfort, lightheadedness, or syncope. She did require IV iron transfusion for significant anemia and thinks this improved her fatigue and may be even made her CONTRERAS a bit better.     Assessment and Plan:   This is a very pleasant 89-year-old female who presents to cardiology clinic for three-month follow-up.  Her past medical history is notable for coronary artery disease (coronary angiogram revealed 60-70% hemodynamically significant mid to distal LAD lesion, due to contrast dye load she was recommended to return for staged intervention of this lesion.  She however had resolution of her symptoms with the addition of amlodipine and therefore staged intervention  was not performed), history of severe aortic valve stenosis (status post TAVR in 8/2016 ([place] scientific Treva Valve), most recent echocardiogram shows normal gradients and no evidence of significant AI), ischemic cardiomyopathy (EF 35-40%, switch to long-acting beta-blocker during TAVR follow-up visit, not on ACE inhibitor), history of CVA in 2013 (on chronic Plavix therapy), hypertension, hyperlipidemia, GERD, known left bundle branch block, and hypothyroidism.    She appears to be doing well from cardiac standpoint. She reports some CONTRERAS but this is very mild and stable. She would like to avoid stress testing or any invasive procedures which I think is very reasonable. She has no symptoms to suggest heart failure or arrhythmia. We will monitor her symptoms. BP and HR are controlled. Physical exam was unremarkable.    She will follow up with Dr. Lee in 8/2020 with repeat echocardiogram.    Thank you for allowing me to participate in the care of this patient today.      This note was completed in part using Dragon voice recognition software. Although reviewed after completion, some word and grammatical errors may occur.    Orders this Visit:  No orders of the defined types were placed in this encounter.    No orders of the defined types were placed in this encounter.    Medications Discontinued During This Encounter   Medication Reason     ciprofloxacin (CIPRO) 500 MG tablet Therapy completed     nizatidine (AXID) 150 MG capsule Therapy completed         Encounter Diagnosis   Name Primary?     Coronary artery disease involving native coronary artery of native heart with angina pectoris (H)        CURRENT MEDICATIONS:  Current Outpatient Medications   Medication Sig Dispense Refill     acetaminophen (TYLENOL ARTHRITIS PAIN) 650 MG CR tablet Take 1,300 mg by mouth every 8 hours as needed for mild pain        ALPRAZolam (XANAX) 0.25 MG tablet TAKE 1 TABLET BY MOUTH TWICE DAILY AS NEEDED FOR ANXIETY 28 tablet 0      amLODIPine (NORVASC) 5 MG tablet Take 1 tablet (5 mg) by mouth daily 90 tablet 3     aspirin EC 81 MG EC tablet Take 1 tablet (81 mg) by mouth daily       atorvastatin (LIPITOR) 40 MG tablet TAKE 1 TABLET BY MOUTH ONCE DAILY 90 tablet 1     calcium carbonate (TUMS) 500 MG chewable tablet Take 2-4 tablets (1,000-2,000 mg) by mouth as needed for heartburn       clopidogrel (PLAVIX) 75 MG tablet TAKE 1 TABLET BY MOUTH ONCE DAILY 90 tablet 1     Cranberry 1000 MG CAPS        docusate sodium (COLACE) 100 MG capsule Take 200 mg by mouth daily 2 capsules       famotidine (PEPCID) 40 MG tablet TAKE 1 TABLET BY MOUTH AT BEDTIME 30 tablet 9     folic acid (FOLVITE) 1 MG tablet TAKE 1 TABLET BY MOUTH ONCE DAILY 90 tablet 1     isosorbide mononitrate (IMDUR) 60 MG 24 hr tablet Take 2 tablets (120 mg) by mouth daily 90 tablet 3     Lactobacillus (FLORAJEN ACIDOPHILUS) CAPS Take 1 capsule by mouth daily       levETIRAcetam (KEPPRA) 500 MG tablet Take 1 tablet (500 mg) by mouth 2 times daily 90 tablet 6     levothyroxine (SYNTHROID/LEVOTHROID) 50 MCG tablet TAKE 1 TABLET BY MOUTH ONCE DAILY 90 tablet 1     losartan (COZAAR) 25 MG tablet TAKE 1/2 (ONE-HALF) TABLET BY MOUTH ONCE DAILY 45 tablet 1     methotrexate 2.5 MG tablet Take 4 tablets (10 mg) by mouth once a week Wed 52 tablet 3     metoprolol succinate ER (TOPROL-XL) 50 MG 24 hr tablet Take 1 tablet (50 mg) by mouth 2 times daily 180 tablet 3     mirtazapine (REMERON) 30 MG tablet TAKE 1 TABLET BY MOUTH ONCE DAILY AT BEDTIME 90 tablet 1     nitroFURantoin macrocrystal-monohydrate (MACROBID) 100 MG capsule Take 100 mg by mouth once       nitroGLYcerin (NITROSTAT) 0.4 MG sublingual tablet DISSOLVE ONE TABLET UNDER THE TONGUE EVERY 5 MINUTES AS NEEDED FOR CHEST PAIN.  DO NOT EXCEED A TOTAL OF 3 DOSES IN 15 MINUTES 25 tablet 0     predniSONE (DELTASONE) 5 MG tablet TAKE ONE TABLET BY MOUTH EVERY OTHER DAY ALTERNATING  WITH  1/2  TABLET  EVERY  OTHER  DAY 23 tablet 13      predniSONE (DELTASONE) 50 MG tablet Take 50 mg by mouth daily. (Patient not taking: Reported on 9/18/2019) 5 tablet 0       ALLERGIES     Allergies   Allergen Reactions     Penicillins Hives     Caffeine Other (See Comments) and Unknown     Triggers your Meniere's disease     Hydrocodone Other (See Comments) and Unknown     hallucinations     Ibuprofen GI Disturbance and Unknown     Tramadol Other (See Comments)     Seizure, was taking remeron along with tramadol     Metal [Staples] Rash       PAST MEDICAL HISTORY:  Past Medical History:   Diagnosis Date     Aortic stenosis     s/p TAVR 8/17/16     Chronic migraine      Hyperlipidaemia      Hypertension      Hypothyroidism      Myocardial infarction (H)      Need for SBE (subacute bacterial endocarditis) prophylaxis      Orthostatic hypotension     wears compression stockings     PUD (peptic ulcer disease)      Secondary Sjogren's syndrome (H)      Seizures (H)     after stroke (partial onset)     Seropositive rheumatoid arthritis (H)      Stroke (H) 05/2013    with visual field cut, left occipital lobe     Syncope 2014    due to orthostatic hypotension       PAST SURGICAL HISTORY:  Past Surgical History:   Procedure Laterality Date     C TOTAL HIP ARTHROPLASTY Right 2010     C TOTAL HIP ARTHROPLASTY Left 2014     CATARACT IOL, RT/LT Bilateral 2000     CV FLUOROSCOPY ONLY N/A 8/6/2019    Procedure: Fluoroscopy Only - valve cine done of aortic valve at 180 degrees ARIANA to JUNG;  Surgeon: Dave Farfan MD;  Location:  [facility]     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     HEART CATH FEMORAL CANNULIZATION WITH OPEN STANDBY REPAIR AORTIC VALVE N/A 8/3/2016    Procedure: HEART CATH FEMORAL CANNULIZATION WITH OPEN STANDBY REPAIR AORTIC VALVE;  Surgeon: Owen Schultz MD;  Location: [facility] OR     HYSTERECTOMY TOTAL ABDOMINAL  1970    ovaries out     MOUTH SURGERY  2011    jaw surgery due to fracture     TRANSCATHETER AORTIC VALVE  IMPLANT ANESTHESIA N/A 8/3/2016    Procedure: TRANSCATHETER AORTIC VALVE IMPLANT ANESTHESIA;  Surgeon: GENERIC ANESTHESIA PROVIDER;  Location: [facility] OR       FAMILY HISTORY:  Family History   Problem Relation Age of Onset     Hyperlipidemia Mother      Family History Negative No family hx of        SOCIAL HISTORY:  Social History     Socioeconomic History     Marital status:      Spouse name: Not on file     Number of children: 4     Years of education: Not on file     Highest education level: Not on file   Occupational History     Employer: RETIRED   Social Needs     Financial resource strain: Not on file     Food insecurity:     Worry: Not on file     Inability: Not on file     Transportation needs:     Medical: Not on file     Non-medical: Not on file   Tobacco Use     Smoking status: Never Smoker     Smokeless tobacco: Never Used   Substance and Sexual Activity     Alcohol use: No     Alcohol/week: 0.0 standard drinks     Drug use: No     Sexual activity: Not Currently   Lifestyle     Physical activity:     Days per week: Not on file     Minutes per session: Not on file     Stress: Not on file   Relationships     Social connections:     Talks on phone: Not on file     Gets together: Not on file     Attends Confucianism service: Not on file     Active member of club or organization: Not on file     Attends meetings of clubs or organizations: Not on file     Relationship status: Not on file     Intimate partner violence:     Fear of current or ex partner: Not on file     Emotionally abused: Not on file     Physically abused: Not on file     Forced sexual activity: Not on file   Other Topics Concern     Parent/sibling w/ CABG, MI or angioplasty before 65F 55M? Not Asked      Service Not Asked     Blood Transfusions Not Asked     Caffeine Concern Not Asked     Occupational Exposure Not Asked     Hobby Hazards Not Asked     Sleep Concern Not Asked     Stress Concern Not Asked     Weight Concern Not Asked      "Special Diet Yes     Comment: low salt      Back Care Not Asked     Exercise Yes     Comment: semi recument bike 15 mins daily      Bike Helmet Not Asked     Seat Belt Not Asked     Self-Exams Not Asked   Social History Narrative    .  Lives in assisted living. Independent. Has help with making bed and changing sheets.    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 .     4 children - 1 with RA       Review of Systems:  Skin:  Negative bruising   Eyes:  Positive for glasses  ENT:  Positive for hearing loss  Respiratory:  Negative cough;shortness of breath  Cardiovascular:  Negative for;palpitations;lightheadedness;dizziness;edema;chest pain edema;Positive for  Gastroenterology: Negative heartburn  Genitourinary:  Negative    Musculoskeletal:  Positive for arthritis  Neurologic:  Positive for numbness or tingling of feet  Psychiatric:  Positive for anxiety  Heme/Lymph/Imm:  Negative easy bruising  Endocrine:  Positive for thyroid disorder    Physical Exam:  Vitals: /76 (BP Location: Right arm, Cuff Size: Adult Regular)   Pulse 74   Resp 12   Ht 1.676 m (5' 6\")   Wt 56.3 kg (124 lb 3.2 oz)   SpO2 96%   BMI 20.05 kg/m       GEN:  NAD  NECK: No JVD  C/V:  Regular rate and rhythm, no murmur, rub or gallop.  RESP: Clear to auscultation bilaterally without wheezing, rales, or rhonchi.  GI: Abdomen soft, nontender, nondistended.  EXTREM: No LE edema.   NEURO: Alert and oriented, cooperative. No obvious focal deficits.   PSYCH: Normal affect.  SKIN: Warm and dry.       Recent Lab Results:  LIPID RESULTS:  Lab Results   Component Value Date    CHOL 130 08/22/2019    HDL 64 08/22/2019    LDL 45 08/22/2019    TRIG 106 08/22/2019    CHOLHDLRATIO 2.3 07/07/2015       LIVER ENZYME RESULTS:  Lab Results   Component Value Date    AST 19 11/22/2019    ALT 19 11/22/2019       CBC RESULTS:  Lab Results   Component Value Date    WBC 10.6 11/22/2019    RBC 3.46 (L) 11/22/2019    HGB 10.5 (L) " 11/22/2019    HCT 32.8 (L) 11/22/2019    MCV 95 11/22/2019    MCH 30.3 11/22/2019    MCHC 32.0 11/22/2019    RDW 18.6 (H) 11/22/2019     11/22/2019       BMP RESULTS:  Lab Results   Component Value Date     08/22/2019    POTASSIUM 3.8 08/22/2019    CHLORIDE 105 08/22/2019    CO2 27 08/22/2019    ANIONGAP 9 08/22/2019     (H) 08/22/2019    BUN 17 08/22/2019    CR 0.93 08/22/2019    GFRESTIMATED 53 (L) 08/22/2019    GFRESTBLACK 62 08/22/2019    CHEYANNE 8.4 (L) 08/22/2019        A1C RESULTS:  Lab Results   Component Value Date    A1C 5.5 10/13/2018       INR RESULTS:  Lab Results   Component Value Date    INR 1.11 04/26/2019    INR 1.1 10/13/2018           Matt Mccracken PA-C, KATTY   February 25, 2020

## 2020-02-25 NOTE — TELEPHONE ENCOUNTER
"Requested Prescriptions   Pending Prescriptions Disp Refills     nitroGLYcerin (NITROSTAT) 0.4 MG sublingual tablet [Pharmacy Med Name: Nitroglycerin 0.4 MG Sublingual Tablet Sublingual]  0     Sig: DISSOLVE ONE TABLET UNDER THE TONGUE EVERY 5 MINUTES AS NEEDED FOR CHEST PAIN.  DO NOT EXCEED A TOTAL OF 3 DOSES IN 15 MINUTES   Last Written Prescription Date:  7/3/19  Last Fill Quantity: 25,  # refills: 0   Last office visit: 10/10/2019 with prescribing provider:  10/10/19   Future Office Visit:   Next 5 appointments (look out 90 days)    Feb 25, 2020  1:15 PM CST  Return Visit with Matt Mccracken PA-C  [facility] ([facility]) [address] [place] [id number] [phone]           Nitrates Failed - 2/23/2020  1:22 PM        Failed - Sublingual nitro order needs review     If refill exceeds 1 bottle per month, please forward request to provider.           Passed - Blood pressure under 140/90 in past 12 months     BP Readings from Last 3 Encounters:   10/10/19 132/74   10/01/19 (!) 149/78   09/25/19 133/70                 Passed - Pt is not on erectile dysfunction medications        Passed - Recent (12 mo) or future (30 days) visit within the authorizing provider's specialty     Patient has had an office visit with the authorizing provider or a provider within the authorizing providers department within the previous 12 mos or has a future within next 30 days. See \"Patient Info\" tab in inbasket, or \"Choose Columns\" in Meds & Orders section of the refill encounter.              Passed - Medication is active on med list        Passed - Patient is age 18 or older        "

## 2020-02-25 NOTE — LETTER
2/25/2020    Augusto Meyer, DO  [address] [place] [address]    RE: Gillian Burk       Dear Colleague,    I had the pleasure of seeing Gillian Burk in the [facility].    Primary Cardiologist: Dr. Lee    Reason For Visit: 6 month follow up    History of Present Illness:   This is a very pleasant 89-year-old female who presents to cardiology clinic for three-month follow-up.  Her past medical history is notable for coronary artery disease (coronary angiogram revealed 60-70% hemodynamically significant mid to distal LAD lesion, due to contrast dye load she was recommended to return for staged intervention of this lesion.  She however had resolution of her symptoms with the addition of amlodipine and therefore staged intervention was not performed), history of severe aortic valve stenosis (status post TAVR in 8/2016 ([place] scientific Treva Valve), most recent echocardiogram shows normal gradients and no evidence of significant AI), ischemic cardiomyopathy (EF 35-40%, switch to long-acting beta-blocker during TAVR follow-up visit, not on ACE inhibitor), history of CVA in 2013 (on chronic Plavix therapy), hypertension, hyperlipidemia, GERD, known left bundle branch block, and hypothyroidism.    She returns to clinic with her daughter, stating she feels about the same. She will notice some CONTRERAS but this is same as what she reported to Dr. Lee 6 months ago. She denies orthopnea, PND, peripheral edema, abdominal distension, chest discomfort, lightheadedness, or syncope. She did require IV iron transfusion for significant anemia and thinks this improved her fatigue and may be even made her CONTRERAS a bit better.     Assessment and Plan:   This is a very pleasant 89-year-old female who presents to cardiology clinic for three-month follow-up.  Her past medical history is notable for coronary artery disease (coronary angiogram revealed 60-70% hemodynamically  significant mid to distal LAD lesion, due to contrast dye load she was recommended to return for staged intervention of this lesion.  She however had resolution of her symptoms with the addition of amlodipine and therefore staged intervention was not performed), history of severe aortic valve stenosis (status post TAVR in 8/2016 ([place] scientific Treva Valve), most recent echocardiogram shows normal gradients and no evidence of significant AI), ischemic cardiomyopathy (EF 35-40%, switch to long-acting beta-blocker during TAVR follow-up visit, not on ACE inhibitor), history of CVA in 2013 (on chronic Plavix therapy), hypertension, hyperlipidemia, GERD, known left bundle branch block, and hypothyroidism.    She appears to be doing well from cardiac standpoint. She reports some CONTRERAS but this is very mild and stable. She would like to avoid stress testing or any invasive procedures which I think is very reasonable. She has no symptoms to suggest heart failure or arrhythmia. We will monitor her symptoms. BP and HR are controlled. Physical exam was unremarkable.    She will follow up with Dr. Lee in 8/2020 with repeat echocardiogram.    Thank you for allowing me to participate in the care of this patient today.      This note was completed in part using Dragon voice recognition software. Although reviewed after completion, some word and grammatical errors may occur.    Orders this Visit:  No orders of the defined types were placed in this encounter.    No orders of the defined types were placed in this encounter.    Medications Discontinued During This Encounter   Medication Reason     ciprofloxacin (CIPRO) 500 MG tablet Therapy completed     nizatidine (AXID) 150 MG capsule Therapy completed         Encounter Diagnosis   Name Primary?     Coronary artery disease involving native coronary artery of native heart with angina pectoris (H)        CURRENT MEDICATIONS:  Current Outpatient Medications   Medication Sig Dispense  Refill     acetaminophen (TYLENOL ARTHRITIS PAIN) 650 MG CR tablet Take 1,300 mg by mouth every 8 hours as needed for mild pain        ALPRAZolam (XANAX) 0.25 MG tablet TAKE 1 TABLET BY MOUTH TWICE DAILY AS NEEDED FOR ANXIETY 28 tablet 0     amLODIPine (NORVASC) 5 MG tablet Take 1 tablet (5 mg) by mouth daily 90 tablet 3     aspirin EC 81 MG EC tablet Take 1 tablet (81 mg) by mouth daily       atorvastatin (LIPITOR) 40 MG tablet TAKE 1 TABLET BY MOUTH ONCE DAILY 90 tablet 1     calcium carbonate (TUMS) 500 MG chewable tablet Take 2-4 tablets (1,000-2,000 mg) by mouth as needed for heartburn       clopidogrel (PLAVIX) 75 MG tablet TAKE 1 TABLET BY MOUTH ONCE DAILY 90 tablet 1     Cranberry 1000 MG CAPS        docusate sodium (COLACE) 100 MG capsule Take 200 mg by mouth daily 2 capsules       famotidine (PEPCID) 40 MG tablet TAKE 1 TABLET BY MOUTH AT BEDTIME 30 tablet 9     folic acid (FOLVITE) 1 MG tablet TAKE 1 TABLET BY MOUTH ONCE DAILY 90 tablet 1     isosorbide mononitrate (IMDUR) 60 MG 24 hr tablet Take 2 tablets (120 mg) by mouth daily 90 tablet 3     Lactobacillus (FLORAJEN ACIDOPHILUS) CAPS Take 1 capsule by mouth daily       levETIRAcetam (KEPPRA) 500 MG tablet Take 1 tablet (500 mg) by mouth 2 times daily 90 tablet 6     levothyroxine (SYNTHROID/LEVOTHROID) 50 MCG tablet TAKE 1 TABLET BY MOUTH ONCE DAILY 90 tablet 1     losartan (COZAAR) 25 MG tablet TAKE 1/2 (ONE-HALF) TABLET BY MOUTH ONCE DAILY 45 tablet 1     methotrexate 2.5 MG tablet Take 4 tablets (10 mg) by mouth once a week Wed 52 tablet 3     metoprolol succinate ER (TOPROL-XL) 50 MG 24 hr tablet Take 1 tablet (50 mg) by mouth 2 times daily 180 tablet 3     mirtazapine (REMERON) 30 MG tablet TAKE 1 TABLET BY MOUTH ONCE DAILY AT BEDTIME 90 tablet 1     nitroFURantoin macrocrystal-monohydrate (MACROBID) 100 MG capsule Take 100 mg by mouth once       nitroGLYcerin (NITROSTAT) 0.4 MG sublingual tablet DISSOLVE ONE TABLET UNDER THE TONGUE EVERY 5  MINUTES AS NEEDED FOR CHEST PAIN.  DO NOT EXCEED A TOTAL OF 3 DOSES IN 15 MINUTES 25 tablet 0     predniSONE (DELTASONE) 5 MG tablet TAKE ONE TABLET BY MOUTH EVERY OTHER DAY ALTERNATING  WITH  1/2  TABLET  EVERY  OTHER  DAY 23 tablet 13     predniSONE (DELTASONE) 50 MG tablet Take 50 mg by mouth daily. (Patient not taking: Reported on 9/18/2019) 5 tablet 0       ALLERGIES     Allergies   Allergen Reactions     Penicillins Hives     Caffeine Other (See Comments) and Unknown     Triggers your Meniere's disease     Hydrocodone Other (See Comments) and Unknown     hallucinations     Ibuprofen GI Disturbance and Unknown     Tramadol Other (See Comments)     Seizure, was taking remeron along with tramadol     Metal [Staples] Rash       PAST MEDICAL HISTORY:  Past Medical History:   Diagnosis Date     Aortic stenosis     s/p TAVR 8/17/16     Chronic migraine      Hyperlipidaemia      Hypertension      Hypothyroidism      Myocardial infarction (H)      Need for SBE (subacute bacterial endocarditis) prophylaxis      Orthostatic hypotension     wears compression stockings     PUD (peptic ulcer disease)      Secondary Sjogren's syndrome (H)      Seizures (H)     after stroke (partial onset)     Seropositive rheumatoid arthritis (H)      Stroke (H) 05/2013    with visual field cut, left occipital lobe     Syncope 2014    due to orthostatic hypotension       PAST SURGICAL HISTORY:  Past Surgical History:   Procedure Laterality Date     C TOTAL HIP ARTHROPLASTY Right 2010     C TOTAL HIP ARTHROPLASTY Left 2014     CATARACT IOL, RT/LT Bilateral 2000     CV FLUOROSCOPY ONLY N/A 8/6/2019    Procedure: Fluoroscopy Only - valve cine done of aortic valve at 180 degrees [place] to JUNG;  Surgeon: Dave Farfan MD;  Location:  [facility] CATH LAB     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     HEART CATH FEMORAL CANNULIZATION WITH OPEN STANDBY REPAIR AORTIC VALVE N/A 8/3/2016    Procedure: HEART CATH FEMORAL  CANNULIZATION WITH OPEN STANDBY REPAIR AORTIC VALVE;  Surgeon: Owen Schultz MD;  Location: [facility] OR     HYSTERECTOMY TOTAL ABDOMINAL  1970    ovaries out     MOUTH SURGERY  2011    jaw surgery due to fracture     TRANSCATHETER AORTIC VALVE IMPLANT ANESTHESIA N/A 8/3/2016    Procedure: TRANSCATHETER AORTIC VALVE IMPLANT ANESTHESIA;  Surgeon: GENERIC ANESTHESIA PROVIDER;  Location: [facility] OR       FAMILY HISTORY:  Family History   Problem Relation Age of Onset     Hyperlipidemia Mother      Family History Negative No family hx of        SOCIAL HISTORY:  Social History     Socioeconomic History     Marital status:      Spouse name: Not on file     Number of children: 4     Years of education: Not on file     Highest education level: Not on file   Occupational History     Employer: RETIRED   Social Needs     Financial resource strain: Not on file     Food insecurity:     Worry: Not on file     Inability: Not on file     Transportation needs:     Medical: Not on file     Non-medical: Not on file   Tobacco Use     Smoking status: Never Smoker     Smokeless tobacco: Never Used   Substance and Sexual Activity     Alcohol use: No     Alcohol/week: 0.0 standard drinks     Drug use: No     Sexual activity: Not Currently   Lifestyle     Physical activity:     Days per week: Not on file     Minutes per session: Not on file     Stress: Not on file   Relationships     Social connections:     Talks on phone: Not on file     Gets together: Not on file     Attends Caodaism service: Not on file     Active member of club or organization: Not on file     Attends meetings of clubs or organizations: Not on file     Relationship status: Not on file     Intimate partner violence:     Fear of current or ex partner: Not on file     Emotionally abused: Not on file     Physically abused: Not on file     Forced sexual activity: Not on file   Other Topics Concern     Parent/sibling w/ CABG, MI or angioplasty before 65F 55M? Not Asked  "     Service Not Asked     Blood Transfusions Not Asked     Caffeine Concern Not Asked     Occupational Exposure Not Asked     Hobby Hazards Not Asked     Sleep Concern Not Asked     Stress Concern Not Asked     Weight Concern Not Asked     Special Diet Yes     Comment: low salt      Back Care Not Asked     Exercise Yes     Comment: semi recument bike 15 mins daily      Bike Helmet Not Asked     Seat Belt Not Asked     Self-Exams Not Asked   Social History Narrative    .  Lives in assisted living. Independent. Has help with making bed and changing sheets.    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 .     4 children - 1 with RA       Review of Systems:  Skin:  Negative bruising   Eyes:  Positive for glasses  ENT:  Positive for hearing loss  Respiratory:  Negative cough;shortness of breath  Cardiovascular:  Negative for;palpitations;lightheadedness;dizziness;edema;chest pain edema;Positive for  Gastroenterology: Negative heartburn  Genitourinary:  Negative    Musculoskeletal:  Positive for arthritis  Neurologic:  Positive for numbness or tingling of feet  Psychiatric:  Positive for anxiety  Heme/Lymph/Imm:  Negative easy bruising  Endocrine:  Positive for thyroid disorder    Physical Exam:  Vitals: /76 (BP Location: Right arm, Cuff Size: Adult Regular)   Pulse 74   Resp 12   Ht 1.676 m (5' 6\")   Wt 56.3 kg (124 lb 3.2 oz)   SpO2 96%   BMI 20.05 kg/m        GEN:  NAD  NECK: No JVD  C/V:  Regular rate and rhythm, no murmur, rub or gallop.  RESP: Clear to auscultation bilaterally without wheezing, rales, or rhonchi.  GI: Abdomen soft, nontender, nondistended.  EXTREM: No LE edema.   NEURO: Alert and oriented, cooperative. No obvious focal deficits.   PSYCH: Normal affect.  SKIN: Warm and dry.       Recent Lab Results:  LIPID RESULTS:  Lab Results   Component Value Date    CHOL 130 08/22/2019    HDL 64 08/22/2019    LDL 45 08/22/2019    TRIG 106 08/22/2019    CHOLHDLRATIO " 2.3 07/07/2015       LIVER ENZYME RESULTS:  Lab Results   Component Value Date    AST 19 11/22/2019    ALT 19 11/22/2019       CBC RESULTS:  Lab Results   Component Value Date    WBC 10.6 11/22/2019    RBC 3.46 (L) 11/22/2019    HGB 10.5 (L) 11/22/2019    HCT 32.8 (L) 11/22/2019    MCV 95 11/22/2019    MCH 30.3 11/22/2019    MCHC 32.0 11/22/2019    RDW 18.6 (H) 11/22/2019     11/22/2019       BMP RESULTS:  Lab Results   Component Value Date     08/22/2019    POTASSIUM 3.8 08/22/2019    CHLORIDE 105 08/22/2019    CO2 27 08/22/2019    ANIONGAP 9 08/22/2019     (H) 08/22/2019    BUN 17 08/22/2019    CR 0.93 08/22/2019    GFRESTIMATED 53 (L) 08/22/2019    GFRESTBLACK 62 08/22/2019    CHEYANNE 8.4 (L) 08/22/2019        A1C RESULTS:  Lab Results   Component Value Date    A1C 5.5 10/13/2018       INR RESULTS:  Lab Results   Component Value Date    INR 1.11 04/26/2019    INR 1.1 10/13/2018           Matt Mccracken PA-C, KATTY   February 25, 2020     Thank you for allowing me to participate in the care of your patient.    Sincerely,     Matt Mccracken PA-C     [facility]

## 2020-02-26 NOTE — RESULT ENCOUNTER NOTE
Dear Gillian, your recent test results are attached.  The blood cell count shows persistence of your anemia which is mild.  The hemoglobin is 10.0.  You will be contacted with any outstanding results when they are available.  Feel free to contact me via the office or My Chart if you have any questions regarding the above.  Sincerely,  Augusto Meyer, DO FACOI

## 2020-02-28 ENCOUNTER — MYC MEDICAL ADVICE (OUTPATIENT)
Dept: FAMILY MEDICINE | Facility: OTHER | Age: 85
End: 2020-02-28

## 2020-02-28 DIAGNOSIS — D50.0 IRON DEFICIENCY ANEMIA DUE TO CHRONIC BLOOD LOSS: Primary | ICD-10-CM

## 2020-02-28 NOTE — TELEPHONE ENCOUNTER
I have sent a prescription for an iron supplement to the [facility] pharmacy.    We should recheck her hemoglobin in a month or so.            Betsy

## 2020-03-02 ENCOUNTER — HEALTH MAINTENANCE LETTER (OUTPATIENT)
Age: 85
End: 2020-03-02

## 2020-03-03 DIAGNOSIS — I20.0 UNSTABLE ANGINA (H): ICD-10-CM

## 2020-03-03 RX ORDER — ISOSORBIDE MONONITRATE 60 MG/1
120 TABLET, EXTENDED RELEASE ORAL DAILY
Qty: 90 TABLET | Refills: 3 | Status: SHIPPED | OUTPATIENT
Start: 2020-03-03 | End: 2020-09-09

## 2020-03-31 DIAGNOSIS — Z86.73 HISTORY OF CVA (CEREBROVASCULAR ACCIDENT): ICD-10-CM

## 2020-03-31 DIAGNOSIS — M05.9 SEROPOSITIVE RHEUMATOID ARTHRITIS (H): ICD-10-CM

## 2020-03-31 DIAGNOSIS — E03.4 HYPOTHYROIDISM DUE TO ACQUIRED ATROPHY OF THYROID: ICD-10-CM

## 2020-03-31 RX ORDER — LEVOTHYROXINE SODIUM 50 UG/1
TABLET ORAL
Qty: 90 TABLET | Refills: 0 | Status: SHIPPED | OUTPATIENT
Start: 2020-03-31 | End: 2020-07-14

## 2020-03-31 RX ORDER — FOLIC ACID 1 MG/1
TABLET ORAL
Qty: 90 TABLET | Refills: 0 | Status: SHIPPED | OUTPATIENT
Start: 2020-03-31 | End: 2020-07-08

## 2020-03-31 RX ORDER — CLOPIDOGREL BISULFATE 75 MG/1
TABLET ORAL
Qty: 90 TABLET | Refills: 0 | Status: SHIPPED | OUTPATIENT
Start: 2020-03-31 | End: 2020-06-30

## 2020-03-31 NOTE — TELEPHONE ENCOUNTER
Routing refill request to provider for review/approval because:  Labs out of range:  Hemoglobin  Labs not current:  TSH    AUTUMN BustilloN, RN  [facility]

## 2020-03-31 NOTE — TELEPHONE ENCOUNTER
"Requested Prescriptions   Pending Prescriptions Disp Refills     folic acid (FOLVITE) 1 MG tablet [Pharmacy Med Name: Folic Acid 1 MG Oral Tablet] 90 tablet 0     Sig: Take 1 tablet by mouth once daily   Last Written Prescription Date:  10/8/19  Last Fill Quantity: 90,  # refills: 1   Last office visit: 10/10/2019 with prescribing provider:  10/10/19   Future Office Visit:        Vitamin Supplements (Adult) Protocol Passed - 3/31/2020 11:22 AM        Passed - High dose Vitamin D not ordered        Passed - Recent (12 mo) or future (30 days) visit within the authorizing provider's specialty     Patient has had an office visit with the authorizing provider or a provider within the authorizing providers department within the previous 12 mos or has a future within next 30 days. See \"Patient Info\" tab in inbasket, or \"Choose Columns\" in Meds & Orders section of the refill encounter.              Passed - Medication is active on med list           clopidogrel (PLAVIX) 75 MG tablet [Pharmacy Med Name: Clopidogrel Bisulfate 75 MG Oral Tablet] 90 tablet 0     Sig: Take 1 tablet by mouth once daily   Last Written Prescription Date:  10/2/19  Last Fill Quantity: 90,  # refills: 1   Last office visit: 10/10/2019 with prescribing provider:  10/10/19   Future Office Visit:        Plavix Failed - 3/31/2020 11:22 AM        Failed - Normal HGB on file in past 12 months     Recent Labs   Lab Test 02/25/20  1357   HGB 10.0*               Passed - No active PPI on record unless is Protonix        Passed - Normal Platelets on file in past 12 months     Recent Labs   Lab Test 02/25/20  1357                  Passed - Recent (12 mo) or future (30 days) visit within the authorizing provider's specialty     Patient has had an office visit with the authorizing provider or a provider within the authorizing providers department within the previous 12 mos or has a future within next 30 days. See \"Patient Info\" tab in inbasket, or \"Choose " "Columns\" in Meds & Orders section of the refill encounter.              Passed - Medication is active on med list        Passed - Patient is age 18 or older        Passed - No active pregnancy on record        Passed - No positive pregnancy test in past 12 months           levothyroxine (SYNTHROID/LEVOTHROID) 50 MCG tablet [Pharmacy Med Name: Levothyroxine Sodium 50 MCG Oral Tablet] 90 tablet 0     Sig: Take 1 tablet by mouth once daily   Last Written Prescription Date:  10/8/19  Last Fill Quantity: 90,  # refills: 1   Last office visit: 10/10/2019 with prescribing provider:  10/10/19   Future Office Visit:        Thyroid Protocol Failed - 3/31/2020 11:22 AM        Failed - Normal TSH on file in past 12 months     Recent Labs   Lab Test 05/03/16  1427   TSH 3.37              Passed - Patient is 12 years or older        Passed - Recent (12 mo) or future (30 days) visit within the authorizing provider's specialty     Patient has had an office visit with the authorizing provider or a provider within the authorizing providers department within the previous 12 mos or has a future within next 30 days. See \"Patient Info\" tab in inbasket, or \"Choose Columns\" in Meds & Orders section of the refill encounter.              Passed - Medication is active on med list        Passed - No active pregnancy on record     If patient is pregnant or has had a positive pregnancy test, please check TSH.          Passed - No positive pregnancy test in past 12 months     If patient is pregnant or has had a positive pregnancy test, please check TSH.             "

## 2020-04-20 ENCOUNTER — MEDICAL CORRESPONDENCE (OUTPATIENT)
Dept: HEALTH INFORMATION MANAGEMENT | Facility: CLINIC | Age: 85
End: 2020-04-20

## 2020-04-30 DIAGNOSIS — Z95.2 S/P TAVR (TRANSCATHETER AORTIC VALVE REPLACEMENT): Primary | ICD-10-CM

## 2020-05-19 DIAGNOSIS — R07.9 CHEST PAIN, UNSPECIFIED TYPE: ICD-10-CM

## 2020-05-19 DIAGNOSIS — I10 HYPERTENSION GOAL BP (BLOOD PRESSURE) < 140/90: ICD-10-CM

## 2020-05-19 DIAGNOSIS — E78.5 HYPERLIPIDEMIA WITH TARGET LDL LESS THAN 130: ICD-10-CM

## 2020-05-19 RX ORDER — AMLODIPINE BESYLATE 5 MG/1
5 TABLET ORAL DAILY
Qty: 90 TABLET | Refills: 0 | Status: SHIPPED | OUTPATIENT
Start: 2020-05-19 | End: 2020-09-15

## 2020-05-20 RX ORDER — LOSARTAN POTASSIUM 25 MG/1
TABLET ORAL
Qty: 45 TABLET | Refills: 0 | Status: SHIPPED | OUTPATIENT
Start: 2020-05-20 | End: 2020-08-12

## 2020-05-20 RX ORDER — ATORVASTATIN CALCIUM 40 MG/1
TABLET, FILM COATED ORAL
Qty: 90 TABLET | Refills: 0 | Status: SHIPPED | OUTPATIENT
Start: 2020-05-20 | End: 2020-08-19

## 2020-05-20 NOTE — TELEPHONE ENCOUNTER
Prescription approved per [facility] Refill Protocol.    AUTUMN BustilloN, RN  [facility]

## 2020-06-17 ENCOUNTER — HOSPITAL LABORATORY (OUTPATIENT)
Facility: OTHER | Age: 85
End: 2020-06-17

## 2020-06-19 LAB
SARS-COV-2 RNA SPEC QL NAA+PROBE: NOT DETECTED
SPECIMEN SOURCE: NORMAL

## 2020-06-24 ENCOUNTER — TELEPHONE (OUTPATIENT)
Dept: CARDIOLOGY | Facility: CLINIC | Age: 85
End: 2020-06-24

## 2020-06-24 ENCOUNTER — HOSPITAL LABORATORY (OUTPATIENT)
Facility: OTHER | Age: 85
End: 2020-06-24

## 2020-06-24 DIAGNOSIS — R07.9 CHEST PAIN, UNSPECIFIED TYPE: ICD-10-CM

## 2020-06-24 RX ORDER — METOPROLOL SUCCINATE 50 MG/1
50 TABLET, EXTENDED RELEASE ORAL 2 TIMES DAILY
Qty: 180 TABLET | Refills: 3 | Status: SHIPPED | OUTPATIENT
Start: 2020-06-24 | End: 2020-09-15

## 2020-06-28 LAB
COVID-19 VIRUS PCR TO MAYO - RESULT: NORMAL
SPECIMEN SOURCE: NORMAL

## 2020-06-30 DIAGNOSIS — Z86.73 HISTORY OF CVA (CEREBROVASCULAR ACCIDENT): ICD-10-CM

## 2020-06-30 RX ORDER — CLOPIDOGREL BISULFATE 75 MG/1
TABLET ORAL
Qty: 90 TABLET | Refills: 0 | Status: SHIPPED | OUTPATIENT
Start: 2020-06-30 | End: 2020-09-15

## 2020-06-30 NOTE — TELEPHONE ENCOUNTER
"Routing refill request to provider for review/approval because:  Labs out of range:  HGB  T'd up 3 month for provider review.    Requested Prescriptions   Pending Prescriptions Disp Refills     clopidogrel (PLAVIX) 75 MG tablet [Pharmacy Med Name: Clopidogrel Bisulfate 75 MG Oral Tablet] 90 tablet 0     Sig: Take 1 tablet by mouth once daily   Last Written Prescription Date:  3/31/2020  Last Fill Quantity: 90,  # refills: 0  Last office visit: 10/10/2019  Future Office Visit:        Plavix Failed - 6/30/2020  9:38 AM        Failed - Normal HGB on file in past 12 months     Recent Labs   Lab Test 02/25/20  1357   HGB 10.0*           Passed - No active PPI on record unless is Protonix        Passed - Normal Platelets on file in past 12 months     Recent Labs   Lab Test 02/25/20  1357              Passed - Recent (12 mo) or future (30 days) visit within the authorizing provider's specialty     Patient has had an office visit with the authorizing provider or a provider within the authorizing providers department within the previous 12 mos or has a future within next 30 days. See \"Patient Info\" tab in inbasket, or \"Choose Columns\" in Meds & Orders section of the refill encounter.            Passed - Medication is active on med list        Passed - Patient is age 18 or older        Passed - No active pregnancy on record        Passed - No positive pregnancy test in past 12 months         Erica Cuadra RN      "

## 2020-07-01 ENCOUNTER — HOSPITAL LABORATORY (OUTPATIENT)
Facility: OTHER | Age: 85
End: 2020-07-01

## 2020-07-05 LAB
COVID-19 VIRUS PCR TO MAYO - RESULT: NORMAL
SPECIMEN SOURCE: NORMAL

## 2020-07-07 DIAGNOSIS — M05.9 SEROPOSITIVE RHEUMATOID ARTHRITIS (H): ICD-10-CM

## 2020-07-07 DIAGNOSIS — G47.09 SLEEP INITIATION DISORDER: ICD-10-CM

## 2020-07-07 DIAGNOSIS — G40.909 SEIZURE DISORDER (H): ICD-10-CM

## 2020-07-08 RX ORDER — FOLIC ACID 1 MG/1
TABLET ORAL
Qty: 90 TABLET | Refills: 0 | Status: SHIPPED | OUTPATIENT
Start: 2020-07-08 | End: 2020-10-07

## 2020-07-08 RX ORDER — LEVETIRACETAM 500 MG/1
TABLET ORAL
Qty: 270 TABLET | Refills: 0 | Status: SHIPPED | OUTPATIENT
Start: 2020-07-08 | End: 2020-10-02

## 2020-07-08 RX ORDER — MIRTAZAPINE 30 MG/1
TABLET, FILM COATED ORAL
Qty: 90 TABLET | Refills: 0 | Status: SHIPPED | OUTPATIENT
Start: 2020-07-08 | End: 2020-10-07

## 2020-07-08 NOTE — TELEPHONE ENCOUNTER
Routing refill request to provider for review/approval because:  Drug not on the [facility]    AUTUMN BustilloN, RN  [facility]

## 2020-07-14 DIAGNOSIS — E03.4 HYPOTHYROIDISM DUE TO ACQUIRED ATROPHY OF THYROID: ICD-10-CM

## 2020-07-14 RX ORDER — LEVOTHYROXINE SODIUM 50 UG/1
TABLET ORAL
Qty: 90 TABLET | Refills: 0 | Status: SHIPPED | OUTPATIENT
Start: 2020-07-14 | End: 2020-09-25 | Stop reason: DRUGHIGH

## 2020-07-14 NOTE — TELEPHONE ENCOUNTER
Routing refill request to provider for review/approval because:  Labs not current:  TSH.     Martina Kamara RN

## 2020-07-22 ENCOUNTER — MYC MEDICAL ADVICE (OUTPATIENT)
Dept: FAMILY MEDICINE | Facility: OTHER | Age: 85
End: 2020-07-22

## 2020-07-22 NOTE — TELEPHONE ENCOUNTER
Try a multivitamin.  If it does not seem to help, please set up an appointment so we can better understand her current problem and possibly adjust medications and look for occult causes of behavioral change..    Betsy

## 2020-08-06 DIAGNOSIS — Z00.6 EXAMINATION OF PARTICIPANT OR CONTROL IN CLINICAL RESEARCH: ICD-10-CM

## 2020-08-06 DIAGNOSIS — Z95.2 S/P TAVR (TRANSCATHETER AORTIC VALVE REPLACEMENT): Primary | ICD-10-CM

## 2020-08-10 DIAGNOSIS — Z95.2 S/P TAVR (TRANSCATHETER AORTIC VALVE REPLACEMENT): Primary | ICD-10-CM

## 2020-08-11 DIAGNOSIS — I10 HYPERTENSION GOAL BP (BLOOD PRESSURE) < 140/90: ICD-10-CM

## 2020-08-12 RX ORDER — LOSARTAN POTASSIUM 25 MG/1
TABLET ORAL
Qty: 45 TABLET | Refills: 0 | Status: SHIPPED | OUTPATIENT
Start: 2020-08-12 | End: 2020-09-15

## 2020-08-12 NOTE — TELEPHONE ENCOUNTER
Prescription approved per [facility] Refill Protocol.    AUTUMN BustilloN, RN  [facility]

## 2020-08-19 DIAGNOSIS — E78.5 HYPERLIPIDEMIA WITH TARGET LDL LESS THAN 130: ICD-10-CM

## 2020-08-19 DIAGNOSIS — D50.0 IRON DEFICIENCY ANEMIA DUE TO CHRONIC BLOOD LOSS: ICD-10-CM

## 2020-08-19 RX ORDER — ATORVASTATIN CALCIUM 40 MG/1
TABLET, FILM COATED ORAL
Qty: 90 TABLET | Refills: 0 | Status: SHIPPED | OUTPATIENT
Start: 2020-08-19 | End: 2020-09-15

## 2020-08-19 NOTE — TELEPHONE ENCOUNTER
Routing refill request to provider for review/approval because:  Labs out of range:  Hemoglobin, hematocrit    AUTUMN BustilloN, RN  [facility]

## 2020-08-23 ENCOUNTER — HOSPITAL LABORATORY (OUTPATIENT)
Facility: OTHER | Age: 85
End: 2020-08-23

## 2020-08-26 LAB
SARS-COV-2 RNA SPEC QL NAA+PROBE: NOT DETECTED
SPECIMEN SOURCE: NORMAL

## 2020-08-30 ENCOUNTER — HOSPITAL LABORATORY (OUTPATIENT)
Facility: OTHER | Age: 85
End: 2020-08-30

## 2020-09-01 DIAGNOSIS — I25.10 CORONARY ARTERY DISEASE INVOLVING NATIVE CORONARY ARTERY OF NATIVE HEART WITHOUT ANGINA PECTORIS: ICD-10-CM

## 2020-09-01 RX ORDER — NITROGLYCERIN 0.4 MG/1
TABLET SUBLINGUAL
Qty: 25 TABLET | Refills: 0 | Status: ON HOLD | OUTPATIENT
Start: 2020-09-01 | End: 2021-05-25

## 2020-09-01 NOTE — TELEPHONE ENCOUNTER
Prescription approved per [facility] Refill Protocol.    AUTUMN BustilloN, RN  [facility]

## 2020-09-02 LAB
SARS-COV-2 RNA SPEC QL NAA+PROBE: NOT DETECTED
SPECIMEN SOURCE: NORMAL

## 2020-09-08 DIAGNOSIS — F41.9 ANXIETY: ICD-10-CM

## 2020-09-08 RX ORDER — ALPRAZOLAM 0.25 MG
TABLET ORAL
Qty: 28 TABLET | Refills: 0 | Status: SHIPPED | OUTPATIENT
Start: 2020-09-08 | End: 2021-12-16

## 2020-09-09 ENCOUNTER — HOSPITAL LABORATORY (OUTPATIENT)
Facility: OTHER | Age: 85
End: 2020-09-09

## 2020-09-09 DIAGNOSIS — I20.0 UNSTABLE ANGINA (H): ICD-10-CM

## 2020-09-09 RX ORDER — ISOSORBIDE MONONITRATE 60 MG/1
120 TABLET, EXTENDED RELEASE ORAL DAILY
Qty: 180 TABLET | Refills: 0 | Status: SHIPPED | OUTPATIENT
Start: 2020-09-09 | End: 2020-09-15

## 2020-09-10 ENCOUNTER — ALLIED HEALTH/NURSE VISIT (OUTPATIENT)
Dept: RESEARCH | Facility: CLINIC | Age: 85
End: 2020-09-10
Payer: COMMERCIAL

## 2020-09-10 DIAGNOSIS — Z95.2 S/P TAVR (TRANSCATHETER AORTIC VALVE REPLACEMENT): Primary | ICD-10-CM

## 2020-09-10 LAB — LEVETIRACETAM SERPL-MCNC: 28 UG/ML (ref 12–46)

## 2020-09-15 ENCOUNTER — VIRTUAL VISIT (OUTPATIENT)
Dept: CARDIOLOGY | Facility: CLINIC | Age: 85
End: 2020-09-15
Payer: COMMERCIAL

## 2020-09-15 DIAGNOSIS — I25.119 CORONARY ARTERY DISEASE INVOLVING NATIVE CORONARY ARTERY OF NATIVE HEART WITH ANGINA PECTORIS (H): Primary | ICD-10-CM

## 2020-09-15 DIAGNOSIS — I10 HYPERTENSION GOAL BP (BLOOD PRESSURE) < 140/90: ICD-10-CM

## 2020-09-15 DIAGNOSIS — I25.5 ISCHEMIC CARDIOMYOPATHY: ICD-10-CM

## 2020-09-15 DIAGNOSIS — Z95.2 S/P TAVR (TRANSCATHETER AORTIC VALVE REPLACEMENT): ICD-10-CM

## 2020-09-15 DIAGNOSIS — D50.0 IRON DEFICIENCY ANEMIA DUE TO CHRONIC BLOOD LOSS: ICD-10-CM

## 2020-09-15 DIAGNOSIS — I20.0 UNSTABLE ANGINA (H): ICD-10-CM

## 2020-09-15 DIAGNOSIS — R07.9 CHEST PAIN, UNSPECIFIED TYPE: ICD-10-CM

## 2020-09-15 DIAGNOSIS — I20.89 STABLE ANGINA PECTORIS (H): ICD-10-CM

## 2020-09-15 DIAGNOSIS — Z86.73 HISTORY OF CVA (CEREBROVASCULAR ACCIDENT): ICD-10-CM

## 2020-09-15 DIAGNOSIS — D64.9 ANEMIA, UNSPECIFIED TYPE: ICD-10-CM

## 2020-09-15 DIAGNOSIS — E78.5 HYPERLIPIDEMIA WITH TARGET LDL LESS THAN 130: ICD-10-CM

## 2020-09-15 DIAGNOSIS — I35.0 NONRHEUMATIC AORTIC VALVE STENOSIS: ICD-10-CM

## 2020-09-15 DIAGNOSIS — R06.09 DOE (DYSPNEA ON EXERTION): ICD-10-CM

## 2020-09-15 PROCEDURE — 99214 OFFICE O/P EST MOD 30 MIN: CPT | Mod: 95 | Performed by: INTERNAL MEDICINE

## 2020-09-15 RX ORDER — CLOPIDOGREL BISULFATE 75 MG/1
75 TABLET ORAL DAILY
Qty: 90 TABLET | Refills: 3 | Status: SHIPPED | OUTPATIENT
Start: 2020-09-15 | End: 2021-10-07

## 2020-09-15 RX ORDER — AMLODIPINE BESYLATE 5 MG/1
5 TABLET ORAL DAILY
Qty: 90 TABLET | Refills: 3 | Status: SHIPPED | OUTPATIENT
Start: 2020-09-15 | End: 2021-09-21

## 2020-09-15 RX ORDER — ATORVASTATIN CALCIUM 40 MG/1
40 TABLET, FILM COATED ORAL DAILY
Qty: 90 TABLET | Refills: 3 | Status: SHIPPED | OUTPATIENT
Start: 2020-09-15 | End: 2021-11-29

## 2020-09-15 RX ORDER — ISOSORBIDE MONONITRATE 60 MG/1
120 TABLET, EXTENDED RELEASE ORAL DAILY
Qty: 180 TABLET | Refills: 3 | Status: ON HOLD | OUTPATIENT
Start: 2020-09-15 | End: 2021-06-01

## 2020-09-15 RX ORDER — METOPROLOL SUCCINATE 50 MG/1
50 TABLET, EXTENDED RELEASE ORAL 2 TIMES DAILY
Qty: 180 TABLET | Refills: 3 | Status: SHIPPED | OUTPATIENT
Start: 2020-09-15 | End: 2021-10-07

## 2020-09-15 RX ORDER — LOSARTAN POTASSIUM 25 MG/1
TABLET ORAL
Qty: 45 TABLET | Refills: 3 | Status: SHIPPED | OUTPATIENT
Start: 2020-09-15 | End: 2020-11-03

## 2020-09-15 NOTE — TELEPHONE ENCOUNTER
Prescription approved per [facility] Refill Protocol.    AUTUMN BustilloN, RN  [facility]

## 2020-09-15 NOTE — PROGRESS NOTES
Service Date: 09/15/2020      VIRTUAL VIDEO VISIT      HISTORY OF PRESENT ILLNESS:  I had the opportunity to see Ms. Gillian Burk in Cardiology Clinic today using a virtual video visit at the [facility] in [place] for reevaluation of aortic valve disease, coronary artery disease and symptoms of shortness of breath.        She is a 89-year-old woman with severe aortic stenosis treated with transcatheter aortic valve replacement in 2016.  After that, she developed episodes of exertional angina in 2017 and underwent coronary angiography.  That study demonstrated moderately severe blockage in the mid to distal LAD.  We had planned to treat that with a staged intervention, but she had dramatic improvement in her anginal symptoms with medical therapy and decided not to pursue revascularization.  We have been managing her coronary artery disease and stable angina medically since then and she has done very well.  In fact, she does not recall any recent episodes of angina type discomfort.      Her primary complaint at this point is shortness of breath.  She gets short of breath with any activity and often has to stop to catch her breath.  She is not having PND or orthopnea.  She has a stable degree of mild lower extremity edema.  She has not noticed any significant increase in her angina-type chest discomfort, but her chest does feel a bit tight when she gets short of breath.      I do not have any information to go on at this point.  We do not have any laboratory tests since last year at this time, except for a hemoglobin in February, which was 10.0.  She had been running hemoglobins in the range of 9.8 to 10.6 throughout the fall and early winter.  In the past, her basic metabolic panel has been normal and her cholesterol numbers have been well controlled on medical therapy.      She believes her weight is about 115-120, which is a significant decrease from about 130 pounds last year.       IMPRESSIONS:  Ms. Gillian Burk is an 89-year-old woman with a history of severe aortic stenosis treated with transcatheter aortic valve replacement.  She also has a history of coronary artery disease and stable exertional angina involving the mid to distal LAD.  This was planned as a staged intervention, but she got reasonable anginal relief from medical therapy and decided not to pursue that revascularization.      She is now in assisted living and having worsening symptoms of shortness of breath with exertion,consistent with NYHA Class III heart failure symptoms.  This could be due to congestive heart failure, although she does not have any PND, orthopnea or worsening edema issues.  This could be due to worsening anemia or other problems, including kidney dysfunction.      I suggested we do some evaluation, including blood tests and an echocardiogram initially.  Unfortunately, if she leaves her assisted living, she has to quarantine for 14 days when she comes back.  Therefore, I will wait on the echocardiogram and blood tests done there at her assisted living.  Based on those results, we may wish to consider additional evaluation including an echocardiogram or stress test.  I will start with a comprehensive metabolic panel, CBC and NT-proBNP.  I think I will go ahead and check iron studies and a TSH as well.        If we do not find an explanation for her symptoms based on those blood tests, we will then can reconsider the echo.  At this point, she is not having significant angina to make me think that this is primarily a coronary disease issue.  However, we will keep a stress test in mind as we go forward.      I will not make any medication changes yet until have a better understanding of what might be occurring here.  I will request vital signs from her assisted living as well including heart rate, blood pressure and weight.      cc:      Augusto Meyer DO    [id number] [facility] [place] [address]         MARGAUX WILLIAMSON MD, [facility]             D: 09/15/2020   T: 09/15/2020   MT: [id number]      Name:     LUCIO HANNON   MRN:      [id number]        Account:      [id number]   :      1927           Service Date: 09/15/2020      Document: [id number]

## 2020-09-15 NOTE — LETTER
"9/15/2020    Augusto Meridaliliyain, DO  [address] [place] [address]    RE: Gillian Burk       Dear Colleague,    I had the pleasure of seeing Gillian TIFFANY Burk in the [facility].    The patient has been notified of following:     \"This video visit will be conducted via a call between you and your physician/provider. We have found that certain health care needs can be provided without the need for an in-person physical exam.  This service lets us provide the care you need with a video conversation.  If a prescription is necessary we can send it directly to your pharmacy.  If lab work is needed we can place an order for that and you can then stop by our lab to have the test done at a later time.    Video visits are billed at different rates depending on your insurance coverage.  Please reach out to your insurance provider with any questions.    If during the course of the call the physician/provider feels a video visit is not appropriate, you will not be charged for this service.\"    Patient has given verbal consent for Video visit? Yes    How would you like to obtain your AVS? Ba    Patient would like the video invitation sent by: Text to cell phone: [phone] daughter Hanna    Will anyone else be joining your video visit? No      Blood pressure: senior living monitors but patient does not know her readings  Pulse: NA  Weight: around 115-120 #      Video-Visit Details    Type of service:  Video Visit    Video Start Time: 11:19  Video End Time: 11:32 AM    Originating Location (pt. Location): Assisted Living    Distant Location (provider location):  [facility]     Platform used for Video Visit: MARGAUX Foster MD    HPI and Plan:   See dictation    No orders of the defined types were placed in this encounter.    No orders of the defined types were placed in this encounter.    Medications Discontinued " During This Encounter   Medication Reason     predniSONE (DELTASONE) 50 MG tablet Therapy completed         No diagnosis found.    CURRENT MEDICATIONS:  Current Outpatient Medications   Medication Sig Dispense Refill     acetaminophen (TYLENOL ARTHRITIS PAIN) 650 MG CR tablet Take 1,300 mg by mouth every 8 hours as needed for mild pain        ALPRAZolam (XANAX) 0.25 MG tablet Take 1 tablet by mouth twice daily as needed for anxiety 28 tablet 0     amLODIPine (NORVASC) 5 MG tablet Take 1 tablet (5 mg) by mouth daily 90 tablet 0     aspirin EC 81 MG EC tablet Take 1 tablet (81 mg) by mouth daily       atorvastatin (LIPITOR) 40 MG tablet Take 1 tablet by mouth once daily 90 tablet 0     calcium carbonate (TUMS) 500 MG chewable tablet Take 2-4 tablets (1,000-2,000 mg) by mouth as needed for heartburn       clopidogrel (PLAVIX) 75 MG tablet Take 1 tablet by mouth once daily 90 tablet 0     Cranberry 1000 MG CAPS        docusate sodium (COLACE) 100 MG capsule Take 200 mg by mouth daily 2 capsules       famotidine (PEPCID) 40 MG tablet TAKE 1 TABLET BY MOUTH AT BEDTIME 30 tablet 9     folic acid (FOLVITE) 1 MG tablet Take 1 tablet by mouth once daily 90 tablet 0     isosorbide mononitrate (IMDUR) 60 MG 24 hr tablet Take 2 tablets (120 mg) by mouth daily 180 tablet 0     Lactobacillus (FLORAJEN ACIDOPHILUS) CAPS Take 1 capsule by mouth daily       levETIRAcetam (KEPPRA) 500 MG tablet TAKE 1 TABLET BY MOUTH ONCE DAILY AND 2 AT BEDTIME 270 tablet 0     levothyroxine (SYNTHROID/LEVOTHROID) 50 MCG tablet Take 1 tablet by mouth once daily 90 tablet 0     losartan (COZAAR) 25 MG tablet Take 1/2 (one-half) tablet by mouth once daily 45 tablet 0     methotrexate 2.5 MG tablet Take 4 tablets (10 mg) by mouth once a week Wed 52 tablet 3     metoprolol succinate ER (TOPROL-XL) 50 MG 24 hr tablet Take 1 tablet (50 mg) by mouth 2 times daily 180 tablet 3     mirtazapine (REMERON) 30 MG tablet TAKE 1 TABLET BY MOUTH ONCE DAILY AT BEDTIME  90 tablet 0     nitroFURantoin macrocrystal-monohydrate (MACROBID) 100 MG capsule Take 100 mg by mouth once       nitroGLYcerin (NITROSTAT) 0.4 MG sublingual tablet DISSOLVE ONE TABLET UNDER THE TONGUE EVERY 5 MINUTES AS NEEDED FOR CHEST PAIN.  DO NOT EXCEED A TOTAL OF 3 DOSES IN 15 MINUTES 25 tablet 0     predniSONE (DELTASONE) 5 MG tablet TAKE ONE TABLET BY MOUTH EVERY OTHER DAY ALTERNATING  WITH  1/2  TABLET  EVERY  OTHER  DAY 23 tablet 13     SLOW  (45 Fe) MG CR tablet Take 1 tablet by mouth once daily 30 tablet 0       ALLERGIES     Allergies   Allergen Reactions     Penicillins Hives     Caffeine Other (See Comments) and Unknown     Triggers your Meniere's disease     Hydrocodone Other (See Comments) and Unknown     hallucinations     Ibuprofen GI Disturbance and Unknown     Tramadol Other (See Comments)     Seizure, was taking remeron along with tramadol     Metal [[place]] Rash       PAST MEDICAL HISTORY:  Past Medical History:   Diagnosis Date     Aortic stenosis     s/p TAVR 8/17/16     Chronic migraine      Hyperlipidaemia      Hypertension      Hypothyroidism      Myocardial infarction (H)      Need for SBE (subacute bacterial endocarditis) prophylaxis      Orthostatic hypotension     wears compression stockings     PUD (peptic ulcer disease)      Secondary Sjogren's syndrome (H)      Seizures (H)     after stroke (partial onset)     Seropositive rheumatoid arthritis (H)      Stroke (H) 05/2013    with visual field cut, left occipital lobe     Syncope 2014    due to orthostatic hypotension       PAST SURGICAL HISTORY:  Past Surgical History:   Procedure Laterality Date     C TOTAL HIP ARTHROPLASTY Right 2010     C TOTAL HIP ARTHROPLASTY Left 2014     CATARACT IOL, RT/LT Bilateral 2000     CV FLUOROSCOPY ONLY N/A 8/6/2019    Procedure: Fluoroscopy Only - valve cine done of aortic valve at 180 degrees ARIANA to JUNG;  Surgeon: Dave Farfan MD;  Location: [facility] CARDIAC CATH LAB     EYE  SURGERY  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     HEART CATH FEMORAL CANNULIZATION WITH OPEN STANDBY REPAIR AORTIC VALVE N/A 8/3/2016    Procedure: HEART CATH FEMORAL CANNULIZATION WITH OPEN STANDBY REPAIR AORTIC VALVE;  Surgeon: Owen Schultz MD;  Location: [facility] OR     HYSTERECTOMY TOTAL ABDOMINAL  1970    ovaries out     MOUTH SURGERY  2011    jaw surgery due to fracture     TRANSCATHETER AORTIC VALVE IMPLANT ANESTHESIA N/A 8/3/2016    Procedure: TRANSCATHETER AORTIC VALVE IMPLANT ANESTHESIA;  Surgeon: GENERIC ANESTHESIA PROVIDER;  Location: [facility] OR       FAMILY HISTORY:  Family History   Problem Relation Age of Onset     Hyperlipidemia Mother      Family History Negative No family hx of        SOCIAL HISTORY:  Social History     Socioeconomic History     Marital status:      Spouse name: Not on file     Number of children: 4     Years of education: Not on file     Highest education level: Not on file   Occupational History     Employer: RETIRED   Social Needs     Financial resource strain: Not on file     Food insecurity     Worry: Not on file     Inability: Not on file     Transportation needs     Medical: Not on file     Non-medical: Not on file   Tobacco Use     Smoking status: Never Smoker     Smokeless tobacco: Never Used   Substance and Sexual Activity     Alcohol use: No     Alcohol/week: 0.0 standard drinks     Drug use: No     Sexual activity: Not Currently   Lifestyle     Physical activity     Days per week: Not on file     Minutes per session: Not on file     Stress: Not on file   Relationships     Social connections     Talks on phone: Not on file     Gets together: Not on file     Attends Rastafari service: Not on file     Active member of club or organization: Not on file     Attends meetings of clubs or organizations: Not on file     Relationship status: Not on file     Intimate partner violence     Fear of current or ex partner: Not on file     Emotionally abused: Not on file      Physically abused: Not on file     Forced sexual activity: Not on file   Other Topics Concern     Parent/sibling w/ CABG, MI or angioplasty before 65F 55M? Not Asked      Service Not Asked     Blood Transfusions Not Asked     Caffeine Concern Not Asked     Occupational Exposure Not Asked     Hobby Hazards Not Asked     Sleep Concern Not Asked     Stress Concern Not Asked     Weight Concern Not Asked     Special Diet Yes     Comment: low salt      Back Care Not Asked     Exercise Yes     Comment: semi recument bike 15 mins daily      Bike Helmet Not Asked     Seat Belt Not Asked     Self-Exams Not Asked   Social History Narrative    .  Lives in assisted living. Independent. Has help with making bed and changing sheets.    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 .     4 children - 1 with RA       Physical Exam:  Vitals: There were no vitals taken for this visit.    General:  no apparent distress, normal body habitus, sitting upright.  ENT/Mouth:  membranes moist, no nasal discharge.  Normal head shape, no apparent injury or laceration.  Eyes:  no scleral icterus, normal conjunctivae.  No observed jaundice.  Neck:  no apparent neck swelling.   Chest/Lungs:  No breathing difficulty while speaking.  No audible wheezing.  No cough during conversation.  Cardiovascular:  No obviously elevated jugular venous pressure.  No apparent edema bilaterally in LE.   Abdomen:  no obvious abdominal distention.   Extremities:  no apparent cyanosis.  Skin:  no xanthelasma.  No facial lacerations.  Neurologic:  Normal arm motion bilateral, no tremors.    Psychiatric:  Alert and oriented x3, calm demeanor    The rest of the comprehensive physical examination is deferred due to public health emergency video visit restrictions.        Recent Lab Results:  LIPID RESULTS:  Lab Results   Component Value Date    CHOL 130 08/22/2019    HDL 64 08/22/2019    LDL 45 08/22/2019    TRIG 106 08/22/2019     CHOLHDLRATIO 2.3 07/07/2015       LIVER ENZYME RESULTS:  Lab Results   Component Value Date    AST 19 11/22/2019    ALT 19 11/22/2019       CBC RESULTS:  Lab Results   Component Value Date    WBC 7.7 02/25/2020    RBC 3.44 (L) 02/25/2020    HGB 10.0 (L) 02/25/2020    HCT 31.9 (L) 02/25/2020    MCV 93 02/25/2020    MCH 29.1 02/25/2020    MCHC 31.3 (L) 02/25/2020    RDW 17.0 (H) 02/25/2020     02/25/2020       BMP RESULTS:  Lab Results   Component Value Date     08/22/2019    POTASSIUM 3.8 08/22/2019    CHLORIDE 105 08/22/2019    CO2 27 08/22/2019    ANIONGAP 9 08/22/2019     (H) 08/22/2019    BUN 17 08/22/2019    CR 0.93 08/22/2019    GFRESTIMATED 53 (L) 08/22/2019    GFRESTBLACK 62 08/22/2019    CHEYANNE 8.4 (L) 08/22/2019        A1C RESULTS:  Lab Results   Component Value Date    A1C 5.5 10/13/2018       INR RESULTS:  Lab Results   Component Value Date    INR 1.11 04/26/2019    INR 1.1 10/13/2018       Service Date: 09/15/2020      VIRTUAL VIDEO VISIT      HISTORY OF PRESENT ILLNESS:  I had the opportunity to see Ms. Gillian Burk in Cardiology Clinic today using a virtual video visit at the [facility] in [place] for reevaluation of aortic valve disease, coronary artery disease and symptoms of shortness of breath.        She is a 89-year-old woman with severe aortic stenosis treated with transcatheter aortic valve replacement in 2016.  After that, she developed episodes of exertional angina in 2017 and underwent coronary angiography.  That study demonstrated moderately severe blockage in the mid to distal LAD.  We had planned to treat that with a staged intervention, but she had dramatic improvement in her anginal symptoms with medical therapy and decided not to pursue revascularization.  We have been managing her coronary artery disease and stable angina medically since then and she has done very well.  In fact, she does not recall any recent episodes of angina type  discomfort.      Her primary complaint at this point is shortness of breath.  She gets short of breath with any activity and often has to stop to catch her breath.  She is not having PND or orthopnea.  She has a stable degree of mild lower extremity edema.  She has not noticed any significant increase in her angina-type chest discomfort, but her chest does feel a bit tight when she gets short of breath.      I do not have any information to go on at this point.  We do not have any laboratory tests since last year at this time, except for a hemoglobin in February, which was 10.0.  She had been running hemoglobins in the range of 9.8 to 10.6 throughout the fall and early winter.  In the past, her basic metabolic panel has been normal and her cholesterol numbers have been well controlled on medical therapy.      She believes her weight is about 115-120, which is a significant decrease from about 130 pounds last year.      IMPRESSIONS:  Ms. Gillian Burk is an 89-year-old woman with a history of severe aortic stenosis treated with transcatheter aortic valve replacement.  She also has a history of coronary artery disease and stable exertional angina involving the mid to distal LAD.  This was planned as a staged intervention, but she got reasonable anginal relief from medical therapy and decided not to pursue that revascularization.      She is now in assisted living and having worsening symptoms of shortness of breath with exertion.  This could be due to congestive heart failure, although she does not have any PND, orthopnea or worsening edema issues.  This could be due to worsening anemia or other problems, including kidney dysfunction.      I suggested we do some evaluation, including blood tests and an echocardiogram initially.  Unfortunately, if she leaves her assisted living, she has to quarantine for 14 days when she comes back.  Therefore, I will wait on the echocardiogram and blood tests done there at her  assisted living.  Based on those results, we may wish to consider additional evaluation including an echocardiogram or stress test.  I will start with a comprehensive metabolic panel, CBC and NT-proBNP.  I think I will go ahead and check iron studies and a TSH as well.        If we do not find an explanation for her symptoms based on those blood tests, we will then can reconsider the echo.  At this point, she is not having significant angina to make me think that this is primarily a coronary disease issue.  However, we will keep a stress test in mind as we go forward.      I will not make any medication changes yet until have a better understanding of what might be occurring here.  I will request vital signs from her assisted living as well including heart rate, blood pressure and weight.      cc:      [place] [address]         MARGAUX WILLIAMSON MD, [facility]         D: 09/15/2020   T: 09/15/2020   MT:       Name:     LUCIO HANNON   MRN:      [id number]        Account:      [id number]   :      1927           Service Date: 09/15/2020      Document: [id number]         Thank you for allowing me to participate in the care of your patient.    Sincerely,     MARGAUX WILLIAMSON MD     [facility]

## 2020-09-18 ENCOUNTER — HOSPITAL LABORATORY (OUTPATIENT)
Facility: OTHER | Age: 85
End: 2020-09-18

## 2020-09-22 ENCOUNTER — TRANSFERRED RECORDS (OUTPATIENT)
Dept: HEALTH INFORMATION MANAGEMENT | Facility: CLINIC | Age: 85
End: 2020-09-22

## 2020-09-24 ENCOUNTER — CARE COORDINATION (OUTPATIENT)
Dept: CARDIOLOGY | Facility: CLINIC | Age: 85
End: 2020-09-24

## 2020-09-24 NOTE — PROGRESS NOTES
Received call from patient's daughter regarding labs Dr. Lee ordered, requesting results.    Chart reviewed. Dr. Lee placed lab orders, but it looks like his never got scheduled. The intention was to have labs drawn at patient's care home, but Team RN's were never alerted to sent lab orders to the care home. Labs were drawn on 9/18/20 per provider Aylin Castañeda CNP, so our team was not aware of any lab results.    Labs reviewed, below. Call out to pt's daughter Hanna. No answer. I left a detailed voicemail  asking for a call back to discuss any symptoms her mother may be having. Will await return call. In the meantime, routed to Dr. Lee for review. Patient is on Plavix and aspirin. It sounds like she has been on lockdown at her [facility] due to the pandemic, but c/o of SOB at her recent virtual appointment.  Meri Layton RN on 9/24/2020 at 3:55 PM      5:19 PM  Returned call to patient's daughter Hanna. Reviewed labs. Will send pt's PCP Dr. Meyer the TSH/Free T4 levels. Hanna denies any knowledge of signs/symptoms of bleeding. No excess bruising that she is aware with DAPT. Routed to Dr. Lee for recommendations. I advised for any clinical changes to her symptoms to review with care home providers until I hear back from Dr. Lee. Meri Layton RN on 9/24/2020 at 5:36 PM      Component      Latest Ref Rng & Units 9/18/2020   TSH      0.40 - 4.00 mU/L 8.00 (H)       Component      Latest Ref Rng & Units 9/18/2020   T4 Free      0.76 - 1.46 ng/dL 1.14       Component      Latest Ref Rng & Units 9/18/2020   WBC      4.0 - 11.0 10e9/L 8.5   RBC Count      3.8 - 5.2 10e12/L 2.90 (L)   Hemoglobin      11.7 - 15.7 g/dL 8.4 (L)   Hematocrit      35.0 - 47.0 % 27.2 (L)   MCV      78 - 100 fl 94   MCH      26.5 - 33.0 pg 29.0   MCHC      31.5 - 36.5 g/dL 30.9 (L)   RDW      10.0 - 15.0 % 16.9 (H)   Platelet Count      150 - 450 10e9/L 400       Component      Latest Ref Rng & Units 9/18/2020   % Retic      0.5 - 2.0 % 3.4  (H)   Absolute Retic      25 - 95 10e9/L 97.2 (H)       Component      Latest Ref Rng & Units 9/18/2020   Ferritin      8 - 252 ng/mL 37       Component      Latest Ref Rng & Units 9/18/2020   Iron      35 - 180 ug/dL 19 (L)   Iron Binding Cap      240 - 430 ug/dL 220 (L)   Iron Saturation Index      15 - 46 % 9 (L)       Component      Latest Ref Rng & Units 9/18/2020   Sodium      133 - 144 mmol/L 136   Potassium      3.4 - 5.3 mmol/L 3.4   Chloride      94 - 109 mmol/L 104   Carbon Dioxide      20 - 32 mmol/L 26   Anion Gap      3 - 14 mmol/L 6   Glucose      70 - 99 mg/dL 86   Urea Nitrogen      7 - 30 mg/dL 17   Creatinine      0.52 - 1.04 mg/dL 0.88   GFR Estimate      >60 mL/min/1.73:m2 57 (L)   GFR Estimate If Black      >60 mL/min/1.73:m2 66   Calcium      8.5 - 10.1 mg/dL 7.7 (L)   Bilirubin Total      0.2 - 1.3 mg/dL 0.2   Albumin      3.4 - 5.0 g/dL 2.4 (L)   Protein Total      6.8 - 8.8 g/dL 8.4   Alkaline Phosphatase      40 - 150 U/L 87   ALT      0 - 50 U/L 17   AST      0 - 45 U/L 23       Component      Latest Ref Rng & Units 9/18/2020   N-Terminal Pro Bnp      0 - 450 pg/mL 1,328 (H)

## 2020-09-25 ENCOUNTER — DOCUMENTATION ONLY (OUTPATIENT)
Dept: FAMILY MEDICINE | Facility: OTHER | Age: 85
End: 2020-09-25

## 2020-09-25 DIAGNOSIS — E03.4 HYPOTHYROIDISM DUE TO ACQUIRED ATROPHY OF THYROID: ICD-10-CM

## 2020-09-25 RX ORDER — LEVOTHYROXINE SODIUM 75 UG/1
75 TABLET ORAL DAILY
Qty: 30 TABLET | Refills: 0 | Status: SHIPPED | OUTPATIENT
Start: 2020-09-25 | End: 2020-10-21

## 2020-09-25 NOTE — PROGRESS NOTES
The patient recently had lab work performed which demonstrates progressive iron deficiency.  Please help her set up an office appointment so we can discuss further work-up/IV iron supplementation/etc.      Thank you.      Betsy

## 2020-09-25 NOTE — TELEPHONE ENCOUNTER
The main cause of her dyspnea with exertion seems to be iron deficiency anemia. She is probably bleeding slowly from the GI tract. Let's stop her Plavix and continue aspirin 81 mg daily. She needs iron replacement therapy. I will defer that to Dr. Meyer. He may choose to evaluate her for a source of bleeding also. I am not too concerned about the TSH, but he can evaluate that issue as well. Thanks. LISSETTE

## 2020-09-25 NOTE — PROGRESS NOTES
Patient informed and request I call daughter to set up appointment. Appointment set per daughters requested time.     Taya Hayward LPN........9/25/2020 4:43 PM

## 2020-09-25 NOTE — TELEPHONE ENCOUNTER
Rufino Meyer.    I recently saw Gillian for a video visit. She was having shortness of breath with exertion but not clearly having angina. I did some labs that you see here. I suspect that her iron deficiency anemia is contributing to her dyspnea. I suggested that she follow up with you to address that issue and any possible bleeding problem. I will stop her Plavix and leave her on aspirin 81 mg daily alone. Her TSH is up a little bit too.     Thanks.     Jones Lee  Cardiology.

## 2020-09-25 NOTE — PROGRESS NOTES
Jones Lee MD Matushin, Clifford Michael, DO 2 minutes ago (4:13 PM)          Hi Dr. Meyer.     I recently saw Gillian for a video visit. She was having shortness of breath with exertion but not clearly having angina. I did some labs that you see here. I suspect that her iron deficiency anemia is contributing to her dyspnea. I suggested that she follow up with you to address that issue and any possible bleeding problem. I will stop her Plavix and leave her on aspirin 81 mg daily alone. Her TSH is up a little bit too.      Thanks.      Jones Lee  Cardiology.          Documentation       Jones Lee MD  [facility] Heart Team 7 6 minutes ago (4:10 PM)          The main cause of her dyspnea with exertion seems to be iron deficiency anemia. She is probably bleeding slowly from the GI tract. Let's stop her Plavix and continue aspirin 81 mg daily. She needs iron replacement therapy. I will defer that to Dr. Meyer. He may choose to evaluate her for a source of bleeding also. I am not too concerned about the TSH, but he can evaluate that issue as well. Thanks. EE         Documentation     No new orders from Dr. Lee. RN CC will close encounter for Cardiology. Meri Layton RN on 9/25/2020 at 4:21 PM

## 2020-09-25 NOTE — PROGRESS NOTES
The patient's TSH is elevated suggesting under supplementation of the thyroid.  Even in the presence of a normal T4, I believe this variance in the TSH justifies dosage adjustment.  I have sent a prescription for 75 mcg (up from the previous 50 mcg) of Synthroid to be taken daily to her pharmacy.  We will recheck the TSH in the upcoming month.    Please let the patient know to start a new medication soon.    Betsy

## 2020-09-28 LAB
ALBUMIN SERPL-MCNC: 2.4 G/DL (ref 3.4–5)
ALP SERPL-CCNC: 87 U/L (ref 40–150)
ALT SERPL W P-5'-P-CCNC: 17 U/L (ref 0–50)
ANION GAP SERPL CALCULATED.3IONS-SCNC: 6 MMOL/L (ref 3–14)
AST SERPL W P-5'-P-CCNC: 23 U/L (ref 0–45)
BILIRUB SERPL-MCNC: 0.2 MG/DL (ref 0.2–1.3)
BUN SERPL-MCNC: 17 MG/DL (ref 7–30)
CALCIUM SERPL-MCNC: 7.7 MG/DL (ref 8.5–10.1)
CHLORIDE SERPL-SCNC: 104 MMOL/L (ref 94–109)
CO2 SERPL-SCNC: 26 MMOL/L (ref 20–32)
CREAT SERPL-MCNC: 0.88 MG/DL (ref 0.52–1.04)
ERYTHROCYTE [DISTWIDTH] IN BLOOD BY AUTOMATED COUNT: 16.9 % (ref 10–15)
FERRITIN SERPL-MCNC: 37 NG/ML (ref 8–252)
GFR SERPL CREATININE-BSD FRML MDRD: 57 ML/MIN/{1.73_M2}
GLUCOSE SERPL-MCNC: 86 MG/DL (ref 70–99)
HCT VFR BLD AUTO: 27.2 % (ref 35–47)
HGB BLD-MCNC: 8.4 G/DL (ref 11.7–15.7)
IRON SATN MFR SERPL: 9 % (ref 15–46)
IRON SERPL-MCNC: 19 UG/DL (ref 35–180)
MCH RBC QN AUTO: 29 PG (ref 26.5–33)
MCHC RBC AUTO-ENTMCNC: 30.9 G/DL (ref 31.5–36.5)
MCV RBC AUTO: 94 FL (ref 78–100)
NT-PROBNP SERPL-MCNC: 1328 PG/ML (ref 0–450)
PLATELET # BLD AUTO: 400 10E9/L (ref 150–450)
POTASSIUM SERPL-SCNC: 3.4 MMOL/L (ref 3.4–5.3)
PROT SERPL-MCNC: 8.4 G/DL (ref 6.8–8.8)
RBC # BLD AUTO: 2.9 10E12/L (ref 3.8–5.2)
RETICS # AUTO: 97.2 10E9/L (ref 25–95)
RETICS/RBC NFR AUTO: 3.4 % (ref 0.5–2)
SODIUM SERPL-SCNC: 136 MMOL/L (ref 133–144)
T4 FREE SERPL-MCNC: 1.14 NG/DL (ref 0.76–1.46)
TIBC SERPL-MCNC: 220 UG/DL (ref 240–430)
TSH SERPL DL<=0.005 MIU/L-ACNC: 8 MU/L (ref 0.4–4)
WBC # BLD AUTO: 8.5 10E9/L (ref 4–11)

## 2020-10-01 ENCOUNTER — CARE COORDINATION (OUTPATIENT)
Dept: CARDIOLOGY | Facility: CLINIC | Age: 85
End: 2020-10-01

## 2020-10-01 NOTE — PROGRESS NOTES
Pt's daughter, Claudia, left message stating pt's assisted living called to say that they received a call to set up a TAVR appt for pt. Pt's daughter wanted to know why they would be getting this call and who called. Pt had a TAVR in 2016. I told her  did not order any type of TAVR consult at this point in time. I will forward her question to the TAVR team to see if they reached out to pt. Pt going to follow up with PCP tomorrow to discuss her anemia. Vijay POWELL October 1, 2020, 2:08 PM

## 2020-10-02 ENCOUNTER — OFFICE VISIT (OUTPATIENT)
Dept: INTERNAL MEDICINE | Facility: CLINIC | Age: 85
End: 2020-10-02
Payer: COMMERCIAL

## 2020-10-02 VITALS
DIASTOLIC BLOOD PRESSURE: 68 MMHG | SYSTOLIC BLOOD PRESSURE: 116 MMHG | RESPIRATION RATE: 16 BRPM | WEIGHT: 121 LBS | TEMPERATURE: 97.9 F | OXYGEN SATURATION: 94 % | HEART RATE: 74 BPM | BODY MASS INDEX: 19.53 KG/M2

## 2020-10-02 DIAGNOSIS — M05.9 SEROPOSITIVE RHEUMATOID ARTHRITIS (H): ICD-10-CM

## 2020-10-02 DIAGNOSIS — I25.10 CORONARY ARTERY DISEASE INVOLVING NATIVE CORONARY ARTERY OF NATIVE HEART WITHOUT ANGINA PECTORIS: ICD-10-CM

## 2020-10-02 DIAGNOSIS — Z86.73 HISTORY OF CVA (CEREBROVASCULAR ACCIDENT): ICD-10-CM

## 2020-10-02 DIAGNOSIS — K27.4 GASTROINTESTINAL HEMORRHAGE ASSOCIATED WITH PEPTIC ULCER: ICD-10-CM

## 2020-10-02 DIAGNOSIS — D50.0 IRON DEFICIENCY ANEMIA DUE TO CHRONIC BLOOD LOSS: ICD-10-CM

## 2020-10-02 DIAGNOSIS — Z95.2 S/P TAVR (TRANSCATHETER AORTIC VALVE REPLACEMENT): Primary | ICD-10-CM

## 2020-10-02 DIAGNOSIS — G40.909 SEIZURE DISORDER (H): ICD-10-CM

## 2020-10-02 DIAGNOSIS — I50.22 CHRONIC SYSTOLIC CONGESTIVE HEART FAILURE (H): ICD-10-CM

## 2020-10-02 DIAGNOSIS — Z20.822 COVID-19 RULED OUT: Primary | ICD-10-CM

## 2020-10-02 PROCEDURE — U0003 INFECTIOUS AGENT DETECTION BY NUCLEIC ACID (DNA OR RNA); SEVERE ACUTE RESPIRATORY SYNDROME CORONAVIRUS 2 (SARS-COV-2) (CORONAVIRUS DISEASE [COVID-19]), AMPLIFIED PROBE TECHNIQUE, MAKING USE OF HIGH THROUGHPUT TECHNOLOGIES AS DESCRIBED BY CMS-2020-01-R: HCPCS | Performed by: INTERNAL MEDICINE

## 2020-10-02 PROCEDURE — 99214 OFFICE O/P EST MOD 30 MIN: CPT | Performed by: INTERNAL MEDICINE

## 2020-10-02 RX ORDER — LEVETIRACETAM 500 MG/1
500 TABLET ORAL 2 TIMES DAILY
Qty: 270 TABLET | Refills: 0 | COMMUNITY
Start: 2020-10-02 | End: 2020-11-23

## 2020-10-02 ASSESSMENT — PAIN SCALES - GENERAL: PAINLEVEL: NO PAIN (0)

## 2020-10-02 NOTE — PROGRESS NOTES
Subjective     Gillian Burk is a 89 year old female who presents to clinic today for the following health issues:    HPI         Chief Complaint   Patient presents with     Anemia        EMR reviewed including: History of chief complaint, pertinent distant history, medications, concurrent diagnoses.             Chief Complaint:  #1   Follow-up on anemia.  History of chronic upper gastrointestinal blood loss.  Persistent iron deficiency anemia despite oral supplementation.  Intermittent constipation from iron supplementation.  Has been more weak and fatigued.  Recent hemoglobin 8.4.  Recent iron saturation 9%.    #2  Follow-up on previous CVA.  No significant neurologic sequelae.  Continues aspirin and Plavix.  No gross melena noted.    #3   Follow-up on chronic rheumatoid arthritis.  No acute joint inflammation noted.  Relates minimal chronic deformity of the hands.  Continues methotrexate and low-dose prednisone without significant side effect.    #4   Follow-up on congestive heart failure.  No orthopnea.  No chest pain.  Markedly improved since TAVR procedure.  No bruising or bleeding.  No significant edema noted.                         PAST, FAMILY,SOCIAL HISTORY:     Medical  History:   has a past medical history of Aortic stenosis, Chronic migraine, Hyperlipidaemia, Hypertension, Hypothyroidism, Myocardial infarction (H), Need for SBE (subacute bacterial endocarditis) prophylaxis, Orthostatic hypotension, PUD (peptic ulcer disease), Secondary Sjogren's syndrome (H), Seizures (H), Seropositive rheumatoid arthritis (H), Stroke (H) (05/2013), and Syncope (2014).     Surgical History:   has a past surgical history that includes Mouth surgery (2011); Eye surgery (1999); cataract iol, rt/lt (Bilateral, 2000); Hysterectomy total abdominal (1970); TOTAL HIP ARTHROPLASTY (Right, 2010); TOTAL HIP ARTHROPLASTY (Left, 2014); TRANSCATHETER AORTIC VALVE IMPLANT ANEST (N/A, 8/3/2016); Heart Cath Femoral Cannulization  With Open Standby Repair Aortic Valve (N/A, 8/3/2016); and Fluoroscopy Only (N/A, 8/6/2019).     Social History:   reports that she has never smoked. She has never used smokeless tobacco. She reports that she does not drink alcohol or use drugs.     Family History:  family history includes Hyperlipidemia in her mother.            MEDICATIONS  Current Outpatient Medications   Medication Sig Dispense Refill     acetaminophen (TYLENOL ARTHRITIS PAIN) 650 MG CR tablet Take 1,300 mg by mouth every 8 hours as needed for mild pain        ALPRAZolam (XANAX) 0.25 MG tablet Take 1 tablet by mouth twice daily as needed for anxiety 28 tablet 0     amLODIPine (NORVASC) 5 MG tablet Take 1 tablet (5 mg) by mouth daily 90 tablet 3     aspirin EC 81 MG EC tablet Take 1 tablet (81 mg) by mouth daily       atorvastatin (LIPITOR) 40 MG tablet Take 1 tablet (40 mg) by mouth daily 90 tablet 3     calcium carbonate (TUMS) 500 MG chewable tablet Take 2-4 tablets (1,000-2,000 mg) by mouth as needed for heartburn       clopidogrel (PLAVIX) 75 MG tablet Take 1 tablet (75 mg) by mouth daily 90 tablet 3     Cranberry 1000 MG CAPS        docusate sodium (COLACE) 100 MG capsule Take 200 mg by mouth daily 2 capsules       famotidine (PEPCID) 40 MG tablet TAKE 1 TABLET BY MOUTH AT BEDTIME 30 tablet 9     folic acid (FOLVITE) 1 MG tablet Take 1 tablet by mouth once daily 90 tablet 0     isosorbide mononitrate (IMDUR) 60 MG 24 hr tablet Take 2 tablets (120 mg) by mouth daily 180 tablet 3     Lactobacillus (FLORAJEN ACIDOPHILUS) CAPS Take 1 capsule by mouth daily       levETIRAcetam (KEPPRA) 500 MG tablet Take 1 tablet (500 mg) by mouth 2 times daily 270 tablet 0     levothyroxine (SYNTHROID/LEVOTHROID) 75 MCG tablet Take 1 tablet (75 mcg) by mouth daily 30 tablet 0     losartan (COZAAR) 25 MG tablet Take 1/2 (one-half) tablet by mouth once daily 45 tablet 3     methotrexate 2.5 MG tablet Take 4 tablets (10 mg) by mouth once a week Wed 52 tablet 3      metoprolol succinate ER (TOPROL-XL) 50 MG 24 hr tablet Take 1 tablet (50 mg) by mouth 2 times daily 180 tablet 3     mirtazapine (REMERON) 30 MG tablet TAKE 1 TABLET BY MOUTH ONCE DAILY AT BEDTIME 90 tablet 0     nitroFURantoin macrocrystal-monohydrate (MACROBID) 100 MG capsule Take 100 mg by mouth once       nitroGLYcerin (NITROSTAT) 0.4 MG sublingual tablet DISSOLVE ONE TABLET UNDER THE TONGUE EVERY 5 MINUTES AS NEEDED FOR CHEST PAIN.  DO NOT EXCEED A TOTAL OF 3 DOSES IN 15 MINUTES 25 tablet 0     predniSONE (DELTASONE) 5 MG tablet TAKE ONE TABLET BY MOUTH EVERY OTHER DAY ALTERNATING  WITH  1/2  TABLET  EVERY  OTHER  DAY 23 tablet 13     SLOW  (45 Fe) MG CR tablet Take 1 tablet by mouth once daily 30 tablet 0         --------------------------------------------------------------------------------------------------------------------                              Review of Systems     Constitutional: The patient denies significant weight gain, weight loss, or cold intolerance, night sweats,  lethargy, listlessness.  Has had progressive fatigue.  Denies depression.   LUNGS: Pt denies: cough, excess sputum, hemoptysis, or shortness of breath.   HEART: Pt denies: chest pain, arrhythmia, syncope, tachy or bradyarrhythmia.   GI: Pt denies: nausea, vomiting, diarrhea, constipation, melena, or hematochezia.   NEURO: Pt denies: seizures, strokes, diplopia, weakness, paraesthesias, or paralysis.   SKIN: Pt denies: itching, rashes, discoloration, or specific lesions of concern. Denies recent hair loss.   PSYCH: The patient denies significant depression, anxiety, mood imbalance. Specifically denies any suicidal ideation.        Examination    /68 (BP Location: Right arm, Patient Position: Sitting, Cuff Size: Adult Regular)   Pulse 74   Temp 97.9  F (36.6  C) (Temporal)   Resp 16   Wt 54.9 kg (121 lb)   SpO2 94%   BMI 19.53 kg/m      Constitutional: The patient appears to be in no acute distress. The patient  appears to be adequately hydrated. No acute respiratory or hemodynamic distress is noted at this time.   LUNGS: clear bilaterally, airflow is brisk, no intercostal retraction or stridor is noted. No coughing is noted during visit.   HEART:  regular without rubs, clicks, gallops, or murmurs. PMI is nondisplaced. Upstrokes are brisk. S1,S2 are heard.   GI: Abdomen is soft, without rebound, guarding or tenderness. Bowel sounds are appropriate. No renal bruits are heard.   NEURO: Pt is alert and appropriate. No neurologic lateralization is noted. Cranial nerves 2-12 are intact. Peripheral sensory and motor function are grossly normal.    SKIN:  warm and dry. No erythema, or rashes are noted. No specific lesions of concern are noted.    PSYCH: The patient appears grossly appropriate. Maintains good eye contact, does not have any jittery or atypical motion. Displays appropriate affect.         Decision Making       1. Gastrointestinal hemorrhage associated with peptic ulcer  Continue proton pump inhibitor.  Alert for melena.    2. Iron deficiency anemia due to chronic blood loss  We will set up for a fusion.  Mucobid testing for  - Asymptomatic COVID-19 Virus (Coronavirus) by PCR    3. Seizure disorder (H)  Currently stable without recurrence of seizure.  - levETIRAcetam (KEPPRA) 500 MG tablet; Take 1 tablet (500 mg) by mouth 2 times daily  Dispense: 270 tablet; Refill: 0    4. History of CVA (cerebrovascular accident) - 2013  Continue aspirin and Plavix.  Watch for gastrointestinal bleeding.    5. Seropositive rheumatoid arthritis (H)  Continue current low-dose prednisone and methotrexate.  Tolerating well.    6. S/P TAVR (transcatheter aortic valve replacement)  Significant provement in overall cardiac status following the procedure.    7. Chronic systolic congestive heart failure (H)  Markedly improved.  Continue beta-blocker, ARB,    8. Coronary artery disease involving native coronary artery of native heart without  angina pectoris  Continue lipid control, blood pressure control, aspirin, beta-blocker.                             FOLLOW UP   I have asked the patient to make an appointment for followup with me in 2 to 3 weeks following infusion of iron for recheck lab work            I have carefully explained the diagnosis and treatment options to the patient.  The patient has displayed an understanding of the above, and all subsequent questions were answered.      DO DELMA Pineda    Portions of this note were produced using [employer]  Although every attempt at real-time proof reading has been made, occasional grammar/syntax errors may have been missed.

## 2020-10-02 NOTE — PROGRESS NOTES
TAVR research team was reaching out to pt or her daughter to set up research TAVR follow up visit. Tania, research RN said nothing more is needed at this time. She said she did speak to the daughter. Vijay POWELL October 2, 2020, 8:38 AM

## 2020-10-03 LAB
SARS-COV-2 RNA SPEC QL NAA+PROBE: NOT DETECTED
SPECIMEN SOURCE: NORMAL

## 2020-10-05 NOTE — PROGRESS NOTES
Reprise III patient 48 Mo Follow-up. Patient is in an assisted living facility that is currently on lock down due to Covid. Rotational Xray, ECHO, PE and NYHA not done due to the patient being unable to attend appt.     Spoke with patients daughter to review patients condition and complete KCCQ as she speaks with her mother frequently. mRS completed by Abi Garza via daughter.    Daughter states patient is doing well, has had no cardiac events since last visit, is able to complete most ADLs by herself at the facility. No cardiac AEs or SAEs noted.   VS as per Epic.    Follow-up 1 year per protocol.

## 2020-10-06 DIAGNOSIS — M05.9 SEROPOSITIVE RHEUMATOID ARTHRITIS (H): ICD-10-CM

## 2020-10-06 DIAGNOSIS — G47.09 SLEEP INITIATION DISORDER: ICD-10-CM

## 2020-10-07 RX ORDER — MIRTAZAPINE 30 MG/1
TABLET, FILM COATED ORAL
Qty: 90 TABLET | Refills: 1 | Status: SHIPPED | OUTPATIENT
Start: 2020-10-07 | End: 2021-04-15

## 2020-10-07 RX ORDER — FOLIC ACID 1 MG/1
TABLET ORAL
Qty: 90 TABLET | Refills: 1 | Status: SHIPPED | OUTPATIENT
Start: 2020-10-07 | End: 2021-04-07

## 2020-10-07 NOTE — TELEPHONE ENCOUNTER
Prescription approved per [facility] Refill Protocol.    AUTUMN BustilloN, RN  [facility]

## 2020-10-08 NOTE — RESULT ENCOUNTER NOTE
Dear Gillian, your recent test results are attached.    Your COVID 19 test was negative.    You will be contacted with any outstanding results when they are available.  Feel free to contact me via the office or My Chart if you have any questions regarding the above.  Sincerely,  DO DELMA Pineda

## 2020-10-13 ENCOUNTER — INFUSION THERAPY VISIT (OUTPATIENT)
Dept: INFUSION THERAPY | Facility: CLINIC | Age: 85
End: 2020-10-13
Attending: INTERNAL MEDICINE
Payer: COMMERCIAL

## 2020-10-13 VITALS
SYSTOLIC BLOOD PRESSURE: 149 MMHG | DIASTOLIC BLOOD PRESSURE: 74 MMHG | HEART RATE: 80 BPM | WEIGHT: 124.3 LBS | TEMPERATURE: 97.7 F | RESPIRATION RATE: 16 BRPM | OXYGEN SATURATION: 96 % | BODY MASS INDEX: 20.06 KG/M2

## 2020-10-13 DIAGNOSIS — K27.4 GASTROINTESTINAL HEMORRHAGE ASSOCIATED WITH PEPTIC ULCER: Primary | ICD-10-CM

## 2020-10-13 DIAGNOSIS — D50.0 IRON DEFICIENCY ANEMIA DUE TO CHRONIC BLOOD LOSS: ICD-10-CM

## 2020-10-13 PROCEDURE — 96366 THER/PROPH/DIAG IV INF ADDON: CPT

## 2020-10-13 PROCEDURE — 258N000003 HC RX IP 258 OP 636: Performed by: INTERNAL MEDICINE

## 2020-10-13 PROCEDURE — 96375 TX/PRO/DX INJ NEW DRUG ADDON: CPT

## 2020-10-13 PROCEDURE — 250N000011 HC RX IP 250 OP 636: Performed by: INTERNAL MEDICINE

## 2020-10-13 PROCEDURE — 96365 THER/PROPH/DIAG IV INF INIT: CPT

## 2020-10-13 RX ORDER — METHYLPREDNISOLONE SODIUM SUCCINATE 125 MG/2ML
125 INJECTION, POWDER, LYOPHILIZED, FOR SOLUTION INTRAMUSCULAR; INTRAVENOUS ONCE
Status: COMPLETED | OUTPATIENT
Start: 2020-10-13 | End: 2020-10-13

## 2020-10-13 RX ORDER — METHYLPREDNISOLONE SODIUM SUCCINATE 125 MG/2ML
125 INJECTION, POWDER, LYOPHILIZED, FOR SOLUTION INTRAMUSCULAR; INTRAVENOUS ONCE
Status: CANCELLED
Start: 2020-10-15 | End: 2020-10-15

## 2020-10-13 RX ADMIN — METHYLPREDNISOLONE SODIUM SUCCINATE 125 MG: 125 INJECTION, POWDER, FOR SOLUTION INTRAMUSCULAR; INTRAVENOUS at 13:07

## 2020-10-13 RX ADMIN — IRON SUCROSE 300 MG: 20 INJECTION, SOLUTION INTRAVENOUS at 13:24

## 2020-10-13 RX ADMIN — SODIUM CHLORIDE 250 ML: 9 INJECTION, SOLUTION INTRAVENOUS at 13:06

## 2020-10-13 ASSESSMENT — PAIN SCALES - GENERAL: PAINLEVEL: NO PAIN (0)

## 2020-10-13 NOTE — PROGRESS NOTES
Infusion Nursing Note:  Gillian Burk presents today for Day 1/3 Venofer.    Patient seen by provider today: No   present during visit today: Not Applicable.    Note: Patient arrives in W/C. Alert, oriented, denies pain. VSS. Reports feeling fatigued and more tired lately. SOA with activity. Denies dizziness.   Received Solumedrol as premedication for prior Venofer infusions so this was ordered and given.     Intravenous Access:  Peripheral IV placed.    Treatment Conditions:  Not Applicable.      Post Infusion Assessment:  Patient tolerated infusion without incident.  Patient observed for 30 minutes following infusion.  Blood return noted pre and post infusion.  Site patent and intact, free from redness, edema or discomfort.  No evidence of extravasations.  PIV access discontinued per protocol.       Discharge Plan:   Copy of AVS reviewed with patient and/or family.  Patient will return 10/16 for next appointment.  Patient discharged in stable condition accompanied by: family.  Departure Mode: Wheelchair.    Renetta Ordaz RN

## 2020-10-15 ENCOUNTER — MYC MEDICAL ADVICE (OUTPATIENT)
Dept: INTERNAL MEDICINE | Facility: CLINIC | Age: 85
End: 2020-10-15

## 2020-10-16 ENCOUNTER — INFUSION THERAPY VISIT (OUTPATIENT)
Dept: INFUSION THERAPY | Facility: CLINIC | Age: 85
End: 2020-10-16
Attending: INTERNAL MEDICINE
Payer: COMMERCIAL

## 2020-10-16 VITALS
HEIGHT: 66 IN | WEIGHT: 123.2 LBS | DIASTOLIC BLOOD PRESSURE: 59 MMHG | RESPIRATION RATE: 16 BRPM | TEMPERATURE: 97.2 F | HEART RATE: 79 BPM | SYSTOLIC BLOOD PRESSURE: 141 MMHG | BODY MASS INDEX: 19.8 KG/M2

## 2020-10-16 DIAGNOSIS — K27.4 GASTROINTESTINAL HEMORRHAGE ASSOCIATED WITH PEPTIC ULCER: Primary | ICD-10-CM

## 2020-10-16 DIAGNOSIS — D50.0 IRON DEFICIENCY ANEMIA DUE TO CHRONIC BLOOD LOSS: ICD-10-CM

## 2020-10-16 PROCEDURE — 96365 THER/PROPH/DIAG IV INF INIT: CPT

## 2020-10-16 PROCEDURE — 96375 TX/PRO/DX INJ NEW DRUG ADDON: CPT

## 2020-10-16 PROCEDURE — 96366 THER/PROPH/DIAG IV INF ADDON: CPT

## 2020-10-16 PROCEDURE — 258N000003 HC RX IP 258 OP 636: Performed by: INTERNAL MEDICINE

## 2020-10-16 PROCEDURE — 250N000011 HC RX IP 250 OP 636: Performed by: INTERNAL MEDICINE

## 2020-10-16 RX ORDER — METHYLPREDNISOLONE SODIUM SUCCINATE 125 MG/2ML
125 INJECTION, POWDER, LYOPHILIZED, FOR SOLUTION INTRAMUSCULAR; INTRAVENOUS ONCE
Status: CANCELLED
Start: 2020-10-17 | End: 2020-10-17

## 2020-10-16 RX ORDER — METHYLPREDNISOLONE SODIUM SUCCINATE 125 MG/2ML
125 INJECTION, POWDER, LYOPHILIZED, FOR SOLUTION INTRAMUSCULAR; INTRAVENOUS ONCE
Status: COMPLETED | OUTPATIENT
Start: 2020-10-16 | End: 2020-10-16

## 2020-10-16 RX ADMIN — METHYLPREDNISOLONE SODIUM SUCCINATE 125 MG: 125 INJECTION, POWDER, FOR SOLUTION INTRAMUSCULAR; INTRAVENOUS at 12:56

## 2020-10-16 RX ADMIN — IRON SUCROSE 300 MG: 20 INJECTION, SOLUTION INTRAVENOUS at 13:23

## 2020-10-16 RX ADMIN — SODIUM CHLORIDE 250 ML: 9 INJECTION, SOLUTION INTRAVENOUS at 12:50

## 2020-10-16 ASSESSMENT — MIFFLIN-ST. JEOR: SCORE: 980.33

## 2020-10-16 NOTE — PATIENT INSTRUCTIONS
Pt to return on 10-19-20 for Venofer. Copies of medication list and upcoming appointments given prior to discharge.

## 2020-10-16 NOTE — TELEPHONE ENCOUNTER
[employer] message sent to patient that Cathryn Oneill is out of office until 10/20/2020.     Dee Benitez MA

## 2020-10-19 ENCOUNTER — INFUSION THERAPY VISIT (OUTPATIENT)
Dept: INFUSION THERAPY | Facility: CLINIC | Age: 85
End: 2020-10-19
Attending: INTERNAL MEDICINE
Payer: COMMERCIAL

## 2020-10-19 VITALS
SYSTOLIC BLOOD PRESSURE: 146 MMHG | HEART RATE: 86 BPM | OXYGEN SATURATION: 96 % | RESPIRATION RATE: 16 BRPM | DIASTOLIC BLOOD PRESSURE: 72 MMHG | TEMPERATURE: 97.8 F

## 2020-10-19 DIAGNOSIS — K27.4 GASTROINTESTINAL HEMORRHAGE ASSOCIATED WITH PEPTIC ULCER: Primary | ICD-10-CM

## 2020-10-19 DIAGNOSIS — D50.0 IRON DEFICIENCY ANEMIA DUE TO CHRONIC BLOOD LOSS: ICD-10-CM

## 2020-10-19 PROCEDURE — 258N000003 HC RX IP 258 OP 636: Performed by: INTERNAL MEDICINE

## 2020-10-19 PROCEDURE — 96365 THER/PROPH/DIAG IV INF INIT: CPT

## 2020-10-19 PROCEDURE — 250N000011 HC RX IP 250 OP 636: Performed by: INTERNAL MEDICINE

## 2020-10-19 PROCEDURE — 96366 THER/PROPH/DIAG IV INF ADDON: CPT

## 2020-10-19 PROCEDURE — 96375 TX/PRO/DX INJ NEW DRUG ADDON: CPT

## 2020-10-19 RX ORDER — METHYLPREDNISOLONE SODIUM SUCCINATE 125 MG/2ML
125 INJECTION, POWDER, LYOPHILIZED, FOR SOLUTION INTRAMUSCULAR; INTRAVENOUS ONCE
Status: CANCELLED
Start: 2020-10-20 | End: 2020-10-20

## 2020-10-19 RX ORDER — METHYLPREDNISOLONE SODIUM SUCCINATE 125 MG/2ML
125 INJECTION, POWDER, LYOPHILIZED, FOR SOLUTION INTRAMUSCULAR; INTRAVENOUS ONCE
Status: COMPLETED | OUTPATIENT
Start: 2020-10-19 | End: 2020-10-19

## 2020-10-19 RX ADMIN — METHYLPREDNISOLONE SODIUM SUCCINATE 125 MG: 125 INJECTION, POWDER, FOR SOLUTION INTRAMUSCULAR; INTRAVENOUS at 12:51

## 2020-10-19 RX ADMIN — SODIUM CHLORIDE 250 ML: 9 INJECTION, SOLUTION INTRAVENOUS at 12:47

## 2020-10-19 RX ADMIN — IRON SUCROSE 300 MG: 20 INJECTION, SOLUTION INTRAVENOUS at 13:05

## 2020-10-19 ASSESSMENT — PAIN SCALES - GENERAL: PAINLEVEL: NO PAIN (0)

## 2020-10-19 NOTE — TELEPHONE ENCOUNTER
I am a little confused.    Does Gillian want to see me or Cathryn?    If she is wanting to see me, please put her on the schedule for sometime next week.    If she is wanting to see Cathryn, please redirect the appointment request to her.      Thank you.    Betsy

## 2020-10-19 NOTE — PROGRESS NOTES
Infusion Nursing Note:  Gillian TIFFANY Burk presents today for Venofer #3 of 3.    Patient seen by provider today: No   present during visit today: Not Applicable.    Note: Patient states she feels a difference from the infusions, feels a bit better.    Intravenous Access:  Peripheral IV placed.    Treatment Conditions:  Not Applicable.      Post Infusion Assessment:  Patient tolerated infusion without incident.  Patient observed for 15 minutes post infusion per protocol.  Blood return noted pre and post infusion.  Site patent and intact, free from redness, edema or discomfort.  No evidence of extravasations.  Access discontinued per protocol.       Discharge Plan:   Discharge instructions reviewed with: Patient.  Patient and/or family verbalized understanding of discharge instructions and all questions answered.  Patient discharged in stable condition accompanied by: self and daughter.  Departure Mode: Wheelchair.    Davina Rich RN

## 2020-10-20 DIAGNOSIS — D50.0 IRON DEFICIENCY ANEMIA DUE TO CHRONIC BLOOD LOSS: ICD-10-CM

## 2020-10-20 DIAGNOSIS — E03.4 HYPOTHYROIDISM DUE TO ACQUIRED ATROPHY OF THYROID: ICD-10-CM

## 2020-10-21 RX ORDER — LEVOTHYROXINE SODIUM 75 UG/1
TABLET ORAL
Qty: 30 TABLET | Refills: 0 | Status: SHIPPED | OUTPATIENT
Start: 2020-10-21 | End: 2020-11-18

## 2020-10-21 NOTE — TELEPHONE ENCOUNTER
Prescription approved per [facility] Refill Protocol (Iron)    Simin Bueno, AUTUMNN, RN  [facility]

## 2020-10-21 NOTE — TELEPHONE ENCOUNTER
Routing refill request to provider for review/approval because:  Labs out of range:  TSH    AUTUMN BustilloN, RN  [facility]

## 2020-10-28 ENCOUNTER — HOSPITAL LABORATORY (OUTPATIENT)
Facility: OTHER | Age: 85
End: 2020-10-28

## 2020-10-28 LAB
SARS-COV-2 RNA SPEC QL NAA+PROBE: NOT DETECTED
SPECIMEN SOURCE: NORMAL

## 2020-10-29 ENCOUNTER — OFFICE VISIT (OUTPATIENT)
Dept: INTERNAL MEDICINE | Facility: CLINIC | Age: 85
End: 2020-10-29
Payer: COMMERCIAL

## 2020-10-29 VITALS
OXYGEN SATURATION: 95 % | RESPIRATION RATE: 20 BRPM | WEIGHT: 122.9 LBS | DIASTOLIC BLOOD PRESSURE: 74 MMHG | SYSTOLIC BLOOD PRESSURE: 128 MMHG | HEART RATE: 98 BPM | BODY MASS INDEX: 19.85 KG/M2 | TEMPERATURE: 98 F

## 2020-10-29 DIAGNOSIS — I10 HYPERTENSION GOAL BP (BLOOD PRESSURE) < 140/90: Primary | ICD-10-CM

## 2020-10-29 DIAGNOSIS — E03.9 ACQUIRED HYPOTHYROIDISM: ICD-10-CM

## 2020-10-29 DIAGNOSIS — N18.32 STAGE 3B CHRONIC KIDNEY DISEASE (H): ICD-10-CM

## 2020-10-29 DIAGNOSIS — D64.9 ANEMIA, UNSPECIFIED TYPE: ICD-10-CM

## 2020-10-29 DIAGNOSIS — M05.9 SEROPOSITIVE RHEUMATOID ARTHRITIS (H): ICD-10-CM

## 2020-10-29 DIAGNOSIS — R06.09 DOE (DYSPNEA ON EXERTION): ICD-10-CM

## 2020-10-29 LAB
ALBUMIN SERPL-MCNC: 2.6 G/DL (ref 3.4–5)
ALP SERPL-CCNC: 90 U/L (ref 40–150)
ALT SERPL W P-5'-P-CCNC: 18 U/L (ref 0–50)
ANION GAP SERPL CALCULATED.3IONS-SCNC: 6 MMOL/L (ref 3–14)
AST SERPL W P-5'-P-CCNC: 22 U/L (ref 0–45)
BILIRUB SERPL-MCNC: 0.3 MG/DL (ref 0.2–1.3)
BUN SERPL-MCNC: 23 MG/DL (ref 7–30)
CALCIUM SERPL-MCNC: 8.5 MG/DL (ref 8.5–10.1)
CHLORIDE SERPL-SCNC: 102 MMOL/L (ref 94–109)
CO2 SERPL-SCNC: 27 MMOL/L (ref 20–32)
CREAT SERPL-MCNC: 1.24 MG/DL (ref 0.52–1.04)
ERYTHROCYTE [DISTWIDTH] IN BLOOD BY AUTOMATED COUNT: 20.4 % (ref 10–15)
FERRITIN SERPL-MCNC: 198 NG/ML (ref 8–252)
GFR SERPL CREATININE-BSD FRML MDRD: 37 ML/MIN/{1.73_M2}
GLUCOSE SERPL-MCNC: 109 MG/DL (ref 70–99)
HCT VFR BLD AUTO: 29.7 % (ref 35–47)
HGB BLD-MCNC: 9.2 G/DL (ref 11.7–15.7)
IRON SATN MFR SERPL: 28 % (ref 15–46)
IRON SERPL-MCNC: 58 UG/DL (ref 35–180)
MCH RBC QN AUTO: 30.1 PG (ref 26.5–33)
MCHC RBC AUTO-ENTMCNC: 31 G/DL (ref 31.5–36.5)
MCV RBC AUTO: 97 FL (ref 78–100)
NT-PROBNP SERPL-MCNC: 1266 PG/ML (ref 0–450)
PLATELET # BLD AUTO: 325 10E9/L (ref 150–450)
POTASSIUM SERPL-SCNC: 4.3 MMOL/L (ref 3.4–5.3)
PROT SERPL-MCNC: 8.4 G/DL (ref 6.8–8.8)
RBC # BLD AUTO: 3.06 10E12/L (ref 3.8–5.2)
RETICS # AUTO: 107.1 10E9/L (ref 25–95)
RETICS/RBC NFR AUTO: 3.5 % (ref 0.5–2)
SODIUM SERPL-SCNC: 135 MMOL/L (ref 133–144)
TIBC SERPL-MCNC: 206 UG/DL (ref 240–430)
TSH SERPL DL<=0.005 MIU/L-ACNC: 2.88 MU/L (ref 0.4–4)
WBC # BLD AUTO: 6.7 10E9/L (ref 4–11)

## 2020-10-29 PROCEDURE — 85027 COMPLETE CBC AUTOMATED: CPT | Performed by: INTERNAL MEDICINE

## 2020-10-29 PROCEDURE — 83550 IRON BINDING TEST: CPT | Performed by: INTERNAL MEDICINE

## 2020-10-29 PROCEDURE — 83540 ASSAY OF IRON: CPT | Performed by: INTERNAL MEDICINE

## 2020-10-29 PROCEDURE — 99214 OFFICE O/P EST MOD 30 MIN: CPT | Performed by: INTERNAL MEDICINE

## 2020-10-29 PROCEDURE — 36415 COLL VENOUS BLD VENIPUNCTURE: CPT | Performed by: INTERNAL MEDICINE

## 2020-10-29 PROCEDURE — 82728 ASSAY OF FERRITIN: CPT | Performed by: INTERNAL MEDICINE

## 2020-10-29 PROCEDURE — 85045 AUTOMATED RETICULOCYTE COUNT: CPT | Performed by: INTERNAL MEDICINE

## 2020-10-29 PROCEDURE — 84443 ASSAY THYROID STIM HORMONE: CPT | Performed by: INTERNAL MEDICINE

## 2020-10-29 PROCEDURE — 83880 ASSAY OF NATRIURETIC PEPTIDE: CPT | Performed by: INTERNAL MEDICINE

## 2020-10-29 PROCEDURE — 80053 COMPREHEN METABOLIC PANEL: CPT | Performed by: INTERNAL MEDICINE

## 2020-10-29 ASSESSMENT — PAIN SCALES - GENERAL: PAINLEVEL: NO PAIN (0)

## 2020-10-29 NOTE — PROGRESS NOTES
Subjective     Gillian Burk is a 89 year old female who presents to clinic today for the following health issues:    HPI         Chief Complaint   Patient presents with     Anemia     follow up        EMR reviewed including: History of chief complaint, pertinent distant history, medications, concurrent diagnoses.             Chief Complaint:  #1   Follow-up of anemia post iron infusion.  Much more active  Less pale  Less shortness of breath  Feeling much better overall.  History of chronic upper gastrointestinal bleeding intermittently.  No recent melena or hematochezia.    #2   Follow-up on congestive heart failure.  Post TAVR.  Denies orthopnea or chest pain.  Weight controlled.  Denies edema.    #3   Follow-up on rheumatoid arthritis  Continues  methotrexate without intolerance.  Denies acute inflammatory joints at this time.  Continues low-dose prednisone without cushingoid symptoms.  Hypothyroidism                       PAST, FAMILY,SOCIAL HISTORY:     Medical  History:   has a past medical history of Aortic stenosis, Chronic migraine, Hyperlipidaemia, Hypertension, Hypothyroidism, Myocardial infarction (H), Need for SBE (subacute bacterial endocarditis) prophylaxis, Orthostatic hypotension, PUD (peptic ulcer disease), Secondary Sjogren's syndrome (H), Seizures (H), Seropositive rheumatoid arthritis (H), Stroke (H) (05/2013), and Syncope (2014).     Surgical History:   has a past surgical history that includes Mouth surgery (2011); Eye surgery (1999); cataract iol, rt/lt (Bilateral, 2000); Hysterectomy total abdominal (1970); TOTAL HIP ARTHROPLASTY (Right, 2010); TOTAL HIP ARTHROPLASTY (Left, 2014); TRANSCATHETER AORTIC VALVE IMPLANT ANEST (N/A, 8/3/2016); Heart Cath Femoral Cannulization With Open Standby Repair Aortic Valve (N/A, 8/3/2016); and Fluoroscopy Only (N/A, 8/6/2019).     Social History:   reports that she has never smoked. She has never used smokeless tobacco. She reports that she does not  drink alcohol or use drugs.     Family History:  family history includes Hyperlipidemia in her mother.            MEDICATIONS  Current Outpatient Medications   Medication Sig Dispense Refill     acetaminophen (TYLENOL ARTHRITIS PAIN) 650 MG CR tablet Take 1,300 mg by mouth every 8 hours as needed for mild pain        ALPRAZolam (XANAX) 0.25 MG tablet Take 1 tablet by mouth twice daily as needed for anxiety 28 tablet 0     amLODIPine (NORVASC) 5 MG tablet Take 1 tablet (5 mg) by mouth daily 90 tablet 3     aspirin EC 81 MG EC tablet Take 1 tablet (81 mg) by mouth daily       atorvastatin (LIPITOR) 40 MG tablet Take 1 tablet (40 mg) by mouth daily 90 tablet 3     calcium carbonate (TUMS) 500 MG chewable tablet Take 2-4 tablets (1,000-2,000 mg) by mouth as needed for heartburn       clopidogrel (PLAVIX) 75 MG tablet Take 1 tablet (75 mg) by mouth daily 90 tablet 3     Cranberry 1000 MG CAPS        docusate sodium (COLACE) 100 MG capsule Take 200 mg by mouth daily 2 capsules       EUTHYROX 75 MCG tablet Take 1 tablet by mouth once daily 30 tablet 0     famotidine (PEPCID) 40 MG tablet TAKE 1 TABLET BY MOUTH AT BEDTIME 30 tablet 9     folic acid (FOLVITE) 1 MG tablet Take 1 tablet by mouth once daily 90 tablet 1     isosorbide mononitrate (IMDUR) 60 MG 24 hr tablet Take 2 tablets (120 mg) by mouth daily 180 tablet 3     Lactobacillus (FLORAJEN ACIDOPHILUS) CAPS Take 1 capsule by mouth daily       levETIRAcetam (KEPPRA) 500 MG tablet Take 1 tablet (500 mg) by mouth 2 times daily 270 tablet 0     losartan (COZAAR) 25 MG tablet Take 1/2 (one-half) tablet by mouth once daily 45 tablet 3     methotrexate 2.5 MG tablet Take 4 tablets (10 mg) by mouth once a week Wed 52 tablet 3     metoprolol succinate ER (TOPROL-XL) 50 MG 24 hr tablet Take 1 tablet (50 mg) by mouth 2 times daily 180 tablet 3     mirtazapine (REMERON) 30 MG tablet TAKE 1 TABLET BY MOUTH ONCE DAILY AT BEDTIME 90 tablet 1     predniSONE (DELTASONE) 5 MG tablet  TAKE ONE TABLET BY MOUTH EVERY OTHER DAY ALTERNATING  WITH  1/2  TABLET  EVERY  OTHER  DAY 23 tablet 13     SLOW  (45 Fe) MG CR tablet Take 1 tablet by mouth once daily 90 tablet 0     nitroGLYcerin (NITROSTAT) 0.4 MG sublingual tablet DISSOLVE ONE TABLET UNDER THE TONGUE EVERY 5 MINUTES AS NEEDED FOR CHEST PAIN.  DO NOT EXCEED A TOTAL OF 3 DOSES IN 15 MINUTES (Patient not taking: Reported on 10/29/2020) 25 tablet 0         --------------------------------------------------------------------------------------------------------------------                              Review of Systems       LUNGS: Pt denies: cough, excess sputum, hemoptysis, or shortness of breath.   HEART: Pt denies: chest pain, arrhythmia, syncope, tachy or bradyarrhythmia.   GI: Pt denies: nausea, vomiting, diarrhea, constipation, melena, or hematochezia.   NEURO: Pt denies: seizures, strokes, diplopia, weakness, paraesthesias, or paralysis.   SKIN: Pt denies: itching, rashes, discoloration, or specific lesions of concern. Denies recent hair loss.   PSYCH: The patient denies significant depression, anxiety, mood imbalance. Specifically denies any suicidal ideation.        Examination    /74 (BP Location: Right arm, Patient Position: Sitting, Cuff Size: Adult Regular)   Pulse 98   Temp 98  F (36.7  C) (Temporal)   Resp 20   Wt 55.7 kg (122 lb 14.4 oz)   SpO2 95%   BMI 19.85 kg/m      Constitutional: The patient appears to be in no acute distress. The patient appears to be adequately hydrated. No acute respiratory or hemodynamic distress is noted at this time.  Patient is able to ambulate without assistance.   LUNGS: clear bilaterally, airflow is brisk, no intercostal retraction or stridor is noted. No coughing is noted during visit.   HEART:  regular without rubs, clicks, gallops, or murmurs. PMI is nondisplaced. Upstrokes are brisk. S1,S2 are heard.   GI: Abdomen is soft, without rebound, guarding or tenderness. Bowel sounds are  appropriate. No renal bruits are heard.   NEURO: Pt is alert and appropriate. No neurologic lateralization is noted. Cranial nerves 2-12 are intact. Peripheral sensory and motor function are grossly normal.    SKIN:  warm and dry. No erythema, or rashes are noted. No specific lesions of concern are noted.    PSYCH: The patient appears grossly appropriate. Maintains good eye contact, does not have any jittery or atypical motion. Displays appropriate affect.       Decision Making       1. Anemia, unspecified type  Recheck iron levels as per standing orders of cardiology.  - Reticulocyte count  - Ferritin  - Iron and iron binding capacity    2. CONTRERAS (dyspnea on exertion) secondary to congestive heart failure.  Modest.  Check beta natruretic peptide per standing orders of cardiology  - N terminal pro BNP outpatient  Recommend continuing support hose.  Would recommend above-the-knee to help alleviate discomfort and risk of skin breakdown of blood clot associated with lower extremity edema secondary to CHF.    3. Stage 3b chronic kidney disease  Stable    4. Hypertension goal BP (blood pressure) < 140/90  Check electrolytes  - Comprehensive metabolic panel    5. Seropositive rheumatoid arthritis (H)  Check hemoglobin for anemia  - CBC with platelets    6. Acquired hypothyroidism  Check thyroid and adjust accordingly  - TSH with free T4 reflex                               FOLLOW UP   I have asked the patient to make an appointment for followup with me in 3 months or as predicated by her results            I have carefully explained the diagnosis and treatment options to the patient.  The patient has displayed an understanding of the above, and all subsequent questions were answered.      DO DELMA Pineda    Portions of this note were produced using [employer]  Although every attempt at real-time proof reading has been made, occasional grammar/syntax errors may have been missed.

## 2020-10-29 NOTE — RESULT ENCOUNTER NOTE
Dear Gillian, your recent test results are attached.  Congratulations, your coronavirus test was negative.      You will be contacted with any outstanding results when they are available.  Feel free to contact me via the office or My Chart if you have any questions regarding the above.  Sincerely,  DO DELMA Pineda

## 2020-11-03 ENCOUNTER — DOCUMENTATION ONLY (OUTPATIENT)
Dept: FAMILY MEDICINE | Facility: OTHER | Age: 85
End: 2020-11-03

## 2020-11-03 DIAGNOSIS — N18.4 ANEMIA OF CHRONIC RENAL FAILURE, STAGE 4 (SEVERE) (H): Primary | ICD-10-CM

## 2020-11-03 DIAGNOSIS — D63.1 ANEMIA OF CHRONIC RENAL FAILURE, STAGE 4 (SEVERE) (H): Primary | ICD-10-CM

## 2020-11-03 NOTE — PROGRESS NOTES
Patient notified, will discontinue medication.  Please order labs as needed, appointment made for Tuesday, November 17th in Lab.    Melissa GARCIAO/

## 2020-11-03 NOTE — PROGRESS NOTES
The patient recently had laboratory work ordered by her cardiologist.  This demonstrates a significant decline in her renal function.  Please contact the patient and have her discontinue the losartan.  We will need to recheck her lab work in about 2 weeks.    Thank you.    Betsy

## 2020-11-04 ENCOUNTER — HOSPITAL LABORATORY (OUTPATIENT)
Facility: OTHER | Age: 85
End: 2020-11-04

## 2020-11-05 LAB
SARS-COV-2 RNA SPEC QL NAA+PROBE: NOT DETECTED
SPECIMEN SOURCE: NORMAL

## 2020-11-06 ENCOUNTER — MYC MEDICAL ADVICE (OUTPATIENT)
Dept: INTERNAL MEDICINE | Facility: CLINIC | Age: 85
End: 2020-11-06

## 2020-11-11 ENCOUNTER — HOSPITAL LABORATORY (OUTPATIENT)
Facility: OTHER | Age: 85
End: 2020-11-11

## 2020-11-12 LAB
SARS-COV-2 RNA SPEC QL NAA+PROBE: NOT DETECTED
SPECIMEN SOURCE: NORMAL

## 2020-11-14 NOTE — RESULT ENCOUNTER NOTE
Dear Gillian, your recent test results are attached.    Congratulations, your Covid test is negative.    You will be contacted with any outstanding results when they are available.  Feel free to contact me via the office or My Chart if you have any questions regarding the above.  Sincerely,  Augusto Meyer, DO BOWEROI

## 2020-11-17 DIAGNOSIS — E03.4 HYPOTHYROIDISM DUE TO ACQUIRED ATROPHY OF THYROID: ICD-10-CM

## 2020-11-17 DIAGNOSIS — N18.4 ANEMIA OF CHRONIC RENAL FAILURE, STAGE 4 (SEVERE) (H): ICD-10-CM

## 2020-11-17 DIAGNOSIS — D50.0 IRON DEFICIENCY ANEMIA DUE TO CHRONIC BLOOD LOSS: ICD-10-CM

## 2020-11-17 DIAGNOSIS — D63.1 ANEMIA OF CHRONIC RENAL FAILURE, STAGE 4 (SEVERE) (H): ICD-10-CM

## 2020-11-17 LAB
ANION GAP SERPL CALCULATED.3IONS-SCNC: 7 MMOL/L (ref 3–14)
BUN SERPL-MCNC: 24 MG/DL (ref 7–30)
CALCIUM SERPL-MCNC: 8.2 MG/DL (ref 8.5–10.1)
CHLORIDE SERPL-SCNC: 106 MMOL/L (ref 94–109)
CO2 SERPL-SCNC: 25 MMOL/L (ref 20–32)
CREAT SERPL-MCNC: 0.9 MG/DL (ref 0.52–1.04)
GFR SERPL CREATININE-BSD FRML MDRD: 55 ML/MIN/{1.73_M2}
GLUCOSE SERPL-MCNC: 110 MG/DL (ref 70–99)
POTASSIUM SERPL-SCNC: 3.9 MMOL/L (ref 3.4–5.3)
SODIUM SERPL-SCNC: 138 MMOL/L (ref 133–144)

## 2020-11-17 PROCEDURE — 36415 COLL VENOUS BLD VENIPUNCTURE: CPT | Performed by: INTERNAL MEDICINE

## 2020-11-17 PROCEDURE — 80048 BASIC METABOLIC PNL TOTAL CA: CPT | Performed by: INTERNAL MEDICINE

## 2020-11-18 ENCOUNTER — HOSPITAL LABORATORY (OUTPATIENT)
Facility: OTHER | Age: 85
End: 2020-11-18

## 2020-11-18 RX ORDER — LEVOTHYROXINE SODIUM 75 UG/1
TABLET ORAL
Qty: 90 TABLET | Refills: 1 | Status: SHIPPED | OUTPATIENT
Start: 2020-11-18 | End: 2021-05-11

## 2020-11-18 NOTE — TELEPHONE ENCOUNTER
Prescription approved per [facility] [place].    AUTUMN BustilloN, RN  [facility]

## 2020-11-18 NOTE — TELEPHONE ENCOUNTER
Prescription approved per [facility] Refill Protocol.    Martina Kamara RN

## 2020-11-19 LAB
SARS-COV-2 RNA SPEC QL NAA+PROBE: NOT DETECTED
SPECIMEN SOURCE: NORMAL

## 2020-11-20 DIAGNOSIS — G40.909 SEIZURE DISORDER (H): ICD-10-CM

## 2020-11-23 RX ORDER — LEVETIRACETAM 500 MG/1
TABLET ORAL
Qty: 270 TABLET | Refills: 0 | Status: SHIPPED | OUTPATIENT
Start: 2020-11-23 | End: 2021-04-15

## 2020-11-23 NOTE — RESULT ENCOUNTER NOTE
Dear Gillian, your recent test results are attached.    Congratulations!  Your COVID-19 test is negative.    You will be contacted with any outstanding results when they are available.  Feel free to contact me via the office or My Chart if you have any questions regarding the above.  Sincerely,  Augusto Meyer,  FACOI

## 2020-11-23 NOTE — RESULT ENCOUNTER NOTE
Dear Gillian, your recent test results are attached.    The chemistry panel shows a blood sugar to be slightly elevated at 110.  Kidney function is improved significantly over the previous value from 3 weeks ago.    You will be contacted with any outstanding results when they are available.  Feel free to contact me via the office or My Chart if you have any questions regarding the above.  Sincerely,  Augusto Meyer, DO BOWEROI

## 2020-11-23 NOTE — TELEPHONE ENCOUNTER
Routing refill request to provider for review/approval because:  Drug not on the [facility] refill protocol   Medication is reported/historical    AUTUMN BustilloN, RN  [facility]

## 2020-11-25 ENCOUNTER — HOSPITAL LABORATORY (OUTPATIENT)
Facility: OTHER | Age: 85
End: 2020-11-25

## 2020-11-26 LAB
SARS-COV-2 RNA SPEC QL NAA+PROBE: NOT DETECTED
SPECIMEN SOURCE: NORMAL

## 2020-12-02 ENCOUNTER — HOSPITAL LABORATORY (OUTPATIENT)
Facility: OTHER | Age: 85
End: 2020-12-02

## 2020-12-04 LAB
SARS-COV-2 RNA SPEC QL NAA+PROBE: NOT DETECTED
SPECIMEN SOURCE: NORMAL

## 2020-12-07 ENCOUNTER — MYC MEDICAL ADVICE (OUTPATIENT)
Dept: INTERNAL MEDICINE | Facility: CLINIC | Age: 85
End: 2020-12-07

## 2020-12-07 ENCOUNTER — HOSPITAL LABORATORY (OUTPATIENT)
Facility: OTHER | Age: 85
End: 2020-12-07

## 2020-12-07 DIAGNOSIS — D64.9 ANEMIA, UNSPECIFIED TYPE: Primary | ICD-10-CM

## 2020-12-08 DIAGNOSIS — M05.79 RHEUMATOID ARTHRITIS WITH RHEUMATOID FACTOR OF MULTIPLE SITES WITHOUT ORGAN OR SYSTEMS INVOLVEMENT (H): Primary | ICD-10-CM

## 2020-12-08 DIAGNOSIS — Z79.899 LONG TERM USE OF DRUG: ICD-10-CM

## 2020-12-08 DIAGNOSIS — D64.9 ANEMIA, UNSPECIFIED TYPE: ICD-10-CM

## 2020-12-08 LAB
ALT SERPL W P-5'-P-CCNC: 14 U/L (ref 0–50)
AST SERPL W P-5'-P-CCNC: 16 U/L (ref 0–45)
BASOPHILS # BLD AUTO: 0.1 10E9/L (ref 0–0.2)
BASOPHILS NFR BLD AUTO: 0.5 %
CREAT SERPL-MCNC: 0.96 MG/DL (ref 0.52–1.04)
CRP SERPL-MCNC: 7.7 MG/L (ref 0–8)
DIFFERENTIAL METHOD BLD: ABNORMAL
EOSINOPHIL NFR BLD AUTO: 0.5 %
ERYTHROCYTE [DISTWIDTH] IN BLOOD BY AUTOMATED COUNT: 18.3 % (ref 10–15)
ERYTHROCYTE [DISTWIDTH] IN BLOOD BY AUTOMATED COUNT: 18.5 % (ref 10–15)
ERYTHROCYTE [SEDIMENTATION RATE] IN BLOOD BY WESTERGREN METHOD: 109 MM/H (ref 0–30)
GFR SERPL CREATININE-BSD FRML MDRD: 51 ML/MIN/{1.73_M2}
HCT VFR BLD AUTO: 29.6 % (ref 35–47)
HCT VFR BLD AUTO: 30.3 % (ref 35–47)
HGB BLD-MCNC: 9.4 G/DL (ref 11.7–15.7)
HGB BLD-MCNC: 9.5 G/DL (ref 11.7–15.7)
IMM GRANULOCYTES # BLD: 0 10E9/L (ref 0–0.4)
IMM GRANULOCYTES NFR BLD: 0.4 %
LYMPHOCYTES # BLD AUTO: 2.1 10E9/L (ref 0.8–5.3)
LYMPHOCYTES NFR BLD AUTO: 22.8 %
MCH RBC QN AUTO: 29.7 PG (ref 26.5–33)
MCH RBC QN AUTO: 30.8 PG (ref 26.5–33)
MCHC RBC AUTO-ENTMCNC: 31 G/DL (ref 31.5–36.5)
MCHC RBC AUTO-ENTMCNC: 32.1 G/DL (ref 31.5–36.5)
MCV RBC AUTO: 96 FL (ref 78–100)
MCV RBC AUTO: 96 FL (ref 78–100)
MONOCYTES # BLD AUTO: 0.4 10E9/L (ref 0–1.3)
MONOCYTES NFR BLD AUTO: 4.6 %
NEUTROPHILS # BLD AUTO: 6.7 10E9/L (ref 1.6–8.3)
NEUTROPHILS NFR BLD AUTO: 71.2 %
NRBC # BLD AUTO: 0 10*3/UL
NRBC BLD AUTO-RTO: 0 /100
PLATELET # BLD AUTO: 352 10E9/L (ref 150–450)
PLATELET # BLD AUTO: 362 10E9/L (ref 150–450)
RBC # BLD AUTO: 3.08 10E12/L (ref 3.8–5.2)
RBC # BLD AUTO: 3.16 10E12/L (ref 3.8–5.2)
SARS-COV-2 RNA SPEC QL NAA+PROBE: NOT DETECTED
SPECIMEN SOURCE: NORMAL
WBC # BLD AUTO: 9.4 10E9/L (ref 4–11)
WBC # BLD AUTO: 9.5 10E9/L (ref 4–11)

## 2020-12-08 PROCEDURE — 82565 ASSAY OF CREATININE: CPT | Performed by: INTERNAL MEDICINE

## 2020-12-08 PROCEDURE — 84460 ALANINE AMINO (ALT) (SGPT): CPT | Performed by: INTERNAL MEDICINE

## 2020-12-08 PROCEDURE — 85652 RBC SED RATE AUTOMATED: CPT | Performed by: INTERNAL MEDICINE

## 2020-12-08 PROCEDURE — 86140 C-REACTIVE PROTEIN: CPT | Performed by: INTERNAL MEDICINE

## 2020-12-08 PROCEDURE — 85025 COMPLETE CBC W/AUTO DIFF WBC: CPT | Performed by: INTERNAL MEDICINE

## 2020-12-08 PROCEDURE — 84450 TRANSFERASE (AST) (SGOT): CPT | Performed by: INTERNAL MEDICINE

## 2020-12-08 PROCEDURE — 36415 COLL VENOUS BLD VENIPUNCTURE: CPT | Performed by: INTERNAL MEDICINE

## 2020-12-10 NOTE — RESULT ENCOUNTER NOTE
Dear Gillian, your recent test results are attached.    The hemoglobin has increased from 9.2 up to 9.4.  This is not a substantial change but is certainly stable.    You will be contacted with any outstanding results when they are available.  Feel free to contact me via the office or My Chart if you have any questions regarding the above.  Sincerely,  Augusto Meyer, DO FACOI

## 2020-12-10 NOTE — RESULT ENCOUNTER NOTE
Dear Gillian, your recent test results are attached.    Congratulations!  Your Covid test is negative.    You will be contacted with any outstanding results when they are available.  Feel free to contact me via the office or My Chart if you have any questions regarding the above.  Sincerely,  DO SATHISH PinedaOI

## 2020-12-14 ENCOUNTER — HOSPITAL LABORATORY (OUTPATIENT)
Facility: OTHER | Age: 85
End: 2020-12-14

## 2020-12-15 LAB
SARS-COV-2 RNA SPEC QL NAA+PROBE: NOT DETECTED
SPECIMEN SOURCE: NORMAL

## 2020-12-20 ENCOUNTER — HEALTH MAINTENANCE LETTER (OUTPATIENT)
Age: 85
End: 2020-12-20

## 2020-12-21 ENCOUNTER — HOSPITAL LABORATORY (OUTPATIENT)
Facility: OTHER | Age: 85
End: 2020-12-21

## 2020-12-22 LAB
SARS-COV-2 RNA SPEC QL NAA+PROBE: NOT DETECTED
SPECIMEN SOURCE: NORMAL

## 2020-12-22 NOTE — RESULT ENCOUNTER NOTE
Dear Gillian, your recent test results are attached.    Congratulations your Covid test is negative.    You will be contacted with any outstanding results when they are available.  Feel free to contact me via the office or My Chart if you have any questions regarding the above.  Sincerely,  DO SATHISH PinedaOI

## 2020-12-28 ENCOUNTER — HOSPITAL LABORATORY (OUTPATIENT)
Facility: OTHER | Age: 85
End: 2020-12-28

## 2020-12-29 LAB
SARS-COV-2 RNA SPEC QL NAA+PROBE: NOT DETECTED
SPECIMEN SOURCE: NORMAL

## 2021-01-04 ENCOUNTER — HOSPITAL LABORATORY (OUTPATIENT)
Facility: OTHER | Age: 86
End: 2021-01-04

## 2021-01-04 LAB
SARS-COV-2 RNA SPEC QL NAA+PROBE: NOT DETECTED
SPECIMEN SOURCE: NORMAL

## 2021-01-05 ENCOUNTER — TRANSFERRED RECORDS (OUTPATIENT)
Dept: HEALTH INFORMATION MANAGEMENT | Facility: CLINIC | Age: 86
End: 2021-01-05

## 2021-01-05 ENCOUNTER — MEDICAL CORRESPONDENCE (OUTPATIENT)
Dept: HEALTH INFORMATION MANAGEMENT | Facility: CLINIC | Age: 86
End: 2021-01-05

## 2021-01-08 ENCOUNTER — MYC MEDICAL ADVICE (OUTPATIENT)
Dept: INTERNAL MEDICINE | Facility: CLINIC | Age: 86
End: 2021-01-08

## 2021-01-08 DIAGNOSIS — G40.909 SEIZURE DISORDER (H): Primary | ICD-10-CM

## 2021-01-08 NOTE — TELEPHONE ENCOUNTER
Not sure what other labs you want for her.  Route back to the pool so we can Mychart and get her scheduled for a lab appointment.

## 2021-01-11 ENCOUNTER — MYC MEDICAL ADVICE (OUTPATIENT)
Dept: INTERNAL MEDICINE | Facility: CLINIC | Age: 86
End: 2021-01-11

## 2021-01-11 ENCOUNTER — HOSPITAL LABORATORY (OUTPATIENT)
Facility: OTHER | Age: 86
End: 2021-01-11

## 2021-01-11 DIAGNOSIS — R60.0 LOCALIZED EDEMA: Primary | ICD-10-CM

## 2021-01-12 LAB
SARS-COV-2 RNA RESP QL NAA+PROBE: NOT DETECTED
SPECIMEN SOURCE: NORMAL

## 2021-01-12 NOTE — TELEPHONE ENCOUNTER
DME script has been printed, signed and faxed to [facility] [place] @ [phone]  Dee Benitez MA

## 2021-01-13 DIAGNOSIS — G40.909 SEIZURE DISORDER (H): ICD-10-CM

## 2021-01-13 LAB
BASOPHILS # BLD AUTO: 0.1 10E9/L (ref 0–0.2)
BASOPHILS NFR BLD AUTO: 0.6 %
DIFFERENTIAL METHOD BLD: ABNORMAL
EOSINOPHIL NFR BLD AUTO: 0.6 %
ERYTHROCYTE [DISTWIDTH] IN BLOOD BY AUTOMATED COUNT: 17.7 % (ref 10–15)
HCT VFR BLD AUTO: 29.9 % (ref 35–47)
HGB BLD-MCNC: 9.4 G/DL (ref 11.7–15.7)
IMM GRANULOCYTES # BLD: 0 10E9/L (ref 0–0.4)
IMM GRANULOCYTES NFR BLD: 0.5 %
LYMPHOCYTES # BLD AUTO: 1.9 10E9/L (ref 0.8–5.3)
LYMPHOCYTES NFR BLD AUTO: 23 %
MCH RBC QN AUTO: 29.9 PG (ref 26.5–33)
MCHC RBC AUTO-ENTMCNC: 31.4 G/DL (ref 31.5–36.5)
MCV RBC AUTO: 95 FL (ref 78–100)
MONOCYTES # BLD AUTO: 0.4 10E9/L (ref 0–1.3)
MONOCYTES NFR BLD AUTO: 5.3 %
NEUTROPHILS # BLD AUTO: 5.6 10E9/L (ref 1.6–8.3)
NEUTROPHILS NFR BLD AUTO: 70 %
NRBC # BLD AUTO: 0 10*3/UL
NRBC BLD AUTO-RTO: 0 /100
PLATELET # BLD AUTO: 330 10E9/L (ref 150–450)
RBC # BLD AUTO: 3.14 10E12/L (ref 3.8–5.2)
WBC # BLD AUTO: 8.1 10E9/L (ref 4–11)

## 2021-01-13 PROCEDURE — 99000 SPECIMEN HANDLING OFFICE-LAB: CPT | Performed by: INTERNAL MEDICINE

## 2021-01-13 PROCEDURE — 80177 DRUG SCRN QUAN LEVETIRACETAM: CPT | Mod: 90 | Performed by: INTERNAL MEDICINE

## 2021-01-13 PROCEDURE — 85025 COMPLETE CBC W/AUTO DIFF WBC: CPT | Performed by: INTERNAL MEDICINE

## 2021-01-13 PROCEDURE — 36415 COLL VENOUS BLD VENIPUNCTURE: CPT | Performed by: INTERNAL MEDICINE

## 2021-01-14 NOTE — RESULT ENCOUNTER NOTE
Dear Gillian, your recent test results are attached.  Hemoglobin is stable at 9.4.      You will be contacted with any outstanding results when they are available.  Feel free to contact me via the office or My Chart if you have any questions regarding the above.  Sincerely,  DO DELMA Pineda

## 2021-01-15 LAB — LEVETIRACETAM SERPL-MCNC: 27 UG/ML (ref 12–46)

## 2021-01-18 ENCOUNTER — HOSPITAL LABORATORY (OUTPATIENT)
Facility: OTHER | Age: 86
End: 2021-01-18

## 2021-01-18 LAB
LABORATORY COMMENT REPORT: NORMAL
SARS-COV-2 RNA RESP QL NAA+PROBE: NEGATIVE
SARS-COV-2 RNA RESP QL NAA+PROBE: NORMAL
SPECIMEN SOURCE: NORMAL
SPECIMEN SOURCE: NORMAL

## 2021-01-26 ENCOUNTER — OFFICE VISIT (OUTPATIENT)
Dept: INTERNAL MEDICINE | Facility: CLINIC | Age: 86
End: 2021-01-26
Payer: COMMERCIAL

## 2021-01-26 ENCOUNTER — TELEPHONE (OUTPATIENT)
Dept: CARDIOLOGY | Facility: CLINIC | Age: 86
End: 2021-01-26

## 2021-01-26 VITALS
RESPIRATION RATE: 24 BRPM | TEMPERATURE: 97.9 F | WEIGHT: 124.13 LBS | DIASTOLIC BLOOD PRESSURE: 50 MMHG | SYSTOLIC BLOOD PRESSURE: 130 MMHG | HEART RATE: 109 BPM | BODY MASS INDEX: 20.04 KG/M2 | OXYGEN SATURATION: 94 %

## 2021-01-26 DIAGNOSIS — I50.22 CHRONIC SYSTOLIC CONGESTIVE HEART FAILURE (H): ICD-10-CM

## 2021-01-26 DIAGNOSIS — G40.909 SEIZURE DISORDER (H): ICD-10-CM

## 2021-01-26 DIAGNOSIS — M94.0 COSTOCHONDRITIS: ICD-10-CM

## 2021-01-26 DIAGNOSIS — M05.9 SEROPOSITIVE RHEUMATOID ARTHRITIS (H): ICD-10-CM

## 2021-01-26 DIAGNOSIS — I25.119 CORONARY ARTERY DISEASE INVOLVING NATIVE CORONARY ARTERY OF NATIVE HEART WITH ANGINA PECTORIS (H): ICD-10-CM

## 2021-01-26 DIAGNOSIS — R07.2 PRECORDIAL PAIN: Primary | ICD-10-CM

## 2021-01-26 DIAGNOSIS — N18.32 STAGE 3B CHRONIC KIDNEY DISEASE (H): ICD-10-CM

## 2021-01-26 LAB — TROPONIN I SERPL-MCNC: <0.015 UG/L (ref 0–0.04)

## 2021-01-26 PROCEDURE — 36415 COLL VENOUS BLD VENIPUNCTURE: CPT | Performed by: INTERNAL MEDICINE

## 2021-01-26 PROCEDURE — 93000 ELECTROCARDIOGRAM COMPLETE: CPT | Performed by: INTERNAL MEDICINE

## 2021-01-26 PROCEDURE — 99214 OFFICE O/P EST MOD 30 MIN: CPT | Performed by: INTERNAL MEDICINE

## 2021-01-26 PROCEDURE — 84484 ASSAY OF TROPONIN QUANT: CPT | Performed by: INTERNAL MEDICINE

## 2021-01-26 ASSESSMENT — PAIN SCALES - GENERAL: PAINLEVEL: NO PAIN (0)

## 2021-01-26 NOTE — PROGRESS NOTES
Janice French is a 89 year old who presents to clinic today for the following health issues     HPI     Patient is here to be seen for intermittent chest discomfort and SOB with exertion since Friday.     Patient's daughter calling to make an appointment for patient see Dr. Lee for angina and shortness of breath. Daughter Hanna said that the chest discomfort comes and goes and has been going on for a couple days. Patient has not had chest discomfort prior to this for while. She has not used nitroglycerin. Patient should be assessed by a provider today. Dr. Lee does not have any openings today or the rest of this week. Patient does not want to drive to [facility]. Set patient up to see her primary care provider Dr. Meyer for today at 1pm. Patient instructed to report to the ED if chest pain does not go away, increased shortness of breath, nausea, and or lightheadedness.          EMR reviewed including:             Complaint, History of Chief Complaint, Corresponding Review of Systems, and Complaint Specific Physical Examination.    #1   Chest pain  Dull and retrosternal.  Somewhat worse with exertion.  Not associated with nausea or diaphoresis.  Resolves within 20 minutes.  Has known history of coronary artery disease.  Has not taken nitroglycerin for diagnostic or therapeutic purposes.  Exam: EKG demonstrates left bundle branch block and is otherwise nondiagnostic demonstrating normal sinus rhythm.  Reproducible chest pain is noted with palpation of the third and fourth right costochondral junction.  This is not characteristic of her chief complaint pain.  Heart is regular without rubs or murmurs.  Lungs are clear without signs of congestive heart failure.  No rales or rhonchi heard.    #2   Follow-up on congestive heart failure.  Controlled at this time.  No significant edema.  Continues with her current diuretic usage.  No significant edema associated with Norvasc usage.  Exam: No edema in  lower extremities is noted.  No dependent residence.  No signs of diabetes distention.    #3   Ongoing rheumatoid arthritis.  On methotrexate without significant suppression.  No acute inflammation noted at this time of the joints.  No red or swollen joints related.      Vital Signs:   /50   Pulse 109   Temp 97.9  F (36.6  C) (Temporal)   Resp 24   Wt 56.3 kg (124 lb 2 oz)   SpO2 94%   Breastfeeding No   BMI 20.04 kg/m               Decision Making    Problem and Complexity     1. Precordial pain  EKG is unremarkable and troponin is negative.  Recommend patient continues her current medications and uses the nitroglycerin if she develops progressive chest pain.  Strongly recommend she goes to the emergency department with recurrence of her pain if it does not respond to nitroglycerin.  Patient does have upcoming appointment with her cardiologist.  - EKG 12-lead complete w/read - Clinics  - Troponin I    2. Coronary artery disease involving native coronary artery of native heart with angina pectoris (H)  As noted above.    3. Chronic systolic congestive heart failure (H)  Currently well controlled with medications.  Not requiring diuretic therapy    4. Costochondritis  Easily reproducible.  Suspect this is a second source of chest pain and not her primary concern.    5. Seropositive rheumatoid arthritis (H)  Continue suppressive medications and follow-up lab work    6. Seizure disorder (H)  Continue seizure medication.  No recent seizure activity noted    7. Stage 3b chronic kidney disease  Limiting diuretic usage and ACE inhibitor/ARB.               ------------------------------------------------------------------------------------------------------------------------------  Data  1  Specialty (external) notes reviewed:   Cardiology notes appreciated    Tests reviewed:   EKG reviewed     Tests ordered:   Cardiac enzymes ordered    Independent Historians Interview:   Daughter present gave a better portion  of the history due to patient's hearing loss    2  Review of outside Tests:   None    3   Verbal Discussion with Specialists:    None      ----------------------------------------------------------------------------------------------------------------------------------  Risk   Prescription drug management:   Ongoing                                High risk for toxicity:     Decision regarding surgery:    None     Social determinants of health:   None     Decision to withhold therapy:   None                              FOLLOW UP   I have asked the patient to make an appointment for followup with me in 2 weeks or sooner if necessary.            I have carefully explained the diagnosis and treatment options to the patient.  The patient has displayed an understanding of the above, and all subsequent questions were answered.      DO DELMA Pineda    Portions of this note were produced using [employer]  Although every attempt at real-time proof reading has been made, occasional grammar/syntax errors may have been missed.

## 2021-01-26 NOTE — TELEPHONE ENCOUNTER
Patient's daughter calling to make an appointment for patient see Dr. Lee for angina and shortness of breath. Daughter Hanna said that the chest discomfort comes and goes and has been going on for a couple days. Patient has not had chest discomfort prior to this for while. She has not used nitroglycerin. Patient should be assessed by a provider today. Dr. Lee does not have any openings today or the rest of this week. Patient does not want to drive to [facility]. Set patient up to see her primary care provider Dr. Meyer for today at 1pm. Patient instructed to report to the ED if chest pain does not go away, increased shortness of breath, nausea, and or lightheadedness.

## 2021-02-02 ENCOUNTER — CARE COORDINATION (OUTPATIENT)
Dept: CARDIOLOGY | Facility: CLINIC | Age: 86
End: 2021-02-02

## 2021-02-02 ENCOUNTER — OFFICE VISIT (OUTPATIENT)
Dept: CARDIOLOGY | Facility: CLINIC | Age: 86
End: 2021-02-02
Payer: COMMERCIAL

## 2021-02-02 ENCOUNTER — HOSPITAL ENCOUNTER (OUTPATIENT)
Dept: CARDIOLOGY | Facility: CLINIC | Age: 86
Discharge: HOME | End: 2021-02-02
Attending: INTERNAL MEDICINE | Admitting: INTERNAL MEDICINE
Payer: COMMERCIAL

## 2021-02-02 ENCOUNTER — ANCILLARY PROCEDURE (OUTPATIENT)
Dept: GENERAL RADIOLOGY | Facility: CLINIC | Age: 86
End: 2021-02-02
Attending: INTERNAL MEDICINE
Payer: COMMERCIAL

## 2021-02-02 VITALS
HEIGHT: 66 IN | OXYGEN SATURATION: 98 % | DIASTOLIC BLOOD PRESSURE: 58 MMHG | SYSTOLIC BLOOD PRESSURE: 138 MMHG | BODY MASS INDEX: 19.88 KG/M2 | WEIGHT: 123.7 LBS | HEART RATE: 80 BPM

## 2021-02-02 DIAGNOSIS — D64.9 ANEMIA, UNSPECIFIED TYPE: ICD-10-CM

## 2021-02-02 DIAGNOSIS — I10 HYPERTENSION GOAL BP (BLOOD PRESSURE) < 140/90: ICD-10-CM

## 2021-02-02 DIAGNOSIS — Z95.2 S/P TAVR (TRANSCATHETER AORTIC VALVE REPLACEMENT): ICD-10-CM

## 2021-02-02 DIAGNOSIS — I25.119 CORONARY ARTERY DISEASE INVOLVING NATIVE CORONARY ARTERY OF NATIVE HEART WITH ANGINA PECTORIS (H): Primary | ICD-10-CM

## 2021-02-02 DIAGNOSIS — Z86.73 HISTORY OF CVA (CEREBROVASCULAR ACCIDENT): ICD-10-CM

## 2021-02-02 DIAGNOSIS — R06.09 DOE (DYSPNEA ON EXERTION): ICD-10-CM

## 2021-02-02 DIAGNOSIS — I35.0 NONRHEUMATIC AORTIC VALVE STENOSIS: ICD-10-CM

## 2021-02-02 DIAGNOSIS — Z11.59 ENCOUNTER FOR SCREENING FOR OTHER VIRAL DISEASES: ICD-10-CM

## 2021-02-02 DIAGNOSIS — E78.5 HYPERLIPIDEMIA WITH TARGET LDL LESS THAN 130: ICD-10-CM

## 2021-02-02 PROCEDURE — 93306 TTE W/DOPPLER COMPLETE: CPT | Mod: 26 | Performed by: INTERNAL MEDICINE

## 2021-02-02 PROCEDURE — 99215 OFFICE O/P EST HI 40 MIN: CPT | Performed by: INTERNAL MEDICINE

## 2021-02-02 PROCEDURE — 71046 X-RAY EXAM CHEST 2 VIEWS: CPT | Mod: TC | Performed by: RADIOLOGY

## 2021-02-02 PROCEDURE — 93306 TTE W/DOPPLER COMPLETE: CPT

## 2021-02-02 ASSESSMENT — MIFFLIN-ST. JEOR: SCORE: 982.85

## 2021-02-02 NOTE — PROGRESS NOTES
Service Date: 02/02/2021      HISTORY OF PRESENT ILLNESS:  I had the opportunity to see Ms. Gillian Burk in Cardiology Clinic today at [facility] in [place] for reevaluation of shortness of breath with exertion with a history of aortic valve disease and coronary artery disease.      Ms. Burk is a 89-year-old woman with history of severe aortic stenosis treated with transcatheter aortic valve replacement in 2016.  After that, she developed episodes of exertional angina in 2017 and underwent coronary angiography.  That study demonstrated a 70% stenosis within the mid LAD which was significant by iFR.  We had planned to have her return for staged intervention, but her chest discomfort symptoms seemed to resolve and she chose not to go through with the procedure for stenting.      When I saw her in September for a virtual video visit, she was having significant shortness of breath problems.  She had difficulty doing even mild activity without stopping to catch her breath.  She is not experiencing any recurrence of chest discomfort.  She elected to continue with medical management at that time during the quarantine at her assisted living center and I did some basic evaluation including a hemoglobin, which has been consistently low at about 9.4, basic metabolic panel which looked essentially normal and NT-proBNP, which was relatively unremarkable at 1300 and a TSH which was a little high at 8.0.  Initially, her hemoglobin was 8.4, which seemed to be a reasonable explanation for her shortness of breath and came up to about 9.4 after iron infusions.  It has stabilized.      She tells me now that her shortness of breath has not gotten any better.  She is still short of breath with any activity.  She was having some chest discomfort symptoms for a short time, but was found to have chest wall tenderness and her chest discomfort symptoms resolved with some Aspercreme topical anti-inflammatory.       She denies having PND, orthopnea, or increasing lower extremity edema.  She has no palpitations, lightheadedness or syncope.      PHYSICAL EXAMINATION:  On examination today, her blood pressure is 138/58, heart rate 80 and weight 123 pounds and stable.  Her lungs are clear.  Her heart rhythm is regular with occasional extrasystoles and a systolic ejection murmur at the base, rated 2/6 in severity.  She may have a soft holosystolic apical murmur as well.      IMPRESSIONS:  Ms. Gillian Burk is a 89-year-old woman with history of progressively severe aortic stenosis requiring valve replacement with a bioprosthetic valve using TAVR.  That was performed back in 2016.  She then developed some chest pain consistent with angina and her angiogram in 2017 revealed evidence of a significant mid LAD stenosis.  It was not immediately stented and she decided not to pursue stenting after her chest discomfort symptoms went away.        Over the last 6 months or more, she has had progressive shortness of breath.  Her shortness of breath is now very limiting to her.  She can walk only a short distance.  She has no other symptoms of congestive heart failure such as PND, orthopnea or lower extremity edema.  Her NT-proBNP is not very high.  Her hemoglobin is a little low, but I do not think it is low enough to explain her severe symptoms of shortness of breath.      I suggested that we update her echocardiogram to rule out the possibility of thrombotic aortic valve stenosis of her bioprosthetic aortic valve.  Obviously, I would like to rule out other causes as well including significant mitral valve disease and LV dysfunction.      If her echo looks stable, I have recommended that we proceed with coronary angiography with possible stenting of her mid LAD where she has a known significant lesion.  That lesion may have progressed to the point where she is now more symptomatic with her shortness of breath being an anginal  equivalent.      I described the procedure and the risks to her including the possibility of bleeding, renal failure, myocardial infarction, stroke, emergency surgery and death.  She understands and agrees to proceed if necessary.      I will have her follow up with us after the angiogram procedure if that is performed.      cc:      Augusto Meyer DO    [address] [address]         MARGAUX WILLIAMSON MD, FACC             D: 2021   T: 2021   MT: DEBBIE      Name:     LUCIO HANNON   MRN:      [id number]        Account:      [id number]   :      1927           Service Date: 2021      Document: [id number]

## 2021-02-02 NOTE — LETTER
2/2/2021      Augusto Meyer, DO  [address] [place] [address]      RE: Gillian ALLEN Wm       Dear Colleague,    I had the pleasure of seeing Gillian Burk in the [facility].    Service Date: 02/02/2021      HISTORY OF PRESENT ILLNESS:  I had the opportunity to see Ms. Gillian Burk in Cardiology Clinic today at [facility] in [place] for reevaluation of shortness of breath with exertion with a history of aortic valve disease and coronary artery disease.      Ms. Burk is a 89-year-old woman with history of severe aortic stenosis treated with transcatheter aortic valve replacement in 2016.  After that, she developed episodes of exertional angina in 2017 and underwent coronary angiography.  That study demonstrated a 70% stenosis within the mid LAD which was significant by iFR.  We had planned to have her return for staged intervention, but her chest discomfort symptoms seemed to resolve and she chose not to go through with the procedure for stenting.      When I saw her in September for a virtual video visit, she was having significant shortness of breath problems.  She had difficulty doing even mild activity without stopping to catch her breath.  She is not experiencing any recurrence of chest discomfort.  She elected to continue with medical management at that time during the quarantine at her assisted living center and I did some basic evaluation including a hemoglobin, which has been consistently low at about 9.4, basic metabolic panel which looked essentially normal and NT-proBNP, which was relatively unremarkable at 1300 and a TSH which was a little high at 8.0.  Initially, her hemoglobin was 8.4, which seemed to be a reasonable explanation for her shortness of breath and came up to about 9.4 after iron infusions.  It has stabilized.      She tells me now that her shortness of breath has not gotten any better.  She is still  short of breath with any activity.  She was having some chest discomfort symptoms for a short time, but was found to have chest wall tenderness and her chest discomfort symptoms resolved with some Aspercreme topical anti-inflammatory.      She denies having PND, orthopnea, or increasing lower extremity edema.  She has no palpitations, lightheadedness or syncope.      PHYSICAL EXAMINATION:  On examination today, her blood pressure is 138/58, heart rate 80 and weight 123 pounds and stable.  Her lungs are clear.  Her heart rhythm is regular with occasional extrasystoles and a systolic ejection murmur at the base, rated 2/6 in severity.  She may have a soft holosystolic apical murmur as well.      IMPRESSIONS:  Ms. Gillian Burk is a 89-year-old woman with history of progressively severe aortic stenosis requiring valve replacement with a bioprosthetic valve using TAVR.  That was performed back in 2016.  She then developed some chest pain consistent with angina and her angiogram in 2017 revealed evidence of a significant mid LAD stenosis.  It was not immediately stented and she decided not to pursue stenting after her chest discomfort symptoms went away.        Over the last 6 months or more, she has had progressive shortness of breath.  Her shortness of breath is now very limiting to her.  She can walk only a short distance.  She has no other symptoms of congestive heart failure such as PND, orthopnea or lower extremity edema.  Her NT-proBNP is not very high.  Her hemoglobin is a little low, but I do not think it is low enough to explain her severe symptoms of shortness of breath.      I suggested that we update her echocardiogram to rule out the possibility of thrombotic aortic valve stenosis of her bioprosthetic aortic valve.  Obviously, I would like to rule out other causes as well including significant mitral valve disease and LV dysfunction.      If her echo looks stable, I have recommended that we proceed with  coronary angiography with possible stenting of her mid LAD where she has a known significant lesion.  That lesion may have progressed to the point where she is now more symptomatic with her shortness of breath being an anginal equivalent.      I described the procedure and the risks to her including the possibility of bleeding, renal failure, myocardial infarction, stroke, emergency surgery and death.  She understands and agrees to proceed if necessary.      I will have her follow up with us after the angiogram procedure if that is performed.      cc:      Augusto Meyer DO    [address] [address]         MARGAUX WILLIAMSON MD, [facility]             D: 2021   T: 2021   MT: DEBBIE      Name:     LUCIO HANNON   MRN:      [id number]        Account:      [id number]   :      1927           Service Date: 2021      Document: [id number]        Outpatient Encounter Medications as of 2021   Medication Sig Dispense Refill     acetaminophen (TYLENOL ARTHRITIS PAIN) 650 MG CR tablet Take 1,300 mg by mouth every 8 hours as needed for mild pain        ALPRAZolam (XANAX) 0.25 MG tablet Take 1 tablet by mouth twice daily as needed for anxiety 28 tablet 0     amLODIPine (NORVASC) 5 MG tablet Take 1 tablet (5 mg) by mouth daily 90 tablet 3     aspirin EC 81 MG EC tablet Take 1 tablet (81 mg) by mouth daily       atorvastatin (LIPITOR) 40 MG tablet Take 1 tablet (40 mg) by mouth daily 90 tablet 3     calcium carbonate (TUMS) 500 MG chewable tablet Take 2-4 tablets (1,000-2,000 mg) by mouth as needed for heartburn       clopidogrel (PLAVIX) 75 MG tablet Take 1 tablet (75 mg) by mouth daily 90 tablet 3     Cranberry 1000 MG CAPS        docusate sodium (COLACE) 100 MG capsule Take 200 mg by mouth daily 2 capsules       EUTHYROX 75 MCG tablet Take 1 tablet by mouth once daily 90 tablet 1     famotidine (PEPCID) 40 MG tablet TAKE 1 TABLET BY MOUTH AT BEDTIME 30  tablet 9     folic acid (FOLVITE) 1 MG tablet Take 1 tablet by mouth once daily 90 tablet 1     isosorbide mononitrate (IMDUR) 60 MG 24 hr tablet Take 2 tablets (120 mg) by mouth daily 180 tablet 3     Lactobacillus (FLORAJEN ACIDOPHILUS) CAPS Take 1 capsule by mouth daily       levETIRAcetam (KEPPRA) 500 MG tablet TAKE 1 TABLET BY MOUTH ONCE DAILY THEN 2 AT BEDTIME 270 tablet 0     methotrexate 2.5 MG tablet Take 4 tablets (10 mg) by mouth once a week Wed 52 tablet 3     metoprolol succinate ER (TOPROL-XL) 50 MG 24 hr tablet Take 1 tablet (50 mg) by mouth 2 times daily 180 tablet 3     mirtazapine (REMERON) 30 MG tablet TAKE 1 TABLET BY MOUTH ONCE DAILY AT BEDTIME 90 tablet 1     predniSONE (DELTASONE) 5 MG tablet TAKE ONE TABLET BY MOUTH EVERY OTHER DAY ALTERNATING  WITH  1/2  TABLET  EVERY  OTHER  DAY 23 tablet 13     SLOW  (45 Fe) MG CR tablet Take 1 tablet by mouth once daily 90 tablet 0     nitroGLYcerin (NITROSTAT) 0.4 MG sublingual tablet DISSOLVE ONE TABLET UNDER THE TONGUE EVERY 5 MINUTES AS NEEDED FOR CHEST PAIN.  DO NOT EXCEED A TOTAL OF 3 DOSES IN 15 MINUTES (Patient not taking: Reported on 2/2/2021) 25 tablet 0     No facility-administered encounter medications on file as of 2/2/2021.        Again, thank you for allowing me to participate in the care of your patient.      Sincerely,    MARGAUX WILLIAMSON MD     [facility]

## 2021-02-02 NOTE — PROGRESS NOTES
HPI and Plan:   See dictation    Orders Placed This Encounter   Procedures     X-ray Chest 2 vws*     Basic metabolic panel     N terminal pro BNP outpatient     Follow-Up with Cardiac Advanced Practice Provider     Echocardiogram Complete       No orders of the defined types were placed in this encounter.      There are no discontinued medications.      Encounter Diagnoses   Name Primary?     Coronary artery disease involving native coronary artery of native heart with angina pectoris (H) Yes     Hypertension goal BP (blood pressure) < 140/90      Hyperlipidemia with target LDL less than 130      CONTRERAS (dyspnea on exertion)      Nonrheumatic aortic valve stenosis      S/P TAVR (transcatheter aortic valve replacement)      Anemia, unspecified type      History of CVA (cerebrovascular accident) - 2013        CURRENT MEDICATIONS:  Current Outpatient Medications   Medication Sig Dispense Refill     acetaminophen (TYLENOL ARTHRITIS PAIN) 650 MG CR tablet Take 1,300 mg by mouth every 8 hours as needed for mild pain        ALPRAZolam (XANAX) 0.25 MG tablet Take 1 tablet by mouth twice daily as needed for anxiety 28 tablet 0     amLODIPine (NORVASC) 5 MG tablet Take 1 tablet (5 mg) by mouth daily 90 tablet 3     aspirin EC 81 MG EC tablet Take 1 tablet (81 mg) by mouth daily       atorvastatin (LIPITOR) 40 MG tablet Take 1 tablet (40 mg) by mouth daily 90 tablet 3     calcium carbonate (TUMS) 500 MG chewable tablet Take 2-4 tablets (1,000-2,000 mg) by mouth as needed for heartburn       clopidogrel (PLAVIX) 75 MG tablet Take 1 tablet (75 mg) by mouth daily 90 tablet 3     Cranberry 1000 MG CAPS        docusate sodium (COLACE) 100 MG capsule Take 200 mg by mouth daily 2 capsules       EUTHYROX 75 MCG tablet Take 1 tablet by mouth once daily 90 tablet 1     famotidine (PEPCID) 40 MG tablet TAKE 1 TABLET BY MOUTH AT BEDTIME 30 tablet 9     folic acid (FOLVITE) 1 MG tablet Take 1 tablet by mouth once daily 90 tablet 1      isosorbide mononitrate (IMDUR) 60 MG 24 hr tablet Take 2 tablets (120 mg) by mouth daily 180 tablet 3     Lactobacillus (FLORAJEN ACIDOPHILUS) CAPS Take 1 capsule by mouth daily       levETIRAcetam (KEPPRA) 500 MG tablet TAKE 1 TABLET BY MOUTH ONCE DAILY THEN 2 AT BEDTIME 270 tablet 0     methotrexate 2.5 MG tablet Take 4 tablets (10 mg) by mouth once a week Wed 52 tablet 3     metoprolol succinate ER (TOPROL-XL) 50 MG 24 hr tablet Take 1 tablet (50 mg) by mouth 2 times daily 180 tablet 3     mirtazapine (REMERON) 30 MG tablet TAKE 1 TABLET BY MOUTH ONCE DAILY AT BEDTIME 90 tablet 1     predniSONE (DELTASONE) 5 MG tablet TAKE ONE TABLET BY MOUTH EVERY OTHER DAY ALTERNATING  WITH  1/2  TABLET  EVERY  OTHER  DAY 23 tablet 13     SLOW  (45 Fe) MG CR tablet Take 1 tablet by mouth once daily 90 tablet 0     nitroGLYcerin (NITROSTAT) 0.4 MG sublingual tablet DISSOLVE ONE TABLET UNDER THE TONGUE EVERY 5 MINUTES AS NEEDED FOR CHEST PAIN.  DO NOT EXCEED A TOTAL OF 3 DOSES IN 15 MINUTES (Patient not taking: Reported on 2/2/2021) 25 tablet 0       ALLERGIES     Allergies   Allergen Reactions     Penicillins Hives     Caffeine Other (See Comments) and Unknown     Triggers your Meniere's disease     Hydrocodone Other (See Comments) and Unknown     hallucinations     Ibuprofen GI Disturbance and Unknown     Other Environmental Allergy      Metals of unspecific kind     Tramadol Other (See Comments)     Seizure, was taking remeron along with tramadol     Metal [Staples] Rash       PAST MEDICAL HISTORY:  Past Medical History:   Diagnosis Date     Aortic stenosis     s/p TAVR 8/17/16     Chronic migraine      Hyperlipidaemia      Hypertension      Hypothyroidism      Myocardial infarction (H)      Need for SBE (subacute bacterial endocarditis) prophylaxis      Orthostatic hypotension     wears compression stockings     PUD (peptic ulcer disease)      Secondary Sjogren's syndrome (H)      Seizures (H)     after stroke (partial  onset)     Seropositive rheumatoid arthritis (H)      Stroke (H) 05/2013    with visual field cut, left occipital lobe     Syncope 2014    due to orthostatic hypotension       PAST SURGICAL HISTORY:  Past Surgical History:   Procedure Laterality Date     C TOTAL HIP ARTHROPLASTY Right 2010     C TOTAL HIP ARTHROPLASTY Left 2014     CATARACT IOL, RT/LT Bilateral 2000     CV FLUOROSCOPY ONLY N/A 8/6/2019    Procedure: Fluoroscopy Only - valve cine done of aortic valve at 180 degrees [place] to JUNG;  Surgeon: Dave Farfan MD;  Location:  [facility]     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     HEART CATH FEMORAL CANNULIZATION WITH OPEN STANDBY REPAIR AORTIC VALVE N/A 8/3/2016    Procedure: HEART CATH FEMORAL CANNULIZATION WITH OPEN STANDBY REPAIR AORTIC VALVE;  Surgeon: Owen Schultz MD;  Location:  OR     HYSTERECTOMY TOTAL ABDOMINAL  1970    ovaries out     MOUTH SURGERY  2011    jaw surgery due to fracture     TRANSCATHETER AORTIC VALVE IMPLANT ANESTHESIA N/A 8/3/2016    Procedure: TRANSCATHETER AORTIC VALVE IMPLANT ANESTHESIA;  Surgeon: GENERIC ANESTHESIA PROVIDER;  Location:  OR       FAMILY HISTORY:  Family History   Problem Relation Age of Onset     Hyperlipidemia Mother      Family History Negative No family hx of        SOCIAL HISTORY:  Social History     Socioeconomic History     Marital status:      Spouse name: Not on file     Number of children: 4     Years of education: Not on file     Highest education level: Not on file   Occupational History     Employer: RETIRED   Social Needs     Financial resource strain: Not on file     Food insecurity     Worry: Not on file     Inability: Not on file     Transportation needs     Medical: Not on file     Non-medical: Not on file   Tobacco Use     Smoking status: Never Smoker     Smokeless tobacco: Never Used   Substance and Sexual Activity     Alcohol use: No     Alcohol/week: 0.0 standard drinks     Drug use: No      Sexual activity: Not Currently   Lifestyle     Physical activity     Days per week: Not on file     Minutes per session: Not on file     Stress: Not on file   Relationships     Social connections     Talks on phone: Not on file     Gets together: Not on file     Attends Yarsani service: Not on file     Active member of club or organization: Not on file     Attends meetings of clubs or organizations: Not on file     Relationship status: Not on file     Intimate partner violence     Fear of current or ex partner: Not on file     Emotionally abused: Not on file     Physically abused: Not on file     Forced sexual activity: Not on file   Other Topics Concern     Parent/sibling w/ CABG, MI or angioplasty before 65F 55M? Not Asked      Service Not Asked     Blood Transfusions Not Asked     Caffeine Concern Not Asked     Occupational Exposure Not Asked     Hobby Hazards Not Asked     Sleep Concern Not Asked     Stress Concern Not Asked     Weight Concern Not Asked     Special Diet Yes     Comment: low salt      Back Care Not Asked     Exercise Yes     Comment: semi recument bike 15 mins daily      Bike Helmet Not Asked     Seat Belt Not Asked     Self-Exams Not Asked   Social History Narrative    .  Lives in assisted living. Independent. Has help with making bed and changing sheets.    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 .     4 children - 1 with RA       Review of Systems:  Skin:  Negative       Eyes:  Positive for glasses    ENT:  Negative      Respiratory:  Positive for dyspnea on exertion;shortness of breath     Cardiovascular:  Negative for;palpitations;chest pain;edema;lightheadedness;dizziness      Gastroenterology: Negative      Genitourinary:  Negative      Musculoskeletal:  Negative      Neurologic:  Negative      Psychiatric:  Negative      Heme/Lymph/Imm:  Positive for allergies    Endocrine:  Positive for thyroid disorder      Physical Exam:  Vitals: /58  "(BP Location: Right arm, Patient Position: Fowlers, Cuff Size: Adult Regular)   Pulse 80   Ht 1.676 m (5' 6\")   Wt 56.1 kg (123 lb 11.2 oz)   SpO2 98%   BMI 19.97 kg/m      Constitutional:  cooperative;in no acute distress thin      Skin:  warm and dry to the touch, no apparent skin lesions or masses noted          Head:  normocephalic, no masses or lesions        Eyes:  pupils equal and round, conjunctivae and lids unremarkable, sclera white, no xanthalasma, EOMS intact, no nystagmus        Lymph:No Cervical lymphadenopathy present     ENT:  no pallor or cyanosis        Neck:  carotid pulses are full and equal bilaterally, JVP normal, no carotid bruit transmitted murmur      Respiratory:  normal breath sounds, clear to auscultation, normal A-P diameter, normal symmetry, normal respiratory excursion, no use of accessory muscles         Cardiac: normal S1 and S2 frequent premature beats     systolic murmur        pulses full and equal, no bruits auscultated                                        GI:  abdomen soft, non-tender, BS normoactive, no mass, no HSM, no bruits        Extremities and Muscular Skeletal:  no deformities, clubbing, cyanosis, erythema observed   bilateral LE edema;trace     left upper extremity diffusely ecchymotic with tenderness on palpation. No bruit     Neurological:  no gross motor deficits   walks with cane    Psych:  Alert and Oriented x 3        CC  No referring provider defined for this encounter.              "

## 2021-02-02 NOTE — NURSING NOTE
Coronary Angiogram    Scheduled: TBD  Location: [facility]  Check-in time: TBD  Procedure time: TBD      Prep instructions were reviewed with patient/daughter in clinic. Patient had no questions.    See instructions below    If procedure is before 12 noon  NPO after midnight, the night before the procedure.     If procedure is after 12 noon   Clear liquid breakfast before 8:00 am. NPO after 8:00 am.     All medications can be taken on the morning of the procedure (with small sips of water).    Patient is aware they will need a ride home, and a person to stay with her for 24 hours after the procedure.     Patient is aware she will need a COVID test prior to her angiogram.       Zuleyma Sebastian RN

## 2021-02-02 NOTE — PROGRESS NOTES
Patient seen in clinic today for evaluation of progressive shortness of breath. An echo and CXR were completed after the appt today. Dr. Williamson's note states the plan is to go on to coronary angiography is the echo is stable.    Results of Echo and CXR below. Routed to Dr. Williamson for further recommendations. Meri Layton RN on 2021 at 5:44 PM      Recent Results (from the past 4320 hour(s))   Echocardiogram Complete    Narrative    [id number]  [id number]  [id number]  [id number]^CLAY^MARGAUX^RODDY           [facility]  Echocardiography Laboratory  [address] [place] [address]        Name: LUCIO HANNON  MRN: [id number]  : 1927  Study Date: 2021 03:24 PM  Age: 89 yrs  Gender: Female  Patient Location: [facility]  Reason For Study: CONTRERAS (dyspnea on exertion), S/P TAVR (transcatheter aortic  valve  History: HTN,CAD,TAVR 2016 27mm [employer] Treva, PVCs, PACs, Mitral  Stenosis, TIA, CHF, NSTEMI  Ordering Physician: MARGAUX WILLIAMSON  Referring Physician: Augusto Meyer  Performed By: Dee Terry     BSA: 1.6 m2  Height: 66 in  Weight: 123 lb  HR: 80  BP: 138/58 mmHg  _____________________________________________________________________________  __        Procedure  Complete Echo Adult.  _____________________________________________________________________________  __        Interpretation Summary     1. Left ventricular systolic function is low normal. The visual ejection  fraction is estimated at 50-55%.  2. Septal motion is consistent with conduction abnormality.  3. The right ventricle is normal in structure, function and size.  4. The left atrium is moderate to severely dilated.  5. There is moderate to severe mitral annular calcification.  6. There is mild mitral stenosis.  7. S/P #27 [employer] Treva TAVR; appears well seated with grossly  normal function, mean gradient 9 mmHg, peak velocity 2.0 m/sec, calculated  valve area 2.1 cm2. No  perivalvular regurgitation.  8. In comaprison with the prior report, there has been no significant change.  _____________________________________________________________________________  __        Left Ventricle  The left ventricle is normal in size. There is mild concentric left  ventricular hypertrophy. The visual ejection fraction is estimated at 50-55%.  Left ventricular systolic function is low normal. Left ventricular diastolic  function is not assessable. Septal motion is consistent with conduction  abnormality.     Right Ventricle  The right ventricle is normal in structure, function and size.     Atria  The left atrium is moderate to severely dilated. The right atrium is  moderately dilated. There is no color Doppler evidence of an atrial shunt.     Mitral Valve  There is moderate to severe mitral annular calcification. There is mild (1+)  mitral regurgitation. There is mild mitral stenosis.        Tricuspid Valve  The tricuspid valve is normal in structure and function. There is mild (1+)  tricuspid regurgitation.     Aortic Valve  There is a bioprosthetic aortic valve. S/P #27 [employer] Treva TAVR;  appears well seated with grossly normal function, meang radient 9 mmHg, peak  velocity 2.0 m/sec, calculated valve area 2.1 cm2. No perivalvular  regurgitation.     Pulmonic Valve  The pulmonic valve is normal in structure and function.     Vessels  Normal size aorta. IVC diameter and respiratory changes fall into an  intermediate range suggesting an RA pressure of 8 mmHg.     Pericardium  There is no pericardial effusion.        Rhythm  Sinus rhythm was noted.  _____________________________________________________________________________  __  MMode/2D Measurements & Calculations  IVSd: 1.2 cm     LVIDd: 4.7 cm  LVIDs: 3.7 cm  LVPWd: 1.1 cm  FS: 21.8 %  LV mass(C)d: 206.4 grams  LV mass(C)dI: 126.8 grams/m2  LA dimension: 3.6 cm  asc Aorta Diam: 3.4 cm  LVOT diam: 2.2 cm  LVOT area: 3.7 cm2  LA Volume  (BP): 69.3 ml  LA Volume Index (BP): 42.5 ml/m2  RWT: 0.48           Doppler Measurements & Calculations  MV E max olivier: 112.0 cm/sec  MV A max olivier: 183.3 cm/sec  MV E/A: 0.61  MV max P.7 mmHg  MV mean P.2 mmHg  MV V2 VTI: 41.9 cm  MVA(VTI): 2.2 cm2  MV dec slope: 394.6 cm/sec2  MV dec time: 0.28 sec  Ao V2 max: 197.0 cm/sec  Ao max P.0 mmHg  Ao V2 mean: 144.6 cm/sec  Ao mean P.1 mmHg  Ao V2 VTI: 44.2 cm  SEAN(I,D): 2.1 cm2  SEAN(V,D): 2.1 cm2  LV V1 max P.3 mmHg  LV V1 max: 115.0 cm/sec  LV V1 VTI: 25.4 cm  SV(LVOT): 93.4 ml  SI(LVOT): 57.4 ml/m2  PA acc time: 0.07 sec  TR max olivier: 287.8 cm/sec  TR max P.1 mmHg  AV Olivier Ratio (DI): 0.58  SEAN Index (cm2/m2): 1.3              _____________________________________________________________________________  __        Report approved by: Carlos Sosa 2021 04:25 PM        CXR results:    Study Result    CHEST TWO VIEWS  2021 4:11 PM      HISTORY: CONTRERAS (dyspnea on exertion)     COMPARISON: Chest x-ray on 3/30/2018                                                                      IMPRESSION: PA and lateral views of the chest were obtained.  Postsurgical changes of TAVR. Stable cardiomediastinal silhouette.  Basilar predominant interstitial pulmonary opacities, could be  atelectatic/infectious versus related to underlying interstitial lung  disease. No significant pleural effusion or pneumothorax. Diffuse  osteopenia and multilevel degenerative changes of the spine.     HAYDEE HEALY MD

## 2021-02-02 NOTE — LETTER
2/2/2021    Augusto Meyer, DO  [address] [place] [address]    RE: Gillian Burk       Dear Colleague,    I had the pleasure of seeing Gillian Burk in the [facility].    HPI and Plan:   See dictation    Orders Placed This Encounter   Procedures     X-ray Chest 2 vws*     Basic metabolic panel     N terminal pro BNP outpatient     Follow-Up with Cardiac Advanced Practice Provider     Echocardiogram Complete       No orders of the defined types were placed in this encounter.      There are no discontinued medications.      Encounter Diagnoses   Name Primary?     Coronary artery disease involving native coronary artery of native heart with angina pectoris (H) Yes     Hypertension goal BP (blood pressure) < 140/90      Hyperlipidemia with target LDL less than 130      CONTRERAS (dyspnea on exertion)      Nonrheumatic aortic valve stenosis      S/P TAVR (transcatheter aortic valve replacement)      Anemia, unspecified type      History of CVA (cerebrovascular accident) - 2013        CURRENT MEDICATIONS:  Current Outpatient Medications   Medication Sig Dispense Refill     acetaminophen (TYLENOL ARTHRITIS PAIN) 650 MG CR tablet Take 1,300 mg by mouth every 8 hours as needed for mild pain        ALPRAZolam (XANAX) 0.25 MG tablet Take 1 tablet by mouth twice daily as needed for anxiety 28 tablet 0     amLODIPine (NORVASC) 5 MG tablet Take 1 tablet (5 mg) by mouth daily 90 tablet 3     aspirin EC 81 MG EC tablet Take 1 tablet (81 mg) by mouth daily       atorvastatin (LIPITOR) 40 MG tablet Take 1 tablet (40 mg) by mouth daily 90 tablet 3     calcium carbonate (TUMS) 500 MG chewable tablet Take 2-4 tablets (1,000-2,000 mg) by mouth as needed for heartburn       clopidogrel (PLAVIX) 75 MG tablet Take 1 tablet (75 mg) by mouth daily 90 tablet 3     Cranberry 1000 MG CAPS        docusate sodium (COLACE) 100 MG capsule Take 200 mg by mouth daily 2 capsules       EUTHYROX 75  MCG tablet Take 1 tablet by mouth once daily 90 tablet 1     famotidine (PEPCID) 40 MG tablet TAKE 1 TABLET BY MOUTH AT BEDTIME 30 tablet 9     folic acid (FOLVITE) 1 MG tablet Take 1 tablet by mouth once daily 90 tablet 1     isosorbide mononitrate (IMDUR) 60 MG 24 hr tablet Take 2 tablets (120 mg) by mouth daily 180 tablet 3     Lactobacillus (FLORAJEN ACIDOPHILUS) CAPS Take 1 capsule by mouth daily       levETIRAcetam (KEPPRA) 500 MG tablet TAKE 1 TABLET BY MOUTH ONCE DAILY THEN 2 AT BEDTIME 270 tablet 0     methotrexate 2.5 MG tablet Take 4 tablets (10 mg) by mouth once a week Wed 52 tablet 3     metoprolol succinate ER (TOPROL-XL) 50 MG 24 hr tablet Take 1 tablet (50 mg) by mouth 2 times daily 180 tablet 3     mirtazapine (REMERON) 30 MG tablet TAKE 1 TABLET BY MOUTH ONCE DAILY AT BEDTIME 90 tablet 1     predniSONE (DELTASONE) 5 MG tablet TAKE ONE TABLET BY MOUTH EVERY OTHER DAY ALTERNATING  WITH  1/2  TABLET  EVERY  OTHER  DAY 23 tablet 13     SLOW  (45 Fe) MG CR tablet Take 1 tablet by mouth once daily 90 tablet 0     nitroGLYcerin (NITROSTAT) 0.4 MG sublingual tablet DISSOLVE ONE TABLET UNDER THE TONGUE EVERY 5 MINUTES AS NEEDED FOR CHEST PAIN.  DO NOT EXCEED A TOTAL OF 3 DOSES IN 15 MINUTES (Patient not taking: Reported on 2/2/2021) 25 tablet 0       ALLERGIES     Allergies   Allergen Reactions     Penicillins Hives     Caffeine Other (See Comments) and Unknown     Triggers your Meniere's disease     Hydrocodone Other (See Comments) and Unknown     hallucinations     Ibuprofen GI Disturbance and Unknown     Other Environmental Allergy      Metals of unspecific kind     Tramadol Other (See Comments)     Seizure, was taking remeron along with tramadol     Metal [Staples] Rash       PAST MEDICAL HISTORY:  Past Medical History:   Diagnosis Date     Aortic stenosis     s/p TAVR 8/17/16     Chronic migraine      Hyperlipidaemia      Hypertension      Hypothyroidism      Myocardial infarction (H)      Need  for SBE (subacute bacterial endocarditis) prophylaxis      Orthostatic hypotension     wears compression stockings     PUD (peptic ulcer disease)      Secondary Sjogren's syndrome (H)      Seizures (H)     after stroke (partial onset)     Seropositive rheumatoid arthritis (H)      Stroke (H) 05/2013    with visual field cut, left occipital lobe     Syncope 2014    due to orthostatic hypotension       PAST SURGICAL HISTORY:  Past Surgical History:   Procedure Laterality Date     C TOTAL HIP ARTHROPLASTY Right 2010     C TOTAL HIP ARTHROPLASTY Left 2014     CATARACT IOL, RT/LT Bilateral 2000     CV FLUOROSCOPY ONLY N/A 8/6/2019    Procedure: Fluoroscopy Only - valve cine done of aortic valve at 180 degrees [place] to JUNG;  Surgeon: Dave Farfan MD;  Location:  HEART CARDIAC CATH LAB     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     HEART CATH FEMORAL CANNULIZATION WITH OPEN STANDBY REPAIR AORTIC VALVE N/A 8/3/2016    Procedure: HEART CATH FEMORAL CANNULIZATION WITH OPEN STANDBY REPAIR AORTIC VALVE;  Surgeon: Owen Schultz MD;  Location: [facility] OR     HYSTERECTOMY TOTAL ABDOMINAL  1970    ovaries out     MOUTH SURGERY  2011    jaw surgery due to fracture     TRANSCATHETER AORTIC VALVE IMPLANT ANESTHESIA N/A 8/3/2016    Procedure: TRANSCATHETER AORTIC VALVE IMPLANT ANESTHESIA;  Surgeon: GENERIC ANESTHESIA PROVIDER;  Location: [facility] OR       FAMILY HISTORY:  Family History   Problem Relation Age of Onset     Hyperlipidemia Mother      Family History Negative No family hx of        SOCIAL HISTORY:  Social History     Socioeconomic History     Marital status:      Spouse name: Not on file     Number of children: 4     Years of education: Not on file     Highest education level: Not on file   Occupational History     Employer: RETIRED   Social Needs     Financial resource strain: Not on file     Food insecurity     Worry: Not on file     Inability: Not on file     Transportation needs     Medical:  Not on file     Non-medical: Not on file   Tobacco Use     Smoking status: Never Smoker     Smokeless tobacco: Never Used   Substance and Sexual Activity     Alcohol use: No     Alcohol/week: 0.0 standard drinks     Drug use: No     Sexual activity: Not Currently   Lifestyle     Physical activity     Days per week: Not on file     Minutes per session: Not on file     Stress: Not on file   Relationships     Social connections     Talks on phone: Not on file     Gets together: Not on file     Attends Religion service: Not on file     Active member of club or organization: Not on file     Attends meetings of clubs or organizations: Not on file     Relationship status: Not on file     Intimate partner violence     Fear of current or ex partner: Not on file     Emotionally abused: Not on file     Physically abused: Not on file     Forced sexual activity: Not on file   Other Topics Concern     Parent/sibling w/ CABG, MI or angioplasty before 65F 55M? Not Asked      Service Not Asked     Blood Transfusions Not Asked     Caffeine Concern Not Asked     Occupational Exposure Not Asked     Hobby Hazards Not Asked     Sleep Concern Not Asked     Stress Concern Not Asked     Weight Concern Not Asked     Special Diet Yes     Comment: low salt      Back Care Not Asked     Exercise Yes     Comment: semi recument bike 15 mins daily      Bike Helmet Not Asked     Seat Belt Not Asked     Self-Exams Not Asked   Social History Narrative    .  Lives in assisted living. Independent. Has help with making bed and changing sheets.    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 .     4 children - 1 with RA       Review of Systems:  Skin:  Negative       Eyes:  Positive for glasses    ENT:  Negative      Respiratory:  Positive for dyspnea on exertion;shortness of breath     Cardiovascular:  Negative for;palpitations;chest pain;edema;lightheadedness;dizziness      Gastroenterology: Negative     "  Genitourinary:  Negative      Musculoskeletal:  Negative      Neurologic:  Negative      Psychiatric:  Negative      Heme/Lymph/Imm:  Positive for allergies    Endocrine:  Positive for thyroid disorder      Physical Exam:  Vitals: /58 (BP Location: Right arm, Patient Position: Fowlers, Cuff Size: Adult Regular)   Pulse 80   Ht 1.676 m (5' 6\")   Wt 56.1 kg (123 lb 11.2 oz)   SpO2 98%   BMI 19.97 kg/m      Constitutional:  cooperative;in no acute distress thin      Skin:  warm and dry to the touch, no apparent skin lesions or masses noted          Head:  normocephalic, no masses or lesions        Eyes:  pupils equal and round, conjunctivae and lids unremarkable, sclera white, no xanthalasma, EOMS intact, no nystagmus        Lymph:No Cervical lymphadenopathy present     ENT:  no pallor or cyanosis        Neck:  carotid pulses are full and equal bilaterally, JVP normal, no carotid bruit transmitted murmur      Respiratory:  normal breath sounds, clear to auscultation, normal A-P diameter, normal symmetry, normal respiratory excursion, no use of accessory muscles         Cardiac: normal S1 and S2 frequent premature beats     systolic murmur        pulses full and equal, no bruits auscultated                                        GI:  abdomen soft, non-tender, BS normoactive, no mass, no HSM, no bruits        Extremities and Muscular Skeletal:  no deformities, clubbing, cyanosis, erythema observed   bilateral LE edema;trace     left upper extremity diffusely ecchymotic with tenderness on palpation. No bruit     Neurological:  no gross motor deficits   walks with cane    Psych:  Alert and Oriented x 3        CC  No referring provider defined for this encounter.                Service Date: 02/02/2021      HISTORY OF PRESENT ILLNESS:  I had the opportunity to see Ms. Gillian Burk in Cardiology Clinic today at [facility] in [place] for reevaluation of shortness of breath with exertion " with a history of aortic valve disease and coronary artery disease.      Ms. Burk is a 89-year-old woman with history of severe aortic stenosis treated with transcatheter aortic valve replacement in 2016.  After that, she developed episodes of exertional angina in 2017 and underwent coronary angiography.  That study demonstrated a 70% stenosis within the mid LAD which was significant by iFR.  We had planned to have her return for staged intervention, but her chest discomfort symptoms seemed to resolve and she chose not to go through with the procedure for stenting.      When I saw her in September for a virtual video visit, she was having significant shortness of breath problems.  She had difficulty doing even mild activity without stopping to catch her breath.  She is not experiencing any recurrence of chest discomfort.  She elected to continue with medical management at that time during the quarantine at her assisted living center and I did some basic evaluation including a hemoglobin, which has been consistently low at about 9.4, basic metabolic panel which looked essentially normal and NT-proBNP, which was relatively unremarkable at 1300 and a TSH which was a little high at 8.0.  Initially, her hemoglobin was 8.4, which seemed to be a reasonable explanation for her shortness of breath and came up to about 9.4 after iron infusions.  It has stabilized.      She tells me now that her shortness of breath has not gotten any better.  She is still short of breath with any activity.  She was having some chest discomfort symptoms for a short time, but was found to have chest wall tenderness and her chest discomfort symptoms resolved with some Aspercreme topical anti-inflammatory.      She denies having PND, orthopnea, or increasing lower extremity edema.  She has no palpitations, lightheadedness or syncope.      PHYSICAL EXAMINATION:  On examination today, her blood pressure is 138/58, heart rate 80 and weight 123  pounds and stable.  Her lungs are clear.  Her heart rhythm is regular with occasional extrasystoles and a systolic ejection murmur at the base, rated 2/6 in severity.  She may have a soft holosystolic apical murmur as well.      IMPRESSIONS:  Ms. Gillian Burk is a 89-year-old woman with history of progressively severe aortic stenosis requiring valve replacement with a bioprosthetic valve using TAVR.  That was performed back in 2016.  She then developed some chest pain consistent with angina and her angiogram in 2017 revealed evidence of a significant mid LAD stenosis.  It was not immediately stented and she decided not to pursue stenting after her chest discomfort symptoms went away.        Over the last 6 months or more, she has had progressive shortness of breath.  Her shortness of breath is now very limiting to her.  She can walk only a short distance.  She has no other symptoms of congestive heart failure such as PND, orthopnea or lower extremity edema.  Her NT-proBNP is not very high.  Her hemoglobin is a little low, but I do not think it is low enough to explain her severe symptoms of shortness of breath.      I suggested that we update her echocardiogram to rule out the possibility of thrombotic aortic valve stenosis of her bioprosthetic aortic valve.  Obviously, I would like to rule out other causes as well including significant mitral valve disease and LV dysfunction.      If her echo looks stable, I have recommended that we proceed with coronary angiography with possible stenting of her mid LAD where she has a known significant lesion.  That lesion may have progressed to the point where she is now more symptomatic with her shortness of breath being an anginal equivalent.      I described the procedure and the risks to her including the possibility of bleeding, renal failure, myocardial infarction, stroke, emergency surgery and death.  She understands and agrees to proceed if necessary.      I will  have her follow up with us after the angiogram procedure if that is performed.      cc:      Augusto Meyer DO    [address] [address]         MARGAUX WILLIAMSON MD, [facility]             D: 2021   T: 2021   MT: [id number]      Name:     LUCIO HANNON   MRN:      [id number]        Account:      [id number]   :      1927           Service Date: 2021      Document: [id number]        Thank you for allowing me to participate in the care of your patient.      Sincerely,     MARGAUX WILLIAMSON MD     [facility]    cc:   No referring provider defined for this encounter.

## 2021-02-03 NOTE — TELEPHONE ENCOUNTER
Echo is stable. I reviewed it yesterday. I think she was going to schedule her angiogram. If she hasn't done that yet, please contact her to set it up. Thanks. EE

## 2021-02-03 NOTE — PROGRESS NOTES
Patient is scheduled for her pre-procedure COVID-19 test on 2/6/21 at 1:20 PM; C scheduled for 2/10/21 at 10 AM at [facility]. Will call pt on 2/8/21 to review COVID results and go through [facility] prep. Meri Layton, RN on 2/3/2021 at 1:10 PM      Jones Lee MD  [facility] Heart Team 7 2 hours ago (11:03 AM)        CXR is non-specific also. No obvious cause of dyspnea. EE         Documentation       Jones Lee MD  [facility] Heart Team 7 2 hours ago (11:02 AM)        Echo is stable. I reviewed it yesterday. I think she was going to schedule her angiogram. If she hasn't done that yet, please contact her to set it up. Thanks. EE         Documentation

## 2021-02-06 DIAGNOSIS — Z11.59 ENCOUNTER FOR SCREENING FOR OTHER VIRAL DISEASES: ICD-10-CM

## 2021-02-06 LAB
SARS-COV-2 RNA RESP QL NAA+PROBE: NORMAL
SPECIMEN SOURCE: NORMAL

## 2021-02-06 PROCEDURE — U0003 INFECTIOUS AGENT DETECTION BY NUCLEIC ACID (DNA OR RNA); SEVERE ACUTE RESPIRATORY SYNDROME CORONAVIRUS 2 (SARS-COV-2) (CORONAVIRUS DISEASE [COVID-19]), AMPLIFIED PROBE TECHNIQUE, MAKING USE OF HIGH THROUGHPUT TECHNOLOGIES AS DESCRIBED BY CMS-2020-01-R: HCPCS | Performed by: INTERNAL MEDICINE

## 2021-02-06 PROCEDURE — U0005 INFEC AGEN DETEC AMPLI PROBE: HCPCS | Performed by: INTERNAL MEDICINE

## 2021-02-07 LAB
LABORATORY COMMENT REPORT: NORMAL
SARS-COV-2 RNA RESP QL NAA+PROBE: NEGATIVE
SPECIMEN SOURCE: NORMAL

## 2021-02-08 ENCOUNTER — CARE COORDINATION (OUTPATIENT)
Dept: CARDIOLOGY | Facility: CLINIC | Age: 86
End: 2021-02-08

## 2021-02-08 DIAGNOSIS — I25.119 CORONARY ARTERY DISEASE INVOLVING NATIVE CORONARY ARTERY OF NATIVE HEART WITH ANGINA PECTORIS (H): ICD-10-CM

## 2021-02-08 DIAGNOSIS — R06.09 DOE (DYSPNEA ON EXERTION): Primary | ICD-10-CM

## 2021-02-08 RX ORDER — SODIUM CHLORIDE 9 MG/ML
INJECTION, SOLUTION INTRAVENOUS CONTINUOUS
Status: CANCELLED | OUTPATIENT
Start: 2021-02-08

## 2021-02-08 RX ORDER — POTASSIUM CHLORIDE 1500 MG/1
20 TABLET, EXTENDED RELEASE ORAL
Status: CANCELLED | OUTPATIENT
Start: 2021-02-08

## 2021-02-08 RX ORDER — LIDOCAINE 40 MG/G
CREAM TOPICAL
Status: CANCELLED | OUTPATIENT
Start: 2021-02-08

## 2021-02-08 RX ORDER — ASPIRIN 81 MG/1
81 TABLET ORAL DAILY
Status: CANCELLED | OUTPATIENT
Start: 2021-02-08 | End: 2021-02-10

## 2021-02-08 NOTE — PROGRESS NOTES
I called pt to review cath prep instructions. Pt's daughter answered and said she will discuss with pt. Pt scheduled for 2/10/21 at 10:00 at [facility], with check in time of 0800. Pt had negative COVID test on 2/6/21. Pt not to eat or drink anything after midnight prior to AM of procedure, except for AM medications with small sip of water. Pt will take ASA in the AM of her procedure. Pt has someone to drive her and stay with her 24 hours post procedure. No known contrast dye allergy. Pt's other daughter will bring her and stay in the waiting area for the procedure. I called care suites and was told this would be ok. Vijay POWELL February 8, 2021, 5:07 PM

## 2021-02-10 ENCOUNTER — HOSPITAL ENCOUNTER (OUTPATIENT)
Facility: CLINIC | Age: 86
Discharge: HOME | End: 2021-02-10
Payer: COMMERCIAL

## 2021-02-10 VITALS
WEIGHT: 123.1 LBS | HEIGHT: 66 IN | HEART RATE: 71 BPM | BODY MASS INDEX: 19.78 KG/M2 | DIASTOLIC BLOOD PRESSURE: 63 MMHG | SYSTOLIC BLOOD PRESSURE: 132 MMHG | OXYGEN SATURATION: 96 % | RESPIRATION RATE: 16 BRPM | TEMPERATURE: 97.8 F

## 2021-02-10 DIAGNOSIS — R06.09 DOE (DYSPNEA ON EXERTION): ICD-10-CM

## 2021-02-10 DIAGNOSIS — I25.119 CORONARY ARTERY DISEASE INVOLVING NATIVE CORONARY ARTERY OF NATIVE HEART WITH ANGINA PECTORIS (H): ICD-10-CM

## 2021-02-10 LAB
ANION GAP SERPL CALCULATED.3IONS-SCNC: 5 MMOL/L (ref 3–14)
APTT PPP: <20 SEC (ref 22–37)
BUN SERPL-MCNC: 21 MG/DL (ref 7–30)
CALCIUM SERPL-MCNC: 8.9 MG/DL (ref 8.5–10.1)
CHLORIDE SERPL-SCNC: 106 MMOL/L (ref 94–109)
CHOLEST SERPL-MCNC: 107 MG/DL
CO2 SERPL-SCNC: 26 MMOL/L (ref 20–32)
CREAT SERPL-MCNC: 1.01 MG/DL (ref 0.52–1.04)
ERYTHROCYTE [DISTWIDTH] IN BLOOD BY AUTOMATED COUNT: 18.2 % (ref 10–15)
GFR SERPL CREATININE-BSD FRML MDRD: 48 ML/MIN/{1.73_M2}
GLUCOSE SERPL-MCNC: 98 MG/DL (ref 70–99)
HCT VFR BLD AUTO: 29.2 % (ref 35–47)
HDLC SERPL-MCNC: 59 MG/DL
HGB BLD-MCNC: 8.7 G/DL (ref 11.7–15.7)
INR PPP: 1.16 (ref 0.86–1.14)
KCT BLD-ACNC: 233 SEC (ref 75–150)
KCT BLD-ACNC: 274 SEC (ref 75–150)
LDLC SERPL CALC-MCNC: 31 MG/DL
MCH RBC QN AUTO: 28.5 PG (ref 26.5–33)
MCHC RBC AUTO-ENTMCNC: 29.8 G/DL (ref 31.5–36.5)
MCV RBC AUTO: 96 FL (ref 78–100)
NONHDLC SERPL-MCNC: 48 MG/DL
PLATELET # BLD AUTO: 414 10E9/L (ref 150–450)
POTASSIUM SERPL-SCNC: 3.6 MMOL/L (ref 3.4–5.3)
RBC # BLD AUTO: 3.05 10E12/L (ref 3.8–5.2)
SODIUM SERPL-SCNC: 137 MMOL/L (ref 133–144)
TRIGL SERPL-MCNC: 87 MG/DL
WBC # BLD AUTO: 10.5 10E9/L (ref 4–11)

## 2021-02-10 PROCEDURE — 99153 MOD SED SAME PHYS/QHP EA: CPT | Performed by: INTERNAL MEDICINE

## 2021-02-10 PROCEDURE — C1769 GUIDE WIRE: HCPCS | Performed by: INTERNAL MEDICINE

## 2021-02-10 PROCEDURE — C1725 CATH, TRANSLUMIN NON-LASER: HCPCS | Performed by: INTERNAL MEDICINE

## 2021-02-10 PROCEDURE — 250N000013 HC RX MED GY IP 250 OP 250 PS 637: Performed by: INTERNAL MEDICINE

## 2021-02-10 PROCEDURE — 999N000054 HC STATISTIC EKG NON-CHARGEABLE

## 2021-02-10 PROCEDURE — 99152 MOD SED SAME PHYS/QHP 5/>YRS: CPT | Performed by: INTERNAL MEDICINE

## 2021-02-10 PROCEDURE — 93005 ELECTROCARDIOGRAM TRACING: CPT

## 2021-02-10 PROCEDURE — 36415 COLL VENOUS BLD VENIPUNCTURE: CPT

## 2021-02-10 PROCEDURE — 999N000184 HC STATISTIC TELEMETRY

## 2021-02-10 PROCEDURE — 93454 CORONARY ARTERY ANGIO S&I: CPT | Performed by: INTERNAL MEDICINE

## 2021-02-10 PROCEDURE — 250N000011 HC RX IP 250 OP 636: Performed by: INTERNAL MEDICINE

## 2021-02-10 PROCEDURE — C1887 CATHETER, GUIDING: HCPCS | Performed by: INTERNAL MEDICINE

## 2021-02-10 PROCEDURE — 272N000001 HC OR GENERAL SUPPLY STERILE: Performed by: INTERNAL MEDICINE

## 2021-02-10 PROCEDURE — 999N000071 HC STATISTIC HEART CATH LAB OR EP LAB

## 2021-02-10 PROCEDURE — 93010 ELECTROCARDIOGRAM REPORT: CPT | Mod: 76 | Performed by: INTERNAL MEDICINE

## 2021-02-10 PROCEDURE — 85027 COMPLETE CBC AUTOMATED: CPT | Performed by: PHYSICIAN ASSISTANT

## 2021-02-10 PROCEDURE — 85347 COAGULATION TIME ACTIVATED: CPT | Mod: 91

## 2021-02-10 PROCEDURE — C9600 PERC DRUG-EL COR STENT SING: HCPCS | Performed by: INTERNAL MEDICINE

## 2021-02-10 PROCEDURE — 80048 BASIC METABOLIC PNL TOTAL CA: CPT | Performed by: PHYSICIAN ASSISTANT

## 2021-02-10 PROCEDURE — 258N000003 HC RX IP 258 OP 636: Performed by: INTERNAL MEDICINE

## 2021-02-10 PROCEDURE — C1874 STENT, COATED/COV W/DEL SYS: HCPCS | Performed by: INTERNAL MEDICINE

## 2021-02-10 PROCEDURE — 85730 THROMBOPLASTIN TIME PARTIAL: CPT | Performed by: PHYSICIAN ASSISTANT

## 2021-02-10 PROCEDURE — 80061 LIPID PANEL: CPT | Performed by: PHYSICIAN ASSISTANT

## 2021-02-10 PROCEDURE — 85610 PROTHROMBIN TIME: CPT | Performed by: PHYSICIAN ASSISTANT

## 2021-02-10 PROCEDURE — C1894 INTRO/SHEATH, NON-LASER: HCPCS | Performed by: INTERNAL MEDICINE

## 2021-02-10 PROCEDURE — 250N000009 HC RX 250: Performed by: INTERNAL MEDICINE

## 2021-02-10 DEVICE — IMPLANTABLE DEVICE: Type: IMPLANTABLE DEVICE | Status: FUNCTIONAL

## 2021-02-10 RX ORDER — OXYCODONE HYDROCHLORIDE 5 MG/1
5 TABLET ORAL EVERY 4 HOURS PRN
Status: DISCONTINUED | OUTPATIENT
Start: 2021-02-10 | End: 2021-02-10 | Stop reason: HOSPADM

## 2021-02-10 RX ORDER — NALOXONE HYDROCHLORIDE 0.4 MG/ML
0.2 INJECTION, SOLUTION INTRAMUSCULAR; INTRAVENOUS; SUBCUTANEOUS
Status: DISCONTINUED | OUTPATIENT
Start: 2021-02-10 | End: 2021-02-10 | Stop reason: HOSPADM

## 2021-02-10 RX ORDER — LIDOCAINE 40 MG/G
CREAM TOPICAL
Status: DISCONTINUED | OUTPATIENT
Start: 2021-02-10 | End: 2021-02-10 | Stop reason: HOSPADM

## 2021-02-10 RX ORDER — ACETAMINOPHEN 325 MG/1
650 TABLET ORAL EVERY 4 HOURS PRN
Status: DISCONTINUED | OUTPATIENT
Start: 2021-02-10 | End: 2021-02-10 | Stop reason: HOSPADM

## 2021-02-10 RX ORDER — NALOXONE HYDROCHLORIDE 0.4 MG/ML
0.4 INJECTION, SOLUTION INTRAMUSCULAR; INTRAVENOUS; SUBCUTANEOUS
Status: DISCONTINUED | OUTPATIENT
Start: 2021-02-10 | End: 2021-02-10 | Stop reason: HOSPADM

## 2021-02-10 RX ORDER — ONDANSETRON 2 MG/ML
4 INJECTION INTRAMUSCULAR; INTRAVENOUS EVERY 6 HOURS PRN
Status: DISCONTINUED | OUTPATIENT
Start: 2021-02-10 | End: 2021-02-10 | Stop reason: HOSPADM

## 2021-02-10 RX ORDER — FENTANYL CITRATE 50 UG/ML
25-50 INJECTION, SOLUTION INTRAMUSCULAR; INTRAVENOUS
Status: ACTIVE | OUTPATIENT
Start: 2021-02-10 | End: 2021-02-10

## 2021-02-10 RX ORDER — HEPARIN SODIUM 1000 [USP'U]/ML
INJECTION, SOLUTION INTRAVENOUS; SUBCUTANEOUS
Status: DISCONTINUED | OUTPATIENT
Start: 2021-02-10 | End: 2021-02-10 | Stop reason: HOSPADM

## 2021-02-10 RX ORDER — METOPROLOL TARTRATE 1 MG/ML
5-10 INJECTION, SOLUTION INTRAVENOUS
Status: DISCONTINUED | OUTPATIENT
Start: 2021-02-10 | End: 2021-02-10 | Stop reason: HOSPADM

## 2021-02-10 RX ORDER — FENTANYL CITRATE 50 UG/ML
INJECTION, SOLUTION INTRAMUSCULAR; INTRAVENOUS
Status: DISCONTINUED | OUTPATIENT
Start: 2021-02-10 | End: 2021-02-10 | Stop reason: HOSPADM

## 2021-02-10 RX ORDER — ASPIRIN 81 MG/1
81 TABLET ORAL DAILY
Status: DISCONTINUED | OUTPATIENT
Start: 2021-02-10 | End: 2021-02-10 | Stop reason: HOSPADM

## 2021-02-10 RX ORDER — HYDRALAZINE HYDROCHLORIDE 20 MG/ML
10 INJECTION INTRAMUSCULAR; INTRAVENOUS EVERY 4 HOURS PRN
Status: DISCONTINUED | OUTPATIENT
Start: 2021-02-10 | End: 2021-02-10 | Stop reason: HOSPADM

## 2021-02-10 RX ORDER — SODIUM CHLORIDE 9 MG/ML
INJECTION, SOLUTION INTRAVENOUS CONTINUOUS
Status: ACTIVE | OUTPATIENT
Start: 2021-02-10 | End: 2021-02-10

## 2021-02-10 RX ORDER — NITROGLYCERIN 0.4 MG/1
0.4 TABLET SUBLINGUAL EVERY 5 MIN PRN
Status: DISCONTINUED | OUTPATIENT
Start: 2021-02-10 | End: 2021-02-10 | Stop reason: HOSPADM

## 2021-02-10 RX ORDER — NITROGLYCERIN 5 MG/ML
VIAL (ML) INTRAVENOUS
Status: DISCONTINUED | OUTPATIENT
Start: 2021-02-10 | End: 2021-02-10 | Stop reason: HOSPADM

## 2021-02-10 RX ORDER — OXYCODONE HYDROCHLORIDE 5 MG/1
10 TABLET ORAL EVERY 4 HOURS PRN
Status: DISCONTINUED | OUTPATIENT
Start: 2021-02-10 | End: 2021-02-10 | Stop reason: HOSPADM

## 2021-02-10 RX ORDER — POTASSIUM CHLORIDE 1500 MG/1
20 TABLET, EXTENDED RELEASE ORAL
Status: COMPLETED | OUTPATIENT
Start: 2021-02-10 | End: 2021-02-10

## 2021-02-10 RX ORDER — FLUMAZENIL 0.1 MG/ML
0.2 INJECTION, SOLUTION INTRAVENOUS
Status: DISCONTINUED | OUTPATIENT
Start: 2021-02-10 | End: 2021-02-10 | Stop reason: HOSPADM

## 2021-02-10 RX ORDER — SODIUM CHLORIDE 9 MG/ML
INJECTION, SOLUTION INTRAVENOUS CONTINUOUS
Status: DISCONTINUED | OUTPATIENT
Start: 2021-02-10 | End: 2021-02-10 | Stop reason: HOSPADM

## 2021-02-10 RX ORDER — CLOPIDOGREL BISULFATE 75 MG/1
TABLET ORAL
Status: DISCONTINUED | OUTPATIENT
Start: 2021-02-10 | End: 2021-02-10 | Stop reason: HOSPADM

## 2021-02-10 RX ORDER — ASPIRIN 81 MG/1
81 TABLET, CHEWABLE ORAL ONCE
Status: DISCONTINUED | OUTPATIENT
Start: 2021-02-10 | End: 2021-02-10 | Stop reason: HOSPADM

## 2021-02-10 RX ORDER — ATROPINE SULFATE 0.1 MG/ML
0.5 INJECTION INTRAVENOUS
Status: DISCONTINUED | OUTPATIENT
Start: 2021-02-10 | End: 2021-02-10 | Stop reason: HOSPADM

## 2021-02-10 RX ORDER — ONDANSETRON 4 MG/1
4 TABLET, ORALLY DISINTEGRATING ORAL EVERY 6 HOURS PRN
Status: DISCONTINUED | OUTPATIENT
Start: 2021-02-10 | End: 2021-02-10 | Stop reason: HOSPADM

## 2021-02-10 RX ORDER — ASPIRIN 81 MG/1
81 TABLET ORAL DAILY
Status: DISCONTINUED | OUTPATIENT
Start: 2021-02-11 | End: 2021-02-10 | Stop reason: HOSPADM

## 2021-02-10 RX ORDER — VERAPAMIL HYDROCHLORIDE 2.5 MG/ML
INJECTION, SOLUTION INTRAVENOUS
Status: DISCONTINUED | OUTPATIENT
Start: 2021-02-10 | End: 2021-02-10 | Stop reason: HOSPADM

## 2021-02-10 RX ADMIN — POTASSIUM CHLORIDE 20 MEQ: 1500 TABLET, EXTENDED RELEASE ORAL at 10:47

## 2021-02-10 RX ADMIN — SODIUM CHLORIDE: 9 INJECTION, SOLUTION INTRAVENOUS at 09:23

## 2021-02-10 ASSESSMENT — MIFFLIN-ST. JEOR: SCORE: 980.13

## 2021-02-10 NOTE — PROGRESS NOTES
Care Suites Post Procedure Note    Patient Information  Name: Gillian Burk  Age: 89 year old    Post Procedure  Time patient returned to Care Suites: 1245  Concerns/abnormal assessment: no  If abnormal assessment, provider notified: Yes - bruising and swelling right radial - no hematoma  Plan/Other: monitor    Oswaldo Hernandez RN

## 2021-02-10 NOTE — PROGRESS NOTES
Care Suites Admission Nursing Note    Patient Information  Name: Gillian Burk  Age: 89 year old  Reason for admission: left heart cath  Care Suites arrival time: 0800    Visitor Information  Name: Lauryn   Informed of visitor restrictions: Yes  1 visitor allowed per patient   Visitor must screen negative for COVID symptoms   Visitor must wear a mask  Waiting rooms closed to visitors    Patient Admission/Assessment   Pre-procedure assessment complete: Yes  If abnormal assessment/labs, provider notified: No  NPO: Yes  Medications held per instructions/orders: Yes  Consent: obtained  If applicable, pregnancy test status: declined  Patient oriented to room: Yes  Education/questions answered: Yes  Plan/other: Review labs, obtain consent and proceed with scheduled treatment or intervention      Discharge Planning  Discharge name/phone number:Lauryn hester - [phone]  Overnight post sedation caregiver: Lauryn Jara [phone]  Discharge location: [place]    Oswaldo Hernandez RN         PATIENT/VISITOR WELLNESS SCREENING    Step 1 Patient Screening    1. In the last month, have you been in contact with someone who was confirmed or suspected to have Coronavirus/COVID-19? No    2. Do you have the following symptoms?  Fever/Chills? No   Cough? No   Shortness of breath? No   New loss of taste or smell? No  Sore throat? No  Muscle or body aches? No  Headaches? No  Fatigue? No  Vomiting or diarrhea? No    Step 2 Visitor Screening    1. Name of Visitor (1 visitor per patient): Lauryn    2. In the last month, have you been in contact with someone who was confirmed or suspected to have Coronavirus/COVID-19? No    3. Do you have the following symptoms?  Fever/Chills? No   Cough? No   Shortness of breath? No   Skin rash? No   Loss of taste or smell? No  Sore throat? No  Runny or stuffy nose? No  Muscle or body aches? No  Headaches? No  Fatigue? No  Vomiting or diarrhea? No

## 2021-02-10 NOTE — Clinical Note
The first balloon was inserted into the left anterior descending and distal left anterior descending.Max pressure = 14 lauren. Total duration = 25 seconds.

## 2021-02-10 NOTE — DISCHARGE INSTRUCTIONS
Cardiac Angioplasty/Stent Discharge Instructions - Radial    After you go home:      Have an adult stay with you until tomorrow.    Drink extra fluids for 2 days.    You may resume your normal diet.    No smoking       For 24 hours - due to the sedation you received:    Relax and take it easy.    Do NOT make any important or legal decisions.    Do NOT drive or operate machines at home or at work.    Do NOT drink alcohol.    Care of Wrist Puncture Site:      For the first 24 hrs - check the puncture site every 1-2 hours while awake.    It is normal to have soreness at the puncture site and mild tingling in your hand for up to 3 days.    Remove the bandaid after 24 hours. If there is minor oozing, apply another bandaid and remove it after 12 hours.    You may shower tomorrow.  Do NOT take a bath, or use a hot tub or pool for at least 3 days. Do NOT scrub the site. Do not use lotion or powder near the puncture site.           Activity:          For 2 days:     do not use your hand or arm to support your weight (such as rising from a chair)     do not bend your wrist (such as lifting a garage door).    do not lift more than 5 pounds or exercise your arm (such as tennis, golf or bowling).    Do NOT do any heavy activity such as exercise, lifting, or straining.     Bleeding:      If you start bleeding from the site in your wrist, sit down and press firmly on/above the site for 10 minutes.     Once bleeding stops, keep arm still for 2 hours.     Call [facility] as soon as you can.       Call 911 right away if you have heavy bleeding or bleeding that does not stop.      Medicines:      If you are taking an antiplatelet medication such as Plavix, Brilinta or Effient, do not stop taking it until you talk to your cardiologist.        Take your medications, including blood thinners, unless your provider tells you not to.      If you have stopped any medicines, check with your provider about when to restart them.    Follow Up  Appointments:      Follow up with [facility] Nurse Practitioner at [facility] of patient preference in 7-10 days.    Cardiac Rehab will contact you for follow up care.    Call the clinic if:      You have a large or growing hard lump around the site.    The site is red, swollen, hot or tender.    Blood or fluid is draining from the site.    You have chills or a fever greater than 101 F (38 C).    Your arm feels numb, cool or changes color.    You have hives, a rash or unusual itching.    Any questions or concerns.    Other Instructions:      If you received a stent - carry your stent card with you at all times.      [facility] [place]:    [phone] [facility] (7 days a week)

## 2021-02-11 ENCOUNTER — DOCUMENTATION ONLY (OUTPATIENT)
Dept: CARDIOLOGY | Facility: CLINIC | Age: 86
End: 2021-02-11

## 2021-02-11 NOTE — PROGRESS NOTES
Spoke with patient post discharge from care suites after coronary angiography.     Intervention: PCI to distal LAD using a 2.25 x 12 CHANDRAKANT  Plavix and aspirin uninterrupted x 12 month. Continue on all other medications    Access Site: Right Radial - stable.     Instructions:   Patient may remove the Band-Aid after 24 hours, but if there is minor oozing apply another and may remove second Band-Aid after 12 hours. Also, no heavy exercise for 2 days, do not take a bath, or use a hot tub or pool for at least 3 days but may shower.     Reviewed with patient care of wrist site and that is it normal to have soreness at the puncture site and mild tingling in the hand for up to 3 days. For 2 days use hand to support body weight or bend wrist, do not lift > 5 lbs or exercise the arm. If the wrist site startsto bleed, sit down and place firm pressure at the site with your fingers for 10 minutes. If the bleeding stops, sit still and keep wrist straight for 2 hours.     Instructed patient to call 911 right away if the bleeding is heavy or does not stop. Call the clinic if there is a large or growing lump around the site, the site is red, swollen, hot, or tender, have fever >101 degrees F or chills, your arm or leg feels numb or cool, or hives, a rash, or unusual itching, shortness of breath, or chest discomfort.     Heart Follow Up: 2/23/2021 at 1:15pm with Matt SANZ    After hours contact number [phone] option 2.     ADRIÁN Millard CNP  2/11/2021

## 2021-02-11 NOTE — PROGRESS NOTES
Care Suites Discharge Nursing Note    Patient Information  Name: Gillian Burk  Age: 89 year old    Discharge Education:  Discharge instructions reviewed: Yes by Prasad POWELL.  Additional education/resources provided: Stent card and contrast sheet.  Patient/patient representative verbalizes understanding: Yes and daughters also agree to understanding.  Patient discharging on new medications: No  Medication education completed: Yes    Discharge Plans:   Discharge location: Care Suites to home.  Discharge ride contacted: Yes  Approximate discharge time: 1735    Discharge Criteria:  Discharge criteria met and vital signs stable: Yes, (R) wrist soft and bruised, ate meal and ambulated.    Patient Belongs:  Patient belongings returned to patient: Yes    Jacy Whitaker RN

## 2021-02-15 LAB
INTERPRETATION ECG - MUSE: NORMAL
INTERPRETATION ECG - MUSE: NORMAL

## 2021-02-23 ENCOUNTER — OFFICE VISIT (OUTPATIENT)
Dept: CARDIOLOGY | Facility: CLINIC | Age: 86
End: 2021-02-23
Payer: COMMERCIAL

## 2021-02-23 VITALS
DIASTOLIC BLOOD PRESSURE: 64 MMHG | HEART RATE: 86 BPM | HEIGHT: 66 IN | BODY MASS INDEX: 19.88 KG/M2 | OXYGEN SATURATION: 96 % | SYSTOLIC BLOOD PRESSURE: 142 MMHG | WEIGHT: 123.7 LBS

## 2021-02-23 DIAGNOSIS — Z95.2 S/P TAVR (TRANSCATHETER AORTIC VALVE REPLACEMENT): Primary | ICD-10-CM

## 2021-02-23 DIAGNOSIS — R06.09 DOE (DYSPNEA ON EXERTION): ICD-10-CM

## 2021-02-23 DIAGNOSIS — I25.10 CORONARY ARTERY DISEASE INVOLVING NATIVE CORONARY ARTERY OF NATIVE HEART WITHOUT ANGINA PECTORIS: ICD-10-CM

## 2021-02-23 PROCEDURE — 99214 OFFICE O/P EST MOD 30 MIN: CPT | Performed by: PHYSICIAN ASSISTANT

## 2021-02-23 ASSESSMENT — MIFFLIN-ST. JEOR: SCORE: 982.85

## 2021-02-23 NOTE — PATIENT INSTRUCTIONS
Today's Plan:   Have your living facility fax your blood pressure numbers to our clinic.     If you have questions or concerns please call clinic at [phone].    Please call [phone] for scheduling.       It was a pleasure seeing you today!     Reminder: Please bring in all current medications, over the counter supplements and vitamin bottles to your next appointment.    Matt Mccracken PA-C  2/23/2021

## 2021-02-23 NOTE — PROGRESS NOTES
Primary Cardiologist: Dr. Lee    Reason for Visit: Post angiogram follow up    History of Present Illness:   Heike is a very pleasant 89-year-old female with past medical history is notable for coronary artery disease (coronary angiogram revealed 60-70% hemodynamically significant mid to distal LAD lesion, due to contrast dye load she was recommended to return for staged intervention of this lesion.  She however had resolution of her symptoms with the addition of amlodipine and therefore staged intervention was not performed; she recently had recurrent symptoms and was referred for coronary angiogram), history of severe aortic valve stenosis (status post TAVR in 8/2016 ([place] scientific Treva Valve), most recent echocardiogram shows normal gradients and no evidence of significant AI), ischemic cardiomyopathy (EF 35-40%, switch to long-acting beta-blocker during TAVR follow-up visit, not on ACE inhibitor), history of CVA in 2013 (on chronic Plavix therapy), anemia (receiving Iron infusions regularly; stable 8-9 with no recent hx of acute bleeding issues), hypertension, hyperlipidemia, GERD, known left bundle branch block, and hypothyroidism.    She was seen by Dr. Lee and had reports of CONTRERAS. She was referred for PCI of her known LAD with significant iFR. She received one CHANDRAKANT and reports feeling better. She is on plavix and aspirin currently with no bleeding issues. She has no discomfort at the radial site.    Assessment and Plan:   Heike is a very pleasant 89-year-old female with past medical history is notable for coronary artery disease (coronary angiogram revealed 60-70% hemodynamically significant mid to distal LAD lesion, due to contrast dye load she was recommended to return for staged intervention of this lesion.  She however had resolution of her symptoms with the addition of amlodipine and therefore staged intervention was not performed; she recently had recurrent symptoms and was referred for  coronary angiogram and had PCI of LAD; on DAPT), history of severe aortic valve stenosis (status post TAVR in 8/2016 ([place] scientific Treva Valve), most recent echocardiogram shows normal gradients and no evidence of significant AI), ischemic cardiomyopathy (EF 35-40%, switch to long-acting beta-blocker during TAVR follow-up visit, not on ACE inhibitor), history of CVA in 2013 (on chronic Plavix therapy), anemia (receiving Iron infusions regularly; stable 8-9 with no recent hx of acute bleeding issues), hypertension, hyperlipidemia, GERD, known left bundle branch block, and hypothyroidism.    Gillian reports she feels better but continues to have some fatigue.  She will be on dual antiplatelet therapy for at least 1 year.  She will see Dr. Lee in 4 months.  Her blood pressure is high today.  Her blood pressure is checked daily at her residence.  I asked that her blood pressure record is faxed over to our clinic.    Addendum: Received BP record from [facility] - average BP seems to be 132/71. We will continue with no medication changes.    This note was completed in part using Dragon voice recognition software. Although reviewed after completion, some word and grammatical errors may occur.    Orders this Visit:  Orders Placed This Encounter   Procedures     Follow-Up with Cardiologist     No orders of the defined types were placed in this encounter.    There are no discontinued medications.      Encounter Diagnoses   Name Primary?     CONTRERAS (dyspnea on exertion)      S/P TAVR (transcatheter aortic valve replacement) Yes     Coronary artery disease involving native coronary artery of native heart without angina pectoris        CURRENT MEDICATIONS:  Current Outpatient Medications   Medication Sig Dispense Refill     acetaminophen (TYLENOL ARTHRITIS PAIN) 650 MG CR tablet Take 1,300 mg by mouth every 8 hours as needed for mild pain        ALPRAZolam (XANAX) 0.25 MG tablet Take 1 tablet by mouth twice daily as  needed for anxiety 28 tablet 0     amLODIPine (NORVASC) 5 MG tablet Take 1 tablet (5 mg) by mouth daily 90 tablet 3     aspirin EC 81 MG EC tablet Take 1 tablet (81 mg) by mouth daily       atorvastatin (LIPITOR) 40 MG tablet Take 1 tablet (40 mg) by mouth daily 90 tablet 3     calcium carbonate (TUMS) 500 MG chewable tablet Take 2-4 tablets (1,000-2,000 mg) by mouth as needed for heartburn       clopidogrel (PLAVIX) 75 MG tablet Take 1 tablet (75 mg) by mouth daily 90 tablet 3     Cranberry 1000 MG CAPS        docusate sodium (COLACE) 100 MG capsule Take 200 mg by mouth daily 2 capsules       EUTHYROX 75 MCG tablet Take 1 tablet by mouth once daily 90 tablet 1     famotidine (PEPCID) 40 MG tablet TAKE 1 TABLET BY MOUTH AT BEDTIME 30 tablet 9     folic acid (FOLVITE) 1 MG tablet Take 1 tablet by mouth once daily 90 tablet 1     isosorbide mononitrate (IMDUR) 60 MG 24 hr tablet Take 2 tablets (120 mg) by mouth daily 180 tablet 3     Lactobacillus (FLORAJEN ACIDOPHILUS) CAPS Take 1 capsule by mouth daily       levETIRAcetam (KEPPRA) 500 MG tablet TAKE 1 TABLET BY MOUTH ONCE DAILY THEN 2 AT BEDTIME 270 tablet 0     methotrexate 2.5 MG tablet Take 4 tablets (10 mg) by mouth once a week Wed 52 tablet 3     metoprolol succinate ER (TOPROL-XL) 50 MG 24 hr tablet Take 1 tablet (50 mg) by mouth 2 times daily 180 tablet 3     mirtazapine (REMERON) 30 MG tablet TAKE 1 TABLET BY MOUTH ONCE DAILY AT BEDTIME 90 tablet 1     predniSONE (DELTASONE) 5 MG tablet TAKE ONE TABLET BY MOUTH EVERY OTHER DAY ALTERNATING  WITH  1/2  TABLET  EVERY  OTHER  DAY 23 tablet 13     SLOW  (45 Fe) MG CR tablet Take 1 tablet by mouth once daily 90 tablet 0     nitroGLYcerin (NITROSTAT) 0.4 MG sublingual tablet DISSOLVE ONE TABLET UNDER THE TONGUE EVERY 5 MINUTES AS NEEDED FOR CHEST PAIN.  DO NOT EXCEED A TOTAL OF 3 DOSES IN 15 MINUTES (Patient not taking: Reported on 2/23/2021) 25 tablet 0       ALLERGIES     Allergies   Allergen Reactions      Penicillins Hives     Caffeine Other (See Comments) and Unknown     Triggers your Meniere's disease     Hydrocodone Other (See Comments) and Unknown     hallucinations     Ibuprofen GI Disturbance and Unknown     Other Environmental Allergy      Metals of unspecific kind     Tramadol Other (See Comments)     Seizure, was taking remeron along with tramadol     Metal [Staples] Rash       PAST MEDICAL HISTORY:  Past Medical History:   Diagnosis Date     Aortic stenosis     s/p TAVR 8/17/16     Chronic migraine      Hyperlipidaemia      Hypertension      Hypothyroidism      Myocardial infarction (H)      Need for SBE (subacute bacterial endocarditis) prophylaxis      Orthostatic hypotension     wears compression stockings     PUD (peptic ulcer disease)      Secondary Sjogren's syndrome (H)      Seizures (H)     after stroke (partial onset)     Seropositive rheumatoid arthritis (H)      Stroke (H) 05/2013    with visual field cut, left occipital lobe     Syncope 2014    due to orthostatic hypotension       PAST SURGICAL HISTORY:  Past Surgical History:   Procedure Laterality Date     C TOTAL HIP ARTHROPLASTY Right 2010     C TOTAL HIP ARTHROPLASTY Left 2014     CATARACT IOL, RT/LT Bilateral 2000     CV FLUOROSCOPY ONLY N/A 8/6/2019    Procedure: Fluoroscopy Only - valve cine done of aortic valve at 180 degrees French to JUNG;  Surgeon: Dave Farfan MD;  Location:  [facility]     CV HEART CATHETERIZATION WITH POSSIBLE INTERVENTION N/A 2/10/2021    Procedure: Heart Catheterization with Possible Intervention;  Surgeon: Fish Padgett MD;  Location:  [facility]     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     HEART CATH FEMORAL CANNULIZATION WITH OPEN STANDBY REPAIR AORTIC VALVE N/A 8/3/2016    Procedure: HEART CATH FEMORAL CANNULIZATION WITH OPEN STANDBY REPAIR AORTIC VALVE;  Surgeon: Owen Schultz MD;  Location: [facility] OR     HYSTERECTOMY TOTAL ABDOMINAL  1970     ovaries out     MOUTH SURGERY  2011    jaw surgery due to fracture     TRANSCATHETER AORTIC VALVE IMPLANT ANESTHESIA N/A 8/3/2016    Procedure: TRANSCATHETER AORTIC VALVE IMPLANT ANESTHESIA;  Surgeon: GENERIC ANESTHESIA PROVIDER;  Location:  OR       FAMILY HISTORY:  Family History   Problem Relation Age of Onset     Hyperlipidemia Mother      Family History Negative No family hx of        SOCIAL HISTORY:  Social History     Socioeconomic History     Marital status:      Spouse name: Not on file     Number of children: 4     Years of education: Not on file     Highest education level: Not on file   Occupational History     Employer: RETIRED   Social Needs     Financial resource strain: Not on file     Food insecurity     Worry: Not on file     Inability: Not on file     Transportation needs     Medical: Not on file     Non-medical: Not on file   Tobacco Use     Smoking status: Never Smoker     Smokeless tobacco: Never Used   Substance and Sexual Activity     Alcohol use: No     Alcohol/week: 0.0 standard drinks     Drug use: No     Sexual activity: Not Currently   Lifestyle     Physical activity     Days per week: Not on file     Minutes per session: Not on file     Stress: Not on file   Relationships     Social connections     Talks on phone: Not on file     Gets together: Not on file     Attends [employer] service: Not on file     Active member of club or organization: Not on file     Attends meetings of clubs or organizations: Not on file     Relationship status: Not on file     Intimate partner violence     Fear of current or ex partner: Not on file     Emotionally abused: Not on file     Physically abused: Not on file     Forced sexual activity: Not on file   Other Topics Concern     Parent/sibling w/ CABG, MI or angioplasty before 65F 55M? Not Asked      Service Not Asked     Blood Transfusions Not Asked     Caffeine Concern Not Asked     Occupational Exposure Not Asked     Hobby Hazards Not  "Asked     Sleep Concern Not Asked     Stress Concern Not Asked     Weight Concern Not Asked     Special Diet Yes     Comment: low salt      Back Care Not Asked     Exercise Yes     Comment: semi recument bike 15 mins daily      Bike Helmet Not Asked     Seat Belt Not Asked     Self-Exams Not Asked   Social History Narrative    .  Lives in assisted living. Independent. Has help with making bed and changing sheets.    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 .     4 children - 1 with RA       Review of Systems:  Skin:  Negative     Eyes:  Positive for glasses  ENT:  Negative    Respiratory:  Positive for dyspnea on exertion  Cardiovascular:  Negative for;palpitations;chest pain;lightheadedness;dizziness Positive for;edema  Gastroenterology: Negative    Genitourinary:  Negative    Musculoskeletal:  Negative    Neurologic:  Negative    Psychiatric:  Negative    Heme/Lymph/Imm:  Positive for allergies  Endocrine:  Negative      Physical Exam:  Vitals: BP (!) 142/64 (BP Location: Right arm, Patient Position: Sitting, Cuff Size: Adult Regular)   Pulse 86   Ht 1.676 m (5' 6\")   Wt 56.1 kg (123 lb 11.2 oz)   SpO2 96%   BMI 19.97 kg/m       GEN:  NAD  NECK: No JVD  C/V:  Regular rate and rhythm, no murmur, rub or gallop.  RESP: Clear to auscultation bilaterally without wheezing, rales, or rhonchi.  GI: Abdomen soft, nontender, nondistended. No HSM appreciated.   EXTREM: No LE edema.   NEURO: Alert and oriented, cooperative. No obvious focal deficits.   PSYCH: Normal affect.  SKIN: Mild ecchymosis at the radial site. No hematoma or bruit.      Recent Lab Results:  LIPID RESULTS:  Lab Results   Component Value Date    CHOL 107 02/10/2021    HDL 59 02/10/2021    LDL 31 02/10/2021    TRIG 87 02/10/2021    CHOLHDLRATIO 2.3 07/07/2015       LIVER ENZYME RESULTS:  Lab Results   Component Value Date    AST 16 12/08/2020    ALT 14 12/08/2020       CBC RESULTS:  Lab Results   Component Value Date    " WBC 10.5 02/10/2021    RBC 3.05 (L) 02/10/2021    HGB 8.7 (L) 02/10/2021    HCT 29.2 (L) 02/10/2021    MCV 96 02/10/2021    MCH 28.5 02/10/2021    MCHC 29.8 (L) 02/10/2021    RDW 18.2 (H) 02/10/2021     02/10/2021       BMP RESULTS:  Lab Results   Component Value Date     02/10/2021    POTASSIUM 3.6 02/10/2021    CHLORIDE 106 02/10/2021    CO2 26 02/10/2021    ANIONGAP 5 02/10/2021    GLC 98 02/10/2021    BUN 21 02/10/2021    CR 1.01 02/10/2021    GFRESTIMATED 48 (L) 02/10/2021    GFRESTBLACK 55 (L) 02/10/2021    CHEYANNE 8.9 02/10/2021        A1C RESULTS:  Lab Results   Component Value Date    A1C 5.5 10/13/2018       INR RESULTS:  Lab Results   Component Value Date    INR 1.16 (H) 02/10/2021    INR 1.11 04/26/2019           Matt Mccracken PA-C  February 23, 2021

## 2021-02-23 NOTE — LETTER
2/23/2021    Augusto Meyer, DO  [address] [place] [address]    RE: Gillian Burk       Dear Colleague,    I had the pleasure of seeing Gillian Burk in the [facility].    Primary Cardiologist: Dr. Lee    Reason for Visit: Post angiogram follow up    History of Present Illness:   Heike is a very pleasant 89-year-old female with past medical history is notable for coronary artery disease (coronary angiogram revealed 60-70% hemodynamically significant mid to distal LAD lesion, due to contrast dye load she was recommended to return for staged intervention of this lesion.  She however had resolution of her symptoms with the addition of amlodipine and therefore staged intervention was not performed; she recently had recurrent symptoms and was referred for coronary angiogram), history of severe aortic valve stenosis (status post TAVR in 8/2016 ([place] scientific Treva Valve), most recent echocardiogram shows normal gradients and no evidence of significant AI), ischemic cardiomyopathy (EF 35-40%, switch to long-acting beta-blocker during TAVR follow-up visit, not on ACE inhibitor), history of CVA in 2013 (on chronic Plavix therapy), anemia (receiving Iron infusions regularly; stable 8-9 with no recent hx of acute bleeding issues), hypertension, hyperlipidemia, GERD, known left bundle branch block, and hypothyroidism.    She was seen by Dr. Lee and had reports of CONTRERAS. She was referred for PCI of her known LAD with significant iFR. She received one CHANDRAKANT and reports feeling better. She is on plavix and aspirin currently with no bleeding issues. She has no discomfort at the radial site.    Assessment and Plan:   Heike is a very pleasant 89-year-old female with past medical history is notable for coronary artery disease (coronary angiogram revealed 60-70% hemodynamically significant mid to distal LAD lesion, due to contrast dye load  she was recommended to return for staged intervention of this lesion.  She however had resolution of her symptoms with the addition of amlodipine and therefore staged intervention was not performed; she recently had recurrent symptoms and was referred for coronary angiogram and had PCI of LAD; on DAPT), history of severe aortic valve stenosis (status post TAVR in 8/2016 ([place] scientific Treva Valve), most recent echocardiogram shows normal gradients and no evidence of significant AI), ischemic cardiomyopathy (EF 35-40%, switch to long-acting beta-blocker during TAVR follow-up visit, not on ACE inhibitor), history of CVA in 2013 (on chronic Plavix therapy), anemia (receiving Iron infusions regularly; stable 8-9 with no recent hx of acute bleeding issues), hypertension, hyperlipidemia, GERD, known left bundle branch block, and hypothyroidism.    Gillian reports she feels better but continues to have some fatigue.  She will be on dual antiplatelet therapy for at least 1 year.  She will see Dr. Lee in 4 months.  Her blood pressure is high today.  Her blood pressure is checked daily at her residence.  I asked that her blood pressure record is faxed over to our clinic.    Addendum: Received BP record from [place] - average BP seems to be 132/71. We will continue with no medication changes.    This note was completed in part using Dragon voice recognition software. Although reviewed after completion, some word and grammatical errors may occur.    Orders this Visit:  Orders Placed This Encounter   Procedures     Follow-Up with Cardiologist     No orders of the defined types were placed in this encounter.    There are no discontinued medications.      Encounter Diagnoses   Name Primary?     CONTRERAS (dyspnea on exertion)      S/P TAVR (transcatheter aortic valve replacement) Yes     Coronary artery disease involving native coronary artery of native heart without angina pectoris        CURRENT MEDICATIONS:  Current  Outpatient Medications   Medication Sig Dispense Refill     acetaminophen (TYLENOL ARTHRITIS PAIN) 650 MG CR tablet Take 1,300 mg by mouth every 8 hours as needed for mild pain        ALPRAZolam (XANAX) 0.25 MG tablet Take 1 tablet by mouth twice daily as needed for anxiety 28 tablet 0     amLODIPine (NORVASC) 5 MG tablet Take 1 tablet (5 mg) by mouth daily 90 tablet 3     aspirin EC 81 MG EC tablet Take 1 tablet (81 mg) by mouth daily       atorvastatin (LIPITOR) 40 MG tablet Take 1 tablet (40 mg) by mouth daily 90 tablet 3     calcium carbonate (TUMS) 500 MG chewable tablet Take 2-4 tablets (1,000-2,000 mg) by mouth as needed for heartburn       clopidogrel (PLAVIX) 75 MG tablet Take 1 tablet (75 mg) by mouth daily 90 tablet 3     Cranberry 1000 MG CAPS        docusate sodium (COLACE) 100 MG capsule Take 200 mg by mouth daily 2 capsules       EUTHYROX 75 MCG tablet Take 1 tablet by mouth once daily 90 tablet 1     famotidine (PEPCID) 40 MG tablet TAKE 1 TABLET BY MOUTH AT BEDTIME 30 tablet 9     folic acid (FOLVITE) 1 MG tablet Take 1 tablet by mouth once daily 90 tablet 1     isosorbide mononitrate (IMDUR) 60 MG 24 hr tablet Take 2 tablets (120 mg) by mouth daily 180 tablet 3     Lactobacillus (FLORAJEN ACIDOPHILUS) CAPS Take 1 capsule by mouth daily       levETIRAcetam (KEPPRA) 500 MG tablet TAKE 1 TABLET BY MOUTH ONCE DAILY THEN 2 AT BEDTIME 270 tablet 0     methotrexate 2.5 MG tablet Take 4 tablets (10 mg) by mouth once a week Wed 52 tablet 3     metoprolol succinate ER (TOPROL-XL) 50 MG 24 hr tablet Take 1 tablet (50 mg) by mouth 2 times daily 180 tablet 3     mirtazapine (REMERON) 30 MG tablet TAKE 1 TABLET BY MOUTH ONCE DAILY AT BEDTIME 90 tablet 1     predniSONE (DELTASONE) 5 MG tablet TAKE ONE TABLET BY MOUTH EVERY OTHER DAY ALTERNATING  WITH  1/2  TABLET  EVERY  OTHER  DAY 23 tablet 13     SLOW  (45 Fe) MG CR tablet Take 1 tablet by mouth once daily 90 tablet 0     nitroGLYcerin (NITROSTAT) 0.4 MG  sublingual tablet DISSOLVE ONE TABLET UNDER THE TONGUE EVERY 5 MINUTES AS NEEDED FOR CHEST PAIN.  DO NOT EXCEED A TOTAL OF 3 DOSES IN 15 MINUTES (Patient not taking: Reported on 2/23/2021) 25 tablet 0       ALLERGIES     Allergies   Allergen Reactions     Penicillins Hives     Caffeine Other (See Comments) and Unknown     Triggers your Meniere's disease     Hydrocodone Other (See Comments) and Unknown     hallucinations     Ibuprofen GI Disturbance and Unknown     Other Environmental Allergy      Metals of unspecific kind     Tramadol Other (See Comments)     Seizure, was taking remeron along with tramadol     Metal [Staples] Rash       PAST MEDICAL HISTORY:  Past Medical History:   Diagnosis Date     Aortic stenosis     s/p TAVR 8/17/16     Chronic migraine      Hyperlipidaemia      Hypertension      Hypothyroidism      Myocardial infarction (H)      Need for SBE (subacute bacterial endocarditis) prophylaxis      Orthostatic hypotension     wears compression stockings     PUD (peptic ulcer disease)      Secondary Sjogren's syndrome (H)      Seizures (H)     after stroke (partial onset)     Seropositive rheumatoid arthritis (H)      Stroke (H) 05/2013    with visual field cut, left occipital lobe     Syncope 2014    due to orthostatic hypotension       PAST SURGICAL HISTORY:  Past Surgical History:   Procedure Laterality Date     C TOTAL HIP ARTHROPLASTY Right 2010     C TOTAL HIP ARTHROPLASTY Left 2014     CATARACT IOL, RT/LT Bilateral 2000     CV FLUOROSCOPY ONLY N/A 8/6/2019    Procedure: Fluoroscopy Only - valve cine done of aortic valve at 180 degrees ARIANA to JUNG;  Surgeon: Dave Farfan MD;  Location:  HEART CARDIAC CATH LAB     CV HEART CATHETERIZATION WITH POSSIBLE INTERVENTION N/A 2/10/2021    Procedure: Heart Catheterization with Possible Intervention;  Surgeon: Fish Padgett MD;  Location:  HEART CARDIAC CATH LAB     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     HEART CATH  FEMORAL CANNULIZATION WITH OPEN STANDBY REPAIR AORTIC VALVE N/A 8/3/2016    Procedure: HEART CATH FEMORAL CANNULIZATION WITH OPEN STANDBY REPAIR AORTIC VALVE;  Surgeon: Owen Schultz MD;  Location: [facility] OR     HYSTERECTOMY TOTAL ABDOMINAL  1970    ovaries out     MOUTH SURGERY  2011    jaw surgery due to fracture     TRANSCATHETER AORTIC VALVE IMPLANT ANESTHESIA N/A 8/3/2016    Procedure: TRANSCATHETER AORTIC VALVE IMPLANT ANESTHESIA;  Surgeon: GENERIC ANESTHESIA PROVIDER;  Location: [facility] OR       FAMILY HISTORY:  Family History   Problem Relation Age of Onset     Hyperlipidemia Mother      Family History Negative No family hx of        SOCIAL HISTORY:  Social History     Socioeconomic History     Marital status:      Spouse name: Not on file     Number of children: 4     Years of education: Not on file     Highest education level: Not on file   Occupational History     Employer: RETIRED   Social Needs     Financial resource strain: Not on file     Food insecurity     Worry: Not on file     Inability: Not on file     Transportation needs     Medical: Not on file     Non-medical: Not on file   Tobacco Use     Smoking status: Never Smoker     Smokeless tobacco: Never Used   Substance and Sexual Activity     Alcohol use: No     Alcohol/week: 0.0 standard drinks     Drug use: No     Sexual activity: Not Currently   Lifestyle     Physical activity     Days per week: Not on file     Minutes per session: Not on file     Stress: Not on file   Relationships     Social connections     Talks on phone: Not on file     Gets together: Not on file     Attends Restorationist service: Not on file     Active member of club or organization: Not on file     Attends meetings of clubs or organizations: Not on file     Relationship status: Not on file     Intimate partner violence     Fear of current or ex partner: Not on file     Emotionally abused: Not on file     Physically abused: Not on file     Forced sexual activity: Not  "on file   Other Topics Concern     Parent/sibling w/ CABG, MI or angioplasty before 65F 55M? Not Asked      Service Not Asked     Blood Transfusions Not Asked     Caffeine Concern Not Asked     Occupational Exposure Not Asked     Hobby Hazards Not Asked     Sleep Concern Not Asked     Stress Concern Not Asked     Weight Concern Not Asked     Special Diet Yes     Comment: low salt      Back Care Not Asked     Exercise Yes     Comment: semi recument bike 15 mins daily      Bike Helmet Not Asked     Seat Belt Not Asked     Self-Exams Not Asked   Social History Narrative    .  Lives in assisted living. Independent. Has help with making bed and changing sheets.    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 .     4 children - 1 with RA       Review of Systems:  Skin:  Negative     Eyes:  Positive for glasses  ENT:  Negative    Respiratory:  Positive for dyspnea on exertion  Cardiovascular:  Negative for;palpitations;chest pain;lightheadedness;dizziness Positive for;edema  Gastroenterology: Negative    Genitourinary:  Negative    Musculoskeletal:  Negative    Neurologic:  Negative    Psychiatric:  Negative    Heme/Lymph/Imm:  Positive for allergies  Endocrine:  Negative      Physical Exam:  Vitals: BP (!) 142/64 (BP Location: Right arm, Patient Position: Sitting, Cuff Size: Adult Regular)   Pulse 86   Ht 1.676 m (5' 6\")   Wt 56.1 kg (123 lb 11.2 oz)   SpO2 96%   BMI 19.97 kg/m       GEN:  NAD  NECK: No JVD  C/V:  Regular rate and rhythm, no murmur, rub or gallop.  RESP: Clear to auscultation bilaterally without wheezing, rales, or rhonchi.  GI: Abdomen soft, nontender, nondistended. No HSM appreciated.   EXTREM: No LE edema.   NEURO: Alert and oriented, cooperative. No obvious focal deficits.   PSYCH: Normal affect.  SKIN: Mild ecchymosis at the radial site. No hematoma or bruit.      Recent Lab Results:  LIPID RESULTS:  Lab Results   Component Value Date    CHOL 107 02/10/2021    " HDL 59 02/10/2021    LDL 31 02/10/2021    TRIG 87 02/10/2021    CHOLHDLRATIO 2.3 07/07/2015       LIVER ENZYME RESULTS:  Lab Results   Component Value Date    AST 16 12/08/2020    ALT 14 12/08/2020       CBC RESULTS:  Lab Results   Component Value Date    WBC 10.5 02/10/2021    RBC 3.05 (L) 02/10/2021    HGB 8.7 (L) 02/10/2021    HCT 29.2 (L) 02/10/2021    MCV 96 02/10/2021    MCH 28.5 02/10/2021    MCHC 29.8 (L) 02/10/2021    RDW 18.2 (H) 02/10/2021     02/10/2021       BMP RESULTS:  Lab Results   Component Value Date     02/10/2021    POTASSIUM 3.6 02/10/2021    CHLORIDE 106 02/10/2021    CO2 26 02/10/2021    ANIONGAP 5 02/10/2021    GLC 98 02/10/2021    BUN 21 02/10/2021    CR 1.01 02/10/2021    GFRESTIMATED 48 (L) 02/10/2021    GFRESTBLACK 55 (L) 02/10/2021    CHEYANNE 8.9 02/10/2021        A1C RESULTS:  Lab Results   Component Value Date    A1C 5.5 10/13/2018       INR RESULTS:  Lab Results   Component Value Date    INR 1.16 (H) 02/10/2021    INR 1.11 04/26/2019       Thank you for allowing me to participate in the care of your patient.    Sincerely,     Matt Mccracken PA-C     [facility]

## 2021-02-24 ENCOUNTER — HOSPITAL ENCOUNTER (OUTPATIENT)
Dept: CARDIAC REHAB | Facility: CLINIC | Age: 86
End: 2021-02-24
Attending: INTERNAL MEDICINE
Payer: COMMERCIAL

## 2021-02-24 ENCOUNTER — MYC MEDICAL ADVICE (OUTPATIENT)
Dept: CARDIOLOGY | Facility: CLINIC | Age: 86
End: 2021-02-24

## 2021-02-24 DIAGNOSIS — I25.119 CORONARY ARTERY DISEASE INVOLVING NATIVE CORONARY ARTERY OF NATIVE HEART WITH ANGINA PECTORIS (H): ICD-10-CM

## 2021-02-24 PROCEDURE — 93798 PHYS/QHP OP CAR RHAB W/ECG: CPT

## 2021-02-24 PROCEDURE — 93797 PHYS/QHP OP CAR RHAB WO ECG: CPT

## 2021-02-26 ENCOUNTER — MEDICAL CORRESPONDENCE (OUTPATIENT)
Dept: HEALTH INFORMATION MANAGEMENT | Facility: CLINIC | Age: 86
End: 2021-02-26

## 2021-03-01 ENCOUNTER — MYC MEDICAL ADVICE (OUTPATIENT)
Dept: INTERNAL MEDICINE | Facility: CLINIC | Age: 86
End: 2021-03-01

## 2021-03-01 DIAGNOSIS — Z79.899 DRUG THERAPY: ICD-10-CM

## 2021-03-01 DIAGNOSIS — D64.9 ANEMIA, UNSPECIFIED TYPE: Primary | ICD-10-CM

## 2021-03-01 DIAGNOSIS — M05.79 RHEU ARTHRITIS W RHEU FACTOR MULT SITE W/O ORG/SYS INVOLV (H): Primary | ICD-10-CM

## 2021-03-01 NOTE — TELEPHONE ENCOUNTER
See MyChart message from daughter.     Will route to PCP to place future hemoglobin orders.     Simin Bueno, AUTUMNN, RN  [facility],   This is Hanna (Gillian's daughter). Gillian has an appointment at the lab for a Hemoglobin check Wednesday afternoon at [place]. I'm checking to make sure that the orders have been sent to the lab. I don't want to bring her to the clinic if the proper paper work is not in place. Thank you.  ~Hanna Gonzalez

## 2021-03-02 ENCOUNTER — TELEPHONE (OUTPATIENT)
Dept: CARDIOLOGY | Facility: CLINIC | Age: 86
End: 2021-03-02

## 2021-03-02 NOTE — TELEPHONE ENCOUNTER
[facility] sent additional BP readings for the patient.    2/26/2021 @ 5:42 PM /60  2/24/2021 @ 6:15 PM /72    Patient to follow up with a June visit to Dr. Lee at the [place] office.    Will message ZEKE Matt Mccracken to review.      0900 reply from [employer]EKE [employer] [place]:  No need to send further blood pressure reads.  On average her blood pressure seems to be controlled well.  No medication changes.     Matt     Order faxed to [facility] with update to stop sending BP reports

## 2021-03-03 ENCOUNTER — APPOINTMENT (OUTPATIENT)
Dept: LAB | Facility: CLINIC | Age: 86
End: 2021-03-03
Payer: COMMERCIAL

## 2021-03-03 DIAGNOSIS — D64.9 ANEMIA, UNSPECIFIED TYPE: ICD-10-CM

## 2021-03-03 DIAGNOSIS — R06.09 DOE (DYSPNEA ON EXERTION): ICD-10-CM

## 2021-03-03 DIAGNOSIS — I10 HYPERTENSION GOAL BP (BLOOD PRESSURE) < 140/90: ICD-10-CM

## 2021-03-03 LAB
ALBUMIN SERPL-MCNC: 2.5 G/DL (ref 3.4–5)
ALP SERPL-CCNC: 83 U/L (ref 40–150)
ALT SERPL W P-5'-P-CCNC: 17 U/L (ref 0–50)
ANION GAP SERPL CALCULATED.3IONS-SCNC: 4 MMOL/L (ref 3–14)
AST SERPL W P-5'-P-CCNC: 15 U/L (ref 0–45)
BASOPHILS # BLD AUTO: 0.1 10E9/L (ref 0–0.2)
BASOPHILS NFR BLD AUTO: 0.9 %
BILIRUB SERPL-MCNC: 0.2 MG/DL (ref 0.2–1.3)
BUN SERPL-MCNC: 17 MG/DL (ref 7–30)
CALCIUM SERPL-MCNC: 8.5 MG/DL (ref 8.5–10.1)
CHLORIDE SERPL-SCNC: 103 MMOL/L (ref 94–109)
CO2 SERPL-SCNC: 29 MMOL/L (ref 20–32)
CREAT SERPL-MCNC: 0.89 MG/DL (ref 0.52–1.04)
DIFFERENTIAL METHOD BLD: ABNORMAL
EOSINOPHIL NFR BLD AUTO: 1.4 %
ERYTHROCYTE [DISTWIDTH] IN BLOOD BY AUTOMATED COUNT: 18.4 % (ref 10–15)
FERRITIN SERPL-MCNC: 33 NG/ML (ref 8–252)
GFR SERPL CREATININE-BSD FRML MDRD: 55 ML/MIN/{1.73_M2}
GLUCOSE SERPL-MCNC: 95 MG/DL (ref 70–99)
HCT VFR BLD AUTO: 26.2 % (ref 35–47)
HGB BLD-MCNC: 8.1 G/DL (ref 11.7–15.7)
IMM GRANULOCYTES # BLD: 0 10E9/L (ref 0–0.4)
IMM GRANULOCYTES NFR BLD: 0.4 %
IRON SATN MFR SERPL: 31 % (ref 15–46)
IRON SERPL-MCNC: 68 UG/DL (ref 35–180)
LYMPHOCYTES # BLD AUTO: 1.6 10E9/L (ref 0.8–5.3)
LYMPHOCYTES NFR BLD AUTO: 23.2 %
MCH RBC QN AUTO: 29.9 PG (ref 26.5–33)
MCHC RBC AUTO-ENTMCNC: 30.9 G/DL (ref 31.5–36.5)
MCV RBC AUTO: 97 FL (ref 78–100)
MONOCYTES # BLD AUTO: 0.7 10E9/L (ref 0–1.3)
MONOCYTES NFR BLD AUTO: 9.3 %
NEUTROPHILS # BLD AUTO: 4.6 10E9/L (ref 1.6–8.3)
NEUTROPHILS NFR BLD AUTO: 64.8 %
NRBC # BLD AUTO: 0 10*3/UL
NRBC BLD AUTO-RTO: 0 /100
NT-PROBNP SERPL-MCNC: 1877 PG/ML (ref 0–450)
PLATELET # BLD AUTO: 323 10E9/L (ref 150–450)
POTASSIUM SERPL-SCNC: 3.6 MMOL/L (ref 3.4–5.3)
PROT SERPL-MCNC: 8.7 G/DL (ref 6.8–8.8)
RBC # BLD AUTO: 2.71 10E12/L (ref 3.8–5.2)
SODIUM SERPL-SCNC: 136 MMOL/L (ref 133–144)
TIBC SERPL-MCNC: 220 UG/DL (ref 240–430)
WBC # BLD AUTO: 7 10E9/L (ref 4–11)

## 2021-03-03 PROCEDURE — 80053 COMPREHEN METABOLIC PANEL: CPT | Performed by: INTERNAL MEDICINE

## 2021-03-03 PROCEDURE — 83880 ASSAY OF NATRIURETIC PEPTIDE: CPT | Performed by: INTERNAL MEDICINE

## 2021-03-03 PROCEDURE — 83540 ASSAY OF IRON: CPT | Performed by: INTERNAL MEDICINE

## 2021-03-03 PROCEDURE — 83550 IRON BINDING TEST: CPT | Performed by: INTERNAL MEDICINE

## 2021-03-03 PROCEDURE — 36415 COLL VENOUS BLD VENIPUNCTURE: CPT | Performed by: INTERNAL MEDICINE

## 2021-03-03 PROCEDURE — 85025 COMPLETE CBC W/AUTO DIFF WBC: CPT | Performed by: INTERNAL MEDICINE

## 2021-03-03 PROCEDURE — 82728 ASSAY OF FERRITIN: CPT | Performed by: INTERNAL MEDICINE

## 2021-03-08 ENCOUNTER — HOSPITAL ENCOUNTER (OUTPATIENT)
Dept: CARDIAC REHAB | Facility: CLINIC | Age: 86
End: 2021-03-08
Attending: INTERNAL MEDICINE
Payer: COMMERCIAL

## 2021-03-08 DIAGNOSIS — R06.09 DOE (DYSPNEA ON EXERTION): Primary | ICD-10-CM

## 2021-03-08 DIAGNOSIS — I10 HYPERTENSION GOAL BP (BLOOD PRESSURE) < 140/90: ICD-10-CM

## 2021-03-08 DIAGNOSIS — Z95.2 S/P TAVR (TRANSCATHETER AORTIC VALVE REPLACEMENT): ICD-10-CM

## 2021-03-08 PROCEDURE — 93798 PHYS/QHP OP CAR RHAB W/ECG: CPT | Performed by: REHABILITATION PRACTITIONER

## 2021-03-08 RX ORDER — LISINOPRIL 2.5 MG/1
2.5 TABLET ORAL DAILY
Qty: 30 TABLET | Refills: 11 | Status: SHIPPED | OUTPATIENT
Start: 2021-03-08 | End: 2021-12-07

## 2021-03-10 ENCOUNTER — HOSPITAL ENCOUNTER (OUTPATIENT)
Dept: CARDIAC REHAB | Facility: CLINIC | Age: 86
End: 2021-03-10
Attending: INTERNAL MEDICINE
Payer: COMMERCIAL

## 2021-03-10 PROCEDURE — 93798 PHYS/QHP OP CAR RHAB W/ECG: CPT | Performed by: REHABILITATION PRACTITIONER

## 2021-03-15 DIAGNOSIS — M05.79 RHEU ARTHRITIS W RHEU FACTOR MULT SITE W/O ORG/SYS INVOLV (H): ICD-10-CM

## 2021-03-15 DIAGNOSIS — Z79.899 DRUG THERAPY: ICD-10-CM

## 2021-03-15 DIAGNOSIS — I10 HYPERTENSION GOAL BP (BLOOD PRESSURE) < 140/90: ICD-10-CM

## 2021-03-15 DIAGNOSIS — R06.09 DOE (DYSPNEA ON EXERTION): ICD-10-CM

## 2021-03-15 DIAGNOSIS — Z95.2 S/P TAVR (TRANSCATHETER AORTIC VALVE REPLACEMENT): ICD-10-CM

## 2021-03-15 LAB
ALT SERPL W P-5'-P-CCNC: 16 U/L (ref 0–50)
ANION GAP SERPL CALCULATED.3IONS-SCNC: 5 MMOL/L (ref 3–14)
AST SERPL W P-5'-P-CCNC: 16 U/L (ref 0–45)
BASOPHILS # BLD AUTO: 0.1 10E9/L (ref 0–0.2)
BASOPHILS NFR BLD AUTO: 1.3 %
BUN SERPL-MCNC: 20 MG/DL (ref 7–30)
CALCIUM SERPL-MCNC: 8.5 MG/DL (ref 8.5–10.1)
CHLORIDE SERPL-SCNC: 102 MMOL/L (ref 94–109)
CO2 SERPL-SCNC: 27 MMOL/L (ref 20–32)
CREAT SERPL-MCNC: 0.99 MG/DL (ref 0.52–1.04)
CRP SERPL-MCNC: 8.6 MG/L (ref 0–8)
DIFFERENTIAL METHOD BLD: ABNORMAL
EOSINOPHIL # BLD AUTO: 0.1 10E9/L (ref 0–0.7)
EOSINOPHIL NFR BLD AUTO: 1.7 %
ERYTHROCYTE [DISTWIDTH] IN BLOOD BY AUTOMATED COUNT: 18.3 % (ref 10–15)
GFR SERPL CREATININE-BSD FRML MDRD: 49 ML/MIN/{1.73_M2}
GLUCOSE SERPL-MCNC: 96 MG/DL (ref 70–99)
HCT VFR BLD AUTO: 29 % (ref 35–47)
HGB BLD-MCNC: 8.8 G/DL (ref 11.7–15.7)
IMM GRANULOCYTES # BLD: 0 10E9/L (ref 0–0.4)
IMM GRANULOCYTES NFR BLD: 0.3 %
LYMPHOCYTES # BLD AUTO: 2 10E9/L (ref 0.8–5.3)
LYMPHOCYTES NFR BLD AUTO: 28.8 %
MCH RBC QN AUTO: 29.4 PG (ref 26.5–33)
MCHC RBC AUTO-ENTMCNC: 30.3 G/DL (ref 31.5–36.5)
MCV RBC AUTO: 97 FL (ref 78–100)
MONOCYTES # BLD AUTO: 0.7 10E9/L (ref 0–1.3)
MONOCYTES NFR BLD AUTO: 9.4 %
NEUTROPHILS # BLD AUTO: 4 10E9/L (ref 1.6–8.3)
NEUTROPHILS NFR BLD AUTO: 58.5 %
NRBC # BLD AUTO: 0 10*3/UL
NRBC BLD AUTO-RTO: 0 /100
PLATELET # BLD AUTO: 368 10E9/L (ref 150–450)
POTASSIUM SERPL-SCNC: 4.1 MMOL/L (ref 3.4–5.3)
RBC # BLD AUTO: 2.99 10E12/L (ref 3.8–5.2)
SODIUM SERPL-SCNC: 134 MMOL/L (ref 133–144)
WBC # BLD AUTO: 6.9 10E9/L (ref 4–11)

## 2021-03-15 PROCEDURE — 80048 BASIC METABOLIC PNL TOTAL CA: CPT | Performed by: PHYSICIAN ASSISTANT

## 2021-03-15 PROCEDURE — 36415 COLL VENOUS BLD VENIPUNCTURE: CPT | Performed by: PHYSICIAN ASSISTANT

## 2021-03-15 PROCEDURE — 84460 ALANINE AMINO (ALT) (SGPT): CPT | Performed by: PHYSICIAN ASSISTANT

## 2021-03-15 PROCEDURE — 84450 TRANSFERASE (AST) (SGOT): CPT | Performed by: PHYSICIAN ASSISTANT

## 2021-03-15 PROCEDURE — 86140 C-REACTIVE PROTEIN: CPT | Performed by: PHYSICIAN ASSISTANT

## 2021-03-15 PROCEDURE — 85025 COMPLETE CBC W/AUTO DIFF WBC: CPT | Performed by: PHYSICIAN ASSISTANT

## 2021-04-05 ENCOUNTER — HOSPITAL LABORATORY (OUTPATIENT)
Facility: OTHER | Age: 86
End: 2021-04-05

## 2021-04-06 ENCOUNTER — MYC MEDICAL ADVICE (OUTPATIENT)
Dept: INTERNAL MEDICINE | Facility: CLINIC | Age: 86
End: 2021-04-06

## 2021-04-06 DIAGNOSIS — D64.9 ANEMIA, UNSPECIFIED TYPE: Primary | ICD-10-CM

## 2021-04-06 DIAGNOSIS — M05.9 SEROPOSITIVE RHEUMATOID ARTHRITIS (H): ICD-10-CM

## 2021-04-06 LAB
SARS-COV-2 RNA RESP QL NAA+PROBE: NOT DETECTED
SPECIMEN SOURCE: NORMAL

## 2021-04-07 DIAGNOSIS — D64.9 ANEMIA, UNSPECIFIED TYPE: ICD-10-CM

## 2021-04-07 LAB
BASOPHILS # BLD AUTO: 0 10E9/L (ref 0–0.2)
BASOPHILS NFR BLD AUTO: 0.5 %
DIFFERENTIAL METHOD BLD: ABNORMAL
EOSINOPHIL # BLD AUTO: 0.1 10E9/L (ref 0–0.7)
EOSINOPHIL NFR BLD AUTO: 0.7 %
ERYTHROCYTE [DISTWIDTH] IN BLOOD BY AUTOMATED COUNT: 18.8 % (ref 10–15)
HCT VFR BLD AUTO: 28.7 % (ref 35–47)
HGB BLD-MCNC: 8.8 G/DL (ref 11.7–15.7)
IMM GRANULOCYTES # BLD: 0 10E9/L (ref 0–0.4)
IMM GRANULOCYTES NFR BLD: 0.5 %
LYMPHOCYTES # BLD AUTO: 1.6 10E9/L (ref 0.8–5.3)
LYMPHOCYTES NFR BLD AUTO: 18.1 %
MCH RBC QN AUTO: 29.4 PG (ref 26.5–33)
MCHC RBC AUTO-ENTMCNC: 30.7 G/DL (ref 31.5–36.5)
MCV RBC AUTO: 96 FL (ref 78–100)
MONOCYTES # BLD AUTO: 0.4 10E9/L (ref 0–1.3)
MONOCYTES NFR BLD AUTO: 4.5 %
NEUTROPHILS # BLD AUTO: 6.7 10E9/L (ref 1.6–8.3)
NEUTROPHILS NFR BLD AUTO: 75.7 %
NRBC # BLD AUTO: 0 10*3/UL
NRBC BLD AUTO-RTO: 0 /100
PLATELET # BLD AUTO: 319 10E9/L (ref 150–450)
RBC # BLD AUTO: 2.99 10E12/L (ref 3.8–5.2)
WBC # BLD AUTO: 8.8 10E9/L (ref 4–11)

## 2021-04-07 PROCEDURE — 36415 COLL VENOUS BLD VENIPUNCTURE: CPT | Performed by: INTERNAL MEDICINE

## 2021-04-07 PROCEDURE — 85025 COMPLETE CBC W/AUTO DIFF WBC: CPT | Performed by: INTERNAL MEDICINE

## 2021-04-07 RX ORDER — FOLIC ACID 1 MG/1
TABLET ORAL
Qty: 90 TABLET | Refills: 1 | Status: SHIPPED | OUTPATIENT
Start: 2021-04-07 | End: 2021-12-07

## 2021-04-07 NOTE — TELEPHONE ENCOUNTER
Prescription approved per [facility] Refill Protocol.    AUTUMN BustilloN, RN  [facility]

## 2021-04-15 ENCOUNTER — CARE COORDINATION (OUTPATIENT)
Dept: CARDIOLOGY | Facility: CLINIC | Age: 86
End: 2021-04-15

## 2021-04-15 DIAGNOSIS — I25.119 CORONARY ARTERY DISEASE INVOLVING NATIVE CORONARY ARTERY OF NATIVE HEART WITH ANGINA PECTORIS (H): Primary | ICD-10-CM

## 2021-04-15 NOTE — PROGRESS NOTES
Call back to pt's daughter Hanna re: they would like to transfer Cardiac Rehab to pt's ROBERT at [facility], states the nurse there told her they offer in-house Cardiac Rehab. She states it is true Cardiac Rehab and not just a walking program. I told Hanna I will call this company to find out about their program and whether it meets guidelines, and when I have more information will call her back. Meri Layton RN on 4/15/2021 at 12:34 PM      [employer]   . [phone]  Fax [phone]      April 27, 2021  3:39 PM    Spoke w/Larissa at [facility]. She said they offer PT at [facility] but she didn't know about Cardiac Rehab. She referred me to Sandra who manages the services at [facility], but she's unavailable right now. Sandra will call me back with more information. Meri Layton RN on 4/27/2021 at 3:40 PM

## 2021-04-20 DIAGNOSIS — D50.0 IRON DEFICIENCY ANEMIA DUE TO CHRONIC BLOOD LOSS: ICD-10-CM

## 2021-04-21 NOTE — TELEPHONE ENCOUNTER
Prescription approved per [facility] Refill Protocol.    AUTUMN BustilloN, RN  [facility]

## 2021-04-30 NOTE — PROGRESS NOTES
Pt's daughter, Hanna, called and wanted to know what the status is regarding whether pt can do cardiac rehab at her [facility]. I told her SHERRY Jerez is waiting on a call back from Neetu at [facility] to find out if they offer a cardiac rehab service. I told her Neetu may be out of the office this week so we should hear back next week. Hanna said she is eager to get her mom started on cardiac rehab, and is willing to take pt to [facility] for rehab if needed. Vijay POWELL April 30, 2021, 4:34 PM      May 4, 2021  11:56 AM    I still have not received a return call from neetu or anyone at [facility] regarding whether they offer cardiac rehab services at [facility] or not. I called back out to [facility]. I was transferred to [facility] today, and left her a VM. I then called [facility], to see if someone there could say whether or not monitored cardiac rehab is available. I was transferred to a Evelia, who did not answer. I left a detailed VM and asked for a call back.     Called back out to pt's daughter Hanna. Explained the above. Ultimately, Hanna said she'd prefer to do this at [facility], but she prefers that Gillian resume cardiac rehab ASAP. She is willing to resume rehab at [facility] since at this point it's unclear if true cardiac rehab is available at her [facility]. I entered a new order set under Dr. Lee (previous was under the Interventional Cardiologist). Provided Hanna with contact info below. She is going to call them to setup rehab appts. Meri Layton RN on 5/4/2021 at 12:09 PM      Appointment Instructions   Patient Instructions    Visit Type: CARDIAC TREATMENT    Panel:      Reason for early arrival:      Directions to Department    Department Location: From [address]: Exit at [address] on [address] side of [place]. Turn right on [address]. Turn left at stoplight on [address]. [facility] will be in view two blocks ahead   From Sign In  Desk:      Department Address: [address] [place] [address] DR Guerda NEVAREZ [address]    Department Phone: [phone]

## 2021-05-03 ENCOUNTER — HOSPITAL LABORATORY (OUTPATIENT)
Facility: OTHER | Age: 86
End: 2021-05-03

## 2021-05-04 LAB
SARS-COV-2 RNA RESP QL NAA+PROBE: NOT DETECTED
SPECIMEN SOURCE: NORMAL

## 2021-05-10 ENCOUNTER — HOSPITAL LABORATORY (OUTPATIENT)
Facility: OTHER | Age: 86
End: 2021-05-10

## 2021-05-11 ENCOUNTER — MYC MEDICAL ADVICE (OUTPATIENT)
Dept: INTERNAL MEDICINE | Facility: CLINIC | Age: 86
End: 2021-05-11

## 2021-05-11 DIAGNOSIS — D64.9 ANEMIA, UNSPECIFIED TYPE: Primary | ICD-10-CM

## 2021-05-11 DIAGNOSIS — E03.4 HYPOTHYROIDISM DUE TO ACQUIRED ATROPHY OF THYROID: ICD-10-CM

## 2021-05-11 RX ORDER — LEVOTHYROXINE SODIUM 75 UG/1
TABLET ORAL
Qty: 90 TABLET | Refills: 1 | Status: SHIPPED | OUTPATIENT
Start: 2021-05-11 | End: 2021-11-18

## 2021-05-13 DIAGNOSIS — D64.9 ANEMIA, UNSPECIFIED TYPE: ICD-10-CM

## 2021-05-13 LAB
ERYTHROCYTE [DISTWIDTH] IN BLOOD BY AUTOMATED COUNT: 18.3 % (ref 10–15)
HCT VFR BLD AUTO: 31.6 % (ref 35–47)
HGB BLD-MCNC: 9.9 G/DL (ref 11.7–15.7)
MCH RBC QN AUTO: 28.9 PG (ref 26.5–33)
MCHC RBC AUTO-ENTMCNC: 31.3 G/DL (ref 31.5–36.5)
MCV RBC AUTO: 92 FL (ref 78–100)
PLATELET # BLD AUTO: 365 10E9/L (ref 150–450)
RBC # BLD AUTO: 3.42 10E12/L (ref 3.8–5.2)
WBC # BLD AUTO: 8 10E9/L (ref 4–11)

## 2021-05-13 PROCEDURE — 85027 COMPLETE CBC AUTOMATED: CPT | Performed by: INTERNAL MEDICINE

## 2021-05-13 PROCEDURE — 36415 COLL VENOUS BLD VENIPUNCTURE: CPT | Performed by: INTERNAL MEDICINE

## 2021-05-18 ENCOUNTER — HOSPITAL ENCOUNTER (OUTPATIENT)
Dept: CARDIAC REHAB | Facility: CLINIC | Age: 86
End: 2021-05-18
Attending: INTERNAL MEDICINE
Payer: COMMERCIAL

## 2021-05-18 DIAGNOSIS — I25.119 CORONARY ARTERY DISEASE INVOLVING NATIVE CORONARY ARTERY OF NATIVE HEART WITH ANGINA PECTORIS (H): ICD-10-CM

## 2021-05-18 PROCEDURE — 93798 PHYS/QHP OP CAR RHAB W/ECG: CPT

## 2021-05-23 ENCOUNTER — APPOINTMENT (OUTPATIENT)
Dept: GENERAL RADIOLOGY | Facility: CLINIC | Age: 86
End: 2021-05-23
Attending: EMERGENCY MEDICINE
Payer: COMMERCIAL

## 2021-05-23 ENCOUNTER — HOSPITAL ENCOUNTER (OUTPATIENT)
Facility: CLINIC | Age: 86
Setting detail: OBSERVATION
Discharge: HOME | End: 2021-05-25
Attending: EMERGENCY MEDICINE | Admitting: PEDIATRICS
Payer: COMMERCIAL

## 2021-05-23 DIAGNOSIS — Z11.52 ENCOUNTER FOR SCREENING LABORATORY TESTING FOR SEVERE ACUTE RESPIRATORY SYNDROME CORONAVIRUS 2 (SARS-COV-2): ICD-10-CM

## 2021-05-23 DIAGNOSIS — I25.10 CORONARY ARTERY DISEASE INVOLVING NATIVE CORONARY ARTERY OF NATIVE HEART WITHOUT ANGINA PECTORIS: ICD-10-CM

## 2021-05-23 DIAGNOSIS — R07.9 CHEST PAIN, UNSPECIFIED TYPE: ICD-10-CM

## 2021-05-23 PROBLEM — B02.9 HERPES ZOSTER: Status: ACTIVE | Noted: 2021-05-23

## 2021-05-23 PROBLEM — H91.90 HEARING LOSS: Status: ACTIVE | Noted: 2021-05-23

## 2021-05-23 PROBLEM — F41.1 GAD (GENERALIZED ANXIETY DISORDER): Status: ACTIVE | Noted: 2021-05-23

## 2021-05-23 PROBLEM — H81.09 MENIERE'S DISEASE: Status: ACTIVE | Noted: 2021-05-23

## 2021-05-23 PROBLEM — G43.909 MIGRAINE HEADACHE: Status: ACTIVE | Noted: 2021-05-23

## 2021-05-23 PROBLEM — N87.9 PRECANCEROUS CHANGES OF THE CERVIX: Status: ACTIVE | Noted: 2021-05-23

## 2021-05-23 PROBLEM — D04.9 SQUAMOUS CELL CARCINOMA IN SITU OF SKIN: Status: ACTIVE | Noted: 2021-05-23

## 2021-05-23 PROBLEM — I63.9 OCCIPITAL STROKE (H): Status: ACTIVE | Noted: 2018-10-12

## 2021-05-23 PROBLEM — H34.11 CRAO (CENTRAL RETINAL ARTERY OCCLUSION), RIGHT: Status: ACTIVE | Noted: 2018-10-12

## 2021-05-23 PROBLEM — N60.19 FIBROCYSTIC BREAST DISEASE: Status: ACTIVE | Noted: 2021-05-23

## 2021-05-23 PROBLEM — S02.609A JAW FRACTURE (H): Status: ACTIVE | Noted: 2021-05-23

## 2021-05-23 PROBLEM — S02.609A JAW FRACTURE (H): Status: RESOLVED | Noted: 2021-05-23 | Resolved: 2021-05-23

## 2021-05-23 LAB
ALBUMIN SERPL-MCNC: 2.6 G/DL (ref 3.4–5)
ALP SERPL-CCNC: 77 U/L (ref 40–150)
ALT SERPL W P-5'-P-CCNC: 15 U/L (ref 0–50)
ANION GAP SERPL CALCULATED.3IONS-SCNC: 4 MMOL/L (ref 3–14)
AST SERPL W P-5'-P-CCNC: 18 U/L (ref 0–45)
BASOPHILS # BLD AUTO: 0.1 10E9/L (ref 0–0.2)
BASOPHILS NFR BLD AUTO: 0.8 %
BILIRUB SERPL-MCNC: 0.2 MG/DL (ref 0.2–1.3)
BUN SERPL-MCNC: 19 MG/DL (ref 7–30)
CALCIUM SERPL-MCNC: 7.9 MG/DL (ref 8.5–10.1)
CHLORIDE SERPL-SCNC: 106 MMOL/L (ref 94–109)
CO2 SERPL-SCNC: 28 MMOL/L (ref 20–32)
CREAT SERPL-MCNC: 0.82 MG/DL (ref 0.52–1.04)
DIFFERENTIAL METHOD BLD: ABNORMAL
EOSINOPHIL # BLD AUTO: 0.2 10E9/L (ref 0–0.7)
EOSINOPHIL NFR BLD AUTO: 2 %
ERYTHROCYTE [DISTWIDTH] IN BLOOD BY AUTOMATED COUNT: 18.2 % (ref 10–15)
GFR SERPL CREATININE-BSD FRML MDRD: 61 ML/MIN/{1.73_M2}
GLUCOSE SERPL-MCNC: 91 MG/DL (ref 70–99)
HCT VFR BLD AUTO: 27.1 % (ref 35–47)
HGB BLD-MCNC: 8.7 G/DL (ref 11.7–15.7)
IMM GRANULOCYTES # BLD: 0 10E9/L (ref 0–0.4)
IMM GRANULOCYTES NFR BLD: 0.4 %
LABORATORY COMMENT REPORT: NORMAL
LIPASE SERPL-CCNC: 167 U/L (ref 73–393)
LYMPHOCYTES # BLD AUTO: 1.3 10E9/L (ref 0.8–5.3)
LYMPHOCYTES NFR BLD AUTO: 16.1 %
MCH RBC QN AUTO: 30.2 PG (ref 26.5–33)
MCHC RBC AUTO-ENTMCNC: 32.1 G/DL (ref 31.5–36.5)
MCV RBC AUTO: 94 FL (ref 78–100)
MONOCYTES # BLD AUTO: 0.6 10E9/L (ref 0–1.3)
MONOCYTES NFR BLD AUTO: 7.9 %
NEUTROPHILS # BLD AUTO: 5.8 10E9/L (ref 1.6–8.3)
NEUTROPHILS NFR BLD AUTO: 72.8 %
NRBC # BLD AUTO: 0 10*3/UL
NRBC BLD AUTO-RTO: 0 /100
NT-PROBNP SERPL-MCNC: 1595 PG/ML (ref 0–1800)
PLATELET # BLD AUTO: 295 10E9/L (ref 150–450)
POTASSIUM SERPL-SCNC: 3.4 MMOL/L (ref 3.4–5.3)
PROT SERPL-MCNC: 8.1 G/DL (ref 6.8–8.8)
RBC # BLD AUTO: 2.88 10E12/L (ref 3.8–5.2)
SARS-COV-2 RNA RESP QL NAA+PROBE: NEGATIVE
SODIUM SERPL-SCNC: 138 MMOL/L (ref 133–144)
SPECIMEN SOURCE: NORMAL
TROPONIN I SERPL-MCNC: 0.03 UG/L (ref 0–0.04)
TROPONIN I SERPL-MCNC: 0.04 UG/L (ref 0–0.04)
TROPONIN I SERPL-MCNC: 0.04 UG/L (ref 0–0.04)
UFH PPP CHRO-ACNC: <0.1 IU/ML
WBC # BLD AUTO: 7.9 10E9/L (ref 4–11)

## 2021-05-23 PROCEDURE — C9803 HOPD COVID-19 SPEC COLLECT: HCPCS | Performed by: EMERGENCY MEDICINE

## 2021-05-23 PROCEDURE — 96366 THER/PROPH/DIAG IV INF ADDON: CPT

## 2021-05-23 PROCEDURE — 80053 COMPREHEN METABOLIC PANEL: CPT | Performed by: EMERGENCY MEDICINE

## 2021-05-23 PROCEDURE — 83880 ASSAY OF NATRIURETIC PEPTIDE: CPT | Performed by: EMERGENCY MEDICINE

## 2021-05-23 PROCEDURE — 85520 HEPARIN ASSAY: CPT | Performed by: PEDIATRICS

## 2021-05-23 PROCEDURE — 250N000011 HC RX IP 250 OP 636: Performed by: EMERGENCY MEDICINE

## 2021-05-23 PROCEDURE — 96376 TX/PRO/DX INJ SAME DRUG ADON: CPT | Performed by: EMERGENCY MEDICINE

## 2021-05-23 PROCEDURE — 96366 THER/PROPH/DIAG IV INF ADDON: CPT | Performed by: EMERGENCY MEDICINE

## 2021-05-23 PROCEDURE — 250N000012 HC RX MED GY IP 250 OP 636 PS 637: Performed by: INTERNAL MEDICINE

## 2021-05-23 PROCEDURE — 250N000013 HC RX MED GY IP 250 OP 250 PS 637: Performed by: PEDIATRICS

## 2021-05-23 PROCEDURE — 84484 ASSAY OF TROPONIN QUANT: CPT | Performed by: INTERNAL MEDICINE

## 2021-05-23 PROCEDURE — 85025 COMPLETE CBC W/AUTO DIFF WBC: CPT | Performed by: EMERGENCY MEDICINE

## 2021-05-23 PROCEDURE — G0378 HOSPITAL OBSERVATION PER HR: HCPCS

## 2021-05-23 PROCEDURE — 83690 ASSAY OF LIPASE: CPT | Performed by: EMERGENCY MEDICINE

## 2021-05-23 PROCEDURE — 93005 ELECTROCARDIOGRAM TRACING: CPT | Performed by: EMERGENCY MEDICINE

## 2021-05-23 PROCEDURE — 84484 ASSAY OF TROPONIN QUANT: CPT | Performed by: EMERGENCY MEDICINE

## 2021-05-23 PROCEDURE — 99285 EMERGENCY DEPT VISIT HI MDM: CPT | Mod: 25 | Performed by: EMERGENCY MEDICINE

## 2021-05-23 PROCEDURE — 99285 EMERGENCY DEPT VISIT HI MDM: CPT | Performed by: EMERGENCY MEDICINE

## 2021-05-23 PROCEDURE — 87635 SARS-COV-2 COVID-19 AMP PRB: CPT | Performed by: EMERGENCY MEDICINE

## 2021-05-23 PROCEDURE — 258N000003 HC RX IP 258 OP 636: Performed by: PEDIATRICS

## 2021-05-23 PROCEDURE — 36415 COLL VENOUS BLD VENIPUNCTURE: CPT | Performed by: PEDIATRICS

## 2021-05-23 PROCEDURE — 250N000013 HC RX MED GY IP 250 OP 250 PS 637: Performed by: EMERGENCY MEDICINE

## 2021-05-23 PROCEDURE — 36415 COLL VENOUS BLD VENIPUNCTURE: CPT | Performed by: INTERNAL MEDICINE

## 2021-05-23 PROCEDURE — 96365 THER/PROPH/DIAG IV INF INIT: CPT | Performed by: EMERGENCY MEDICINE

## 2021-05-23 PROCEDURE — 250N000013 HC RX MED GY IP 250 OP 250 PS 637: Performed by: INTERNAL MEDICINE

## 2021-05-23 PROCEDURE — 71046 X-RAY EXAM CHEST 2 VIEWS: CPT

## 2021-05-23 RX ORDER — AMLODIPINE BESYLATE 5 MG/1
5 TABLET ORAL DAILY
Status: DISCONTINUED | OUTPATIENT
Start: 2021-05-23 | End: 2021-05-25 | Stop reason: HOSPADM

## 2021-05-23 RX ORDER — NITROFURANTOIN 25; 75 MG/1; MG/1
100 CAPSULE ORAL DAILY
COMMUNITY
End: 2022-06-21

## 2021-05-23 RX ORDER — LEVETIRACETAM 500 MG/1
500 TABLET ORAL 2 TIMES DAILY
Status: DISCONTINUED | OUTPATIENT
Start: 2021-05-23 | End: 2021-05-25 | Stop reason: HOSPADM

## 2021-05-23 RX ORDER — MORPHINE SULFATE 2 MG/ML
2 INJECTION, SOLUTION INTRAMUSCULAR; INTRAVENOUS
Status: DISCONTINUED | OUTPATIENT
Start: 2021-05-23 | End: 2021-05-25 | Stop reason: HOSPADM

## 2021-05-23 RX ORDER — LIDOCAINE 40 MG/G
CREAM TOPICAL
Status: DISCONTINUED | OUTPATIENT
Start: 2021-05-23 | End: 2021-05-25 | Stop reason: HOSPADM

## 2021-05-23 RX ORDER — ACETAMINOPHEN 325 MG/1
650 TABLET ORAL EVERY 4 HOURS PRN
Status: DISCONTINUED | OUTPATIENT
Start: 2021-05-23 | End: 2021-05-25 | Stop reason: HOSPADM

## 2021-05-23 RX ORDER — LISINOPRIL 2.5 MG/1
2.5 TABLET ORAL DAILY
Status: DISCONTINUED | OUTPATIENT
Start: 2021-05-23 | End: 2021-05-25 | Stop reason: HOSPADM

## 2021-05-23 RX ORDER — NALOXONE HYDROCHLORIDE 0.4 MG/ML
0.4 INJECTION, SOLUTION INTRAMUSCULAR; INTRAVENOUS; SUBCUTANEOUS
Status: DISCONTINUED | OUTPATIENT
Start: 2021-05-23 | End: 2021-05-25 | Stop reason: HOSPADM

## 2021-05-23 RX ORDER — DOCUSATE SODIUM 100 MG/1
200 CAPSULE, LIQUID FILLED ORAL DAILY
Status: DISCONTINUED | OUTPATIENT
Start: 2021-05-23 | End: 2021-05-25 | Stop reason: HOSPADM

## 2021-05-23 RX ORDER — NALOXONE HYDROCHLORIDE 0.4 MG/ML
0.2 INJECTION, SOLUTION INTRAMUSCULAR; INTRAVENOUS; SUBCUTANEOUS
Status: DISCONTINUED | OUTPATIENT
Start: 2021-05-23 | End: 2021-05-25 | Stop reason: HOSPADM

## 2021-05-23 RX ORDER — NITROGLYCERIN 40 MG/1
2 PATCH TRANSDERMAL DAILY
Status: DISCONTINUED | OUTPATIENT
Start: 2021-05-23 | End: 2021-05-24

## 2021-05-23 RX ORDER — FAMOTIDINE 20 MG/1
40 TABLET, FILM COATED ORAL AT BEDTIME
Status: DISCONTINUED | OUTPATIENT
Start: 2021-05-23 | End: 2021-05-24

## 2021-05-23 RX ORDER — MIRTAZAPINE 15 MG/1
30 TABLET, FILM COATED ORAL AT BEDTIME
Status: DISCONTINUED | OUTPATIENT
Start: 2021-05-23 | End: 2021-05-25 | Stop reason: HOSPADM

## 2021-05-23 RX ORDER — CLOPIDOGREL BISULFATE 75 MG/1
75 TABLET ORAL DAILY
Status: DISCONTINUED | OUTPATIENT
Start: 2021-05-23 | End: 2021-05-25 | Stop reason: HOSPADM

## 2021-05-23 RX ORDER — ATORVASTATIN CALCIUM 40 MG/1
40 TABLET, FILM COATED ORAL DAILY
Status: DISCONTINUED | OUTPATIENT
Start: 2021-05-23 | End: 2021-05-25 | Stop reason: HOSPADM

## 2021-05-23 RX ORDER — ASPIRIN 81 MG/1
324 TABLET, CHEWABLE ORAL ONCE
Status: COMPLETED | OUTPATIENT
Start: 2021-05-23 | End: 2021-05-23

## 2021-05-23 RX ORDER — METOPROLOL SUCCINATE 50 MG/1
50 TABLET, EXTENDED RELEASE ORAL 2 TIMES DAILY
Status: DISCONTINUED | OUTPATIENT
Start: 2021-05-23 | End: 2021-05-25 | Stop reason: HOSPADM

## 2021-05-23 RX ORDER — FOLIC ACID 1 MG/1
1000 TABLET ORAL DAILY
Status: DISCONTINUED | OUTPATIENT
Start: 2021-05-23 | End: 2021-05-25 | Stop reason: HOSPADM

## 2021-05-23 RX ORDER — ASPIRIN 81 MG/1
81 TABLET ORAL DAILY
Status: DISCONTINUED | OUTPATIENT
Start: 2021-05-24 | End: 2021-05-23

## 2021-05-23 RX ORDER — SODIUM CHLORIDE 9 MG/ML
INJECTION, SOLUTION INTRAVENOUS CONTINUOUS
Status: DISCONTINUED | OUTPATIENT
Start: 2021-05-23 | End: 2021-05-25 | Stop reason: HOSPADM

## 2021-05-23 RX ORDER — ACETAMINOPHEN 650 MG/1
650 SUPPOSITORY RECTAL EVERY 4 HOURS PRN
Status: DISCONTINUED | OUTPATIENT
Start: 2021-05-23 | End: 2021-05-25 | Stop reason: HOSPADM

## 2021-05-23 RX ORDER — MAGNESIUM HYDROXIDE/ALUMINUM HYDROXICE/SIMETHICONE 120; 1200; 1200 MG/30ML; MG/30ML; MG/30ML
30 SUSPENSION ORAL EVERY 4 HOURS PRN
Status: DISCONTINUED | OUTPATIENT
Start: 2021-05-23 | End: 2021-05-25 | Stop reason: HOSPADM

## 2021-05-23 RX ORDER — NITROGLYCERIN 0.4 MG/1
0.4 TABLET SUBLINGUAL EVERY 5 MIN PRN
Status: DISCONTINUED | OUTPATIENT
Start: 2021-05-23 | End: 2021-05-25 | Stop reason: HOSPADM

## 2021-05-23 RX ORDER — CALCIUM CARBONATE 500 MG/1
TABLET, CHEWABLE ORAL PRN
Status: DISCONTINUED | OUTPATIENT
Start: 2021-05-23 | End: 2021-05-25 | Stop reason: HOSPADM

## 2021-05-23 RX ORDER — MIRTAZAPINE 7.5 MG/1
7.5 TABLET, FILM COATED ORAL AT BEDTIME
Status: DISCONTINUED | OUTPATIENT
Start: 2021-05-23 | End: 2021-05-23

## 2021-05-23 RX ORDER — HEPARIN SODIUM 10000 [USP'U]/100ML
0-5000 INJECTION, SOLUTION INTRAVENOUS CONTINUOUS
Status: DISCONTINUED | OUTPATIENT
Start: 2021-05-23 | End: 2021-05-24

## 2021-05-23 RX ORDER — ASPIRIN 81 MG/1
81 TABLET ORAL DAILY
Status: DISCONTINUED | OUTPATIENT
Start: 2021-05-24 | End: 2021-05-25 | Stop reason: HOSPADM

## 2021-05-23 RX ORDER — ISOSORBIDE MONONITRATE 30 MG/1
120 TABLET, EXTENDED RELEASE ORAL DAILY
Status: DISCONTINUED | OUTPATIENT
Start: 2021-05-24 | End: 2021-05-24

## 2021-05-23 RX ADMIN — NITROGLYCERIN 15 MG: 20 OINTMENT TOPICAL at 09:37

## 2021-05-23 RX ADMIN — HEPARIN SODIUM 650 UNITS/HR: 10000 INJECTION, SOLUTION INTRAVENOUS at 13:31

## 2021-05-23 RX ADMIN — LISINOPRIL 2.5 MG: 2.5 TABLET ORAL at 18:02

## 2021-05-23 RX ADMIN — FAMOTIDINE 40 MG: 20 TABLET ORAL at 20:51

## 2021-05-23 RX ADMIN — DOCUSATE SODIUM 200 MG: 100 CAPSULE, LIQUID FILLED ORAL at 18:00

## 2021-05-23 RX ADMIN — ASPIRIN 81 MG 324 MG: 81 TABLET ORAL at 13:24

## 2021-05-23 RX ADMIN — CLOPIDOGREL BISULFATE 75 MG: 75 TABLET ORAL at 18:03

## 2021-05-23 RX ADMIN — FOLIC ACID 1000 MCG: 1 TABLET ORAL at 18:03

## 2021-05-23 RX ADMIN — ATORVASTATIN CALCIUM 40 MG: 40 TABLET, FILM COATED ORAL at 18:02

## 2021-05-23 RX ADMIN — LEVETIRACETAM 500 MG: 500 TABLET, FILM COATED ORAL at 20:51

## 2021-05-23 RX ADMIN — METOPROLOL SUCCINATE 50 MG: 50 TABLET, EXTENDED RELEASE ORAL at 20:51

## 2021-05-23 RX ADMIN — PREDNISONE 2.5 MG: 1 TABLET ORAL at 18:07

## 2021-05-23 RX ADMIN — SODIUM CHLORIDE: 9 INJECTION, SOLUTION INTRAVENOUS at 18:04

## 2021-05-23 RX ADMIN — AMLODIPINE BESYLATE 5 MG: 5 TABLET ORAL at 18:00

## 2021-05-23 RX ADMIN — NITROGLYCERIN 2 PATCH: 0.2 PATCH TRANSDERMAL at 16:53

## 2021-05-23 RX ADMIN — MIRTAZAPINE 30 MG: 15 TABLET, FILM COATED ORAL at 20:50

## 2021-05-23 NOTE — PROGRESS NOTES
S-(situation): Patient registered to Observation. Patient arrived to room 266 via cart from emergency room @ 1530    B-(background): Admitted for chest pain    A-(assessment): VSS. Afebrile. Tele SR with BBB and occasional PVCs & PACs.  Adequate oxygen saturations while on room air. Denies chest pain. Up with standby assist. Continues on Heparin infusion. To have nuc med stress test. Denies pain. Appetite good.     R-(recommendations): Orders and observation goals reviewed with patient and daughter.     Nursing Observation criteria listed below was met:    Skin issues/needs documented:NA  Isolation needs addressed and Signage up: NA  Fall Prevention: Education given and documented: NA  Education Assessment documented:Yes  Admission Education Documented: Yes  New medication patient education completed and documented (Possible Side Effects of Common Medications handout): Yes  OBS video/handout Reviewed & Documented: Yes  Allergies Reviewed: Yes  Medication Reconciliation Complete: Yes  Home medications if not able to send immediately home with family stored here: NA  Reminder note placed in discharge instructions of home meds: NA  Patient has discharge needs (If yes, please explain): Yes  Patient discharge preferences addressed and charted on white board:  Yes

## 2021-05-23 NOTE — ED NOTES
ED Nursing criteria listed below was addressed during verbal handoff:     Abnormal vitals: No  Abnormal results: Yes  Med Reconciliation completed: Yes  Meds given in ED: Yes  Any Overdue Meds: No  Core Measures: N/A  Isolation: No  Special needs: No  Skin assessment: Yes    Observation Patient  Education provided: Yes

## 2021-05-23 NOTE — ED TRIAGE NOTES
Patient had an onset of chest pain - substernal with radiation to left chest this am around 0640.  Patient took her own NTG which would relieve the pain for a short period of time - then the pain would return.  Currently patient is experiencing 4/10 dull ache all over chest.  Patient denies nausea.  States she is slightly SOB.

## 2021-05-23 NOTE — ED NOTES
Assisted pt to bedside commode. Pt tolerated well. Pt updated on plan. Daughter back to room. No other requests at this time.

## 2021-05-23 NOTE — ED NOTES
Patient states she had been at 0/10 pain, stood for xray pain returned momentarily then resolved when she sat back on ED cart.

## 2021-05-23 NOTE — ED NOTES
Report received from previous shift. Assumed care of patient. Questions answered and patient updated about treatment plan.

## 2021-05-23 NOTE — ED NOTES
"Patient has  [place] to Observation  order. Patient has been given the Observation brochure -  What does Observation mean to me.\"  Patient has been given the opportunity to ask questions about observation status and their plan of care.        "

## 2021-05-23 NOTE — ED PROVIDER NOTES
History     Chief Complaint   Patient presents with     Chest Pain     HPI  History per patient and medical records    This is a 89-year-old female, history of rheumatoid arthritis on chronic prednisone, aortic valve replacement, coronary artery disease status post stenting 2/21, hypertension, hyperlipidemia, stroke with seizure, other history as below presenting with chest pain.  Patient started having chest pain this morning after she got up to use the bathroom.  She took nitroglycerin with relief but she has had 2 further episodes whenever she is had any exertion.  These also have been relieved with nitroglycerin.  Patient has a hard time describing the pain, but notes that it is a dull discomfort.  It may be similar to her previous cardiac related pain.  Initially was in the epigastric/substernal area and then started moving over to the left and then to her entire chest.  She is had slight shortness of breath.  No fevers, chills, cold or cough symptoms, nausea, vomiting, diarrhea, other bowel or bladder changes.  No weakness, dizziness.    Allergies:  Allergies   Allergen Reactions     Penicillins Hives     Caffeine Other (See Comments) and Unknown     Triggers your Meniere's disease     Hydrocodone Other (See Comments) and Unknown     hallucinations     Ibuprofen GI Disturbance and Unknown     Other Environmental Allergy      Metals of unspecific kind     Tramadol Other (See Comments)     Seizure, was taking remeron along with tramadol     Metal [Staples] Rash       Problem List:    Patient Active Problem List    Diagnosis Date Noted     Fibrocystic breast disease 05/23/2021     Priority: Medium     IZABEL (generalized anxiety disorder) 05/23/2021     Priority: Medium     Hearing loss 05/23/2021     Priority: Medium     Formatting of this note might be different from the original.  right ear       Herpes zoster 05/23/2021     Priority: Medium     Meniere's disease 05/23/2021     Priority: Medium     Migraine  headache 05/23/2021     Priority: Medium     Precancerous changes of the cervix 05/23/2021     Priority: Medium     Squamous cell carcinoma in situ of skin 05/23/2021     Priority: Medium     Formatting of this note might be different from the original.  face       Chest pain, unspecified type 05/23/2021     Priority: Medium     Coronary artery disease involving native coronary artery of native heart with angina pectoris (H) 02/02/2021     Priority: Medium     Added automatically from request for surgery [id number]       CONTRERAS (dyspnea on exertion) 02/02/2021     Priority: Medium     Added automatically from request for surgery [id number]       Ischemic cardiomyopathy 08/06/2019     Priority: Medium     Urinary incontinence without sensory awareness 04/26/2019     Priority: Medium     Chronic systolic congestive heart failure (H) 03/24/2019     Priority: Medium     Chronic stable angina (H) 03/24/2019     Priority: Medium     CRAO (central retinal artery occlusion), 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 Discharging from [facility] today. She has appointment with Dr. Mateusz Jane on 11/9/18 in [place], [place].   - Okay to keep appointment with Dr. Jane, advised to return with any decrease in vision in either eye.       Occipital stroke (H) 10/12/2018     Priority: Medium     Primary osteoarthritis of right knee 08/03/2017     Priority: Medium     GI bleed 03/03/2017     Priority: Medium     Hypotension 12/04/2016     Priority: Medium     TIA (transient ischemic attack) 12/03/2016     Priority: Medium     Non-rheumatic mitral valve stenosis - mild to moderate 11/16 echo 12/03/2016     Priority: Medium     Coronary artery disease involving native coronary artery - NSTEMI 11/16 with moderate RCA disease treated medically 12/03/2016     Priority: Medium     CKD (chronic kidney disease) stage 3, GFR 30-59 ml/min 12/03/2016     Priority: Medium     Elevated troponin  12/03/2016     Priority: Medium     Iron deficiency anemia 12/03/2016     Priority: Medium     Acquired hypothyroidism 11/24/2016     Priority: Medium     Aortic stenosis      Priority: Medium     s/p TAVR 8/17/16       Need for SBE (subacute bacterial endocarditis) prophylaxis      Priority: Medium     Advanced directives, counseling/discussion 09/22/2016     Priority: Medium     Was addressed at appt. Will bring in and will have scanned. 9/22/2016  Cathy Anderson MA         S/P TAVR (transcatheter aortic valve replacement) 08/05/2016     Priority: Medium     Aortic stenosis, severe - s/p TAVR 08/03/2016     Priority: Medium     Hypothyroidism due to acquired atrophy of thyroid 05/25/2016     Priority: Medium     Aortic valve disorder 09/02/2015     Priority: Medium     Intermediate coronary syndrome 09/01/2015     Priority: Medium     Hyperlipidemia with target LDL less than 130 04/06/2015     Priority: Medium     Anxiety 07/24/2014     Priority: Medium     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 07/24/2014     Priority: Medium     History of CVA (cerebrovascular accident) - 2013 07/24/2014     Priority: Medium     Esophageal reflux 07/24/2014     Priority: Medium     PAC (premature atrial contraction) 07/24/2014     Priority: Medium     PVC's (premature ventricular contractions) 07/24/2014     Priority: Medium     Hypertension goal BP (blood pressure) < 140/90 07/01/2014     Priority: Medium     Seropositive rheumatoid arthritis (H)      Priority: Medium     Transient ischemic attack (TIA), and cerebral infarction without residual deficits 06/18/2014     Priority: Medium     Osteoarthritis of left hip 02/18/2014     Priority: Medium        Past Medical History:    Past Medical History:   Diagnosis Date     Aortic stenosis      Chronic migraine      Hyperlipidaemia      Hypertension      Hypothyroidism      Myocardial infarction (H)      Need for SBE (subacute bacterial endocarditis) prophylaxis       Orthostatic hypotension      PUD (peptic ulcer disease)      Secondary Sjogren's syndrome (H)      Seizures (H)      Seropositive rheumatoid arthritis (H)      Stroke (H) 05/2013     Syncope 2014       Past Surgical History:    Past Surgical History:   Procedure Laterality Date     C TOTAL HIP ARTHROPLASTY Right 2010     C TOTAL HIP ARTHROPLASTY Left 2014     CATARACT IOL, RT/LT Bilateral 2000     CV FLUOROSCOPY ONLY N/A 8/6/2019    Procedure: Fluoroscopy Only - valve cine done of aortic valve at 180 degrees ARIANA to JUNG;  Surgeon: Dave Farfan MD;  Location:  [facility]     CV HEART CATHETERIZATION WITH POSSIBLE INTERVENTION N/A 2/10/2021    Procedure: Heart Catheterization with Possible Intervention;  Surgeon: Fish Padgett MD;  Location:  [facility]     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     HEART CATH FEMORAL CANNULIZATION WITH OPEN STANDBY REPAIR AORTIC VALVE N/A 8/3/2016    Procedure: HEART CATH FEMORAL CANNULIZATION WITH OPEN STANDBY REPAIR AORTIC VALVE;  Surgeon: Owen Schultz MD;  Location: [facility] OR     HYSTERECTOMY TOTAL ABDOMINAL  1970    ovaries out     MOUTH SURGERY  2011    jaw surgery due to fracture     TRANSCATHETER AORTIC VALVE IMPLANT ANESTHESIA N/A 8/3/2016    Procedure: TRANSCATHETER AORTIC VALVE IMPLANT ANESTHESIA;  Surgeon: GENERIC ANESTHESIA PROVIDER;  Location: [facility] OR       Family History:    Family History   Problem Relation Age of Onset     Hyperlipidemia Mother      Family History Negative No family hx of        Social History:  Marital Status:   [5]  Social History     Tobacco Use     Smoking status: Never Smoker     Smokeless tobacco: Never Used   Substance Use Topics     Alcohol use: No     Alcohol/week: 0.0 standard drinks     Drug use: No        Medications:    No current outpatient medications on file.        Review of Systems   All other ROS reviewed and are negative or non-contributory except as stated in  HPI.     Physical Exam   BP: (!) 152/75  Pulse: 72  Temp: 96.7  F (35.9  C)  Resp: 18  Weight: 53.1 kg (117 lb)  SpO2: 97 %      Physical Exam  Vitals signs and nursing note reviewed.   Constitutional:       Appearance: She is well-developed and normal weight.      Comments: Healthy appearing, thin, elderly female sitting upright in the bed   Eyes:      Extraocular Movements: Extraocular movements intact.      Conjunctiva/sclera: Conjunctivae normal.   Neck:      Musculoskeletal: Normal range of motion.   Cardiovascular:      Rate and Rhythm: Normal rate and regular rhythm.      Pulses: Normal pulses.      Heart sounds: Murmur (Quiet systolic) present.   Pulmonary:      Effort: Pulmonary effort is normal.      Breath sounds: Normal breath sounds.   Abdominal:      Palpations: Abdomen is soft.      Comments: Mild lower abdominal tenderness without mass or rebound.  Patient believes this is related to her bladder and the need to urinate.   Musculoskeletal: Normal range of motion.      Right lower leg: No edema.      Left lower leg: No edema.   Skin:     General: Skin is warm and dry.   Neurological:      General: No focal deficit present.      Mental Status: She is alert.   Psychiatric:         Mood and Affect: Mood normal.         ED Course (with Medical Decision Making)    Pt seen and examined by me.  RN and EPIC notes reviewed.      Elderly patient with history of coronary artery disease presenting with chest pain.  It seems as exertional in nature.  Concern for anginal symptoms.  Plan IV, EKG, labs, chest x-ray.  I am going to place nitroglycerin paste.    EKG shows a left bundle branch block with a rate of 71.  This is consistent with previous EKGs.    Patient's hemoglobin has dropped a little bit to 8.7.  Previous hemoglobin on 5/13/2021 was 9.9.  White count is normal.  Chemistry panel unremarkable.  Troponin is 0.03.  Lipase normal.    Patient noted a little bit of chest discomfort when she got up to use the  bathroom and on the way to the get her chest x-ray.  She notes it is less than at home.  Again, nitroglycerin paste is in place.    I elected to do a second troponin.  Patient's chest x-ray does not show any acute infiltrates or other abnormalities.    Second troponin is very slightly elevated at 0.038.  I spoke with cardiology.  They recommended patient be started on heparin and given aspirin and that she may need a stress test.  However, if her pain was uncontrolled okay to transfer to [facility].    Patient notes that she does not have any further pain with getting up to use the bathroom.  I discussed the findings and the thoughts and the plan is to try to have her admitted here for further monitoring, serial troponins.  She is comfortable with this plan.  I spoke with hospitalist who saw her in the ED and patient admitted in stable condition.        Procedures    Results for orders placed or performed during the hospital encounter of 05/23/21 (from the past 24 hour(s))   CBC with platelets differential   Result Value Ref Range    WBC 7.9 4.0 - 11.0 10e9/L    RBC Count 2.88 (L) 3.8 - 5.2 10e12/L    Hemoglobin 8.7 (L) 11.7 - 15.7 g/dL    Hematocrit 27.1 (L) 35.0 - 47.0 %    MCV 94 78 - 100 fl    MCH 30.2 26.5 - 33.0 pg    MCHC 32.1 31.5 - 36.5 g/dL    RDW 18.2 (H) 10.0 - 15.0 %    Platelet Count 295 150 - 450 10e9/L    Diff Method Automated Method     % Neutrophils 72.8 %    % Lymphocytes 16.1 %    % Monocytes 7.9 %    % Eosinophils 2.0 %    % Basophils 0.8 %    % Immature Granulocytes 0.4 %    Nucleated RBCs 0 0 /100    Absolute Neutrophil 5.8 1.6 - 8.3 10e9/L    Absolute Lymphocytes 1.3 0.8 - 5.3 10e9/L    Absolute Monocytes 0.6 0.0 - 1.3 10e9/L    Absolute Eosinophils 0.2 0.0 - 0.7 10e9/L    Absolute Basophils 0.1 0.0 - 0.2 10e9/L    Abs Immature Granulocytes 0.0 0 - 0.4 10e9/L    Absolute Nucleated RBC 0.0    Comprehensive metabolic panel   Result Value Ref Range    Sodium 138 133 - 144 mmol/L     Potassium 3.4 3.4 - 5.3 mmol/L    Chloride 106 94 - 109 mmol/L    Carbon Dioxide 28 20 - 32 mmol/L    Anion Gap 4 3 - 14 mmol/L    Glucose 91 70 - 99 mg/dL    Urea Nitrogen 19 7 - 30 mg/dL    Creatinine 0.82 0.52 - 1.04 mg/dL    GFR Estimate 61 >60 mL/min/[1.73_m2]    GFR Estimate If Black 71 >60 mL/min/[1.73_m2]    Calcium 7.9 (L) 8.5 - 10.1 mg/dL    Bilirubin Total 0.2 0.2 - 1.3 mg/dL    Albumin 2.6 (L) 3.4 - 5.0 g/dL    Protein Total 8.1 6.8 - 8.8 g/dL    Alkaline Phosphatase 77 40 - 150 U/L    ALT 15 0 - 50 U/L    AST 18 0 - 45 U/L   Lipase   Result Value Ref Range    Lipase 167 73 - 393 U/L   Troponin I   Result Value Ref Range    Troponin I ES 0.030 0.000 - 0.045 ug/L   Nt probnp inpatient   Result Value Ref Range    N-Terminal Pro BNP Inpatient 1,595 0 - 1,800 pg/mL   XR Chest 2 Views    Narrative    CHEST TWO VIEWS  5/23/2021 10:27 AM     HISTORY: Chest pain.    COMPARISON: February 2, 2021       Impression    IMPRESSION: Increased reticulonodular changes in both mid and lower  lungs since the comparison study. No definite effusions. The cardiac  silhouette is not enlarged. Pulmonary vasculature is unremarkable.     PONCHO QURESHI MD   Troponin I (second draw)   Result Value Ref Range    Troponin I ES 0.038 0.000 - 0.045 ug/L   Asymptomatic SARS-CoV-2 COVID-19 Virus (Coronavirus) by PCR    Specimen: Nasopharyngeal   Result Value Ref Range    SARS-CoV-2 Virus Specimen Source Nasopharyngeal     SARS-CoV-2 PCR Result NEGATIVE     SARS-CoV-2 PCR Comment (Note)    Troponin I   Result Value Ref Range    Troponin I ES 0.044 0.000 - 0.045 ug/L       Medications   nitroGLYcerin (NITRO-BID) 2 % ointment 15 mg (15 mg Transdermal Patch/Med Applied 5/23/21 3025)   heparin 25,000 units in 0.45% NaCl 250 mL ANTICOAGULANT infusion (650 Units/hr Intravenous Rate/Dose Verify 5/23/21 1600)   lidocaine 1 % 0.1-1 mL (has no administration in time range)   lidocaine (LMX4) kit (has no administration in time range)   sodium  chloride (PF) 0.9% PF flush 3 mL (3 mLs Intracatheter Not Given 5/23/21 1648)   sodium chloride (PF) 0.9% PF flush 3 mL (has no administration in time range)   nitroGLYcerin (NITROSTAT) sublingual tablet 0.4 mg (has no administration in time range)   alum & mag hydroxide-simethicone (MAALOX) suspension 30 mL (has no administration in time range)   acetaminophen (TYLENOL) tablet 650 mg (has no administration in time range)   acetaminophen (TYLENOL) Suppository 650 mg (has no administration in time range)   sodium chloride (PF) 0.9% PF flush 1-10 mL (has no administration in time range)   sodium chloride (PF) 0.9% PF flush 10 mL (10 mLs Intravenous Not Given 5/23/21 1648)   HOLD: Betablockers 24 hours prior to the procedure (has no administration in time range)   HOLD: Caffeine containing medications 12 hours prior to the procedure (has no administration in time range)   HOLD: dipyridamole (PERSANTINE) or aspirin/dipyridamole (AGGRENOX) 48 hours prior to the procedure (has no administration in time range)   HOLD: Phosphodiesterase-5 (PDE-5) inhibitor medications - vardenafil (LEVITRA/STAXYN), sildenafil (VIAGRA/REVATIO), tadalafil (CIALIS/ADCIRCA), avanafil (STENDRA) - 48 hours prior to the procedure (has no administration in time range)   HOLD: theophylline or aminophylline 12 hours prior to the procedure (has no administration in time range)   IF patient diabetic - HOLD: ALL ORAL HYPOGLYCEMICS and include: glipizide, glyburide, glimepiride, gliclazide, metformin, any metformin containing medication, on day of the procedure (has no administration in time range)   nitroGLYcerin (NITRODUR) 0.2 MG/HR 24 hr patch 2 patch (2 patches Transdermal Patch/Med Applied 5/23/21 1653)   nitroGLYcerin (NITRODUR) Patch in Place ( Transdermal Patch in Place 5/23/21 1654)   morphine (PF) injection 2 mg (has no administration in time range)   naloxone (NARCAN) injection 0.2 mg (has no administration in time range)     Or   naloxone  (NARCAN) injection 0.4 mg (has no administration in time range)     Or   naloxone (NARCAN) injection 0.2 mg (has no administration in time range)     Or   naloxone (NARCAN) injection 0.4 mg (has no administration in time range)   aspirin EC tablet 81 mg (has no administration in time range)   atorvastatin (LIPITOR) tablet 40 mg (40 mg Oral Given 5/23/21 1802)   amLODIPine (NORVASC) tablet 5 mg (5 mg Oral Given 5/23/21 1800)   docusate sodium (COLACE) capsule 200 mg (200 mg Oral Given 5/23/21 1800)   clopidogrel (PLAVIX) tablet 75 mg (75 mg Oral Given 5/23/21 1803)   calcium carbonate (TUMS) chewable tablet 1,000-2,000 mg (has no administration in time range)   famotidine (PEPCID) tablet 40 mg (has no administration in time range)   folic acid (FOLVITE) tablet 1,000 mcg (1,000 mcg Oral Given 5/23/21 1803)   isosorbide mononitrate (IMDUR) 24 hr tablet 120 mg (has no administration in time range)   levETIRAcetam (KEPPRA) tablet 500 mg (has no administration in time range)   metoprolol succinate ER (TOPROL-XL) 24 hr tablet 50 mg (has no administration in time range)   lisinopril (ZESTRIL) tablet 2.5 mg (2.5 mg Oral Given 5/23/21 1802)   sodium chloride 0.9% infusion ( Intravenous New Bag 5/23/21 1804)   mirtazapine (REMERON) tablet 30 mg (has no administration in time range)   heparin loading dose for LOW INTENSITY TREATMENT * Give BEFORE starting heparin infusion (3,200 Units Intravenous Given 5/23/21 1330)   aspirin (ASA) chewable tablet 324 mg (324 mg Oral Given 5/23/21 1324)   predniSONE (DELTASONE) half-tab 2.5 mg (2.5 mg Oral Given 5/23/21 1807)       Assessments & Plan      I have reviewed the findings, diagnosis, plan with the patient.    Current Discharge Medication List          Final diagnoses:   Chest pain, unspecified type     Disposition: Patient admitted in stable condition.    Note: Chart documentation done in part with Dragon Voice Recognition software. Although reviewed after completion, some word and  grammatical errors may remain.     5/23/2021   [facility] EMERGENCY DEPT     Asiya Waters MD  05/23/21 1948

## 2021-05-23 NOTE — H&P
[facility]    History and Physical - Hospitalist Service       Date of Admission:  5/23/2021    Assessment & Plan   Gillian Burk is a 89 year old female admitted on 5/23/2021. She is being admitted under observation status to a telemetry monitored bed    1/.  Angina: Appears to be stable, she has had this issue in the past.  At this point in time she is on Imdur 120 mg a day.  I do not see any evidence of ACS.  We will keep her in a monitored bed continue with the Nitropaste.  We will serially trend her troponins and intervene as appropriate.  2/.  Coronary artery disease: Extensive history will obtain a stress test tomorrow, will request cardiology input into the situation.  3/.  Heparin: Does not seem to have any evidence of ACS so I am not sure that we need to continue this if we have 3 serial troponins is negative.  Or no new EKG changes  4/.  CODE STATUS as mentioned is full code.  Daughter is her surrogate decision maker  5/.  Hypertension clearly elevated blood pressure may need to intervene in this.  6/.  Chronic arthritis with prednisone dependence: Continue with home medication doses of 5 mg alternating with 2.5 mg.    Care plan was discussed with patient and her daughter and they were in agreement with the same       Diet:  Cardiac diet  DVT Prophylaxis: Patient was started on heparin in the emergency department  Reyes Catheter: not present  Code Status:  Full code, discussed with patient         Disposition Plan   Expected discharge: Tomorrow, recommended to prior living arrangement once ACS ruled out and cardiology eval.  Entered: Libra Sullivan MD 05/23/2021, 1:49 PM     The patient's care was discussed with the Patient and Patient's Family.  Information was also provided to the care team    Libra Sullivan MD  [facility]  Contact information available via [facility]  Paging/Directory      ______________________________________________________________________    Chief Complaint   Chest pain    History is obtained from the patient, ER provider and EHR    History of Present Illness   Gillian Burk is a 89 year old female who has a past medical history of TAVR for aortic stenosis in August 2016, known coronary artery disease, recent stent placement for angina in 2021, secondary Sjogren's syndrome on chronic prednisone therapy, osteoarthritis, osteopenia, hypothyroid state.  Patient states she was in her usual state of health when she went to bed last night.  She states she woke up this morning and had to go to the bathroom to urinate and when she returned she experienced left-sided chest pain.  She thought it was exacerbation of her arthritis and she used some topical Aspercreme.  She waited about 15 or 20 minutes and things did not get better and at that point in time she took a nitroglycerin which resolved the pain.  She states about 30 minutes later she had recurrence of chest pain and this happened when she was getting up and moving around.  Hence at home she took a total of 3 nitroglycerin with good result but the recurrence of chest pain and at that point in time because of these issues she was brought to the emergency department by her daughter who lives with her.  In the emergency department, she was given a whole aspirin equivalent and then the patient got up to go to the bathroom when she developed chest pain again.  Nitropaste was applied to her at about 9:40 AM and subsequent to that she had transient chest pain once again in the next hour.  However at the time of my evaluation of the patient at about 1:45 PM.  Patient had already gone to the bathroom twice without any chest pain.  The emergency room provider had spoken with cardiology who advised heparin and admission to the hospital with troponin trending.  At the time of my evaluation of the patient in emergency  room she is accompanied by her daughter and she denies any complaints whatsoever.  She denies any shortness of breath, nausea or vomiting, she denies any diaphoresis.  She is on clopidogrel and a baby aspirin every day.  She is very diligent with her medications.  She was however hypertensive in the emergency room and her pulse rate was elevated in the mid 80s    Review of Systems    The 10 point Review of Systems is negative other than noted in the HPI or here.     Past Medical History    I have reviewed this patient's medical history and updated it with pertinent information if needed.   Past Medical History:   Diagnosis Date     Aortic stenosis     s/p TAVR 8/17/16     Chronic migraine      Hyperlipidaemia      Hypertension      Hypothyroidism      Myocardial infarction (H)      Need for SBE (subacute bacterial endocarditis) prophylaxis      Orthostatic hypotension     wears compression stockings     PUD (peptic ulcer disease)      Secondary Sjogren's syndrome (H)      Seizures (H)     after stroke (partial onset)     Seropositive rheumatoid arthritis (H)      Stroke (H) 05/2013    with visual field cut, left occipital lobe     Syncope 2014    due to orthostatic hypotension       Past Surgical History   I have reviewed this patient's surgical history and updated it with pertinent information if needed.  Past Surgical History:   Procedure Laterality Date     C TOTAL HIP ARTHROPLASTY Right 2010     C TOTAL HIP ARTHROPLASTY Left 2014     CATARACT IOL, RT/LT Bilateral 2000     CV FLUOROSCOPY ONLY N/A 8/6/2019    Procedure: Fluoroscopy Only - valve cine done of aortic valve at 180 degrees [place] to JUNG;  Surgeon: Dave Farfan MD;  Location:  HEART CARDIAC CATH LAB     CV HEART CATHETERIZATION WITH POSSIBLE INTERVENTION N/A 2/10/2021    Procedure: Heart Catheterization with Possible Intervention;  Surgeon: Fish Padgett MD;  Location:  HEART CARDIAC CATH LAB     EYE SURGERY  1999    macular pucker  eye surgery     HEART CATH FEMORAL CANNULIZATION WITH OPEN STANDBY REPAIR AORTIC VALVE N/A 8/3/2016    Procedure: HEART CATH FEMORAL CANNULIZATION WITH OPEN STANDBY REPAIR AORTIC VALVE;  Surgeon: Owen Schultz MD;  Location: [facility] OR     HYSTERECTOMY TOTAL ABDOMINAL  1970    ovaries out     MOUTH SURGERY  2011    jaw surgery due to fracture     TRANSCATHETER AORTIC VALVE IMPLANT ANESTHESIA N/A 8/3/2016    Procedure: TRANSCATHETER AORTIC VALVE IMPLANT ANESTHESIA;  Surgeon: GENERIC ANESTHESIA PROVIDER;  Location: [facility] OR       Social History   I have reviewed this patient's social history and updated it with pertinent information if needed.  Social History     Tobacco Use     Smoking status: Never Smoker     Smokeless tobacco: Never Used   Substance Use Topics     Alcohol use: No     Alcohol/week: 0.0 standard drinks     Drug use: No       Family History   I have reviewed this patient's family history and updated it with pertinent information if needed.  Family History   Problem Relation Age of Onset     Hyperlipidemia Mother      Family History Negative No family hx of        Prior to Admission Medications   Prior to Admission Medications   Prescriptions Last Dose Informant Patient Reported? Taking?   ALPRAZolam (XANAX) 0.25 MG tablet   No No   Sig: Take 1 tablet by mouth twice daily as needed for anxiety   Cranberry 1000 MG CAPS   Yes No   EUTHYROX 75 MCG tablet   No No   Sig: Take 1 tablet by mouth once daily   Lactobacillus (FLORAJEN ACIDOPHILUS) CAPS   Yes No   Sig: Take 1 capsule by mouth daily   SLOW  (45 Fe) MG CR tablet   No No   Sig: Take 1 tablet by mouth once daily   acetaminophen (TYLENOL ARTHRITIS PAIN) 650 MG CR tablet   Yes No   Sig: Take 1,300 mg by mouth every 8 hours as needed for mild pain    amLODIPine (NORVASC) 5 MG tablet   No No   Sig: Take 1 tablet (5 mg) by mouth daily   aspirin EC 81 MG EC tablet   No No   Sig: Take 1 tablet (81 mg) by mouth daily   atorvastatin (LIPITOR) 40 MG  tablet   No No   Sig: Take 1 tablet (40 mg) by mouth daily   calcium carbonate (TUMS) 500 MG chewable tablet   Yes No   Sig: Take 2-4 tablets (1,000-2,000 mg) by mouth as needed for heartburn   clopidogrel (PLAVIX) 75 MG tablet   No No   Sig: Take 1 tablet (75 mg) by mouth daily   docusate sodium (COLACE) 100 MG capsule   Yes No   Sig: Take 200 mg by mouth daily 2 capsules   famotidine (PEPCID) 40 MG tablet   No No   Sig: TAKE 1 TABLET BY MOUTH AT BEDTIME   folic acid (FOLVITE) 1 MG tablet   No No   Sig: Take 1 tablet by mouth once daily   isosorbide mononitrate (IMDUR) 60 MG 24 hr tablet   No No   Sig: Take 2 tablets (120 mg) by mouth daily   levETIRAcetam (KEPPRA) 500 MG tablet   No No   Sig: TAKE 1 TABLET BY MOUTH ONCE DAILY THEN 2 AT BEDTIME   lisinopril (ZESTRIL) 2.5 MG tablet   No No   Sig: Take 1 tablet (2.5 mg) by mouth daily   methotrexate 2.5 MG tablet   No No   Sig: Take 4 tablets (10 mg) by mouth once a week Wed   metoprolol succinate ER (TOPROL-XL) 50 MG 24 hr tablet   No No   Sig: Take 1 tablet (50 mg) by mouth 2 times daily   mirtazapine (REMERON) 30 MG tablet   No No   Sig: TAKE 1 TABLET BY MOUTH ONCE DAILY AT BEDTIME   nitroGLYcerin (NITROSTAT) 0.4 MG sublingual tablet 5/23/2021 at 0815  No Yes   Sig: DISSOLVE ONE TABLET UNDER THE TONGUE EVERY 5 MINUTES AS NEEDED FOR CHEST PAIN.  DO NOT EXCEED A TOTAL OF 3 DOSES IN 15 MINUTES   predniSONE (DELTASONE) 5 MG tablet   No No   Sig: TAKE ONE TABLET BY MOUTH EVERY OTHER DAY ALTERNATING  WITH  1/2  TABLET  EVERY  OTHER  DAY      Facility-Administered Medications: None     Allergies   Allergies   Allergen Reactions     Penicillins Hives     Caffeine Other (See Comments) and Unknown     Triggers your Meniere's disease     Hydrocodone Other (See Comments) and Unknown     hallucinations     Ibuprofen GI Disturbance and Unknown     Other Environmental Allergy      Metals of unspecific kind     Tramadol Other (See Comments)     Seizure, was taking remeron along  with tramadol     Metal [Staples] Rash       Physical Exam   Vital Signs: Temp: 96.7  F (35.9  C) Temp src: Oral BP: (!) 152/84 Pulse: 73   Resp: 12 SpO2: 98 % O2 Device: None (Room air)    Weight: 117 lbs 0 oz    General Appearance: Alert awake oriented x3 appears much younger than stated age very pleasant  Eyes: PERRLA  HEENT: Oropharynx is clear dentures are noted.  Respiratory: Clear to auscultation  Cardiovascular: S1-S2 regular rate and rhythm  GI: Abdomen is soft, nontender nondistended bowel sounds are present  Lymph/Hematologic: No obvious lymph nodes are noted  Genitourinary: Not evaluated  Skin: Free of lesions and rashes  Musculoskeletal: Some small joint abnormalities and deformities noted without any redness or warmth  Neurologic: No obvious neurological deficit  Psychiatric: Pleasant and cooperative    Data   Data reviewed today: I reviewed all medications, new labs and imaging results over the last 24 hours. I personally reviewed the EKG tracing showing No acute changes, no STEMI no new bundle branch block.    Recent Labs   Lab 05/23/21  1208 05/23/21  0934   WBC  --  7.9   HGB  --  8.7*   MCV  --  94   PLT  --  295   NA  --  138   POTASSIUM  --  3.4   CHLORIDE  --  106   CO2  --  28   BUN  --  19   CR  --  0.82   ANIONGAP  --  4   CHEYANNE  --  7.9*   GLC  --  91   ALBUMIN  --  2.6*   PROTTOTAL  --  8.1   BILITOTAL  --  0.2   ALKPHOS  --  77   ALT  --  15   AST  --  18   LIPASE  --  167   TROPI 0.038 0.030

## 2021-05-23 NOTE — ED NOTES
Pt up BR with SBA. Pt tolerated well. Pt back to bed with VS and IV attached. Dinner tray removed. No other requests at this time. Pt updated on bed status.

## 2021-05-24 LAB
ANION GAP SERPL CALCULATED.3IONS-SCNC: 7 MMOL/L (ref 3–14)
BUN SERPL-MCNC: 15 MG/DL (ref 7–30)
CALCIUM SERPL-MCNC: 7.9 MG/DL (ref 8.5–10.1)
CHLORIDE SERPL-SCNC: 105 MMOL/L (ref 94–109)
CO2 SERPL-SCNC: 26 MMOL/L (ref 20–32)
CREAT SERPL-MCNC: 0.86 MG/DL (ref 0.52–1.04)
ERYTHROCYTE [DISTWIDTH] IN BLOOD BY AUTOMATED COUNT: 18.7 % (ref 10–15)
GFR SERPL CREATININE-BSD FRML MDRD: 58 ML/MIN/{1.73_M2}
GLUCOSE SERPL-MCNC: 88 MG/DL (ref 70–99)
HCT VFR BLD AUTO: 31.8 % (ref 35–47)
HGB BLD-MCNC: 10.3 G/DL (ref 11.7–15.7)
MCH RBC QN AUTO: 29.9 PG (ref 26.5–33)
MCHC RBC AUTO-ENTMCNC: 32.4 G/DL (ref 31.5–36.5)
MCV RBC AUTO: 92 FL (ref 78–100)
PLATELET # BLD AUTO: 311 10E9/L (ref 150–450)
POTASSIUM SERPL-SCNC: 3.5 MMOL/L (ref 3.4–5.3)
RBC # BLD AUTO: 3.44 10E12/L (ref 3.8–5.2)
SODIUM SERPL-SCNC: 138 MMOL/L (ref 133–144)
TROPONIN I SERPL-MCNC: 0.05 UG/L (ref 0–0.04)
TROPONIN I SERPL-MCNC: 0.05 UG/L (ref 0–0.04)
UFH PPP CHRO-ACNC: 0.14 IU/ML
UFH PPP CHRO-ACNC: 0.71 IU/ML
WBC # BLD AUTO: 5.2 10E9/L (ref 4–11)

## 2021-05-24 PROCEDURE — 96366 THER/PROPH/DIAG IV INF ADDON: CPT

## 2021-05-24 PROCEDURE — 85520 HEPARIN ASSAY: CPT | Performed by: INTERNAL MEDICINE

## 2021-05-24 PROCEDURE — 250N000013 HC RX MED GY IP 250 OP 250 PS 637: Performed by: PEDIATRICS

## 2021-05-24 PROCEDURE — G0378 HOSPITAL OBSERVATION PER HR: HCPCS

## 2021-05-24 PROCEDURE — 99225 PR SUBSEQUENT OBSERVATION CARE,LEVEL II: CPT | Performed by: INTERNAL MEDICINE

## 2021-05-24 PROCEDURE — 80048 BASIC METABOLIC PNL TOTAL CA: CPT | Performed by: INTERNAL MEDICINE

## 2021-05-24 PROCEDURE — 250N000013 HC RX MED GY IP 250 OP 250 PS 637: Performed by: INTERNAL MEDICINE

## 2021-05-24 PROCEDURE — 36415 COLL VENOUS BLD VENIPUNCTURE: CPT | Performed by: PEDIATRICS

## 2021-05-24 PROCEDURE — 36415 COLL VENOUS BLD VENIPUNCTURE: CPT | Performed by: INTERNAL MEDICINE

## 2021-05-24 PROCEDURE — 84484 ASSAY OF TROPONIN QUANT: CPT | Performed by: INTERNAL MEDICINE

## 2021-05-24 PROCEDURE — 85027 COMPLETE CBC AUTOMATED: CPT | Performed by: INTERNAL MEDICINE

## 2021-05-24 PROCEDURE — 85520 HEPARIN ASSAY: CPT | Performed by: PEDIATRICS

## 2021-05-24 PROCEDURE — 99207 PR CDG-CODE CATEGORY CHANGED: CPT | Performed by: INTERNAL MEDICINE

## 2021-05-24 RX ORDER — ISOSORBIDE MONONITRATE 30 MG/1
60 TABLET, EXTENDED RELEASE ORAL 2 TIMES DAILY
Status: DISCONTINUED | OUTPATIENT
Start: 2021-05-25 | End: 2021-05-25 | Stop reason: HOSPADM

## 2021-05-24 RX ORDER — FAMOTIDINE 20 MG/1
20 TABLET, FILM COATED ORAL AT BEDTIME
Status: DISCONTINUED | OUTPATIENT
Start: 2021-05-24 | End: 2021-05-25 | Stop reason: HOSPADM

## 2021-05-24 RX ADMIN — AMLODIPINE BESYLATE 5 MG: 5 TABLET ORAL at 08:34

## 2021-05-24 RX ADMIN — LISINOPRIL 2.5 MG: 2.5 TABLET ORAL at 08:34

## 2021-05-24 RX ADMIN — LEVETIRACETAM 500 MG: 500 TABLET, FILM COATED ORAL at 21:17

## 2021-05-24 RX ADMIN — CLOPIDOGREL BISULFATE 75 MG: 75 TABLET ORAL at 08:34

## 2021-05-24 RX ADMIN — ATORVASTATIN CALCIUM 40 MG: 40 TABLET, FILM COATED ORAL at 08:35

## 2021-05-24 RX ADMIN — LEVETIRACETAM 500 MG: 500 TABLET, FILM COATED ORAL at 08:34

## 2021-05-24 RX ADMIN — FOLIC ACID 1000 MCG: 1 TABLET ORAL at 08:34

## 2021-05-24 RX ADMIN — METOPROLOL SUCCINATE 50 MG: 50 TABLET, EXTENDED RELEASE ORAL at 08:35

## 2021-05-24 RX ADMIN — ISOSORBIDE MONONITRATE 120 MG: 30 TABLET, EXTENDED RELEASE ORAL at 08:34

## 2021-05-24 RX ADMIN — NITROGLYCERIN 2 PATCH: 0.2 PATCH TRANSDERMAL at 16:18

## 2021-05-24 RX ADMIN — MIRTAZAPINE 30 MG: 15 TABLET, FILM COATED ORAL at 21:16

## 2021-05-24 RX ADMIN — DOCUSATE SODIUM 200 MG: 100 CAPSULE, LIQUID FILLED ORAL at 08:34

## 2021-05-24 RX ADMIN — FAMOTIDINE 20 MG: 20 TABLET ORAL at 21:16

## 2021-05-24 RX ADMIN — ASPIRIN 81 MG: 81 TABLET, DELAYED RELEASE ORAL at 08:34

## 2021-05-24 ASSESSMENT — ACTIVITIES OF DAILY LIVING (ADL)
DEPENDENT_IADLS:: CLEANING;COOKING;LAUNDRY;SHOPPING;MEAL PREPARATION;MEDICATION MANAGEMENT;MONEY MANAGEMENT;TRANSPORTATION

## 2021-05-24 NOTE — CONSULTS
Care Management Initial Consult    General Information  Assessment completed with: Patient, Children, Patient and daughter- Hanna   Type of CM/SW Visit: Initial Assessment    Primary Care Provider verified and updated as needed: Yes   Readmission within the last 30 days:        Reason for Consult: discharge planning  Advance Care Planning: Advance Care Planning Reviewed: present on chart          Communication Assessment  Patient's communication style: spoken language (English or Bilingual)    Hearing Difficulty or Deaf: yes   Wear Glasses or Blind: no    Cognitive  Cognitive/Neuro/Behavioral: WDL                      Living Environment:   People in home: facility resident     Current living Arrangements: assisted living  Name of Facility: [facility]    Able to return to prior arrangements: yes       Family/Social Support:  Care provided by: self, other (see comments)(family and staff at [facility] )  Provides care for: no one  Marital Status:   Children, Facility resident(s)/Staff          Description of Support System: Supportive, Involved    Support Assessment: Adequate family and caregiver support, Adequate social supports    Current Resources:   Patient receiving home care services: No     Community Resources: [facility] Worker  Equipment currently used at home: cane, straight  Supplies currently used at home: None    Employment/Financial:  Employment Status: retired        Financial Concerns: No concerns identified           Lifestyle & Psychosocial Needs:        Socioeconomic History     Marital status:      Spouse name: Not on file     Number of children: 4     Years of education: Not on file     Highest education level: Not on file   Occupational History     Employer: RETIRED     Tobacco Use     Smoking status: Never Smoker     Smokeless tobacco: Never Used   Substance and Sexual Activity     Alcohol use: No     Alcohol/week: 0.0 standard drinks     Drug  use: No     Sexual activity: Not Currently       Functional Status:  Prior to admission patient needed assistance:   Dependent ADLs:: Ambulation-cane  Dependent IADLs:: Cleaning, Cooking, Laundry, Shopping, Meal Preparation, Medication Management, Money Management, Transportation  Assesssment of Functional Status: Not at baseline with ADL Functioning    Mental Health Status:  Mental Health Status: No Current Concerns       Chemical Dependency Status:  Chemical Dependency Status: No Current Concerns             Values/Beliefs:  Spiritual, Cultural Beliefs, Religion Practices, Values that affect care:            Values/Beliefs Comment: unknown     Additional Information:  Care Management has been consulted for discharge planning.  Writer visited with patient and her daughter, Jennifer.  Patient lives at [facility]. She gets help from staff with showers 1x per week, some light cleaning, washing bed sheets, 3 meals per day and medication reminders.  Daughter, Jennifer, helps with setting up her pills and medication management.  Patient is able to walk with at cane at baseline.      Patient has a [facility] , Massiel Pierce #[phone].      Per patient and daughter's request, writer has spoken with patient's nurse, Brenda, at [facility].  Discussed that patient most likely will be ready for discharge back to [facility] tomorrow.      No discharge needs have been indicated at this time. Care Management will continue to follow.      María Honeycutt [facility]   [phone]

## 2021-05-24 NOTE — PROGRESS NOTES
PRIMARY DIAGNOSIS: CHEST PAIN  OUTPATIENT/OBSERVATION GOALS TO BE MET BEFORE DISCHARGE:  1. Ruled out acute coronary syndrome (negative or stable Troponin):  Troponins of 0.038 and 0.044  2. Pain Status: Pain free.  3. Appropriate provocative testing performed: Yes, to be preformed  - Stress Test Procedure: Nuclear, to be done this week  - Interpretation of cardiac rhythm per telemetry tech: NSR with BBB    4. Cleared by Consultants (if applicable):N/A  5. Return to near baseline physical activity: Yes  Discharge Planner Nurse   Safe discharge environment identified: Yes  Barriers to discharge: No       Entered by: Effie Fleming 05/24/2021 1:05 AM     Please review provider order for any additional goals.   Nurse to notify provider when observation goals have been met and patient is ready for discharge.

## 2021-05-24 NOTE — PROGRESS NOTES
[facility]    Medicine Progress Note - Hospitalist Service       Date of Admission:  5/23/2021  Assessment & Plan       Gillian Burk is a 89 year old female admitted on 5/23/2021. She is  admitted under observation status to a telemetry monitored bed    # coronary artery disease s/p CHANDRAKANT in Feb 2021  # Chest pain r/o ACS  # history of severe aortic valve stenosis (status post TAVR in 8/2016 (Friedens scientific Treva Valve)  # ischemic cardiomyopathy (EF 35-40%)  # HTN     -05/24: She is remained pain-free since admission and for troponins have remained negative.  Case was discussed with Dr. Monson (her primary cardiologist) who agreed with stopping heparin drip.  With the recurrence of chest pain she needs to have further evaluation for possible stent thrombosis.   -Discussed options with patient about continuing medical management and follow-up as outpatient versus getting stress test as inpatient (tomorrow as none available in the hospital today): She and her daughter agreed to stay overnight for possible stress test tomorrow.  -Continue her home medications of Imdur 120 mg daily, Norvasc 5 mg daily, aspirin 81 mg daily, Plavix, Lipitor   -We will discontinue heparin gtt. and monitor for any recurrence of symptoms    #  Chronic arthritis with prednisone dependence: Continue with home medication doses of 5 mg alternating with 2.5 mg.    # history of CVA in 2013 (on chronic Plavix therapy)  - ct Keppra    # Chronic anemia (receiving Iron infusions regularly; stable 8-9 with no recent hx of acute bleeding issues)       Diet: Low Saturated Fat Na <2400 mg    DVT Prophylaxis: ASA, Plavix and was on Heparin gtt  Reyes Catheter: not present  Code Status: Full Code           Disposition Plan   Expected discharge: Tomorrow, recommended to prior living arrangement once adequate pain management/ tolerating PO medications and stress test completed .  Entered: Abilio Victoria MD, MD  05/24/2021, 12:26 PM       The patient's care was discussed with the Bedside Nurse, Care Coordinator/, Patient, Patient's Family and Cardiology Consultant.    Abilio Victoria MD, MD  Hospitalist Service  [facility]  Contact information available via [facility] Paging/Directory    ______________________________________________________________________    Interval History   History reviewed.  Presented with episodes of chest pain yesterday afternoon which felt like her CAD pain from February.  The pain resolved with repeated doses of nitroglycerin and being on heparin drip.  Currently Denies Chest pain/ SOB/ cough, Denies any N&V/ bowel problems  Denies urinary problems , Denies fever/chills/rigors     Data reviewed today: I reviewed all medications, new labs and imaging results over the last 24 hours. I personally reviewed no images or EKG's today.    Physical Exam   /56   Pulse 75   Temp 96.3  F (35.7  C) (Oral)   Resp 16   Wt 52.5 kg (115 lb 12.8 oz)   SpO2 95%   BMI 18.69 kg/m    Gen- pleasant elderly lying in bed  HEENT- NAD, KAYLEN  Neck- supple, no JVD elevation, no thyromegaly  CVS- I+II+ soft ESM  RS- CTAB  Abdo- soft, no tenderness . No g/r/r   Ext- no edema     Data    BMP  Recent Labs   Lab 05/24/21  0829 05/23/21  0934    138   POTASSIUM 3.5 3.4   CHLORIDE 105 106   CHEYANNE 7.9* 7.9*   CO2 26 28   BUN 15 19   CR 0.86 0.82   GLC 88 91     CBC  Recent Labs   Lab 05/24/21  0829 05/23/21  0934   WBC 5.2 7.9   RBC 3.44* 2.88*   HGB 10.3* 8.7*   HCT 31.8* 27.1*   MCV 92 94   MCH 29.9 30.2   MCHC 32.4 32.1   RDW 18.7* 18.2*    295     INRNo lab results found in last 7 days.  LFTs  Recent Labs   Lab 05/23/21  0934   ALKPHOS 77   AST 18   ALT 15   BILITOTAL 0.2   PROTTOTAL 8.1   ALBUMIN 2.6*      PANC  Recent Labs   Lab 05/23/21  0934   LIPASE 167     Lab Results   Component Value Date    TROPI 0.047 (H) 05/24/2021    TROPI 0.050 (H) 05/24/2021    TROPI  0.044 05/23/2021    TROPI 0.038 05/23/2021    TROPI 0.030 05/23/2021       No results found for this or any previous visit (from the past 24 hour(s)).

## 2021-05-24 NOTE — PROGRESS NOTES
PRIMARY DIAGNOSIS: CHEST PAIN  OUTPATIENT/OBSERVATION GOALS TO BE MET BEFORE DISCHARGE:  1. Ruled out acute coronary syndrome (negative or stable Troponin):  Recent troponin level of 0.047.  2. Pain Status: Pain free.  3. Appropriate provocative testing performed: Yes  - Stress Test Procedure: [place]  - Interpretation of cardiac rhythm per telemetry tech: NSR    4. Cleared by Consultants (if applicable):N/A  5. Return to near baseline physical activity: Yes  Discharge Planner Nurse   Safe discharge environment identified: Yes  Barriers to discharge: No       Entered by: Elizabeth Hernandez 05/24/2021 6:47 PM     Please review provider order for any additional goals.   Nurse to notify provider when observation goals have been met and patient is ready for discharge.

## 2021-05-24 NOTE — PLAN OF CARE
"Feeling better. Stated she had two twinges of chest pain in the last 8 hours that lasted \"maybe two seconds\". Otherwise has denied pain. Reading in bed. VSS Heparin Xa was <0.10 at 1940, so received bolus of 3150 units, and then rate was increased to 950 units/hour. Heparin Xa will be rechecked at 0315. Troponin at 1800 was 0.044. Telemetry was SR with first degree AVB with a BBB. Bed alarm on. Will continue to monitor Heparin Xa, chest pain, telemetry, safety.   "

## 2021-05-24 NOTE — PLAN OF CARE
Vital signs:  Temp: 96.8  F (36  C) Temp src: Oral BP: 132/59 Pulse: 78   Resp: 18 SpO2: 96 % O2 Device: None (Room air)       Patient is A&Ox4. Denies any chest pain or new pain. Heparin Xa was 0.71. Heparin infusion stopped for 1 hour and restaredt with a dose decrease of 300 units. Heparin is now infusing at 650 units an hour. Troponin up from 0.044 to 0.050. Lungs are clear. Skin intact. Up to the bathroom SBA with walker to bathroom frequently. No complaints at this time. Will continue to monitor.

## 2021-05-25 ENCOUNTER — APPOINTMENT (OUTPATIENT)
Dept: CARDIOLOGY | Facility: CLINIC | Age: 86
End: 2021-05-25
Attending: INTERNAL MEDICINE
Payer: COMMERCIAL

## 2021-05-25 ENCOUNTER — APPOINTMENT (OUTPATIENT)
Dept: NUCLEAR MEDICINE | Facility: CLINIC | Age: 86
End: 2021-05-25
Attending: INTERNAL MEDICINE
Payer: COMMERCIAL

## 2021-05-25 VITALS
DIASTOLIC BLOOD PRESSURE: 69 MMHG | WEIGHT: 115.8 LBS | BODY MASS INDEX: 18.69 KG/M2 | HEART RATE: 90 BPM | OXYGEN SATURATION: 96 % | TEMPERATURE: 95.8 F | SYSTOLIC BLOOD PRESSURE: 162 MMHG | RESPIRATION RATE: 18 BRPM

## 2021-05-25 LAB
CV STRESS MAX HR HE: 98
NUC STRESS EJECTION FRACTION: 62 %
RATE PRESSURE PRODUCT: NORMAL
STRESS ECHO BASELINE DIASTOLIC HE: 78
STRESS ECHO BASELINE HR: 81
STRESS ECHO BASELINE SYSTOLIC BP: 158
STRESS ECHO CALCULATED PERCENT HR: 78 %
STRESS ECHO LAST STRESS DIASTOLIC BP: 70
STRESS ECHO LAST STRESS SYSTOLIC BP: 130
STRESS ECHO TARGET HR: 126
STRESS/REST PERFUSION RATIO: 1.02

## 2021-05-25 PROCEDURE — 93018 CV STRESS TEST I&R ONLY: CPT | Performed by: INTERNAL MEDICINE

## 2021-05-25 PROCEDURE — 78452 HT MUSCLE IMAGE SPECT MULT: CPT | Mod: 26 | Performed by: INTERNAL MEDICINE

## 2021-05-25 PROCEDURE — 250N000013 HC RX MED GY IP 250 OP 250 PS 637: Performed by: INTERNAL MEDICINE

## 2021-05-25 PROCEDURE — 78452 HT MUSCLE IMAGE SPECT MULT: CPT

## 2021-05-25 PROCEDURE — 99207 PR CDG-CODE CATEGORY CHANGED: CPT | Performed by: INTERNAL MEDICINE

## 2021-05-25 PROCEDURE — 250N000011 HC RX IP 250 OP 636: Performed by: INTERNAL MEDICINE

## 2021-05-25 PROCEDURE — 93016 CV STRESS TEST SUPVJ ONLY: CPT | Performed by: INTERNAL MEDICINE

## 2021-05-25 PROCEDURE — 96375 TX/PRO/DX INJ NEW DRUG ADDON: CPT

## 2021-05-25 PROCEDURE — 99217 PR OBSERVATION CARE DISCHARGE: CPT | Performed by: INTERNAL MEDICINE

## 2021-05-25 PROCEDURE — 343N000001 HC RX 343

## 2021-05-25 PROCEDURE — 93017 CV STRESS TEST TRACING ONLY: CPT

## 2021-05-25 PROCEDURE — G0378 HOSPITAL OBSERVATION PER HR: HCPCS

## 2021-05-25 PROCEDURE — A9502 TC99M TETROFOSMIN: HCPCS

## 2021-05-25 RX ORDER — NITROGLYCERIN 0.4 MG/1
TABLET SUBLINGUAL
Qty: 25 TABLET | Refills: 0 | Status: SHIPPED | OUTPATIENT
Start: 2021-05-25 | End: 2021-12-16

## 2021-05-25 RX ORDER — REGADENOSON 0.08 MG/ML
0.4 INJECTION, SOLUTION INTRAVENOUS ONCE
Status: COMPLETED | OUTPATIENT
Start: 2021-05-25 | End: 2021-05-25

## 2021-05-25 RX ORDER — AMINOPHYLLINE 25 MG/ML
50 INJECTION, SOLUTION INTRAVENOUS ONCE
Status: COMPLETED | OUTPATIENT
Start: 2021-05-25 | End: 2021-05-25

## 2021-05-25 RX ADMIN — TETROFOSMIN 8 MCI.: 1.38 INJECTION, POWDER, LYOPHILIZED, FOR SOLUTION INTRAVENOUS at 11:30

## 2021-05-25 RX ADMIN — DOCUSATE SODIUM 200 MG: 100 CAPSULE, LIQUID FILLED ORAL at 08:28

## 2021-05-25 RX ADMIN — CLOPIDOGREL BISULFATE 75 MG: 75 TABLET ORAL at 08:28

## 2021-05-25 RX ADMIN — AMINOPHYLLINE 50 MG: 25 INJECTION, SOLUTION INTRAVENOUS at 13:56

## 2021-05-25 RX ADMIN — FOLIC ACID 1000 MCG: 1 TABLET ORAL at 08:29

## 2021-05-25 RX ADMIN — REGADENOSON 0.4 MG: 0.08 INJECTION, SOLUTION INTRAVENOUS at 13:25

## 2021-05-25 RX ADMIN — LISINOPRIL 2.5 MG: 2.5 TABLET ORAL at 08:28

## 2021-05-25 RX ADMIN — AMLODIPINE BESYLATE 5 MG: 5 TABLET ORAL at 08:28

## 2021-05-25 RX ADMIN — TETROFOSMIN 24.9 MCI.: 1.38 INJECTION, POWDER, LYOPHILIZED, FOR SOLUTION INTRAVENOUS at 13:36

## 2021-05-25 RX ADMIN — LEVETIRACETAM 500 MG: 500 TABLET, FILM COATED ORAL at 08:28

## 2021-05-25 RX ADMIN — ATORVASTATIN CALCIUM 40 MG: 40 TABLET, FILM COATED ORAL at 08:28

## 2021-05-25 RX ADMIN — ISOSORBIDE MONONITRATE 60 MG: 30 TABLET, EXTENDED RELEASE ORAL at 15:04

## 2021-05-25 RX ADMIN — ASPIRIN 81 MG: 81 TABLET, DELAYED RELEASE ORAL at 08:28

## 2021-05-25 NOTE — PROGRESS NOTES
S-(situation): Patient discharged to [facility] via wheelchair with daughter    B-(background): Observation goals met     A-(assessment): A&Ox4. VSS. Denies any chest pain. NM stress completed. Telemetry SR with BBB.    R-(recommendations): Discharge instructions reviewed with patient and daughter  . Listed belongings gathered and returned to patient.  Patient Education resolved: Yes  New medications-Pt. Has been educated about reason of use and side effects NA  Home medications returned to patient NA  Medication Bin checked and emptied on discharge Yes   Darius Sellers RN

## 2021-05-25 NOTE — DISCHARGE SUMMARY
[facility]  Hospitalist Discharge Summary      Date of Admission:  5/23/2021  Date of Discharge:  5/25/2021  Discharging Provider: Abilio Victoria MD, MD      Discharge Diagnoses     # Atypical Chest pain     Chronic     # coronary artery disease s/p CHANDRAKANT in Feb 2021  # Chest pain r/o ACS  # history of severe aortic valve stenosis (status post TAVR in 8/2016 ([employer] Valve)  # ischemic cardiomyopathy (EF 35-40%)  # HTN    #  Chronic arthritis with prednisone dependence:   # history of CVA in 2013 (on chronic Plavix therapy)  # Chronic anemia     Follow-ups Needed After Discharge   Follow-up Appointments     Follow-up and recommended labs and tests       Follow up with primary care provider, Augusto Meyer, within 7   days for hospital follow- up.        Follow up with Dr. Monson/ Cardiology , at (location with clinic name or   city) [facility], within 2 weeks  for hospital follow- up.           Unresulted Labs Ordered in the Past 30 Days of this Admission     No orders found from 4/23/2021 to 5/24/2021.        Discharge Disposition   Discharged to home  Condition at discharge: Stable      Hospital Course   Gillian Burk is a 89 year old female admitted on 5/23/2021. She is  admitted under observation status to a telemetry monitored bed     # coronary artery disease s/p CHANDRAKANT in Feb 2021  # Chest pain r/o ACS  # history of severe aortic valve stenosis (status post TAVR in 8/2016 ([employer]va Valve)  # ischemic cardiomyopathy (EF 35-40%)  # HTN      She was started on heparin gtt and admitted under observation. She remained pain-free since admission and  troponins remained negative. Her case was discussed with Dr. Monson (her primary cardiologist) who agreed with stopping heparin drip.   -Continued her home medications of Imdur 120 mg daily, Norvasc 5 mg daily, aspirin 81 mg daily, Plavix, Lipitor   -She had Stress test done which was negative  for any ischemia so she was discharged home with follow up with her cardiologist.     #  Chronic arthritis with prednisone dependence: Continued with home medication doses of 5 mg alternating with 2.5 mg.     # history of CVA in 2013 (on chronic Plavix therapy)  - ct Keppra     # Chronic anemia (receiving Iron infusions regularly; stable 8-9 with no recent hx of acute bleeding issues)        Consultations This Hospital Stay   CARDIOLOGY IP CONSULT  CARE MANAGEMENT / SOCIAL WORK IP CONSULT    Code Status   Full Code    Time Spent on this Encounter   I, Abilio Victoria MD, MD, personally saw the patient today and spent greater than 30 minutes discharging this patient.       Abilio Victoria MD, MD  [facility] MEDICAL SURGICAL  [address] [place] [address]  Phone: [phone]  ______________________________________________________________________    Physical Exam   Vital Signs: Temp: 95.8  F (35.4  C) Temp src: Oral BP: (!) 162/69 Pulse: 90   Resp: 18 SpO2: 96 % O2 Device: None (Room air)    Weight: 115 lbs 12.8 oz  Gen- pleasant elderly lying in bed  HEENT- NAD, KAYLEN  Neck- supple, no JVD elevation, no thyromegaly  CVS- I+II+ soft ESM  RS- CTAB  Abdo- soft, no tenderness . No g/r/r   Ext- no edema        Primary Care Physician   Augusto Meyer    Discharge Orders      Reason for your hospital stay    Gillian Burk is a 89 year old female admitted on 5/23/2021. She is  admitted under observation status to a telemetry monitored bed due to chest pain. Had serial troponin (negative ) and NM stress test (negative for ischemia)        Follow-up and recommended labs and tests     Follow up with primary care provider, Augusto Meyer, within 7 days for hospital follow- up.        Follow up with Dr. Monson/ Cardiology , at (location with clinic name or city) [facility], within 2 weeks  for hospital follow- up.     Activity    Your activity upon discharge: activity as  tolerated     When to contact your care team    Call your primary doctor if you have any of the following: temperature greater than 100.4 or less than 96,  increased shortness of breath or chest pain.     Diet    Follow this diet upon discharge: Orders Placed This Encounter      Low Saturated Fat Na <2400 mg       Significant Results and Procedures   Results for orders placed or performed during the hospital encounter of 05/23/21   XR Chest 2 Views    Narrative    CHEST TWO VIEWS  5/23/2021 10:27 AM     HISTORY: Chest pain.    COMPARISON: February 2, 2021       Impression    IMPRESSION: Increased reticulonodular changes in both mid and lower  lungs since the comparison study. No definite effusions. The cardiac  silhouette is not enlarged. Pulmonary vasculature is unremarkable.     PONCHO QURESHI MD   NM MPI w Lexiscan     Value    Target     Baseline Systolic     Baseline Diastolic BP 78    Last Stress Systolic     Last Stress Diastolic BP 70    Baseline HR 81    Max HR 98    Calculated Percent HR 78    Rate Pressure Product 12,740.0    Left Ventricular EF 62    Stress/rest perfusion ratio 1.02    Narrative       The nuclear stress test is negative for inducible myocardial ischemia   or infarction.     The left ventricular ejection fraction at stress is 62%.     There is no prior study for comparison.           Discharge Medications   Current Discharge Medication List      CONTINUE these medications which have NOT CHANGED    Details   acetaminophen (TYLENOL ARTHRITIS PAIN) 650 MG CR tablet Take 1,300 mg by mouth every 8 hours as needed for mild pain       ALPRAZolam (XANAX) 0.25 MG tablet Take 1 tablet by mouth twice daily as needed for anxiety  Qty: 28 tablet, Refills: 0    Associated Diagnoses: Anxiety      amLODIPine (NORVASC) 5 MG tablet Take 1 tablet (5 mg) by mouth daily  Qty: 90 tablet, Refills: 3    Associated Diagnoses: Chest pain, unspecified type      aspirin EC 81 MG EC tablet Take 1  tablet (81 mg) by mouth daily    Associated Diagnoses: S/P TAVR (transcatheter aortic valve replacement)      atorvastatin (LIPITOR) 40 MG tablet Take 1 tablet (40 mg) by mouth daily  Qty: 90 tablet, Refills: 3    Associated Diagnoses: Hyperlipidemia with target LDL less than 130      calcium carbonate (TUMS) 500 MG chewable tablet Take 2-4 tablets (1,000-2,000 mg) by mouth as needed for heartburn      clopidogrel (PLAVIX) 75 MG tablet Take 1 tablet (75 mg) by mouth daily  Qty: 90 tablet, Refills: 3    Associated Diagnoses: History of CVA (cerebrovascular accident)      Cranberry 1000 MG CAPS       docusate sodium (COLACE) 100 MG capsule Take 200 mg by mouth daily 2 capsules      EUTHYROX 75 MCG tablet Take 1 tablet by mouth once daily  Qty: 90 tablet, Refills: 1    Associated Diagnoses: Hypothyroidism due to acquired atrophy of thyroid      famotidine (PEPCID) 40 MG tablet TAKE 1 TABLET BY MOUTH AT BEDTIME  Qty: 30 tablet, Refills: 9    Comments: Please consider 90 day supplies to promote better adherence  Associated Diagnoses: Gastroesophageal reflux disease without esophagitis      folic acid (FOLVITE) 1 MG tablet Take 1 tablet by mouth once daily  Qty: 90 tablet, Refills: 1    Associated Diagnoses: Seropositive rheumatoid arthritis (H)      isosorbide mononitrate (IMDUR) 60 MG 24 hr tablet Take 2 tablets (120 mg) by mouth daily  Qty: 180 tablet, Refills: 3    Associated Diagnoses: Unstable angina (H)      Lactobacillus (FLORAJEN ACIDOPHILUS) CAPS Take 1 capsule by mouth daily      levETIRAcetam (KEPPRA) 500 MG tablet TAKE 1 TABLET BY MOUTH ONCE DAILY THEN 2 AT BEDTIME  Qty: 270 tablet, Refills: 1    Associated Diagnoses: Seizure disorder (H)      lisinopril (ZESTRIL) 2.5 MG tablet Take 1 tablet (2.5 mg) by mouth daily  Qty: 30 tablet, Refills: 11    Associated Diagnoses: CONTRERAS (dyspnea on exertion); Hypertension goal BP (blood pressure) < 140/90; S/P TAVR (transcatheter aortic valve replacement)      methotrexate  2.5 MG tablet Take 4 tablets (10 mg) by mouth once a week Wed  Qty: 52 tablet, Refills: 3    Associated Diagnoses: Seropositive rheumatoid arthritis (H)      metoprolol succinate ER (TOPROL-XL) 50 MG 24 hr tablet Take 1 tablet (50 mg) by mouth 2 times daily  Qty: 180 tablet, Refills: 3    Associated Diagnoses: Chest pain, unspecified type      mirtazapine (REMERON) 30 MG tablet TAKE 1 TABLET BY MOUTH ONCE DAILY AT BEDTIME  Qty: 90 tablet, Refills: 1    Associated Diagnoses: Sleep initiation disorder      nitroFURantoin macrocrystal-monohydrate (MACROBID) 100 MG capsule Take 100 mg by mouth daily Takes for prevention of UTI      predniSONE (DELTASONE) 5 MG tablet TAKE ONE TABLET BY MOUTH EVERY OTHER DAY ALTERNATING  WITH  1/2  TABLET  EVERY  OTHER  DAY  Qty: 23 tablet, Refills: 13    Associated Diagnoses: Seropositive rheumatoid arthritis (H)      SLOW  (45 Fe) MG CR tablet Take 1 tablet by mouth once daily  Qty: 90 tablet, Refills: 1    Associated Diagnoses: Iron deficiency anemia due to chronic blood loss      nitroGLYcerin (NITROSTAT) 0.4 MG sublingual tablet DISSOLVE ONE TABLET UNDER THE TONGUE EVERY 5 MINUTES AS NEEDED FOR CHEST PAIN.  DO NOT EXCEED A TOTAL OF 3 DOSES IN 15 MINUTES  Qty: 25 tablet, Refills: 0    Associated Diagnoses: Coronary artery disease involving native coronary artery of native heart without angina pectoris           Allergies   Allergies   Allergen Reactions     Penicillins Hives     Caffeine Other (See Comments) and Unknown     Triggers your Meniere's disease     Hydrocodone Other (See Comments) and Unknown     hallucinations     Ibuprofen GI Disturbance and Unknown     Other Environmental Allergy      Metals of unspecific kind     Tramadol Other (See Comments)     Seizure, was taking remeron along with tramadol     Metal [Staples] Rash

## 2021-05-25 NOTE — PROGRESS NOTES
.PRIMARY DIAGNOSIS: CHEST PAIN  OUTPATIENT/OBSERVATION GOALS TO BE MET BEFORE DISCHARGE:  1. Ruled out acute coronary syndrome (negative or stable Troponin):  Awaiting NM stress test.  2. Pain Status: Pain free.  3. Appropriate provocative testing performed: Yes  - Stress Test Procedure: Nuclear  - Interpretation of cardiac rhythm per telemetry tech: SR with BBB    4. Cleared by Consultants (if applicable):N/A  5. Return to near baseline physical activity: Yes  Discharge Planner Nurse   Safe discharge environment identified: Yes  Barriers to discharge: No       Entered by: Darius Sellers 05/25/2021 4:38 PM     Please review provider order for any additional goals.   Nurse to notify provider when observation goals have been met and patient is ready for discharge.

## 2021-05-25 NOTE — PROGRESS NOTES
PRIMARY DIAGNOSIS: CHEST PAIN  OUTPATIENT/OBSERVATION GOALS TO BE MET BEFORE DISCHARGE:  1. Ruled out acute coronary syndrome (negative or stable Troponin):  Yes  2. Pain Status: Pain free.  3. Appropriate provocative testing performed: No  - Stress Test Procedure: Nuclear:   4. Cleared by Consultants (if applicable):N/A  5. Return to near baseline physical activity: Yes  Discharge Planner Nurse   Safe discharge environment identified: Yes  Barriers to discharge: No       Entered by: Genny Urbano 05/24/2021 10:25 PM     Please review provider order for any additional goals.   Nurse to notify provider when observation goals have been met and patient is ready for discharge.

## 2021-05-25 NOTE — PROGRESS NOTES
PRIMARY DIAGNOSIS: CHEST PAIN  OUTPATIENT/OBSERVATION GOALS TO BE MET BEFORE DISCHARGE:  1. Ruled out acute coronary syndrome (negative or stable Troponin):  Yes  2. Pain Status: Pain free.  3. Appropriate provocative testing performed: N/A  - Stress Test Procedure: Nuclear  4. Cleared by Consultants (if applicable):N/A  5. Return to near baseline physical activity: Yes  Discharge Planner Nurse   Safe discharge environment identified: Yes  Barriers to discharge: Yes       Entered by: Genny Urbano 05/25/2021 4:45 AM     Please review provider order for any additional goals.   Nurse to notify provider when observation goals have been met and patient is ready for discharge.

## 2021-05-25 NOTE — TELEPHONE ENCOUNTER
Prescription approved per [facility] Refill Protocol.    AUTUMN BustilloN, RN  [facility]

## 2021-05-26 ENCOUNTER — TELEPHONE (OUTPATIENT)
Dept: INTERNAL MEDICINE | Facility: CLINIC | Age: 86
End: 2021-05-26

## 2021-05-26 NOTE — TELEPHONE ENCOUNTER
"Hospital/TCU/ED for chronic condition Discharge Protocol    \"Hi, my name is Simin Bueno RN, a registered nurse, and I am calling from [facility].  I am calling to follow up and see how things are going for you after your recent emergency visit/hospital/TCU stay.\"    Tell me how you are doing now that you are home?\" Doing good. Tired but good\"      Discharge Instructions    \"Let's review your discharge instructions.  What is/are the follow-up recommendations?  Pt. Response: Follow up with PCP    \"Has an appointment with your primary care provider been scheduled?\"   Yes. (confirm)    \"When you see the provider, I would recommend that you bring your medications with you.\"    Medications    \"Tell me what changed about your medicines when you discharged?\"    Changes to chronic meds?    0-1    \"What questions do you have about your medications?\"    None     New diagnoses of heart failure, COPD, diabetes, or MI?    No              Post Discharge Medication Reconciliation Status: discharge medications reconciled, continue medications without change.    Was MTM referral placed (*Make sure to put transitions as reason for referral)?   No    Call Summary    \"What questions or concerns do you have about your recent visit and your follow-up care?\"     none    \"If you have questions or things don't continue to improve, we encourage you contact us through the main clinic number (give number).  Even if the clinic is not open, triage nurses are available 24/7 to help you.     We would like you to know that our clinic has extended hours (provide information).  We also have urgent care (provide details on closest location and hours/contact info)\"      \"Thank you for your time and take care!\"    ERNESTINE Bustillo, RN  [facility]  "

## 2021-05-26 NOTE — TELEPHONE ENCOUNTER
Patient called to schedule an appointment for a hospital follow-up or appeared on a report showing that they were recently discharged from the hospital.    Patient was admitted to [facility]:  5/23/2021  Discharged date: 5/25/2021  Reason for hospital admission:  Chest Pain, Unspecified Type  Does patient have future appointment scheduled with provider? Yes Dr. Meyer  Date of future appointment:  5/28/2021      This information will be used to help the care team plan for the patients upcoming visit.  The triage RN may determine that a follow up call is necessary and reach out to the patient via the phone number listed in the chart.     Please route this message on routine priority to the Triage RN pool.

## 2021-05-26 NOTE — PROGRESS NOTES
Care Management Discharge Note    Discharge Date: 05/25/21       Discharge Disposition: Assisted Living    Discharge Transportation: family or friend will provide    Private pay costs discussed: Not applicable      Persons Notified of Discharge Plans: Patient, daughter    Patient/Family in Agreement with the Plan: yes    Handoff Referral Completed: No      RASHEEDA Santiago  [facility]   [phone]

## 2021-05-28 ENCOUNTER — OFFICE VISIT (OUTPATIENT)
Dept: INTERNAL MEDICINE | Facility: CLINIC | Age: 86
End: 2021-05-28
Payer: COMMERCIAL

## 2021-05-28 VITALS
HEART RATE: 82 BPM | WEIGHT: 116 LBS | SYSTOLIC BLOOD PRESSURE: 108 MMHG | DIASTOLIC BLOOD PRESSURE: 62 MMHG | TEMPERATURE: 97.8 F | OXYGEN SATURATION: 95 % | BODY MASS INDEX: 18.72 KG/M2 | RESPIRATION RATE: 18 BRPM

## 2021-05-28 DIAGNOSIS — G40.909 SEIZURE DISORDER (H): ICD-10-CM

## 2021-05-28 DIAGNOSIS — M05.9 SEROPOSITIVE RHEUMATOID ARTHRITIS (H): Primary | ICD-10-CM

## 2021-05-28 DIAGNOSIS — R07.9 CHEST PAIN, UNSPECIFIED TYPE: ICD-10-CM

## 2021-05-28 DIAGNOSIS — Z95.2 S/P TAVR (TRANSCATHETER AORTIC VALVE REPLACEMENT): ICD-10-CM

## 2021-05-28 PROCEDURE — 99214 OFFICE O/P EST MOD 30 MIN: CPT | Performed by: INTERNAL MEDICINE

## 2021-05-28 RX ORDER — LEVETIRACETAM 500 MG/1
500 TABLET ORAL 2 TIMES DAILY
Qty: 270 TABLET | Refills: 1 | COMMUNITY
Start: 2021-05-28 | End: 2022-01-18

## 2021-05-28 ASSESSMENT — PAIN SCALES - GENERAL: PAINLEVEL: NO PAIN (0)

## 2021-05-28 NOTE — PROGRESS NOTES
Assessment & Plan     Chest pain, unspecified type    S/P TAVR (transcatheter aortic valve replacement)    Seizure disorder (H)  - levETIRAcetam (KEPPRA) 500 MG tablet; Take 1 tablet (500 mg) by mouth 2 times daily    Seropositive rheumatoid arthritis (H)                 No follow-ups on file.    Augusto Meyer DO  [facility] GIANNA French is a 89 year old who presents for the following health issues     HPI       Hospital Follow-up Visit:    Hospital/Nursing Home/ Rehab Facility: [facility]  Date of Admission: 5/23/21  Date of Discharge: 5/25/212  Reason(s) for Admission: chest pain      Was your hospitalization related to COVID-19? No   Problems taking medications regularly:  None  Medication changes since discharge: None  Problems adhering to non-medication therapy:  None    Summary of hospitalization:  [facility] discharge summary reviewed  Diagnostic Tests/Treatments reviewed.  Follow up needed: none  Other Healthcare Providers Involved in Patient s Care:         None  Update since discharge: improved. Post Discharge Medication Reconciliation: discharge medications reconciled, continue medications without change.  Plan of care communicated with patient and family              Review of Systems   CONSTITUTIONAL: NEGATIVE for fever, chills, change in weight  INTEGUMENTARY/SKIN: NEGATIVE for worrisome rashes, moles or lesions  EYES: NEGATIVE for vision changes or irritation  ENT/MOUTH: NEGATIVE for ear, mouth and throat problems  RESP: NEGATIVE for significant cough or SOB  BREAST: NEGATIVE for masses, tenderness or discharge  CV: NEGATIVE for chest pain, palpitations or peripheral edema  GI: NEGATIVE for nausea, abdominal pain, heartburn, or change in bowel habits  : NEGATIVE for frequency, dysuria, or hematuria  MUSCULOSKELETAL: NEGATIVE for significant arthralgias or myalgia  NEURO: NEGATIVE for weakness, dizziness or  paresthesias  ENDOCRINE: NEGATIVE for temperature intolerance, skin/hair changes  HEME: NEGATIVE for bleeding problems  PSYCHIATRIC: NEGATIVE for changes in mood or affect      Objective    /62 (BP Location: Right arm, Patient Position: Sitting, Cuff Size: Adult Regular)   Pulse 82   Temp 97.8  F (36.6  C) (Temporal)   Resp 18   Wt 52.6 kg (116 lb)   SpO2 95%   BMI 18.72 kg/m    Body mass index is 18.72 kg/m .  Physical Exam   GENERAL: healthy, alert and no distress  EYES: Eyes grossly normal to inspection, PERRL and conjunctivae and sclerae normal  HENT: ear canals and TM's normal, nose and mouth without ulcers or lesions  NECK: no adenopathy, no asymmetry, masses, or scars and thyroid normal to palpation  RESP: lungs clear to auscultation - no rales, rhonchi or wheezes  BREAST: normal without masses, tenderness or nipple discharge and no palpable axillary masses or adenopathy  CV: regular rates and rhythm, normal S1 S2, no S3 or S4, peripheral pulses strong, no peripheral edema and slight murmur is noted consistent with her previous W-plasty.  ABDOMEN: soft, nontender, no hepatosplenomegaly, no masses and bowel sounds normal  MS: no gross musculoskeletal defects noted, no edema  SKIN: no suspicious lesions or rashes  NEURO: Normal strength and tone, mentation intact and speech normal  PSYCH: mentation appears normal, affect normal/bright    Lab work and radiographs from her hospitalization are reviewed with the patient and her daughter.

## 2021-05-29 ENCOUNTER — APPOINTMENT (OUTPATIENT)
Dept: CT IMAGING | Facility: CLINIC | Age: 86
DRG: 690 | End: 2021-05-29
Attending: EMERGENCY MEDICINE
Payer: COMMERCIAL

## 2021-05-29 ENCOUNTER — HOSPITAL ENCOUNTER (INPATIENT)
Facility: CLINIC | Age: 86
LOS: 3 days | Discharge: NURSING, REHAB OR LONG-TERM CARE | DRG: 690 | End: 2021-06-02
Attending: EMERGENCY MEDICINE | Admitting: INTERNAL MEDICINE
Payer: COMMERCIAL

## 2021-05-29 DIAGNOSIS — Z74.09 DECREASED MOBILITY AND ENDURANCE: ICD-10-CM

## 2021-05-29 DIAGNOSIS — R42 DIZZINESS: ICD-10-CM

## 2021-05-29 DIAGNOSIS — Z86.73 HISTORY OF CVA (CEREBROVASCULAR ACCIDENT): Primary | ICD-10-CM

## 2021-05-29 DIAGNOSIS — I20.0 UNSTABLE ANGINA (H): ICD-10-CM

## 2021-05-29 DIAGNOSIS — N30.00 ACUTE CYSTITIS WITHOUT HEMATURIA: ICD-10-CM

## 2021-05-29 DIAGNOSIS — R26.9 ABNORMAL GAIT: ICD-10-CM

## 2021-05-29 PROBLEM — N39.0 UTI (URINARY TRACT INFECTION): Status: ACTIVE | Noted: 2021-05-29

## 2021-05-29 LAB
ALBUMIN SERPL-MCNC: 3 G/DL (ref 3.4–5)
ALBUMIN UR-MCNC: NEGATIVE MG/DL
ALP SERPL-CCNC: 91 U/L (ref 40–150)
ALT SERPL W P-5'-P-CCNC: 21 U/L (ref 0–50)
ANION GAP SERPL CALCULATED.3IONS-SCNC: 6 MMOL/L (ref 3–14)
APPEARANCE UR: ABNORMAL
AST SERPL W P-5'-P-CCNC: 25 U/L (ref 0–45)
BACTERIA #/AREA URNS HPF: ABNORMAL /HPF
BASOPHILS # BLD AUTO: 0.1 10E9/L (ref 0–0.2)
BASOPHILS NFR BLD AUTO: 0.8 %
BILIRUB SERPL-MCNC: 0.3 MG/DL (ref 0.2–1.3)
BILIRUB UR QL STRIP: NEGATIVE
BUN SERPL-MCNC: 16 MG/DL (ref 7–30)
CALCIUM SERPL-MCNC: 8.8 MG/DL (ref 8.5–10.1)
CHLORIDE SERPL-SCNC: 100 MMOL/L (ref 94–109)
CO2 SERPL-SCNC: 29 MMOL/L (ref 20–32)
COLOR UR AUTO: ABNORMAL
CREAT SERPL-MCNC: 0.73 MG/DL (ref 0.52–1.04)
DIFFERENTIAL METHOD BLD: ABNORMAL
EOSINOPHIL # BLD AUTO: 0 10E9/L (ref 0–0.7)
EOSINOPHIL NFR BLD AUTO: 0.7 %
ERYTHROCYTE [DISTWIDTH] IN BLOOD BY AUTOMATED COUNT: 17.8 % (ref 10–15)
GFR SERPL CREATININE-BSD FRML MDRD: 70 ML/MIN/{1.73_M2}
GLUCOSE SERPL-MCNC: 110 MG/DL (ref 70–99)
GLUCOSE UR STRIP-MCNC: NEGATIVE MG/DL
HCT VFR BLD AUTO: 31.6 % (ref 35–47)
HGB BLD-MCNC: 10.3 G/DL (ref 11.7–15.7)
HGB UR QL STRIP: NEGATIVE
IMM GRANULOCYTES # BLD: 0 10E9/L (ref 0–0.4)
IMM GRANULOCYTES NFR BLD: 0.2 %
KETONES UR STRIP-MCNC: NEGATIVE MG/DL
LABORATORY COMMENT REPORT: NORMAL
LEUKOCYTE ESTERASE UR QL STRIP: ABNORMAL
LYMPHOCYTES # BLD AUTO: 1.8 10E9/L (ref 0.8–5.3)
LYMPHOCYTES NFR BLD AUTO: 30.1 %
MCH RBC QN AUTO: 30.1 PG (ref 26.5–33)
MCHC RBC AUTO-ENTMCNC: 32.6 G/DL (ref 31.5–36.5)
MCV RBC AUTO: 92 FL (ref 78–100)
MONOCYTES # BLD AUTO: 0.2 10E9/L (ref 0–1.3)
MONOCYTES NFR BLD AUTO: 3.7 %
NEUTROPHILS # BLD AUTO: 3.8 10E9/L (ref 1.6–8.3)
NEUTROPHILS NFR BLD AUTO: 64.5 %
NITRATE UR QL: POSITIVE
NRBC # BLD AUTO: 0 10*3/UL
NRBC BLD AUTO-RTO: 0 /100
PH UR STRIP: 8 PH (ref 5–7)
PLATELET # BLD AUTO: 350 10E9/L (ref 150–450)
POTASSIUM SERPL-SCNC: 4 MMOL/L (ref 3.4–5.3)
PROT SERPL-MCNC: 9.4 G/DL (ref 6.8–8.8)
RBC # BLD AUTO: 3.42 10E12/L (ref 3.8–5.2)
RBC #/AREA URNS AUTO: 2 /HPF (ref 0–2)
SARS-COV-2 RNA RESP QL NAA+PROBE: NEGATIVE
SODIUM SERPL-SCNC: 135 MMOL/L (ref 133–144)
SOURCE: ABNORMAL
SP GR UR STRIP: 1 (ref 1–1.03)
SPECIMEN SOURCE: NORMAL
UROBILINOGEN UR STRIP-MCNC: 0 MG/DL (ref 0–2)
WBC # BLD AUTO: 5.9 10E9/L (ref 4–11)
WBC #/AREA URNS AUTO: 177 /HPF (ref 0–5)
WBC CLUMPS #/AREA URNS HPF: PRESENT /HPF

## 2021-05-29 PROCEDURE — 70450 CT HEAD/BRAIN W/O DYE: CPT

## 2021-05-29 PROCEDURE — 99285 EMERGENCY DEPT VISIT HI MDM: CPT | Performed by: EMERGENCY MEDICINE

## 2021-05-29 PROCEDURE — 99285 EMERGENCY DEPT VISIT HI MDM: CPT | Mod: 25 | Performed by: EMERGENCY MEDICINE

## 2021-05-29 PROCEDURE — 87635 SARS-COV-2 COVID-19 AMP PRB: CPT | Performed by: EMERGENCY MEDICINE

## 2021-05-29 PROCEDURE — 96375 TX/PRO/DX INJ NEW DRUG ADDON: CPT | Performed by: EMERGENCY MEDICINE

## 2021-05-29 PROCEDURE — 96365 THER/PROPH/DIAG IV INF INIT: CPT | Performed by: EMERGENCY MEDICINE

## 2021-05-29 PROCEDURE — 99207 PR CDG-MDM COMPONENT: MEETS MODERATE - DOWN CODED: CPT | Performed by: NURSE PRACTITIONER

## 2021-05-29 PROCEDURE — 81001 URINALYSIS AUTO W/SCOPE: CPT | Performed by: EMERGENCY MEDICINE

## 2021-05-29 PROCEDURE — 250N000013 HC RX MED GY IP 250 OP 250 PS 637: Performed by: NURSE PRACTITIONER

## 2021-05-29 PROCEDURE — 85025 COMPLETE CBC W/AUTO DIFF WBC: CPT | Performed by: EMERGENCY MEDICINE

## 2021-05-29 PROCEDURE — 99219 PR INITIAL OBSERVATION CARE,LEVEL II: CPT | Performed by: NURSE PRACTITIONER

## 2021-05-29 PROCEDURE — 250N000011 HC RX IP 250 OP 636: Performed by: EMERGENCY MEDICINE

## 2021-05-29 PROCEDURE — G0378 HOSPITAL OBSERVATION PER HR: HCPCS

## 2021-05-29 PROCEDURE — C9803 HOPD COVID-19 SPEC COLLECT: HCPCS | Performed by: EMERGENCY MEDICINE

## 2021-05-29 PROCEDURE — 250N000013 HC RX MED GY IP 250 OP 250 PS 637: Performed by: EMERGENCY MEDICINE

## 2021-05-29 PROCEDURE — 80053 COMPREHEN METABOLIC PANEL: CPT | Performed by: EMERGENCY MEDICINE

## 2021-05-29 RX ORDER — PHENAZOPYRIDINE HYDROCHLORIDE 100 MG/1
100 TABLET, FILM COATED ORAL ONCE
Status: COMPLETED | OUTPATIENT
Start: 2021-05-29 | End: 2021-05-29

## 2021-05-29 RX ORDER — ACETAMINOPHEN 325 MG/1
975 TABLET ORAL EVERY 8 HOURS PRN
Status: DISCONTINUED | OUTPATIENT
Start: 2021-05-29 | End: 2021-06-02 | Stop reason: HOSPADM

## 2021-05-29 RX ORDER — MULTIPLE VITAMINS W/ MINERALS TAB 9MG-400MCG
1 TAB ORAL DAILY
COMMUNITY
End: 2021-12-07

## 2021-05-29 RX ORDER — LEVETIRACETAM 500 MG/1
500 TABLET ORAL 2 TIMES DAILY
Status: DISCONTINUED | OUTPATIENT
Start: 2021-05-29 | End: 2021-06-02 | Stop reason: HOSPADM

## 2021-05-29 RX ORDER — CEFTRIAXONE 1 G/1
1 INJECTION, POWDER, FOR SOLUTION INTRAMUSCULAR; INTRAVENOUS ONCE
Status: COMPLETED | OUTPATIENT
Start: 2021-05-29 | End: 2021-05-29

## 2021-05-29 RX ORDER — METOPROLOL SUCCINATE 50 MG/1
50 TABLET, EXTENDED RELEASE ORAL 2 TIMES DAILY
Status: DISCONTINUED | OUTPATIENT
Start: 2021-05-29 | End: 2021-06-02 | Stop reason: HOSPADM

## 2021-05-29 RX ORDER — ONDANSETRON 2 MG/ML
4 INJECTION INTRAMUSCULAR; INTRAVENOUS EVERY 6 HOURS PRN
Status: DISCONTINUED | OUTPATIENT
Start: 2021-05-29 | End: 2021-06-02 | Stop reason: HOSPADM

## 2021-05-29 RX ORDER — MECLIZINE HYDROCHLORIDE 25 MG/1
25 TABLET ORAL ONCE
Status: COMPLETED | OUTPATIENT
Start: 2021-05-29 | End: 2021-05-29

## 2021-05-29 RX ORDER — HYDROMORPHONE HYDROCHLORIDE 1 MG/ML
0.3 INJECTION, SOLUTION INTRAMUSCULAR; INTRAVENOUS; SUBCUTANEOUS ONCE
Status: COMPLETED | OUTPATIENT
Start: 2021-05-29 | End: 2021-05-29

## 2021-05-29 RX ORDER — LISINOPRIL 2.5 MG/1
2.5 TABLET ORAL DAILY
Status: DISCONTINUED | OUTPATIENT
Start: 2021-05-30 | End: 2021-06-02 | Stop reason: HOSPADM

## 2021-05-29 RX ORDER — ASPIRIN 81 MG/1
81 TABLET ORAL DAILY
Status: DISCONTINUED | OUTPATIENT
Start: 2021-05-30 | End: 2021-06-02 | Stop reason: HOSPADM

## 2021-05-29 RX ORDER — ONDANSETRON 4 MG/1
4 TABLET, ORALLY DISINTEGRATING ORAL EVERY 6 HOURS PRN
Status: DISCONTINUED | OUTPATIENT
Start: 2021-05-29 | End: 2021-06-02 | Stop reason: HOSPADM

## 2021-05-29 RX ORDER — FAMOTIDINE 20 MG/1
40 TABLET, FILM COATED ORAL AT BEDTIME
Status: DISCONTINUED | OUTPATIENT
Start: 2021-05-29 | End: 2021-05-30

## 2021-05-29 RX ORDER — AMLODIPINE BESYLATE 5 MG/1
5 TABLET ORAL DAILY
Status: DISCONTINUED | OUTPATIENT
Start: 2021-05-30 | End: 2021-06-02 | Stop reason: HOSPADM

## 2021-05-29 RX ORDER — CEFTRIAXONE 1 G/1
1 INJECTION, POWDER, FOR SOLUTION INTRAMUSCULAR; INTRAVENOUS EVERY 24 HOURS
Status: DISCONTINUED | OUTPATIENT
Start: 2021-05-30 | End: 2021-06-01

## 2021-05-29 RX ORDER — CLOPIDOGREL BISULFATE 75 MG/1
75 TABLET ORAL DAILY
Status: DISCONTINUED | OUTPATIENT
Start: 2021-05-30 | End: 2021-06-02 | Stop reason: HOSPADM

## 2021-05-29 RX ORDER — ISOSORBIDE MONONITRATE 30 MG/1
60 TABLET, EXTENDED RELEASE ORAL 2 TIMES DAILY
Status: DISCONTINUED | OUTPATIENT
Start: 2021-05-29 | End: 2021-06-02 | Stop reason: HOSPADM

## 2021-05-29 RX ORDER — PREDNISONE 5 MG/1
5 TABLET ORAL EVERY OTHER DAY
Status: DISCONTINUED | OUTPATIENT
Start: 2021-05-31 | End: 2021-06-02 | Stop reason: HOSPADM

## 2021-05-29 RX ORDER — ONDANSETRON 2 MG/ML
4 INJECTION INTRAMUSCULAR; INTRAVENOUS EVERY 30 MIN PRN
Status: DISCONTINUED | OUTPATIENT
Start: 2021-05-29 | End: 2021-05-29

## 2021-05-29 RX ORDER — LEVOTHYROXINE SODIUM 75 UG/1
75 TABLET ORAL
Status: DISCONTINUED | OUTPATIENT
Start: 2021-05-30 | End: 2021-06-02 | Stop reason: HOSPADM

## 2021-05-29 RX ORDER — MIRTAZAPINE 15 MG/1
30 TABLET, FILM COATED ORAL AT BEDTIME
Status: DISCONTINUED | OUTPATIENT
Start: 2021-05-29 | End: 2021-06-02 | Stop reason: HOSPADM

## 2021-05-29 RX ADMIN — MECLIZINE HYDROCHLORIDE 25 MG: 25 TABLET ORAL at 14:04

## 2021-05-29 RX ADMIN — CEFTRIAXONE SODIUM 1 G: 1 INJECTION, POWDER, FOR SOLUTION INTRAMUSCULAR; INTRAVENOUS at 15:40

## 2021-05-29 RX ADMIN — ACETAMINOPHEN 975 MG: 325 TABLET, FILM COATED ORAL at 20:13

## 2021-05-29 RX ADMIN — METOPROLOL SUCCINATE 50 MG: 50 TABLET, EXTENDED RELEASE ORAL at 20:13

## 2021-05-29 RX ADMIN — HYDROMORPHONE HYDROCHLORIDE 0.3 MG: 1 INJECTION, SOLUTION INTRAMUSCULAR; INTRAVENOUS; SUBCUTANEOUS at 15:38

## 2021-05-29 RX ADMIN — ONDANSETRON 4 MG: 2 INJECTION INTRAMUSCULAR; INTRAVENOUS at 14:01

## 2021-05-29 RX ADMIN — FAMOTIDINE 40 MG: 20 TABLET, FILM COATED ORAL at 20:13

## 2021-05-29 RX ADMIN — ISOSORBIDE MONONITRATE 60 MG: 30 TABLET, EXTENDED RELEASE ORAL at 17:54

## 2021-05-29 RX ADMIN — MIRTAZAPINE 30 MG: 15 TABLET, FILM COATED ORAL at 20:13

## 2021-05-29 RX ADMIN — PHENAZOPYRIDINE HYDROCHLORIDE 100 MG: 100 TABLET, FILM COATED ORAL at 15:43

## 2021-05-29 RX ADMIN — LEVETIRACETAM 500 MG: 500 TABLET, FILM COATED ORAL at 20:13

## 2021-05-29 ASSESSMENT — ACTIVITIES OF DAILY LIVING (ADL)
WALKING_OR_CLIMBING_STAIRS_DIFFICULTY: NO
EQUIPMENT_CURRENTLY_USED_AT_HOME: CANE, STRAIGHT
DRESSING/BATHING_DIFFICULTY: NO
DIFFICULTY_COMMUNICATING: NO
CONCENTRATING,_REMEMBERING_OR_MAKING_DECISIONS_DIFFICULTY: NO
DOING_ERRANDS_INDEPENDENTLY_DIFFICULTY: NO
DIFFICULTY_EATING/SWALLOWING: NO
TOILETING_ISSUES: NO
FALL_HISTORY_WITHIN_LAST_SIX_MONTHS: NO

## 2021-05-29 NOTE — PROGRESS NOTES
"S-(situation): Patient registered to Observation. Patient arrived to room 247 via cart from ED    B-(background): UTI, lightheadedness    A-(assessment): Pt is A & O to self, otherwise unable to say where she is and why. VSS, hypertensive upon arrival, afebrile, on room air, denies pain. States only pain with urination, which she describes as burning sensation. Says she feels \"crummy\". Pt arrived in large amount of incontinent urine, bed change done. Pure wick put in place. Lung sounds clear, but crackles noted to RLL. Denies shortness of breathe. Bowels normal active. Last BM today in ED. Scattered bruising to to both arms and bruise to left foot. Daughter Jennifer present at bedside.    R-(recommendations): Orders and observation goals reviewed with patient    Nursing Observation criteria listed below was met:    Skin issues/needs documented:Yes  Isolation needs addressed and Signage up: NA  Fall Prevention: Education given and documented: Yes  Education Assessment documented:Yes  Admission Education Documented: Yes  New medication patient education completed and documented (Possible Side Effects of Common Medications handout): Yes  OBS video/handout Reviewed & Documented: Yes  Allergies Reviewed: Yes  Medication Reconciliation Complete: Yes  Home medications if not able to send immediately home with family stored here: NA  Reminder note placed in discharge instructions of home meds: NA  Patient has discharge needs (If yes, please explain): Yes  Patient discharge preferences addressed and charted on white board:  Yes              "

## 2021-05-29 NOTE — ED TRIAGE NOTES
Pt is a resident at [facility] and got up to go to the dining room and while sitting there developed dizziness, slightly nauseated. Pt states since it happened she has gone to the bathroom multiple time.

## 2021-05-29 NOTE — ED NOTES
ED Nursing criteria listed below was addressed during verbal handoff:     Abnormal vitals: No  Abnormal results: Yes  Med Reconciliation completed: Yes  Meds given in ED: Yes  Any Overdue Meds: No  Core Measures: N/A  Isolation: No  Special needs: Yes, assist of one  Skin assessment: Yes    Observation Patient  Education provided: Yes

## 2021-05-29 NOTE — ED PROVIDER NOTES
History     Chief Complaint   Patient presents with     Dizziness     HPI  Gillian Burk is a 89 year old female who presents to the emergency department secondary to dizziness.  This started at 11:30 AM, approximately 3 hours prior to arrival in the emergency department.  She lives in [facility] and was at the dining room table when it came on.  Its been intermittent since then.  It is made worse by movement and better with sitting still.  It is associated with nausea but no vomiting.  She has had ongoing issues with the constant feeling of needing to empty her bladder.  She has had some dysuria which started today.  No fever, chills, vomiting, cough that is new, body aches or a new headache.  No new visual deficit.  His does not feel like the room is spinning but rather feels like lightheadedness to her.    Allergies:  Allergies   Allergen Reactions     Penicillins Hives     Caffeine Other (See Comments) and Unknown     Triggers your Meniere's disease     Hydrocodone Other (See Comments) and Unknown     hallucinations     Ibuprofen GI Disturbance and Unknown     Other Environmental Allergy      Metals of unspecific kind     Tramadol Other (See Comments)     Seizure, was taking remeron along with tramadol     Metal [Staples] Rash       Problem List:    Patient Active Problem List    Diagnosis Date Noted     UTI (urinary tract infection) 05/29/2021     Priority: Medium     Fibrocystic breast disease 05/23/2021     Priority: Medium     IZABEL (generalized anxiety disorder) 05/23/2021     Priority: Medium     Hearing loss 05/23/2021     Priority: Medium     Formatting of this note might be different from the original.  right ear       Herpes zoster 05/23/2021     Priority: Medium     Meniere's disease 05/23/2021     Priority: Medium     Migraine headache 05/23/2021     Priority: Medium     Precancerous changes of the cervix 05/23/2021     Priority: Medium     Squamous cell carcinoma in situ  of skin 05/23/2021     Priority: Medium     Formatting of this note might be different from the original.  face       Chest pain, unspecified type 05/23/2021     Priority: Medium     Coronary artery disease involving native coronary artery of native heart with angina pectoris (H) 02/02/2021     Priority: Medium     Added automatically from request for surgery [id number]       CONTRERAS (dyspnea on exertion) 02/02/2021     Priority: Medium     Added automatically from request for surgery [id number]       Ischemic cardiomyopathy 08/06/2019     Priority: Medium     Urinary incontinence without sensory awareness 04/26/2019     Priority: Medium     Chronic systolic congestive heart failure (H) 03/24/2019     Priority: Medium     Chronic stable angina (H) 03/24/2019     Priority: Medium     CRAO (central retinal artery occlusion), 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 Discharging from [facility] Service today. She has appointment with Dr. Mateusz Jane on 11/9/18 in [place], [place].   - Okay to keep appointment with Dr. Jane, advised to return with any decrease in vision in either eye.       Occipital stroke (H) 10/12/2018     Priority: Medium     Primary osteoarthritis of right knee 08/03/2017     Priority: Medium     GI bleed 03/03/2017     Priority: Medium     Hypotension 12/04/2016     Priority: Medium     TIA (transient ischemic attack) 12/03/2016     Priority: Medium     Non-rheumatic mitral valve stenosis - mild to moderate 11/16 echo 12/03/2016     Priority: Medium     Coronary artery disease involving native coronary artery - NSTEMI 11/16 with moderate RCA disease treated medically 12/03/2016     Priority: Medium     CKD (chronic kidney disease) stage 3, GFR 30-59 ml/min 12/03/2016     Priority: Medium     Elevated troponin 12/03/2016     Priority: Medium     Iron deficiency anemia 12/03/2016     Priority: Medium     Acquired hypothyroidism 11/24/2016     Priority:  Medium     Aortic stenosis      Priority: Medium     s/p TAVR 8/17/16       Need for SBE (subacute bacterial endocarditis) prophylaxis      Priority: Medium     Advanced directives, counseling/discussion 09/22/2016     Priority: Medium     Was addressed at appt. Will bring in and will have scanned. 9/22/2016  Cathy Anderson MA         S/P TAVR (transcatheter aortic valve replacement) 08/05/2016     Priority: Medium     Aortic stenosis, severe - s/p TAVR 08/03/2016     Priority: Medium     Hypothyroidism due to acquired atrophy of thyroid 05/25/2016     Priority: Medium     Aortic valve disorder 09/02/2015     Priority: Medium     Intermediate coronary syndrome 09/01/2015     Priority: Medium     Hyperlipidemia with target LDL less than 130 04/06/2015     Priority: Medium     Anxiety 07/24/2014     Priority: Medium     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 07/24/2014     Priority: Medium     History of CVA (cerebrovascular accident) - 2013 07/24/2014     Priority: Medium     Esophageal reflux 07/24/2014     Priority: Medium     PAC (premature atrial contraction) 07/24/2014     Priority: Medium     PVC's (premature ventricular contractions) 07/24/2014     Priority: Medium     Hypertension goal BP (blood pressure) < 140/90 07/01/2014     Priority: Medium     Seropositive rheumatoid arthritis (H)      Priority: Medium     Transient ischemic attack (TIA), and cerebral infarction without residual deficits 06/18/2014     Priority: Medium     Osteoarthritis of left hip 02/18/2014     Priority: Medium        Past Medical History:    Past Medical History:   Diagnosis Date     Aortic stenosis      Chronic migraine      Hyperlipidaemia      Hypertension      Hypothyroidism      Myocardial infarction (H)      Need for SBE (subacute bacterial endocarditis) prophylaxis      Orthostatic hypotension      PUD (peptic ulcer disease)      Secondary Sjogren's syndrome (H)      Seizures (H)      Seropositive  rheumatoid arthritis (H)      Stroke (H) 05/2013     Syncope 2014       Past Surgical History:    Past Surgical History:   Procedure Laterality Date     C TOTAL HIP ARTHROPLASTY Right 2010     C TOTAL HIP ARTHROPLASTY Left 2014     CATARACT IOL, RT/LT Bilateral 2000     CV FLUOROSCOPY ONLY N/A 8/6/2019    Procedure: Fluoroscopy Only - valve cine done of aortic valve at 180 degrees Portuguese to JUNG;  Surgeon: Dave Farfan MD;  Location:  HE[facility]ARD[facility]ATH [facility]     CV HEART CATHETERIZATION WITH POSSIBLE INTERVENTION N/A 2/10/2021    Procedure: Heart Catheterization with Possible Intervention;  Surgeon: Fish Padgett MD;  Location:  HEART CARD[facility] CATH LAB     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     HEART CATH FEMORAL CANNULIZATION WITH OPEN STANDBY REPAIR AORTIC VALVE N/A 8/3/2016    Procedure: HEART CATH FEMORAL CANNULIZATION WITH OPEN STANDBY REPAIR AORTIC VALVE;  Surgeon: Owen Schultz MD;  Location: [facility]     HYSTERECTOMY TOTAL ABDOMINAL  1970    ovaries out     MOUTH SURGERY  2011    jaw surgery due to fracture     TRANSCATHETER AORTIC VALVE IMPLANT ANESTHESIA N/A 8/3/2016    Procedure: TRANSCATHETER AORTIC VALVE IMPLANT ANESTHESIA;  Surgeon: GENERIC ANESTHESIA PROVIDER;  Location: [facility]       Family History:    Family History   Problem Relation Age of Onset     Hyperlipidemia Mother      Family History Negative No family hx of        Social History:  Marital Status:   [5]  Social History     Tobacco Use     Smoking status: Never Smoker     Smokeless tobacco: Never Used   Substance Use Topics     Alcohol use: No     Alcohol/week: 0.0 standard drinks     Drug use: No        Medications:    acetaminophen (TYLENOL ARTHRITIS PAIN) 650 MG CR tablet  ALPRAZolam (XANAX) 0.25 MG tablet  amLODIPine (NORVASC) 5 MG tablet  aspirin EC 81 MG EC tablet  atorvastatin (LIPITOR) 40 MG tablet  calcium carbonate (TUMS) 500 MG chewable tablet  clopidogrel (PLAVIX) 75 MG tablet  Cranberry  1000 MG CAPS  docusate sodium (COLACE) 100 MG capsule  EUTHYROX 75 MCG tablet  famotidine (PEPCID) 40 MG tablet  folic acid (FOLVITE) 1 MG tablet  isosorbide mononitrate (IMDUR) 60 MG 24 hr tablet  Lactobacillus (FLORAJEN ACIDOPHILUS) CAPS  levETIRAcetam (KEPPRA) 500 MG tablet  lisinopril (ZESTRIL) 2.5 MG tablet  methotrexate 2.5 MG tablet  metoprolol succinate ER (TOPROL-XL) 50 MG 24 hr tablet  mirtazapine (REMERON) 30 MG tablet  nitroFURantoin macrocrystal-monohydrate (MACROBID) 100 MG capsule  nitroGLYcerin (NITROSTAT) 0.4 MG sublingual tablet  predniSONE (DELTASONE) 5 MG tablet  SLOW  (45 Fe) MG CR tablet          Review of Systems    Physical Exam   BP: (!) 166/82  Pulse: 80  Temp: 98.3  F (36.8  C)  Resp: 16  Weight: 50.8 kg (112 lb)  SpO2: 99 %      Physical Exam  Vitals signs and nursing note reviewed.   Constitutional:       General: She is not in acute distress.     Appearance: She is well-developed. She is not ill-appearing, toxic-appearing or diaphoretic.   HENT:      Head: Normocephalic and atraumatic.      Right Ear: External ear normal.      Left Ear: External ear normal.      Nose: Nose normal. No congestion or rhinorrhea.      Mouth/Throat:      Mouth: Mucous membranes are moist.      Pharynx: No oropharyngeal exudate or posterior oropharyngeal erythema.   Eyes:      General: No scleral icterus.     Extraocular Movements: Extraocular movements intact.      Conjunctiva/sclera: Conjunctivae normal.      Pupils: Pupils are equal, round, and reactive to light.      Comments: No nystagmus    Neck:      Musculoskeletal: Normal range of motion and neck supple.   Cardiovascular:      Rate and Rhythm: Normal rate and regular rhythm.   Pulmonary:      Effort: Pulmonary effort is normal. No respiratory distress.      Breath sounds: Normal breath sounds. No stridor.   Musculoskeletal: Normal range of motion.         General: No swelling or deformity.      Right lower leg: No edema.      Left lower leg: No  edema.   Skin:     General: Skin is warm and dry.      Coloration: Skin is not pale.      Findings: No erythema or rash.   Neurological:      General: No focal deficit present.      Mental Status: She is alert and oriented to person, place, and time. Mental status is at baseline.   Psychiatric:         Mood and Affect: Mood normal.         ED Course        Procedures                 Results for orders placed or performed during the hospital encounter of 05/29/21 (from the past 24 hour(s))   Routine UA with microscopic   Result Value Ref Range    Color Urine Straw     Appearance Urine Slightly Cloudy     Glucose Urine Negative NEG^Negative mg/dL    Bilirubin Urine Negative NEG^Negative    Ketones Urine Negative NEG^Negative mg/dL    Specific Gravity Urine 1.005 1.003 - 1.035    Blood Urine Negative NEG^Negative    pH Urine 8.0 (H) 5.0 - 7.0 pH    Protein Albumin Urine Negative NEG^Negative mg/dL    Urobilinogen mg/dL 0.0 0.0 - 2.0 mg/dL    Nitrite Urine Positive (A) NEG^Negative    Leukocyte Esterase Urine Large (A) NEG^Negative    Source Midstream Urine     WBC Urine 177 (H) 0 - 5 /HPF    RBC Urine 2 0 - 2 /HPF    WBC Clumps Present (A) NEG^Negative /HPF    Bacteria Urine Few (A) NEG^Negative /HPF   CBC with platelets differential   Result Value Ref Range    WBC 5.9 4.0 - 11.0 10e9/L    RBC Count 3.42 (L) 3.8 - 5.2 10e12/L    Hemoglobin 10.3 (L) 11.7 - 15.7 g/dL    Hematocrit 31.6 (L) 35.0 - 47.0 %    MCV 92 78 - 100 fl    MCH 30.1 26.5 - 33.0 pg    MCHC 32.6 31.5 - 36.5 g/dL    RDW 17.8 (H) 10.0 - 15.0 %    Platelet Count 350 150 - 450 10e9/L    Diff Method Automated Method     % Neutrophils 64.5 %    % Lymphocytes 30.1 %    % Monocytes 3.7 %    % Eosinophils 0.7 %    % Basophils 0.8 %    % Immature Granulocytes 0.2 %    Nucleated RBCs 0 0 /100    Absolute Neutrophil 3.8 1.6 - 8.3 10e9/L    Absolute Lymphocytes 1.8 0.8 - 5.3 10e9/L    Absolute Monocytes 0.2 0.0 - 1.3 10e9/L    Absolute Eosinophils 0.0 0.0 - 0.7  10e9/L    Absolute Basophils 0.1 0.0 - 0.2 10e9/L    Abs Immature Granulocytes 0.0 0 - 0.4 10e9/L    Absolute Nucleated RBC 0.0    Comprehensive metabolic panel   Result Value Ref Range    Sodium 135 133 - 144 mmol/L    Potassium 4.0 3.4 - 5.3 mmol/L    Chloride 100 94 - 109 mmol/L    Carbon Dioxide 29 20 - 32 mmol/L    Anion Gap 6 3 - 14 mmol/L    Glucose 110 (H) 70 - 99 mg/dL    Urea Nitrogen 16 7 - 30 mg/dL    Creatinine 0.73 0.52 - 1.04 mg/dL    GFR Estimate 70 >60 mL/min/[1.73_m2]    GFR Estimate If Black 81 >60 mL/min/[1.73_m2]    Calcium 8.8 8.5 - 10.1 mg/dL    Bilirubin Total 0.3 0.2 - 1.3 mg/dL    Albumin 3.0 (L) 3.4 - 5.0 g/dL    Protein Total 9.4 (H) 6.8 - 8.8 g/dL    Alkaline Phosphatase 91 40 - 150 U/L    ALT 21 0 - 50 U/L    AST 25 0 - 45 U/L   CT Head w/o Contrast    Narrative    CT SCAN OF THE HEAD WITHOUT CONTRAST   5/29/2021 2:31 PM     HISTORY: Dizziness, non-specific.    TECHNIQUE:  Axial images of the head and coronal reformations without  IV contrast material. Radiation dose for this scan was reduced using  automated exposure control, adjustment of the mA and/or kV according  to patient size, or iterative reconstruction technique.    COMPARISON: 4/26/2019    FINDINGS:     Intracranial contents: There is mild cerebral volume loss. Confluent  low attenuation in the white matter is nonspecific, though likely due  to severe small vessel ischemic disease. There is a small chronic  cortical infarction in the medial occipital lobe on the left side.  Small chronic cortical infarction in the frontal parietal region on  the right side. There is no evidence of intracranial hemorrhage, mass,  acute infarct or anomaly.    Visualized orbits/sinuses/mastoids: The visualized portions of the  sinuses and mastoids appear normal.    Osseous structures/soft tissues: There is no evidence of trauma.      Impression    IMPRESSION:   1. No acute findings.  2. Chronic infarctions in the frontoparietal region on the  right and  in the occipital lobe on the left.  3. White matter changes most likely due to severe small vessel  ischemic disease.      JAMILA LEE MD       Medications   ondansetron (ZOFRAN) injection 4 mg (4 mg Intravenous Given 5/29/21 1401)   cefTRIAXone (ROCEPHIN) 1 g vial to attach to  mL bag for ADULTS or NS 50 mL bag for PEDS (1 g Intravenous New Bag 5/29/21 1540)   meclizine (ANTIVERT) tablet 25 mg (25 mg Oral Given 5/29/21 1404)   phenazopyridine (PYRIDIUM) tablet 100 mg (100 mg Oral Given 5/29/21 1543)   HYDROmorphone (PF) (DILAUDID) injection 0.3 mg (0.3 mg Intravenous Given 5/29/21 1538)       Assessments & Plan (with Medical Decision Making)  uti -the UTI was causing significant burning and urethral pain.  The patient was calling for the nurse to help her to the bathroom every 5 minutes.  She was having fairly significant dysuria.  She was given Pyridium orally and 0.3 mg of Dilaudid IV.  Her daughter stated she is tolerated this medications in the past.  She was also given 1 g Rocephin IV.  Dizziness -does not seem to be vertigo.  It was not associated with change in position as far as standing up like orthostatic hypotension.  It does seem to be waxing and waning.  Most likely this is secondary to her UTI.  CT scan shows chronic infarctions but no acute ischemia or infarctions.  Electrode panel is fairly unremarkable.  After discussion with the patient and her daughter it was determined that she was unlikely to be safe in her living facility as it is in assisted living.  She is too weak to walk with her dizziness and UTI.  It was determined it would be safer for her to be treated in the hospital overnight for observation and determine whether or not she was safe for discharge tomorrow.  I discussed the case with Cameron Jara NP who accepts the patient for admission and he will see her in the emergency department.     I have reviewed the nursing notes.    I have reviewed the findings, diagnosis, plan and  need for follow up with the patient.      New Prescriptions    No medications on file       Final diagnoses:   None       5/29/2021   [facility] EMERGENCY DEPT     Johan Wilhelm MD  05/29/21 5018

## 2021-05-29 NOTE — H&P
[facility]    History and Physical - Hospitalist Service       Date of Admission:  5/29/2021    Assessment & Plan   Gillian Burk is a 89 year old female with a history of systolic heart failure with known EF of 35-40%, aortic stenosis s/p TAVR on chronic Plavix/aspirin, hypertension, hypothyroidism, and rheumatoid arthritis on chronic prednisone who presented to the ED 5/29/2021 secondary to lightheadedness. Patient lives in [facility] and was at the dining room table earlier on the day of admission when she noted feeling lightheaded. In the ED, patient was afebrile and hemodynamically stable. No focal neurologic deficits noted on exam. Head CT completed and negative for acute pathology. Lab work largely unremarkable but UA showed likely UTI with 177 WBC, large LE, and positive nitrite. UTI likely causing patient's symptoms. Patient will be admitted to observation status and will receive IV Rocephin with urine cultures pending.     Principal Problem:      Lightheadedness    UTI (urinary tract infection)    History of recurrent UTI  Assessment: Patient presented to ED with lightheadedness which began day of admission. Notes room was not spinning. No nystagmus on exam. No focal neurologic deficits. Found to have likely UTI with 177 WBC, large LE, and positive nitrite. UTI likely causing patient's symptoms. Received IV Rocephin in the ED. Patient with significant dysuria and frequency down in the ED and received Pyridium and IV dilaudid. She did have some increased confusion following IV dilaudid. She is afebrile and hemodynamically stable although blood pressure slightly elevated likely secondary to discomfort. Of note, patient on chronic Bactrim for UTI prevention.   Plan:   - Admit to observation status  - Continue IV Rocephin for UTI  - Request PT evaluation  - Tylenol as needed for pain-would avoid opiates as patient has history of  confusion/hallucinations with opiates in the past  - Continue to monitor for urine culture results    Active Problems:      Seropositive rheumatoid arthritis (H)  Assessment: Manages with prednisone 5 mg every other day alternating with 2.5 mg daily. Also on methotrexate weekly. Currently denies pain  Plan:   - Continue PTA dose of prednisone but will hold methotrexate for now  - Tylenol as needed for pain      Hypertension goal BP (blood pressure) < 140/90    CAD  Assessment: Manages with Norvasc 5 mg daily, Imdur 60 mg twice daily, lisinopril 2.5 mg daily, and metoprolol ER 50 mg twice daily. Blood pressure mildly elevated in the ED likely secondary to discomfort  Plan:   - Continue PTA antihypertensive regimen while patient in hospital       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    History of CVA (cerebrovascular accident) - 2013    Aortic stenosis, severe - s/p TAVR    Chronic systolic congestive heart failure (H)  Assessment: Manages with Keppra 500 mg twice daily. Takes Plavix and baby aspirin daily since CVA and TAVR. Echocardiogram done in 2/21 showed EF of 50-55%. Of note, patient was discharged from [facility] 5/25 after admission for chest pain. Had stress test done during that admission which was negative for ischemia. Patient denies any chest pain.   Plan:   - Continue PTA regimen while in hospital         Acquired hypothyroidism  Assessment: Manages with Euthyrox 75 mcg daily  Plan:   - Continue PTA regimen while in hospital      Diet:   Low saturated fat, <2400 mg NA  DVT Prophylaxis: observation pt  Reyes Catheter: not present  Code Status:   FULL         Disposition Plan   Expected discharge: 1-2 days, recommended to prior living arrangement once adequate pain management/ tolerating PO medications, antibiotic plan established and safe disposition plan/ TCU bed available.  Entered: Cameron Llamas NP 05/29/2021, 4:21 PM     The patient's care was discussed with the Attending  Physician, Dr. Wilks, Patient and Patient's Family.    Cameron Llamas NP  [facility]  Contact information available via [facility] Paging/Directory      ______________________________________________________________________    Chief Complaint   Lightheadedness    History is obtained from the patient    History of Present Illness   Gillian Burk is a 89 year old female with a history of systolic heart failure with known EF of 35-40%, aortic stenosis s/p TAVR on chronic Plavix/aspirin, hypertension, hypothyroidism, and rheumatoid arthritis on chronic prednisone who presented to the ED 5/29/2021 secondary to lightheadedness. Patient lives in [facility] and was at the dining room table earlier on the day of admission when she noted feeling lightheaded. Reported that it did not feel as though room was spinning but instead felt like lightheadedness. Symptoms have been intermittent since then.  Lightheadedness was noted to be worse with movement and better with sitting still. It is associated with nausea but no vomiting. Patient also reported urinary frequency and dysuria.  No fever, chills, vomiting, cough that is new, body aches or a new headache. 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 Of note, patient was discharged from [facility] 5/25 after admission for chest pain. Had stress test done during that admission which was negative for ischemia and patient subsequently discharged home. In the ED, patient was afebrile and hemodynamically stable. No focal neurologic deficits noted on exam. Head CT completed and negative for acute pathology. Lab work largely unremarkable but UA showed likely UTI with 177 WBC, large LE, and positive nitrite. UTI likely causing patient's symptoms. Patient will be admitted to observation status and will receive IV Rocephin with urine cultures pending.     Review of Systems    The 10 point Review of Systems is negative other  than noted in the HPI or here.     Past Medical History    I have reviewed this patient's medical history and updated it with pertinent information if needed.   Past Medical History:   Diagnosis Date     Aortic stenosis     s/p TAVR 8/17/16     Chronic migraine      Hyperlipidaemia      Hypertension      Hypothyroidism      Myocardial infarction (H)      Need for SBE (subacute bacterial endocarditis) prophylaxis      Orthostatic hypotension     wears compression stockings     PUD (peptic ulcer disease)      Secondary Sjogren's syndrome (H)      Seizures (H)     after stroke (partial onset)     Seropositive rheumatoid arthritis (H)      Stroke (H) 05/2013    with visual field cut, left occipital lobe     Syncope 2014    due to orthostatic hypotension       Past Surgical History   I have reviewed this patient's surgical history and updated it with pertinent information if needed.  Past Surgical History:   Procedure Laterality Date     C TOTAL HIP ARTHROPLASTY Right 2010     C TOTAL HIP ARTHROPLASTY Left 2014     CATARACT IOL, RT/LT Bilateral 2000     CV FLUOROSCOPY ONLY N/A 8/6/2019    Procedure: Fluoroscopy Only - valve cine done of aortic valve at 180 degrees [place] to JUNG;  Surgeon: Dave Farfan MD;  Location:  HEART CARDIAC CATH LAB     CV HEART CATHETERIZATION WITH POSSIBLE INTERVENTION N/A 2/10/2021    Procedure: Heart Catheterization with Possible Intervention;  Surgeon: Fish Padgett MD;  Location:  HEART CARDIAC CATH LAB     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     HEART CATH FEMORAL CANNULIZATION WITH OPEN STANDBY REPAIR AORTIC VALVE N/A 8/3/2016    Procedure: HEART CATH FEMORAL CANNULIZATION WITH OPEN STANDBY REPAIR AORTIC VALVE;  Surgeon: Owen Schultz MD;  Location: [facility] OR     HYSTERECTOMY TOTAL ABDOMINAL  1970    ovaries out     MOUTH SURGERY  2011    jaw surgery due to fracture     TRANSCATHETER AORTIC VALVE IMPLANT ANESTHESIA N/A 8/3/2016    Procedure:  TRANSCATHETER AORTIC VALVE IMPLANT ANESTHESIA;  Surgeon: GENERIC ANESTHESIA PROVIDER;  Location: [facility] OR       Social History   I have reviewed this patient's social history and updated it with pertinent information if needed.  Social History     Tobacco Use     Smoking status: Never Smoker     Smokeless tobacco: Never Used   Substance Use Topics     Alcohol use: No     Alcohol/week: 0.0 standard drinks     Drug use: No       Family History   I have reviewed this patient's family history and updated it with pertinent information if needed.  Family History   Problem Relation Age of Onset     Hyperlipidemia Mother      Family History Negative No family hx of        Prior to Admission Medications   Prior to Admission Medications   Prescriptions Last Dose Informant Patient Reported? Taking?   ALPRAZolam (XANAX) 0.25 MG tablet Past Week at Unknown time  No Yes   Sig: Take 1 tablet by mouth twice daily as needed for anxiety   Cranberry 1000 MG CAPS 5/29/2021 at 0800  Yes Yes   EUTHYROX 75 MCG tablet 5/29/2021 at 0600  No Yes   Sig: Take 1 tablet by mouth once daily   Lactobacillus (FLORAJEN ACIDOPHILUS) CAPS 5/29/2021 at 0600  Yes Yes   Sig: Take 1 capsule by mouth daily   SLOW  (45 Fe) MG CR tablet 5/29/2021 at 0800  No Yes   Sig: Take 1 tablet by mouth once daily   acetaminophen (TYLENOL ARTHRITIS PAIN) 650 MG CR tablet Past Week at Unknown time  Yes Yes   Sig: Take 1,300 mg by mouth every 8 hours as needed for mild pain    amLODIPine (NORVASC) 5 MG tablet 5/29/2021 at 0800  No Yes   Sig: Take 1 tablet (5 mg) by mouth daily   aspirin EC 81 MG EC tablet 5/29/2021 at 080  No Yes   Sig: Take 1 tablet (81 mg) by mouth daily   atorvastatin (LIPITOR) 40 MG tablet 5/29/2021 at 0800  No Yes   Sig: Take 1 tablet (40 mg) by mouth daily   calcium carbonate (TUMS) 500 MG chewable tablet Past Week at Unknown time  Yes Yes   Sig: Take 2-4 tablets (1,000-2,000 mg) by mouth as needed for heartburn   clopidogrel (PLAVIX) 75 MG  tablet 5/29/2021 at 0800  No Yes   Sig: Take 1 tablet (75 mg) by mouth daily   docusate sodium (COLACE) 100 MG capsule 5/29/2021 at 0800  Yes Yes   Sig: Take 200 mg by mouth daily 2 capsules   famotidine (PEPCID) 40 MG tablet 5/28/2021 at hs  No Yes   Sig: TAKE 1 TABLET BY MOUTH AT BEDTIME   folic acid (FOLVITE) 1 MG tablet 5/29/2021 at 0800  No Yes   Sig: Take 1 tablet by mouth once daily   isosorbide mononitrate (IMDUR) 60 MG 24 hr tablet 5/29/2021 at 0600  No Yes   Sig: Take 2 tablets (120 mg) by mouth daily   levETIRAcetam (KEPPRA) 500 MG tablet 5/29/2021 at 0800  Yes Yes   Sig: Take 1 tablet (500 mg) by mouth 2 times daily   lisinopril (ZESTRIL) 2.5 MG tablet 5/29/2021 at 0800  No Yes   Sig: Take 1 tablet (2.5 mg) by mouth daily   methotrexate 2.5 MG tablet 5/26/2021 at Unknown time  No Yes   Sig: Take 4 tablets (10 mg) by mouth once a week Wed   metoprolol succinate ER (TOPROL-XL) 50 MG 24 hr tablet 5/29/2021 at 0800  No Yes   Sig: Take 1 tablet (50 mg) by mouth 2 times daily   mirtazapine (REMERON) 30 MG tablet 5/28/2021 at hs  No Yes   Sig: TAKE 1 TABLET BY MOUTH ONCE DAILY AT BEDTIME   multivitamin w/minerals (THERA-VIT-M) tablet 5/29/2021 at 0800  Yes Yes   Sig: Take 1 tablet by mouth daily   nitroFURantoin macrocrystal-monohydrate (MACROBID) 100 MG capsule 5/28/2021 at hs Daughter Yes Yes   Sig: Take 100 mg by mouth daily Takes for prevention of UTI   nitroGLYcerin (NITROSTAT) 0.4 MG sublingual tablet 5/23/2021 at am  No Yes   Sig: DISSOLVE ONE TABLET UNDER THE TONGUE EVERY 5 MINUTES AS NEEDED FOR CHEST PAIN.  DO NOT EXCEED A TOTAL OF 3 DOSES IN 15 MINUTES   predniSONE (DELTASONE) 5 MG tablet 5/29/2021 at 0800  No Yes   Sig: TAKE ONE TABLET BY MOUTH EVERY OTHER DAY ALTERNATING  WITH  1/2  TABLET  EVERY  OTHER  DAY      Facility-Administered Medications: None     Allergies   Allergies   Allergen Reactions     Penicillins Hives     Caffeine Other (See Comments) and Unknown     Triggers your Meniere's  disease     Hydrocodone Other (See Comments) and Unknown     hallucinations     Ibuprofen GI Disturbance and Unknown     Other Environmental Allergy      Metals of unspecific kind     Tramadol Other (See Comments)     Seizure, was taking remeron along with tramadol     Metal [Staples] Rash       Physical Exam   Vital Signs: Temp: 98.3  F (36.8  C) Temp src: Oral BP: (!) 172/95 Pulse: 92   Resp: 22 SpO2: 99 %      Weight: 112 lbs 0 oz    Constitutional: awake, alert, cooperative, no apparent distress, and appears stated age  Eyes: Lids and lashes normal, pupils equal, round and reactive to light, extra ocular muscles intact, sclera clear, conjunctiva normal  ENT: normocepalic, without obvious abnormality, atramatic  Respiratory: No increased work of breathing, good air exchange, clear to auscultation bilaterally, no crackles or wheezing  Cardiovascular: regular rate and rhythm and normal S1 and S2  GI: normal bowel sounds, non-distended and non-tender  Skin: normal skin color, texture, turgor  Musculoskeletal: there is no redness, warmth, or swelling of the joints  Neurologic: Awake, alert, oriented to name, place and time; very [place]; no focal neurologic deficits    Data   Data reviewed today: I reviewed all medications, new labs and imaging results over the last 24 hours.     Recent Labs   Lab 05/29/21  1401 05/24/21  0829 05/24/21  0319 05/23/21  1758 05/23/21  0934 05/23/21  0934   WBC 5.9 5.2  --   --   --  7.9   HGB 10.3* 10.3*  --   --   --  8.7*   MCV 92 92  --   --   --  94    311  --   --   --  295    138  --   --   --  138   POTASSIUM 4.0 3.5  --   --   --  3.4   CHLORIDE 100 105  --   --   --  106   CO2 29 26  --   --   --  28   BUN 16 15  --   --   --  19   CR 0.73 0.86  --   --   --  0.82   ANIONGAP 6 7  --   --   --  4   CHEYANNE 8.8 7.9*  --   --   --  7.9*   * 88  --   --   --  91   ALBUMIN 3.0*  --   --   --   --  2.6*   PROTTOTAL 9.4*  --   --   --   --  8.1   BILITOTAL 0.3  --   --    --   --  0.2   ALKPHOS 91  --   --   --   --  77   ALT 21  --   --   --   --  15   AST 25  --   --   --   --  18   LIPASE  --   --   --   --   --  167   TROPI  --  0.047* 0.050* 0.044   < > 0.030    < > = values in this interval not displayed.     Recent Results (from the past 24 hour(s))   CT Head w/o Contrast    Narrative    CT SCAN OF THE HEAD WITHOUT CONTRAST   5/29/2021 2:31 PM     HISTORY: Dizziness, non-specific.    TECHNIQUE:  Axial images of the head and coronal reformations without  IV contrast material. Radiation dose for this scan was reduced using  automated exposure control, adjustment of the mA and/or kV according  to patient size, or iterative reconstruction technique.    COMPARISON: 4/26/2019    FINDINGS:     Intracranial contents: There is mild cerebral volume loss. Confluent  low attenuation in the white matter is nonspecific, though likely due  to severe small vessel ischemic disease. There is a small chronic  cortical infarction in the medial occipital lobe on the left side.  Small chronic cortical infarction in the frontal parietal region on  the right side. There is no evidence of intracranial hemorrhage, mass,  acute infarct or anomaly.    Visualized orbits/sinuses/mastoids: The visualized portions of the  sinuses and mastoids appear normal.    Osseous structures/soft tissues: There is no evidence of trauma.      Impression    IMPRESSION:   1. No acute findings.  2. Chronic infarctions in the frontoparietal region on the right and  in the occipital lobe on the left.  3. White matter changes most likely due to severe small vessel  ischemic disease.      JAMILA LEE MD

## 2021-05-30 PROBLEM — R42 DIZZINESS: Status: ACTIVE | Noted: 2021-05-30

## 2021-05-30 PROBLEM — N17.9 ACUTE KIDNEY FAILURE, UNSPECIFIED (H): Status: ACTIVE | Noted: 2021-05-30

## 2021-05-30 LAB
ANION GAP SERPL CALCULATED.3IONS-SCNC: 6 MMOL/L (ref 3–14)
BUN SERPL-MCNC: 22 MG/DL (ref 7–30)
CALCIUM SERPL-MCNC: 8.1 MG/DL (ref 8.5–10.1)
CHLORIDE SERPL-SCNC: 103 MMOL/L (ref 94–109)
CO2 SERPL-SCNC: 25 MMOL/L (ref 20–32)
CREAT SERPL-MCNC: 1.55 MG/DL (ref 0.52–1.04)
ERYTHROCYTE [DISTWIDTH] IN BLOOD BY AUTOMATED COUNT: 18.2 % (ref 10–15)
GFR SERPL CREATININE-BSD FRML MDRD: 28 ML/MIN/{1.73_M2}
GLUCOSE SERPL-MCNC: 117 MG/DL (ref 70–99)
HCT VFR BLD AUTO: 29.5 % (ref 35–47)
HGB BLD-MCNC: 9.5 G/DL (ref 11.7–15.7)
MCH RBC QN AUTO: 28.7 PG (ref 26.5–33)
MCHC RBC AUTO-ENTMCNC: 32.2 G/DL (ref 31.5–36.5)
MCV RBC AUTO: 89 FL (ref 78–100)
PLATELET # BLD AUTO: 337 10E9/L (ref 150–450)
POTASSIUM SERPL-SCNC: 4.1 MMOL/L (ref 3.4–5.3)
RBC # BLD AUTO: 3.31 10E12/L (ref 3.8–5.2)
SODIUM SERPL-SCNC: 134 MMOL/L (ref 133–144)
WBC # BLD AUTO: 13 10E9/L (ref 4–11)

## 2021-05-30 PROCEDURE — 250N000013 HC RX MED GY IP 250 OP 250 PS 637: Performed by: NURSE PRACTITIONER

## 2021-05-30 PROCEDURE — 80048 BASIC METABOLIC PNL TOTAL CA: CPT | Performed by: NURSE PRACTITIONER

## 2021-05-30 PROCEDURE — 87086 URINE CULTURE/COLONY COUNT: CPT | Performed by: NURSE PRACTITIONER

## 2021-05-30 PROCEDURE — G0378 HOSPITAL OBSERVATION PER HR: HCPCS

## 2021-05-30 PROCEDURE — 96361 HYDRATE IV INFUSION ADD-ON: CPT

## 2021-05-30 PROCEDURE — 250N000012 HC RX MED GY IP 250 OP 636 PS 637: Performed by: NURSE PRACTITIONER

## 2021-05-30 PROCEDURE — 999N000157 HC STATISTIC RCP TIME EA 10 MIN

## 2021-05-30 PROCEDURE — 258N000003 HC RX IP 258 OP 636: Performed by: INTERNAL MEDICINE

## 2021-05-30 PROCEDURE — 120N000001 HC R&B MED SURG/OB

## 2021-05-30 PROCEDURE — 36415 COLL VENOUS BLD VENIPUNCTURE: CPT | Performed by: NURSE PRACTITIONER

## 2021-05-30 PROCEDURE — 85027 COMPLETE CBC AUTOMATED: CPT | Performed by: NURSE PRACTITIONER

## 2021-05-30 PROCEDURE — 250N000011 HC RX IP 250 OP 636: Performed by: NURSE PRACTITIONER

## 2021-05-30 PROCEDURE — 99233 SBSQ HOSP IP/OBS HIGH 50: CPT | Performed by: NURSE PRACTITIONER

## 2021-05-30 RX ORDER — SODIUM CHLORIDE 9 MG/ML
INJECTION, SOLUTION INTRAVENOUS CONTINUOUS
Status: DISCONTINUED | OUTPATIENT
Start: 2021-05-30 | End: 2021-05-31

## 2021-05-30 RX ORDER — FAMOTIDINE 20 MG/1
20 TABLET, FILM COATED ORAL AT BEDTIME
Status: DISCONTINUED | OUTPATIENT
Start: 2021-05-30 | End: 2021-06-02 | Stop reason: HOSPADM

## 2021-05-30 RX ADMIN — MIRTAZAPINE 30 MG: 15 TABLET, FILM COATED ORAL at 20:15

## 2021-05-30 RX ADMIN — METOPROLOL SUCCINATE 50 MG: 50 TABLET, EXTENDED RELEASE ORAL at 09:06

## 2021-05-30 RX ADMIN — FAMOTIDINE 20 MG: 20 TABLET ORAL at 20:16

## 2021-05-30 RX ADMIN — Medication 2.5 MG: at 11:57

## 2021-05-30 RX ADMIN — LISINOPRIL 2.5 MG: 2.5 TABLET ORAL at 09:06

## 2021-05-30 RX ADMIN — LEVOTHYROXINE SODIUM 75 MCG: 75 TABLET ORAL at 06:45

## 2021-05-30 RX ADMIN — AMLODIPINE BESYLATE 5 MG: 5 TABLET ORAL at 09:06

## 2021-05-30 RX ADMIN — CLOPIDOGREL BISULFATE 75 MG: 75 TABLET ORAL at 09:06

## 2021-05-30 RX ADMIN — LEVETIRACETAM 500 MG: 500 TABLET, FILM COATED ORAL at 20:15

## 2021-05-30 RX ADMIN — SODIUM CHLORIDE: 9 INJECTION, SOLUTION INTRAVENOUS at 09:05

## 2021-05-30 RX ADMIN — ISOSORBIDE MONONITRATE 60 MG: 30 TABLET, EXTENDED RELEASE ORAL at 09:06

## 2021-05-30 RX ADMIN — CEFTRIAXONE SODIUM 1 G: 1 INJECTION, POWDER, FOR SOLUTION INTRAMUSCULAR; INTRAVENOUS at 15:30

## 2021-05-30 RX ADMIN — LEVETIRACETAM 500 MG: 500 TABLET, FILM COATED ORAL at 09:05

## 2021-05-30 RX ADMIN — ASPIRIN 81 MG: 81 TABLET, DELAYED RELEASE ORAL at 09:05

## 2021-05-30 ASSESSMENT — ACTIVITIES OF DAILY LIVING (ADL)
ADLS_ACUITY_SCORE: 18

## 2021-05-30 NOTE — PROGRESS NOTES
S-(situation): 4 hour shift note    B-(background): UTI, lightheaded    A-(assessment): pt alert, oriented to self and year.  Vss, denies pain.  Has purewick in  Place, voiding qs, denies dizziness.      R-(recommendations): continue to monitor vs, safety, urinary status.

## 2021-05-30 NOTE — PROGRESS NOTES
[facility]    Medicine Progress Note - Hospitalist Service       Date of Admission:  5/29/2021  Assessment & Plan       Gillian Burk is a 89 year old female with a history of systolic heart failure with known EF of 35-40%, aortic stenosis s/p TAVR on chronic Plavix/aspirin, hypertension, hypothyroidism, and rheumatoid arthritis on chronic prednisone who presented to the ED 5/29/2021 secondary to lightheadedness. Patient lives in [facility] and was at the dining room table earlier on the day of admission when she noted feeling lightheaded. In the ED, patient was afebrile and hemodynamically stable. No focal neurologic deficits noted on exam. Head CT completed and negative for acute pathology. Lab work largely unremarkable but UA showed likely UTI with 177 WBC, large LE, and positive nitrite. UTI likely causing patient's symptoms. Patient will be admitted to observation status and will receive IV Rocephin with urine cultures pending.     Principal Problem:      Lightheadedness    UTI (urinary tract infection)    History of recurrent UTI  Assessment: Patient presented to ED with lightheadedness which began day of admission. Notes room was not spinning. No nystagmus on exam. No focal neurologic deficits. Found to have likely UTI with 177 WBC, large LE, and positive nitrite. UTI likely causing patient's symptoms. Received IV Rocephin in the ED. Patient with significant dysuria and frequency down in the ED and received Pyridium and IV dilaudid. She did have some increased confusion following IV dilaudid. She is afebrile and hemodynamically stable although blood pressure slightly elevated likely secondary to discomfort. Of note, patient on chronic Bactrim for UTI prevention. 5/30-Patient with ongoing, intermittent episodes of lightheadedness. Notes this is mildly improved from admission. Noted to be mildly hypotensive this morning with blood pressure in 90's/50's.  Suspect secondary to infection and poor oral intake. Denies shortness of breath and patient is maintaining oxygen saturations above 90% on room air.  Notes intermittent nausea and poor oral intake. She is afebrile and mildly hypotensive. Creatinine doubled overnight from 0.73 to 1.55. WBC up to 13.0 today from 5.9 at time of admission. Confusion improved since admission but she does remain forgetful regarding date. Given ongoing poor oral intake and worsening kidney function in setting of acute infection, will admit patient to inpatient status. Given patient's comorbid chronic medical problems, she is at very high risk for an adverse outcome including worsening weakness and neurologic status, respiratory deterioration, inadequate oral intake, worsening functional status, and even death, so hospitalization is considered medically necessary.  Based on the presently available information, hospitalization for at least 2 midnights is anticipated.     Plan:   - Admit to inpatient status  - Continue IV Rocephin for UTI and monitor for urine culture results   - PT evaluation requested and pending  - Will hold blood pressure medications and check orthostatic blood pressures as this may have contributed to patient's lightheadedness  - Tylenol as needed for pain-would avoid opiates as patient has history of confusion/hallucinations with opiates in the past  - Continue to monitor for urine culture results  - Continue to monitor lab values and vital signs closely    Active Problems:      Acute kidney failure, unspecified (H)  Assessment: 5/30-Patient with normal kidney function at time of admission. Creatinine of 0.73 with GFR of 70 at time of admission. Noted to have creatinine of 1.55 with GFR of 28 today. Possibly secondary to acute infection, poor oral intake, and/or dose of Pyridium patient received in ED  Plan:  - Continue IV fluids at 100 mL/hr  - Continue to monitor kidney function  - Avoid nephrotoxins      Seropositive  rheumatoid arthritis (H)  Assessment: Manages with prednisone 5 mg every other day alternating with 2.5 mg daily. Also on methotrexate weekly. 5/30-Currently denies pain  Plan:   - Continue PTA dose of prednisone but will hold methotrexate for now  - Tylenol as needed for pain      Hypertension goal BP (blood pressure) < 140/90    CAD  Assessment: Manages with Norvasc 5 mg daily, Imdur 60 mg twice daily, lisinopril 2.5 mg daily, and metoprolol ER 50 mg twice daily. Blood pressure mildly elevated in the ED likely secondary to discomfort 5/30-Patient noted to be mildly hypotensive this morning.   Plan:   - Hold PTA antihypertensive regimen as above  - Continue to monitor vital signs closely       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    History of CVA (cerebrovascular accident) - 2013    Aortic stenosis, severe - s/p TAVR    Chronic systolic congestive heart failure (H)  Assessment: Manages with Keppra 500 mg twice daily. Takes Plavix and baby aspirin daily since CVA and TAVR. Echocardiogram done in 2/21 showed EF of 50-55%. Of note, patient was discharged from [facility] 5/25 after admission for chest pain. Had stress test done during that admission which was negative for ischemia. Patient denies any chest pain. 5/30-Stable   Plan:   - Continue PTA regimen while in hospital         Acquired hypothyroidism  Assessment: Manages with Euthyrox 75 mcg daily 5/30-Stable   Plan:   - Continue PTA regimen while in hospital        Diet: Combination Diet Low Saturated Fat Na <2400mg Diet    DVT Prophylaxis: Pneumatic Compression Devices  Reyes Catheter: not present  Code Status: Full Code           Disposition Plan   Expected discharge: 2 - 3 days, recommended to prior living arrangement once adequate pain management/ tolerating PO medications, antibiotic plan established, renal function improved and safe disposition plan/ TCU bed available.  Entered: Cameron Llamas NP 05/30/2021, 10:36 AM       The patient's  care was discussed with the Attending Physician, Dr. Wilks, Bedside Nurse, Patient and Patient's Family.    Cameron Llamas NP  Hospitalist Service  [facility]  Contact information available via [facility] Paging/Directory    ______________________________________________________________________    Interval History   Patient seen sitting up in bed noting mild improvement in symptoms. Denies any pain today. Notes intermittently feeling lightheaded but notes this is improved from admission. Denies shortness of breath and patient is maintaining oxygen saturations above 90% on room air.  Notes intermittent nausea and decreased oral intake. Patient is afebrile. Blood pressure noted to be low normal. WBC up to over 13 today. Kidney function worsened with creatinine of 1.55. Confusion improved from admission but remains forgetful. No focal neurologic deficits.     Data reviewed today: I reviewed all medications, new labs and imaging results over the last 24 hours    Physical Exam   Vital Signs: Temp: 97.3  F (36.3  C) Temp src: Oral BP: 95/50 Pulse: 76   Resp: 18 SpO2: 94 % O2 Device: None (Room air)    Weight: 112 lbs 6.95 oz  Constitutional: awake, alert, cooperative, no apparent distress, and appears stated age  Respiratory: No increased work of breathing, good air exchange, clear to auscultation bilaterally, no crackles or wheezing  Cardiovascular: regular rate and rhythm and normal S1 and S2  GI: normal bowel sounds, non-distended and non-tender  Skin: normal skin color, texture, turgor  Musculoskeletal: there is no redness, warmth, or swelling of the joints  Neurologic: Awake, alert, oriented to name, place and situation; inconsistently oriented to time; very [place]; no focal neurologic deficits    Data   Recent Labs   Lab 05/30/21  0543 05/29/21  1401 05/24/21  0829 05/24/21  0319 05/23/21  1758   WBC 13.0* 5.9 5.2  --   --    HGB 9.5* 10.3* 10.3*  --   --    MCV 89 92 92  --   --      350 311  --   --     135 138  --   --    POTASSIUM 4.1 4.0 3.5  --   --    CHLORIDE 103 100 105  --   --    CO2 25 29 26  --   --    BUN 22 16 15  --   --    CR 1.55* 0.73 0.86  --   --    ANIONGAP 6 6 7  --   --    CHEYANNE 8.1* 8.8 7.9*  --   --    * 110* 88  --   --    ALBUMIN  --  3.0*  --   --   --    PROTTOTAL  --  9.4*  --   --   --    BILITOTAL  --  0.3  --   --   --    ALKPHOS  --  91  --   --   --    ALT  --  21  --   --   --    AST  --  25  --   --   --    TROPI  --   --  0.047* 0.050* 0.044     Recent Results (from the past 24 hour(s))   CT Head w/o Contrast    Narrative    CT SCAN OF THE HEAD WITHOUT CONTRAST   5/29/2021 2:31 PM     HISTORY: Dizziness, non-specific.    TECHNIQUE:  Axial images of the head and coronal reformations without  IV contrast material. Radiation dose for this scan was reduced using  automated exposure control, adjustment of the mA and/or kV according  to patient size, or iterative reconstruction technique.    COMPARISON: 4/26/2019    FINDINGS:     Intracranial contents: There is mild cerebral volume loss. Confluent  low attenuation in the white matter is nonspecific, though likely due  to severe small vessel ischemic disease. There is a small chronic  cortical infarction in the medial occipital lobe on the left side.  Small chronic cortical infarction in the frontal parietal region on  the right side. There is no evidence of intracranial hemorrhage, mass,  acute infarct or anomaly.    Visualized orbits/sinuses/mastoids: The visualized portions of the  sinuses and mastoids appear normal.    Osseous structures/soft tissues: There is no evidence of trauma.      Impression    IMPRESSION:   1. No acute findings.  2. Chronic infarctions in the frontoparietal region on the right and  in the occipital lobe on the left.  3. White matter changes most likely due to severe small vessel  ischemic disease.      JAMILA LEE MD     Medications     sodium chloride 75 mL/hr at 05/30/21  0905       amLODIPine  5 mg Oral Daily     aspirin  81 mg Oral Daily     cefTRIAXone  1 g Intravenous Q24H     clopidogrel  75 mg Oral Daily     famotidine  40 mg Oral At Bedtime     [Held by provider] isosorbide mononitrate  60 mg Oral BID     levETIRAcetam  500 mg Oral BID     levothyroxine  75 mcg Oral QAM AC     [Held by provider] lisinopril  2.5 mg Oral Daily     [Held by provider] metoprolol succinate ER  50 mg Oral BID     mirtazapine  30 mg Oral At Bedtime     predniSONE  2.5 mg Oral Every Other Day     [START ON 5/31/2021] predniSONE  5 mg Oral Every Other Day

## 2021-05-30 NOTE — PROGRESS NOTES
S-(situation): 4 hour shift note    B-(background): UTI, lightheaded    A-(assessment): denies being lightheaded, vss, has pure wick in place, has slept most of the night, denies pain.  Continues to be oriented to self and knows year.    R-(recommendations): continue to monitor vs, safety, urinary status

## 2021-05-30 NOTE — UTILIZATION REVIEW
Admission Status; Secondary Review Determination    Under the authority of the Utilization Management Committee, the utilization review process indicated a secondary review on the above patient. The review outcome is based on review of the medical records, discussions with staff, and applying clinical experience noted on the date of the review.    (x) Inpatient Status Appropriate - This patient's medical care is consistent with medical management for inpatient care and reasonable inpatient medical practice.    RATIONALE FOR DETERMINATION: 89-year-old female with history of systolic heart failure, EF 35-40%, status post TAVR on dual antiplatelet agents, hypertension, rheumatoid arthritis on chronic prednisone presented to the hospital with significant lightheadedness with urinary tract infection.  On hospital day 2 patient continued to have lightheadedness with relative hypotension, poor p.o. intake with intermittent nausea accompanied by acute kidney injury with a doubling of her creatinine level while in the hospital and significant doubling of white blood count with leukocytosis.  Due to the severity of patient's UTI with low blood pressures and acute kidney injury, patient is appropriate for inpatient management.    At the time of admission with the information available to the attending physician more than 2 nights Hospital complex care was anticipated, based on patient risk of adverse outcome if treated as outpatient and complex care required. Inpatient admission is appropriate based on the Medicare guidelines.    This document was produced using voice recognition software    The information on this document is developed by the utilization review team in order for the business office to ensure compliance. This only denotes the appropriateness of proper admission status and does not reflect the quality of care rendered.    The definitions of Inpatient Status and Observation Status used in making the  determination above are those provided in the CMS Coverage Manual, Chapter 1 and Chapter 6, section 70.4.    Sincerely,    Tl Ballard MD  Utilization Review  Physician Advisor  [facility].

## 2021-05-30 NOTE — CONSULTS
Care Management Initial Consult    General Information  Assessment completed with: Patient, Children,    Type of CM/SW Visit: Initial Assessment    Primary Care Provider verified and updated as needed:     Readmission within the last 30 days:        Reason for Consult: discharge planning  Advance Care Planning:          Communication Assessment  Patient's communication style: spoken language (English or Bilingual)    Hearing Difficulty or Deaf: yes   Wear Glasses or Blind: yes    Cognitive  Cognitive/Neuro/Behavioral: .WDL except  Level of Consciousness: alert, confused  Arousal Level: opens eyes spontaneously  Orientation: disoriented to, situation  Mood/Behavior: calm, cooperative  Best Language: 0 - No aphasia  Speech: clear, spontaneous, logical    Living Environment:   People in home: facility resident     Current living Arrangements: assisted living  Name of Facility: [facility]   Able to return to prior arrangements: yes     Family/Social Support:  Care provided by: self  Provides care for:    Marital Status:   Children, Facility resident(s)/Staff          Description of Support System: Supportive, Involved       Current Resources:   Patient receiving home care services: No     Community Resources:    Equipment currently used at home:    Supplies currently used at home:      Employment/Financial:  Employment Status:          Financial Concerns:         Lifestyle & Psychosocial Needs:        Socioeconomic History     Marital status:      Spouse name: Not on file     Number of children: 4     Years of education: Not on file     Highest education level: Not on file   Occupational History     Employer: RETIRED     Tobacco Use     Smoking status: Never Smoker     Smokeless tobacco: Never Used   Substance and Sexual Activity     Alcohol use: No     Alcohol/week: 0.0 standard drinks     Drug use: No     Sexual activity: Not Currently       Functional Status:  Prior to admission patient needed  assistance:        Mental Health Status:          Chemical Dependency Status:              Values/Beliefs:  Spiritual, Cultural Beliefs, Orthodox Practices, Values that affect care:                 Additional Information:  Writer met with patient and her daughter, Jennifer.  Patient lives at [facility].  She is independent with most cares.  Patient eats her meal at the dining center.  She ambulates independently in her apartment, and uses a cane when out of her apartment.  Patient is independent with her medications.  Jennifer states the [facility] staff do check on patient to make sure she took her medication.  [facility] staff put on and take off patient's compression stockings in the morning and at night.  Staff assist with laundry and shower 1x/wk.      Discussed discharge plan with patient/Jennifer.  Plan is for patient to discharge back to [facility] when medically able.  Jennifer will transport.  If today, patient's other daughter from [place], will come to stay with her.      Writer talked with Salome at [facility] [phone].  Salome states that patient could return today or tomorrow, however, she would need to return well before 1500 as there is only a nurse on-site until 1500.  Also, an new meds should be faxed to [facility] as they will need to stat order them on Sunday/Monday (Holiday).  Fax for [facility] is [phone].    RASHEEDA Donahue  [facility] [phone]/ [place] [phone]  Care Management

## 2021-05-30 NOTE — PLAN OF CARE
Neuro: Alert to self, time, place. Disoriented to situation and intermittently forgets where she is. Word-finding difficulty present. Speech clear.   Pulm: LS clear/diminished  CV: Apical pulse regular  GI: Appetite good. BS active. Denies nausea.  : Voiding well. Denies pain with urination today.   Skin: Bruising on arms and legs, otherwise intact.   Lines/Tubes/Drains: New PIV placed in left arm.   Drips: NS at 75 mL/hr     Plan: Encourage fluids. Hold BP medications. Monitor for hypotension and dizziness.

## 2021-05-30 NOTE — PLAN OF CARE
S-(situation): Patient changed to inpatient status    B-(background): Patient status change due to observation goals not being met.     A-(assessment): A&Ox4 with some confusion, word finding difficulty. LS clear and diminished in bases. CMS/neuros intact. Skin intact with bruising. Voiding well. BS active. Appetite good.     R-(recommendations): Will monitor patient per MD orders.       Inpatient nursing criteria listed below were met:    Adult Profile completedYes  Health care directives status obtained and documented: Yes  VTE prophylaxis orders: PCD  (FYI: Asprin is not an approved anticoagulation for DVT prophylaxis)  SCD's Documented: Yes  Vaccine assessment done and vaccine ordered if needed. Not Applicable  My Chart patient sign up addressed and documented: No  Care Plan initiated: Yes  Discharge planning review completed (admission navigator) Yes

## 2021-05-30 NOTE — PROGRESS NOTES
S-(situation): Holding PT for 5- and rechecking patient situation on 5-    B-(background): 89 yo female referred to PT for safety assessment. Patient had been up in room and doing well overall and then had issues after medications per nursing. Team is checking for medication issues at this time. Nurse also reports patient is having issues picking up her feet and feels her slippers are sticking to the floor.     A-(assessment): inconsistent gait performance and looking at medications    R-(recommendations): Nurse will ask her daughter to bring in a pair of her walking shoes vs using slippers. Will assess patient on 5- to see if there is improvement in lightheaded/dizziness as she did better with gait prior to medication. Nurse was in agreement.

## 2021-05-31 ENCOUNTER — APPOINTMENT (OUTPATIENT)
Dept: PHYSICAL THERAPY | Facility: CLINIC | Age: 86
DRG: 690 | End: 2021-05-31
Payer: COMMERCIAL

## 2021-05-31 LAB
ANION GAP SERPL CALCULATED.3IONS-SCNC: 7 MMOL/L (ref 3–14)
BACTERIA SPEC CULT: NORMAL
BUN SERPL-MCNC: 26 MG/DL (ref 7–30)
CALCIUM SERPL-MCNC: 7.5 MG/DL (ref 8.5–10.1)
CHLORIDE SERPL-SCNC: 105 MMOL/L (ref 94–109)
CO2 SERPL-SCNC: 24 MMOL/L (ref 20–32)
CREAT SERPL-MCNC: 1.15 MG/DL (ref 0.52–1.04)
GFR SERPL CREATININE-BSD FRML MDRD: 41 ML/MIN/{1.73_M2}
GLUCOSE SERPL-MCNC: 131 MG/DL (ref 70–99)
Lab: NORMAL
POTASSIUM SERPL-SCNC: 3.5 MMOL/L (ref 3.4–5.3)
SODIUM SERPL-SCNC: 136 MMOL/L (ref 133–144)
SPECIMEN SOURCE: NORMAL

## 2021-05-31 PROCEDURE — 250N000012 HC RX MED GY IP 250 OP 636 PS 637: Performed by: NURSE PRACTITIONER

## 2021-05-31 PROCEDURE — 250N000013 HC RX MED GY IP 250 OP 250 PS 637: Performed by: NURSE PRACTITIONER

## 2021-05-31 PROCEDURE — 120N000001 HC R&B MED SURG/OB

## 2021-05-31 PROCEDURE — 97161 PT EVAL LOW COMPLEX 20 MIN: CPT | Mod: GP | Performed by: PHYSICAL THERAPIST

## 2021-05-31 PROCEDURE — 250N000013 HC RX MED GY IP 250 OP 250 PS 637: Performed by: INTERNAL MEDICINE

## 2021-05-31 PROCEDURE — 36415 COLL VENOUS BLD VENIPUNCTURE: CPT | Performed by: INTERNAL MEDICINE

## 2021-05-31 PROCEDURE — 258N000003 HC RX IP 258 OP 636: Performed by: NURSE PRACTITIONER

## 2021-05-31 PROCEDURE — 80048 BASIC METABOLIC PNL TOTAL CA: CPT | Performed by: INTERNAL MEDICINE

## 2021-05-31 PROCEDURE — 250N000011 HC RX IP 250 OP 636: Performed by: NURSE PRACTITIONER

## 2021-05-31 RX ADMIN — LEVETIRACETAM 500 MG: 500 TABLET, FILM COATED ORAL at 20:29

## 2021-05-31 RX ADMIN — SODIUM CHLORIDE: 9 INJECTION, SOLUTION INTRAVENOUS at 00:21

## 2021-05-31 RX ADMIN — FAMOTIDINE 20 MG: 20 TABLET ORAL at 20:29

## 2021-05-31 RX ADMIN — CLOPIDOGREL BISULFATE 75 MG: 75 TABLET ORAL at 09:31

## 2021-05-31 RX ADMIN — AMLODIPINE BESYLATE 5 MG: 5 TABLET ORAL at 09:31

## 2021-05-31 RX ADMIN — METOPROLOL SUCCINATE 50 MG: 50 TABLET, EXTENDED RELEASE ORAL at 10:14

## 2021-05-31 RX ADMIN — MIRTAZAPINE 30 MG: 15 TABLET, FILM COATED ORAL at 20:29

## 2021-05-31 RX ADMIN — ISOSORBIDE MONONITRATE 60 MG: 30 TABLET, EXTENDED RELEASE ORAL at 14:28

## 2021-05-31 RX ADMIN — PREDNISONE 5 MG: 5 TABLET ORAL at 09:31

## 2021-05-31 RX ADMIN — ASPIRIN 81 MG: 81 TABLET, DELAYED RELEASE ORAL at 09:31

## 2021-05-31 RX ADMIN — LEVETIRACETAM 500 MG: 500 TABLET, FILM COATED ORAL at 09:31

## 2021-05-31 RX ADMIN — LEVOTHYROXINE SODIUM 75 MCG: 75 TABLET ORAL at 06:23

## 2021-05-31 RX ADMIN — METOPROLOL SUCCINATE 50 MG: 50 TABLET, EXTENDED RELEASE ORAL at 20:29

## 2021-05-31 RX ADMIN — ISOSORBIDE MONONITRATE 60 MG: 30 TABLET, EXTENDED RELEASE ORAL at 10:13

## 2021-05-31 RX ADMIN — LISINOPRIL 2.5 MG: 2.5 TABLET ORAL at 10:13

## 2021-05-31 RX ADMIN — CEFTRIAXONE SODIUM 1 G: 1 INJECTION, POWDER, FOR SOLUTION INTRAMUSCULAR; INTRAVENOUS at 17:07

## 2021-05-31 ASSESSMENT — ACTIVITIES OF DAILY LIVING (ADL)
ADLS_ACUITY_SCORE: 18

## 2021-05-31 NOTE — PLAN OF CARE
Patient is disoriented to situation and time. Vitally stable and on room air. Home blood pressure meds were restarted today. PT worked with patient, up to bathroom with assist of 1 with a cane. Patient refuses to use walker at home. Occasionally will use walker in hospital. Discontinue fluids. Potential plan to discharge tomorrow. MD notified of daughters request for patient to have senna and iron supplement ordered.     Ree Ricardo RN on 5/31/2021 at 2:55 PM      Problem: Adult Inpatient Plan of Care  Goal: Plan of Care Review  Outcome: Improving  Goal: Patient-Specific Goal (Individualized)  Outcome: Improving  Goal: Absence of Hospital-Acquired Illness or Injury  Outcome: Improving  Intervention: Identify and Manage Fall Risk  Recent Flowsheet Documentation  Taken 5/31/2021 0800 by Ree Ricardo RN  Safety Promotion/Fall Prevention:   activity supervised   bed alarm on   clutter free environment maintained   fall prevention program maintained   increase visualization of patient   increased rounding and observation   nonskid shoes/slippers when out of bed   patient and family education   room door open   room near nurse's station   room organization consistent   safety round/check completed  Intervention: Prevent Skin Injury  Recent Flowsheet Documentation  Taken 5/31/2021 0800 by Ree Ricardo RN  Body Position: supine, head elevated  Intervention: Prevent and Manage VTE (Venous Thromboembolism) Risk  Recent Flowsheet Documentation  Taken 5/31/2021 0800 by Ree Ricardo, RN  VTE Prevention/Management: pneumatic compression device  Goal: Optimal Comfort and Wellbeing  Outcome: Improving  Goal: Readiness for Transition of Care  Outcome: Improving     Problem: UTI (Urinary Tract Infection)  Goal: Improved Infection Symptoms  Outcome: Improving     Problem: Discharge Planning  Goal: Discharge Planning (Adult, OB, Behavioral, Peds)  Outcome: Improving     Problem: Adjustment to Illness  (Delirium)  Goal: Optimal Coping  Outcome: Improving     Problem: Attention and Thought Clarity Impairment (Delirium)  Goal: Improved Attention and Thought Clarity  Outcome: Improving     Problem: Sleep Disturbance (Delirium)  Goal: Improved Sleep  Outcome: Improving

## 2021-05-31 NOTE — PLAN OF CARE
VSS. Afebrile. Denies any pain/discomfort. Pt alert and oriented to self and place. Pt disoriented to time and situation. Easily redirected. Pt able to follow commands but slow to respond at times. Pt [place]. denies any chest pain/SOB/nausea/vomiting. Urine orange/cloudy/strong smelling. Pt up to BR SBA. No complaints at this time.

## 2021-05-31 NOTE — PROGRESS NOTES
05/31/21 0849   Quick Adds   Type of Visit Initial PT Evaluation       Present no   Living Environment   People in home facility resident   Current Living Arrangements assisted living   Home Accessibility wheelchair accessible  (elevators)   Transportation Anticipated family or friend will provide   Living Environment Comments lives on second floor   Self-Care   Usual Activity Tolerance fair   Regular Exercise Yes   Activity/Exercise Type other (see comments)  (group exercises and walks approx 200 feet from dining rm)   Exercise Amount/Frequency 3-5 times/wk   Equipment Currently Used at Home cane, straight;raised toilet seat;shower chair;grab bar, toilet;grab bar, tub/shower  (in apt uses items around but not cane)   Disability/Function   Hearing Difficulty or Deaf yes   Describe hearing loss bilateral hearing loss   Use of hearing assistive devices bilateral hearing aids   Hearing Management bilateral hearing aides and usually reads lips   Wear Glasses or Blind yes   Vision Management glasses with sight only in the L eye, R eye sees black and white and shpaes   Concentrating, Remembering or Making Decisions Difficulty no   Difficulty Communicating no   Difficulty Eating/Swallowing no   Walking or Climbing Stairs Difficulty yes   Walking or Climbing Stairs ambulation difficulty, requires equipment;other (see comments)  (fatigues)   Dressing/Bathing Difficulty no   Toileting issues no   Doing Errands Independently Difficulty (such as shopping) no  (family assists her)   Fall history within last six months no   General Information   Onset of Illness/Injury or Date of Surgery 05/29/21  (admit)   Referring Physician Cameron Llamas NP   Patient/Family Therapy Goals Statement (PT) Return home and not have to use walker   Pertinent History of Current Problem (include personal factors and/or comorbidities that impact the POC) 89 yo admitted with PT order for evaluate and treat for safe discharge. Her  daughter is present for the session today. Both agreed Gillian is confused but this is clearing. She lives at [facility] and plans to return there. She will have assistance to go to dining room (walk or WC) or they will bring her a tray for meals. Overall, Gillian feels better she has been walking to the bathroom with a FWW and nursing assistance. She usually walks in her slippers in her apt and does not use a cane. She declines use of walker and her children have tried to have her use one as needed eg 4 wheeled so she could sit as needed. She has declined this stating walkers were for older people. She does make use of the exercise group 3-5 times per day.    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    Cognition   Orientation Status (Cognition) oriented x 4   Affect/Mental Status (Cognition) WFL   Follows Commands (Cognition) over 90% accuracy   Memory Deficit (Cognition)   (intermittent )   Pain Assessment   Patient Currently in Pain No   Posture    Posture Forward head position;Protracted shoulders   Range of Motion (ROM)   ROM Comment WFL arms and legs   Strength   Strength Comments Leg strength was 4-4+/5 in bed and arms 4/5   Bed Mobility   Comment (Bed Mobility) indepenent supine<->sit, scooting in bed   Transfers   Transfer Safety Comments independent sit<->stand using single end cane. independent toilet transfers. PT needed for IV   Gait/Stairs (Locomotion)   [place] Level (Gait) modified independence   Assistive Device (Gait) cane, straight   Distance in  Feet (Required for LE Total Joints) 30 feet to bathroom and then to end of bed and back to her bed due to fatigue   Pattern (Gait) step-to   Deviations/Abnormal Patterns (Gait) gait speed decreased;stride length decreased;weight shifting decreased;base of support, narrow   Comment (Gait/Stairs) safely walking with cane and reaches for doorframes with R hand for more support. Moves slow and careful. Easily fatigues.    Balance   Balance Comments fair with gait today   Clinical Impression   Criteria for Skilled Therapeutic Intervention yes, treatment indicated   PT Diagnosis (PT) decreased endurance and balance    Influenced by the following impairments endurance, balance   Functional limitations due to impairments walking distances eg to dining room (approx 200 feet from her room at [facility])   Clinical Presentation Stable/Uncomplicated   Clinical Presentation Rationale clinical judgement   Clinical Decision Making (Complexity) low complexity   Predicted Duration of Therapy Intervention (days/wks) daily   Planned Therapy Interventions (PT) gait training;home exercise program;balance training   Anticipated Equipment Needs at Discharge (PT)   (patient declines walker at this time)   Risk & Benefits of therapy have been explained evaluation/treatment results reviewed;care plan/treatment goals reviewed;risks/benefits reviewed;current/potential barriers reviewed;patient;daughter   Clinical Impression Comments 89 yo female with decreased endurance and using items around room  as well as her single end cane. She usually walks to and from her dining room with the cane and she does not use and assist device in her apt. She does hang onto items in her apt for support. When a walker was recommended until she was stronger and had better endurance, she declined. Her daughters have been discussing this with her as well. She was fair balance with cane for short distances and indep with her transfers using the cane.  She should be able to return to [facility] with assistance to get to [facility] - walk with cane part way and then WC as needed to increase her endurance. She is asked to always use her cane even in the apt. She does have cardiac rehab ordered from pre hospitalizaton and this will help with her endurance. PT is recommended to assess her function in her apt and to improve balance and endurance.    PT Discharge Planning    PT Discharge Recommendation (DC Rec) home with home care physical therapy;home with assist   PT Rationale for DC Rec Skilled intervention for safety with gait and improve balance. Assess for safety in apt without walker.    PT Brief overview of current status  Independent supine<->sit, bed mobility and gait wtih cane short distance., However she needs to be able to walk 200 feet to get to the dining room and is holding various items in her apyt for support vs cane  use. She eclines use of walker at this time but would be beneficial for her safety and to allow her to walk further independently. ($ wheeled walker would work well for her).    Total Evaluation Time   Total Evaluation Time (Minutes) 35   Coping Strategies   Trust Relationship/Rapport care explained;choices provided;emotional support provided;empathic listening provided;questions answered;questions encouraged;thoughts/feelings acknowledged

## 2021-05-31 NOTE — PROGRESS NOTES
[facility]    Medicine Progress Note - Hospitalist Service         Date of Admission:  5/29/2021    Identifier  89 year old female who has a past medical history of TAVR for aortic stenosis in August 2016, known coronary artery disease, recent stent placement for angina in 2021, secondary Sjogren's syndrome on chronic prednisone therapy, osteoarthritis, osteopenia, hypothyroid state.  Patient was recently admitted to this hospital for chest pain and subsequently discharged on the 25th of this month.  She was subsequently readmitted less than 48 hours ago with complaints of lightheadedness.  She was found to have evidence of leukocytosis as well on presentation.  Patient was started on IV fluids, antibiotics.  Her blood pressure medicines were on hold because of hypotension.  Patient is well-known to me from the recent hospitalization      Assessment & Plan       1/.  Lactic acidosis: Seems to have resolved, likely related to prerenal factors.  Discontinue IV fluids.  2/.  Urinary tract infection: Culture results are pending.  Continue with ceftriaxone at this time.  Based on the culture results antibiotics can be adjusted at discharge.  3/.  Hypertension: Medications were on hold because of patient having hypotension and prerenal failure  Much improved blood pressure, safe to resume home medications.  4/.  CKD stage II: Creatinine hovering around baseline and normal.  5/.  Leukocytosis: Likely related to the UTI, will recheck labs in the morning  6/.  Type 2 diabetes mellitus excellent blood sugar control, continue with the same.    Anticipate discharge back to her care facility/assisted living tomorrow.  Today is not possible due to holiday       Diet: Combination Diet Low Saturated Fat Na <2400mg Diet    DVT Prophylaxis: Low Risk/Ambulatory with no VTE prophylaxis indicated  Reyes Catheter: not present  Code Status: Full Code           Disposition Plan   Expected discharge:  Tomorrow, recommended to prior living arrangement once Assisted living requirements.  Entered: Libra Sullivan MD 05/31/2021, 10:06 AM       The patient's care was discussed with the Bedside Nurse, Care Coordinator/, Patient and Patient's Family.    Libra Sullivan MD  Hospitalist Service  [facility]  Contact information available via [facility] Paging/Directory    ______________________________________________________________________    Interval History   Overnight patient has had no issues, her appetite is good, she feels much stronger and much better.  She is wondering about her medications because a lot of her blood pressure medicines were on hold on admission    Data reviewed today: I reviewed all medications, new labs and imaging results over the last 24 hours. I personally reviewed no images or EKG's today.    Physical Exam   Vital Signs: Temp: 97.1  F (36.2  C) Temp src: Oral BP: (!) 149/66 Pulse: 84   Resp: 18 SpO2: 100 % O2 Device: None (Room air)    Weight: 112 lbs 6.95 oz  General Appearance: Alert awake oriented x3 elderly, appears much younger than stated age  Respiratory: Clear to auscultation  Cardiovascular: S1-S2 regular rate and rhythm  GI: Abdomen is soft, nontender nondistended bowel sounds are present  Skin: Superficial bruising over extremities and anterior chest (not new)  Other: No lower extremity swelling  PERRLA      Data   Recent Labs   Lab 05/31/21  0835 05/30/21  0543 05/29/21  1401   WBC  --  13.0* 5.9   HGB  --  9.5* 10.3*   MCV  --  89 92   PLT  --  337 350    134 135   POTASSIUM 3.5 4.1 4.0   CHLORIDE 105 103 100   CO2 24 25 29   BUN 26 22 16   CR 1.15* 1.55* 0.73   ANIONGAP 7 6 6   CHEYANNE 7.5* 8.1* 8.8   * 117* 110*   ALBUMIN  --   --  3.0*   PROTTOTAL  --   --  9.4*   BILITOTAL  --   --  0.3   ALKPHOS  --   --  91   ALT  --   --  21   AST  --   --  25

## 2021-06-01 ENCOUNTER — APPOINTMENT (OUTPATIENT)
Dept: PHYSICAL THERAPY | Facility: CLINIC | Age: 86
DRG: 690 | End: 2021-06-01
Payer: COMMERCIAL

## 2021-06-01 PROBLEM — D72.829 LEUKOCYTOSIS: Status: ACTIVE | Noted: 2021-05-30

## 2021-06-01 PROBLEM — R26.9 ABNORMAL GAIT: Status: ACTIVE | Noted: 2021-06-01

## 2021-06-01 PROBLEM — I25.5 ISCHEMIC CARDIOMYOPATHY: Status: ACTIVE | Noted: 2019-08-06

## 2021-06-01 PROBLEM — G93.89 ENCEPHALOMALACIA ON IMAGING STUDY: Status: ACTIVE | Noted: 2021-06-01

## 2021-06-01 PROCEDURE — 120N000001 HC R&B MED SURG/OB

## 2021-06-01 PROCEDURE — 250N000013 HC RX MED GY IP 250 OP 250 PS 637: Performed by: NURSE PRACTITIONER

## 2021-06-01 PROCEDURE — 250N000013 HC RX MED GY IP 250 OP 250 PS 637: Performed by: PEDIATRICS

## 2021-06-01 PROCEDURE — 250N000012 HC RX MED GY IP 250 OP 636 PS 637: Performed by: NURSE PRACTITIONER

## 2021-06-01 PROCEDURE — 97116 GAIT TRAINING THERAPY: CPT | Mod: GP | Performed by: PHYSICAL THERAPIST

## 2021-06-01 PROCEDURE — 99232 SBSQ HOSP IP/OBS MODERATE 35: CPT | Performed by: PEDIATRICS

## 2021-06-01 PROCEDURE — 250N000013 HC RX MED GY IP 250 OP 250 PS 637: Performed by: INTERNAL MEDICINE

## 2021-06-01 RX ORDER — DOCUSATE SODIUM 100 MG/1
200 CAPSULE, LIQUID FILLED ORAL DAILY
Status: DISCONTINUED | OUTPATIENT
Start: 2021-06-01 | End: 2021-06-02 | Stop reason: HOSPADM

## 2021-06-01 RX ORDER — CIPROFLOXACIN 500 MG/1
500 TABLET, FILM COATED ORAL EVERY 24 HOURS
Qty: 3 TABLET | Refills: 0 | Status: SHIPPED | OUTPATIENT
Start: 2021-06-02 | End: 2021-06-05

## 2021-06-01 RX ORDER — CEFDINIR 300 MG/1
300 CAPSULE ORAL DAILY
Status: DISCONTINUED | OUTPATIENT
Start: 2021-06-01 | End: 2021-06-01

## 2021-06-01 RX ORDER — CIPROFLOXACIN 500 MG/1
500 TABLET, FILM COATED ORAL
Status: DISCONTINUED | OUTPATIENT
Start: 2021-06-01 | End: 2021-06-02 | Stop reason: HOSPADM

## 2021-06-01 RX ORDER — FERROUS SULFATE 325(65) MG
325 TABLET ORAL DAILY
Status: DISCONTINUED | OUTPATIENT
Start: 2021-06-01 | End: 2021-06-02 | Stop reason: HOSPADM

## 2021-06-01 RX ORDER — ISOSORBIDE MONONITRATE 60 MG/1
60 TABLET, EXTENDED RELEASE ORAL 2 TIMES DAILY
Qty: 180 TABLET | Refills: 3 | COMMUNITY
Start: 2021-06-01 | End: 2021-06-18

## 2021-06-01 RX ORDER — PHENAZOPYRIDINE HCL 100 MG
50 TABLET ORAL 3 TIMES DAILY PRN
Status: DISCONTINUED | OUTPATIENT
Start: 2021-06-01 | End: 2021-06-02 | Stop reason: HOSPADM

## 2021-06-01 RX ADMIN — ASPIRIN 81 MG: 81 TABLET, DELAYED RELEASE ORAL at 09:03

## 2021-06-01 RX ADMIN — LISINOPRIL 2.5 MG: 2.5 TABLET ORAL at 09:04

## 2021-06-01 RX ADMIN — LEVETIRACETAM 500 MG: 500 TABLET, FILM COATED ORAL at 20:17

## 2021-06-01 RX ADMIN — LEVOTHYROXINE SODIUM 75 MCG: 75 TABLET ORAL at 05:57

## 2021-06-01 RX ADMIN — ISOSORBIDE MONONITRATE 60 MG: 30 TABLET, EXTENDED RELEASE ORAL at 09:00

## 2021-06-01 RX ADMIN — METOPROLOL SUCCINATE 50 MG: 50 TABLET, EXTENDED RELEASE ORAL at 20:17

## 2021-06-01 RX ADMIN — DOCUSATE SODIUM 200 MG: 100 CAPSULE, LIQUID FILLED ORAL at 10:47

## 2021-06-01 RX ADMIN — Medication 2.5 MG: at 09:07

## 2021-06-01 RX ADMIN — CLOPIDOGREL BISULFATE 75 MG: 75 TABLET ORAL at 09:01

## 2021-06-01 RX ADMIN — ISOSORBIDE MONONITRATE 60 MG: 30 TABLET, EXTENDED RELEASE ORAL at 13:55

## 2021-06-01 RX ADMIN — CIPROFLOXACIN HYDROCHLORIDE 500 MG: 500 TABLET, FILM COATED ORAL at 09:02

## 2021-06-01 RX ADMIN — METOPROLOL SUCCINATE 50 MG: 50 TABLET, EXTENDED RELEASE ORAL at 09:02

## 2021-06-01 RX ADMIN — FAMOTIDINE 20 MG: 20 TABLET ORAL at 20:17

## 2021-06-01 RX ADMIN — AMLODIPINE BESYLATE 5 MG: 5 TABLET ORAL at 09:03

## 2021-06-01 RX ADMIN — LEVETIRACETAM 500 MG: 500 TABLET, FILM COATED ORAL at 09:02

## 2021-06-01 RX ADMIN — FERROUS SULFATE TAB 325 MG (65 MG ELEMENTAL FE) 325 MG: 325 (65 FE) TAB at 10:47

## 2021-06-01 RX ADMIN — MIRTAZAPINE 30 MG: 15 TABLET, FILM COATED ORAL at 20:17

## 2021-06-01 ASSESSMENT — ACTIVITIES OF DAILY LIVING (ADL)
ADLS_ACUITY_SCORE: 16
ADLS_ACUITY_SCORE: 18
ADLS_ACUITY_SCORE: 16
ADLS_ACUITY_SCORE: 18

## 2021-06-01 NOTE — PROGRESS NOTES
[facility]    Medicine Progress Note - Hospitalist Service       Date of Admission:  5/29/2021  Assessment & Plan       89-year-old woman with complex health history and recent hospitalization for chest pain admitted due to lightheadedness with concern for urinary tract infection and lower blood pressure readings than her normal baseline.  Urine culture was negative, but she does continue to endorse symptoms suspicious for UTI and did have pyuria as well as positive nitrites on dipstick UA.  Hemodynamic status is improving as is lightheadedness with elevated blood pressure even after restarting all of her normal antihypertensive medications yesterday.  Signs of acute renal failure have improved although renal function has not yet recovered to previous baseline.  Her multiple chronic cardiac problems including coronary artery disease with ischemic cardiomyopathy, chronic systolic heart failure, and previous TAVR are all stable.  Her neurologic status including seizure with encephalomalacia of the left occipital lobe after previous stroke has been stable.  She chronically takes prednisone to treat seropositive rheumatoid arthritis and it is possible that relative adrenal insufficiency contributed to initial symptoms of lightheadedness as well.  She normally lives at assisted living and can function largely independently with many of her day-to-day activities although does have abnormal gait at baseline, but she continues to be weaker than usual.    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    S/P TAVR (transcatheter aortic valve replacement)    Chronic systolic congestive heart failure (H)    Ischemic cardiomyopathy    Coronary artery disease involving native coronary artery of native heart with angina pectoris (H)    Acute  kidney failure, unspecified (H)    Abnormal gait    Encephalomalacia on imaging study-left occipital lobe    Hypertension goal BP (blood pressure) < 140/90    History of CVA (cerebrovascular accident) - 2013    Acquired hypothyroidism    Advance activity as tolerated, work with PT today in anticipation of probable discharge tomorrow if otherwise medically stable  Switch IV Rocephin to empiric oral ciprofloxacin to complete treatment course of UTI, hold Macrodantin for now but anticipate restarting Macrodantin after discharge based on urology recommendations once she completes a treatment course of Cipro  May resume home doses of iron and docusate  May use Pyridium as needed for ongoing symptoms of urethritis  Continue to follow renal function until recovered       Diet: Combination Diet Low Saturated Fat Na <2400mg Diet  Diet    DVT Prophylaxis: Pneumatic Compression Devices  Reyes Catheter: not present  Code Status: Full Code           Disposition Plan   Expected discharge: Tomorrow, recommended to prior living arrangement once antibiotic plan established and safe disposition plan/ TCU bed available.  Entered: Tom Moncada MD 06/01/2021, 9:08 AM       The patient's care was discussed with the Care Coordinator/, Patient and Patient's Family.    Tom Moncada MD  Hospitalist Service  [facility]  Contact information available via [facility] Paging/Directory    ______________________________________________________________________    Interval History   She is not feeling lightheaded today but is still weaker than normal.  She is worried about trying to return home to assisted living due to her weakness.  She is agreeable to using a walker with ambulation.  She also had urinary frequency that was increased overnight such that she did not sleep well.  She denies pain with urination.  She remains afebrile and hemodynamically stable with normal to elevated blood pressure  reading today.  Oxygenation has been normal.  She is tolerating adequate oral intake.    Additional history is obtained from her daughter today.  Her daughter says that the patient has had bladder problems for many years with tendency for recurring UTIs.  Her former urologist Dr. Winters advised daily antibiotic therapy to prevent UTIs.  She took a number of different antibiotics over time although they do not recall all the names.  Ultimately, daily Macrodantin was recommended by her urologist for this purpose.    Data reviewed today: I reviewed all medications, new labs and imaging results over the last 24 hours. I personally reviewed no images or EKG's today.    Physical Exam   Vital Signs: Temp: 96.7  F (35.9  C) Temp src: Oral BP: 134/59 Pulse: 75   Resp: 16 SpO2: 95 % O2 Device: None (Room air)    Weight: 112 lbs 6.95 oz  General Appearance: Appears to be resting comfortably sitting up in a chair, tired  Respiratory: Normal respiratory effort, clear lungs  Cardiovascular: Regular rate and rhythm, normal capillary refill  GI: Soft abdomen without tenderness  Other: Opens eyes to voice and responds appropriately    Data   Recent Labs   Lab 05/31/21  0835 05/30/21  0543 05/29/21  1401   WBC  --  13.0* 5.9   HGB  --  9.5* 10.3*   MCV  --  89 92   PLT  --  337 350    134 135   POTASSIUM 3.5 4.1 4.0   CHLORIDE 105 103 100   CO2 24 25 29   BUN 26 22 16   CR 1.15* 1.55* 0.73   ANIONGAP 7 6 6   CHEYANNE 7.5* 8.1* 8.8   * 117* 110*   ALBUMIN  --   --  3.0*   PROTTOTAL  --   --  9.4*   BILITOTAL  --   --  0.3   ALKPHOS  --   --  91   ALT  --   --  21   AST  --   --  25     Urine culture grew less than 10,000 colonies per mL of mixed urogenital growth    Medications       amLODIPine  5 mg Oral Daily     aspirin  81 mg Oral Daily     ciprofloxacin  500 mg Oral Q24H [facility]     clopidogrel  75 mg Oral Daily     famotidine  20 mg Oral At Bedtime     isosorbide mononitrate  60 mg Oral BID     levETIRAcetam  500 mg Oral  BID     levothyroxine  75 mcg Oral QAM AC     lisinopril  2.5 mg Oral Daily     metoprolol succinate ER  50 mg Oral BID     mirtazapine  30 mg Oral At Bedtime     predniSONE  2.5 mg Oral Every Other Day     predniSONE  5 mg Oral Every Other Day

## 2021-06-01 NOTE — PLAN OF CARE
VSS, afebrile.  Pt reports urinary frequency lessening.  Printed educational materials provided to patient regarding new medication pyridium.  Pt declines need to trial med at this time, but states she will consider for later use.  Pt has walked in halls this morning and this afternoon, tolerating activity.  Pt preference to sit up in chair much of the morning, assisted to bed this afternoon, as blanchable redness noted on buttocks.  Pt has good appetite, eating >50% of meals.  Home meds colace and iron were restarted today per MD at patient request.

## 2021-06-01 NOTE — PROGRESS NOTES
Care Management Follow Up    Length of Stay (days): 2    Expected Discharge Date: 05/31/21     Concerns to be Addressed: all concerns addressed in this encounter     Patient plan of care discussed at interdisciplinary rounds: Yes    Anticipated Discharge Disposition: Assisted Living     Anticipated Discharge Services:    Anticipated Discharge DME:      Patient/family educated on Medicare website which has current facility and service quality ratings:    Education Provided on the Discharge Plan:    Patient/Family in Agreement with the Plan: yes    Referrals Placed by CM/SW: Internal Clinic Care Coordination, Scheduled Follow-up appointments  Private pay costs discussed: Not applicable    Additional Information:  Home P/T recommended by physical therapy.  Discussed with Julio César Haegn.  Julio César Hagen uses [facility] for in-house therapy.  Order to be placed for [facility] to eval and treat for P/T, O/T at discharge.  Patient and daughter, Jennifer, in agreement.    RASHEEDA Donahue  [facility] [phone]/ [facility] [phone]  Care Management

## 2021-06-01 NOTE — PLAN OF CARE
Patient was alert and oriented to person, place, situation however off on time until early morning hours in which she was disoriented X3. She is pleasant and cooperative; spent most of evening reading in bed. VSS. Afebrile. [place] with bilateral hearing aides. Denies lightheaded and/or dizziness. Up with assist of one with gait belt and cane; gait is intermittently unsteady. Voiding without complications; urine cloudy light pale yellow. Continues on IV Rocephin Q24H. Denies pain. Bed alarm on.

## 2021-06-02 VITALS
RESPIRATION RATE: 19 BRPM | OXYGEN SATURATION: 96 % | TEMPERATURE: 97.3 F | WEIGHT: 112.43 LBS | HEART RATE: 68 BPM | DIASTOLIC BLOOD PRESSURE: 67 MMHG | BODY MASS INDEX: 18.15 KG/M2 | SYSTOLIC BLOOD PRESSURE: 138 MMHG

## 2021-06-02 PROCEDURE — 250N000012 HC RX MED GY IP 250 OP 636 PS 637: Performed by: NURSE PRACTITIONER

## 2021-06-02 PROCEDURE — 250N000013 HC RX MED GY IP 250 OP 250 PS 637: Performed by: PEDIATRICS

## 2021-06-02 PROCEDURE — 999N000147 HC STATISTIC PT IP EVAL DEFER: Performed by: PHYSICAL THERAPIST

## 2021-06-02 PROCEDURE — 250N000013 HC RX MED GY IP 250 OP 250 PS 637: Performed by: NURSE PRACTITIONER

## 2021-06-02 PROCEDURE — 99238 HOSP IP/OBS DSCHRG MGMT 30/<: CPT | Performed by: PEDIATRICS

## 2021-06-02 PROCEDURE — 250N000013 HC RX MED GY IP 250 OP 250 PS 637: Performed by: INTERNAL MEDICINE

## 2021-06-02 RX ADMIN — FERROUS SULFATE TAB 325 MG (65 MG ELEMENTAL FE) 325 MG: 325 (65 FE) TAB at 08:59

## 2021-06-02 RX ADMIN — ISOSORBIDE MONONITRATE 60 MG: 30 TABLET, EXTENDED RELEASE ORAL at 07:54

## 2021-06-02 RX ADMIN — METOPROLOL SUCCINATE 50 MG: 50 TABLET, EXTENDED RELEASE ORAL at 09:00

## 2021-06-02 RX ADMIN — LISINOPRIL 2.5 MG: 2.5 TABLET ORAL at 08:59

## 2021-06-02 RX ADMIN — ASPIRIN 81 MG: 81 TABLET, DELAYED RELEASE ORAL at 08:57

## 2021-06-02 RX ADMIN — PREDNISONE 5 MG: 5 TABLET ORAL at 09:00

## 2021-06-02 RX ADMIN — CIPROFLOXACIN HYDROCHLORIDE 500 MG: 500 TABLET, FILM COATED ORAL at 08:58

## 2021-06-02 RX ADMIN — AMLODIPINE BESYLATE 5 MG: 5 TABLET ORAL at 08:59

## 2021-06-02 RX ADMIN — DOCUSATE SODIUM 200 MG: 100 CAPSULE, LIQUID FILLED ORAL at 08:58

## 2021-06-02 RX ADMIN — CLOPIDOGREL BISULFATE 75 MG: 75 TABLET ORAL at 08:57

## 2021-06-02 RX ADMIN — LEVOTHYROXINE SODIUM 75 MCG: 75 TABLET ORAL at 06:24

## 2021-06-02 RX ADMIN — LEVETIRACETAM 500 MG: 500 TABLET, FILM COATED ORAL at 08:58

## 2021-06-02 ASSESSMENT — ACTIVITIES OF DAILY LIVING (ADL)
ADLS_ACUITY_SCORE: 15

## 2021-06-02 NOTE — PLAN OF CARE
S-(situation): Physical therapy treatment per plan of care    B-(background): Patient is a 89 year old female, admitted 5/29/21 found to have UTI. See chart for additional medical history.    A-(assessment): Patient discharged to [facility] prior to scheduled PT treatment. Daughter acquired a four wheeled walker.    R-(recommendations): Discharge inpatient PT plan of care, resume skilled PT intervention at facility to address mobility, balance, safety and endurance for improved quality of life.  Physical Therapy Discharge Summary    Reason for therapy discharge:    Discharged to [facility] with PT    Progress towards therapy goal(s). See goals on Care Plan in [facility] electronic health record for goal details.  Goals partially met.  Barriers to achieving goals:   discharge from facility.    Therapy recommendation(s):    Continued therapy is recommended.  Rationale/Recommendations:  see above.      Thank you for your referral.  Elda Lewis, PT, DPT, ATC    [facility]  O: [phone]  E: [email or web address]

## 2021-06-02 NOTE — PROGRESS NOTES
Care Management Discharge Note    Discharge Date: 05/31/21     Discharge Disposition: Assisted Living - [facility]    Discharge Services:  [facility] outpt P/T    Discharge DME:      Discharge Transportation: family or friend will provide    Private pay costs discussed: Not applicable    PAS Confirmation Code:    Patient/family educated on Medicare website which has current facility and service quality ratings:      Education Provided on the Discharge Plan:    Persons Notified of Discharge Plans: Patient and Jennifer poon    Patient/Family in Agreement with the Plan: yes    Handoff Referral Completed: Yes    Additional Information:  Patient discharging back to [facility] today.  She will receive [facility] PT/OT eval and treat as outpatient at [facility].  Family transport.    RASHEEDA Donahue  [facility] [phone]/ [facility] [phone]  Care Management

## 2021-06-02 NOTE — PLAN OF CARE
S-(situation): End of shift note    B-(background): UTI    A-(assessment): Vital signs stable.  /60 (BP Location: Left arm)   Pulse 71   Temp 97  F (36.1  C) (Oral)   Resp 18   Wt 51 kg (112 lb 7 oz)   SpO2 95%   BMI 18.15 kg/m  . Afebrile. Lung sounds CTA. Frequent urination, cloudy.  On RA.   A&O x3, confused at times.  Ambulating with SBA, gait belt  and walker.   Able to make needs known and uses call light appropriately.     R-(recommendations): Continue to monitor per care plan.

## 2021-06-02 NOTE — DISCHARGE SUMMARY
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    S/P TAVR (transcatheter aortic valve replacement)    Chronic systolic congestive heart failure (H)    Ischemic cardiomyopathy    Coronary artery disease involving native coronary artery of native heart with angina pectoris (H)    Acute kidney failure, unspecified (H)    Abnormal gait    Encephalomalacia on imaging study-left occipital lobe    Hypertension goal BP (blood pressure) < 140/90    History of CVA (cerebrovascular accident) - 2013    Acquired hypothyroidism      Follow-ups Needed After Discharge   Follow-up Appointments     Follow-up and recommended labs and tests       Follow up with primary care provider, Augusto Meyer, within 7   days for hospital follow- up.             Discharge Disposition   Discharged to assisted living  Condition at discharge: Stable    Hospital Course   89-year-old woman with multiple chronic medical problems presented with worsening lightheadedness and dizziness.  She was initially hospitalized for observation, but when she failed to improve despite treatment in the hospital and had lower blood pressure readings in her normal baseline, she was admitted.  Urinalysis demonstrated pyuria and was positive for nitrites raising concern for UTI.  Patient did express recent change in urinary symptoms including dysuria and increased urinary frequency.  She was empirically treated with antibiotics.  Urine culture did not grow any specific organism.  Leukocytosis was present but she did not otherwise demonstrate signs of sepsis.  Renal function was worsened compared  with her previous baseline and blood pressure was reduced compared with her normal baseline, so her chronic antihypertensive medications were held.  She was also treated with IV fluids and renal function trended toward improvement and blood pressure recovered toward her baseline.  As suspected UTI was treated, she improved clinically including improvement in symptoms of lightheadedness.  She was felt safe to discharge back to assisted living to complete a course of oral ciprofloxacin with close outpatient follow-up from her PCP.    Consultations This Hospital Stay   PHYSICAL THERAPY ADULT IP CONSULT  CARE MANAGEMENT / SOCIAL WORK IP CONSULT    Code Status   Full Code    Time Spent on this Encounter   I, Tom Moncada MD, personally saw the patient today and spent less than or equal to 30 minutes discharging this patient.       Tom Moncada MD  [address] SURGICAL  [address] [place] [address]  Phone: [phone]  ______________________________________________________________________    Physical Exam   Vital Signs: Temp: 97.3  F (36.3  C) Temp src: Oral BP: 138/67 Pulse: 68   Resp: 19 SpO2: 96 % O2 Device: None (Room air)    Weight: 112 lbs 6.95 oz  General Appearance: elderly woman appears tired but comfortable sitting in a chair  Respiratory: Normal respiratory effort, clear lungs  Cardiovascular: Presently regular rate and rhythm, normal capillary refill        Primary Care Physician   Augusto Meyer    Discharge Orders      Physical Therapy Referral      Reason for your hospital stay    Hospitalized due to lower blood pressures and concern for urinary infection and improved     Follow-up and recommended labs and tests     Follow up with primary care provider, Augusto Meyer, within 7 days for hospital follow- up.     Activity    Your activity upon discharge: activity as tolerated     Diet    Follow this diet upon discharge: Orders Placed This  Encounter      Combination Diet Low Saturated Fat Na <2400mg Diet       Significant Results and Procedures   Most Recent 3 CBC's:  Recent Labs   Lab Test 05/30/21  0543 05/29/21  1401 05/24/21  0829   WBC 13.0* 5.9 5.2   HGB 9.5* 10.3* 10.3*   MCV 89 92 92    350 311     Most Recent 3 BMP's:  Recent Labs   Lab Test 05/31/21  0835 05/30/21  0543 05/29/21  1401    134 135   POTASSIUM 3.5 4.1 4.0   CHLORIDE 105 103 100   CO2 24 25 29   BUN 26 22 16   CR 1.15* 1.55* 0.73   ANIONGAP 7 6 6   CHEYANNE 7.5* 8.1* 8.8   * 117* 110*     Most Recent 6 Bacteria Isolates From Any Culture (See EPIC Reports for Culture Details):  Recent Labs   Lab Test 05/30/21  1905 04/26/19  1152 04/28/18  1152 02/15/18  1307 11/30/17  1445 10/12/17  0300   CULT <10,000 colonies/mL  urogenital aditi  Susceptibility testing not routinely done   No growth >100,000 colonies/mL  Klebsiella pneumoniae  * >100,000 colonies/mL  Coagulase negative Staphylococcus  * >100,000 colonies/mL  Coagulase negative Staphylococcus  * >100,000 colonies/mL  Citrobacter freundii complex  *     Most Recent Urinalysis:  Recent Labs   Lab Test 05/29/21  1314 04/28/18  1151 04/28/18  1151   COLOR Straw   < > Yellow   APPEARANCE Slightly Cloudy   < > Cloudy   URINEGLC Negative   < > Neg   URINEBILI Negative   < > Neg   URINEKETONE Negative   < > Neg   SG 1.005   < > 1.010   UBLD Negative   < > Small   URINEPH 8.0*   < > 7.0   PROTEIN Negative   < > Neg   UROBILINOGEN  --   --  0.2   NITRITE Positive*   < > Pos   LEUKEST Large*   < > Large   RBCU 2   < >  --    WBCU 177*   < >  --     < > = values in this interval not displayed.   ,   Results for orders placed or performed during the hospital encounter of 05/29/21   CT Head w/o Contrast    Narrative    CT SCAN OF THE HEAD WITHOUT CONTRAST   5/29/2021 2:31 PM     HISTORY: Dizziness, non-specific.    TECHNIQUE:  Axial images of the head and coronal reformations without  IV contrast material. Radiation dose for  this scan was reduced using  automated exposure control, adjustment of the mA and/or kV according  to patient size, or iterative reconstruction technique.    COMPARISON: 4/26/2019    FINDINGS:     Intracranial contents: There is mild cerebral volume loss. Confluent  low attenuation in the white matter is nonspecific, though likely due  to severe small vessel ischemic disease. There is a small chronic  cortical infarction in the medial occipital lobe on the left side.  Small chronic cortical infarction in the frontal parietal region on  the right side. There is no evidence of intracranial hemorrhage, mass,  acute infarct or anomaly.    Visualized orbits/sinuses/mastoids: The visualized portions of the  sinuses and mastoids appear normal.    Osseous structures/soft tissues: There is no evidence of trauma.      Impression    IMPRESSION:   1. No acute findings.  2. Chronic infarctions in the frontoparietal region on the right and  in the occipital lobe on the left.  3. White matter changes most likely due to severe small vessel  ischemic disease.      JAMILA LEE MD       Discharge Medications   Current Discharge Medication List      START taking these medications    Details   ciprofloxacin (CIPRO) 500 MG tablet Take 1 tablet (500 mg) by mouth every 24 hours for 3 days  Qty: 3 tablet, Refills: 0    Associated Diagnoses: Acute cystitis without hematuria         CONTINUE these medications which have CHANGED    Details   isosorbide mononitrate (IMDUR) 60 MG 24 hr tablet Take 1 tablet (60 mg) by mouth 2 times daily  Qty: 180 tablet, Refills: 3    Associated Diagnoses: Unstable angina (H)         CONTINUE these medications which have NOT CHANGED    Details   acetaminophen (TYLENOL ARTHRITIS PAIN) 650 MG CR tablet Take 1,300 mg by mouth every 8 hours as needed for mild pain       ALPRAZolam (XANAX) 0.25 MG tablet Take 1 tablet by mouth twice daily as needed for anxiety  Qty: 28 tablet, Refills: 0    Associated Diagnoses:  Anxiety      amLODIPine (NORVASC) 5 MG tablet Take 1 tablet (5 mg) by mouth daily  Qty: 90 tablet, Refills: 3    Associated Diagnoses: Chest pain, unspecified type      aspirin EC 81 MG EC tablet Take 1 tablet (81 mg) by mouth daily    Associated Diagnoses: S/P TAVR (transcatheter aortic valve replacement)      atorvastatin (LIPITOR) 40 MG tablet Take 1 tablet (40 mg) by mouth daily  Qty: 90 tablet, Refills: 3    Associated Diagnoses: Hyperlipidemia with target LDL less than 130      calcium carbonate (TUMS) 500 MG chewable tablet Take 2-4 tablets (1,000-2,000 mg) by mouth as needed for heartburn      clopidogrel (PLAVIX) 75 MG tablet Take 1 tablet (75 mg) by mouth daily  Qty: 90 tablet, Refills: 3    Associated Diagnoses: History of CVA (cerebrovascular accident)      Cranberry 1000 MG CAPS       docusate sodium (COLACE) 100 MG capsule Take 200 mg by mouth daily 2 capsules      EUTHYROX 75 MCG tablet Take 1 tablet by mouth once daily  Qty: 90 tablet, Refills: 1    Associated Diagnoses: Hypothyroidism due to acquired atrophy of thyroid      famotidine (PEPCID) 40 MG tablet TAKE 1 TABLET BY MOUTH AT BEDTIME  Qty: 30 tablet, Refills: 9    Comments: Please consider 90 day supplies to promote better adherence  Associated Diagnoses: Gastroesophageal reflux disease without esophagitis      folic acid (FOLVITE) 1 MG tablet Take 1 tablet by mouth once daily  Qty: 90 tablet, Refills: 1    Associated Diagnoses: Seropositive rheumatoid arthritis (H)      Lactobacillus (FLORAJEN ACIDOPHILUS) CAPS Take 1 capsule by mouth daily      levETIRAcetam (KEPPRA) 500 MG tablet Take 1 tablet (500 mg) by mouth 2 times daily  Qty: 270 tablet, Refills: 1    Associated Diagnoses: Seizure disorder (H)      lisinopril (ZESTRIL) 2.5 MG tablet Take 1 tablet (2.5 mg) by mouth daily  Qty: 30 tablet, Refills: 11    Associated Diagnoses: CONTRERAS (dyspnea on exertion); Hypertension goal BP (blood pressure) < 140/90; S/P TAVR (transcatheter aortic valve  replacement)      methotrexate 2.5 MG tablet Take 4 tablets (10 mg) by mouth once a week Wed  Qty: 52 tablet, Refills: 3    Associated Diagnoses: Seropositive rheumatoid arthritis (H)      metoprolol succinate ER (TOPROL-XL) 50 MG 24 hr tablet Take 1 tablet (50 mg) by mouth 2 times daily  Qty: 180 tablet, Refills: 3    Associated Diagnoses: Chest pain, unspecified type      mirtazapine (REMERON) 30 MG tablet TAKE 1 TABLET BY MOUTH ONCE DAILY AT BEDTIME  Qty: 90 tablet, Refills: 1    Associated Diagnoses: Sleep initiation disorder      multivitamin w/minerals (THERA-VIT-M) tablet Take 1 tablet by mouth daily      nitroFURantoin macrocrystal-monohydrate (MACROBID) 100 MG capsule Take 100 mg by mouth daily Takes for prevention of UTI      nitroGLYcerin (NITROSTAT) 0.4 MG sublingual tablet DISSOLVE ONE TABLET UNDER THE TONGUE EVERY 5 MINUTES AS NEEDED FOR CHEST PAIN.  DO NOT EXCEED A TOTAL OF 3 DOSES IN 15 MINUTES  Qty: 25 tablet, Refills: 0    Associated Diagnoses: Coronary artery disease involving native coronary artery of native heart without angina pectoris      predniSONE (DELTASONE) 5 MG tablet TAKE ONE TABLET BY MOUTH EVERY OTHER DAY ALTERNATING  WITH  1/2  TABLET  EVERY  OTHER  DAY  Qty: 23 tablet, Refills: 13    Associated Diagnoses: Seropositive rheumatoid arthritis (H)      SLOW  (45 Fe) MG CR tablet Take 1 tablet by mouth once daily  Qty: 90 tablet, Refills: 1    Associated Diagnoses: Iron deficiency anemia due to chronic blood loss           Allergies   Allergies   Allergen Reactions     Penicillins Hives     Caffeine Other (See Comments) and Unknown     Triggers your Meniere's disease     Hydrocodone Other (See Comments) and Unknown     hallucinations     Ibuprofen GI Disturbance and Unknown     Other Environmental Allergy      Metals of unspecific kind     Tramadol Other (See Comments)     Seizure, was taking remeron along with tramadol     Metal [Staples] Rash

## 2021-06-02 NOTE — PLAN OF CARE
S-(situation): Patient discharged to home at [facility] via private car with daughter    B-(background): UTI, JULITO    A-(assessment): Temp: 97.3  F (36.3  C) Temp src: Oral BP: 138/67 Pulse: 68   Resp: 19 SpO2: 96 % O2 Device: None (Room air)   Pt denies pain.  Tolerating activity, ambulated halls this AM.  Voiding spontaneously without difficulty.  Tolerating PO antibiotic.    R-(recommendations): Discharge instructions reviewed with patient and daughter. Listed belongings gathered and returned to patient.          Discharge Nursing Criteria:     Care Plan and Patient education resolved: Yes    New Medications- pt has been educated about purpose and side effects: Yes    Vaccines  Influenza status verified at discharge:  Yes      MISC  Prescriptions if needed, hard copies sent with patient  NA  Home medications returned to patient: NA  Medication Bin checked and emptied on discharge Yes  Patient reports post-discharge pain management plan is effective: Yes

## 2021-06-03 ENCOUNTER — PATIENT OUTREACH (OUTPATIENT)
Dept: FAMILY MEDICINE | Facility: CLINIC | Age: 86
End: 2021-06-03
Payer: COMMERCIAL

## 2021-06-03 NOTE — PROGRESS NOTES
Clinic Care Coordination Contact    Clinic Care Coordination Contact  OUTREACH    Referral Information:  Referral Source: IP Handoff    Primary Diagnosis: Gynecological disorders    Chief Complaint   Patient presents with     Clinic Care Coordination - Post Hospital     RNCC assessment-Hospital Discharge      Universal Utilization:   Clinic Utilization  Difficulty keeping appointments:: No  Compliance Concerns: No  No-Show Concerns: No  No PCP office visit in Past Year: No  Utilization    Last refreshed: 6/3/2021  8:43 AM: Hospital Admissions 3           Last refreshed: 6/3/2021  8:43 AM: ED Visits 2           Last refreshed: 6/3/2021  8:43 AM: No Show Count (past year) 0              Current as of: 6/3/2021  8:43 AM            Clinical Concerns:  Current Medical Concerns: Called and spoke with daughter Hanna, introduced self and role.  Patient currently resides at [facility] living.  Daughter states she slept overnight with her last night and states she is doing well.  States her mom manages all her medications and does very well, she reviews them as needed.  Patient does have services such as housekeeping meals and daughter helps with everything else.  Daughter is aware of upcoming appointment and will make sure she gets to that appointment.  No further needs at this time  Current Behavioral Concerns: No current concerns  Education Provided to patient: Call with any questions or concerns     Health Maintenance Reviewed: Not assessed  Clinical Pathway: None    Medication Management:  Pt manages medication with the help of her daughter    Functional Status:       Living Situation:  Current living arrangement:: I live in assisted living  Type of residence:: Apartment - handicap accessible    Lifestyle & Psychosocial Needs:           Transportation means:: Family, Regular car     Informal Support system:: Family, Children   Socioeconomic History     Marital status:      Spouse name: Not on file      Number of children: 4     Years of education: Not on file     Highest education level: Not on file   Occupational History     Employer: RETIRED     Tobacco Use     Smoking status: Never Smoker     Smokeless tobacco: Never Used   Substance and Sexual Activity     Alcohol use: No     Alcohol/week: 0.0 standard drinks     Drug use: No     Sexual activity: Not Currently      Resources and Interventions:  Current Resources:   Skilled Home Care Services: Physical Therapy, Occupational Therapy        Equipment Currently Used at Home: cane, straight, raised toilet seat, shower chair, grab bar, toilet, grab bar, tub/shower(in apt uses items around but not cane)    Referrals Placed: None     Goals: N/A    Patient/Caregiver understanding: Pt verbalizes understanding of follow up instructions and when scheduled appt date and times.        Future Appointments              Today PH CCC RN [facility]    In 4 days PH CR [address] [place] [address] Cardiac [facility] [place]    In 6 days PH CR 1 [facility] Cardiac [facility] [place]    In 6 days Flavia Sheehan, PT [facility]    In 1 week Augusto Meyer DO [facility]    In 1 week PH CR 1 [facility] Cardiac Rehabilitation [facility]    In 1 week Matt Mccracken PA-C [facility]    In 1 week PH CR [address] [place] [address] Cardiac Rehabilitation [facility] [place]    In 2 weeks PH CR 1 [facility] Cardiac [facility] [place]    In 2 weeks PH CR 1 [facility] Cardiac [facility]          Plan: No further overage by care coordination daughter declined needs.      Tonya LEYVAN, RN, PHN, CCM  Primary Clinic Care Coordination    [facility]  Primary Care  Clinics  [email or web address]   Office: [phone]  Employed by [employer]

## 2021-06-07 ENCOUNTER — HOSPITAL ENCOUNTER (OUTPATIENT)
Dept: CARDIAC REHAB | Facility: CLINIC | Age: 86
End: 2021-06-07
Attending: INTERNAL MEDICINE
Payer: COMMERCIAL

## 2021-06-07 PROCEDURE — 93798 PHYS/QHP OP CAR RHAB W/ECG: CPT | Performed by: REHABILITATION PRACTITIONER

## 2021-06-09 ENCOUNTER — HOSPITAL ENCOUNTER (OUTPATIENT)
Dept: CARDIAC REHAB | Facility: CLINIC | Age: 86
End: 2021-06-09
Attending: INTERNAL MEDICINE
Payer: COMMERCIAL

## 2021-06-09 PROCEDURE — 93798 PHYS/QHP OP CAR RHAB W/ECG: CPT

## 2021-06-10 ENCOUNTER — OFFICE VISIT (OUTPATIENT)
Dept: INTERNAL MEDICINE | Facility: CLINIC | Age: 86
End: 2021-06-10
Payer: COMMERCIAL

## 2021-06-10 VITALS
WEIGHT: 119 LBS | SYSTOLIC BLOOD PRESSURE: 138 MMHG | BODY MASS INDEX: 19.21 KG/M2 | TEMPERATURE: 98 F | RESPIRATION RATE: 14 BRPM | OXYGEN SATURATION: 96 % | DIASTOLIC BLOOD PRESSURE: 64 MMHG | HEART RATE: 84 BPM

## 2021-06-10 DIAGNOSIS — I50.22 CHRONIC SYSTOLIC CONGESTIVE HEART FAILURE (H): ICD-10-CM

## 2021-06-10 DIAGNOSIS — R42 LIGHTHEADEDNESS: ICD-10-CM

## 2021-06-10 DIAGNOSIS — N30.00 ACUTE CYSTITIS WITHOUT HEMATURIA: ICD-10-CM

## 2021-06-10 DIAGNOSIS — I10 HYPERTENSION GOAL BP (BLOOD PRESSURE) < 140/90: ICD-10-CM

## 2021-06-10 DIAGNOSIS — M05.9 SEROPOSITIVE RHEUMATOID ARTHRITIS (H): ICD-10-CM

## 2021-06-10 DIAGNOSIS — N17.9 ACUTE RENAL FAILURE, UNSPECIFIED ACUTE RENAL FAILURE TYPE (H): ICD-10-CM

## 2021-06-10 DIAGNOSIS — M62.81 GENERALIZED MUSCLE WEAKNESS: ICD-10-CM

## 2021-06-10 DIAGNOSIS — I25.5 ISCHEMIC CARDIOMYOPATHY: ICD-10-CM

## 2021-06-10 DIAGNOSIS — J30.1 SEASONAL ALLERGIC RHINITIS DUE TO POLLEN: Primary | ICD-10-CM

## 2021-06-10 DIAGNOSIS — Z95.2 S/P TAVR (TRANSCATHETER AORTIC VALVE REPLACEMENT): ICD-10-CM

## 2021-06-10 PROCEDURE — 99495 TRANSJ CARE MGMT MOD F2F 14D: CPT | Performed by: INTERNAL MEDICINE

## 2021-06-10 RX ORDER — FLUTICASONE PROPIONATE 50 MCG
1 SPRAY, SUSPENSION (ML) NASAL DAILY
Qty: 16 ML | Refills: 3 | Status: SHIPPED | OUTPATIENT
Start: 2021-06-10 | End: 2021-12-07

## 2021-06-10 RX ORDER — LORATADINE 10 MG/1
10 TABLET ORAL DAILY
Qty: 30 TABLET | Refills: 0 | Status: SHIPPED | OUTPATIENT
Start: 2021-06-10 | End: 2021-07-06

## 2021-06-10 ASSESSMENT — PAIN SCALES - GENERAL: PAINLEVEL: NO PAIN (0)

## 2021-06-10 NOTE — PROGRESS NOTES
Assessment & Plan     Seasonal allergic rhinitis due to pollen  - fluticasone (FLONASE) 50 MCG/ACT nasal spray; Spray 1 spray into both nostrils daily  - loratadine (CLARITIN) 10 MG tablet; Take 1 tablet (10 mg) by mouth daily    Generalized muscle weakness  - PHYSICAL THERAPY REFERRAL    Acute cystitis without hematuria    Lightheadedness    Seropositive rheumatoid arthritis (H)    S/P TAVR (transcatheter aortic valve replacement)    Chronic systolic congestive heart failure (H)    Ischemic cardiomyopathy    Acute renal failure, unspecified acute renal failure type (H)    Hypertension goal BP (blood pressure) < 140/90    DO TIFFANY Vizcaino [facility] GIANNA French is a 89 year old who presents for the following health issues  accompanied by her daughter:    HPI       UTI (urinary tract infection)    Lightheadedness    Leukocytosis    Seropositive rheumatoid arthritis (H)    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    S/P TAVR (transcatheter aortic valve replacement)    Chronic systolic congestive heart failure (H)    Ischemic cardiomyopathy    Coronary artery disease involving native coronary artery of native heart with angina pectoris (H)    Acute kidney failure, unspecified (H)    Abnormal gait    Encephalomalacia on imaging study-left occipital lobe    Hypertension goal BP (blood pressure) < 140/90    History of CVA (cerebrovascular accident) - 2013    Acquired hypothyroidism       Hospital Follow-up Visit:    Hospital/Nursing Home/IP Rehab Facility: [facility]  Date of Admission: 5/29/21  Date of Discharge: 6/2/21  Reason(s) for Admission: UTI      Was your hospitalization related to COVID-19? No   Problems taking medications regularly:  None  Medication changes since discharge: None  Problems adhering to non-medication therapy:  None    Summary of hospitalization:  [facility] discharge summary reviewed  Diagnostic  Tests/Treatments reviewed.  Follow up needed: none  Other Healthcare Providers Involved in Patient s Care:         None  Update since discharge: improved. Post Discharge Medication Reconciliation: discharge medications reconciled, continue medications without change.  Plan of care communicated with patient and family              Review of Systems   CONSTITUTIONAL: NEGATIVE for fever, chills, change in weight  INTEGUMENTARY/SKIN: NEGATIVE for worrisome rashes, moles or lesions  EYES: NEGATIVE for vision changes or irritation  ENT/MOUTH: NEGATIVE for ear, mouth and throat problems  RESP: NEGATIVE for significant cough or SOB  BREAST: NEGATIVE for masses, tenderness or discharge  CV: NEGATIVE for chest pain, palpitations or peripheral edema  GI: NEGATIVE for nausea, abdominal pain, heartburn, or change in bowel habits  : NEGATIVE for frequency, dysuria, or hematuria  MUSCULOSKELETAL: NEGATIVE for significant arthralgias or myalgia  NEURO: NEGATIVE for weakness, dizziness or paresthesias  ENDOCRINE: NEGATIVE for temperature intolerance, skin/hair changes  HEME: NEGATIVE for bleeding problems  PSYCHIATRIC: NEGATIVE for changes in mood or affect      Objective    /64 (BP Location: Right arm, Patient Position: Sitting, Cuff Size: Adult Regular)   Pulse 84   Temp 98  F (36.7  C) (Temporal)   Resp 14   Wt 54 kg (119 lb)   SpO2 96%   BMI 19.21 kg/m    Body mass index is 19.21 kg/m .  Physical Exam   GENERAL: healthy, alert and no distress  EYES: Eyes grossly normal to inspection, PERRL and conjunctivae and sclerae normal  HENT: ear canals and TM's normal, nose and mouth without ulcers or lesions  NECK: no adenopathy, no asymmetry, masses, or scars, thyroid normal to palpation.  Posterior nasal congestion, clear posterior nasal drainage, mild vertical nystagmus from time to time.  RESP: lungs clear to auscultation - no rales, rhonchi or wheezes  CV: regular rate and rhythm, normal S1 S2, no S3 or S4, no murmur,  click or rub, no peripheral edema and peripheral pulses strong  ABDOMEN: soft, nontender, no hepatosplenomegaly, no masses and bowel sounds normal  MS:  Global weakness is noted.  Patient is able to transfer with assistance and ambulate with greater assistant.  Is scheduled for physical therapy.    SKIN: no suspicious lesions or rashes  NEURO: Normal strength and tone, mentation intact and speech normal  PSYCH: mentation appears normal, affect normal/bright    Lab work and radiographs performed during her hospitalization are reviewed with the patient and her daughter

## 2021-06-14 ENCOUNTER — HOSPITAL LABORATORY (OUTPATIENT)
Facility: OTHER | Age: 86
End: 2021-06-14

## 2021-06-15 ENCOUNTER — OFFICE VISIT (OUTPATIENT)
Dept: CARDIOLOGY | Facility: CLINIC | Age: 86
End: 2021-06-15
Payer: COMMERCIAL

## 2021-06-15 VITALS
BODY MASS INDEX: 18.98 KG/M2 | WEIGHT: 118.1 LBS | DIASTOLIC BLOOD PRESSURE: 66 MMHG | HEART RATE: 78 BPM | SYSTOLIC BLOOD PRESSURE: 130 MMHG | OXYGEN SATURATION: 97 % | HEIGHT: 66 IN

## 2021-06-15 DIAGNOSIS — Z95.2 S/P TAVR (TRANSCATHETER AORTIC VALVE REPLACEMENT): ICD-10-CM

## 2021-06-15 DIAGNOSIS — I10 HYPERTENSION GOAL BP (BLOOD PRESSURE) < 140/90: Primary | ICD-10-CM

## 2021-06-15 DIAGNOSIS — I25.10 CORONARY ARTERY DISEASE INVOLVING NATIVE CORONARY ARTERY OF NATIVE HEART WITHOUT ANGINA PECTORIS: ICD-10-CM

## 2021-06-15 DIAGNOSIS — E78.5 HYPERLIPIDEMIA WITH TARGET LDL LESS THAN 130: ICD-10-CM

## 2021-06-15 DIAGNOSIS — Z86.73 HISTORY OF CVA (CEREBROVASCULAR ACCIDENT): ICD-10-CM

## 2021-06-15 PROCEDURE — 99214 OFFICE O/P EST MOD 30 MIN: CPT | Performed by: PHYSICIAN ASSISTANT

## 2021-06-15 ASSESSMENT — MIFFLIN-ST. JEOR: SCORE: 952.45

## 2021-06-15 NOTE — LETTER
6/15/2021    Augusto Meyer, DO  [address] [place] [address]    RE: Gillian TIFFANY Wm       Dear Colleague,    I had the pleasure of seeing Gilliangeraldo Burk in the [facility].    Primary Cardiologist: Dr. Lee    Reason for Visit: Hospital Follow up    History of Present Illness:   Heike is a very pleasant 89-year-old female with past medical history is notable for coronary artery disease (coronary angiogram revealed 60-70% hemodynamically significant mid to distal LAD lesion, due to contrast dye load she was recommended to return for staged intervention of this lesion.  She however had resolution of her symptoms with the addition of amlodipine and therefore staged intervention was not performed; she recently had recurrent symptoms and was referred for coronary angiogram and had PCI of LAD; on DAPT), history of severe aortic valve stenosis (status post TAVR in 8/2016 ([place] scientific Treva Valve), most recent echocardiogram shows normal gradients and no evidence of significant AI), ischemic cardiomyopathy (EF 35-40%, switch to long-acting beta-blocker during TAVR follow-up visit, not on ACE inhibitor), history of CVA in 2013 (on chronic Plavix therapy), anemia (receiving Iron infusions regularly; stable 9-10 with no recent hx of acute bleeding issues), hypertension, hyperlipidemia, GERD, known left bundle branch block, and hypothyroidism.    Gillian was in the hospital recently with atypical chest discomfort.  Her work-up was essentially benign.  Stress nuclear scan showed no evidence of ischemia.  Her case was discussed with Dr. Lee and recommended no further interventions.  She unfortunately was admitted one more time with UTI.  She was treated for this and was discharged shortly after to her assisted for facility.    She returns to clinic with her daughter, stating she is doing well.  She denies any symptoms.  She feels back  to her baseline.    Assessment and Plan:  Heike is a very pleasant 89-year-old female with past medical history is notable for coronary artery disease (coronary angiogram revealed 60-70% hemodynamically significant mid to distal LAD lesion, due to contrast dye load she was recommended to return for staged intervention of this lesion.  She however had resolution of her symptoms with the addition of amlodipine and therefore staged intervention was not performed; she recently had recurrent symptoms and was referred for coronary angiogram and had PCI of LAD; on DAPT), history of severe aortic valve stenosis (status post TAVR in 8/2016 ([place] scientific Treva Valve), most recent echocardiogram shows normal gradients and no evidence of significant AI), ischemic cardiomyopathy (EF 35-40%, switch to long-acting beta-blocker during TAVR follow-up visit, not on ACE inhibitor), history of CVA in 2013 (on chronic Plavix therapy), anemia (receiving Iron infusions regularly; stable 9-10 with no recent hx of acute bleeding issues), hypertension, hyperlipidemia, GERD, known left bundle branch block, and hypothyroidism.    At this time Gillian is doing well from a cardiac standpoint.  She has no further symptoms.  We will continue with current regimen.  I will have her return in 6 months for reevaluation.  At that time we will consider stopping aspirin and keeping her on Plavix therapy alone.      This note was completed in part using Dragon voice recognition software. Although reviewed after completion, some word and grammatical errors may occur.    Orders this Visit:  No orders of the defined types were placed in this encounter.    No orders of the defined types were placed in this encounter.    There are no discontinued medications.      Encounter Diagnoses   Name Primary?     S/P TAVR (transcatheter aortic valve replacement)      Coronary artery disease involving native coronary artery of native heart without angina pectoris         CURRENT MEDICATIONS:  Current Outpatient Medications   Medication Sig Dispense Refill     acetaminophen (TYLENOL ARTHRITIS PAIN) 650 MG CR tablet Take 1,300 mg by mouth every 8 hours as needed for mild pain        ALPRAZolam (XANAX) 0.25 MG tablet Take 1 tablet by mouth twice daily as needed for anxiety 28 tablet 0     amLODIPine (NORVASC) 5 MG tablet Take 1 tablet (5 mg) by mouth daily 90 tablet 3     aspirin EC 81 MG EC tablet Take 1 tablet (81 mg) by mouth daily       atorvastatin (LIPITOR) 40 MG tablet Take 1 tablet (40 mg) by mouth daily 90 tablet 3     calcium carbonate (TUMS) 500 MG chewable tablet Take 2-4 tablets (1,000-2,000 mg) by mouth as needed for heartburn       clopidogrel (PLAVIX) 75 MG tablet Take 1 tablet (75 mg) by mouth daily 90 tablet 3     Cranberry 1000 MG CAPS        docusate sodium (COLACE) 100 MG capsule Take 200 mg by mouth daily 2 capsules       EUTHYROX 75 MCG tablet Take 1 tablet by mouth once daily 90 tablet 1     famotidine (PEPCID) 40 MG tablet TAKE 1 TABLET BY MOUTH AT BEDTIME 30 tablet 9     fluticasone (FLONASE) 50 MCG/ACT nasal spray Spray 1 spray into both nostrils daily 16 mL 3     folic acid (FOLVITE) 1 MG tablet Take 1 tablet by mouth once daily 90 tablet 1     isosorbide mononitrate (IMDUR) 60 MG 24 hr tablet Take 1 tablet (60 mg) by mouth 2 times daily 180 tablet 3     Lactobacillus (FLORAJEN ACIDOPHILUS) CAPS Take 1 capsule by mouth daily       levETIRAcetam (KEPPRA) 500 MG tablet Take 1 tablet (500 mg) by mouth 2 times daily 270 tablet 1     lisinopril (ZESTRIL) 2.5 MG tablet Take 1 tablet (2.5 mg) by mouth daily 30 tablet 11     loratadine (CLARITIN) 10 MG tablet Take 1 tablet (10 mg) by mouth daily 30 tablet 0     methotrexate 2.5 MG tablet Take 4 tablets (10 mg) by mouth once a week Wed 52 tablet 3     metoprolol succinate ER (TOPROL-XL) 50 MG 24 hr tablet Take 1 tablet (50 mg) by mouth 2 times daily 180 tablet 3     mirtazapine (REMERON) 30 MG tablet TAKE 1  TABLET BY MOUTH ONCE DAILY AT BEDTIME 90 tablet 1     multivitamin w/minerals (THERA-VIT-M) tablet Take 1 tablet by mouth daily       nitroFURantoin macrocrystal-monohydrate (MACROBID) 100 MG capsule Take 100 mg by mouth daily Takes for prevention of UTI       predniSONE (DELTASONE) 5 MG tablet TAKE ONE TABLET BY MOUTH EVERY OTHER DAY ALTERNATING  WITH  1/2  TABLET  EVERY  OTHER  DAY 23 tablet 13     SLOW  (45 Fe) MG CR tablet Take 1 tablet by mouth once daily 90 tablet 1     nitroGLYcerin (NITROSTAT) 0.4 MG sublingual tablet DISSOLVE ONE TABLET UNDER THE TONGUE EVERY 5 MINUTES AS NEEDED FOR CHEST PAIN.  DO NOT EXCEED A TOTAL OF 3 DOSES IN 15 MINUTES (Patient not taking: Reported on 6/15/2021) 25 tablet 0       ALLERGIES     Allergies   Allergen Reactions     Penicillins Hives     Caffeine Other (See Comments) and Unknown     Triggers your Meniere's disease     Hydrocodone Other (See Comments) and Unknown     hallucinations     Ibuprofen GI Disturbance and Unknown     Other Environmental Allergy      Metals of unspecific kind     Tramadol Other (See Comments)     Seizure, was taking remeron along with tramadol     Metal [Staples] Rash       PAST MEDICAL HISTORY:  Past Medical History:   Diagnosis Date     Aortic stenosis     s/p TAVR 8/17/16     Chronic migraine      Hyperlipidaemia      Hypertension      Hypothyroidism      Myocardial infarction (H)      Need for SBE (subacute bacterial endocarditis) prophylaxis      Orthostatic hypotension     wears compression stockings     PUD (peptic ulcer disease)      Secondary Sjogren's syndrome (H)      Seizures (H)     after stroke (partial onset)     Seropositive rheumatoid arthritis (H)      Stroke (H) 05/2013    with visual field cut, left occipital lobe     Syncope 2014    due to orthostatic hypotension       PAST SURGICAL HISTORY:  Past Surgical History:   Procedure Laterality Date     C TOTAL HIP ARTHROPLASTY Right 2010     C TOTAL HIP ARTHROPLASTY Left 2014      CATARACT IOL, RT/LT Bilateral 2000     CV FLUOROSCOPY ONLY N/A 8/6/2019    Procedure: Fluoroscopy Only - valve cine done of aortic valve at 180 degrees ARIANA to JUNG;  Surgeon: Dave Farfan MD;  Location:  [facility]     CV HEART CATHETERIZATION WITH POSSIBLE INTERVENTION N/A 2/10/2021    Procedure: Heart Catheterization with Possible Intervention;  Surgeon: Fish Padgett MD;  Location:  [facility]     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     HEART CATH FEMORAL CANNULIZATION WITH OPEN STANDBY REPAIR AORTIC VALVE N/A 8/3/2016    Procedure: HEART CATH FEMORAL CANNULIZATION WITH OPEN STANDBY REPAIR AORTIC VALVE;  Surgeon: Owen Schultz MD;  Location: [facility] OR     HYSTERECTOMY TOTAL ABDOMINAL  1970    ovaries out     MOUTH SURGERY  2011    jaw surgery due to fracture     TRANSCATHETER AORTIC VALVE IMPLANT ANESTHESIA N/A 8/3/2016    Procedure: TRANSCATHETER AORTIC VALVE IMPLANT ANESTHESIA;  Surgeon: GENERIC ANESTHESIA PROVIDER;  Location: [facility] OR       FAMILY HISTORY:  Family History   Problem Relation Age of Onset     Hyperlipidemia Mother      Family History Negative No family hx of        SOCIAL HISTORY:  Social History     Socioeconomic History     Marital status:      Spouse name: Not on file     Number of children: 4     Years of education: Not on file     Highest education level: Not on file   Occupational History     Employer: RETIRED   Social Needs     Financial resource strain: Not on file     Food insecurity     Worry: Not on file     Inability: Not on file     Transportation needs     Medical: Not on file     Non-medical: Not on file   Tobacco Use     Smoking status: Never Smoker     Smokeless tobacco: Never Used   Substance and Sexual Activity     Alcohol use: No     Alcohol/week: 0.0 standard drinks     Drug use: No     Sexual activity: Not Currently   Lifestyle     Physical activity     Days per week: Not on file     Minutes per  "session: Not on file     Stress: Not on file   Relationships     Social connections     Talks on phone: Not on file     Gets together: Not on file     Attends Hindu service: Not on file     Active member of club or organization: Not on file     Attends meetings of clubs or organizations: Not on file     Relationship status: Not on file     Intimate partner violence     Fear of current or ex partner: Not on file     Emotionally abused: Not on file     Physically abused: Not on file     Forced sexual activity: Not on file   Other Topics Concern     Parent/sibling w/ CABG, MI or angioplasty before 65F 55M? Not Asked      Service Not Asked     Blood Transfusions Not Asked     Caffeine Concern Not Asked     Occupational Exposure Not Asked     Hobby Hazards Not Asked     Sleep Concern Not Asked     Stress Concern Not Asked     Weight Concern Not Asked     Special Diet Yes     Comment: low salt      Back Care Not Asked     Exercise Yes     Comment: semi recument bike 15 mins daily      Bike Helmet Not Asked     Seat Belt Not Asked     Self-Exams Not Asked   Social History Narrative    .  Lives in assisted living. Independent. Has help with making bed and changing sheets.    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 .     4 children - 1 with RA       Review of Systems:  Skin:  Negative     Eyes:  Positive for glasses  ENT:  Negative    Respiratory:  Positive for dyspnea on exertion  Cardiovascular:  Negative for;palpitations;lightheadedness;dizziness Positive for;chest pain;edema  Gastroenterology: Negative    Genitourinary:  Negative    Musculoskeletal:  Positive for arthritis  Neurologic:  Negative    Psychiatric:  Negative    Heme/Lymph/Imm:  Positive for allergies  Endocrine:  Negative      Physical Exam:  Vitals: /66 (BP Location: Right arm, Patient Position: Sitting, Cuff Size: Adult Regular)   Pulse 78   Ht 1.676 m (5' 6\")   Wt 53.6 kg (118 lb 1.6 oz)   SpO2 97%   " BMI 19.06 kg/m       GEN:  NAD. Thin and frail. Sitting in wheelchair.  NECK: No JVD  C/V:  Regular rate and rhythm, no murmur, rub or gallop.  RESP: Clear to auscultation bilaterally.  GI: Abdomen soft, nontender, nondistended.    EXTREM: No LE edema.   NEURO: Alert and oriented, cooperative. No obvious focal deficits.   PSYCH: Normal affect.  SKIN: Warm and dry.       Recent Lab Results:  LIPID RESULTS:  Lab Results   Component Value Date    CHOL 107 02/10/2021    HDL 59 02/10/2021    LDL 31 02/10/2021    TRIG 87 02/10/2021    CHOLHDLRATIO 2.3 07/07/2015       LIVER ENZYME RESULTS:  Lab Results   Component Value Date    AST 25 05/29/2021    ALT 21 05/29/2021       CBC RESULTS:  Lab Results   Component Value Date    WBC 13.0 (H) 05/30/2021    RBC 3.31 (L) 05/30/2021    HGB 9.5 (L) 05/30/2021    HCT 29.5 (L) 05/30/2021    MCV 89 05/30/2021    MCH 28.7 05/30/2021    MCHC 32.2 05/30/2021    RDW 18.2 (H) 05/30/2021     05/30/2021       BMP RESULTS:  Lab Results   Component Value Date     05/31/2021    POTASSIUM 3.5 05/31/2021    CHLORIDE 105 05/31/2021    CO2 24 05/31/2021    ANIONGAP 7 05/31/2021     (H) 05/31/2021    BUN 26 05/31/2021    CR 1.15 (H) 05/31/2021    GFRESTIMATED 41 (L) 05/31/2021    GFRESTBLACK 47 (L) 05/31/2021    CHEYANNE 7.5 (L) 05/31/2021        A1C RESULTS:  Lab Results   Component Value Date    A1C 5.5 10/13/2018       INR RESULTS:  Lab Results   Component Value Date    INR 1.16 (H) 02/10/2021    INR 1.11 04/26/2019           Matt Mccracken PA-C  Jessica 15, 2021     cc:   Mtat Mccracken PA-C  [address]  [place] [address]

## 2021-06-15 NOTE — PROGRESS NOTES
Primary Cardiologist: Dr. Lee    Reason for Visit: Hospital Follow up    History of Present Illness:   Heike is a very pleasant 89-year-old female with past medical history is notable for coronary artery disease (coronary angiogram revealed 60-70% hemodynamically significant mid to distal LAD lesion, due to contrast dye load she was recommended to return for staged intervention of this lesion.  She however had resolution of her symptoms with the addition of amlodipine and therefore staged intervention was not performed; she recently had recurrent symptoms and was referred for coronary angiogram and had PCI of LAD; on DAPT), history of severe aortic valve stenosis (status post TAVR in 8/2016 ([place] scientific Treva Valve), most recent echocardiogram shows normal gradients and no evidence of significant AI), ischemic cardiomyopathy (EF 35-40%, switch to long-acting beta-blocker during TAVR follow-up visit, not on ACE inhibitor), history of CVA in 2013 (on chronic Plavix therapy), anemia (receiving Iron infusions regularly; stable 9-10 with no recent hx of acute bleeding issues), hypertension, hyperlipidemia, GERD, known left bundle branch block, and hypothyroidism.    Gillian was in the hospital recently with atypical chest discomfort.  Her work-up was essentially benign.  Stress nuclear scan showed no evidence of ischemia.  Her case was discussed with Dr. Lee and recommended no further interventions.  She unfortunately was admitted one more time with UTI.  She was treated for this and was discharged shortly after to her assisted for facility.    She returns to clinic with her daughter, stating she is doing well.  She denies any symptoms.  She feels back to her baseline.    Assessment and Plan:  Heike is a very pleasant 89-year-old female with past medical history is notable for coronary artery disease (coronary angiogram revealed 60-70% hemodynamically significant mid to distal LAD lesion, due to contrast dye  load she was recommended to return for staged intervention of this lesion.  She however had resolution of her symptoms with the addition of amlodipine and therefore staged intervention was not performed; she recently had recurrent symptoms and was referred for coronary angiogram and had PCI of LAD; on DAPT), history of severe aortic valve stenosis (status post TAVR in 8/2016 ([place] scientific Treva Valve), most recent echocardiogram shows normal gradients and no evidence of significant AI), ischemic cardiomyopathy (EF 35-40%, switch to long-acting beta-blocker during TAVR follow-up visit, not on ACE inhibitor), history of CVA in 2013 (on chronic Plavix therapy), anemia (receiving Iron infusions regularly; stable 9-10 with no recent hx of acute bleeding issues), hypertension, hyperlipidemia, GERD, known left bundle branch block, and hypothyroidism.    At this time Gillian is doing well from a cardiac standpoint.  She has no further symptoms.  We will continue with current regimen.  I will have her return in 6 months for reevaluation.  At that time we will consider stopping aspirin and keeping her on Plavix therapy alone.      This note was completed in part using Dragon voice recognition software. Although reviewed after completion, some word and grammatical errors may occur.    Orders this Visit:  No orders of the defined types were placed in this encounter.    No orders of the defined types were placed in this encounter.    There are no discontinued medications.      Encounter Diagnoses   Name Primary?     S/P TAVR (transcatheter aortic valve replacement)      Coronary artery disease involving native coronary artery of native heart without angina pectoris        CURRENT MEDICATIONS:  Current Outpatient Medications   Medication Sig Dispense Refill     acetaminophen (TYLENOL ARTHRITIS PAIN) 650 MG CR tablet Take 1,300 mg by mouth every 8 hours as needed for mild pain        ALPRAZolam (XANAX) 0.25 MG tablet Take 1  tablet by mouth twice daily as needed for anxiety 28 tablet 0     amLODIPine (NORVASC) 5 MG tablet Take 1 tablet (5 mg) by mouth daily 90 tablet 3     aspirin EC 81 MG EC tablet Take 1 tablet (81 mg) by mouth daily       atorvastatin (LIPITOR) 40 MG tablet Take 1 tablet (40 mg) by mouth daily 90 tablet 3     calcium carbonate (TUMS) 500 MG chewable tablet Take 2-4 tablets (1,000-2,000 mg) by mouth as needed for heartburn       clopidogrel (PLAVIX) 75 MG tablet Take 1 tablet (75 mg) by mouth daily 90 tablet 3     Cranberry 1000 MG CAPS        docusate sodium (COLACE) 100 MG capsule Take 200 mg by mouth daily 2 capsules       EUTHYROX 75 MCG tablet Take 1 tablet by mouth once daily 90 tablet 1     famotidine (PEPCID) 40 MG tablet TAKE 1 TABLET BY MOUTH AT BEDTIME 30 tablet 9     fluticasone (FLONASE) 50 MCG/ACT nasal spray Spray 1 spray into both nostrils daily 16 mL 3     folic acid (FOLVITE) 1 MG tablet Take 1 tablet by mouth once daily 90 tablet 1     isosorbide mononitrate (IMDUR) 60 MG 24 hr tablet Take 1 tablet (60 mg) by mouth 2 times daily 180 tablet 3     Lactobacillus (FLORAJEN ACIDOPHILUS) CAPS Take 1 capsule by mouth daily       levETIRAcetam (KEPPRA) 500 MG tablet Take 1 tablet (500 mg) by mouth 2 times daily 270 tablet 1     lisinopril (ZESTRIL) 2.5 MG tablet Take 1 tablet (2.5 mg) by mouth daily 30 tablet 11     loratadine (CLARITIN) 10 MG tablet Take 1 tablet (10 mg) by mouth daily 30 tablet 0     methotrexate 2.5 MG tablet Take 4 tablets (10 mg) by mouth once a week Wed 52 tablet 3     metoprolol succinate ER (TOPROL-XL) 50 MG 24 hr tablet Take 1 tablet (50 mg) by mouth 2 times daily 180 tablet 3     mirtazapine (REMERON) 30 MG tablet TAKE 1 TABLET BY MOUTH ONCE DAILY AT BEDTIME 90 tablet 1     multivitamin w/minerals (THERA-VIT-M) tablet Take 1 tablet by mouth daily       nitroFURantoin macrocrystal-monohydrate (MACROBID) 100 MG capsule Take 100 mg by mouth daily Takes for prevention of UTI        predniSONE (DELTASONE) 5 MG tablet TAKE ONE TABLET BY MOUTH EVERY OTHER DAY ALTERNATING  WITH  1/2  TABLET  EVERY  OTHER  DAY 23 tablet 13     SLOW  (45 Fe) MG CR tablet Take 1 tablet by mouth once daily 90 tablet 1     nitroGLYcerin (NITROSTAT) 0.4 MG sublingual tablet DISSOLVE ONE TABLET UNDER THE TONGUE EVERY 5 MINUTES AS NEEDED FOR CHEST PAIN.  DO NOT EXCEED A TOTAL OF 3 DOSES IN 15 MINUTES (Patient not taking: Reported on 6/15/2021) 25 tablet 0       ALLERGIES     Allergies   Allergen Reactions     Penicillins Hives     Caffeine Other (See Comments) and Unknown     Triggers your Meniere's disease     Hydrocodone Other (See Comments) and Unknown     hallucinations     Ibuprofen GI Disturbance and Unknown     Other Environmental Allergy      Metals of unspecific kind     Tramadol Other (See Comments)     Seizure, was taking remeron along with tramadol     Metal [Staples] Rash       PAST MEDICAL HISTORY:  Past Medical History:   Diagnosis Date     Aortic stenosis     s/p TAVR 8/17/16     Chronic migraine      Hyperlipidaemia      Hypertension      Hypothyroidism      Myocardial infarction (H)      Need for SBE (subacute bacterial endocarditis) prophylaxis      Orthostatic hypotension     wears compression stockings     PUD (peptic ulcer disease)      Secondary Sjogren's syndrome (H)      Seizures (H)     after stroke (partial onset)     Seropositive rheumatoid arthritis (H)      Stroke (H) 05/2013    with visual field cut, left occipital lobe     Syncope 2014    due to orthostatic hypotension       PAST SURGICAL HISTORY:  Past Surgical History:   Procedure Laterality Date     C TOTAL HIP ARTHROPLASTY Right 2010     C TOTAL HIP ARTHROPLASTY Left 2014     CATARACT IOL, RT/LT Bilateral 2000     CV FLUOROSCOPY ONLY N/A 8/6/2019    Procedure: Fluoroscopy Only - valve cine done of aortic valve at 180 degrees ARIANA to JUNG;  Surgeon: Dave Farfan MD;  Location[facility] CARD[facility] CATH LAB      HEART  CATHETERIZATION WITH POSSIBLE INTERVENTION N/A 2/10/2021    Procedure: Heart Catheterization with Possible Intervention;  Surgeon: Fish Padgett MD;  Location:  HEART CARDIAC CATH LAB     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     HEART CATH FEMORAL CANNULIZATION WITH OPEN STANDBY REPAIR AORTIC VALVE N/A 8/3/2016    Procedure: HEART CATH FEMORAL CANNULIZATION WITH OPEN STANDBY REPAIR AORTIC VALVE;  Surgeon: Owen Schultz MD;  Location: [facility] OR     HYSTERECTOMY TOTAL ABDOMINAL  1970    ovaries out     MOUTH SURGERY  2011    jaw surgery due to fracture     TRANSCATHETER AORTIC VALVE IMPLANT ANESTHESIA N/A 8/3/2016    Procedure: TRANSCATHETER AORTIC VALVE IMPLANT ANESTHESIA;  Surgeon: GENERIC ANESTHESIA PROVIDER;  Location: [facility] OR       FAMILY HISTORY:  Family History   Problem Relation Age of Onset     Hyperlipidemia Mother      Family History Negative No family hx of        SOCIAL HISTORY:  Social History     Socioeconomic History     Marital status:      Spouse name: Not on file     Number of children: 4     Years of education: Not on file     Highest education level: Not on file   Occupational History     Employer: RETIRED   Social Needs     Financial resource strain: Not on file     Food insecurity     Worry: Not on file     Inability: Not on file     Transportation needs     Medical: Not on file     Non-medical: Not on file   Tobacco Use     Smoking status: Never Smoker     Smokeless tobacco: Never Used   Substance and Sexual Activity     Alcohol use: No     Alcohol/week: 0.0 standard drinks     Drug use: No     Sexual activity: Not Currently   Lifestyle     Physical activity     Days per week: Not on file     Minutes per session: Not on file     Stress: Not on file   Relationships     Social connections     Talks on phone: Not on file     Gets together: Not on file     Attends Pentec[employer]al service: Not on file     Active member of club or organization: Not on file     Attends  "meetings of clubs or organizations: Not on file     Relationship status: Not on file     Intimate partner violence     Fear of current or ex partner: Not on file     Emotionally abused: Not on file     Physically abused: Not on file     Forced sexual activity: Not on file   Other Topics Concern     Parent/sibling w/ CABG, MI or angioplasty before 65F 55M? Not Asked      Service Not Asked     Blood Transfusions Not Asked     Caffeine Concern Not Asked     Occupational Exposure Not Asked     Hobby Hazards Not Asked     Sleep Concern Not Asked     Stress Concern Not Asked     Weight Concern Not Asked     Special Diet Yes     Comment: low salt      Back Care Not Asked     Exercise Yes     Comment: semi recument bike 15 mins daily      Bike Helmet Not Asked     Seat Belt Not Asked     Self-Exams Not Asked   Social History Narrative    .  Lives in assisted living. Independent. Has help with making bed and changing sheets.    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 .     4 children - 1 with RA       Review of Systems:  Skin:  Negative     Eyes:  Positive for glasses  ENT:  Negative    Respiratory:  Positive for dyspnea on exertion  Cardiovascular:  Negative for;palpitations;lightheadedness;dizziness Positive for;chest pain;edema  Gastroenterology: Negative    Genitourinary:  Negative    Musculoskeletal:  Positive for arthritis  Neurologic:  Negative    Psychiatric:  Negative    Heme/Lymph/Imm:  Positive for allergies  Endocrine:  Negative      Physical Exam:  Vitals: /66 (BP Location: Right arm, Patient Position: Sitting, Cuff Size: Adult Regular)   Pulse 78   Ht 1.676 m (5' 6\")   Wt 53.6 kg (118 lb 1.6 oz)   SpO2 97%   BMI 19.06 kg/m       GEN:  NAD. Thin and frail. Sitting in wheelchair.  NECK: No JVD  C/V:  Regular rate and rhythm, no murmur, rub or gallop.  RESP: Clear to auscultation bilaterally.  GI: Abdomen soft, nontender, nondistended.    EXTREM: No LE edema. "   NEURO: Alert and oriented, cooperative. No obvious focal deficits.   PSYCH: Normal affect.  SKIN: Warm and dry.       Recent Lab Results:  LIPID RESULTS:  Lab Results   Component Value Date    CHOL 107 02/10/2021    HDL 59 02/10/2021    LDL 31 02/10/2021    TRIG 87 02/10/2021    CHOLHDLRATIO 2.3 07/07/2015       LIVER ENZYME RESULTS:  Lab Results   Component Value Date    AST 25 05/29/2021    ALT 21 05/29/2021       CBC RESULTS:  Lab Results   Component Value Date    WBC 13.0 (H) 05/30/2021    RBC 3.31 (L) 05/30/2021    HGB 9.5 (L) 05/30/2021    HCT 29.5 (L) 05/30/2021    MCV 89 05/30/2021    MCH 28.7 05/30/2021    MCHC 32.2 05/30/2021    RDW 18.2 (H) 05/30/2021     05/30/2021       BMP RESULTS:  Lab Results   Component Value Date     05/31/2021    POTASSIUM 3.5 05/31/2021    CHLORIDE 105 05/31/2021    CO2 24 05/31/2021    ANIONGAP 7 05/31/2021     (H) 05/31/2021    BUN 26 05/31/2021    CR 1.15 (H) 05/31/2021    GFRESTIMATED 41 (L) 05/31/2021    GFRESTBLACK 47 (L) 05/31/2021    CHEYANNE 7.5 (L) 05/31/2021        A1C RESULTS:  Lab Results   Component Value Date    A1C 5.5 10/13/2018       INR RESULTS:  Lab Results   Component Value Date    INR 1.16 (H) 02/10/2021    INR 1.11 04/26/2019           Matt Mccracken PA-C  Jessica 15, 2021

## 2021-06-16 ENCOUNTER — HOSPITAL ENCOUNTER (OUTPATIENT)
Dept: CARDIAC REHAB | Facility: CLINIC | Age: 86
End: 2021-06-16
Attending: INTERNAL MEDICINE
Payer: COMMERCIAL

## 2021-06-16 PROCEDURE — 93798 PHYS/QHP OP CAR RHAB W/ECG: CPT | Performed by: REHABILITATION PRACTITIONER

## 2021-06-17 ENCOUNTER — TELEPHONE (OUTPATIENT)
Dept: INTERNAL MEDICINE | Facility: CLINIC | Age: 86
End: 2021-06-17

## 2021-06-17 NOTE — TELEPHONE ENCOUNTER
[facility] would like to know if Dr. Nova can give a verbal that he will follow patient for home care?    They are also needing him do the addendum for Ms Burk.     They are sending a faxed order back, as they were not able to read.

## 2021-06-18 DIAGNOSIS — I20.0 UNSTABLE ANGINA (H): ICD-10-CM

## 2021-06-18 RX ORDER — ISOSORBIDE MONONITRATE 60 MG/1
60 TABLET, EXTENDED RELEASE ORAL 2 TIMES DAILY
Qty: 180 TABLET | Refills: 1 | Status: SHIPPED | OUTPATIENT
Start: 2021-06-18 | End: 2021-12-07

## 2021-06-21 ENCOUNTER — HOSPITAL ENCOUNTER (OUTPATIENT)
Dept: CARDIAC REHAB | Facility: CLINIC | Age: 86
End: 2021-06-21
Attending: INTERNAL MEDICINE
Payer: COMMERCIAL

## 2021-06-21 ENCOUNTER — HOSPITAL LABORATORY (OUTPATIENT)
Facility: OTHER | Age: 86
End: 2021-06-21

## 2021-06-21 PROCEDURE — 93798 PHYS/QHP OP CAR RHAB W/ECG: CPT | Performed by: REHABILITATION PRACTITIONER

## 2021-06-22 ENCOUNTER — TELEPHONE (OUTPATIENT)
Dept: INTERNAL MEDICINE | Facility: CLINIC | Age: 86
End: 2021-06-22

## 2021-06-22 NOTE — TELEPHONE ENCOUNTER
Elizabeth with [facility].  Stopped today about admission to home care, patient is doing Cardiac rehab. If doing cardiac rehab cannot admit to home care to do the same thing. Needs to know if patient will continue with cardiac rehab or home care?  Will be stopping tomorrow to see patient again.  If patient would rather use home care they do have PT/OT     Does provider want patient to finish cardiac rehab and then do home care or just stop cardiac rehab and start home care?        Elizabeth, [phone]           Britta Neri RN  [facility]

## 2021-06-23 ENCOUNTER — HOSPITAL ENCOUNTER (OUTPATIENT)
Dept: CARDIAC REHAB | Facility: CLINIC | Age: 86
End: 2021-06-23
Attending: INTERNAL MEDICINE
Payer: COMMERCIAL

## 2021-06-23 PROCEDURE — 93798 PHYS/QHP OP CAR RHAB W/ECG: CPT | Performed by: REHABILITATION PRACTITIONER

## 2021-06-23 NOTE — TELEPHONE ENCOUNTER
Given the patient's advanced age and level of activity, I believe she can discontinue cardiac rehab in favor of continuing with home care.    Betsy

## 2021-06-24 NOTE — TELEPHONE ENCOUNTER
Called Elizabeth and relayed message from Dr. Meyer, Elizabeth states spoke to patient again who has decided will continue with Cardiac rehab for a few more visits and then will do home care PT/OT.   Elizabeth will contact clinic for new orders when patient ready for home care.        Britta Neri RN  [facility]

## 2021-06-28 ENCOUNTER — HOSPITAL ENCOUNTER (OUTPATIENT)
Dept: CARDIAC REHAB | Facility: CLINIC | Age: 86
End: 2021-06-28
Attending: INTERNAL MEDICINE
Payer: COMMERCIAL

## 2021-06-28 PROCEDURE — 93798 PHYS/QHP OP CAR RHAB W/ECG: CPT

## 2021-06-30 ENCOUNTER — HOSPITAL ENCOUNTER (OUTPATIENT)
Dept: CARDIAC REHAB | Facility: CLINIC | Age: 86
End: 2021-06-30
Attending: INTERNAL MEDICINE
Payer: COMMERCIAL

## 2021-06-30 DIAGNOSIS — M62.81 GENERALIZED MUSCLE WEAKNESS: Primary | ICD-10-CM

## 2021-06-30 PROCEDURE — 93798 PHYS/QHP OP CAR RHAB W/ECG: CPT

## 2021-06-30 NOTE — PROGRESS NOTES
Please print a copy of the transport chair order and forward it to whomever needs it.  This is per the request of [facility].    Their phone number is [phone] and it was requested by Massiel ZHONG      Apparently, Massiel does not feel comfortable in telling us her real name.      Betsy

## 2021-07-01 ENCOUNTER — OFFICE VISIT (OUTPATIENT)
Dept: INTERNAL MEDICINE | Facility: CLINIC | Age: 86
End: 2021-07-01
Payer: COMMERCIAL

## 2021-07-01 VITALS
OXYGEN SATURATION: 98 % | SYSTOLIC BLOOD PRESSURE: 132 MMHG | DIASTOLIC BLOOD PRESSURE: 64 MMHG | TEMPERATURE: 97.6 F | RESPIRATION RATE: 18 BRPM | HEART RATE: 76 BPM

## 2021-07-01 DIAGNOSIS — G40.909 SEIZURE DISORDER (H): ICD-10-CM

## 2021-07-01 DIAGNOSIS — D64.9 ANEMIA, UNSPECIFIED TYPE: Primary | ICD-10-CM

## 2021-07-01 LAB
BASOPHILS # BLD AUTO: 0.1 10E9/L (ref 0–0.2)
BASOPHILS NFR BLD AUTO: 0.6 %
DIFFERENTIAL METHOD BLD: ABNORMAL
EOSINOPHIL # BLD AUTO: 0.1 10E9/L (ref 0–0.7)
EOSINOPHIL NFR BLD AUTO: 0.6 %
ERYTHROCYTE [DISTWIDTH] IN BLOOD BY AUTOMATED COUNT: 18.9 % (ref 10–15)
HCT VFR BLD AUTO: 26.6 % (ref 35–47)
HGB BLD-MCNC: 8.4 G/DL (ref 11.7–15.7)
IMM GRANULOCYTES # BLD: 0 10E9/L (ref 0–0.4)
IMM GRANULOCYTES NFR BLD: 0.5 %
LYMPHOCYTES # BLD AUTO: 2 10E9/L (ref 0.8–5.3)
LYMPHOCYTES NFR BLD AUTO: 25.1 %
MCH RBC QN AUTO: 28.4 PG (ref 26.5–33)
MCHC RBC AUTO-ENTMCNC: 31.6 G/DL (ref 31.5–36.5)
MCV RBC AUTO: 90 FL (ref 78–100)
MONOCYTES # BLD AUTO: 0.6 10E9/L (ref 0–1.3)
MONOCYTES NFR BLD AUTO: 7.8 %
NEUTROPHILS # BLD AUTO: 5.3 10E9/L (ref 1.6–8.3)
NEUTROPHILS NFR BLD AUTO: 65.4 %
NRBC # BLD AUTO: 0 10*3/UL
NRBC BLD AUTO-RTO: 0 /100
PLATELET # BLD AUTO: 348 10E9/L (ref 150–450)
RBC # BLD AUTO: 2.96 10E12/L (ref 3.8–5.2)
WBC # BLD AUTO: 8.1 10E9/L (ref 4–11)

## 2021-07-01 PROCEDURE — 36415 COLL VENOUS BLD VENIPUNCTURE: CPT | Performed by: INTERNAL MEDICINE

## 2021-07-01 PROCEDURE — 80177 DRUG SCRN QUAN LEVETIRACETAM: CPT | Mod: 90 | Performed by: INTERNAL MEDICINE

## 2021-07-01 PROCEDURE — 85025 COMPLETE CBC W/AUTO DIFF WBC: CPT | Performed by: INTERNAL MEDICINE

## 2021-07-01 PROCEDURE — 99000 SPECIMEN HANDLING OFFICE-LAB: CPT | Performed by: INTERNAL MEDICINE

## 2021-07-01 PROCEDURE — 99213 OFFICE O/P EST LOW 20 MIN: CPT | Performed by: INTERNAL MEDICINE

## 2021-07-01 RX ORDER — CYCLOSPORINE 0.5 MG/ML
1 EMULSION OPHTHALMIC 2 TIMES DAILY
Status: ON HOLD | COMMUNITY
Start: 2021-06-22 | End: 2023-01-01

## 2021-07-01 ASSESSMENT — PAIN SCALES - GENERAL: PAINLEVEL: NO PAIN (0)

## 2021-07-01 NOTE — PROGRESS NOTES
Subjective   Gillian is a 89 year old who presents for the following health issues     HPI     Chief Complaint   Patient presents with     Musculoskeletal Problem     back bone is sticking out/bending, and getting worse.          EMR reviewed including:             Complaint, History of Chief Complaint, Corresponding Review of Systems, and Complaint Specific Physical Examination.    #1   Patient concerned that back bone is sticking out.  Finds this uncomfortable when sitting and horizontally slantted chairs.  No back pain at this time.       Exam:  Significant kyphosis noted.  Significant muscle wasting of the paraspinal musculature.  Subsequent prominence of  spinous processes.        #2   Follow-up on chronic anemia.  Denies melena or hematochezia.  Denies significant shortness of breath or dyspnea.    Vital Signs:   /64 (BP Location: Right arm, Patient Position: Sitting, Cuff Size: Adult Regular)   Pulse 76   Temp 97.6  F (36.4  C) (Temporal)   Resp 18   SpO2 98%              Decision Making    Problem and Complexity     1. Anemia, unspecified type  We will check CBC and leave standing orders for future checks.  - CBC with platelets and differential; Standing      2. Seizure disorder (H)  No recent seizure activity.  Check Keppra level for surveillance  - Keppra (Levetiracetam) Level          ------------------------------------------------------------------------------------------------------------------------------  Data    4=1/3 5=2/3    1  >3  Specialty (external) notes reviewed:   None  Individual tests ordered (by provider) reviewed:   None  Individual tests ordered (by provider):   None  Independent Historians Interview:   None  2  Review of outside (other providers) Tests:   None  3   Verbal Discussion with Specialists:    None    ----------------------------------------------------------------------------------------------------------------------------------  Risk   Prescription drug  management:   None                                High risk for toxicity:   Decision regarding surgery:    None   Social determinants of health:   None   Decision regarding hospitalization:     None   Decision to withhold therapy:   None                                   FOLLOW UP   I have asked the patient to make an appointment for followup with me in 4 months or as needed            I have carefully explained the diagnosis and treatment options to the patient.  The patient has displayed an understanding of the above, and all subsequent questions were answered.      DO DELMA Pineda    Portions of this note were produced using [employer]  Although every attempt at real-time proof reading has been made, occasional grammar/syntax errors may have been missed.

## 2021-07-02 NOTE — PLAN OF CARE
"Gillianailyn Burk  Gender: female  : 3/19/1927  [address]    [place] [address] [phone] (home) [phone] (work)    Medical Record: [id number]  Pharmacy:    [facility] - [place], [place] - [address]  [facility] - [place], [place] - [address]  Primary Care Provider: Augusto Meyer    Parent's names are: Data Unavailable (mother) and Data Unavailable (father).    Spoke with patient's daughter, Hanna Winter [facility]  2021     Discharge Phone Call:  Key Words/Key Times      Introduction - AIDET (Acknowledge, Introduce, Duration, Explanation)      Empathy-   We are calling to see how you are since your recent stay in the hospital?     Call back COMMENTS: It went very well.      Clinical Questions -  (f/u appts, medication side effects/purpose, ability to care for self at home) \"For your safety, it is important to us that you understand the purpose and side effects of your medications, can you tell me what your new medications are?\"     Call back COMMENTS: She finished up her antibiotic a few days after she left.      Staff Recognition -  We like to recognize staff and physicians who have done an excellent job.  Do you remember any people from your care team that you would like recognize?     Call back COMMENTS: It has been sometime now so I'm not sure but everyone treated her very nicely.       Very Good Care -  We want to provide very good care to all patients.  How was your care?     Call back COMMENTS: It was honestly really good.      Opportunities for Improvement -  Our goal is to be the best.  Do you have any suggestions for things that we could improve upon?     Call back COMMENTS: I honestly can't think of anything other than it would have been nice if you could let more people in but I understand that staff doesn't have a say in that.       Thank You              "

## 2021-07-03 LAB — LEVETIRACETAM SERPL-MCNC: 23 UG/ML (ref 12–46)

## 2021-07-05 ENCOUNTER — HOSPITAL ENCOUNTER (OUTPATIENT)
Dept: CARDIAC REHAB | Facility: CLINIC | Age: 86
End: 2021-07-05
Attending: INTERNAL MEDICINE
Payer: COMMERCIAL

## 2021-07-05 PROCEDURE — 93798 PHYS/QHP OP CAR RHAB W/ECG: CPT

## 2021-07-06 DIAGNOSIS — J30.1 SEASONAL ALLERGIC RHINITIS DUE TO POLLEN: ICD-10-CM

## 2021-07-06 NOTE — TELEPHONE ENCOUNTER
Routing refill request to provider for review/approval because:  Patient is over the age of 64    Martina Kamara Rn

## 2021-07-07 ENCOUNTER — HOSPITAL ENCOUNTER (OUTPATIENT)
Dept: CARDIAC REHAB | Facility: CLINIC | Age: 86
End: 2021-07-07
Attending: INTERNAL MEDICINE
Payer: COMMERCIAL

## 2021-07-07 PROCEDURE — 93798 PHYS/QHP OP CAR RHAB W/ECG: CPT

## 2021-07-14 ENCOUNTER — HOSPITAL ENCOUNTER (OUTPATIENT)
Dept: CARDIAC REHAB | Facility: CLINIC | Age: 86
End: 2021-07-14
Attending: INTERNAL MEDICINE
Payer: COMMERCIAL

## 2021-07-14 PROCEDURE — 93798 PHYS/QHP OP CAR RHAB W/ECG: CPT | Performed by: REHABILITATION PRACTITIONER

## 2021-07-19 ENCOUNTER — HOSPITAL ENCOUNTER (OUTPATIENT)
Dept: CARDIAC REHAB | Facility: CLINIC | Age: 86
End: 2021-07-19
Attending: INTERNAL MEDICINE
Payer: COMMERCIAL

## 2021-07-19 PROCEDURE — 93798 PHYS/QHP OP CAR RHAB W/ECG: CPT | Performed by: REHABILITATION PRACTITIONER

## 2021-07-21 ENCOUNTER — LAB (OUTPATIENT)
Dept: LAB | Facility: CLINIC | Age: 86
End: 2021-07-21
Payer: COMMERCIAL

## 2021-07-21 ENCOUNTER — HOSPITAL ENCOUNTER (OUTPATIENT)
Dept: CARDIAC REHAB | Facility: CLINIC | Age: 86
End: 2021-07-21
Attending: INTERNAL MEDICINE
Payer: COMMERCIAL

## 2021-07-21 DIAGNOSIS — D64.9 ANEMIA, UNSPECIFIED TYPE: ICD-10-CM

## 2021-07-21 LAB
BASOPHILS # BLD AUTO: 0.1 10E3/UL (ref 0–0.2)
BASOPHILS NFR BLD AUTO: 1 %
EOSINOPHIL # BLD AUTO: 0 10E3/UL (ref 0–0.7)
EOSINOPHIL NFR BLD AUTO: 0 %
ERYTHROCYTE [DISTWIDTH] IN BLOOD BY AUTOMATED COUNT: 18.6 % (ref 10–15)
HCT VFR BLD AUTO: 28.5 % (ref 35–47)
HGB BLD-MCNC: 8.8 G/DL (ref 11.7–15.7)
IMM GRANULOCYTES # BLD: 0 10E3/UL
IMM GRANULOCYTES NFR BLD: 0 %
LYMPHOCYTES # BLD AUTO: 1.6 10E3/UL (ref 0.8–5.3)
LYMPHOCYTES NFR BLD AUTO: 20 %
MCH RBC QN AUTO: 28.9 PG (ref 26.5–33)
MCHC RBC AUTO-ENTMCNC: 30.9 G/DL (ref 31.5–36.5)
MCV RBC AUTO: 94 FL (ref 78–100)
MONOCYTES # BLD AUTO: 0.4 10E3/UL (ref 0–1.3)
MONOCYTES NFR BLD AUTO: 5 %
NEUTROPHILS # BLD AUTO: 5.7 10E3/UL (ref 1.6–8.3)
NEUTROPHILS NFR BLD AUTO: 74 %
NRBC # BLD AUTO: 0 10E3/UL
NRBC BLD AUTO-RTO: 0 /100
PLATELET # BLD AUTO: 307 10E3/UL (ref 150–450)
RBC # BLD AUTO: 3.04 10E6/UL (ref 3.8–5.2)
WBC # BLD AUTO: 7.8 10E3/UL (ref 4–11)

## 2021-07-21 PROCEDURE — 85025 COMPLETE CBC W/AUTO DIFF WBC: CPT

## 2021-07-21 PROCEDURE — 93798 PHYS/QHP OP CAR RHAB W/ECG: CPT

## 2021-07-21 PROCEDURE — 36415 COLL VENOUS BLD VENIPUNCTURE: CPT

## 2021-07-27 DIAGNOSIS — J30.1 SEASONAL ALLERGIC RHINITIS DUE TO POLLEN: ICD-10-CM

## 2021-07-30 NOTE — TELEPHONE ENCOUNTER
Routing to covering provider as PCP is currently out of office.     Routing refill request to provider for review/approval because:  Patient is over the age of 64    Martina Kamara RN

## 2021-08-24 DIAGNOSIS — J30.1 SEASONAL ALLERGIC RHINITIS DUE TO POLLEN: ICD-10-CM

## 2021-08-26 RX ORDER — LORATADINE 10 MG/1
TABLET ORAL
Qty: 30 TABLET | Refills: 0 | Status: SHIPPED | OUTPATIENT
Start: 2021-08-26 | End: 2021-10-04

## 2021-08-26 NOTE — TELEPHONE ENCOUNTER
Pending Prescriptions:                       Disp   Refills    EQ ALLERGY RELIEF 10 MG tablet [Pharmacy M*30 tab*0        Sig: Take 1 tablet by mouth once daily      Routing refill request to provider for review/approval because:  Labs out of range:  age    Lela Canela RN on 8/26/2021 at 10:21 AM

## 2021-09-08 ENCOUNTER — LAB (OUTPATIENT)
Dept: LAB | Facility: CLINIC | Age: 86
End: 2021-09-08
Payer: COMMERCIAL

## 2021-09-08 DIAGNOSIS — D64.9 ANEMIA, UNSPECIFIED TYPE: ICD-10-CM

## 2021-09-08 LAB
BASOPHILS # BLD AUTO: 0.1 10E3/UL (ref 0–0.2)
BASOPHILS NFR BLD AUTO: 1 %
EOSINOPHIL # BLD AUTO: 0.1 10E3/UL (ref 0–0.7)
EOSINOPHIL NFR BLD AUTO: 1 %
ERYTHROCYTE [DISTWIDTH] IN BLOOD BY AUTOMATED COUNT: 17.9 % (ref 10–15)
HCT VFR BLD AUTO: 23.9 % (ref 35–47)
HGB BLD-MCNC: 7.4 G/DL (ref 11.7–15.7)
IMM GRANULOCYTES # BLD: 0.1 10E3/UL
IMM GRANULOCYTES NFR BLD: 1 %
LYMPHOCYTES # BLD AUTO: 1.4 10E3/UL (ref 0.8–5.3)
LYMPHOCYTES NFR BLD AUTO: 18 %
MCH RBC QN AUTO: 29.4 PG (ref 26.5–33)
MCHC RBC AUTO-ENTMCNC: 31 G/DL (ref 31.5–36.5)
MCV RBC AUTO: 95 FL (ref 78–100)
MONOCYTES # BLD AUTO: 0.5 10E3/UL (ref 0–1.3)
MONOCYTES NFR BLD AUTO: 7 %
NEUTROPHILS # BLD AUTO: 5.5 10E3/UL (ref 1.6–8.3)
NEUTROPHILS NFR BLD AUTO: 72 %
NRBC # BLD AUTO: 0 10E3/UL
NRBC BLD AUTO-RTO: 0 /100
PLATELET # BLD AUTO: 319 10E3/UL (ref 150–450)
RBC # BLD AUTO: 2.52 10E6/UL (ref 3.8–5.2)
WBC # BLD AUTO: 7.6 10E3/UL (ref 4–11)

## 2021-09-08 PROCEDURE — 36415 COLL VENOUS BLD VENIPUNCTURE: CPT

## 2021-09-08 PROCEDURE — 85025 COMPLETE CBC W/AUTO DIFF WBC: CPT

## 2021-09-13 ENCOUNTER — MYC MEDICAL ADVICE (OUTPATIENT)
Dept: INTERNAL MEDICINE | Facility: CLINIC | Age: 86
End: 2021-09-13

## 2021-09-13 DIAGNOSIS — D64.9 ANEMIA, UNSPECIFIED TYPE: Primary | ICD-10-CM

## 2021-09-14 NOTE — TELEPHONE ENCOUNTER
As the patient's MCV is normal, I do not know that this is iron deficiency.  Subsequently, iron infusions may not be beneficial.    As her hemoglobin is not yet critical, I have placed orders for iron studies as well as B12 and folic acid.  This will help us better determine if there will be any benefit to iron infusion.      Betsy

## 2021-09-15 ENCOUNTER — LAB (OUTPATIENT)
Dept: LAB | Facility: CLINIC | Age: 86
End: 2021-09-15
Payer: COMMERCIAL

## 2021-09-15 DIAGNOSIS — D64.9 ANEMIA, UNSPECIFIED TYPE: ICD-10-CM

## 2021-09-15 LAB
BASOPHILS # BLD AUTO: 0.1 10E3/UL (ref 0–0.2)
BASOPHILS NFR BLD AUTO: 1 %
EOSINOPHIL # BLD AUTO: 0.1 10E3/UL (ref 0–0.7)
EOSINOPHIL NFR BLD AUTO: 1 %
ERYTHROCYTE [DISTWIDTH] IN BLOOD BY AUTOMATED COUNT: 18.5 % (ref 10–15)
FERRITIN SERPL-MCNC: 29 NG/ML (ref 8–252)
FOLATE SERPL-MCNC: >100 NG/ML
HCT VFR BLD AUTO: 25.7 % (ref 35–47)
HGB BLD-MCNC: 8 G/DL (ref 11.7–15.7)
IMM GRANULOCYTES # BLD: 0 10E3/UL
IMM GRANULOCYTES NFR BLD: 0 %
IRON SATN MFR SERPL: 7 % (ref 15–46)
IRON SERPL-MCNC: 19 UG/DL (ref 35–180)
LYMPHOCYTES # BLD AUTO: 1.4 10E3/UL (ref 0.8–5.3)
LYMPHOCYTES NFR BLD AUTO: 17 %
MCH RBC QN AUTO: 29.4 PG (ref 26.5–33)
MCHC RBC AUTO-ENTMCNC: 31.1 G/DL (ref 31.5–36.5)
MCV RBC AUTO: 95 FL (ref 78–100)
MONOCYTES # BLD AUTO: 0.4 10E3/UL (ref 0–1.3)
MONOCYTES NFR BLD AUTO: 4 %
NEUTROPHILS # BLD AUTO: 6.1 10E3/UL (ref 1.6–8.3)
NEUTROPHILS NFR BLD AUTO: 77 %
NRBC # BLD AUTO: 0 10E3/UL
NRBC BLD AUTO-RTO: 0 /100
PLATELET # BLD AUTO: 345 10E3/UL (ref 150–450)
RBC # BLD AUTO: 2.72 10E6/UL (ref 3.8–5.2)
RETICS # AUTO: 0.15 10E6/UL (ref 0.03–0.1)
RETICS/RBC NFR AUTO: 5.4 % (ref 0.5–2)
TIBC SERPL-MCNC: 264 UG/DL (ref 240–430)
VIT B12 SERPL-MCNC: 667 PG/ML (ref 193–986)
WBC # BLD AUTO: 7.9 10E3/UL (ref 4–11)

## 2021-09-15 PROCEDURE — 85025 COMPLETE CBC W/AUTO DIFF WBC: CPT

## 2021-09-15 PROCEDURE — 36415 COLL VENOUS BLD VENIPUNCTURE: CPT

## 2021-09-15 PROCEDURE — 85045 AUTOMATED RETICULOCYTE COUNT: CPT

## 2021-09-15 PROCEDURE — 82746 ASSAY OF FOLIC ACID SERUM: CPT

## 2021-09-15 PROCEDURE — 82607 VITAMIN B-12: CPT

## 2021-09-15 PROCEDURE — 82728 ASSAY OF FERRITIN: CPT

## 2021-09-15 PROCEDURE — 83550 IRON BINDING TEST: CPT

## 2021-09-17 ENCOUNTER — MYC MEDICAL ADVICE (OUTPATIENT)
Dept: INTERNAL MEDICINE | Facility: CLINIC | Age: 86
End: 2021-09-17

## 2021-09-18 ENCOUNTER — LAB REQUISITION (OUTPATIENT)
Dept: LAB | Facility: CLINIC | Age: 86
End: 2021-09-18
Payer: COMMERCIAL

## 2021-09-18 DIAGNOSIS — Z11.52 ENCOUNTER FOR SCREENING FOR COVID-19: ICD-10-CM

## 2021-09-20 PROCEDURE — U0003 INFECTIOUS AGENT DETECTION BY NUCLEIC ACID (DNA OR RNA); SEVERE ACUTE RESPIRATORY SYNDROME CORONAVIRUS 2 (SARS-COV-2) (CORONAVIRUS DISEASE [COVID-19]), AMPLIFIED PROBE TECHNIQUE, MAKING USE OF HIGH THROUGHPUT TECHNOLOGIES AS DESCRIBED BY CMS-2020-01-R: HCPCS | Mod: ORL | Performed by: NURSE PRACTITIONER

## 2021-09-21 ENCOUNTER — MYC MEDICAL ADVICE (OUTPATIENT)
Dept: INTERNAL MEDICINE | Facility: CLINIC | Age: 86
End: 2021-09-21

## 2021-09-21 DIAGNOSIS — D50.0 IRON DEFICIENCY ANEMIA DUE TO CHRONIC BLOOD LOSS: ICD-10-CM

## 2021-09-21 DIAGNOSIS — R07.9 CHEST PAIN, UNSPECIFIED TYPE: ICD-10-CM

## 2021-09-21 RX ORDER — AMLODIPINE BESYLATE 5 MG/1
5 TABLET ORAL DAILY
Qty: 90 TABLET | Refills: 0 | Status: SHIPPED | OUTPATIENT
Start: 2021-09-21 | End: 2021-12-14

## 2021-09-21 NOTE — TELEPHONE ENCOUNTER
Routing to PCP to review and advise.    See recent lab result message - Ferritin increased to 1 capsule BID ?          Britta Neri RN  [facility]

## 2021-09-22 LAB — SARS-COV-2 RNA RESP QL NAA+PROBE: NOT DETECTED

## 2021-09-26 ENCOUNTER — LAB REQUISITION (OUTPATIENT)
Dept: LAB | Facility: CLINIC | Age: 86
End: 2021-09-26
Payer: COMMERCIAL

## 2021-09-26 DIAGNOSIS — Z11.52 ENCOUNTER FOR SCREENING FOR COVID-19: ICD-10-CM

## 2021-10-01 DIAGNOSIS — J30.1 SEASONAL ALLERGIC RHINITIS DUE TO POLLEN: ICD-10-CM

## 2021-10-01 NOTE — TELEPHONE ENCOUNTER
Pending Prescriptions:                       Disp   Refills    EQ ALLERGY RELIEF 10 MG tablet [Pharmacy M*30 tab*0        Sig: Take 1 tablet by mouth once daily    Routing refill request to provider for review/approval because:  Labs out of range:  Age

## 2021-10-03 ENCOUNTER — HEALTH MAINTENANCE LETTER (OUTPATIENT)
Age: 86
End: 2021-10-03

## 2021-10-04 RX ORDER — LORATADINE 10 MG/1
TABLET ORAL
Qty: 30 TABLET | Refills: 0 | Status: SHIPPED | OUTPATIENT
Start: 2021-10-04 | End: 2021-11-04

## 2021-10-05 DIAGNOSIS — G47.09 SLEEP INITIATION DISORDER: ICD-10-CM

## 2021-10-07 DIAGNOSIS — R07.9 CHEST PAIN, UNSPECIFIED TYPE: ICD-10-CM

## 2021-10-07 DIAGNOSIS — Z86.73 HISTORY OF CVA (CEREBROVASCULAR ACCIDENT): ICD-10-CM

## 2021-10-07 RX ORDER — CLOPIDOGREL BISULFATE 75 MG/1
75 TABLET ORAL DAILY
Qty: 90 TABLET | Refills: 0 | Status: SHIPPED | OUTPATIENT
Start: 2021-10-07 | End: 2022-01-05

## 2021-10-07 RX ORDER — MIRTAZAPINE 30 MG/1
TABLET, FILM COATED ORAL
Qty: 90 TABLET | Refills: 1 | Status: SHIPPED | OUTPATIENT
Start: 2021-10-07 | End: 2022-03-29

## 2021-10-07 RX ORDER — METOPROLOL SUCCINATE 50 MG/1
50 TABLET, EXTENDED RELEASE ORAL 2 TIMES DAILY
Qty: 180 TABLET | Refills: 0 | Status: SHIPPED | OUTPATIENT
Start: 2021-10-07 | End: 2022-01-05

## 2021-10-12 ENCOUNTER — LAB (OUTPATIENT)
Dept: LAB | Facility: CLINIC | Age: 86
End: 2021-10-12
Payer: COMMERCIAL

## 2021-10-12 DIAGNOSIS — Z79.899 NEED FOR PROPHYLACTIC CHEMOTHERAPY: ICD-10-CM

## 2021-10-12 DIAGNOSIS — M05.79 SEROPOSITIVE RHEUMATOID ARTHRITIS OF MULTIPLE SITES (H): Primary | ICD-10-CM

## 2021-10-12 DIAGNOSIS — D64.9 ANEMIA, UNSPECIFIED TYPE: ICD-10-CM

## 2021-10-12 LAB
ALT SERPL W P-5'-P-CCNC: 17 U/L (ref 0–50)
AST SERPL W P-5'-P-CCNC: 17 U/L (ref 0–45)
BASOPHILS # BLD AUTO: 0.1 10E3/UL (ref 0–0.2)
BASOPHILS NFR BLD AUTO: 1 %
CRP SERPL-MCNC: 4 MG/L (ref 0–8)
EOSINOPHIL # BLD AUTO: 0.1 10E3/UL (ref 0–0.7)
EOSINOPHIL NFR BLD AUTO: 1 %
ERYTHROCYTE [DISTWIDTH] IN BLOOD BY AUTOMATED COUNT: 18.4 % (ref 10–15)
HCT VFR BLD AUTO: 26.7 % (ref 35–47)
HGB BLD-MCNC: 8.5 G/DL (ref 11.7–15.7)
IMM GRANULOCYTES # BLD: 0 10E3/UL
IMM GRANULOCYTES NFR BLD: 0 %
LYMPHOCYTES # BLD AUTO: 1.4 10E3/UL (ref 0.8–5.3)
LYMPHOCYTES NFR BLD AUTO: 21 %
MCH RBC QN AUTO: 30.1 PG (ref 26.5–33)
MCHC RBC AUTO-ENTMCNC: 31.8 G/DL (ref 31.5–36.5)
MCV RBC AUTO: 95 FL (ref 78–100)
MONOCYTES # BLD AUTO: 0.5 10E3/UL (ref 0–1.3)
MONOCYTES NFR BLD AUTO: 8 %
NEUTROPHILS # BLD AUTO: 4.7 10E3/UL (ref 1.6–8.3)
NEUTROPHILS NFR BLD AUTO: 69 %
NRBC # BLD AUTO: 0 10E3/UL
NRBC BLD AUTO-RTO: 0 /100
PLATELET # BLD AUTO: 311 10E3/UL (ref 150–450)
RBC # BLD AUTO: 2.82 10E6/UL (ref 3.8–5.2)
WBC # BLD AUTO: 6.8 10E3/UL (ref 4–11)

## 2021-10-12 PROCEDURE — 86140 C-REACTIVE PROTEIN: CPT | Performed by: INTERNAL MEDICINE

## 2021-10-12 PROCEDURE — 84450 TRANSFERASE (AST) (SGOT): CPT | Performed by: INTERNAL MEDICINE

## 2021-10-12 PROCEDURE — 84460 ALANINE AMINO (ALT) (SGPT): CPT | Performed by: INTERNAL MEDICINE

## 2021-10-12 PROCEDURE — 85025 COMPLETE CBC W/AUTO DIFF WBC: CPT

## 2021-10-12 PROCEDURE — 36415 COLL VENOUS BLD VENIPUNCTURE: CPT

## 2021-10-12 PROCEDURE — 36415 COLL VENOUS BLD VENIPUNCTURE: CPT | Performed by: INTERNAL MEDICINE

## 2021-10-26 DIAGNOSIS — J30.1 SEASONAL ALLERGIC RHINITIS DUE TO POLLEN: ICD-10-CM

## 2021-10-27 NOTE — TELEPHONE ENCOUNTER
Pending Prescriptions:                       Disp   Refills    EQ LORATADINE 10 MG tablet [Pharmacy Med N*30 tab*0        Sig: Take 1 tablet by mouth once daily    Routing refill request to provider for review/approval because:  Labs out of range:  Age

## 2021-11-03 ENCOUNTER — OFFICE VISIT (OUTPATIENT)
Dept: INTERNAL MEDICINE | Facility: CLINIC | Age: 86
End: 2021-11-03
Payer: COMMERCIAL

## 2021-11-03 VITALS
HEART RATE: 83 BPM | TEMPERATURE: 97.8 F | RESPIRATION RATE: 18 BRPM | OXYGEN SATURATION: 96 % | WEIGHT: 115.6 LBS | BODY MASS INDEX: 18.66 KG/M2 | DIASTOLIC BLOOD PRESSURE: 60 MMHG | SYSTOLIC BLOOD PRESSURE: 134 MMHG

## 2021-11-03 DIAGNOSIS — D50.0 IRON DEFICIENCY ANEMIA DUE TO CHRONIC BLOOD LOSS: Primary | ICD-10-CM

## 2021-11-03 DIAGNOSIS — K27.4 GASTROINTESTINAL HEMORRHAGE ASSOCIATED WITH PEPTIC ULCER: ICD-10-CM

## 2021-11-03 DIAGNOSIS — I10 HYPERTENSION GOAL BP (BLOOD PRESSURE) < 140/90: ICD-10-CM

## 2021-11-03 LAB
BASOPHILS # BLD AUTO: 0 10E3/UL (ref 0–0.2)
BASOPHILS NFR BLD AUTO: 1 %
EOSINOPHIL # BLD AUTO: 0 10E3/UL (ref 0–0.7)
EOSINOPHIL NFR BLD AUTO: 0 %
ERYTHROCYTE [DISTWIDTH] IN BLOOD BY AUTOMATED COUNT: 18.3 % (ref 10–15)
HCT VFR BLD AUTO: 24.9 % (ref 35–47)
HGB BLD-MCNC: 7.7 G/DL (ref 11.7–15.7)
IMM GRANULOCYTES # BLD: 0 10E3/UL
IMM GRANULOCYTES NFR BLD: 0 %
LYMPHOCYTES # BLD AUTO: 1.4 10E3/UL (ref 0.8–5.3)
LYMPHOCYTES NFR BLD AUTO: 19 %
MCH RBC QN AUTO: 29.8 PG (ref 26.5–33)
MCHC RBC AUTO-ENTMCNC: 30.9 G/DL (ref 31.5–36.5)
MCV RBC AUTO: 97 FL (ref 78–100)
MONOCYTES # BLD AUTO: 0.6 10E3/UL (ref 0–1.3)
MONOCYTES NFR BLD AUTO: 8 %
NEUTROPHILS # BLD AUTO: 5 10E3/UL (ref 1.6–8.3)
NEUTROPHILS NFR BLD AUTO: 72 %
NRBC # BLD AUTO: 0 10E3/UL
NRBC BLD AUTO-RTO: 0 /100
PLATELET # BLD AUTO: 292 10E3/UL (ref 150–450)
RBC # BLD AUTO: 2.58 10E6/UL (ref 3.8–5.2)
WBC # BLD AUTO: 7 10E3/UL (ref 4–11)

## 2021-11-03 PROCEDURE — 99214 OFFICE O/P EST MOD 30 MIN: CPT | Performed by: INTERNAL MEDICINE

## 2021-11-03 PROCEDURE — 85025 COMPLETE CBC W/AUTO DIFF WBC: CPT | Performed by: INTERNAL MEDICINE

## 2021-11-03 PROCEDURE — 36415 COLL VENOUS BLD VENIPUNCTURE: CPT | Performed by: INTERNAL MEDICINE

## 2021-11-03 RX ORDER — METHYLPREDNISOLONE SODIUM SUCCINATE 125 MG/2ML
125 INJECTION, POWDER, LYOPHILIZED, FOR SOLUTION INTRAMUSCULAR; INTRAVENOUS
Status: CANCELLED
Start: 2021-11-08

## 2021-11-03 RX ORDER — ALBUTEROL SULFATE 0.83 MG/ML
2.5 SOLUTION RESPIRATORY (INHALATION)
Status: CANCELLED | OUTPATIENT
Start: 2021-11-08

## 2021-11-03 RX ORDER — ALBUTEROL SULFATE 90 UG/1
1-2 AEROSOL, METERED RESPIRATORY (INHALATION)
Status: CANCELLED
Start: 2021-11-08

## 2021-11-03 RX ORDER — MEPERIDINE HYDROCHLORIDE 25 MG/ML
25 INJECTION INTRAMUSCULAR; INTRAVENOUS; SUBCUTANEOUS EVERY 30 MIN PRN
Status: CANCELLED | OUTPATIENT
Start: 2021-11-08

## 2021-11-03 RX ORDER — NALOXONE HYDROCHLORIDE 0.4 MG/ML
0.2 INJECTION, SOLUTION INTRAMUSCULAR; INTRAVENOUS; SUBCUTANEOUS
Status: CANCELLED | OUTPATIENT
Start: 2021-11-08

## 2021-11-03 RX ORDER — EPINEPHRINE 1 MG/ML
0.3 INJECTION, SOLUTION, CONCENTRATE INTRAVENOUS EVERY 5 MIN PRN
Status: CANCELLED | OUTPATIENT
Start: 2021-11-08

## 2021-11-03 RX ORDER — DIPHENHYDRAMINE HYDROCHLORIDE 50 MG/ML
50 INJECTION INTRAMUSCULAR; INTRAVENOUS
Status: CANCELLED
Start: 2021-11-08

## 2021-11-03 ASSESSMENT — PAIN SCALES - GENERAL: PAINLEVEL: NO PAIN (0)

## 2021-11-03 NOTE — PROGRESS NOTES
Subjective   Gillian is a 89 year old who presents for the following health issues  accompanied by her FEI.  Chief Complaint   Patient presents with     Fatigue     Other     vaccinations        EMR reviewed including:             Complaint, History of Chief Complaint, Corresponding Review of Systems, and Complaint Specific Physical Examination.    #1   Progressive fatigue.  History of chronic anemia with iron deficiency.  Suspect chronic GI bleed.  Previous GI work-up unremarkable.  Is on oral iron with minimal benefit.  Has had IV iron in the past with substantial improvement.        Exam:   HEART:  regular without rubs, clicks, gallops, or murmurs. PMI is nondisplaced. Upstrokes are brisk. S1,S2 are heard.   LUNGS: clear bilaterally, airflow is brisk, no intercostal retraction or stridor is noted. No coughing is noted during visit.   GI: Abdomen is soft, without rebound, guarding or tenderness. Bowel sounds are appropriate. No renal bruits are heard.      #2   Hypertension  Currently controlled blood pressure 134/80.  Continues with Norvasc, metoprolol.        Exam:  As above.        Patient has been interviewed, applicable history and applied review of systems have been performed.    Vital Signs:   /60   Pulse 83   Temp 97.8  F (36.6  C) (Temporal)   Resp 18   Wt 52.4 kg (115 lb 9.6 oz)   SpO2 96%   BMI 18.66 kg/m        Decision Making    Problem and Complexity     1. Iron deficiency anemia due to chronic blood loss  Check hemoglobin.  If decreased below 7 would recommend transfusion of 2 units packed red blood cells followed by IV iron infusion next week.  Greater than 7, recommend IV iron infusion next week.  - CBC with platelets and differential; Future  - CBC with platelets and differential    2. Gastrointestinal hemorrhage associated with peptic ulcer  Stable.    3. Hypertension goal BP (blood pressure) < 140/90  Currently controlled.  Continue medications as current                                 FOLLOW UP   I have asked the patient to make an appointment for followup with me in 2 weeks pending results)        I have carefully explained the diagnosis and treatment options to the patient.  The patient has displayed an understanding of the above, and all subsequent questions were answered.      DO DELMA Pineda    Portions of this note were produced using [employer]  Although every attempt at real-time proof reading has been made, occasional grammar/syntax errors may have been missed.

## 2021-11-05 ENCOUNTER — MYC MEDICAL ADVICE (OUTPATIENT)
Dept: INTERNAL MEDICINE | Facility: CLINIC | Age: 86
End: 2021-11-05

## 2021-11-16 DIAGNOSIS — E03.4 HYPOTHYROIDISM DUE TO ACQUIRED ATROPHY OF THYROID: ICD-10-CM

## 2021-11-17 ENCOUNTER — OFFICE VISIT (OUTPATIENT)
Dept: UROLOGY | Facility: CLINIC | Age: 86
End: 2021-11-17
Payer: COMMERCIAL

## 2021-11-17 VITALS
RESPIRATION RATE: 16 BRPM | DIASTOLIC BLOOD PRESSURE: 59 MMHG | SYSTOLIC BLOOD PRESSURE: 125 MMHG | HEART RATE: 72 BPM | OXYGEN SATURATION: 96 %

## 2021-11-17 DIAGNOSIS — N30.20 CHRONIC CYSTITIS: Primary | ICD-10-CM

## 2021-11-17 PROCEDURE — 99203 OFFICE O/P NEW LOW 30 MIN: CPT | Performed by: UROLOGY

## 2021-11-17 RX ORDER — NITROFURANTOIN MACROCRYSTAL 100 MG
100 CAPSULE ORAL AT BEDTIME
Qty: 90 CAPSULE | Refills: 3 | Status: SHIPPED | OUTPATIENT
Start: 2021-11-17 | End: 2022-08-17

## 2021-11-17 RX ORDER — NITROFURANTOIN 25; 75 MG/1; MG/1
100 CAPSULE ORAL DAILY
Status: CANCELLED | OUTPATIENT
Start: 2021-11-17

## 2021-11-17 ASSESSMENT — PAIN SCALES - GENERAL: PAINLEVEL: NO PAIN (0)

## 2021-11-17 NOTE — PROGRESS NOTES
S: Patient is a pleasant 89-year-old  female who was seen for a consultation with regard to patient's history of recurrent urinary tract infection.  Patient was seen by Dr. Winters in the past.  She has been on Macrodantin nightly for a number of years which seems to work well for her.  She did have a urinary tract infection in May of this year otherwise she is doing fine.  She has no side effects from the medication.  Current Outpatient Medications   Medication Sig Dispense Refill     acetaminophen (TYLENOL ARTHRITIS PAIN) 650 MG CR tablet Take 1,300 mg by mouth every 8 hours as needed for mild pain        amLODIPine (NORVASC) 5 MG tablet Take 1 tablet (5 mg) by mouth daily 90 tablet 0     aspirin EC 81 MG EC tablet Take 1 tablet (81 mg) by mouth daily       atorvastatin (LIPITOR) 40 MG tablet Take 1 tablet (40 mg) by mouth daily 90 tablet 3     calcium carbonate (TUMS) 500 MG chewable tablet Take 2-4 tablets (1,000-2,000 mg) by mouth as needed for heartburn       clopidogrel (PLAVIX) 75 MG tablet Take 1 tablet (75 mg) by mouth daily 90 tablet 0     Cranberry 1000 MG CAPS        docusate sodium (COLACE) 100 MG capsule Take 200 mg by mouth daily 2 capsules       EQ LORATADINE 10 MG tablet Take 1 tablet by mouth once daily 30 tablet 0     EUTHYROX 75 MCG tablet Take 1 tablet by mouth once daily 90 tablet 1     famotidine (PEPCID) 40 MG tablet TAKE 1 TABLET BY MOUTH AT BEDTIME 30 tablet 9     isosorbide mononitrate (IMDUR) 60 MG 24 hr tablet Take 1 tablet (60 mg) by mouth 2 times daily 180 tablet 1     Lactobacillus (FLORAJEN ACIDOPHILUS) CAPS Take 1 capsule by mouth daily       levETIRAcetam (KEPPRA) 500 MG tablet Take 1 tablet (500 mg) by mouth 2 times daily 270 tablet 1     lisinopril (ZESTRIL) 2.5 MG tablet Take 1 tablet (2.5 mg) by mouth daily 30 tablet 11     methotrexate 2.5 MG tablet Take 4 tablets (10 mg) by mouth once a week Wed 52 tablet 3     metoprolol succinate ER (TOPROL-XL) 50 MG 24 hr tablet  Take 1 tablet (50 mg) by mouth 2 times daily 180 tablet 0     mirtazapine (REMERON) 30 MG tablet TAKE 1 TABLET BY MOUTH ONCE DAILY AT BEDTIME 90 tablet 1     nitroFURantoin macrocrystal (MACRODANTIN) 100 MG capsule Take 1 capsule (100 mg) by mouth At Bedtime 90 capsule 3     nitroFURantoin macrocrystal-monohydrate (MACROBID) 100 MG capsule Take 100 mg by mouth daily Takes for prevention of UTI       predniSONE (DELTASONE) 5 MG tablet TAKE ONE TABLET BY MOUTH EVERY OTHER DAY ALTERNATING  WITH  1/2  TABLET  EVERY  OTHER  DAY 23 tablet 13     RESTASIS 0.05 % ophthalmic emulsion INSTILL 1 DROP INTO LEFT EYE TWICE DAILY AS DIRECTED       SLOW  (45 Fe) MG CR tablet Take 1 tablet (142 mg) by mouth daily 90 tablet 1     ALPRAZolam (XANAX) 0.25 MG tablet Take 1 tablet by mouth twice daily as needed for anxiety (Patient not taking: Reported on 11/17/2021) 28 tablet 0     fluticasone (FLONASE) 50 MCG/ACT nasal spray Spray 1 spray into both nostrils daily (Patient not taking: Reported on 11/17/2021) 16 mL 3     folic acid (FOLVITE) 1 MG tablet Take 1 tablet by mouth once daily (Patient not taking: Reported on 11/3/2021) 90 tablet 1     multivitamin w/minerals (THERA-VIT-M) tablet Take 1 tablet by mouth daily (Patient not taking: Reported on 11/17/2021)       nitroGLYcerin (NITROSTAT) 0.4 MG sublingual tablet DISSOLVE ONE TABLET UNDER THE TONGUE EVERY 5 MINUTES AS NEEDED FOR CHEST PAIN.  DO NOT EXCEED A TOTAL OF 3 DOSES IN 15 MINUTES 25 tablet 0     Allergies   Allergen Reactions     Penicillins Hives     Caffeine Other (See Comments) and Unknown     Triggers your Meniere's disease     Hydrocodone Other (See Comments) and Unknown     hallucinations     Ibuprofen GI Disturbance and Unknown     Other Environmental Allergy      Metals of unspecific kind     Tramadol Other (See Comments)     Seizure, was taking remeron along with tramadol     Metal [Turner] Rash     Past Medical History:   Diagnosis Date     Aortic stenosis      s/p TAVR 8/17/16     Chronic migraine      Hyperlipidaemia      Hypertension      Hypothyroidism      Myocardial infarction (H)      Need for SBE (subacute bacterial endocarditis) prophylaxis      Orthostatic hypotension     wears compression stockings     PUD (peptic ulcer disease)      Secondary Sjogren's syndrome (H)      Seizures (H)     after stroke (partial onset)     Seropositive rheumatoid arthritis (H)      Stroke (H) 05/2013    with visual field cut, left occipital lobe     Syncope 2014    due to orthostatic hypotension     Past Surgical History:   Procedure Laterality Date     ANOMALOUS PULMONARY VENOUS RETURN REPAIR, TOTAL       C TOTAL HIP ARTHROPLASTY Right 2010     C TOTAL HIP ARTHROPLASTY Left 2014     CATARACT EXTRACTION       CATARACT IOL, RT/LT Bilateral 2000     CV FLUOROSCOPY ONLY N/A 8/6/2019    Procedure: Fluoroscopy Only - valve cine done of aortic valve at 180 degrees Citizen [place] to [place]UNG;  Surgeon: Dave Farfan MD;  Location:  [facility]     CV HEART CATHETERIZATION WITH POSSIBLE INTERVENTION N/A 2/10/2021    Procedure: Heart Catheterization with Possible Intervention;  Surgeon: Fish Padgett MD;  Location:  [facility]     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     EYE SURGERY       HEART CATH FEMORAL CANNULIZATION WITH OPEN STANDBY REPAIR AORTIC VALVE N/A 8/3/2016    Procedure: HEART CATH FEMORAL CANNULIZATION WITH OPEN STANDBY REPAIR AORTIC VALVE;  Surgeon: Owen Schultz MD;  Location: [facility] OR     HYSTERECTOMY       HYSTERECTOMY TOTAL ABDOMINAL  1970    ovaries out     MANDIBLE FRACTURE SURGERY       MOUTH SURGERY  2011    jaw surgery due to fracture     OTHER SURGICAL HISTORY      Treva valve implant     TOTAL HIP ARTHROPLASTY       TRANSCATHETER AORTIC VALVE IMPLANT ANESTHESIA N/A 8/3/2016    Procedure: TRANSCATHETER AORTIC VALVE IMPLANT ANESTHESIA;  Surgeon: GENERIC ANESTHESIA PROVIDER;  Location: [facility] OR      Family History    Problem Relation Age of Onset     Hyperlipidemia Mother      Family History Negative No family hx of      Hypertension Mother      Rheumatoid Arthritis Child      Social History     Socioeconomic History     Marital status:      Spouse name: None     Number of children: 4     Years of education: None     Highest education level: None   Occupational History     Employer: RETIRED   Tobacco Use     Smoking status: Never Smoker     Smokeless tobacco: Never Used   Substance and Sexual Activity     Alcohol use: No     Alcohol/week: 0.0 standard drinks     Drug use: No     Sexual activity: Not Currently   Other Topics Concern     Parent/sibling w/ CABG, MI or angioplasty before 65F 55M? Not Asked      Service Not Asked     Blood Transfusions Not Asked     Caffeine Concern Not Asked     Occupational Exposure Not Asked     Hobby Hazards Not Asked     Sleep Concern Not Asked     Stress Concern Not Asked     Weight Concern Not Asked     Special Diet Yes     Comment: low salt      Back Care Not Asked     Exercise Yes     Comment: semi recument bike 15 mins daily      Bike Helmet Not Asked     Seat Belt Not Asked     Self-Exams Not Asked   Social History Narrative    .  Lives in assisted living. Independent. Has help with making bed and changing sheets.    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 .     4 children - 1 with RA     Social Determinants of Health     Financial Resource Strain: Not on file   Food Insecurity: Not on file   Transportation Needs: Not on file   Physical Activity: Not on file   Stress: Not on file   Social Connections: Not on file   Intimate Partner Violence: Not on file   Housing Stability: Not on file       REVIEW OF SYSTEMS  =================  C: NEGATIVE for fever, chills, change in weight  I: NEGATIVE for worrisome rashes, moles or lesions  E/M: NEGATIVE for ear, mouth and throat problems  R: NEGATIVE for significant cough or SHORTNESS OF BREATH  CV:   NEGATIVE for chest pain, palpitations or peripheral edema  GI: NEGATIVE for nausea, abdominal pain, heartburn, or change in bowel habits  NEURO: NEGATIVE numbness/weakness  : see HPI  PSYCH: NEGATIVE depression/anxiety  LYmph: no new enlarged lymph nodes  Ortho: no new trauma/movements      Physical Exam:  /59 (BP Location: Right arm, Patient Position: Sitting, Cuff Size: Adult Regular)   Pulse 72   Resp 16   SpO2 96%    Patient is pleasant, in no acute distress, good general condition.  Heart:  negative, PMI normal  Lung: no evidence of respiratory distress    Abdomen: Soft, nondistended, non tender. No masses. No rebound or guarding.   Exam: No CVA tenderness  Skin: Warm and dry.  No redness.  Neuro: grossly normal  Musculaskeletal: moving all extremities  Psych normal mood and affect  Musculoskeletal  moving all extremities  Hematologic/Lymphatic/Immunologic: normal ant/post cervical, axillary, supraclavicular and inguinal nodes    Assessment/Plan:     Chronic cystitis: Continue with Macrodantin.  Follow-up in 1 year.

## 2021-11-17 NOTE — TELEPHONE ENCOUNTER
Euthyrox  Routing refill request to provider for review/approval because:  Labs not current:  TSH    Erica Cuadra RN

## 2021-11-18 RX ORDER — LEVOTHYROXINE SODIUM 75 UG/1
TABLET ORAL
Qty: 90 TABLET | Refills: 0 | Status: SHIPPED | OUTPATIENT
Start: 2021-11-18 | End: 2022-02-10

## 2021-11-19 ENCOUNTER — MYC MEDICAL ADVICE (OUTPATIENT)
Dept: INTERNAL MEDICINE | Facility: CLINIC | Age: 86
End: 2021-11-19
Payer: COMMERCIAL

## 2021-11-23 ENCOUNTER — INFUSION THERAPY VISIT (OUTPATIENT)
Dept: INFUSION THERAPY | Facility: CLINIC | Age: 86
End: 2021-11-23
Attending: INTERNAL MEDICINE
Payer: COMMERCIAL

## 2021-11-23 VITALS
OXYGEN SATURATION: 94 % | DIASTOLIC BLOOD PRESSURE: 65 MMHG | RESPIRATION RATE: 16 BRPM | TEMPERATURE: 98.2 F | HEART RATE: 78 BPM | BODY MASS INDEX: 19.14 KG/M2 | HEIGHT: 66 IN | WEIGHT: 119.1 LBS | SYSTOLIC BLOOD PRESSURE: 143 MMHG

## 2021-11-23 DIAGNOSIS — D50.0 IRON DEFICIENCY ANEMIA DUE TO CHRONIC BLOOD LOSS: Primary | ICD-10-CM

## 2021-11-23 PROCEDURE — 96365 THER/PROPH/DIAG IV INF INIT: CPT

## 2021-11-23 PROCEDURE — 250N000011 HC RX IP 250 OP 636: Performed by: INTERNAL MEDICINE

## 2021-11-23 PROCEDURE — 258N000003 HC RX IP 258 OP 636: Performed by: INTERNAL MEDICINE

## 2021-11-23 RX ORDER — EPINEPHRINE 1 MG/ML
0.3 INJECTION, SOLUTION INTRAMUSCULAR; SUBCUTANEOUS EVERY 5 MIN PRN
Status: CANCELLED | OUTPATIENT
Start: 2021-11-30

## 2021-11-23 RX ORDER — EPINEPHRINE 1 MG/ML
0.3 INJECTION, SOLUTION, CONCENTRATE INTRAVENOUS EVERY 5 MIN PRN
Status: CANCELLED | OUTPATIENT
Start: 2021-11-23

## 2021-11-23 RX ORDER — MEPERIDINE HYDROCHLORIDE 25 MG/ML
25 INJECTION INTRAMUSCULAR; INTRAVENOUS; SUBCUTANEOUS EVERY 30 MIN PRN
Status: CANCELLED | OUTPATIENT
Start: 2021-11-30

## 2021-11-23 RX ORDER — ALBUTEROL SULFATE 90 UG/1
1-2 AEROSOL, METERED RESPIRATORY (INHALATION)
Status: CANCELLED
Start: 2021-11-23

## 2021-11-23 RX ORDER — ALBUTEROL SULFATE 90 UG/1
1-2 AEROSOL, METERED RESPIRATORY (INHALATION)
Status: CANCELLED
Start: 2021-11-30

## 2021-11-23 RX ORDER — ALBUTEROL SULFATE 0.83 MG/ML
2.5 SOLUTION RESPIRATORY (INHALATION)
Status: CANCELLED | OUTPATIENT
Start: 2021-11-30

## 2021-11-23 RX ORDER — METHYLPREDNISOLONE SODIUM SUCCINATE 125 MG/2ML
125 INJECTION, POWDER, LYOPHILIZED, FOR SOLUTION INTRAMUSCULAR; INTRAVENOUS
Status: CANCELLED
Start: 2021-11-30

## 2021-11-23 RX ORDER — METHYLPREDNISOLONE SODIUM SUCCINATE 125 MG/2ML
125 INJECTION, POWDER, LYOPHILIZED, FOR SOLUTION INTRAMUSCULAR; INTRAVENOUS
Status: CANCELLED
Start: 2021-11-23

## 2021-11-23 RX ORDER — NALOXONE HYDROCHLORIDE 0.4 MG/ML
0.2 INJECTION, SOLUTION INTRAMUSCULAR; INTRAVENOUS; SUBCUTANEOUS
Status: CANCELLED | OUTPATIENT
Start: 2021-11-30

## 2021-11-23 RX ORDER — NALOXONE HYDROCHLORIDE 0.4 MG/ML
0.2 INJECTION, SOLUTION INTRAMUSCULAR; INTRAVENOUS; SUBCUTANEOUS
Status: CANCELLED | OUTPATIENT
Start: 2021-11-23

## 2021-11-23 RX ORDER — DIPHENHYDRAMINE HYDROCHLORIDE 50 MG/ML
50 INJECTION INTRAMUSCULAR; INTRAVENOUS
Status: CANCELLED
Start: 2021-11-30

## 2021-11-23 RX ORDER — MEPERIDINE HYDROCHLORIDE 25 MG/ML
25 INJECTION INTRAMUSCULAR; INTRAVENOUS; SUBCUTANEOUS EVERY 30 MIN PRN
Status: CANCELLED | OUTPATIENT
Start: 2021-11-23

## 2021-11-23 RX ORDER — ALBUTEROL SULFATE 0.83 MG/ML
2.5 SOLUTION RESPIRATORY (INHALATION)
Status: CANCELLED | OUTPATIENT
Start: 2021-11-23

## 2021-11-23 RX ORDER — DIPHENHYDRAMINE HYDROCHLORIDE 50 MG/ML
50 INJECTION INTRAMUSCULAR; INTRAVENOUS
Status: CANCELLED
Start: 2021-11-23

## 2021-11-23 RX ADMIN — SODIUM CHLORIDE 250 ML: 9 INJECTION, SOLUTION INTRAVENOUS at 12:08

## 2021-11-23 RX ADMIN — FERRIC CARBOXYMALTOSE INJECTION 750 MG: 50 INJECTION, SOLUTION INTRAVENOUS at 12:25

## 2021-11-23 ASSESSMENT — PAIN SCALES - GENERAL: PAINLEVEL: NO PAIN (0)

## 2021-11-23 ASSESSMENT — MIFFLIN-ST. JEOR: SCORE: 956.98

## 2021-11-23 NOTE — PROGRESS NOTES
Infusion Nursing Note:  Gillian Burk presents today for Injectafer.    Patient seen by provider today: No   present during visit today: Not Applicable.    Note: Just complains of fatigue and weakness, not getting better with iron pills. Tolerated Injectafer well.      Intravenous Access:  Peripheral IV placed.    Treatment Conditions:  Not Applicable.      Post Infusion Assessment:  Patient tolerated infusion without incident.  Patient observed for 30 minutes post infusion per protocol.  Blood return noted pre and post infusion.  Site patent and intact, free from redness, edema or discomfort.  No evidence of extravasations.  Access discontinued per protocol.       Discharge Plan:   Discharge instructions reviewed with: Patient.  Patient and/or family verbalized understanding of discharge instructions and all questions answered.  Patient discharged in stable condition accompanied by: daughter.  Departure Mode: Ambulatory and walker.      Davina Rich RN

## 2021-11-29 DIAGNOSIS — E78.5 HYPERLIPIDEMIA WITH TARGET LDL LESS THAN 130: ICD-10-CM

## 2021-11-29 RX ORDER — ATORVASTATIN CALCIUM 40 MG/1
40 TABLET, FILM COATED ORAL DAILY
Qty: 90 TABLET | Refills: 0 | Status: SHIPPED | OUTPATIENT
Start: 2021-11-29 | End: 2022-02-17

## 2021-11-30 ENCOUNTER — MYC MEDICAL ADVICE (OUTPATIENT)
Dept: INTERNAL MEDICINE | Facility: CLINIC | Age: 86
End: 2021-11-30

## 2021-11-30 ENCOUNTER — INFUSION THERAPY VISIT (OUTPATIENT)
Dept: INFUSION THERAPY | Facility: CLINIC | Age: 86
End: 2021-11-30
Attending: INTERNAL MEDICINE
Payer: COMMERCIAL

## 2021-11-30 VITALS
HEART RATE: 77 BPM | SYSTOLIC BLOOD PRESSURE: 147 MMHG | WEIGHT: 118.3 LBS | RESPIRATION RATE: 20 BRPM | TEMPERATURE: 97.9 F | BODY MASS INDEX: 19.09 KG/M2 | DIASTOLIC BLOOD PRESSURE: 67 MMHG

## 2021-11-30 DIAGNOSIS — D64.9 ANEMIA, UNSPECIFIED TYPE: Primary | ICD-10-CM

## 2021-11-30 DIAGNOSIS — D50.0 IRON DEFICIENCY ANEMIA DUE TO CHRONIC BLOOD LOSS: Primary | ICD-10-CM

## 2021-11-30 PROCEDURE — 96365 THER/PROPH/DIAG IV INF INIT: CPT

## 2021-11-30 PROCEDURE — 250N000011 HC RX IP 250 OP 636: Performed by: INTERNAL MEDICINE

## 2021-11-30 PROCEDURE — 258N000003 HC RX IP 258 OP 636: Performed by: INTERNAL MEDICINE

## 2021-11-30 RX ORDER — EPINEPHRINE 1 MG/ML
0.3 INJECTION, SOLUTION INTRAMUSCULAR; SUBCUTANEOUS EVERY 5 MIN PRN
Status: CANCELLED | OUTPATIENT
Start: 2021-12-07

## 2021-11-30 RX ORDER — EPINEPHRINE 1 MG/ML
0.3 INJECTION, SOLUTION INTRAMUSCULAR; SUBCUTANEOUS EVERY 5 MIN PRN
Status: DISCONTINUED | OUTPATIENT
Start: 2021-11-30 | End: 2021-11-30 | Stop reason: HOSPADM

## 2021-11-30 RX ORDER — NALOXONE HYDROCHLORIDE 0.4 MG/ML
0.2 INJECTION, SOLUTION INTRAMUSCULAR; INTRAVENOUS; SUBCUTANEOUS
Status: DISCONTINUED | OUTPATIENT
Start: 2021-11-30 | End: 2021-11-30 | Stop reason: HOSPADM

## 2021-11-30 RX ORDER — ALBUTEROL SULFATE 0.83 MG/ML
2.5 SOLUTION RESPIRATORY (INHALATION)
Status: CANCELLED | OUTPATIENT
Start: 2021-12-07

## 2021-11-30 RX ORDER — METHYLPREDNISOLONE SODIUM SUCCINATE 125 MG/2ML
125 INJECTION, POWDER, LYOPHILIZED, FOR SOLUTION INTRAMUSCULAR; INTRAVENOUS
Status: CANCELLED
Start: 2021-12-07

## 2021-11-30 RX ORDER — MEPERIDINE HYDROCHLORIDE 25 MG/ML
25 INJECTION INTRAMUSCULAR; INTRAVENOUS; SUBCUTANEOUS EVERY 30 MIN PRN
Status: DISCONTINUED | OUTPATIENT
Start: 2021-11-30 | End: 2021-11-30 | Stop reason: HOSPADM

## 2021-11-30 RX ORDER — ALBUTEROL SULFATE 0.83 MG/ML
2.5 SOLUTION RESPIRATORY (INHALATION)
Status: DISCONTINUED | OUTPATIENT
Start: 2021-11-30 | End: 2021-11-30 | Stop reason: HOSPADM

## 2021-11-30 RX ORDER — METHYLPREDNISOLONE SODIUM SUCCINATE 125 MG/2ML
125 INJECTION, POWDER, LYOPHILIZED, FOR SOLUTION INTRAMUSCULAR; INTRAVENOUS
Status: DISCONTINUED | OUTPATIENT
Start: 2021-11-30 | End: 2021-11-30 | Stop reason: HOSPADM

## 2021-11-30 RX ORDER — ALBUTEROL SULFATE 90 UG/1
1-2 AEROSOL, METERED RESPIRATORY (INHALATION)
Status: DISCONTINUED | OUTPATIENT
Start: 2021-11-30 | End: 2021-11-30 | Stop reason: HOSPADM

## 2021-11-30 RX ORDER — MEPERIDINE HYDROCHLORIDE 25 MG/ML
25 INJECTION INTRAMUSCULAR; INTRAVENOUS; SUBCUTANEOUS EVERY 30 MIN PRN
Status: CANCELLED | OUTPATIENT
Start: 2021-12-07

## 2021-11-30 RX ORDER — NALOXONE HYDROCHLORIDE 0.4 MG/ML
0.2 INJECTION, SOLUTION INTRAMUSCULAR; INTRAVENOUS; SUBCUTANEOUS
Status: CANCELLED | OUTPATIENT
Start: 2021-12-07

## 2021-11-30 RX ORDER — DIPHENHYDRAMINE HYDROCHLORIDE 50 MG/ML
50 INJECTION INTRAMUSCULAR; INTRAVENOUS
Status: CANCELLED
Start: 2021-12-07

## 2021-11-30 RX ORDER — ALBUTEROL SULFATE 90 UG/1
1-2 AEROSOL, METERED RESPIRATORY (INHALATION)
Status: CANCELLED
Start: 2021-12-07

## 2021-11-30 RX ORDER — DIPHENHYDRAMINE HYDROCHLORIDE 50 MG/ML
50 INJECTION INTRAMUSCULAR; INTRAVENOUS
Status: DISCONTINUED | OUTPATIENT
Start: 2021-11-30 | End: 2021-11-30 | Stop reason: HOSPADM

## 2021-11-30 RX ADMIN — SODIUM CHLORIDE 250 ML: 9 INJECTION, SOLUTION INTRAVENOUS at 13:15

## 2021-11-30 RX ADMIN — FERRIC CARBOXYMALTOSE INJECTION 750 MG: 50 INJECTION, SOLUTION INTRAVENOUS at 13:30

## 2021-11-30 NOTE — PROGRESS NOTES
Infusion Nursing Note:  Gillian TIFFANY Burk presents today for injectafer.    Patient seen by provider today: No   present during visit today: Not Applicable.    Note: Patient here for injectafer infusion.  States that she thinks the last infusion gave her a little more energy.      Intravenous Access:  Peripheral IV placed.    Treatment Conditions:  Not Applicable.      Post Infusion Assessment:  Patient tolerated infusion without incident.  Patient observed for 15 minutes post injectafer per protocol.  Site patent and intact, free from redness, edema or discomfort.  No evidence of extravasations.  Access discontinued per protocol.       Discharge Plan:   Patient discharged in stable condition accompanied by: daughter.  Departure Mode: Wheelchair.  No current dates for her to return for infusion.  At this time the infusions have been completed.       Nguyen Vivas RN

## 2021-12-07 ENCOUNTER — OFFICE VISIT (OUTPATIENT)
Dept: CARDIOLOGY | Facility: CLINIC | Age: 86
End: 2021-12-07
Payer: COMMERCIAL

## 2021-12-07 ENCOUNTER — LAB (OUTPATIENT)
Dept: LAB | Facility: CLINIC | Age: 86
End: 2021-12-07
Payer: COMMERCIAL

## 2021-12-07 VITALS
WEIGHT: 118.7 LBS | OXYGEN SATURATION: 98 % | DIASTOLIC BLOOD PRESSURE: 70 MMHG | HEIGHT: 66 IN | SYSTOLIC BLOOD PRESSURE: 136 MMHG | HEART RATE: 74 BPM | BODY MASS INDEX: 19.08 KG/M2

## 2021-12-07 DIAGNOSIS — I25.10 CORONARY ARTERY DISEASE INVOLVING NATIVE CORONARY ARTERY OF NATIVE HEART WITHOUT ANGINA PECTORIS: ICD-10-CM

## 2021-12-07 DIAGNOSIS — E78.5 HYPERLIPIDEMIA WITH TARGET LDL LESS THAN 130: ICD-10-CM

## 2021-12-07 DIAGNOSIS — Z95.2 S/P TAVR (TRANSCATHETER AORTIC VALVE REPLACEMENT): ICD-10-CM

## 2021-12-07 DIAGNOSIS — I20.0 UNSTABLE ANGINA (H): ICD-10-CM

## 2021-12-07 DIAGNOSIS — I10 HYPERTENSION GOAL BP (BLOOD PRESSURE) < 140/90: ICD-10-CM

## 2021-12-07 DIAGNOSIS — Z86.73 HISTORY OF CVA (CEREBROVASCULAR ACCIDENT): ICD-10-CM

## 2021-12-07 DIAGNOSIS — R06.09 DOE (DYSPNEA ON EXERTION): ICD-10-CM

## 2021-12-07 DIAGNOSIS — J30.1 SEASONAL ALLERGIC RHINITIS DUE TO POLLEN: ICD-10-CM

## 2021-12-07 LAB
ANION GAP SERPL CALCULATED.3IONS-SCNC: 3 MMOL/L (ref 3–14)
BASOPHILS # BLD AUTO: 0.1 10E3/UL (ref 0–0.2)
BASOPHILS NFR BLD AUTO: 1 %
BUN SERPL-MCNC: 25 MG/DL (ref 7–30)
CALCIUM SERPL-MCNC: 7.9 MG/DL (ref 8.5–10.1)
CHLORIDE BLD-SCNC: 108 MMOL/L (ref 94–109)
CO2 SERPL-SCNC: 27 MMOL/L (ref 20–32)
CREAT SERPL-MCNC: 0.79 MG/DL (ref 0.52–1.04)
EOSINOPHIL # BLD AUTO: 0.2 10E3/UL (ref 0–0.7)
EOSINOPHIL NFR BLD AUTO: 2 %
ERYTHROCYTE [DISTWIDTH] IN BLOOD BY AUTOMATED COUNT: 20.2 % (ref 10–15)
GFR SERPL CREATININE-BSD FRML MDRD: 64 ML/MIN/1.73M2
GLUCOSE BLD-MCNC: 97 MG/DL (ref 70–99)
HCT VFR BLD AUTO: 30.2 % (ref 35–47)
HGB BLD-MCNC: 9.4 G/DL (ref 11.7–15.7)
IMM GRANULOCYTES # BLD: 0 10E3/UL
IMM GRANULOCYTES NFR BLD: 0 %
LYMPHOCYTES # BLD AUTO: 1.5 10E3/UL (ref 0.8–5.3)
LYMPHOCYTES NFR BLD AUTO: 22 %
MCH RBC QN AUTO: 30.7 PG (ref 26.5–33)
MCHC RBC AUTO-ENTMCNC: 31.1 G/DL (ref 31.5–36.5)
MCV RBC AUTO: 99 FL (ref 78–100)
MONOCYTES # BLD AUTO: 0.6 10E3/UL (ref 0–1.3)
MONOCYTES NFR BLD AUTO: 9 %
NEUTROPHILS # BLD AUTO: 4.4 10E3/UL (ref 1.6–8.3)
NEUTROPHILS NFR BLD AUTO: 66 %
NRBC # BLD AUTO: 0 10E3/UL
NRBC BLD AUTO-RTO: 0 /100
PLATELET # BLD AUTO: 252 10E3/UL (ref 150–450)
POTASSIUM BLD-SCNC: 3.9 MMOL/L (ref 3.4–5.3)
RBC # BLD AUTO: 3.06 10E6/UL (ref 3.8–5.2)
SODIUM SERPL-SCNC: 138 MMOL/L (ref 133–144)
WBC # BLD AUTO: 6.7 10E3/UL (ref 4–11)

## 2021-12-07 PROCEDURE — 99214 OFFICE O/P EST MOD 30 MIN: CPT | Performed by: INTERNAL MEDICINE

## 2021-12-07 PROCEDURE — 36415 COLL VENOUS BLD VENIPUNCTURE: CPT

## 2021-12-07 PROCEDURE — 80048 BASIC METABOLIC PNL TOTAL CA: CPT | Performed by: PHYSICIAN ASSISTANT

## 2021-12-07 PROCEDURE — 85025 COMPLETE CBC W/AUTO DIFF WBC: CPT

## 2021-12-07 RX ORDER — LISINOPRIL 5 MG/1
5 TABLET ORAL DAILY
Qty: 90 TABLET | Refills: 3 | Status: SHIPPED | OUTPATIENT
Start: 2021-12-07 | End: 2022-01-01

## 2021-12-07 RX ORDER — ISOSORBIDE MONONITRATE 60 MG/1
60 TABLET, EXTENDED RELEASE ORAL DAILY
Qty: 180 TABLET | Refills: 3 | Status: SHIPPED | OUTPATIENT
Start: 2021-12-07 | End: 2022-01-01

## 2021-12-07 ASSESSMENT — MIFFLIN-ST. JEOR: SCORE: 955.17

## 2021-12-07 NOTE — PROGRESS NOTES
Service Date: 12/07/2021    HISTORY OF PRESENT ILLNESS:  I had the opportunity to see Ms. Gillian Burk in Cardiology Clinic today for reevaluation of coronary artery disease, aortic valve disease and shortness of breath.      She is a 89-year-old woman with a history of aortic valve stenosis, who underwent transcatheter aortic valve replacement in 2016.  She then had some chest discomfort symptoms and was found to have a 70% to 80% mid LAD stenosis.  However, her chest discomfort improved with medical therapy and she elected not to return for angioplasty and stenting of that.      Over the course of the next year or so, she developed progressive shortness of breath.  When I saw her last in 02/2021, I referred her for an echocardiogram to rule out bioprosthetic valve thrombosis and her echo looked good.  Her left ventricular function was normal and her bioprosthetic valve was functioning well.  I then referred her back for angioplasty and stenting of her mid LAD, which was completed successfully in 02/2021.      Unfortunately, she did not feel much better.  Her shortness of breath did not improve much.  She had a persistent anemia as well, but her iron studies did not suggest severely low iron levels.  However, as her anemia worsened, and her hemoglobin dipped down to 7.4, her iron studies also showed more evidence of iron deficiency.  This fall, she has had iron infusions and now she tells me that she is feeling better with less shortness of breath.    Her most recent hemoglobin has come up from 7.7, now up to 9.4 today.  Her basic metabolic panel remains normal with normal creatinine and her lipids are excellent.    She is not experiencing any recurrent angina since her stenting procedure.  She is not having lightheadedness, syncope or falling episodes.    PHYSICAL EXAMINATION:  Her blood pressure is 136/70, heart rate 74 and weight 118 pounds.  Her lungs are clear.  Heart rhythm is regular.  She has a soft  systolic ejection murmur at the base.    IMPRESSIONS:  Ms. Lucio Burk is a 89-year-old woman with a history of transcatheter aortic valve replacement in 2016 with development of shortness of breath most recently that does not appear to be due to left ventricular dysfunction, congestive heart failure or bioprosthetic valve dysfunction.  Stenting of her mid LAD did not improve her shortness of breath much either.  I suspect that her anemia is the primary cause of her shortness of breath and that is improving after iron infusions.  Her hemoglobin is up to 9.4 after dipping as low as 7.4.    I suggested we could try to simplify her medication program.  She is on isosorbide mononitrate 60 mg p.o. b.i.d.  I will cut that down to once a day and increase her lisinopril from 2.5 mg to 5 mg daily.  She is a bit unsure about that change.  because she does not want to have her angina back again.  I reassured her that she does not have any significant residual coronary artery disease and angina and is now unlikely to recur after the stenting procedure.    We will have her follow up again in 6 months for reevaluation of these issues.    cc:   [place] [address]     Jones Lee MD, [facility]        D: 2021   T: 2021   MT: [id number]    Name:     LUCIO BURK  MRN:      [id number]        Account:      [id number]   :      1927           Service Date: 2021       Document: [id number]

## 2021-12-07 NOTE — PROGRESS NOTES
HPI and Plan:   See dictation    Today's clinic visit entailed:  Review of the result(s) of each unique test - bmp, flp, hgb, iron studies, echo, angiogram  Ordering of each unique test  Prescription drug management  30 minutes spent on the date of the encounter doing chart review, review of test results, interpretation of tests, patient visit and documentation   Provider  Link to [facility] Help Grid     The level of medical decision making during this visit was of moderate complexity.      Orders Placed This Encounter   Procedures     Basic metabolic panel     Basic metabolic panel     Lipid Profile     ALT     Follow-Up with Cardiac Advanced Practice Provider     Follow-Up with Cardiologist     Echocardiogram Complete       Orders Placed This Encounter   Medications     isosorbide mononitrate (IMDUR) 60 MG 24 hr tablet     Sig: Take 1 tablet (60 mg) by mouth daily     Dispense:  180 tablet     Refill:  3     lisinopril (ZESTRIL) 5 MG tablet     Sig: Take 1 tablet (5 mg) by mouth daily     Dispense:  90 tablet     Refill:  3       Medications Discontinued During This Encounter   Medication Reason     multivitamin w/minerals (THERA-VIT-M) tablet      folic acid (FOLVITE) 1 MG tablet      fluticasone (FLONASE) 50 MCG/ACT nasal spray      lisinopril (ZESTRIL) 2.5 MG tablet      isosorbide mononitrate (IMDUR) 60 MG 24 hr tablet          Encounter Diagnoses   Name Primary?     S/P TAVR (transcatheter aortic valve replacement)      Coronary artery disease involving native coronary artery of native heart without angina pectoris      Hypertension goal BP (blood pressure) < 140/90      History of CVA (cerebrovascular accident) - 2013      Hyperlipidemia with target LDL less than 130      Unstable angina (H)      CONTRERAS (dyspnea on exertion)        CURRENT MEDICATIONS:  Current Outpatient Medications   Medication Sig Dispense Refill     acetaminophen (TYLENOL ARTHRITIS PAIN) 650 MG CR tablet Take 1,300 mg by mouth every 8 hours as  needed for mild pain        amLODIPine (NORVASC) 5 MG tablet Take 1 tablet (5 mg) by mouth daily 90 tablet 0     aspirin EC 81 MG EC tablet Take 1 tablet (81 mg) by mouth daily       atorvastatin (LIPITOR) 40 MG tablet Take 1 tablet (40 mg) by mouth daily 90 tablet 0     calcium carbonate (TUMS) 500 MG chewable tablet Take 2-4 tablets (1,000-2,000 mg) by mouth as needed for heartburn       clopidogrel (PLAVIX) 75 MG tablet Take 1 tablet (75 mg) by mouth daily 90 tablet 0     Cranberry 1000 MG CAPS        docusate sodium (COLACE) 100 MG capsule Take 200 mg by mouth daily 2 capsules       EQ LORATADINE 10 MG tablet Take 1 tablet by mouth once daily 30 tablet 0     EUTHYROX 75 MCG tablet Take 1 tablet by mouth once daily 90 tablet 0     famotidine (PEPCID) 40 MG tablet TAKE 1 TABLET BY MOUTH AT BEDTIME 30 tablet 9     isosorbide mononitrate (IMDUR) 60 MG 24 hr tablet Take 1 tablet (60 mg) by mouth daily 180 tablet 3     Lactobacillus (FLORAJEN ACIDOPHILUS) CAPS Take 1 capsule by mouth daily       levETIRAcetam (KEPPRA) 500 MG tablet Take 1 tablet (500 mg) by mouth 2 times daily 270 tablet 1     lisinopril (ZESTRIL) 5 MG tablet Take 1 tablet (5 mg) by mouth daily 90 tablet 3     methotrexate 2.5 MG tablet Take 4 tablets (10 mg) by mouth once a week Wed 52 tablet 3     metoprolol succinate ER (TOPROL-XL) 50 MG 24 hr tablet Take 1 tablet (50 mg) by mouth 2 times daily 180 tablet 0     mirtazapine (REMERON) 30 MG tablet TAKE 1 TABLET BY MOUTH ONCE DAILY AT BEDTIME 90 tablet 1     nitroFURantoin macrocrystal (MACRODANTIN) 100 MG capsule Take 1 capsule (100 mg) by mouth At Bedtime 90 capsule 3     nitroFURantoin macrocrystal-monohydrate (MACROBID) 100 MG capsule Take 100 mg by mouth daily Takes for prevention of UTI       nitroGLYcerin (NITROSTAT) 0.4 MG sublingual tablet DISSOLVE ONE TABLET UNDER THE TONGUE EVERY 5 MINUTES AS NEEDED FOR CHEST PAIN.  DO NOT EXCEED A TOTAL OF 3 DOSES IN 15 MINUTES 25 tablet 0     predniSONE  (DELTASONE) 5 MG tablet TAKE ONE TABLET BY MOUTH EVERY OTHER DAY ALTERNATING  WITH  1/2  TABLET  EVERY  OTHER  DAY 23 tablet 13     RESTASIS 0.05 % ophthalmic emulsion INSTILL 1 DROP INTO LEFT EYE TWICE DAILY AS DIRECTED       SLOW  (45 Fe) MG CR tablet Take 1 tablet (142 mg) by mouth daily 90 tablet 1     ALPRAZolam (XANAX) 0.25 MG tablet Take 1 tablet by mouth twice daily as needed for anxiety (Patient not taking: Reported on 11/17/2021) 28 tablet 0       ALLERGIES     Allergies   Allergen Reactions     Penicillins Hives     Caffeine Other (See Comments) and Unknown     Triggers your Meniere's disease     Hydrocodone Other (See Comments) and Unknown     hallucinations     Ibuprofen GI Disturbance and Unknown     Other Environmental Allergy      Metals of unspecific kind     Tramadol Other (See Comments)     Seizure, was taking remeron along with tramadol     Metal [Staples] Rash       PAST MEDICAL HISTORY:  Past Medical History:   Diagnosis Date     Aortic stenosis     s/p TAVR 8/17/16     Chronic migraine      Hyperlipidaemia      Hypertension      Hypothyroidism      Myocardial infarction (H)      Need for SBE (subacute bacterial endocarditis) prophylaxis      Orthostatic hypotension     wears compression stockings     PUD (peptic ulcer disease)      Secondary Sjogren's syndrome (H)      Seizures (H)     after stroke (partial onset)     Seropositive rheumatoid arthritis (H)      Stroke (H) 05/2013    with visual field cut, left occipital lobe     Syncope 2014    due to orthostatic hypotension       PAST SURGICAL HISTORY:  Past Surgical History:   Procedure Laterality Date     ANOMALOUS PULMONARY VENOUS RETURN REPAIR, TOTAL       C TOTAL HIP ARTHROPLASTY Right 2010     C TOTAL HIP ARTHROPLASTY Left 2014     CATARACT EXTRACTION       CATARACT IOL, RT/LT Bilateral 2000     CV FLUOROSCOPY ONLY N/A 8/6/2019    Procedure: Fluoroscopy Only - valve cine done of aortic valve at 180 degrees ARI[place] to JUNG;  Surgeon:  Dave Farfan MD;  Location:  HEART CARDIAC CATH LAB     CV HEART CATHETERIZATION WITH POSSIBLE INTERVENTION N/A 2/10/2021    Procedure: Heart Catheterization with Possible Intervention;  Surgeon: Fish Padgett MD;  Location:  HEART CARDIAC CATH LAB     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     EYE SURGERY       HEART CATH FEMORAL CANNULIZATION WITH OPEN STANDBY REPAIR AORTIC VALVE N/A 8/3/2016    Procedure: HEART CATH FEMORAL CANNULIZATION WITH OPEN STANDBY REPAIR AORTIC VALVE;  Surgeon: Owen Schultz MD;  [facility]     HYSTERECTOMY       HYSTERECTOMY TOTAL ABDOMINAL  1970    ovaries out     MANDIBLE FRACTURE SURGERY       MOUTH SURGERY  2011    jaw surgery due to fracture     OTHER SURGICAL HISTORY      Treva valve implant     TOTAL HIP ARTHROPLASTY       TRANSCATHETER AORTIC VALVE IMPLANT ANESTHESIA N/A 8/3/2016    Procedure: TRANSCATHETER AORTIC VALVE IMPLANT ANESTHESIA;  Surgeon: GENERIC ANESTHESIA PROVIDER;  Location[facility]       FAMILY HISTORY:  Family History   Problem Relation Age of Onset     Hyperlipidemia Mother      Family History Negative No family hx of      Hypertension Mother      Rheumatoid Arthritis Child        SOCIAL HISTORY:  Social History     Socioeconomic History     Marital status:      Spouse name: Not on file     Number of children: 4     Years of education: Not on file     Highest education level: Not on file   Occupational History     Employer: RETIRED   Tobacco Use     Smoking status: Never Smoker     Smokeless tobacco: Never Used   Substance and Sexual Activity     Alcohol use: No     Alcohol/week: 0.0 standard drinks     Drug use: No     Sexual activity: Not Currently   Other Topics Concern     Parent/sibling w/ CABG, MI or angioplasty before 65F 55M? Not Asked      Service Not Asked     Blood Transfusions Not Asked     Caffeine Concern Not Asked     Occupational Exposure Not Asked     Hobby Hazards Not Asked     Sleep  "Concern Not Asked     Stress Concern Not Asked     Weight Concern Not Asked     Special Diet Yes     Comment: low salt      Back Care Not Asked     Exercise Yes     Comment: semi recument bike 15 mins daily      Bike Helmet Not Asked     Seat Belt Not Asked     Self-Exams Not Asked   Social History Narrative    .  Lives in assisted living. Independent. Has help with making bed and changing sheets.    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 .     4 children - 1 with RA     Social Determinants of Health     Financial Resource Strain: Not on file   Food Insecurity: Not on file   Transportation Needs: Not on file   Physical Activity: Not on file   Stress: Not on file   Social Connections: Not on file   Intimate Partner Violence: Not on file   Housing Stability: Not on file       Review of Systems:  Skin:  Negative       Eyes:  Positive for glasses    ENT:  Positive for hearing loss    Respiratory:  Positive for dyspnea on exertion since infusion breathing has been a little better   Cardiovascular:  Negative for;palpitations;chest pain;lightheadedness;dizziness Positive for;edema little swelling in feet  Gastroenterology: Negative      Genitourinary:  Negative      Musculoskeletal:  Negative      Neurologic:  Negative      Psychiatric:  Negative      Heme/Lymph/Imm:  Positive for allergies    Endocrine:  Negative        Physical Exam:  Vitals: /70 (BP Location: Right arm, Patient Position: Sitting, Cuff Size: Adult Regular)   Pulse 74   Ht 1.676 m (5' 6\")   Wt 53.8 kg (118 lb 11.2 oz)   SpO2 98%   BMI 19.16 kg/m      Constitutional:           Skin:             Head:           Eyes:           Lymph:      ENT:           Neck:           Respiratory:            Cardiac:                                                           GI:           Extremities and Muscular Skeletal:                 Neurological:           Psych:           CC  Matt Mccracken PAMarekC  [address] [place] [address]

## 2021-12-07 NOTE — LETTER
12/7/2021       RE: Gillian Burk  [address]  [place] [address]     Dear Colleague,    Thank you for referring your patient, Gillian Burk, to the [facility]. Please see a copy of my visit note below.    HPI and Plan:   See dictation    Today's clinic visit entailed:  Review of the result(s) of each unique test - bmp, flp, hgb, iron studies, echo, angiogram  Ordering of each unique test  Prescription drug management  30 minutes spent on the date of the encounter doing chart review, review of test results, interpretation of tests, patient visit and documentation   Provider  Link to [facility] Help Grid     The level of medical decision making during this visit was of moderate complexity.      Orders Placed This Encounter   Procedures     Basic metabolic panel     Basic metabolic panel     Lipid Profile     ALT     Follow-Up with Cardiac Advanced Practice Provider     Follow-Up with Cardiologist     Echocardiogram Complete       Orders Placed This Encounter   Medications     isosorbide mononitrate (IMDUR) 60 MG 24 hr tablet     Sig: Take 1 tablet (60 mg) by mouth daily     Dispense:  180 tablet     Refill:  3     lisinopril (ZESTRIL) 5 MG tablet     Sig: Take 1 tablet (5 mg) by mouth daily     Dispense:  90 tablet     Refill:  3       Medications Discontinued During This Encounter   Medication Reason     multivitamin w/minerals (THERA-VIT-M) tablet      folic acid (FOLVITE) 1 MG tablet      fluticasone (FLONASE) 50 MCG/ACT nasal spray      lisinopril (ZESTRIL) 2.5 MG tablet      isosorbide mononitrate (IMDUR) 60 MG 24 hr tablet          Encounter Diagnoses   Name Primary?     S/P TAVR (transcatheter aortic valve replacement)      Coronary artery disease involving native coronary artery of native heart without angina pectoris      Hypertension goal BP (blood pressure) < 140/90      History of CVA  (cerebrovascular accident) - 2013      Hyperlipidemia with target LDL less than 130      Unstable angina (H)      CONTRERAS (dyspnea on exertion)        CURRENT MEDICATIONS:  Current Outpatient Medications   Medication Sig Dispense Refill     acetaminophen (TYLENOL ARTHRITIS PAIN) 650 MG CR tablet Take 1,300 mg by mouth every 8 hours as needed for mild pain        amLODIPine (NORVASC) 5 MG tablet Take 1 tablet (5 mg) by mouth daily 90 tablet 0     aspirin EC 81 MG EC tablet Take 1 tablet (81 mg) by mouth daily       atorvastatin (LIPITOR) 40 MG tablet Take 1 tablet (40 mg) by mouth daily 90 tablet 0     calcium carbonate (TUMS) 500 MG chewable tablet Take 2-4 tablets (1,000-2,000 mg) by mouth as needed for heartburn       clopidogrel (PLAVIX) 75 MG tablet Take 1 tablet (75 mg) by mouth daily 90 tablet 0     Cranberry 1000 MG CAPS        docusate sodium (COLACE) 100 MG capsule Take 200 mg by mouth daily 2 capsules       EQ LORATADINE 10 MG tablet Take 1 tablet by mouth once daily 30 tablet 0     EUTHYROX 75 MCG tablet Take 1 tablet by mouth once daily 90 tablet 0     famotidine (PEPCID) 40 MG tablet TAKE 1 TABLET BY MOUTH AT BEDTIME 30 tablet 9     isosorbide mononitrate (IMDUR) 60 MG 24 hr tablet Take 1 tablet (60 mg) by mouth daily 180 tablet 3     Lactobacillus (FLORAJEN ACIDOPHILUS) CAPS Take 1 capsule by mouth daily       levETIRAcetam (KEPPRA) 500 MG tablet Take 1 tablet (500 mg) by mouth 2 times daily 270 tablet 1     lisinopril (ZESTRIL) 5 MG tablet Take 1 tablet (5 mg) by mouth daily 90 tablet 3     methotrexate 2.5 MG tablet Take 4 tablets (10 mg) by mouth once a week Wed 52 tablet 3     metoprolol succinate ER (TOPROL-XL) 50 MG 24 hr tablet Take 1 tablet (50 mg) by mouth 2 times daily 180 tablet 0     mirtazapine (REMERON) 30 MG tablet TAKE 1 TABLET BY MOUTH ONCE DAILY AT BEDTIME 90 tablet 1     nitroFURantoin macrocrystal (MACRODANTIN) 100 MG capsule Take 1 capsule (100 mg) by mouth At Bedtime 90 capsule 3      nitroFURantoin macrocrystal-monohydrate (MACROBID) 100 MG capsule Take 100 mg by mouth daily Takes for prevention of UTI       nitroGLYcerin (NITROSTAT) 0.4 MG sublingual tablet DISSOLVE ONE TABLET UNDER THE TONGUE EVERY 5 MINUTES AS NEEDED FOR CHEST PAIN.  DO NOT EXCEED A TOTAL OF 3 DOSES IN 15 MINUTES 25 tablet 0     predniSONE (DELTASONE) 5 MG tablet TAKE ONE TABLET BY MOUTH EVERY OTHER DAY ALTERNATING  WITH  1/2  TABLET  EVERY  OTHER  DAY 23 tablet 13     RESTASIS 0.05 % ophthalmic emulsion INSTILL 1 DROP INTO LEFT EYE TWICE DAILY AS DIRECTED       SLOW  (45 Fe) MG CR tablet Take 1 tablet (142 mg) by mouth daily 90 tablet 1     ALPRAZolam (XANAX) 0.25 MG tablet Take 1 tablet by mouth twice daily as needed for anxiety (Patient not taking: Reported on 11/17/2021) 28 tablet 0       ALLERGIES     Allergies   Allergen Reactions     Penicillins Hives     Caffeine Other (See Comments) and Unknown     Triggers your Meniere's disease     Hydrocodone Other (See Comments) and Unknown     hallucinations     Ibuprofen GI Disturbance and Unknown     Other Environmental Allergy      Metals of unspecific kind     Tramadol Other (See Comments)     Seizure, was taking remeron along with tramadol     Metal [Staples] Rash       PAST MEDICAL HISTORY:  Past Medical History:   Diagnosis Date     Aortic stenosis     s/p TAVR 8/17/16     Chronic migraine      Hyperlipidaemia      Hypertension      Hypothyroidism      Myocardial infarction (H)      Need for SBE (subacute bacterial endocarditis) prophylaxis      Orthostatic hypotension     wears compression stockings     PUD (peptic ulcer disease)      Secondary Sjogren's syndrome (H)      Seizures (H)     after stroke (partial onset)     Seropositive rheumatoid arthritis (H)      Stroke (H) 05/2013    with visual field cut, left occipital lobe     Syncope 2014    due to orthostatic hypotension       PAST SURGICAL HISTORY:  Past Surgical History:   Procedure Laterality Date      ANOMALOUS PULMONARY VENOUS RETURN REPAIR, TOTAL       C TOTAL HIP ARTHROPLASTY Right 2010     C TOTAL HIP ARTHROPLASTY Left 2014     CATARACT EXTRACTION       CATARACT IOL, RT/LT Bilateral 2000     CV FLUOROSCOPY ONLY N/A 8/6/2019    Procedure: Fluoroscopy Only - valve cine done of aortic valve at 180 degrees [place] to JUNG;  Surgeon: Dave Farfan MD;  Location:  HEART CARDIAC CATH LAB     CV HEART CATHETERIZATION WITH POSSIBLE INTERVENTION N/A 2/10/2021    Procedure: Heart Catheterization with Possible Intervention;  Surgeon: Fish Padgett MD;  Location:  HEART CARDIAC CATH LAB     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     EYE SURGERY       HEART CATH FEMORAL CANNULIZATION WITH OPEN STANDBY REPAIR AORTIC VALVE N/A 8/3/2016    Procedure: HEART CATH FEMORAL CANNULIZATION WITH OPEN STANDBY REPAIR AORTIC VALVE;  Surgeon: Owen Schultz MD;  [facility]     HYSTERECTOMY       HYSTERECTOMY TOTAL ABDOMINAL  1970    ovaries out     MANDIBLE FRACTURE SURGERY       MOUTH SURGERY  2011    jaw surgery due to fracture     OTHER SURGICAL HISTORY      Treva valve implant     TOTAL HIP ARTHROPLASTY       TRANSCATHETER AORTIC VALVE IMPLANT ANESTHESIA N/A 8/3/2016    Procedure: TRANSCATHETER AORTIC VALVE IMPLANT ANESTHESIA;  Surgeon: GENERIC ANESTHESIA PROVIDER;  Location:  OR       FAMILY HISTORY:  Family History   Problem Relation Age of Onset     Hyperlipidemia Mother      Family History Negative No family hx of      Hypertension Mother      Rheumatoid Arthritis Child        SOCIAL HISTORY:  Social History     Socioeconomic History     Marital status:      Spouse name: Not on file     Number of children: 4     Years of education: Not on file     Highest education level: Not on file   Occupational History     Employer: RETIRED   Tobacco Use     Smoking status: Never Smoker     Smokeless tobacco: Never Used   Substance and Sexual Activity     Alcohol use: No     Alcohol/week: 0.0  "standard drinks     Drug use: No     Sexual activity: Not Currently   Other Topics Concern     Parent/sibling w/ CABG, MI or angioplasty before 65F 55M? Not Asked      Service Not Asked     Blood Transfusions Not Asked     Caffeine Concern Not Asked     Occupational Exposure Not Asked     Hobby Hazards Not Asked     Sleep Concern Not Asked     Stress Concern Not Asked     Weight Concern Not Asked     Special Diet Yes     Comment: low salt      Back Care Not Asked     Exercise Yes     Comment: semi recument bike 15 mins daily      Bike Helmet Not Asked     Seat Belt Not Asked     Self-Exams Not Asked   Social History Narrative    .  Lives in assisted living. Independent. Has help with making bed and changing sheets.    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 .     4 children - 1 with RA     Social Determinants of Health     Financial Resource Strain: Not on file   Food Insecurity: Not on file   Transportation Needs: Not on file   Physical Activity: Not on file   Stress: Not on file   Social Connections: Not on file   Intimate Partner Violence: Not on file   Housing Stability: Not on file       Review of Systems:  Skin:  Negative       Eyes:  Positive for glasses    ENT:  Positive for hearing loss    Respiratory:  Positive for dyspnea on exertion since infusion breathing has been a little better   Cardiovascular:  Negative for;palpitations;chest pain;lightheadedness;dizziness Positive for;edema little swelling in feet  Gastroenterology: Negative      Genitourinary:  Negative      Musculoskeletal:  Negative      Neurologic:  Negative      Psychiatric:  Negative      Heme/Lymph/Imm:  Positive for allergies    Endocrine:  Negative        Physical Exam:  Vitals: /70 (BP Location: Right arm, Patient Position: Sitting, Cuff Size: Adult Regular)   Pulse 74   Ht 1.676 m (5' 6\")   Wt 53.8 kg (118 lb 11.2 oz)   SpO2 98%   BMI 19.16 kg/m      Constitutional:           Skin:       "       Head:           Eyes:           Lymph:      ENT:           Neck:           Respiratory:            Cardiac:                                                           GI:           Extremities and Muscular Skeletal:                 Neurological:           Psych:           CC  Matt Mccracken PA-C  [address] [place] [address]                Service Date: 12/07/2021    HISTORY OF PRESENT ILLNESS:  I had the opportunity to see Ms. Gillian Burk in Cardiology Clinic today for reevaluation of coronary artery disease, aortic valve disease and shortness of breath.      She is a 89-year-old woman with a history of aortic valve stenosis, who underwent transcatheter aortic valve replacement in 2016.  She then had some chest discomfort symptoms and was found to have a 70% to 80% mid LAD stenosis.  However, her chest discomfort improved with medical therapy and she elected not to return for angioplasty and stenting of that.      Over the course of the next year or so, she developed progressive shortness of breath.  When I saw her last in 02/2021, I referred her for an echocardiogram to rule out bioprosthetic valve thrombosis and her echo looked good.  Her left ventricular function was normal and her bioprosthetic valve was functioning well.  I then referred her back for angioplasty and stenting of her mid LAD, which was completed successfully in 02/2021.      Unfortunately, she did not feel much better.  Her shortness of breath did not improve much.  She had a persistent anemia as well, but her iron studies did not suggest severely low iron levels.  However, as her anemia worsened, and her hemoglobin dipped down to 7.4, her iron studies also showed more evidence of iron deficiency.  This fall, she has had iron infusions and now she tells me that she is feeling better with less shortness of breath.    Her most recent hemoglobin has come up from 7.7, now up to 9.4 today.  Her basic metabolic panel remains  normal with normal creatinine and her lipids are excellent.    She is not experiencing any recurrent angina since her stenting procedure.  She is not having lightheadedness, syncope or falling episodes.    PHYSICAL EXAMINATION:  Her blood pressure is 136/70, heart rate 74 and weight 118 pounds.  Her lungs are clear.  Heart rhythm is regular.  She has a soft systolic ejection murmur at the base.    IMPRESSIONS:  Ms. Lucio Burk is a 89-year-old woman with a history of transcatheter aortic valve replacement in 2016 with development of shortness of breath most recently that does not appear to be due to left ventricular dysfunction, congestive heart failure or bioprosthetic valve dysfunction.  Stenting of her mid LAD did not improve her shortness of breath much either.  I suspect that her anemia is the primary cause of her shortness of breath and that is improving after iron infusions.  Her hemoglobin is up to 9.4 after dipping as low as 7.4.    I suggested we could try to simplify her medication program.  She is on isosorbide mononitrate 60 mg p.o. b.i.d.  I will cut that down to once a day and increase her lisinopril from 2.5 mg to 5 mg daily.  She is a bit unsure about that change.  because she does not want to have her angina back again.  I reassured her that she does not have any significant residual coronary artery disease and angina and is now unlikely to recur after the stenting procedure.    We will have her follow up again in 6 months for reevaluation of these issues.    cc:   [place] [address]       Jones Lee MD, [facility]        D: 2021   T: 2021   MT: [id number]    Name:     LUCIO BURK  MRN:      [id number]        Account:      [id number]   :      1927           Service Date: 2021       Document: [id number]

## 2021-12-08 NOTE — TELEPHONE ENCOUNTER
Routing refill request to provider for review/approval because:  Patient over age 64      Rickey Carver,RN

## 2021-12-14 DIAGNOSIS — I25.10 CORONARY ARTERY DISEASE INVOLVING NATIVE CORONARY ARTERY OF NATIVE HEART WITHOUT ANGINA PECTORIS: ICD-10-CM

## 2021-12-14 DIAGNOSIS — F41.9 ANXIETY: ICD-10-CM

## 2021-12-14 DIAGNOSIS — R07.9 CHEST PAIN, UNSPECIFIED TYPE: ICD-10-CM

## 2021-12-14 RX ORDER — AMLODIPINE BESYLATE 5 MG/1
5 TABLET ORAL DAILY
Qty: 90 TABLET | Refills: 3 | Status: SHIPPED | OUTPATIENT
Start: 2021-12-14 | End: 2023-01-01

## 2021-12-16 RX ORDER — ALPRAZOLAM 0.25 MG
TABLET ORAL
Qty: 28 TABLET | Refills: 0 | Status: SHIPPED | OUTPATIENT
Start: 2021-12-16 | End: 2022-01-01

## 2021-12-16 RX ORDER — NITROGLYCERIN 0.4 MG/1
TABLET SUBLINGUAL
Qty: 25 TABLET | Refills: 0 | Status: SHIPPED | OUTPATIENT
Start: 2021-12-16 | End: 2022-01-01

## 2021-12-16 NOTE — TELEPHONE ENCOUNTER
Xanax      Last Written Prescription Date:  9/8/2021  Last Fill Quantity: 28,   # refills: 0  Last Office Visit: 11/3/2021  Future Office visit:       Routing refill request to provider for review/approval because:  Drug not on the [facility], [facility] or [facility] refill protocol or controlled substance

## 2021-12-27 ENCOUNTER — MYC MEDICAL ADVICE (OUTPATIENT)
Dept: INTERNAL MEDICINE | Facility: CLINIC | Age: 86
End: 2021-12-27
Payer: COMMERCIAL

## 2021-12-27 DIAGNOSIS — N30.00 ACUTE CYSTITIS WITHOUT HEMATURIA: Primary | ICD-10-CM

## 2021-12-28 ENCOUNTER — TELEPHONE (OUTPATIENT)
Dept: INTERNAL MEDICINE | Facility: CLINIC | Age: 86
End: 2021-12-28
Payer: COMMERCIAL

## 2021-12-28 RX ORDER — SULFAMETHOXAZOLE/TRIMETHOPRIM 800-160 MG
1 TABLET ORAL 2 TIMES DAILY
Qty: 14 TABLET | Refills: 0 | Status: SHIPPED | OUTPATIENT
Start: 2021-12-28 | End: 2022-06-21

## 2021-12-28 NOTE — TELEPHONE ENCOUNTER
Pharmacy calling from [place] as patient is visiting daughter and getting medication filled there, pharmacy inquiring if patient is still on methotrexate. Update pharmacy that med hasn't been ordered sine 2016.    Rickey Carver RN

## 2022-01-01 ENCOUNTER — NURSE TRIAGE (OUTPATIENT)
Dept: INTERNAL MEDICINE | Facility: CLINIC | Age: 87
End: 2022-01-01

## 2022-01-01 ENCOUNTER — VIRTUAL VISIT (OUTPATIENT)
Dept: UROLOGY | Facility: CLINIC | Age: 87
End: 2022-01-01
Payer: COMMERCIAL

## 2022-01-01 ENCOUNTER — HOSPITAL ENCOUNTER (OUTPATIENT)
Dept: GENERAL RADIOLOGY | Facility: CLINIC | Age: 87
Discharge: HOME | End: 2022-12-30
Attending: FAMILY MEDICINE | Admitting: FAMILY MEDICINE
Payer: COMMERCIAL

## 2022-01-01 ENCOUNTER — OFFICE VISIT (OUTPATIENT)
Dept: FAMILY MEDICINE | Facility: CLINIC | Age: 87
End: 2022-01-01
Payer: COMMERCIAL

## 2022-01-01 ENCOUNTER — OFFICE VISIT (OUTPATIENT)
Dept: CARDIOLOGY | Facility: CLINIC | Age: 87
End: 2022-01-01
Attending: PHYSICIAN ASSISTANT
Payer: COMMERCIAL

## 2022-01-01 VITALS
HEART RATE: 96 BPM | SYSTOLIC BLOOD PRESSURE: 130 MMHG | OXYGEN SATURATION: 96 % | DIASTOLIC BLOOD PRESSURE: 66 MMHG | TEMPERATURE: 100.1 F

## 2022-01-01 VITALS
WEIGHT: 108.9 LBS | BODY MASS INDEX: 17.5 KG/M2 | DIASTOLIC BLOOD PRESSURE: 60 MMHG | OXYGEN SATURATION: 95 % | HEIGHT: 66 IN | HEART RATE: 80 BPM | SYSTOLIC BLOOD PRESSURE: 128 MMHG

## 2022-01-01 DIAGNOSIS — I25.10 CORONARY ARTERY DISEASE INVOLVING NATIVE CORONARY ARTERY OF NATIVE HEART WITHOUT ANGINA PECTORIS: ICD-10-CM

## 2022-01-01 DIAGNOSIS — R06.09 DOE (DYSPNEA ON EXERTION): ICD-10-CM

## 2022-01-01 DIAGNOSIS — J18.9 COMMUNITY ACQUIRED PNEUMONIA, UNSPECIFIED LATERALITY: ICD-10-CM

## 2022-01-01 DIAGNOSIS — I10 HYPERTENSION GOAL BP (BLOOD PRESSURE) < 140/90: ICD-10-CM

## 2022-01-01 DIAGNOSIS — Z95.2 S/P TAVR (TRANSCATHETER AORTIC VALVE REPLACEMENT): ICD-10-CM

## 2022-01-01 DIAGNOSIS — N30.20 CHRONIC CYSTITIS: ICD-10-CM

## 2022-01-01 DIAGNOSIS — J30.1 SEASONAL ALLERGIC RHINITIS DUE TO POLLEN: ICD-10-CM

## 2022-01-01 DIAGNOSIS — R07.89 ATYPICAL CHEST PAIN: ICD-10-CM

## 2022-01-01 DIAGNOSIS — N18.31 STAGE 3A CHRONIC KIDNEY DISEASE (H): Primary | ICD-10-CM

## 2022-01-01 DIAGNOSIS — F41.9 ANXIETY: ICD-10-CM

## 2022-01-01 DIAGNOSIS — I20.0 UNSTABLE ANGINA (H): ICD-10-CM

## 2022-01-01 DIAGNOSIS — E78.5 HYPERLIPIDEMIA WITH TARGET LDL LESS THAN 130: Primary | ICD-10-CM

## 2022-01-01 DIAGNOSIS — G47.09 SLEEP INITIATION DISORDER: ICD-10-CM

## 2022-01-01 LAB
ANION GAP SERPL CALCULATED.3IONS-SCNC: 7 MMOL/L (ref 3–14)
BASOPHILS # BLD AUTO: 0.1 10E3/UL (ref 0–0.2)
BASOPHILS NFR BLD AUTO: 1 %
BUN SERPL-MCNC: 21 MG/DL (ref 7–30)
CALCIUM SERPL-MCNC: 8 MG/DL (ref 8.5–10.1)
CHLORIDE BLD-SCNC: 102 MMOL/L (ref 94–109)
CO2 SERPL-SCNC: 28 MMOL/L (ref 20–32)
CREAT SERPL-MCNC: 0.64 MG/DL (ref 0.52–1.04)
EOSINOPHIL # BLD AUTO: 0 10E3/UL (ref 0–0.7)
EOSINOPHIL NFR BLD AUTO: 0 %
ERYTHROCYTE [DISTWIDTH] IN BLOOD BY AUTOMATED COUNT: 17.4 % (ref 10–15)
GFR SERPL CREATININE-BSD FRML MDRD: 81 ML/MIN/1.73M2
GLUCOSE BLD-MCNC: 105 MG/DL (ref 70–99)
HCT VFR BLD AUTO: 34.6 % (ref 35–47)
HGB BLD-MCNC: 10.8 G/DL (ref 11.7–15.7)
IMM GRANULOCYTES # BLD: 0.1 10E3/UL
IMM GRANULOCYTES NFR BLD: 0 %
LYMPHOCYTES # BLD AUTO: 2.8 10E3/UL (ref 0.8–5.3)
LYMPHOCYTES NFR BLD AUTO: 21 %
MCH RBC QN AUTO: 28.2 PG (ref 26.5–33)
MCHC RBC AUTO-ENTMCNC: 31.2 G/DL (ref 31.5–36.5)
MCV RBC AUTO: 90 FL (ref 78–100)
MONOCYTES # BLD AUTO: 1 10E3/UL (ref 0–1.3)
MONOCYTES NFR BLD AUTO: 7 %
NEUTROPHILS # BLD AUTO: 9.6 10E3/UL (ref 1.6–8.3)
NEUTROPHILS NFR BLD AUTO: 71 %
NRBC # BLD AUTO: 0 10E3/UL
NRBC BLD AUTO-RTO: 0 /100
PLATELET # BLD AUTO: 378 10E3/UL (ref 150–450)
POTASSIUM BLD-SCNC: 3.4 MMOL/L (ref 3.4–5.3)
RBC # BLD AUTO: 3.83 10E6/UL (ref 3.8–5.2)
SODIUM SERPL-SCNC: 137 MMOL/L (ref 133–144)
WBC # BLD AUTO: 13.6 10E3/UL (ref 4–11)

## 2022-01-01 PROCEDURE — 99214 OFFICE O/P EST MOD 30 MIN: CPT | Performed by: PHYSICIAN ASSISTANT

## 2022-01-01 PROCEDURE — 36415 COLL VENOUS BLD VENIPUNCTURE: CPT | Performed by: FAMILY MEDICINE

## 2022-01-01 PROCEDURE — 71046 X-RAY EXAM CHEST 2 VIEWS: CPT

## 2022-01-01 PROCEDURE — 99214 OFFICE O/P EST MOD 30 MIN: CPT | Performed by: FAMILY MEDICINE

## 2022-01-01 PROCEDURE — 99212 OFFICE O/P EST SF 10 MIN: CPT | Mod: 95 | Performed by: UROLOGY

## 2022-01-01 PROCEDURE — 85025 COMPLETE CBC W/AUTO DIFF WBC: CPT | Performed by: FAMILY MEDICINE

## 2022-01-01 PROCEDURE — 80048 BASIC METABOLIC PNL TOTAL CA: CPT | Performed by: FAMILY MEDICINE

## 2022-01-01 RX ORDER — LORATADINE 10 MG/1
TABLET ORAL
Qty: 30 TABLET | Refills: 0 | Status: SHIPPED | OUTPATIENT
Start: 2022-01-01 | End: 2022-01-01

## 2022-01-01 RX ORDER — LISINOPRIL 5 MG/1
5 TABLET ORAL DAILY
Qty: 90 TABLET | Refills: 3 | Status: ON HOLD | OUTPATIENT
Start: 2022-01-01 | End: 2023-01-01

## 2022-01-01 RX ORDER — NITROFURANTOIN MACROCRYSTAL 100 MG
100 CAPSULE ORAL AT BEDTIME
Qty: 90 CAPSULE | Refills: 3 | Status: ON HOLD | OUTPATIENT
Start: 2022-01-01 | End: 2023-01-01

## 2022-01-01 RX ORDER — LEVOFLOXACIN 500 MG/1
500 TABLET, FILM COATED ORAL DAILY
Qty: 14 TABLET | Refills: 0 | Status: SHIPPED | OUTPATIENT
Start: 2022-01-01 | End: 2023-01-01

## 2022-01-01 RX ORDER — ALPRAZOLAM 0.25 MG
TABLET ORAL
Qty: 28 TABLET | Refills: 0 | Status: SHIPPED | OUTPATIENT
Start: 2022-01-01 | End: 2023-01-01

## 2022-01-01 RX ORDER — MIRTAZAPINE 30 MG/1
TABLET, FILM COATED ORAL
Qty: 90 TABLET | Refills: 1 | Status: SHIPPED | OUTPATIENT
Start: 2022-01-01 | End: 2023-01-01

## 2022-01-01 RX ORDER — LORATADINE 10 MG/1
TABLET ORAL
Qty: 30 TABLET | Refills: 0 | Status: SHIPPED | OUTPATIENT
Start: 2022-01-01 | End: 2023-01-01

## 2022-01-01 RX ORDER — NITROGLYCERIN 0.4 MG/1
TABLET SUBLINGUAL
Qty: 25 TABLET | Refills: 0 | Status: SHIPPED | OUTPATIENT
Start: 2022-01-01 | End: 2023-01-01

## 2022-01-01 RX ORDER — ISOSORBIDE MONONITRATE 60 MG/1
60 TABLET, EXTENDED RELEASE ORAL DAILY
Qty: 180 TABLET | Refills: 3 | Status: SHIPPED | OUTPATIENT
Start: 2022-01-01 | End: 2023-01-01

## 2022-01-01 ASSESSMENT — ENCOUNTER SYMPTOMS: COUGH: 1

## 2022-01-04 DIAGNOSIS — J30.1 SEASONAL ALLERGIC RHINITIS DUE TO POLLEN: ICD-10-CM

## 2022-01-05 ENCOUNTER — MYC MEDICAL ADVICE (OUTPATIENT)
Dept: INTERNAL MEDICINE | Facility: CLINIC | Age: 87
End: 2022-01-05
Payer: COMMERCIAL

## 2022-01-05 DIAGNOSIS — R07.9 CHEST PAIN, UNSPECIFIED TYPE: ICD-10-CM

## 2022-01-05 DIAGNOSIS — N30.20 CHRONIC CYSTITIS: ICD-10-CM

## 2022-01-05 DIAGNOSIS — G40.909 SEIZURE DISORDER (H): Primary | ICD-10-CM

## 2022-01-05 DIAGNOSIS — Z86.73 HISTORY OF CVA (CEREBROVASCULAR ACCIDENT): ICD-10-CM

## 2022-01-05 RX ORDER — METOPROLOL SUCCINATE 50 MG/1
50 TABLET, EXTENDED RELEASE ORAL 2 TIMES DAILY
Qty: 180 TABLET | Refills: 1 | Status: SHIPPED | OUTPATIENT
Start: 2022-01-05 | End: 2022-07-08

## 2022-01-05 RX ORDER — CLOPIDOGREL BISULFATE 75 MG/1
75 TABLET ORAL DAILY
Qty: 90 TABLET | Refills: 1 | Status: SHIPPED | OUTPATIENT
Start: 2022-01-05 | End: 2022-07-22

## 2022-01-05 NOTE — TELEPHONE ENCOUNTER
EQ loratadine  Routing refill request to provider for review/approval because:  Drug not on the [facility] refill protocol - AGE    Erica Cuadra RN

## 2022-01-17 ENCOUNTER — OFFICE VISIT (OUTPATIENT)
Dept: FAMILY MEDICINE | Facility: CLINIC | Age: 87
End: 2022-01-17
Payer: COMMERCIAL

## 2022-01-17 ENCOUNTER — LAB (OUTPATIENT)
Dept: LAB | Facility: CLINIC | Age: 87
End: 2022-01-17
Payer: COMMERCIAL

## 2022-01-17 VITALS
DIASTOLIC BLOOD PRESSURE: 62 MMHG | SYSTOLIC BLOOD PRESSURE: 120 MMHG | HEART RATE: 87 BPM | TEMPERATURE: 97.7 F | OXYGEN SATURATION: 97 %

## 2022-01-17 DIAGNOSIS — M79.18 LUMBAR MUSCLE PAIN: Primary | ICD-10-CM

## 2022-01-17 DIAGNOSIS — D64.9 ANEMIA, UNSPECIFIED TYPE: ICD-10-CM

## 2022-01-17 DIAGNOSIS — G40.909 SEIZURE DISORDER (H): ICD-10-CM

## 2022-01-17 LAB
BASOPHILS # BLD AUTO: 0.1 10E3/UL (ref 0–0.2)
BASOPHILS NFR BLD AUTO: 1 %
EOSINOPHIL # BLD AUTO: 0 10E3/UL (ref 0–0.7)
EOSINOPHIL NFR BLD AUTO: 0 %
ERYTHROCYTE [DISTWIDTH] IN BLOOD BY AUTOMATED COUNT: 17.9 % (ref 10–15)
HCT VFR BLD AUTO: 31.3 % (ref 35–47)
HGB BLD-MCNC: 10.2 G/DL (ref 11.7–15.7)
IMM GRANULOCYTES # BLD: 0 10E3/UL
IMM GRANULOCYTES NFR BLD: 0 %
LYMPHOCYTES # BLD AUTO: 1.5 10E3/UL (ref 0.8–5.3)
LYMPHOCYTES NFR BLD AUTO: 17 %
MCH RBC QN AUTO: 31.4 PG (ref 26.5–33)
MCHC RBC AUTO-ENTMCNC: 32.6 G/DL (ref 31.5–36.5)
MCV RBC AUTO: 96 FL (ref 78–100)
MONOCYTES # BLD AUTO: 0.3 10E3/UL (ref 0–1.3)
MONOCYTES NFR BLD AUTO: 4 %
NEUTROPHILS # BLD AUTO: 7.1 10E3/UL (ref 1.6–8.3)
NEUTROPHILS NFR BLD AUTO: 78 %
NRBC # BLD AUTO: 0 10E3/UL
NRBC BLD AUTO-RTO: 0 /100
PLATELET # BLD AUTO: 288 10E3/UL (ref 150–450)
RBC # BLD AUTO: 3.25 10E6/UL (ref 3.8–5.2)
WBC # BLD AUTO: 9.1 10E3/UL (ref 4–11)

## 2022-01-17 PROCEDURE — 99000 SPECIMEN HANDLING OFFICE-LAB: CPT

## 2022-01-17 PROCEDURE — 80177 DRUG SCRN QUAN LEVETIRACETAM: CPT | Mod: 90

## 2022-01-17 PROCEDURE — 99213 OFFICE O/P EST LOW 20 MIN: CPT | Performed by: NURSE PRACTITIONER

## 2022-01-17 PROCEDURE — 85025 COMPLETE CBC W/AUTO DIFF WBC: CPT

## 2022-01-17 PROCEDURE — 36415 COLL VENOUS BLD VENIPUNCTURE: CPT

## 2022-01-17 ASSESSMENT — PAIN SCALES - GENERAL: PAINLEVEL: EXTREME PAIN (8)

## 2022-01-17 ASSESSMENT — ENCOUNTER SYMPTOMS: BACK PAIN: 1

## 2022-01-17 NOTE — PATIENT INSTRUCTIONS
Continue to use heat every 2-3 hours for about 10 minutes and tylenol as you have been as needed .    Follow up with physical therapy for muscle strain lumbar area     If no improvement in 6 weeks return to clinic for follow up back pain.     Thank you  Sandra Brumfield CNP

## 2022-01-17 NOTE — PROGRESS NOTES
Assessment & Plan     Lumbar muscle pain  Recommend physical therapy for lumbar strain and muscle tension.  May also benefit from strengthening exercises.  See AVS for further home treatment plan.  Reviewed with both patient and daughter both verbalized understanding.  - Physical Therapy Referral; Future             Patient Instructions   Continue to use heat every 2-3 hours for about 10 minutes and tylenol as you have been as needed .    Follow up with physical therapy for muscle strain lumbar area     If no improvement in 6 weeks return to clinic for follow up back pain.     Thank you  Sandra Brumfield CNP        Return in about 6 weeks (around 2/28/2022).    ADRIÁN López CNP  [facility] GIANNA French is a 89 year old who presents for the following health issues  accompanied by her daughter.    Back Pain          Pain History:  When did you first notice your pain? - Less than 1 week   Have you seen anyone else for your pain? No  Where in your body do you have pain? Back Pain  Onset/Duration: Tuesday  Description:   Location of pain: low back bilateral  Character of pain: stabbing and waxing and waning  Pain radiation: none  New numbness or weakness in legs, not attributed to pain: no   Intensity: Currently 8/10  Progression of Symptoms: improving  History:   Specific cause: none  Pain interferes with job: not applicable  History of back problems: recurrent self limited episodes of low back pain in the past  Any previous MRI or X-rays: Yes- at [place].  Date 3/20/19  Sees a specialist for back pain: No  Alleviating factors:   Improved by: acetaminophen (Tylenol), cold and rest    Precipitating factors:  Worsened by: Walking  Therapies tried and outcome: acetaminophen (Tylenol) and cold    Accompanying Signs & Symptoms:  Risk of Fracture: None  Risk of Cauda Equina: None  Risk of Infection: None  Risk of Cancer: None  Risk of Ankylosing Spondylitis: Onset at age <35,  male, AND morning back stiffness  no     Review of Systems   Musculoskeletal: Positive for back pain.      Tylenol is somewhat helpful. Heat has also been helpful.       Constitutional, HEENT, cardiovascular, pulmonary, GI, , musculoskeletal, neuro, skin, endocrine and psych systems are negative, except as otherwise noted.      Objective    /62   Pulse 87   Temp 97.7  F (36.5  C) (Temporal)   SpO2 97%   There is no height or weight on file to calculate BMI.  Physical Exam   GENERAL: healthy, alert and no distress  NECK: no adenopathy, no asymmetry, masses, or scars and thyroid normal to palpation  RESP: lungs clear to auscultation - no rales, rhonchi or wheezes  CV: regular rate and rhythm, normal S1 S2, no S3 or S4, no murmur, click or rub, no peripheral edema and peripheral pulses strong  ABDOMEN: soft, nontender, no hepatosplenomegaly, no masses and bowel sounds normal  MS: no gross musculoskeletal defects noted, no edema  MS: Mild pain with palpation lumbar bilateral left greater than right.  SKIN: no suspicious lesions or rashes  NEURO: Normal strength and tone, mentation intact and speech normal  PSYCH: mentation appears normal, affect normal/bright

## 2022-01-18 DIAGNOSIS — G40.909 SEIZURE DISORDER (H): ICD-10-CM

## 2022-01-18 RX ORDER — LEVETIRACETAM 500 MG/1
TABLET ORAL
Qty: 270 TABLET | Refills: 0 | Status: SHIPPED | OUTPATIENT
Start: 2022-01-18 | End: 2022-03-29

## 2022-01-18 NOTE — TELEPHONE ENCOUNTER
Pending Prescriptions:                       Disp   Refills    levETIRAcetam (KEPPRA) 500 MG tablet [Phar*270 ta*0        Sig: TAKE 1 TABLET BY MOUTH ONCE DAILY THEN 2 AT BEDTIME    Routing refill request to provider for review/approval because:  Drug not on the [facility] refill protocol

## 2022-01-19 ENCOUNTER — HOSPITAL ENCOUNTER (OUTPATIENT)
Dept: PHYSICAL THERAPY | Facility: CLINIC | Age: 87
Setting detail: THERAPIES SERIES
End: 2022-01-19
Attending: NURSE PRACTITIONER
Payer: COMMERCIAL

## 2022-01-19 DIAGNOSIS — M79.18 LUMBAR MUSCLE PAIN: ICD-10-CM

## 2022-01-19 PROCEDURE — 97014 ELECTRIC STIMULATION THERAPY: CPT | Mod: GP | Performed by: PHYSICAL THERAPIST

## 2022-01-19 PROCEDURE — 97161 PT EVAL LOW COMPLEX 20 MIN: CPT | Mod: GP | Performed by: PHYSICAL THERAPIST

## 2022-01-19 PROCEDURE — 97140 MANUAL THERAPY 1/> REGIONS: CPT | Mod: GP | Performed by: PHYSICAL THERAPIST

## 2022-01-19 PROCEDURE — 97010 HOT OR COLD PACKS THERAPY: CPT | Mod: GP | Performed by: PHYSICAL THERAPIST

## 2022-01-20 LAB — LEVETIRACETAM SERPL-MCNC: 21 UG/ML

## 2022-01-23 ENCOUNTER — HEALTH MAINTENANCE LETTER (OUTPATIENT)
Age: 87
End: 2022-01-23

## 2022-01-28 ENCOUNTER — HOSPITAL ENCOUNTER (OUTPATIENT)
Dept: PHYSICAL THERAPY | Facility: CLINIC | Age: 87
Setting detail: THERAPIES SERIES
End: 2022-01-28
Attending: NURSE PRACTITIONER
Payer: COMMERCIAL

## 2022-01-28 PROCEDURE — 97014 ELECTRIC STIMULATION THERAPY: CPT | Mod: GP | Performed by: PHYSICAL THERAPIST

## 2022-01-28 PROCEDURE — 97530 THERAPEUTIC ACTIVITIES: CPT | Mod: GP | Performed by: PHYSICAL THERAPIST

## 2022-01-28 PROCEDURE — 97140 MANUAL THERAPY 1/> REGIONS: CPT | Mod: GP | Performed by: PHYSICAL THERAPIST

## 2022-01-31 ENCOUNTER — HOSPITAL ENCOUNTER (OUTPATIENT)
Dept: PHYSICAL THERAPY | Facility: CLINIC | Age: 87
Setting detail: THERAPIES SERIES
End: 2022-01-31
Attending: NURSE PRACTITIONER
Payer: COMMERCIAL

## 2022-01-31 PROCEDURE — 97140 MANUAL THERAPY 1/> REGIONS: CPT | Mod: GP | Performed by: PHYSICAL THERAPIST

## 2022-01-31 PROCEDURE — 97014 ELECTRIC STIMULATION THERAPY: CPT | Mod: GP | Performed by: PHYSICAL THERAPIST

## 2022-01-31 PROCEDURE — 97530 THERAPEUTIC ACTIVITIES: CPT | Mod: GP | Performed by: PHYSICAL THERAPIST

## 2022-01-31 NOTE — PROGRESS NOTES
[facility]    Outpatient Physical Therapy Discharge Note  Patient: Gillian Burk  : 3/19/1927    Beginning/End Dates of Reporting Period:  22 to 22    Referring Provider: Sandra Brumfield APRN CNP    Therapy Diagnosis: mm spasms     Client Self Report: Feeling better.  Time has helped the most.  Able to ly propped up inbed last night. I will try to ly flat tonight.     Objective Measurements:  Objective Measure: sx's  Details: none right now.  Still fearful to try lying on flat mat or even propped on mat in PT.       Goals:  Goals  Goal Identifier: sleep in bed  Goal Description: Gillian will be able to sleep in bed again (at eval she was sleeping in recliner.)  Target Date: 22  Progress:  She laid in bed 22 and plans to try sleeping there tonight.     Goal Identifier: walk/transfer  Goal Description: Gillian will be able to independently transfer and walk with AD without fear of pain causing fall or limitations.  Target Date: 22  Progress: Gillian is taking steps without the walker confidently and feels she will continue to progress.    Plan:  Discharge from therapy but pt can call in next month or so to reopen if not fully resolved.     Discharge:    Reason for Discharge: Patient has met all goals and is getting better with just time.      Equipment Issued: .    Discharge Plan: Patient to continue home program.

## 2022-02-01 NOTE — PROGRESS NOTES
[facility]    OUTPATIENT PHYSICAL THERAPY ORTHOPEDIC EVALUATION  PLAN OF TREATMENT FOR OUTPATIENT REHABILITATION  (COMPLETE FOR INITIAL CLAIMS ONLY)  Patient's Last Name, First Name, M.I.  YOB: 1927  Gillian Burk    Provider s Name:  [facility]   Medical Record No.  [id number]   Start of Care Date:  01/19/22   Onset Date:  01/11/22   Type:     _X__PT   ___OT   ___SLP Medical Diagnosis:  Lumbar muscle pain     PT Diagnosis:  mm spasms   Visits from SOC:  1      _________________________________________________________________________________  Plan of Treatment/Functional Goals:  manual therapy,neuromuscular re-education     Electrical stimulation  IFC  Goals  Goal Identifier: sleep in bed  Goal Description: Gillian will be able to sleep in bed again (at eval she was sleeping in recliner.)  Target Date: 02/18/22    Goal Identifier: walk/transfer  Goal Description: Gillian will be able to independently transfer and walk with AD without fear of pain causing fall or limitations.  Target Date: 02/18/22                   Therapy Frequency:  1 time/week  Predicted Duration of Therapy Intervention:  4 weeks    Melissa Grant, PT                 I CERTIFY THE NEED FOR THESE SERVICES FURNISHED UNDER        THIS PLAN OF TREATMENT AND WHILE UNDER MY CARE     (Physician co-signature of this document indicates review and certification of the therapy plan).                       Certification Date From:  01/19/22   Certification Date To:  02/18/22    Referring Provider:  Sandra Brumfield APRN CNP    Initial Assessment        See Epic Evaluation Start of Care Date: 01/19/22

## 2022-02-01 NOTE — PROGRESS NOTES
"   01/19/22 1300   General Information   Type of Visit Initial OP Ortho PT Evaluation   Start of Care Date 01/19/22   Referring Physician Sandra Brumfield APRN CNP   Patient/Family Goals Statement not have pain, be able to walk without walker again.     Orders Evaluate and Treat   Date of Order 01/17/22   Certification Required? Yes   Medical Diagnosis Lumbar muscle pain   Surgical/Medical history reviewed Yes   Precautions/Limitations no known precautions/limitations   Body Part(s)   Body Part(s) Lumbar Spine/SI   Presentation and Etiology   Pertinent history of current problem (include personal factors and/or comorbidities that impact the POC) 1/11/22 pain in lumbar area just started without indcident, staying about the same in irritation level.  \"I have learned to watch my movements and go slow\" to help it more.  Sitting down it doesn't hurt (in recliner), but hurts more standing. She was not using walker or cane in apartment but uses walker now in the apartment.     Impairments A. Pain   Functional Limitations perform activities of daily living;perform required work activities;perform desired leisure / sports activities   Symptom Location left side hand print below waist.   How/Where did it occur From insidious onset   Onset date of current episode/exacerbation 01/11/22   Pain rating (0-10 point scale) Best (/10);Worst (/10);Other   Pain rating comment says 8/10, \"only a little bit sitting here\", \"bad when not thinking about moving.\"   Described as \"intense\"  and shooting at times.     Pain/symptoms eased by F. Certain positions   Prior Level of Function   Prior Level of Function-Mobility no AD    Prior Level of Function-ADLs independent, lives in A-living    Current Level of Function   Current Community Support Family/friend caregiver   Fall Risk Screen   Fall screen completed by PT   Have you fallen 2 or more times in the past year? No   Have you fallen and had an injury in the past year? No   Is " patient a fall risk? No   Abuse Screen (yes response referral indicated)   Feels Unsafe at Home or Work/School no   Feels Threatened by Someone no   Does Anyone Try to Keep You From Having Contact with Others or Doing Things Outside Your Home? no   Physical Signs of Abuse Present no   Lumbar Spine/SI Objective Findings   Observation comes intransport chair, rounded spine and pain with straightening   Posture severe kyphosis   Gait/Locomotion severely limited to a couple steps with assist d/t pain   Lumbar ROM Comment not testing ROM as any motion causes increased pain   Pelvic Screen level in sitting   Neurological Testing Comments pain with leg moitns--NT MMT   Palpation mm spsasm palpable in lumbar region   Planned Therapy Interventions   Planned Therapy Interventions manual therapy;neuromuscular re-education   Planned Modality Interventions   Planned Modality Interventions Electrical stimulation   Planned Modality Interventions Comments IFC   Clinical Impression   Criteria for Skilled Therapeutic Interventions Met yes, treatment indicated   PT Diagnosis mm spasms   Influenced by the following impairments pain   Functional limitations due to impairments walking, transfers, standing   Clinical Presentation Stable/Uncomplicated   Clinical Decision Making (Complexity) Low complexity   Therapy Frequency 1 time/week   Predicted Duration of Therapy Intervention (days/wks) 4 weeks   Risk & Benefits of therapy have been explained Yes   Patient, Family & other staff in agreement with plan of care Yes   Clinical Impression Comments Acute mm spsam in lumbar causing pain and severly guarded in motions   Education Assessment   Preferred Learning Style Listening   Barriers to Learning No barriers   ORTHO GOALS   PT Ortho Eval Goals 1;2   Ortho Goal 1   Goal Identifier sleep in bed   Goal Description Gillian will be able to sleep in bed again (at eval she was sleeping in recliner.)   Target Date 02/18/22   Ortho Goal 2   Goal  Identifier walk/transfer   Goal Description Gillian will be able to independently transfer and walk with AD without fear of pain causing fall or limitations.   Target Date 02/18/22   Total Evaluation Time   PT Eval, Low Complexity Minutes (54502) 20   Therapy Certification   Certification date from 01/19/22   Certification date to 02/18/22   Medical Diagnosis Lumbar muscle pain

## 2022-02-08 DIAGNOSIS — E03.4 HYPOTHYROIDISM DUE TO ACQUIRED ATROPHY OF THYROID: ICD-10-CM

## 2022-02-10 RX ORDER — LEVOTHYROXINE SODIUM 75 UG/1
TABLET ORAL
Qty: 90 TABLET | Refills: 0 | Status: SHIPPED | OUTPATIENT
Start: 2022-02-10 | End: 2022-05-11

## 2022-02-10 NOTE — TELEPHONE ENCOUNTER
Routing refill request to provider for review/approval because:  Labs not current:  TSH      Rickey Carver RN

## 2022-02-17 DIAGNOSIS — E78.5 HYPERLIPIDEMIA WITH TARGET LDL LESS THAN 130: ICD-10-CM

## 2022-02-17 DIAGNOSIS — E78.5 HYPERLIPIDEMIA WITH TARGET LDL LESS THAN 130: Primary | ICD-10-CM

## 2022-02-17 RX ORDER — ATORVASTATIN CALCIUM 40 MG/1
40 TABLET, FILM COATED ORAL DAILY
Qty: 90 TABLET | Refills: 1 | Status: SHIPPED | OUTPATIENT
Start: 2022-02-17 | End: 2022-09-08

## 2022-03-01 NOTE — PROGRESS NOTES
Patient presented today with a request for a Synvisc 3 series of injections.  She has had a Synvisc 1 injection which worked very well but for only a short time.  She understood that Synvisc 3 may work longer.    Her x-ray was briefly reviewed.    She was accompanied by her daughter.    We thought it was appropriate to proceed.    After risks and benefits were explained and questions answered, the patient agreed to undergo the recommended proceedure .     Using aseptic technique the right  Knee  lateral infrapatellar: joint space was infiltrated with a 3 mL Synvisc and 5cc of 1% Lidocaine without.  There were no complications and the patient tolerated the procedure well.      She will return in 1 week for repeat injection.   Cartilage Graft Text: The defect edges were debeveled with a #15 scalpel blade.  Given the location of the defect, shape of the defect, the fact the defect involved a full thickness cartilage defect a cartilage graft was deemed most appropriate.  An appropriate donor site was identified, cleansed, and anesthetized. The cartilage graft was then harvested and transferred to the recipient site, oriented appropriately and then sutured into place.  The secondary defect was then repaired using a primary closure.

## 2022-03-07 ENCOUNTER — MYC MEDICAL ADVICE (OUTPATIENT)
Dept: INTERNAL MEDICINE | Facility: CLINIC | Age: 87
End: 2022-03-07
Payer: COMMERCIAL

## 2022-03-08 DIAGNOSIS — D50.0 IRON DEFICIENCY ANEMIA DUE TO CHRONIC BLOOD LOSS: ICD-10-CM

## 2022-03-09 ENCOUNTER — ALLIED HEALTH/NURSE VISIT (OUTPATIENT)
Dept: FAMILY MEDICINE | Facility: CLINIC | Age: 87
End: 2022-03-09
Payer: COMMERCIAL

## 2022-03-09 ENCOUNTER — LAB (OUTPATIENT)
Dept: LAB | Facility: CLINIC | Age: 87
End: 2022-03-09
Payer: COMMERCIAL

## 2022-03-09 DIAGNOSIS — E78.5 HYPERLIPIDEMIA WITH TARGET LDL LESS THAN 130: ICD-10-CM

## 2022-03-09 DIAGNOSIS — Z23 ENCOUNTER FOR IMMUNIZATION: Primary | ICD-10-CM

## 2022-03-09 LAB
ALT SERPL W P-5'-P-CCNC: 14 U/L (ref 0–50)
CHOLEST SERPL-MCNC: 131 MG/DL
FASTING STATUS PATIENT QL REPORTED: NO
HDLC SERPL-MCNC: 50 MG/DL
LDLC SERPL CALC-MCNC: 61 MG/DL
NONHDLC SERPL-MCNC: 81 MG/DL
TRIGL SERPL-MCNC: 102 MG/DL

## 2022-03-09 PROCEDURE — G0008 ADMIN INFLUENZA VIRUS VAC: HCPCS

## 2022-03-09 PROCEDURE — 84460 ALANINE AMINO (ALT) (SGPT): CPT | Performed by: INTERNAL MEDICINE

## 2022-03-09 PROCEDURE — 36415 COLL VENOUS BLD VENIPUNCTURE: CPT

## 2022-03-09 PROCEDURE — 99207 PR NO CHARGE NURSE ONLY: CPT

## 2022-03-09 PROCEDURE — 80061 LIPID PANEL: CPT | Performed by: INTERNAL MEDICINE

## 2022-03-09 PROCEDURE — 90662 IIV NO PRSV INCREASED AG IM: CPT

## 2022-03-09 NOTE — TELEPHONE ENCOUNTER
Slow FE  Prescription approved per [facility] Refill Protocol.    Erica Cuadra RN

## 2022-03-29 DIAGNOSIS — G40.909 SEIZURE DISORDER (H): ICD-10-CM

## 2022-03-29 DIAGNOSIS — G47.09 SLEEP INITIATION DISORDER: ICD-10-CM

## 2022-03-29 RX ORDER — MIRTAZAPINE 30 MG/1
TABLET, FILM COATED ORAL
Qty: 90 TABLET | Refills: 1 | Status: SHIPPED | OUTPATIENT
Start: 2022-03-29 | End: 2022-01-01

## 2022-03-29 NOTE — TELEPHONE ENCOUNTER
remeron  Prescription approved per [facility] Refill Protocol.    keppra  Routing refill request to provider for review/approval because:  Drug not on the [facility] refill protocol     Erica Cuadra RN

## 2022-03-29 NOTE — TELEPHONE ENCOUNTER
Reason for Call:  Medication or medication refill:    Do you use a [facility]?  Name of the pharmacy and phone number for the current request:  [facility] [place]    Name of the medication requested: Keppra, Remeron    Other request:     Can we leave a detailed message on this number? YES    Phone number patient can be reached at: Cell number on file:    Telephone Information:   Mobile [phone]       Best Time: any    Call taken on 3/29/2022 at 11:33 AM by Anaya Horton

## 2022-03-30 NOTE — TELEPHONE ENCOUNTER
[place], [place] calling on the status of patients refill request. Patient is in [place] and is needing medication filled. Informed request was received yesterday and has been routed to the provider to review. Mariposa Amaya LPN

## 2022-03-31 RX ORDER — LEVETIRACETAM 500 MG/1
TABLET ORAL
Qty: 270 TABLET | Refills: 0 | Status: SHIPPED | OUTPATIENT
Start: 2022-03-31 | End: 2022-07-05

## 2022-04-12 ENCOUNTER — LAB (OUTPATIENT)
Dept: LAB | Facility: CLINIC | Age: 87
End: 2022-04-12
Payer: COMMERCIAL

## 2022-04-12 DIAGNOSIS — D64.9 ANEMIA, UNSPECIFIED TYPE: ICD-10-CM

## 2022-04-12 LAB
BASOPHILS # BLD AUTO: 0.1 10E3/UL (ref 0–0.2)
BASOPHILS NFR BLD AUTO: 1 %
EOSINOPHIL # BLD AUTO: 0.2 10E3/UL (ref 0–0.7)
EOSINOPHIL NFR BLD AUTO: 2 %
ERYTHROCYTE [DISTWIDTH] IN BLOOD BY AUTOMATED COUNT: 17.6 % (ref 10–15)
HCT VFR BLD AUTO: 31.8 % (ref 35–47)
HGB BLD-MCNC: 10.1 G/DL (ref 11.7–15.7)
IMM GRANULOCYTES # BLD: 0 10E3/UL
IMM GRANULOCYTES NFR BLD: 0 %
LYMPHOCYTES # BLD AUTO: 1.2 10E3/UL (ref 0.8–5.3)
LYMPHOCYTES NFR BLD AUTO: 18 %
MCH RBC QN AUTO: 29.8 PG (ref 26.5–33)
MCHC RBC AUTO-ENTMCNC: 31.8 G/DL (ref 31.5–36.5)
MCV RBC AUTO: 94 FL (ref 78–100)
MONOCYTES # BLD AUTO: 0.6 10E3/UL (ref 0–1.3)
MONOCYTES NFR BLD AUTO: 8 %
NEUTROPHILS # BLD AUTO: 4.7 10E3/UL (ref 1.6–8.3)
NEUTROPHILS NFR BLD AUTO: 71 %
NRBC # BLD AUTO: 0 10E3/UL
NRBC BLD AUTO-RTO: 0 /100
PLATELET # BLD AUTO: 255 10E3/UL (ref 150–450)
RBC # BLD AUTO: 3.39 10E6/UL (ref 3.8–5.2)
WBC # BLD AUTO: 6.7 10E3/UL (ref 4–11)

## 2022-04-12 PROCEDURE — 85025 COMPLETE CBC W/AUTO DIFF WBC: CPT

## 2022-04-12 PROCEDURE — 36415 COLL VENOUS BLD VENIPUNCTURE: CPT

## 2022-05-10 DIAGNOSIS — E03.4 HYPOTHYROIDISM DUE TO ACQUIRED ATROPHY OF THYROID: ICD-10-CM

## 2022-05-11 RX ORDER — LEVOTHYROXINE SODIUM 75 UG/1
TABLET ORAL
Qty: 90 TABLET | Refills: 0 | Status: SHIPPED | OUTPATIENT
Start: 2022-05-11 | End: 2022-08-16

## 2022-05-11 NOTE — TELEPHONE ENCOUNTER
Pending Prescriptions:                       Disp   Refills    EUTHYROX 75 MCG tablet [Pharmacy Med Name:*90 tab*0        Sig: Take 1 tablet by mouth once daily    Routing refill request to provider for review/approval because:  Labs not current:  TSH    Erica Cuadra RN

## 2022-05-24 ENCOUNTER — OFFICE VISIT (OUTPATIENT)
Dept: CARDIOLOGY | Facility: CLINIC | Age: 87
End: 2022-05-24
Attending: INTERNAL MEDICINE
Payer: COMMERCIAL

## 2022-05-24 VITALS
HEIGHT: 66 IN | OXYGEN SATURATION: 94 % | SYSTOLIC BLOOD PRESSURE: 112 MMHG | BODY MASS INDEX: 17.49 KG/M2 | DIASTOLIC BLOOD PRESSURE: 58 MMHG | WEIGHT: 108.8 LBS | HEART RATE: 78 BPM

## 2022-05-24 DIAGNOSIS — I25.10 CORONARY ARTERY DISEASE INVOLVING NATIVE CORONARY ARTERY OF NATIVE HEART WITHOUT ANGINA PECTORIS: ICD-10-CM

## 2022-05-24 DIAGNOSIS — J30.1 SEASONAL ALLERGIC RHINITIS DUE TO POLLEN: ICD-10-CM

## 2022-05-24 DIAGNOSIS — I10 HYPERTENSION GOAL BP (BLOOD PRESSURE) < 140/90: ICD-10-CM

## 2022-05-24 DIAGNOSIS — D64.9 ANEMIA, UNSPECIFIED TYPE: ICD-10-CM

## 2022-05-24 LAB
ANION GAP SERPL CALCULATED.3IONS-SCNC: 5 MMOL/L (ref 3–14)
BASOPHILS # BLD AUTO: 0.1 10E3/UL (ref 0–0.2)
BASOPHILS NFR BLD AUTO: 1 %
BUN SERPL-MCNC: 23 MG/DL (ref 7–30)
CALCIUM SERPL-MCNC: 8 MG/DL (ref 8.5–10.1)
CHLORIDE BLD-SCNC: 106 MMOL/L (ref 94–109)
CO2 SERPL-SCNC: 27 MMOL/L (ref 20–32)
CREAT SERPL-MCNC: 0.92 MG/DL (ref 0.52–1.04)
EOSINOPHIL # BLD AUTO: 0.1 10E3/UL (ref 0–0.7)
EOSINOPHIL NFR BLD AUTO: 1 %
ERYTHROCYTE [DISTWIDTH] IN BLOOD BY AUTOMATED COUNT: 17.8 % (ref 10–15)
GFR SERPL CREATININE-BSD FRML MDRD: 57 ML/MIN/1.73M2
GLUCOSE BLD-MCNC: 108 MG/DL (ref 70–99)
HCT VFR BLD AUTO: 34.2 % (ref 35–47)
HGB BLD-MCNC: 10.7 G/DL (ref 11.7–15.7)
IMM GRANULOCYTES # BLD: 0.1 10E3/UL
IMM GRANULOCYTES NFR BLD: 1 %
LYMPHOCYTES # BLD AUTO: 2.1 10E3/UL (ref 0.8–5.3)
LYMPHOCYTES NFR BLD AUTO: 23 %
MCH RBC QN AUTO: 29.3 PG (ref 26.5–33)
MCHC RBC AUTO-ENTMCNC: 31.3 G/DL (ref 31.5–36.5)
MCV RBC AUTO: 94 FL (ref 78–100)
MONOCYTES # BLD AUTO: 0.4 10E3/UL (ref 0–1.3)
MONOCYTES NFR BLD AUTO: 4 %
NEUTROPHILS # BLD AUTO: 6.7 10E3/UL (ref 1.6–8.3)
NEUTROPHILS NFR BLD AUTO: 70 %
NRBC # BLD AUTO: 0 10E3/UL
NRBC BLD AUTO-RTO: 0 /100
PLATELET # BLD AUTO: 247 10E3/UL (ref 150–450)
POTASSIUM BLD-SCNC: 3.8 MMOL/L (ref 3.4–5.3)
RBC # BLD AUTO: 3.65 10E6/UL (ref 3.8–5.2)
SODIUM SERPL-SCNC: 138 MMOL/L (ref 133–144)
WBC # BLD AUTO: 9.4 10E3/UL (ref 4–11)

## 2022-05-24 PROCEDURE — 80048 BASIC METABOLIC PNL TOTAL CA: CPT | Performed by: PHYSICIAN ASSISTANT

## 2022-05-24 PROCEDURE — 99214 OFFICE O/P EST MOD 30 MIN: CPT | Performed by: PHYSICIAN ASSISTANT

## 2022-05-24 PROCEDURE — 36415 COLL VENOUS BLD VENIPUNCTURE: CPT | Performed by: PHYSICIAN ASSISTANT

## 2022-05-24 PROCEDURE — 85025 COMPLETE CBC W/AUTO DIFF WBC: CPT | Performed by: PHYSICIAN ASSISTANT

## 2022-05-24 NOTE — LETTER
5/24/2022    Augusto Meyer, DO  [address] [place] [address]    RE: Gillian Burk       Dear Colleague,     I had the pleasure of seeing Gillian Burk in the [facility].  Primary Cardiologist: Dr. Lee    Reason for Visit: 6 month follow up    History of Present Illness:   Heike is a very pleasant 89-year-old female with past medical history is notable for coronary artery disease (coronary angiogram revealed 60-70% hemodynamically significant mid to distal LAD lesion, due to contrast dye load she was recommended to return for staged intervention of this lesion.  She however had resolution of her symptoms with the addition of amlodipine and therefore staged intervention was not performed; she recently had recurrent symptoms and was referred for coronary angiogram and had PCI of LAD; on DAPT), history of severe aortic valve stenosis (status post TAVR in 8/2016 ([place] scientific Treva Valve), most recent echocardiogram shows normal gradients and no evidence of significant AI), ischemic cardiomyopathy (EF 35-40%, switch to long-acting beta-blocker during TAVR follow-up visit, not on ACE inhibitor), history of CVA in 2013 (on chronic Plavix therapy), anemia (receiving Iron infusions regularly; stable 9-10 with no recent hx of acute bleeding issues), hypertension, hyperlipidemia, GERD, known left bundle branch block, and hypothyroidism.    She returns to clinic with her daughter stating she is doing well. She has no CP, SOB, peripheral edema, orthopnea, or bleeding issues.     Assessment and Plan:  Heike is a very pleasant 89-year-old female with past medical history is notable for coronary artery disease (coronary angiogram revealed 60-70% hemodynamically significant mid to distal LAD lesion, due to contrast dye load she was recommended to return for staged intervention of this lesion.  She however had resolution of her symptoms with the addition of amlodipine and  therefore staged intervention was not performed; she recently had recurrent symptoms and was referred for coronary angiogram and had PCI of LAD; on DAPT), history of severe aortic valve stenosis (status post TAVR in 8/2016 ([place] scientific Treva Valve), most recent echocardiogram shows normal gradients and no evidence of significant AI), ischemic cardiomyopathy (EF 35-40%, switch to long-acting beta-blocker during TAVR follow-up visit, not on ACE inhibitor), history of CVA in 2013 (on chronic Plavix therapy), anemia (receiving Iron infusions regularly; stable 9-10 with no recent hx of acute bleeding issues), hypertension, hyperlipidemia, GERD, known left bundle branch block, and hypothyroidism.    She is doing well from a cardiac standpoint. We will continue with current medications with no changes today. She will return to clinic in 6 months.     This note was completed in part using Dragon voice recognition software. Although reviewed after completion, some word and grammatical errors may occur.    Orders this Visit:  Orders Placed This Encounter   Procedures     CBC with platelets and differential     No orders of the defined types were placed in this encounter.    There are no discontinued medications.      Encounter Diagnoses   Name Primary?     Coronary artery disease involving native coronary artery of native heart without angina pectoris      Anemia, unspecified type      Hypertension goal BP (blood pressure) < 140/90        CURRENT MEDICATIONS:  Current Outpatient Medications   Medication Sig Dispense Refill     acetaminophen (TYLENOL ARTHRITIS PAIN) 650 MG CR tablet Take 1,300 mg by mouth every 8 hours as needed for mild pain        ALPRAZolam (XANAX) 0.25 MG tablet Take 1 tablet by mouth twice daily as needed for anxiety 28 tablet 0     amLODIPine (NORVASC) 5 MG tablet Take 1 tablet (5 mg) by mouth daily 90 tablet 3     aspirin EC 81 MG EC tablet Take 1 tablet (81 mg) by mouth daily       atorvastatin  (LIPITOR) 40 MG tablet Take 1 tablet (40 mg) by mouth daily 90 tablet 1     calcium carbonate (TUMS) 500 MG chewable tablet Take 2-4 tablets (1,000-2,000 mg) by mouth as needed for heartburn       clopidogrel (PLAVIX) 75 MG tablet Take 1 tablet (75 mg) by mouth daily 90 tablet 1     Cranberry 1000 MG CAPS        docusate sodium (COLACE) 100 MG capsule Take 200 mg by mouth daily 2 capsules       EQ LORATADINE 10 MG tablet Take 1 tablet by mouth once daily 30 tablet 3     EUTHYROX 75 MCG tablet Take 1 tablet by mouth once daily 90 tablet 0     famotidine (PEPCID) 40 MG tablet TAKE 1 TABLET BY MOUTH AT BEDTIME 30 tablet 9     isosorbide mononitrate (IMDUR) 60 MG 24 hr tablet Take 1 tablet (60 mg) by mouth daily 180 tablet 3     Lactobacillus (FLORAJEN ACIDOPHILUS) CAPS Take 1 capsule by mouth daily       levETIRAcetam (KEPPRA) 500 MG tablet TAKE 1 TABLET BY MOUTH ONCE DAILY THEN 2 AT BEDTIME 270 tablet 0     lisinopril (ZESTRIL) 5 MG tablet Take 1 tablet (5 mg) by mouth daily 90 tablet 3     methotrexate 2.5 MG tablet Take 4 tablets (10 mg) by mouth once a week Wed 52 tablet 3     metoprolol succinate ER (TOPROL-XL) 50 MG 24 hr tablet Take 1 tablet (50 mg) by mouth 2 times daily 180 tablet 1     mirtazapine (REMERON) 30 MG tablet TAKE 1 TABLET BY MOUTH ONCE DAILY AT BEDTIME 90 tablet 1     nitroFURantoin macrocrystal (MACRODANTIN) 100 MG capsule Take 1 capsule (100 mg) by mouth At Bedtime 90 capsule 3     nitroFURantoin macrocrystal-monohydrate (MACROBID) 100 MG capsule Take 100 mg by mouth daily Takes for prevention of UTI       predniSONE (DELTASONE) 5 MG tablet TAKE ONE TABLET BY MOUTH EVERY OTHER DAY ALTERNATING  WITH  1/2  TABLET  EVERY  OTHER  DAY 23 tablet 13     RESTASIS 0.05 % ophthalmic emulsion INSTILL 1 DROP INTO LEFT EYE TWICE DAILY AS DIRECTED       SLOW  (45 Fe) MG CR tablet Take 1 tablet by mouth once daily 90 tablet 1     sulfamethoxazole-trimethoprim (BACTRIM DS) 800-160 MG tablet Take 1 tablet  by mouth 2 times daily 14 tablet 0     nitroGLYcerin (NITROSTAT) 0.4 MG sublingual tablet DISSOLVE ONE TABLET UNDER THE TONGUE EVERY 5 MINUTES AS NEEDED FOR CHEST PAIN.  DO NOT EXCEED A TOTAL OF 3 DOSES IN 15 MINUTES (Patient not taking: Reported on 5/24/2022) 25 tablet 0       ALLERGIES     Allergies   Allergen Reactions     Penicillins Hives     Caffeine Other (See Comments) and Unknown     Triggers your Meniere's disease     Hydrocodone Other (See Comments) and Unknown     hallucinations     Ibuprofen GI Disturbance and Unknown     Other Environmental Allergy      Metals of unspecific kind     Tramadol Other (See Comments)     Seizure, was taking remeron along with tramadol     Metal [Staples] Rash       PAST MEDICAL HISTORY:  Past Medical History:   Diagnosis Date     Aortic stenosis     s/p TAVR 8/17/16     Chronic migraine      Hyperlipidaemia      Hypertension      Hypothyroidism      Myocardial infarction (H)      Need for SBE (subacute bacterial endocarditis) prophylaxis      Orthostatic hypotension     wears compression stockings     PUD (peptic ulcer disease)      Secondary Sjogren's syndrome (H)      Seizures (H)     after stroke (partial onset)     Seropositive rheumatoid arthritis (H)      Stroke (H) 05/2013    with visual field cut, left occipital lobe     Syncope 2014    due to orthostatic hypotension       PAST SURGICAL HISTORY:  Past Surgical History:   Procedure Laterality Date     ANOMALOUS PULMONARY VENOUS RETURN REPAIR, TOTAL       CATARACT EXTRACTION       CATARACT IOL, RT/LT Bilateral 2000     CV FLUOROSCOPY ONLY N/A 8/6/2019    Procedure: Fluoroscopy Only - valve cine done of aortic valve at 180 degrees ARIANA to JUNG;  Surgeon: Dave Farfan MD;  Location:  HEART [facility]ARD[facility] CATH LAB     CV HEART CATHETERIZATION WITH POSSIBLE INTERVENTION N/A 2/10/2021    Procedure: Heart Catheterization with Possible Intervention;  Surgeon: Fish Padgett MD;  Location:  HEART [facility]ARD[facility] CATH LAB      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     EYE SURGERY       HEART CATH FEMORAL CANNULIZATION WITH OPEN STANDBY REPAIR AORTIC VALVE N/A 8/3/2016    Procedure: HEART CATH FEMORAL CANNULIZATION WITH OPEN STANDBY REPAIR AORTIC VALVE;  Surgeon: Owen Schultz MD;  Location: [facility] OR     HYSTERECTOMY       HYSTERECTOMY TOTAL ABDOMINAL  1970    ovaries out     MANDIBLE FRACTURE SURGERY       MOUTH SURGERY  2011    jaw surgery due to fracture     OTHER SURGICAL HISTORY      Treva valve implant     TOTAL HIP ARTHROPLASTY       TRANSCATHETER AORTIC VALVE IMPLANT ANESTHESIA N/A 8/3/2016    Procedure: TRANSCATHETER AORTIC VALVE IMPLANT ANESTHESIA;  Surgeon: GENERIC ANESTHESIA PROVIDER;  Location: [facility] OR     ZZC TOTAL HIP ARTHROPLASTY Right 2010     ZZC TOTAL HIP ARTHROPLASTY Left 2014       FAMILY HISTORY:  Family History   Problem Relation Age of Onset     Hyperlipidemia Mother      Family History Negative No family hx of      Hypertension Mother      Rheumatoid Arthritis Child        SOCIAL HISTORY:  Social History     Socioeconomic History     Marital status:      Spouse name: None     Number of children: 4     Years of education: None     Highest education level: None   Occupational History     Employer: RETIRED   Tobacco Use     Smoking status: Never Smoker     Smokeless tobacco: Never Used   Vaping Use     Vaping Use: Never used   Substance and Sexual Activity     Alcohol use: No     Alcohol/week: 0.0 standard drinks     Drug use: No     Sexual activity: Not Currently   Other Topics Concern     Special Diet Yes     Comment: low salt      Exercise Yes     Comment: semi recument bike 15 mins daily    Social History Narrative    .  Lives in assisted living. Independent. Has help with making bed and changing sheets.    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 .     4 children - 1 with RA       Review of Systems:  Skin:        Eyes:  Positive for glasses  ENT:  Positive  "for hearing loss  Respiratory:  Negative shortness of breath;cough;wheezing  Cardiovascular:  Negative;palpitations;chest pain;edema;lightheadedness;dizziness    Gastroenterology:      Genitourinary:       Musculoskeletal:       Neurologic:  Negative numbness or tingling of hands;numbness or tingling of feet  Psychiatric:       Heme/Lymph/Imm:       Endocrine:         Physical Exam:  Vitals: /58 (BP Location: Right arm, Patient Position: Sitting, Cuff Size: Adult Regular)   Pulse 78   Ht 1.676 m (5' 6\")   Wt 49.4 kg (108 lb 12.8 oz)   SpO2 94%   BMI 17.56 kg/m       GEN:  NAD  NECK: No JVD  C/V:  Regular rate and rhythm, no murmur, rub or gallop.  RESP: Clear to auscultation bilaterally without wheezing, rales, or rhonchi.  GI: Abdomen soft, nontender, nondistended. No HSM appreciated.   EXTREM: No pitting LE edema.   NEURO: Alert and oriented, cooperative. No obvious focal deficits.   PSYCH: Normal affect.  SKIN: Warm and dry.       Recent Lab Results:  LIPID RESULTS:  Lab Results   Component Value Date    CHOL 131 03/09/2022    CHOL 107 02/10/2021    HDL 50 03/09/2022    HDL 59 02/10/2021    LDL 61 03/09/2022    LDL 31 02/10/2021    TRIG 102 03/09/2022    TRIG 87 02/10/2021    CHOLHDLRATIO 2.3 07/07/2015       LIVER ENZYME RESULTS:  Lab Results   Component Value Date    AST 17 10/12/2021    AST 25 05/29/2021    ALT 14 03/09/2022    ALT 21 05/29/2021       CBC RESULTS:  Lab Results   Component Value Date    WBC 9.4 05/24/2022    WBC 8.1 07/01/2021    RBC 3.65 (L) 05/24/2022    RBC 2.96 (L) 07/01/2021    HGB 10.7 (L) 05/24/2022    HGB 8.4 (L) 07/01/2021    HCT 34.2 (L) 05/24/2022    HCT 26.6 (L) 07/01/2021    MCV 94 05/24/2022    MCV 90 07/01/2021    MCH 29.3 05/24/2022    MCH 28.4 07/01/2021    MCHC 31.3 (L) 05/24/2022    MCHC 31.6 07/01/2021    RDW 17.8 (H) 05/24/2022    RDW 18.9 (H) 07/01/2021     05/24/2022     07/01/2021       BMP RESULTS:  Lab Results   Component Value Date     " 05/24/2022     05/31/2021    POTASSIUM 3.8 05/24/2022    POTASSIUM 3.5 05/31/2021    CHLORIDE 106 05/24/2022    CHLORIDE 105 05/31/2021    CO2 27 05/24/2022    CO2 24 05/31/2021    ANIONGAP 5 05/24/2022    ANIONGAP 7 05/31/2021     (H) 05/24/2022     (H) 05/31/2021    BUN 23 05/24/2022    BUN 26 05/31/2021    CR 0.92 05/24/2022    CR 1.15 (H) 05/31/2021    GFRESTIMATED 57 (L) 05/24/2022    GFRESTIMATED 41 (L) 05/31/2021    GFRESTBLACK 47 (L) 05/31/2021    CHEYANNE 8.0 (L) 05/24/2022    CHEYANNE 7.5 (L) 05/31/2021        A1C RESULTS:  Lab Results   Component Value Date    A1C 5.5 10/13/2018       INR RESULTS:  Lab Results   Component Value Date    INR 1.16 (H) 02/10/2021    INR 1.11 04/26/2019           Matt Mccracken PA-C  May 24, 2022     Thank you for allowing me to participate in the care of your patient.      Sincerely,     Matt Mccracken PA-C     [facility]  cc:   Jones Lee MD  [address]  [place] [address]

## 2022-05-24 NOTE — PROGRESS NOTES
Primary Cardiologist: Dr. Lee    Reason for Visit: 6 month follow up    History of Present Illness:   Heike is a very pleasant 89-year-old female with past medical history is notable for coronary artery disease (coronary angiogram revealed 60-70% hemodynamically significant mid to distal LAD lesion, due to contrast dye load she was recommended to return for staged intervention of this lesion.  She however had resolution of her symptoms with the addition of amlodipine and therefore staged intervention was not performed; she recently had recurrent symptoms and was referred for coronary angiogram and had PCI of LAD; on DAPT), history of severe aortic valve stenosis (status post TAVR in 8/2016 ([place] scientific Treva Valve), most recent echocardiogram shows normal gradients and no evidence of significant AI), ischemic cardiomyopathy (EF 35-40%, switch to long-acting beta-blocker during TAVR follow-up visit, not on ACE inhibitor), history of CVA in 2013 (on chronic Plavix therapy), anemia (receiving Iron infusions regularly; stable 9-10 with no recent hx of acute bleeding issues), hypertension, hyperlipidemia, GERD, known left bundle branch block, and hypothyroidism.    She returns to clinic with her daughter stating she is doing well. She has no CP, SOB, peripheral edema, orthopnea, or bleeding issues.     Assessment and Plan:  Heike is a very pleasant 89-year-old female with past medical history is notable for coronary artery disease (coronary angiogram revealed 60-70% hemodynamically significant mid to distal LAD lesion, due to contrast dye load she was recommended to return for staged intervention of this lesion.  She however had resolution of her symptoms with the addition of amlodipine and therefore staged intervention was not performed; she recently had recurrent symptoms and was referred for coronary angiogram and had PCI of LAD; on DAPT), history of severe aortic valve stenosis (status post TAVR in 8/2016  ([place] scientific Treva Valve), most recent echocardiogram shows normal gradients and no evidence of significant AI), ischemic cardiomyopathy (EF 35-40%, switch to long-acting beta-blocker during TAVR follow-up visit, not on ACE inhibitor), history of CVA in 2013 (on chronic Plavix therapy), anemia (receiving Iron infusions regularly; stable 9-10 with no recent hx of acute bleeding issues), hypertension, hyperlipidemia, GERD, known left bundle branch block, and hypothyroidism.    She is doing well from a cardiac standpoint. We will continue with current medications with no changes today. She will return to clinic in 6 months.     This note was completed in part using Dragon voice recognition software. Although reviewed after completion, some word and grammatical errors may occur.    Orders this Visit:  Orders Placed This Encounter   Procedures     CBC with platelets and differential     No orders of the defined types were placed in this encounter.    There are no discontinued medications.      Encounter Diagnoses   Name Primary?     Coronary artery disease involving native coronary artery of native heart without angina pectoris      Anemia, unspecified type      Hypertension goal BP (blood pressure) < 140/90        CURRENT MEDICATIONS:  Current Outpatient Medications   Medication Sig Dispense Refill     acetaminophen (TYLENOL ARTHRITIS PAIN) 650 MG CR tablet Take 1,300 mg by mouth every 8 hours as needed for mild pain        ALPRAZolam (XANAX) 0.25 MG tablet Take 1 tablet by mouth twice daily as needed for anxiety 28 tablet 0     amLODIPine (NORVASC) 5 MG tablet Take 1 tablet (5 mg) by mouth daily 90 tablet 3     aspirin EC 81 MG EC tablet Take 1 tablet (81 mg) by mouth daily       atorvastatin (LIPITOR) 40 MG tablet Take 1 tablet (40 mg) by mouth daily 90 tablet 1     calcium carbonate (TUMS) 500 MG chewable tablet Take 2-4 tablets (1,000-2,000 mg) by mouth as needed for heartburn       clopidogrel (PLAVIX) 75 MG  tablet Take 1 tablet (75 mg) by mouth daily 90 tablet 1     Cranberry 1000 MG CAPS        docusate sodium (COLACE) 100 MG capsule Take 200 mg by mouth daily 2 capsules       EQ LORATADINE 10 MG tablet Take 1 tablet by mouth once daily 30 tablet 3     EUTHYROX 75 MCG tablet Take 1 tablet by mouth once daily 90 tablet 0     famotidine (PEPCID) 40 MG tablet TAKE 1 TABLET BY MOUTH AT BEDTIME 30 tablet 9     isosorbide mononitrate (IMDUR) 60 MG 24 hr tablet Take 1 tablet (60 mg) by mouth daily 180 tablet 3     Lactobacillus (FLORAJEN ACIDOPHILUS) CAPS Take 1 capsule by mouth daily       levETIRAcetam (KEPPRA) 500 MG tablet TAKE 1 TABLET BY MOUTH ONCE DAILY THEN 2 AT BEDTIME 270 tablet 0     lisinopril (ZESTRIL) 5 MG tablet Take 1 tablet (5 mg) by mouth daily 90 tablet 3     methotrexate 2.5 MG tablet Take 4 tablets (10 mg) by mouth once a week Wed 52 tablet 3     metoprolol succinate ER (TOPROL-XL) 50 MG 24 hr tablet Take 1 tablet (50 mg) by mouth 2 times daily 180 tablet 1     mirtazapine (REMERON) 30 MG tablet TAKE 1 TABLET BY MOUTH ONCE DAILY AT BEDTIME 90 tablet 1     nitroFURantoin macrocrystal (MACRODANTIN) 100 MG capsule Take 1 capsule (100 mg) by mouth At Bedtime 90 capsule 3     nitroFURantoin macrocrystal-monohydrate (MACROBID) 100 MG capsule Take 100 mg by mouth daily Takes for prevention of UTI       predniSONE (DELTASONE) 5 MG tablet TAKE ONE TABLET BY MOUTH EVERY OTHER DAY ALTERNATING  WITH  1/2  TABLET  EVERY  OTHER  DAY 23 tablet 13     RESTASIS 0.05 % ophthalmic emulsion INSTILL 1 DROP INTO LEFT EYE TWICE DAILY AS DIRECTED       SLOW  (45 Fe) MG CR tablet Take 1 tablet by mouth once daily 90 tablet 1     sulfamethoxazole-trimethoprim (BACTRIM DS) 800-160 MG tablet Take 1 tablet by mouth 2 times daily 14 tablet 0     nitroGLYcerin (NITROSTAT) 0.4 MG sublingual tablet DISSOLVE ONE TABLET UNDER THE TONGUE EVERY 5 MINUTES AS NEEDED FOR CHEST PAIN.  DO NOT EXCEED A TOTAL OF 3 DOSES IN 15 MINUTES  (Patient not taking: Reported on 5/24/2022) 25 tablet 0       ALLERGIES     Allergies   Allergen Reactions     Penicillins Hives     Caffeine Other (See Comments) and Unknown     Triggers your Meniere's disease     Hydrocodone Other (See Comments) and Unknown     hallucinations     Ibuprofen GI Disturbance and Unknown     Other Environmental Allergy      Metals of unspecific kind     Tramadol Other (See Comments)     Seizure, was taking remeron along with tramadol     Metal [Staples] Rash       PAST MEDICAL HISTORY:  Past Medical History:   Diagnosis Date     Aortic stenosis     s/p TAVR 8/17/16     Chronic migraine      Hyperlipidaemia      Hypertension      Hypothyroidism      Myocardial infarction (H)      Need for SBE (subacute bacterial endocarditis) prophylaxis      Orthostatic hypotension     wears compression stockings     PUD (peptic ulcer disease)      Secondary Sjogren's syndrome (H)      Seizures (H)     after stroke (partial onset)     Seropositive rheumatoid arthritis (H)      Stroke (H) 05/2013    with visual field cut, left occipital lobe     Syncope 2014    due to orthostatic hypotension       PAST SURGICAL HISTORY:  Past Surgical History:   Procedure Laterality Date     ANOMALOUS PULMONARY VENOUS RETURN REPAIR, TOTAL       CATARACT EXTRACTION       CATARACT IOL, RT/LT Bilateral 2000     CV FLUOROSCOPY ONLY N/A 8/6/2019    Procedure: Fluoroscopy Only - valve cine done of aortic valve at 180 degrees [place] to JUNG;  Surgeon: Dave Farfan MD;  Location:  [facility]     CV HEART CATHETERIZATION WITH POSSIBLE INTERVENTION N/A 2/10/2021    Procedure: Heart Catheterization with Possible Intervention;  Surgeon: Fish Padgett MD;  Location:  [facility]     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     EYE SURGERY       HEART CATH FEMORAL CANNULIZATION WITH OPEN STANDBY REPAIR AORTIC VALVE N/A 8/3/2016    Procedure: HEART CATH FEMORAL CANNULIZATION WITH OPEN  STANDBY REPAIR AORTIC VALVE;  Surgeon: Owen Schultz MD;  Location: [facility] OR     HYSTERECTOMY       HYSTERECTOMY TOTAL ABDOMINAL  1970    ovaries out     MANDIBLE FRACTURE SURGERY       MOUTH SURGERY  2011    jaw surgery due to fracture     OTHER SURGICAL HISTORY      Treva valve implant     TOTAL HIP ARTHROPLASTY       TRANSCATHETER AORTIC VALVE IMPLANT ANESTHESIA N/A 8/3/2016    Procedure: TRANSCATHETER AORTIC VALVE IMPLANT ANESTHESIA;  Surgeon: GENERIC ANESTHESIA PROVIDER;  Location: [facility] OR     ZC TOTAL HIP ARTHROPLASTY Right 2010     ZZC TOTAL HIP ARTHROPLASTY Left 2014       FAMILY HISTORY:  Family History   Problem Relation Age of Onset     Hyperlipidemia Mother      Family History Negative No family hx of      Hypertension Mother      Rheumatoid Arthritis Child        SOCIAL HISTORY:  Social History     Socioeconomic History     Marital status:      Spouse name: None     Number of children: 4     Years of education: None     Highest education level: None   Occupational History     Employer: RETIRED   Tobacco Use     Smoking status: Never Smoker     Smokeless tobacco: Never Used   Vaping Use     Vaping Use: Never used   Substance and Sexual Activity     Alcohol use: No     Alcohol/week: 0.0 standard drinks     Drug use: No     Sexual activity: Not Currently   Other Topics Concern     Special Diet Yes     Comment: low salt      Exercise Yes     Comment: semi recument bike 15 mins daily    Social History Narrative    .  Lives in assisted living. Independent. Has help with making bed and changing sheets.    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 .     4 children - 1 with RA       Review of Systems:  Skin:        Eyes:  Positive for glasses  ENT:  Positive for hearing loss  Respiratory:  Negative shortness of breath;cough;wheezing  Cardiovascular:  Negative;palpitations;chest pain;edema;lightheadedness;dizziness    Gastroenterology:      Genitourinary:      "  Musculoskeletal:       Neurologic:  Negative numbness or tingling of hands;numbness or tingling of feet  Psychiatric:       Heme/Lymph/Imm:       Endocrine:         Physical Exam:  Vitals: /58 (BP Location: Right arm, Patient Position: Sitting, Cuff Size: Adult Regular)   Pulse 78   Ht 1.676 m (5' 6\")   Wt 49.4 kg (108 lb 12.8 oz)   SpO2 94%   BMI 17.56 kg/m       GEN:  NAD  NECK: No JVD  C/V:  Regular rate and rhythm, no murmur, rub or gallop.  RESP: Clear to auscultation bilaterally without wheezing, rales, or rhonchi.  GI: Abdomen soft, nontender, nondistended. No HSM appreciated.   EXTREM: No pitting LE edema.   NEURO: Alert and oriented, cooperative. No obvious focal deficits.   PSYCH: Normal affect.  SKIN: Warm and dry.       Recent Lab Results:  LIPID RESULTS:  Lab Results   Component Value Date    CHOL 131 03/09/2022    CHOL 107 02/10/2021    HDL 50 03/09/2022    HDL 59 02/10/2021    LDL 61 03/09/2022    LDL 31 02/10/2021    TRIG 102 03/09/2022    TRIG 87 02/10/2021    CHOLHDLRATIO 2.3 07/07/2015       LIVER ENZYME RESULTS:  Lab Results   Component Value Date    AST 17 10/12/2021    AST 25 05/29/2021    ALT 14 03/09/2022    ALT 21 05/29/2021       CBC RESULTS:  Lab Results   Component Value Date    WBC 9.4 05/24/2022    WBC 8.1 07/01/2021    RBC 3.65 (L) 05/24/2022    RBC 2.96 (L) 07/01/2021    HGB 10.7 (L) 05/24/2022    HGB 8.4 (L) 07/01/2021    HCT 34.2 (L) 05/24/2022    HCT 26.6 (L) 07/01/2021    MCV 94 05/24/2022    MCV 90 07/01/2021    MCH 29.3 05/24/2022    MCH 28.4 07/01/2021    MCHC 31.3 (L) 05/24/2022    MCHC 31.6 07/01/2021    RDW 17.8 (H) 05/24/2022    RDW 18.9 (H) 07/01/2021     05/24/2022     07/01/2021       BMP RESULTS:  Lab Results   Component Value Date     05/24/2022     05/31/2021    POTASSIUM 3.8 05/24/2022    POTASSIUM 3.5 05/31/2021    CHLORIDE 106 05/24/2022    CHLORIDE 105 05/31/2021    CO2 27 05/24/2022    CO2 24 05/31/2021    ANIONGAP 5 " 05/24/2022    ANIONGAP 7 05/31/2021     (H) 05/24/2022     (H) 05/31/2021    BUN 23 05/24/2022    BUN 26 05/31/2021    CR 0.92 05/24/2022    CR 1.15 (H) 05/31/2021    GFRESTIMATED 57 (L) 05/24/2022    GFRESTIMATED 41 (L) 05/31/2021    GFRESTBLACK 47 (L) 05/31/2021    CHEYANNE 8.0 (L) 05/24/2022    CHEYANNE 7.5 (L) 05/31/2021        A1C RESULTS:  Lab Results   Component Value Date    A1C 5.5 10/13/2018       INR RESULTS:  Lab Results   Component Value Date    INR 1.16 (H) 02/10/2021    INR 1.11 04/26/2019           Matt Mccracken PA-C  May 24, 2022

## 2022-05-26 NOTE — TELEPHONE ENCOUNTER
Pending Prescriptions:                       Disp   Refills    EQ LORATADINE 10 MG tablet [Pharmacy Med N*30 tab*0        Sig: Take 1 tablet by mouth once daily      Routing refill request to provider for review/approval because:   Patient is 3-64 years of age    Loni Jacobo RN

## 2022-06-14 DIAGNOSIS — D50.0 IRON DEFICIENCY ANEMIA DUE TO CHRONIC BLOOD LOSS: ICD-10-CM

## 2022-06-21 ENCOUNTER — OFFICE VISIT (OUTPATIENT)
Dept: INTERNAL MEDICINE | Facility: CLINIC | Age: 87
End: 2022-06-21
Payer: COMMERCIAL

## 2022-06-21 VITALS
WEIGHT: 105.1 LBS | RESPIRATION RATE: 18 BRPM | OXYGEN SATURATION: 94 % | DIASTOLIC BLOOD PRESSURE: 64 MMHG | HEART RATE: 92 BPM | TEMPERATURE: 98.1 F | BODY MASS INDEX: 16.96 KG/M2 | SYSTOLIC BLOOD PRESSURE: 108 MMHG

## 2022-06-21 DIAGNOSIS — I50.22 CHRONIC SYSTOLIC (CONGESTIVE) HEART FAILURE (H): ICD-10-CM

## 2022-06-21 DIAGNOSIS — I10 HYPERTENSION GOAL BP (BLOOD PRESSURE) < 140/90: ICD-10-CM

## 2022-06-21 DIAGNOSIS — E03.4 HYPOTHYROIDISM DUE TO ACQUIRED ATROPHY OF THYROID: ICD-10-CM

## 2022-06-21 DIAGNOSIS — N30.00 ACUTE CYSTITIS WITHOUT HEMATURIA: ICD-10-CM

## 2022-06-21 DIAGNOSIS — J30.1 SEASONAL ALLERGIC RHINITIS DUE TO POLLEN: ICD-10-CM

## 2022-06-21 DIAGNOSIS — N18.32 STAGE 3B CHRONIC KIDNEY DISEASE (H): ICD-10-CM

## 2022-06-21 DIAGNOSIS — M05.9 SEROPOSITIVE RHEUMATOID ARTHRITIS (H): ICD-10-CM

## 2022-06-21 DIAGNOSIS — I25.119 ATHEROSCLEROSIS OF NATIVE CORONARY ARTERY OF NATIVE HEART WITH ANGINA PECTORIS (H): ICD-10-CM

## 2022-06-21 DIAGNOSIS — F51.5 NIGHTMARES: Primary | ICD-10-CM

## 2022-06-21 DIAGNOSIS — G40.909 SEIZURE DISORDER (H): ICD-10-CM

## 2022-06-21 LAB
ANION GAP SERPL CALCULATED.3IONS-SCNC: 4 MMOL/L (ref 3–14)
BASOPHILS # BLD AUTO: 0.1 10E3/UL (ref 0–0.2)
BASOPHILS NFR BLD AUTO: 1 %
BUN SERPL-MCNC: 33 MG/DL (ref 7–30)
CALCIUM SERPL-MCNC: 8 MG/DL (ref 8.5–10.1)
CHLORIDE BLD-SCNC: 105 MMOL/L (ref 94–109)
CO2 SERPL-SCNC: 30 MMOL/L (ref 20–32)
CREAT SERPL-MCNC: 1.03 MG/DL (ref 0.52–1.04)
EOSINOPHIL # BLD AUTO: 0.1 10E3/UL (ref 0–0.7)
EOSINOPHIL NFR BLD AUTO: 1 %
ERYTHROCYTE [DISTWIDTH] IN BLOOD BY AUTOMATED COUNT: 18.4 % (ref 10–15)
GFR SERPL CREATININE-BSD FRML MDRD: 50 ML/MIN/1.73M2
GLUCOSE BLD-MCNC: 112 MG/DL (ref 70–99)
HCT VFR BLD AUTO: 33 % (ref 35–47)
HGB BLD-MCNC: 10.7 G/DL (ref 11.7–15.7)
IMM GRANULOCYTES # BLD: 0.1 10E3/UL
IMM GRANULOCYTES NFR BLD: 1 %
LEVETIRACETAM SERPL-MCNC: 34 ΜG/ML (ref 10–40)
LYMPHOCYTES # BLD AUTO: 1.7 10E3/UL (ref 0.8–5.3)
LYMPHOCYTES NFR BLD AUTO: 17 %
MCH RBC QN AUTO: 30.1 PG (ref 26.5–33)
MCHC RBC AUTO-ENTMCNC: 32.4 G/DL (ref 31.5–36.5)
MCV RBC AUTO: 93 FL (ref 78–100)
MONOCYTES # BLD AUTO: 0.9 10E3/UL (ref 0–1.3)
MONOCYTES NFR BLD AUTO: 9 %
NEUTROPHILS # BLD AUTO: 7.5 10E3/UL (ref 1.6–8.3)
NEUTROPHILS NFR BLD AUTO: 71 %
NRBC # BLD AUTO: 0 10E3/UL
NRBC BLD AUTO-RTO: 0 /100
PLATELET # BLD AUTO: 263 10E3/UL (ref 150–450)
POTASSIUM BLD-SCNC: 3.9 MMOL/L (ref 3.4–5.3)
RBC # BLD AUTO: 3.56 10E6/UL (ref 3.8–5.2)
SODIUM SERPL-SCNC: 139 MMOL/L (ref 133–144)
WBC # BLD AUTO: 10.3 10E3/UL (ref 4–11)

## 2022-06-21 PROCEDURE — 99213 OFFICE O/P EST LOW 20 MIN: CPT | Mod: 25 | Performed by: INTERNAL MEDICINE

## 2022-06-21 PROCEDURE — 80177 DRUG SCRN QUAN LEVETIRACETAM: CPT | Performed by: INTERNAL MEDICINE

## 2022-06-21 PROCEDURE — 85025 COMPLETE CBC W/AUTO DIFF WBC: CPT | Performed by: INTERNAL MEDICINE

## 2022-06-21 PROCEDURE — 80048 BASIC METABOLIC PNL TOTAL CA: CPT | Performed by: INTERNAL MEDICINE

## 2022-06-21 PROCEDURE — 99397 PER PM REEVAL EST PAT 65+ YR: CPT | Performed by: INTERNAL MEDICINE

## 2022-06-21 PROCEDURE — 36415 COLL VENOUS BLD VENIPUNCTURE: CPT | Performed by: INTERNAL MEDICINE

## 2022-06-21 ASSESSMENT — ENCOUNTER SYMPTOMS
CHILLS: 0
FEVER: 0
HEMATOCHEZIA: 0
WEAKNESS: 0
EYE PAIN: 0
PALPITATIONS: 0
SORE THROAT: 0
ARTHRALGIAS: 0
HEADACHES: 1
CONSTIPATION: 0
NERVOUS/ANXIOUS: 0
SHORTNESS OF BREATH: 0
MYALGIAS: 0
DIZZINESS: 0
HEARTBURN: 0
HEMATURIA: 0
DYSURIA: 0
BREAST MASS: 1
COUGH: 0
ABDOMINAL PAIN: 0
PARESTHESIAS: 0
NAUSEA: 0
FREQUENCY: 1
JOINT SWELLING: 0
DIARRHEA: 0

## 2022-06-21 ASSESSMENT — ACTIVITIES OF DAILY LIVING (ADL)
CURRENT_FUNCTION: PREPARING MEALS REQUIRES ASSISTANCE
CURRENT_FUNCTION: SHOPPING REQUIRES ASSISTANCE
CURRENT_FUNCTION: HOUSEWORK REQUIRES ASSISTANCE
CURRENT_FUNCTION: TRANSPORTATION REQUIRES ASSISTANCE

## 2022-06-21 ASSESSMENT — PAIN SCALES - GENERAL: PAINLEVEL: NO PAIN (0)

## 2022-06-21 NOTE — PROGRESS NOTES
"SUBJECTIVE:   Gillian Burk is a 89 year old female who presents for Preventive Visit.    Patient has been advised of split billing requirements and indicates understanding: Yes  Are you in the first 12 months of your Medicare coverage?  No    Healthy Habits:     In general, how would you rate your overall health?  Good    Frequency of exercise:  2-3 days/week    Duration of exercise:  15-30 minutes    Do you usually eat at least 4 servings of fruit and vegetables a day, include whole grains    & fiber and avoid regularly eating high fat or \"junk\" foods?  Yes    Taking medications regularly:  Yes    Medication side effects:  No muscle aches and No significant flushing    Ability to successfully perform activities of daily living:  Transportation requires assistance, shopping requires assistance, preparing meals requires assistance and housework requires assistance    Home Safety:  No safety concerns identified    Hearing Impairment:  Difficulty following a conversation in a noisy restaurant or crowded room, feel that people are mumbling or not speaking clearly, difficult to understand a speaker at a public meeting or Jain service, need to ask people to speak up or repeat themselves, find that men's voices are easier to understand than woman's, difficulty understanding soft or whispered speech and difficulty understanding speech on the telephone    In the past 6 months, have you been bothered by leaking of urine? Yes    In general, how would you rate your overall mental or emotional health?  Good      PHQ-2 Total Score: 1    Additional concerns today:  Yes    Do you feel safe in your environment? Yes    Have you ever done Advance Care Planning? (For example, a Health Directive, POLST, or a discussion with a medical provider or your loved ones about your wishes): No, advance care planning information given to patient to review.  Patient plans to discuss their wishes with loved ones or provider.         Fall " risk  Fallen 2 or more times in the past year?: No  Any fall with injury in the past year?: No    Cognitive Screening Not appropriate due to known dementia        Reviewed and updated as needed this visit by clinical staff   Tobacco  Allergies  Meds   Med Hx  Surg Hx  Fam Hx  Soc Hx          Reviewed and updated as needed this visit by Provider                   Social History     Tobacco Use     Smoking status: Never Smoker     Smokeless tobacco: Never Used   Substance Use Topics     Alcohol use: No     Alcohol/week: 0.0 standard drinks     COVID in if you drink alcohol do you typically have >3 drinks per day or >7 drinks per week? No    Alcohol Use 6/21/2022   Prescreen: >3 drinks/day or >7 drinks/week? Not Applicable   Prescreen: >3 drinks/day or >7 drinks/week? -           Patient complains of frequent nightmares which have escalated over the last several weeks.  Also has occasional episodes of intermittent confusion where she does not know if it is daytime or nighttime when she wakes up from a nap.  Also notes that she goes to bed about 6 or 7 in the evening and sleeps until 6 or 7 in the morning.  Also notes that in the morning is when she has most of her nightmares.  After the first 10 or 11 hours of sleep.  Patient has no activity.    Current providers sharing in care for this patient include:   Patient Care Team:  Augusto Meyer DO as PCP - General (Internal Medicine)  Augusto Meyer DO as Assigned PCP  Fish Gutierrez MD as Assigned Surgical Provider  Jones Lee MD as Assigned Heart and Vascular Provider    The following health maintenance items are reviewed in Epic and correct as of today:  Health Maintenance Due   Topic Date Due     MICROALBUMIN  Never done     ANNUAL REVIEW OF  ORDERS  Never done     ZOSTER IMMUNIZATION (1 of 2) Never done     Pneumococcal Vaccine: 65+ Years (3 - PPSV23 or PCV20) 09/22/2017     DTAP/TDAP/TD IMMUNIZATION (2 - Td or Tdap)  "03/31/2020     HF ACTION PLAN  02/26/2021     ADVANCE CARE PLANNING  12/03/2021           Mammogram Screening - Patient over age 75, has elected to discontinue screenings.  Pertinent mammograms are reviewed under the imaging tab.    Review of Systems   Constitutional: Negative for chills and fever.   HENT: Negative for congestion, ear pain, hearing loss and sore throat.    Eyes: Negative for pain and visual disturbance.   Respiratory: Negative for cough and shortness of breath.    Cardiovascular: Negative for chest pain, palpitations and peripheral edema.   Gastrointestinal: Negative for abdominal pain, constipation, diarrhea, heartburn, hematochezia and nausea.   Breasts:  Positive for breast mass. Negative for tenderness and discharge.   Genitourinary: Positive for frequency and urgency. Negative for dysuria, genital sores, hematuria, pelvic pain, vaginal bleeding and vaginal discharge.   Musculoskeletal: Negative for arthralgias, joint swelling and myalgias.   Skin: Negative for rash.   Neurological: Positive for headaches. Negative for dizziness, weakness and paresthesias.   Psychiatric/Behavioral: Negative for mood changes. The patient is not nervous/anxious.      The patient's breast mass is a rib.  Is at the costochondral junction in the upper right chest.  Has frequent frequency of urination.  Has had occasional urinary tract infections.    OBJECTIVE:   /64 (BP Location: Right arm, Patient Position: Sitting, Cuff Size: Adult Regular)   Pulse 92   Temp 98.1  F (36.7  C) (Temporal)   Resp 18   Wt 47.7 kg (105 lb 1.6 oz)   SpO2 94%   BMI 16.96 kg/m   Estimated body mass index is 16.96 kg/m  as calculated from the following:    Height as of 5/24/22: 1.676 m (5' 6\").    Weight as of this encounter: 47.7 kg (105 lb 1.6 oz).  Physical Exam  GENERAL APPEARANCE: healthy, alert and no distress  EYES: Eyes grossly normal to inspection, PERRL and conjunctivae and sclerae normal  HENT: ear canals and TM's " normal, nose and mouth without ulcers or lesions, oropharynx clear and oral mucous membranes moist  NECK: no adenopathy, no asymmetry, masses, or scars and thyroid normal to palpation  RESP: lungs clear to auscultation - no rales, rhonchi or wheezes  CV: regular rate and rhythm, normal S1 S2, no S3 or S4, no murmur, click or rub, no peripheral edema and peripheral pulses strong  ABDOMEN: soft, nontender, no hepatosplenomegaly, no masses and bowel sounds normal  MS: no musculoskeletal defects are noted and gait is age appropriate without ataxia  SKIN: no suspicious lesions or rashes  NEURO: Normal strength and tone, sensory exam grossly normal, mentation intact and speech normal  PSYCH: mentation appears normal and affect normal/bright    Diagnostic Test Results:  Results for orders placed or performed in visit on 06/21/22 (from the past 24 hour(s))   CBC with platelets and differential    Narrative    The following orders were created for panel order CBC with platelets and differential.  Procedure                               Abnormality         Status                     ---------                               -----------         ------                     CBC with platelets and d...[[id number]]                                                   Please view results for these tests on the individual orders.       ASSESSMENT / PLAN:       ICD-10-CM    1. Nightmares  F51.5 UA Macro with Reflex to Micro and Culture - lab collect     UA Macro with Reflex to Micro and Culture - lab collect   2. Hypothyroidism due to acquired atrophy of thyroid  E03.4    3. Hypertension goal BP (blood pressure) < 140/90  I10 Albumin Random Urine Quantitative with Creat Ratio     CBC with platelets and differential     Basic metabolic panel  (Ca, Cl, CO2, Creat, Gluc, K, Na, BUN)     UA Macro with Reflex to Micro and Culture - lab collect     Albumin Random Urine Quantitative with Creat Ratio     CBC with platelets and differential      "Basic metabolic panel  (Ca, Cl, CO2, Creat, Gluc, K, Na, BUN)     UA Macro with Reflex to Micro and Culture - lab collect   4. 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 Seropositive rheumatoid arthritis (H)  M05.9    6. Chronic systolic (congestive) heart failure (H)  I50.22    7. Atherosclerosis of native coronary artery of native heart with angina pectoris (H)  I25.119    8. Stage 3b chronic kidney disease (H)  N18.32        Patient has been advised of split billing requirements and indicates understanding: Yes    COUNSELING:  Reviewed preventive health counseling, as reflected in patient instructions       Regular exercise       Healthy diet/nutrition       Vision screening       Hearing screening       Dental care       Bladder control    Estimated body mass index is 16.96 kg/m  as calculated from the following:    Height as of 5/24/22: 1.676 m (5' 6\").    Weight as of this encounter: 47.7 kg (105 lb 1.6 oz).    Weight management plan noted, stable and monitoring    She reports that she has never smoked. She has never used smokeless tobacco.      Appropriate preventive services were discussed with this patient, including applicable screening as appropriate for cardiovascular disease, diabetes, osteopenia/osteoporosis, and glaucoma.  As appropriate for age/gender, discussed screening for colorectal cancer, prostate cancer, breast cancer, and cervical cancer. Checklist reviewing preventive services available has been given to the patient.    Reviewed patients plan of care and provided an AVS. The Basic Care Plan (routine screening as documented in Health Maintenance) for Gillian meets the Care Plan requirement. This Care Plan has been established and reviewed with the Patient and daughter.    Counseling Resources:  ATP IV Guidelines  Pooled Cohorts Equation Calculator  Breast Cancer Risk Calculator  Breast Cancer: Medication " to Reduce Risk  FRAX Risk Assessment  ICSI Preventive Guidelines  Dietary Guidelines for Americans, 2010  USDA's MyPlate  ASA Prophylaxis  Lung CA Screening    Augusto Meyer DO  [facility]    Identified Health Risks:

## 2022-06-22 LAB
ALBUMIN UR-MCNC: 100 MG/DL
APPEARANCE UR: ABNORMAL
BACTERIA #/AREA URNS HPF: ABNORMAL /HPF
BILIRUB UR QL STRIP: NEGATIVE
COLOR UR AUTO: YELLOW
CREAT UR-MCNC: 105 MG/DL
GLUCOSE UR STRIP-MCNC: NEGATIVE MG/DL
HGB UR QL STRIP: ABNORMAL
KETONES UR STRIP-MCNC: NEGATIVE MG/DL
LEUKOCYTE ESTERASE UR QL STRIP: ABNORMAL
MICROALBUMIN UR-MCNC: 285 MG/L
MICROALBUMIN/CREAT UR: 271.43 MG/G CR (ref 0–25)
MUCOUS THREADS #/AREA URNS LPF: PRESENT /LPF
NITRATE UR QL: NEGATIVE
PH UR STRIP: 5 [PH] (ref 5–7)
RBC URINE: 12 /HPF
SP GR UR STRIP: 1.02 (ref 1–1.03)
SQUAMOUS EPITHELIAL: 1 /HPF
UROBILINOGEN UR STRIP-MCNC: NORMAL MG/DL
WBC CLUMPS #/AREA URNS HPF: PRESENT /HPF
WBC URINE: >182 /HPF

## 2022-06-22 PROCEDURE — 81001 URINALYSIS AUTO W/SCOPE: CPT | Performed by: INTERNAL MEDICINE

## 2022-06-22 PROCEDURE — 87086 URINE CULTURE/COLONY COUNT: CPT | Performed by: INTERNAL MEDICINE

## 2022-06-22 PROCEDURE — 82043 UR ALBUMIN QUANTITATIVE: CPT | Performed by: INTERNAL MEDICINE

## 2022-06-23 LAB — BACTERIA UR CULT: NORMAL

## 2022-06-23 NOTE — TELEPHONE ENCOUNTER
Pending Prescriptions:                       Disp   Refills    EQ LORATADINE 10 MG tablet [Pharmacy Med N*30 tab*0        Sig: Take 1 tablet by mouth once daily    Routing refill request to provider for review/approval because:  Pt's age is out of range for RN to prescribe.

## 2022-06-27 RX ORDER — CIPROFLOXACIN 250 MG/1
250 TABLET, FILM COATED ORAL 2 TIMES DAILY
Qty: 20 TABLET | Refills: 0 | Status: SHIPPED | OUTPATIENT
Start: 2022-06-27 | End: 2022-01-01

## 2022-06-28 ENCOUNTER — TELEPHONE (OUTPATIENT)
Dept: INTERNAL MEDICINE | Facility: CLINIC | Age: 87
End: 2022-06-28

## 2022-06-28 DIAGNOSIS — R30.0 DYSURIA: Primary | ICD-10-CM

## 2022-07-05 DIAGNOSIS — G40.909 SEIZURE DISORDER (H): ICD-10-CM

## 2022-07-05 RX ORDER — LEVETIRACETAM 500 MG/1
TABLET ORAL
Qty: 270 TABLET | Refills: 0 | Status: SHIPPED | OUTPATIENT
Start: 2022-07-05 | End: 2023-01-01

## 2022-07-05 NOTE — TELEPHONE ENCOUNTER
Keppra      Last Written Prescription Date:  3/31/2022  Last Fill Quantity: 270,   # refills: 0  Last Office Visit: 6/21/2022  Future Office visit:       Routing refill request to provider for review/approval because:  Drug not on the [facility], [facility] or  Health refill protocol or controlled substance    Ang Guadarrama RN

## 2022-07-08 DIAGNOSIS — R07.9 CHEST PAIN, UNSPECIFIED TYPE: ICD-10-CM

## 2022-07-08 RX ORDER — METOPROLOL SUCCINATE 50 MG/1
50 TABLET, EXTENDED RELEASE ORAL 2 TIMES DAILY
Qty: 180 TABLET | Refills: 1 | Status: SHIPPED | OUTPATIENT
Start: 2022-07-08 | End: 2023-01-01

## 2022-07-12 ENCOUNTER — LAB (OUTPATIENT)
Dept: LAB | Facility: CLINIC | Age: 87
End: 2022-07-12
Payer: COMMERCIAL

## 2022-07-12 DIAGNOSIS — R30.0 DYSURIA: ICD-10-CM

## 2022-07-13 LAB
ALBUMIN UR-MCNC: NEGATIVE MG/DL
APPEARANCE UR: CLEAR
BACTERIA #/AREA URNS HPF: ABNORMAL /HPF
BILIRUB UR QL STRIP: NEGATIVE
COLOR UR AUTO: YELLOW
GLUCOSE UR STRIP-MCNC: NEGATIVE MG/DL
HGB UR QL STRIP: ABNORMAL
KETONES UR STRIP-MCNC: NEGATIVE MG/DL
LEUKOCYTE ESTERASE UR QL STRIP: NEGATIVE
NITRATE UR QL: NEGATIVE
PH UR STRIP: 5 [PH] (ref 5–7)
RBC URINE: 28 /HPF
SP GR UR STRIP: 1.01 (ref 1–1.03)
SQUAMOUS EPITHELIAL: <1 /HPF
UROBILINOGEN UR STRIP-MCNC: NORMAL MG/DL
WBC URINE: 17 /HPF

## 2022-07-13 PROCEDURE — 81001 URINALYSIS AUTO W/SCOPE: CPT

## 2022-07-13 PROCEDURE — 87086 URINE CULTURE/COLONY COUNT: CPT

## 2022-07-15 LAB — BACTERIA UR CULT: NORMAL

## 2022-07-22 DIAGNOSIS — Z86.73 HISTORY OF CVA (CEREBROVASCULAR ACCIDENT): ICD-10-CM

## 2022-07-22 RX ORDER — CLOPIDOGREL BISULFATE 75 MG/1
75 TABLET ORAL DAILY
Qty: 90 TABLET | Refills: 3 | Status: ON HOLD | OUTPATIENT
Start: 2022-07-22 | End: 2023-01-01

## 2022-07-26 DIAGNOSIS — J30.1 SEASONAL ALLERGIC RHINITIS DUE TO POLLEN: ICD-10-CM

## 2022-07-26 RX ORDER — LORATADINE 10 MG/1
TABLET ORAL
Qty: 30 TABLET | Refills: 0 | Status: SHIPPED | OUTPATIENT
Start: 2022-07-26 | End: 2022-08-26

## 2022-08-16 DIAGNOSIS — E03.4 HYPOTHYROIDISM DUE TO ACQUIRED ATROPHY OF THYROID: ICD-10-CM

## 2022-08-16 RX ORDER — LEVOTHYROXINE SODIUM 75 UG/1
TABLET ORAL
Qty: 30 TABLET | Refills: 0 | Status: SHIPPED | OUTPATIENT
Start: 2022-08-16 | End: 2022-09-21

## 2022-08-16 NOTE — TELEPHONE ENCOUNTER
"Routing refill request to provider for review/approval because:    Requested Prescriptions   Pending Prescriptions Disp Refills    EUTHYROX 75 MCG tablet [Pharmacy Med Name: Euthyrox 75 MCG Oral Tablet] 90 tablet 0     Sig: Take 1 tablet by mouth once daily        Thyroid Protocol Failed - 8/16/2022 11:05 AM        Failed - Normal TSH on file in past 12 months     Recent Labs   Lab Test 10/29/20  1530   TSH 2.88                Passed - Patient is 12 years or older        Passed - Recent (12 mo) or future (30 days) visit within the authorizing provider's specialty     Patient has had an office visit with the authorizing provider or a provider within the authorizing providers department within the previous 12 mos or has a future within next 30 days. See \"Patient Info\" tab in inbasket, or \"Choose Columns\" in Meds & Orders section of the refill encounter.              Passed - Medication is active on med list        Passed - No active pregnancy on record     If patient is pregnant or has had a positive pregnancy test, please check TSH.          Passed - No positive pregnancy test in past 12 months     If patient is pregnant or has had a positive pregnancy test, please check TSH.                    "

## 2022-08-16 NOTE — TELEPHONE ENCOUNTER
I have sent the pt a [facility] message with the message below.     Per Dr. Matthews:    Patient needs follow up appointment prior to additional refills.   Electronically signed by MD Rocío Peng CMA

## 2022-08-17 DIAGNOSIS — N30.20 CHRONIC CYSTITIS: ICD-10-CM

## 2022-08-17 RX ORDER — NITROFURANTOIN MACROCRYSTAL 100 MG
100 CAPSULE ORAL AT BEDTIME
Qty: 90 CAPSULE | Refills: 0 | Status: SHIPPED | OUTPATIENT
Start: 2022-08-17 | End: 2022-01-01

## 2022-08-17 NOTE — TELEPHONE ENCOUNTER
ONE TIME KAM Refill Nitrofurantoin. 90 with 0 refills. Last OV= 11/17/21.    Lela BAÑUELOS RN Urology 8/17/2022 8:42 AM

## 2022-08-22 DIAGNOSIS — E03.4 HYPOTHYROIDISM DUE TO ACQUIRED ATROPHY OF THYROID: ICD-10-CM

## 2022-08-22 NOTE — PROGRESS NOTES
S-(situation): 4 hour shift note    B-(background): Lightheadedness, UTI    A-(assessment): Pt is A & O to self, remains confused. VSS, afebrile, on room air. Prn tylenol given x 1 for intermittent dysuria. Lung sounds clear with some crackles to RLL and LLL diminished. Pt denied dinner, few sips of water. Pure wick remains in place with 350 mL output. Resting most of shift.    R-(recommendations): Will continue to monitor vitals, pain, orientation, urine, and await pending urine cultures     Patient Needs Assistance to Leave Residence...

## 2022-08-23 DIAGNOSIS — J30.1 SEASONAL ALLERGIC RHINITIS DUE TO POLLEN: ICD-10-CM

## 2022-08-23 RX ORDER — LEVOTHYROXINE SODIUM 75 UG/1
TABLET ORAL
Qty: 30 TABLET | Refills: 0 | OUTPATIENT
Start: 2022-08-23

## 2022-08-26 RX ORDER — LORATADINE 10 MG/1
TABLET ORAL
Qty: 30 TABLET | Refills: 0 | Status: SHIPPED | OUTPATIENT
Start: 2022-08-26 | End: 2022-01-01

## 2022-08-26 NOTE — TELEPHONE ENCOUNTER
"Requested Prescriptions   Pending Prescriptions Disp Refills    EQ ALLERGY RELIEF 10 MG tablet [Pharmacy Med Name: EQ Allergy Relief 10 MG Oral Tablet] 30 tablet 0     Sig: Take 1 tablet by mouth once daily        Antihistamines Protocol Failed - 8/23/2022 10:07 AM        Failed - Patient is 3-64 years of age     Apply weight-based dosing for peds patients age 3 - 12 years of age.    Forward request to provider for patients under the age of 3 or over the age of 64.            Passed - Recent (12 mo) or future (30 days) visit within the authorizing provider's specialty     Patient has had an office visit with the authorizing provider or a provider within the authorizing providers department within the previous 12 mos or has a future within next 30 days. See \"Patient Info\" tab in inbasket, or \"Choose Columns\" in Meds & Orders section of the refill encounter.              Passed - Medication is active on med list               Loni Carmichael RN  "

## 2022-08-30 ENCOUNTER — MYC MEDICAL ADVICE (OUTPATIENT)
Dept: INTERNAL MEDICINE | Facility: CLINIC | Age: 87
End: 2022-08-30

## 2022-08-30 ENCOUNTER — TELEPHONE (OUTPATIENT)
Dept: CARDIOLOGY | Facility: CLINIC | Age: 87
End: 2022-08-30

## 2022-08-30 NOTE — TELEPHONE ENCOUNTER
[facility] Call Center    Phone Message    May a detailed message be left on voicemail: yes     Reason for Call: Other: PLease call to schedule TAVR appointments.      Action Taken: Other: routed to cardiology    Travel Screening: Not Applicable

## 2022-09-03 NOTE — TELEPHONE ENCOUNTER
Notified, will update in 2 weeks.    Melissa Serrano XRO/     Pt remains fall free this shift.  Pt AAOx4, verbal, clear speech.  Skin warm and dry. No new skin issues.  Telemonitoring in progress, SR  Room air  Urinal  Independent with transfers.  Bed low, side rails up x 2, wheels locked, call light in reach.  Hourly rounding completed.  24 hour chart check completed  POC updated and reviewed with pt. Will continue POC.

## 2022-09-08 DIAGNOSIS — E78.5 HYPERLIPIDEMIA WITH TARGET LDL LESS THAN 130: ICD-10-CM

## 2022-09-08 RX ORDER — ATORVASTATIN CALCIUM 40 MG/1
40 TABLET, FILM COATED ORAL DAILY
Qty: 90 TABLET | Refills: 2 | Status: ON HOLD | OUTPATIENT
Start: 2022-09-08 | End: 2023-01-01

## 2022-09-08 NOTE — TELEPHONE ENCOUNTER
Received refill request for: Atorvastatin  Last OV was: 5/24/22 YVON Hameed  Labs/EKG: 3/9/22 Lipids/ALT  F/U scheduled: Orders for 11/2022. Letter was sent 8/22/22.   Date Time Provider Department Center   9/21/2022  2:00 PM Augusto Meyer DO [facility]   9/21/2022  2:30 PM PH LAB [facility]       Pharmacy: [facility] Refill Guideline reviewed.  Medication meets criteria for refill.    Susan Harrington RN, BSN  09/08/22 at 9:05 AM

## 2022-09-10 ENCOUNTER — HEALTH MAINTENANCE LETTER (OUTPATIENT)
Age: 87
End: 2022-09-10

## 2022-09-13 DIAGNOSIS — D50.0 IRON DEFICIENCY ANEMIA DUE TO CHRONIC BLOOD LOSS: ICD-10-CM

## 2022-09-16 NOTE — TELEPHONE ENCOUNTER
"  Requested Prescriptions   Pending Prescriptions Disp Refills    SLOW  (45 Fe) MG CR tablet [Pharmacy Med Name: Slow Fe 142 (45 Fe) MG Oral Tablet Extended Release] 90 tablet 1     Sig: Take 1 tablet by mouth once daily        Iron Supplements Passed - 9/13/2022 10:17 AM        Passed - Patient is 12 years of age or older        Passed - Recent (12 mo) or future (30 days) visit within the authorizing provider's specialty     Patient has had an office visit with the authorizing provider or a provider within the authorizing providers department within the previous 12 mos or has a future within next 30 days. See \"Patient Info\" tab in inbasket, or \"Choose Columns\" in Meds & Orders section of the refill encounter.              Passed - Hgb OR Hct on record within the past 12 mos.     Patient need only have had a HGB or HCT on file in the past 12 mos. That result does not need to be normal.    Recent Labs   Lab Test 06/21/22  1339 05/24/22  1324 04/12/22  1409   HGB 10.7* 10.7* 10.1*       Recent Labs   Lab Test 06/21/22  1339 05/24/22  1324 04/12/22  1409   HCT 33.0* 34.2* 31.8*       Please verify a HGB or HCT has been checked SINCE THE LAST DOSE CHANGE.            Passed - Medication is active on med list              AUTUMN CruzN, RN     "

## 2022-09-21 ENCOUNTER — LAB (OUTPATIENT)
Dept: LAB | Facility: CLINIC | Age: 87
End: 2022-09-21
Payer: COMMERCIAL

## 2022-09-21 ENCOUNTER — OFFICE VISIT (OUTPATIENT)
Dept: INTERNAL MEDICINE | Facility: CLINIC | Age: 87
End: 2022-09-21
Payer: COMMERCIAL

## 2022-09-21 VITALS
SYSTOLIC BLOOD PRESSURE: 128 MMHG | DIASTOLIC BLOOD PRESSURE: 60 MMHG | TEMPERATURE: 98.6 F | OXYGEN SATURATION: 96 % | BODY MASS INDEX: 17.75 KG/M2 | RESPIRATION RATE: 10 BRPM | WEIGHT: 110 LBS | HEART RATE: 76 BPM

## 2022-09-21 DIAGNOSIS — D64.9 ANEMIA, UNSPECIFIED TYPE: ICD-10-CM

## 2022-09-21 DIAGNOSIS — E03.4 HYPOTHYROIDISM DUE TO ACQUIRED ATROPHY OF THYROID: Primary | ICD-10-CM

## 2022-09-21 DIAGNOSIS — G40.909 SEIZURE DISORDER (H): ICD-10-CM

## 2022-09-21 DIAGNOSIS — N39.46 MIXED STRESS AND URGE URINARY INCONTINENCE: ICD-10-CM

## 2022-09-21 DIAGNOSIS — Z79.899 DRUG THERAPY: Primary | ICD-10-CM

## 2022-09-21 DIAGNOSIS — M05.9 SEROPOSITIVE RHEUMATOID ARTHRITIS (H): ICD-10-CM

## 2022-09-21 DIAGNOSIS — E03.4 HYPOTHYROIDISM DUE TO ACQUIRED ATROPHY OF THYROID: ICD-10-CM

## 2022-09-21 DIAGNOSIS — D50.0 IRON DEFICIENCY ANEMIA DUE TO CHRONIC BLOOD LOSS: ICD-10-CM

## 2022-09-21 LAB
ALT SERPL W P-5'-P-CCNC: 14 U/L (ref 0–50)
AST SERPL W P-5'-P-CCNC: 16 U/L (ref 0–45)
BASOPHILS # BLD AUTO: 0.1 10E3/UL (ref 0–0.2)
BASOPHILS NFR BLD AUTO: 1 %
CREAT SERPL-MCNC: 0.82 MG/DL (ref 0.52–1.04)
CRP SERPL-MCNC: 3.1 MG/L (ref 0–8)
EOSINOPHIL # BLD AUTO: 0.1 10E3/UL (ref 0–0.7)
EOSINOPHIL NFR BLD AUTO: 1 %
ERYTHROCYTE [DISTWIDTH] IN BLOOD BY AUTOMATED COUNT: 17.1 % (ref 10–15)
GFR SERPL CREATININE-BSD FRML MDRD: 66 ML/MIN/1.73M2
HCT VFR BLD AUTO: 31.9 % (ref 35–47)
HGB BLD-MCNC: 10.4 G/DL (ref 11.7–15.7)
IMM GRANULOCYTES # BLD: 0 10E3/UL
IMM GRANULOCYTES NFR BLD: 0 %
LYMPHOCYTES # BLD AUTO: 1.4 10E3/UL (ref 0.8–5.3)
LYMPHOCYTES NFR BLD AUTO: 19 %
MCH RBC QN AUTO: 30.4 PG (ref 26.5–33)
MCHC RBC AUTO-ENTMCNC: 32.6 G/DL (ref 31.5–36.5)
MCV RBC AUTO: 93 FL (ref 78–100)
MONOCYTES # BLD AUTO: 0.5 10E3/UL (ref 0–1.3)
MONOCYTES NFR BLD AUTO: 7 %
NEUTROPHILS # BLD AUTO: 5.2 10E3/UL (ref 1.6–8.3)
NEUTROPHILS NFR BLD AUTO: 72 %
NRBC # BLD AUTO: 0 10E3/UL
NRBC BLD AUTO-RTO: 0 /100
PLATELET # BLD AUTO: 273 10E3/UL (ref 150–450)
RBC # BLD AUTO: 3.42 10E6/UL (ref 3.8–5.2)
TSH SERPL DL<=0.005 MIU/L-ACNC: 3.1 MU/L (ref 0.4–4)
WBC # BLD AUTO: 7.2 10E3/UL (ref 4–11)

## 2022-09-21 PROCEDURE — 82565 ASSAY OF CREATININE: CPT

## 2022-09-21 PROCEDURE — 36415 COLL VENOUS BLD VENIPUNCTURE: CPT

## 2022-09-21 PROCEDURE — 85025 COMPLETE CBC W/AUTO DIFF WBC: CPT

## 2022-09-21 PROCEDURE — 86140 C-REACTIVE PROTEIN: CPT

## 2022-09-21 PROCEDURE — 84450 TRANSFERASE (AST) (SGOT): CPT

## 2022-09-21 PROCEDURE — 99214 OFFICE O/P EST MOD 30 MIN: CPT | Performed by: INTERNAL MEDICINE

## 2022-09-21 PROCEDURE — 84443 ASSAY THYROID STIM HORMONE: CPT

## 2022-09-21 PROCEDURE — 84460 ALANINE AMINO (ALT) (SGPT): CPT

## 2022-09-21 RX ORDER — LEVOTHYROXINE SODIUM 75 UG/1
75 TABLET ORAL DAILY
Qty: 90 TABLET | Refills: 1 | Status: SHIPPED | OUTPATIENT
Start: 2022-09-21 | End: 2023-01-01

## 2022-09-21 RX ORDER — CX-024414 0.2 MG/ML
INJECTION, SUSPENSION INTRAMUSCULAR
COMMUNITY
Start: 2022-06-07 | End: 2022-01-01

## 2022-09-21 NOTE — PROGRESS NOTES
"  Assessment & Plan     Hypothyroidism due to acquired atrophy of thyroid  Patient needing recheck of TSH levels prior to represcribing her levothyroxine.  Patient denies any any new physical changes, heat or cold intolerance, loss of appetite, difficulty sleeping, and state \"feels fine\" today.    Upon evaluation of her TSH levels today patient will either be represcribed her normal dose of levothyroxine or will receive a new prescription dose.    - levothyroxine (EUTHYROX) 75 MCG tablet; Take 1 tablet (75 mcg) by mouth daily  - TSH with free T4 reflex; Future    Iron deficiency anemia due to chronic blood loss  Stable    Last CBC taken in June revealing mild chronic stable anemia.  Patient alert and oriented today with no recent complaints of increased lethargy or weakness.    Seropositive rheumatoid arthritis (H)  Stable.  No new complaints today.    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    Keppra level last assessed in June and within normal limits.       No follow-ups on file.    Augusto Meyer [facility] GIANNA French is a 89 year old accompanied by her daughter, presenting for the following health issues:  Recheck Medication      HPI       Review of Systems     CONSTITUTIONAL: NEGATIVE for fever, chills, change in weight  INTEGUMENTARY/SKIN: NEGATIVE for worrisome rashes, moles or lesions  EYES: NEGATIVE for vision changes or irritation  ENT/MOUTH: NEGATIVE for ear, mouth and throat problems  RESP: NEGATIVE for significant cough or SOB  CV: NEGATIVE for chest pain, palpitations or peripheral edema  GI: NEGATIVE for nausea, abdominal pain, heartburn, or change in bowel habits  : NEGATIVE for frequency, dysuria, or hematuria  MUSCULOSKELETAL: NEGATIVE for significant arthralgias or myalgia  NEURO: NEGATIVE for weakness, dizziness or paresthesias  ENDOCRINE: NEGATIVE for temperature intolerance, skin/hair changes  HEME: NEGATIVE for " bleeding problems  PSYCHIATRIC: NEGATIVE for changes in mood or affect      Objective    /60   Pulse 76   Temp 98.6  F (37  C)   Resp 10   Wt 49.9 kg (110 lb)   SpO2 96%   BMI 17.75 kg/m    Body mass index is 17.75 kg/m .  Physical Exam     GENERAL: Appears thin but healthy, alert and no distress  EYES: Eyes grossly normal to inspection, PERRL and conjunctivae and sclerae normal  HENT: ear canals and TM's normal, nose and mouth without ulcers or lesions  NECK: no adenopathy, no asymmetry, masses, or scars and thyroid normal to palpation  RESP: lungs clear to auscultation - no rales, rhonchi or wheezes  CV: regular rate and rhythm, normal S1 S2, no S3 or S4, no murmur, click or rub, no peripheral edema and peripheral pulses strong  ABDOMEN: soft, nontender, no hepatosplenomegaly, no masses and bowel sounds normal  MS: no gross musculoskeletal defects noted, no edema  SKIN: no suspicious lesions or rashes  NEURO: Normal strength and tone, mentation intact and speech normal  PSYCH: mentation appears normal, affect normal/bright    No results found for this or any previous visit (from the past 24 hour(s)).                   I have carefully explained the diagnosis and treatment options to the patient.  The patient has displayed an understanding of the above, and all subsequent questions were answered.      DO DELMA Pineda    Portions of this note were produced using [employer]  Although every attempt at real-time proof reading has been made, occasional grammar/syntax errors may have been missed.        This patient has been interviewed, examined, diagnosed, and informed of the above by me personally.  Medical records and available pertinent information has been reviewed by me personally.  All decisions and discussion have been between myself and the patient/family.  This was done in the presence of Zak Verdugo, who acted as a medical scribe and recorded the events above.  No diagnosis or  decision making was made by the above-mentioned scribe.  The patient, and or his/her ensurors will not be billed for the presence or actions of this scribe.  The information recorded by the scribe has been reviewed by me and found to be accurate.

## 2022-09-29 NOTE — TELEPHONE ENCOUNTER
"Requested Prescriptions   Pending Prescriptions Disp Refills    EQ ALLERGY RELIEF 10 MG tablet [Pharmacy Med Name: EQ Allergy Relief 10 MG Oral Tablet] 30 tablet 0     Sig: Take 1 tablet by mouth once daily        Antihistamines Protocol Failed - 9/27/2022 10:01 AM        Failed - Patient is 3-64 years of age     Apply weight-based dosing for peds patients age 3 - 12 years of age.    Forward request to provider for patients under the age of 3 or over the age of 64.            Passed - Recent (12 mo) or future (30 days) visit within the authorizing provider's specialty     Patient has had an office visit with the authorizing provider or a provider within the authorizing providers department within the previous 12 mos or has a future within next 30 days. See \"Patient Info\" tab in inbasket, or \"Choose Columns\" in Meds & Orders section of the refill encounter.              Passed - Medication is active on med list                ERNESTINE Burrell, RN          "

## 2022-11-08 NOTE — TELEPHONE ENCOUNTER
"Remeron  Prescription approved per [facility] Refill Protocol.    Allergy relief  Routing refill request to provider for review/approval because:    Requested Prescriptions   Pending Prescriptions Disp Refills     EQ ALLERGY RELIEF 10 MG tablet [Pharmacy Med Name: EQ Allergy Relief 10 MG Oral Tablet] 30 tablet 0     Sig: Take 1 tablet by mouth once daily       Antihistamines Protocol Failed - 11/4/2022  5:09 PM        Failed - Patient is 3-64 years of age     Apply weight-based dosing for peds patients age 3 - 12 years of age.    Forward request to provider for patients under the age of 3 or over the age of 64.          Passed - Recent (12 mo) or future (30 days) visit within the authorizing provider's specialty     Patient has had an office visit with the authorizing provider or a provider within the authorizing providers department within the previous 12 mos or has a future within next 30 days. See \"Patient Info\" tab in inbasket, or \"Choose Columns\" in Meds & Orders section of the refill encounter.              Passed - Medication is active on med list        "

## 2022-11-16 NOTE — PROGRESS NOTES
Gillian is a 89 year old who is being evaluated via a billable video visit.      How would you like to obtain your AVS? MyChart  If the video visit is dropped, the invitation should be resent by: Text to cell phone: [phone]  Will anyone else be joining your video visit? Yes: Yasmeen (Daughter). How would they like to receive their invitation? Text to cell phone: [phone]          Assessment & Plan   Problem List Items Addressed This Visit    None  Visit Diagnoses     Chronic cystitis        Relevant Medications    nitroFURantoin macrocrystal (MACRODANTIN) 100 MG capsule           Prescription drug management             No follow-ups on file.    Fish Gutierrez MD  [facility]   Gillian is a 89 year old, presenting for the following health issues:  No chief complaint on file.      HPI     Patient is a pleasant 89-year-old  female with history of recurrent urinary tract infections.  She is on Macrodantin nightly for prevention.  She is on this medication for a number of years.  She denies any infections.  She denies any side effects from the medications.    Review of Systems   Constitutional, HEENT, cardiovascular, pulmonary, gi and gu systems are negative, except as otherwise noted.      Objective           Vitals:  No vitals were obtained today due to virtual visit.    Physical Exam   GENERAL: Healthy, alert and no distress  EYES: Eyes grossly normal to inspection.  No discharge or erythema, or obvious scleral/conjunctival abnormalities.  RESP: No audible wheeze, cough, or visible cyanosis.  No visible retractions or increased work of breathing.    SKIN: Visible skin clear. No significant rash, abnormal pigmentation or lesions.  NEURO: Cranial nerves grossly intact.  Mentation and speech appropriate for age.  PSYCH: Mentation appears normal, affect normal/bright, judgement and insight intact, normal speech and appearance well-groomed.      Recurrent UTI:  Continue with Macrodantin nightly.  Follow-up with me in 1 year.        Video-Visit Details    Video Start Time: 1130    Type of service:  Video Visit    Video End Time:11:37 AM    Originating Location (pt. Location): Home        Distant Location (provider location):  On-site    Platform used for Video Visit: [employer]

## 2022-11-22 NOTE — TELEPHONE ENCOUNTER
Is there any restriction on how much mylanta she can take? Lela Canela A     [Confused or Disoriented] : confusion [Memory Lapses or Loss] : memory loss [Decr. Concentrating Ability] : decreased concentrating ability [Anxiety] : anxiety [Negative] : Musculoskeletal

## 2022-11-25 NOTE — PROGRESS NOTES
Primary Cardiologist: Dr. Lee    Reason for Visit: 6-month follow-up    History of Present Illness:   Heike is a very pleasant 89-year-old female with past medical history is notable for coronary artery disease (coronary angiogram revealed 60-70% hemodynamically significant mid to distal LAD lesion, due to contrast dye load she was recommended to return for staged intervention of this lesion.  She however had resolution of her symptoms with the addition of amlodipine and therefore staged intervention was not performed; she recently had recurrent symptoms and was referred for coronary angiogram and had PCI of LAD; on DAPT), history of severe aortic valve stenosis (status post TAVR in 8/2016 ([place] scientific Treva Valve), most recent echocardiogram shows normal gradients and no evidence of significant AI), ischemic cardiomyopathy (EF 35-40%, switch to long-acting beta-blocker during TAVR follow-up visit, not on ACE inhibitor), history of CVA in 2013 (on chronic Plavix therapy), anemia (receiving Iron infusions regularly; stable 9-10 with no recent hx of acute bleeding issues), hypertension, hyperlipidemia, GERD, known left bundle branch block, and hypothyroidism.     Heike presents to clinic today with her daughter, stating she is doing well.  Once in a while she will have left-sided chest discomfort that feels like sharp shooting.  If she massages over the area at this helps.  Sometimes sitting straight up or stretching her side also seems to help improve the discomfort.  She has no associated shortness of breath, diaphoresis, palpitations, or lightheadedness.  She does not think she ever gets it when she is walking.  This seems to happen more when she is tired and hunching over more than usual.  She denies orthopnea, abdominal distention, or peripheral edema.    Assessment and Plan:  Heike is a very pleasant 89-year-old female with past medical history is notable for coronary artery disease (coronary  angiogram revealed 60-70% hemodynamically significant mid to distal LAD lesion, due to contrast dye load she was recommended to return for staged intervention of this lesion.  She however had resolution of her symptoms with the addition of amlodipine and therefore staged intervention was not performed; she recently had recurrent symptoms and was referred for coronary angiogram and had PCI of LAD; on DAPT), history of severe aortic valve stenosis (status post TAVR in 8/2016 ([place] scientific Treva Valve), most recent echocardiogram shows normal gradients and no evidence of significant AI), ischemic cardiomyopathy (EF 35-40%, switch to long-acting beta-blocker during TAVR follow-up visit, not on ACE inhibitor), history of CVA in 2013 (on chronic Plavix therapy), anemia (receiving Iron infusions regularly; stable 9-10 with no recent hx of acute bleeding issues), hypertension, hyperlipidemia, GERD, known left bundle branch block, and hypothyroidism.    Heike appears to be doing well from a cardiac standpoint.  In regard to her chest discomfort this seems to be quite atypical.  I wonder if her moderate kyphosis is causing some muscle soreness.  I have encouraged her to do some stretches in the morning.  She will keep an eye on her symptoms and let us know if it seems to happen more frequently or if she develops it during her activities.  I would like her to complete full resting echocardiogram.  She already has this scheduled for next week.  We will see what her valve function does.  We will also assess her LVEF and for any wall motion abnormalities.  She and her daughter were both in agreement with this plan.    We will follow-up with her in 6 months or sooner if she has worsening symptoms.      This note was completed in part using Dragon voice recognition software. Although reviewed after completion, some word and grammatical errors may occur.    Orders this Visit:  No orders of the defined types were placed in this  encounter.    No orders of the defined types were placed in this encounter.    Medications Discontinued During This Encounter   Medication Reason     ciprofloxacin (CIPRO) 250 MG tablet      docusate sodium (COLACE) 100 MG capsule      MODERNA COVID-19 VACCINE 100 MCG/0.5ML injection          Encounter Diagnoses   Name Primary?     Coronary artery disease involving native coronary artery of native heart without angina pectoris      Anemia, unspecified type      Hypertension goal BP (blood pressure) < 140/90        CURRENT MEDICATIONS:  Current Outpatient Medications   Medication Sig Dispense Refill     acetaminophen (TYLENOL ARTHRITIS PAIN) 650 MG CR tablet Take 1,300 mg by mouth every 8 hours as needed for mild pain        ALPRAZolam (XANAX) 0.25 MG tablet Take 1 tablet by mouth twice daily as needed for anxiety 28 tablet 0     amLODIPine (NORVASC) 5 MG tablet Take 1 tablet (5 mg) by mouth daily 90 tablet 3     aspirin EC 81 MG EC tablet Take 1 tablet (81 mg) by mouth daily       atorvastatin (LIPITOR) 40 MG tablet Take 1 tablet (40 mg) by mouth daily 90 tablet 2     calcium carbonate (TUMS) 500 MG chewable tablet Take 2-4 tablets (1,000-2,000 mg) by mouth as needed for heartburn       clopidogrel (PLAVIX) 75 MG tablet Take 1 tablet (75 mg) by mouth daily 90 tablet 3     Cranberry 1000 MG CAPS        EQ ALLERGY RELIEF 10 MG tablet Take 1 tablet by mouth once daily 30 tablet 0     famotidine (PEPCID) 40 MG tablet TAKE 1 TABLET BY MOUTH AT BEDTIME 30 tablet 9     isosorbide mononitrate (IMDUR) 60 MG 24 hr tablet Take 1 tablet (60 mg) by mouth daily 180 tablet 3     Lactobacillus (FLORAJEN ACIDOPHILUS) CAPS Take 1 capsule by mouth daily       levETIRAcetam (KEPPRA) 500 MG tablet TAKE 1 TABLET BY MOUTH ONCE DAILY AND 2 AT BEDTIME 270 tablet 0     levothyroxine (EUTHYROX) 75 MCG tablet Take 1 tablet (75 mcg) by mouth daily 90 tablet 1     lisinopril (ZESTRIL) 5 MG tablet Take 1 tablet (5 mg) by mouth daily 90 tablet 3      methotrexate 2.5 MG tablet Take 4 tablets (10 mg) by mouth once a week Wed 52 tablet 3     metoprolol succinate ER (TOPROL XL) 50 MG 24 hr tablet Take 1 tablet (50 mg) by mouth 2 times daily 180 tablet 1     mirtazapine (REMERON) 30 MG tablet TAKE 1 TABLET BY MOUTH ONCE DAILY AT BEDTIME 90 tablet 1     nitroFURantoin macrocrystal (MACRODANTIN) 100 MG capsule Take 1 capsule (100 mg) by mouth At Bedtime PLEASE MAKE AN APPOINTMENT FOR FUTURE FILLS. CALL [phone]. 90 capsule 3     predniSONE (DELTASONE) 5 MG tablet TAKE ONE TABLET BY MOUTH EVERY OTHER DAY ALTERNATING  WITH  1/2  TABLET  EVERY  OTHER  DAY 23 tablet 13     RESTASIS 0.05 % ophthalmic emulsion INSTILL 1 DROP INTO LEFT EYE TWICE DAILY AS DIRECTED       SLOW  (45 Fe) MG CR tablet Take 1 tablet by mouth once daily 90 tablet 1     nitroGLYcerin (NITROSTAT) 0.4 MG sublingual tablet DISSOLVE ONE TABLET UNDER THE TONGUE EVERY 5 MINUTES AS NEEDED FOR CHEST PAIN.  DO NOT EXCEED A TOTAL OF 3 DOSES IN 15 MINUTES (Patient not taking: Reported on 5/24/2022) 25 tablet 0       ALLERGIES     Allergies   Allergen Reactions     Penicillins Hives     Caffeine Other (See Comments) and Unknown     Triggers your Meniere's disease     Hydrocodone Other (See Comments) and Unknown     hallucinations     Ibuprofen GI Disturbance and Unknown     Other Environmental Allergy      Metals of unspecific kind     Tramadol Other (See Comments)     Seizure, was taking remeron along with tramadol     Metal [Staples] Rash       PAST MEDICAL HISTORY:  Past Medical History:   Diagnosis Date     Aortic stenosis     s/p TAVR 8/17/16     Chronic migraine      Hyperlipidaemia      Hypertension      Hypothyroidism      Myocardial infarction (H)      Need for SBE (subacute bacterial endocarditis) prophylaxis      Orthostatic hypotension     wears compression stockings     PUD (peptic ulcer disease)      Secondary Sjogren's syndrome (H)      Seizures (H)     after stroke (partial onset)      Seropositive rheumatoid arthritis (H)      Stroke (H) 05/2013    with visual field cut, left occipital lobe     Syncope 2014    due to orthostatic hypotension       PAST SURGICAL HISTORY:  Past Surgical History:   Procedure Laterality Date     ANOMALOUS PULMONARY VENOUS RETURN REPAIR, TOTAL       CATARACT EXTRACTION       CATARACT IOL, RT/LT Bilateral 2000     CV FLUOROSCOPY ONLY N/A 8/6/2019    Procedure: Fluoroscopy Only - valve cine done of aortic valve at 180 degrees [place] to [place]G;  Surgeon: Dave Farfan MD;  Location:  [facility]     CV HEART CATHETERIZATION WITH POSSIBLE INTERVENTION N/A 2/10/2021    Procedure: Heart Catheterization with Possible Intervention;  Surgeon: Fish Padgett MD;  Location:  [facility]     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     EYE SURGERY       HEART CATH FEMORAL CANNULIZATION WITH OPEN STANDBY REPAIR AORTIC VALVE N/A 8/3/2016    Procedure: HEART CATH FEMORAL CANNULIZATION WITH OPEN STANDBY REPAIR AORTIC VALVE;  Surgeon: Owen Schultz MD;  Location: [facility]     HYSTERECTOMY       HYSTERECTOMY TOTAL ABDOMINAL  1970    ovaries out     MANDIBLE FRACTURE SURGERY       MOUTH SURGERY  2011    jaw surgery due to fracture     OTHER SURGICAL HISTORY      Treva valve implant     TOTAL HIP ARTHROPLASTY       TRANSCATHETER AORTIC VALVE IMPLANT ANESTHESIA N/A 8/3/2016    Procedure: TRANSCATHETER AORTIC VALVE IMPLANT ANESTHESIA;  Surgeon: GENERIC ANESTHESIA PROVIDER;  [facility]     Z TOTAL HIP ARTHROPLASTY Right 2010     Z TOTAL HIP ARTHROPLASTY Left 2014       FAMILY HISTORY:  Family History   Problem Relation Age of Onset     Hyperlipidemia Mother      Family History Negative No family hx of      Hypertension Mother      Rheumatoid Arthritis Child        SOCIAL HISTORY:  Social History     Socioeconomic History     Marital status:      Number of children: 4   Occupational History     Employer: RETIRED   Tobacco  "Use     Smoking status: Never     Smokeless tobacco: Never   Vaping Use     Vaping Use: Never used   Substance and Sexual Activity     Alcohol use: No     Alcohol/week: 0.0 standard drinks     Drug use: No     Sexual activity: Not Currently   Other Topics Concern     Special Diet Yes     Comment: low salt      Exercise Yes     Comment: semi recument bike 15 mins daily    Social History Narrative    .  Lives in assisted living. Independent. Has help with making bed and changing sheets.    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 .     4 children - 1 with RA       Review of Systems:  Skin:  Negative     Eyes:  Positive for glasses  ENT:  Negative    Respiratory:  Positive for dyspnea on exertion  Cardiovascular:  Negative for;palpitations;lightheadedness;dizziness Positive for;chest pain;edema;fatigue  Gastroenterology: Negative    Genitourinary:  Negative    Musculoskeletal:  Negative    Neurologic:  Negative    Psychiatric:  Positive for sleep disturbances  Heme/Lymph/Imm:  Positive for allergies  Endocrine:  Negative      Physical Exam:  Vitals: /60 (BP Location: Right arm, Patient Position: Sitting, Cuff Size: Adult Regular)   Pulse 80   Ht 1.676 m (5' 6\")   Wt 49.4 kg (108 lb 14.4 oz)   SpO2 95%   BMI 17.58 kg/m       GEN:  NAD. Moderate kyphosis. Sitting in wheelchair. Daughter is with her.   NECK: No JVD  C/V:  Regular rate and rhythm, no murmur, rub or gallop.  RESP: Clear to auscultation bilaterally without wheezing, rales, or rhonchi.  GI: Abdomen soft, nontender, nondistended.  EXTREM: No pitting LE edema. Wearing compression socks.  NEURO: Alert and oriented, cooperative. No obvious focal deficits.   PSYCH: Normal affect.  SKIN: Warm and dry.       Recent Lab Results:  LIPID RESULTS:  Lab Results   Component Value Date    CHOL 131 03/09/2022    CHOL 107 02/10/2021    HDL 50 03/09/2022    HDL 59 02/10/2021    LDL 61 03/09/2022    LDL 31 02/10/2021    TRIG 102 " 03/09/2022    TRIG 87 02/10/2021    CHOLHDLRATIO 2.3 07/07/2015       LIVER ENZYME RESULTS:  Lab Results   Component Value Date    AST 16 09/21/2022    AST 25 05/29/2021    ALT 14 09/21/2022    ALT 21 05/29/2021       CBC RESULTS:  Lab Results   Component Value Date    WBC 7.2 09/21/2022    WBC 8.1 07/01/2021    RBC 3.42 (L) 09/21/2022    RBC 2.96 (L) 07/01/2021    HGB 10.4 (L) 09/21/2022    HGB 8.4 (L) 07/01/2021    HCT 31.9 (L) 09/21/2022    HCT 26.6 (L) 07/01/2021    MCV 93 09/21/2022    MCV 90 07/01/2021    MCH 30.4 09/21/2022    MCH 28.4 07/01/2021    MCHC 32.6 09/21/2022    MCHC 31.6 07/01/2021    RDW 17.1 (H) 09/21/2022    RDW 18.9 (H) 07/01/2021     09/21/2022     07/01/2021       BMP RESULTS:  Lab Results   Component Value Date     06/21/2022     05/31/2021    POTASSIUM 3.9 06/21/2022    POTASSIUM 3.5 05/31/2021    CHLORIDE 105 06/21/2022    CHLORIDE 105 05/31/2021    CO2 30 06/21/2022    CO2 24 05/31/2021    ANIONGAP 4 06/21/2022    ANIONGAP 7 05/31/2021     (H) 06/21/2022     (H) 05/31/2021    BUN 33 (H) 06/21/2022    BUN 26 05/31/2021    CR 0.82 09/21/2022    CR 1.15 (H) 05/31/2021    GFRESTIMATED 66 09/21/2022    GFRESTIMATED 41 (L) 05/31/2021    GFRESTBLACK 47 (L) 05/31/2021    CHEYANNE 8.0 (L) 06/21/2022    CHEYANNE 7.5 (L) 05/31/2021        A1C RESULTS:  Lab Results   Component Value Date    A1C 5.5 10/13/2018       INR RESULTS:  Lab Results   Component Value Date    INR 1.16 (H) 02/10/2021    INR 1.11 04/26/2019           Matt Mccracken PA-C  November 25, 2022

## 2022-11-29 NOTE — LETTER
11/29/2022    Augusto Meyer, DO  [address] [place] [address]    RE: Gillian Burk       Dear Colleague,     I had the pleasure of seeing Gillian TIFFANY Burk in the [facility].  Primary Cardiologist: Dr. Lee    Reason for Visit: 6-month follow-up    History of Present Illness:   Heike is a very pleasant 89-year-old female with past medical history is notable for coronary artery disease (coronary angiogram revealed 60-70% hemodynamically significant mid to distal LAD lesion, due to contrast dye load she was recommended to return for staged intervention of this lesion.  She however had resolution of her symptoms with the addition of amlodipine and therefore staged intervention was not performed; she recently had recurrent symptoms and was referred for coronary angiogram and had PCI of LAD; on DAPT), history of severe aortic valve stenosis (status post TAVR in 8/2016 ([place] scientific Treva Valve), most recent echocardiogram shows normal gradients and no evidence of significant AI), ischemic cardiomyopathy (EF 35-40%, switch to long-acting beta-blocker during TAVR follow-up visit, not on ACE inhibitor), history of CVA in 2013 (on chronic Plavix therapy), anemia (receiving Iron infusions regularly; stable 9-10 with no recent hx of acute bleeding issues), hypertension, hyperlipidemia, GERD, known left bundle branch block, and hypothyroidism.     Heike presents to clinic today with her daughter, stating she is doing well.  Once in a while she will have left-sided chest discomfort that feels like sharp shooting.  If she massages over the area at this helps.  Sometimes sitting straight up or stretching her side also seems to help improve the discomfort.  She has no associated shortness of breath, diaphoresis, palpitations, or lightheadedness.  She does not think she ever gets it when she is walking.  This seems to happen more when she is tired and hunching over more than  usual.  She denies orthopnea, abdominal distention, or peripheral edema.    Assessment and Plan:  Heike is a very pleasant 89-year-old female with past medical history is notable for coronary artery disease (coronary angiogram revealed 60-70% hemodynamically significant mid to distal LAD lesion, due to contrast dye load she was recommended to return for staged intervention of this lesion.  She however had resolution of her symptoms with the addition of amlodipine and therefore staged intervention was not performed; she recently had recurrent symptoms and was referred for coronary angiogram and had PCI of LAD; on DAPT), history of severe aortic valve stenosis (status post TAVR in 8/2016 ([place] scientific Treva Valve), most recent echocardiogram shows normal gradients and no evidence of significant AI), ischemic cardiomyopathy (EF 35-40%, switch to long-acting beta-blocker during TAVR follow-up visit, not on ACE inhibitor), history of CVA in 2013 (on chronic Plavix therapy), anemia (receiving Iron infusions regularly; stable 9-10 with no recent hx of acute bleeding issues), hypertension, hyperlipidemia, GERD, known left bundle branch block, and hypothyroidism.    Heike appears to be doing well from a cardiac standpoint.  In regard to her chest discomfort this seems to be quite atypical.  I wonder if her moderate kyphosis is causing some muscle soreness.  I have encouraged her to do some stretches in the morning.  She will keep an eye on her symptoms and let us know if it seems to happen more frequently or if she develops it during her activities.  I would like her to complete full resting echocardiogram.  She already has this scheduled for next week.  We will see what her valve function does.  We will also assess her LVEF and for any wall motion abnormalities.  She and her daughter were both in agreement with this plan.    We will follow-up with her in 6 months or sooner if she has worsening symptoms.      This  note was completed in part using Dragon voice recognition software. Although reviewed after completion, some word and grammatical errors may occur.    Orders this Visit:  No orders of the defined types were placed in this encounter.    No orders of the defined types were placed in this encounter.    Medications Discontinued During This Encounter   Medication Reason     ciprofloxacin (CIPRO) 250 MG tablet      docusate sodium (COLACE) 100 MG capsule      MODERNA COVID-19 VACCINE 100 MCG/0.5ML injection          Encounter Diagnoses   Name Primary?     Coronary artery disease involving native coronary artery of native heart without angina pectoris      Anemia, unspecified type      Hypertension goal BP (blood pressure) < 140/90        CURRENT MEDICATIONS:  Current Outpatient Medications   Medication Sig Dispense Refill     acetaminophen (TYLENOL ARTHRITIS PAIN) 650 MG CR tablet Take 1,300 mg by mouth every 8 hours as needed for mild pain        ALPRAZolam (XANAX) 0.25 MG tablet Take 1 tablet by mouth twice daily as needed for anxiety 28 tablet 0     amLODIPine (NORVASC) 5 MG tablet Take 1 tablet (5 mg) by mouth daily 90 tablet 3     aspirin EC 81 MG EC tablet Take 1 tablet (81 mg) by mouth daily       atorvastatin (LIPITOR) 40 MG tablet Take 1 tablet (40 mg) by mouth daily 90 tablet 2     calcium carbonate (TUMS) 500 MG chewable tablet Take 2-4 tablets (1,000-2,000 mg) by mouth as needed for heartburn       clopidogrel (PLAVIX) 75 MG tablet Take 1 tablet (75 mg) by mouth daily 90 tablet 3     Cranberry 1000 MG CAPS        EQ ALLERGY RELIEF 10 MG tablet Take 1 tablet by mouth once daily 30 tablet 0     famotidine (PEPCID) 40 MG tablet TAKE 1 TABLET BY MOUTH AT BEDTIME 30 tablet 9     isosorbide mononitrate (IMDUR) 60 MG 24 hr tablet Take 1 tablet (60 mg) by mouth daily 180 tablet 3     Lactobacillus (FLORAJEN ACIDOPHILUS) CAPS Take 1 capsule by mouth daily       levETIRAcetam (KEPPRA) 500 MG tablet TAKE 1 TABLET BY  MOUTH ONCE DAILY AND 2 AT BEDTIME 270 tablet 0     levothyroxine (EUTHYROX) 75 MCG tablet Take 1 tablet (75 mcg) by mouth daily 90 tablet 1     lisinopril (ZESTRIL) 5 MG tablet Take 1 tablet (5 mg) by mouth daily 90 tablet 3     methotrexate 2.5 MG tablet Take 4 tablets (10 mg) by mouth once a week Wed 52 tablet 3     metoprolol succinate ER (TOPROL XL) 50 MG 24 hr tablet Take 1 tablet (50 mg) by mouth 2 times daily 180 tablet 1     mirtazapine (REMERON) 30 MG tablet TAKE 1 TABLET BY MOUTH ONCE DAILY AT BEDTIME 90 tablet 1     nitroFURantoin macrocrystal (MACRODANTIN) 100 MG capsule Take 1 capsule (100 mg) by mouth At Bedtime PLEASE MAKE AN APPOINTMENT FOR FUTURE FILLS. CALL [phone]. 90 capsule 3     predniSONE (DELTASONE) 5 MG tablet TAKE ONE TABLET BY MOUTH EVERY OTHER DAY ALTERNATING  WITH  1/2  TABLET  EVERY  OTHER  DAY 23 tablet 13     RESTASIS 0.05 % ophthalmic emulsion INSTILL 1 DROP INTO LEFT EYE TWICE DAILY AS DIRECTED       SLOW  (45 Fe) MG CR tablet Take 1 tablet by mouth once daily 90 tablet 1     nitroGLYcerin (NITROSTAT) 0.4 MG sublingual tablet DISSOLVE ONE TABLET UNDER THE TONGUE EVERY 5 MINUTES AS NEEDED FOR CHEST PAIN.  DO NOT EXCEED A TOTAL OF 3 DOSES IN 15 MINUTES (Patient not taking: Reported on 5/24/2022) 25 tablet 0       ALLERGIES     Allergies   Allergen Reactions     Penicillins Hives     Caffeine Other (See Comments) and Unknown     Triggers your Meniere's disease     Hydrocodone Other (See Comments) and Unknown     hallucinations     Ibuprofen GI Disturbance and Unknown     Other Environmental Allergy      Metals of unspecific kind     Tramadol Other (See Comments)     Seizure, was taking remeron along with tramadol     Metal [Staples] Rash       PAST MEDICAL HISTORY:  Past Medical History:   Diagnosis Date     Aortic stenosis     s/p TAVR 8/17/16     Chronic migraine      Hyperlipidaemia      Hypertension      Hypothyroidism      Myocardial infarction (H)      Need for SBE  (subacute bacterial endocarditis) prophylaxis      Orthostatic hypotension     wears compression stockings     PUD (peptic ulcer disease)      Secondary Sjogren's syndrome (H)      Seizures (H)     after stroke (partial onset)     Seropositive rheumatoid arthritis (H)      Stroke (H) 05/2013    with visual field cut, left occipital lobe     Syncope 2014    due to orthostatic hypotension       PAST SURGICAL HISTORY:  Past Surgical History:   Procedure Laterality Date     ANOMALOUS PULMONARY VENOUS RETURN REPAIR, TOTAL       CATARACT EXTRACTION       CATARACT IOL, RT/LT Bilateral 2000     CV FLUOROSCOPY ONLY N/A 8/6/2019    Procedure: Fluoroscopy Only - valve cine done of aortic valve at 180 degrees Spanish to JUNG;  Surgeon: Dave Farfan MD;  Location:  HEART CARDIAC CATH LAB     CV HEART CATHETERIZATION WITH POSSIBLE INTERVENTION N/A 2/10/2021    Procedure: Heart Catheterization with Possible Intervention;  Surgeon: Fish Padgett MD;  Location:  HEART CARDIAC CATH LAB     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     EYE SURGERY       HEART CATH FEMORAL CANNULIZATION WITH OPEN STANDBY REPAIR AORTIC VALVE N/A 8/3/2016    Procedure: HEART CATH FEMORAL CANNULIZATION WITH OPEN STANDBY REPAIR AORTIC VALVE;  Surgeon: Owen Schultz MD;  Location:  OR     HYSTERECTOMY       HYSTERECTOMY TOTAL ABDOMINAL  1970    ovaries out     MANDIBLE FRACTURE SURGERY       MOUTH SURGERY  2011    jaw surgery due to fracture     OTHER SURGICAL HISTORY      Treva valve implant     TOTAL HIP ARTHROPLASTY       TRANSCATHETER AORTIC VALVE IMPLANT ANESTHESIA N/A 8/3/2016    Procedure: TRANSCATHETER AORTIC VALVE IMPLANT ANESTHESIA;  Surgeon: GENERIC ANESTHESIA PROVIDER;  Location:  OR     [facility] TOTAL HIP ARTHROPLASTY Right 2010     [facility] TOTAL HIP ARTHROPLASTY Left 2014       FAMILY HISTORY:  Family History   Problem Relation Age of Onset     Hyperlipidemia Mother      Family History Negative No family hx of   "    Hypertension Mother      Rheumatoid Arthritis Child        SOCIAL HISTORY:  Social History     Socioeconomic History     Marital status:      Number of children: 4   Occupational History     Employer: RETIRED   Tobacco Use     Smoking status: Never     Smokeless tobacco: Never   Vaping Use     Vaping Use: Never used   Substance and Sexual Activity     Alcohol use: No     Alcohol/week: 0.0 standard drinks     Drug use: No     Sexual activity: Not Currently   Other Topics Concern     Special Diet Yes     Comment: low salt      Exercise Yes     Comment: semi recument bike 15 mins daily    Social History Narrative    .  Lives in assisted living. Independent. Has help with making bed and changing sheets.    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 .     4 children - 1 with RA       Review of Systems:  Skin:  Negative     Eyes:  Positive for glasses  ENT:  Negative    Respiratory:  Positive for dyspnea on exertion  Cardiovascular:  Negative for;palpitations;lightheadedness;dizziness Positive for;chest pain;edema;fatigue  Gastroenterology: Negative    Genitourinary:  Negative    Musculoskeletal:  Negative    Neurologic:  Negative    Psychiatric:  Positive for sleep disturbances  Heme/Lymph/Imm:  Positive for allergies  Endocrine:  Negative      Physical Exam:  Vitals: /60 (BP Location: Right arm, Patient Position: Sitting, Cuff Size: Adult Regular)   Pulse 80   Ht 1.676 m (5' 6\")   Wt 49.4 kg (108 lb 14.4 oz)   SpO2 95%   BMI 17.58 kg/m       GEN:  NAD. Moderate kyphosis. Sitting in wheelchair. Daughter is with her.   NECK: No JVD  C/V:  Regular rate and rhythm, no murmur, rub or gallop.  RESP: Clear to auscultation bilaterally without wheezing, rales, or rhonchi.  GI: Abdomen soft, nontender, nondistended.  EXTREM: No pitting LE edema. Wearing compression socks.  NEURO: Alert and oriented, cooperative. No obvious focal deficits.   PSYCH: Normal affect.  SKIN: Warm and " dry.       Recent Lab Results:  LIPID RESULTS:  Lab Results   Component Value Date    CHOL 131 03/09/2022    CHOL 107 02/10/2021    HDL 50 03/09/2022    HDL 59 02/10/2021    LDL 61 03/09/2022    LDL 31 02/10/2021    TRIG 102 03/09/2022    TRIG 87 02/10/2021    CHOLHDLRATIO 2.3 07/07/2015       LIVER ENZYME RESULTS:  Lab Results   Component Value Date    AST 16 09/21/2022    AST 25 05/29/2021    ALT 14 09/21/2022    ALT 21 05/29/2021       CBC RESULTS:  Lab Results   Component Value Date    WBC 7.2 09/21/2022    WBC 8.1 07/01/2021    RBC 3.42 (L) 09/21/2022    RBC 2.96 (L) 07/01/2021    HGB 10.4 (L) 09/21/2022    HGB 8.4 (L) 07/01/2021    HCT 31.9 (L) 09/21/2022    HCT 26.6 (L) 07/01/2021    MCV 93 09/21/2022    MCV 90 07/01/2021    MCH 30.4 09/21/2022    MCH 28.4 07/01/2021    MCHC 32.6 09/21/2022    MCHC 31.6 07/01/2021    RDW 17.1 (H) 09/21/2022    RDW 18.9 (H) 07/01/2021     09/21/2022     07/01/2021       BMP RESULTS:  Lab Results   Component Value Date     06/21/2022     05/31/2021    POTASSIUM 3.9 06/21/2022    POTASSIUM 3.5 05/31/2021    CHLORIDE 105 06/21/2022    CHLORIDE 105 05/31/2021    CO2 30 06/21/2022    CO2 24 05/31/2021    ANIONGAP 4 06/21/2022    ANIONGAP 7 05/31/2021     (H) 06/21/2022     (H) 05/31/2021    BUN 33 (H) 06/21/2022    BUN 26 05/31/2021    CR 0.82 09/21/2022    CR 1.15 (H) 05/31/2021    GFRESTIMATED 66 09/21/2022    GFRESTIMATED 41 (L) 05/31/2021    GFRESTBLACK 47 (L) 05/31/2021    CHEYANNE 8.0 (L) 06/21/2022    CHEYANNE 7.5 (L) 05/31/2021        A1C RESULTS:  Lab Results   Component Value Date    A1C 5.5 10/13/2018       INR RESULTS:  Lab Results   Component Value Date    INR 1.16 (H) 02/10/2021    INR 1.11 04/26/2019           Matt Mccracken PA-C  November 25, 2022       Thank you for allowing me to participate in the care of your patient.      Sincerely,     Matt Mccracken PA-C     [facility]  [facility] Care  cc:   Matt Mccracken PA-C  [address] [place] [address]

## 2022-12-09 NOTE — TELEPHONE ENCOUNTER
Nitrostat  Prescription approved per [facility] Refill Protocol.    Allergy relief  Routing refill request to provider for review/approval because:   Failed - Patient is 3-64 years of age    Apply weight-based dosing for peds patients age 3 - 12 years of age.    Forward request to provider for patients under the age of 3 or over the age of 64.         Erica Cuadra RN

## 2022-12-20 NOTE — TELEPHONE ENCOUNTER
Last Written Prescription Date:  12/7/21  Last Fill Quantity: 180,  # refills: 3   Last office visit: 11/29/2022 with prescribing provider:  11/29/22   Future Office Visit:  Follow up is not due until May of 2023, not yet scheduled.     Daisy Jeong on 12/20/2022 at 11:19 AM

## 2022-12-27 NOTE — TELEPHONE ENCOUNTER
Spoke with SHERRY Moreno who reports that patient has concerns with her cough.    Patient had a covid at the beginning of December and has been doing well up until yesterday.  Yesterday her cough started and today it has gotten worse/bad.    Tiffanie described the following regarding patient cough:  - productive/phlegmy  - consistent  - feels stuffy/congested  - not coughing up blood  - no chest pain  - no breathing concerns  - no other symptoms    Tiffanie is requesting an order for PRN cough syrup.  She mentioned that patient is not diabetic so any would be fine.    Please advise.    SHERRY Moreno - T: [phone]    Additional Information    Negative: SEVERE coughing spells (e.g., whooping sound after coughing, vomiting after coughing)    Negative: Coughing up azar-colored (reddish-brown) or blood-tinged sputum    Negative: Fever present > 3 days (72 hours)    Negative: Fever returns after gone for over 24 hours and symptoms worse or not improved    Negative: Using nasal washes and pain medicine > 24 hours and sinus pain persists    Negative: Known COPD or other severe lung disease (i.e., bronchiectasis, cystic fibrosis, lung surgery) and worsening symptoms (i.e., increased sputum purulence or amount, increased breathing difficulty)    Protocols used: COUGH-A-OH

## 2022-12-28 NOTE — TELEPHONE ENCOUNTER
I called and scheduled with Dr. Hayden on 12-. I spoke with daughter and consent to communicate is on file.

## 2022-12-30 NOTE — PROGRESS NOTES
Assessment & Plan       ICD-10-CM    1. CKD (chronic kidney disease) stage 3, GFR 30-59 ml/min (H)  N18.30       2. Community acquired pneumonia, unspecified laterality  J18.9 CBC with platelets and differential     levofloxacin (LEVAQUIN) 500 MG tablet     Basic metabolic panel  (Ca, Cl, CO2, Creat, Gluc, K, Na, BUN)     XR Chest 2 Views     CBC with platelets and differential     Basic metabolic panel  (Ca, Cl, CO2, Creat, Gluc, K, Na, BUN)           Sounds like she has pneumonia.  Check a CBC.  And x-ray.  Trial of Levaquin.  If worsening should be seen in the ED    Return in about 1 week (around 1/6/2023).    Fortino Hayden MD  [facility] GIANNA French is a 89 year old, presenting for the following health issues:  Cough      History of Present Illness       Reason for visit:  Cough  Symptom onset:  3-7 days ago  Symptoms include:  Coughing, not feeling well, COVID positive at beginning of the month  Symptom intensity:  Moderate  Symptom progression:  Worsening    She eats 4 or more servings of fruits and vegetables daily.She consumes 0 sweetened beverage(s) daily.She exercises with enough effort to increase her heart rate 20 to 29 minutes per day.  She exercises with enough effort to increase her heart rate 5 days per week.   She is taking medications regularly.       Past week worsening coughing, wet cough, COVID-positive 3 weeks ago, recovered from that.  Resides at local nursing home.  Lower energy.  Confusion.    Review of Systems   Constitutional, HEENT, cardiovascular, pulmonary, gi and gu systems are negative, except as otherwise noted.      Objective    /66   Pulse 96   Temp 100.1  F (37.8  C) (Tympanic)   SpO2 96%   There is no height or weight on file to calculate BMI.  Physical Exam   Appears unwell, tired.  Daughter is here today.  Heart regular.  No murmur.  Lungs with coarse breath sounds bilaterally.  No wheezing or increased work of breathing.   Extremities warm well perfused with no edema.

## 2022-12-30 NOTE — PROGRESS NOTES
{PROVIDER CHARTING PREFERENCE:778228}    Janice French is a 89 year old{ACCOMPANIED BY STATEMENT (Optional):590680}, presenting for the following health issues:  Cough      Cough    History of Present Illness       Reason for visit:  Cough  Symptom onset:  3-7 days ago  Symptoms include:  Coughing, not feeling   Symptom intensity:  Moderate  Symptom progression:  Worsening    She eats 4 or more servings of fruits and vegetables daily.She consumes 0 sweetened beverage(s) daily.She exercises with enough effort to increase her heart rate 20 to 29 minutes per day.  She exercises with enough effort to increase her heart rate 5 days per week.   She is taking medications regularly.       {SUPERLIST (Optional):757222}  {additonal problems for provider to add (Optional):567638}    Review of Systems   Respiratory: Positive for cough.       {ROS COMP (Optional):030124}      Objective    /66   Pulse 96   Temp 100.1  F (37.8  C) (Tympanic)   SpO2 96%   There is no height or weight on file to calculate BMI.  Physical Exam   {Exam List (Optional):889173}    {Diagnostic Test Results (Optional):518294}    {AMBULATORY ATTESTATION (Optional):593235}

## 2023-01-01 ENCOUNTER — LAB REQUISITION (OUTPATIENT)
Dept: LAB | Facility: CLINIC | Age: 88
DRG: 811 | End: 2023-01-01
Payer: COMMERCIAL

## 2023-01-01 ENCOUNTER — APPOINTMENT (OUTPATIENT)
Dept: PHYSICAL THERAPY | Facility: CLINIC | Age: 88
End: 2023-01-01
Attending: NURSE PRACTITIONER
Payer: COMMERCIAL

## 2023-01-01 ENCOUNTER — HOSPITAL ENCOUNTER (OUTPATIENT)
Dept: CARDIOLOGY | Facility: CLINIC | Age: 88
Discharge: HOME | End: 2023-02-17
Attending: INTERNAL MEDICINE | Admitting: INTERNAL MEDICINE
Payer: COMMERCIAL

## 2023-01-01 ENCOUNTER — APPOINTMENT (OUTPATIENT)
Dept: CT IMAGING | Facility: CLINIC | Age: 88
End: 2023-01-01
Attending: FAMILY MEDICINE
Payer: COMMERCIAL

## 2023-01-01 ENCOUNTER — HEALTH MAINTENANCE LETTER (OUTPATIENT)
Age: 88
End: 2023-01-01

## 2023-01-01 ENCOUNTER — TELEPHONE (OUTPATIENT)
Dept: INTERNAL MEDICINE | Facility: CLINIC | Age: 88
End: 2023-01-01
Payer: COMMERCIAL

## 2023-01-01 ENCOUNTER — OFFICE VISIT (OUTPATIENT)
Dept: FAMILY MEDICINE | Facility: CLINIC | Age: 88
End: 2023-01-01
Payer: COMMERCIAL

## 2023-01-01 ENCOUNTER — LAB (OUTPATIENT)
Dept: LAB | Facility: CLINIC | Age: 88
End: 2023-01-01
Payer: COMMERCIAL

## 2023-01-01 ENCOUNTER — OFFICE VISIT (OUTPATIENT)
Dept: INTERNAL MEDICINE | Facility: CLINIC | Age: 88
End: 2023-01-01
Payer: COMMERCIAL

## 2023-01-01 ENCOUNTER — MEDICAL CORRESPONDENCE (OUTPATIENT)
Dept: HEALTH INFORMATION MANAGEMENT | Facility: CLINIC | Age: 88
End: 2023-01-01

## 2023-01-01 ENCOUNTER — PATIENT OUTREACH (OUTPATIENT)
Dept: CARE COORDINATION | Facility: CLINIC | Age: 88
End: 2023-01-01
Payer: COMMERCIAL

## 2023-01-01 ENCOUNTER — CARE COORDINATION (OUTPATIENT)
Dept: CARDIOLOGY | Facility: CLINIC | Age: 88
End: 2023-01-01
Payer: COMMERCIAL

## 2023-01-01 ENCOUNTER — TELEPHONE (OUTPATIENT)
Dept: INTERNAL MEDICINE | Facility: CLINIC | Age: 88
End: 2023-01-01

## 2023-01-01 ENCOUNTER — TRANSITIONAL CARE UNIT VISIT (OUTPATIENT)
Dept: GERIATRICS | Facility: CLINIC | Age: 88
End: 2023-01-01
Payer: COMMERCIAL

## 2023-01-01 ENCOUNTER — VIRTUAL VISIT (OUTPATIENT)
Dept: FAMILY MEDICINE | Facility: CLINIC | Age: 88
End: 2023-01-01
Payer: COMMERCIAL

## 2023-01-01 ENCOUNTER — APPOINTMENT (OUTPATIENT)
Dept: GENERAL RADIOLOGY | Facility: CLINIC | Age: 88
DRG: 811 | End: 2023-01-01
Attending: HOSPITALIST
Payer: COMMERCIAL

## 2023-01-01 ENCOUNTER — HOSPITAL ENCOUNTER (INPATIENT)
Facility: CLINIC | Age: 88
LOS: 7 days | DRG: 811 | End: 2023-09-22
Attending: INTERNAL MEDICINE | Admitting: HOSPITALIST
Payer: COMMERCIAL

## 2023-01-01 ENCOUNTER — DOCUMENTATION ONLY (OUTPATIENT)
Dept: OTHER | Facility: CLINIC | Age: 88
End: 2023-01-01
Payer: COMMERCIAL

## 2023-01-01 ENCOUNTER — HOSPITAL ENCOUNTER (OUTPATIENT)
Dept: GENERAL RADIOLOGY | Facility: CLINIC | Age: 88
Discharge: HOME | End: 2023-02-14
Attending: FAMILY MEDICINE | Admitting: FAMILY MEDICINE
Payer: COMMERCIAL

## 2023-01-01 ENCOUNTER — LAB REQUISITION (OUTPATIENT)
Dept: LAB | Facility: CLINIC | Age: 88
End: 2023-01-01
Payer: COMMERCIAL

## 2023-01-01 ENCOUNTER — INFUSION THERAPY VISIT (OUTPATIENT)
Dept: INFUSION THERAPY | Facility: CLINIC | Age: 88
End: 2023-01-01
Attending: FAMILY MEDICINE
Payer: COMMERCIAL

## 2023-01-01 ENCOUNTER — INFUSION THERAPY VISIT (OUTPATIENT)
Dept: INFUSION THERAPY | Facility: CLINIC | Age: 88
End: 2023-01-01
Payer: COMMERCIAL

## 2023-01-01 ENCOUNTER — MYC MEDICAL ADVICE (OUTPATIENT)
Dept: INTERNAL MEDICINE | Facility: CLINIC | Age: 88
End: 2023-01-01
Payer: COMMERCIAL

## 2023-01-01 ENCOUNTER — TELEPHONE (OUTPATIENT)
Dept: CARDIOLOGY | Facility: CLINIC | Age: 88
End: 2023-01-01
Payer: COMMERCIAL

## 2023-01-01 ENCOUNTER — LAB REQUISITION (OUTPATIENT)
Dept: LAB | Facility: CLINIC | Age: 88
End: 2023-01-01

## 2023-01-01 ENCOUNTER — HOSPITAL ENCOUNTER (EMERGENCY)
Facility: CLINIC | Age: 88
Discharge: OTHER INSTITUTION | End: 2023-09-15
Attending: FAMILY MEDICINE | Admitting: FAMILY MEDICINE
Payer: COMMERCIAL

## 2023-01-01 ENCOUNTER — HOSPITAL ENCOUNTER (OUTPATIENT)
Facility: CLINIC | Age: 88
Setting detail: OBSERVATION
Discharge: NURSING, REHAB OR LONG-TERM CARE | End: 2023-08-30
Attending: EMERGENCY MEDICINE | Admitting: NURSE PRACTITIONER
Payer: COMMERCIAL

## 2023-01-01 ENCOUNTER — APPOINTMENT (OUTPATIENT)
Dept: GENERAL RADIOLOGY | Facility: CLINIC | Age: 88
End: 2023-01-01
Attending: EMERGENCY MEDICINE
Payer: COMMERCIAL

## 2023-01-01 ENCOUNTER — TELEPHONE (OUTPATIENT)
Dept: FAMILY MEDICINE | Facility: CLINIC | Age: 88
End: 2023-01-01
Payer: COMMERCIAL

## 2023-01-01 ENCOUNTER — TELEPHONE (OUTPATIENT)
Dept: UROLOGY | Facility: CLINIC | Age: 88
End: 2023-01-01
Payer: COMMERCIAL

## 2023-01-01 ENCOUNTER — DOCUMENTATION ONLY (OUTPATIENT)
Dept: OTHER | Facility: CLINIC | Age: 88
End: 2023-01-01

## 2023-01-01 ENCOUNTER — APPOINTMENT (OUTPATIENT)
Dept: CARDIOLOGY | Facility: CLINIC | Age: 88
DRG: 811 | End: 2023-01-01
Attending: INTERNAL MEDICINE
Payer: COMMERCIAL

## 2023-01-01 ENCOUNTER — OFFICE VISIT (OUTPATIENT)
Dept: CARDIOLOGY | Facility: CLINIC | Age: 88
End: 2023-01-01
Payer: COMMERCIAL

## 2023-01-01 ENCOUNTER — APPOINTMENT (OUTPATIENT)
Dept: SPEECH THERAPY | Facility: CLINIC | Age: 88
DRG: 811 | End: 2023-01-01
Attending: HOSPITALIST
Payer: COMMERCIAL

## 2023-01-01 ENCOUNTER — TELEPHONE (OUTPATIENT)
Dept: GERIATRICS | Facility: CLINIC | Age: 88
End: 2023-01-01
Payer: COMMERCIAL

## 2023-01-01 ENCOUNTER — APPOINTMENT (OUTPATIENT)
Dept: ULTRASOUND IMAGING | Facility: CLINIC | Age: 88
End: 2023-01-01
Attending: EMERGENCY MEDICINE
Payer: COMMERCIAL

## 2023-01-01 ENCOUNTER — APPOINTMENT (OUTPATIENT)
Dept: ULTRASOUND IMAGING | Facility: CLINIC | Age: 88
DRG: 811 | End: 2023-01-01
Attending: HOSPITALIST
Payer: COMMERCIAL

## 2023-01-01 VITALS
WEIGHT: 104.6 LBS | BODY MASS INDEX: 16.42 KG/M2 | SYSTOLIC BLOOD PRESSURE: 90 MMHG | DIASTOLIC BLOOD PRESSURE: 64 MMHG | HEART RATE: 91 BPM | RESPIRATION RATE: 16 BRPM | TEMPERATURE: 97.5 F | HEIGHT: 67 IN | OXYGEN SATURATION: 99 %

## 2023-01-01 VITALS
BODY MASS INDEX: 17.26 KG/M2 | WEIGHT: 110.23 LBS | OXYGEN SATURATION: 94 % | HEART RATE: 68 BPM | DIASTOLIC BLOOD PRESSURE: 58 MMHG | TEMPERATURE: 98 F | SYSTOLIC BLOOD PRESSURE: 100 MMHG

## 2023-01-01 VITALS
HEART RATE: 79 BPM | RESPIRATION RATE: 20 BRPM | SYSTOLIC BLOOD PRESSURE: 118 MMHG | DIASTOLIC BLOOD PRESSURE: 62 MMHG | TEMPERATURE: 97.4 F | BODY MASS INDEX: 16.87 KG/M2 | WEIGHT: 105.3 LBS | OXYGEN SATURATION: 95 % | TEMPERATURE: 98 F | OXYGEN SATURATION: 99 % | RESPIRATION RATE: 20 BRPM | SYSTOLIC BLOOD PRESSURE: 137 MMHG | DIASTOLIC BLOOD PRESSURE: 70 MMHG | HEART RATE: 76 BPM

## 2023-01-01 VITALS
TEMPERATURE: 97.5 F | OXYGEN SATURATION: 97 % | DIASTOLIC BLOOD PRESSURE: 64 MMHG | HEART RATE: 76 BPM | SYSTOLIC BLOOD PRESSURE: 116 MMHG | RESPIRATION RATE: 20 BRPM

## 2023-01-01 VITALS
SYSTOLIC BLOOD PRESSURE: 117 MMHG | TEMPERATURE: 97.7 F | RESPIRATION RATE: 18 BRPM | BODY MASS INDEX: 17.87 KG/M2 | HEART RATE: 76 BPM | OXYGEN SATURATION: 97 % | WEIGHT: 110.7 LBS | DIASTOLIC BLOOD PRESSURE: 52 MMHG

## 2023-01-01 VITALS
HEART RATE: 78 BPM | TEMPERATURE: 97.9 F | SYSTOLIC BLOOD PRESSURE: 94 MMHG | DIASTOLIC BLOOD PRESSURE: 47 MMHG | HEIGHT: 67 IN | OXYGEN SATURATION: 97 % | BODY MASS INDEX: 16.42 KG/M2 | RESPIRATION RATE: 16 BRPM | WEIGHT: 104.6 LBS

## 2023-01-01 VITALS
TEMPERATURE: 97.4 F | HEART RATE: 72 BPM | WEIGHT: 107.6 LBS | BODY MASS INDEX: 17.37 KG/M2 | OXYGEN SATURATION: 97 % | SYSTOLIC BLOOD PRESSURE: 147 MMHG | DIASTOLIC BLOOD PRESSURE: 72 MMHG | RESPIRATION RATE: 14 BRPM

## 2023-01-01 VITALS
RESPIRATION RATE: 16 BRPM | SYSTOLIC BLOOD PRESSURE: 134 MMHG | OXYGEN SATURATION: 97 % | TEMPERATURE: 98.5 F | DIASTOLIC BLOOD PRESSURE: 78 MMHG | HEART RATE: 82 BPM

## 2023-01-01 VITALS
RESPIRATION RATE: 18 BRPM | OXYGEN SATURATION: 97 % | DIASTOLIC BLOOD PRESSURE: 62 MMHG | TEMPERATURE: 97.4 F | SYSTOLIC BLOOD PRESSURE: 118 MMHG | HEART RATE: 73 BPM

## 2023-01-01 VITALS
BODY MASS INDEX: 16.61 KG/M2 | WEIGHT: 105.82 LBS | HEART RATE: 78 BPM | RESPIRATION RATE: 18 BRPM | SYSTOLIC BLOOD PRESSURE: 101 MMHG | OXYGEN SATURATION: 94 % | TEMPERATURE: 98.1 F | DIASTOLIC BLOOD PRESSURE: 52 MMHG | HEIGHT: 67 IN

## 2023-01-01 VITALS
DIASTOLIC BLOOD PRESSURE: 66 MMHG | RESPIRATION RATE: 16 BRPM | TEMPERATURE: 98 F | HEART RATE: 74 BPM | WEIGHT: 110 LBS | OXYGEN SATURATION: 94 % | SYSTOLIC BLOOD PRESSURE: 108 MMHG | BODY MASS INDEX: 17.75 KG/M2

## 2023-01-01 VITALS
OXYGEN SATURATION: 97 % | HEART RATE: 74 BPM | TEMPERATURE: 97.7 F | SYSTOLIC BLOOD PRESSURE: 117 MMHG | DIASTOLIC BLOOD PRESSURE: 46 MMHG | RESPIRATION RATE: 18 BRPM

## 2023-01-01 VITALS
BODY MASS INDEX: 16.71 KG/M2 | OXYGEN SATURATION: 96 % | WEIGHT: 103.5 LBS | DIASTOLIC BLOOD PRESSURE: 60 MMHG | HEART RATE: 80 BPM | TEMPERATURE: 97.5 F | RESPIRATION RATE: 18 BRPM | SYSTOLIC BLOOD PRESSURE: 116 MMHG

## 2023-01-01 VITALS
RESPIRATION RATE: 20 BRPM | OXYGEN SATURATION: 98 % | BODY MASS INDEX: 17.09 KG/M2 | WEIGHT: 109.1 LBS | DIASTOLIC BLOOD PRESSURE: 84 MMHG | HEART RATE: 102 BPM | TEMPERATURE: 98.1 F | SYSTOLIC BLOOD PRESSURE: 153 MMHG

## 2023-01-01 VITALS
HEART RATE: 84 BPM | TEMPERATURE: 97.9 F | SYSTOLIC BLOOD PRESSURE: 108 MMHG | BODY MASS INDEX: 16.2 KG/M2 | WEIGHT: 103.2 LBS | OXYGEN SATURATION: 96 % | DIASTOLIC BLOOD PRESSURE: 56 MMHG | RESPIRATION RATE: 16 BRPM | HEIGHT: 67 IN

## 2023-01-01 VITALS
SYSTOLIC BLOOD PRESSURE: 132 MMHG | OXYGEN SATURATION: 96 % | WEIGHT: 108.5 LBS | HEART RATE: 87 BPM | TEMPERATURE: 97.7 F | BODY MASS INDEX: 17.51 KG/M2 | DIASTOLIC BLOOD PRESSURE: 64 MMHG

## 2023-01-01 VITALS
HEART RATE: 70 BPM | WEIGHT: 106.8 LBS | OXYGEN SATURATION: 95 % | SYSTOLIC BLOOD PRESSURE: 122 MMHG | BODY MASS INDEX: 17.16 KG/M2 | HEIGHT: 66 IN | DIASTOLIC BLOOD PRESSURE: 58 MMHG

## 2023-01-01 VITALS
RESPIRATION RATE: 16 BRPM | HEART RATE: 76 BPM | SYSTOLIC BLOOD PRESSURE: 123 MMHG | DIASTOLIC BLOOD PRESSURE: 59 MMHG | OXYGEN SATURATION: 97 % | TEMPERATURE: 98.4 F

## 2023-01-01 VITALS
DIASTOLIC BLOOD PRESSURE: 65 MMHG | RESPIRATION RATE: 16 BRPM | WEIGHT: 121 LBS | BODY MASS INDEX: 18.95 KG/M2 | OXYGEN SATURATION: 93 % | HEART RATE: 55 BPM | TEMPERATURE: 97.2 F | SYSTOLIC BLOOD PRESSURE: 97 MMHG

## 2023-01-01 VITALS
SYSTOLIC BLOOD PRESSURE: 122 MMHG | HEART RATE: 72 BPM | OXYGEN SATURATION: 96 % | WEIGHT: 105.6 LBS | TEMPERATURE: 98.6 F | RESPIRATION RATE: 18 BRPM | BODY MASS INDEX: 16.92 KG/M2 | DIASTOLIC BLOOD PRESSURE: 62 MMHG

## 2023-01-01 DIAGNOSIS — I82.412 ACUTE DEEP VEIN THROMBOSIS (DVT) OF FEMORAL VEIN OF LEFT LOWER EXTREMITY (H): ICD-10-CM

## 2023-01-01 DIAGNOSIS — S72.001D CLOSED FRACTURE OF RIGHT HIP WITH ROUTINE HEALING, SUBSEQUENT ENCOUNTER: Primary | ICD-10-CM

## 2023-01-01 DIAGNOSIS — D50.0 IRON DEFICIENCY ANEMIA DUE TO CHRONIC BLOOD LOSS: Primary | ICD-10-CM

## 2023-01-01 DIAGNOSIS — R19.7 DIARRHEA, UNSPECIFIED TYPE: ICD-10-CM

## 2023-01-01 DIAGNOSIS — S72.001A CLOSED FRACTURE OF RIGHT HIP, INITIAL ENCOUNTER (H): Primary | ICD-10-CM

## 2023-01-01 DIAGNOSIS — I25.10 CORONARY ARTERY DISEASE INVOLVING NATIVE CORONARY ARTERY OF NATIVE HEART WITHOUT ANGINA PECTORIS: ICD-10-CM

## 2023-01-01 DIAGNOSIS — I82.492 ACUTE DEEP VEIN THROMBOSIS (DVT) OF OTHER SPECIFIED VEIN OF LEFT LOWER EXTREMITY (H): ICD-10-CM

## 2023-01-01 DIAGNOSIS — K21.9 GASTROESOPHAGEAL REFLUX DISEASE WITHOUT ESOPHAGITIS: ICD-10-CM

## 2023-01-01 DIAGNOSIS — N30.00 ACUTE CYSTITIS WITHOUT HEMATURIA: ICD-10-CM

## 2023-01-01 DIAGNOSIS — E03.4 HYPOTHYROIDISM DUE TO ACQUIRED ATROPHY OF THYROID: ICD-10-CM

## 2023-01-01 DIAGNOSIS — J18.9 COMMUNITY ACQUIRED PNEUMONIA, UNSPECIFIED LATERALITY: Primary | ICD-10-CM

## 2023-01-01 DIAGNOSIS — M54.50 LUMBAR PAIN: ICD-10-CM

## 2023-01-01 DIAGNOSIS — L30.9 ECZEMA, UNSPECIFIED TYPE: ICD-10-CM

## 2023-01-01 DIAGNOSIS — D50.0 IRON DEFICIENCY ANEMIA DUE TO CHRONIC BLOOD LOSS: ICD-10-CM

## 2023-01-01 DIAGNOSIS — I10 ESSENTIAL (PRIMARY) HYPERTENSION: ICD-10-CM

## 2023-01-01 DIAGNOSIS — F41.9 ANXIETY: ICD-10-CM

## 2023-01-01 DIAGNOSIS — J18.9 COMMUNITY ACQUIRED PNEUMONIA, UNSPECIFIED LATERALITY: ICD-10-CM

## 2023-01-01 DIAGNOSIS — R26.9 ABNORMAL GAIT: ICD-10-CM

## 2023-01-01 DIAGNOSIS — G47.00 INSOMNIA, UNSPECIFIED TYPE: ICD-10-CM

## 2023-01-01 DIAGNOSIS — F33.1 MODERATE EPISODE OF RECURRENT MAJOR DEPRESSIVE DISORDER (H): ICD-10-CM

## 2023-01-01 DIAGNOSIS — I10 HYPERTENSION GOAL BP (BLOOD PRESSURE) < 140/90: Primary | ICD-10-CM

## 2023-01-01 DIAGNOSIS — I10 HYPERTENSION GOAL BP (BLOOD PRESSURE) < 140/90: ICD-10-CM

## 2023-01-01 DIAGNOSIS — M05.9 SEROPOSITIVE RHEUMATOID ARTHRITIS (H): ICD-10-CM

## 2023-01-01 DIAGNOSIS — K92.2 UPPER GI BLEED: ICD-10-CM

## 2023-01-01 DIAGNOSIS — G40.909 SEIZURE DISORDER (H): ICD-10-CM

## 2023-01-01 DIAGNOSIS — D64.9 ANEMIA, UNSPECIFIED TYPE: ICD-10-CM

## 2023-01-01 DIAGNOSIS — Z13.220 SCREENING FOR HYPERLIPIDEMIA: ICD-10-CM

## 2023-01-01 DIAGNOSIS — Z86.73 HISTORY OF CVA (CEREBROVASCULAR ACCIDENT): ICD-10-CM

## 2023-01-01 DIAGNOSIS — Z79.899 NEED FOR PROPHYLACTIC CHEMOTHERAPY: ICD-10-CM

## 2023-01-01 DIAGNOSIS — Z95.2 S/P TAVR (TRANSCATHETER AORTIC VALVE REPLACEMENT): ICD-10-CM

## 2023-01-01 DIAGNOSIS — E78.5 HYPERLIPIDEMIA WITH TARGET LDL LESS THAN 130: ICD-10-CM

## 2023-01-01 DIAGNOSIS — I82.412 THROMBOSIS OF LEFT FEMORAL VEIN (H): ICD-10-CM

## 2023-01-01 DIAGNOSIS — R07.9 CHEST PAIN, UNSPECIFIED TYPE: ICD-10-CM

## 2023-01-01 DIAGNOSIS — S72.001A HIP FRACTURE, RIGHT, CLOSED, INITIAL ENCOUNTER (H): ICD-10-CM

## 2023-01-01 DIAGNOSIS — M94.0 COSTOCHONDRITIS: ICD-10-CM

## 2023-01-01 DIAGNOSIS — M97.01XA PERIPROSTHETIC FRACTURE AROUND INTERNAL PROSTHETIC RIGHT HIP JOINT, INITIAL ENCOUNTER (H): ICD-10-CM

## 2023-01-01 DIAGNOSIS — G40.909 SEIZURE DISORDER (H): Primary | ICD-10-CM

## 2023-01-01 DIAGNOSIS — D64.9 ANEMIA, UNSPECIFIED: ICD-10-CM
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
## 2023-01-01 DIAGNOSIS — I50.22 CHRONIC SYSTOLIC (CONGESTIVE) HEART FAILURE (H): ICD-10-CM

## 2023-01-01 DIAGNOSIS — R07.89 ATYPICAL CHEST PAIN: ICD-10-CM

## 2023-01-01 DIAGNOSIS — J30.2 SEASONAL ALLERGIC RHINITIS, UNSPECIFIED TRIGGER: ICD-10-CM

## 2023-01-01 DIAGNOSIS — M16.12 PRIMARY OSTEOARTHRITIS OF LEFT HIP: ICD-10-CM

## 2023-01-01 DIAGNOSIS — R33.9 URINE RETENTION: ICD-10-CM

## 2023-01-01 DIAGNOSIS — K21.00 GASTROESOPHAGEAL REFLUX DISEASE WITH ESOPHAGITIS WITHOUT HEMORRHAGE: ICD-10-CM

## 2023-01-01 DIAGNOSIS — N39.0 URINARY TRACT INFECTION WITHOUT HEMATURIA, SITE UNSPECIFIED: ICD-10-CM

## 2023-01-01 DIAGNOSIS — N18.31 STAGE 3A CHRONIC KIDNEY DISEASE (H): ICD-10-CM

## 2023-01-01 DIAGNOSIS — J30.1 SEASONAL ALLERGIC RHINITIS DUE TO POLLEN: ICD-10-CM

## 2023-01-01 DIAGNOSIS — I10 HYPERTENSION, UNSPECIFIED TYPE: ICD-10-CM

## 2023-01-01 DIAGNOSIS — E44.1 MILD PROTEIN-CALORIE MALNUTRITION (H): ICD-10-CM

## 2023-01-01 DIAGNOSIS — J32.0 CHRONIC MAXILLARY SINUSITIS: Primary | ICD-10-CM

## 2023-01-01 DIAGNOSIS — R06.09 DOE (DYSPNEA ON EXERTION): ICD-10-CM

## 2023-01-01 DIAGNOSIS — I25.119 CORONARY ARTERY DISEASE INVOLVING NATIVE CORONARY ARTERY OF NATIVE HEART WITH ANGINA PECTORIS (H): ICD-10-CM

## 2023-01-01 DIAGNOSIS — I20.0 UNSTABLE ANGINA (H): ICD-10-CM

## 2023-01-01 DIAGNOSIS — D62 ANEMIA DUE TO BLOOD LOSS, ACUTE: ICD-10-CM

## 2023-01-01 DIAGNOSIS — M15.9 GENERALIZED OSTEOARTHRITIS: ICD-10-CM

## 2023-01-01 DIAGNOSIS — I25.119 CORONARY ARTERY DISEASE INVOLVING NATIVE CORONARY ARTERY OF NATIVE HEART WITH ANGINA PECTORIS (H): Primary | ICD-10-CM

## 2023-01-01 DIAGNOSIS — H04.123 DRY EYES: ICD-10-CM

## 2023-01-01 DIAGNOSIS — M54.50 LUMBAR PAIN: Primary | ICD-10-CM

## 2023-01-01 DIAGNOSIS — M19.90 ARTHRITIS: ICD-10-CM

## 2023-01-01 DIAGNOSIS — M06.9 RHEUMATOID ARTHRITIS, INVOLVING UNSPECIFIED SITE, UNSPECIFIED WHETHER RHEUMATOID FACTOR PRESENT (H): ICD-10-CM

## 2023-01-01 DIAGNOSIS — L30.9 DERMATITIS OF BOTH FEET: Primary | ICD-10-CM

## 2023-01-01 DIAGNOSIS — Z96.643 HISTORY OF BILATERAL HIP REPLACEMENTS: ICD-10-CM

## 2023-01-01 DIAGNOSIS — R41.0 CONFUSION: Primary | ICD-10-CM

## 2023-01-01 DIAGNOSIS — L30.9 ECZEMA, UNSPECIFIED TYPE: Primary | ICD-10-CM

## 2023-01-01 DIAGNOSIS — I35.0 AORTIC STENOSIS, SEVERE: ICD-10-CM

## 2023-01-01 DIAGNOSIS — N30.00 ACUTE CYSTITIS WITHOUT HEMATURIA: Primary | ICD-10-CM

## 2023-01-01 DIAGNOSIS — R53.83 FATIGUE, UNSPECIFIED TYPE: Primary | ICD-10-CM

## 2023-01-01 DIAGNOSIS — S72.001D CLOSED FRACTURE OF RIGHT HIP WITH ROUTINE HEALING, SUBSEQUENT ENCOUNTER: ICD-10-CM

## 2023-01-01 DIAGNOSIS — Z51.5 END OF LIFE CARE: Primary | ICD-10-CM

## 2023-01-01 DIAGNOSIS — I34.2 NON-RHEUMATIC MITRAL VALVE STENOSIS: ICD-10-CM

## 2023-01-01 DIAGNOSIS — N18.9 CHRONIC KIDNEY DISEASE, UNSPECIFIED: ICD-10-CM

## 2023-01-01 DIAGNOSIS — Z11.1 ENCOUNTER FOR SCREENING FOR RESPIRATORY TUBERCULOSIS: ICD-10-CM

## 2023-01-01 DIAGNOSIS — M79.10 MUSCLE PAIN: ICD-10-CM

## 2023-01-01 LAB
ABO/RH(D): NORMAL
ABO/RH(D): NORMAL
ALBUMIN SERPL BCG-MCNC: 1.7 G/DL (ref 3.5–5.2)
ALBUMIN SERPL BCG-MCNC: 1.7 G/DL (ref 3.5–5.2)
ALBUMIN SERPL BCG-MCNC: 1.9 G/DL (ref 3.5–5.2)
ALBUMIN SERPL BCG-MCNC: 2 G/DL (ref 3.5–5.2)
ALBUMIN SERPL BCG-MCNC: 2 G/DL (ref 3.5–5.2)
ALBUMIN SERPL BCG-MCNC: 2.4 G/DL (ref 3.5–5.2)
ALBUMIN SERPL BCG-MCNC: 3 G/DL (ref 3.5–5.2)
ALBUMIN SERPL BCG-MCNC: 3 G/DL (ref 3.5–5.2)
ALBUMIN UR-MCNC: 100 MG/DL
ALBUMIN UR-MCNC: 30 MG/DL
ALP SERPL-CCNC: 106 U/L (ref 35–104)
ALP SERPL-CCNC: 149 U/L (ref 35–104)
ALP SERPL-CCNC: 209 U/L (ref 35–104)
ALP SERPL-CCNC: 240 U/L (ref 35–104)
ALP SERPL-CCNC: 263 U/L (ref 35–104)
ALP SERPL-CCNC: 291 U/L (ref 35–104)
ALP SERPL-CCNC: 83 U/L (ref 35–104)
ALP SERPL-CCNC: 90 U/L (ref 35–104)
ALT SERPL W P-5'-P-CCNC: 10 U/L (ref 10–35)
ALT SERPL W P-5'-P-CCNC: 12 U/L (ref 0–50)
ALT SERPL W P-5'-P-CCNC: 12 U/L (ref 0–50)
ALT SERPL W P-5'-P-CCNC: 17 U/L (ref 0–50)
ALT SERPL W P-5'-P-CCNC: 19 U/L (ref 0–50)
ALT SERPL W P-5'-P-CCNC: 21 U/L (ref 0–50)
ALT SERPL W P-5'-P-CCNC: 25 U/L (ref 0–50)
ALT SERPL W P-5'-P-CCNC: 8 U/L (ref 10–35)
ALT SERPL W P-5'-P-CCNC: 9 U/L (ref 0–50)
ALT SERPL W P-5'-P-CCNC: 9 U/L (ref 10–35)
ANION GAP SERPL CALCULATED.3IONS-SCNC: 10 MMOL/L (ref 7–15)
ANION GAP SERPL CALCULATED.3IONS-SCNC: 11 MMOL/L (ref 7–15)
ANION GAP SERPL CALCULATED.3IONS-SCNC: 11 MMOL/L (ref 7–15)
ANION GAP SERPL CALCULATED.3IONS-SCNC: 12 MMOL/L (ref 7–15)
ANION GAP SERPL CALCULATED.3IONS-SCNC: 13 MMOL/L (ref 7–15)
ANION GAP SERPL CALCULATED.3IONS-SCNC: 14 MMOL/L (ref 7–15)
ANION GAP SERPL CALCULATED.3IONS-SCNC: 14 MMOL/L (ref 7–15)
ANION GAP SERPL CALCULATED.3IONS-SCNC: 5 MMOL/L (ref 7–15)
ANION GAP SERPL CALCULATED.3IONS-SCNC: 7 MMOL/L (ref 7–15)
ANION GAP SERPL CALCULATED.3IONS-SCNC: 8 MMOL/L (ref 7–15)
ANION GAP SERPL CALCULATED.3IONS-SCNC: 9 MMOL/L (ref 7–15)
ANTIBODY SCREEN: NEGATIVE
ANTIBODY SCREEN: NEGATIVE
APPEARANCE UR: ABNORMAL
APPEARANCE UR: ABNORMAL
APTT PPP: 34 SECONDS (ref 22–38)
APTT PPP: 37 SECONDS (ref 22–38)
AST SERPL W P-5'-P-CCNC: 14 U/L (ref 10–35)
AST SERPL W P-5'-P-CCNC: 18 U/L (ref 0–45)
AST SERPL W P-5'-P-CCNC: 22 U/L (ref 10–35)
AST SERPL W P-5'-P-CCNC: 24 U/L (ref 0–45)
AST SERPL W P-5'-P-CCNC: 27 U/L (ref 0–45)
AST SERPL W P-5'-P-CCNC: 37 U/L (ref 0–45)
AST SERPL W P-5'-P-CCNC: 60 U/L (ref 0–45)
AST SERPL W P-5'-P-CCNC: ABNORMAL U/L
AST SERPL W P-5'-P-CCNC: ABNORMAL U/L
ATRIAL RATE - MUSE: 144 BPM
BACTERIA #/AREA URNS HPF: ABNORMAL /HPF
BACTERIA #/AREA URNS HPF: ABNORMAL /HPF
BACTERIA UR CULT: NO GROWTH
BASE EXCESS BLDV CALC-SCNC: -4.7 MMOL/L (ref -7.7–1.9)
BASE EXCESS BLDV CALC-SCNC: -6.5 MMOL/L (ref -7.7–1.9)
BASOPHILS # BLD AUTO: 0 10E3/UL (ref 0–0.2)
BASOPHILS # BLD AUTO: 0.1 10E3/UL (ref 0–0.2)
BASOPHILS # BLD AUTO: 0.1 10E3/UL (ref 0–0.2)
BASOPHILS NFR BLD AUTO: 1 %
BILIRUB DIRECT SERPL-MCNC: 0.22 MG/DL (ref 0–0.3)
BILIRUB DIRECT SERPL-MCNC: <0.2 MG/DL (ref 0–0.3)
BILIRUB SERPL-MCNC: 0.2 MG/DL
BILIRUB SERPL-MCNC: 0.3 MG/DL
BILIRUB SERPL-MCNC: 0.5 MG/DL
BILIRUB UR QL STRIP: NEGATIVE
BILIRUB UR QL STRIP: NEGATIVE
BLD PROD TYP BPU: NORMAL
BLOOD COMPONENT TYPE: NORMAL
BUN SERPL-MCNC: 16.8 MG/DL (ref 8–23)
BUN SERPL-MCNC: 17.4 MG/DL (ref 8–23)
BUN SERPL-MCNC: 17.6 MG/DL (ref 8–23)
BUN SERPL-MCNC: 21.4 MG/DL (ref 8–23)
BUN SERPL-MCNC: 21.9 MG/DL (ref 8–23)
BUN SERPL-MCNC: 22.8 MG/DL (ref 8–23)
BUN SERPL-MCNC: 27.7 MG/DL (ref 8–23)
BUN SERPL-MCNC: 28.9 MG/DL (ref 8–23)
BUN SERPL-MCNC: 29.7 MG/DL (ref 8–23)
BUN SERPL-MCNC: 32.9 MG/DL (ref 8–23)
BUN SERPL-MCNC: 34.7 MG/DL (ref 8–23)
BUN SERPL-MCNC: 37.5 MG/DL (ref 8–23)
BUN SERPL-MCNC: 41.9 MG/DL (ref 8–23)
BUN SERPL-MCNC: 44.5 MG/DL (ref 8–23)
BUN SERPL-MCNC: 48.1 MG/DL (ref 8–23)
CALCIUM SERPL-MCNC: 6.9 MG/DL (ref 8.2–9.6)
CALCIUM SERPL-MCNC: 6.9 MG/DL (ref 8.2–9.6)
CALCIUM SERPL-MCNC: 7.1 MG/DL (ref 8.2–9.6)
CALCIUM SERPL-MCNC: 7.2 MG/DL (ref 8.2–9.6)
CALCIUM SERPL-MCNC: 7.2 MG/DL (ref 8.2–9.6)
CALCIUM SERPL-MCNC: 7.3 MG/DL (ref 8.2–9.6)
CALCIUM SERPL-MCNC: 7.3 MG/DL (ref 8.2–9.6)
CALCIUM SERPL-MCNC: 7.4 MG/DL (ref 8.2–9.6)
CALCIUM SERPL-MCNC: 7.5 MG/DL (ref 8.2–9.6)
CALCIUM SERPL-MCNC: 7.5 MG/DL (ref 8.2–9.6)
CALCIUM SERPL-MCNC: 8 MG/DL (ref 8.2–9.6)
CALCIUM SERPL-MCNC: 8.3 MG/DL (ref 8.2–9.6)
CALCIUM SERPL-MCNC: 8.6 MG/DL (ref 8.2–9.6)
CHLORIDE SERPL-SCNC: 101 MMOL/L (ref 98–107)
CHLORIDE SERPL-SCNC: 102 MMOL/L (ref 98–107)
CHLORIDE SERPL-SCNC: 102 MMOL/L (ref 98–107)
CHLORIDE SERPL-SCNC: 103 MMOL/L (ref 98–107)
CHLORIDE SERPL-SCNC: 104 MMOL/L (ref 98–107)
CHLORIDE SERPL-SCNC: 107 MMOL/L (ref 98–107)
CHLORIDE SERPL-SCNC: 108 MMOL/L (ref 98–107)
CHLORIDE SERPL-SCNC: 109 MMOL/L (ref 98–107)
CHLORIDE SERPL-SCNC: 109 MMOL/L (ref 98–107)
CHLORIDE SERPL-SCNC: 111 MMOL/L (ref 98–107)
CHLORIDE SERPL-SCNC: 111 MMOL/L (ref 98–107)
CHLORIDE SERPL-SCNC: 113 MMOL/L (ref 98–107)
CHOLEST SERPL-MCNC: 116 MG/DL
CHOLEST SERPL-MCNC: 124 MG/DL
CODING SYSTEM: NORMAL
COLOR UR AUTO: YELLOW
COLOR UR AUTO: YELLOW
CREAT SERPL-MCNC: 0.66 MG/DL (ref 0.51–0.95)
CREAT SERPL-MCNC: 0.68 MG/DL (ref 0.51–0.95)
CREAT SERPL-MCNC: 0.68 MG/DL (ref 0.51–0.95)
CREAT SERPL-MCNC: 0.69 MG/DL (ref 0.51–0.95)
CREAT SERPL-MCNC: 0.73 MG/DL (ref 0.51–0.95)
CREAT SERPL-MCNC: 0.8 MG/DL (ref 0.51–0.95)
CREAT SERPL-MCNC: 1.16 MG/DL (ref 0.51–0.95)
CREAT SERPL-MCNC: 1.25 MG/DL (ref 0.51–0.95)
CREAT SERPL-MCNC: 1.32 MG/DL (ref 0.51–0.95)
CREAT SERPL-MCNC: 1.35 MG/DL (ref 0.51–0.95)
CREAT SERPL-MCNC: 1.41 MG/DL (ref 0.51–0.95)
CREAT SERPL-MCNC: 1.56 MG/DL (ref 0.51–0.95)
CREAT UR-MCNC: 77.5 MG/DL
CROSSMATCH: NORMAL
CRP SERPL-MCNC: 14.03 MG/L
DEPRECATED HCO3 PLAS-SCNC: 15 MMOL/L (ref 22–29)
DEPRECATED HCO3 PLAS-SCNC: 15 MMOL/L (ref 22–29)
DEPRECATED HCO3 PLAS-SCNC: 17 MMOL/L (ref 22–29)
DEPRECATED HCO3 PLAS-SCNC: 20 MMOL/L (ref 22–29)
DEPRECATED HCO3 PLAS-SCNC: 23 MMOL/L (ref 22–29)
DEPRECATED HCO3 PLAS-SCNC: 24 MMOL/L (ref 22–29)
DEPRECATED HCO3 PLAS-SCNC: 24 MMOL/L (ref 22–29)
DEPRECATED HCO3 PLAS-SCNC: 25 MMOL/L (ref 22–29)
DEPRECATED HCO3 PLAS-SCNC: 27 MMOL/L (ref 22–29)
DEPRECATED HCO3 PLAS-SCNC: 27 MMOL/L (ref 22–29)
DEPRECATED HCO3 PLAS-SCNC: 28 MMOL/L (ref 22–29)
DIASTOLIC BLOOD PRESSURE - MUSE: NORMAL MMHG
EGFRCR SERPLBLD CKD-EPI 2021: 30 ML/MIN/1.73M2
EGFRCR SERPLBLD CKD-EPI 2021: 34 ML/MIN/1.73M2
EGFRCR SERPLBLD CKD-EPI 2021: 36 ML/MIN/1.73M2
EGFRCR SERPLBLD CKD-EPI 2021: 37 ML/MIN/1.73M2
EGFRCR SERPLBLD CKD-EPI 2021: 39 ML/MIN/1.73M2
EGFRCR SERPLBLD CKD-EPI 2021: 43 ML/MIN/1.73M2
EGFRCR SERPLBLD CKD-EPI 2021: 79 ML/MIN/1.73M2
EGFRCR SERPLBLD CKD-EPI 2021: 80 ML/MIN/1.73M2
EOSINOPHIL # BLD AUTO: 0.1 10E3/UL (ref 0–0.7)
EOSINOPHIL NFR BLD AUTO: 1 %
EOSINOPHIL NFR BLD AUTO: 1 %
EOSINOPHIL NFR BLD AUTO: 2 %
EOSINOPHIL NFR BLD AUTO: 3 %
EOSINOPHIL NFR BLD AUTO: 3 %
ERYTHROCYTE [DISTWIDTH] IN BLOOD BY AUTOMATED COUNT: 18.9 % (ref 10–15)
ERYTHROCYTE [DISTWIDTH] IN BLOOD BY AUTOMATED COUNT: 19.1 % (ref 10–15)
ERYTHROCYTE [DISTWIDTH] IN BLOOD BY AUTOMATED COUNT: 19.1 % (ref 10–15)
ERYTHROCYTE [DISTWIDTH] IN BLOOD BY AUTOMATED COUNT: 19.2 % (ref 10–15)
ERYTHROCYTE [DISTWIDTH] IN BLOOD BY AUTOMATED COUNT: 19.3 % (ref 10–15)
ERYTHROCYTE [DISTWIDTH] IN BLOOD BY AUTOMATED COUNT: 19.4 % (ref 10–15)
ERYTHROCYTE [DISTWIDTH] IN BLOOD BY AUTOMATED COUNT: 19.6 % (ref 10–15)
ERYTHROCYTE [DISTWIDTH] IN BLOOD BY AUTOMATED COUNT: 20.2 % (ref 10–15)
ERYTHROCYTE [DISTWIDTH] IN BLOOD BY AUTOMATED COUNT: 20.8 % (ref 10–15)
ERYTHROCYTE [DISTWIDTH] IN BLOOD BY AUTOMATED COUNT: 21.1 % (ref 10–15)
ERYTHROCYTE [DISTWIDTH] IN BLOOD BY AUTOMATED COUNT: 21.4 % (ref 10–15)
ERYTHROCYTE [DISTWIDTH] IN BLOOD BY AUTOMATED COUNT: 21.5 % (ref 10–15)
ERYTHROCYTE [DISTWIDTH] IN BLOOD BY AUTOMATED COUNT: 21.5 % (ref 10–15)
FERRITIN SERPL-MCNC: 94 NG/ML (ref 11–328)
FIBRINOGEN PPP-MCNC: 275 MG/DL (ref 170–490)
FOLATE SERPL-MCNC: 2.5 NG/ML (ref 4.6–34.8)
GAMMA INTERFERON BACKGROUND BLD IA-ACNC: 0.15 IU/ML
GFR SERPL CREATININE-BSD FRML MDRD: 67 ML/MIN/1.73M2
GFR SERPL CREATININE-BSD FRML MDRD: 75 ML/MIN/1.73M2
GFR SERPL CREATININE-BSD FRML MDRD: 79 ML/MIN/1.73M2
GFR SERPL CREATININE-BSD FRML MDRD: 79 ML/MIN/1.73M2
GFR SERPL CREATININE-BSD FRML MDRD: 80 ML/MIN/1.73M2
GFR SERPL CREATININE-BSD FRML MDRD: 80 ML/MIN/1.73M2
GLUCOSE BLDC GLUCOMTR-MCNC: 128 MG/DL (ref 70–99)
GLUCOSE SERPL-MCNC: 101 MG/DL (ref 70–99)
GLUCOSE SERPL-MCNC: 125 MG/DL (ref 70–99)
GLUCOSE SERPL-MCNC: 128 MG/DL (ref 70–99)
GLUCOSE SERPL-MCNC: 132 MG/DL (ref 70–99)
GLUCOSE SERPL-MCNC: 135 MG/DL (ref 70–99)
GLUCOSE SERPL-MCNC: 139 MG/DL (ref 70–99)
GLUCOSE SERPL-MCNC: 139 MG/DL (ref 70–99)
GLUCOSE SERPL-MCNC: 71 MG/DL (ref 70–99)
GLUCOSE SERPL-MCNC: 77 MG/DL (ref 70–99)
GLUCOSE SERPL-MCNC: 80 MG/DL (ref 70–99)
GLUCOSE SERPL-MCNC: 83 MG/DL (ref 70–99)
GLUCOSE SERPL-MCNC: 85 MG/DL (ref 70–99)
GLUCOSE SERPL-MCNC: 97 MG/DL (ref 70–99)
GLUCOSE SERPL-MCNC: 98 MG/DL (ref 70–99)
GLUCOSE SERPL-MCNC: 99 MG/DL (ref 70–99)
GLUCOSE UR STRIP-MCNC: NEGATIVE MG/DL
GLUCOSE UR STRIP-MCNC: NEGATIVE MG/DL
HCO3 BLDV-SCNC: 19 MMOL/L (ref 21–28)
HCO3 BLDV-SCNC: 21 MMOL/L (ref 21–28)
HCT VFR BLD AUTO: 21.2 % (ref 35–47)
HCT VFR BLD AUTO: 21.2 % (ref 35–47)
HCT VFR BLD AUTO: 22.6 % (ref 35–47)
HCT VFR BLD AUTO: 27.2 % (ref 35–47)
HCT VFR BLD AUTO: 28.1 % (ref 35–47)
HCT VFR BLD AUTO: 29.2 % (ref 35–47)
HCT VFR BLD AUTO: 29.2 % (ref 35–47)
HCT VFR BLD AUTO: 29.3 % (ref 35–47)
HCT VFR BLD AUTO: 32.2 % (ref 35–47)
HCT VFR BLD AUTO: 34.9 % (ref 35–47)
HCT VFR BLD AUTO: 35.6 % (ref 35–47)
HCT VFR BLD AUTO: 36.3 % (ref 35–47)
HCT VFR BLD AUTO: 37.3 % (ref 35–47)
HCT VFR BLD AUTO: 37.9 % (ref 35–47)
HCT VFR BLD AUTO: 41.9 % (ref 35–47)
HDLC SERPL-MCNC: 45 MG/DL
HDLC SERPL-MCNC: 48 MG/DL
HEMOCCULT STL QL: POSITIVE
HGB BLD-MCNC: 10.6 G/DL (ref 11.7–15.7)
HGB BLD-MCNC: 10.8 G/DL (ref 11.7–15.7)
HGB BLD-MCNC: 11 G/DL (ref 11.7–15.7)
HGB BLD-MCNC: 11.9 G/DL (ref 11.7–15.7)
HGB BLD-MCNC: 12.3 G/DL (ref 11.7–15.7)
HGB BLD-MCNC: 12.5 G/DL (ref 11.7–15.7)
HGB BLD-MCNC: 12.8 G/DL (ref 11.7–15.7)
HGB BLD-MCNC: 13.7 G/DL (ref 11.7–15.7)
HGB BLD-MCNC: 14.7 G/DL (ref 11.7–15.7)
HGB BLD-MCNC: 6.2 G/DL (ref 11.7–15.7)
HGB BLD-MCNC: 6.2 G/DL (ref 11.7–15.7)
HGB BLD-MCNC: 6.8 G/DL (ref 11.7–15.7)
HGB BLD-MCNC: 7 G/DL (ref 11.7–15.7)
HGB BLD-MCNC: 8.5 G/DL (ref 11.7–15.7)
HGB BLD-MCNC: 8.9 G/DL (ref 11.7–15.7)
HGB BLD-MCNC: 9.1 G/DL (ref 11.7–15.7)
HGB BLD-MCNC: 9.2 G/DL (ref 11.7–15.7)
HGB BLD-MCNC: 9.4 G/DL (ref 11.7–15.7)
HGB BLD-MCNC: 9.4 G/DL (ref 11.7–15.7)
HGB UR QL STRIP: ABNORMAL
HGB UR QL STRIP: ABNORMAL
HOLD SPECIMEN: NORMAL
HOLD SPECIMEN: NORMAL
IMM GRANULOCYTES # BLD: 0 10E3/UL
IMM GRANULOCYTES NFR BLD: 0 %
IMM GRANULOCYTES NFR BLD: 1 %
INR PPP: 1.26 (ref 0.85–1.15)
INR PPP: 1.51 (ref 0.85–1.15)
INR PPP: 2.13 (ref 0.85–1.15)
INTERPRETATION ECG - MUSE: NORMAL
IRON BINDING CAPACITY (ROCHE): 115 UG/DL (ref 240–430)
IRON BINDING CAPACITY (ROCHE): 237 UG/DL (ref 240–430)
IRON SATN MFR SERPL: 20 % (ref 15–46)
IRON SATN MFR SERPL: 77 % (ref 15–46)
IRON SERPL-MCNC: 182 UG/DL (ref 37–145)
IRON SERPL-MCNC: 23 UG/DL (ref 37–145)
ISSUE DATE AND TIME: NORMAL
KETONES UR STRIP-MCNC: NEGATIVE MG/DL
KETONES UR STRIP-MCNC: NEGATIVE MG/DL
LACTATE SERPL-SCNC: 1.9 MMOL/L (ref 0.7–2)
LACTATE SERPL-SCNC: 2.3 MMOL/L (ref 0.7–2)
LACTATE SERPL-SCNC: 2.7 MMOL/L (ref 0.7–2)
LACTATE SERPL-SCNC: 2.9 MMOL/L (ref 0.7–2)
LDLC SERPL CALC-MCNC: 51 MG/DL
LDLC SERPL CALC-MCNC: 55 MG/DL
LEUKOCYTE ESTERASE UR QL STRIP: ABNORMAL
LEUKOCYTE ESTERASE UR QL STRIP: NEGATIVE
LEVETIRACETAM SERPL-MCNC: 24.6 ΜG/ML (ref 10–40)
LEVETIRACETAM SERPL-MCNC: 33.2 ΜG/ML (ref 10–40)
LVEF ECHO: NORMAL
LVEF ECHO: NORMAL
LYMPHOCYTES # BLD AUTO: 0.9 10E3/UL (ref 0.8–5.3)
LYMPHOCYTES # BLD AUTO: 0.9 10E3/UL (ref 0.8–5.3)
LYMPHOCYTES # BLD AUTO: 1 10E3/UL (ref 0.8–5.3)
LYMPHOCYTES # BLD AUTO: 1.3 10E3/UL (ref 0.8–5.3)
LYMPHOCYTES # BLD AUTO: 2.4 10E3/UL (ref 0.8–5.3)
LYMPHOCYTES NFR BLD AUTO: 15 %
LYMPHOCYTES NFR BLD AUTO: 27 %
LYMPHOCYTES NFR BLD AUTO: 31 %
LYMPHOCYTES NFR BLD AUTO: 31 %
LYMPHOCYTES NFR BLD AUTO: 40 %
M TB IFN-G BLD-IMP: NEGATIVE
M TB IFN-G CD4+ BCKGRND COR BLD-ACNC: 1.9 IU/ML
MAGNESIUM SERPL-MCNC: 2.3 MG/DL (ref 1.7–2.3)
MAGNESIUM SERPL-MCNC: 2.6 MG/DL (ref 1.7–2.3)
MAGNESIUM SERPL-MCNC: 2.6 MG/DL (ref 1.7–2.3)
MAGNESIUM SERPL-MCNC: 2.8 MG/DL (ref 1.7–2.3)
MAGNESIUM SERPL-MCNC: 2.9 MG/DL (ref 1.7–2.3)
MCH RBC QN AUTO: 27.3 PG (ref 26.5–33)
MCH RBC QN AUTO: 28.2 PG (ref 26.5–33)
MCH RBC QN AUTO: 28.3 PG (ref 26.5–33)
MCH RBC QN AUTO: 28.4 PG (ref 26.5–33)
MCH RBC QN AUTO: 28.8 PG (ref 26.5–33)
MCH RBC QN AUTO: 29.2 PG (ref 26.5–33)
MCH RBC QN AUTO: 31.4 PG (ref 26.5–33)
MCH RBC QN AUTO: 31.8 PG (ref 26.5–33)
MCH RBC QN AUTO: 32.2 PG (ref 26.5–33)
MCH RBC QN AUTO: 32.3 PG (ref 26.5–33)
MCH RBC QN AUTO: 33.2 PG (ref 26.5–33)
MCH RBC QN AUTO: 33.2 PG (ref 26.5–33)
MCH RBC QN AUTO: 34.5 PG (ref 26.5–33)
MCHC RBC AUTO-ENTMCNC: 29.2 G/DL (ref 31.5–36.5)
MCHC RBC AUTO-ENTMCNC: 29.2 G/DL (ref 31.5–36.5)
MCHC RBC AUTO-ENTMCNC: 30.1 G/DL (ref 31.5–36.5)
MCHC RBC AUTO-ENTMCNC: 30.4 G/DL (ref 31.5–36.5)
MCHC RBC AUTO-ENTMCNC: 30.4 G/DL (ref 31.5–36.5)
MCHC RBC AUTO-ENTMCNC: 31.2 G/DL (ref 31.5–36.5)
MCHC RBC AUTO-ENTMCNC: 31.3 G/DL (ref 31.5–36.5)
MCHC RBC AUTO-ENTMCNC: 31.5 G/DL (ref 31.5–36.5)
MCHC RBC AUTO-ENTMCNC: 33 G/DL (ref 31.5–36.5)
MCHC RBC AUTO-ENTMCNC: 33.4 G/DL (ref 31.5–36.5)
MCHC RBC AUTO-ENTMCNC: 33.5 G/DL (ref 31.5–36.5)
MCHC RBC AUTO-ENTMCNC: 33.5 G/DL (ref 31.5–36.5)
MCHC RBC AUTO-ENTMCNC: 35.1 G/DL (ref 31.5–36.5)
MCHC RBC AUTO-ENTMCNC: 35.3 G/DL (ref 31.5–36.5)
MCHC RBC AUTO-ENTMCNC: 36.1 G/DL (ref 31.5–36.5)
MCV RBC AUTO: 90 FL (ref 78–100)
MCV RBC AUTO: 92 FL (ref 78–100)
MCV RBC AUTO: 93 FL (ref 78–100)
MCV RBC AUTO: 94 FL (ref 78–100)
MCV RBC AUTO: 94 FL (ref 78–100)
MCV RBC AUTO: 95 FL (ref 78–100)
MCV RBC AUTO: 95 FL (ref 78–100)
MCV RBC AUTO: 96 FL (ref 78–100)
MCV RBC AUTO: 97 FL (ref 78–100)
MCV RBC AUTO: 98 FL (ref 78–100)
MICROALBUMIN UR-MCNC: 108.8 MG/L
MICROALBUMIN/CREAT UR: 140.39 MG/G CR (ref 0–25)
MITOGEN IGNF BCKGRD COR BLD-ACNC: 0 IU/ML
MITOGEN IGNF BCKGRD COR BLD-ACNC: 0.01 IU/ML
MONOCYTES # BLD AUTO: 0.2 10E3/UL (ref 0–1.3)
MONOCYTES # BLD AUTO: 0.5 10E3/UL (ref 0–1.3)
MONOCYTES # BLD AUTO: 0.6 10E3/UL (ref 0–1.3)
MONOCYTES NFR BLD AUTO: 6 %
MONOCYTES NFR BLD AUTO: 7 %
MONOCYTES NFR BLD AUTO: 9 %
MUCOUS THREADS #/AREA URNS LPF: PRESENT /LPF
NEUTROPHILS # BLD AUTO: 1.5 10E3/UL (ref 1.6–8.3)
NEUTROPHILS # BLD AUTO: 1.8 10E3/UL (ref 1.6–8.3)
NEUTROPHILS # BLD AUTO: 1.8 10E3/UL (ref 1.6–8.3)
NEUTROPHILS # BLD AUTO: 4.9 10E3/UL (ref 1.6–8.3)
NEUTROPHILS # BLD AUTO: 5.9 10E3/UL (ref 1.6–8.3)
NEUTROPHILS NFR BLD AUTO: 49 %
NEUTROPHILS NFR BLD AUTO: 58 %
NEUTROPHILS NFR BLD AUTO: 58 %
NEUTROPHILS NFR BLD AUTO: 65 %
NEUTROPHILS NFR BLD AUTO: 73 %
NITRATE UR QL: NEGATIVE
NITRATE UR QL: NEGATIVE
NONHDLC SERPL-MCNC: 71 MG/DL
NONHDLC SERPL-MCNC: 76 MG/DL
NRBC # BLD AUTO: 0 10E3/UL
NRBC # BLD AUTO: 0.1 10E3/UL
NRBC BLD AUTO-RTO: 0 /100
NRBC BLD AUTO-RTO: 0 /100
NRBC BLD AUTO-RTO: 1 /100
NRBC BLD AUTO-RTO: 1 /100
NRBC BLD AUTO-RTO: 2 /100
O2/TOTAL GAS SETTING VFR VENT: 21 %
O2/TOTAL GAS SETTING VFR VENT: 21 %
P AXIS - MUSE: NORMAL DEGREES
PATH REPORT.COMMENTS IMP SPEC: NORMAL
PATH REPORT.FINAL DX SPEC: NORMAL
PATH REPORT.MICROSCOPIC SPEC OTHER STN: NORMAL
PATH REPORT.MICROSCOPIC SPEC OTHER STN: NORMAL
PATH REPORT.RELEVANT HX SPEC: NORMAL
PCO2 BLDV: 35 MM HG (ref 40–50)
PCO2 BLDV: 39 MM HG (ref 40–50)
PH BLDV: 7.33 [PH] (ref 7.32–7.43)
PH BLDV: 7.33 [PH] (ref 7.32–7.43)
PH UR STRIP: 5 [PH] (ref 5–7)
PH UR STRIP: 6 [PH] (ref 5–7)
PHOSPHATE SERPL-MCNC: 3.6 MG/DL (ref 2.5–4.5)
PHOSPHATE SERPL-MCNC: 3.7 MG/DL (ref 2.5–4.5)
PLATELET # BLD AUTO: 118 10E3/UL (ref 150–450)
PLATELET # BLD AUTO: 152 10E3/UL (ref 150–450)
PLATELET # BLD AUTO: 217 10E3/UL (ref 150–450)
PLATELET # BLD AUTO: 257 10E3/UL (ref 150–450)
PLATELET # BLD AUTO: 279 10E3/UL (ref 150–450)
PLATELET # BLD AUTO: 299 10E3/UL (ref 150–450)
PLATELET # BLD AUTO: 312 10E3/UL (ref 150–450)
PLATELET # BLD AUTO: 324 10E3/UL (ref 150–450)
PLATELET # BLD AUTO: 373 10E3/UL (ref 150–450)
PLATELET # BLD AUTO: 421 10E3/UL (ref 150–450)
PLATELET # BLD AUTO: 427 10E3/UL (ref 150–450)
PLATELET # BLD AUTO: 470 10E3/UL (ref 150–450)
PLATELET # BLD AUTO: 562 10E3/UL (ref 150–450)
PLATELET # BLD AUTO: 562 10E3/UL (ref 150–450)
PLATELET # BLD AUTO: 584 10E3/UL (ref 150–450)
PO2 BLDV: 37 MM HG (ref 25–47)
PO2 BLDV: 68 MM HG (ref 25–47)
POTASSIUM SERPL-SCNC: 3.4 MMOL/L (ref 3.4–5.3)
POTASSIUM SERPL-SCNC: 3.4 MMOL/L (ref 3.4–5.3)
POTASSIUM SERPL-SCNC: 3.5 MMOL/L (ref 3.4–5.3)
POTASSIUM SERPL-SCNC: 3.5 MMOL/L (ref 3.4–5.3)
POTASSIUM SERPL-SCNC: 4 MMOL/L (ref 3.4–5.3)
POTASSIUM SERPL-SCNC: 4.4 MMOL/L (ref 3.4–5.3)
POTASSIUM SERPL-SCNC: 4.5 MMOL/L (ref 3.4–5.3)
POTASSIUM SERPL-SCNC: 4.6 MMOL/L (ref 3.4–5.3)
POTASSIUM SERPL-SCNC: 5 MMOL/L (ref 3.4–5.3)
POTASSIUM SERPL-SCNC: 5.2 MMOL/L (ref 3.4–5.3)
POTASSIUM SERPL-SCNC: 5.5 MMOL/L (ref 3.4–5.3)
POTASSIUM SERPL-SCNC: 5.9 MMOL/L (ref 3.4–5.3)
POTASSIUM SERPL-SCNC: 6.3 MMOL/L (ref 3.4–5.3)
PR INTERVAL - MUSE: NORMAL MS
PROCALCITONIN SERPL IA-MCNC: 12.45 NG/ML
PROT SERPL-MCNC: 4.9 G/DL (ref 6.4–8.3)
PROT SERPL-MCNC: 5.5 G/DL (ref 6.4–8.3)
PROT SERPL-MCNC: 5.6 G/DL (ref 6.4–8.3)
PROT SERPL-MCNC: 5.8 G/DL (ref 6.4–8.3)
PROT SERPL-MCNC: 5.8 G/DL (ref 6.4–8.3)
PROT SERPL-MCNC: 5.9 G/DL (ref 6.4–8.3)
PROT SERPL-MCNC: 7.1 G/DL (ref 6.4–8.3)
PROT SERPL-MCNC: 8 G/DL (ref 6.4–8.3)
QRS DURATION - MUSE: 132 MS
QT - MUSE: 364 MS
QTC - MUSE: 547 MS
QUANTIFERON MITOGEN: 2.05 IU/ML
QUANTIFERON NIL TUBE: 0.15 IU/ML
QUANTIFERON TB1 TUBE: 0.15 IU/ML
QUANTIFERON TB2 TUBE: 0.16
R AXIS - MUSE: -53 DEGREES
RBC # BLD AUTO: 2.19 10E6/UL (ref 3.8–5.2)
RBC # BLD AUTO: 2.19 10E6/UL (ref 3.8–5.2)
RBC # BLD AUTO: 2.33 10E6/UL (ref 3.8–5.2)
RBC # BLD AUTO: 2.96 10E6/UL (ref 3.8–5.2)
RBC # BLD AUTO: 3.01 10E6/UL (ref 3.8–5.2)
RBC # BLD AUTO: 3.16 10E6/UL (ref 3.8–5.2)
RBC # BLD AUTO: 3.24 10E6/UL (ref 3.8–5.2)
RBC # BLD AUTO: 3.26 10E6/UL (ref 3.8–5.2)
RBC # BLD AUTO: 3.44 10E6/UL (ref 3.8–5.2)
RBC # BLD AUTO: 3.69 10E6/UL (ref 3.8–5.2)
RBC # BLD AUTO: 3.74 10E6/UL (ref 3.8–5.2)
RBC # BLD AUTO: 3.81 10E6/UL (ref 3.8–5.2)
RBC # BLD AUTO: 3.85 10E6/UL (ref 3.8–5.2)
RBC # BLD AUTO: 3.97 10E6/UL (ref 3.8–5.2)
RBC # BLD AUTO: 4.43 10E6/UL (ref 3.8–5.2)
RBC URINE: 74 /HPF
RBC URINE: >182 /HPF
RETICS # AUTO: 0.03 10E6/UL (ref 0.03–0.1)
RETICS # AUTO: 0.03 10E6/UL (ref 0.03–0.1)
RETICS/RBC NFR AUTO: 1.2 % (ref 0.5–2)
RETICS/RBC NFR AUTO: 1.2 % (ref 0.5–2)
SODIUM SERPL-SCNC: 136 MMOL/L (ref 136–145)
SODIUM SERPL-SCNC: 136 MMOL/L (ref 136–145)
SODIUM SERPL-SCNC: 137 MMOL/L (ref 136–145)
SODIUM SERPL-SCNC: 138 MMOL/L (ref 136–145)
SODIUM SERPL-SCNC: 139 MMOL/L (ref 136–145)
SODIUM SERPL-SCNC: 140 MMOL/L (ref 136–145)
SODIUM SERPL-SCNC: 141 MMOL/L (ref 136–145)
SP GR UR STRIP: 1.01 (ref 1–1.03)
SP GR UR STRIP: 1.02 (ref 1–1.03)
SPECIMEN EXPIRATION DATE: NORMAL
SPECIMEN EXPIRATION DATE: NORMAL
SQUAMOUS EPITHELIAL: 1 /HPF
SQUAMOUS EPITHELIAL: <1 /HPF
SYSTOLIC BLOOD PRESSURE - MUSE: NORMAL MMHG
T AXIS - MUSE: 135 DEGREES
T4 FREE SERPL-MCNC: 0.95 NG/DL (ref 0.9–1.7)
T4 FREE SERPL-MCNC: 1.19 NG/DL (ref 0.9–1.7)
TRIGL SERPL-MCNC: 100 MG/DL
TRIGL SERPL-MCNC: 104 MG/DL
TROPONIN T SERPL HS-MCNC: 333 NG/L
TROPONIN T SERPL HS-MCNC: 337 NG/L
TSH SERPL DL<=0.005 MIU/L-ACNC: 5.44 UIU/ML (ref 0.3–4.2)
TSH SERPL DL<=0.005 MIU/L-ACNC: 9.69 UIU/ML (ref 0.3–4.2)
UNIT ABO/RH: NORMAL
UNIT NUMBER: NORMAL
UNIT STATUS: NORMAL
UNIT TYPE ISBT: 6200
UROBILINOGEN UR STRIP-MCNC: NORMAL MG/DL
UROBILINOGEN UR STRIP-MCNC: NORMAL MG/DL
VENTRICULAR RATE- MUSE: 136 BPM
VIT B12 SERPL-MCNC: 606 PG/ML (ref 232–1245)
VIT B12 SERPL-MCNC: 946 PG/ML (ref 232–1245)
WBC # BLD AUTO: 12.1 10E3/UL (ref 4–11)
WBC # BLD AUTO: 13.1 10E3/UL (ref 4–11)
WBC # BLD AUTO: 15.3 10E3/UL (ref 4–11)
WBC # BLD AUTO: 15.6 10E3/UL (ref 4–11)
WBC # BLD AUTO: 3 10E3/UL (ref 4–11)
WBC # BLD AUTO: 3 10E3/UL (ref 4–11)
WBC # BLD AUTO: 3.1 10E3/UL (ref 4–11)
WBC # BLD AUTO: 3.7 10E3/UL (ref 4–11)
WBC # BLD AUTO: 4.2 10E3/UL (ref 4–11)
WBC # BLD AUTO: 6.6 10E3/UL (ref 4–11)
WBC # BLD AUTO: 7.9 10E3/UL (ref 4–11)
WBC # BLD AUTO: 8.5 10E3/UL (ref 4–11)
WBC # BLD AUTO: 8.9 10E3/UL (ref 4–11)
WBC # BLD AUTO: 9.1 10E3/UL (ref 4–11)
WBC # BLD AUTO: 9.8 10E3/UL (ref 4–11)
WBC URINE: 60 /HPF
WBC URINE: 9 /HPF

## 2023-01-01 PROCEDURE — 36415 COLL VENOUS BLD VENIPUNCTURE: CPT | Performed by: INTERNAL MEDICINE

## 2023-01-01 PROCEDURE — 250N000011 HC RX IP 250 OP 636: Performed by: HOSPITALIST

## 2023-01-01 PROCEDURE — 250N000013 HC RX MED GY IP 250 OP 250 PS 637: Performed by: INTERNAL MEDICINE

## 2023-01-01 PROCEDURE — 99222 1ST HOSP IP/OBS MODERATE 55: CPT | Performed by: NURSE PRACTITIONER

## 2023-01-01 PROCEDURE — 250N000009 HC RX 250: Performed by: FAMILY MEDICINE

## 2023-01-01 PROCEDURE — 80053 COMPREHEN METABOLIC PANEL: CPT | Performed by: FAMILY MEDICINE

## 2023-01-01 PROCEDURE — 82248 BILIRUBIN DIRECT: CPT | Performed by: INTERNAL MEDICINE

## 2023-01-01 PROCEDURE — 99222 1ST HOSP IP/OBS MODERATE 55: CPT | Performed by: FAMILY MEDICINE

## 2023-01-01 PROCEDURE — 258N000003 HC RX IP 258 OP 636: Performed by: FAMILY MEDICINE

## 2023-01-01 PROCEDURE — 36415 COLL VENOUS BLD VENIPUNCTURE: CPT | Mod: ORL | Performed by: FAMILY MEDICINE

## 2023-01-01 PROCEDURE — 86850 RBC ANTIBODY SCREEN: CPT | Performed by: FAMILY MEDICINE

## 2023-01-01 PROCEDURE — 80053 COMPREHEN METABOLIC PANEL: CPT | Performed by: INTERNAL MEDICINE

## 2023-01-01 PROCEDURE — 36415 COLL VENOUS BLD VENIPUNCTURE: CPT | Performed by: HOSPITALIST

## 2023-01-01 PROCEDURE — P9604 ONE-WAY ALLOW PRORATED TRIP: HCPCS | Performed by: FAMILY MEDICINE

## 2023-01-01 PROCEDURE — 82607 VITAMIN B-12: CPT | Performed by: FAMILY MEDICINE

## 2023-01-01 PROCEDURE — 82803 BLOOD GASES ANY COMBINATION: CPT | Performed by: INTERNAL MEDICINE

## 2023-01-01 PROCEDURE — 99213 OFFICE O/P EST LOW 20 MIN: CPT | Performed by: FAMILY MEDICINE

## 2023-01-01 PROCEDURE — 85027 COMPLETE CBC AUTOMATED: CPT | Performed by: INTERNAL MEDICINE

## 2023-01-01 PROCEDURE — 83605 ASSAY OF LACTIC ACID: CPT

## 2023-01-01 PROCEDURE — 84439 ASSAY OF FREE THYROXINE: CPT | Performed by: INTERNAL MEDICINE

## 2023-01-01 PROCEDURE — 99292 CRITICAL CARE ADDL 30 MIN: CPT | Performed by: INTERNAL MEDICINE

## 2023-01-01 PROCEDURE — 96372 THER/PROPH/DIAG INJ SC/IM: CPT | Mod: XS | Performed by: EMERGENCY MEDICINE

## 2023-01-01 PROCEDURE — 85027 COMPLETE CBC AUTOMATED: CPT | Performed by: HOSPITALIST

## 2023-01-01 PROCEDURE — 36415 COLL VENOUS BLD VENIPUNCTURE: CPT | Performed by: EMERGENCY MEDICINE

## 2023-01-01 PROCEDURE — 36415 COLL VENOUS BLD VENIPUNCTURE: CPT | Performed by: FAMILY MEDICINE

## 2023-01-01 PROCEDURE — 83735 ASSAY OF MAGNESIUM: CPT

## 2023-01-01 PROCEDURE — 83540 ASSAY OF IRON: CPT | Performed by: FAMILY MEDICINE

## 2023-01-01 PROCEDURE — 99214 OFFICE O/P EST MOD 30 MIN: CPT | Performed by: INTERNAL MEDICINE

## 2023-01-01 PROCEDURE — 99291 CRITICAL CARE FIRST HOUR: CPT

## 2023-01-01 PROCEDURE — G0378 HOSPITAL OBSERVATION PER HR: HCPCS

## 2023-01-01 PROCEDURE — 250N000011 HC RX IP 250 OP 636: Performed by: FAMILY MEDICINE

## 2023-01-01 PROCEDURE — 83735 ASSAY OF MAGNESIUM: CPT | Performed by: HOSPITALIST

## 2023-01-01 PROCEDURE — 84484 ASSAY OF TROPONIN QUANT: CPT

## 2023-01-01 PROCEDURE — 84460 ALANINE AMINO (ALT) (SGPT): CPT | Performed by: INTERNAL MEDICINE

## 2023-01-01 PROCEDURE — 84443 ASSAY THYROID STIM HORMONE: CPT | Performed by: INTERNAL MEDICINE

## 2023-01-01 PROCEDURE — 97530 THERAPEUTIC ACTIVITIES: CPT | Mod: GP | Performed by: PHYSICAL THERAPIST

## 2023-01-01 PROCEDURE — 258N000003 HC RX IP 258 OP 636

## 2023-01-01 PROCEDURE — 250N000013 HC RX MED GY IP 250 OP 250 PS 637: Performed by: HOSPITALIST

## 2023-01-01 PROCEDURE — 99418 PROLNG IP/OBS E/M EA 15 MIN: CPT | Performed by: INTERNAL MEDICINE

## 2023-01-01 PROCEDURE — 120N000001 HC R&B MED SURG/OB

## 2023-01-01 PROCEDURE — 93306 TTE W/DOPPLER COMPLETE: CPT | Mod: 26 | Performed by: INTERNAL MEDICINE

## 2023-01-01 PROCEDURE — 85045 AUTOMATED RETICULOCYTE COUNT: CPT | Mod: ORL | Performed by: FAMILY MEDICINE

## 2023-01-01 PROCEDURE — 258N000003 HC RX IP 258 OP 636: Performed by: INTERNAL MEDICINE

## 2023-01-01 PROCEDURE — 84100 ASSAY OF PHOSPHORUS: CPT | Performed by: HOSPITALIST

## 2023-01-01 PROCEDURE — 250N000012 HC RX MED GY IP 250 OP 636 PS 637: Performed by: INTERNAL MEDICINE

## 2023-01-01 PROCEDURE — 250N000012 HC RX MED GY IP 250 OP 636 PS 637: Performed by: HOSPITALIST

## 2023-01-01 PROCEDURE — 74177 CT ABD & PELVIS W/CONTRAST: CPT

## 2023-01-01 PROCEDURE — 99233 SBSQ HOSP IP/OBS HIGH 50: CPT | Mod: FS | Performed by: NURSE PRACTITIONER

## 2023-01-01 PROCEDURE — 80048 BASIC METABOLIC PNL TOTAL CA: CPT

## 2023-01-01 PROCEDURE — 84450 TRANSFERASE (AST) (SGOT): CPT

## 2023-01-01 PROCEDURE — 93321 DOPPLER ECHO F-UP/LMTD STD: CPT

## 2023-01-01 PROCEDURE — 250N000011 HC RX IP 250 OP 636

## 2023-01-01 PROCEDURE — 36430 TRANSFUSION BLD/BLD COMPNT: CPT | Mod: 59 | Performed by: FAMILY MEDICINE

## 2023-01-01 PROCEDURE — 99285 EMERGENCY DEPT VISIT HI MDM: CPT | Performed by: FAMILY MEDICINE

## 2023-01-01 PROCEDURE — 99238 HOSP IP/OBS DSCHRG MGMT 30/<: CPT | Performed by: NURSE PRACTITIONER

## 2023-01-01 PROCEDURE — 99285 EMERGENCY DEPT VISIT HI MDM: CPT | Mod: 25 | Performed by: FAMILY MEDICINE

## 2023-01-01 PROCEDURE — 85018 HEMOGLOBIN: CPT | Performed by: HOSPITALIST

## 2023-01-01 PROCEDURE — 85025 COMPLETE CBC W/AUTO DIFF WBC: CPT

## 2023-01-01 PROCEDURE — 250N000011 HC RX IP 250 OP 636: Mod: JZ | Performed by: HOSPITALIST

## 2023-01-01 PROCEDURE — 82607 VITAMIN B-12: CPT | Mod: ORL | Performed by: FAMILY MEDICINE

## 2023-01-01 PROCEDURE — 250N000013 HC RX MED GY IP 250 OP 250 PS 637: Performed by: NURSE PRACTITIONER

## 2023-01-01 PROCEDURE — 71046 X-RAY EXAM CHEST 2 VIEWS: CPT

## 2023-01-01 PROCEDURE — 250N000013 HC RX MED GY IP 250 OP 250 PS 637: Performed by: PEDIATRICS

## 2023-01-01 PROCEDURE — 82728 ASSAY OF FERRITIN: CPT | Performed by: FAMILY MEDICINE

## 2023-01-01 PROCEDURE — 80053 COMPREHEN METABOLIC PANEL: CPT | Performed by: EMERGENCY MEDICINE

## 2023-01-01 PROCEDURE — 85610 PROTHROMBIN TIME: CPT

## 2023-01-01 PROCEDURE — 85045 AUTOMATED RETICULOCYTE COUNT: CPT | Performed by: FAMILY MEDICINE

## 2023-01-01 PROCEDURE — 82746 ASSAY OF FOLIC ACID SERUM: CPT | Performed by: FAMILY MEDICINE

## 2023-01-01 PROCEDURE — 82043 UR ALBUMIN QUANTITATIVE: CPT | Performed by: INTERNAL MEDICINE

## 2023-01-01 PROCEDURE — 96365 THER/PROPH/DIAG IV INF INIT: CPT

## 2023-01-01 PROCEDURE — 80048 BASIC METABOLIC PNL TOTAL CA: CPT | Performed by: HOSPITALIST

## 2023-01-01 PROCEDURE — 99239 HOSP IP/OBS DSCHRG MGMT >30: CPT | Performed by: NURSE PRACTITIONER

## 2023-01-01 PROCEDURE — 93308 TTE F-UP OR LMTD: CPT | Mod: 26 | Performed by: INTERNAL MEDICINE

## 2023-01-01 PROCEDURE — 85730 THROMBOPLASTIN TIME PARTIAL: CPT | Performed by: FAMILY MEDICINE

## 2023-01-01 PROCEDURE — 99232 SBSQ HOSP IP/OBS MODERATE 35: CPT | Performed by: HOSPITALIST

## 2023-01-01 PROCEDURE — 83605 ASSAY OF LACTIC ACID: CPT | Performed by: INTERNAL MEDICINE

## 2023-01-01 PROCEDURE — 99291 CRITICAL CARE FIRST HOUR: CPT | Performed by: INTERNAL MEDICINE

## 2023-01-01 PROCEDURE — 99232 SBSQ HOSP IP/OBS MODERATE 35: CPT | Performed by: INTERNAL MEDICINE

## 2023-01-01 PROCEDURE — 81001 URINALYSIS AUTO W/SCOPE: CPT | Performed by: INTERNAL MEDICINE

## 2023-01-01 PROCEDURE — 210N000002 HC R&B HEART CARE

## 2023-01-01 PROCEDURE — 80048 BASIC METABOLIC PNL TOTAL CA: CPT | Performed by: FAMILY MEDICINE

## 2023-01-01 PROCEDURE — 80048 BASIC METABOLIC PNL TOTAL CA: CPT | Performed by: INTERNAL MEDICINE

## 2023-01-01 PROCEDURE — 99213 OFFICE O/P EST LOW 20 MIN: CPT | Mod: 95 | Performed by: FAMILY MEDICINE

## 2023-01-01 PROCEDURE — 99214 OFFICE O/P EST MOD 30 MIN: CPT | Performed by: FAMILY MEDICINE

## 2023-01-01 PROCEDURE — 250N000011 HC RX IP 250 OP 636: Mod: JZ | Performed by: EMERGENCY MEDICINE

## 2023-01-01 PROCEDURE — 85027 COMPLETE CBC AUTOMATED: CPT | Mod: ORL | Performed by: FAMILY MEDICINE

## 2023-01-01 PROCEDURE — C9113 INJ PANTOPRAZOLE SODIUM, VIA: HCPCS | Mod: JZ | Performed by: HOSPITALIST

## 2023-01-01 PROCEDURE — 93321 DOPPLER ECHO F-UP/LMTD STD: CPT | Mod: 26 | Performed by: INTERNAL MEDICINE

## 2023-01-01 PROCEDURE — 3E043XZ INTRODUCTION OF VASOPRESSOR INTO CENTRAL VEIN, PERCUTANEOUS APPROACH: ICD-10-PCS

## 2023-01-01 PROCEDURE — 250N000009 HC RX 250

## 2023-01-01 PROCEDURE — 84145 PROCALCITONIN (PCT): CPT | Performed by: HOSPITALIST

## 2023-01-01 PROCEDURE — 82272 OCCULT BLD FECES 1-3 TESTS: CPT | Performed by: FAMILY MEDICINE

## 2023-01-01 PROCEDURE — 99310 SBSQ NF CARE HIGH MDM 45: CPT | Performed by: FAMILY MEDICINE

## 2023-01-01 PROCEDURE — 99309 SBSQ NF CARE MODERATE MDM 30: CPT | Performed by: FAMILY MEDICINE

## 2023-01-01 PROCEDURE — 85018 HEMOGLOBIN: CPT | Performed by: FAMILY MEDICINE

## 2023-01-01 PROCEDURE — 97602 WOUND(S) CARE NON-SELECTIVE: CPT

## 2023-01-01 PROCEDURE — 82570 ASSAY OF URINE CREATININE: CPT | Performed by: INTERNAL MEDICINE

## 2023-01-01 PROCEDURE — 97162 PT EVAL MOD COMPLEX 30 MIN: CPT | Mod: GP | Performed by: PHYSICAL THERAPIST

## 2023-01-01 PROCEDURE — 99213 OFFICE O/P EST LOW 20 MIN: CPT | Performed by: INTERNAL MEDICINE

## 2023-01-01 PROCEDURE — 82565 ASSAY OF CREATININE: CPT

## 2023-01-01 PROCEDURE — 73502 X-RAY EXAM HIP UNI 2-3 VIEWS: CPT

## 2023-01-01 PROCEDURE — 85018 HEMOGLOBIN: CPT | Performed by: INTERNAL MEDICINE

## 2023-01-01 PROCEDURE — 85027 COMPLETE CBC AUTOMATED: CPT | Performed by: NURSE PRACTITIONER

## 2023-01-01 PROCEDURE — 99291 CRITICAL CARE FIRST HOUR: CPT | Mod: 25 | Performed by: INTERNAL MEDICINE

## 2023-01-01 PROCEDURE — 84460 ALANINE AMINO (ALT) (SGPT): CPT | Mod: ORL | Performed by: INTERNAL MEDICINE

## 2023-01-01 PROCEDURE — 86923 COMPATIBILITY TEST ELECTRIC: CPT | Performed by: FAMILY MEDICINE

## 2023-01-01 PROCEDURE — 93010 ELECTROCARDIOGRAM REPORT: CPT | Performed by: INTERNAL MEDICINE

## 2023-01-01 PROCEDURE — 82040 ASSAY OF SERUM ALBUMIN: CPT

## 2023-01-01 PROCEDURE — 250N000011 HC RX IP 250 OP 636: Mod: JZ | Performed by: FAMILY MEDICINE

## 2023-01-01 PROCEDURE — P9016 RBC LEUKOCYTES REDUCED: HCPCS | Performed by: HOSPITALIST

## 2023-01-01 PROCEDURE — 93005 ELECTROCARDIOGRAM TRACING: CPT

## 2023-01-01 PROCEDURE — 81001 URINALYSIS AUTO W/SCOPE: CPT | Performed by: EMERGENCY MEDICINE

## 2023-01-01 PROCEDURE — 85025 COMPLETE CBC W/AUTO DIFF WBC: CPT | Performed by: FAMILY MEDICINE

## 2023-01-01 PROCEDURE — 99232 SBSQ HOSP IP/OBS MODERATE 35: CPT | Mod: FS | Performed by: NURSE PRACTITIONER

## 2023-01-01 PROCEDURE — C9113 INJ PANTOPRAZOLE SODIUM, VIA: HCPCS | Mod: JZ | Performed by: FAMILY MEDICINE

## 2023-01-01 PROCEDURE — 80048 BASIC METABOLIC PNL TOTAL CA: CPT | Mod: ORL | Performed by: INTERNAL MEDICINE

## 2023-01-01 PROCEDURE — P9604 ONE-WAY ALLOW PRORATED TRIP: HCPCS | Mod: ORL | Performed by: FAMILY MEDICINE

## 2023-01-01 PROCEDURE — 99285 EMERGENCY DEPT VISIT HI MDM: CPT | Performed by: EMERGENCY MEDICINE

## 2023-01-01 PROCEDURE — 85060 BLOOD SMEAR INTERPRETATION: CPT | Performed by: PATHOLOGY

## 2023-01-01 PROCEDURE — 99207 PR NO BILLABLE SERVICE THIS VISIT: CPT | Performed by: NURSE PRACTITIONER

## 2023-01-01 PROCEDURE — 250N000011 HC RX IP 250 OP 636: Mod: JZ | Performed by: INTERNAL MEDICINE

## 2023-01-01 PROCEDURE — 85027 COMPLETE CBC AUTOMATED: CPT | Performed by: FAMILY MEDICINE

## 2023-01-01 PROCEDURE — 83550 IRON BINDING TEST: CPT | Performed by: FAMILY MEDICINE

## 2023-01-01 PROCEDURE — 85041 AUTOMATED RBC COUNT: CPT | Performed by: HOSPITALIST

## 2023-01-01 PROCEDURE — 99292 CRITICAL CARE ADDL 30 MIN: CPT

## 2023-01-01 PROCEDURE — 86901 BLOOD TYPING SEROLOGIC RH(D): CPT | Performed by: FAMILY MEDICINE

## 2023-01-01 PROCEDURE — 80061 LIPID PANEL: CPT | Performed by: INTERNAL MEDICINE

## 2023-01-01 PROCEDURE — 93971 EXTREMITY STUDY: CPT | Mod: LT

## 2023-01-01 PROCEDURE — 96374 THER/PROPH/DIAG INJ IV PUSH: CPT | Mod: 59 | Performed by: FAMILY MEDICINE

## 2023-01-01 PROCEDURE — 93325 DOPPLER ECHO COLOR FLOW MAPG: CPT

## 2023-01-01 PROCEDURE — 99233 SBSQ HOSP IP/OBS HIGH 50: CPT | Mod: 25 | Performed by: INTERNAL MEDICINE

## 2023-01-01 PROCEDURE — 999N000040 HC STATISTIC CONSULT NO CHARGE VASC ACCESS

## 2023-01-01 PROCEDURE — 200N000001 HC R&B ICU

## 2023-01-01 PROCEDURE — 85025 COMPLETE CBC W/AUTO DIFF WBC: CPT | Performed by: EMERGENCY MEDICINE

## 2023-01-01 PROCEDURE — 99233 SBSQ HOSP IP/OBS HIGH 50: CPT | Performed by: INTERNAL MEDICINE

## 2023-01-01 PROCEDURE — 82374 ASSAY BLOOD CARBON DIOXIDE: CPT | Performed by: INTERNAL MEDICINE

## 2023-01-01 PROCEDURE — 83735 ASSAY OF MAGNESIUM: CPT | Performed by: INTERNAL MEDICINE

## 2023-01-01 PROCEDURE — 36415 COLL VENOUS BLD VENIPUNCTURE: CPT | Mod: ORL | Performed by: INTERNAL MEDICINE

## 2023-01-01 PROCEDURE — 93306 TTE W/DOPPLER COMPLETE: CPT

## 2023-01-01 PROCEDURE — 83550 IRON BINDING TEST: CPT | Mod: ORL | Performed by: FAMILY MEDICINE

## 2023-01-01 PROCEDURE — 80177 DRUG SCRN QUAN LEVETIRACETAM: CPT | Performed by: INTERNAL MEDICINE

## 2023-01-01 PROCEDURE — 250N000009 HC RX 250: Performed by: INTERNAL MEDICINE

## 2023-01-01 PROCEDURE — 96374 THER/PROPH/DIAG INJ IV PUSH: CPT | Performed by: EMERGENCY MEDICINE

## 2023-01-01 PROCEDURE — G0463 HOSPITAL OUTPT CLINIC VISIT: HCPCS

## 2023-01-01 PROCEDURE — 87086 URINE CULTURE/COLONY COUNT: CPT | Performed by: INTERNAL MEDICINE

## 2023-01-01 PROCEDURE — 80048 BASIC METABOLIC PNL TOTAL CA: CPT | Performed by: NURSE PRACTITIONER

## 2023-01-01 PROCEDURE — P9604 ONE-WAY ALLOW PRORATED TRIP: HCPCS | Mod: ORL | Performed by: INTERNAL MEDICINE

## 2023-01-01 PROCEDURE — 36415 COLL VENOUS BLD VENIPUNCTURE: CPT

## 2023-01-01 PROCEDURE — 36415 COLL VENOUS BLD VENIPUNCTURE: CPT | Performed by: NURSE PRACTITIONER

## 2023-01-01 PROCEDURE — 86140 C-REACTIVE PROTEIN: CPT

## 2023-01-01 PROCEDURE — 250N000012 HC RX MED GY IP 250 OP 636 PS 637: Performed by: NURSE PRACTITIONER

## 2023-01-01 PROCEDURE — 85730 THROMBOPLASTIN TIME PARTIAL: CPT

## 2023-01-01 PROCEDURE — 71045 X-RAY EXAM CHEST 1 VIEW: CPT

## 2023-01-01 PROCEDURE — 85384 FIBRINOGEN ACTIVITY: CPT

## 2023-01-01 PROCEDURE — 85610 PROTHROMBIN TIME: CPT | Performed by: FAMILY MEDICINE

## 2023-01-01 PROCEDURE — 85027 COMPLETE CBC AUTOMATED: CPT

## 2023-01-01 PROCEDURE — 86923 COMPATIBILITY TEST ELECTRIC: CPT | Performed by: HOSPITALIST

## 2023-01-01 PROCEDURE — 99233 SBSQ HOSP IP/OBS HIGH 50: CPT | Performed by: HOSPITALIST

## 2023-01-01 PROCEDURE — 99285 EMERGENCY DEPT VISIT HI MDM: CPT | Mod: 25 | Performed by: EMERGENCY MEDICINE

## 2023-01-01 PROCEDURE — 99207 PR APP CREDIT; MD BILLING SHARED VISIT: CPT | Performed by: HOSPITALIST

## 2023-01-01 PROCEDURE — 80177 DRUG SCRN QUAN LEVETIRACETAM: CPT

## 2023-01-01 PROCEDURE — 92610 EVALUATE SWALLOWING FUNCTION: CPT | Mod: GN

## 2023-01-01 PROCEDURE — 99223 1ST HOSP IP/OBS HIGH 75: CPT | Performed by: HOSPITALIST

## 2023-01-01 PROCEDURE — 999N000111 HC STATISTIC OT IP EVAL DEFER: Performed by: SPECIALIST/TECHNOLOGIST

## 2023-01-01 PROCEDURE — 85610 PROTHROMBIN TIME: CPT | Performed by: NURSE PRACTITIONER

## 2023-01-01 PROCEDURE — 86481 TB AG RESPONSE T-CELL SUSP: CPT | Performed by: FAMILY MEDICINE

## 2023-01-01 PROCEDURE — 93325 DOPPLER ECHO COLOR FLOW MAPG: CPT | Mod: 26 | Performed by: INTERNAL MEDICINE

## 2023-01-01 PROCEDURE — 80061 LIPID PANEL: CPT | Mod: ORL | Performed by: INTERNAL MEDICINE

## 2023-01-01 PROCEDURE — 84460 ALANINE AMINO (ALT) (SGPT): CPT

## 2023-01-01 PROCEDURE — P9016 RBC LEUKOCYTES REDUCED: HCPCS | Performed by: FAMILY MEDICINE

## 2023-01-01 RX ORDER — LACTOBACILLUS ACIDOPHILUS 20B CELL
1 CAPSULE ORAL DAILY
Qty: 30 CAPSULE | Refills: 0 | Status: ON HOLD | OUTPATIENT
Start: 2023-01-01 | End: 2023-01-01

## 2023-01-01 RX ORDER — DIPHENHYDRAMINE HYDROCHLORIDE 50 MG/ML
50 INJECTION INTRAMUSCULAR; INTRAVENOUS
Status: CANCELLED
Start: 2023-01-01

## 2023-01-01 RX ORDER — PREDNISONE 2.5 MG/1
2.5 TABLET ORAL EVERY OTHER DAY
Status: CANCELLED | OUTPATIENT
Start: 2023-01-01

## 2023-01-01 RX ORDER — EPINEPHRINE 1 MG/ML
0.3 INJECTION, SOLUTION INTRAMUSCULAR; SUBCUTANEOUS EVERY 5 MIN PRN
Status: CANCELLED | OUTPATIENT
Start: 2023-01-01

## 2023-01-01 RX ORDER — NITROGLYCERIN 0.4 MG/1
0.4 TABLET SUBLINGUAL EVERY 5 MIN PRN
Status: DISCONTINUED | OUTPATIENT
Start: 2023-01-01 | End: 2023-09-23 | Stop reason: HOSPADM

## 2023-01-01 RX ORDER — ONDANSETRON 4 MG/1
4 TABLET, ORALLY DISINTEGRATING ORAL EVERY 6 HOURS PRN
Status: DISCONTINUED | OUTPATIENT
Start: 2023-01-01 | End: 2023-09-23 | Stop reason: HOSPADM

## 2023-01-01 RX ORDER — ISOSORBIDE MONONITRATE 30 MG/1
60 TABLET, EXTENDED RELEASE ORAL DAILY
Status: DISCONTINUED | OUTPATIENT
Start: 2023-01-01 | End: 2023-01-01 | Stop reason: HOSPADM

## 2023-01-01 RX ORDER — LEVOTHYROXINE SODIUM 88 UG/1
88 TABLET ORAL
Qty: 30 TABLET | Refills: 0 | Status: SHIPPED | OUTPATIENT
Start: 2023-01-01

## 2023-01-01 RX ORDER — HYDROMORPHONE HCL IN WATER/PF 6 MG/30 ML
0.2 PATIENT CONTROLLED ANALGESIA SYRINGE INTRAVENOUS
Status: DISCONTINUED | OUTPATIENT
Start: 2023-01-01 | End: 2023-01-01 | Stop reason: HOSPADM

## 2023-01-01 RX ORDER — CLOPIDOGREL BISULFATE 75 MG/1
75 TABLET ORAL DAILY
Status: DISCONTINUED | OUTPATIENT
Start: 2023-01-01 | End: 2023-01-01 | Stop reason: HOSPADM

## 2023-01-01 RX ORDER — CALCIUM GLUCONATE 20 MG/ML
1 INJECTION, SOLUTION INTRAVENOUS ONCE
Status: COMPLETED | OUTPATIENT
Start: 2023-01-01 | End: 2023-01-01

## 2023-01-01 RX ORDER — LIDOCAINE 4 G/G
1 PATCH TOPICAL EVERY 24 HOURS
Qty: 20 PATCH | Refills: 0 | Status: SHIPPED | OUTPATIENT
Start: 2023-01-01 | End: 2023-01-01

## 2023-01-01 RX ORDER — METOPROLOL TARTRATE 1 MG/ML
5 INJECTION, SOLUTION INTRAVENOUS ONCE
Status: COMPLETED | OUTPATIENT
Start: 2023-01-01 | End: 2023-01-01

## 2023-01-01 RX ORDER — PYRIDOXINE HCL (VITAMIN B6) 100 MG
500 TABLET ORAL 2 TIMES DAILY
Qty: 180 CAPSULE | Refills: 0 | Status: SHIPPED | OUTPATIENT
Start: 2023-01-01

## 2023-01-01 RX ORDER — LIDOCAINE 4 G/G
1 PATCH TOPICAL
Status: DISCONTINUED | OUTPATIENT
Start: 2023-01-01 | End: 2023-01-01 | Stop reason: HOSPADM

## 2023-01-01 RX ORDER — MEPERIDINE HYDROCHLORIDE 25 MG/ML
25 INJECTION INTRAMUSCULAR; INTRAVENOUS; SUBCUTANEOUS EVERY 30 MIN PRN
Status: CANCELLED | OUTPATIENT
Start: 2023-01-01

## 2023-01-01 RX ORDER — PREDNISONE 2.5 MG/1
2.5 TABLET ORAL DAILY
Qty: 30 TABLET | Refills: 0 | Status: SHIPPED | OUTPATIENT
Start: 2023-01-01

## 2023-01-01 RX ORDER — PREDNISONE 2.5 MG/1
2.5 TABLET ORAL DAILY
Status: ON HOLD | COMMUNITY
Start: 2023-01-01 | End: 2023-01-01

## 2023-01-01 RX ORDER — LORATADINE 10 MG/1
1 TABLET ORAL DAILY
Qty: 30 TABLET | Refills: 0 | Status: SHIPPED | OUTPATIENT
Start: 2023-01-01 | End: 2023-01-01 | Stop reason: ALTCHOICE

## 2023-01-01 RX ORDER — LORATADINE 10 MG/1
TABLET ORAL
Qty: 30 TABLET | Refills: 0 | Status: SHIPPED | OUTPATIENT
Start: 2023-01-01 | End: 2023-01-01

## 2023-01-01 RX ORDER — CLOTRIMAZOLE AND BETAMETHASONE DIPROPIONATE 10; .64 MG/G; MG/G
CREAM TOPICAL
Qty: 45 G | Refills: 1 | Status: SHIPPED | OUTPATIENT
Start: 2023-01-01 | End: 2023-01-01

## 2023-01-01 RX ORDER — ISOSORBIDE MONONITRATE 60 MG/1
60 TABLET, EXTENDED RELEASE ORAL DAILY
Qty: 30 TABLET | Refills: 0 | Status: ON HOLD | OUTPATIENT
Start: 2023-01-01 | End: 2023-01-01

## 2023-01-01 RX ORDER — MIRTAZAPINE 15 MG/1
30 TABLET, FILM COATED ORAL AT BEDTIME
Status: DISCONTINUED | OUTPATIENT
Start: 2023-01-01 | End: 2023-01-01

## 2023-01-01 RX ORDER — LEVETIRACETAM 250 MG/1
250 TABLET ORAL 2 TIMES DAILY
Status: DISCONTINUED | OUTPATIENT
Start: 2023-01-01 | End: 2023-01-01

## 2023-01-01 RX ORDER — NITROGLYCERIN 0.4 MG/1
0.4 TABLET SUBLINGUAL EVERY 5 MIN PRN
Qty: 25 TABLET | Refills: 0 | Status: ON HOLD | OUTPATIENT
Start: 2023-01-01 | End: 2023-01-01

## 2023-01-01 RX ORDER — PROCHLORPERAZINE 25 MG
12.5 SUPPOSITORY, RECTAL RECTAL EVERY 12 HOURS PRN
Status: DISCONTINUED | OUTPATIENT
Start: 2023-01-01 | End: 2023-01-01 | Stop reason: HOSPADM

## 2023-01-01 RX ORDER — AMOXICILLIN 250 MG
2 CAPSULE ORAL 2 TIMES DAILY
Status: DISCONTINUED | OUTPATIENT
Start: 2023-01-01 | End: 2023-01-01 | Stop reason: HOSPADM

## 2023-01-01 RX ORDER — METOPROLOL SUCCINATE 50 MG/1
50 TABLET, EXTENDED RELEASE ORAL 2 TIMES DAILY
Status: DISCONTINUED | OUTPATIENT
Start: 2023-01-01 | End: 2023-01-01 | Stop reason: HOSPADM

## 2023-01-01 RX ORDER — METHYLPREDNISOLONE SODIUM SUCCINATE 125 MG/2ML
125 INJECTION, POWDER, LYOPHILIZED, FOR SOLUTION INTRAMUSCULAR; INTRAVENOUS
Status: CANCELLED
Start: 2023-01-01

## 2023-01-01 RX ORDER — LEVOTHYROXINE SODIUM 88 UG/1
88 TABLET ORAL DAILY
Qty: 90 TABLET | Refills: 0 | Status: ON HOLD | OUTPATIENT
Start: 2023-01-01 | End: 2023-01-01

## 2023-01-01 RX ORDER — DIGOXIN 0.25 MG/ML
250 INJECTION INTRAMUSCULAR; INTRAVENOUS ONCE
Status: COMPLETED | OUTPATIENT
Start: 2023-01-01 | End: 2023-01-01

## 2023-01-01 RX ORDER — CALCIUM CARBONATE 500 MG/1
1 TABLET, CHEWABLE ORAL 2 TIMES DAILY PRN
Qty: 30 TABLET | Refills: 0 | Status: ON HOLD | OUTPATIENT
Start: 2023-01-01 | End: 2023-01-01

## 2023-01-01 RX ORDER — ALBUTEROL SULFATE 0.83 MG/ML
2.5 SOLUTION RESPIRATORY (INHALATION)
Status: CANCELLED | OUTPATIENT
Start: 2023-01-01

## 2023-01-01 RX ORDER — ALBUTEROL SULFATE 90 UG/1
1-2 AEROSOL, METERED RESPIRATORY (INHALATION)
Status: CANCELLED
Start: 2023-01-01

## 2023-01-01 RX ORDER — AMOXICILLIN 250 MG
1 CAPSULE ORAL 2 TIMES DAILY PRN
Status: DISCONTINUED | OUTPATIENT
Start: 2023-01-01 | End: 2023-09-23 | Stop reason: HOSPADM

## 2023-01-01 RX ORDER — LACTOSE-REDUCED FOOD
1 LIQUID (ML) ORAL DAILY
Qty: 7110 ML | Refills: 3 | Status: SHIPPED | OUTPATIENT
Start: 2023-01-01 | End: 2023-01-01

## 2023-01-01 RX ORDER — ACETAMINOPHEN 325 MG/1
975 TABLET ORAL EVERY 8 HOURS
Status: DISCONTINUED | OUTPATIENT
Start: 2023-01-01 | End: 2023-01-01 | Stop reason: HOSPADM

## 2023-01-01 RX ORDER — AMIODARONE HYDROCHLORIDE 200 MG/1
200 TABLET ORAL 2 TIMES DAILY
Status: DISCONTINUED | OUTPATIENT
Start: 2023-01-01 | End: 2023-01-01

## 2023-01-01 RX ORDER — AMIODARONE HYDROCHLORIDE 200 MG/1
200 TABLET ORAL 2 TIMES DAILY
Qty: 6 TABLET | Refills: 0 | Status: SHIPPED | OUTPATIENT
Start: 2023-01-01

## 2023-01-01 RX ORDER — ACETAMINOPHEN 325 MG/1
650 TABLET ORAL EVERY 6 HOURS PRN
Status: DISCONTINUED | OUTPATIENT
Start: 2023-01-01 | End: 2023-09-23 | Stop reason: HOSPADM

## 2023-01-01 RX ORDER — NYSTATIN 100000/ML
500000 SUSPENSION, ORAL (FINAL DOSE FORM) ORAL 4 TIMES DAILY
Status: DISCONTINUED | OUTPATIENT
Start: 2023-01-01 | End: 2023-01-01

## 2023-01-01 RX ORDER — GLIPIZIDE 10 MG/1
1 TABLET ORAL 2 TIMES DAILY
Status: DISCONTINUED | OUTPATIENT
Start: 2023-01-01 | End: 2023-09-23 | Stop reason: HOSPADM

## 2023-01-01 RX ORDER — IOPAMIDOL 755 MG/ML
500 INJECTION, SOLUTION INTRAVASCULAR ONCE
Status: COMPLETED | OUTPATIENT
Start: 2023-01-01 | End: 2023-01-01

## 2023-01-01 RX ORDER — LANOLIN ALCOHOL/MO/W.PET/CERES
3 CREAM (GRAM) TOPICAL
Status: DISCONTINUED | OUTPATIENT
Start: 2023-01-01 | End: 2023-01-01 | Stop reason: HOSPADM

## 2023-01-01 RX ORDER — AMOXICILLIN 250 MG
1 CAPSULE ORAL 2 TIMES DAILY
Qty: 60 TABLET | Refills: 0 | Status: ON HOLD | OUTPATIENT
Start: 2023-01-01 | End: 2023-01-01

## 2023-01-01 RX ORDER — CETIRIZINE HYDROCHLORIDE 10 MG/1
10 TABLET ORAL DAILY
COMMUNITY
End: 2023-01-01

## 2023-01-01 RX ORDER — ALPRAZOLAM 0.25 MG
TABLET ORAL
Qty: 60 TABLET | Refills: 5 | Status: ON HOLD | OUTPATIENT
Start: 2023-01-01 | End: 2023-01-01

## 2023-01-01 RX ORDER — PYRIDOXINE HCL (VITAMIN B6) 100 MG
500 TABLET ORAL 2 TIMES DAILY
Qty: 180 CAPSULE | Refills: 0 | Status: SHIPPED | OUTPATIENT
Start: 2023-01-01 | End: 2023-01-01

## 2023-01-01 RX ORDER — LIDOCAINE 40 MG/G
CREAM TOPICAL
Qty: 120 G | Refills: 0 | Status: SHIPPED | OUTPATIENT
Start: 2023-01-01 | End: 2023-01-01

## 2023-01-01 RX ORDER — HYDROMORPHONE HYDROCHLORIDE 2 MG/1
2 TABLET ORAL
Status: DISCONTINUED | OUTPATIENT
Start: 2023-01-01 | End: 2023-09-23 | Stop reason: HOSPADM

## 2023-01-01 RX ORDER — CITALOPRAM HYDROBROMIDE 10 MG/1
10 TABLET ORAL AT BEDTIME
Status: DISCONTINUED | OUTPATIENT
Start: 2023-01-01 | End: 2023-01-01

## 2023-01-01 RX ORDER — AMOXICILLIN 250 MG
1 CAPSULE ORAL 2 TIMES DAILY PRN
Qty: 60 TABLET | Refills: 0
Start: 2023-01-01

## 2023-01-01 RX ORDER — HEPARIN SODIUM,PORCINE 10 UNIT/ML
5 VIAL (ML) INTRAVENOUS
Status: CANCELLED | OUTPATIENT
Start: 2023-01-01

## 2023-01-01 RX ORDER — MAGNESIUM SULFATE HEPTAHYDRATE 40 MG/ML
2 INJECTION, SOLUTION INTRAVENOUS ONCE
Status: COMPLETED | OUTPATIENT
Start: 2023-01-01 | End: 2023-01-01

## 2023-01-01 RX ORDER — LIDOCAINE 4 G/G
1 PATCH TOPICAL EVERY 24 HOURS
Status: DISCONTINUED | OUTPATIENT
Start: 2023-01-01 | End: 2023-01-01

## 2023-01-01 RX ORDER — OXYCODONE HYDROCHLORIDE 5 MG/1
5 TABLET ORAL EVERY 4 HOURS PRN
Status: DISCONTINUED | OUTPATIENT
Start: 2023-01-01 | End: 2023-01-01 | Stop reason: HOSPADM

## 2023-01-01 RX ORDER — CALCIUM CARBONATE 500 MG/1
1 TABLET, CHEWABLE ORAL 3 TIMES DAILY PRN
Qty: 270 TABLET | Refills: 0 | Status: SHIPPED | OUTPATIENT
Start: 2023-01-01 | End: 2023-01-01

## 2023-01-01 RX ORDER — HEPARIN SODIUM (PORCINE) LOCK FLUSH IV SOLN 100 UNIT/ML 100 UNIT/ML
5 SOLUTION INTRAVENOUS
Status: CANCELLED | OUTPATIENT
Start: 2023-01-01

## 2023-01-01 RX ORDER — PREDNISONE 5 MG/1
5 TABLET ORAL EVERY OTHER DAY
Status: CANCELLED | OUTPATIENT
Start: 2023-01-01

## 2023-01-01 RX ORDER — LACTOSE-REDUCED FOOD
1 LIQUID (ML) ORAL
Qty: 63990 ML | Refills: 3 | Status: SHIPPED | OUTPATIENT
Start: 2023-01-01 | End: 2023-01-01

## 2023-01-01 RX ORDER — ATROPINE SULFATE 10 MG/ML
2 SOLUTION/ DROPS OPHTHALMIC EVERY 4 HOURS PRN
Status: DISCONTINUED | OUTPATIENT
Start: 2023-01-01 | End: 2023-09-23 | Stop reason: HOSPADM

## 2023-01-01 RX ORDER — ACETAMINOPHEN 500 MG
500-1000 TABLET ORAL EVERY 6 HOURS PRN
Qty: 500 TABLET | Refills: 3 | Status: SHIPPED | OUTPATIENT
Start: 2023-01-01 | End: 2023-01-01

## 2023-01-01 RX ORDER — ACETAMINOPHEN 325 MG/10.15ML
650 LIQUID ORAL EVERY 6 HOURS PRN
Status: DISCONTINUED | OUTPATIENT
Start: 2023-01-01 | End: 2023-09-23 | Stop reason: HOSPADM

## 2023-01-01 RX ORDER — LEVETIRACETAM 500 MG/1
500 TABLET ORAL 2 TIMES DAILY
Qty: 60 TABLET | Refills: 0 | Status: SHIPPED | OUTPATIENT
Start: 2023-01-01

## 2023-01-01 RX ORDER — FOLIC ACID 1 MG/1
1 TABLET ORAL DAILY
COMMUNITY
End: 2023-01-01

## 2023-01-01 RX ORDER — MIRTAZAPINE 7.5 MG/1
30 TABLET, FILM COATED ORAL AT BEDTIME
Status: CANCELLED | OUTPATIENT
Start: 2023-01-01

## 2023-01-01 RX ORDER — NALOXONE HYDROCHLORIDE 0.4 MG/ML
0.2 INJECTION, SOLUTION INTRAMUSCULAR; INTRAVENOUS; SUBCUTANEOUS
Status: DISCONTINUED | OUTPATIENT
Start: 2023-01-01 | End: 2023-09-23 | Stop reason: HOSPADM

## 2023-01-01 RX ORDER — TRIAMCINOLONE ACETONIDE 5 MG/G
1 OINTMENT TOPICAL 2 TIMES DAILY
Qty: 15 G | Refills: 0 | Status: ON HOLD | OUTPATIENT
Start: 2023-01-01 | End: 2023-01-01

## 2023-01-01 RX ORDER — ONDANSETRON 4 MG/1
4 TABLET, ORALLY DISINTEGRATING ORAL EVERY 6 HOURS PRN
Status: DISCONTINUED | OUTPATIENT
Start: 2023-01-01 | End: 2023-01-01 | Stop reason: HOSPADM

## 2023-01-01 RX ORDER — LORATADINE 10 MG/1
TABLET ORAL
Qty: 30 TABLET | Refills: 0 | OUTPATIENT
Start: 2023-01-01

## 2023-01-01 RX ORDER — LEVETIRACETAM 500 MG/1
TABLET ORAL
Qty: 270 TABLET | Refills: 0 | Status: SHIPPED | OUTPATIENT
Start: 2023-01-01 | End: 2023-01-01

## 2023-01-01 RX ORDER — ACETAMINOPHEN 325 MG/1
975 TABLET ORAL EVERY 8 HOURS
Qty: 90 TABLET | Refills: 0 | Status: ON HOLD | OUTPATIENT
Start: 2023-01-01 | End: 2023-01-01

## 2023-01-01 RX ORDER — LEVETIRACETAM 500 MG/1
500 TABLET ORAL 2 TIMES DAILY
Status: DISCONTINUED | OUTPATIENT
Start: 2023-01-01 | End: 2023-01-01 | Stop reason: HOSPADM

## 2023-01-01 RX ORDER — HYDROMORPHONE HYDROCHLORIDE 2 MG/1
1-2 TABLET ORAL
Qty: 10 TABLET | Refills: 0 | Status: SHIPPED | OUTPATIENT
Start: 2023-01-01

## 2023-01-01 RX ORDER — LOPERAMIDE HCL 2 MG
2 CAPSULE ORAL 4 TIMES DAILY PRN
Status: ON HOLD | COMMUNITY
Start: 2023-01-01 | End: 2023-01-01

## 2023-01-01 RX ORDER — ATORVASTATIN CALCIUM 40 MG/1
40 TABLET, FILM COATED ORAL DAILY
Status: DISCONTINUED | OUTPATIENT
Start: 2023-01-01 | End: 2023-01-01

## 2023-01-01 RX ORDER — ACETAMINOPHEN 325 MG/1
650 TABLET ORAL EVERY 6 HOURS PRN
Qty: 20 TABLET | Refills: 0 | Status: SHIPPED | OUTPATIENT
Start: 2023-01-01

## 2023-01-01 RX ORDER — POLYETHYLENE GLYCOL 3350 17 G/17G
17 POWDER, FOR SOLUTION ORAL DAILY PRN
Status: DISCONTINUED | OUTPATIENT
Start: 2023-01-01 | End: 2023-01-01 | Stop reason: HOSPADM

## 2023-01-01 RX ORDER — LACTOBACILLUS RHAMNOSUS GG 10B CELL
1 CAPSULE ORAL DAILY
Status: DISCONTINUED | OUTPATIENT
Start: 2023-01-01 | End: 2023-01-01 | Stop reason: HOSPADM

## 2023-01-01 RX ORDER — CYCLOSPORINE 0.5 MG/ML
1 EMULSION OPHTHALMIC 2 TIMES DAILY
Qty: 1 ML | Refills: 0 | Status: SHIPPED | OUTPATIENT
Start: 2023-01-01

## 2023-01-01 RX ORDER — ATORVASTATIN CALCIUM 40 MG/1
40 TABLET, FILM COATED ORAL DAILY
Status: DISCONTINUED | OUTPATIENT
Start: 2023-01-01 | End: 2023-01-01 | Stop reason: HOSPADM

## 2023-01-01 RX ORDER — ATORVASTATIN CALCIUM 40 MG/1
40 TABLET, FILM COATED ORAL DAILY
Qty: 30 TABLET | Refills: 0 | Status: ON HOLD | OUTPATIENT
Start: 2023-01-01 | End: 2023-01-01

## 2023-01-01 RX ORDER — CALCIUM CARBONATE 500 MG/1
1 TABLET, CHEWABLE ORAL 2 TIMES DAILY PRN
Qty: 100 TABLET | Refills: 3 | Status: ON HOLD | OUTPATIENT
Start: 2023-01-01 | End: 2023-01-01

## 2023-01-01 RX ORDER — LACTOBACILLUS ACIDOPHILUS 20B CELL
1 CAPSULE ORAL DAILY
Qty: 90 CAPSULE | Refills: 3 | Status: ON HOLD | OUTPATIENT
Start: 2023-01-01 | End: 2023-01-01

## 2023-01-01 RX ORDER — OXYCODONE HYDROCHLORIDE 5 MG/1
5 TABLET ORAL EVERY 4 HOURS PRN
Qty: 20 TABLET | Refills: 0 | Status: SHIPPED | OUTPATIENT
Start: 2023-01-01 | End: 2023-01-01

## 2023-01-01 RX ORDER — LORAZEPAM 2 MG/ML
0.5 INJECTION INTRAMUSCULAR EVERY 6 HOURS
Status: DISCONTINUED | OUTPATIENT
Start: 2023-01-01 | End: 2023-09-23 | Stop reason: HOSPADM

## 2023-01-01 RX ORDER — NALOXONE HYDROCHLORIDE 0.4 MG/ML
0.4 INJECTION, SOLUTION INTRAMUSCULAR; INTRAVENOUS; SUBCUTANEOUS
Status: DISCONTINUED | OUTPATIENT
Start: 2023-01-01 | End: 2023-01-01 | Stop reason: HOSPADM

## 2023-01-01 RX ORDER — CETIRIZINE HYDROCHLORIDE 10 MG/1
10 TABLET ORAL DAILY
Qty: 90 TABLET | Refills: 3 | Status: ON HOLD | OUTPATIENT
Start: 2023-01-01 | End: 2023-01-01

## 2023-01-01 RX ORDER — LOPERAMIDE HCL 2 MG
2 CAPSULE ORAL 4 TIMES DAILY PRN
Qty: 20 CAPSULE | Refills: 0 | Status: ON HOLD | OUTPATIENT
Start: 2023-01-01 | End: 2023-01-01

## 2023-01-01 RX ORDER — HYDROMORPHONE HYDROCHLORIDE 1 MG/ML
2 SOLUTION ORAL
Status: DISCONTINUED | OUTPATIENT
Start: 2023-01-01 | End: 2023-09-23 | Stop reason: HOSPADM

## 2023-01-01 RX ORDER — NITROFURANTOIN 25; 75 MG/1; MG/1
100 CAPSULE ORAL AT BEDTIME
Status: DISCONTINUED | OUTPATIENT
Start: 2023-01-01 | End: 2023-01-01

## 2023-01-01 RX ORDER — LIDOCAINE 40 MG/G
CREAM TOPICAL
Status: DISCONTINUED | OUTPATIENT
Start: 2023-01-01 | End: 2023-09-23 | Stop reason: HOSPADM

## 2023-01-01 RX ORDER — SODIUM CHLORIDE 9 MG/ML
INJECTION, SOLUTION INTRAVENOUS CONTINUOUS
Status: DISCONTINUED | OUTPATIENT
Start: 2023-01-01 | End: 2023-01-01

## 2023-01-01 RX ORDER — LEVETIRACETAM 500 MG/1
TABLET ORAL
Qty: 180 TABLET | Refills: 3 | Status: ON HOLD | OUTPATIENT
Start: 2023-01-01 | End: 2023-01-01

## 2023-01-01 RX ORDER — A/SINGAPORE/GP1908/2015 IVR-180 (AN A/MICHIGAN/45/2015 (H1N1)PDM09-LIKE VIRUS, A/HONG KONG/4801/2014, NYMC X-263B (H3N2) (AN A/HONG KONG/4801/2014-LIKE VIRUS), AND B/BRISBANE/60/2008, WILD TYPE (A B/BRISBANE/60/2008-LIKE VIRUS) 15; 15; 15 UG/.5ML; UG/.5ML; UG/.5ML
INJECTION, SUSPENSION INTRAMUSCULAR
COMMUNITY
Start: 2022-09-21 | End: 2023-01-01

## 2023-01-01 RX ORDER — FAMOTIDINE 40 MG/1
40 TABLET, FILM COATED ORAL DAILY
Qty: 30 TABLET | Refills: 0 | Status: SHIPPED | OUTPATIENT
Start: 2023-01-01

## 2023-01-01 RX ORDER — CITALOPRAM HYDROBROMIDE 10 MG/1
10 TABLET ORAL AT BEDTIME
Qty: 30 TABLET | Refills: 0 | Status: SHIPPED | OUTPATIENT
Start: 2023-01-01

## 2023-01-01 RX ORDER — LORAZEPAM 1 MG/1
1 TABLET ORAL
Qty: 5 TABLET | Refills: 0 | Status: SHIPPED | OUTPATIENT
Start: 2023-01-01

## 2023-01-01 RX ORDER — HYDROMORPHONE HYDROCHLORIDE 1 MG/ML
0.3 INJECTION, SOLUTION INTRAMUSCULAR; INTRAVENOUS; SUBCUTANEOUS
Status: DISCONTINUED | OUTPATIENT
Start: 2023-01-01 | End: 2023-09-23 | Stop reason: HOSPADM

## 2023-01-01 RX ORDER — MIRTAZAPINE 15 MG/1
30 TABLET, FILM COATED ORAL AT BEDTIME
Status: DISCONTINUED | OUTPATIENT
Start: 2023-01-01 | End: 2023-01-01 | Stop reason: HOSPADM

## 2023-01-01 RX ORDER — LISINOPRIL 2.5 MG/1
5 TABLET ORAL DAILY
Status: DISCONTINUED | OUTPATIENT
Start: 2023-01-01 | End: 2023-01-01 | Stop reason: HOSPADM

## 2023-01-01 RX ORDER — NALOXONE HYDROCHLORIDE 0.4 MG/ML
0.1 INJECTION, SOLUTION INTRAMUSCULAR; INTRAVENOUS; SUBCUTANEOUS
Status: DISCONTINUED | OUTPATIENT
Start: 2023-01-01 | End: 2023-09-23 | Stop reason: HOSPADM

## 2023-01-01 RX ORDER — LACTOBACILLUS RHAMNOSUS GG 10B CELL
1 CAPSULE ORAL DAILY
Status: DISCONTINUED | OUTPATIENT
Start: 2023-01-01 | End: 2023-01-01

## 2023-01-01 RX ORDER — PROCHLORPERAZINE MALEATE 5 MG
5 TABLET ORAL EVERY 6 HOURS PRN
Status: DISCONTINUED | OUTPATIENT
Start: 2023-01-01 | End: 2023-01-01 | Stop reason: HOSPADM

## 2023-01-01 RX ORDER — METOPROLOL SUCCINATE 50 MG/1
50 TABLET, EXTENDED RELEASE ORAL 2 TIMES DAILY
Qty: 180 TABLET | Refills: 1 | Status: ON HOLD | OUTPATIENT
Start: 2023-01-01 | End: 2023-01-01

## 2023-01-01 RX ORDER — ISOSORBIDE MONONITRATE 60 MG/1
60 TABLET, EXTENDED RELEASE ORAL DAILY
Qty: 180 TABLET | Refills: 3 | Status: ON HOLD | OUTPATIENT
Start: 2023-01-01 | End: 2023-01-01

## 2023-01-01 RX ORDER — ONDANSETRON 2 MG/ML
4 INJECTION INTRAMUSCULAR; INTRAVENOUS EVERY 6 HOURS PRN
Status: DISCONTINUED | OUTPATIENT
Start: 2023-01-01 | End: 2023-09-23 | Stop reason: HOSPADM

## 2023-01-01 RX ORDER — LEVETIRACETAM 500 MG/1
500 TABLET ORAL 2 TIMES DAILY
Status: DISCONTINUED | OUTPATIENT
Start: 2023-01-01 | End: 2023-01-01

## 2023-01-01 RX ORDER — LORATADINE 10 MG/1
1 TABLET ORAL DAILY
Qty: 30 TABLET | Refills: 0 | Status: SHIPPED | OUTPATIENT
Start: 2023-01-01 | End: 2023-01-01

## 2023-01-01 RX ORDER — FOLIC ACID 1 MG/1
1 TABLET ORAL DAILY
Status: DISCONTINUED | OUTPATIENT
Start: 2023-01-01 | End: 2023-01-01

## 2023-01-01 RX ORDER — NAPROXEN 500 MG/1
500 TABLET ORAL 2 TIMES DAILY WITH MEALS
Qty: 14 TABLET | Refills: 1 | Status: SHIPPED | OUTPATIENT
Start: 2023-01-01 | End: 2023-01-01

## 2023-01-01 RX ORDER — HYDROMORPHONE HCL IN WATER/PF 6 MG/30 ML
0.4 PATIENT CONTROLLED ANALGESIA SYRINGE INTRAVENOUS
Status: DISCONTINUED | OUTPATIENT
Start: 2023-01-01 | End: 2023-01-01 | Stop reason: HOSPADM

## 2023-01-01 RX ORDER — ACETAMINOPHEN 325 MG/1
975 TABLET ORAL EVERY 8 HOURS
Status: DISCONTINUED | OUTPATIENT
Start: 2023-01-01 | End: 2023-01-01

## 2023-01-01 RX ORDER — PREDNISONE 20 MG/1
20 TABLET ORAL DAILY
Qty: 7 TABLET | Refills: 0 | Status: SHIPPED | OUTPATIENT
Start: 2023-01-01 | End: 2023-01-01

## 2023-01-01 RX ORDER — AMLODIPINE BESYLATE 5 MG/1
5 TABLET ORAL DAILY
Status: DISCONTINUED | OUTPATIENT
Start: 2023-01-01 | End: 2023-01-01 | Stop reason: HOSPADM

## 2023-01-01 RX ORDER — EPINEPHRINE 1 MG/ML
0.3 INJECTION, SOLUTION, CONCENTRATE INTRAVENOUS EVERY 5 MIN PRN
Status: CANCELLED | OUTPATIENT
Start: 2023-01-01

## 2023-01-01 RX ORDER — PHENYLEPHRINE HCL IN 0.9% NACL 50MG/250ML
.5-1.25 PLASTIC BAG, INJECTION (ML) INTRAVENOUS CONTINUOUS
Status: DISCONTINUED | OUTPATIENT
Start: 2023-01-01 | End: 2023-01-01

## 2023-01-01 RX ORDER — CALCIUM CARBONATE 500 MG/1
2-4 TABLET, CHEWABLE ORAL PRN
Qty: 100 TABLET | Refills: 3 | Status: SHIPPED | OUTPATIENT
Start: 2023-01-01 | End: 2023-01-01

## 2023-01-01 RX ORDER — CETIRIZINE HYDROCHLORIDE 10 MG/1
10 TABLET ORAL DAILY
Qty: 30 TABLET | Refills: 0 | Status: ON HOLD | OUTPATIENT
Start: 2023-01-01 | End: 2023-01-01

## 2023-01-01 RX ORDER — CALCIUM CARBONATE 500 MG/1
1 TABLET, CHEWABLE ORAL 2 TIMES DAILY
Qty: 100 TABLET | Refills: 3 | Status: SHIPPED | OUTPATIENT
Start: 2023-01-01 | End: 2023-01-01

## 2023-01-01 RX ORDER — METOPROLOL TARTRATE 1 MG/ML
INJECTION, SOLUTION INTRAVENOUS
Status: DISCONTINUED
Start: 2023-01-01 | End: 2023-01-01 | Stop reason: HOSPADM

## 2023-01-01 RX ORDER — NITROFURANTOIN 25; 75 MG/1; MG/1
100 CAPSULE ORAL AT BEDTIME
Status: DISCONTINUED | OUTPATIENT
Start: 2023-01-01 | End: 2023-01-01 | Stop reason: HOSPADM

## 2023-01-01 RX ORDER — ACETAMINOPHEN 650 MG/1
650 SUPPOSITORY RECTAL EVERY 6 HOURS PRN
Qty: 3 SUPPOSITORY | Refills: 0 | Status: SHIPPED | OUTPATIENT
Start: 2023-01-01

## 2023-01-01 RX ORDER — HYDROMORPHONE HCL IN WATER/PF 6 MG/30 ML
0.2 PATIENT CONTROLLED ANALGESIA SYRINGE INTRAVENOUS ONCE
Status: COMPLETED | OUTPATIENT
Start: 2023-01-01 | End: 2023-01-01

## 2023-01-01 RX ORDER — METOPROLOL SUCCINATE 50 MG/1
50 TABLET, EXTENDED RELEASE ORAL 2 TIMES DAILY
Qty: 60 TABLET | Refills: 0 | Status: ON HOLD | OUTPATIENT
Start: 2023-01-01 | End: 2023-01-01

## 2023-01-01 RX ORDER — ACETAMINOPHEN 650 MG/1
650 SUPPOSITORY RECTAL EVERY 6 HOURS PRN
Status: DISCONTINUED | OUTPATIENT
Start: 2023-01-01 | End: 2023-09-23 | Stop reason: HOSPADM

## 2023-01-01 RX ORDER — CETIRIZINE HYDROCHLORIDE 10 MG/1
10 TABLET ORAL DAILY
Status: DISCONTINUED | OUTPATIENT
Start: 2023-01-01 | End: 2023-01-01

## 2023-01-01 RX ORDER — NALOXONE HYDROCHLORIDE 0.4 MG/ML
0.2 INJECTION, SOLUTION INTRAMUSCULAR; INTRAVENOUS; SUBCUTANEOUS
Status: DISCONTINUED | OUTPATIENT
Start: 2023-01-01 | End: 2023-01-01 | Stop reason: HOSPADM

## 2023-01-01 RX ORDER — BNT162B2 ORIGINAL AND OMICRON BA.4/BA.5 .1125; .1125 MG/2.25ML; MG/2.25ML
INJECTION, SUSPENSION INTRAMUSCULAR
COMMUNITY
Start: 2022-01-01 | End: 2023-01-01

## 2023-01-01 RX ORDER — LEVETIRACETAM 500 MG/1
TABLET ORAL
Qty: 180 TABLET | Refills: 3 | Status: SHIPPED | OUTPATIENT
Start: 2023-01-01 | End: 2023-01-01

## 2023-01-01 RX ORDER — ALPRAZOLAM 0.25 MG
0.25 TABLET ORAL 2 TIMES DAILY PRN
Qty: 28 TABLET | Refills: 0 | Status: SHIPPED | OUTPATIENT
Start: 2023-01-01 | End: 2023-01-01

## 2023-01-01 RX ORDER — HYDROMORPHONE HYDROCHLORIDE 1 MG/ML
0.5 INJECTION, SOLUTION INTRAMUSCULAR; INTRAVENOUS; SUBCUTANEOUS
Status: DISCONTINUED | OUTPATIENT
Start: 2023-01-01 | End: 2023-09-23 | Stop reason: HOSPADM

## 2023-01-01 RX ORDER — LEVOTHYROXINE SODIUM 88 UG/1
88 TABLET ORAL DAILY
Status: DISCONTINUED | OUTPATIENT
Start: 2023-01-01 | End: 2023-01-01 | Stop reason: HOSPADM

## 2023-01-01 RX ORDER — OXYCODONE HYDROCHLORIDE 5 MG/1
2.5 TABLET ORAL EVERY 4 HOURS PRN
Qty: 20 TABLET | Refills: 0 | Status: SHIPPED | OUTPATIENT
Start: 2023-01-01 | End: 2023-01-01

## 2023-01-01 RX ORDER — LEVETIRACETAM 5 MG/ML
500 INJECTION INTRAVASCULAR EVERY 12 HOURS
Status: DISCONTINUED | OUTPATIENT
Start: 2023-01-01 | End: 2023-01-01

## 2023-01-01 RX ORDER — CITALOPRAM HYDROBROMIDE 10 MG/1
TABLET ORAL
Qty: 30 TABLET | Refills: 0 | Status: ON HOLD | OUTPATIENT
Start: 2023-01-01 | End: 2023-01-01

## 2023-01-01 RX ORDER — NITROFURANTOIN 25; 75 MG/1; MG/1
100 CAPSULE ORAL AT BEDTIME
Qty: 30 CAPSULE | Refills: 0 | Status: ON HOLD | OUTPATIENT
Start: 2023-01-01 | End: 2023-01-01

## 2023-01-01 RX ORDER — POLYETHYLENE GLYCOL 3350 17 G/17G
17 POWDER, FOR SOLUTION ORAL DAILY
Qty: 510 G | Refills: 0 | Status: ON HOLD | OUTPATIENT
Start: 2023-01-01 | End: 2023-01-01

## 2023-01-01 RX ORDER — FAMOTIDINE 20 MG/1
20 TABLET, FILM COATED ORAL DAILY
Status: DISCONTINUED | OUTPATIENT
Start: 2023-01-01 | End: 2023-01-01 | Stop reason: HOSPADM

## 2023-01-01 RX ORDER — ALPRAZOLAM 0.25 MG
0.25 TABLET ORAL 2 TIMES DAILY PRN
Qty: 30 TABLET | Refills: 0 | Status: SHIPPED | OUTPATIENT
Start: 2023-01-01 | End: 2023-01-01

## 2023-01-01 RX ORDER — LIDOCAINE 40 MG/G
CREAM TOPICAL 2 TIMES DAILY PRN
Qty: 120 G | Refills: 3 | Status: ON HOLD | OUTPATIENT
Start: 2023-01-01 | End: 2023-01-01

## 2023-01-01 RX ORDER — LIDOCAINE 40 MG/G
CREAM TOPICAL 2 TIMES DAILY PRN
Qty: 120 G | Refills: 3 | Status: SHIPPED | OUTPATIENT
Start: 2023-01-01 | End: 2023-01-01

## 2023-01-01 RX ORDER — AMLODIPINE BESYLATE 5 MG/1
5 TABLET ORAL DAILY
Qty: 90 TABLET | Refills: 1 | Status: ON HOLD | OUTPATIENT
Start: 2023-01-01 | End: 2023-01-01

## 2023-01-01 RX ORDER — MIRTAZAPINE 30 MG/1
30 TABLET, FILM COATED ORAL AT BEDTIME
Qty: 30 TABLET | Refills: 0 | Status: ON HOLD | OUTPATIENT
Start: 2023-01-01 | End: 2023-01-01

## 2023-01-01 RX ORDER — SENNOSIDES 8.6 MG
1300 CAPSULE ORAL EVERY 8 HOURS PRN
Qty: 100 TABLET | Refills: 3 | Status: ON HOLD | OUTPATIENT
Start: 2023-01-01 | End: 2023-01-01

## 2023-01-01 RX ORDER — LACTOBACILLUS ACIDOPHILUS 20B CELL
1 CAPSULE ORAL DAILY
Qty: 90 CAPSULE | Refills: 3 | Status: SHIPPED | OUTPATIENT
Start: 2023-01-01 | End: 2023-01-01

## 2023-01-01 RX ORDER — ASPIRIN 81 MG/1
81 TABLET ORAL DAILY
Qty: 90 TABLET | Refills: 3 | Status: ON HOLD | OUTPATIENT
Start: 2023-01-01 | End: 2023-01-01

## 2023-01-01 RX ORDER — ONDANSETRON 2 MG/ML
4 INJECTION INTRAMUSCULAR; INTRAVENOUS EVERY 6 HOURS PRN
Status: DISCONTINUED | OUTPATIENT
Start: 2023-01-01 | End: 2023-01-01 | Stop reason: HOSPADM

## 2023-01-01 RX ORDER — BISACODYL 10 MG
10 SUPPOSITORY, RECTAL RECTAL
Qty: 4 SUPPOSITORY | Refills: 0 | Status: SHIPPED | OUTPATIENT
Start: 2023-09-23

## 2023-01-01 RX ORDER — PREDNISONE 2.5 MG/1
2.5 TABLET ORAL DAILY
Status: DISCONTINUED | OUTPATIENT
Start: 2023-01-01 | End: 2023-01-01

## 2023-01-01 RX ORDER — LEVOTHYROXINE SODIUM 75 UG/1
TABLET ORAL
Qty: 90 TABLET | Refills: 2 | Status: SHIPPED | OUTPATIENT
Start: 2023-01-01 | End: 2023-01-01

## 2023-01-01 RX ORDER — AMOXICILLIN 250 MG
1 CAPSULE ORAL 2 TIMES DAILY
Status: DISCONTINUED | OUTPATIENT
Start: 2023-01-01 | End: 2023-01-01 | Stop reason: HOSPADM

## 2023-01-01 RX ORDER — AZITHROMYCIN 250 MG/1
TABLET, FILM COATED ORAL
Qty: 6 TABLET | Refills: 0 | Status: SHIPPED | OUTPATIENT
Start: 2023-01-01 | End: 2023-01-01

## 2023-01-01 RX ORDER — ASPIRIN 81 MG/1
81 TABLET ORAL DAILY
Status: DISCONTINUED | OUTPATIENT
Start: 2023-01-01 | End: 2023-01-01

## 2023-01-01 RX ORDER — LORAZEPAM 2 MG/ML
1 INJECTION INTRAMUSCULAR
Status: DISCONTINUED | OUTPATIENT
Start: 2023-01-01 | End: 2023-09-23 | Stop reason: HOSPADM

## 2023-01-01 RX ORDER — LEVOTHYROXINE SODIUM 88 UG/1
88 TABLET ORAL DAILY
Status: DISCONTINUED | OUTPATIENT
Start: 2023-01-01 | End: 2023-01-01

## 2023-01-01 RX ORDER — FAMOTIDINE 40 MG/1
40 TABLET, FILM COATED ORAL DAILY
Qty: 90 TABLET | Refills: 3 | Status: ON HOLD | OUTPATIENT
Start: 2023-01-01 | End: 2023-01-01

## 2023-01-01 RX ORDER — HYDROMORPHONE HYDROCHLORIDE 1 MG/ML
1 SOLUTION ORAL
Status: DISCONTINUED | OUTPATIENT
Start: 2023-01-01 | End: 2023-09-23 | Stop reason: HOSPADM

## 2023-01-01 RX ORDER — CITALOPRAM HYDROBROMIDE 10 MG/1
10 TABLET ORAL DAILY
Qty: 30 TABLET | Refills: 0 | Status: SHIPPED | OUTPATIENT
Start: 2023-01-01 | End: 2023-01-01

## 2023-01-01 RX ORDER — LIDOCAINE 4 G/G
1 PATCH TOPICAL 2 TIMES DAILY
COMMUNITY

## 2023-01-01 RX ORDER — CITALOPRAM HYDROBROMIDE 10 MG/1
10 TABLET ORAL AT BEDTIME
Status: DISCONTINUED | OUTPATIENT
Start: 2023-01-01 | End: 2023-01-01 | Stop reason: HOSPADM

## 2023-01-01 RX ORDER — AMLODIPINE BESYLATE 5 MG/1
5 TABLET ORAL DAILY
Qty: 30 TABLET | Refills: 0 | Status: SHIPPED | OUTPATIENT
Start: 2023-01-01 | End: 2023-01-01

## 2023-01-01 RX ORDER — BISACODYL 10 MG
10 SUPPOSITORY, RECTAL RECTAL
Status: DISCONTINUED | OUTPATIENT
Start: 2023-09-23 | End: 2023-09-23 | Stop reason: HOSPADM

## 2023-01-01 RX ORDER — PANTOPRAZOLE SODIUM 40 MG/1
40 TABLET, DELAYED RELEASE ORAL
Status: DISCONTINUED | OUTPATIENT
Start: 2023-01-01 | End: 2023-01-01

## 2023-01-01 RX ORDER — ENOXAPARIN SODIUM 100 MG/ML
40 INJECTION SUBCUTANEOUS 2 TIMES DAILY
Status: DISCONTINUED | OUTPATIENT
Start: 2023-01-01 | End: 2023-01-01

## 2023-01-01 RX ORDER — PREDNISONE 2.5 MG/1
1 TABLET ORAL DAILY
COMMUNITY
Start: 2023-01-01 | End: 2023-01-01

## 2023-01-01 RX ORDER — CLOPIDOGREL BISULFATE 75 MG/1
75 TABLET ORAL DAILY
Qty: 30 TABLET | Refills: 0 | Status: ON HOLD | OUTPATIENT
Start: 2023-01-01 | End: 2023-01-01

## 2023-01-01 RX ORDER — CETIRIZINE HYDROCHLORIDE 10 MG/1
10 TABLET ORAL DAILY
Qty: 90 TABLET | Refills: 3 | Status: SHIPPED | OUTPATIENT
Start: 2023-01-01 | End: 2023-01-01

## 2023-01-01 RX ORDER — LISINOPRIL 5 MG/1
5 TABLET ORAL DAILY
Qty: 30 TABLET | Refills: 0 | Status: ON HOLD | OUTPATIENT
Start: 2023-01-01 | End: 2023-01-01

## 2023-01-01 RX ORDER — LORAZEPAM 1 MG/1
1 TABLET ORAL
Status: DISCONTINUED | OUTPATIENT
Start: 2023-01-01 | End: 2023-09-23 | Stop reason: HOSPADM

## 2023-01-01 RX ADMIN — ACETAMINOPHEN 975 MG: 325 TABLET, FILM COATED ORAL at 21:16

## 2023-01-01 RX ADMIN — HYDROMORPHONE HYDROCHLORIDE 1 MG: 1 SOLUTION ORAL at 08:28

## 2023-01-01 RX ADMIN — DEXTRAN 70, GLYCERIN, HYPROMELLOSE 1 DROP: 1; 2; 3 SOLUTION/ DROPS OPHTHALMIC at 11:48

## 2023-01-01 RX ADMIN — AMIODARONE HYDROCHLORIDE 200 MG: 200 TABLET ORAL at 08:49

## 2023-01-01 RX ADMIN — HYDROMORPHONE HYDROCHLORIDE 0.5 MG: 1 INJECTION, SOLUTION INTRAMUSCULAR; INTRAVENOUS; SUBCUTANEOUS at 22:57

## 2023-01-01 RX ADMIN — LEVETIRACETAM 500 MG: 500 TABLET, FILM COATED ORAL at 20:13

## 2023-01-01 RX ADMIN — DIGOXIN 250 MCG: 0.25 INJECTION INTRAMUSCULAR; INTRAVENOUS at 00:50

## 2023-01-01 RX ADMIN — CLOPIDOGREL BISULFATE 75 MG: 75 TABLET ORAL at 08:36

## 2023-01-01 RX ADMIN — LEVOTHYROXINE SODIUM 88 MCG: 0.09 TABLET ORAL at 08:36

## 2023-01-01 RX ADMIN — LEVETIRACETAM 500 MG: 5 INJECTION INTRAVENOUS at 21:07

## 2023-01-01 RX ADMIN — Medication 1 CAPSULE: at 11:41

## 2023-01-01 RX ADMIN — PANTOPRAZOLE SODIUM 40 MG: 40 TABLET, DELAYED RELEASE ORAL at 08:32

## 2023-01-01 RX ADMIN — LEVETIRACETAM 500 MG: 500 TABLET, FILM COATED ORAL at 11:10

## 2023-01-01 RX ADMIN — PANTOPRAZOLE SODIUM 40 MG: 40 INJECTION, POWDER, FOR SOLUTION INTRAVENOUS at 15:25

## 2023-01-01 RX ADMIN — PHENYLEPHRINE HYDROCHLORIDE 0.5 MCG/KG/MIN: 10 INJECTION INTRAVENOUS at 01:27

## 2023-01-01 RX ADMIN — METOPROLOL TARTRATE 5 MG: 5 INJECTION INTRAVENOUS at 00:51

## 2023-01-01 RX ADMIN — CITALOPRAM HYDROBROMIDE 10 MG: 10 TABLET ORAL at 21:06

## 2023-01-01 RX ADMIN — ACETAMINOPHEN 975 MG: 325 TABLET, FILM COATED ORAL at 06:32

## 2023-01-01 RX ADMIN — SODIUM CHLORIDE 60 ML: 9 INJECTION, SOLUTION INTRAVENOUS at 14:06

## 2023-01-01 RX ADMIN — PANTOPRAZOLE SODIUM 40 MG: 40 TABLET, DELAYED RELEASE ORAL at 08:27

## 2023-01-01 RX ADMIN — DEXTRAN 70, GLYCERIN, HYPROMELLOSE 1 DROP: 1; 2; 3 SOLUTION/ DROPS OPHTHALMIC at 10:34

## 2023-01-01 RX ADMIN — AMIODARONE HYDROCHLORIDE 200 MG: 200 TABLET ORAL at 20:13

## 2023-01-01 RX ADMIN — LEVOTHYROXINE SODIUM 88 MCG: 88 TABLET ORAL at 08:32

## 2023-01-01 RX ADMIN — HYDROMORPHONE HYDROCHLORIDE 0.5 MG: 1 INJECTION, SOLUTION INTRAMUSCULAR; INTRAVENOUS; SUBCUTANEOUS at 11:53

## 2023-01-01 RX ADMIN — FOLIC ACID 1 MG: 1 TABLET ORAL at 08:48

## 2023-01-01 RX ADMIN — DEXTRAN 70, GLYCERIN, HYPROMELLOSE 1 DROP: 1; 2; 3 SOLUTION/ DROPS OPHTHALMIC at 21:40

## 2023-01-01 RX ADMIN — AMIODARONE HYDROCHLORIDE 150 MG: 1.5 INJECTION, SOLUTION INTRAVENOUS at 01:01

## 2023-01-01 RX ADMIN — CALCIUM GLUCONATE 1 G: 20 INJECTION, SOLUTION INTRAVENOUS at 10:17

## 2023-01-01 RX ADMIN — ACETAMINOPHEN 975 MG: 325 TABLET, FILM COATED ORAL at 13:04

## 2023-01-01 RX ADMIN — LIDOCAINE 1 PATCH: 560 PATCH PERCUTANEOUS; TOPICAL; TRANSDERMAL at 22:33

## 2023-01-01 RX ADMIN — LEVOTHYROXINE SODIUM 88 MCG: 88 TABLET ORAL at 06:32

## 2023-01-01 RX ADMIN — HYDROCORTISONE SODIUM SUCCINATE 50 MG: 100 INJECTION, POWDER, FOR SOLUTION INTRAMUSCULAR; INTRAVENOUS at 09:29

## 2023-01-01 RX ADMIN — DEXTRAN 70, GLYCERIN, HYPROMELLOSE 1 DROP: 1; 2; 3 SOLUTION/ DROPS OPHTHALMIC at 08:27

## 2023-01-01 RX ADMIN — AMIODARONE HYDROCHLORIDE 200 MG: 200 TABLET ORAL at 22:47

## 2023-01-01 RX ADMIN — FOLIC ACID 1 MG: 1 TABLET ORAL at 11:41

## 2023-01-01 RX ADMIN — PREDNISONE 2.5 MG: 2.5 TABLET ORAL at 13:04

## 2023-01-01 RX ADMIN — SODIUM CHLORIDE, POTASSIUM CHLORIDE, SODIUM LACTATE AND CALCIUM CHLORIDE 500 ML: 600; 310; 30; 20 INJECTION, SOLUTION INTRAVENOUS at 08:17

## 2023-01-01 RX ADMIN — ACETAMINOPHEN 975 MG: 325 TABLET, FILM COATED ORAL at 17:14

## 2023-01-01 RX ADMIN — LIDOCAINE 1 PATCH: 560 PATCH PERCUTANEOUS; TOPICAL; TRANSDERMAL at 08:37

## 2023-01-01 RX ADMIN — DEXTRAN 70, GLYCERIN, HYPROMELLOSE 1 DROP: 1; 2; 3 SOLUTION/ DROPS OPHTHALMIC at 10:52

## 2023-01-01 RX ADMIN — PREDNISONE 2.5 MG: 2.5 TABLET ORAL at 08:32

## 2023-01-01 RX ADMIN — ACETAMINOPHEN 975 MG: 325 TABLET, FILM COATED ORAL at 08:25

## 2023-01-01 RX ADMIN — MIRTAZAPINE 30 MG: 15 TABLET, FILM COATED ORAL at 20:51

## 2023-01-01 RX ADMIN — HYDROCORTISONE SODIUM SUCCINATE 50 MG: 100 INJECTION, POWDER, FOR SOLUTION INTRAMUSCULAR; INTRAVENOUS at 10:53

## 2023-01-01 RX ADMIN — MIRTAZAPINE 30 MG: 15 TABLET, FILM COATED ORAL at 22:33

## 2023-01-01 RX ADMIN — CETIRIZINE HYDROCHLORIDE 10 MG: 10 TABLET, FILM COATED ORAL at 08:26

## 2023-01-01 RX ADMIN — METHOTREXATE SODIUM 10 MG: 2.5 TABLET ORAL at 10:24

## 2023-01-01 RX ADMIN — ACETAMINOPHEN 650 MG: 325 SUSPENSION ORAL at 17:14

## 2023-01-01 RX ADMIN — HYDROMORPHONE HYDROCHLORIDE 1 MG: 1 SOLUTION ORAL at 19:55

## 2023-01-01 RX ADMIN — FOLIC ACID 1 MG: 1 TABLET ORAL at 08:26

## 2023-01-01 RX ADMIN — IOPAMIDOL 60 ML: 755 INJECTION, SOLUTION INTRAVENOUS at 14:07

## 2023-01-01 RX ADMIN — HYDROMORPHONE HYDROCHLORIDE 0.5 MG: 1 INJECTION, SOLUTION INTRAMUSCULAR; INTRAVENOUS; SUBCUTANEOUS at 20:20

## 2023-01-01 RX ADMIN — NYSTATIN 500000 UNITS: 100000 SUSPENSION ORAL at 17:59

## 2023-01-01 RX ADMIN — NITROFURANTOIN MONOHYDRATE/MACROCRYSTALLINE 100 MG: 25; 75 CAPSULE ORAL at 22:47

## 2023-01-01 RX ADMIN — HYDROMORPHONE HYDROCHLORIDE 0.3 MG: 1 INJECTION, SOLUTION INTRAMUSCULAR; INTRAVENOUS; SUBCUTANEOUS at 00:09

## 2023-01-01 RX ADMIN — IRON SUCROSE 200 MG: 20 INJECTION, SOLUTION INTRAVENOUS at 13:36

## 2023-01-01 RX ADMIN — FOLIC ACID 1 MG: 1 TABLET ORAL at 08:20

## 2023-01-01 RX ADMIN — LEVETIRACETAM 500 MG: 500 TABLET, FILM COATED ORAL at 08:49

## 2023-01-01 RX ADMIN — AMIODARONE HYDROCHLORIDE 200 MG: 200 TABLET ORAL at 21:24

## 2023-01-01 RX ADMIN — LEVETIRACETAM 500 MG: 5 INJECTION INTRAVENOUS at 10:52

## 2023-01-01 RX ADMIN — LIDOCAINE 1 PATCH: 560 PATCH PERCUTANEOUS; TOPICAL; TRANSDERMAL at 23:08

## 2023-01-01 RX ADMIN — PANTOPRAZOLE SODIUM 40 MG: 40 TABLET, DELAYED RELEASE ORAL at 17:38

## 2023-01-01 RX ADMIN — ACETAMINOPHEN 975 MG: 325 TABLET, FILM COATED ORAL at 08:36

## 2023-01-01 RX ADMIN — CITALOPRAM HYDROBROMIDE 10 MG: 10 TABLET ORAL at 22:47

## 2023-01-01 RX ADMIN — SODIUM CHLORIDE 250 ML: 9 INJECTION, SOLUTION INTRAVENOUS at 13:56

## 2023-01-01 RX ADMIN — NITROFURANTOIN MONOHYDRATE/MACROCRYSTALLINE 100 MG: 25; 75 CAPSULE ORAL at 21:07

## 2023-01-01 RX ADMIN — LEVETIRACETAM 500 MG: 500 TABLET, FILM COATED ORAL at 08:26

## 2023-01-01 RX ADMIN — Medication 1 CAPSULE: at 08:36

## 2023-01-01 RX ADMIN — HYDROMORPHONE HYDROCHLORIDE 0.3 MG: 1 INJECTION, SOLUTION INTRAMUSCULAR; INTRAVENOUS; SUBCUTANEOUS at 01:35

## 2023-01-01 RX ADMIN — FOLIC ACID 1 MG: 1 TABLET ORAL at 22:33

## 2023-01-01 RX ADMIN — LORAZEPAM 0.5 MG: 2 INJECTION INTRAMUSCULAR; INTRAVENOUS at 10:25

## 2023-01-01 RX ADMIN — CETIRIZINE HYDROCHLORIDE 10 MG: 10 TABLET, FILM COATED ORAL at 11:41

## 2023-01-01 RX ADMIN — Medication 1 CAPSULE: at 08:49

## 2023-01-01 RX ADMIN — HYDROMORPHONE HYDROCHLORIDE 1 MG: 1 SOLUTION ORAL at 15:46

## 2023-01-01 RX ADMIN — LEVETIRACETAM 500 MG: 500 TABLET, FILM COATED ORAL at 20:53

## 2023-01-01 RX ADMIN — ACETAMINOPHEN 975 MG: 325 TABLET, FILM COATED ORAL at 15:13

## 2023-01-01 RX ADMIN — SODIUM CHLORIDE 250 ML: 9 INJECTION, SOLUTION INTRAVENOUS at 14:57

## 2023-01-01 RX ADMIN — FAMOTIDINE 20 MG: 20 TABLET, FILM COATED ORAL at 08:36

## 2023-01-01 RX ADMIN — HYDROMORPHONE HYDROCHLORIDE 0.5 MG: 1 INJECTION, SOLUTION INTRAMUSCULAR; INTRAVENOUS; SUBCUTANEOUS at 17:38

## 2023-01-01 RX ADMIN — ACETAMINOPHEN 975 MG: 325 TABLET, FILM COATED ORAL at 06:08

## 2023-01-01 RX ADMIN — ASPIRIN 81 MG: 81 TABLET ORAL at 08:35

## 2023-01-01 RX ADMIN — IRON SUCROSE 200 MG: 20 INJECTION, SOLUTION INTRAVENOUS at 13:57

## 2023-01-01 RX ADMIN — HYDROMORPHONE HYDROCHLORIDE 0.5 MG: 1 INJECTION, SOLUTION INTRAMUSCULAR; INTRAVENOUS; SUBCUTANEOUS at 14:53

## 2023-01-01 RX ADMIN — ACETAMINOPHEN 975 MG: 325 TABLET, FILM COATED ORAL at 21:05

## 2023-01-01 RX ADMIN — LEVETIRACETAM 500 MG: 5 INJECTION INTRAVENOUS at 09:30

## 2023-01-01 RX ADMIN — LEVOTHYROXINE SODIUM 88 MCG: 88 TABLET ORAL at 08:26

## 2023-01-01 RX ADMIN — DEXTRAN 70, GLYCERIN, HYPROMELLOSE 1 DROP: 1; 2; 3 SOLUTION/ DROPS OPHTHALMIC at 11:15

## 2023-01-01 RX ADMIN — PANTOPRAZOLE SODIUM 40 MG: 40 TABLET, DELAYED RELEASE ORAL at 15:14

## 2023-01-01 RX ADMIN — PANTOPRAZOLE SODIUM 40 MG: 40 INJECTION, POWDER, FOR SOLUTION INTRAVENOUS at 20:44

## 2023-01-01 RX ADMIN — ATORVASTATIN CALCIUM 40 MG: 40 TABLET, FILM COATED ORAL at 11:11

## 2023-01-01 RX ADMIN — NYSTATIN 500000 UNITS: 100000 SUSPENSION ORAL at 11:48

## 2023-01-01 RX ADMIN — RIVAROXABAN 15 MG: 10 TABLET, FILM COATED ORAL at 08:47

## 2023-01-01 RX ADMIN — IRON SUCROSE 200 MG: 20 INJECTION, SOLUTION INTRAVENOUS at 12:17

## 2023-01-01 RX ADMIN — SODIUM CHLORIDE: 9 INJECTION, SOLUTION INTRAVENOUS at 18:11

## 2023-01-01 RX ADMIN — HYDROCORTISONE SODIUM SUCCINATE 50 MG: 100 INJECTION, POWDER, FOR SOLUTION INTRAMUSCULAR; INTRAVENOUS at 17:05

## 2023-01-01 RX ADMIN — LEVETIRACETAM 500 MG: 500 TABLET, FILM COATED ORAL at 08:36

## 2023-01-01 RX ADMIN — DEXTRAN 70, GLYCERIN, HYPROMELLOSE 1 DROP: 1; 2; 3 SOLUTION/ DROPS OPHTHALMIC at 08:33

## 2023-01-01 RX ADMIN — Medication 1 MG: at 21:06

## 2023-01-01 RX ADMIN — AMIODARONE HYDROCHLORIDE 200 MG: 200 TABLET ORAL at 08:26

## 2023-01-01 RX ADMIN — PREDNISONE 2.5 MG: 2.5 TABLET ORAL at 11:10

## 2023-01-01 RX ADMIN — ACETAMINOPHEN 975 MG: 325 TABLET, FILM COATED ORAL at 15:14

## 2023-01-01 RX ADMIN — DEXTRAN 70, GLYCERIN, HYPROMELLOSE 1 DROP: 1; 2; 3 SOLUTION/ DROPS OPHTHALMIC at 22:56

## 2023-01-01 RX ADMIN — PANTOPRAZOLE SODIUM 40 MG: 40 TABLET, DELAYED RELEASE ORAL at 15:47

## 2023-01-01 RX ADMIN — CITALOPRAM HYDROBROMIDE 10 MG: 10 TABLET ORAL at 22:33

## 2023-01-01 RX ADMIN — DEXTRAN 70, GLYCERIN, HYPROMELLOSE 1 DROP: 1; 2; 3 SOLUTION/ DROPS OPHTHALMIC at 08:20

## 2023-01-01 RX ADMIN — LEVETIRACETAM 500 MG: 500 TABLET, FILM COATED ORAL at 20:37

## 2023-01-01 RX ADMIN — HYDROMORPHONE HYDROCHLORIDE 0.3 MG: 1 INJECTION, SOLUTION INTRAMUSCULAR; INTRAVENOUS; SUBCUTANEOUS at 03:19

## 2023-01-01 RX ADMIN — SENNOSIDES AND DOCUSATE SODIUM 1 TABLET: 50; 8.6 TABLET ORAL at 20:54

## 2023-01-01 RX ADMIN — DEXTRAN 70, GLYCERIN, HYPROMELLOSE 1 DROP: 1; 2; 3 SOLUTION/ DROPS OPHTHALMIC at 22:01

## 2023-01-01 RX ADMIN — HYDROMORPHONE HYDROCHLORIDE 0.2 MG: 0.2 INJECTION, SOLUTION INTRAMUSCULAR; INTRAVENOUS; SUBCUTANEOUS at 15:06

## 2023-01-01 RX ADMIN — NITROFURANTOIN MONOHYDRATE/MACROCRYSTALLINE 100 MG: 25; 75 CAPSULE ORAL at 00:21

## 2023-01-01 RX ADMIN — CETIRIZINE HYDROCHLORIDE 10 MG: 10 TABLET, FILM COATED ORAL at 08:49

## 2023-01-01 RX ADMIN — LEVETIRACETAM 500 MG: 500 TABLET, FILM COATED ORAL at 21:25

## 2023-01-01 RX ADMIN — HYDROCORTISONE SODIUM SUCCINATE 50 MG: 100 INJECTION, POWDER, FOR SOLUTION INTRAMUSCULAR; INTRAVENOUS at 04:31

## 2023-01-01 RX ADMIN — PREDNISONE 2.5 MG: 2.5 TABLET ORAL at 08:20

## 2023-01-01 RX ADMIN — FOLIC ACID 1 MG: 1 TABLET ORAL at 08:49

## 2023-01-01 RX ADMIN — SODIUM CHLORIDE 1 MG/MIN: 9 INJECTION, SOLUTION INTRAVENOUS at 01:07

## 2023-01-01 RX ADMIN — ENOXAPARIN SODIUM 40 MG: 40 INJECTION SUBCUTANEOUS at 15:06

## 2023-01-01 RX ADMIN — SENNOSIDES AND DOCUSATE SODIUM 1 TABLET: 50; 8.6 TABLET ORAL at 08:36

## 2023-01-01 RX ADMIN — HYDROMORPHONE HYDROCHLORIDE 0.3 MG: 1 INJECTION, SOLUTION INTRAMUSCULAR; INTRAVENOUS; SUBCUTANEOUS at 20:43

## 2023-01-01 RX ADMIN — PHENYLEPHRINE HYDROCHLORIDE 1 MCG/KG/MIN: 10 INJECTION INTRAVENOUS at 21:02

## 2023-01-01 RX ADMIN — CITALOPRAM HYDROBROMIDE 10 MG: 10 TABLET ORAL at 20:53

## 2023-01-01 RX ADMIN — CITALOPRAM HYDROBROMIDE 10 MG: 10 TABLET ORAL at 00:21

## 2023-01-01 RX ADMIN — ATORVASTATIN CALCIUM 40 MG: 40 TABLET, FILM COATED ORAL at 08:36

## 2023-01-01 RX ADMIN — NITROFURANTOIN MONOHYDRATE/MACROCRYSTALLINE 100 MG: 25; 75 CAPSULE ORAL at 20:50

## 2023-01-01 RX ADMIN — CITALOPRAM HYDROBROMIDE 10 MG: 10 TABLET ORAL at 21:17

## 2023-01-01 RX ADMIN — IRON SUCROSE 200 MG: 20 INJECTION, SOLUTION INTRAVENOUS at 12:55

## 2023-01-01 RX ADMIN — ACETAMINOPHEN 975 MG: 325 TABLET, FILM COATED ORAL at 00:22

## 2023-01-01 RX ADMIN — LEVOTHYROXINE SODIUM 88 MCG: 0.09 TABLET ORAL at 17:14

## 2023-01-01 RX ADMIN — ACETAMINOPHEN 975 MG: 325 TABLET, FILM COATED ORAL at 00:35

## 2023-01-01 RX ADMIN — HYDROCORTISONE SODIUM SUCCINATE 50 MG: 100 INJECTION, POWDER, FOR SOLUTION INTRAMUSCULAR; INTRAVENOUS at 21:06

## 2023-01-01 RX ADMIN — LIDOCAINE 1 PATCH: 560 PATCH PERCUTANEOUS; TOPICAL; TRANSDERMAL at 21:20

## 2023-01-01 RX ADMIN — AMIODARONE HYDROCHLORIDE 200 MG: 200 TABLET ORAL at 08:32

## 2023-01-01 RX ADMIN — NYSTATIN 500000 UNITS: 100000 SUSPENSION ORAL at 15:13

## 2023-01-01 RX ADMIN — PREDNISONE 2.5 MG: 2.5 TABLET ORAL at 08:27

## 2023-01-01 RX ADMIN — SODIUM CHLORIDE, POTASSIUM CHLORIDE, SODIUM LACTATE AND CALCIUM CHLORIDE 1000 ML: 600; 310; 30; 20 INJECTION, SOLUTION INTRAVENOUS at 00:00

## 2023-01-01 RX ADMIN — AMIODARONE HYDROCHLORIDE 200 MG: 200 TABLET ORAL at 20:37

## 2023-01-01 RX ADMIN — LORAZEPAM 0.5 MG: 2 INJECTION INTRAMUSCULAR; INTRAVENOUS at 22:07

## 2023-01-01 RX ADMIN — SODIUM CHLORIDE 250 ML: 9 INJECTION, SOLUTION INTRAVENOUS at 21:32

## 2023-01-01 RX ADMIN — MAGNESIUM SULFATE HEPTAHYDRATE 2 G: 40 INJECTION, SOLUTION INTRAVENOUS at 03:29

## 2023-01-01 RX ADMIN — ATORVASTATIN CALCIUM 40 MG: 40 TABLET, FILM COATED ORAL at 08:49

## 2023-01-01 RX ADMIN — LIDOCAINE HYDROCHLORIDE ANHYDROUS 0.5 ML: 10 INJECTION, SOLUTION INFILTRATION at 12:33

## 2023-01-01 RX ADMIN — PANTOPRAZOLE SODIUM 40 MG: 40 TABLET, DELAYED RELEASE ORAL at 16:48

## 2023-01-01 RX ADMIN — SODIUM CHLORIDE 250 ML: 9 INJECTION, SOLUTION INTRAVENOUS at 13:34

## 2023-01-01 RX ADMIN — NITROFURANTOIN MONOHYDRATE/MACROCRYSTALLINE 100 MG: 25; 75 CAPSULE ORAL at 21:16

## 2023-01-01 RX ADMIN — LIDOCAINE 1 PATCH: 560 PATCH PERCUTANEOUS; TOPICAL; TRANSDERMAL at 22:01

## 2023-01-01 RX ADMIN — PANTOPRAZOLE SODIUM 40 MG: 40 INJECTION, POWDER, FOR SOLUTION INTRAVENOUS at 08:17

## 2023-01-01 RX ADMIN — RIVAROXABAN 15 MG: 10 TABLET, FILM COATED ORAL at 21:07

## 2023-01-01 RX ADMIN — Medication 1 CAPSULE: at 08:32

## 2023-01-01 RX ADMIN — PREDNISONE 2.5 MG: 2.5 TABLET ORAL at 08:49

## 2023-01-01 RX ADMIN — SODIUM CHLORIDE 250 ML: 9 INJECTION, SOLUTION INTRAVENOUS at 12:14

## 2023-01-01 RX ADMIN — HYDROMORPHONE HYDROCHLORIDE 0.5 MG: 1 INJECTION, SOLUTION INTRAMUSCULAR; INTRAVENOUS; SUBCUTANEOUS at 08:23

## 2023-01-01 RX ADMIN — DEXTRAN 70, GLYCERIN, HYPROMELLOSE 1 DROP: 1; 2; 3 SOLUTION/ DROPS OPHTHALMIC at 20:16

## 2023-01-01 RX ADMIN — METOPROLOL SUCCINATE 50 MG: 50 TABLET, EXTENDED RELEASE ORAL at 21:04

## 2023-01-01 RX ADMIN — NITROFURANTOIN MONOHYDRATE/MACROCRYSTALLINE 100 MG: 25; 75 CAPSULE ORAL at 21:24

## 2023-01-01 RX ADMIN — PREDNISONE 2.5 MG: 1 TABLET ORAL at 08:47

## 2023-01-01 RX ADMIN — PANTOPRAZOLE SODIUM 40 MG: 40 TABLET, DELAYED RELEASE ORAL at 06:32

## 2023-01-01 RX ADMIN — DEXTRAN 70, GLYCERIN, HYPROMELLOSE 1 DROP: 1; 2; 3 SOLUTION/ DROPS OPHTHALMIC at 20:40

## 2023-01-01 RX ADMIN — LIDOCAINE 1 PATCH: 560 PATCH PERCUTANEOUS; TOPICAL; TRANSDERMAL at 22:47

## 2023-01-01 RX ADMIN — SODIUM CHLORIDE 250 ML: 9 INJECTION, SOLUTION INTRAVENOUS at 12:38

## 2023-01-01 RX ADMIN — LEVETIRACETAM 500 MG: 5 INJECTION INTRAVENOUS at 22:42

## 2023-01-01 RX ADMIN — ACETAMINOPHEN 975 MG: 325 TABLET, FILM COATED ORAL at 21:24

## 2023-01-01 RX ADMIN — NYSTATIN 500000 UNITS: 100000 SUSPENSION ORAL at 18:33

## 2023-01-01 RX ADMIN — LEVETIRACETAM 500 MG: 500 TABLET, FILM COATED ORAL at 08:32

## 2023-01-01 RX ADMIN — SODIUM CHLORIDE, POTASSIUM CHLORIDE, SODIUM LACTATE AND CALCIUM CHLORIDE 500 ML: 600; 310; 30; 20 INJECTION, SOLUTION INTRAVENOUS at 08:48

## 2023-01-01 RX ADMIN — ATORVASTATIN CALCIUM 40 MG: 40 TABLET, FILM COATED ORAL at 08:26

## 2023-01-01 RX ADMIN — HYDROMORPHONE HYDROCHLORIDE 0.5 MG: 1 INJECTION, SOLUTION INTRAMUSCULAR; INTRAVENOUS; SUBCUTANEOUS at 04:36

## 2023-01-01 RX ADMIN — AMIODARONE HYDROCHLORIDE 200 MG: 200 TABLET ORAL at 13:04

## 2023-01-01 RX ADMIN — LORAZEPAM 0.5 MG: 2 INJECTION INTRAMUSCULAR; INTRAVENOUS at 16:10

## 2023-01-01 RX ADMIN — NYSTATIN 500000 UNITS: 100000 SUSPENSION ORAL at 22:48

## 2023-01-01 RX ADMIN — ACETAMINOPHEN 975 MG: 325 TABLET, FILM COATED ORAL at 22:33

## 2023-01-01 RX ADMIN — AMIODARONE HYDROCHLORIDE 200 MG: 200 TABLET ORAL at 11:10

## 2023-01-01 RX ADMIN — PANTOPRAZOLE SODIUM 40 MG: 40 INJECTION, POWDER, FOR SOLUTION INTRAVENOUS at 08:48

## 2023-01-01 RX ADMIN — CITALOPRAM HYDROBROMIDE 10 MG: 10 TABLET ORAL at 21:25

## 2023-01-01 RX ADMIN — IRON SUCROSE 200 MG: 20 INJECTION, SOLUTION INTRAVENOUS at 15:08

## 2023-01-01 RX ADMIN — LEVETIRACETAM 500 MG: 500 TABLET, FILM COATED ORAL at 22:47

## 2023-01-01 RX ADMIN — HYDROMORPHONE HYDROCHLORIDE 0.5 MG: 1 INJECTION, SOLUTION INTRAMUSCULAR; INTRAVENOUS; SUBCUTANEOUS at 06:08

## 2023-01-01 ASSESSMENT — ACTIVITIES OF DAILY LIVING (ADL)
ADLS_ACUITY_SCORE: 59
ADLS_ACUITY_SCORE: 27
ADLS_ACUITY_SCORE: 55
ADLS_ACUITY_SCORE: 57
ADLS_ACUITY_SCORE: 55
ADLS_ACUITY_SCORE: 59
ADLS_ACUITY_SCORE: 27
ADLS_ACUITY_SCORE: 59
ADLS_ACUITY_SCORE: 57
ADLS_ACUITY_SCORE: 55
ADLS_ACUITY_SCORE: 53
ADLS_ACUITY_SCORE: 27
ADLS_ACUITY_SCORE: 55
ADLS_ACUITY_SCORE: 49
ADLS_ACUITY_SCORE: 53
ADLS_ACUITY_SCORE: 55
ADLS_ACUITY_SCORE: 53
TRANSFERRING: 1-->ASSISTANCE (EQUIPMENT/PERSON) NEEDED (NOT DEVELOPMENTALLY APPROPRIATE)
ADLS_ACUITY_SCORE: 49
TOILETING_MANAGEMENT: PAD
ADLS_ACUITY_SCORE: 57
ADLS_ACUITY_SCORE: 49
ADLS_ACUITY_SCORE: 49
DRESSING/BATHING_DIFFICULTY: NO
CHANGE_IN_FUNCTIONAL_STATUS_SINCE_ONSET_OF_CURRENT_ILLNESS/INJURY: YES
WEAR_GLASSES_OR_BLIND: YES
ADLS_ACUITY_SCORE: 35
ADLS_ACUITY_SCORE: 55
ADLS_ACUITY_SCORE: 41
VISION_MANAGEMENT: GLASSES
ADLS_ACUITY_SCORE: 27
CONCENTRATING,_REMEMBERING_OR_MAKING_DECISIONS_DIFFICULTY: NO
ADLS_ACUITY_SCORE: 53
ADLS_ACUITY_SCORE: 57
ADLS_ACUITY_SCORE: 57
ADLS_ACUITY_SCORE: 49
ADLS_ACUITY_SCORE: 55
ADLS_ACUITY_SCORE: 57
ADLS_ACUITY_SCORE: 53
ADLS_ACUITY_SCORE: 37
ADLS_ACUITY_SCORE: 49
ADLS_ACUITY_SCORE: 41
ADLS_ACUITY_SCORE: 53
DIFFICULTY_COMMUNICATING: NO
ADLS_ACUITY_SCORE: 27
ADLS_ACUITY_SCORE: 39
ADLS_ACUITY_SCORE: 57
ADLS_ACUITY_SCORE: 53
ADLS_ACUITY_SCORE: 55
ADLS_ACUITY_SCORE: 43
ADLS_ACUITY_SCORE: 53
ADLS_ACUITY_SCORE: 57
ADLS_ACUITY_SCORE: 57
ADLS_ACUITY_SCORE: 42
ADLS_ACUITY_SCORE: 53
ADLS_ACUITY_SCORE: 53
NUMBER_OF_TIMES_PATIENT_HAS_FALLEN_WITHIN_LAST_SIX_MONTHS: 2
EQUIPMENT_CURRENTLY_USED_AT_HOME: WALKER, ROLLING
ADLS_ACUITY_SCORE: 57
DIFFICULTY_EATING/SWALLOWING: NO
ADLS_ACUITY_SCORE: 55
ADLS_ACUITY_SCORE: 55
USE_OF_HEARING_ASSISTIVE_DEVICES: BILATERAL HEARING AIDS
ADLS_ACUITY_SCORE: 55
ADLS_ACUITY_SCORE: 53
ADLS_ACUITY_SCORE: 53
FALL_HISTORY_WITHIN_LAST_SIX_MONTHS: YES
WALKING_OR_CLIMBING_STAIRS_DIFFICULTY: YES
ADLS_ACUITY_SCORE: 41
ADLS_ACUITY_SCORE: 55
ADLS_ACUITY_SCORE: 53
ADLS_ACUITY_SCORE: 49
ADLS_ACUITY_SCORE: 57
ADLS_ACUITY_SCORE: 55
ADLS_ACUITY_SCORE: 53
DEPENDENT_IADLS:: CLEANING;COOKING;LAUNDRY;SHOPPING;MEAL PREPARATION;MEDICATION MANAGEMENT;MONEY MANAGEMENT;TRANSPORTATION
ADLS_ACUITY_SCORE: 49
DESCRIBE_HEARING_LOSS: BILATERAL HEARING LOSS
ADLS_ACUITY_SCORE: 27
ADLS_ACUITY_SCORE: 55
ADLS_ACUITY_SCORE: 35
ADLS_ACUITY_SCORE: 57
ADLS_ACUITY_SCORE: 57
ADLS_ACUITY_SCORE: 53
ADLS_ACUITY_SCORE: 49
ADLS_ACUITY_SCORE: 59
ADLS_ACUITY_SCORE: 43
ADLS_ACUITY_SCORE: 55
ADLS_ACUITY_SCORE: 42
DEPENDENT_IADLS:: CLEANING;COOKING;LAUNDRY;SHOPPING;MEDICATION MANAGEMENT;MEAL PREPARATION;TRANSPORTATION
ADLS_ACUITY_SCORE: 59
ADLS_ACUITY_SCORE: 57
ADLS_ACUITY_SCORE: 59
ADLS_ACUITY_SCORE: 49
ADLS_ACUITY_SCORE: 57
ADLS_ACUITY_SCORE: 53
ADLS_ACUITY_SCORE: 42
TOILETING_ISSUES: YES
DOING_ERRANDS_INDEPENDENTLY_DIFFICULTY: NO
HEARING_DIFFICULTY_OR_DEAF: YES
ADLS_ACUITY_SCORE: 49
ADLS_ACUITY_SCORE: 55
ADLS_ACUITY_SCORE: 55
ADLS_ACUITY_SCORE: 53
ADLS_ACUITY_SCORE: 53
ADLS_ACUITY_SCORE: 49
ADLS_ACUITY_SCORE: 57
ADLS_ACUITY_SCORE: 55
ADLS_ACUITY_SCORE: 53
ADLS_ACUITY_SCORE: 53
ADLS_ACUITY_SCORE: 55
ADLS_ACUITY_SCORE: 49
ADLS_ACUITY_SCORE: 49
ADLS_ACUITY_SCORE: 51
ADLS_ACUITY_SCORE: 35
ADLS_ACUITY_SCORE: 59
DEPENDENT_IADLS:: CLEANING;COOKING;LAUNDRY;SHOPPING;MEAL PREPARATION;MEDICATION MANAGEMENT;MONEY MANAGEMENT;TRANSPORTATION
ADLS_ACUITY_SCORE: 53
ADLS_ACUITY_SCORE: 59

## 2023-01-01 ASSESSMENT — ENCOUNTER SYMPTOMS
WHEEZING: 1
COUGH: 1
BACK PAIN: 1
COUGH: 1

## 2023-01-01 ASSESSMENT — PAIN SCALES - GENERAL
PAINLEVEL: NO PAIN (0)

## 2023-01-01 ASSESSMENT — PATIENT HEALTH QUESTIONNAIRE - PHQ9
SUM OF ALL RESPONSES TO PHQ QUESTIONS 1-9: 2
SUM OF ALL RESPONSES TO PHQ QUESTIONS 1-9: 2

## 2023-01-03 NOTE — PROGRESS NOTES
Assessment & Plan       ICD-10-CM    1. Community acquired pneumonia, unspecified laterality  J18.9            Follow-up community-acquired pneumonia in this elderly woman.  Doing well on Levaquin.  Nice response.  I will have her see her primary in a few weeks to discuss the role of potentially rechecking a chest x-ray, and discussion of her chronic anemia and weight loss of 35 pounds    Return in about 6 weeks (around 2/14/2023) for recheck, pneumonia, hemoglobin, chest xr.    Fortino Hayden MD  [facility]      Janice French is a 89 year old accompanied by her daughter, presenting for the following health issues:  Cough (FOLLOW UP/)      Cough       Follow-up pneumonia.  Here with her 2 daughters.  Feeling much improved.  Still with cough.  Energy better.  No fever.          Review of Systems   Respiratory: Positive for cough.             Objective    /64   Pulse 87   Temp 97.7  F (36.5  C) (Temporal)   Wt 49.2 kg (108 lb 8 oz)   SpO2 96%   BMI 17.51 kg/m    Body mass index is 17.51 kg/m .  Physical Exam   Elderly.  Well-appearing.  Nontoxic.  Heart regular no murmur.  Lungs mostly clear bilaterally, with no increased work of breathing, but Rales in the bilateral bases

## 2023-01-11 NOTE — TELEPHONE ENCOUNTER
"Requested Prescriptions   Pending Prescriptions Disp Refills    EQ ALLERGY RELIEF 10 MG tablet [Pharmacy Med Name: EQ Allergy Relief 10 MG Oral Tablet] 30 tablet 0     Sig: Take 1 tablet by mouth once daily       Antihistamines Protocol Failed - 1/10/2023 11:30 AM        Failed - Patient is 3-64 years of age     Apply weight-based dosing for peds patients age 3 - 12 years of age.    Forward request to provider for patients under the age of 3 or over the age of 64.          Passed - Recent (12 mo) or future (30 days) visit within the authorizing provider's specialty     Patient has had an office visit with the authorizing provider or a provider within the authorizing providers department within the previous 12 mos or has a future within next 30 days. See \"Patient Info\" tab in inbasket, or \"Choose Columns\" in Meds & Orders section of the refill encounter.              Passed - Medication is active on med list          levETIRAcetam (KEPPRA) 500 MG tablet [Pharmacy Med Name: levETIRAcetam 500 MG Oral Tablet] 270 tablet 0     Sig: TAKE 1 TABLET BY MOUTH ONCE DAILY AND 2 AT BEDTIME       There is no refill protocol information for this order          "

## 2023-02-14 NOTE — PROGRESS NOTES
Assessment & Plan       ICD-10-CM    1. Iron deficiency anemia due to chronic blood loss  D50.0       2. Screening for hyperlipidemia  Z13.220       3. Coronary artery disease involving native coronary artery of native heart with angina pectoris (H)  I25.119       4. Anemia, unspecified type  D64.9 Iron and iron binding capacity     Ferritin     CBC with platelets     Vitamin B12     Comprehensive metabolic panel (BMP + Alb, Alk Phos, ALT, AST, Total. Bili, TP)     Iron and iron binding capacity     Ferritin     CBC with platelets     Vitamin B12     Comprehensive metabolic panel (BMP + Alb, Alk Phos, ALT, AST, Total. Bili, TP)      5. Stage 3a chronic kidney disease (H)  N18.31       6. Community acquired pneumonia, unspecified laterality  J18.9 XR Chest 2 Views         Cough.  Mild.  Lung exam coarse sounding and she does have a mild cough, but not appearing acutely ill.  Checked an x-ray today which continues to show bilateral infiltrates.  Coupled with her weight loss, raises the possibility of cancer.  Labs today showed anemia, but adequate iron stores.  Likely bone marrow failure.  Discussed the case with her primary who will see her back in a couple of weeks to discuss plans for ongoing work-up.  She should call if she is worsening and we can start antibiotics at that time.        Return in about 2 weeks (around 2/28/2023) for recheck.    Fortino Hayden MD  [facility]      Janice French is a 89 year old, presenting for the following health issues:  Cough      Cough       Comes in with daughter today  Complains of a cough  Was treated 2 months ago for community-acquired pneumonia with Levaquin orally and did well without  Returns today for follow-up chest x-ray, complains of worsening cough  Does have significant weight loss over the past couple of years, approximately 35 to 40 pounds  Complains of feeling weaker  No fever or chills    Review of Systems   Respiratory:  Positive for cough.             Objective    /64   Pulse 76   Temp 97.5  F (36.4  C) (Temporal)   Resp 20   SpO2 97%   There is no height or weight on file to calculate BMI.  Physical Exam   Elderly.  Thin.  Heart regular without murmur.  Lungs coarse bilaterally but no increased work of breathing.  No wheeze rale or rub.  Extremities thin but well perfused no edema.  Walks with the assistance of a walker.

## 2023-02-14 NOTE — TELEPHONE ENCOUNTER
"Requested Prescriptions   Pending Prescriptions Disp Refills    EQ ALLERGY RELIEF 10 MG tablet [Pharmacy Med Name: EQ Allergy Relief 10 MG Oral Tablet] 30 tablet 0     Sig: Take 1 tablet by mouth once daily       Antihistamines Protocol Failed - 2/14/2023 10:16 AM        Failed - Patient is 3-64 years of age     Apply weight-based dosing for peds patients age 3 - 12 years of age.    Forward request to provider for patients under the age of 3 or over the age of 64.          Passed - Recent (12 mo) or future (30 days) visit within the authorizing provider's specialty     Patient has had an office visit with the authorizing provider or a provider within the authorizing providers department within the previous 12 mos or has a future within next 30 days. See \"Patient Info\" tab in inbasket, or \"Choose Columns\" in Meds & Orders section of the refill encounter.              Passed - Medication is active on med list             "

## 2023-02-16 NOTE — TELEPHONE ENCOUNTER
----- Message from Fortino Hayden MD sent at 2/15/2023 10:20 PM CST -----  Mychart note sent    Please also call daughter. I would like to set her up with iron infusions if she's ok with that    Vadim fyi, she see's you again soon. I'll try and get the infusion set up    Fortino Hayden MD

## 2023-02-16 NOTE — TELEPHONE ENCOUNTER
Attempted to reach Hanna WESTBROOK on file unable to get through. If she calls back please relay below.     Dee Benitez MA

## 2023-02-16 NOTE — TELEPHONE ENCOUNTER
Daughter stated that sounds great. She will wait for the call to know when.   Cathy Jean MA 2/16/2023

## 2023-02-21 NOTE — TELEPHONE ENCOUNTER
Patients daughter notified and she will call them  Cathy Jean MA 2/21/2023     Patient Specific Counseling (Will Not Stick From Patient To Patient): Pt to start on tretinoin 0.025% qhs as tolerated Erythromycin Pregnancy And Lactation Text: This medication is Pregnancy Category B and is considered safe during pregnancy. It is also excreted in breast milk. Birth Control Pills Counseling: Birth Control Pill Counseling: I discussed with the patient the potential side effects of OCPs including but not limited to increased risk of stroke, heart attack, thrombophlebitis, deep venous thrombosis, hepatic adenomas, breast changes, GI upset, headaches, and depression.  The patient verbalized understanding of the proper use and possible adverse effects of OCPs. All of the patient's questions and concerns were addressed. Bactrim Pregnancy And Lactation Text: This medication is Pregnancy Category D and is known to cause fetal risk.  It is also excreted in breast milk. Azithromycin Pregnancy And Lactation Text: This medication is considered safe during pregnancy and is also secreted in breast milk. Dapsone Counseling: I discussed with the patient the risks of dapsone including but not limited to hemolytic anemia, agranulocytosis, rashes, methemoglobinemia, kidney failure, peripheral neuropathy, headaches, GI upset, and liver toxicity.  Patients who start dapsone require monitoring including baseline LFTs and weekly CBCs for the first month, then every month thereafter.  The patient verbalized understanding of the proper use and possible adverse effects of dapsone.  All of the patient's questions and concerns were addressed. Minocycline Pregnancy And Lactation Text: This medication is Pregnancy Category D and not consider safe during pregnancy. It is also excreted in breast milk. Benzoyl Peroxide Counseling: Patient counseled that medicine may cause skin irritation and bleach clothing.  In the event of skin irritation, the patient was advised to reduce the amount of the drug applied or use it less frequently.   The patient verbalized understanding of the proper use and possible adverse effects of benzoyl peroxide.  All of the patient's questions and concerns were addressed. Spironolactone Counseling: Patient advised regarding risks of diarrhea, abdominal pain, hyperkalemia, birth defects (for female patients), liver toxicity and renal toxicity. The patient may need blood work to monitor liver and kidney function and potassium levels while on therapy. The patient verbalized understanding of the proper use and possible adverse effects of spironolactone.  All of the patient's questions and concerns were addressed. Minocycline Counseling: Patient advised regarding possible photosensitivity and discoloration of the teeth, skin, lips, tongue and gums.  Patient instructed to avoid sunlight, if possible.  When exposed to sunlight, patients should wear protective clothing, sunglasses, and sunscreen.  The patient was instructed to call the office immediately if the following severe adverse effects occur:  hearing changes, easy bruising/bleeding, severe headache, or vision changes.  The patient verbalized understanding of the proper use and possible adverse effects of minocycline.  All of the patient's questions and concerns were addressed. Doxycycline Pregnancy And Lactation Text: This medication is Pregnancy Category D and not consider safe during pregnancy. It is also excreted in breast milk but is considered safe for shorter treatment courses. Include Pregnancy/Lactation Warning?: No High Dose Vitamin A Counseling: Side effects reviewed, pt to contact office should one occur. Isotretinoin Pregnancy And Lactation Text: This medication is Pregnancy Category X and is considered extremely dangerous during pregnancy. It is unknown if it is excreted in breast milk. Dapsone Pregnancy And Lactation Text: This medication is Pregnancy Category C and is not considered safe during pregnancy or breast feeding. Topical Sulfur Applications Counseling: Topical Sulfur Counseling: Patient counseled that this medication may cause skin irritation or allergic reactions.  In the event of skin irritation, the patient was advised to reduce the amount of the drug applied or use it less frequently.   The patient verbalized understanding of the proper use and possible adverse effects of topical sulfur application.  All of the patient's questions and concerns were addressed. Doxycycline Counseling:  Patient counseled regarding possible photosensitivity and increased risk for sunburn.  Patient instructed to avoid sunlight, if possible.  When exposed to sunlight, patients should wear protective clothing, sunglasses, and sunscreen.  The patient was instructed to call the office immediately if the following severe adverse effects occur:  hearing changes, easy bruising/bleeding, severe headache, or vision changes.  The patient verbalized understanding of the proper use and possible adverse effects of doxycycline.  All of the patient's questions and concerns were addressed. Tetracycline Counseling: Patient counseled regarding possible photosensitivity and increased risk for sunburn.  Patient instructed to avoid sunlight, if possible.  When exposed to sunlight, patients should wear protective clothing, sunglasses, and sunscreen.  The patient was instructed to call the office immediately if the following severe adverse effects occur:  hearing changes, easy bruising/bleeding, severe headache, or vision changes.  The patient verbalized understanding of the proper use and possible adverse effects of tetracycline.  All of the patient's questions and concerns were addressed. Patient understands to avoid pregnancy while on therapy due to potential birth defects. Topical Retinoid Pregnancy And Lactation Text: This medication is Pregnancy Category C. It is unknown if this medication is excreted in breast milk. High Dose Vitamin A Pregnancy And Lactation Text: High dose vitamin A therapy is contraindicated during pregnancy and breast feeding. Topical Clindamycin Pregnancy And Lactation Text: This medication is Pregnancy Category B and is considered safe during pregnancy. It is unknown if it is excreted in breast milk. Bactrim Counseling:  I discussed with the patient the risks of sulfa antibiotics including but not limited to GI upset, allergic reaction, drug rash, diarrhea, dizziness, photosensitivity, and yeast infections.  Rarely, more serious reactions can occur including but not limited to aplastic anemia, agranulocytosis, methemoglobinemia, blood dyscrasias, liver or kidney failure, lung infiltrates or desquamative/blistering drug rashes. Detail Level: Zone Topical Clindamycin Counseling: Patient counseled that this medication may cause skin irritation or allergic reactions.  In the event of skin irritation, the patient was advised to reduce the amount of the drug applied or use it less frequently.   The patient verbalized understanding of the proper use and possible adverse effects of clindamycin.  All of the patient's questions and concerns were addressed. Tazorac Counseling:  Patient advised that medication is irritating and drying.  Patient may need to apply sparingly and wash off after an hour before eventually leaving it on overnight.  The patient verbalized understanding of the proper use and possible adverse effects of tazorac.  All of the patient's questions and concerns were addressed. Benzoyl Peroxide Pregnancy And Lactation Text: This medication is Pregnancy Category C. It is unknown if benzoyl peroxide is excreted in breast milk. Birth Control Pills Pregnancy And Lactation Text: This medication should be avoided if pregnant and for the first 30 days post-partum. Spironolactone Pregnancy And Lactation Text: This medication can cause feminization of the male fetus and should be avoided during pregnancy. The active metabolite is also found in breast milk. Isotretinoin Counseling: Patient should get monthly blood tests, not donate blood, not drive at night if vision affected, not share medication, and not undergo elective surgery for 6 months after tx completed. Side effects reviewed, pt to contact office should one occur. Topical Sulfur Applications Pregnancy And Lactation Text: This medication is Pregnancy Category C and has an unknown safety profile during pregnancy. It is unknown if this topical medication is excreted in breast milk. Tazorac Pregnancy And Lactation Text: This medication is not safe during pregnancy. It is unknown if this medication is excreted in breast milk. Azithromycin Counseling:  I discussed with the patient the risks of azithromycin including but not limited to GI upset, allergic reaction, drug rash, diarrhea, and yeast infections. Erythromycin Counseling:  I discussed with the patient the risks of erythromycin including but not limited to GI upset, allergic reaction, drug rash, diarrhea, increase in liver enzymes, and yeast infections. Topical Retinoid counseling:  Patient advised to apply a pea-sized amount only at bedtime and wait 30 minutes after washing their face before applying.  If too drying, patient may add a non-comedogenic moisturizer. The patient verbalized understanding of the proper use and possible adverse effects of retinoids.  All of the patient's questions and concerns were addressed. Aklief Pregnancy And Lactation Text: It is unknown if this medication is safe to use during pregnancy.  It is unknown if this medication is excreted in breast milk.  Breastfeeding women should use the topical cream on the smallest area of the skin for the shortest time needed while breastfeeding.  Do not apply to nipple and areola. Sarecycline Counseling: Patient advised regarding possible photosensitivity and discoloration of the teeth, skin, lips, tongue and gums.  Patient instructed to avoid sunlight, if possible.  When exposed to sunlight, patients should wear protective clothing, sunglasses, and sunscreen.  The patient was instructed to call the office immediately if the following severe adverse effects occur:  hearing changes, easy bruising/bleeding, severe headache, or vision changes.  The patient verbalized understanding of the proper use and possible adverse effects of sarecycline.  All of the patient's questions and concerns were addressed. Aklief counseling:  Patient advised to apply a pea-sized amount only at bedtime and wait 30 minutes after washing their face before applying.  If too drying, patient may add a non-comedogenic moisturizer.  The most commonly reported side effects including irritation, redness, scaling, dryness, stinging, burning, itching, and increased risk of sunburn.  The patient verbalized understanding of the proper use and possible adverse effects of retinoids.  All of the patient's questions and concerns were addressed. Azelaic Acid Counseling: Patient counseled that medicine may cause skin irritation and to avoid applying near the eyes.  In the event of skin irritation, the patient was advised to reduce the amount of the drug applied or use it less frequently.   The patient verbalized understanding of the proper use and possible adverse effects of azelaic acid.  All of the patient's questions and concerns were addressed. Winlevi Counseling:  I discussed with the patient the risks of topical clascoterone including but not limited to erythema, scaling, itching, and stinging. Patient voiced their understanding. Azelaic Acid Pregnancy And Lactation Text: This medication is considered safe during pregnancy and breast feeding. Winlevi Pregnancy And Lactation Text: This medication is considered safe during pregnancy and breastfeeding.

## 2023-02-21 NOTE — PROGRESS NOTES
"Pt had echo done 2/17/23, results for review. Pt last seen 11/29/22 by YVON Gutierrez, with the following plan:    \" I would like her to complete full resting echocardiogram.  She already has this scheduled for next week.  We will see what her valve function does.  We will also assess her LVEF and for any wall motion abnormalities.  She and her daughter were both in agreement with this plan.     We will follow-up with her in 6 months or sooner if she has worsening symptoms. \"    Pt had to r/s the echo several times so it was just completed now. Routed to Dr. Lee to review as pt currently has no appt setup but will be due in a few months. Meri Layton RN on 2/21/2023 at 11:40 AM     "

## 2023-02-28 NOTE — TELEPHONE ENCOUNTER
Pt notified of results. Please sign order for repeat UA.    Per Dr. Meyer:  Urinary culture confirms normal bacteria.  Given the abnormal urinary analysis and recent nightmares etc., I believe it is appropriate to treat this with antibiotics.  Keppra level is within normal limits.  Chemistry panel shows decreased but improved kidney function.  Blood cell count shows mild chronic stable anemia.    I have sent a prescription for an antibiotic to your pharmacy.  We should repeat the urine sample upon completion of the antibiotic.    Rocío Burch, CMA      
on the discharge service for the patient. I have reviewed and made amendments to the documentation where necessary.

## 2023-03-01 NOTE — PROGRESS NOTES
Janice French is a 89 year old, presenting for the following health issues:  Cough (Loose cough with congestion)      History of Present Illness       Reason for visit:  Congestion  Symptom onset:  3-7 days ago  Symptom intensity:  Moderate  Symptom progression:  Worsening  Had these symptoms before:  Yes  Has tried/received treatment for these symptoms:  No    She eats 2-3 servings of fruits and vegetables daily.She consumes 2 sweetened beverage(s) daily.She exercises with enough effort to increase her heart rate 9 or less minutes per day.  She exercises with enough effort to increase her heart rate 3 or less days per week. She is missing 1 dose(s) of medications per week.  She is not taking prescribed medications regularly due to remembering to take.        EMR reviewed including:             Complaint, History of Chief Complaint, Corresponding Review of Systems, and Complaint Specific Physical Examination.    #1   Persistent nasal congestion.  Denies fevers or chills.  Has some facial and maxillary discomfort.  Seems to correlate with the weather.  Has no orthopnea.  Denies cough or pulmonary involvement.        Exam:  Nasal mucosa is markedly edematous,  Mild posterior nasal drainage is seen is slightly yellow.  Maxillary tap is positive bilaterally.  Frontal tap  Negative.      #2   Follow-up on coronary artery disease  Denies current chest pain or recent chest pain.  No significant edema or orthopnea.  Taking medications compliantly.        Exam:   LUNGS: clear bilaterally, airflow is brisk, no intercostal retraction or stridor is noted. No coughing is noted during visit.   HEART:  regular without rubs, clicks, gallops, or murmurs. PMI is nondisplaced. Upstrokes are brisk. S1,S2 are heard.   GI: Abdomen is soft, without rebound, guarding or tenderness. Bowel sounds are appropriate. No renal bruits are heard.      #3   Follow-up on seizure disorder  Controlled with Keppra.  Levels within normal  limits recently.  No side effects, fatigue etc.        Exam:   NEURO: Pt is alert and appropriate. No neurologic lateralization is noted. Cranial nerves 2-12 are intact. Peripheral sensory and motor function are grossly normal.       #4   Follow-up on rheumatoid arthritis.  Denies acute inflammatory disease at this time.  Taking weekly methotrexate and low-dose prednisone.        Exam:   MS: Minimal crepitance is noted in the hands.  No ulnar deviation noted at this time.  No synovial thickening or inflammation seen.. No deformity is present. Muscle strength is appropriate and equal bilaterally. No acute joint erythema or swelling is present.        Patient has been interviewed, applicable history and applied review of systems have been performed.    Vital Signs:   /66   Pulse 74   Temp 98  F (36.7  C) (Temporal)   Resp 16   Wt 49.9 kg (110 lb)   SpO2 94%   BMI 17.75 kg/m        Decision Making    Problem and Complexity     1. Coronary artery disease involving native coronary artery of native heart with angina pectoris (H)  Currently stable.  Continue current medication    2. Chronic maxillary sinusitis  We will place on a short prednisone bump for the allergic component.  We will also cover with antibiotic as some purulence is noted.  Avoid decongestants due to underlying heart disease.  - predniSONE (DELTASONE) 20 MG tablet; Take 1 tablet (20 mg) by mouth daily  Dispense: 7 tablet; Refill: 0  - azithromycin (ZITHROMAX) 250 MG tablet; Take 2 tablets (500 mg) by mouth daily for 1 day, THEN 1 tablet (250 mg) daily for 4 days.  Dispense: 6 tablet; Refill: 0    3. Seropositive rheumatoid arthritis (H)  Following with rheumatology.  Continue methotrexate and low-dose steroid    4. Chronic systolic (congestive) heart failure (H)  Stable.  No signs of exacerbation.  Continue beta-blocker.  Diuretics as needed    5. Seizure disorder (H)  Stable and suppressed with seizure medication    6. Stage 3a chronic  kidney disease (H)  Stable    7. Screening for hyperlipidemia  Screening to be performed at next blood draw                                FOLLOW UP   I have asked the patient to make an appointment for followup with either:  1.  Me,  2.  The patient's preferred provider,  3.  Any available provider  In 3 months or as needed.  Patient will be out of the state for several weeks        Regarding routine vaccinations:  I have reviewed the patient's vaccination schedule and discussed the benefits of prophylactic vaccination in detail.  I recommend the patient contact their pharmacist (not the pharmacy within the clinic) for vaccinations.  Discussed that most insurance companies now favor reimbursement to the pharmacies and it will financially behoove the patient to have vaccinations performed at their pharmacy.        I have carefully explained the diagnosis and treatment options to the patient.  The patient has displayed an understanding of the above, and all subsequent questions were answered.      DO DELMA Pineda    Portions of this note were produced using [employer]  Although every attempt at real-time proof reading has been made, occasional grammar/syntax errors may have been missed.

## 2023-03-02 NOTE — PROGRESS NOTES
Jesus, Jones Crespo MD  [facility] Heart Team 7 53 minutes ago (9:50 AM)     EE  I reviewed the echocardiogram report done recently.  Wall motion is normal and left ventricular systolic function is normal.  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  No concerning findings on her echocardiogram.  Pleased that her cardiac function looks so good.  She should notify us if she has worsening chest pain symptoms and we can evaluate that issue separately.  Jones Lee MD        ------------------------------------------------------    3/2/23 Called patient and daughter Jennifer at patient home number and updated on DR Lee review of echocardiogram and recommendations as above. Patient currently is not reportiin symptoms or chest pain, but they will call if any symptoms or chest pain do occur.  Julieta Aguila RN 03/02/23 10:49 AM

## 2023-03-02 NOTE — TELEPHONE ENCOUNTER
I reviewed the echocardiogram report done recently.  Wall motion is normal and left ventricular systolic function is normal.  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  No concerning findings on her echocardiogram.  Pleased that her cardiac function looks so good.  She should notify us if she has worsening chest pain symptoms and we can evaluate that issue separately.  Jones Lee MD

## 2023-03-06 NOTE — PROGRESS NOTES
Infusion Nursing Note:  Gillian TIFFANY Wm presents today for Venofer # 1/5.    Patient seen by provider today: No   present during visit today: Not Applicable.    Note: Patient arrived in w/c with daughter. Denies pain. Feeling mor fatigued lately.  POC reviewed with them. VSS.     Intravenous Access:  Peripheral IV placed.    Treatment Conditions:  Not Applicable.    Post Infusion Assessment:  Patient tolerated infusion without incident.  Patient observed for 15 minutes post Venofer per protocol.  Site patent and intact, free from redness, edema or discomfort.  No evidence of extravasations.  Access discontinued per protocol.     Discharge Plan:   Discharge instructions reviewed with: Patient and Family.  Patient discharged in stable condition accompanied by: self and daughter.  Departure Mode: Wheelchair.      Nita Kulkarni RN

## 2023-03-08 NOTE — PROGRESS NOTES
Infusion Nursing Note:  Gillian Burk presents today for dose 2 of 5 Venofer.    Patient seen by provider today: No   present during visit today: Not Applicable.    Note: Tolerated well.    Intravenous Access:  Peripheral IV placed.    Treatment Conditions:  Not Applicable.    Post Infusion Assessment:  Patient tolerated infusion without incident.  Patient observed for 20 minutes post infusion.  No evidence of extravasations.  Access discontinued per protocol.     Discharge Plan:   Discharge instructions reviewed with: Patient.  Patient and/or family verbalized understanding of discharge instructions and all questions answered.  Patient discharged in stable condition accompanied by: daughter.  Departure Mode: Wheelchair.      Anaya Greene RN

## 2023-03-10 NOTE — PROGRESS NOTES
Infusion Nursing Note:  Gillian Burk presents today for Venofer # 3/5.    Patient seen by provider today: No   present during visit today: Not Applicable.    Note: Arrived in w/c with daughter. Tolerating infusions well. Administering over 30 minutes. VSS. No complaints..    Intravenous Access:  Peripheral IV placed.    Treatment Conditions:  Not Applicable.    Post Infusion Assessment:  Patient tolerated infusion without incident.  Blood return noted pre and post infusion.  Site patent and intact, free from redness, edema or discomfort.  Access discontinued per protocol.     Discharge Plan:   Discharge instructions reviewed with: Patient and Family.  Patient discharged in stable condition accompanied by: self and daughter.  Departure Mode: Wheelchair.      Nita Kulkarni RN

## 2023-03-13 NOTE — PROGRESS NOTES
Infusion Nursing Note:  Gillian TIFFANY Wm presents today for Venofer infusion.  This is 4/5.    Patient seen by provider today: No   present during visit today: Not Applicable.    Note: Pt here today for 4th venofer infusion.  States that she has been tolerating them well.  No concerns. Pt is going to [place] and will get her last infusion once she is back from [place]    Intravenous Access:  Peripheral IV placed.    Treatment Conditions:  Not Applicable.    Post Infusion Assessment:  Patient tolerated infusion without incident.  Site patent and intact, free from redness, edema or discomfort.  No evidence of extravasations.  Access discontinued per protocol.     Discharge Plan:   Patient discharged in stable condition accompanied by: daughter.  Departure Mode: Wheelchair.  Pt will return on 04/05/2023 for last infusion       Nguyen Vivas RN

## 2023-04-05 NOTE — TELEPHONE ENCOUNTER
Last Written Prescription Date:  12/20/22  Last Fill Quantity: 180,  # refills: 3   Last office visit: 11/29/2022 ; last virtual visit: Visit date not found with prescribing provider:  Matt Mccracken PA-C   Future Office Visit:  Quinn MICHAELS to be seen back around 5/9/23 .  Pt. Is scheduled for 6/8/23 with   Next 5 appointments (look out 90 days)    Apr 07, 2023 11:40 AM  (Arrive by 11:20 AM)  Provider Visit with Fortino Hayden MD  [facility] ([facility] ) [address] [place] [address] [id number] [phone]           Daisy Jeong on 4/5/2023 at 2:20 PM

## 2023-04-05 NOTE — TELEPHONE ENCOUNTER
Pending Prescriptions:                       Disp   Refills    loratadine (EQ ALLERGY RELIEF) 10 MG [id number] tab*0        Sig: Take 1 tablet (10 mg) by mouth daily        Routing refill request to provider for review/approval because:  Drug not active on patient's medication list

## 2023-04-07 NOTE — PROGRESS NOTES
ICD-10-CM    1. Coronary artery disease involving native coronary artery of native heart with angina pectoris (H)  I25.119       2. Lumbar pain  M54.50 naproxen (NAPROSYN) 500 MG tablet         Low back pain.  Suspect myofascial.  Had a similar episode last year.  Feels the same.  It does wrap around the front which raises the possibility of a compression fracture.  But she declined imaging today wishing to just care for her symptoms at this time and see what happens.  She will continue with 1200 mg of Tylenol twice daily, and add naproxen 500 twice daily.  I will call her next week for an update.  Last time she required physical therapy      Subjective   Gillian is a 89 year old, presenting for the following health issues:  Back Pain        4/7/2023    11:17 AM   Additional Questions   Roomed by Laadn MARRERO   Accompanied by Daughter         4/7/2023    11:17 AM   Patient Reported Additional Medications   Patient reports taking the following new medications Aspercream     History of Present Illness       Back Pain:  She presents for follow up of back pain. Patient's back pain is a new problem.    Original cause of back pain: not sure  First noticed back pain: in the last week  Patient feels back pain: comes and goesLocation of back pain:  Right lower back, left lower back, right middle of back and left middle of back  Description of back pain: cramping and stabbing  Back pain spreads: nowhere    Since patient first noticed back pain, pain is: always present, but gets better and worse  Does back pain interfere with her job:  Not applicable  On a scale of 1-10 (10 being the worst), patient describes pain as:  9  What makes back pain worse: certain positions  Acupuncture: not tried  Acetaminophen: helpful  Activity or exercise: not helpful  Chiropractor:  Not tried  Cold: not tried  Heat: helpful  Massage: not tried  Muscle relaxants: not tried  NSAIDS: not tried  Opioids: not tried  Physical Therapy: not  tried  Rest: helpful  Steroid Injection: not tried  Stretching: not tried  Surgery: not tried  TENS unit: not tried  Topical pain relievers: helpful  Other healthcare providers patient is seeing for back pain: None    She eats 2-3 servings of fruits and vegetables daily.She consumes 1 sweetened beverage(s) daily.She exercises with enough effort to increase her heart rate 9 or less minutes per day.  She exercises with enough effort to increase her heart rate 3 or less days per week.   She is taking medications regularly.       Delightful 89-year-old who returns with new onset low back pain.  Here with her daughter today.  She can get comfortable in a recliner and has no pain.  But then when she moves about she has worsening pain.  It does wrap around to the right or the left.  No pain down the legs.  No lower extremity weakness noted.  No saddle anesthesia.  No recent fever.  No fall or injury.  She recalls a similar incident last year.  Took her several weeks till she felt better.  She is taking Tylenol twice daily      Review of Systems   Constitutional, HEENT, cardiovascular, pulmonary, gi and gu systems are negative, except as otherwise noted.      Objective    /78 (BP Location: Right arm, Patient Position: Sitting, Cuff Size: Child)   Pulse 82   Temp 98.5  F (36.9  C) (Temporal)   Resp 16   SpO2 97%   There is no height or weight on file to calculate BMI.  Physical Exam   Well-appearing.  Elderly.  Good historian.  Alert and oriented.  There is no midline tenderness.  No step-offs.  Normal spinal alignment.  Kyphotic.  Mild tenderness diffusely across the low back.  No spasm appreciated.  She has normal lower extremity strength for age.  Normal sensation to light touch.

## 2023-04-13 NOTE — PROGRESS NOTES
Gillian is a 89 year old who is being evaluated via a billable telephone visit.      What phone number would you like to be contacted at? [phone]  How would you like to obtain your AVS? Ba    Distant Location (provider location):  On-site    Assessment & Plan       ICD-10-CM    1. Lumbar pain  M54.50              Still suspect myofascial pain.  There is no radiation to the front to suggest compression fracture.  She is comfortable.  I will recheck with her next week.  Continue naproxen for another week plus Tylenol.    Return in about 1 week (around 4/20/2023) for recheck back pain.    Fortino Hayden MD  [facility]   Gillian is a 89 year old, presenting for the following health issues:  Back Pain        4/7/2023    11:17 AM   Additional Questions   Roomed by Ladan MARRERO   Accompanied by Daughter     Back Pain     History of Present Illness       Back Pain:  She presents for follow up of back pain. Patient's back pain is a new problem.    Original cause of back pain: not sure  First noticed back pain: in the last week  Patient feels back pain: comes and goesLocation of back pain:  Right lower back, left lower back, right middle of back and left middle of back  Description of back pain: cramping and stabbing  Back pain spreads: nowhere    Since patient first noticed back pain, pain is: always present, but gets better and worse  Does back pain interfere with her job:  Not applicable  On a scale of 1-10 (10 being the worst), patient describes pain as:  9  What makes back pain worse: certain positions  Acupuncture: not tried  Acetaminophen: helpful  Activity or exercise: not helpful  Chiropractor:  Not tried  Cold: not tried  Heat: helpful  Massage: not tried  Muscle relaxants: not tried  NSAIDS: not tried  Opioids: not tried  Physical Therapy: not tried  Rest: helpful  Steroid Injection: not tried  Stretching: not tried  Surgery: not tried  TENS unit: not  tried  Topical pain relievers: helpful  Other healthcare providers patient is seeing for back pain: None    She eats 2-3 servings of fruits and vegetables daily.She consumes 1 sweetened beverage(s) daily.She exercises with enough effort to increase her heart rate 9 or less minutes per day.  She exercises with enough effort to increase her heart rate 3 or less days per week.   She is taking medications regularly.     She is stable, pain seems to come and go, alternates right and left, but no bowel or bladder issues  Taking the naproxen seems to help  Similar issue last year          Review of Systems   Musculoskeletal: Positive for back pain.            Objective           Vitals:  No vitals were obtained today due to virtual visit.    Physical Exam     PSYCH: Alert and oriented times 3; coherent speech, normal   rate and volume, able to articulate logical thoughts, able   to abstract reason, no tangential thoughts, no hallucinations   or delusions  Her affect is   RESP: No cough, no audible wheezing, able to talk in full sentences  Remainder of exam unable to be completed due to telephone visits                Phone call duration: 5 minutes

## 2023-04-18 NOTE — TELEPHONE ENCOUNTER
Reason for Call:  Appointment Request    Patient requesting this type of appt:  Follow up back pain    Requested provider: Dr Hayden    Reason patient unable to be scheduled: Not within requested timeframe    When does patient want to be seen/preferred time: this week but not on friday    Comments: daughter states she was supposed to receive a call about scheduling a virtual follow up but has not heard back. She is wondering if Dr Hayden would work her in this week    Could we send this information to you in Emme [id number] or would you prefer to receive a phone call?:   Patient would prefer a phone call   Okay to leave a detailed message?: Yes at Home number on file [phone] (home)    Call taken on 4/18/2023 at 8:42 AM by Tanya Greene

## 2023-04-20 NOTE — PROGRESS NOTES
Assessment & Plan       ICD-10-CM    1. Lumbar pain  M54.50            Back pain.  Sounds myofascial.  Has responded with time and anti-inflammatories and Tylenol.  She is feeling 90% improved.  Continue to watch.  If not resolving could pursue imaging and PT if it continues.  Similar episode last year    No follow-ups on file.    Fortino Hayden MD  [facility] GIANNA French is a 89 year old, presenting for the following health issues:  RECHECK (Back pain)        4/7/2023    11:17 AM   Additional Questions   Roomed by Ladan MARRERO   Accompanied by Daughter     History of Present Illness       Back Pain:  She presents for follow up of back pain. Patient's back pain is a new problem.    Original cause of back pain: not sure  First noticed back pain: in the last week  Patient feels back pain: comes and goesLocation of back pain:  Right lower back, left lower back, right middle of back and left middle of back  Description of back pain: cramping and stabbing  Back pain spreads: nowhere    Since patient first noticed back pain, pain is: always present, but gets better and worse  Does back pain interfere with her job:  Not applicable  On a scale of 1-10 (10 being the worst), patient describes pain as:  9  What makes back pain worse: certain positions  Acupuncture: not tried  Acetaminophen: helpful  Activity or exercise: not helpful  Chiropractor:  Not tried  Cold: not tried  Heat: helpful  Massage: not tried  Muscle relaxants: not tried  NSAIDS: not tried  Opioids: not tried  Physical Therapy: not tried  Rest: helpful  Steroid Injection: not tried  Stretching: not tried  Surgery: not tried  TENS unit: not tried  Topical pain relievers: helpful  Other healthcare providers patient is seeing for back pain: None    She eats 2-3 servings of fruits and vegetables daily.She consumes 1 sweetened beverage(s) daily.She exercises with enough effort to increase her heart rate 9 or less minutes  per day.  She exercises with enough effort to increase her heart rate 3 or less days per week.   She is taking medications regularly.               Review of Systems         Objective    BP (!) 147/72 (BP Location: Right arm, Patient Position: Sitting, Cuff Size: Child)   Pulse 72   Temp 97.4  F (36.3  C)   Resp 14   Wt 48.8 kg (107 lb 9.6 oz)   SpO2 97%   BMI 17.37 kg/m    Body mass index is 17.37 kg/m .  Physical Exam

## 2023-04-25 NOTE — PROGRESS NOTES
Encounter Date: 4/24/2023       History     Chief Complaint   Patient presents with    Hip Pain     44 y/o AAM with PMH HTN, Arthritis presents with c/o left hip paint hat started this AM and has progressively worsened throughout the day. Denies injury. Denies fevers or chills. No difficulty ambulating. States has taken tylenol with no relief. There are no known exacerbating or remitting factors. He denies numbness, tingling or weakness.    The history is provided by the patient.   Review of patient's allergies indicates:  No Known Allergies  Past Medical History:   Diagnosis Date    Arthritis     Hypertension      No past surgical history on file.  No family history on file.  Social History     Tobacco Use    Smoking status: Every Day    Smokeless tobacco: Never   Substance Use Topics    Alcohol use: Yes     Comment: yesterday had vodka     Drug use: Yes     Types: Cocaine     Review of Systems   Constitutional:  Negative for chills and fever.   Musculoskeletal:  Positive for arthralgias. Negative for gait problem and myalgias.   All other systems reviewed and are negative.    Physical Exam     Initial Vitals [04/24/23 1552]   BP Pulse Resp Temp SpO2   120/77 89 17 98.1 °F (36.7 °C) 98 %      MAP       --         Physical Exam    Constitutional: He appears well-developed and well-nourished. He is active and cooperative.   Cardiovascular:  Normal rate, regular rhythm, normal heart sounds and normal pulses.           Pulmonary/Chest: Effort normal and breath sounds normal.   Abdominal: Abdomen is soft. Bowel sounds are normal. There is no abdominal tenderness.   Musculoskeletal:      Right hip: Normal.      Left hip: Tenderness present. No deformity or crepitus. Normal range of motion. Normal strength.      Comments: NVI     Neurological: He is alert and oriented to person, place, and time.   Skin: Skin is warm, dry and intact. Capillary refill takes less than 2 seconds.   Psychiatric: He has a normal mood and  My Chart message sent to patient.  Melissa Serrano XRO/   affect. His speech is normal and behavior is normal. Judgment and thought content normal. Cognition and memory are normal.       Medical Screening Exam   See Full Note    ED Course   Procedures  Labs Reviewed - No data to display       Imaging Results              X-Ray Hip 2 or 3 views Left (with Pelvis when performed) (Final result)  Result time 04/24/23 17:39:23      Final result by Sudarshan Ball DO (04/24/23 17:39:23)                   Impression:      As above.    Point of Service: [facility]      Electronically signed by: Sudarshan Ball  Date:    04/24/2023  Time:    17:39               Narrative:    EXAMINATION:  XR HIP WITH PELVIS WHEN PERFORMED, 2 OR 3 VIEWS LEFT    CLINICAL HISTORY:  Pain in left hip    COMPARISON:  None    TECHNIQUE:  Single frontal view of the pelvis as well as frontal and frogleg lateral views of the left hip.    FINDINGS:  There is a Cam-like configuration of the bilateral femoral head which can be seen with femoroacetabular impingement. Moderate degenerative change of the bilateral hips.  No convincing acute fracture or dislocation demonstrated. No concerning radiopaque foreign body visualized.                                       Medications   ketorolac injection 30 mg (30 mg Intramuscular Given 4/24/23 1806)   methylPREDNISolone acetate injection 80 mg (80 mg Intramuscular Given 4/24/23 1806)     Medical Decision Making:   Initial Assessment:   46 y/o AAM with PMH HTN, Arthritis presents with c/o left hip paint hat started this AM and has progressively worsened throughout the day. Denies injury. Denies fevers or chills. No difficulty ambulating. States has taken tylenol with no relief. There are no known exacerbating or remitting factors. He denies numbness, tingling or weakness.  Differential Diagnosis:   DJD  Dislocation  Sciatica  Vs other  Clinical Tests:   Radiological Study: Ordered and Reviewed  ED Management:  IM toradol and steroid given. Rx for toradol.  Counseled on supportive measures. Strict f/u instructions given. Warning s/s discussed and return precautions given; the patient has v/u.  Referral sent to orthopedics per ED Attending, Dr. Hernandez recommendation after reviewing xray per my request.                        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 Provider    ketorolac (TORADOL) 10 mg tablet Take 1 tablet (10 mg total) by mouth every 6 (six) hours as needed for Pain. Take with food 20 tablet 4/24/2023 4/29/2023 YARED Velazquez          Follow-up Information       Follow up With Specialties Details Why Contact Info    [facility]  As needed [address]  [facility]  [facility] [address]  [phone]               YARED Velazquez  04/25/23 0013

## 2023-04-25 NOTE — PROGRESS NOTES
Infusion Nursing Note:  Gillian Burk presents today for Venofer 5 of 5..    Patient seen by provider today: No   present during visit today: Not Applicable.    Note: N/A.      Intravenous Access:  Peripheral IV placed.    Treatment Conditions:  Not Applicable.      Post Infusion Assessment:  Patient tolerated infusion without incident.       Discharge Plan:   Patient discharged in stable condition accompanied by: daughter. Left via w/c.      Cathryn Tellez RN

## 2023-04-28 NOTE — TELEPHONE ENCOUNTER
Prescription approved per [facility] Refill Protocol.  Lela Canela RN on 4/28/2023 at 3:22 PM

## 2023-04-28 NOTE — TELEPHONE ENCOUNTER
Pending Prescriptions:                       Disp   Refills    levETIRAcetam (KEPPRA) 500 MG tablet [Phar*270 ta*0        Sig: TAKE 1 TABLET BY MOUTH ONCE DAILY AND 2 AT BEDTIME    loratadine (CLARITIN) 10 MG tablet [Pharma*30 tab*0        Sig: Take 1 tablet by mouth once daily    Signed Prescriptions:                        Disp   Refills    SLOW  (45 Fe) MG CR tablet           90 tab*3        Sig: Take 1 tablet by mouth once daily  Authorizing Provider: ANAM FERRER  Ordering User: CABRERA CANELA      Routing refill request to provider for review/approval because:  Drug not on the [facility] refill protocol   Age is out of range    Cabrera Canela RN on 4/28/2023 at 3:22 PM

## 2023-05-30 NOTE — PROGRESS NOTES
Janice French is a 89 year old, presenting for the following health issues:  Allergies        5/30/2023     1:08 PM   Additional Questions   Roomed by Tanya HUDSON   Accompanied by Hanna, daughter         5/30/2023     1:08 PM   Patient Reported Additional Medications   Patient reports taking the following new medications Keppra 500 mg 1 tablet twice daily     Allergies    History of Present Illness       Reason for visit:  Congestion  Symptom onset:  1-2 weeks ago    She eats 2-3 servings of fruits and vegetables daily.She consumes 0 sweetened beverage(s) daily.She exercises with enough effort to increase her heart rate 9 or less minutes per day.  She exercises with enough effort to increase her heart rate 7 days per week. She is missing 1 dose(s) of medications per week.        EMR reviewed including:             Complaint, History of Chief Complaint, Corresponding Review of Systems, and Complaint Specific Physical Examination.    #1   Nasal congestion  Worse last couple weeks  Takes Claritin with modest results  Denies fevers or chills  Denies cough or hemoptysis  Denies shortness of breath at rest.        Exam:   ENT: Pharynx is non-erythemous, moderate/clear PND, no significant nasal obstruction, TM's not red or retracted, hearing intact bilaterally. No carotid bruits are heard. No JVD seen. Thyroid is not nodular or enlarged.      #2   Fatigue  Some withdrawal from normal activities (activity is very limited due to age)  Sleeping adequately  Daughter recently left town for the weekend and this worsened the fatigue.  Discussed depression in detail  Patient denies any suicidal ideation or intent.  Is somewhat despondent regarding her physical limitations and aging.        Exam:   Constitutional: The patient appears to be in no acute distress. The patient appears to be adequately hydrated. No acute respiratory or hemodynamic distress is noted at this time.  Oh PSYCH: The patient appears grossly  appropriate. Maintains good eye contact, does not have any jittery or atypical motion. Displays appropriate affect.      #3   History of hypothyroidism  On supplementation  Notes that she has not missed any dosages  Denies any significant weight change        Exam:   HEART:  regular without rubs, clicks, gallops, or murmurs. PMI is nondisplaced. Upstrokes are brisk. S1,S2 are heard.   LUNGS: clear bilaterally, airflow is brisk, no intercostal retraction or stridor is noted. No coughing is noted during visit.   NEURO: Pt is alert and appropriate. No neurologic lateralization is noted. Cranial nerves 2-12 are intact. Peripheral sensory and motor function are grossly normal.         Patient has been interviewed, applicable history and applied review of systems have been performed.    Vital Signs:   /60 (BP Location: Right arm, Patient Position: Chair)   Pulse 80   Temp 97.5  F (36.4  C) (Temporal)   Resp 18   Wt 46.9 kg (103 lb 8 oz)   SpO2 96%   BMI 16.71 kg/m        Decision Making    Problem and Complexity     1. Fatigue, unspecified type  Rule out physical causes.  - CBC with platelets; Future  - Basic metabolic panel  (Ca, Cl, CO2, Creat, Gluc, K, Na, BUN); Future  - Hepatic panel (Albumin, ALT, AST, Bili, Alk Phos, TP); Future  - CBC with platelets  - Basic metabolic panel  (Ca, Cl, CO2, Creat, Gluc, K, Na, BUN)  - Hepatic panel (Albumin, ALT, AST, Bili, Alk Phos, TP)    2. Hypothyroidism due to acquired atrophy of thyroid  Rule out cause for fatigue.  - TSH with free T4 reflex; Future  - TSH with free T4 reflex    3. Iron deficiency anemia due to chronic blood loss  Rule out anemia.  History of same    4. Seizure disorder (H)  Check Keppra levels for causes of fatigue  - Keppra (Levetiracetam) Level; Future  - Keppra (Levetiracetam) Level    5. Hypertension goal BP (blood pressure) < 140/90  Well-controlled.  Check renal function and microalbumin  - Albumin Random Urine Quantitative with Creat Ratio;  Future  - UA Macroscopic with reflex to Microscopic and Culture; Future  - Basic metabolic panel  (Ca, Cl, CO2, Creat, Gluc, K, Na, BUN); Future  - Albumin Random Urine Quantitative with Creat Ratio  - UA Macroscopic with reflex to Microscopic and Culture  - Basic metabolic panel  (Ca, Cl, CO2, Creat, Gluc, K, Na, BUN)    6. Coronary artery disease involving native coronary artery of native heart without angina pectoris  Stable.  Continue current medication    7. Moderate episode of recurrent major depressive disorder (H)  Discussed risks and benefits of low-dose antidepression medication.  Patient wishes to pursue  - citalopram (CELEXA) 10 MG tablet; Take 1 tablet (10 mg) by mouth daily  Dispense: 30 tablet; Refill: 0                                FOLLOW UP   I have asked the patient to make an appointment for followup with either:  1.  Me,  2.  The patient's preferred provider,  3.  Any available provider  In 2 weeks virtually.  Daughter will contact        Regarding routine vaccinations:  I have reviewed the patient's vaccination schedule and discussed the benefits of prophylactic vaccination in detail.  I recommend the patient contact their pharmacist (not the pharmacy within the clinic) for vaccinations.  Discussed that most insurance companies now favor reimbursement to the pharmacies and it will financially behoove the patient to have vaccinations performed at their pharmacy.        I have carefully explained the diagnosis and treatment options to the patient.  The patient has displayed an understanding of the above, and all subsequent questions were answered.      DO DELMA Pineda    Portions of this note were produced using [employer]  Although every attempt at real-time proof reading has been made, occasional grammar/syntax errors may have been missed.

## 2023-06-07 NOTE — TELEPHONE ENCOUNTER
Pending Prescriptions:                       Disp   Refills    loratadine (CLARITIN) 10 MG tablet         30 tab*0        Sig: Take 1 tablet (10 mg) by mouth daily      Routing refill request to provider for review/approval because:  Age is out of range    Lela Canela RN on 6/7/2023 at 12:31 PM

## 2023-06-08 NOTE — TELEPHONE ENCOUNTER
[facility] needs an updated SIGNED medication list.    Please sign/complete pended medications as patient is taking and needs to be added to current medication list.

## 2023-06-08 NOTE — PROGRESS NOTES
General Cardiology Clinic Progress Note  Gillian Burk MRN# [id number]   YOB: 1927 Age: 89 year old       Reason for visit: Coronary artery disease and aortic valve disease    History of presenting illness:       I had the opportunity to see Ms. Gillian Burk in Cardiology Clinic today for reevaluation of coronary artery disease, aortic valve disease and shortness of breath.       She is a 89-year-old woman with a history of aortic valve stenosis, who underwent transcatheter aortic valve replacement in 2016.  She then had some chest discomfort symptoms and was found to have a 70% to 80% mid LAD stenosis.  However, her chest discomfort improved with medical therapy and she elected not to return for angioplasty and stenting of that.       Over the course of the next year or so, she developed progressive shortness of breath.  When I saw her last in 02/2021, I referred her for an echocardiogram to rule out bioprosthetic valve thrombosis and her echo looked good.  Her left ventricular function was normal and her bioprosthetic valve was functioning well.  I then referred her back for angioplasty and stenting of her mid LAD, which was completed successfully in 02/2021.       Unfortunately, she did not feel much better.  Her shortness of breath did not improve much.  She had a persistent anemia as well, but her iron studies did not suggest severely low iron levels.  However, as her anemia worsened, and her hemoglobin dipped down to 7.4, her iron studies also showed more evidence of iron deficiency.  She has gone through several rounds of IV iron replacement and her hemoglobin is now up above 10.  She developed COVID pneumonia in December and bacterial pneumonia in January but eventually recovered.  Now she seems to be doing relatively well.  She had some chest pain symptoms back in January but those symptoms have resolved and she has not had any chest pain since then.  She still able to get around  somewhat with her walker.  She is not having lightheadedness or syncope.  She still has some shortness of breath with exertion but no chest pain.    She has some lower extremity edema and has difficulty with the compression stockings which she believes causes some bruising on her legs and they are difficult to get on.  She tries to elevate her legs during the day which generally keeps her edema under reasonable control.  It is mostly confined to the left foot and ankle.  She has no weeping or skin breakdown issues.    She has lost some weight.  She is down 12 pounds since I saw her last in December 2021.  Her weight is now 106 pounds.  Heart rate and blood pressure are well controlled.  I reviewed her labs with her today which look excellent, including basic metabolic panel, lipid panel, and CBC.  Her hemoglobin is down a little bit at 10.6.    Her echocardiogram was done last in February 2023 and demonstrated normal left ventricular function with a normally functioning bioprosthetic aortic valve.  She had some mild mitral stenosis due to thickening and calcification of the valve with a mean gradient of 5 mmHg.          Assessment and Plan:     ASSESSMENT:    Ms. Gillian Burk is a 89-year-old woman with coronary artery disease including stenting of the LAD, aortic stenosis treated with TAVR in 2016 with a normally functioning bioprosthetic valve, hypertension, dyslipidemia, and lower extremity edema.  Her medications are working well for her.  Her blood pressure and cholesterol numbers are excellent.  I am pleased that she is doing well.  She is not experiencing any concerning cardiac symptoms.  We talked about some strategies for managing her lower extremity edema.  We can try referring her to lymphedema clinic if her swelling gets significantly worse but so far it seems to be mild.    Have not made any medication changes today.  I will plan to have her follow-up with me again in 1 year for  "reevaluation.    Jones Lee MD           Orders this Visit:  Orders Placed This Encounter   Procedures     Basic metabolic panel     Lipid Profile     ALT     Follow-Up with Cardiology     Orders Placed This Encounter   Medications     cetirizine (ZYRTEC) 10 MG tablet     Sig: Take 10 mg by mouth daily     There are no discontinued medications.    Today's clinic visit entailed:  Review of the result(s) of each unique test - Basic metabolic panel, fasting lipid panel, liver function tests, CBC, echocardiogram  Ordering of each unique test  Prescription drug management  30 minutes spent by me on the date of the encounter doing chart review, review of test results, patient visit, documentation, discussion with family and Daughters present   Provider  Link to [facility] Help Grid     The level of medical decision making during this visit was of moderate complexity.           Review of Systems:     Review of Systems:  Skin:        Eyes:  Positive for glasses  ENT:       Respiratory:  Positive for dyspnea on exertion;cough  Cardiovascular:  Negative;palpitations;chest pain;dizziness;lightheadedness;syncope or near-syncope Positive for;edema  Gastroenterology:      Genitourinary:       Musculoskeletal:       Neurologic:  Negative numbness or tingling of feet;numbness or tingling of hands  Psychiatric:       Heme/Lymph/Imm:  Positive for allergies  Endocrine:                 Physical Exam:     Vitals: /58 (BP Location: Left arm, Patient Position: Sitting, Cuff Size: Adult Regular)   Pulse 70   Ht 1.683 m (5' 6.25\")   Wt 48.4 kg (106 lb 12.8 oz)   SpO2 95%   BMI 17.11 kg/m    Constitutional: Well nourished and in no apparent distress.  Eyes: Pupils equal, round. Sclerae anicteric.   HEENT: Normocephalic, atraumatic.   Neck: Supple. JVD   Respiratory: Breathing non-labored. Lungs clear to auscultation bilaterally. No crackles, wheezes, rhonchi, or rales.  Cardiovascular:  Regular rate and rhythm, normal S1 and S2. No " murmur, rub, or gallop.  Skin: Warm, dry. No rashes, cyanosis, or xanthelasma.  Extremities: No edema.  Neurologic: No gross motor deficits. Alert, awake, and oriented to person, place and time.  Psychiatric: Affect appropriate.             Medications:     Current Outpatient Medications   Medication Sig Dispense Refill     acetaminophen (TYLENOL ARTHRITIS PAIN) 650 MG CR tablet Take 1,300 mg by mouth every 8 hours as needed for mild pain       ALPRAZolam (XANAX) 0.25 MG tablet Take 1 tablet by mouth twice daily as needed for anxiety 28 tablet 0     amLODIPine (NORVASC) 5 MG tablet Take 1 tablet (5 mg) by mouth daily 90 tablet 1     aspirin EC 81 MG EC tablet Take 1 tablet (81 mg) by mouth daily       atorvastatin (LIPITOR) 40 MG tablet Take 1 tablet (40 mg) by mouth daily 90 tablet 2     calcium carbonate (TUMS) 500 MG chewable tablet Take 2-4 chew tab by mouth as needed for heartburn       cetirizine (ZYRTEC) 10 MG tablet Take 10 mg by mouth daily       citalopram (CELEXA) 10 MG tablet Take 1 tablet (10 mg) by mouth daily 30 tablet 0     clopidogrel (PLAVIX) 75 MG tablet Take 1 tablet (75 mg) by mouth daily 90 tablet 3     Cranberry 1000 MG CAPS        famotidine (PEPCID) 40 MG tablet TAKE 1 TABLET BY MOUTH AT BEDTIME 30 tablet 9     isosorbide mononitrate (IMDUR) 60 MG 24 hr tablet Take 1 tablet (60 mg) by mouth daily 180 tablet 3     Lactobacillus (FLORAJEN ACIDOPHILUS) CAPS Take 1 capsule by mouth daily       levETIRAcetam (KEPPRA) 500 MG tablet TAKE 1 TABLET BY MOUTH ONCE DAILY AND 2 AT BEDTIME 270 tablet 0     levothyroxine (SYNTHROID/LEVOTHROID) 88 MCG tablet Take 1 tablet (88 mcg) by mouth daily 90 tablet 0     lisinopril (ZESTRIL) 5 MG tablet Take 1 tablet (5 mg) by mouth daily 90 tablet 3     methotrexate 2.5 MG tablet Take 4 tablets (10 mg) by mouth once a week Wed 52 tablet 3     metoprolol succinate ER (TOPROL XL) 50 MG 24 hr tablet Take 1 tablet (50 mg) by mouth 2 times daily 180 tablet 1      mirtazapine (REMERON) 30 MG tablet TAKE 1 TABLET BY MOUTH ONCE DAILY AT BEDTIME 90 tablet 0     nitroFURantoin macrocrystal (MACRODANTIN) 100 MG capsule Take 1 capsule (100 mg) by mouth At Bedtime PLEASE MAKE AN APPOINTMENT FOR FUTURE FILLS. CALL [phone]. 64 capsule 3     predniSONE (DELTASONE) 5 MG tablet TAKE ONE TABLET BY MOUTH EVERY OTHER DAY ALTERNATING  WITH  1/2  TABLET  EVERY  OTHER  DAY 23 tablet 13     RESTASIS 0.05 % ophthalmic emulsion INSTILL 1 DROP INTO LEFT EYE TWICE DAILY AS DIRECTED       SLOW  (45 Fe) MG CR tablet Take 1 tablet by mouth once daily 90 tablet 3     loratadine (CLARITIN) 10 MG tablet Take 1 tablet (10 mg) by mouth daily (Patient not taking: Reported on 6/8/2023) 30 tablet 0     naproxen (NAPROSYN) 500 MG tablet Take 1 tablet (500 mg) by mouth 2 times daily (with meals) (Patient not taking: Reported on 5/30/2023) 14 tablet 1     nitroGLYcerin (NITROSTAT) 0.4 MG sublingual tablet DISSOLVE ONE TABLET UNDER THE TONGUE EVERY 5 MINUTES AS NEEDED FOR CHEST PAIN.  DO NOT EXCEED A TOTAL OF 3 DOSES IN 15 MINUTES (Patient not taking: Reported on 6/8/2023) 25 tablet 0       Family History   Problem Relation Age of Onset     Hyperlipidemia Mother      Family History Negative No family hx of      Hypertension Mother      Rheumatoid Arthritis Child        Social History     Socioeconomic History     Marital status:      Spouse name: Not on file     Number of children: 4     Years of education: Not on file     Highest education level: Not on file   Occupational History     Employer: RETIRED   Tobacco Use     Smoking status: Never     Passive exposure: Never     Smokeless tobacco: Never   Vaping Use     Vaping status: Never Used   Substance and Sexual Activity     Alcohol use: No     Alcohol/week: 0.0 standard drinks of alcohol     Drug use: No     Sexual activity: Not Currently   Other Topics Concern     Parent/sibling w/ CABG, MI or angioplasty before 65F 55M? Not Asked       Service Not Asked     Blood Transfusions Not Asked     Caffeine Concern Not Asked     Occupational Exposure Not Asked     Hobby Hazards Not Asked     Sleep Concern Not Asked     Stress Concern Not Asked     Weight Concern Not Asked     Special Diet Yes     Comment: low salt      Back Care Not Asked     Exercise Yes     Comment: semi recument bike 15 mins daily      Bike Helmet Not Asked     Seat Belt Not Asked     Self-Exams Not Asked   Social History Narrative    .  Lives in assisted living. Independent. Has help with making bed and changing sheets.    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 .     4 children - 1 with RA     Social Determinants of Health     Financial Resource Strain: Not on file   Food Insecurity: Not on file   Transportation Needs: Not on file   Physical Activity: Not on file   Stress: Not on file   Social Connections: Not on file   Intimate Partner Violence: Not on file   Housing Stability: Not on file            Past Medical History:     Past Medical History:   Diagnosis Date     Aortic stenosis     s/p TAVR 8/17/16     Chronic migraine      Hyperlipidaemia      Hypertension      Hypothyroidism      Myocardial infarction (H)      Need for SBE (subacute bacterial endocarditis) prophylaxis      Orthostatic hypotension     wears compression stockings     PUD (peptic ulcer disease)      Secondary Sjogren's syndrome (H)      Seizures (H)     after stroke (partial onset)     Seropositive rheumatoid arthritis (H)      Stroke (H) 05/2013    with visual field cut, left occipital lobe     Syncope 2014    due to orthostatic hypotension              Past Surgical History:     Past Surgical History:   Procedure Laterality Date     ANOMALOUS PULMONARY VENOUS RETURN REPAIR, TOTAL       CATARACT EXTRACTION       CATARACT IOL, RT/LT Bilateral 2000     CV FLUOROSCOPY ONLY N/A 8/6/2019    Procedure: Fluoroscopy Only - valve cine done of aortic valve at 180 degrees Greenlandic to  ERICH;  Surgeon: Dave Farfan MD;  Location:  [facility]     CV HEART CATHETERIZATION WITH POSSIBLE INTERVENTION N/A 2/10/2021    Procedure: Heart Catheterization with Possible Intervention;  Surgeon: Fish Padgett MD;  Location:  [facility]ATH LAB     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     EYE SURGERY       HEART CATH FEMORAL CANNULIZATION WITH OPEN STANDBY REPAIR AORTIC VALVE N/A 8/3/2016    Procedure: HEART CATH FEMORAL CANNULIZATION WITH OPEN STANDBY REPAIR AORTIC VALVE;  Surgeon: Owen Schultz MD;  Location: [facility]     HYSTERECTOMY       HYSTERECTOMY TOTAL ABDOMINAL  1970    ovaries out     MANDIBLE FRACTURE SURGERY       MOUTH SURGERY  2011    jaw surgery due to fracture     OTHER SURGICAL HISTORY      Treva valve implant     TOTAL HIP ARTHROPLASTY       TRANSCATHETER AORTIC VALVE IMPLANT ANESTHESIA N/A 8/3/2016    Procedure: TRANSCATHETER AORTIC VALVE IMPLANT ANESTHESIA;  Surgeon: GENERIC ANESTHESIA PROVIDER;  Location:  OR     Z TOTAL HIP ARTHROPLASTY Right 2010     Z TOTAL HIP ARTHROPLASTY Left 2014              Allergies:   Penicillins, Caffeine, Hydrocodone, Ibuprofen, Other environmental allergy, Tramadol, and Metal [staples]       Data:   All laboratory data reviewed:    Recent Labs   Lab Test 05/30/23  1400 02/14/23  1214 09/21/22  1451 03/09/22  1450 09/15/21  1328 03/03/21  1422 02/10/21  0830 10/29/20  1530 09/18/20  0630   LDL 55  --   --  61  --   --  31  --   --    HDL 48*  --   --  50  --   --  59  --   --    NHDL 76  --   --  81  --   --  48  --   --    CHOL 124  --   --  131  --   --  107  --   --    TRIG 104  --   --  102  --   --  87  --   --    TSH 5.44*  --  3.10  --   --   --   --  2.88 8.00*   NTBNP  --   --   --   --   --  1,877*  --  1,266* 1,328*   IRON  --  182*  --   --    < > 68  --  58 19*   FEB  --  237*  --   --    < > 220*  --  206* 220*   IRONSAT  --  77*  --   --    < > 31  --  28 9*   BERNARD  --  94  --   --     < > 33  --  198 37    < > = values in this interval not displayed.       Lab Results   Component Value Date    WBC 9.8 05/30/2023    WBC 8.1 07/01/2021    RBC 3.74 (L) 05/30/2023    RBC 2.96 (L) 07/01/2021    HGB 10.6 (L) 05/30/2023    HGB 8.4 (L) 07/01/2021    HCT 34.9 (L) 05/30/2023    HCT 26.6 (L) 07/01/2021    MCV 93 05/30/2023    MCV 90 07/01/2021    MCH 28.3 05/30/2023    MCH 28.4 07/01/2021    MCHC 30.4 (L) 05/30/2023    MCHC 31.6 07/01/2021    RDW 19.1 (H) 05/30/2023    RDW 18.9 (H) 07/01/2021     05/30/2023     07/01/2021       Lab Results   Component Value Date     05/30/2023     05/31/2021    POTASSIUM 3.5 05/30/2023    POTASSIUM 3.4 12/30/2022    POTASSIUM 3.5 05/31/2021    CHLORIDE 102 05/30/2023    CHLORIDE 102 12/30/2022    CHLORIDE 105 05/31/2021    CO2 27 05/30/2023    CO2 28 12/30/2022    CO2 24 05/31/2021    ANIONGAP 9 05/30/2023    ANIONGAP 7 12/30/2022    ANIONGAP 7 05/31/2021     (H) 05/30/2023     (H) 12/30/2022     (H) 05/31/2021    BUN 17.4 05/30/2023    BUN 21 12/30/2022    BUN 26 05/31/2021    CR 0.66 05/30/2023    CR 1.15 (H) 05/31/2021    GFRESTIMATED 80 05/30/2023    GFRESTIMATED 41 (L) 05/31/2021    GFRESTBLACK 47 (L) 05/31/2021    CHEYANNE 8.3 05/30/2023    CHEYANNE 7.5 (L) 05/31/2021      Lab Results   Component Value Date    AST 14 05/30/2023    AST 25 05/29/2021    ALT 8 (L) 05/30/2023    ALT 21 05/29/2021       Lab Results   Component Value Date    A1C 5.5 10/13/2018       Lab Results   Component Value Date    INR 1.16 (H) 02/10/2021    INR 1.11 04/26/2019         MARGAUX WILLIAMSON MD  [facility]

## 2023-06-08 NOTE — LETTER
6/8/2023    Augusto Meyer, DO  [address] [place] [address]    RE: Gillian Burk       Dear Colleague,     I had the pleasure of seeing Gillian Burk in the [facility].    General Cardiology Clinic Progress Note  Gillian Burk MRN# [id number]   YOB: 1927 Age: 89 year old       Reason for visit: Coronary artery disease and aortic valve disease    History of presenting illness:       I had the opportunity to see Ms. Gillian Burk in Cardiology Clinic today for reevaluation of coronary artery disease, aortic valve disease and shortness of breath.       She is a 89-year-old woman with a history of aortic valve stenosis, who underwent transcatheter aortic valve replacement in 2016.  She then had some chest discomfort symptoms and was found to have a 70% to 80% mid LAD stenosis.  However, her chest discomfort improved with medical therapy and she elected not to return for angioplasty and stenting of that.       Over the course of the next year or so, she developed progressive shortness of breath.  When I saw her last in 02/2021, I referred her for an echocardiogram to rule out bioprosthetic valve thrombosis and her echo looked good.  Her left ventricular function was normal and her bioprosthetic valve was functioning well.  I then referred her back for angioplasty and stenting of her mid LAD, which was completed successfully in 02/2021.       Unfortunately, she did not feel much better.  Her shortness of breath did not improve much.  She had a persistent anemia as well, but her iron studies did not suggest severely low iron levels.  However, as her anemia worsened, and her hemoglobin dipped down to 7.4, her iron studies also showed more evidence of iron deficiency.  She has gone through several rounds of IV iron replacement and her hemoglobin is now up above 10.  She developed COVID pneumonia in December and bacterial pneumonia in January but  eventually recovered.  Now she seems to be doing relatively well.  She had some chest pain symptoms back in January but those symptoms have resolved and she has not had any chest pain since then.  She still able to get around somewhat with her walker.  She is not having lightheadedness or syncope.  She still has some shortness of breath with exertion but no chest pain.    She has some lower extremity edema and has difficulty with the compression stockings which she believes causes some bruising on her legs and they are difficult to get on.  She tries to elevate her legs during the day which generally keeps her edema under reasonable control.  It is mostly confined to the left foot and ankle.  She has no weeping or skin breakdown issues.    She has lost some weight.  She is down 12 pounds since I saw her last in December 2021.  Her weight is now 106 pounds.  Heart rate and blood pressure are well controlled.  I reviewed her labs with her today which look excellent, including basic metabolic panel, lipid panel, and CBC.  Her hemoglobin is down a little bit at 10.6.    Her echocardiogram was done last in February 2023 and demonstrated normal left ventricular function with a normally functioning bioprosthetic aortic valve.  She had some mild mitral stenosis due to thickening and calcification of the valve with a mean gradient of 5 mmHg.          Assessment and Plan:     ASSESSMENT:    Ms. Gillian Burk is a 89-year-old woman with coronary artery disease including stenting of the LAD, aortic stenosis treated with TAVR in 2016 with a normally functioning bioprosthetic valve, hypertension, dyslipidemia, and lower extremity edema.  Her medications are working well for her.  Her blood pressure and cholesterol numbers are excellent.  I am pleased that she is doing well.  She is not experiencing any concerning cardiac symptoms.  We talked about some strategies for managing her lower extremity edema.  We can try referring  "her to lymphedema clinic if her swelling gets significantly worse but so far it seems to be mild.    Have not made any medication changes today.  I will plan to have her follow-up with me again in 1 year for reevaluation.    Jones Lee MD           Orders this Visit:  Orders Placed This Encounter   Procedures    Basic metabolic panel    Lipid Profile    ALT    Follow-Up with Cardiology     Orders Placed This Encounter   Medications    cetirizine (ZYRTEC) 10 MG tablet     Sig: Take 10 mg by mouth daily     There are no discontinued medications.    Today's clinic visit entailed:  Review of the result(s) of each unique test - Basic metabolic panel, fasting lipid panel, liver function tests, CBC, echocardiogram  Ordering of each unique test  Prescription drug management  30 minutes spent by me on the date of the encounter doing chart review, review of test results, patient visit, documentation, discussion with family and Daughters present   Provider  Link to [facility] Help Grid     The level of medical decision making during this visit was of moderate complexity.           Review of Systems:     Review of Systems:  Skin:        Eyes:  Positive for glasses  ENT:       Respiratory:  Positive for dyspnea on exertion;cough  Cardiovascular:  Negative;palpitations;chest pain;dizziness;lightheadedness;syncope or near-syncope Positive for;edema  Gastroenterology:      Genitourinary:       Musculoskeletal:       Neurologic:  Negative numbness or tingling of feet;numbness or tingling of hands  Psychiatric:       Heme/Lymph/Imm:  Positive for allergies  Endocrine:                 Physical Exam:     Vitals: /58 (BP Location: Left arm, Patient Position: Sitting, Cuff Size: Adult Regular)   Pulse 70   Ht 1.683 m (5' 6.25\")   Wt 48.4 kg (106 lb 12.8 oz)   SpO2 95%   BMI 17.11 kg/m    Constitutional: Well nourished and in no apparent distress.  Eyes: Pupils equal, round. Sclerae anicteric.   HEENT: Normocephalic, atraumatic. "   Neck: Supple. JVD   Respiratory: Breathing non-labored. Lungs clear to auscultation bilaterally. No crackles, wheezes, rhonchi, or rales.  Cardiovascular:  Regular rate and rhythm, normal S1 and S2. No murmur, rub, or gallop.  Skin: Warm, dry. No rashes, cyanosis, or xanthelasma.  Extremities: No edema.  Neurologic: No gross motor deficits. Alert, awake, and oriented to person, place and time.  Psychiatric: Affect appropriate.             Medications:     Current Outpatient Medications   Medication Sig Dispense Refill    acetaminophen (TYLENOL ARTHRITIS PAIN) 650 MG CR tablet Take 1,300 mg by mouth every 8 hours as needed for mild pain      ALPRAZolam (XANAX) 0.25 MG tablet Take 1 tablet by mouth twice daily as needed for anxiety 28 tablet 0    amLODIPine (NORVASC) 5 MG tablet Take 1 tablet (5 mg) by mouth daily 90 tablet 1    aspirin EC 81 MG EC tablet Take 1 tablet (81 mg) by mouth daily      atorvastatin (LIPITOR) 40 MG tablet Take 1 tablet (40 mg) by mouth daily 90 tablet 2    calcium carbonate (TUMS) 500 MG chewable tablet Take 2-4 chew tab by mouth as needed for heartburn      cetirizine (ZYRTEC) 10 MG tablet Take 10 mg by mouth daily      citalopram (CELEXA) 10 MG tablet Take 1 tablet (10 mg) by mouth daily 30 tablet 0    clopidogrel (PLAVIX) 75 MG tablet Take 1 tablet (75 mg) by mouth daily 90 tablet 3    Cranberry 1000 MG CAPS       famotidine (PEPCID) 40 MG tablet TAKE 1 TABLET BY MOUTH AT BEDTIME 30 tablet 9    isosorbide mononitrate (IMDUR) 60 MG 24 hr tablet Take 1 tablet (60 mg) by mouth daily 180 tablet 3    Lactobacillus (FLORAJEN ACIDOPHILUS) CAPS Take 1 capsule by mouth daily      levETIRAcetam (KEPPRA) 500 MG tablet TAKE 1 TABLET BY MOUTH ONCE DAILY AND 2 AT BEDTIME 270 tablet 0    levothyroxine (SYNTHROID/LEVOTHROID) 88 MCG tablet Take 1 tablet (88 mcg) by mouth daily 90 tablet 0    lisinopril (ZESTRIL) 5 MG tablet Take 1 tablet (5 mg) by mouth daily 90 tablet 3    methotrexate 2.5 MG tablet  Take 4 tablets (10 mg) by mouth once a week Wed 52 tablet 3    metoprolol succinate ER (TOPROL XL) 50 MG 24 hr tablet Take 1 tablet (50 mg) by mouth 2 times daily 180 tablet 1    mirtazapine (REMERON) 30 MG tablet TAKE 1 TABLET BY MOUTH ONCE DAILY AT BEDTIME 90 tablet 0    nitroFURantoin macrocrystal (MACRODANTIN) 100 MG capsule Take 1 capsule (100 mg) by mouth At Bedtime PLEASE MAKE AN APPOINTMENT FOR FUTURE FILLS. CALL [phone]. 90 capsule 3    predniSONE (DELTASONE) 5 MG tablet TAKE ONE TABLET BY MOUTH EVERY OTHER DAY ALTERNATING  WITH  1/2  TABLET  EVERY  OTHER  DAY 23 tablet 13    RESTASIS 0.05 % ophthalmic emulsion INSTILL 1 DROP INTO LEFT EYE TWICE DAILY AS DIRECTED      SLOW  (45 Fe) MG CR tablet Take 1 tablet by mouth once daily 90 tablet 3    loratadine (CLARITIN) 10 MG tablet Take 1 tablet (10 mg) by mouth daily (Patient not taking: Reported on 6/8/2023) 30 tablet 0    naproxen (NAPROSYN) 500 MG tablet Take 1 tablet (500 mg) by mouth 2 times daily (with meals) (Patient not taking: Reported on 5/30/2023) 14 tablet 1    nitroGLYcerin (NITROSTAT) 0.4 MG sublingual tablet DISSOLVE ONE TABLET UNDER THE TONGUE EVERY 5 MINUTES AS NEEDED FOR CHEST PAIN.  DO NOT EXCEED A TOTAL OF 3 DOSES IN 15 MINUTES (Patient not taking: Reported on 6/8/2023) 25 tablet 0       Family History   Problem Relation Age of Onset    Hyperlipidemia Mother     Family History Negative No family hx of     Hypertension Mother     Rheumatoid Arthritis Child        Social History     Socioeconomic History    Marital status:      Spouse name: Not on file    Number of children: 4    Years of education: Not on file    Highest education level: Not on file   Occupational History     Employer: RETIRED   Tobacco Use    Smoking status: Never     Passive exposure: Never    Smokeless tobacco: Never   Vaping Use    Vaping status: Never Used   Substance and Sexual Activity    Alcohol use: No     Alcohol/week: 0.0 standard drinks of  alcohol    Drug use: No    Sexual activity: Not Currently   Other Topics Concern    Parent/sibling w/ CABG, MI or angioplasty before 65F 55M? Not Asked     Service Not Asked    Blood Transfusions Not Asked    Caffeine Concern Not Asked    Occupational Exposure Not Asked    Hobby Hazards Not Asked    Sleep Concern Not Asked    Stress Concern Not Asked    Weight Concern Not Asked    Special Diet Yes     Comment: low salt     Back Care Not Asked    Exercise Yes     Comment: semi recument bike 15 mins daily     Bike Helmet Not Asked    Seat Belt Not Asked    Self-Exams Not Asked   Social History Narrative    .  Lives in assisted living. Independent. Has help with making bed and changing sheets.    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 .     4 children - 1 with RA     Social Determinants of Health     Financial Resource Strain: Not on file   Food Insecurity: Not on file   Transportation Needs: Not on file   Physical Activity: Not on file   Stress: Not on file   Social Connections: Not on file   Intimate Partner Violence: Not on file   Housing Stability: Not on file            Past Medical History:     Past Medical History:   Diagnosis Date    Aortic stenosis     s/p TAVR 8/17/16    Chronic migraine     Hyperlipidaemia     Hypertension     Hypothyroidism     Myocardial infarction (H)     Need for SBE (subacute bacterial endocarditis) prophylaxis     Orthostatic hypotension     wears compression stockings    PUD (peptic ulcer disease)     Secondary Sjogren's syndrome (H)     Seizures (H)     after stroke (partial onset)    Seropositive rheumatoid arthritis (H)     Stroke (H) 05/2013    with visual field cut, left occipital lobe    Syncope 2014    due to orthostatic hypotension              Past Surgical History:     Past Surgical History:   Procedure Laterality Date    ANOMALOUS PULMONARY VENOUS RETURN REPAIR, TOTAL      CATARACT EXTRACTION      CATARACT IOL, RT/LT Bilateral 2000     CV FLUOROSCOPY ONLY N/A 8/6/2019    Procedure: Fluoroscopy Only - valve cine done of aortic valve at 180 degrees [place] to JUNG;  Surgeon: Dave Farfan MD;  Location:  [facility]ARD[facility] CATH LAB    CV HEART CATHETERIZATION WITH POSSIBLE INTERVENTION N/A 2/10/2021    Procedure: Heart Catheterization with Possible Intervention;  Surgeon: Fish Padgett MD;  Location:  [facility]ARD[facility] CATH LAB    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    EYE SURGERY      HEART CATH FEMORAL CANNULIZATION WITH OPEN STANDBY REPAIR AORTIC VALVE N/A 8/3/2016    Procedure: HEART CATH FEMORAL CANNULIZATION WITH OPEN STANDBY REPAIR AORTIC VALVE;  Surgeon: Owen Schultz MD;  Location: [facility] OR    HYSTERECTOMY      HYSTERECTOMY TOTAL ABDOMINAL  1970    ovaries out    MANDIBLE FRACTURE SURGERY      MOUTH SURGERY  2011    jaw surgery due to fracture    OTHER SURGICAL HISTORY      Treva valve implant    TOTAL HIP ARTHROPLASTY      TRANSCATHETER AORTIC VALVE IMPLANT ANESTHESIA N/A 8/3/2016    Procedure: TRANSCATHETER AORTIC VALVE IMPLANT ANESTHESIA;  Surgeon: GENERIC ANESTHESIA PROVIDER;  Location: [facility] OR    ZZC TOTAL HIP ARTHROPLASTY Right 2010    Z TOTAL HIP ARTHROPLASTY Left 2014              Allergies:   Penicillins, Caffeine, Hydrocodone, Ibuprofen, Other environmental allergy, Tramadol, and Metal [staples]       Data:   All laboratory data reviewed:    Recent Labs   Lab Test 05/30/23  1400 02/14/23  1214 09/21/22  1451 03/09/22  1450 09/15/21  1328 03/03/21  1422 02/10/21  0830 10/29/20  1530 09/18/20  0630   LDL 55  --   --  61  --   --  31  --   --    HDL 48*  --   --  50  --   --  59  --   --    NHDL 76  --   --  81  --   --  48  --   --    CHOL 124  --   --  131  --   --  107  --   --    TRIG 104  --   --  102  --   --  87  --   --    TSH 5.44*  --  3.10  --   --   --   --  2.88 8.00*   NTBNP  --   --   --   --   --  1,877*  --  1,266* 1,328*   IRON  --  182*  --   --    < > 68  --  58 19*   FEB  --  237*  --    --    < > 220*  --  206* 220*   IRONSAT  --  77*  --   --    < > 31  --  28 9*   BERNARD  --  94  --   --    < > 33  --  198 37    < > = values in this interval not displayed.       Lab Results   Component Value Date    WBC 9.8 05/30/2023    WBC 8.1 07/01/2021    RBC 3.74 (L) 05/30/2023    RBC 2.96 (L) 07/01/2021    HGB 10.6 (L) 05/30/2023    HGB 8.4 (L) 07/01/2021    HCT 34.9 (L) 05/30/2023    HCT 26.6 (L) 07/01/2021    MCV 93 05/30/2023    MCV 90 07/01/2021    MCH 28.3 05/30/2023    MCH 28.4 07/01/2021    MCHC 30.4 (L) 05/30/2023    MCHC 31.6 07/01/2021    RDW 19.1 (H) 05/30/2023    RDW 18.9 (H) 07/01/2021     05/30/2023     07/01/2021       Lab Results   Component Value Date     05/30/2023     05/31/2021    POTASSIUM 3.5 05/30/2023    POTASSIUM 3.4 12/30/2022    POTASSIUM 3.5 05/31/2021    CHLORIDE 102 05/30/2023    CHLORIDE 102 12/30/2022    CHLORIDE 105 05/31/2021    CO2 27 05/30/2023    CO2 28 12/30/2022    CO2 24 05/31/2021    ANIONGAP 9 05/30/2023    ANIONGAP 7 12/30/2022    ANIONGAP 7 05/31/2021     (H) 05/30/2023     (H) 12/30/2022     (H) 05/31/2021    BUN 17.4 05/30/2023    BUN 21 12/30/2022    BUN 26 05/31/2021    CR 0.66 05/30/2023    CR 1.15 (H) 05/31/2021    GFRESTIMATED 80 05/30/2023    GFRESTIMATED 41 (L) 05/31/2021    GFRESTBLACK 47 (L) 05/31/2021    CHEYANNE 8.3 05/30/2023    CHEYANNE 7.5 (L) 05/31/2021      Lab Results   Component Value Date    AST 14 05/30/2023    AST 25 05/29/2021    ALT 8 (L) 05/30/2023    ALT 21 05/29/2021       Lab Results   Component Value Date    A1C 5.5 10/13/2018       Lab Results   Component Value Date    INR 1.16 (H) 02/10/2021    INR 1.11 04/26/2019         MARGAUX WILLIAMSON MD  [facility]      Thank you for allowing me to participate in the care of your patient.      Sincerely,     MARGAUX WILLIAMSON MD     [facility]  cc:   Matt Mccracken PA-C  [address] [place] [address]

## 2023-06-21 NOTE — TELEPHONE ENCOUNTER
"Requested Prescriptions   Pending Prescriptions Disp Refills    citalopram (CELEXA) 10 MG tablet [Pharmacy Med Name: Citalopram Hydrobromide 10 MG Oral Tablet] 30 tablet 0     Sig: Take 1 tablet by mouth once daily       SSRIs Protocol Failed - 6/20/2023  4:08 PM        Failed - PHQ-9 score less than 5 in past 6 months     Please review last PHQ-9 score.           Passed - Medication is active on med list        Passed - Patient is age 18 or older        Passed - No active pregnancy on record        Passed - No positive pregnancy test in last 12 months        Passed - Recent (6 mo) or future (30 days) visit within the authorizing provider's specialty     Patient had office visit in the last 6 months or has a visit in the next 30 days with authorizing provider or within the authorizing provider's specialty.  See \"Patient Info\" tab in inbasket, or \"Choose Columns\" in Meds & Orders section of the refill encounter.                 "

## 2023-06-22 NOTE — TELEPHONE ENCOUNTER
[facility] needs medication clarification.    There are 2 sets of instructions for the Tylenol, TUMs, and lidocaine 4% cream.    - the prescription needs to be for a set number of pills and a set number of time- no ranges at all.    They would like the medication updates faxed to them at fax# [phone]    They also need a signed med  List faxed to them. She is almost out of medications and the facility needs to send to their pharmacy.    They also have orders for AVIS socks for the patient and the family was told these are not needed. Please send a discontinue order if appropriate. Otherwise an continuation order if she should continue them.      Lela Canela RN on 6/22/2023 at 1:38 PM

## 2023-06-22 NOTE — PROGRESS NOTES
Janice French is a 89 year old, presenting for the following health issues:  History of Present Illness (Follow up back pain, rash/swelling on left foot, fluid retention left foot, bruise on right foot, Rx for BOOST /Orders for Julio C[facility]ar Hagen to take care of patient medications, order that patient is authorized to administer nitroglycerin so she can wear a pill tabares with the nitroglycerin in it.  Authorization or orders from provider to take OTC medications, new prescription for Keppra taking one pill in the am and 1 pill in the pm, Diabetic stockings better to wear for swelling or compression socks?/)        6/22/2023    11:19 AM   Additional Questions   Roomed by Ladan MARRERO   Accompanied by Daughters         6/22/2023    11:19 AM   Patient Reported Additional Medications   Patient reports taking the following new medications None     History of Present Illness       Reason for visit:  Here at Dr's request    She eats 2-3 servings of fruits and vegetables daily.She consumes 2 sweetened beverage(s) daily.She exercises with enough effort to increase her heart rate 9 or less minutes per day.  She exercises with enough effort to increase her heart rate 3 or less days per week.   She is taking medications regularly.            EMR reviewed including:             Complaint, History of Chief Complaint, Corresponding Review of Systems, and Complaint Specific Physical Examination.    #1   Follow-up with swelling and erythema in the lower extremities.  Rash on left foot greater than right.  Appears eczematous.  Mild erythema, no signs of cellulitis or ascending infection.  No open lesions or ulceration.      #2   Follow-up on coronary artery disease.  Stable.  Patient without chest pain.  Denies syncope or near syncope.  Has not required nitroglycerin.  Continues aspirin and Plavix daily.  Continues atorvastatin daily.  Continue his Imdur daily.  Blood pressure is well controlled at 122/62.         Exam:   LUNGS: clear bilaterally, airflow is brisk, no intercostal retraction or stridor is noted. No coughing is noted during visit.   HEART:  regular without rubs, clicks, gallops, or murmurs. PMI is nondisplaced. Upstrokes are brisk. S1,S2 are heard.   NEURO: Pt is alert and appropriate. No neurologic lateralization is noted. Cranial nerves 2-12 are intact. Peripheral sensory and motor function are grossly normal.       #3   History of seizure disorder.  No recent seizure activity.  Continues to take Keppra without difficulty.  Levels maintained.  No neurologic side effects.      #4   Diffuse arthritis.  Follows hands as well as weightbearing extremities.  As somewhat limited function with ability to maintain ADLs.  Denies acute inflammation.  Findings consistent with rheumatoid arthritis.  Continues methotrexate 10 mg weekly.        Patient has been interviewed, applicable history and applied review of systems have been performed.    Vital Signs:   /62 (BP Location: Right arm, Patient Position: Sitting, Cuff Size: Child)   Pulse 72   Temp 98.6  F (37  C) (Temporal)   Resp 18   Wt 47.9 kg (105 lb 9.6 oz)   SpO2 96%   BMI 16.92 kg/m        Decision Making    Problem and Complexity     1. Eczema, unspecified type  We will place on Kenalog.  Patient will notify me of her progress.  - triamcinolone (KENALOG) 0.5 % external ointment; Apply 1 g topically 2 times daily  Dispense: 15 g; Refill: 0    2. Coronary artery disease involving native coronary artery of native heart without angina pectoris  Continue current medication.  Follow-up with cardiology regarding prior TAVR.  - nitroGLYcerin (NITROSTAT) 0.4 MG sublingual tablet; Place 1 tablet (0.4 mg) under the tongue every 5 minutes as needed for chest pain (pt may self administer as needed) For chest pain place 1 tablet under the tongue every 5 minutes for 3 doses. If symptoms persist 5 minutes after 1st dose call 911.  Dispense: 25 tablet; Refill: 0    3.  Seizure disorder (H)  Suppressed on current medication.  Follow levels    4. Acute cystitis without hematuria  Patient wishes to take cranberry extract, no acute infection at this time  - Cranberry 500 MG CAPS; Take 1 capsule (500 mg) by mouth 2 times daily  Dispense: 180 capsule; Refill: 0    5. S/P TAVR (transcatheter aortic valve replacement)  As above    - aspirin 81 MG EC tablet; Take 1 tablet (81 mg) by mouth daily  Dispense: 90 tablet; Refill: 3    6. Seasonal allergic rhinitis, unspecified trigger  Patient will use over-the-counter antihistamines sparingly    7. Costochondritis  Stable    8. Gastroesophageal reflux disease with esophagitis without hemorrhage  Continue Pepcid  - famotidine (PEPCID) 40 MG tablet; Take 1 tablet (40 mg) by mouth daily  Dispense: 90 tablet; Refill: 3    9. Arthritis  Tylenol and methotrexate.  - acetaminophen (TYLENOL) 500 MG tablet; Take 1-2 tablets (500-1,000 mg) by mouth every 6 hours as needed for mild pain  Dispense: 500 tablet; Refill: 3    10. Mild protein-calorie malnutrition (H)  Continue dietary supplementation    11. Need for prophylactic chemotherapy  Stable  - AST; Future  - ALT; Future  - CRP inflammation; Future  - Creatinine; Future                                FOLLOW UP   I have asked the patient to make an appointment for followup with either:  1.  Me,  2.  The patient's preferred provider,  3.  Any available provider  In 4 months or as needed        Regarding routine vaccinations:  I have reviewed the patient's vaccination schedule and discussed the benefits of prophylactic vaccination in detail.  I recommend the patient contact their pharmacist (not the pharmacy within the clinic) for vaccinations.  Discussed that most insurance companies now favor reimbursement to the pharmacies and it will financially behoove the patient to have vaccinations performed at their pharmacy.        I have carefully explained the diagnosis and treatment options to the patient.   The patient has displayed an understanding of the above, and all subsequent questions were answered.      DO DELMA Pineda    Portions of this note were produced using [employer]  Although every attempt at real-time proof reading has been made, occasional grammar/syntax errors may have been missed.

## 2023-06-22 NOTE — TELEPHONE ENCOUNTER
[facility] [phone]. That will be the only pharmacy she will be using from now on.

## 2023-06-23 NOTE — TELEPHONE ENCOUNTER
S: Refill Request    B: Natalee, nurse coordinator, at [facility] patient is switching all of her medications over to their pharmacy.  They were able to switch all but the Xanax. She is requesting a printed Xanax prescription to be faxed to [phone].  If any questions can reach Natalee at [phone].     A: Advised message will be sent to provider with request.     R: Natalee verbalizes understanding and is agreeable.  Denies any other questions at this time.

## 2023-06-27 NOTE — TELEPHONE ENCOUNTER
Patient is unable to tolerate 3x/day boost - ok to change script to boost once daily?  If so, prescription pended with 1 boost daily for 30 day supply.    Tums order states 2-4 tabs PRN.  [facility] states they cannot have a range in orders.  Please advise on either 2 or 4 tabs and the frequency.  Tums pended but order needs to be changed.

## 2023-07-07 NOTE — TELEPHONE ENCOUNTER
Claudia calling from [facility] and is asking for updated directions on the TUMS changed to PRN per patients request and that these three prescriptions be sent to [facility] for filling.     [facility] also needs updated medication list signed by Dr. Meyer once these med orders have been sent to the pharmacy. Fax signed med list to,  [phone].    Claudia can be reached at [phone] if any questions. Mariposa Amaya LPN

## 2023-07-10 NOTE — TELEPHONE ENCOUNTER
Please print off medication list and have Betsy sign it and fax over to [facility] [phone]. Mariposa Amaya LPN

## 2023-07-12 NOTE — TELEPHONE ENCOUNTER
If the patient is not improving with the use of the triamcinolone, afraid I will need to make photographs or office visit.  I am concerned regarding possible development of sepsis.    Betsy

## 2023-07-13 NOTE — PROGRESS NOTES
Janice French is a 89 year old, presenting for the following health issues:  Rash (Left foot/)        7/13/2023    12:53 PM   Additional Questions   Roomed by Tanya HUDSON   Accompanied by Hanna, daughter     Rash  Associated symptoms include a rash.   History of Present Illness       Reason for visit:  Check rash on left foot    She eats 2-3 servings of fruits and vegetables daily.She consumes 2 sweetened beverage(s) daily.She exercises with enough effort to increase her heart rate 9 or less minutes per day.  She exercises with enough effort to increase her heart rate 3 or less days per week.   She is taking medications regularly.        EMR reviewed including:             Complaint, History of Chief Complaint, Corresponding Review of Systems, and Complaint Specific Physical Examination.    #1   Rash on feet  Worse on the left than right  Slightly red, raised, sandpapery, irritated, tender.  No signs of ascending cellulitis or lymphangitis  No ulceration noted.  Warm to touch.  Has been present for the last week.  Not getting better or worse.        Exam:   LUNGS: clear bilaterally, airflow is brisk, no intercostal retraction or stridor is noted. No coughing is noted during visit.   HEART:  regular without rubs, clicks, gallops, or murmurs. PMI is nondisplaced. Upstrokes are brisk. S1,S2 are heard.     GI: Abdomen is soft, without rebound, guarding or tenderness. Bowel sounds are appropriate. No renal bruits are heard.   Constitutional: The patient appears to be in no acute distress. The patient appears to be adequately hydrated. No acute respiratory or hemodynamic distress is noted at this time.No evidence of sepsis.        Patient has been interviewed, applicable history and applied review of systems have been performed.    Vital Signs:   /70 (BP Location: Right arm, Patient Position: Chair)   Pulse 76   Temp 98  F (36.7  C) (Temporal)   Resp 20   Wt 47.8 kg (105 lb 4.8 oz)   SpO2 95%    BMI 16.87 kg/m        Decision Making    Problem and Complexity     1. Dermatitis of both feet  It appears to be yeast in origin.  Suspect tinea pedis.  We will start patient onShort course of Lotrisone.  If improvement not noted, will then start oral Lamisil.                             FOLLOW UP   I have asked the patient to make an appointment for followup with either:  1.  Me,  2.  The patient's preferred provider,  3.  Any available provider  In 1 week by telephone        Regarding routine vaccinations:  I have reviewed the patient's vaccination schedule and discussed the benefits of prophylactic vaccination in detail.  I recommend the patient contact their pharmacist for vaccinations.  Discussed that most insurance companies now favor reimbursement to the pharmacies and it will financially behoove the patient to have vaccinations performed at their pharmacy.        I have carefully explained the diagnosis and treatment options to the patient.  The patient has displayed an understanding of the above, and all subsequent questions were answered.      DO DELMA Pineda    Portions of this note were produced using [employer]  Although every attempt at real-time proof reading has been made, occasional grammar/syntax errors may have been missed.

## 2023-07-13 NOTE — TELEPHONE ENCOUNTER
[facility] still had some medication discrepancies. Medications that needed changing changed in epic.  They need a signed updated med list .    Fax number [phone]    Lela Canela RN on 7/13/2023 at 11:44 AM

## 2023-07-13 NOTE — TELEPHONE ENCOUNTER
[facility] calling to clarify script for prednisone if patient is still to be on 5mg every other day and 2.5mg every other day.    They have both strengths on hand and would prefer separate scripts to make things easier.     RN pended scripts separately with these instructions if patient is still to be on the prednisone.     AUTUMN CruzN, RN

## 2023-07-14 NOTE — TELEPHONE ENCOUNTER
[facility] notified to discontinue triamcinolone per Dr. Meyer.  Lela Canela RN on 7/14/2023 at 5:38 PM

## 2023-07-14 NOTE — LETTER
7/14/2023        RE: Gillian Burk  [address]  [place] [address]      Please discontinue Triamcinolone cream.        Sincerely,        Augusto Meyer, DO

## 2023-07-14 NOTE — TELEPHONE ENCOUNTER
Vicky from [facility] calling and wanting clarification on fax orders. RN clarified that Dr. Meyer was just clarifying orders.

## 2023-07-14 NOTE — TELEPHONE ENCOUNTER
[facility] needs to know if Triamcinolone was discontinued when patient started Lotrisone cream.    They need orders faxed to discontinue.    Fax# [phone]    Lela Canela RN on 7/14/2023 at 11:09 AM

## 2023-07-19 NOTE — TELEPHONE ENCOUNTER
[facility] is calling needing orders for Discontinuing Triamcinolone cream faxed to them at [phone].

## 2023-08-29 PROBLEM — S72.001A HIP FRACTURE, RIGHT, CLOSED, INITIAL ENCOUNTER (H): Status: ACTIVE | Noted: 2023-01-01

## 2023-08-29 PROBLEM — I82.412 ACUTE DEEP VEIN THROMBOSIS (DVT) OF FEMORAL VEIN OF LEFT LOWER EXTREMITY (H): Status: ACTIVE | Noted: 2023-01-01

## 2023-08-29 NOTE — PROGRESS NOTES
S-(situation): Patient arrives to room 265 via cart from ED    B-(background): fall    A-(assessment): right hip fracture, left leg DVT, room air, denies pain    R-(recommendations): Orders reviewed with patient. Will monitor patient per MD orders.     Inpatient nursing criteria listed below were met:    Health care directives status obtained and documented: Yes  VTE ordered/documented: Yes  Skin issues/needs documented:Yes  Isolation addressed and Signage used: NA  Fall Prevention: Care plan updated Yes Education given and documented Yes  Care Plan initiated and Co-Morbidities added:  obs  Education Assessment documented:Yes  Admission Education Documented: Yes  If present CAUTI/CLABI Education done: Yes  New medication patient education completed and documented (Possible Side Effects of Common Medications handout): Yes  Allergies Reviewed: Yes  Admission Medication Reconciliation completed: Yes  Home medications if not able to send immediately home with family stored here: NA  Reminder note placed in discharge instructions regarding home meds: NA  Individualized care needs/preferences addressed and charted: Yes  Provider Notified that patient has arrived to the unit: Yes

## 2023-08-29 NOTE — PROGRESS NOTES
PRIMARY DIAGNOSIS: ACUTE PAIN  OUTPATIENT/OBSERVATION GOALS TO BE MET BEFORE DISCHARGE:  1. Pain Status: Improved but still requiring IV narcotics.    2. Return to near baseline physical activity: No    3. Cleared for discharge by consultants (if involved): No         Please review provider order for any additional goals.   Nurse to notify provider when observation goals have been met and patient is ready for discharge.      Scheduled Tylenol given, poor appetite

## 2023-08-29 NOTE — H&P
[facility]    History and Physical  Hospital Medicine       Date of Admission:  8/29/2023  Date of Service: 8/29/2023     Assessment & Plan   Gillian Burk is a 89 year old female who presents on 8/29/2023 due to fall resulting right hip pain and inability to tolerate standing or walking. Incidental discovery of LLE DVT.    Right Hip Fracture  S/p Bilateral LANG  Fall earlier this morning landing on right hip.  Unable to rise, unable to bear weight.  X-ray confirmed mildly displaced periprosthetic fracture involving the right greater trochanter with likely intertrochanteric extension.  Non-surgical per Orthopedics on-call, Dr. Santiago. Cleared for toe touch weight bearing without acute surgery at this time.  -Pain control with scheduled 975 mg acetaminophen, Lidocaine patch, as needed oxycodone and IV hydromorphone  - PT and OT evaluate and treat  - Will likely require TCU placement as patient lives in [facility].    DVT left lower extremity  ED provider noted swelling of left leg.  Ultrasound confirmed partially occlusive to occlusive thrombus of the left iliac vein stenting into the common femoral vein and proximal superficial femoral vein.  - ED initiated Lovenox  - Start anticoagulation with Xarelto 15mg BID for 3 weeks then transition to 20mg daily    CAD  Hypertension  Hyperlipidemia  Continue PTA lisinopril, metoprolol, Imdur, amlodipine, atorvastatin  Hypothyroid  Continue PTA levothyroxine  Rheumatoid Arthritis  Continue PTA weekly Methotrexate, daily prednisone  History of seizure  Continue PTA Levetiracetam  Anxiety  Continue PTA Celexa and PRN Xanax  Chronic UTI  Continue PTA Macrodantin (prophylaxis)      Principal Problem:    Acute deep vein thrombosis (DVT) of femoral vein of left lower extremity (H)  Active Problems:    Hip fracture, right, closed, initial encounter (H)    Clinically Significant Risk Factors Present on Admission              # Hypoalbuminemia: Lowest  "albumin = 2.4 g/dL at 8/29/2023 12:37 PM, will monitor as appropriate     # Drug Induced Platelet Defect: home medication list includes an antiplatelet medication     # Hypertension: Noted on problem list    # Chronic heart failure with preserved ejection fraction: heart failure noted on problem list and last echo with EF >50%     # Cachexia: Estimated body mass index is 16.57 kg/m  as calculated from the following:    Height as of this encounter: 1.702 m (5' 7\").    Weight as of this encounter: 48 kg (105 lb 13.1 oz).                   Diet: Combination Diet Regular Diet Adult, Regular Diet; High Kcal/High Protein Diet, ADULT; 2 gm NA Diet; No Caffeine Diet, Low Saturated Fat Diet  DVT Prophylaxis: Enoxaparin (Lovenox) SQ and DOAC  Reyes Catheter: Not present  Code Status: No CPR- Do NOT Intubate  Lines: None    Disposition Plan      Expected Discharge Date: 08/30/2023             Entered: ADRIÁN BRUNER CNP 08/29/2023, 4:46 PM       The patient's care was discussed with the Patient and Patient's Family.  I have discussed patient and formulated plan with attending hospitalist physician, ADRIÁN Doss CNP        Primary Care Physician   Augusto Meyer [phone]    History is obtained from the patient, her family, and review the EMR.    History of Present Illness   Gillian Burk is a 89 year old female with past medical history of ischemic cardiomyopathy, CAD, TAVR, on Plavix, seizure history, hypothyroid, and anxiety now presents on 8/29/2023 following a fall this morning. Due to her severe pain and inability to bear weight or ambulate she was brought to the ED by EMS. The on-call orthopedic surgeon (Dr. Santiago) review the imaging and states it is non-surgical and she can toe-touch for transfers. She resides at [facility]. ED physician noted left leg swelling and evaluated for DVT which was positive.   Gillian is pleasant and oriented though quite hard " of hearing. She understands she is admitted for pain control, PT and OT, with likely discharge to TCU. She states she has no pain at rest, only with movement.    Review of Systems   CONSTITUTIONAL: NEGATIVE for fever, chills, change in weight  INTEGUMENTARY/SKIN: NEGATIVE for worrisome rashes, moles or lesions. There is bruising noted over all limbs  ENT/MOUTH: NEGATIVE for ear, mouth and throat problems  RESP: NEGATIVE for significant cough or SOB  CV: NEGATIVE for chest pain, palpitations or peripheral edema  GI: NEGATIVE for nausea, abdominal pain, heartburn, or change in bowel habits  MUSCULOSKELETAL: POSITIVE  for right hip pain and weakness  NEURO: POSITIVE for gait disturbance and hearing loss, NEGATIVE for behavior changes, dizziness/lightheadedness, dysphagia, and memory problems, Hx CVA, and Hx seizure disorder    Past Medical History      Past Medical History:   Diagnosis Date    Aortic stenosis     s/p TAVR 8/17/16    Chronic migraine     Hyperlipidaemia     Hypertension     Hypothyroidism     Myocardial infarction (H)     Need for SBE (subacute bacterial endocarditis) prophylaxis     Orthostatic hypotension     wears compression stockings    PUD (peptic ulcer disease)     Secondary Sjogren's syndrome (H)     Seizures (H)     after stroke (partial onset)    Seropositive rheumatoid arthritis (H)     Stroke (H) 05/2013    with visual field cut, left occipital lobe    Syncope 2014    due to orthostatic hypotension     Patient Active Problem List    Diagnosis Date Noted    Hip fracture, right, closed, initial encounter (H) 08/29/2023     Priority: Medium    Acute deep vein thrombosis (DVT) of femoral vein of left lower extremity (H) 08/29/2023     Priority: Medium    Abnormal gait 06/01/2021     Priority: Medium    Encephalomalacia on imaging study-left occipital lobe 06/01/2021     Priority: Medium    Acute kidney failure, unspecified (H) 05/30/2021     Priority: Medium    Leukocytosis 05/30/2021      Priority: Medium    UTI (urinary tract infection) 05/29/2021     Priority: Medium    Lightheadedness 05/29/2021     Priority: Medium    Fibrocystic breast disease 05/23/2021     Priority: Medium    IZABEL (generalized anxiety disorder) 05/23/2021     Priority: Medium    Hearing loss 05/23/2021     Priority: Medium     Formatting of this note might be different from the original.  right ear      Herpes zoster 05/23/2021     Priority: Medium    Meniere's disease 05/23/2021     Priority: Medium    Migraine headache 05/23/2021     Priority: Medium    Precancerous changes of the cervix 05/23/2021     Priority: Medium    Squamous cell carcinoma in situ of skin 05/23/2021     Priority: Medium     Formatting of this note might be different from the original.  face      Chest pain, unspecified type 05/23/2021     Priority: Medium    Coronary artery disease involving native coronary artery of native heart with angina pectoris (H) 02/02/2021     Priority: Medium     Added automatically from request for surgery [id number]      CONTRERAS (dyspnea on exertion) 02/02/2021     Priority: Medium     Added automatically from request for surgery [id number]      Ischemic cardiomyopathy 08/06/2019     Priority: Medium    Urinary incontinence without sensory awareness 04/26/2019     Priority: Medium    Chronic systolic congestive heart failure (H) 03/24/2019     Priority: Medium    Chronic stable angina (H) 03/24/2019     Priority: Medium    CRAO (central retinal artery occlusion), 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 Discharging from Neuro Stroke Service today. She has appointment with Dr. Mateusz Jane on 11/9/18 in [place], [place].   - Okay to keep appointment with Dr. Jane, advised to return with any decrease in vision in either eye.      Occipital stroke (H) 10/12/2018     Priority: Medium    Primary osteoarthritis of right knee 08/03/2017     Priority: Medium    GI bleed 03/03/2017      Priority: Medium    Hypotension 12/04/2016     Priority: Medium    TIA (transient ischemic attack) 12/03/2016     Priority: Medium    Non-rheumatic mitral valve stenosis - mild to moderate 11/16 echo 12/03/2016     Priority: Medium    Coronary artery disease involving native coronary artery - NSTEMI 11/16 with moderate RCA disease treated medically 12/03/2016     Priority: Medium    CKD (chronic kidney disease) stage 3, GFR 30-59 ml/min (H) 12/03/2016     Priority: Medium    Elevated troponin 12/03/2016     Priority: Medium    Iron deficiency anemia 12/03/2016     Priority: Medium    Acquired hypothyroidism 11/24/2016     Priority: Medium    Aortic stenosis      Priority: Medium     s/p TAVR 8/17/16      Need for SBE (subacute bacterial endocarditis) prophylaxis      Priority: Medium    Advanced directives, counseling/discussion 09/22/2016     Priority: Medium     Was addressed at appt. Will bring in and will have scanned. 9/22/2016  Cathy Anderson MA        S/P TAVR (transcatheter aortic valve replacement) 08/05/2016     Priority: Medium    Aortic stenosis, severe - s/p TAVR 08/03/2016     Priority: Medium    Hypothyroidism due to acquired atrophy of thyroid 05/25/2016     Priority: Medium    Aortic valve disorder 09/02/2015     Priority: Medium    Intermediate coronary syndrome 09/01/2015     Priority: Medium    Hyperlipidemia with target LDL less than 130 04/06/2015     Priority: Medium    Anxiety 07/24/2014     Priority: Medium    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 07/24/2014     Priority: Medium    History of CVA (cerebrovascular accident) - 2013 07/24/2014     Priority: Medium    Esophageal reflux 07/24/2014     Priority: Medium    PAC (premature atrial contraction) 07/24/2014     Priority: Medium    PVC's (premature ventricular contractions) 07/24/2014     Priority: Medium    Hypertension goal BP (blood pressure) < 140/90 07/01/2014     Priority: Medium    Seropositive rheumatoid  arthritis (H)      Priority: Medium    Transient ischemic attack (TIA), and cerebral infarction without residual deficits 06/18/2014     Priority: Medium    Osteoarthritis of left hip 02/18/2014     Priority: Medium        Past Surgical History     Past Surgical History:   Procedure Laterality Date    ANOMALOUS PULMONARY VENOUS RETURN REPAIR, TOTAL      CATARACT EXTRACTION      CATARACT IOL, RT/LT Bilateral 2000    CV FLUOROSCOPY ONLY N/A 8/6/2019    Procedure: Fluoroscopy Only - valve cine done of aortic valve at 180 degrees ARIANA to JUNG;  Surgeon: Dave Farfan MD;  Location:  HEART CARDIAC CATH LAB    CV HEART CATHETERIZATION WITH POSSIBLE INTERVENTION N/A 2/10/2021    Procedure: Heart Catheterization with Possible Intervention;  Surgeon: Fish Padgett MD;  Location:  HEART CARDIAC CATH LAB    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    EYE SURGERY      HEART CATH FEMORAL CANNULIZATION WITH OPEN STANDBY REPAIR AORTIC VALVE N/A 8/3/2016    Procedure: HEART CATH FEMORAL CANNULIZATION WITH OPEN STANDBY REPAIR AORTIC VALVE;  Surgeon: Owen Schultz MD;  Location: [facility] OR    HYSTERECTOMY      HYSTERECTOMY TOTAL ABDOMINAL  1970    ovaries out    MANDIBLE FRACTURE SURGERY      MOUTH SURGERY  2011    jaw surgery due to fracture    OTHER SURGICAL HISTORY      Treva valve implant    TOTAL HIP ARTHROPLASTY      TRANSCATHETER AORTIC VALVE IMPLANT ANESTHESIA N/A 8/3/2016    Procedure: TRANSCATHETER AORTIC VALVE IMPLANT ANESTHESIA;  Surgeon: GENERIC ANESTHESIA PROVIDER;  Location:  OR    [facility] TOTAL HIP ARTHROPLASTY Right 2010    [facility] TOTAL HIP ARTHROPLASTY Left 2014        Prior to Admission Medications   Prior to Admission Medications   Prescriptions Last Dose Informant Patient Reported? Taking?   ALPRAZolam (XANAX) 0.25 MG tablet  at not started  No No   Sig: TAKE 1 TABLET BY MOUTH TWICE DAILY AS NEEDED. DX ANXIETY   Patient taking differently: Take 0.25 mg by mouth 2 times daily as needed  for anxiety or agitation   Cranberry 500 MG CAPS 8/29/2023 at 1030  No Yes   Sig: Take 1 capsule (500 mg) by mouth 2 times daily   Lactobacillus (FLORAJEN ACIDOPHILUS) CAPS 8/29/2023 at 1030  No Yes   Sig: Take 1 capsule by mouth daily   RESTASIS 0.05 % ophthalmic emulsion 8/29/2023 at 1030  Yes Yes   Sig: Place 1 drop Into the left eye 2 times daily   SLOW  (45 Fe) MG CR tablet 8/29/2023 at 1030  No Yes   Sig: Take 1 tablet by mouth once daily   acetaminophen (TYLENOL ARTHRITIS PAIN) 650 MG CR tablet 8/29/2023 at 1030  No Yes   Sig: Take 2 tablets (1,300 mg) by mouth every 8 hours as needed for mild pain   amLODIPine (NORVASC) 5 MG tablet 8/29/2023 at 1030  No Yes   Sig: Take 1 tablet (5 mg) by mouth daily   aspirin 81 MG EC tablet 8/29/2023 at 1030  No Yes   Sig: Take 1 tablet (81 mg) by mouth daily   atorvastatin (LIPITOR) 40 MG tablet 8/29/2023 at 1030  No Yes   Sig: Take 1 tablet (40 mg) by mouth daily   calcium carbonate (TUMS) 500 MG chewable tablet  at not started  No No   Sig: Take 1 tablet (500 mg) by mouth 2 times daily as needed for heartburn   cetirizine (ZYRTEC) 10 MG tablet 8/29/2023 at 1030  No Yes   Sig: Take 1 tablet (10 mg) by mouth daily   citalopram (CELEXA) 10 MG tablet 8/28/2023 at 1830  No Yes   Sig: Take 1 tablet by mouth once daily   Patient taking differently: Take 10 mg by mouth At Bedtime   clopidogrel (PLAVIX) 75 MG tablet 8/29/2023 at 1030  No Yes   Sig: Take 1 tablet (75 mg) by mouth daily   famotidine (PEPCID) 40 MG tablet 8/29/2023 at 1030  No Yes   Sig: Take 1 tablet (40 mg) by mouth daily   isosorbide mononitrate (IMDUR) 60 MG 24 hr tablet 8/29/2023 at 1030  No Yes   Sig: Take 1 tablet (60 mg) by mouth daily   levETIRAcetam (KEPPRA) 500 MG tablet 8/29/2023 at 1030  No Yes   Sig: TAKE 1 TABLET BY MOUTH ONCE in the morning AND 1 tablet in the evening   Patient taking differently: Take 500 mg by mouth 2 times daily   levothyroxine (SYNTHROID/LEVOTHROID) 88 MCG tablet 8/28/2023  at 1500  No Yes   Sig: Take 1 tablet (88 mcg) by mouth daily   Patient taking differently: Take 88 mcg by mouth daily at 2 pm   lidocaine (LMX4) 4 % external cream 8/29/2023 at 1030  No Yes   Sig: Apply topically 2 times daily as needed for mild pain   lisinopril (ZESTRIL) 5 MG tablet 8/29/2023 at 1030  No Yes   Sig: Take 1 tablet (5 mg) by mouth daily   loperamide (IMODIUM) 2 MG capsule  at not started  Yes No   Sig: Take 2 mg by mouth 4 times daily as needed for diarrhea   methotrexate 2.5 MG tablet 8/23/2023 at am  No No   Sig: Take 4 tablets (10 mg) by mouth once a week Wed   metoprolol succinate ER (TOPROL XL) 50 MG 24 hr tablet 8/29/2023 at 1030  No Yes   Sig: Take 1 tablet (50 mg) by mouth 2 times daily   mirtazapine (REMERON) 30 MG tablet 8/28/2023 at hs  No Yes   Sig: TAKE 1 TABLET BY MOUTH ONCE DAILY AT BEDTIME   Patient taking differently: Take 30 mg by mouth At Bedtime   nitroFURantoin macrocrystal (MACRODANTIN) 100 MG capsule 8/28/2023 at 1830  No Yes   Sig: Take 1 capsule (100 mg) by mouth At Bedtime PLEASE MAKE AN APPOINTMENT FOR FUTURE FILLS. CALL [phone].   Patient taking differently: Take 100 mg by mouth At Bedtime   nitroGLYcerin (NITROSTAT) 0.4 MG sublingual tablet More than a month at unkn  No Yes   Sig: Place 1 tablet (0.4 mg) under the tongue every 5 minutes as needed for chest pain (pt may self administer as needed) For chest pain place 1 tablet under the tongue every 5 minutes for 3 doses. If symptoms persist 5 minutes after 1st dose call 911.   predniSONE (DELTASONE) 2.5 MG tablet 8/29/2023 at 1030  Yes Yes   Sig: Take 1 tablet (2.5 mg) by mouth daily   triamcinolone (KENALOG) 0.5 % external ointment 8/29/2023 at 1030  No Yes   Sig: Apply 1 g topically 2 times daily      Facility-Administered Medications: None     Allergies   Allergies   Allergen Reactions    Penicillins Hives    Caffeine Other (See Comments) and Unknown     Triggers your Meniere's disease    Hydrocodone Other (See  Comments) and Unknown     hallucinations    Ibuprofen GI Disturbance and Unknown    Other Environmental Allergy      Metals of unspecific kind    Tramadol Other (See Comments)     Seizure, was taking remeron along with tramadol    Metal [Staples] Rash       Family History    Family History   Problem Relation Age of Onset    Hyperlipidemia Mother     Family History Negative No family hx of     Hypertension Mother     Rheumatoid Arthritis Child        Social History   Social History     Socioeconomic History    Marital status:      Spouse name: Not on file    Number of children: 4    Years of education: Not on file    Highest education level: Not on file   Occupational History     Employer: RETIRED   Tobacco Use    Smoking status: Never     Passive exposure: Never    Smokeless tobacco: Never   Vaping Use    Vaping Use: Never used   Substance and Sexual Activity    Alcohol use: No     Alcohol/week: 0.0 standard drinks of alcohol    Drug use: No    Sexual activity: Not Currently   Other Topics Concern    Parent/sibling w/ CABG, MI or angioplasty before 65F 55M? Not Asked     Service Not Asked    Blood Transfusions Not Asked    Caffeine Concern Not Asked    Occupational Exposure Not Asked    Hobby Hazards Not Asked    Sleep Concern Not Asked    Stress Concern Not Asked    Weight Concern Not Asked    Special Diet Yes     Comment: low salt     Back Care Not Asked    Exercise Yes     Comment: semi recument bike 15 mins daily     Bike Helmet Not Asked    Seat Belt Not Asked    Self-Exams Not Asked   Social History Narrative    .  Lives in assisted living. Independent. Has help with making bed and changing sheets.    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 .     4 children - 1 with RA     Social Determinants of Health     Financial Resource Strain: Not on file   Food Insecurity: Not on file   Transportation Needs: Not on file   Physical Activity: Not on file   Stress: Not on file  "  Social Connections: Not on file   Intimate Partner Violence: Not on file   Housing Stability: Not on file       Physical Exam   BP (!) 149/70 (BP Location: Right arm, Patient Position: Semi-Farmer's, Cuff Size: Adult Regular)   Pulse 82   Temp 97.8  F (36.6  C) (Oral)   Resp 16   Ht 1.702 m (5' 7\")   Wt 48 kg (105 lb 13.1 oz)   SpO2 93%   BMI 16.57 kg/m       Weight: 105 lbs 13.13 oz Body mass index is 16.57 kg/m .     Constitutional: Alert, pleasant, cooperative, no apparent distress, appears nontoxic  Eyes: Eyes are clear, pupils are reactive.  HENT: Oropharynx is clear and moist. No evidence of cranial trauma.  Lymph/Hematologic: No epitrochlear, axillary, anterior or posterior cervical, or supraclavicular lymphadenopathy is appreciated.  Cardiovascular: Regular rate and rhythm, normal S1 and S2, and no murmur noted. +2 left lower extremity edema. 2+ pulses at DP and radial bilaterally.  Respiratory: Clear to auscultation bilaterally.   GI: Soft, non-tender, normal bowel sounds, no hepatomegaly.  Genitourinary: Deferred  Musculoskeletal: Thin lady with RLE pain and weakness  Skin: Warm and dry, no rashes.   Neurologic: Hard of hearing, otherwise cranial nerves are grossly intact.  is symmetric.     Data   Data reviewed today:   Recent Labs   Lab 08/29/23  1237   WBC 6.6   HGB 9.2*   MCV 90         POTASSIUM 3.4   CHLORIDE 104   CO2 25   BUN 17.6   CR 0.68   ANIONGAP 9   CHEYANNE 7.4*   GLC 98   ALBUMIN 2.4*   PROTTOTAL 5.9*   BILITOTAL 0.2   ALKPHOS 90   ALT 12   AST 24       Recent Results (from the past 24 hour(s))   XR Pelvis w Hip Right G/E 2 Views    Narrative    XR PELVIS AND HIP RIGHT 2 VIEWS 8/29/2023 12:52 PM     HISTORY: fall and hip pain  COMPARISON: None.       Impression    IMPRESSION: There are postoperative changes from right total hip  arthroplasty. There is an acute, minimally displaced periprosthetic  fracture involving the right greater trochanter with " likely  intertrochanteric extension. This could be confirmed with CT with  metal suppression technique.    There is diffuse osseous demineralization, which limits evaluation for  nondisplaced fractures. Vascular calcifications are noted. There is  also partially visualized left total hip are the plasty. There are  degenerative changes in the lower lumbar spine at the sacroiliac  joints.    NOTE: ABNORMAL REPORT    THE DICTATION ABOVE DESCRIBES AN ABNORMAL REPORT FOR WHICH FOLLOW-UP  IS NEEDED.    REANNA WINN MD         SYSTEM ID:  [id number]   US Lower Extremity Venous Duplex Left    Narrative    VENOUS ULTRASOUND LEFT LEG  8/29/2023 1:25 PM     HISTORY: unilateral leg swelling    COMPARISON: None.    FINDINGS:  Examination of the deep veins with graded compression and  color flow Doppler with spectral wave form analysis was performed.   There is occlusive to partially occlusive thrombus in the left iliac  veins extending into the common femoral vein and proximal superficial  femoral vein.      Impression    IMPRESSION: Left lower extremity DVT as above.    CAMPBELL SANTOS MD         SYSTEM ID:  [id number]       I personally reviewed the Right hip Xray  image(s) showing periprosthetic fracture of greater trochanter.

## 2023-08-29 NOTE — ED PROVIDER NOTES
History     Chief Complaint   Patient presents with    Leg Injury     HPI  Gillian Burk is a 89 year old female who has a history of anxiety, shingles, menieres disease, ischemic cardiomyopathy, TAVR, CVA, seizures, plavix therapy who presents to the ER via ems from [facility] secondary to a fall this morning around 0600 she was getting out of bed with her walker right by the bed and is not sure exactly how or why she felt but fell to her right side injuring her right hip.  She has a history of bilateral hip replacements.  After falling she could not bear weight on her right leg.  Pain is located in the upper right thigh and the lateral right hip.  She does not have any pain in her arms or head or neck or her left leg..      On inspection I noted that her left leg was significantly swollen compared to the right.  She states has been that way for at least a couple of weeks.  She does not think she has had ultrasound or any evaluation for this.  She does not have any calf tenderness or upper left thigh tenderness.  She did not get any chest pain prior to the fall.  She did not feel dizzy or woozy.  Seems likely that this was a mechanical fall.  No loss of consciousness.  She bumped her head but not very much and does not have any headache.  No neck pain.  Full range of motion without any pain    Allergies:  Allergies   Allergen Reactions    Penicillins Hives    Caffeine Other (See Comments) and Unknown     Triggers your Meniere's disease    Hydrocodone Other (See Comments) and Unknown     hallucinations    Ibuprofen GI Disturbance and Unknown    Other Environmental Allergy      Metals of unspecific kind    Tramadol Other (See Comments)     Seizure, was taking remeron along with tramadol    Metal [Staples] Rash       Problem List:    Patient Active Problem List    Diagnosis Date Noted    Hip fracture, right, closed, initial encounter (H) 08/29/2023     Priority: Medium    Acute deep vein thrombosis (DVT)  of femoral vein of left lower extremity (H) 08/29/2023     Priority: Medium    Abnormal gait 06/01/2021     Priority: Medium    Encephalomalacia on imaging study-left occipital lobe 06/01/2021     Priority: Medium    Acute kidney failure, unspecified (H) 05/30/2021     Priority: Medium    Leukocytosis 05/30/2021     Priority: Medium    UTI (urinary tract infection) 05/29/2021     Priority: Medium    Lightheadedness 05/29/2021     Priority: Medium    Fibrocystic breast disease 05/23/2021     Priority: Medium    IZABEL (generalized anxiety disorder) 05/23/2021     Priority: Medium    Hearing loss 05/23/2021     Priority: Medium     Formatting of this note might be different from the original.  right ear      Herpes zoster 05/23/2021     Priority: Medium    Meniere's disease 05/23/2021     Priority: Medium    Migraine headache 05/23/2021     Priority: Medium    Precancerous changes of the cervix 05/23/2021     Priority: Medium    Squamous cell carcinoma in situ of skin 05/23/2021     Priority: Medium     Formatting of this note might be different from the original.  face      Chest pain, unspecified type 05/23/2021     Priority: Medium    Coronary artery disease involving native coronary artery of native heart with angina pectoris (H) 02/02/2021     Priority: Medium     Added automatically from request for surgery [id number]      CONTRERAS (dyspnea on exertion) 02/02/2021     Priority: Medium     Added automatically from request for surgery [id number]      Ischemic cardiomyopathy 08/06/2019     Priority: Medium    Urinary incontinence without sensory awareness 04/26/2019     Priority: Medium    Chronic systolic congestive heart failure (H) 03/24/2019     Priority: Medium    Chronic stable angina (H) 03/24/2019     Priority: Medium    CRAO (central retinal artery occlusion), 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 Discharging from Neuro Stroke Service today. She has appointment with  Dr. Mateusz Jane on 11/9/18 in [place], [place].   - Okay to keep appointment with Dr. Jane, advised to return with any decrease in vision in either eye.      Occipital stroke (H) 10/12/2018     Priority: Medium    Primary osteoarthritis of right knee 08/03/2017     Priority: Medium    GI bleed 03/03/2017     Priority: Medium    Hypotension 12/04/2016     Priority: Medium    TIA (transient ischemic attack) 12/03/2016     Priority: Medium    Non-rheumatic mitral valve stenosis - mild to moderate 11/16 echo 12/03/2016     Priority: Medium    Coronary artery disease involving native coronary artery - NSTEMI 11/16 with moderate RCA disease treated medically 12/03/2016     Priority: Medium    CKD (chronic kidney disease) stage 3, GFR 30-59 ml/min (H) 12/03/2016     Priority: Medium    Elevated troponin 12/03/2016     Priority: Medium    Iron deficiency anemia 12/03/2016     Priority: Medium    Acquired hypothyroidism 11/24/2016     Priority: Medium    Aortic stenosis      Priority: Medium     s/p TAVR 8/17/16      Need for SBE (subacute bacterial endocarditis) prophylaxis      Priority: Medium    Advanced directives, counseling/discussion 09/22/2016     Priority: Medium     Was addressed at appt. Will bring in and will have scanned. 9/22/2016  Cathy Anderson MA        S/P TAVR (transcatheter aortic valve replacement) 08/05/2016     Priority: Medium    Aortic stenosis, severe - s/p TAVR 08/03/2016     Priority: Medium    Hypothyroidism due to acquired atrophy of thyroid 05/25/2016     Priority: Medium    Aortic valve disorder 09/02/2015     Priority: Medium    Intermediate coronary syndrome 09/01/2015     Priority: Medium    Hyperlipidemia with target LDL less than 130 04/06/2015     Priority: Medium    Anxiety 07/24/2014     Priority: Medium    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 07/24/2014     Priority: Medium    History of CVA (cerebrovascular accident) - 2013 07/24/2014     Priority: Medium     Esophageal reflux 07/24/2014     Priority: Medium    PAC (premature atrial contraction) 07/24/2014     Priority: Medium    PVC's (premature ventricular contractions) 07/24/2014     Priority: Medium    Hypertension goal BP (blood pressure) < 140/90 07/01/2014     Priority: Medium    Seropositive rheumatoid arthritis (H)      Priority: Medium    Transient ischemic attack (TIA), and cerebral infarction without residual deficits 06/18/2014     Priority: Medium    Osteoarthritis of left hip 02/18/2014     Priority: Medium        Past Medical History:    Past Medical History:   Diagnosis Date    Aortic stenosis     Chronic migraine     Hyperlipidaemia     Hypertension     Hypothyroidism     Myocardial infarction (H)     Need for SBE (subacute bacterial endocarditis) prophylaxis     Orthostatic hypotension     PUD (peptic ulcer disease)     Secondary Sjogren's syndrome (H)     Seizures (H)     Seropositive rheumatoid arthritis (H)     Stroke (H) 05/2013    Syncope 2014       Past Surgical History:    Past Surgical History:   Procedure Laterality Date    ANOMALOUS PULMONARY VENOUS RETURN REPAIR, TOTAL      CATARACT EXTRACTION      CATARACT IOL, RT/LT Bilateral 2000    CV FLUOROSCOPY ONLY N/A 8/6/2019    Procedure: Fluoroscopy Only - valve cine done of aortic valve at 180 degrees ARIANA to JUNG;  Surgeon: Dave Farfan MD;  Location:  [facility]    CV HEART CATHETERIZATION WITH POSSIBLE INTERVENTION N/A 2/10/2021    Procedure: Heart Catheterization with Possible Intervention;  Surgeon: Fish Padgett MD;  Location:  [facility] CATH LAB    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    EYE SURGERY      HEART CATH FEMORAL CANNULIZATION WITH OPEN STANDBY REPAIR AORTIC VALVE N/A 8/3/2016    Procedure: HEART CATH FEMORAL CANNULIZATION WITH OPEN STANDBY REPAIR AORTIC VALVE;  Surgeon: Owen Schultz MD;  Location: [facility] OR    HYSTERECTOMY      HYSTERECTOMY TOTAL ABDOMINAL  1970    ovaries  out    MANDIBLE FRACTURE SURGERY      MOUTH SURGERY  2011    jaw surgery due to fracture    OTHER SURGICAL HISTORY      Treva valve implant    TOTAL HIP ARTHROPLASTY      TRANSCATHETER AORTIC VALVE IMPLANT ANESTHESIA N/A 8/3/2016    Procedure: TRANSCATHETER AORTIC VALVE IMPLANT ANESTHESIA;  Surgeon: GENERIC ANESTHESIA PROVIDER;  Location: [facility] TOTAL HIP ARTHROPLASTY Right 2010    Z TOTAL HIP ARTHROPLASTY Left 2014       Family History:    Family History   Problem Relation Age of Onset    Hyperlipidemia Mother     Family History Negative No family hx of     Hypertension Mother     Rheumatoid Arthritis Child        Social History:  Marital Status:   [5]  Social History     Tobacco Use    Smoking status: Never     Passive exposure: Never    Smokeless tobacco: Never   Vaping Use    Vaping Use: Never used   Substance Use Topics    Alcohol use: No     Alcohol/week: 0.0 standard drinks of alcohol    Drug use: No        Medications:    acetaminophen (TYLENOL ARTHRITIS PAIN) 650 MG CR tablet  ALPRAZolam (XANAX) 0.25 MG tablet  amLODIPine (NORVASC) 5 MG tablet  aspirin 81 MG EC tablet  atorvastatin (LIPITOR) 40 MG tablet  calcium carbonate (TUMS) 500 MG chewable tablet  cetirizine (ZYRTEC) 10 MG tablet  citalopram (CELEXA) 10 MG tablet  clopidogrel (PLAVIX) 75 MG tablet  clotrimazole-betamethasone (LOTRISONE) 1-0.05 % external cream  Cranberry 500 MG CAPS  famotidine (PEPCID) 40 MG tablet  isosorbide mononitrate (IMDUR) 60 MG 24 hr tablet  Lactobacillus (FLORAJEN ACIDOPHILUS) CAPS  levETIRAcetam (KEPPRA) 500 MG tablet  levothyroxine (SYNTHROID/LEVOTHROID) 88 MCG tablet  lidocaine (LMX4) 4 % external cream  lisinopril (ZESTRIL) 5 MG tablet  methotrexate 2.5 MG tablet  metoprolol succinate ER (TOPROL XL) 50 MG 24 hr tablet  mirtazapine (REMERON) 30 MG tablet  nitroFURantoin macrocrystal (MACRODANTIN) 100 MG capsule  nitroGLYcerin (NITROSTAT) 0.4 MG sublingual tablet  Nutritional Supplements (BOOST)  predniSONE  (DELTASONE) 2.5 MG tablet  RESTASIS 0.05 % ophthalmic emulsion  SLOW  (45 Fe) MG CR tablet  triamcinolone (KENALOG) 0.5 % external ointment          Review of Systems   All other systems reviewed and are negative.      Physical Exam   BP: (!) 156/76  Pulse: 73  Temp: 97.6  F (36.4  C)  Resp: 18  Weight: 50.4 kg (111 lb 3.2 oz)  SpO2: 96 %      Physical Exam  Vitals and nursing note reviewed.   Constitutional:       General: She is not in acute distress.     Appearance: Normal appearance. She is well-developed.   HENT:      Head: Normocephalic and atraumatic.      Comments: No cephalhematoma or tenderness over the scalp.     Right Ear: External ear normal.      Left Ear: External ear normal.      Nose: Nose normal.      Mouth/Throat:      Mouth: Mucous membranes are moist.   Eyes:      General: No scleral icterus.     Extraocular Movements: Extraocular movements intact.      Conjunctiva/sclera: Conjunctivae normal.   Cardiovascular:      Rate and Rhythm: Normal rate and regular rhythm.   Pulmonary:      Effort: Pulmonary effort is normal. No respiratory distress.      Breath sounds: Normal breath sounds. No stridor.   Abdominal:      General: Abdomen is flat.   Musculoskeletal:      Cervical back: Normal range of motion and neck supple.      Comments: Logroll of the right hip demonstrates significant discomfort over the greater trochanter area and into the groin.  Pain is also elicited by straight leg raise with hip flexion.  Left lower extremity full range of motion without pain.  Significant edema with pretibial 2+ pitting edema on the left.  Multiple scattered old bruises over all 4 extremities.   Skin:     General: Skin is warm and dry.      Findings: No rash.   Neurological:      Mental Status: She is alert and oriented to person, place, and time.         ED Course                 Procedures                  Results for orders placed or performed during the hospital encounter of 08/29/23 (from the past 24  hour(s))   CBC with platelets differential    Narrative    The following orders were created for panel order CBC with platelets differential.  Procedure                               Abnormality         Status                     ---------                               -----------         ------                     CBC with platelets and d...[[id number]]  Abnormal            Final result                 Please view results for these tests on the individual orders.   Comprehensive metabolic panel   Result Value Ref Range    Sodium 138 136 - 145 mmol/L    Potassium 3.4 3.4 - 5.3 mmol/L    Chloride 104 98 - 107 mmol/L    Carbon Dioxide (CO2) 25 22 - 29 mmol/L    Anion Gap 9 7 - 15 mmol/L    Urea Nitrogen 17.6 8.0 - 23.0 mg/dL    Creatinine 0.68 0.51 - 0.95 mg/dL    Calcium 7.4 (L) 8.2 - 9.6 mg/dL    Glucose 98 70 - 99 mg/dL    Alkaline Phosphatase 90 35 - 104 U/L    AST 24 0 - 45 U/L    ALT 12 0 - 50 U/L    Protein Total 5.9 (L) 6.4 - 8.3 g/dL    Albumin 2.4 (L) 3.5 - 5.2 g/dL    Bilirubin Total 0.2 <=1.2 mg/dL    GFR Estimate 79 >60 mL/min/1.73m2   CBC with platelets and differential   Result Value Ref Range    WBC Count 6.6 4.0 - 11.0 10e3/uL    RBC Count 3.24 (L) 3.80 - 5.20 10e6/uL    Hemoglobin 9.2 (L) 11.7 - 15.7 g/dL    Hematocrit 29.2 (L) 35.0 - 47.0 %    MCV 90 78 - 100 fL    MCH 28.4 26.5 - 33.0 pg    MCHC 31.5 31.5 - 36.5 g/dL    RDW 19.2 (H) 10.0 - 15.0 %    Platelet Count 257 150 - 450 10e3/uL    % Neutrophils 73 %    % Lymphocytes 15 %    % Monocytes 9 %    % Eosinophils 1 %    % Basophils 1 %    % Immature Granulocytes 1 %    NRBCs per 100 WBC 0 <1 /100    Absolute Neutrophils 4.9 1.6 - 8.3 10e3/uL    Absolute Lymphocytes 1.0 0.8 - 5.3 10e3/uL    Absolute Monocytes 0.6 0.0 - 1.3 10e3/uL    Absolute Eosinophils 0.1 0.0 - 0.7 10e3/uL    Absolute Basophils 0.1 0.0 - 0.2 10e3/uL    Absolute Immature Granulocytes 0.0 <=0.4 10e3/uL    Absolute NRBCs 0.0 10e3/uL   XR Pelvis w Hip Right G/E 2 Views    Narrative     XR PELVIS AND HIP RIGHT 2 VIEWS 8/29/2023 12:52 PM     HISTORY: fall and hip pain  COMPARISON: None.       Impression    IMPRESSION: There are postoperative changes from right total hip  arthroplasty. There is an acute, minimally displaced periprosthetic  fracture involving the right greater trochanter with likely  intertrochanteric extension. This could be confirmed with CT with  metal suppression technique.    There is diffuse osseous demineralization, which limits evaluation for  nondisplaced fractures. Vascular calcifications are noted. There is  also partially visualized left total hip are the plasty. There are  degenerative changes in the lower lumbar spine at the sacroiliac  joints.    NOTE: ABNORMAL REPORT    THE DICTATION ABOVE DESCRIBES AN ABNORMAL REPORT FOR WHICH FOLLOW-UP  IS NEEDED.    REANNA WINN MD         SYSTEM ID:  [id number]   US Lower Extremity Venous Duplex Left    Narrative    VENOUS ULTRASOUND LEFT LEG  8/29/2023 1:25 PM     HISTORY: unilateral leg swelling    COMPARISON: None.    FINDINGS:  Examination of the deep veins with graded compression and  color flow Doppler with spectral wave form analysis was performed.   There is occlusive to partially occlusive thrombus in the left iliac  veins extending into the common femoral vein and proximal superficial  femoral vein.      Impression    IMPRESSION: Left lower extremity DVT as above.    CAMPBELL SANTOS MD         SYSTEM ID:  [id number]     *Note: Due to a large number of results and/or encounters for the requested time period, some results have not been displayed. A complete set of results can be found in Results Review.       Medications   enoxaparin ANTICOAGULANT (LOVENOX) injection 80 mg (has no administration in time range)       Assessments & Plan (with Medical Decision Making)  89-year-old female who presented from assisted living facility secondary to a fall who has a history of bilateral hip replacements but is here with right  hip pain and inability to bear weight.  She also has incidentally noted swelling of her left leg not related to the fall.  That is been there for at least a couple of weeks.  I am concerned there could be a DVT so we will get an ultrasound of her left lower extremity to rule out DVT.  Basic lab work drawn as well.  Patient is pain-free without movement of the leg and therefore no pain medicines were given.  X-ray of the right hip shows a periprosthetic fracture.  Discussed with on-call orthopedics, Dr. Santiago who does not think a CT scan is indicated at this time.  He reviewed the x-rays and recommends toe-touch weightbearing and no acute surgeries.  This will take quite a while to heal but surgery is not indicated at this time.  We will plan on getting the patient admitted.  Ultrasound positive for DVT.  Will discuss with hospitalist but will need to start the patient on anticoagulation.  Anticipate DOAC therapy.  No symptoms of PE.  No hypoxia tachycardia or shortness of breath.  The diagnosis, recommendations for anticoagulation and admission to the hospital with PT and probable transitional care discussed with the patient and her family and they agree with the plan.  Discussed with Dr. Moncada who accepts patient for admission and recommends observation admission and a dose of subcutaneous Lovenox until they can have a conversation with her about a DOAC versus warfarin.  They will see the patient in the emergency department.     I have reviewed the nursing notes.    I have reviewed the findings, diagnosis, plan and need for follow up with the patient.          New Prescriptions    No medications on file       Final diagnoses:   Hip fracture, right, closed, initial encounter (H)   Acute deep vein thrombosis (DVT) of femoral vein of left lower extremity (H)       8/29/2023   [facility] EMERGENCY DEPT       Johan Wilhelm MD  08/29/23 [id number]

## 2023-08-29 NOTE — MEDICATION SCRIBE - ADMISSION MEDICATION HISTORY
Medication Scribe Admission Medication History    Admission medication history is complete. The information provided in this note is only as accurate as the sources available at the time of the update.    Medication reconciliation/reorder completed by provider prior to medication history? No    Information Source(s): Facility ([facility]/) medication list/MAR via phone    Pertinent Information: spoke with Barbara POWELL at [facility] (verified patient is on Methotrexate but is not on folic acid)    Changes made to PTA medication list:  Added: loperamide  Deleted: lotrisone and boost  Changed: synthroid to 3pm, celexa to HS    Medication Affordability:  Not including over the counter (OTC) medications, was there a time in the past 3 months when you did not take your medications as prescribed because of cost?: Unable to Assess    Allergies reviewed with patient and updates made in EHR: yes    Medication History Completed By: NOLA MCMILLAN 8/29/2023 2:31 PM    Prior to Admission medications    Medication Sig Last Dose Taking? Auth Provider Long Term End Date   acetaminophen (TYLENOL ARTHRITIS PAIN) 650 MG CR tablet Take 2 tablets (1,300 mg) by mouth every 8 hours as needed for mild pain 8/29/2023 at 1030 Yes Augusto Meyer,      amLODIPine (NORVASC) 5 MG tablet Take 1 tablet (5 mg) by mouth daily 8/29/2023 at 1030 Yes Matt Mccracken PA-C Yes    aspirin 81 MG EC tablet Take 1 tablet (81 mg) by mouth daily 8/29/2023 at 1030 Yes Augusto Meyer DO     atorvastatin (LIPITOR) 40 MG tablet Take 1 tablet (40 mg) by mouth daily 8/29/2023 at 1030 Yes Matt Mccracken PA-C Yes    cetirizine (ZYRTEC) 10 MG tablet Take 1 tablet (10 mg) by mouth daily 8/29/2023 at 1030 Yes Augusto Meyer DO     citalopram (CELEXA) 10 MG tablet Take 1 tablet by mouth once daily  Patient taking differently: Take 10 mg by mouth At Bedtime 8/28/2023 at 1830 Yes Esvin Matthews MD Yes    clopidogrel  (PLAVIX) 75 MG tablet Take 1 tablet (75 mg) by mouth daily 8/29/2023 at 1030 Yes Matt Mccracken PA-C Yes    Cranberry 500 MG CAPS Take 1 capsule (500 mg) by mouth 2 times daily 8/29/2023 at 1030 Yes Augusto Meyer DO     famotidine (PEPCID) 40 MG tablet Take 1 tablet (40 mg) by mouth daily 8/29/2023 at 1030 Yes Augusto Meyer DO No    isosorbide mononitrate (IMDUR) 60 MG 24 hr tablet Take 1 tablet (60 mg) by mouth daily 8/29/2023 at 1030 Yes Jones Lee MD Yes    Lactobacillus (FLORAJEN ACIDOPHILUS) CAPS Take 1 capsule by mouth daily 8/29/2023 at 1030 Yes Augusto Meyer DO     levETIRAcetam (KEPPRA) 500 MG tablet TAKE 1 TABLET BY MOUTH ONCE in the morning AND 1 tablet in the evening  Patient taking differently: Take 500 mg by mouth 2 times daily 8/29/2023 at 1030 Yes Augusto Meyer DO Yes    levothyroxine (SYNTHROID/LEVOTHROID) 88 MCG tablet Take 1 tablet (88 mcg) by mouth daily  Patient taking differently: Take 88 mcg by mouth daily at 2 pm 8/28/2023 at 1500 Yes Augusto Meyer DO Yes    lidocaine (LMX4) 4 % external cream Apply topically 2 times daily as needed for mild pain 8/29/2023 at 1030 Yes Augusto Meyer DO     lisinopril (ZESTRIL) 5 MG tablet Take 1 tablet (5 mg) by mouth daily 8/29/2023 at 1030 Yes Jones Lee MD Yes    metoprolol succinate ER (TOPROL XL) 50 MG 24 hr tablet Take 1 tablet (50 mg) by mouth 2 times daily 8/29/2023 at 1030 Yes Matt Mccracken PA-C Yes    mirtazapine (REMERON) 30 MG tablet TAKE 1 TABLET BY MOUTH ONCE DAILY AT BEDTIME  Patient taking differently: Take 30 mg by mouth At Bedtime 8/28/2023 at hs Yes Fortino Hayden MD Yes    nitroFURantoin macrocrystal (MACRODANTIN) 100 MG capsule Take 1 capsule (100 mg) by mouth At Bedtime PLEASE MAKE AN APPOINTMENT FOR FUTURE FILLS. CALL [phone].  Patient taking differently: Take 100 mg by mouth At Bedtime 8/28/2023 at 1830 Yes  Fish Gutierrez MD     nitroGLYcerin (NITROSTAT) 0.4 MG sublingual tablet Place 1 tablet (0.4 mg) under the tongue every 5 minutes as needed for chest pain (pt may self administer as needed) For chest pain place 1 tablet under the tongue every 5 minutes for 3 doses. If symptoms persist 5 minutes after 1st dose call 911. More than a month at unkn Yes Augusto Meyer DO Yes    predniSONE (DELTASONE) 2.5 MG tablet Take 1 tablet (2.5 mg) by mouth daily 8/29/2023 at 1030 Yes Augusto Meyer DO     RESTASIS 0.05 % ophthalmic emulsion Place 1 drop Into the left eye 2 times daily 8/29/2023 at 1030 Yes Reported, Patient     SLOW  (45 Fe) MG CR tablet Take 1 tablet by mouth once daily 8/29/2023 at 1030 Yes Augusto Meyer DO     triamcinolone (KENALOG) 0.5 % external ointment Apply 1 g topically 2 times daily 8/29/2023 at 1030 Yes Augusto Meyer DO     ALPRAZolam (XANAX) 0.25 MG tablet TAKE 1 TABLET BY MOUTH TWICE DAILY AS NEEDED. DX ANXIETY  Patient taking differently: Take 0.25 mg by mouth 2 times daily as needed for anxiety or agitation  at not started  Augusto Meyer DO     calcium carbonate (TUMS) 500 MG chewable tablet Take 1 tablet (500 mg) by mouth 2 times daily as needed for heartburn  at not started  Augusto Meyer DO     loperamide (IMODIUM) 2 MG capsule Take 2 mg by mouth 4 times daily as needed for diarrhea  at not started  Reported, Patient     methotrexate 2.5 MG tablet Take 4 tablets (10 mg) by mouth once a week Wed 8/23/2023 at am  Verena Velasquez PA-C Yes

## 2023-08-29 NOTE — ED TRIAGE NOTES
PT fell this morning around 0600. Now is unable to ambulate. Complains of R leg pain, and has L leg swelling and L arm swelling/ bruises. Pt comes from [facility].

## 2023-08-30 NOTE — CONSULTS
Care Management Initial Consult    General Information  Assessment completed with: Children, Hanna  Type of CM/SW Visit: Initial Assessment    Primary Care Provider verified and updated as needed:     Readmission within the last 30 days:        Reason for Consult: discharge planning  Advance Care Planning:          Communication Assessment  Patient's communication style: spoken language (English or Bilingual)    Hearing Difficulty or Deaf: yes   Wear Glasses or Blind: yes    Cognitive  Cognitive/Neuro/Behavioral: .WDL except  Level of Consciousness: alert  Arousal Level: opens eyes spontaneously  Orientation: oriented x 4  Mood/Behavior: calm, cooperative  Best Language: 0 - No aphasia  Speech: hoarse, clear, spontaneous, logical    Living Environment:   People in home: alone     Current living Arrangements: assisted living  Name of Facility: [facility]   Able to return to prior arrangements: no  Living Arrangement Comments: needs TCU    Family/Social Support:  Care provided by: other (see comments) (staff)  Provides care for: no one, unable/limited ability to care for self  Marital Status:   Children          Description of Support System: Supportive, Involved    Support Assessment: Adequate family and caregiver support    Current Resources:   Patient receiving home care services: No     Community Resources: [facility]  Equipment currently used at home: walker, rolling  Supplies currently used at home: Hearing Aid Batteries    Employment/Financial:  Employment Status: retired        Financial Concerns:         Does the patient's insurance plan have a 3 day qualifying hospital stay waiver?  No    Lifestyle & Psychosocial Needs:  Social Determinants of Health     Tobacco Use: Low Risk  (8/29/2023)    Patient History     Smoking Tobacco Use: Never     Smokeless Tobacco Use: Never     Passive Exposure: Never   Alcohol Use: Not on file   Financial Resource Strain: Not on file   Food Insecurity: Not on  file   Transportation Needs: Not on file   Physical Activity: Not on file   Stress: Not on file   Social Connections: Not on file   Intimate Partner Violence: Not on file   Depression: Not at risk (6/22/2023)    PHQ-2     PHQ-2 Score: 0   Housing Stability: Not on file       Functional Status:  Prior to admission patient needed assistance:   Dependent ADLs:: Ambulation-walker, Bathing, Dressing, Grooming  Dependent IADLs:: Cleaning, Cooking, Laundry, Shopping, Meal Preparation, Medication Management, Money Management, Transportation       Mental Health Status:          Chemical Dependency Status:              Values/Beliefs:  Spiritual, Cultural Beliefs, Worship Practices, Values that affect care: no               Additional Information:  Referral received for discharge planning. Patient Elem, without hearing aids. Initial assessment completed with daughter, Hanna, at bedside.    Patient resides at [facility]. [facility] staff assist patient with showers, meals, cleaning, and medication management/administration.    Patient ambulates with a walker at baseline.    P/T evaluation recommends TCU. Discussed recommendation with Hanna, and she is in agreement. Hanna requests TCU referral be sent to [facility] (Main Phone: [phone] Admissions Phone: [phone] Fax: [phone]) . Referral sent.    Patient accepted  at [facility] (Main Phone: [phone] Admissions Phone: [phone] Fax: [phone]) .    Transport arranged through [employer]. [facility] transport @ [employer].    RASHEEDA Donahue  [facility] [phone]/ [place] [phone]  Care Management

## 2023-08-30 NOTE — PROGRESS NOTES
"     PRIMARY DIAGNOSIS: ACUTE PAIN  OUTPATIENT/OBSERVATION GOALS TO BE MET BEFORE DISCHARGE:    1. Pain Status: Denies pain. Scheduled Tylenol given.     2. Return to near baseline physical activity: No     3. Cleared for discharge by consultants (if involved): No        Sleep well with no  acute distress.    /60 (BP Location: Right arm)   Pulse 75   Temp 97.4  F (36.3  C) (Oral)   Resp 18   Ht 1.702 m (5' 7\")   Wt 48 kg (105 lb 13.1 oz)   SpO2 93%   BMI 16.57 kg/m      "

## 2023-08-30 NOTE — PROGRESS NOTES
PRIMARY DIAGNOSIS: ACUTE PAIN  OUTPATIENT/OBSERVATION GOALS TO BE MET BEFORE DISCHARGE:     1. Pain Status: Reported 2/10 on her right hip.     2. Return to near baseline physical activity: No     3. Cleared for discharge by consultants (if involved): No             Pt was up to bedside commode and unable to urinate with assist of 2 with gait belt and walker. Bladder scanned: 402mL. Noted 100mL of tea colored urine in the urine collection container. Urine sample sent last evening and result is in(see results.)

## 2023-08-30 NOTE — PLAN OF CARE
Goal Outcome Evaluation:      Plan of Care Reviewed With: patient    Overall Patient Progress: no change    Patient vital signs are at baseline: Yes  Patient able to ambulate as they were prior to admission or with assist devices provided by therapies during their stay:  No,  Reason:  Hip fracture  Patient MUST void prior to discharge:  No,  Reason:  Patient not adequately voiding.   Patient able to tolerate oral intake:  No,  Reason:  Pt offered snack. Not eating  Pain has adequate pain control using Oral analgesics:  Yes  Does patient have an identified :  Yes  Has goal D/C date and time been discussed with patient:  No,  Reason:  Pt wanted to go home overnight. Discussed that she would need to see the doctor in the morning to discuss.    Pt attempting to void using purewick and unable. Bladder scan of 558. Straight cath ordered and completed to relieve and to catch urine sample. 380 mL removed. VSS on RA. Did not ambulate yet. Pt denies pain.

## 2023-08-30 NOTE — PROGRESS NOTES
08/30/23 0924   Appointment Info   Signing Clinician's Name / Credentials (PT) Elda Lewis PT, DPT, ATC, LAT   Rehab Comments (PT) PT eval and treat discontinue rec. Activity orders: ambulate with assist, ice, heat, Toe touch weight bearing   Quick Adds   Quick Adds Certification       Present no   Living Environment   People in Home facility resident;alone   Current Living Arrangements assisted living   Home Accessibility no concerns   Transportation Anticipated family or friend will provide   Living Environment Comments [facility] assists with meals, linens, medications, showering. light housekeeping and laundry. Recently facility assits [facility] patient dressing. Daughter supervises finances. Daughter transports to and from appointments.   Self-Care   Usual Activity Tolerance fair   Current Activity Tolerance poor   Regular Exercise No   Equipment Currently Used at Home walker, rolling   Fall history within last six months yes   Number of times patient has fallen within last six months 2   Activity/Exercise/Self-Care Comment The last few months receiving meals in room, occassionally walks the hallway for fitness. Wheelchair ambulation for community activities or MD appointments.   General Information   Onset of Illness/Injury or Date of Surgery 08/29/23   Referring Physician Jerry SAMPSON CNP   Pertinent History of Current Problem (include personal factors and/or comorbidities that impact the POC) Patient is a 89 year old female, registered OBSERVATION status from the ED following fall at her [facility] with inability to ambulate. Patient found to have right periprosthetic hip fracture (conservative management), acute DVT of femoral vein in LLE. Patient with a previous medical history of anxiety, shingles, menieres disease, ischemic cardiomyopathy, TAVR, CVA, seizures, plavix therapy, bilateral LANG, hearing loss, migraines, occipital stroke, chronic angina, CKD,  aortic  stenosis, rheumatoid arthritis.   Existing Precautions/Restrictions fall;weight bearing   Weight-Bearing Status - LUE full weight-bearing   Weight-Bearing Status - RUE full weight-bearing   Weight-Bearing Status - LLE weight-bearing as tolerated   Weight-Bearing Status - RLE toe touch weight-bearing   General Observations per physician patient on anticoagulation therapy since yesterday, INR is 2.13, patient with dose of lovenox yesterday and on Xarelto   Cognition   Affect/Mental Status (Cognition) anxious   Orientation Status (Cognition) oriented to;person;situation;place   Follows Commands (Cognition) follows one-step commands;increased processing time needed;delayed response/completion   Cognitive Status Comments hard of hearing, hearing aides in place but batteries not working. Pocket talker not beneficial at time of assessment, daughter assists with much of the subjective interview   Pain Assessment   Patient Currently in Pain Yes, see Vital Sign flowsheet   Integumentary/Edema   Integumentary/Edema Comments ecchymosis throughout limbs. thin skin and frail build with significantly visible janell prominiences. LLE edema from popliteal fossa to toes   Posture    Posture Forward head position;Protracted shoulders;Kyphosis   Range of Motion (ROM)   ROM Comment bilat UE WFL. LLE WFL. RLE limited by pain   Strength (Manual Muscle Testing)   Strength Comments assistance required for all RLE ROM tasks due to pain and fear of pain. Sufficient LLE strength to maintain standing with walker support   Bed Mobility   Bed Mobility supine-sit   Supine-Sit [place] (Bed Mobility) maximum assist (25% patient effort)   Bed Mobility Limitations decreased ability to use legs for bridging/pushing;impaired ability to control trunk for mobility   Comment, (Bed Mobility) supine to long sitting pull assist MIN. Long sit to sitting EOB max assist with transfer sheet. IND scooting to EOB   Transfers   Transfers sit-stand  transfer;bed-chair transfer   Bed-Chair Transfer   Assistive Device (Bed-Chair Transfers) walker, front-wheeled   Bed-Chair [place] (Transfers) maximum assist (25% patient effort)   Comment, (Bed-Chair Transfer) unable to safely maintain TTWB and required controlled assistance to the chair with weak side transfer.   Sit-Stand Transfer   Sit-Stand [place] (Transfers) minimum assist (75% patient effort)   Assistive Device (Sit-Stand Transfers) walker, front-wheeled   Gait/Stairs (Locomotion)   Comment, (Gait/Stairs) unable to progress   Balance   Balance Comments IND short sitting balance. Standing places right foot flat, minimal weight however fatigues quickly and is a high risk fo falling   Sensory Examination   Sensory Perception patient reports no sensory changes   Coordination   Coordination no deficits were identified   Muscle Tone   Muscle Tone no deficits were identified   Clinical Impression   Criteria for Skilled Therapeutic Intervention Yes, treatment indicated   PT Diagnosis (PT) impaired mobility, muscle weakness   Influenced by the following impairments status post fall, DVT and gradual decline in mobility   Functional limitations due to impairments assistance for transfers and cares. use of walker and limited WB for transfers. wheelchair mobility for any ambulation   Clinical Presentation (PT Evaluation Complexity) Evolving/Changing   Clinical Presentation Rationale acute fracture, pain, medical history, DVT, clinical judgement   Clinical Decision Making (Complexity) moderate complexity   Planned Therapy Interventions (PT) bed mobility training;balance training;gait training;joint mobilization;ROM (range of motion);strengthening;transfer training;progressive activity/exercise;wheelchair management/propulsion training   Anticipated Equipment Needs at Discharge (PT)   (TBD at next level of care)   Clinical Impression Comments Patient with recent decline in functional mobility and 2 falls, she  has now suffered a right hip fracture and is limited in WB and fearful. She is well below her functional baseline and requires assistance for bed mobility, transfers and WB complaince. She would benefit from TCU placement in order to safety progress her towards her baseline function, IND and safety prior to returning to her ROBERT.   PT Total Evaluation Time   PT Eval, Moderate Complexity Minutes (87363) 20   Therapy Certification   Start of care date 08/30/23   Certification date from 08/30/23   Certification date to 09/06/23   Medical Diagnosis acute LLE DVT, Right periprosthetic hip fracture   Physical Therapy Goals   PT Frequency 6x/week   PT Predicted Duration/Target Date for Goal Attainment 09/06/23   PT Goals Bed Mobility;Transfers;Gait;Wheelchair Mobility   PT: Bed Mobility Modified independent;Supine to/from sit;Within precautions   PT: Transfers Minimal assist;Bed to/from chair;Assistive device;Within precautions   PT: Gait Minimal assist;5 feet;Within precautions;Standard walker   PT: Wheelchair Mobility Caregiver Min assist;50 feet;manual wheelchair   Interventions   Interventions Quick Adds Therapeutic Activity   Therapeutic Activity   Therapeutic Activities: dynamic activities to improve functional performance Minutes (88661) 10   PT Discharge Planning   PT Plan 1/6 Wed. bed mobility, seated and supine HEP. transfers TTWB   PT Discharge Recommendation (DC Rec) Transitional Care Facility  (wheelchair transport)   PT Rationale for DC Rec Patient from [facility] with assistance for ADLs and IND in mobility with a walker. Patient with recent decline in functional mobility and 2 falls, she has now suffered a right hip fracture and is limited in WB and fearful. She is well below her functional baseline and requires assistance for bed mobility, transfers and WB complaince. She would benefit from TCU placement in order to safety progress her towards her baseline function, IND and safety prior to returning to her ROBERT.    PT Brief overview of current status MIN assist supine to long sitting. MAX assist long sitting to sitting EOB. MIN assist sit to stand and MAX assist bed to chair with walker and TTWB. Supine RLE AROM tasks with tolerable pain.   Total Session Time   Timed Code Treatment Minutes 10   Total Session Time (sum of timed and untimed services) 30     [facility]  OUTPATIENT PHYSICAL THERAPY EVALUATION  PLAN OF TREATMENT FOR OUTPATIENT REHABILITATION  (COMPLETE FOR INITIAL CLAIMS ONLY)  Patient's Last Name, First Name, M.I.  YOB: 1927  Gillian Burk                        Provider's Name  [facility] Medical Record No.  [id number]                             Onset Date:  08/29/23   Start of Care Date:  08/30/23   Type:     _X_PT   ___OT   ___SLP Medical Diagnosis:  acute LLE DVT, Right periprosthetic hip fracture              PT Diagnosis:  impaired mobility, muscle weakness Visits from SOC:  1     See note for plan of treatment, functional goals and certification details    I CERTIFY THE NEED FOR THESE SERVICES FURNISHED UNDER        THIS PLAN OF TREATMENT AND WHILE UNDER MY CARE     (Physician co-signature of this document indicates review and certification of the therapy plan).              Physical Therapy Discharge Summary    Reason for therapy discharge:    Discharged to transitional care facility.    Progress towards therapy goal(s). See goals on Care Plan in [facility] electronic health record for goal details.  Goals not met.  Barriers to achieving goals:   discharge on same date as initial evaluation.    Therapy recommendation(s):    Continued therapy is recommended.  Rationale/Recommendations:  see above.      Thank you for your referral.  Elda Lewis PT, DPT, ATC, LAT    [facility]  O: [phone]  E: [email or web address]    Thank you for your referral.  Elda Lewis PT, DPT, ATC, LAT    [facility]  [facility]  O: [phone]  E: [email or web address]

## 2023-08-30 NOTE — PROVIDER NOTIFICATION
Pt BP 90s/50s. Map 65. HR 67. She had small amount of urine output in brief, hematuria present. After voiding, still had 362 mL still when bladder scanned. Provider notified and asked about straight cath to obtain urine sample. Also asked about holding metoprolol? After 20 min, rechecked BP as 129/61 with map 99. Gave metoprolol. Provider ordered straight cath which was completed and 380 mL returned, sample sent to lab. Provider notified of results.

## 2023-08-30 NOTE — PROGRESS NOTES
Care Management Discharge Note    Discharge Date: 08/30/2023     Discharge Disposition: Transitional Care - [facility] (Main Phone: [phone] Admissions Phone: [phone] Fax: [phone])     Discharge Services:      Discharge DME:      Discharge Transportation: agency - [facility] @ 1530    Private pay costs discussed: Not applicable    Does the patient's insurance plan have a 3 day qualifying hospital stay waiver?  No    PAS Confirmation Code: [id number]  Patient/family educated on Medicare website which has current facility and service quality ratings: yes    Education Provided on the Discharge Plan: Yes  Persons Notified of Discharge Plans: Hanna hester  Patient/Family in Agreement with the Plan: yes    Handoff Referral Completed: Yes    Additional Information:  Patient discharging to [facility] (Main Phone: [phone] Admissions Phone: [phone] Fax: [phone]) TCU today.  [facility] [employer] [facility]    Left voicemail for daughterJennifer, as she is in an appointment.    [facility].    RASHEEDA Donahue  [facility] [phone]/ [facility] [phone]  Care Management

## 2023-08-30 NOTE — PROGRESS NOTES
PRIMARY DIAGNOSIS: ACUTE PAIN  OUTPATIENT/OBSERVATION GOALS TO BE MET BEFORE DISCHARGE:  1. Pain Status: Pain free. denies pain, scheduled tylenol given.    2. Return to near baseline physical activity: No    3. Cleared for discharge by consultants (if involved): No    Discharge Planner Nurse   Safe discharge environment identified: Yes  Barriers to discharge: Yes, urinary retention, bladder scanned 344. straight cath done out 350, bloody with small clots UO. Mild redness noted on coccyx. BP- 100/50 at 0857, rechecked at 1021 was 106/45. Metoprolol, imdur, amlodipine and lisinopril held.       Entered by: Anna Zhou RN 08/30/2023 10:52 AM     Please review provider order for any additional goals.   Nurse to notify provider when observation goals have been met and patient is ready for discharge.

## 2023-08-30 NOTE — PROGRESS NOTES
[facility]    Medicine Progress Note - Hospitalist Service    Date of Admission:  8/29/2023    Assessment & Plan   Right hip fracture secondary to fall approx 0600 8-29-23 with mildly displaced periprosthetic fracture involving right greater trochanter. After fall, she was unable to bear weight  Plan:   Dr. Santiago was consulted.  Recommendation was toe-touch weightbearing.  No surgical intervention planned.  Continue with scheduled Tylenol  Lidocaine patch  As needed oxycodone and Dilaudid.  PT recommends TCU.  Case management aware and will be making referrals.    DVT left lower extremity.  In the emergency department patient was noted to have welling of her left leg.  Ultrasound showed occlusive to partially occlusive thrombus within the left iliac veins extending into the common femoral and proximal superficial femoral vein.  Plan:   Started on Xarelto 15 mg p.o. twice daily x3 weeks then 20 mg p.o. daily thereafter.  Continue with Plavix   Will need to be monitored closely for bleeding     History of CAD/hypertension/hyperlipidemia  Plan:  Continue with lisinopril, metoprolol, Imdur, amlodipine, atorvastatin.    Hypothyroidism  Plan:  Continue with levothyroxine.    Rheumatoid arthritis:  Plan:  Continue with weekly methotrexate and daily prednisone.    Anxiety disorder.  Plan:  Continue with Celexa and as needed Xanax.    History of seizure:  Plan:  Continue with Keppra    Chronic UTI  Plan:  Continue with Macrodantin as prophylaxis.    Hx ischemic cardiomyopathy, TAVR, CVA            Diet: Combination Diet Regular Diet Adult, Regular Diet; High Kcal/High Protein Diet, ADULT; 2 gm NA Diet; No Caffeine Diet, Low Saturated Fat Diet    DVT Prophylaxis: DOAC Plavix   Reyes Catheter: Not present  Lines: None     Cardiac Monitoring: None  Code Status: No CPR- Do NOT Intubate      Clinically Significant Risk Factors Present on Admission              # Hypoalbuminemia: Lowest albumin =  "2.4 g/dL at 8/29/2023 12:37 PM, will monitor as appropriate  # Coagulation Defect: INR = 2.13 (Ref range: 0.85 - 1.15) and/or PTT = N/A, will monitor for bleeding  # Drug Induced Platelet Defect: home medication list includes an antiplatelet medication   # Hypertension: Noted on problem list  # Chronic heart failure with preserved ejection fraction: heart failure noted on problem list and last echo with EF >50%     # Cachexia: Estimated body mass index is 16.57 kg/m  as calculated from the following:    Height as of this encounter: 1.702 m (5' 7\").    Weight as of this encounter: 48 kg (105 lb 13.1 oz).              Disposition Plan      Expected Discharge Date: 08/30/2023                The patient's care was discussed with the Attending Physician, Dr. Moncada  and Patient.    ADRIÁN Plata [facility]  Hospitalist Service  [facility]  Securely message with [employer] (more info)  Text page via [employer] Paging/Directory   ______________________________________________________________________    Interval History   Reviewed ED provider notes.  Reviewed H&P    Physical Exam   Vital Signs: Temp: 97.5  F (36.4  C) Temp src: Oral BP: 100/50 Pulse: 73   Resp: 17 SpO2: 94 % O2 Device: None (Room air)    Weight: 105 lbs 13.13 oz    Constitutional: Frail 89-year-old female sitting up in the chair.  She is on room air.  Does not appear to be in acute distress.  Eyes: sclera clear and conjunctiva normal  Hematologic / Lymphatic: Multiple bruises on extremities due to chronic antiplatelet therapy.  Respiratory: No increased work of breathing while at rest.  Her lungs are clear and equal bilaterally without wheezes, Rales or rhonchi.  No retractions.  Cardiovascular: Mechanical heart sound located.  The rhythm is regular.  GI: hypoactive bowel sounds  Skin: Acyanotic.  No pallor.  No rashes.  Bruises on arms.  Musculoskeletal: 1+ edema her extremities.  Neurologic: Awake, very hard of hearing.  No " obvious focal neurological deficits.    Medical Decision Making       45 MINUTES SPENT BY ME on the date of service doing chart review, history, exam, documentation & further activities per the note.      Data     I have personally reviewed the following data over the past 24 hrs:    8.9  \   8.5 (L)   / 217     137 102 21.9 /  85   3.5 24 0.80 \     ALT: 12 AST: 24 AP: 90 TBILI: 0.2   ALB: 2.4 (L) TOT PROTEIN: 5.9 (L) LIPASE: N/A     INR:  2.13 (H) PTT:  N/A   D-dimer:  N/A Fibrinogen:  N/A       Imaging results reviewed over the past 24 hrs:   Recent Results (from the past 24 hour(s))   XR Pelvis w Hip Right G/E 2 Views    Narrative    XR PELVIS AND HIP RIGHT 2 VIEWS 8/29/2023 12:52 PM     HISTORY: fall and hip pain  COMPARISON: None.       Impression    IMPRESSION: There are postoperative changes from right total hip  arthroplasty. There is an acute, minimally displaced periprosthetic  fracture involving the right greater trochanter with likely  intertrochanteric extension. This could be confirmed with CT with  metal suppression technique.    There is diffuse osseous demineralization, which limits evaluation for  nondisplaced fractures. Vascular calcifications are noted. There is  also partially visualized left total hip are the plasty. There are  degenerative changes in the lower lumbar spine at the sacroiliac  joints.    NOTE: ABNORMAL REPORT    THE DICTATION ABOVE DESCRIBES AN ABNORMAL REPORT FOR WHICH FOLLOW-UP  IS NEEDED.    REANNA WINN MD         SYSTEM ID:  [id number]   US Lower Extremity Venous Duplex Left    Narrative    VENOUS ULTRASOUND LEFT LEG  8/29/2023 1:25 PM     HISTORY: unilateral leg swelling    COMPARISON: None.    FINDINGS:  Examination of the deep veins with graded compression and  color flow Doppler with spectral wave form analysis was performed.   There is occlusive to partially occlusive thrombus in the left iliac  veins extending into the common femoral vein and proximal  superficial  femoral vein.      Impression    IMPRESSION: Left lower extremity DVT as above.    CAMPBELL SANTOS MD         SYSTEM ID:  [id number]

## 2023-08-30 NOTE — PLAN OF CARE
Occupational Therapy: Orders received. Chart reviewed and discussed with care team.? Occupational Therapy not indicated due to inpatient OT involvement will not alter/contribute to discharge placement.? Defer discharge recommendations to PT and care team.? Will complete orders.      Thank you for your referral.  Celeste Sebastian OTR/[place]     [facility]  O: [phone]  E: [email or web address]

## 2023-08-30 NOTE — PROVIDER NOTIFICATION
MB  Pt. unable to pee, bladder scanned- 420. Night nurse had straight cath her last night. Do you think we can put a breaux on her?  Also, Bp-100/50, I'm holding her metoprolol, amlodipine, lisinopril and isosorbide. will recheck bp later.    ABAD  Red Amador - 8:53 am  Ok, sounds good.

## 2023-08-30 NOTE — DISCHARGE SUMMARY
[facility]  Hospitalist Discharge Summary      Date of Admission:  8/29/2023  Date of Discharge:  8/30/2023  Discharging Provider: ADRIÁN Plata CNP  Discharge Service: Hospitalist Service    Discharge Diagnoses   Right hip fracture secondary to fall approx 0600 8-29-23 with mildly displaced periprosthetic fracture involving right greater trochanter. After fall, she was unable to bear weight  Plan:   Dr. Santiago was consulted.  Recommendation was toe-touch weightbearing.  No surgical intervention planned.  Continue with scheduled Tylenol  Lidocaine patch  As needed oxycodone and Dilaudid.  PT recommends TCU.  Plan [facility]      DVT left lower extremity.  In the emergency department patient was noted to have welling of her left leg.  Ultrasound showed occlusive to partially occlusive thrombus within the left iliac veins extending into the common femoral and proximal superficial femoral vein.  Plan:   Started on Xarelto 15 mg p.o. twice daily x3 weeks then 20 mg p.o. daily thereafter.  Continue with Plavix     Urinary retention. Had multiple straight caths for retention > 400cc. Now has hematuria secondary to trauma from cath, Plavix and Xarelto  Plan:   Place Reyes cath.   Pt can transfer to [facility] with catheter   [facility] to remove Reyes. If she continues to retain urine, OP Urology follow up        History of CAD/hypertension/hyperlipidemia  Plan:  Continue with lisinopril, metoprolol, Imdur, amlodipine, atorvastatin.     Hypothyroidism  Plan:  Continue with levothyroxine.     Rheumatoid arthritis:  Plan:  Continue with weekly methotrexate and daily prednisone.     Anxiety disorder.  Plan:  Continue with Celexa and as needed Xanax.     History of seizure:  Plan:  Continue with Keppra     Chronic UTI  Plan:  Continue with Macrodantin as prophylaxis.     Hx ischemic cardiomyopathy, TAVR, CVA     Clinically Significant Risk Factors     # Cachexia: Estimated body mass index is 16.57  "kg/m  as calculated from the following:    Height as of this encounter: 1.702 m (5' 7\").    Weight as of this encounter: 48 kg (105 lb 13.1 oz).       Follow-ups Needed After Discharge   Follow-up Appointments     Follow Up and recommended labs and tests      Follow up with Nursing home physician.  No follow up labs or test are   needed.  Notify provider gross hematuria  Facility provider to determine when Reyes discontinued.   Recommend voiding trial.        Start voiding trials at [facility] provider discretion.  If she continues to retain urine consider outpatient urology consult.   urology appointment has been made    Unresulted Labs Ordered in the Past 30 Days of this Admission       No orders found for last 31 day(s).            Discharge Disposition   Discharged to [facility] [place]   Condition at discharge: Stable    Hospital Course   The patient is a 89-year-old female who presented to the emergency department at [facility] via EMS on 8/29/2023 with chief complaint of leg pain.  Explained it was 600 hours on the morning of admission.  She was getting out of bed.  She fell onto her left hip.  After her fall, she was unable to bear weight on the right lower extremity.  EMS was called and she was transported to the emergency department for further evaluation and treatment.    In the emergency department; chemistry panel unremarkable.  CBC hemoglobin 9.2 hematocrit 29.2 WBC 6.6.  X-ray of the pelvis and right hip showed an acute minimally displaced periprosthetic fracture involving the right greater trochanter.    Patient was noted to have swelling in her left lower extremity.  Ultrasound of the left lower extremity showed occlusive to partially occlusive thrombus in the left iliac veins extending into the common femoral vein and proximal superficial femoral vein.    Dr. Santiago orthopedic service was consulted regarding the hip fracture.  There was no recommendation for CT scanning.  Mentation was for " toe-touch weightbearing without any other further surgical intervention.    Patient received dose of Lovenox prior to admission.    Patient was admitted to hospitalist service.  She was initiated on Xarelto therapy 15 mg p.o. twice daily for 21 days then 20 mg daily thereafter.  She was continued on Plavix.  Aspirin was discontinued.  She was given Dilaudid as needed for pain as well as oxycodone.  Scheduled Tylenol was started.    Patient was seen in consultation by physical therapy.  They recommended TCU.  Case management referrals.  Patient has been accepted to [facility] for TCU services.    On the day of dismissal, when I saw her, she was sitting up in the chair.  She is on room air.  Did not appear to be in acute distress.  She is very hard of hearing though.  She is not verbalized any significant pain.  Patient was noted to be retaining urine.  She has been cath several times.  The last time she was cath, she did have some hematuria.  The decision was made to keep Reyes catheter in place prevent trauma from multiple catheterizations.  Patient will dismiss to [facility] with a Reyes catheter.  She may be able to start voiding trials at TCU.  If not, then follow-up as outpatient with urology.    Discharge plan:  Transfer to [facility] TCU with plan to return to her facility.  She will need toe-touch bearing on the right lower extremity.    Consultations:  Physical therapy, OT Case management    Procedures:  None    Complications:  None    Pending results:  None      Consultations This Hospital Stay   PHYSICAL THERAPY ADULT IP CONSULT  OCCUPATIONAL THERAPY ADULT IP CONSULT  CARE MANAGEMENT / SOCIAL WORK IP CONSULT  PHYSICAL THERAPY ADULT IP CONSULT  OCCUPATIONAL THERAPY ADULT IP CONSULT    Code Status   No CPR- Do NOT Intubate    Time Spent on this Encounter   José Miguel VICTOR APRN CNP, personally saw the patient today and spent greater than 30 minutes discharging this patient.       José Miguel BAKER  ADRIÁN [facility] 2[facility] MEDICAL SURGICAL  [address]   [place] [address]  Phone: [phone]  ______________________________________________________________________    Physical Exam   Vital Signs: Temp: 98.1  F (36.7  C) Temp src: Oral BP: 101/52 Pulse: 78   Resp: 18 SpO2: 94 % O2 Device: None (Room air)    Weight: 105 lbs 13.13 oz  Constitutional: Frail 89-year-old female sitting up in the chair.  She is on room air.  Does not appear to be in acute distress.  Eyes: sclera clear and conjunctiva normal  Hematologic / Lymphatic: Multiple bruises on extremities due to chronic antiplatelet therapy.  Respiratory: No increased work of breathing while at rest.  Her lungs are clear and equal bilaterally without wheezes, Rales or rhonchi.  No retractions.  Cardiovascular: Mechanical heart sound located.  The rhythm is regular.  GI: hypoactive bowel sounds  Skin: Acyanotic.  No pallor.  No rashes.  Bruises on arms.  Musculoskeletal: 1+ edema her extremities.  Neurologic: Awake, very hard of hearing.  No obvious focal neurological deficits.       Primary Care Physician   Augusto Meyer    Discharge Orders      Primary Care - Care Coordination Referral      Adult Urology  Referral      General info for SNF    Length of Stay Estimate: Short Term Care: Estimated # of Days <30  Condition at Discharge: Stable  Level of care:skilled   Rehabilitation Potential: Good  Admission H&P remains valid and up-to-date: Yes  Recent Chemotherapy: N/A  Use Nursing Home Standing Orders: Yes     Mantoux instructions    Give two-step Mantoux (PPD) Per Facility Policy Yes     Follow Up and recommended labs and tests    Follow up with Nursing home physician.  No follow up labs or test are needed.  Notify provider gross hematuria  Facility provider to determine when Reyes discontinued.   Recommend voiding trial.     Reason for your hospital stay    Fall with periprosthetic hip fracture, LLE DVT      Reyes catheter    To straight gravity drainage. Change catheter every 2 weeks and PRN for leaking or decreased urine output with signs of bladder distention. DO NOT change catheter without a specific Provider order IF diagnosis of benign prostatic hypertrophy (BPH), neurogenic bladder, or other urological conditions.   Reason Urinary Retention     Activity - Up with nursing assistance     Activity - Up with assistive device    Walker with toe touch weightbearing RLE     No CPR- Do NOT Intubate     Physical Therapy Adult Consult    Evaluate and treat as clinically indicated.    Reason:  Fall, right periprosthetic hip fracture. Needs walker with right toe touch weightbearing  Has new DVT LLE     Occupational Therapy Adult Consult    Evaluate and treat as clinically indicated.    Reason:  Fall right side periprosthetic hip fx. Needs walker with toe touch weightbearing on right side   Has new DVT LLE,     Fall precautions    Pt is a bleeding risk. On Plavix and Xarelto     Diet    Follow this diet upon discharge: Orders Placed This Encounter      Combination Diet Regular Diet Adult, Regular Diet; High Kcal/High Protein Diet, ADULT; 2 gm NA Diet; No Caffeine Diet, Low Saturated Fat Diet       Significant Results and Procedures   Most Recent 3 CBC's:  Recent Labs   Lab Test 08/30/23  0540 08/29/23  1237 05/30/23  1400   WBC 8.9 6.6 9.8   HGB 8.5* 9.2* 10.6*   MCV 90 90 93    257 373     Most Recent 3 BMP's:  Recent Labs   Lab Test 08/30/23  0540 08/29/23  1237 06/22/23  1208 06/12/23  0651    138  --  136   POTASSIUM 3.5 3.4  --  3.4   CHLORIDE 102 104  --  101   CO2 24 25  --  28   BUN 21.9 17.6  --  16.8   CR 0.80 0.68 0.66 0.73   ANIONGAP 11 9  --  7   CHEYANNE 7.4* 7.4*  --  8.0*   GLC 85 98  --  83   ,   Results for orders placed or performed during the hospital encounter of 08/29/23   US Lower Extremity Venous Duplex Left    Narrative    VENOUS ULTRASOUND LEFT LEG  8/29/2023 1:25 PM     HISTORY:  unilateral leg swelling    COMPARISON: None.    FINDINGS:  Examination of the deep veins with graded compression and  color flow Doppler with spectral wave form analysis was performed.   There is occlusive to partially occlusive thrombus in the left iliac  veins extending into the common femoral vein and proximal superficial  femoral vein.      Impression    IMPRESSION: Left lower extremity DVT as above.    CAMPBELL SANTOS MD         SYSTEM ID:  [id number]   XR Pelvis w Hip Right G/E 2 Views    Narrative    XR PELVIS AND HIP RIGHT 2 VIEWS 8/29/2023 12:52 PM     HISTORY: fall and hip pain  COMPARISON: None.       Impression    IMPRESSION: There are postoperative changes from right total hip  arthroplasty. There is an acute, minimally displaced periprosthetic  fracture involving the right greater trochanter with likely  intertrochanteric extension. This could be confirmed with CT with  metal suppression technique.    There is diffuse osseous demineralization, which limits evaluation for  nondisplaced fractures. Vascular calcifications are noted. There is  also partially visualized left total hip are the plasty. There are  degenerative changes in the lower lumbar spine at the sacroiliac  joints.    NOTE: ABNORMAL REPORT    THE DICTATION ABOVE DESCRIBES AN ABNORMAL REPORT FOR WHICH FOLLOW-UP  IS NEEDED.    REANNA WINN MD         SYSTEM ID:  [id number]     *Note: Due to a large number of results and/or encounters for the requested time period, some results have not been displayed. A complete set of results can be found in Results Review.       Discharge Medications   Current Discharge Medication List        START taking these medications    Details   acetaminophen (TYLENOL) 325 MG tablet Take 3 tablets (975 mg) by mouth every 8 hours  Qty: 90 tablet, Refills: 0    Associated Diagnoses: Periprosthetic fracture around internal prosthetic right hip joint, initial encounter (H)      Lidocaine (LIDOCARE) 4 % Patch Place  1 patch onto the skin every 24 hours To prevent lidocaine toxicity, patient should be patch free for 12 hrs daily.Apply patch(s) to RIGHT HIP. To prevent lidocaine toxicity, patient should be patch free for 12 hrs daily. Patches may be cut to smaller size prior to removing release liner.  Reminder: Remove previous patch before applying new patch.  NEVER APPLY HEAT OVER PATCH which increases absorption and may lead to local anesthetic toxicity. Do not apply over area where liposomal bupivacaine was injected for 96 hours post injection.  Qty: 20 patch, Refills: 0    Associated Diagnoses: Periprosthetic fracture around internal prosthetic right hip joint, initial encounter (H)      nitroFURantoin macrocrystal-monohydrate (MACROBID) 100 MG capsule Take 1 capsule (100 mg) by mouth At Bedtime  Qty: 30 capsule, Refills: 0    Associated Diagnoses: Urinary tract infection without hematuria, site unspecified      !! oxyCODONE (ROXICODONE) 5 MG tablet Take 0.5 tablets (2.5 mg) by mouth every 4 hours as needed  Qty: 20 tablet, Refills: 0    Associated Diagnoses: Periprosthetic fracture around internal prosthetic right hip joint, initial encounter (H)      !! oxyCODONE (ROXICODONE) 5 MG tablet Take 1 tablet (5 mg) by mouth every 4 hours as needed for severe pain (IF pain not managed with non-pharmacological and non-opioid interventions)  Qty: 20 tablet, Refills: 0    Associated Diagnoses: Periprosthetic fracture around internal prosthetic right hip joint, initial encounter (H)      polyethylene glycol (MIRALAX) 17 GM/Dose powder Take 17 g by mouth daily  Qty: 510 g, Refills: 0    Associated Diagnoses: Periprosthetic fracture around internal prosthetic right hip joint, initial encounter (H)      !! rivaroxaban ANTICOAGULANT (XARELTO) 15 MG TABS tablet Take 1 tablet (15 mg) by mouth 2 times daily (with meals) for 21 days  Qty: 42 tablet, Refills: 0    Associated Diagnoses: Acute deep vein thrombosis (DVT) of other specified vein  of left lower extremity (H)      !! rivaroxaban ANTICOAGULANT (XARELTO) 20 MG TABS tablet Take 1 tablet (20 mg) by mouth daily (with dinner)  Qty: 30 tablet, Refills: 0    Associated Diagnoses: Acute deep vein thrombosis (DVT) of other specified vein of left lower extremity (H)      senna-docusate (SENOKOT-S/PERICOLACE) 8.6-50 MG tablet Take 1 tablet by mouth 2 times daily  Qty: 60 tablet, Refills: 0    Associated Diagnoses: Periprosthetic fracture around internal prosthetic right hip joint, initial encounter (H)       !! - Potential duplicate medications found. Please discuss with provider.        CONTINUE these medications which have CHANGED    Details   ALPRAZolam (XANAX) 0.25 MG tablet Take 1 tablet (0.25 mg) by mouth 2 times daily as needed for anxiety or agitation  Qty: 30 tablet, Refills: 0    Associated Diagnoses: Anxiety      amLODIPine (NORVASC) 5 MG tablet Take 1 tablet (5 mg) by mouth daily  Qty: 30 tablet, Refills: 0    Associated Diagnoses: Chest pain, unspecified type      atorvastatin (LIPITOR) 40 MG tablet Take 1 tablet (40 mg) by mouth daily  Qty: 30 tablet, Refills: 0    Associated Diagnoses: Hyperlipidemia with target LDL less than 130      calcium carbonate (TUMS) 500 MG chewable tablet Take 1 tablet (500 mg) by mouth 2 times daily as needed for heartburn  Qty: 30 tablet, Refills: 0    Associated Diagnoses: Gastroesophageal reflux disease without esophagitis      cetirizine (ZYRTEC) 10 MG tablet Take 1 tablet (10 mg) by mouth daily  Qty: 30 tablet, Refills: 0    Associated Diagnoses: Seasonal allergic rhinitis, unspecified trigger      citalopram (CELEXA) 10 MG tablet Take 1 tablet (10 mg) by mouth At Bedtime  Qty: 30 tablet, Refills: 0    Associated Diagnoses: Moderate episode of recurrent major depressive disorder (H)      clopidogrel (PLAVIX) 75 MG tablet Take 1 tablet (75 mg) by mouth daily  Qty: 30 tablet, Refills: 0    Associated Diagnoses: History of CVA (cerebrovascular accident)       famotidine (PEPCID) 40 MG tablet Take 1 tablet (40 mg) by mouth daily  Qty: 30 tablet, Refills: 0    Associated Diagnoses: Gastroesophageal reflux disease with esophagitis without hemorrhage      isosorbide mononitrate (IMDUR) 60 MG 24 hr tablet Take 1 tablet (60 mg) by mouth daily  Qty: 30 tablet, Refills: 0    Associated Diagnoses: Unstable angina (H)      Lactobacillus (FLORAJEN ACIDOPHILUS) CAPS Take 1 capsule by mouth daily  Qty: 30 capsule, Refills: 0    Associated Diagnoses: Diarrhea, unspecified type      levETIRAcetam (KEPPRA) 500 MG tablet Take 1 tablet (500 mg) by mouth 2 times daily  Qty: 60 tablet, Refills: 0    Associated Diagnoses: Seizure disorder (H)      levothyroxine (SYNTHROID/LEVOTHROID) 88 MCG tablet Take 1 tablet (88 mcg) by mouth daily at 2 pm  Qty: 30 tablet, Refills: 0    Associated Diagnoses: Hypothyroidism due to acquired atrophy of thyroid      lisinopril (ZESTRIL) 5 MG tablet Take 1 tablet (5 mg) by mouth daily  Qty: 30 tablet, Refills: 0    Associated Diagnoses: S/P TAVR (transcatheter aortic valve replacement); Hypertension goal BP (blood pressure) < 140/90; CONTRERAS (dyspnea on exertion)      loperamide (IMODIUM) 2 MG capsule Take 1 capsule (2 mg) by mouth 4 times daily as needed for diarrhea  Qty: 20 capsule, Refills: 0    Associated Diagnoses: Diarrhea, unspecified type      methotrexate 2.5 MG tablet Take 4 tablets (10 mg) by mouth once a week  Qty: 12 tablet, Refills: 0    Associated Diagnoses: Rheumatoid arthritis, involving unspecified site, unspecified whether rheumatoid factor present (H)      metoprolol succinate ER (TOPROL XL) 50 MG 24 hr tablet Take 1 tablet (50 mg) by mouth 2 times daily  Qty: 60 tablet, Refills: 0    Associated Diagnoses: Hypertension, unspecified type      mirtazapine (REMERON) 30 MG tablet Take 1 tablet (30 mg) by mouth At Bedtime  Qty: 30 tablet, Refills: 0    Associated Diagnoses: Insomnia, unspecified type      nitroGLYcerin (NITROSTAT) 0.4 MG  sublingual tablet Place 1 tablet (0.4 mg) under the tongue every 5 minutes as needed for chest pain (pt may self administer as needed) For chest pain place 1 tablet under the tongue every 5 minutes for 3 doses. If symptoms persist 5 minutes after 1st dose call 911.  Qty: 25 tablet, Refills: 0    Associated Diagnoses: Coronary artery disease involving native coronary artery of native heart without angina pectoris      predniSONE (DELTASONE) 2.5 MG tablet Take 1 tablet (2.5 mg) by mouth daily  Qty: 30 tablet, Refills: 0    Associated Diagnoses: Rheumatoid arthritis, involving unspecified site, unspecified whether rheumatoid factor present (H)      RESTASIS 0.05 % ophthalmic emulsion Place 1 drop Into the left eye 2 times daily  Qty: 1 mL, Refills: 0    Associated Diagnoses: Non-rheumatic mitral valve stenosis; Dry eyes      SLOW  (45 Fe) MG CR tablet Take 1 tablet (142 mg) by mouth daily  Qty: 30 tablet, Refills: 0    Associated Diagnoses: Iron deficiency anemia due to chronic blood loss      triamcinolone (KENALOG) 0.5 % external ointment Apply 1 g topically 2 times daily  Qty: 15 g, Refills: 0    Associated Diagnoses: Eczema, unspecified type           CONTINUE these medications which have NOT CHANGED    Details   Cranberry 500 MG CAPS Take 1 capsule (500 mg) by mouth 2 times daily  Qty: 180 capsule, Refills: 0    Associated Diagnoses: Acute cystitis without hematuria           STOP taking these medications       acetaminophen (TYLENOL ARTHRITIS PAIN) 650 MG CR tablet Comments:   Reason for Stopping:         aspirin 81 MG EC tablet Comments:   Reason for Stopping:         lidocaine (LMX4) 4 % external cream Comments:   Reason for Stopping:         nitroFURantoin macrocrystal (MACRODANTIN) 100 MG capsule Comments:   Reason for Stopping:             Allergies   Allergies   Allergen Reactions    Penicillins Hives    Caffeine Other (See Comments) and Unknown     Triggers your Meniere's disease    Hydrocodone Other  (See Comments) and Unknown     hallucinations    Ibuprofen GI Disturbance and Unknown    Other Environmental Allergy      Metals of unspecific kind    Tramadol Other (See Comments)     Seizure, was taking remeron along with tramadol    Metal [Staples] Rash

## 2023-08-30 NOTE — PROVIDER NOTIFICATION
Nurse notified provider that pt voided 150 mL and scanned at 402 mL in bladder. Unable to use commode. Provider PRN straight cath order did not state parameters to cath so nurse requested new orders that give parameters. Provider added order to straight cath once. Nurse notified day shift nurse of this new order.

## 2023-08-30 NOTE — PROGRESS NOTES
/S-(situation): [facility], val SANCHEZ-(background): R hip fracture    A-(assessment): Hypotensive 100/50. Metoprolol, isosorbide, amlodipine and lisinopril held. Rechecked Bp-106/45. Unable to pee, bladder scanned 344, straight cath done out 350 ml of red-tea colored UO with small clots. Gets up with 1-2 assist withGB and walker. Denies pain but gets scheduled tylenol. Pt going to TCU with breaux cath.     R-(recommendations): Discharge instructions reviewed with SHERRY Chopra. Listed belongings gathered and returned to patient. Yes         Discharge Nursing Criteria:     Care Plan and Patient education resolved: Yes    New Medications- pt has been educated about purpose and side effects: Not Applicable      Intentionally Retained Items (examples: Drains, Wound packing, ureteral stents, breaux, PICC line or IV)  Patient was sent to TCU with Breaux cath left in place.   Follow up appointment made for removal: Yes    MISC  Prescriptions if needed, hard copies sent with patient  Yes  Home medications returned to patient: Yes  Medication Bin checked and emptied on discharge Yes  Patient reports post-discharge pain management plan is effective: Yes

## 2023-08-31 NOTE — LETTER
[facility]   To:             Please give to facility    From:   Simin Bueno  RN  Care Coordinator   [facility]   P: [phone]  [email or web address]   Patient Name:  Gillian Burk YOB: 1927   Admit date: 8/30/2023      *Information Needed:  Please contact me when the patient will discharge (or if they will move to long term care)- include the discharge date, disposition, and main diagnosis   If the patient is discharged with home care services, please provide the name of the agency    Also- Please inform me if a care conference is being held.   Phone, Fax or Email with information                        Thank you

## 2023-08-31 NOTE — TELEPHONE ENCOUNTER
Writer called and spoke with daughter Jennifer. C2C on file. Appt made for next avail in anticipation pt will be out of TCU by then.   Lela BAÑUELOS RN Urology 8/31/2023 1:20 PM

## 2023-08-31 NOTE — PROGRESS NOTES
Clinic Care Coordination Contact  Care Coordination Transition Communication    Referral Source: IP Handoff    Clinical Data: Patient was hospitalized at [facility] from 8/29/2023 to 8/30/2023 with diagnosis of Right hip fracture secondary to fall.     Transition to Facility:              Facility Name: [facility]              Contact name and phone number/fax:     [facility] (Main Phone: [phone] Admissions Phone: [phone] Fax: [phone])    Plan: RN/SW Care Coordinator will await notification from facility staff informing RN/SW Care Coordinator of patient's discharge plans/needs. RN/SW Care Coordinator will review chart and outreach to facility staff every 4 weeks and as needed.     Simin Bueno RN Care Coordination   [facility]  Email: [email or web address]  Phone: [phone]

## 2023-08-31 NOTE — TELEPHONE ENCOUNTER
[facility] Call Center    Phone Message    May a detailed message be left on voicemail: no     Reason for Call: Other: Patient has a referral for urinary retention. Per Protocols writer is needing send a TE to the clinic.      Action Taken: Other: PH Urology    Travel Screening: Not Applicable

## 2023-09-05 PROBLEM — S72.001A HIP FRACTURE, RIGHT, CLOSED, INITIAL ENCOUNTER (H): Status: RESOLVED | Noted: 2023-01-01 | Resolved: 2023-01-01

## 2023-09-05 NOTE — LETTER
9/5/2023        RE: Gillian ALLEN Wm  [address]  [facility] [id number]        [facility] GERIATRIC SERVICES    Facility:   [facility] [[id number]]   Code Status: DNR/DNI      HPI:   Gillian is a 89 year old female who was hospitalized August 29, 2023 through August 30, 2023 secondary to a fall sustaining a mildly displaced periprosthetic fracture involving the right greater trochanter and she was unable to bear weight.  She was getting out of bed and fell onto her left hip and she was brought in by the EMS.  Initially the blood chemistry panel was unremarkable with a hemoglobin of 9.2, and the x-ray of the pelvis and right hip did show an acute minimally displaced paresthetic fracture involving the right greater trochanter.  She also did have swelling to the left lower extremity and ultrasound of the left lower extremity did show an occlusive to partially occlusive thrombus in the left iliac veins extending into the common femoral vein and proximal superficial femoral vein.  Orthopedics was consulted and did not recommend any further intervention and she was left to toe-touch weightbearing.  For the DVT she was initiated on Xarelto 15 mg twice daily for 21 days and then 20 mg thereafter and continue with the Plavix and the aspirin was discontinued.  She also did have a Reyes catheter placed secondary to multiple catheterizations and within a week or so on the TCU we can start doing a voiding trial.  Other issues addressed in the hospital was her history of rheumatoid arthritis she does get methotrexate and prednisone.  Continue with the Synthroid for hypothyroidism continue with management of hypertension and hyperlipidemia.  For the anxiety she is on citalopram as well as as needed Xanax to which she would like to continue with the Xanax as needed she is also on Keppra for seizures and she is also on Macrodantin once daily for UTI prophylaxis.  On August 30 her  sodium was 137, potassium 3.5, BUN 21 and creatinine 0.80.  She is now currently in the transitional care unit to which we did discuss the roles and the goals of the transitional care unit.  Her systolic blood pressures ranging  and she has been afebrile and also on room air with a weight of 103 pounds.  She does have a Reyes catheter.  I did have a chance to talk with her daughter Jennifer today.  The patient lives at [facility] and receiving good care.  Had a chance to go over her medications and she is on Tylenol scheduled 975 mg 3 times daily she does not care for the oxycodone and would like to discontinue so we will be discontinued.  She has not received any alprazolam as needed and denies any heartburn or reflux and no Tums as needed.  Her left calf is nontender.  She does have some minimal edema bilaterally.  Her bowels are regular.  She feels that her appetite is improving.    Past Medical History:  Past Medical History:   Diagnosis Date     Aortic stenosis     s/p TAVR 8/17/16     Chronic migraine      Hyperlipidaemia      Hypertension      Hypothyroidism      Myocardial infarction (H)      Need for SBE (subacute bacterial endocarditis) prophylaxis      Orthostatic hypotension     wears compression stockings     PUD (peptic ulcer disease)      Secondary Sjogren's syndrome (H)      Seizures (H)     after stroke (partial onset)     Seropositive rheumatoid arthritis (H)      Stroke (H) 05/2013    with visual field cut, left occipital lobe     Syncope 2014    due to orthostatic hypotension           Surgical History:  Past Surgical History:   Procedure Laterality Date     ANOMALOUS PULMONARY VENOUS RETURN REPAIR, TOTAL       CATARACT EXTRACTION       CATARACT IOL, RT/LT Bilateral 2000     CV FLUOROSCOPY ONLY N/A 8/6/2019    Procedure: Fluoroscopy Only - valve cine done of aortic valve at 180 degrees Irish to JUNG;  Surgeon: Dave Farfan MD;  Location:  HEART CARDIAC CATH LAB     CV HEART  CATHETERIZATION WITH POSSIBLE INTERVENTION N/A 2/10/2021    Procedure: Heart Catheterization with Possible Intervention;  Surgeon: Fish Padgett MD;  Location:  HEART CARDIAC CATH LAB     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     EYE SURGERY       HEART CATH FEMORAL CANNULIZATION WITH OPEN STANDBY REPAIR AORTIC VALVE N/A 8/3/2016    Procedure: HEART CATH FEMORAL CANNULIZATION WITH OPEN STANDBY REPAIR AORTIC VALVE;  Surgeon: Owen Schultz MD;  Location: [facility] OR     HYSTERECTOMY       HYSTERECTOMY TOTAL ABDOMINAL  1970    ovaries out     MANDIBLE FRACTURE SURGERY       MOUTH SURGERY  2011    jaw surgery due to fracture     OTHER SURGICAL HISTORY      Treva valve implant     TOTAL HIP ARTHROPLASTY       TRANSCATHETER AORTIC VALVE IMPLANT ANESTHESIA N/A 8/3/2016    Procedure: TRANSCATHETER AORTIC VALVE IMPLANT ANESTHESIA;  Surgeon: GENERIC ANESTHESIA PROVIDER;  Location: [facility] OR     ZZC TOTAL HIP ARTHROPLASTY Right 2010     ZZC TOTAL HIP ARTHROPLASTY Left 2014       Family History:   Family History   Problem Relation Age of Onset     Hyperlipidemia Mother      Family History Negative No family hx of      Hypertension Mother      Rheumatoid Arthritis Child        Social History:    Social History     Socioeconomic History     Marital status:      Number of children: 4   Occupational History     Employer: RETIRED   Tobacco Use     Smoking status: Never     Passive exposure: Never     Smokeless tobacco: Never   Vaping Use     Vaping Use: Never used   Substance and Sexual Activity     Alcohol use: No     Alcohol/week: 0.0 standard drinks of alcohol     Drug use: No     Sexual activity: Not Currently   Other Topics Concern     Special Diet Yes     Comment: low salt      Exercise Yes     Comment: semi recument bike 15 mins daily    Social History Narrative    .  Lives in assisted living. Independent. Has help with making bed and changing sheets.    Retired teacher.  Worked at the bank.  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 .     4 children - 1 with RA        REVIEW OF SYSTEM:  She currently denies any new symptoms of fever cough or cold sore throat postnasal drip allergies shortness of breath dyspnea wheezing chest pain dizziness or vertigo nausea vomiting diarrhea bladder spasms new pain rashes headaches or stiff neck.  She does have a history of hypertension, RA, depression, anxiety, seizure, hypothyroidism    PHYSICAL EXAM:   Pleasant female in no acute distress.  Head is normocephalic.  Conjunctiva is pink and sclera is clear.  Neck is supple without adenopathy.  Lung sounds are clear throughout.  Cardiovascular TAVR, regular rate and rhythm and 1+ bilateral lower extremity edema.  Gastrointestinal soft and nontender with positive bowel sounds.  Musculoskeletal recent right hip fracture.  Upper extremities are symmetrical.  Psychiatric: Pleasant affect    Vitals:    09/05/23 0923   BP: 108/56   Pulse: 84   Resp: 16   Temp: 97.9  F (36.6  C)   SpO2: 96%   Weight: 46.8 kg (103 lb 3.2 oz)   Height: 1.702 m (5' 7\")         Medication List:  Current Outpatient Medications   Medication Sig     acetaminophen (TYLENOL) 325 MG tablet Take 3 tablets (975 mg) by mouth every 8 hours     ALPRAZolam (XANAX) 0.25 MG tablet Take 1 tablet (0.25 mg) by mouth 2 times daily as needed for anxiety or agitation     amLODIPine (NORVASC) 5 MG tablet Take 1 tablet (5 mg) by mouth daily     atorvastatin (LIPITOR) 40 MG tablet Take 1 tablet (40 mg) by mouth daily     calcium carbonate (TUMS) 500 MG chewable tablet Take 1 tablet (500 mg) by mouth 2 times daily as needed for heartburn     cetirizine (ZYRTEC) 10 MG tablet Take 1 tablet (10 mg) by mouth daily     citalopram (CELEXA) 10 MG tablet Take 1 tablet (10 mg) by mouth At Bedtime     clopidogrel (PLAVIX) 75 MG tablet Take 1 tablet (75 mg) by mouth daily     Cranberry 500 MG CAPS Take 1 capsule (500 mg) by mouth 2 times daily     famotidine (PEPCID) 40 MG tablet Take 1 tablet (40 " mg) by mouth daily     isosorbide mononitrate (IMDUR) 60 MG 24 hr tablet Take 1 tablet (60 mg) by mouth daily     Lactobacillus (FLORAJEN ACIDOPHILUS) CAPS Take 1 capsule by mouth daily     levETIRAcetam (KEPPRA) 500 MG tablet Take 1 tablet (500 mg) by mouth 2 times daily     levothyroxine (SYNTHROID/LEVOTHROID) 88 MCG tablet Take 1 tablet (88 mcg) by mouth daily at 2 pm     lisinopril (ZESTRIL) 5 MG tablet Take 1 tablet (5 mg) by mouth daily     loperamide (IMODIUM) 2 MG capsule Take 1 capsule (2 mg) by mouth 4 times daily as needed for diarrhea     [START ON 9/6/2023] methotrexate 2.5 MG tablet Take 4 tablets (10 mg) by mouth once a week     metoprolol succinate ER (TOPROL XL) 50 MG 24 hr tablet Take 1 tablet (50 mg) by mouth 2 times daily     mirtazapine (REMERON) 30 MG tablet Take 1 tablet (30 mg) by mouth At Bedtime     nitroFURantoin macrocrystal-monohydrate (MACROBID) 100 MG capsule Take 1 capsule (100 mg) by mouth At Bedtime     nitroGLYcerin (NITROSTAT) 0.4 MG sublingual tablet Place 1 tablet (0.4 mg) under the tongue every 5 minutes as needed for chest pain (pt may self administer as needed) For chest pain place 1 tablet under the tongue every 5 minutes for 3 doses. If symptoms persist 5 minutes after 1st dose call 911.     polyethylene glycol (MIRALAX) 17 GM/Dose powder Take 17 g by mouth daily     predniSONE (DELTASONE) 2.5 MG tablet Take 1 tablet (2.5 mg) by mouth daily     RESTASIS 0.05 % ophthalmic emulsion Place 1 drop Into the left eye 2 times daily     rivaroxaban ANTICOAGULANT (XARELTO) 15 MG TABS tablet Take 1 tablet (15 mg) by mouth 2 times daily (with meals) for 21 days     [START ON 9/19/2023] rivaroxaban ANTICOAGULANT (XARELTO) 20 MG TABS tablet Take 1 tablet (20 mg) by mouth daily (with dinner)     senna-docusate (SENOKOT-S/PERICOLACE) 8.6-50 MG tablet Take 1 tablet by mouth 2 times daily     SLOW  (45 Fe) MG CR tablet Take 1 tablet (142 mg) by mouth daily     triamcinolone (KENALOG)  0.5 % external ointment Apply 1 g topically 2 times daily     No current facility-administered medications for this visit.        MED REC REQUIRED  Post Medication Reconciliation Status: discharge medications reconciled and changed, per note/orders       Labs:  CBC RESULTS:   Recent Labs   Lab Test 08/30/23  0540   WBC 8.9   RBC 3.01*   HGB 8.5*   HCT 27.2*   MCV 90   MCH 28.2   MCHC 31.3*   RDW 19.4*        Last Comprehensive Metabolic Panel:  Lab Results   Component Value Date     08/30/2023    POTASSIUM 3.5 08/30/2023    CHLORIDE 102 08/30/2023    CO2 24 08/30/2023    ANIONGAP 11 08/30/2023    GLC 85 08/30/2023    BUN 21.9 08/30/2023    CR 0.80 08/30/2023    GFRESTIMATED 67 08/30/2023    CHEYANNE 7.4 (L) 08/30/2023       TSH   Date Value Ref Range Status   05/30/2023 5.44 (H) 0.30 - 4.20 uIU/mL Final   09/21/2022 3.10 0.40 - 4.00 mU/L Final   10/29/2020 2.88 0.40 - 4.00 mU/L Final     T4 Free   Date Value Ref Range Status   09/18/2020 1.14 0.76 - 1.46 ng/dL Final     Free T4   Date Value Ref Range Status   05/30/2023 1.19 0.90 - 1.70 ng/dL Final         Assessment:   (S72.001D) Closed fracture of right hip with routine healing, subsequent encounter  (primary encounter diagnosis)  Comment: Nonsurgical  Plan: Periprostatic fracture.  Will participate in therapy.    (I82.412) Acute deep vein thrombosis (DVT) of femoral vein of left lower extremity (H)  Comment: Acute  Plan: Will be on Xarelto 15 mg twice daily until September 18 then 20 mg daily thereafter.  Continue with the Plavix    (I10) Hypertension goal BP (blood pressure) < 140/90  Comment: Stable  Plan: Continue to manage and follow    (M15.9) Generalized osteoarthritis  Comment: Currently on Tylenol  Plan: She also is on prednisone chronically      PLAN:    We did talk about her rehabilitation program as well as the rehabilitation process.  She is aware of good nutrition and balanced diet.  We will try a voiding trial soon.  Pain is well managed on  the Tylenol and does want the oxycodone discontinued we also did talk about muscle relaxers.  Her goal is to go back to [place].  Had a chance to talk with her daughter Jennifer today.  No further questions.    For documentation purposes total visit 45 minutes to which over 50% was spent with the patient going over her hospitalization, her fracture, DVT, medications, laboratory studies, nutrition and the remainder the time was spent talking with her daughter about all the above including the rehabilitation process      Electronically signed by: Fish Kulkarni NP      Sincerely,        Fish Kulkarni NP

## 2023-09-05 NOTE — PROGRESS NOTES
[facility] GERIATRIC SERVICES    Facility:   [facility] ([facility]) [[id number]]   Code Status: DNR/DNI      HPI:   Gillian is a 89 year old female who was hospitalized August 29, 2023 through August 30, 2023 secondary to a fall sustaining a mildly displaced periprosthetic fracture involving the right greater trochanter and she was unable to bear weight.  She was getting out of bed and fell onto her left hip and she was brought in by the EMS.  Initially the blood chemistry panel was unremarkable with a hemoglobin of 9.2, and the x-ray of the pelvis and right hip did show an acute minimally displaced paresthetic fracture involving the right greater trochanter.  She also did have swelling to the left lower extremity and ultrasound of the left lower extremity did show an occlusive to partially occlusive thrombus in the left iliac veins extending into the common femoral vein and proximal superficial femoral vein.  Orthopedics was consulted and did not recommend any further intervention and she was left to toe-touch weightbearing.  For the DVT she was initiated on Xarelto 15 mg twice daily for 21 days and then 20 mg thereafter and continue with the Plavix and the aspirin was discontinued.  She also did have a Reyes catheter placed secondary to multiple catheterizations and within a week or so on the TCU we can start doing a voiding trial.  Other issues addressed in the hospital was her history of rheumatoid arthritis she does get methotrexate and prednisone.  Continue with the Synthroid for hypothyroidism continue with management of hypertension and hyperlipidemia.  For the anxiety she is on citalopram as well as as needed Xanax to which she would like to continue with the Xanax as needed she is also on Keppra for seizures and she is also on Macrodantin once daily for UTI prophylaxis.  On August 30 her sodium was 137, potassium 3.5, BUN 21 and creatinine 0.80.  She is now currently in the transitional care  unit to which we did discuss the roles and the goals of the transitional care unit.  Her systolic blood pressures ranging  and she has been afebrile and also on room air with a weight of 103 pounds.  She does have a Reyes catheter.  I did have a chance to talk with her daughter Jennifer today.  The patient lives at [facility] and receiving good care.  Had a chance to go over her medications and she is on Tylenol scheduled 975 mg 3 times daily she does not care for the oxycodone and would like to discontinue so we will be discontinued.  She has not received any alprazolam as needed and denies any heartburn or reflux and no Tums as needed.  Her left calf is nontender.  She does have some minimal edema bilaterally.  Her bowels are regular.  She feels that her appetite is improving.    Past Medical History:  Past Medical History:   Diagnosis Date    Aortic stenosis     s/p TAVR 8/17/16    Chronic migraine     Hyperlipidaemia     Hypertension     Hypothyroidism     Myocardial infarction (H)     Need for SBE (subacute bacterial endocarditis) prophylaxis     Orthostatic hypotension     wears compression stockings    PUD (peptic ulcer disease)     Secondary Sjogren's syndrome (H)     Seizures (H)     after stroke (partial onset)    Seropositive rheumatoid arthritis (H)     Stroke (H) 05/2013    with visual field cut, left occipital lobe    Syncope 2014    due to orthostatic hypotension           Surgical History:  Past Surgical History:   Procedure Laterality Date    ANOMALOUS PULMONARY VENOUS RETURN REPAIR, TOTAL      CATARACT EXTRACTION      CATARACT IOL, RT/LT Bilateral 2000    CV FLUOROSCOPY ONLY N/A 8/6/2019    Procedure: Fluoroscopy Only - valve cine done of aortic valve at 180 degrees [place] to JUNG;  Surgeon: Dave Farfan MD;  Location:  HEART CARDIAC CATH LAB    CV HEART CATHETERIZATION WITH POSSIBLE INTERVENTION N/A 2/10/2021    Procedure: Heart Catheterization with Possible Intervention;   Surgeon: Fish Padgett MD;  Location:  HEART CARDIAC CATH LAB    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    EYE SURGERY      HEART CATH FEMORAL CANNULIZATION WITH OPEN STANDBY REPAIR AORTIC VALVE N/A 8/3/2016    Procedure: HEART CATH FEMORAL CANNULIZATION WITH OPEN STANDBY REPAIR AORTIC VALVE;  Surgeon: Owen Schultz MD;  Location: [facility] OR    HYSTERECTOMY      HYSTERECTOMY TOTAL ABDOMINAL  1970    ovaries out    MANDIBLE FRACTURE SURGERY      MOUTH SURGERY  2011    jaw surgery due to fracture    OTHER SURGICAL HISTORY      Treva valve implant    TOTAL HIP ARTHROPLASTY      TRANSCATHETER AORTIC VALVE IMPLANT ANESTHESIA N/A 8/3/2016    Procedure: TRANSCATHETER AORTIC VALVE IMPLANT ANESTHESIA;  Surgeon: GENERIC ANESTHESIA PROVIDER;  Location: [facility] OR    ZZC TOTAL HIP ARTHROPLASTY Right 2010    ZZC TOTAL HIP ARTHROPLASTY Left 2014       Family History:   Family History   Problem Relation Age of Onset    Hyperlipidemia Mother     Family History Negative No family hx of     Hypertension Mother     Rheumatoid Arthritis Child        Social History:    Social History     Socioeconomic History    Marital status:     Number of children: 4   Occupational History     Employer: RETIRED   Tobacco Use    Smoking status: Never     Passive exposure: Never    Smokeless tobacco: Never   Vaping Use    Vaping Use: Never used   Substance and Sexual Activity    Alcohol use: No     Alcohol/week: 0.0 standard drinks of alcohol    Drug use: No    Sexual activity: Not Currently   Other Topics Concern    Special Diet Yes     Comment: low salt     Exercise Yes     Comment: semi recument bike 15 mins daily    Social History Narrative    .  Lives in assisted living. Independent. Has help with making bed and changing sheets.    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 .     4 children - 1 with RA        REVIEW OF SYSTEM:  She currently denies any new symptoms of fever cough or cold sore  "throat postnasal drip allergies shortness of breath dyspnea wheezing chest pain dizziness or vertigo nausea vomiting diarrhea bladder spasms new pain rashes headaches or stiff neck.  She does have a history of hypertension, RA, depression, anxiety, seizure, hypothyroidism    PHYSICAL EXAM:   Pleasant female in no acute distress.  Head is normocephalic.  Conjunctiva is pink and sclera is clear.  Neck is supple without adenopathy.  Lung sounds are clear throughout.  Cardiovascular TAVR, regular rate and rhythm and 1+ bilateral lower extremity edema.  Gastrointestinal soft and nontender with positive bowel sounds.  Musculoskeletal recent right hip fracture.  Upper extremities are symmetrical.  Psychiatric: Pleasant affect    Vitals:    09/05/23 0923   BP: 108/56   Pulse: 84   Resp: 16   Temp: 97.9  F (36.6  C)   SpO2: 96%   Weight: 46.8 kg (103 lb 3.2 oz)   Height: 1.702 m (5' 7\")         Medication List:  Current Outpatient Medications   Medication Sig    acetaminophen (TYLENOL) 325 MG tablet Take 3 tablets (975 mg) by mouth every 8 hours    ALPRAZolam (XANAX) 0.25 MG tablet Take 1 tablet (0.25 mg) by mouth 2 times daily as needed for anxiety or agitation    amLODIPine (NORVASC) 5 MG tablet Take 1 tablet (5 mg) by mouth daily    atorvastatin (LIPITOR) 40 MG tablet Take 1 tablet (40 mg) by mouth daily    calcium carbonate (TUMS) 500 MG chewable tablet Take 1 tablet (500 mg) by mouth 2 times daily as needed for heartburn    cetirizine (ZYRTEC) 10 MG tablet Take 1 tablet (10 mg) by mouth daily    citalopram (CELEXA) 10 MG tablet Take 1 tablet (10 mg) by mouth At Bedtime    clopidogrel (PLAVIX) 75 MG tablet Take 1 tablet (75 mg) by mouth daily    Cranberry 500 MG CAPS Take 1 capsule (500 mg) by mouth 2 times daily    famotidine (PEPCID) 40 MG tablet Take 1 tablet (40 mg) by mouth daily    isosorbide mononitrate (IMDUR) 60 MG 24 hr tablet Take 1 tablet (60 mg) by mouth daily    Lactobacillus (FLORAJEN ACIDOPHILUS) CAPS " Take 1 capsule by mouth daily    levETIRAcetam (KEPPRA) 500 MG tablet Take 1 tablet (500 mg) by mouth 2 times daily    levothyroxine (SYNTHROID/LEVOTHROID) 88 MCG tablet Take 1 tablet (88 mcg) by mouth daily at 2 pm    lisinopril (ZESTRIL) 5 MG tablet Take 1 tablet (5 mg) by mouth daily    loperamide (IMODIUM) 2 MG capsule Take 1 capsule (2 mg) by mouth 4 times daily as needed for diarrhea    [START ON 9/6/2023] methotrexate 2.5 MG tablet Take 4 tablets (10 mg) by mouth once a week    metoprolol succinate ER (TOPROL XL) 50 MG 24 hr tablet Take 1 tablet (50 mg) by mouth 2 times daily    mirtazapine (REMERON) 30 MG tablet Take 1 tablet (30 mg) by mouth At Bedtime    nitroFURantoin macrocrystal-monohydrate (MACROBID) 100 MG capsule Take 1 capsule (100 mg) by mouth At Bedtime    nitroGLYcerin (NITROSTAT) 0.4 MG sublingual tablet Place 1 tablet (0.4 mg) under the tongue every 5 minutes as needed for chest pain (pt may self administer as needed) For chest pain place 1 tablet under the tongue every 5 minutes for 3 doses. If symptoms persist 5 minutes after 1st dose call 911.    polyethylene glycol (MIRALAX) 17 GM/Dose powder Take 17 g by mouth daily    predniSONE (DELTASONE) 2.5 MG tablet Take 1 tablet (2.5 mg) by mouth daily    RESTASIS 0.05 % ophthalmic emulsion Place 1 drop Into the left eye 2 times daily    rivaroxaban ANTICOAGULANT (XARELTO) 15 MG TABS tablet Take 1 tablet (15 mg) by mouth 2 times daily (with meals) for 21 days    [START ON 9/19/2023] rivaroxaban ANTICOAGULANT (XARELTO) 20 MG TABS tablet Take 1 tablet (20 mg) by mouth daily (with dinner)    senna-docusate (SENOKOT-S/PERICOLACE) 8.6-50 MG tablet Take 1 tablet by mouth 2 times daily    SLOW  (45 Fe) MG CR tablet Take 1 tablet (142 mg) by mouth daily    triamcinolone (KENALOG) 0.5 % external ointment Apply 1 g topically 2 times daily     No current facility-administered medications for this visit.        MED REC REQUIRED  Post Medication  Reconciliation Status: discharge medications reconciled and changed, per note/orders       Labs:  CBC RESULTS:   Recent Labs   Lab Test 08/30/23  0540   WBC 8.9   RBC 3.01*   HGB 8.5*   HCT 27.2*   MCV 90   MCH 28.2   MCHC 31.3*   RDW 19.4*        Last Comprehensive Metabolic Panel:  Lab Results   Component Value Date     08/30/2023    POTASSIUM 3.5 08/30/2023    CHLORIDE 102 08/30/2023    CO2 24 08/30/2023    ANIONGAP 11 08/30/2023    GLC 85 08/30/2023    BUN 21.9 08/30/2023    CR 0.80 08/30/2023    GFRESTIMATED 67 08/30/2023    CHEYANNE 7.4 (L) 08/30/2023       TSH   Date Value Ref Range Status   05/30/2023 5.44 (H) 0.30 - 4.20 uIU/mL Final   09/21/2022 3.10 0.40 - 4.00 mU/L Final   10/29/2020 2.88 0.40 - 4.00 mU/L Final     T4 Free   Date Value Ref Range Status   09/18/2020 1.14 0.76 - 1.46 ng/dL Final     Free T4   Date Value Ref Range Status   05/30/2023 1.19 0.90 - 1.70 ng/dL Final         Assessment:   (S72.001D) Closed fracture of right hip with routine healing, subsequent encounter  (primary encounter diagnosis)  Comment: Nonsurgical  Plan: Periprostatic fracture.  Will participate in therapy.    (I82.412) Acute deep vein thrombosis (DVT) of femoral vein of left lower extremity (H)  Comment: Acute  Plan: Will be on Xarelto 15 mg twice daily until September 18 then 20 mg daily thereafter.  Continue with the Plavix    (I10) Hypertension goal BP (blood pressure) < 140/90  Comment: Stable  Plan: Continue to manage and follow    (M15.9) Generalized osteoarthritis  Comment: Currently on Tylenol  Plan: She also is on prednisone chronically      PLAN:    We did talk about her rehabilitation program as well as the rehabilitation process.  She is aware of good nutrition and balanced diet.  We will try a voiding trial soon.  Pain is well managed on the Tylenol and does want the oxycodone discontinued we also did talk about muscle relaxers.  Her goal is to go back to [place].  Had a chance to talk with  her daughter Jennifer today.  No further questions.    For documentation purposes total visit 45 minutes to which over 50% was spent with the patient going over her hospitalization, her fracture, DVT, medications, laboratory studies, nutrition and the remainder the time was spent talking with her daughter about all the above including the rehabilitation process      Electronically signed by: Fish Kulkarni NP

## 2023-09-07 PROBLEM — S72.001D CLOSED FRACTURE OF RIGHT HIP WITH ROUTINE HEALING, SUBSEQUENT ENCOUNTER: Status: ACTIVE | Noted: 2023-01-01

## 2023-09-07 NOTE — PROGRESS NOTES
[facility] GERIATRIC SERVICES    Facility:   [facility] ([facility]) [[id number]]   Code Status: DNR/DNI      CHIEF COMPLAINT/REASON FOR VISIT:  Chief Complaint   Patient presents with    RECHECK       HISTORY:      HPI: Gillian is a 89 year old female who I had the pleasure to revisit with once again today not only secondary to her hospitalization August 29, 2023 through August 30, 2023 secondary to a fall sustaining a mildly displaced periprosthetic fracture involving the right greater trochanter which is nonsurgical as well as for the DVT was placed on Xarelto and continue with the Plavix and aspirin as well as today's evaluation of her blood pressures, pain management, nutrition and therapy.  Her systolic blood pressures ranging  her weight 104 pounds in comparison to August 31 at 106 pounds.  According to nursing notes she is at a high risk fall 2018 and currently toe-touch weightbearing status and currently working with therapy department.  She does have a Reyes catheter which was placed in the hospital secondary to frequent straight cath.  We will do a voiding trial.  She has not received any alprazolam as needed for anxiety and she does not care for the oxycodone so that we discontinued earlier in the week and her pain is managed with Tylenol 975 mg 3 times daily along with a lidocaine patch.  She is on a 2 g sodium diet along with house nutrient supplement twice daily.    Past Medical History:   Diagnosis Date    Aortic stenosis     s/p TAVR 8/17/16    Chronic migraine     Hyperlipidaemia     Hypertension     Hypothyroidism     Myocardial infarction (H)     Need for SBE (subacute bacterial endocarditis) prophylaxis     Orthostatic hypotension     wears compression stockings    PUD (peptic ulcer disease)     Secondary Sjogren's syndrome (H)     Seizures (H)     after stroke (partial onset)    Seropositive rheumatoid arthritis (H)     Stroke (H) 05/2013    with visual field cut, left  occipital lobe    Syncope 2014    due to orthostatic hypotension            Family History   Problem Relation Age of Onset    Hyperlipidemia Mother     Family History Negative No family hx of     Hypertension Mother     Rheumatoid Arthritis Child       Social History     Socioeconomic History    Marital status:     Number of children: 4   Occupational History     Employer: RETIRED   Tobacco Use    Smoking status: Never     Passive exposure: Never    Smokeless tobacco: Never   Vaping Use    Vaping Use: Never used   Substance and Sexual Activity    Alcohol use: No     Alcohol/week: 0.0 standard drinks of alcohol    Drug use: No    Sexual activity: Not Currently   Other Topics Concern    Special Diet Yes     Comment: low salt     Exercise Yes     Comment: semi recument bike 15 mins daily    Social History Narrative    .  Lives in assisted living. Independent. Has help with making bed and changing sheets.    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 .     4 children - 1 with RA      No new changes to the review of systems or physical exam since her last visit on September 5  REVIEW OF SYSTEM:    She currently denies any new symptoms of fever cough or cold sore throat postnasal drip allergies shortness of breath dyspnea wheezing chest pain dizziness or vertigo nausea vomiting diarrhea bladder spasms new pain rashes headaches or stiff neck. She does have a history of hypertension, RA, depression, anxiety, seizure, hypothyroidism   PHYSICAL EXAM:   Pleasant female in no acute distress. Head is normocephalic. Neck is supple without adenopathy. Lung sounds are clear throughout. Cardiovascular TAVR, regular rate and rhythm and 1+ bilateral lower extremity edema. Gastrointestinal soft and nontender with positive bowel sounds. Musculoskeletal recent right hip fracture. Upper extremities are symmetrical. Psychiatric: Pleasant affect        Current Outpatient Medications:     acetaminophen  (TYLENOL) 325 MG tablet, Take 3 tablets (975 mg) by mouth every 8 hours, Disp: 90 tablet, Rfl: 0    ALPRAZolam (XANAX) 0.25 MG tablet, Take 1 tablet (0.25 mg) by mouth 2 times daily as needed for anxiety or agitation, Disp: 30 tablet, Rfl: 0    amLODIPine (NORVASC) 5 MG tablet, Take 1 tablet (5 mg) by mouth daily, Disp: 30 tablet, Rfl: 0    atorvastatin (LIPITOR) 40 MG tablet, Take 1 tablet (40 mg) by mouth daily, Disp: 30 tablet, Rfl: 0    calcium carbonate (TUMS) 500 MG chewable tablet, Take 1 tablet (500 mg) by mouth 2 times daily as needed for heartburn, Disp: 30 tablet, Rfl: 0    cetirizine (ZYRTEC) 10 MG tablet, Take 1 tablet (10 mg) by mouth daily, Disp: 30 tablet, Rfl: 0    citalopram (CELEXA) 10 MG tablet, Take 1 tablet (10 mg) by mouth At Bedtime, Disp: 30 tablet, Rfl: 0    clopidogrel (PLAVIX) 75 MG tablet, Take 1 tablet (75 mg) by mouth daily, Disp: 30 tablet, Rfl: 0    Cranberry 500 MG CAPS, Take 1 capsule (500 mg) by mouth 2 times daily, Disp: 180 capsule, Rfl: 0    famotidine (PEPCID) 40 MG tablet, Take 1 tablet (40 mg) by mouth daily, Disp: 30 tablet, Rfl: 0    isosorbide mononitrate (IMDUR) 60 MG 24 hr tablet, Take 1 tablet (60 mg) by mouth daily, Disp: 30 tablet, Rfl: 0    Lactobacillus (FLORAJEN ACIDOPHILUS) CAPS, Take 1 capsule by mouth daily, Disp: 30 capsule, Rfl: 0    levETIRAcetam (KEPPRA) 500 MG tablet, Take 1 tablet (500 mg) by mouth 2 times daily, Disp: 60 tablet, Rfl: 0    levothyroxine (SYNTHROID/LEVOTHROID) 88 MCG tablet, Take 1 tablet (88 mcg) by mouth daily at 2 pm, Disp: 30 tablet, Rfl: 0    lisinopril (ZESTRIL) 5 MG tablet, Take 1 tablet (5 mg) by mouth daily, Disp: 30 tablet, Rfl: 0    loperamide (IMODIUM) 2 MG capsule, Take 1 capsule (2 mg) by mouth 4 times daily as needed for diarrhea, Disp: 20 capsule, Rfl: 0    methotrexate 2.5 MG tablet, Take 4 tablets (10 mg) by mouth once a week, Disp: 12 tablet, Rfl: 0    metoprolol succinate ER (TOPROL XL) 50 MG 24 hr tablet, Take 1 tablet  "(50 mg) by mouth 2 times daily, Disp: 60 tablet, Rfl: 0    mirtazapine (REMERON) 30 MG tablet, Take 1 tablet (30 mg) by mouth At Bedtime, Disp: 30 tablet, Rfl: 0    nitroFURantoin macrocrystal-monohydrate (MACROBID) 100 MG capsule, Take 1 capsule (100 mg) by mouth At Bedtime, Disp: 30 capsule, Rfl: 0    nitroGLYcerin (NITROSTAT) 0.4 MG sublingual tablet, Place 1 tablet (0.4 mg) under the tongue every 5 minutes as needed for chest pain (pt may self administer as needed) For chest pain place 1 tablet under the tongue every 5 minutes for 3 doses. If symptoms persist 5 minutes after 1st dose call 911., Disp: 25 tablet, Rfl: 0    polyethylene glycol (MIRALAX) 17 GM/Dose powder, Take 17 g by mouth daily, Disp: 510 g, Rfl: 0    predniSONE (DELTASONE) 2.5 MG tablet, Take 1 tablet (2.5 mg) by mouth daily, Disp: 30 tablet, Rfl: 0    RESTASIS 0.05 % ophthalmic emulsion, Place 1 drop Into the left eye 2 times daily, Disp: 1 mL, Rfl: 0    rivaroxaban ANTICOAGULANT (XARELTO) 15 MG TABS tablet, Take 1 tablet (15 mg) by mouth 2 times daily (with meals) for 21 days, Disp: 42 tablet, Rfl: 0    [START ON 9/19/2023] rivaroxaban ANTICOAGULANT (XARELTO) 20 MG TABS tablet, Take 1 tablet (20 mg) by mouth daily (with dinner), Disp: 30 tablet, Rfl: 0    senna-docusate (SENOKOT-S/PERICOLACE) 8.6-50 MG tablet, Take 1 tablet by mouth 2 times daily, Disp: 60 tablet, Rfl: 0    SLOW  (45 Fe) MG CR tablet, Take 1 tablet (142 mg) by mouth daily, Disp: 30 tablet, Rfl: 0    triamcinolone (KENALOG) 0.5 % external ointment, Apply 1 g topically 2 times daily, Disp: 15 g, Rfl: 0    BP 94/47   Pulse 78   Temp 97.9  F (36.6  C)   Resp 16   Ht 1.702 m (5' 7\")   Wt 47.4 kg (104 lb 9.6 oz)   SpO2 97%   BMI 16.38 kg/m      LABS:   Last Comprehensive Metabolic Panel:  Lab Results   Component Value Date     08/30/2023    POTASSIUM 3.5 08/30/2023    CHLORIDE 102 08/30/2023    CO2 24 08/30/2023    ANIONGAP 11 08/30/2023    GLC 85 08/30/2023    " BUN 21.9 08/30/2023    CR 0.80 08/30/2023    GFRESTIMATED 67 08/30/2023    CHEYANNE 7.4 (L) 08/30/2023       CBC RESULTS:   Recent Labs   Lab Test 08/30/23  0540   WBC 8.9   RBC 3.01*   HGB 8.5*   HCT 27.2*   MCV 90   MCH 28.2   MCHC 31.3*   RDW 19.4*              ASSESSMENT:    Encounter Diagnoses   Name Primary?    Closed fracture of right hip with routine healing, subsequent encounter Yes    Hypertension goal BP (blood pressure) < 140/90     Generalized osteoarthritis     Abnormal gait        PLAN:    Rechecking her hemoglobin again on September 13.  Continue to encourage good nutrition along with therapy process.  Attempt Reyes catheter removal on Monday the 11th.  Discussed pushing plenty of clear liquids.  We also talked about some current events as well as the weather.        Electronically signed by: Fish Kulkarni NP

## 2023-09-07 NOTE — LETTER
9/7/2023        RE: Gillian ALLEN Wm  [address]  [facility] [address]        [facility]    Facility:   [facility] ([facility]) [[id number]]   Code Status: DNR/DNI      CHIEF COMPLAINT/REASON FOR VISIT:  Chief Complaint   Patient presents with     RECHECK       HISTORY:      HPI: Gillian is a 89 year old female who I had the pleasure to revisit with once again today not only secondary to her hospitalization August 29, 2023 through August 30, 2023 secondary to a fall sustaining a mildly displaced periprosthetic fracture involving the right greater trochanter which is nonsurgical as well as for the DVT was placed on Xarelto and continue with the Plavix and aspirin as well as today's evaluation of her blood pressures, pain management, nutrition and therapy.  Her systolic blood pressures ranging  her weight 104 pounds in comparison to August 31 at 106 pounds.  According to nursing notes she is at a high risk fall 2018 and currently toe-touch weightbearing status and currently working with therapy department.  She does have a Reyes catheter which was placed in the hospital secondary to frequent straight cath.  We will do a voiding trial.  She has not received any alprazolam as needed for anxiety and she does not care for the oxycodone so that we discontinued earlier in the week and her pain is managed with Tylenol 975 mg 3 times daily along with a lidocaine patch.  She is on a 2 g sodium diet along with house nutrient supplement twice daily.    Past Medical History:   Diagnosis Date     Aortic stenosis     s/p TAVR 8/17/16     Chronic migraine      Hyperlipidaemia      Hypertension      Hypothyroidism      Myocardial infarction (H)      Need for SBE (subacute bacterial endocarditis) prophylaxis      Orthostatic hypotension     wears compression stockings     PUD (peptic ulcer disease)      Secondary Sjogren's syndrome (H)      Seizures (H)     after stroke  (partial onset)     Seropositive rheumatoid arthritis (H)      Stroke (H) 05/2013    with visual field cut, left occipital lobe     Syncope 2014    due to orthostatic hypotension            Family History   Problem Relation Age of Onset     Hyperlipidemia Mother      Family History Negative No family hx of      Hypertension Mother      Rheumatoid Arthritis Child       Social History     Socioeconomic History     Marital status:      Number of children: 4   Occupational History     Employer: RETIRED   Tobacco Use     Smoking status: Never     Passive exposure: Never     Smokeless tobacco: Never   Vaping Use     Vaping Use: Never used   Substance and Sexual Activity     Alcohol use: No     Alcohol/week: 0.0 standard drinks of alcohol     Drug use: No     Sexual activity: Not Currently   Other Topics Concern     Special Diet Yes     Comment: low salt      Exercise Yes     Comment: semi recument bike 15 mins daily    Social History Narrative    .  Lives in assisted living. Independent. Has help with making bed and changing sheets.    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 .     4 children - 1 with RA      No new changes to the review of systems or physical exam since her last visit on September 5  REVIEW OF SYSTEM:    She currently denies any new symptoms of fever cough or cold sore throat postnasal drip allergies shortness of breath dyspnea wheezing chest pain dizziness or vertigo nausea vomiting diarrhea bladder spasms new pain rashes headaches or stiff neck. She does have a history of hypertension, RA, depression, anxiety, seizure, hypothyroidism   PHYSICAL EXAM:   Pleasant female in no acute distress. Head is normocephalic. Neck is supple without adenopathy. Lung sounds are clear throughout. Cardiovascular TAVR, regular rate and rhythm and 1+ bilateral lower extremity edema. Gastrointestinal soft and nontender with positive bowel sounds. Musculoskeletal recent right hip  fracture. Upper extremities are symmetrical. Psychiatric: Pleasant affect        Current Outpatient Medications:      acetaminophen (TYLENOL) 325 MG tablet, Take 3 tablets (975 mg) by mouth every 8 hours, Disp: 90 tablet, Rfl: 0     ALPRAZolam (XANAX) 0.25 MG tablet, Take 1 tablet (0.25 mg) by mouth 2 times daily as needed for anxiety or agitation, Disp: 30 tablet, Rfl: 0     amLODIPine (NORVASC) 5 MG tablet, Take 1 tablet (5 mg) by mouth daily, Disp: 30 tablet, Rfl: 0     atorvastatin (LIPITOR) 40 MG tablet, Take 1 tablet (40 mg) by mouth daily, Disp: 30 tablet, Rfl: 0     calcium carbonate (TUMS) 500 MG chewable tablet, Take 1 tablet (500 mg) by mouth 2 times daily as needed for heartburn, Disp: 30 tablet, Rfl: 0     cetirizine (ZYRTEC) 10 MG tablet, Take 1 tablet (10 mg) by mouth daily, Disp: 30 tablet, Rfl: 0     citalopram (CELEXA) 10 MG tablet, Take 1 tablet (10 mg) by mouth At Bedtime, Disp: 30 tablet, Rfl: 0     clopidogrel (PLAVIX) 75 MG tablet, Take 1 tablet (75 mg) by mouth daily, Disp: 30 tablet, Rfl: 0     Cranberry 500 MG CAPS, Take 1 capsule (500 mg) by mouth 2 times daily, Disp: 180 capsule, Rfl: 0     famotidine (PEPCID) 40 MG tablet, Take 1 tablet (40 mg) by mouth daily, Disp: 30 tablet, Rfl: 0     isosorbide mononitrate (IMDUR) 60 MG 24 hr tablet, Take 1 tablet (60 mg) by mouth daily, Disp: 30 tablet, Rfl: 0     Lactobacillus (FLORAJEN ACIDOPHILUS) CAPS, Take 1 capsule by mouth daily, Disp: 30 capsule, Rfl: 0     levETIRAcetam (KEPPRA) 500 MG tablet, Take 1 tablet (500 mg) by mouth 2 times daily, Disp: 60 tablet, Rfl: 0     levothyroxine (SYNTHROID/LEVOTHROID) 88 MCG tablet, Take 1 tablet (88 mcg) by mouth daily at 2 pm, Disp: 30 tablet, Rfl: 0     lisinopril (ZESTRIL) 5 MG tablet, Take 1 tablet (5 mg) by mouth daily, Disp: 30 tablet, Rfl: 0     loperamide (IMODIUM) 2 MG capsule, Take 1 capsule (2 mg) by mouth 4 times daily as needed for diarrhea, Disp: 20 capsule, Rfl: 0     methotrexate 2.5 MG  "tablet, Take 4 tablets (10 mg) by mouth once a week, Disp: 12 tablet, Rfl: 0     metoprolol succinate ER (TOPROL XL) 50 MG 24 hr tablet, Take 1 tablet (50 mg) by mouth 2 times daily, Disp: 60 tablet, Rfl: 0     mirtazapine (REMERON) 30 MG tablet, Take 1 tablet (30 mg) by mouth At Bedtime, Disp: 30 tablet, Rfl: 0     nitroFURantoin macrocrystal-monohydrate (MACROBID) 100 MG capsule, Take 1 capsule (100 mg) by mouth At Bedtime, Disp: 30 capsule, Rfl: 0     nitroGLYcerin (NITROSTAT) 0.4 MG sublingual tablet, Place 1 tablet (0.4 mg) under the tongue every 5 minutes as needed for chest pain (pt may self administer as needed) For chest pain place 1 tablet under the tongue every 5 minutes for 3 doses. If symptoms persist 5 minutes after 1st dose call 911., Disp: 25 tablet, Rfl: 0     polyethylene glycol (MIRALAX) 17 GM/Dose powder, Take 17 g by mouth daily, Disp: 510 g, Rfl: 0     predniSONE (DELTASONE) 2.5 MG tablet, Take 1 tablet (2.5 mg) by mouth daily, Disp: 30 tablet, Rfl: 0     RESTASIS 0.05 % ophthalmic emulsion, Place 1 drop Into the left eye 2 times daily, Disp: 1 mL, Rfl: 0     rivaroxaban ANTICOAGULANT (XARELTO) 15 MG TABS tablet, Take 1 tablet (15 mg) by mouth 2 times daily (with meals) for 21 days, Disp: 42 tablet, Rfl: 0     [START ON 9/19/2023] rivaroxaban ANTICOAGULANT (XARELTO) 20 MG TABS tablet, Take 1 tablet (20 mg) by mouth daily (with dinner), Disp: 30 tablet, Rfl: 0     senna-docusate (SENOKOT-S/PERICOLACE) 8.6-50 MG tablet, Take 1 tablet by mouth 2 times daily, Disp: 60 tablet, Rfl: 0     SLOW  (45 Fe) MG CR tablet, Take 1 tablet (142 mg) by mouth daily, Disp: 30 tablet, Rfl: 0     triamcinolone (KENALOG) 0.5 % external ointment, Apply 1 g topically 2 times daily, Disp: 15 g, Rfl: 0    BP 94/47   Pulse 78   Temp 97.9  F (36.6  C)   Resp 16   Ht 1.702 m (5' 7\")   Wt 47.4 kg (104 lb 9.6 oz)   SpO2 97%   BMI 16.38 kg/m      LABS:   Last Comprehensive Metabolic Panel:  Lab Results "   Component Value Date     08/30/2023    POTASSIUM 3.5 08/30/2023    CHLORIDE 102 08/30/2023    CO2 24 08/30/2023    ANIONGAP 11 08/30/2023    GLC 85 08/30/2023    BUN 21.9 08/30/2023    CR 0.80 08/30/2023    GFRESTIMATED 67 08/30/2023    CHEYANNE 7.4 (L) 08/30/2023       CBC RESULTS:   Recent Labs   Lab Test 08/30/23  0540   WBC 8.9   RBC 3.01*   HGB 8.5*   HCT 27.2*   MCV 90   MCH 28.2   MCHC 31.3*   RDW 19.4*              ASSESSMENT:    Encounter Diagnoses   Name Primary?     Closed fracture of right hip with routine healing, subsequent encounter Yes     Hypertension goal BP (blood pressure) < 140/90      Generalized osteoarthritis      Abnormal gait        PLAN:    Rechecking her hemoglobin again on September 13.  Continue to encourage good nutrition along with therapy process.  Attempt Reyes catheter removal on Monday the 11th.  Discussed pushing plenty of clear liquids.  We also talked about some current events as well as the weather.        Electronically signed by: Fish Kulkarni NP      Sincerely,        Fish Kulkarni NP

## 2023-09-12 NOTE — LETTER
9/12/2023        RE: Gillian ALLEN Wm  [address]  [facility] [address]        [facility]    Facility:   [facility] ([facility]) [[id number]]   Code Status: DNR/DNI      CHIEF COMPLAINT/REASON FOR VISIT:  Chief Complaint   Patient presents with     RECHECK       HISTORY:      HPI: Gillian is a 89 year old female who does remain in the transitional care unit room 103 and who I had the opportunity to revisit with once again today not only secondary to her hospitalization August 29 through August 30, 2023 secondary to a fall sustaining mildly displaced periprosthetic fracture involving the right greater trochanter which is nonsurgical as well as today's discussion of her other chronic medical conditions.  Came to the facility with a Reyes catheter that was removed yesterday and she has been voiding.  According to nursing notes she has been afebrile no shortness of breath has been on room air tolerating food and fluids denies any discomfort extensive assist of 2 for cares.  Her systolic blood pressures over the past week ranging  with her last weight on September 5 at 104 pounds and on September 1 she weighed 103 pounds.  For pain she is on scheduled Tylenol 975 mg 3 times daily and for her RA is on 10 mg of methotrexate weekly.  For anticoagulation she is currently on Xarelto 15 mg twice daily thousand and 18 on September 19 starting at 20 mg daily.  She is on 2 g sodium diet and getting extra nutrient supplements.  For her history of anxiety she is on mirtazapine 30 mg in the evening time along with citalopram 10 mg in the evening time and also does have alprazolam as needed to which she has not taking alprazolam.  No Tums as needed no heartburn or reflux currently on Pepcid 40 mg in the morning.    Past Medical History:   Diagnosis Date     Aortic stenosis     s/p TAVR 8/17/16     Chronic migraine      Hyperlipidaemia      Hypertension      Hypothyroidism       Myocardial infarction (H)      Need for SBE (subacute bacterial endocarditis) prophylaxis      Orthostatic hypotension     wears compression stockings     PUD (peptic ulcer disease)      Secondary Sjogren's syndrome (H)      Seizures (H)     after stroke (partial onset)     Seropositive rheumatoid arthritis (H)      Stroke (H) 05/2013    with visual field cut, left occipital lobe     Syncope 2014    due to orthostatic hypotension            Family History   Problem Relation Age of Onset     Hyperlipidemia Mother      Family History Negative No family hx of      Hypertension Mother      Rheumatoid Arthritis Child       Social History     Socioeconomic History     Marital status:      Number of children: 4   Occupational History     Employer: RETIRED   Tobacco Use     Smoking status: Never     Passive exposure: Never     Smokeless tobacco: Never   Vaping Use     Vaping Use: Never used   Substance and Sexual Activity     Alcohol use: No     Alcohol/week: 0.0 standard drinks of alcohol     Drug use: No     Sexual activity: Not Currently   Other Topics Concern     Special Diet Yes     Comment: low salt      Exercise Yes     Comment: semi recument bike 15 mins daily    Social History Narrative    .  Lives in assisted living. Independent. Has help with making bed and changing sheets.    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 .     4 children - 1 with RA      No changes to the review of systems or the physical exam since her last visit on September 7  REVIEW OF SYSTEM:  She currently denies any new symptoms of fever cough or cold sore throat postnasal drip allergies shortness of breath dyspnea wheezing chest pain dizziness or vertigo nausea vomiting diarrhea bladder spasms new pain rashes headaches or stiff neck. She does have a history of hypertension, RA, depression, anxiety, seizure, hypothyroidism      PHYSICAL EXAM:   Pleasant female in no acute distress. Head is  normocephalic. Neck is supple without adenopathy. Lung sounds are clear throughout. Cardiovascular TAVR, regular rate and rhythm and 1+ bilateral lower extremity edema. Gastrointestinal soft and nontender with positive bowel sounds. Musculoskeletal recent right hip fracture. Upper extremities are symmetrical. Psychiatric: Pleasant affect      Current Outpatient Medications:      acetaminophen (TYLENOL) 325 MG tablet, Take 3 tablets (975 mg) by mouth every 8 hours, Disp: 90 tablet, Rfl: 0     ALPRAZolam (XANAX) 0.25 MG tablet, Take 1 tablet (0.25 mg) by mouth 2 times daily as needed for anxiety or agitation, Disp: 30 tablet, Rfl: 0     amLODIPine (NORVASC) 5 MG tablet, Take 1 tablet (5 mg) by mouth daily, Disp: 30 tablet, Rfl: 0     atorvastatin (LIPITOR) 40 MG tablet, Take 1 tablet (40 mg) by mouth daily, Disp: 30 tablet, Rfl: 0     calcium carbonate (TUMS) 500 MG chewable tablet, Take 1 tablet (500 mg) by mouth 2 times daily as needed for heartburn, Disp: 30 tablet, Rfl: 0     cetirizine (ZYRTEC) 10 MG tablet, Take 1 tablet (10 mg) by mouth daily, Disp: 30 tablet, Rfl: 0     citalopram (CELEXA) 10 MG tablet, Take 1 tablet (10 mg) by mouth At Bedtime, Disp: 30 tablet, Rfl: 0     clopidogrel (PLAVIX) 75 MG tablet, Take 1 tablet (75 mg) by mouth daily, Disp: 30 tablet, Rfl: 0     Cranberry 500 MG CAPS, Take 1 capsule (500 mg) by mouth 2 times daily, Disp: 180 capsule, Rfl: 0     famotidine (PEPCID) 40 MG tablet, Take 1 tablet (40 mg) by mouth daily, Disp: 30 tablet, Rfl: 0     isosorbide mononitrate (IMDUR) 60 MG 24 hr tablet, Take 1 tablet (60 mg) by mouth daily, Disp: 30 tablet, Rfl: 0     Lactobacillus (FLORAJEN ACIDOPHILUS) CAPS, Take 1 capsule by mouth daily, Disp: 30 capsule, Rfl: 0     levETIRAcetam (KEPPRA) 500 MG tablet, Take 1 tablet (500 mg) by mouth 2 times daily, Disp: 60 tablet, Rfl: 0     levothyroxine (SYNTHROID/LEVOTHROID) 88 MCG tablet, Take 1 tablet (88 mcg) by mouth daily at 2 pm, Disp: 30 tablet,  Rfl: 0     lisinopril (ZESTRIL) 5 MG tablet, Take 1 tablet (5 mg) by mouth daily, Disp: 30 tablet, Rfl: 0     loperamide (IMODIUM) 2 MG capsule, Take 1 capsule (2 mg) by mouth 4 times daily as needed for diarrhea, Disp: 20 capsule, Rfl: 0     methotrexate 2.5 MG tablet, Take 4 tablets (10 mg) by mouth once a week, Disp: 12 tablet, Rfl: 0     metoprolol succinate ER (TOPROL XL) 50 MG 24 hr tablet, Take 1 tablet (50 mg) by mouth 2 times daily, Disp: 60 tablet, Rfl: 0     mirtazapine (REMERON) 30 MG tablet, Take 1 tablet (30 mg) by mouth At Bedtime, Disp: 30 tablet, Rfl: 0     nitroFURantoin macrocrystal-monohydrate (MACROBID) 100 MG capsule, Take 1 capsule (100 mg) by mouth At Bedtime, Disp: 30 capsule, Rfl: 0     nitroGLYcerin (NITROSTAT) 0.4 MG sublingual tablet, Place 1 tablet (0.4 mg) under the tongue every 5 minutes as needed for chest pain (pt may self administer as needed) For chest pain place 1 tablet under the tongue every 5 minutes for 3 doses. If symptoms persist 5 minutes after 1st dose call 911., Disp: 25 tablet, Rfl: 0     polyethylene glycol (MIRALAX) 17 GM/Dose powder, Take 17 g by mouth daily, Disp: 510 g, Rfl: 0     predniSONE (DELTASONE) 2.5 MG tablet, Take 1 tablet (2.5 mg) by mouth daily, Disp: 30 tablet, Rfl: 0     RESTASIS 0.05 % ophthalmic emulsion, Place 1 drop Into the left eye 2 times daily, Disp: 1 mL, Rfl: 0     rivaroxaban ANTICOAGULANT (XARELTO) 15 MG TABS tablet, Take 1 tablet (15 mg) by mouth 2 times daily (with meals) for 21 days, Disp: 42 tablet, Rfl: 0     [START ON 9/19/2023] rivaroxaban ANTICOAGULANT (XARELTO) 20 MG TABS tablet, Take 1 tablet (20 mg) by mouth daily (with dinner), Disp: 30 tablet, Rfl: 0     senna-docusate (SENOKOT-S/PERICOLACE) 8.6-50 MG tablet, Take 1 tablet by mouth 2 times daily, Disp: 60 tablet, Rfl: 0     SLOW  (45 Fe) MG CR tablet, Take 1 tablet (142 mg) by mouth daily, Disp: 30 tablet, Rfl: 0     triamcinolone (KENALOG) 0.5 % external ointment, Apply  "1 g topically 2 times daily, Disp: 15 g, Rfl: 0       BP 90/64   Pulse 91   Temp 97.5  F (36.4  C)   Resp 16   Ht 1.702 m (5' 7\")   Wt 47.4 kg (104 lb 9.6 oz)   SpO2 99%   BMI 16.38 kg/m      LABS:   TSH   Date Value Ref Range Status   05/30/2023 5.44 (H) 0.30 - 4.20 uIU/mL Final   09/21/2022 3.10 0.40 - 4.00 mU/L Final   10/29/2020 2.88 0.40 - 4.00 mU/L Final     T4 Free   Date Value Ref Range Status   09/18/2020 1.14 0.76 - 1.46 ng/dL Final     Free T4   Date Value Ref Range Status   05/30/2023 1.19 0.90 - 1.70 ng/dL Final     Hemoglobin   Date Value Ref Range Status   08/30/2023 8.5 (L) 11.7 - 15.7 g/dL Final   07/01/2021 8.4 (L) 11.7 - 15.7 g/dL Final   ]  Last Comprehensive Metabolic Panel:  Lab Results   Component Value Date     08/30/2023    POTASSIUM 3.5 08/30/2023    CHLORIDE 102 08/30/2023    CO2 24 08/30/2023    ANIONGAP 11 08/30/2023    GLC 85 08/30/2023    BUN 21.9 08/30/2023    CR 0.80 08/30/2023    GFRESTIMATED 67 08/30/2023    CHEYANNE 7.4 (L) 08/30/2023             ASSESSMENT:    Encounter Diagnoses   Name Primary?     Hypertension goal BP (blood pressure) < 140/90 Yes     Closed fracture of right hip with routine healing, subsequent encounter      Primary osteoarthritis of left hip      Abnormal gait        PLAN:    Continue to encourage the rehabilitation component.  Does need assistance with ADLs.  Blood pressures have not been that high so discontinued amlodipine 5 mg.  No issues with urinating after the Reyes catheter removal.  Continue encourage her nutrition as well as extra nutrient supplements.  She did not have any other questions.        Electronically signed by: Fish Kulkarni NP      Sincerely,        Fish Kulkarni NP      "

## 2023-09-12 NOTE — PROGRESS NOTES
[facility] GERIATRIC SERVICES    Facility:   [facility] ([facility]) [[id number]]   Code Status: DNR/DNI      CHIEF COMPLAINT/REASON FOR VISIT:  Chief Complaint   Patient presents with    RECHECK       HISTORY:      HPI: Gillian is a 89 year old female who does remain in the transitional care unit room 103 and who I had the opportunity to revisit with once again today not only secondary to her hospitalization August 29 through August 30, 2023 secondary to a fall sustaining mildly displaced periprosthetic fracture involving the right greater trochanter which is nonsurgical as well as today's discussion of her other chronic medical conditions.  Came to the facility with a Reyes catheter that was removed yesterday and she has been voiding.  According to nursing notes she has been afebrile no shortness of breath has been on room air tolerating food and fluids denies any discomfort extensive assist of 2 for cares.  Her systolic blood pressures over the past week ranging  with her last weight on September 5 at 104 pounds and on September 1 she weighed 103 pounds.  For pain she is on scheduled Tylenol 975 mg 3 times daily and for her RA is on 10 mg of methotrexate weekly.  For anticoagulation she is currently on Xarelto 15 mg twice daily thousand and 18 on September 19 starting at 20 mg daily.  She is on 2 g sodium diet and getting extra nutrient supplements.  For her history of anxiety she is on mirtazapine 30 mg in the evening time along with citalopram 10 mg in the evening time and also does have alprazolam as needed to which she has not taking alprazolam.  No Tums as needed no heartburn or reflux currently on Pepcid 40 mg in the morning.    Past Medical History:   Diagnosis Date    Aortic stenosis     s/p TAVR 8/17/16    Chronic migraine     Hyperlipidaemia     Hypertension     Hypothyroidism     Myocardial infarction (H)     Need for SBE (subacute bacterial endocarditis) prophylaxis     Orthostatic  hypotension     wears compression stockings    PUD (peptic ulcer disease)     Secondary Sjogren's syndrome (H)     Seizures (H)     after stroke (partial onset)    Seropositive rheumatoid arthritis (H)     Stroke (H) 05/2013    with visual field cut, left occipital lobe    Syncope 2014    due to orthostatic hypotension            Family History   Problem Relation Age of Onset    Hyperlipidemia Mother     Family History Negative No family hx of     Hypertension Mother     Rheumatoid Arthritis Child       Social History     Socioeconomic History    Marital status:     Number of children: 4   Occupational History     Employer: RETIRED   Tobacco Use    Smoking status: Never     Passive exposure: Never    Smokeless tobacco: Never   Vaping Use    Vaping Use: Never used   Substance and Sexual Activity    Alcohol use: No     Alcohol/week: 0.0 standard drinks of alcohol    Drug use: No    Sexual activity: Not Currently   Other Topics Concern    Special Diet Yes     Comment: low salt     Exercise Yes     Comment: semi recument bike 15 mins daily    Social History Narrative    .  Lives in assisted living. Independent. Has help with making bed and changing sheets.    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 .     4 children - 1 with RA      No changes to the review of systems or the physical exam since her last visit on September 7  REVIEW OF SYSTEM:  She currently denies any new symptoms of fever cough or cold sore throat postnasal drip allergies shortness of breath dyspnea wheezing chest pain dizziness or vertigo nausea vomiting diarrhea bladder spasms new pain rashes headaches or stiff neck. She does have a history of hypertension, RA, depression, anxiety, seizure, hypothyroidism      PHYSICAL EXAM:   Pleasant female in no acute distress. Head is normocephalic. Neck is supple without adenopathy. Lung sounds are clear throughout. Cardiovascular TAVR, regular rate and rhythm and 1+  bilateral lower extremity edema. Gastrointestinal soft and nontender with positive bowel sounds. Musculoskeletal recent right hip fracture. Upper extremities are symmetrical. Psychiatric: Pleasant affect      Current Outpatient Medications:     acetaminophen (TYLENOL) 325 MG tablet, Take 3 tablets (975 mg) by mouth every 8 hours, Disp: 90 tablet, Rfl: 0    ALPRAZolam (XANAX) 0.25 MG tablet, Take 1 tablet (0.25 mg) by mouth 2 times daily as needed for anxiety or agitation, Disp: 30 tablet, Rfl: 0    amLODIPine (NORVASC) 5 MG tablet, Take 1 tablet (5 mg) by mouth daily, Disp: 30 tablet, Rfl: 0    atorvastatin (LIPITOR) 40 MG tablet, Take 1 tablet (40 mg) by mouth daily, Disp: 30 tablet, Rfl: 0    calcium carbonate (TUMS) 500 MG chewable tablet, Take 1 tablet (500 mg) by mouth 2 times daily as needed for heartburn, Disp: 30 tablet, Rfl: 0    cetirizine (ZYRTEC) 10 MG tablet, Take 1 tablet (10 mg) by mouth daily, Disp: 30 tablet, Rfl: 0    citalopram (CELEXA) 10 MG tablet, Take 1 tablet (10 mg) by mouth At Bedtime, Disp: 30 tablet, Rfl: 0    clopidogrel (PLAVIX) 75 MG tablet, Take 1 tablet (75 mg) by mouth daily, Disp: 30 tablet, Rfl: 0    Cranberry 500 MG CAPS, Take 1 capsule (500 mg) by mouth 2 times daily, Disp: 180 capsule, Rfl: 0    famotidine (PEPCID) 40 MG tablet, Take 1 tablet (40 mg) by mouth daily, Disp: 30 tablet, Rfl: 0    isosorbide mononitrate (IMDUR) 60 MG 24 hr tablet, Take 1 tablet (60 mg) by mouth daily, Disp: 30 tablet, Rfl: 0    Lactobacillus (FLORAJEN ACIDOPHILUS) CAPS, Take 1 capsule by mouth daily, Disp: 30 capsule, Rfl: 0    levETIRAcetam (KEPPRA) 500 MG tablet, Take 1 tablet (500 mg) by mouth 2 times daily, Disp: 60 tablet, Rfl: 0    levothyroxine (SYNTHROID/LEVOTHROID) 88 MCG tablet, Take 1 tablet (88 mcg) by mouth daily at 2 pm, Disp: 30 tablet, Rfl: 0    lisinopril (ZESTRIL) 5 MG tablet, Take 1 tablet (5 mg) by mouth daily, Disp: 30 tablet, Rfl: 0    loperamide (IMODIUM) 2 MG capsule, Take 1  "capsule (2 mg) by mouth 4 times daily as needed for diarrhea, Disp: 20 capsule, Rfl: 0    methotrexate 2.5 MG tablet, Take 4 tablets (10 mg) by mouth once a week, Disp: 12 tablet, Rfl: 0    metoprolol succinate ER (TOPROL XL) 50 MG 24 hr tablet, Take 1 tablet (50 mg) by mouth 2 times daily, Disp: 60 tablet, Rfl: 0    mirtazapine (REMERON) 30 MG tablet, Take 1 tablet (30 mg) by mouth At Bedtime, Disp: 30 tablet, Rfl: 0    nitroFURantoin macrocrystal-monohydrate (MACROBID) 100 MG capsule, Take 1 capsule (100 mg) by mouth At Bedtime, Disp: 30 capsule, Rfl: 0    nitroGLYcerin (NITROSTAT) 0.4 MG sublingual tablet, Place 1 tablet (0.4 mg) under the tongue every 5 minutes as needed for chest pain (pt may self administer as needed) For chest pain place 1 tablet under the tongue every 5 minutes for 3 doses. If symptoms persist 5 minutes after 1st dose call 911., Disp: 25 tablet, Rfl: 0    polyethylene glycol (MIRALAX) 17 GM/Dose powder, Take 17 g by mouth daily, Disp: 510 g, Rfl: 0    predniSONE (DELTASONE) 2.5 MG tablet, Take 1 tablet (2.5 mg) by mouth daily, Disp: 30 tablet, Rfl: 0    RESTASIS 0.05 % ophthalmic emulsion, Place 1 drop Into the left eye 2 times daily, Disp: 1 mL, Rfl: 0    rivaroxaban ANTICOAGULANT (XARELTO) 15 MG TABS tablet, Take 1 tablet (15 mg) by mouth 2 times daily (with meals) for 21 days, Disp: 42 tablet, Rfl: 0    [START ON 9/19/2023] rivaroxaban ANTICOAGULANT (XARELTO) 20 MG TABS tablet, Take 1 tablet (20 mg) by mouth daily (with dinner), Disp: 30 tablet, Rfl: 0    senna-docusate (SENOKOT-S/PERICOLACE) 8.6-50 MG tablet, Take 1 tablet by mouth 2 times daily, Disp: 60 tablet, Rfl: 0    SLOW  (45 Fe) MG CR tablet, Take 1 tablet (142 mg) by mouth daily, Disp: 30 tablet, Rfl: 0    triamcinolone (KENALOG) 0.5 % external ointment, Apply 1 g topically 2 times daily, Disp: 15 g, Rfl: 0       BP 90/64   Pulse 91   Temp 97.5  F (36.4  C)   Resp 16   Ht 1.702 m (5' 7\")   Wt 47.4 kg (104 lb 9.6 oz)  "  SpO2 99%   BMI 16.38 kg/m      LABS:   TSH   Date Value Ref Range Status   05/30/2023 5.44 (H) 0.30 - 4.20 uIU/mL Final   09/21/2022 3.10 0.40 - 4.00 mU/L Final   10/29/2020 2.88 0.40 - 4.00 mU/L Final     T4 Free   Date Value Ref Range Status   09/18/2020 1.14 0.76 - 1.46 ng/dL Final     Free T4   Date Value Ref Range Status   05/30/2023 1.19 0.90 - 1.70 ng/dL Final     Hemoglobin   Date Value Ref Range Status   08/30/2023 8.5 (L) 11.7 - 15.7 g/dL Final   07/01/2021 8.4 (L) 11.7 - 15.7 g/dL Final   ]  Last Comprehensive Metabolic Panel:  Lab Results   Component Value Date     08/30/2023    POTASSIUM 3.5 08/30/2023    CHLORIDE 102 08/30/2023    CO2 24 08/30/2023    ANIONGAP 11 08/30/2023    GLC 85 08/30/2023    BUN 21.9 08/30/2023    CR 0.80 08/30/2023    GFRESTIMATED 67 08/30/2023    CHEYANNE 7.4 (L) 08/30/2023             ASSESSMENT:    Encounter Diagnoses   Name Primary?    Hypertension goal BP (blood pressure) < 140/90 Yes    Closed fracture of right hip with routine healing, subsequent encounter     Primary osteoarthritis of left hip     Abnormal gait        PLAN:    Continue to encourage the rehabilitation component.  Does need assistance with ADLs.  Blood pressures have not been that high so discontinued amlodipine 5 mg.  No issues with urinating after the Reyes catheter removal.  Continue encourage her nutrition as well as extra nutrient supplements.  She did not have any other questions.        Electronically signed by: Fish Kulkarni NP

## 2023-09-14 PROBLEM — D64.9 ANEMIA, UNSPECIFIED TYPE: Status: ACTIVE | Noted: 2023-01-01

## 2023-09-14 NOTE — PROGRESS NOTES
[facility] GERIATRIC SERVICES    Facility:   [facility] REH[facility] ([facility]) [[id number]]   Code Status: DNR/DNI      CHIEF COMPLAINT/REASON FOR VISIT:  Chief Complaint   Patient presents with    RECHECK       HISTORY:      HPI: Gillian is a 89 year old female who I had the pleasure to revisit with once again today not only secondary to her hospitalization August 29, 2023 through August 30, 2023 secondary to a fall sustaining a mildly displaced periprosthetic fracture involving the right greater trochanter which is nonsurgical as well as today's discussion of her laboratory studies including hemoglobin now at 7.0 and it was 8.5 as well as her nutrition, blood pressures and the rehabilitation process.  Her systolic blood pressures ranging  and most recently the amlodipine 5 mg has been discontinued and now we will change the Toprol-XL currently at 50 mg twice daily to once daily.  She is also on Xarelto 15 mg twice daily until September 18 and then on September 19 will go to 20 mg daily.  For pain she is on scheduled Tylenol 975 mg 3 times daily also does have a lidocaine patch and for her RA is on methotrexate 10 mg weekly on Sunday.  Her hemoglobin is down 7.0 was 8.5 she is on iron daily.  She no longer has a Reyes catheter.  Appetite does seem to wax and wane and her most recent weight on September 12 121 pounds which is not accurate and her weight on September 5 was 104 pounds and her weight on September 1 103 pounds.  She has been tired as of late probably due to the anemia and I did have a chance to talk to her daughter Jennifer today she also concurs that her mom has been tired for the last 2 days and so at this point she also does have an appearance of being dehydrated and needing assistance with even eating or drinking so now we will start an IV fluids over the next couple of days D5 one half normal saline for 2 L and the D5 one half normal saline with 10 of KCl at 50 cc an hour and then recheck  the BMP again on Monday the 18th.  The daughter will also come in later today to check on her mom.  I had a chance to address this with the staff as well today.    Past Medical History:   Diagnosis Date    Aortic stenosis     s/p TAVR 8/17/16    Chronic migraine     Hyperlipidaemia     Hypertension     Hypothyroidism     Myocardial infarction (H)     Need for SBE (subacute bacterial endocarditis) prophylaxis     Orthostatic hypotension     wears compression stockings    PUD (peptic ulcer disease)     Secondary Sjogren's syndrome (H)     Seizures (H)     after stroke (partial onset)    Seropositive rheumatoid arthritis (H)     Stroke (H) 05/2013    with visual field cut, left occipital lobe    Syncope 2014    due to orthostatic hypotension            Family History   Problem Relation Age of Onset    Hyperlipidemia Mother     Family History Negative No family hx of     Hypertension Mother     Rheumatoid Arthritis Child       Social History     Socioeconomic History    Marital status:     Number of children: 4   Occupational History     Employer: RETIRED   Tobacco Use    Smoking status: Never     Passive exposure: Never    Smokeless tobacco: Never   Vaping Use    Vaping Use: Never used   Substance and Sexual Activity    Alcohol use: No     Alcohol/week: 0.0 standard drinks of alcohol    Drug use: No    Sexual activity: Not Currently   Other Topics Concern    Special Diet Yes     Comment: low salt     Exercise Yes     Comment: semi recument bike 15 mins daily    Social History Narrative    .  Lives in assisted living. Independent. Has help with making bed and changing sheets.    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 .     4 children - 1 with RA   In comparison to her last exam on September 12 she has now had increased tiredness as well as decreased p.o. intake.  REVIEW OF SYSTEM:  Per staff no new reports of fever cough or cold sore throat shortness of breath dyspnea wheezing  edema chest pain nausea vomiting bladder spasms dysuria or new myalgias or arthralgias.  PHYSICAL EXAM:   Pleasant female in no acute distress. Head is normocephalic. Neck is supple without adenopathy. Lung sounds are clear throughout. Cardiovascular TAVR, regular rate and rhythm and 1+ bilateral lower extremity edema. Gastrointestinal soft and nontender . Musculoskeletal recent right hip fracture.  Pain is managed psychiatric: Pleasant-tired today       Current Outpatient Medications:     acetaminophen (TYLENOL) 325 MG tablet, Take 3 tablets (975 mg) by mouth every 8 hours, Disp: 90 tablet, Rfl: 0    ALPRAZolam (XANAX) 0.25 MG tablet, Take 1 tablet (0.25 mg) by mouth 2 times daily as needed for anxiety or agitation, Disp: 30 tablet, Rfl: 0    atorvastatin (LIPITOR) 40 MG tablet, Take 1 tablet (40 mg) by mouth daily, Disp: 30 tablet, Rfl: 0    calcium carbonate (TUMS) 500 MG chewable tablet, Take 1 tablet (500 mg) by mouth 2 times daily as needed for heartburn, Disp: 30 tablet, Rfl: 0    cetirizine (ZYRTEC) 10 MG tablet, Take 1 tablet (10 mg) by mouth daily, Disp: 30 tablet, Rfl: 0    citalopram (CELEXA) 10 MG tablet, Take 1 tablet (10 mg) by mouth At Bedtime, Disp: 30 tablet, Rfl: 0    clopidogrel (PLAVIX) 75 MG tablet, Take 1 tablet (75 mg) by mouth daily, Disp: 30 tablet, Rfl: 0    Cranberry 500 MG CAPS, Take 1 capsule (500 mg) by mouth 2 times daily, Disp: 180 capsule, Rfl: 0    famotidine (PEPCID) 40 MG tablet, Take 1 tablet (40 mg) by mouth daily, Disp: 30 tablet, Rfl: 0    isosorbide mononitrate (IMDUR) 60 MG 24 hr tablet, Take 1 tablet (60 mg) by mouth daily, Disp: 30 tablet, Rfl: 0    Lactobacillus (FLORAJEN ACIDOPHILUS) CAPS, Take 1 capsule by mouth daily, Disp: 30 capsule, Rfl: 0    levETIRAcetam (KEPPRA) 500 MG tablet, Take 1 tablet (500 mg) by mouth 2 times daily, Disp: 60 tablet, Rfl: 0    levothyroxine (SYNTHROID/LEVOTHROID) 88 MCG tablet, Take 1 tablet (88 mcg) by mouth daily at 2 pm, Disp: 30  tablet, Rfl: 0    lisinopril (ZESTRIL) 5 MG tablet, Take 1 tablet (5 mg) by mouth daily, Disp: 30 tablet, Rfl: 0    loperamide (IMODIUM) 2 MG capsule, Take 1 capsule (2 mg) by mouth 4 times daily as needed for diarrhea, Disp: 20 capsule, Rfl: 0    methotrexate 2.5 MG tablet, Take 4 tablets (10 mg) by mouth once a week, Disp: 12 tablet, Rfl: 0    metoprolol succinate ER (TOPROL XL) 50 MG 24 hr tablet, Take 1 tablet (50 mg) by mouth 2 times daily, Disp: 60 tablet, Rfl: 0    mirtazapine (REMERON) 30 MG tablet, Take 1 tablet (30 mg) by mouth At Bedtime, Disp: 30 tablet, Rfl: 0    nitroFURantoin macrocrystal-monohydrate (MACROBID) 100 MG capsule, Take 1 capsule (100 mg) by mouth At Bedtime, Disp: 30 capsule, Rfl: 0    nitroGLYcerin (NITROSTAT) 0.4 MG sublingual tablet, Place 1 tablet (0.4 mg) under the tongue every 5 minutes as needed for chest pain (pt may self administer as needed) For chest pain place 1 tablet under the tongue every 5 minutes for 3 doses. If symptoms persist 5 minutes after 1st dose call 911., Disp: 25 tablet, Rfl: 0    polyethylene glycol (MIRALAX) 17 GM/Dose powder, Take 17 g by mouth daily, Disp: 510 g, Rfl: 0    predniSONE (DELTASONE) 2.5 MG tablet, Take 1 tablet (2.5 mg) by mouth daily, Disp: 30 tablet, Rfl: 0    RESTASIS 0.05 % ophthalmic emulsion, Place 1 drop Into the left eye 2 times daily, Disp: 1 mL, Rfl: 0    rivaroxaban ANTICOAGULANT (XARELTO) 15 MG TABS tablet, Take 1 tablet (15 mg) by mouth 2 times daily (with meals) for 21 days, Disp: 42 tablet, Rfl: 0    [START ON 9/19/2023] rivaroxaban ANTICOAGULANT (XARELTO) 20 MG TABS tablet, Take 1 tablet (20 mg) by mouth daily (with dinner), Disp: 30 tablet, Rfl: 0    senna-docusate (SENOKOT-S/PERICOLACE) 8.6-50 MG tablet, Take 1 tablet by mouth 2 times daily, Disp: 60 tablet, Rfl: 0    SLOW  (45 Fe) MG CR tablet, Take 1 tablet (142 mg) by mouth daily, Disp: 30 tablet, Rfl: 0    triamcinolone (KENALOG) 0.5 % external ointment, Apply 1 g  topically 2 times daily, Disp: 15 g, Rfl: 0    BP 97/65   Pulse 55   Temp 97.2  F (36.2  C)   Resp 16   Wt 54.9 kg (121 lb)   SpO2 93%   BMI 18.95 kg/m      LABS:   CBC RESULTS:   Recent Labs   Lab Test 09/13/23  0720 08/30/23  0540   WBC  --  8.9   RBC  --  3.01*   HGB 7.0* 8.5*   HCT  --  27.2*   MCV  --  90   MCH  --  28.2   MCHC  --  31.3*   RDW  --  19.4*   PLT  --  217     Ferritin   Date Value Ref Range Status   02/14/2023 94 11 - 328 ng/mL Final   03/03/2021 33 8 - 252 ng/mL Final     Iron   Date Value Ref Range Status   02/14/2023 182 (H) 37 - 145 ug/dL Final   03/03/2021 68 35 - 180 ug/dL Final     Iron Binding Cap   Date Value Ref Range Status   03/03/2021 220 (L) 240 - 430 ug/dL Final     Iron Binding Capacity   Date Value Ref Range Status   02/14/2023 237 (L) 240 - 430 ug/dL Final   09/15/2021 264 240 - 430 ug/dL Final         Last Comprehensive Metabolic Panel:  Lab Results   Component Value Date     08/30/2023    POTASSIUM 3.5 08/30/2023    CHLORIDE 102 08/30/2023    CO2 24 08/30/2023    ANIONGAP 11 08/30/2023    GLC 85 08/30/2023    BUN 21.9 08/30/2023    CR 0.80 08/30/2023    GFRESTIMATED 67 08/30/2023    CHEYANNE 7.4 (L) 08/30/2023             ASSESSMENT:    Encounter Diagnoses   Name Primary?    Closed fracture of right hip with routine healing, subsequent encounter Yes    Anemia, unspecified type     Aortic stenosis, severe - s/p TAVR     Hypertension goal BP (blood pressure) < 140/90        PLAN:    On Friday the 15th adding a CBC with differential folate B12 iron study and peripheral smear secondary to the anemia and if less than 7 we will send her in for a transfusion.  Increase the iron to twice daily.  Decrease the Toprol-XL 50 mg daily rather than twice daily.  Encourage fluids.  Also adding an IV over the next couple of days due to dehydration.    For documentation purposes total visit 45 minutes to which initially was spent with the patient trying to establish a rapport as well as  an exam and going over her laboratory studies as well as future work-up and the remainder the time spent talking with her daughter regarding all the above including hemoglobin tomorrow's laboratory studies IV fluids and overall care.        Electronically signed by: Fish Kulkarni NP

## 2023-09-14 NOTE — LETTER
9/14/2023        RE: Gillian ALLEN Wm  [address]  [facility] [address]        [facility] GERIATRIC [facility]    Facility:   [facility] ([facility]) [[id number]]   Code Status: DNR/DNI      CHIEF COMPLAINT/REASON FOR VISIT:  Chief Complaint   Patient presents with     RECHECK       HISTORY:      HPI: Gillian is a 89 year old female who I had the pleasure to revisit with once again today not only secondary to her hospitalization August 29, 2023 through August 30, 2023 secondary to a fall sustaining a mildly displaced periprosthetic fracture involving the right greater trochanter which is nonsurgical as well as today's discussion of her laboratory studies including hemoglobin now at 7.0 and it was 8.5 as well as her nutrition, blood pressures and the rehabilitation process.  Her systolic blood pressures ranging  and most recently the amlodipine 5 mg has been discontinued and now we will change the Toprol-XL currently at 50 mg twice daily to once daily.  She is also on Xarelto 15 mg twice daily until September 18 and then on September 19 will go to 20 mg daily.  For pain she is on scheduled Tylenol 975 mg 3 times daily also does have a lidocaine patch and for her RA is on methotrexate 10 mg weekly on Sunday.  Her hemoglobin is down 7.0 was 8.5 she is on iron daily.  She no longer has a Reyes catheter.  Appetite does seem to wax and wane and her most recent weight on September 12 121 pounds which is not accurate and her weight on September 5 was 104 pounds and her weight on September 1 103 pounds.  She has been tired as of late probably due to the anemia and I did have a chance to talk to her daughter Jennifer today she also concurs that her mom has been tired for the last 2 days and so at this point she also does have an appearance of being dehydrated and needing assistance with even eating or drinking so now we will start an IV fluids over the next couple of days D5 one half  normal saline for 2 L and the D5 one half normal saline with 10 of KCl at 50 cc an hour and then recheck the BMP again on Monday the 18th.  The daughter will also come in later today to check on her mom.  I had a chance to address this with the staff as well today.    Past Medical History:   Diagnosis Date     Aortic stenosis     s/p TAVR 8/17/16     Chronic migraine      Hyperlipidaemia      Hypertension      Hypothyroidism      Myocardial infarction (H)      Need for SBE (subacute bacterial endocarditis) prophylaxis      Orthostatic hypotension     wears compression stockings     PUD (peptic ulcer disease)      Secondary Sjogren's syndrome (H)      Seizures (H)     after stroke (partial onset)     Seropositive rheumatoid arthritis (H)      Stroke (H) 05/2013    with visual field cut, left occipital lobe     Syncope 2014    due to orthostatic hypotension            Family History   Problem Relation Age of Onset     Hyperlipidemia Mother      Family History Negative No family hx of      Hypertension Mother      Rheumatoid Arthritis Child       Social History     Socioeconomic History     Marital status:      Number of children: 4   Occupational History     Employer: RETIRED   Tobacco Use     Smoking status: Never     Passive exposure: Never     Smokeless tobacco: Never   Vaping Use     Vaping Use: Never used   Substance and Sexual Activity     Alcohol use: No     Alcohol/week: 0.0 standard drinks of alcohol     Drug use: No     Sexual activity: Not Currently   Other Topics Concern     Special Diet Yes     Comment: low salt      Exercise Yes     Comment: semi recument bike 15 mins daily    Social History Narrative    .  Lives in assisted living. Independent. Has help with making bed and changing sheets.    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 .     4 children - 1 with RA   In comparison to her last exam on September 12 she has now had increased tiredness as well as decreased  p.o. intake.  REVIEW OF SYSTEM:  Per staff no new reports of fever cough or cold sore throat shortness of breath dyspnea wheezing edema chest pain nausea vomiting bladder spasms dysuria or new myalgias or arthralgias.  PHYSICAL EXAM:   Pleasant female in no acute distress. Head is normocephalic. Neck is supple without adenopathy. Lung sounds are clear throughout. Cardiovascular TAVR, regular rate and rhythm and 1+ bilateral lower extremity edema. Gastrointestinal soft and nontender . Musculoskeletal recent right hip fracture.  Pain is managed psychiatric: Pleasant-tired today       Current Outpatient Medications:      acetaminophen (TYLENOL) 325 MG tablet, Take 3 tablets (975 mg) by mouth every 8 hours, Disp: 90 tablet, Rfl: 0     ALPRAZolam (XANAX) 0.25 MG tablet, Take 1 tablet (0.25 mg) by mouth 2 times daily as needed for anxiety or agitation, Disp: 30 tablet, Rfl: 0     atorvastatin (LIPITOR) 40 MG tablet, Take 1 tablet (40 mg) by mouth daily, Disp: 30 tablet, Rfl: 0     calcium carbonate (TUMS) 500 MG chewable tablet, Take 1 tablet (500 mg) by mouth 2 times daily as needed for heartburn, Disp: 30 tablet, Rfl: 0     cetirizine (ZYRTEC) 10 MG tablet, Take 1 tablet (10 mg) by mouth daily, Disp: 30 tablet, Rfl: 0     citalopram (CELEXA) 10 MG tablet, Take 1 tablet (10 mg) by mouth At Bedtime, Disp: 30 tablet, Rfl: 0     clopidogrel (PLAVIX) 75 MG tablet, Take 1 tablet (75 mg) by mouth daily, Disp: 30 tablet, Rfl: 0     Cranberry 500 MG CAPS, Take 1 capsule (500 mg) by mouth 2 times daily, Disp: 180 capsule, Rfl: 0     famotidine (PEPCID) 40 MG tablet, Take 1 tablet (40 mg) by mouth daily, Disp: 30 tablet, Rfl: 0     isosorbide mononitrate (IMDUR) 60 MG 24 hr tablet, Take 1 tablet (60 mg) by mouth daily, Disp: 30 tablet, Rfl: 0     Lactobacillus (FLORAJEN ACIDOPHILUS) CAPS, Take 1 capsule by mouth daily, Disp: 30 capsule, Rfl: 0     levETIRAcetam (KEPPRA) 500 MG tablet, Take 1 tablet (500 mg) by mouth 2 times daily,  Disp: 60 tablet, Rfl: 0     levothyroxine (SYNTHROID/LEVOTHROID) 88 MCG tablet, Take 1 tablet (88 mcg) by mouth daily at 2 pm, Disp: 30 tablet, Rfl: 0     lisinopril (ZESTRIL) 5 MG tablet, Take 1 tablet (5 mg) by mouth daily, Disp: 30 tablet, Rfl: 0     loperamide (IMODIUM) 2 MG capsule, Take 1 capsule (2 mg) by mouth 4 times daily as needed for diarrhea, Disp: 20 capsule, Rfl: 0     methotrexate 2.5 MG tablet, Take 4 tablets (10 mg) by mouth once a week, Disp: 12 tablet, Rfl: 0     metoprolol succinate ER (TOPROL XL) 50 MG 24 hr tablet, Take 1 tablet (50 mg) by mouth 2 times daily, Disp: 60 tablet, Rfl: 0     mirtazapine (REMERON) 30 MG tablet, Take 1 tablet (30 mg) by mouth At Bedtime, Disp: 30 tablet, Rfl: 0     nitroFURantoin macrocrystal-monohydrate (MACROBID) 100 MG capsule, Take 1 capsule (100 mg) by mouth At Bedtime, Disp: 30 capsule, Rfl: 0     nitroGLYcerin (NITROSTAT) 0.4 MG sublingual tablet, Place 1 tablet (0.4 mg) under the tongue every 5 minutes as needed for chest pain (pt may self administer as needed) For chest pain place 1 tablet under the tongue every 5 minutes for 3 doses. If symptoms persist 5 minutes after 1st dose call 911., Disp: 25 tablet, Rfl: 0     polyethylene glycol (MIRALAX) 17 GM/Dose powder, Take 17 g by mouth daily, Disp: 510 g, Rfl: 0     predniSONE (DELTASONE) 2.5 MG tablet, Take 1 tablet (2.5 mg) by mouth daily, Disp: 30 tablet, Rfl: 0     RESTASIS 0.05 % ophthalmic emulsion, Place 1 drop Into the left eye 2 times daily, Disp: 1 mL, Rfl: 0     rivaroxaban ANTICOAGULANT (XARELTO) 15 MG TABS tablet, Take 1 tablet (15 mg) by mouth 2 times daily (with meals) for 21 days, Disp: 42 tablet, Rfl: 0     [START ON 9/19/2023] rivaroxaban ANTICOAGULANT (XARELTO) 20 MG TABS tablet, Take 1 tablet (20 mg) by mouth daily (with dinner), Disp: 30 tablet, Rfl: 0     senna-docusate (SENOKOT-S/PERICOLACE) 8.6-50 MG tablet, Take 1 tablet by mouth 2 times daily, Disp: 60 tablet, Rfl: 0     SLOW   (45 Fe) MG CR tablet, Take 1 tablet (142 mg) by mouth daily, Disp: 30 tablet, Rfl: 0     triamcinolone (KENALOG) 0.5 % external ointment, Apply 1 g topically 2 times daily, Disp: 15 g, Rfl: 0    BP 97/65   Pulse 55   Temp 97.2  F (36.2  C)   Resp 16   Wt 54.9 kg (121 lb)   SpO2 93%   BMI 18.95 kg/m      LABS:   CBC RESULTS:   Recent Labs   Lab Test 09/13/23  0720 08/30/23  0540   WBC  --  8.9   RBC  --  3.01*   HGB 7.0* 8.5*   HCT  --  27.2*   MCV  --  90   MCH  --  28.2   MCHC  --  31.3*   RDW  --  19.4*   PLT  --  217     Ferritin   Date Value Ref Range Status   02/14/2023 94 11 - 328 ng/mL Final   03/03/2021 33 8 - 252 ng/mL Final     Iron   Date Value Ref Range Status   02/14/2023 182 (H) 37 - 145 ug/dL Final   03/03/2021 68 35 - 180 ug/dL Final     Iron Binding Cap   Date Value Ref Range Status   03/03/2021 220 (L) 240 - 430 ug/dL Final     Iron Binding Capacity   Date Value Ref Range Status   02/14/2023 237 (L) 240 - 430 ug/dL Final   09/15/2021 264 240 - 430 ug/dL Final         Last Comprehensive Metabolic Panel:  Lab Results   Component Value Date     08/30/2023    POTASSIUM 3.5 08/30/2023    CHLORIDE 102 08/30/2023    CO2 24 08/30/2023    ANIONGAP 11 08/30/2023    GLC 85 08/30/2023    BUN 21.9 08/30/2023    CR 0.80 08/30/2023    GFRESTIMATED 67 08/30/2023    CHEYANNE 7.4 (L) 08/30/2023             ASSESSMENT:    Encounter Diagnoses   Name Primary?     Closed fracture of right hip with routine healing, subsequent encounter Yes     Anemia, unspecified type      Aortic stenosis, severe - s/p TAVR      Hypertension goal BP (blood pressure) < 140/90        PLAN:    On Friday the 15th adding a CBC with differential folate B12 iron study and peripheral smear secondary to the anemia and if less than 7 we will send her in for a transfusion.  Increase the iron to twice daily.  Decrease the Toprol-XL 50 mg daily rather than twice daily.  Encourage fluids.  Also adding an IV over the next couple of days due to  dehydration.    For documentation purposes total visit 45 minutes to which initially was spent with the patient trying to establish a rapport as well as an exam and going over her laboratory studies as well as future work-up and the remainder the time spent talking with her daughter regarding all the above including hemoglobin tomorrow's laboratory studies IV fluids and overall care.        Electronically signed by: Fish Kulkarni NP      Sincerely,        Fish Kulkarni NP

## 2023-09-15 PROBLEM — D64.9 ANEMIA: Status: ACTIVE | Noted: 2023-01-01

## 2023-09-15 NOTE — ED PROVIDER NOTES
History     Chief Complaint   Patient presents with    Abnormal Labs     HPI  Gillian Burk is a 89 year old female who presents via EMS because of concerns of low hemoglobin.  Patient recently had her hip done and is at rehab currently.  Patient's hemoglobin has been drifting down the last few days.  They have noticed that her stools have been dark over the past week, no obvious blood though.  Patient is complained of some lower abdominal discomfort today that is pretty nonspecific.  She states she has been feeling very tired the last few days and just has not been able to keep her eyes awake.  Denies any chest pain or palpitations.  Denies feeling dizzy or lightheaded.    Allergies:  Allergies   Allergen Reactions    Penicillins Hives     Other Reaction(s): Not available    Caffeine Other (See Comments) and Unknown     Triggers your Meniere's disease    Gold     Hydrocodone Other (See Comments) and Unknown     hallucinations    Ibuprofen GI Disturbance and Unknown    Other Environmental Allergy      Metals of unspecific kind    Tramadol Other (See Comments)     Seizure, was taking remeron along with tramadol    Metal [Staples] Rash       Problem List:    Patient Active Problem List    Diagnosis Date Noted    Anemia, unspecified type 09/14/2023     Priority: Medium    Closed fracture of right hip with routine healing, subsequent encounter 09/07/2023     Priority: Medium    Acute deep vein thrombosis (DVT) of femoral vein of left lower extremity (H) 08/29/2023     Priority: Medium    Abnormal gait 06/01/2021     Priority: Medium    Encephalomalacia on imaging study-left occipital lobe 06/01/2021     Priority: Medium    Acute kidney failure, unspecified (H) 05/30/2021     Priority: Medium    Leukocytosis 05/30/2021     Priority: Medium    UTI (urinary tract infection) 05/29/2021     Priority: Medium    Lightheadedness 05/29/2021     Priority: Medium    Fibrocystic breast disease 05/23/2021     Priority:  Medium    IZABEL (generalized anxiety disorder) 05/23/2021     Priority: Medium    Hearing loss 05/23/2021     Priority: Medium     Formatting of this note might be different from the original.  right ear      Herpes zoster 05/23/2021     Priority: Medium    Meniere's disease 05/23/2021     Priority: Medium    Migraine headache 05/23/2021     Priority: Medium    Precancerous changes of the cervix 05/23/2021     Priority: Medium    Squamous cell carcinoma in situ of skin 05/23/2021     Priority: Medium     Formatting of this note might be different from the original.  face      Chest pain, unspecified type 05/23/2021     Priority: Medium    Coronary artery disease involving native coronary artery of native heart with angina pectoris (H) 02/02/2021     Priority: Medium     Added automatically from request for surgery [id number]      CONTRERAS (dyspnea on exertion) 02/02/2021     Priority: Medium     Added automatically from request for surgery [id number]      Ischemic cardiomyopathy 08/06/2019     Priority: Medium    Urinary incontinence without sensory awareness 04/26/2019     Priority: Medium    Chronic systolic congestive heart failure (H) 03/24/2019     Priority: Medium    Chronic stable angina (H) 03/24/2019     Priority: Medium    CRAO (central retinal artery occlusion), 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 Discharging from Neuro Stroke Service today. She has appointment with Dr. Mateusz Jane on 11/9/18 in [place], [place].   - Okay to keep appointment with Dr. Jane, advised to return with any decrease in vision in either eye.      Occipital stroke (H) 10/12/2018     Priority: Medium    Primary osteoarthritis of right knee 08/03/2017     Priority: Medium    GI bleed 03/03/2017     Priority: Medium    Hypotension 12/04/2016     Priority: Medium    TIA (transient ischemic attack) 12/03/2016     Priority: Medium    Non-rheumatic mitral valve stenosis - mild to moderate 11/16  echo 12/03/2016     Priority: Medium    Coronary artery disease involving native coronary artery - NSTEMI 11/16 with moderate RCA disease treated medically 12/03/2016     Priority: Medium    CKD (chronic kidney disease) stage 3, GFR 30-59 ml/min (H) 12/03/2016     Priority: Medium    Elevated troponin 12/03/2016     Priority: Medium    Iron deficiency anemia 12/03/2016     Priority: Medium    Acquired hypothyroidism 11/24/2016     Priority: Medium    Aortic stenosis      Priority: Medium     s/p TAVR 8/17/16      Need for SBE (subacute bacterial endocarditis) prophylaxis      Priority: Medium    Advanced directives, counseling/discussion 09/22/2016     Priority: Medium     Was addressed at appt. Will bring in and will have scanned. 9/22/2016  Cathy Anderson MA        S/P TAVR (transcatheter aortic valve replacement) 08/05/2016     Priority: Medium    Aortic stenosis, severe - s/p TAVR 08/03/2016     Priority: Medium    Hypothyroidism due to acquired atrophy of thyroid 05/25/2016     Priority: Medium    Aortic valve disorder 09/02/2015     Priority: Medium    Intermediate coronary syndrome 09/01/2015     Priority: Medium    Rheumatoid arthritis (H) 06/17/2015     Priority: Medium    Generalized osteoarthritis 06/17/2015     Priority: Medium    Hyperlipidemia with target LDL less than 130 04/06/2015     Priority: Medium    Anxiety 07/24/2014     Priority: Medium    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 07/24/2014     Priority: Medium    History of CVA (cerebrovascular accident) - 2013 07/24/2014     Priority: Medium    Esophageal reflux 07/24/2014     Priority: Medium    PAC (premature atrial contraction) 07/24/2014     Priority: Medium    PVC's (premature ventricular contractions) 07/24/2014     Priority: Medium    Hypertension goal BP (blood pressure) < 140/90 07/01/2014     Priority: Medium    Transient ischemic attack (TIA), and cerebral infarction without residual deficits 06/18/2014      Priority: Medium    Osteoarthritis of left hip 02/18/2014     Priority: Medium        Past Medical History:    Past Medical History:   Diagnosis Date    Aortic stenosis     Chronic migraine     Hyperlipidaemia     Hypertension     Hypothyroidism     Myocardial infarction (H)     Need for SBE (subacute bacterial endocarditis) prophylaxis     Orthostatic hypotension     PUD (peptic ulcer disease)     Secondary Sjogren's syndrome (H)     Seizures (H)     Seropositive rheumatoid arthritis (H)     Stroke (H) 05/2013    Syncope 2014       Past Surgical History:    Past Surgical History:   Procedure Laterality Date    ANOMALOUS PULMONARY VENOUS RETURN REPAIR, TOTAL      CATARACT EXTRACTION      CATARACT IOL, RT/LT Bilateral 2000    CV FLUOROSCOPY ONLY N/A 8/6/2019    Procedure: Fluoroscopy Only - valve cine done of aortic valve at 180 degrees [place] to JUNG;  Surgeon: Dave Farfan MD;  Location:  [facility] CATH LAB    CV HEART CATHETERIZATION WITH POSSIBLE INTERVENTION N/A 2/10/2021    Procedure: Heart Catheterization with Possible Intervention;  Surgeon: Fish Padgett MD;  Location:  HEART CARD[facility] CATH LAB    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    EYE SURGERY      HEART CATH FEMORAL CANNULIZATION WITH OPEN STANDBY REPAIR AORTIC VALVE N/A 8/3/2016    Procedure: HEART CATH FEMORAL CANNULIZATION WITH OPEN STANDBY REPAIR AORTIC VALVE;  Surgeon: Owen Schultz MD;  Location: [facility] OR    HYSTERECTOMY      HYSTERECTOMY TOTAL ABDOMINAL  1970    ovaries out    MANDIBLE FRACTURE SURGERY      MOUTH SURGERY  2011    jaw surgery due to fracture    OTHER SURGICAL HISTORY      Treva valve implant    TOTAL HIP ARTHROPLASTY      TRANSCATHETER AORTIC VALVE IMPLANT ANESTHESIA N/A 8/3/2016    Procedure: TRANSCATHETER AORTIC VALVE IMPLANT ANESTHESIA;  Surgeon: GENERIC ANESTHESIA PROVIDER;  Location:  OR    [facility] TOTAL HIP ARTHROPLASTY Right 2010    Z TOTAL HIP ARTHROPLASTY Left 2014       Family  History:    Family History   Problem Relation Age of Onset    Hyperlipidemia Mother     Family History Negative No family hx of     Hypertension Mother     Rheumatoid Arthritis Child        Social History:  Marital Status:   [5]  Social History     Tobacco Use    Smoking status: Never     Passive exposure: Never    Smokeless tobacco: Never   Vaping Use    Vaping Use: Never used   Substance Use Topics    Alcohol use: No     Alcohol/week: 0.0 standard drinks of alcohol    Drug use: No        Medications:    acetaminophen (TYLENOL) 325 MG tablet  atorvastatin (LIPITOR) 40 MG tablet  calcium carbonate (TUMS) 500 MG chewable tablet  cetirizine (ZYRTEC) 10 MG tablet  citalopram (CELEXA) 10 MG tablet  clopidogrel (PLAVIX) 75 MG tablet  Cranberry 500 MG CAPS  famotidine (PEPCID) 40 MG tablet  isosorbide mononitrate (IMDUR) 60 MG 24 hr tablet  Lactobacillus (FLORAJEN ACIDOPHILUS) CAPS  levETIRAcetam (KEPPRA) 500 MG tablet  levothyroxine (SYNTHROID/LEVOTHROID) 88 MCG tablet  Lidocaine (LIDOCARE) 4 % Patch  lisinopril (ZESTRIL) 5 MG tablet  loperamide (IMODIUM) 2 MG capsule  metoprolol succinate ER (TOPROL XL) 50 MG 24 hr tablet  mirtazapine (REMERON) 30 MG tablet  nitroFURantoin macrocrystal-monohydrate (MACROBID) 100 MG capsule  nitroGLYcerin (NITROSTAT) 0.4 MG sublingual tablet  polyethylene glycol (MIRALAX) 17 GM/Dose powder  predniSONE (DELTASONE) 2.5 MG tablet  RESTASIS 0.05 % ophthalmic emulsion  rivaroxaban ANTICOAGULANT (XARELTO) 15 MG TABS tablet  senna-docusate (SENOKOT-S/PERICOLACE) 8.6-50 MG tablet  SLOW  (45 Fe) MG CR tablet  triamcinolone (KENALOG) 0.5 % external ointment  methotrexate 2.5 MG tablet          Review of Systems   All other systems reviewed and are negative.      Physical Exam   BP: 116/72  Pulse: 91  Temp: 97.7  F (36.5  C)  Resp: 18  Weight: 49.5 kg (109 lb 1.6 oz)  SpO2: 96 %      Physical Exam  Vitals and nursing note reviewed.   Constitutional:       General: She is not in acute  distress.     Appearance: She is well-developed. She is not diaphoretic.   HENT:      Head: Normocephalic and atraumatic.      Nose: Nose normal.      Mouth/Throat:      Pharynx: No oropharyngeal exudate.   Eyes:      Conjunctiva/sclera: Conjunctivae normal.   Cardiovascular:      Rate and Rhythm: Normal rate and regular rhythm.      Heart sounds: Normal heart sounds. No murmur heard.     No friction rub.   Pulmonary:      Effort: Pulmonary effort is normal. No respiratory distress.      Breath sounds: Normal breath sounds. No stridor. No wheezing or rales.   Abdominal:      General: Bowel sounds are normal. There is no distension.      Palpations: Abdomen is soft. There is no mass.      Tenderness: There is abdominal tenderness (Lower abdominal pain). There is no guarding.   Musculoskeletal:         General: No tenderness. Normal range of motion.      Cervical back: Normal range of motion and neck supple.   Skin:     General: Skin is warm and dry.      Capillary Refill: Capillary refill takes less than 2 seconds.      Findings: No erythema.   Neurological:      Mental Status: She is alert and oriented to person, place, and time.   Psychiatric:         Judgment: Judgment normal.         ED Course                 Procedures        Results for orders placed or performed during the hospital encounter of 09/15/23 (from the past 24 hour(s))   [place] Draw *Canceled*    Narrative    The following orders were created for panel order [place] Draw.  Procedure                               Abnormality         Status                     ---------                               -----------         ------                       Please view results for these tests on the individual orders.   [place] Draw    Narrative    The following orders were created for panel order [place] Draw.  Procedure                               Abnormality         Status                     ---------                               -----------          ------                     Extra Green Top (Lithium...[[id number]]                      Final result               Extra Purple Top Tube[[id number]]                            Final result                 Please view results for these tests on the individual orders.   Extra Green Top (Lithium Heparin) Tube   Result Value Ref Range    Hold Specimen JIC    Extra Purple Top Tube   Result Value Ref Range    Hold Specimen JIC    ABO/Rh type and screen    Narrative    The following orders were created for panel order ABO/Rh type and screen.  Procedure                               Abnormality         Status                     ---------                               -----------         ------                     Adult Type and Screen[[id number]]                            Final result                 Please view results for these tests on the individual orders.   CBC with platelets differential    Narrative    The following orders were created for panel order CBC with platelets differential.  Procedure                               Abnormality         Status                     ---------                               -----------         ------                     CBC with platelets and d...[[id number]]  Abnormal            Final result                 Please view results for these tests on the individual orders.   Comprehensive metabolic panel   Result Value Ref Range    Sodium 141 136 - 145 mmol/L    Potassium 4.6 3.4 - 5.3 mmol/L    Chloride 109 (H) 98 - 107 mmol/L    Carbon Dioxide (CO2) 23 22 - 29 mmol/L    Anion Gap 9 7 - 15 mmol/L    Urea Nitrogen 27.7 (H) 8.0 - 23.0 mg/dL    Creatinine 0.66 0.51 - 0.95 mg/dL    Calcium 7.3 (L) 8.2 - 9.6 mg/dL    Glucose 97 70 - 99 mg/dL    Alkaline Phosphatase 149 (H) 35 - 104 U/L    AST 27 0 - 45 U/L    ALT 12 0 - 50 U/L    Protein Total 5.5 (L) 6.4 - 8.3 g/dL    Albumin 2.0 (L) 3.5 - 5.2 g/dL    Bilirubin Total 0.2 <=1.2 mg/dL    GFR Estimate 80 >60 mL/min/1.73m2   CBC with platelets  and differential   Result Value Ref Range    WBC Count 3.1 (L) 4.0 - 11.0 10e3/uL    RBC Count 2.33 (L) 3.80 - 5.20 10e6/uL    Hemoglobin 6.8 (LL) 11.7 - 15.7 g/dL    Hematocrit 22.6 (L) 35.0 - 47.0 %    MCV 97 78 - 100 fL    MCH 29.2 26.5 - 33.0 pg    MCHC 30.1 (L) 31.5 - 36.5 g/dL    RDW 21.4 (H) 10.0 - 15.0 %    Platelet Count 584 (H) 150 - 450 10e3/uL    % Neutrophils 49 %    % Lymphocytes 40 %    % Monocytes 7 %    % Eosinophils 2 %    % Basophils 1 %    % Immature Granulocytes 1 %    NRBCs per 100 WBC 2 (H) <1 /100    Absolute Neutrophils 1.5 (L) 1.6 - 8.3 10e3/uL    Absolute Lymphocytes 1.3 0.8 - 5.3 10e3/uL    Absolute Monocytes 0.2 0.0 - 1.3 10e3/uL    Absolute Eosinophils 0.1 0.0 - 0.7 10e3/uL    Absolute Basophils 0.0 0.0 - 0.2 10e3/uL    Absolute Immature Granulocytes 0.0 <=0.4 10e3/uL    Absolute NRBCs 0.1 10e3/uL   Adult Type and Screen   Result Value Ref Range    ABO/RH(D) A POS     Antibody Screen Negative Negative    SPECIMEN EXPIRATION DATE [id number]    INR   Result Value Ref Range    INR 1.51 (H) 0.85 - 1.15   Partial thromboplastin time   Result Value Ref Range    aPTT 37 22 - 38 Seconds   Prepare red blood cells (unit)   Result Value Ref Range    Blood Component Type Red Blood Cells     Product Code S1085F20     Unit Status Transfused     Unit Number [id number]     CROSSMATCH Compatible     CODING SYSTEM [id number]     ISSUE DATE AND TIME 83389802427491     UNIT ABO/RH A+     UNIT TYPE ISBT 6200    Prepare red blood cells (unit)   Result Value Ref Range    Blood Component Type Red Blood Cells     Product Code M9154J69     Unit Status Transfused     Unit Number [id number]     CROSSMATCH Compatible     CODING SYSTEM KWVJ259     ISSUE DATE AND TIME [id number]     UNIT ABO/RH A+     UNIT TYPE ISBT 6200    CT Abdomen Pelvis w Contrast    Narrative    CT ABDOMEN PELVIS W CONTRAST 9/15/2023 2:16 PM    CLINICAL HISTORY: Lower abdominal pain. Anemia.    TECHNIQUE: CT scan of the abdomen and  pelvis was performed following  injection of IV contrast. Multiplanar reformats were obtained. Dose  reduction techniques were used.  CONTRAST: Isovue-370, 60mL    COMPARISON: 8/29/2023, 3/23/2018, 6/9/2016    FINDINGS:   LOWER CHEST: Incompletely visualized moderate left and small right  pleural effusions. Interstitial opacities in both lung bases likely  representing a combination of fibrosis and possible superimposed edema  or infiltrate.    HEPATOBILIARY: No focal hepatic lesions. Cholelithiasis.    PANCREAS: Normal.    SPLEEN: Small benign splenic cyst. No follow-up needed.    ADRENAL GLANDS: Normal.    KIDNEYS/BLADDER: Tiny subcentimeter cyst in the right kidney. No  follow-up needed. No hydronephrosis. The bladder is unremarkable.    BOWEL: Sigmoid diverticulosis. No evidence of active inflammation.  Bowel loops are normal caliber. Stool quantity in the colon is upper  range of average. No evidence of appendicitis.    PELVIC ORGANS: Hysterectomy. No free fluid. No large adnexal cysts or  masses.    ADDITIONAL FINDINGS: Diffuse calcified plaque in the abdominal aorta  without aneurysm. No adenopathy.    MUSCULOSKELETAL: Right hip replacement. There is a periprosthetic  fracture around the femoral component in the intratrochanteric region  is noted on the recent x-ray of 8/29/2023. Left hip replacement. The  bones are demineralized. Compression deformities of T11, T12, and L1  are of indeterminate age but likely subacute or chronic.      Impression    IMPRESSION:   1.  Prominent interstitial changes in both lungs likely representing a  combination of fibrosis with superimposed edema and/or infection.  2.  A moderate left and small right pleural effusion are likely  related.  3.  Sigmoid diverticulosis without active inflammation.  4.  Right hip replacement with acute intertrochanteric periprosthetic  fracture. This is unchanged in appearance from the x-rays of  8/29/2023.  5.  Compression deformities of T11,  T12, and L1 are indeterminate in  age. Subacute or chronic fractures are favored.    APOLINAR WALDROP MD         SYSTEM ID:  [id number]   Occult blood stool   Result Value Ref Range    Occult Blood Positive (A) Negative     *Note: Due to a large number of results and/or encounters for the requested time period, some results have not been displayed. A complete set of results can be found in Results Review.       Medications   pantoprazole (PROTONIX) IV push injection 40 mg (40 mg Intravenous $Given 9/15/23 4381)   Saline 100mL Bag (60 mLs Intravenous $Given 9/15/23 1406)   iopamidol (ISOVUE-370) solution 500 mL (60 mLs Intravenous $Given 9/15/23 1407)     Labs have come back and patient's hemoglobin is a little bit better than earlier today, went from 6.1-6.8.  Patient has been typed and crossed for 2 units of blood.  I think patient likely has an upper GI bleed, she did have some black stools that were Hemoccult positive here.  I did do a CT scan because she had some mild lower abdominal discomfort but there is no obvious intra-abdominal processes.  She did have some diverticulosis so she could have a diverticular bleed but I suspect with the melena it is more of an upper GI bleed.  Unfortunately we do not have GI available here over the weekend or anyone to do scopes so patient will need to be transferred.  I spoke to the hospitalist at [facility], Dr. Brown who will accept the patient in transfer.  Patient was given some IV Protonix here also.  Patient will be transferred at this time.    Assessments & Plan (with Medical Decision Making)  GI bleed     I have reviewed the nursing notes.    I have reviewed the findings, diagnosis, plan and need for follow up with the patient.      New Prescriptions    No medications on file       Final diagnoses:   Upper GI bleed   Anemia due to blood loss, acute       9/15/2023   [facility] EMERGENCY DEPT       Shad Martin MD  09/15/23 0624

## 2023-09-15 NOTE — MEDICATION SCRIBE - ADMISSION MEDICATION HISTORY
Medication Scribe Admission Medication History    Admission medication history is complete. The information provided in this note is only as accurate as the sources available at the time of the update.    Medication reconciliation/reorder completed by provider prior to medication history? No    Information Source(s): Facility ([facility]/) medication list/MAR via N/A    Pertinent Information:     Changes made to PTA medication list:  Added: Lidocaine 4% patch as per MAR   Deleted: Alprazolam 0.25 - order ended 9/5/23  Rivaroxaban 20 mg, taking 15 mg BID as per MAR, already in PTA med list   Changed: None    Medication Affordability:  Not including over the counter (OTC) medications, was there a time in the past 3 months when you did not take your medications as prescribed because of cost?: Unable to Assess    Allergies reviewed with patient and updates made in EHR: unable to assess    Medication History Completed By: DALIA BORJA 9/15/2023 3:08 PM    Prior to Admission medications    Medication Sig Last Dose Taking? Auth Provider Long Term End Date   acetaminophen (TYLENOL) 325 MG tablet Take 3 tablets (975 mg) by mouth every 8 hours 9/14/2023 at 1536 Yes José Miguel English APRN CNP No    atorvastatin (LIPITOR) 40 MG tablet Take 1 tablet (40 mg) by mouth daily 9/14/2023 at 0847 Yes José Miguel English APRN CNP Yes    calcium carbonate (TUMS) 500 MG chewable tablet Take 1 tablet (500 mg) by mouth 2 times daily as needed for heartburn Unknown at unknown Yes José Miguel English APRN CNP     cetirizine (ZYRTEC) 10 MG tablet Take 1 tablet (10 mg) by mouth daily 9/14/2023 at 0847 Yes José Miguel English APRN CNP     citalopram (CELEXA) 10 MG tablet Take 1 tablet (10 mg) by mouth At Bedtime 9/14/2023 at 1751 Yes José Miguel English APRN CNP Yes    clopidogrel (PLAVIX) 75 MG tablet Take 1 tablet (75 mg) by mouth daily 9/14/2023 at 0847 Yes José Miguel English APRN CNP Yes    Cranberry 500 MG CAPS Take 1 capsule (500 mg) by mouth 2 times  daily 9/14/2023 at 1751 Yes Augusto Meyer,      famotidine (PEPCID) 40 MG tablet Take 1 tablet (40 mg) by mouth daily 9/14/2023 at 0847 Yes José Miguel English APRN CNP No    isosorbide mononitrate (IMDUR) 60 MG 24 hr tablet Take 1 tablet (60 mg) by mouth daily 9/14/2023 at 0847 Yes José Miguel English APRN CNP Yes    Lactobacillus (FLORAJEN ACIDOPHILUS) CAPS Take 1 capsule by mouth daily 9/14/2023 at 0847 Yes José Miguel English APRN CNP     levETIRAcetam (KEPPRA) 500 MG tablet Take 1 tablet (500 mg) by mouth 2 times daily 9/14/2023 at 1751 Yes José Miguel English APRN CNP Yes    levothyroxine (SYNTHROID/LEVOTHROID) 88 MCG tablet Take 1 tablet (88 mcg) by mouth daily at 2 pm 9/14/2023 at 1412 Yes José Miguel English APRN CNP Yes    Lidocaine (LIDOCARE) 4 % Patch Place 1 patch onto the skin 2 times daily 1 patch topical, Twice a day, Place 1 patch onto the skin every 24 hours to prevent lidocaine toxicity, patient should be patch free for 12 hours daily. Apply patch to RIGHT HIP. To prevent toxicity, patient should be patch free for 12 hours daily. Patches may be cut to smaller size priorto releasing liner. Reminder: Remove previous patch before applying new patch. NEVER APPLY HEAT OVER PATCH which increases absorption and may lead to local anesthetic toxicity. Do not apply over area where liposomal bupivacaine was injected 96 hours post injection. 9/14/2023 at 2155 Yes Reported, Patient No    lisinopril (ZESTRIL) 5 MG tablet Take 1 tablet (5 mg) by mouth daily 9/14/2023 at 0847 Yes José Miguel English APRN CNP Yes    loperamide (IMODIUM) 2 MG capsule Take 1 capsule (2 mg) by mouth 4 times daily as needed for diarrhea Unknown Yes José Miguel English APRN CNP     metoprolol succinate ER (TOPROL XL) 50 MG 24 hr tablet Take 1 tablet (50 mg) by mouth 2 times daily  Patient taking differently: Take 50 mg by mouth daily 9/14/2023 at 0847 Yes Black, José Miguel J, APRN CNP Yes    mirtazapine (REMERON) 30 MG tablet Take 1  tablet (30 mg) by mouth At Bedtime 9/14/2023 at 1751 Yes José Miguel English APRN CNP Yes    nitroFURantoin macrocrystal-monohydrate (MACROBID) 100 MG capsule Take 1 capsule (100 mg) by mouth At Bedtime 9/14/2023 at 1751 Yes José Miguel English APRN CNP No    nitroGLYcerin (NITROSTAT) 0.4 MG sublingual tablet Place 1 tablet (0.4 mg) under the tongue every 5 minutes as needed for chest pain (pt may self administer as needed) For chest pain place 1 tablet under the tongue every 5 minutes for 3 doses. If symptoms persist 5 minutes after 1st dose call 911. Unknown Yes José Miguel English APRN CNP Yes    polyethylene glycol (MIRALAX) 17 GM/Dose powder Take 17 g by mouth daily 9/14/2023 at 0847 Yes José Miguel English APRN CNP     predniSONE (DELTASONE) 2.5 MG tablet Take 1 tablet (2.5 mg) by mouth daily 9/14/2023 at 0847 Yes José Miguel English APRN CNP     RESTASIS 0.05 % ophthalmic emulsion Place 1 drop Into the left eye 2 times daily 9/14/2023 at 1536 Yes José Miguel English APRN CNP     rivaroxaban ANTICOAGULANT (XARELTO) 15 MG TABS tablet Take 1 tablet (15 mg) by mouth 2 times daily (with meals) for 21 days 9/14/2023 at 1751 Yes José Miguel English APRN CNP Yes 9/20/23   senna-docusate (SENOKOT-S/PERICOLACE) 8.6-50 MG tablet Take 1 tablet by mouth 2 times daily 9/14/2023 at 1751 Yes José Miguel English APRN CNP     SLOW  (45 Fe) MG CR tablet Take 1 tablet (142 mg) by mouth daily  Patient taking differently: Take 142 mg by mouth 2 times daily 9/14/2023 at 0847 Yes José Miguel English APRN CNP     triamcinolone (KENALOG) 0.5 % external ointment Apply 1 g topically 2 times daily 9/14/2023 at 0929 Yes José Miguel English APRN CNP     methotrexate 2.5 MG tablet Take 4 tablets (10 mg) by mouth once a week  Patient taking differently: Take 10 mg by mouth once a week Sundays 9/10/2023 at 0939  José Miguel English, APRN CNP Yes

## 2023-09-15 NOTE — TELEPHONE ENCOUNTER
[facility] Lab Note     Provider: COLTON Nixon  Facility: [facility] Facility Type:  TCU    Allergies   Allergen Reactions    Penicillins Hives     Other Reaction(s): Not available    Caffeine Other (See Comments) and Unknown     Triggers your Meniere's disease    Gold     Hydrocodone Other (See Comments) and Unknown     hallucinations    Ibuprofen GI Disturbance and Unknown    Other Environmental Allergy      Metals of unspecific kind    Tramadol Other (See Comments)     Seizure, was taking remeron along with tramadol    Metal [Staples] Rash       Labs Reviewed by provider: Heme 1, iron studies, BMP      Nurse is reporting that patient is having profound lethargy since TCU admission.  Stools are noted to be dark, but unclear if it's because of the iron supplement she is taking.  Staff have not obtained VS yet.  There is an IV nurse out there to place a line for IV fluids, however it is unclear if they were successful at placing the line today.         Verbal Order/Direction given by Provider: Send patient to the ER due to critical anemia and profound lethargy.      Provider giving Order:  COLTON Nixon    Verbal Order given to: Mel([phone])    Mateusz Grant RN

## 2023-09-15 NOTE — ED NOTES
Bed: ED12  Expected date:   Expected time:   Means of arrival:   Comments:  Candy [id number]  89yr   Off labs

## 2023-09-15 NOTE — ED TRIAGE NOTES
"Pt arrivies via ems from [facility] with concerns of \"abnormal labs\" anemia, loss of appetite, weight loss and fatigue x 2-3 days. Denies fevers, CP, Abd pain.       Triage Assessment       Row Name 09/15/23 111       Triage Assessment (Adult)    Airway WDL WDL       Respiratory WDL    Respiratory WDL WDL       Cardiac WDL    Cardiac WDL WDL                    "

## 2023-09-16 NOTE — CODE/RAPID RESPONSE
"[facility]    House [facility] RRT Note  9/16/2023   Time Called: 23:42 pm    RRT called for: new afib     On my arrival Ms Burk is lying in hospital bed. Appears lethargic and frail. Eyes opened spontaneously and remains fully oriented. , BP 60/41, RR 18, SpO2 95% on RA.      Assessment & Plan     Atrial fibrillation with RVR  Hemodynamic instability   Shock   Etiology unclear hemorrhagic vs cardiogenic vs hypovolemic secondary to dehydration     Confirmed with patient and daughter she is DNR/DNI and therefore did not emergently cardiovert as this is considered a resuscitative effort. However, patient and daughter are interested in attempting medications.    Telemetry revived via [employer] showing atrial fibrillation since telemetry applied on admission earlier this evening. Rates increased to afib RVR at 22:58 rate 170's from the 90's. Patient has been on bedrest. No evidence of bloody/dark stool or emesis. Patient denies chest pain, palpitations or shortness of breath. No tenderness to palpation of abdomen, bilateral ribs, hips or legs. Negative ROS other than her fragile, papery skin hurts with multiple IV attempts. Skin is warm and dry, no mottling. No urine output since admission to the unit. Pulses are faint and thready. BP assess in multiple sites. Despite low BP readings Ms. Burk is able to say \"I'm going to be alright\" and reads the clock on the wall across the room accurately.  Attempted to stabilize with volume and antiarrhythmic however given ongoing instability for ~2hrs and initiation of vasopressor pt was ultimately transferred to the ICU.       INTERVENTIONS:  -EKG; confirming rapid a fib with some ST depression   -BMP, Mag, CBC, Trop, LA, coags ordered   -  -1 L LR   -5 mg IV metoprolol once   -2g IV magnesium once   -12:00 am called daughter Hanna; confirmed DNR/DNI status however would want life supporting IV medications (antiarrhythmics and " pressors) and transfer to ICU if necessary.   -12:09 am called overnight Intensivist Dr. Kay to update on situation and likely ICU transfer. Digoxin load per her recommendation   -250 mcg digoxin STAT   -amiodarone bolus and infusion    -Phenylephrine infusion ordered to by on standby   -one unit PRBCs STAT given ongoing hemodynamic instability, unable obtain labs  -Orders for echo placed   -Orders placed for transfer to ICU   -Intensivist consult placed   -01:02 am Updated Dr. Kay on continued instability and need for pressors   -01:05 am Updated daughter Hanna   -01:14 am able to collect labs via PIV site   -Troponin 333 will ordered 2 hr delta could be considered demand ischemia       Working diagnosis: afib with RVR secondary to ?hypovolemic shock vs hemorrhagic shock vs ACS     At the end of the RRT transferred to ICU hemodynamically stable following volume resuscitation  and phenylephrine gtt infusing.     Disposition ICU    Discussed with and defer further cares to ICU attending physician Dr. Kay     Interval History     Gillian Burk is a 89 year old female who has medical history which includes hypertension, hyperlipidemia, history of coronary artery disease, hypothyroidism, rheumatoid arthritis, anxiety, history of seizure disorder, chronic UTIs and is on Macrodantin as prophylaxis, history of CVA, TAVR, ischemic cardiomyopathy, recently diagnosed DVT of the left lower extremity and is currently on 15 mg twice daily of Xarelto for 3 weeks, right hip fracture secondary to fall with mildly displaced periprosthetic fracture involving the right trochanter who is currently at rehab and presented to outside hospital as she has been feeling more fatigue and her hemoglobin was found to be lower at 7 and she has been having dark stools for the past week.      Code Status: No CPR- Do NOT Intubate    Allergies   Allergies   Allergen Reactions    Penicillins Hives     Other Reaction(s): Not  available    Caffeine Other (See Comments) and Unknown     Triggers your Meniere's disease    Gold     Hydrocodone Other (See Comments) and Unknown     hallucinations    Ibuprofen GI Disturbance and Unknown    Other Environmental Allergy      Metals of unspecific kind    Tramadol Other (See Comments)     Seizure, was taking remeron along with tramadol    Metal [Staples] Rash       Physical Exam   Vital Signs with Ranges:  Temp:  [97.3  F (36.3  C)-98.8  F (37.1  C)] 97.7  F (36.5  C)  Pulse:  [] 87  Resp:  [9-29] 18  BP: ()/(41-89) 64/41  SpO2:  [96 %-99 %] 96 %  No intake/output data recorded.    Physical Exam  Constitutional:       General: She is not in acute distress.     Appearance: She is cachectic. She is not diaphoretic.   HENT:      Right Ear: Decreased hearing noted.      Left Ear: Decreased hearing noted.      Ears:      Comments: Left hearing aid in place     Mouth/Throat:      Mouth: Mucous membranes are dry.   Eyes:      Pupils: Pupils are equal, round, and reactive to light.   Cardiovascular:      Rate and Rhythm: Tachycardia present. Rhythm irregular.      Pulses: Normal pulses.      Heart sounds: Normal heart sounds. No murmur heard.  Pulmonary:      Effort: Pulmonary effort is normal. No respiratory distress.   Abdominal:      General: Bowel sounds are normal. There is no distension.      Palpations: Abdomen is soft.      Tenderness: There is no abdominal tenderness.   Musculoskeletal:         General: Normal range of motion.      Comments: Denies pain to bilateral flanks, hips, or knees    Skin:     General: Skin is warm and dry.      Findings: Abrasion and bruising present.      Comments: Paper, fragile skin, with multiple bruises from IV sticks    Neurological:      Mental Status: She is lethargic.           Data     EKG:  Interpreted by Shasha Melara NP  Time reviewed: 0000  Symptoms at time of EKG: new a fib with hypotension    Rhythm: atrial fibrillation - rapid  Rate:  Tachycardia and 140-150  Axis: Normal  Ectopy: none  Conduction: normal  ST Segments/ T Waves: ST Segment Depression   Q Waves: none  Comparison to prior: changed from May 2021    Clinical Impression: atrial fibrillation (new onset)      IMAGING: (X-ray/CT/MRI)   Recent Results (from the past 24 hour(s))   CT Abdomen Pelvis w Contrast    Narrative    CT ABDOMEN PELVIS W CONTRAST 9/15/2023 2:16 PM    CLINICAL HISTORY: Lower abdominal pain. Anemia.    TECHNIQUE: CT scan of the abdomen and pelvis was performed following  injection of IV contrast. Multiplanar reformats were obtained. Dose  reduction techniques were used.  CONTRAST: Isovue-370, 60mL    COMPARISON: 8/29/2023, 3/23/2018, 6/9/2016    FINDINGS:   LOWER CHEST: Incompletely visualized moderate left and small right  pleural effusions. Interstitial opacities in both lung bases likely  representing a combination of fibrosis and possible superimposed edema  or infiltrate.    HEPATOBILIARY: No focal hepatic lesions. Cholelithiasis.    PANCREAS: Normal.    SPLEEN: Small benign splenic cyst. No follow-up needed.    ADRENAL GLANDS: Normal.    KIDNEYS/BLADDER: Tiny subcentimeter cyst in the right kidney. No  follow-up needed. No hydronephrosis. The bladder is unremarkable.    BOWEL: Sigmoid diverticulosis. No evidence of active inflammation.  Bowel loops are normal caliber. Stool quantity in the colon is upper  range of average. No evidence of appendicitis.    PELVIC ORGANS: Hysterectomy. No free fluid. No large adnexal cysts or  masses.    ADDITIONAL FINDINGS: Diffuse calcified plaque in the abdominal aorta  without aneurysm. No adenopathy.    MUSCULOSKELETAL: Right hip replacement. There is a periprosthetic  fracture around the femoral component in the intratrochanteric region  is noted on the recent x-ray of 8/29/2023. Left hip replacement. The  bones are demineralized. Compression deformities of T11, T12, and L1  are of indeterminate age but likely subacute or  chronic.      Impression    IMPRESSION:   1.  Prominent interstitial changes in both lungs likely representing a  combination of fibrosis with superimposed edema and/or infection.  2.  A moderate left and small right pleural effusion are likely  related.  3.  Sigmoid diverticulosis without active inflammation.  4.  Right hip replacement with acute intertrochanteric periprosthetic  fracture. This is unchanged in appearance from the x-rays of  8/29/2023.  5.  Compression deformities of T11, T12, and L1 are indeterminate in  age. Subacute or chronic fractures are favored.    APOLINAR WALDROP MD         SYSTEM ID:  [id number]       CBC with Diff:  Recent Labs   Lab 09/16/23  0447 09/16/23  0114 09/15/23  2152   WBC 8.5 4.2 3.7*   HGB 12.8 9.4*  9.4* 10.8*   HCT 36.3 28.1* 32.2*   MCV 94 95 94    421 470*       Lactic Acid:    Recent Labs   Lab 09/16/23 0114   LACT 2.9*         Comprehensive Metabolic Panel:  Recent Labs   Lab 09/16/23  0447 09/16/23  0114 09/15/23  2347 09/15/23  1136    138  --  141   POTASSIUM 5.0 4.5  --  4.6   CHLORIDE 108* 108*  --  109*   CO2 17* 20*  --  23   ANIONGAP 11 10  --  9   * 139*   < > 97   BUN 22.8 21.4  --  27.7*   CR 0.69 0.68  --  0.66   GFRESTIMATED 79 79  --  80   CHEYANNE 7.3* 7.2*  --  7.3*   MAG  --  2.3  --   --    PROTTOTAL  --   --   --  5.5*   ALBUMIN  --   --   --  2.0*   BILITOTAL  --   --   --  0.2   ALKPHOS  --   --   --  149*   AST  --   --   --  27   ALT  --   --   --  12    < > = values in this interval not displayed.       INR:    Recent Labs   Lab Test 09/16/23 0116   INR 1.26*       Time Spent on this Encounter   I spent 90 minutes (2342  0113) of critical care time on the unit/floor managing the care of Gillian Burk. Upon evaluation, this patient had a high probability of imminent or life-threatening deterioration due to shock, which required my direct attention, intervention, and personal management including lab studies, antiarrythmic  medications, blood transfusion and transfer to ICU. 100% of my time was spent at the bedside counseling the patient and/or coordinating care regarding services listed in this note.    Shasha Melara NP  [facility]  Securely message with the Vocera Web Console (learn more here)  Text page via [facility] Paging/Directory

## 2023-09-16 NOTE — PROGRESS NOTES
Critical Care Progress Note      09/16/2023    Name: Gillian Burk MRN#: [id number]   Age: 89 year old YOB: 1927     Hsptl Day# 1  ICU DAY #    MV DAY #             Problem List:   Principal Problem:    Anemia           Summary/Hospital Course:     sandra Burk is a 89 year old female who has medical history which includes hypertension, hyperlipidemia, history of coronary artery disease, hypothyroidism, rheumatoid arthritis, anxiety, history of seizure disorder, chronic UTIs and is on Macrodantin as prophylaxis, history of CVA, TAVR, ischemic cardiomyopathy, recently diagnosed DVT of the left lower extremity and is currently on 15 mg twice daily of Xarelto for 3 weeks, right hip fracture secondary to fall with mildly displaced periprosthetic fracture involving the right trochanter who is currently at rehab and presented to outside hospital as she has been feeling more fatigue and her hemoglobin was found to be lower at 7 and she has been having dark stools for the past week.     -9/16/23  - She was given blood transfusion with improved HB. Her bedside echo showed significant cardiomyopathy as well as possible pericardial effusion.   - She is requiring pressers suggesting a cardiogenic shock most likely stress cardiomyopathy.        Assessment and plan :     Gillian Burk IS a 89 year old female admitted on 9/15/2023 for  stress cardiomyopathy and Gi bleeding.   I have personally reviewed the daily labs, imaging studies, cultures and discussed the case with referring physician and consulting physicians.     My assessment and plan by system for this patient is as follows:    Neurology/Psychiatry:   Seizure disorder  -We will continue the patient with PTA Keppra when verified by pharmacy    Cardiovascular:   History of coronary artery disease that is PCI to LAD in 2/2021  Hypertension  Hyperlipidemia  -We will hold amlodipine, lisinopril and Imdur h metoprolol and  atorvastatin      #Stress cardiomyopathy noted on echo,   #Localized pericardial effusion.   - On yennifer. And amio darip. Avoid tachycardia.   -Ivc plump avoid further fluids.   -Cardiology consult placed     Pulmonary/Ventilator Management:   -Room air.    GI and Nutrition :   -Melena. Significant drop in HB>   -PPI  -Gi consulted.  -Not a good candidate for EGD  -Transfuse as needed     Renal/Fluids/Electrolytes:   - NAd    Infectious Disease:   -AND    Endocrine:   -ISS    Hematology/Oncology:   -Blood loss anemia. S/p transfusion. Continue to follow.       IV/Access:   1. Venous access -  PICC getting palced       ICU Prophylaxis:   1. DVT: mechanical  2. Stress Ulcer: PPI  3. Restraints: No  4. Feeding - As toelrated   5. Family Update: Daughter at bedside and another daughter on phone. Understand the severity of situation. Continue with current care but if worsening is noted than consider comfort measure then   6. Disposition -  To stay in ICU              [place] Medications:      acetaminophen  975 mg Oral Q8H    artificial tears  1 drop Left Eye BID    citalopram  10 mg Oral At Bedtime    folic acid  1 mg Oral Daily    hydrocortisone sodium succinate PF  50 mg Intravenous Q6H    levETIRAcetam  500 mg Intravenous Q12H    Lidocaine  1 patch Transdermal Q24H    [Held by provider] mirtazapine  30 mg Oral At Bedtime    nitroFURantoin macrocrystal-monohydrate  100 mg Oral At Bedtime    pantoprazole  40 mg Intravenous BID    sodium chloride (PF)  3 mL Intracatheter Q8H      amiodarone 0.5 mg/min (09/16/23 0800)    phenylephrine 1 mcg/kg/min (09/16/23 1000)               Physical Examination:   Temp:  [97.3  F (36.3  C)-98.8  F (37.1  C)] 98.1  F (36.7  C)  Pulse:  [] 118  Resp:  [9-29] 17  BP: ()/(24-89) 100/65  SpO2:  [87 %-99 %] 90 %    Intake/Output Summary (Last 24 hours) at 9/16/2023 1218  Last data filed at 9/16/2023 1000  Gross per 24 hour   Intake 1449.97 ml   Output --   Net 1449.97 ml     Wt  Readings from Last 4 Encounters:   09/16/23 50.8 kg (111 lb 15.9 oz)   09/15/23 49.5 kg (109 lb 1.6 oz)   09/13/23 54.9 kg (121 lb)   09/11/23 47.4 kg (104 lb 9.6 oz)     BP - Mean:  [] 76  Resp: 17    No lab results found in last 7 days.    GEN: no acute distress   HEENT: head ncat, sclera anicteric, OP patent, trachea midline   PULM: clear anteriorly    CV/COR: RRR S1S2 no gallop,  No rub, no murmur  ABD: soft nontender, hypoactive bowel sounds, no mass  EXT:   Purple extremity is noted.   NEURO: grossly intact  SKIN: no obvious rash  LINES: clean, dry intact         Data:   All data and imaging reviewed     ROUTINE ICU LABS (Last four results)  CMP  Recent Labs   Lab 09/16/23 0447 09/16/23 0114 09/15/23  2347 09/15/23  1136 09/15/23  0712    138  --  141 140   POTASSIUM 5.0 4.5  --  4.6 4.4   CHLORIDE 108* 108*  --  109* 111*   CO2 17* 20*  --  23 24   ANIONGAP 11 10  --  9 5*   * 139* 128* 97 80   BUN 22.8 21.4  --  27.7* 28.9*   CR 0.69 0.68  --  0.66 0.69   GFRESTIMATED 79 79  --  80 79   CHEYANNE 7.3* 7.2*  --  7.3* 7.2*   MAG  --  2.3  --   --   --    PROTTOTAL 5.6*  --   --  5.5*  --    ALBUMIN 1.9*  --   --  2.0*  --    BILITOTAL 0.5  --   --  0.2  --    ALKPHOS 240*  --   --  149*  --    AST 60*  --   --  27  --    ALT 19  --   --  12  --      CBC  Recent Labs   Lab 09/16/23 0447 09/16/23  0114 09/15/23  2152 09/15/23  1136   WBC 8.5 4.2 3.7* 3.1*   RBC 3.85 2.96* 3.44* 2.33*   HGB 12.8 9.4*  9.4* 10.8* 6.8*   HCT 36.3 28.1* 32.2* 22.6*   MCV 94 95 94 97   MCH 33.2* 31.8 31.4 29.2   MCHC 35.3 33.5 33.5 30.1*   RDW 20.2* 19.6* 19.3* 21.4*    421 470* 584*     INR  Recent Labs   Lab 09/16/23  0116 09/15/23  1156   INR 1.26* 1.51*     Arterial Blood GasNo lab results found in last 7 days.    All cultures:  No results for input(s): CULT in the last 168 hours.  Recent Results (from the past 24 hour(s))   CT Abdomen Pelvis w Contrast    Narrative    CT ABDOMEN PELVIS W CONTRAST  9/15/2023 2:16 PM    CLINICAL HISTORY: Lower abdominal pain. Anemia.    TECHNIQUE: CT scan of the abdomen and pelvis was performed following  injection of IV contrast. Multiplanar reformats were obtained. Dose  reduction techniques were used.  CONTRAST: Isovue-370, 60mL    COMPARISON: 8/29/2023, 3/23/2018, 6/9/2016    FINDINGS:   LOWER CHEST: Incompletely visualized moderate left and small right  pleural effusions. Interstitial opacities in both lung bases likely  representing a combination of fibrosis and possible superimposed edema  or infiltrate.    HEPATOBILIARY: No focal hepatic lesions. Cholelithiasis.    PANCREAS: Normal.    SPLEEN: Small benign splenic cyst. No follow-up needed.    ADRENAL GLANDS: Normal.    KIDNEYS/BLADDER: Tiny subcentimeter cyst in the right kidney. No  follow-up needed. No hydronephrosis. The bladder is unremarkable.    BOWEL: Sigmoid diverticulosis. No evidence of active inflammation.  Bowel loops are normal caliber. Stool quantity in the colon is upper  range of average. No evidence of appendicitis.    PELVIC ORGANS: Hysterectomy. No free fluid. No large adnexal cysts or  masses.    ADDITIONAL FINDINGS: Diffuse calcified plaque in the abdominal aorta  without aneurysm. No adenopathy.    MUSCULOSKELETAL: Right hip replacement. There is a periprosthetic  fracture around the femoral component in the intratrochanteric region  is noted on the recent x-ray of 8/29/2023. Left hip replacement. The  bones are demineralized. Compression deformities of T11, T12, and L1  are of indeterminate age but likely subacute or chronic.      Impression    IMPRESSION:   1.  Prominent interstitial changes in both lungs likely representing a  combination of fibrosis with superimposed edema and/or infection.  2.  A moderate left and small right pleural effusion are likely  related.  3.  Sigmoid diverticulosis without active inflammation.  4.  Right hip replacement with acute intertrochanteric  periprosthetic  fracture. This is unchanged in appearance from the x-rays of  2023.  5.  Compression deformities of T11, T12, and L1 are indeterminate in  age. Subacute or chronic fractures are favored.    APOLINAR WALDROP MD         SYSTEM ID:  [id number]   Echo Limited   Result Value    LVEF  20-25%    Narrative    [id number]  [id number]  [id number]  [id number]^KING^LARISSA     [facility]  Echocardiography Laboratory  [facility] [place] [address]     Name: LUCIO HANNON  MRN: [id number]  : 1927  Study Date: 2023 08:56 AM  Age: 89 yrs  Gender: Female  Patient Location: [facility]  Reason For Study: Tamponade, Shock  Ordering Physician: LARISSA KING  Referring Physician: NITA DE LA CRUZ  Performed By: Pedro Serrano     BSA: 1.6 m2  Height: 66 in  Weight: 111 lb  HR: 118  BP: 103/61 mmHg  ______________________________________________________________________________  Procedure  Limited Portable Echo Adult.  ______________________________________________________________________________  Interpretation Summary     This was a stat echo in the ICU at [facility]. Technically very  difficult study.     LV size is normal however there is severe mechanical dyssynchrony. Findings  are also suggestive of potentially stress-induced cardiomyopathy. Mid to  distal portions of the left ventricle are essentially akinetic. Estimated  ejection fraction is probably around 25%. No contrast of biplane available.  Normal right ventricular size and systolic function.  Severe mitral annular calcification. Evaluation of MS inadequate on the study.  There is a bioprosthetic aortic valve per history. This is not well  visualized. No hemodynamically significant stenosis noted on that on Doppler  IVC severely dilated consistent with increased right-sided filling pressures.  The pericardial space suggests organizing pericardial effusion worse than  prior. No significant free fluid noted in the  pericardium     Findings communicated to ICU attending at [facility].     ______________________________________________________________________________  Left Ventricle  The left ventricle is normal in size. The visual ejection fraction is 20-25%.  Septal motion is consistent with conduction abnormality. There is apical  akinesis.     Right Ventricle  The right ventricle is normal in size and function.     Atria  The left atrium is not well visualized. The left atrium is mild to moderately  dilated. The right atrium is mild to moderately dilated.     Mitral Valve  There is severe mitral annular calcification. The mitral valve leaflets are  severely thickened.     Tricuspid Valve  The tricuspid valve is not well visualized, but is grossly normal. Right  ventricular systolic pressure could not be approximated due to inadequate  tricuspid regurgitation.     Aortic Valve  The aortic valve is not well visualized. No hemodynamically significant  valvular aortic stenosis. There is a bioprosthetic aortic valve.     Pulmonic Valve  The pulmonic valve is not well visualized.     Vessels  Dilation of the inferior vena cava is present with abnormal respiratory  variation in diameter. IVC diameter >2.1 cm collapsing <50% with sniff  suggests a high RA pressure estimated at 15 mmHg or greater.     Pericardium  The pericardial space suggests organizing pericardial effusion.     ______________________________________________________________________________  Doppler Measurements & Calculations  Ao V2 max: 96.1 cm/sec  Ao max P.0 mmHg  Ao V2 mean: 68.2 cm/sec  Ao mean P.1 mmHg  Ao V2 VTI: 13.5 cm     Ao acc time: 0.07 sec     ______________________________________________________________________________  Report approved by: Fahad Patterson 2023 10:24 AM               [facility]  CRITICAL CARE STAFF NOTE    I am managing these acute and ongoing critical issues resulting in critical condition that  impairs one or more vital organ systems, incur a high probability of imminent or life-threatening deterioration in the patient's condition and providing frequent personal assessment and manipulation of medications and life support equipment.     1. Cardiogenic shock   2.  Hemorrhagic shock.       ICU Interventions: parenteral medications necessitating continuous monitoring including vasoactive medications, medication for heart rhythm control, insulin infusion, and/or sedative/opiates  and interpretation of lab values, cardiac output, cxr, pulse oximetry, blood gases, and/or information/data stored in computers     We have discussed the patient in detail and I agree with the findings, assessment, and plan as documented when this note was cosigned on this day. The plan was formulated in conjunction with pharmacy, ICU nurses, and respiratory therapist. I have evaluated all laboratory values and imaging studies for the past 24 hours. I have reviewed all the consults that have been ordered and are active for this patient.      Critical Care Time: 110 min.  I spent this time (excluding procedures) personally providing and directing critical care services at the bedside and on the critical care unit.      Jelani MUNOZBS MPH   of Medicine  Pager: [phone]     Clinically Significant Risk Factors Present on Admission              # Hypoalbuminemia: Lowest albumin = 1.9 g/dL at 9/16/2023  4:47 AM, will monitor as appropriate  # Drug Induced Coagulation Defect: home medication list includes an anticoagulant medication  # Drug Induced Platelet Defect: home medication list includes an antiplatelet medication   # Hypertension: Noted on problem list  # Chronic heart failure with reduced ejection fraction: last echo with EF <40%  # Circulatory Shock: currently requiring pressors for blood pressure support     # Cachexia: Estimated body mass index is 17.54 kg/m  as calculated from the following:    Height as of  "9/11/23: 1.702 m (5' 7\").    Weight as of this encounter: 50.8 kg (111 lb 15.9 oz).            Code Status: No CPR- Do NOT Intubate             "

## 2023-09-16 NOTE — CONSULTS
Inpatient Cardiology Consultation.   [facility]  Date of Admission: 9/15/2023  Date of Consult: September 16, 2023    PRIMARY CARDIOLOGY TEAM:  Dr. Jones Lee MD.    REASON FOR CONSULT:  Atrial fibrillation with rapid ventricular rates.  Hypotension.  Cardiomyopathy.    DIAGNOSES/ASSESSMENT:  1.  New diagnosis of acute on chronic anemia secondary to GI bleed, anticoagulated on rivaroxaban for recent lower extremity deep vein thrombosis.  2.  New diagnosis of atrial fibrillation with rapid ventricular rates, secondary to #1, sinus rhythm restored with IV amiodarone.  Severe left atrial enlargement and moderate mixed mitral valve disease secondary to annular calcification on echocardiogram.  In sinus rhythm now.  Amiodarone has been appropriately held.  Too hypotensive for beta-blocker.  3.  Hypotension and hemodynamic instability secondary to #1 and #2, requiring inotropic support.  Still getting IV phenylephrine.  4.  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  Previous LVEF 55-60%.  Conservative management for now.  5.  Recent fall and right hip fracture.  6.  Status post transcatheter aortic valve replacement in 2016 with well-functioning bioprosthetic aortic valve and normal LVEF of 55-60%.  7.  Coronary artery disease without angina, status post LAD stenting in February 2021.  Minimal residual coronary artery disease.  8.  DNR/DNI CODE STATUS.  9.  Elderly frailty.  Patient is 89 years old, with extreme frailty and poor muscle mass.    Patient is 89-year-old, has several risk factors for atrial fibrillation including mitral valve disease and severe left atrial enlargement. Her acute GI bleed, acute anemia, recent physical stressors including hip fracture and DVT have precipitated the atrial fibrillation.  Anemia addressed with blood transfusion.      Downstream, she has a class I indication for systemic anticoagulation for valvular  atrial fibrillation and DVT and will need clearance from GI.  If anticoagulation is contraindicated, she can be evaluated for Watchman procedure as an outpatient but it will not solve the problem of DVT anticoagulation.    PLAN:  1.  If she goes back into atrial fibrillation, restart IV amiodarone.  Too hypotensive for beta-blocker.  2.  She has an active bleed and agree with holding anticoagulation for now.  Needs GI clearance for long-term anticoagulation.  Rationale described above.  3.  Please transfuse to a hemoglobin of 10.0 or above given significant cardiac comorbidities.  4.  Plan of care discussed in detail with patient's daughter who was present at the bedside and patient's RN in the ICU.    5. Thank you for consulting cardiology.  We will follow.    Total critical care time today 45 minutes.      Constance Angel MD, MD [facility]  Cardiology      CODE STATUS:  No CPR- Do NOT Intubate      HISTORY OF PRESENT ILLNESS:  Gillian Burk is a 89 year old female anticoagulated on rivaroxaban for recent lower extremity DVT and right hip fracture secondary to fall, currently at rehab, who presented to to the outside hospital with acute on chronic anemia secondary to GI bleed/melena with a hemoglobin of 6.2.  Her history is significant for transcatheter aortic valve replacement in 2016 with a normally functioning bioprosthetic valve, coronary artery disease status post LAD stenting in February 2021, chronic left bundle branch block, hypertension, hyperlipidemia, hypothyroidism, rheumatoid arthritis, seizure disorder, anxiety disorder, chronic UTIs, history of stroke.  Patient is DNR/DNI.    In this context, she developed atrial fibrillation with rapid ventricular rates for which cardiology has been consulted.      Early this morning, patient became hypotensive, developed A-fib RVR with heart rates up to 170s, no chest pain or dyspnea and an RRT was called and patient was transferred to the ICU.  Echocardiogram  shows new systolic cardiomyopathy with LVEF of 25%, possible stress-induced cardiomyopathy.    She was started on IV inotropes and IV amiodarone, GI has been consulted, patient has been transfused 2 units of blood and her hemoglobin is 12.8.  She has reverted to sinus rhythm and IV amiodarone has been held.    I personally reviewed results of Labs - normal CBC with a hemoglobin of 12.8 following transfusion (on admission hemoglobin was 6.8), normal renal panel with a creatinine of 0.69, liver panel shows elevated opacities, elevated AST, severe hypoalbuminemia of 1.9.    I independently interpreted the ECG dated 9/15/2023 which shows chronic left bundle branch block, atrial fibrillation with rapid ventricular rates of 140-150 bpm.    I independently interpreted the transthoracic echocardiogram dated 9/16/2023.  Technically difficult images.  Shows possible stress-induced cardiomyopathy with LVEF of 25%.  Cardiac valves not well visualized.  No pericardial effusion.    I reviewed results of coronary angiogram dated 2/10/2021 showed single-vessel LAD stenosis that was successfully stented.  Minute minimal residual coronary artery disease.    On exam -appears very frail, lying in bed, barely opening eyes, heart sounds are regular.  No audible murmur.  Very poor muscle mass.        FAMILY HISTORY:  Family History   Problem Relation Age of Onset    Hyperlipidemia Mother     Family History Negative No family hx of     Hypertension Mother     Rheumatoid Arthritis Child        MEDICATIONS:  Prior to Admission Medications   Prescriptions Last Dose Informant Patient Reported? Taking?   Cranberry 500 MG CAPS 9/14/2023 at [facility] No Yes   Sig: Take 1 capsule (500 mg) by mouth 2 times daily   Lactobacillus (FLORAJEN ACIDOPHILUS) CAPS 9/14/2023 at [address] [facility] No Yes   Sig: Take 1 capsule by mouth daily   Lidocaine (LIDOCARE) 4 % Patch 9/14/2023 at [address] [facility] Yes Yes   Sig: Place 1 patch onto the skin 2  times daily 1 patch topical, Twice a day, Place 1 patch onto the skin every 24 hours to prevent lidocaine toxicity, patient should be patch free for 12 hours daily. Apply patch to RIGHT HIP. To prevent toxicity, patient should be patch free for 12 hours daily. Patches may be cut to smaller size priorto releasing liner. Reminder: Remove previous patch before applying new patch. NEVER APPLY HEAT OVER PATCH which increases absorption and may lead to local anesthetic toxicity. Do not apply over area where liposomal bupivacaine was injected 96 hours post injection.   RESTASIS 0.05 % ophthalmic emulsion 9/14/2023 at [address] [place] [address] No Yes   Sig: Place 1 drop Into the left eye 2 times daily   SLOW  (45 Fe) MG CR tablet 9/14/2023 at [address] [place] [address] No Yes   Sig: Take 1 tablet (142 mg) by mouth daily   Patient taking differently: Take 142 mg by mouth 2 times daily   acetaminophen (TYLENOL) 325 MG tablet 9/14/2023 at [address] [place] [address] No Yes   Sig: Take 3 tablets (975 mg) by mouth every 8 hours   atorvastatin (LIPITOR) 40 MG tablet 9/14/2023 at [address] [place] [address] No Yes   Sig: Take 1 tablet (40 mg) by mouth daily   calcium carbonate (TUMS) 500 MG chewable tablet Unknown at [facility] No Yes   Sig: Take 1 tablet (500 mg) by mouth 2 times daily as needed for heartburn   cetirizine (ZYRTEC) 10 MG tablet 9/14/2023 at [address] [place] [address] No Yes   Sig: Take 1 tablet (10 mg) by mouth daily   citalopram (CELEXA) 10 MG tablet 9/14/2023 at [address] [place] [address] No Yes   Sig: Take 1 tablet (10 mg) by mouth At Bedtime   clopidogrel (PLAVIX) 75 MG tablet 9/14/2023 at [address] [place] [address] No Yes   Sig: Take 1 tablet (75 mg) by mouth daily   famotidine (PEPCID) 40 MG tablet 9/14/2023 at [address] [place] [address] No Yes   Sig: Take 1 tablet (40 mg) by mouth daily   isosorbide mononitrate (IMDUR) 60 MG 24 hr tablet 9/14/2023 at [address] [place] [address] No Yes   Sig: Take 1 tablet (60 mg) by mouth daily   levETIRAcetam (KEPPRA) 500 MG tablet  9/14/2023 at [address] [place] [address] No Yes   Sig: Take 1 tablet (500 mg) by mouth 2 times daily   levothyroxine (SYNTHROID/LEVOTHROID) 88 MCG tablet 9/14/2023 at [address] [place] [address] No Yes   Sig: Take 1 tablet (88 mcg) by mouth daily at 2 pm   lisinopril (ZESTRIL) 5 MG tablet 9/14/2023 at [address] [place] [address] No Yes   Sig: Take 1 tablet (5 mg) by mouth daily   loperamide (IMODIUM) 2 MG capsule Unknown at [facility] No Yes   Sig: Take 1 capsule (2 mg) by mouth 4 times daily as needed for diarrhea   methotrexate 2.5 MG tablet 9/10/2023 at [address] [place] [address] No Yes   Sig: Take 4 tablets (10 mg) by mouth once a week   Patient taking differently: Take 10 mg by mouth once a week Sundays   metoprolol succinate ER (TOPROL XL) 50 MG 24 hr tablet 9/14/2023 at [address] [place] [address] No Yes   Sig: Take 1 tablet (50 mg) by mouth 2 times daily   Patient taking differently: Take 50 mg by mouth daily   mirtazapine (REMERON) 30 MG tablet 9/14/2023 at [address] [place] [address] No Yes   Sig: Take 1 tablet (30 mg) by mouth At Bedtime   nitroFURantoin macrocrystal-monohydrate (MACROBID) 100 MG capsule 9/14/2023 at [address] [place] [address] No Yes   Sig: Take 1 capsule (100 mg) by mouth At Bedtime   nitroGLYcerin (NITROSTAT) 0.4 MG sublingual tablet Unknown at [facility] No Yes   Sig: Place 1 tablet (0.4 mg) under the tongue every 5 minutes as needed for chest pain (pt may self administer as needed) For chest pain place 1 tablet under the tongue every 5 minutes for 3 doses. If symptoms persist 5 minutes after 1st dose call 911.   polyethylene glycol (MIRALAX) 17 GM/Dose powder 9/14/2023 at [address] [place] [address] No Yes   Sig: Take 17 g by mouth daily   predniSONE (DELTASONE) 2.5 MG tablet 9/14/2023 at [address] [place] [address] No Yes   Sig: Take 1 tablet (2.5 mg) by mouth daily   rivaroxaban ANTICOAGULANT (XARELTO) 15 MG TABS tablet 9/14/2023 at [address] [place] [address] No Yes   Sig: Take 1 tablet (15 mg) by mouth 2 times daily (with meals) for 21 days   senna-docusate  (SENOKOT-S/PERICOLACE) 8.6-50 MG tablet 9/14/2023 at [address] No Yes   Sig: Take 1 tablet by mouth 2 times daily   triamcinolone (KENALOG) 0.5 % external ointment 9/14/2023 at 09[address] [facility] No Yes   Sig: Apply 1 g topically 2 times daily      Facility-Administered Medications: None       HOME MEDICATIONS:  Prior to Admission Medications   Prescriptions Last Dose Informant Patient Reported? Taking?   Cranberry 500 MG CAPS 9/14/2023 at [address] [facility] No Yes   Sig: Take 1 capsule (500 mg) by mouth 2 times daily   Lactobacillus (FLORAJEN ACIDOPHILUS) CAPS 9/14/2023 at [address] [place] [address] No Yes   Sig: Take 1 capsule by mouth daily   Lidocaine (LIDOCARE) 4 % Patch 9/14/2023 at [address] [facility] Yes Yes   Sig: Place 1 patch onto the skin 2 times daily 1 patch topical, Twice a day, Place 1 patch onto the skin every 24 hours to prevent lidocaine toxicity, patient should be patch free for 12 hours daily. Apply patch to RIGHT HIP. To prevent toxicity, patient should be patch free for 12 hours daily. Patches may be cut to smaller size priorto releasing liner. Reminder: Remove previous patch before applying new patch. NEVER APPLY HEAT OVER PATCH which increases absorption and may lead to local anesthetic toxicity. Do not apply over area where liposomal bupivacaine was injected 96 hours post injection.   RESTASIS 0.05 % ophthalmic emulsion 9/14/2023 at [address] [facility] No Yes   Sig: Place 1 drop Into the left eye 2 times daily   SLOW  (45 Fe) MG CR tablet 9/14/2023 at [address] [place] [address] No Yes   Sig: Take 1 tablet (142 mg) by mouth daily   Patient taking differently: Take 142 mg by mouth 2 times daily   acetaminophen (TYLENOL) 325 MG tablet 9/14/2023 at [address] [facility] No Yes   Sig: Take 3 tablets (975 mg) by mouth every 8 hours   atorvastatin (LIPITOR) 40 MG tablet 9/14/2023 at [address] [place] [address] No Yes   Sig: Take 1 tablet (40 mg) by mouth daily   calcium carbonate (TUMS) 500 MG chewable tablet Unknown at  Unknown [facility] No Yes   Sig: Take 1 tablet (500 mg) by mouth 2 times daily as needed for heartburn   cetirizine (ZYRTEC) 10 MG tablet 9/14/2023 at [address] [place] [address] No Yes   Sig: Take 1 tablet (10 mg) by mouth daily   citalopram (CELEXA) 10 MG tablet 9/14/2023 at [address] [place] [address] No Yes   Sig: Take 1 tablet (10 mg) by mouth At Bedtime   clopidogrel (PLAVIX) 75 MG tablet 9/14/2023 at [address] [place] [address] No Yes   Sig: Take 1 tablet (75 mg) by mouth daily   famotidine (PEPCID) 40 MG tablet 9/14/2023 at [address] [place] [address] No Yes   Sig: Take 1 tablet (40 mg) by mouth daily   isosorbide mononitrate (IMDUR) 60 MG 24 hr tablet 9/14/2023 at [address] [place] [address] No Yes   Sig: Take 1 tablet (60 mg) by mouth daily   levETIRAcetam (KEPPRA) 500 MG tablet 9/14/2023 at [address] [place] [address] No Yes   Sig: Take 1 tablet (500 mg) by mouth 2 times daily   levothyroxine (SYNTHROID/LEVOTHROID) 88 MCG tablet 9/14/2023 at [address] [place] [address] No Yes   Sig: Take 1 tablet (88 mcg) by mouth daily at 2 pm   lisinopril (ZESTRIL) 5 MG tablet 9/14/2023 at [address] [place] [address] No Yes   Sig: Take 1 tablet (5 mg) by mouth daily   loperamide (IMODIUM) 2 MG capsule Unknown at [facility] No Yes   Sig: Take 1 capsule (2 mg) by mouth 4 times daily as needed for diarrhea   methotrexate 2.5 MG tablet 9/10/2023 at [address] [place] [address] No Yes   Sig: Take 4 tablets (10 mg) by mouth once a week   Patient taking differently: Take 10 mg by mouth once a week Sundays   metoprolol succinate ER (TOPROL XL) 50 MG 24 hr tablet 9/14/2023 at [address] [place] [address] No Yes   Sig: Take 1 tablet (50 mg) by mouth 2 times daily   Patient taking differently: Take 50 mg by mouth daily   mirtazapine (REMERON) 30 MG tablet 9/14/2023 at [address] [place] [address] No Yes   Sig: Take 1 tablet (30 mg) by mouth At Bedtime   nitroFURantoin macrocrystal-monohydrate (MACROBID) 100 MG capsule 9/14/2023 at [address] [place] [address] No Yes   Sig: Take 1 capsule (100 mg) by mouth At Bedtime   nitroGLYcerin  (NITROSTAT) 0.4 MG sublingual tablet Unknown at [facility] No Yes   Sig: Place 1 tablet (0.4 mg) under the tongue every 5 minutes as needed for chest pain (pt may self administer as needed) For chest pain place 1 tablet under the tongue every 5 minutes for 3 doses. If symptoms persist 5 minutes after 1st dose call 911.   polyethylene glycol (MIRALAX) 17 GM/Dose powder 9/14/2023 at [address] [place] [address] No Yes   Sig: Take 17 g by mouth daily   predniSONE (DELTASONE) 2.5 MG tablet 9/14/2023 at [address] [place] [address] No Yes   Sig: Take 1 tablet (2.5 mg) by mouth daily   rivaroxaban ANTICOAGULANT (XARELTO) 15 MG TABS tablet 9/14/2023 at [address] [place] [address] No Yes   Sig: Take 1 tablet (15 mg) by mouth 2 times daily (with meals) for 21 days   senna-docusate (SENOKOT-S/PERICOLACE) 8.6-50 MG tablet 9/14/2023 at [address] [place] [address] No Yes   Sig: Take 1 tablet by mouth 2 times daily   triamcinolone (KENALOG) 0.5 % external ointment 9/14/2023 at [address] [place] [address] No Yes   Sig: Apply 1 g topically 2 times daily      Facility-Administered Medications: None       ALLERGIES:  Allergies   Allergen Reactions    Penicillins Hives     Other Reaction(s): Not available    Caffeine Other (See Comments) and Unknown     Triggers your Meniere's disease    Gold     Hydrocodone Other (See Comments) and Unknown     hallucinations    Ibuprofen GI Disturbance and Unknown    Other Environmental Allergy      Metals of unspecific kind    Tramadol Other (See Comments)     Seizure, was taking remeron along with tramadol    Metal [Staples] Rash       PAST MEDICAL HISTORY:  Past Medical History:   Diagnosis Date    Aortic stenosis     s/p TAVR 8/17/16    Chronic migraine     Hyperlipidaemia     Hypertension     Hypothyroidism     Myocardial infarction (H)     Need for SBE (subacute bacterial endocarditis) prophylaxis     Orthostatic hypotension     wears compression stockings    PUD (peptic ulcer disease)     Secondary Sjogren's syndrome (H)     Seizures  (H)     after stroke (partial onset)    Seropositive rheumatoid arthritis (H)     Stroke (H) 05/2013    with visual field cut, left occipital lobe    Syncope 2014    due to orthostatic hypotension       PAST SURGICAL HISTORY:  Past Surgical History:   Procedure Laterality Date    ANOMALOUS PULMONARY VENOUS RETURN REPAIR, TOTAL      CATARACT EXTRACTION      CATARACT IOL, RT/LT Bilateral 2000    CV FLUOROSCOPY ONLY N/A 8/6/2019    Procedure: Fluoroscopy Only - valve cine done of aortic valve at 180 degrees [place] to JUNG;  Surgeon: Dave Farfan MD;  Location:  HEART CARDIAC CATH LAB    CV HEART CATHETERIZATION WITH POSSIBLE INTERVENTION N/A 2/10/2021    Procedure: Heart Catheterization with Possible Intervention;  Surgeon: Fish Padgett MD;  Location:  HEART CARDIAC CATH LAB    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    EYE SURGERY      HEART CATH FEMORAL CANNULIZATION WITH OPEN STANDBY REPAIR AORTIC VALVE N/A 8/3/2016    Procedure: HEART CATH FEMORAL CANNULIZATION WITH OPEN STANDBY REPAIR AORTIC VALVE;  Surgeon: Owen Schultz MD;  Location: [facility] OR    HYSTERECTOMY      HYSTERECTOMY TOTAL ABDOMINAL  1970    ovaries out    MANDIBLE FRACTURE SURGERY      MOUTH SURGERY  2011    jaw surgery due to fracture    OTHER SURGICAL HISTORY      Treva valve implant    TOTAL HIP ARTHROPLASTY      TRANSCATHETER AORTIC VALVE IMPLANT ANESTHESIA N/A 8/3/2016    Procedure: TRANSCATHETER AORTIC VALVE IMPLANT ANESTHESIA;  Surgeon: GENERIC ANESTHESIA PROVIDER;  Location:  OR    [facility] TOTAL HIP ARTHROPLASTY Right 2010    [facility] TOTAL HIP ARTHROPLASTY Left 2014       SOCIAL HISTORY:   Gillian Burk  reports that she has never smoked. She has never been exposed to tobacco smoke. She has never used smokeless tobacco. She reports that she does not drink alcohol and does not use drugs.      PHYSICAL EXAMINATION:  Temp: 98.2  F (36.8  C) Temp src: Oral BP: 118/64 Pulse: 62   Resp: 16 SpO2: 97 % O2 Device:  "None (Room air)    09/11 1500 - 09/16 1459  In: 1764.6 [I.V.:1464.6]  Out: 200 [Urine:200]  Net: 1564.6  Vitals:    09/16/23 0400   Weight: 50.8 kg (111 lb 15.9 oz)       Clinically Significant Risk Factors Present on Admission              # Hypoalbuminemia: Lowest albumin = 1.9 g/dL at 9/16/2023  4:47 AM, will monitor as appropriate    # Drug Induced Coagulation Defect: home medication list includes an anticoagulant medication    # Drug Induced Platelet Defect: home medication list includes an antiplatelet medication     # Hypertension: Noted on problem list    # Chronic heart failure with reduced ejection fraction: last echo with EF <40%    # Circulatory Shock: currently requiring pressors for blood pressure support  # Acute Respiratory Failure: Documented O2 saturation < 91%.  Continue supplemental oxygen as needed     # Cachexia: Estimated body mass index is 17.54 kg/m  as calculated from the following:    Height as of 9/11/23: 1.702 m (5' 7\").    Weight as of this encounter: 50.8 kg (111 lb 15.9 oz).             Cardiac Arrhythmia: Atrial fibrillation: Paroxysmal  Cardiomyopathy        Constance Angel MD, MD    "

## 2023-09-16 NOTE — PLAN OF CARE
Goal Outcome Evaluation:  Summary: Hgm 6.2 Upper GI bleed.   DATE & TIME: 9/15//23  6422-9852 Cognitive Concerns/ Orientation : AXOX4   BEHAVIOR & AGGRESSION TOOL COLOR: Green  CIWA SCORE: NA   ABNL VS/O2: VSS@RA  MOBILITY: Assit 2 with Lift. Not OOB. Q2 turn and repo  PAIN MANAGMENT: General back pain. Tylenol and Lidocaine patch on right back.   DIET: NPO,   BOWEL/BLADDER: Incontinent of bowel. Black tarry stool, MD aware. External cath in place.   ABNL LAB/BG: Hgn 10.8, Was given 2 units of RBC at the prior hospital .  DRAIN/DEVICES: PIV SL   TELEMETRY RHYTHM: NS  SKIN: Scattered bruises, large erythema on left shin and pressure sore on coccyx. Mepi applied.   TESTS/PROCEDURES: EGD tomorrow.  D/C DAY/GOALS/PLACE: Pending. Independent living. In a rehab since last him fracture.   OTHER IMPORTANT INFO: Takes meds one at a time with apple sauce. Monitor for hypotension and hgn.

## 2023-09-16 NOTE — PLAN OF CARE
Goal Outcome Evaluation:      Plan of Care Reviewed With: patient    Transfer from [facility] overnight, concerns for hypotension, AMS/lethargy and Afib/RVR. Arrived on yennifer, amio gtt. Dr. Antoine and ZEKE assessed at bedside upon arrival, agreed upon MAP goal > 60. Attempted to wean yennifer this shift, required restart, on 0-1.1 mcg/kg/min. Afib RVR on arrival, rates 110-120. Significant pauses seen on tele, HR 25-30's. Provider notified, see corresponding note. Mag given on unit per orders. Incontinent, bloody BM x2. Purewick in place, awaiting output. NPO this shift. Numerous dressings in place. Several large, scattered bruises and scabs present. PIV with gtt SL. Spoke with daughter Jennifer via telephone, update given, all questions addressed. Tele remains Afib RVR. Bed alarms in use, pt not attempting to exit bed. Alert and oriented, able to make needs known. Continue to monitor.

## 2023-09-16 NOTE — H&P
[facility]    History and Physical - Hospitalist Service       Date of Admission:  9/15/2023    Assessment & Plan      Gillian Burk is a 89 year old female who has medical history which includes hypertension, hyperlipidemia, history of coronary artery disease, hypothyroidism, rheumatoid arthritis, anxiety, history of seizure disorder, chronic UTIs and is on Macrodantin as prophylaxis, history of CVA, TAVR, ischemic cardiomyopathy, recently diagnosed DVT of the left lower extremity and is currently on 15 mg twice daily of Xarelto for 3 weeks, right hip fracture secondary to fall with mildly displaced periprosthetic fracture involving the right trochanter who is currently at rehab and presented to outside hospital as she has been feeling more fatigue and her hemoglobin was found to be lower at 7 and she has been having dark stools for the past week    Acute on chronic anemia likely due to  GI bleed  Iron deficiency  Folate deficiency  -Baseline hemoglobin recently has been between 9-10  -Her last hemoglobin on 8/30/2023 was 8.5 and she had hemoglobin of 7 on 9/13/2023 and hemoglobin today was 6.2 and repeat came out to be 6.8  -She did had B12 levels checked at outside hospital which were normal but her folate is significantly low at 2.5, iron is low at 23, iron binding capacity is low at 115 and saturation index of 20  -Patient does seem to have lower GI bleed as she is on anticoagulation with Xarelto and has been having black stools and fecal occult was positive  -CT scan of the abdomen at outside hospital showed sigmoid diverticulosis with active inflammation and a right hip replacement with acute intertrochanteric periprostatic fracture, compression deformities of T11, T12 and L1 are indeterminant and can be subacute or chronic and moderate left and small right pleural effusion, prominent interstitial changes in both lungs likely represent a combination of fibrosis with  superimposed edema/infection  -Patient has been given 2 units of blood and we will check her hemoglobin stat.  It is 10.8  -I have consulted GI team and spoken with Dr. Muñoz and appreciate input and given that patient is hemodynamically stable there is no indication for Kcentra unless patient becomes hemodynamically unstable  -Continue patient with PPI, check hemoglobin every 6 hours, keep hemoglobin more than 7, cardiac monitor, hold Xarelto and Plavix and no use of NSAIDs, aspirin or heparin products and conditional orders for blood transfusion have been placed  -I will also start her on folate replacement and she can be restarted on iron replacement after EGD tomorrow    Recently diagnosed left lower extremity DVT  -Patient recently was admitted at outside hospital on 8/29/2023-he underwent ultrasound which showed occlusive to partially occlusive thrombus within the left iliac vein extending into the common femoral vein and proximal superficial vein  -Patient was started on Xarelto 15 mg twice daily for 3 weeks and then 20 mg p.o. daily thereafter and she was also continued with Plavix  -Patient has been taking Xarelto and of note we will have to hold anticoagulation in light of acute on chronic anemia and likely GI bleed    Recently diagnosed right hip fracture secondary to fall with mildly displaced periprosthetic fracture involving the right greater trochanter  -Orthopedics was consulted at outside hospital on 8/29 and they recommended left to toe-touch weightbearing and no surgical intervention was planned  -Patient has been continued with scheduled Tylenol, lidocaine patch and as needed narcotics  -I will also consult physical therapy and Occupational Therapy and    Seizure disorder  -We will continue the patient with PTA Keppra when verified by pharmacy    History of coronary artery disease that is PCI to LAD in 2/2021  Hypertension  Hyperlipidemia  -We will hold amlodipine, lisinopril and Imdur h  "metoprolol and atorvastatin in light of GI bleed and monitor and we will have to hold Plavix given the GI bleed   -If she is hemodynamically stable by tomorrow morning then metoprolol a.m. dose can be given    Hypothyroidism  -We will continue patient with PTA levothyroxine when verified by pharmacy    Rheumatoid arthritis  -Patient is currently on weekly methotrexate which will be held and she is on daily prednisone which will be continued    Anxiety disorder  -We will continue the patient with PTA Celexa and avoid as needed Xanax for now when verified by pharmacy    History of CVA in 2013  -Noted    Aortic stenosis status TAVR  -Noted    Chronic UTI  -Patient is currently on Macrodantin as prophylaxis and we will continue the same when verified by pharmacy    Coccygeal wound  -We will consult wound care    CT scan finding of compression deformities of T11, T12 and L1 are indeterminant and can be subacute or chronic   -Noted-she does not complain of any back pain         Diet:   npo   DVT Prophylaxis: Pneumatic Compression Devices  Reyes Catheter: Not present  Lines: None     Cardiac Monitoring: None  Code Status:   dnr / dni.  She has a POLST in place and this was discussed with the patient and her daughter and they are in agreement and these are her wishes    Clinically Significant Risk Factors Present on Admission              # Hypoalbuminemia: Lowest albumin = 2 g/dL at 9/15/2023 11:36 AM, will monitor as appropriate  # Drug Induced Coagulation Defect: home medication list includes an anticoagulant medication  # Drug Induced Platelet Defect: home medication list includes an antiplatelet medication   # Hypertension: Noted on problem list  # Chronic heart failure with preserved ejection fraction: heart failure noted on problem list and last echo with EF >50%     # Cachexia: Estimated body mass index is 17.09 kg/m  as calculated from the following:    Height as of 9/11/23: 1.702 m (5' 7\").    Weight as of an " earlier encounter on 9/15/23: 49.5 kg (109 lb 1.6 oz).              Disposition Plan           Alexandra Chong MD  Hospitalist Service  [facility]  Securely message with [employer] (more info)  Text page via [employer] Paging/Directory     I am an admitting shift and her care in the morning will be taken over by hospital medicine team     patient is critically ill and her prognosis is guarded And I discussed the plan of care in detail with the patient's daughter Jennifer and she had questions which were answered to satisfaction and she is aware of plan including plan for EGD in the morning  ______________________________________________________________________    Chief Complaint     Sent for outside hospital because of low hemoglobin and occult blood is positive    History is obtained from the patient, CHART REVIEW     History of Present Illness   Gillian Burk is a 89 year old female who has medical history which includes hypertension, hyperlipidemia, history of coronary artery disease, hypothyroidism, rheumatoid arthritis, anxiety, history of seizure disorder, chronic UTIs and is on Macrodantin as prophylaxis, history of CVA, TAVR, ischemic cardiomyopathy, recently diagnosed DVT of the left lower extremity and is currently on 15 mg twice daily of Xarelto for 3 weeks, right hip fracture secondary to fall with mildly displaced periprosthetic fracture involving the right trochanter who is currently at rehab and was sent to outside ER today with a chief complaint of low hemoglobin of 7 and patient has been having black stools for the past 1 week and patient has been having symptomatic anemia and has been feeling very tired.  She denied any lightheadedness or dizziness, chest pain or palpitation or shortness of breath.    She had lab work done at outside hospital which showed sodium of 141,4.6, 109, 23 27.7, 0.66, 80, calcium is 7.3 , anion gap of 9, albumin of 2, protein of 5.5, alkaline  phosphatase of 149 with normal ALT of 12, AST of 27 with total bilirubin of 0.2.  Blood glucose of 97.  Occult blood was positive, B12 was normal at 946, total iron was low at 23, iron binding capacity of 115, saturation index of 20 and folate levels were low also    She was found to have hemoglobin of 6.2, WBC count of 3, hematocrit of 21.2 with platelet count of 562.  Her INR was 1.51 with PTT of 37.    Patient at outside hospital was given 2 units of blood    The patient at [facility] in room [facility] and she mentioned to me that she is having pain over her right arm and right hip but denied any shortness of breath, chest discomfort or any prior history of bleeding.  She denies any hematemesis, hemoptysis, epistaxis and did mention of black stool but denied any hematochezia            Past Medical History    Past Medical History:   Diagnosis Date    Aortic stenosis     s/p TAVR 8/17/16    Chronic migraine     Hyperlipidaemia     Hypertension     Hypothyroidism     Myocardial infarction (H)     Need for SBE (subacute bacterial endocarditis) prophylaxis     Orthostatic hypotension     wears compression stockings    PUD (peptic ulcer disease)     Secondary Sjogren's syndrome (H)     Seizures (H)     after stroke (partial onset)    Seropositive rheumatoid arthritis (H)     Stroke (H) 05/2013    with visual field cut, left occipital lobe    Syncope 2014    due to orthostatic hypotension       Past Surgical History   Past Surgical History:   Procedure Laterality Date    ANOMALOUS PULMONARY VENOUS RETURN REPAIR, TOTAL      CATARACT EXTRACTION      CATARACT IOL, RT/LT Bilateral 2000    CV FLUOROSCOPY ONLY N/A 8/6/2019    Procedure: Fluoroscopy Only - valve cine done of aortic valve at 180 degrees ARIANA to JUNG;  Surgeon: Dave Farfan MD;  Location:  HEART CARDIAC CATH LAB    CV HEART CATHETERIZATION WITH POSSIBLE INTERVENTION N/A 2/10/2021    Procedure: Heart Catheterization with Possible Intervention;   Surgeon: Fish Padgett MD;  Location:  HEART CARDIAC CATH LAB    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    EYE SURGERY      HEART CATH FEMORAL CANNULIZATION WITH OPEN STANDBY REPAIR AORTIC VALVE N/A 8/3/2016    Procedure: HEART CATH FEMORAL CANNULIZATION WITH OPEN STANDBY REPAIR AORTIC VALVE;  Surgeon: Owen Schultz MD;  Location: [facility] OR    HYSTERECTOMY      HYSTERECTOMY TOTAL ABDOMINAL  1970    ovaries out    MANDIBLE FRACTURE SURGERY      MOUTH SURGERY  2011    jaw surgery due to fracture    OTHER SURGICAL HISTORY      Treva valve implant    TOTAL HIP ARTHROPLASTY      TRANSCATHETER AORTIC VALVE IMPLANT ANESTHESIA N/A 8/3/2016    Procedure: TRANSCATHETER AORTIC VALVE IMPLANT ANESTHESIA;  Surgeon: GENERIC ANESTHESIA PROVIDER;  Location:  OR    [facility] TOTAL HIP ARTHROPLASTY Right 2010    Z TOTAL HIP ARTHROPLASTY Left 2014       Prior to Admission Medications   Prior to Admission Medications   Prescriptions Last Dose Informant Patient Reported? Taking?   Cranberry 500 MG CAPS  Nursing Home No No   Sig: Take 1 capsule (500 mg) by mouth 2 times daily   Lactobacillus (FLORAJEN ACIDOPHILUS) CAPS  Nursing Home No No   Sig: Take 1 capsule by mouth daily   Lidocaine (LIDOCARE) 4 % Patch  Nursing Home Yes No   Sig: Place 1 patch onto the skin 2 times daily 1 patch topical, Twice a day, Place 1 patch onto the skin every 24 hours to prevent lidocaine toxicity, patient should be patch free for 12 hours daily. Apply patch to RIGHT HIP. To prevent toxicity, patient should be patch free for 12 hours daily. Patches may be cut to smaller size priorto releasing liner. Reminder: Remove previous patch before applying new patch. NEVER APPLY HEAT OVER PATCH which increases absorption and may lead to local anesthetic toxicity. Do not apply over area where liposomal bupivacaine was injected 96 hours post injection.   RESTASIS 0.05 % ophthalmic emulsion  Nursing Home No No   Sig: Place 1 drop Into the left eye  2 times daily   SLOW  (45 Fe) MG CR tablet  Nursing Home No No   Sig: Take 1 tablet (142 mg) by mouth daily   Patient taking differently: Take 142 mg by mouth 2 times daily   acetaminophen (TYLENOL) 325 MG tablet  Nursing Home No No   Sig: Take 3 tablets (975 mg) by mouth every 8 hours   atorvastatin (LIPITOR) 40 MG tablet  Nursing Home No No   Sig: Take 1 tablet (40 mg) by mouth daily   calcium carbonate (TUMS) 500 MG chewable tablet  Nursing Home No No   Sig: Take 1 tablet (500 mg) by mouth 2 times daily as needed for heartburn   cetirizine (ZYRTEC) 10 MG tablet  Nursing Home No No   Sig: Take 1 tablet (10 mg) by mouth daily   citalopram (CELEXA) 10 MG tablet  Nursing Home No No   Sig: Take 1 tablet (10 mg) by mouth At Bedtime   clopidogrel (PLAVIX) 75 MG tablet  Nursing Home No No   Sig: Take 1 tablet (75 mg) by mouth daily   famotidine (PEPCID) 40 MG tablet  Nursing Home No No   Sig: Take 1 tablet (40 mg) by mouth daily   isosorbide mononitrate (IMDUR) 60 MG 24 hr tablet  Nursing Home No No   Sig: Take 1 tablet (60 mg) by mouth daily   levETIRAcetam (KEPPRA) 500 MG tablet  Nursing Home No No   Sig: Take 1 tablet (500 mg) by mouth 2 times daily   levothyroxine (SYNTHROID/LEVOTHROID) 88 MCG tablet  Nursing Home No No   Sig: Take 1 tablet (88 mcg) by mouth daily at 2 pm   lisinopril (ZESTRIL) 5 MG tablet  Nursing Home No No   Sig: Take 1 tablet (5 mg) by mouth daily   loperamide (IMODIUM) 2 MG capsule  Nursing Home No No   Sig: Take 1 capsule (2 mg) by mouth 4 times daily as needed for diarrhea   methotrexate 2.5 MG tablet  Nursing Home No No   Sig: Take 4 tablets (10 mg) by mouth once a week   Patient taking differently: Take 10 mg by mouth once a week Sundays   metoprolol succinate ER (TOPROL XL) 50 MG 24 hr tablet  Nursing Home No No   Sig: Take 1 tablet (50 mg) by mouth 2 times daily   Patient taking differently: Take 50 mg by mouth daily   mirtazapine (REMERON) 30 MG tablet  Nursing Home No No   Sig:  Take 1 tablet (30 mg) by mouth At Bedtime   nitroFURantoin macrocrystal-monohydrate (MACROBID) 100 MG capsule  Nursing Home No No   Sig: Take 1 capsule (100 mg) by mouth At Bedtime   nitroGLYcerin (NITROSTAT) 0.4 MG sublingual tablet  Nursing Home No No   Sig: Place 1 tablet (0.4 mg) under the tongue every 5 minutes as needed for chest pain (pt may self administer as needed) For chest pain place 1 tablet under the tongue every 5 minutes for 3 doses. If symptoms persist 5 minutes after 1st dose call 911.   polyethylene glycol (MIRALAX) 17 GM/Dose powder  Nursing Home No No   Sig: Take 17 g by mouth daily   predniSONE (DELTASONE) 2.5 MG tablet  Nursing Home No No   Sig: Take 1 tablet (2.5 mg) by mouth daily   rivaroxaban ANTICOAGULANT (XARELTO) 15 MG TABS tablet  Nursing Home No No   Sig: Take 1 tablet (15 mg) by mouth 2 times daily (with meals) for 21 days   senna-docusate (SENOKOT-S/PERICOLACE) 8.6-50 MG tablet  Nursing Home No No   Sig: Take 1 tablet by mouth 2 times daily   triamcinolone (KENALOG) 0.5 % external ointment  Nursing Home No No   Sig: Apply 1 g topically 2 times daily      Facility-Administered Medications: None          Physical Exam   Vital Signs:                    Weight: 0 lbs 0 oz        General: Patient appears comfortable and in no acute distress.  HEENT: Head is atraumatic, normocephalic.  Pupils are equal, round and reactive to light.  No scleral icterus. Oral mucosa is moist, little hard of hearing  Neck: Neck is supple   Respiratory: Lungs are clear to auscultation bilaterally with no wheeze or crackles   Cardiovascular: Regular rate , S1 and S2 normal with no murmer or rubs or gallops  Abdomen:   soft , non tender , non distended and bowel sound present   Skin: Coccygeal wound  Neurologic: Higher functions are within normal limits. No obvious defects in speech, language and memory. No facial droop  Musculoskeletal: Normal Range of motion over upper and lower extremities bilaterally    Psychiatric: cooperative      Medical Decision Making         Time spent in care of patient is 75 minutes and I reviewed her labs including CBC, basic metabolic panel, iron panel, folate and B12 levels, reviewed plan of care in detail with the GI team and also reviewed her hospital stay at [facility] and I also discussed the plan of care with the patient's nurse, patient and patient's daughter after she gave permission    Data     I have personally reviewed the following data over the past 24 hrs:    3.1 (L)  \   6.8 (LL)   / 584 (H)     141 109 (H) 27.7 (H) /  97   4.6 23 0.66 \     ALT: 12 AST: 27 AP: 149 (H) TBILI: 0.2   ALB: 2.0 (L) TOT PROTEIN: 5.5 (L) LIPASE: N/A     INR:  1.51 (H) PTT:  37   D-dimer:  N/A Fibrinogen:  N/A     Ferritin:  N/A % Retic:  1.2; 1.2 LDH:  N/A       Imaging results reviewed over the past 24 hrs:   Recent Results (from the past 24 hour(s))   CT Abdomen Pelvis w Contrast    Narrative    CT ABDOMEN PELVIS W CONTRAST 9/15/2023 2:16 PM    CLINICAL HISTORY: Lower abdominal pain. Anemia.    TECHNIQUE: CT scan of the abdomen and pelvis was performed following  injection of IV contrast. Multiplanar reformats were obtained. Dose  reduction techniques were used.  CONTRAST: Isovue-370, 60mL    COMPARISON: 8/29/2023, 3/23/2018, 6/9/2016    FINDINGS:   LOWER CHEST: Incompletely visualized moderate left and small right  pleural effusions. Interstitial opacities in both lung bases likely  representing a combination of fibrosis and possible superimposed edema  or infiltrate.    HEPATOBILIARY: No focal hepatic lesions. Cholelithiasis.    PANCREAS: Normal.    SPLEEN: Small benign splenic cyst. No follow-up needed.    ADRENAL GLANDS: Normal.    KIDNEYS/BLADDER: Tiny subcentimeter cyst in the right kidney. No  follow-up needed. No hydronephrosis. The bladder is unremarkable.    BOWEL: Sigmoid diverticulosis. No evidence of active inflammation.  Bowel loops are normal caliber. Stool quantity in the  colon is upper  range of average. No evidence of appendicitis.    PELVIC ORGANS: Hysterectomy. No free fluid. No large adnexal cysts or  masses.    ADDITIONAL FINDINGS: Diffuse calcified plaque in the abdominal aorta  without aneurysm. No adenopathy.    MUSCULOSKELETAL: Right hip replacement. There is a periprosthetic  fracture around the femoral component in the intratrochanteric region  is noted on the recent x-ray of 8/29/2023. Left hip replacement. The  bones are demineralized. Compression deformities of T11, T12, and L1  are of indeterminate age but likely subacute or chronic.      Impression    IMPRESSION:   1.  Prominent interstitial changes in both lungs likely representing a  combination of fibrosis with superimposed edema and/or infection.  2.  A moderate left and small right pleural effusion are likely  related.  3.  Sigmoid diverticulosis without active inflammation.  4.  Right hip replacement with acute intertrochanteric periprosthetic  fracture. This is unchanged in appearance from the x-rays of  8/29/2023.  5.  Compression deformities of T11, T12, and L1 are indeterminate in  age. Subacute or chronic fractures are favored.    APOLINAR WALDROP MD         SYSTEM ID:  [id number]

## 2023-09-16 NOTE — PROGRESS NOTES
[facility]    Medicine Progress Note - Hospitalist Service    Date of Admission:  9/15/2023    Assessment & Plan   Gillian Burk is a 89 year old female who has medical history which includes hypertension, hyperlipidemia, history of coronary artery disease, hypothyroidism, rheumatoid arthritis, anxiety, history of seizure disorder, chronic UTIs and is on Macrodantin as prophylaxis, history of CVA, TAVR, ischemic cardiomyopathy, recently diagnosed DVT of the left lower extremity and started on Xarelto.    She presents to the ED from rehab facility where she has been recovering from a recent right hip fracture secondary to fall with mildly displaced periprosthetic fracture involving the right trochanter with more fatigue.  H    Her hemoglobin was found to be lower at 7 and she has been having dark stools for the past week     Events of overnight reviewed - Rapid response called d/t a clinical change of a fib with RVR and hypotension.  Transferred to the ICU after IVF bolus given and still having persistent vital sign instability.  She was given IV digoxin and started on IV amiodarone gtt.  She has remained on vasopressor agents since transfer to the ICU and is needing increasing doses of IV vasopressor medications.      Intensivist physician consulted and has been managing vasopressor agents and medical care in the ICU this am.  Just ordered LR bolus now (per bedside RN).    Acute on chronic anemia likely due to  GI bleed  Iron deficiency  Folate deficiency  -Baseline hemoglobin recently has been between 9-10  -Her last hemoglobin on 8/30/2023 was 8.5    -Patient does seem to have lower GI bleed as she is on anticoagulation with Xarelto and has been having black stools and fecal occult was positive    -CT scan of the abdomen at outside hospital showed sigmoid diverticulosis with active inflammation and a right hip replacement with acute intertrochanteric periprostatic fracture,  compression deformities of T11, T12 and L1 are indeterminant and can be subacute or chronic and moderate left and small right pleural effusion, prominent interstitial changes in both lungs likely represent a combination of fibrosis with superimposed edema/infection    -Patient has been given 2 units of blood (9/15/23) and 1 unit (9/16/23)  Hgb this am 12.8 (9.4) (10.8) (6.8)    - GI has been consulted and is following    -Continue patient with PPI, check hemoglobin every 8 hours, keep hemoglobin more than 7, cardiac monitor, hold Xarelto and Plavix and no use of NSAIDs, aspirin or heparin products and conditional orders for blood transfusion have been placed     2.  Acute hypotension, shock    Continues to be significantly hypotensive this am   On IV vasopressors being managed by intensivist physician   LR bolus to be given this am (as well)     RN states that she has had min urine outpt, so far, since transfer earlier this am to the ICU    Likely d/t #1    3.  A fib with RVR and hypotension    Clinical status remains guarded  Was given IV digoxin and remains on IV amiodarone continuous infusion    Cardiology consult requested  Echo requested and is pending    4. Recently diagnosed left lower extremity DVT  -Patient recently was admitted at outside hospital on 8/29/2023, diagnosed with occlusive to partially occlusive thrombus within the left iliac vein extending into the common femoral vein and proximal superficial vein  Had been on xarelto (currently on hold d/t GI bleed)    Will consider repeat leg US once more clinically stable and further evaluate for other treatments indicated for DVT     5. Recently diagnosed right hip fracture secondary to fall with mildly displaced periprosthetic fracture involving the right greater trochanter  -Orthopedics was consulted at outside hospital on 8/29 and they recommended left to toe-touch weightbearing and no surgical intervention was planned    Currently not medically stable  to undergo PT/OT     6. Seizure disorder  -We will continue the patient with PTA Keppra when verified by pharmacy     7. History of coronary artery disease that is PCI to LAD in 2/2021  Hypertension  Hyperlipidemia  -We will hold amlodipine, lisinopril, imdur, metoprolol and atorvastatin in light of GI bleed and monitor   We will have to continue to hold Plavix given the GI bleed   (As above)     8. Hypothyroidism  -We will continue patient with PTA levothyroxine when verified by pharmacy     9 . Rheumatoid arthritis       On chronic prednisone    -Patient is currently on weekly methotrexate which will be held and she is on daily prednisone which will be continued     Will likely need stress dose steroids d/t #2    10. Anxiety disorder  -We will continue the patient with PTA Celexa and avoid as needed Xanax for now when verified by pharmacy     11. History of CVA in 2013  -Noted     12. Aortic stenosis status TAVR  -Noted     13. Chronic UTI  -Patient is currently on Macrodantin as prophylaxis and we will continue the same when verified by pharmacy     14. Coccygeal wound  -We will consult wound care     15. CT scan finding of compression deformities of T11, T12 and L1 are indeterminant and can be subacute or chronic   -Noted-she does not complain of any back pain       Medical Decision Making       65 MINUTES SPENT BY ME on the date of service doing chart review, history, exam, documentation & further activities per the note.        PPE Worn:  Mask, gloves     Diet: NPO per Anesthesia Guidelines for Procedure/Surgery Except for: Meds    DVT Prophylaxis: Pneumatic Compression Devices  Reyes Catheter: Not present  Lines: None     Cardiac Monitoring: ACTIVE order. Indication: h/o  coronary artery disease and DVT presenting with GI bleed  Code Status: No CPR- Do NOT Intubate      Clinically Significant Risk Factors Present on Admission              # Hypoalbuminemia: Lowest albumin = 1.9 g/dL at 9/16/2023  4:47 AM,  "will monitor as appropriate  # Drug Induced Coagulation Defect: home medication list includes an anticoagulant medication  # Drug Induced Platelet Defect: home medication list includes an antiplatelet medication   # Hypertension: Noted on problem list  # Chronic heart failure with preserved ejection fraction: heart failure noted on problem list and last echo with EF >50%  # Circulatory Shock: currently requiring pressors for blood pressure support     # Cachexia: Estimated body mass index is 17.54 kg/m  as calculated from the following:    Height as of 9/11/23: 1.702 m (5' 7\").    Weight as of this encounter: 50.8 kg (111 lb 15.9 oz).              Disposition Plan    Guarded clinical status requiring vasopressor agents and continuous infusion of cardiac medications in the ICU.  Medical management primarily per intensivist service today as has become critically ill requiring the above measures.          Bing Zaldivar DO  Hospitalist Service  [facility]  Securely message with [employer] (more info)  Text page via [employer] Paging/Directory   ______________________________________________________________________    Interval History   Seen with bedside RN; stated that intensivist has ordered LR bolus to be given now.  Min urine outpt recorded since arrival to the ICU earlier this am.      Ms. Burk states that she is \"ok\".  Denies current CP, significant SOB or cough.  No HA, N/V or F/C.    No stools since arrival to the ICU, per RN.     Has remained on IV amiodarone and IV levophed infusions.  Still hypotensive and tachycardic currently.    Physical Exam   Vital Signs: Temp: 98  F (36.7  C) Temp src: Oral BP: (!) 81/45 Pulse: (!) 121   Resp: 23 SpO2: 97 % O2 Device: None (Room air)    Weight: 111 lbs 15.9 oz    GEN:  Alert, awake, elderly female sitting up in the hospital bed, slightly pale appearing, not diaphoretic.  HEENT:  Normocephalic/atraumatic, no scleral icterus, no nasal " discharge, mouth and membranes appear slightly dry  CV:  somewhat distant, tachy and irreg   LUNGS:  Clear to auscultation ant/lat bilaterally without rales/rhonchi/wheezing.  No significant costal retractions noted bilaterally.  Symmetric chest rise on inhalation noted.  ABD:  active bowel sounds, soft, not significantly tender or distended to light palpation throughout.  No clear rebound/guarding/rigidity on exam.  EXT:  No significant pretibial edema bilaterally.  No cyanosis.  No acute joint synovitis noted.  SKIN:  Dry to touch, no exanthems noted in the visualized areas.    Medications    amiodarone 0.5 mg/min (09/16/23 0600)    phenylephrine 1.2 mcg/kg/min (09/16/23 0718)      acetaminophen  975 mg Oral Q8H    artificial tears  1 drop Left Eye BID    citalopram  10 mg Oral At Bedtime    folic acid  1 mg Oral Daily    levETIRAcetam  500 mg Intravenous Q12H    Lidocaine  1 patch Transdermal Q24H    mirtazapine  30 mg Oral At Bedtime    nitroFURantoin macrocrystal-monohydrate  100 mg Oral At Bedtime    pantoprazole  40 mg Intravenous BID    predniSONE  2.5 mg Oral Daily    sodium chloride (PF)  3 mL Intracatheter Q8H       Data     Labs and Imaging results below reviewed today.  Recent Labs   Lab 09/16/23  0447 09/16/23  0114 09/15/23  2152   WBC 8.5 4.2 3.7*   HGB 12.8 9.4*  9.4* 10.8*   HCT 36.3 28.1* 32.2*   MCV 94 95 94    421 470*     Recent Labs   Lab 09/16/23  0447 09/16/23  0114 09/15/23  2347 09/15/23  1136    138  --  141   POTASSIUM 5.0 4.5  --  4.6   CHLORIDE 108* 108*  --  109*   CO2 17* 20*  --  23   ANIONGAP 11 10  --  9   * 139* 128* 97   BUN 22.8 21.4  --  27.7*   CR 0.69 0.68  --  0.66   GFRESTIMATED 79 79  --  80   CHEYANNE 7.3* 7.2*  --  7.3*     Recent Labs   Lab 09/16/23  0447 09/16/23  0114 09/15/23  2347 09/15/23  1136    138  --  141   POTASSIUM 5.0 4.5  --  4.6   CHLORIDE 108* 108*  --  109*   CO2 17* 20*  --  23   ANIONGAP 11 10  --  9   * 139* 128* 97    BUN 22.8 21.4  --  27.7*   CR 0.69 0.68  --  0.66   GFRESTIMATED 79 79  --  80   CHEYANNE 7.3* 7.2*  --  7.3*   MAG  --  2.3  --   --    PROTTOTAL 5.6*  --   --  5.5*   ALBUMIN 1.9*  --   --  2.0*   BILITOTAL 0.5  --   --  0.2   ALKPHOS 240*  --   --  149*   AST 60*  --   --  27   ALT 19  --   --  12     No results for input(s): DD in the last 168 hours.  Recent Labs   Lab 09/16/23  0447 09/15/23  1136   AST 60* 27   ALT 19 12   ALKPHOS 240* 149*   BILITOTAL 0.5 0.2     Recent Labs   Lab 09/16/23  0116 09/15/23  1156   INR 1.26* 1.51*     Recent Labs   Lab 09/16/23  0114   LACT 2.9*       Recent Results (from the past 24 hour(s))   CT Abdomen Pelvis w Contrast    Narrative    CT ABDOMEN PELVIS W CONTRAST 9/15/2023 2:16 PM    CLINICAL HISTORY: Lower abdominal pain. Anemia.    TECHNIQUE: CT scan of the abdomen and pelvis was performed following  injection of IV contrast. Multiplanar reformats were obtained. Dose  reduction techniques were used.  CONTRAST: Isovue-370, 60mL    COMPARISON: 8/29/2023, 3/23/2018, 6/9/2016    FINDINGS:   LOWER CHEST: Incompletely visualized moderate left and small right  pleural effusions. Interstitial opacities in both lung bases likely  representing a combination of fibrosis and possible superimposed edema  or infiltrate.    HEPATOBILIARY: No focal hepatic lesions. Cholelithiasis.    PANCREAS: Normal.    SPLEEN: Small benign splenic cyst. No follow-up needed.    ADRENAL GLANDS: Normal.    KIDNEYS/BLADDER: Tiny subcentimeter cyst in the right kidney. No  follow-up needed. No hydronephrosis. The bladder is unremarkable.    BOWEL: Sigmoid diverticulosis. No evidence of active inflammation.  Bowel loops are normal caliber. Stool quantity in the colon is upper  range of average. No evidence of appendicitis.    PELVIC ORGANS: Hysterectomy. No free fluid. No large adnexal cysts or  masses.    ADDITIONAL FINDINGS: Diffuse calcified plaque in the abdominal aorta  without aneurysm. No  adenopathy.    MUSCULOSKELETAL: Right hip replacement. There is a periprosthetic  fracture around the femoral component in the intratrochanteric region  is noted on the recent x-ray of 8/29/2023. Left hip replacement. The  bones are demineralized. Compression deformities of T11, T12, and L1  are of indeterminate age but likely subacute or chronic.      Impression    IMPRESSION:   1.  Prominent interstitial changes in both lungs likely representing a  combination of fibrosis with superimposed edema and/or infection.  2.  A moderate left and small right pleural effusion are likely  related.  3.  Sigmoid diverticulosis without active inflammation.  4.  Right hip replacement with acute intertrochanteric periprosthetic  fracture. This is unchanged in appearance from the x-rays of  8/29/2023.  5.  Compression deformities of T11, T12, and L1 are indeterminate in  age. Subacute or chronic fractures are favored.    APOLINAR WALDROP MD         SYSTEM ID:  [id number]

## 2023-09-16 NOTE — PROGRESS NOTES
PICC ordered.  Limited vasculature available, brachial only vessel visualized on right.  Significantly impaired circulation noted right hand, purple in color, cool to touch, unable to assess cap refill, patient does move extremity freely.  ICU MD Villaseñor consulted regarding PICC placement in that arm, okays.  PICC procedure attempted, but unable to fully advance wire after vessel cannulation.  Attempt to feed trimmed PICC through introducer, unable to advance PICC beyond upper arm.  Left arm evaluated for PICC placement, brachial vessel also visualized, but measurement too small for PICC.  Consult with ICU MD Gill, aware bedside PICC placement not possible.

## 2023-09-16 NOTE — PHARMACY-ADMISSION MEDICATION HISTORY
Medication Scribe Admission Medication History    Admission medication history is complete. The information provided in this note is only as accurate as the sources available at the time of the update.    Medication reconciliation/reorder completed by provider prior to medication history? No    Information Source(s):  Medication history completed today at [facility]     Pertinent Information: Please see medication history note from [facility]     Changes made to PTA medication list:  Added: Lidocaine 4% patch as per MAR    Deleted: Alprazolam 0.25 - order ended 9/5/23   Rivaroxaban 20 mg, taking 15 mg BID as per MAR, already in PTA med list   Changed: None    Medication Affordability:  Not including over the counter (OTC) medications, was there a time in the past 3 months when you did not take your medications as prescribed because of cost?: Unable to Assess       Allergies reviewed with patient and updates made in EHR: unable to assess    Medication History Completed By: Andrade Camacho RPH 9/15/2023 9:52 PM    Prior to Admission medications    Medication Sig Last Dose Taking? Auth Provider Long Term End Date   acetaminophen (TYLENOL) 325 MG tablet Take 3 tablets (975 mg) by mouth every 8 hours 9/14/2023 at 1536 Yes José Miguel English APRN CNP No    atorvastatin (LIPITOR) 40 MG tablet Take 1 tablet (40 mg) by mouth daily 9/14/2023 at 0847 Yes José Miguel English APRN CNP Yes    calcium carbonate (TUMS) 500 MG chewable tablet Take 1 tablet (500 mg) by mouth 2 times daily as needed for heartburn Unknown at Unknown Yes José Miguel English APRN CNP     cetirizine (ZYRTEC) 10 MG tablet Take 1 tablet (10 mg) by mouth daily 9/14/2023 at 0847 Yes José Miguel English APRN CNP     citalopram (CELEXA) 10 MG tablet Take 1 tablet (10 mg) by mouth At Bedtime 9/14/2023 at 1751 Yes José Miguel English APRN CNP Yes    clopidogrel (PLAVIX) 75 MG tablet Take 1 tablet (75 mg) by mouth daily 9/14/2023 at 0847 Yes José Miguel English  ADRIÁN CONTRERAS CNP Yes    Cranberry 500 MG CAPS Take 1 capsule (500 mg) by mouth 2 times daily 9/14/2023 at 1751 Yes Augusto Meyer, DO     famotidine (PEPCID) 40 MG tablet Take 1 tablet (40 mg) by mouth daily 9/14/2023 at 0847 Yes José Miguel English APRN CNP No    isosorbide mononitrate (IMDUR) 60 MG 24 hr tablet Take 1 tablet (60 mg) by mouth daily 9/14/2023 at 0847 Yes José Miguel English APRN CNP Yes    Lactobacillus (FLORAJEN ACIDOPHILUS) CAPS Take 1 capsule by mouth daily 9/14/2023 at 0847 Yes José Miguel English APRN CNP     levETIRAcetam (KEPPRA) 500 MG tablet Take 1 tablet (500 mg) by mouth 2 times daily 9/14/2023 at 1751 Yes José Miguel English APRN CNP Yes    levothyroxine (SYNTHROID/LEVOTHROID) 88 MCG tablet Take 1 tablet (88 mcg) by mouth daily at 2 pm 9/14/2023 at 1412 Yes José Miguel English APRN CNP Yes    Lidocaine (LIDOCARE) 4 % Patch Place 1 patch onto the skin 2 times daily 1 patch topical, Twice a day, Place 1 patch onto the skin every 24 hours to prevent lidocaine toxicity, patient should be patch free for 12 hours daily. Apply patch to RIGHT HIP. To prevent toxicity, patient should be patch free for 12 hours daily. Patches may be cut to smaller size priorto releasing liner. Reminder: Remove previous patch before applying new patch. NEVER APPLY HEAT OVER PATCH which increases absorption and may lead to local anesthetic toxicity. Do not apply over area where liposomal bupivacaine was injected 96 hours post injection. 9/14/2023 at 2155 Yes Reported, Patient No    lisinopril (ZESTRIL) 5 MG tablet Take 1 tablet (5 mg) by mouth daily 9/14/2023 at 0847 Yes José Miguel English APRN CNP Yes    loperamide (IMODIUM) 2 MG capsule Take 1 capsule (2 mg) by mouth 4 times daily as needed for diarrhea Unknown at Unknown Yes José Miguel English APRN CNP     methotrexate 2.5 MG tablet Take 4 tablets (10 mg) by mouth once a week  Patient taking differently: Take 10 mg by mouth once a week Sundays 9/10/2023 at 0939 Yes  José Miguel English APRN CNP Yes    metoprolol succinate ER (TOPROL XL) 50 MG 24 hr tablet Take 1 tablet (50 mg) by mouth 2 times daily  Patient taking differently: Take 50 mg by mouth daily 9/14/2023 at 0847 Yes José Miguel English APRN CNP Yes    mirtazapine (REMERON) 30 MG tablet Take 1 tablet (30 mg) by mouth At Bedtime 9/14/2023 at 1751 Yes José Miguel English APRN CNP Yes    nitroFURantoin macrocrystal-monohydrate (MACROBID) 100 MG capsule Take 1 capsule (100 mg) by mouth At Bedtime 9/14/2023 at 1751 Yes José Miguel English APRN CNP No    nitroGLYcerin (NITROSTAT) 0.4 MG sublingual tablet Place 1 tablet (0.4 mg) under the tongue every 5 minutes as needed for chest pain (pt may self administer as needed) For chest pain place 1 tablet under the tongue every 5 minutes for 3 doses. If symptoms persist 5 minutes after 1st dose call 911. Unknown at Unknown Yes José Miguel English APRN CNP Yes    polyethylene glycol (MIRALAX) 17 GM/Dose powder Take 17 g by mouth daily 9/14/2023 at 0847 Yes José Miguel English APRN CNP     predniSONE (DELTASONE) 2.5 MG tablet Take 1 tablet (2.5 mg) by mouth daily 9/14/2023 at 0847 Yes José Miguel English APRN CNP     RESTASIS 0.05 % ophthalmic emulsion Place 1 drop Into the left eye 2 times daily 9/14/2023 at 1536 Yes José Miguel English APRN CNP     rivaroxaban ANTICOAGULANT (XARELTO) 15 MG TABS tablet Take 1 tablet (15 mg) by mouth 2 times daily (with meals) for 21 days 9/14/2023 at 1751 Yes José Miguel English APRN CNP Yes 9/20/23   senna-docusate (SENOKOT-S/PERICOLACE) 8.6-50 MG tablet Take 1 tablet by mouth 2 times daily 9/14/2023 at 1751 Yes José Miguel English APRN CNP     SLOW  (45 Fe) MG CR tablet Take 1 tablet (142 mg) by mouth daily  Patient taking differently: Take 142 mg by mouth 2 times daily 9/14/2023 at 0847 Yes José Miguel English APRN CNP     triamcinolone (KENALOG) 0.5 % external ointment Apply 1 g topically 2 times daily 9/14/2023 at 0929 Yes José Miguel English APRN CNP

## 2023-09-16 NOTE — PROGRESS NOTES
Brief ICU note    New junctional rhythm.  Amiodarone stopped.    PICC nurse having difficulty with placement.  Given overall clinical picture we will stay with peripheral IVs.  They can place a midline if needed.    Oswaldo Gill MD

## 2023-09-16 NOTE — PROGRESS NOTES
"Text page sent to ZEKE on call:    \"353 LD    Pt has experienced pauses while on amio; HR 25-30s at times. Amio currently paused. Please call back and advise.    Ghazala MOISE  [phone]\"    Awaiting new orders/response. Continue to monitor.    4:15 AM  ZEKE at bedside; concerned for persistent Afib RVR, 's. Instructed to resume amio at previous rate.   "

## 2023-09-17 NOTE — PROVIDER NOTIFICATION
Lab called with LA 2.3 (morning LA 2.7), Dr Zaldivar was notified and ordered NS at 50 ml/hr. Pt on Macrobid 100 mg/day for UTI.

## 2023-09-17 NOTE — PROGRESS NOTES
[facility]  Gastroenterology Progress Note     Gillian Burk MRN# [id number]   YOB: 1927 Age: 89 year old          Assessment and Plan:     Gillian Ugalde is a 89 year old female who has medical history which includes hypertension, hyperlipidemia, history of coronary artery disease, hypothyroidism, rheumatoid arthritis, anxiety, history of seizure disorder, chronic UTIs and is on Macrodantin as prophylaxis, history of CVA, TAVR, ischemic cardiomyopathy, recently diagnosed DVT of the left lower extremity and is currently on 15 mg twice daily of Xarelto for 3 weeks, right hip fracture secondary to fall with mildly displaced periprosthetic fracture involving the right trochanter and presented to outside hospital with fatigue and dark stools for the past week and hemoglobin was below 7.    Anemia  Melena   Iron and folate deficiency  Baseline hemoglobin between 9-10. hemoglobin of 7 on 9/13/2023 and hemoglobin was 6.8 with massive transfusion improved to 12.8 then 13.7  Occult positive stools  INR 1.26  9/16 CT scan of the abdomen at outside hospital showed sigmoid diverticulosis with active inflammation and a right hip replacement with acute intertrochanteric periprostatic fracture, compression deformities of T11, T12 and L1 are indeterminant and can be subacute or chronic and moderate left and small right pleural effusion, prominent interstitial changes in both lungs likely represent a combination of fibrosis with superimposed edema/infection  Stable today with /60 on room air and SPO2 99    -Continue patient with PPI,   - check hemoglobin every 6 hours, keep hemoglobin more than 7  -Xarelto and Plavix and no use of NSAIDs, aspirin or heparin products  - consider endoscopic procedures- patient unsure if would like to have- she is aware that in order to safely restart DAPT would need to undergo endoscopy  - ok with GI to eat regular diet and NPO at  midnight    Echo noted reduced EF or 25 %  Will need cardiology clearance for EGD/colonoscopy  - appreciated cardiology consult               Interval History:     no new complaints, doing well, denies shortness of breath, denies abdominal pain, pain is controlled, and doing well; no cp, sob, n/v/d, or abd pain.              Review of Systems:     C: NEGATIVE for fever, chills, change in weight  E/M: NEGATIVE for ear, mouth and throat problems  R: NEGATIVE for significant cough or SOB  CV: NEGATIVE for chest pain, palpitations or peripheral edema             Medications:   I have reviewed this patient's current medications   acetaminophen  975 mg Oral Q8H    artificial tears  1 drop Left Eye BID    citalopram  10 mg Oral At Bedtime    folic acid  1 mg Oral Daily    levETIRAcetam  500 mg Oral BID    Lidocaine  1 patch Transdermal Q24H    [Held by provider] mirtazapine  30 mg Oral At Bedtime    nitroFURantoin macrocrystal-monohydrate  100 mg Oral At Bedtime    pantoprazole  40 mg Oral BID AC    predniSONE  2.5 mg Oral Daily    sodium chloride (PF)  3 mL Intracatheter Q8H                  Physical Exam:   Vitals were reviewed  Vital Signs with Ranges  Temp:  [96.4  F (35.8  C)-98.2  F (36.8  C)] 97.1  F (36.2  C)  Pulse:  [] 81  Resp:  [15-40] 22  BP: ()/(37-72) 116/60  SpO2:  [80 %-100 %] 99 %  I/O last 3 completed shifts:  In: 1522.47 [P.O.:150; I.V.:1372.47]  Out: 350 [Urine:350]  Constitutional: healthy, alert, and no distress   Cardiovascular: negative, PMI normal. No lifts, heaves, or thrills. RRR. No murmurs, clicks gallops or rub  Respiratory: negative, Percussion normal. Good diaphragmatic excursion. Lungs clear  Abdomen: Abdomen soft, non-tender. BS normal. No masses, organomegaly           Data:   I reviewed the patient's new clinical lab test results.   Recent Labs   Lab Test 09/17/23  0419 09/16/23  0447 09/16/23  0116 09/16/23  0114 09/15/23  2152 09/15/23  1156 09/13/23  0720 08/30/23  0540    WBC 12.1* 8.5  --  4.2   < >  --    < > 8.9   HGB 13.7 12.8  --  9.4*  9.4*   < >  --    < > 8.5*   MCV 96 94  --  95   < >  --    < > 90    427  --  421   < >  --    < > 217   INR  --   --  1.26*  --   --  1.51*  --  2.13*    < > = values in this interval not displayed.     Recent Labs   Lab Test 09/17/23 0929 09/17/23 0419 09/16/23 0447   POTASSIUM 5.5* 6.3* 5.0   CHLORIDE 108* 108* 108*   CO2 17* 15* 17*   BUN 34.7* 32.9* 22.8   ANIONGAP 14 14 11     Recent Labs   Lab Test 09/17/23  0419 09/16/23  0447 09/15/23  1136 08/29/23  2134 08/29/23  1237 06/12/23  0651 05/30/23  1523 09/21/22  1451 07/13/22  1236 05/29/21  1314 05/23/21  0934 04/26/19  1152 04/26/19  1124   ALBUMIN 2.0* 1.9* 2.0*  --  2.4*  --   --    < >  --    < > 2.6*   < > 2.9*   BILITOTAL 0.3 0.5 0.2  --  0.2  --   --    < >  --    < > 0.2   < > 0.4   ALT 25 19 12  --  12   < >  --    < >  --    < > 15   < > 18   AST  --  60* 27  --  24   < >  --    < >  --    < > 18   < > 21   PROTEIN  --   --   --  100*  --   --  30*  --  Negative   < >  --    < >  --    LIPASE  --   --   --   --   --   --   --   --   --   --  167  --  166    < > = values in this interval not displayed.       I reviewed the patient's new imaging results.    All laboratory data reviewed  All imaging studies reviewed by me.    Ade Fitzpatrick PA-C,  9/17/2023  [facility]  Office : [phone]  Cell: [phone] (Dr. Muñoz)  Cell: [phone] (Ade Fitzpatrick PA-C)

## 2023-09-17 NOTE — PROGRESS NOTES
ICU End of Shift Summary. See flowsheets for vital signs and detailed assessment.    Changes this shift: Patient off amio for onset of new junctional rhythm.  Patient advanced to full liquid diet, no concerns of aspiration with nursing bedside swallow eval.  Patient continues to be somnolent, but A&O x4 when fully awake, sometimes takes multiple approaches to wake her. Echo with EF of 25% today.        Plan: Continue supportive cares.  Set goals of care with the family as able.  Dr. Villaseñor communicated the seriousness of the situation to the daughter in the AM given the echo findings.       Bilobed Flap Text: The defect edges were debeveled with a #15 scalpel blade. Given the location of the defect and the proximity to free margins a bilobe flap was deemed most appropriate. Using a sterile surgical marker, an appropriate bilobe flap drawn around the defect. The area thus outlined was incised deep to adipose tissue with a #15 scalpel blade. The skin margins were undermined to an appropriate distance in all directions utilizing iris scissors. Following this, the designed flap was carried over into the primary defect and sutured into place.

## 2023-09-17 NOTE — PROGRESS NOTES
Around 2100 patient becoming increasingly hypotensive 60-70/40s despite increasing yennifer to max rate. Heart rate 130s. Patient continued to be alert and oriented. MD aware and family notified. Around 0000 heart rate dropped to 40s-50s, then around 0045 converted to sinus rhythm in the 70s, BP normalized following, pressor turned off. Denied pain throughout shift. Bladder scanned for 158mL, small amount of urine voided this shift in purewick and on pad. Taking sips of water with encouragement and RN assistance, meds in applesauce per patient preference. 1 BM this morning, medium. Plan of care ongoing.

## 2023-09-17 NOTE — PROGRESS NOTES
Transferred to: [facility], rm 260 at 1425   Status at time of transfer: A&O x4, somnolent intermittently, friendly, on no drips, room air, and in sinus rhythm.     Belongings: Sent with patient  Reyes removed? (if no, why?): n.a   Chart and medications: Sent with patient.  Family notified: Yes, daughters notified in person, at bedside.

## 2023-09-17 NOTE — PROGRESS NOTES
Cardiology Progress Note          Assessment and Plan:         89-year-old female with a past medical history significant for TAVR in 2016, coronary disease status post LAD stenting in February 2021 and left bundle branch block who was admitted to [facility] on 9/15/2023 with acute on chronic anemia secondary to a GI bleed in the context of Xarelto for recent DVT as well as atrial fibrillation with rapid ventricular response.      She has also been found to have an acute systolic cardiomyopathy with severely reduced left ventricular systolic function.    She is converted to normal sinus rhythm with IV amiodarone and has received PRBC transfusion appropriately.    IMPRESSION:    Acute on chronic anemia secondary to GI bleed in the context of rivaroxaban therapy for recent DVT.  Atrial fibrillation with rapid ventricular response, now in normal sinus rhythm after receiving IV amiodarone.  She subsequently had an episode of junctional rhythm and amiodarone was discontinued, although she has had intermittent runs of SVT subsequently.  Acute systolic cardiomyopathy probably stress-induced.  Recent fall and right hip fracture  Status post TAVR in 2016.    PLAN  -We will start p.o. amiodarone 200 mg twice daily and consider restarting metoprolol tomorrow if her blood pressure remained stable.  -Anticoagulation is on hold given concerns for possible GI bleeding.  -Consider reassessing left ventricular systolic function later on this week if she continues to improve clinically.    I personally examined and evaluated the patient today.  Gillian Burk was in (or remains in) critical condition today due to an acute systolic cardiomyopathy.  I personally managed the patient's cardiomyopathy and the key decisions made today include the decision to start oral amiodarone.  I spent a total of 35 minutes providing critical care services at the bedside, evaluating the patient, directing care and reviewing laboratory  values and radiologic reports.          Interval History:     Alert and awake this morning.  Denies any chest discomfort.  Off all pressors.  Hemoglobin has been stable, and she remains in sinus rhythm although brief runs of SVT were noted on telemetry.             Review of Systems:   As per subjective, otherwise 5 systems reviewed and negative.           Physical Exam:   Blood pressure (!) 79/50, pulse (!) 129, temperature 98.2  F (36.8  C), temperature source Oral, resp. rate 29, weight 50.8 kg (111 lb 15.9 oz), SpO2 97 %, not currently breastfeeding.      Vital Sign Ranges  Temperature Temp  Av.9  F (36.6  C)  Min: 97.6  F (36.4  C)  Max: 98.2  F (36.8  C)   Blood pressure Systolic (24hrs), Av , Min:47 , Max:138        Diastolic (24hrs), Av, Min:24, Max:82      Pulse Pulse  Av.6  Min: 52  Max: 140   Respirations Resp  Av.4  Min: 15  Max: 29   Pulse oximetry SpO2  Av.6 %  Min: 82 %  Max: 98 %         Intake/Output Summary (Last 24 hours) at 2023 1929  Last data filed at 2023 1800  Gross per 24 hour   Intake 1810.21 ml   Output 300 ml   Net 1510.21 ml       Constitutional: NAD  Skin: Warm and dry  Neck: No JVD  Lungs: CTA  Cardiovascular: RRR, no m/r/g  Abdomen: Soft, non tender.  Extremities and Back: No LE edema  Neurological: Nonfocal           Medications:     I have reviewed this patient's current medications.              Data:     Labs reviewed.           Kemi Lee MD, M.D.

## 2023-09-17 NOTE — PROVIDER NOTIFICATION
Spoke with intensivist Dr Kam about continued worsening hypotension despite being on max dose of phenylephrine. MD stated he will call patient's family to discuss. No new orders at this time.

## 2023-09-17 NOTE — PROGRESS NOTES
Brief ICU note    89-year-old woman admitted with GI bleed and acute cardiomyopathy.  She hemodynamically has settled down in sinus rhythm but her kidney function has significantly worsened.    I would recommend no further escalation in care and transition to comfort.  She is presently comfortable with clear mentation and I think this is a valuable time for her to spend with her family.    We can give her some gentle fluid to support her kidneys but overall, if she has new pressor requirement or worsening kidney function this signifies the ending of her life.    Clinical picture discussed in person with her hospitalist MD.  They will take over care and transfer her out.  ICU will sign off.    Oswaldo Gill MD.

## 2023-09-17 NOTE — CONSULTS
[facility]  Gastroenterology Consultation         Gillian Burk  [address]    [place] [address]  89 year old female    Admission Date/Time: 9/15/2023  Primary Care Provider: Augusto Meyer  Referring / Attending Physician:  Dr. Chong    We were asked to see the patient in consultation by Dr. Chong for evaluation of symptomatic anemia.      CC: Acute on chronic anemia    HPI:  Gillian Burk is a 89 year old female who who was transferred here from [facility] patient with complicated past medical history of ischemic cardiomyopathy Diabur CVA on chronic UTI prophylaxis with Macrodantin history of hyperlipidemia hypertension rheumatoid arthritis anxiety history of seizure disorder.  Who was recently diagnosed with lower extremity DVT patient was started on Xarelto 3 weeks ago patient history of right hip fracture secondary to file patient has mild periprosthetic fracture edema who was admitted with the above-mentioned symptoms patient was found to have hemoglobin in the range of 7 patient was transfused.  Patient's hemoglobin was 8.5 in August dropped down to 6.2 patient was supported subsequently patient was transferred to [facility].  Overnight patient whelp acute episode of A-fib with rapid ventricular response hypotension patient was transferred to ICU.  Patient's current findings are consistent with soft blood pressure.  Patient finding discussed with the intensive care team patient's initial ultrasound also showed possible pericardiac fluid and cardiac tamponade.  Patient's hemoglobin arrival to [facility] was in the range of 9 patient was transfused again patient last hemoglobin this morning is in the range of 12.  Patient has been on pressors.  Patient is currently on amiodarone on IV Levophed.  Patient is laying comfortably denying any abdominal complaint denying any abdominal pain nausea vomiting.  No other new systemic  complaint rest of review of system is negative.    ROS: A comprehensive ten point review of systems was negative aside from those in mentioned in the HPI.      PAST MED HX:  I have reviewed this patient's medical history and updated it with pertinent information if needed.   Past Medical History:   Diagnosis Date    Aortic stenosis     s/p TAVR 8/17/16    Chronic migraine     Hyperlipidaemia     Hypertension     Hypothyroidism     Myocardial infarction (H)     Need for SBE (subacute bacterial endocarditis) prophylaxis     Orthostatic hypotension     wears compression stockings    PUD (peptic ulcer disease)     Secondary Sjogren's syndrome (H)     Seizures (H)     after stroke (partial onset)    Seropositive rheumatoid arthritis (H)     Stroke (H) 05/2013    with visual field cut, left occipital lobe    Syncope 2014    due to orthostatic hypotension       MEDICATIONS:   Prior to Admission Medications   Prescriptions Last Dose Informant Patient Reported? Taking?   Cranberry 500 MG CAPS 9/14/2023 at [facility] No Yes   Sig: Take 1 capsule (500 mg) by mouth 2 times daily   Lactobacillus (FLORAJEN ACIDOPHILUS) CAPS 9/14/2023 at 0847 Nursing Home No Yes   Sig: Take 1 capsule by mouth daily   Lidocaine (LIDOCARE) 4 % Patch 9/14/2023 at 2155 [facility] Yes Yes   Sig: Place 1 patch onto the skin 2 times daily 1 patch topical, Twice a day, Place 1 patch onto the skin every 24 hours to prevent lidocaine toxicity, patient should be patch free for 12 hours daily. Apply patch to RIGHT HIP. To prevent toxicity, patient should be patch free for 12 hours daily. Patches may be cut to smaller size priorto releasing liner. Reminder: Remove previous patch before applying new patch. NEVER APPLY HEAT OVER PATCH which increases absorption and may lead to local anesthetic toxicity. Do not apply over area where liposomal bupivacaine was injected 96 hours post injection.   RESTASIS 0.05 % ophthalmic emulsion 9/14/2023 at 1536 Nursing  Home No Yes   Sig: Place 1 drop Into the left eye 2 times daily   SLOW  (45 Fe) MG CR tablet 9/14/2023 at [address] [place] [address] No Yes   Sig: Take 1 tablet (142 mg) by mouth daily   Patient taking differently: Take 142 mg by mouth 2 times daily   acetaminophen (TYLENOL) 325 MG tablet 9/14/2023 at 1536 [facility] No Yes   Sig: Take 3 tablets (975 mg) by mouth every 8 hours   atorvastatin (LIPITOR) 40 MG tablet 9/14/2023 at [address] [place] [address] No Yes   Sig: Take 1 tablet (40 mg) by mouth daily   calcium carbonate (TUMS) 500 MG chewable tablet Unknown at Unknown [facility] No Yes   Sig: Take 1 tablet (500 mg) by mouth 2 times daily as needed for heartburn   cetirizine (ZYRTEC) 10 MG tablet 9/14/2023 at [address] [place] [address] No Yes   Sig: Take 1 tablet (10 mg) by mouth daily   citalopram (CELEXA) 10 MG tablet 9/14/2023 at [address] [place] [address] No Yes   Sig: Take 1 tablet (10 mg) by mouth At Bedtime   clopidogrel (PLAVIX) 75 MG tablet 9/14/2023 at [address] [place] [address] No Yes   Sig: Take 1 tablet (75 mg) by mouth daily   famotidine (PEPCID) 40 MG tablet 9/14/2023 at [address] [place] [address] No Yes   Sig: Take 1 tablet (40 mg) by mouth daily   isosorbide mononitrate (IMDUR) 60 MG 24 hr tablet 9/14/2023 at [address] [place] [address] No Yes   Sig: Take 1 tablet (60 mg) by mouth daily   levETIRAcetam (KEPPRA) 500 MG tablet 9/14/2023 at [address] [place] [address] No Yes   Sig: Take 1 tablet (500 mg) by mouth 2 times daily   levothyroxine (SYNTHROID/LEVOTHROID) 88 MCG tablet 9/14/2023 at 1412 [facility] No Yes   Sig: Take 1 tablet (88 mcg) by mouth daily at 2 pm   lisinopril (ZESTRIL) 5 MG tablet 9/14/2023 at [address] [place] [address] No Yes   Sig: Take 1 tablet (5 mg) by mouth daily   loperamide (IMODIUM) 2 MG capsule Unknown at Unknown [facility] No Yes   Sig: Take 1 capsule (2 mg) by mouth 4 times daily as needed for diarrhea   methotrexate 2.5 MG tablet 9/10/2023 at [address] [place] [address] No Yes   Sig: Take 4 tablets (10 mg) by mouth once a week   Patient taking  differently: Take 10 mg by mouth once a week Sundays   metoprolol succinate ER (TOPROL XL) 50 MG 24 hr tablet 9/14/2023 at [address] [place] [address] No Yes   Sig: Take 1 tablet (50 mg) by mouth 2 times daily   Patient taking differently: Take 50 mg by mouth daily   mirtazapine (REMERON) 30 MG tablet 9/14/2023 at [address] [place] [address] No Yes   Sig: Take 1 tablet (30 mg) by mouth At Bedtime   nitroFURantoin macrocrystal-monohydrate (MACROBID) 100 MG capsule 9/14/2023 at [address] [place] [address] No Yes   Sig: Take 1 capsule (100 mg) by mouth At Bedtime   nitroGLYcerin (NITROSTAT) 0.4 MG sublingual tablet Unknown at [facility] No Yes   Sig: Place 1 tablet (0.4 mg) under the tongue every 5 minutes as needed for chest pain (pt may self administer as needed) For chest pain place 1 tablet under the tongue every 5 minutes for 3 doses. If symptoms persist 5 minutes after 1st dose call 911.   polyethylene glycol (MIRALAX) 17 GM/Dose powder 9/14/2023 at [address] [place] [address] No Yes   Sig: Take 17 g by mouth daily   predniSONE (DELTASONE) 2.5 MG tablet 9/14/2023 at [address] [place] [address] No Yes   Sig: Take 1 tablet (2.5 mg) by mouth daily   rivaroxaban ANTICOAGULANT (XARELTO) 15 MG TABS tablet 9/14/2023 at [address] [place] [address] No Yes   Sig: Take 1 tablet (15 mg) by mouth 2 times daily (with meals) for 21 days   senna-docusate (SENOKOT-S/PERICOLACE) 8.6-50 MG tablet 9/14/2023 at [address] [place] [address] No Yes   Sig: Take 1 tablet by mouth 2 times daily   triamcinolone (KENALOG) 0.5 % external ointment 9/14/2023 at [address] [place] [address] No Yes   Sig: Apply 1 g topically 2 times daily      Facility-Administered Medications: None       ALLERGIES:   Allergies   Allergen Reactions    Penicillins Hives     Other Reaction(s): Not available    Caffeine Other (See Comments) and Unknown     Triggers your Meniere's disease    Gold     Hydrocodone Other (See Comments) and Unknown     hallucinations    Ibuprofen GI Disturbance and Unknown    Other Environmental Allergy       Metals of unspecific kind    Tramadol Other (See Comments)     Seizure, was taking remeron along with tramadol    Metal [Staples] Rash       SOCIAL HISTORY:  Social History     Tobacco Use    Smoking status: Never     Passive exposure: Never    Smokeless tobacco: Never   Vaping Use    Vaping Use: Never used   Substance Use Topics    Alcohol use: No     Alcohol/week: 0.0 standard drinks of alcohol    Drug use: No       FAMILY HISTORY:  Family History   Problem Relation Age of Onset    Hyperlipidemia Mother     Family History Negative No family hx of     Hypertension Mother     Rheumatoid Arthritis Child        PHYSICAL EXAM:   General awake alert responding appropriately.  Vital Signs with Ranges  Temp: 98.1  F (36.7  C) Temp src: Axillary BP: (!) 67/43 Pulse: (!) 133   Resp: 29 SpO2: 99 % O2 Device: None (Room air)    I/O last 3 completed shifts:  In: 1761.6 [I.V.:1461.6]  Out: 200 [Urine:200]    Cardiovascular: negative, PMI normal. No lifts, heaves, or thrills. RRR. No murmurs, clicks gallops or rub  Respiratory: negative, Percussion normal. Good diaphragmatic excursion. Lungs clear  Neck: Neck supple. No adenopathy. Thyroid symmetric, normal size,, Carotids without bruits.  Abdomen: Abdomen soft, non-tender. BS normal. No masses, organomegaly          ADDITIONAL COMMENTS:   I reviewed the patient's new clinical lab test results.   Recent Labs   Lab Test 09/16/23 0447 09/16/23 0116 09/16/23  0114 09/15/23  2152 09/15/23  1156 09/13/23  0720 08/30/23  0540   WBC 8.5  --  4.2 3.7*  --    < > 8.9   HGB 12.8  --  9.4*  9.4* 10.8*  --    < > 8.5*   MCV 94  --  95 94  --    < > 90     --  421 470*  --    < > 217   INR  --  1.26*  --   --  1.51*  --  2.13*    < > = values in this interval not displayed.     Recent Labs   Lab Test 09/16/23 0447 09/16/23  0114 09/15/23  1136   POTASSIUM 5.0 4.5 4.6   CHLORIDE 108* 108* 109*   CO2 17* 20* 23   BUN 22.8 21.4 27.7*   ANIONGAP 11 10 9     Recent Labs   Lab Test  09/16/23  0447 09/15/23  1136 08/29/23  2134 08/29/23  1237 06/12/23  0651 05/30/23  1523 09/21/22  1451 07/13/22  1236 05/29/21  1314 05/23/21  0934 04/26/19  1152 04/26/19  1124   ALBUMIN 1.9* 2.0*  --  2.4*  --   --    < >  --    < > 2.6*   < > 2.9*   BILITOTAL 0.5 0.2  --  0.2  --   --    < >  --    < > 0.2   < > 0.4   ALT 19 12  --  12   < >  --    < >  --    < > 15   < > 18   AST 60* 27  --  24   < >  --    < >  --    < > 18   < > 21   PROTEIN  --   --  100*  --   --  30*  --  Negative   < >  --    < >  --    LIPASE  --   --   --   --   --   --   --   --   --  167  --  166    < > = values in this interval not displayed.       I reviewed the patient's new imaging results.        CONSULTATION ASSESSMENT AND PLAN:    Principal Problem:    Anemia    Assessment:     Plan: Very pleasant 89-year-old female with complicated past medical history who was transferred here from outside facility due to acute on chronic anemia overnight patient was having significant cardiac event patient has significant arrhythmias requiring pressors.  Patient is still on pressors patient's hemoglobin is stable there is no signs of active bleeding patient finding discussed in detail with the ICU team.  I will recommend to continue on current treatment plan supportive care further evaluation and cardiology at this point no further plan for endoscopic evaluation from GI standpoint patient is cleared from cardiology.  Patient can be started on full liquid diet.  Finding and plan discussed with the intensivist.  Continue on IV Protonix serial hemoglobin.  Thank you very much for letting us participate in her care.                Deepak Muñoz MD, FACP  [facility].  Office: [phone]  Cell : [phone]      [facility]  Ph: [phone] Fax: [phone]                      Acute on chronic anemia.

## 2023-09-17 NOTE — PROGRESS NOTES
ICU UPDATE    AM lab - new JULITO with hyerkalemia, prior discussion with family, no escalations of care.  Pt DNR/DNI. Pt in no distress, continue with current cares    Karlo Olson MD

## 2023-09-17 NOTE — PROGRESS NOTES
[facility]    Medicine Progress Note - Hospitalist Service    Date of Admission:  9/15/2023    Assessment & Plan   Gillian Burk is a 89 year old female who has medical history which includes hypertension, hyperlipidemia, history of coronary artery disease, hypothyroidism, rheumatoid arthritis, anxiety, history of seizure disorder, chronic UTIs and is on Macrodantin as prophylaxis, history of CVA, TAVR, ischemic cardiomyopathy, recently diagnosed DVT of the left lower extremity and started on Xarelto.    She presents to the ED from rehab facility where she has been recovering from a recent right hip fracture secondary to fall with mildly displaced periprosthetic fracture involving the right trochanter with more fatigue.  H    Her hemoglobin was found to be lower at 7 and she has been having dark stools for the past week     On early am 9/16/23 a rapid response was called d/t a clinical change of a fib with RVR and hypotension.  Transferred to the ICU after IVF bolus given and still having persistent vital sign instability.  She was given IV digoxin and started on IV amiodarone gtt.  She has remained on vasopressor agents since transfer to the ICU and is needing increasing doses of IV vasopressor medications.      Intensivist physician consulted and has been managing vasopressor agents and medical care in the ICU this am.      Events of overnight (9/16-9/17) reviewed.  She had persistent hypotension with a fib and RVR despite max dose of phenylephrine.  Intensivist notified and family called.  Became stephen (0000) with HR in the 40-50's; around 0045 converted to NSR with HR in the 70's and normalization of BP. She has now been weaned of IV vasopressor agents.    Acute on chronic anemia likely due to  GI bleed  Iron deficiency  Folate deficiency  -Baseline hemoglobin recently has been between 9-10  -Her last hemoglobin on 8/30/2023 was 8.5    -Patient does seem to have lower GI bleed as  she is on anticoagulation with Xarelto and has been having black stools and fecal occult was positive    -CT scan of the abdomen at outside hospital showed sigmoid diverticulosis with active inflammation and a right hip replacement with acute intertrochanteric periprostatic fracture, compression deformities of T11, T12 and L1 are indeterminant and can be subacute or chronic and moderate left and small right pleural effusion, prominent interstitial changes in both lungs likely represent a combination of fibrosis with superimposed edema/infection    -Patient has been given 2 units of blood (9/15/23) and 1 unit (9/16/23)  Hgb this am improved to 13.7 (12.8) (9.4) (10.8) (6.8)    - GI has been consulted and is following (Dr Muñoz)    -Continue patient with PPI, check hemoglobin daily, keep hemoglobin more than 7, cardiac monitor, hold Xarelto and Plavix and no use of NSAIDs, aspirin or heparin products and conditional orders for blood transfusion have been placed     2.  Acute hypotension, shock - resolved    Appears to be secondary to unstable cardiac arrythmia (a fib with RVR and hypotension)  As above, now off of IV vasopressors    Small IVF bolus given this am    Continue to monitor I/O's closely    Currently on stress dose steroids - will transition back to her daily po prednisone dose today    Addendum - 1730  Repeat lactic acid result from 1623 reviewed---improved to 2.3 (2.7).  Vitals have remained stable - no hypotension or fever.  HR improved and is in the 70's.  Noted also BMP with slight elevation in creat and potassium 5.9.  Po intake has been poor as she has been so critically ill.      Will start gentle IVF at 50cc/hr.  Recheck potassium, creat at 2200.  BMP in the am.  Monitor I/O's closely.    3.  A fib with RVR and hypotension - resolved    Clinical status remains guarded  Was given IV digoxin and IV amiodarone   Amiodarone continuous IV infusion has been held  Awaiting cardiology recommendations  regarding further cardiac medications today now that BP in improved  PTA medications include Toprol XL 50mg/day    Cardiology consulted and following - appreciate  Echo with decreased EF (see below)    4. Stress induced CM    Limited images from ECHO 9/16/23 with EF 25% with apical akinesis and septal motion abnormalities noted to be consistent with conduction abnormalities    Further medication recs and management as per Cardiology    5. Recently diagnosed left lower extremity DVT  -Patient recently was admitted at outside hospital on 8/29/2023, diagnosed with occlusive to partially occlusive thrombus within the left iliac vein extending into the common femoral vein and proximal superficial vein  Had been on xarelto (currently on hold d/t GI bleed)    Will consider repeat leg US once more clinically stable and further evaluate for other treatments indicated for DVT     6. Recently diagnosed right hip fracture secondary to fall with mildly displaced periprosthetic fracture involving the right greater trochanter  -Orthopedics was consulted at outside hospital on 8/29 and they recommended left to toe-touch weightbearing and no surgical intervention was planned    7. Seizure disorder  -transition back to po keppra today    8. History of coronary artery disease that is PCI to LAD in 2/2021  Hypertension  Hyperlipidemia  -We will hold amlodipine, lisinopril, imdur, metoprolol and atorvastatin in light of GI bleed and monitor   We will have to continue to hold Plavix given the GI bleed   (As above)     9. Hypothyroidism  -Will check TSH today (9/17/23)  Resume PTA synthroid daily 9/18/23 if no concern in TSH result     10. Rheumatoid arthritis       On chronic prednisone    -Patient is currently on weekly methotrexate which will be held and she is on daily prednisone which will be continued     Has been on IV stress dose hydrocortisone q6 in the ICU.  As BP has normalized, will transition back to po prednisone    11.  Anxiety disorder  -We will continue the patient with PTA Celexa and avoid as needed Xanax for now when verified by pharmacy     12. History of CVA in 2013  -Noted     13. Aortic stenosis status TAVR  -Noted     14. Chronic UTI  -Patient is currently on Macrodantin as prophylaxis and we will continue the same when verified by pharmacy     15. Coccygeal wound  -We will consult wound care     16. CT scan finding of compression deformities of T11, T12 and L1 are indeterminant and can be subacute or chronic   -Noted-she does not complain of any back pain    GOALS OF CARE:     Was able to talk with both of Gillian's daughters on the phone this am and update on events of overnight.  We discussed the change in HR and how that has improved her clinical stability.  Plan to transfer out of the ICU on tele monitoring later today.  Also discussed that her hgb is stable but we may still want to discuss EGD, pending clinical course.  Also discussed the extensive recent left sided DVT and the risks of PE if not able to safely anticoagulate.  Also introduced the possibility of IVC filter placement if she remains clinically stable in the next 24 hours or so.  Also discussed plan to possibly transfer out of the ICU today and that Gillian and her family should talk about wishes about returning to the ICU, vasopressor agents, etc if she again clinically worsens.  They requested a palliative care consult (will place order) to help them with these discussions.  They voiced understanding of the above; all questions answered.    D/W Dr. Gill, Intensivist, as well this am in the ICU.           Medical Decision Making       70 MINUTES SPENT BY ME on the date of service doing chart review, history, exam, documentation & further activities per the note.        PPE Worn:  Mask, gloves     Diet: Advance Diet as Tolerated: Full Liquid Diet    DVT Prophylaxis: Pneumatic Compression Devices  Reyes Catheter: Not present  Lines: None     Cardiac  "Monitoring: ACTIVE order. Indication: h/o  coronary artery disease and DVT presenting with GI bleed  Code Status: No CPR- Do NOT Intubate      Clinically Significant Risk Factors        # Hyperkalemia: Highest K = 6.3 mmol/L in last 2 days, will monitor as appropriate       # Hypoalbuminemia: Lowest albumin = 1.9 g/dL at 9/16/2023  4:47 AM, will monitor as appropriate    # Coagulation Defect: INR = 1.26 (Ref range: 0.85 - 1.15) and/or PTT = 34 Seconds (Ref range: 22 - 38 Seconds), will monitor for bleeding     # Acute Kidney Injury, unspecified: based on a >150% or 0.3 mg/dL increase in last creatinine compared to past 90 day average, will monitor renal function    # Hypertension: Noted on problem list    # Chronic heart failure with reduced ejection fraction: last echo with EF <40%         # Cachexia: Estimated body mass index is 17.54 kg/m  as calculated from the following:    Height as of 9/11/23: 1.702 m (5' 7\").    Weight as of this encounter: 50.8 kg (111 lb 15.9 oz)., PRESENT ON ADMISSION            Disposition Plan    Will transfer out to cardiac floor later today       Bing Zaldivar,   Hospitalist Service  [facility]  Securely message with [employer] (more info)  Text page via [employer] Paging/Directory   ______________________________________________________________________    Interval History   Seen with bedside RN and discussed with intensivist physician in the ICU.      Events of overnight noted.     Gillian denies current chest pain or palpitations.  States that she \"feels better\" and that \"I can breathe better\".      Min po intake, no significant coughing or clear swallowing difficulties.  No F/C, N/V noted.     Physical Exam   Vital Signs: Temp: (!) 96.4  F (35.8  C) Temp src: Axillary BP: 136/71 Pulse: 80   Resp: 27 SpO2: 98 % O2 Device: None (Room air)    Weight: 111 lbs 15.9 oz    GEN:  Alert, awake, elderly female sitting up in the hospital bed in similar position " as yesterday am  HEENT:  Normocephalic/atraumatic, no scleral icterus, no nasal discharge, mouth and membranes more moist today  HEART:  sys m; RRR this am  LUNGS:  fairly clear to auscultation ant/lat bilaterally without rales/rhonchi/wheezing. To somewhat limited exam, seems to have dry, bibasilar crackles post (limited exam with arms lifted for post exam this am)   No significant costal retractions noted bilaterally.  Symmetric chest rise on inhalation noted.  ABD:  active bowel sounds, soft, not significantly tender or distended to light palpation throughout.  No clear rebound/guarding/rigidity on exam.  EXT:    SKIN:  both hands with 1+ pitting edema; both hands are more warm to the touch today.  Both feet with min pitting edema and cooler to the touch but not clearly cyanotic.  Medications    amiodarone Stopped (09/16/23 1530)    phenylephrine Stopped (09/17/23 0230)      acetaminophen  975 mg Oral Q8H    artificial tears  1 drop Left Eye BID    citalopram  10 mg Oral At Bedtime    folic acid  1 mg Oral Daily    hydrocortisone sodium succinate PF  50 mg Intravenous Q6H    levETIRAcetam  500 mg Intravenous Q12H    Lidocaine  1 patch Transdermal Q24H    [Held by provider] mirtazapine  30 mg Oral At Bedtime    nitroFURantoin macrocrystal-monohydrate  100 mg Oral At Bedtime    pantoprazole  40 mg Intravenous BID    sodium chloride (PF)  3 mL Intracatheter Q8H       Data     Labs and Imaging results below reviewed today.  Recent Labs   Lab 09/17/23  0419 09/16/23  0447 09/16/23  0114   WBC 12.1* 8.5 4.2   HGB 13.7 12.8 9.4*  9.4*   HCT 37.9 36.3 28.1*   MCV 96 94 95    427 421     Recent Labs   Lab 09/17/23  0419 09/16/23  0447 09/16/23  0114    136 138   POTASSIUM 6.3* 5.0 4.5   CHLORIDE 108* 108* 108*   CO2 15* 17* 20*   ANIONGAP 14 11 10   * 132* 139*   BUN 32.9* 22.8 21.4   CR 1.25* 0.69 0.68   GFRESTIMATED 39* 79 79   CHEYANNE 7.5* 7.3* 7.2*     Recent Labs   Lab 09/17/23  0419 09/16/23  0447  23  0114 09/15/23  2347 09/15/23  1136    136 138  --  141   POTASSIUM 6.3* 5.0 4.5  --  4.6   CHLORIDE 108* 108* 108*  --  109*   CO2 15* 17* 20*  --  23   ANIONGAP 14 11 10  --  9   * 132* 139*   < > 97   BUN 32.9* 22.8 21.4  --  27.7*   CR 1.25* 0.69 0.68  --  0.66   GFRESTIMATED 39* 79 79  --  80   CHEYANNE 7.5* 7.3* 7.2*  --  7.3*   MAG  --   --  2.3  --   --    PROTTOTAL 5.8* 5.6*  --   --  5.5*   ALBUMIN 2.0* 1.9*  --   --  2.0*   BILITOTAL 0.3 0.5  --   --  0.2   ALKPHOS 291* 240*  --   --  149*   AST  --  60*  --   --  27   ALT 25 19  --   --  12    < > = values in this interval not displayed.     No results for input(s): DD in the last 168 hours.  Recent Labs   Lab 23  0419 23  0447 09/15/23  1136   AST  --  60* 27   ALT 25 19 12   ALKPHOS 291* 240* 149*   BILITOTAL 0.3 0.5 0.2     Recent Labs   Lab 23  0116 09/15/23  1156   INR 1.26* 1.51*     Recent Labs   Lab 23  0419 23  0114   LACT 2.7* 2.9*       Recent Results (from the past 24 hour(s))   Echo Limited   Result Value    LVEF  20-25%    Narrative    [id number]  [id number]  [id number]  [id number]^CHRISTINE^HEM     [facility]  Echocardiography Laboratory  [address] [place], [place] [address]     Name: LUCIO HANNON  MRN: [id number]  : 1927  Study Date: 2023 08:56 AM  Age: 89 yrs  Gender: Female  Patient Location: [facility]  Reason For Study: Tamponade, Shock  Ordering Physician: LARISSA KING  Referring Physician: NITA DE LA CRUZ  Performed By: Pedro Serrano     BSA: 1.6 m2  Height: 66 in  Weight: 111 lb  HR: 118  BP: 103/61 mmHg  ______________________________________________________________________________  Procedure  Limited Portable Echo Adult.  ______________________________________________________________________________  Interpretation Summary     This was a stat echo in the ICU at [facility]. Technically very  difficult study.     LV size is normal however there  is severe mechanical dyssynchrony. Findings  are also suggestive of potentially stress-induced cardiomyopathy. Mid to  distal portions of the left ventricle are essentially akinetic. Estimated  ejection fraction is probably around 25%. No contrast of biplane available.  Normal right ventricular size and systolic function.  Severe mitral annular calcification. Evaluation of MS inadequate on the study.  There is a bioprosthetic aortic valve per history. This is not well  visualized. No hemodynamically significant stenosis noted on that on Doppler  IVC severely dilated consistent with increased right-sided filling pressures.  The pericardial space suggests organizing pericardial effusion worse than  prior. No significant free fluid noted in the pericardium     Findings communicated to ICU attending at [facility].     ______________________________________________________________________________  Left Ventricle  The left ventricle is normal in size. The visual ejection fraction is 20-25%.  Septal motion is consistent with conduction abnormality. There is apical  akinesis.     Right Ventricle  The right ventricle is normal in size and function.     Atria  The left atrium is not well visualized. The left atrium is mild to moderately  dilated. The right atrium is mild to moderately dilated.     Mitral Valve  There is severe mitral annular calcification. The mitral valve leaflets are  severely thickened.     Tricuspid Valve  The tricuspid valve is not well visualized, but is grossly normal. Right  ventricular systolic pressure could not be approximated due to inadequate  tricuspid regurgitation.     Aortic Valve  The aortic valve is not well visualized. No hemodynamically significant  valvular aortic stenosis. There is a bioprosthetic aortic valve.     Pulmonic Valve  The pulmonic valve is not well visualized.     Vessels  Dilation of the inferior vena cava is present with abnormal respiratory  variation in  diameter. IVC diameter >2.1 cm collapsing <50% with sniff  suggests a high RA pressure estimated at 15 mmHg or greater.     Pericardium  The pericardial space suggests organizing pericardial effusion.     ______________________________________________________________________________  Doppler Measurements & Calculations  Ao V2 max: 96.1 cm/sec  Ao max P.0 mmHg  Ao V2 mean: 68.2 cm/sec  Ao mean P.1 mmHg  Ao V2 VTI: 13.5 cm     Ao acc time: 0.07 sec     ______________________________________________________________________________  Report approved by: Fahad Patterson 2023 10:24 AM

## 2023-09-18 NOTE — PLAN OF CARE
Goal Outcome Evaluation:       1900-0730    A&O x 4, very [place]. Blind in R eye. VSS, Bps stable this shift. IVF @ 50mL/hr. Pt currently on RA. Previous L PIV infiltrated, took 6 attempts with various RN's to get new PIV in place on LUE. Skin very thin & fragile with multiple skin tears on both arms, L shin. Coccyx wound with Mepliex in place. R hip lidocaine patch on. Turn 2QH. Purewick in place, had 2 BM this shift. Tele: SR. Alarms on. Continue with plan of care.

## 2023-09-18 NOTE — PROGRESS NOTES
[facility]  Cardiology Progress Note  Date of Service: 09/18/2023  Date of Admission: 9/15/2023  Inpatient Cardiologist: Dr. Yulisa Miranda    Gillian Burk is a 89-year-old female with a past medical history significant for TAVR in 2016, coronary disease status post LAD stenting in February 2021 and left bundle branch block who was admitted to [facility] on 9/15/2023 with acute on chronic anemia secondary to a GI bleed in the context of Xarelto for recent DVT as well as atrial fibrillation with rapid ventricular response.    Asssessment:    Acute on chronic anemia 2/2 GI bleed while on Xarelto for recent DVT.  Atrial fibrillation with rapid ventricular response  Converted to NSR after receiving IV amiodarone.  She subsequently had an episode of junctional rhythm and amiodarone was discontinued, although she has had intermittent runs of SVT subsequently.  On PO Amio 200 mg twice daily.  Anticoagulation on hold in setting of #1.  PSVT, brief episode on telemetry; now stable without recurrence.  Acute systolic cardiomyopathy, EF 25%, likely stress-induced.  CAD, Hx LAD stenting in 02/2021.  Status post TAVR in 2016.  JULITO, Cr slowly rising to 1.56 today  Hyperkalemia, improving; peaked at 6.3, now 5.2.  Recent fall and right hip fracture  DNR/DNI  _____________________________________________________________    Plan:   OK from a cardiology standpoint for GI to proceed with additional testing.  Continue PO amiodarone.  Ongoing HF supportive cares, including strict I/O, daily weights, FR.  Once GI workup completed, we will optimize GDMT as hemodynamics allow.  Cardiology will continue to follow.  _____________________________________________________________    Thank you for the opportunity to participate in the care of Ms. Gillian Burk.  Please feel free to reach out with any additional questions.    ADRIÁN Jane, CNP   Nurse Practitioner  [facility]  Pager: [phone]  Phone: [phone]  Text Page (8am - 5pm, M-F)    Physical Exam   Temp: 97.7  F (36.5  C) Temp src: Axillary BP: 102/56 Pulse: 79   Resp: 18 SpO2: 97 % O2 Device: None (Room air)      Vitals:    09/16/23 0400 09/17/23 1400 09/18/23 0500   Weight: 50.8 kg (111 lb 15.9 oz) 49.3 kg (108 lb 11 oz) 50 kg (110 lb 3.7 oz)     I/O last 3 completed shifts:  In: -   Out: 400 [Urine:400]    Constitutional: Appears stated age, well nourished, NAD.  Neck: Supple. Carotid pulses full and equal.   Respiratory: Non-labored. Lungs clear.  Cardiovascular: RRR, normal S1 and S2. No M/G/R.  Generalized edema.  Vascular: Peripheral pulses intact.  GI: Soft, non-distended, non-tender.  Musculoskeletal/Extremities: Symmetrical movement. No edema.  Neurologic: No gross focal deficits. Alert, awake, and oriented to all spheres.  Psychiatric: Affect appropriate. Mentation normal.    Data   Recent Labs   Lab 09/18/23  0656 09/17/23  1623 09/17/23  0929 09/17/23  0419 09/16/23  0447 09/16/23  0116 09/15/23  2152 09/15/23  1156   WBC  --  15.6*  --  12.1* 8.5  --    < >  --    HGB 12.5 14.7  --  13.7 12.8  --    < >  --    MCV  --  95  --  96 94  --    < >  --    PLT  --  279  --  299 427  --    < >  --    INR  --   --   --   --   --  1.26*  --  1.51*    137 139 137 136  --    < >  --    POTASSIUM 5.2 5.9* 5.5* 6.3* 5.0  --    < >  --    CHLORIDE 109* 107 108* 108* 108*  --    < >  --    CO2 17* 17* 17* 15* 17*  --    < >  --    BUN 41.9* 37.5* 34.7* 32.9* 22.8  --    < >  --    CR 1.56* 1.41* 1.35* 1.25* 0.69  --    < >  --    ANIONGAP 12 13 14 14 11  --    < >  --    CHEYANNE 6.9* 7.4* 7.5* 7.5* 7.3*  --    < >  --    * 139* 135* 128* 132*  --    < >  --    ALBUMIN 1.7* 1.7*  --  2.0* 1.9*  --   --   --    PROTTOTAL 4.9* 5.8*  --  5.8* 5.6*  --   --   --    BILITOTAL 0.2 0.3  --  0.3 0.5  --   --   --    ALKPHOS 209* 263*  --  291* 240*  --   --   --    ALT 17 21  --  25 19  --   --   --    AST 37  --    --   --  60*  --   --   --     < > = values in this interval not displayed.       Recent Labs   Lab 09/18/23  0656 09/17/23  1623 09/17/23  0419 09/16/23  0447   WBC  --  15.6* 12.1* 8.5   HGB 12.5 14.7 13.7 12.8   HCT  --  41.9 37.9 36.3   MCV  --  95 96 94   PLT  --  279 299 427     Recent Labs   Lab 09/18/23  0656 09/17/23  1623 09/17/23  0929    137 139   POTASSIUM 5.2 5.9* 5.5*   CHLORIDE 109* 107 108*   CO2 17* 17* 17*   ANIONGAP 12 13 14   * 139* 135*   BUN 41.9* 37.5* 34.7*   CR 1.56* 1.41* 1.35*   GFRESTIMATED 30* 34* 36*   CHEYANNE 6.9* 7.4* 7.5*        Patient Active Problem List   Diagnosis    Rheumatoid arthritis (H)    Hypertension goal BP (blood pressure) < 140/90    Anxiety    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    History of CVA (cerebrovascular accident) - 2013    Esophageal reflux    PAC (premature atrial contraction)    PVC's (premature ventricular contractions)    Hyperlipidemia with target LDL less than 130    Aortic valve disorder    Intermediate coronary syndrome    Transient ischemic attack (TIA), and cerebral infarction without residual deficits    Hypothyroidism due to acquired atrophy of thyroid    Aortic stenosis, severe - s/p TAVR    S/P TAVR (transcatheter aortic valve replacement)    Advanced directives, counseling/discussion    Aortic stenosis    Need for SBE (subacute bacterial endocarditis) prophylaxis    Acquired hypothyroidism    TIA (transient ischemic attack)    Non-rheumatic mitral valve stenosis - mild to moderate 11/16 echo    Coronary artery disease involving native coronary artery - NSTEMI 11/16 with moderate RCA disease treated medically    CKD (chronic kidney disease) stage 3, GFR 30-59 ml/min (H)    Elevated troponin    Iron deficiency anemia    Hypotension    GI bleed    Primary osteoarthritis of right knee    Chronic systolic congestive heart failure (H)    Chronic stable angina (H)    Urinary incontinence without sensory awareness     Ischemic cardiomyopathy    Coronary artery disease involving native coronary artery of native heart with angina pectoris (H)    CONTRERAS (dyspnea on exertion)    CRAO (central retinal artery occlusion), right    Fibrocystic breast disease    IZABEL (generalized anxiety disorder)    Hearing loss    Herpes zoster    Meniere's disease    Migraine headache    Occipital stroke (H)    Osteoarthritis of left hip    Precancerous changes of the cervix    Squamous cell carcinoma in situ of skin    Chest pain, unspecified type    UTI (urinary tract infection)    Lightheadedness    Acute kidney failure, unspecified (H)    Leukocytosis    Abnormal gait    Encephalomalacia on imaging study-left occipital lobe    Acute deep vein thrombosis (DVT) of femoral vein of left lower extremity (H)    Generalized osteoarthritis    Closed fracture of right hip with routine healing, subsequent encounter    Anemia, unspecified type    Anemia     Medications    sodium chloride 50 mL/hr at 09/17/23 1811      acetaminophen  975 mg Oral Q8H    amiodarone  200 mg Oral BID    artificial tears  1 drop Left Eye BID    atorvastatin  40 mg Oral Daily    cetirizine  10 mg Oral Daily    citalopram  10 mg Oral At Bedtime    folic acid  1 mg Oral Daily    lactobacillus rhamnosus (GG)  1 capsule Oral Daily    levETIRAcetam  500 mg Oral BID    levothyroxine  88 mcg Oral Daily    Lidocaine  1 patch Transdermal Q24H    [Held by provider] mirtazapine  30 mg Oral At Bedtime    nitroFURantoin macrocrystal-monohydrate  100 mg Oral At Bedtime    pantoprazole  40 mg Oral BID AC    predniSONE  2.5 mg Oral Daily       Data   Last 24 hours labs personally reviewed.  Echo:   Recent Results (from the past 4320 hour(s))   Echo Limited   Result Value    LVEF  20-25%    Narrative    [id number]  [id number]  [id number]  [id number]^KING^HEM     [facility]  Echocardiography Laboratory  [address] [place] [id number]     Name: LUCIO HANNON  MRN:  [id number]  : 1927  Study Date: 2023 08:56 AM  Age: 89 yrs  Gender: Female  Patient Location: [facility]  Reason For Study: Tamponade, Shock  Ordering Physician: LARISSA KING  Referring Physician: NITA DE LA CRUZ  Performed By: Pedro Serrano     BSA: 1.6 m2  Height: 66 in  Weight: 111 lb  HR: 118  BP: 103/61 mmHg  ______________________________________________________________________________  Procedure  Limited Portable Echo Adult.  ______________________________________________________________________________  Interpretation Summary     This was a stat echo in the ICU at [facility]. Technically very  difficult study.     LV size is normal however there is severe mechanical dyssynchrony. Findings  are also suggestive of potentially stress-induced cardiomyopathy. Mid to  distal portions of the left ventricle are essentially akinetic. Estimated  ejection fraction is probably around 25%. No contrast of biplane available.  Normal right ventricular size and systolic function.  Severe mitral annular calcification. Evaluation of MS inadequate on the study.  There is a bioprosthetic aortic valve per history. This is not well  visualized. No hemodynamically significant stenosis noted on that on Doppler  IVC severely dilated consistent with increased right-sided filling pressures.  The pericardial space suggests organizing pericardial effusion worse than  prior. No significant free fluid noted in the pericardium     Findings communicated to ICU attending at [facility].     ______________________________________________________________________________  Left Ventricle  The left ventricle is normal in size. The visual ejection fraction is 20-25%.  Septal motion is consistent with conduction abnormality. There is apical  akinesis.     Right Ventricle  The right ventricle is normal in size and function.     Atria  The left atrium is not well visualized. The left atrium is mild to  moderately  dilated. The right atrium is mild to moderately dilated.     Mitral Valve  There is severe mitral annular calcification. The mitral valve leaflets are  severely thickened.     Tricuspid Valve  The tricuspid valve is not well visualized, but is grossly normal. Right  ventricular systolic pressure could not be approximated due to inadequate  tricuspid regurgitation.     Aortic Valve  The aortic valve is not well visualized. No hemodynamically significant  valvular aortic stenosis. There is a bioprosthetic aortic valve.     Pulmonic Valve  The pulmonic valve is not well visualized.     Vessels  Dilation of the inferior vena cava is present with abnormal respiratory  variation in diameter. IVC diameter >2.1 cm collapsing <50% with sniff  suggests a high RA pressure estimated at 15 mmHg or greater.     Pericardium  The pericardial space suggests organizing pericardial effusion.     ______________________________________________________________________________  Doppler Measurements & Calculations  Ao V2 max: 96.1 cm/sec  Ao max P.0 mmHg  Ao V2 mean: 68.2 cm/sec  Ao mean P.1 mmHg  Ao V2 VTI: 13.5 cm     Ao acc time: 0.07 sec     ______________________________________________________________________________  Report approved by: Fahad Patterson 2023 10:24 AM

## 2023-09-18 NOTE — PLAN OF CARE
Goal Outcome Evaluation: Pt is A&Ox4, very [place] and R eye blind, BP soft and NS at 50 ml/hr - house MD Melara was paged - came and assessed pt at 2200, LA fired and pending, on RA and LS diminished, on tele SR, R hip Lidocaine patch on, turned and repositioned every 2 hrs - skin is very thin and fragile with multiple skin tears on both arms and L shin. Pt denies pain - scheduled Tylenol given.       Plan of Care Reviewed With: patient, family    Overall Patient Progress: no changeOverall Patient Progress: no change

## 2023-09-18 NOTE — CONSULTS
Palliative Care Consultation Note  [facility]      Patient: Gillian Burk  Date of Admission:  9/15/2023    Requesting Clinician / Team: Medicine  Reason for consult: Goals of care  Decisional support  Patient and family support       Recommendations & Counseling     GOALS OF CARE:   Life-prolonging with limits    Gillian is clear and consistent at this time that she wants to proceed with UGI endoscopy if it is felt she is strong enough to do so.  She might want to have further discussions about her care if it turns out that endoscopy is not possible.Thus, in terms of goals of care planning I do not think there is anything else for us to address at the moment.  She is clear about her DNR/DNI status.    ADVANCE CARE PLANNING:  Daughters Jennifer and Carley are her designated healthcare agents, and are actively involved  POLST in place  Code status: No CPR- Do NOT Intubate    MEDICAL MANAGEMENT:   We are not actively managing symptoms at this time.    PSYCHOSOCIAL/SPIRITUAL SUPPORT:  Worship claire; would like hospital  to meet and pray with her.     Per discussion with primary team, and since GOC/POC are clear at this time and family is adequately supported, we will sign off.  Please reconsult if we can be of further assistance.  Thank you  Mateusz Pierson MD  Securely message with [employer] (more info)  Text page via [email or web address] Paging/Directory     TT: 71 minutes, with > 50% spent in C/C/E patient/family/care teams re: GOC, POC, Sx management. [id number]    Assessment      89 year old female with recent R right hip fracture secondary to fall, currently at rehab admitted via OSH with hgb 7 and dark stools.    PMH includes hypertension, hyperlipidemia, history of coronary artery disease, hypothyroidism, rheumatoid arthritis, anxiety, history of seizure disorder, chronic UTIs and is on Macrodantin as prophylaxis, history of CVA, TAVR, ischemic cardiomyopathy, recently diagnosed DVT of the  left lower extremity and is currently on 15 mg twice daily of Xarelto for 3 weeks,     Today, the patient was seen for:  GI bleed, cardiomyopathy, weakness, GOC planning    Palliative Care Summary:   I met today with patient and her daughter Carley at bedside, along with daughter/HCA Jennifer present by phone.  As noted above, GOC/POC are clear.  Plan at this time is to proceed with endoscopy to assess GI bleeding, if cardiology feels she is strong enough to go through this.  Gillian is clear and consistent that she wants to do this.    Prognosis, Goals, & Planning:    Functional Status just prior to this current hospitalization:  Was in TCU recovering from recent hip fracture    Prognosis, Goals, and/or Advance Care Planning:  See comments above.    Code Status was addressed today:   Remains DNR/DNI    With support from her daughters.        Patient has decision-making capacity today for complex decisions: Gillian demonstrates capacity to make complex decisions along with support of her daughters.            Coping, Meaning, & Spirituality:   [employer] claire, would like  to visit her.    Social:   , currently in TCU, was a homemaker and a teacher, greatly enjoys music and multiple family members are musicians.    Medications:  I have reviewed this patient's medication profile and medications from this hospitalization.   As needed Tylenol for pain  PPI    ROS:  We are not involved in symptom management.  She has some tenderness on her bottom where she has a recently diagnosed pressure wound.  No nausea, vomiting, shortness of breath or other pain.    Physical Exam   Blood pressure 102/56, pulse 79, temperature 97.7  F (36.5  C), temperature source Axillary, resp. rate 18, weight 50 kg (110 lb 3.7 oz), SpO2 97 %, not currently breastfeeding.   General: Frail-appearing elderly woman lying in bed.  Appears comfortable.  Hard of hearing, with hearing aids in.  Able to participate in conversation.  CV:  Irregularly irregular radial pulse 88    Data reviewed:  ROUTINE ICU LABS (Last four results)  CMP  Recent Labs   Lab 09/18/23  0656 09/17/23  1623 09/17/23  0929 09/17/23 0419 09/16/23 0447 09/16/23  0114 09/15/23  2347 09/15/23  1136    137 139 137 136 138  --  141   POTASSIUM 5.2 5.9* 5.5* 6.3* 5.0 4.5  --  4.6   CHLORIDE 109* 107 108* 108* 108* 108*  --  109*   CO2 17* 17* 17* 15* 17* 20*  --  23   ANIONGAP 12 13 14 14 11 10  --  9   * 139* 135* 128* 132* 139*   < > 97   BUN 41.9* 37.5* 34.7* 32.9* 22.8 21.4  --  27.7*   CR 1.56* 1.41* 1.35* 1.25* 0.69 0.68  --  0.66   GFRESTIMATED 30* 34* 36* 39* 79 79  --  80   CHEYANNE 6.9* 7.4* 7.5* 7.5* 7.3* 7.2*  --  7.3*   MAG 2.8*  --  2.9*  --   --  2.3  --   --    PROTTOTAL 4.9* 5.8*  --  5.8* 5.6*  --   --  5.5*   ALBUMIN 1.7* 1.7*  --  2.0* 1.9*  --   --  2.0*   BILITOTAL 0.2 0.3  --  0.3 0.5  --   --  0.2   ALKPHOS 209* 263*  --  291* 240*  --   --  149*   AST 37  --   --   --  60*  --   --  27   ALT 17 21  --  25 19  --   --  12    < > = values in this interval not displayed.     CBC  Recent Labs   Lab 09/18/23 0656 09/17/23 1623 09/17/23 0419 09/16/23 0447 09/16/23  0114   WBC  --  15.6* 12.1* 8.5 4.2   RBC  --  4.43 3.97 3.85 2.96*   HGB 12.5 14.7 13.7 12.8 9.4*  9.4*   HCT  --  41.9 37.9 36.3 28.1*   MCV  --  95 96 94 95   MCH  --  33.2* 34.5* 33.2* 31.8   MCHC  --  35.1 36.1 35.3 33.5   RDW  --  21.4* 21.4* 20.2* 19.6*   PLT  --  279 299 427 421     INR  Recent Labs   Lab 09/16/23  0116 09/15/23  1156   INR 1.26* 1.51*     Arterial Blood Gas  Recent Labs   Lab 09/17/23  1623 09/17/23  0419   O2PER 21 21

## 2023-09-18 NOTE — CONSULTS
Care Management Initial Consult    General Information  Assessment completed with: VM-chart review, Care Team Member, Assistant Ned Mcmahon  Type of CM/SW Visit: Initial Assessment    Primary Care Provider verified and updated as needed: Yes   Readmission within the last 30 days: current reason for admission unrelated to previous admission      Reason for Consult: discharge planning  Advance Care Planning: Advance Care Planning Reviewed: present on chart, no concerns identified          Communication Assessment  Patient's communication style: spoken language (English or Bilingual)             Cognitive  Cognitive/Neuro/Behavioral: WDL  Level of Consciousness: alert  Arousal Level: opens eyes spontaneously  Orientation: disoriented to, time  Mood/Behavior: calm, cooperative  Best Language: 0 - No aphasia  Speech: logical    Living Environment:   People in home: facility resident     Current living Arrangements: assisted living  Name of Facility: [facility]   Able to return to prior arrangements: no  Living Arrangement Comments: TCU vs return to [facility]    Family/Social Support:  Care provided by: self, other (see comments) (Facility Staff)  Provides care for: no one, unable/limited ability to care for self  Marital Status:   Children, Facility resident(s)/Staff          Description of Support System: Supportive, Involved    Support Assessment: Adequate family and caregiver support, Adequate social supports    Current Resources:   Patient receiving home care services: No     Community Resources: County Worker, County Programs  Equipment currently used at home:    Supplies currently used at home: Hearing Aid Batteries    Employment/Financial:  Employment Status: retired        Financial Concerns: No concerns identified   Referral to Financial Worker: No  Finance Comments[place] [employer] [facility]    Does the patient's insurance plan have a 3 day qualifying hospital stay waiver?  Yes   Will  the waiver be used for post-acute placement? Yes    Lifestyle & Psychosocial Needs:  Social Determinants of Health     Tobacco Use: Low Risk  (9/18/2023)    Patient History     Smoking Tobacco Use: Never     Smokeless Tobacco Use: Never     Passive Exposure: Never   Alcohol Use: Not on file   Financial Resource Strain: Not on file   Food Insecurity: Not on file   Transportation Needs: Not on file   Physical Activity: Not on file   Stress: Not on file   Social Connections: Not on file   Intimate Partner Violence: Not on file   Depression: Not at risk (6/22/2023)    PHQ-2     PHQ-2 Score: 0   Housing Stability: Not on file       Functional Status:  Prior to admission patient needed assistance:   Dependent ADLs:: Ambulation-walker, Bathing, Dressing, Grooming  Dependent IADLs:: Cleaning, Cooking, Laundry, Shopping, Medication Management, Meal Preparation, Transportation  Assesssment of Functional Status: Not at baseline with ADL Functioning, Needs placement in a SNF/TCF for rehabilitation    Mental Health Status:  Mental Health Status: No Current Concerns       Chemical Dependency Status:  Chemical Dependency Status: No Current Concerns             Values/Beliefs:  Spiritual, Cultural Beliefs, Restorationist Practices, Values that affect care: yes  Description of Beliefs that Will Affect Care: Holiness            Additional Information:  Per care management for discharge planning, chart was reviewed. Patient is a pleasant 89 year old female with a past medical history according to H&P of hypertension, hyperlipidemia, history of coronary artery disease, hypothyroidism, rheumatoid arthritis, anxiety, history of seizure disorder, chronic UTIs and is on Macrodantin as prophylaxis, history of CVA, TAVR, ischemic cardiomyopathy, recently diagnosed DVT of the left lower extremity and is currently on 15 mg twice daily of Xarelto for 3 week. Per chart review, patient had a recent hospitalization for right hip fracture and was at [place]  [facility]. Patient has an expected discharge ate of 9/19/23. Writer spoke with Salome, Assistance Director at [facility]. Salome reports that patient has been a resident of their [facility] since 4/2/14. Patient receives laundry, medication admin/set up, dressing/grroming assist x 1 and escorts to meals with a walker or wheelchair. Staff also provide showering/ bathing assist x 1 on Tu, Sa. They check patient's vitals once a month. They utilize [facility] for meds. [facility] is on an EW waiver [facility] at [phone]. Salome stated patient could come back with a higher level of assist, however, that would change patient's financial responsibility and family would have to be agreeable. To reach Salome DUTTA or LUZMARIA Gutierrez you need to call main facility number. There fax number is: [phone].    Call received from kaylee at [facility]. Confirmed patient has a bed hold for the TCU. Updated by  that patient will be seen by palliative. Care management will continue to follow.    Addendum 1500: Writer sent referral via DOD to [facility] so they have updated notes.     Trisha Garcia, DEBBIE, LGSW   Social Work   [facility]

## 2023-09-18 NOTE — PROGRESS NOTES
[facility]  Gastroenterology Progress Note     Gillian Burk MRN# [id number]   YOB: 1927 Age: 89 year old          Assessment and Plan:     Gillian Burk is a 89 year old female who has medical history which includes hypertension, hyperlipidemia, history of coronary artery disease, hypothyroidism, rheumatoid arthritis, anxiety, history of seizure disorder, chronic UTIs and is on Macrodantin as prophylaxis, history of CVA, TAVR, ischemic cardiomyopathy, recently diagnosed DVT of the left lower extremity and is currently on 15 mg twice daily of Xarelto for 3 weeks, right hip fracture secondary to fall with mildly displaced periprosthetic fracture involving the right trochanter and presented to outside hospital with fatigue and dark stools for the past week and hemoglobin was below 7.    Anemia  Melena   Iron and folate deficiency  Baseline hemoglobin between 9-10. hemoglobin of 7 on 9/13/2023 and hemoglobin was 6.8 with massive transfusion improved to normal. Today 12.7  Occult positive stools  INR 1.26  9/16 CT scan of the abdomen at outside hospital showed sigmoid diverticulosis with active inflammation and a right hip replacement with acute intertrochanteric periprostatic fracture, compression deformities of T11, T12 and L1 are indeterminant and can be subacute or chronic and moderate left and small right pleural effusion, prominent interstitial changes in both lungs likely represent a combination of fibrosis with superimposed edema/infection  Stable today with /60 on room air and SPO2 99    -Continue patient with PPI,   - check hemoglobin every 6 hours, keep hemoglobin more than 7  - Xarelto and Plavix and no use of NSAIDs, aspirin or heparin products  - consider endoscopic procedures- patient unsure if would like to have- she is aware that in order to safely restart DAPT would need to undergo endoscopy  - has palliative care consult planned    Echo noted  reduced EF or 25 %  Will need cardiology clearance for EGD/colonoscopy  - appreciate cardiology consult               Interval History:     no new complaints, doing well, denies shortness of breath, denies abdominal pain, pain is controlled, and doing well; no cp, sob, n/v/d, or abd pain.              Review of Systems:     C: NEGATIVE for fever, chills, change in weight  E/M: NEGATIVE for ear, mouth and throat problems  R: NEGATIVE for significant cough or SOB  CV: NEGATIVE for chest pain, palpitations or peripheral edema             Medications:   I have reviewed this patient's current medications   acetaminophen  975 mg Oral Q8H    amiodarone  200 mg Oral BID    artificial tears  1 drop Left Eye BID    atorvastatin  40 mg Oral Daily    cetirizine  10 mg Oral Daily    citalopram  10 mg Oral At Bedtime    folic acid  1 mg Oral Daily    lactobacillus rhamnosus (GG)  1 capsule Oral Daily    levETIRAcetam  500 mg Oral BID    levothyroxine  88 mcg Oral Daily    Lidocaine  1 patch Transdermal Q24H    [Held by provider] mirtazapine  30 mg Oral At Bedtime    nitroFURantoin macrocrystal-monohydrate  100 mg Oral At Bedtime    pantoprazole  40 mg Oral BID AC    predniSONE  2.5 mg Oral Daily                  Physical Exam:   Vitals were reviewed  Vital Signs with Ranges  Temp:  [97  F (36.1  C)-97.8  F (36.6  C)] 97.7  F (36.5  C)  Pulse:  [74-95] 79  Resp:  [16-22] 18  BP: ()/(45-65) 102/56  SpO2:  [96 %-99 %] 97 %  I/O last 3 completed shifts:  In: -   Out: 400 [Urine:400]  Constitutional: healthy, alert, and no distress   Cardiovascular: negative, PMI normal. No lifts, heaves, or thrills. RRR. No murmurs, clicks gallops or rub  Respiratory: negative, Percussion normal. Good diaphragmatic excursion. Lungs clear  Abdomen: Abdomen soft, non-tender. BS normal. No masses, organomegaly           Data:   I reviewed the patient's new clinical lab test results.   Recent Labs   Lab Test 09/18/23  0656 09/17/23  2183  09/17/23  0419 09/16/23  0447 09/16/23  0116 09/15/23  2152 09/15/23  1156 09/13/23  0720 08/30/23  0540   WBC  --  15.6* 12.1* 8.5  --    < >  --    < > 8.9   HGB 12.5 14.7 13.7 12.8  --    < >  --    < > 8.5*   MCV  --  95 96 94  --    < >  --    < > 90   PLT  --  279 299 427  --    < >  --    < > 217   INR  --   --   --   --  1.26*  --  1.51*  --  2.13*    < > = values in this interval not displayed.       Recent Labs   Lab Test 09/18/23  0656 09/17/23 1623 09/17/23  0929   POTASSIUM 5.2 5.9* 5.5*   CHLORIDE 109* 107 108*   CO2 17* 17* 17*   BUN 41.9* 37.5* 34.7*   ANIONGAP 12 13 14       Recent Labs   Lab Test 09/18/23  0656 09/17/23  1623 09/17/23  0419 09/16/23  0447 09/15/23  1136 08/29/23  2134 06/12/23  0651 05/30/23  1523 09/21/22  1451 07/13/22  1236 05/29/21  1314 05/23/21  0934 04/26/19  1152 04/26/19  1124   ALBUMIN 1.7* 1.7* 2.0* 1.9* 2.0*  --    < >  --    < >  --    < > 2.6*   < > 2.9*   BILITOTAL 0.2 0.3 0.3 0.5 0.2  --    < >  --    < >  --    < > 0.2   < > 0.4   ALT 17 21 25 19 12  --    < >  --    < >  --    < > 15   < > 18   AST 37  --   --  60* 27  --    < >  --    < >  --    < > 18   < > 21   PROTEIN  --   --   --   --   --  100*  --  30*  --  Negative   < >  --    < >  --    LIPASE  --   --   --   --   --   --   --   --   --   --   --  167  --  166    < > = values in this interval not displayed.         I reviewed the patient's new imaging results.    All laboratory data reviewed  All imaging studies reviewed by me.    Ade Fitzpatrick PA-C,  9/17/2023  [facility]  Office : [phone]  Cell: [phone] (Dr. Muñoz)  Cell: [phone] (Ade Fitzpatrick PA-C)

## 2023-09-18 NOTE — CONSULTS
[facility] Nurse Inpatient Assessment     Consulted for: Coccygeal wound    Summary:   1) Pt with unstageable pressure injury to coccyx present on admission to the hospital with surrounding bright red erythema. Area concerning for worsening due to pt limited         movement and recent hip fx.    2) Stage 1 pressure injury to Middle spine also present on admission, currently not open and requiring strict PIP    Patient History (according to provider note(s):      Gillian Burk is a 89 year old female who has medical history which includes hypertension, hyperlipidemia, history of coronary artery disease, hypothyroidism, rheumatoid arthritis, anxiety, history of seizure disorder, chronic UTIs and is on Macrodantin as prophylaxis, history of CVA, TAVR, ischemic cardiomyopathy, recently diagnosed DVT of the left lower extremity and started on Xarelto.     She presents to the ED from rehab facility where she has been recovering from a recent right hip fracture secondary to fall with mildly displaced periprosthetic fracture involving the right trochanter with more fatigue.  H    Assessment:      Areas visualized during today's visit: Focused: and Posterior surfaces     Pressure Injury Location: Coccyx    Last photo: 9/18/23    Wound type: Pressure Injury     Pressure Injury Stage: Unstageable, present on admission     Wound history/plan of care:   Pt with recent R hip surgery and family indicates she has been moving less and eating less.     Wound base: 90 % eschar, 10 % slough     Palpation of the wound bed: firm      Drainage: scant     Description of drainage: serous     Measurements (length x width x depth, in cm) 1.5  x 1.7  x  0.1+ cm      Tunneling N/A     Undermining N/A  Periwound skin: Intact and Erythema- blanchable extends outward approx 8cm      Color: red      Temperature: warm  Odor: none  Pain: denies , none  Pain intervention prior to dressing change: N/A  Treatment  "goal: Infection control/prevention and Soften necrotic tissue  STATUS: initial assessment  Supplies ordered: at bedside      Pressure Injury Location: [facility]    Last photo: 9/18/23    Wound type: Pressure Injury     Pressure Injury Stage: Unstageable, present on admission     Wound history/plan of care:   Pt with recent R hip surgery and family indicates she has been moving less and eating less.     Wound base: 100% non blanchable erythema     Palpation of the wound bed: normal     Drainage: none     Description of drainage: none     Measurements (length x width x depth, in cm) approx 2.7 x 2  x  0 cm with erythema extending outward approx 15 x 4cm area     Tunneling N/A     Undermining N/A  Periwound skin: Intact and Erythema- blanchable       Color: red      Temperature: warm  Odor: none  Pain: denies , none  Pain intervention prior to dressing change: N/A  Treatment goal: Infection control/prevention and Soften necrotic tissue  STATUS: initial assessment  Supplies ordered: at bedside    My PI Risk Assessment     Sensory Perception: 1 - Completely Limited     Moisture: 2 - Very moist      Activity: 1 - Bedfast      Mobility: 1 - Completely immobile      Nutrition: 1 - Very poor     Friction/Shear: 1 - Problem     TOTAL: 7      Treatment Plan:     Mid spine: Every other day-Lift and assess every shift  1. Clean wound with saline or MicroKlenz Spray, pat dry  2. Wipe / \"clean\" the surrounding periwound tissue with skin prep (Cavilon No Sting Skin Prep #679338) and allow to dry. This will help protect periwound and help dressing adherence  3. Press a Mepilex  Sacral Dressing (PS#[id number])  to the area, making sure to conform nicely to skin curvatures.   4. Time and date dressing change  NOTE  -Reposition pt side to side kierra when in bed, every 2 hours-get the pt way over on side to completely offload pressure. This will benefit skin and respiratory function   -Keep heels elevated and floating on pillows at all times. " "Try using at least 2 pillows under each calf.  -When up to the chair pt needs to fully offload every 2 hours and use a chair cushion if needed       Coccyx wound(s): Every other day  1. Clean wound with saline or MicKlenz Spray, pat dry  2. Paint with skin prep or No Sting Skin Prep (#477484) where ever you want your dressing to stick, allow to dry thoroughly. Do NOT skip this step! Skin prep will help your dressing to stick  3. Cut a small piece of adaptic and apply small dab of Iodosorb Gel (#304368) then push this into the wound bed  4. Press a Mepilex  Sacral Dressing (PS#[id number])  to the area, making sure to conform nicely to skin curvatures  5. Time and date dressing change     Pressure Injury Prevention (PIP) Plan:  If patient is declining pressure injury prevention interventions: Explore reason why and address patient's concerns, Educate on pressure injury risk and prevention intervention(s), If patient is still declining, document \"informed refusal\" , and Ensure Care team is aware ( provider, charge nurse, etc)  Mattress: Follow bed algorithm, reassess daily and order specialty mattress, if indicated.  HOB: Maintain at or below 30 degrees, unless contraindicated  Repositioning in bed: Every 1-2 hours , Left/right positioning; avoid supine, and Raise foot of bed prior to raising head of bed, to reduce patient sliding down (shear)  Heels: Keep elevated off mattress, Pillows under calves, and Heel lift boots  Protective Dressing: Sacral Mepilex for prevention (#997315),  especially for the agitated patient   Positioning Equipment: Z-Silvestre positioner (#629001-medium or #88934-large) to help maintain side lying position.   Chair positioning: Chair cushion (#120370)    If patient has a buttock pressure injury, or high risk for PI use chair cushion or SPS.  Moisture Management: Perineal cleansing /protection: Follow Incontinence Protocol, Avoid brief in bed, Clean and dry skin folds with bathing , Consider InterDry " (#604085) between folds, and Moisturize dry skin  Under Devices: Inspect skin under all medical devices during skin inspection , Ensure tubes are stabilized without tension, and Ensure patient is not lying on medical devices or equipment when repositioned  Ask provider to discontinue device when no longer needed.        Orders: Written    RECOMMEND PRIMARY TEAM ORDER: None, at this time  Education provided: importance of repositioning, plan of care, Moisture management, Hygiene, and Off-loading pressure  Discussed plan of care with: Patient, Family, Nurse, and nursing assistant  WO nurse follow-up plan: weekly  Notify WOC if wound(s) deteriorate.  Nursing to notify the Provider(s) and re-consult the WOC Nurse if new skin concern.    DATA:     Current support surface: Standard  Standard gel/foam mattress (IsoFlex, Atmos air, etc)  Containment of urine/stool: Incontinence Protocol, Incontinent pad in bed, and Purewick external catheter , removed brief during assessment  BMI: Body mass index is 17.26 kg/m .   Active diet order: Orders Placed This Encounter      Advance Diet as Tolerated: Regular Diet Adult     Output: I/O last 3 completed shifts:  In: -   Out: 400 [Urine:400]     Labs:   Recent Labs   Lab 09/18/23  0656 09/17/23  1623 09/16/23  0447 09/16/23  0116   ALBUMIN 1.7* 1.7*   < >  --    HGB 12.5 14.7   < >  --    INR  --   --   --  1.26*   WBC  --  15.6*   < >  --     < > = values in this interval not displayed.     Pressure injury risk assessment:   Sensory Perception: 2-->very limited  Moisture: 3-->occasionally moist  Activity: 1-->bedfast  Mobility: 2-->very limited  Nutrition: 2-->probably inadequate  Friction and Shear: 1-->problem  Tevin Score: 11    Darius Bettencourt RN CWOCN  -Securely message with Vocera (more info) - can reach individually by name or search 'WOC Nurse' (Tamara) to reach all current WOCs on duty.  [facility] Office Phone: [phone]

## 2023-09-18 NOTE — PROGRESS NOTES
Clinic Care Coordination Contact  Ambulatory Care Coordination to Inpatient Care Management   Hand-In Communication    Date:  September 18, 2023  Name: Gillian Burk is enrolled in Ambulatory Care Coordination program and I am the Lead Care Coordinator.  CC Contact Information: Epic [employer] Medical[employer]et + phone  Payor Source: Payor: BLUE PLUS / Plan: BLUE PLUS ADVANTAGE DUAL MSHO / Product Type: Medicare /   Current services in place:     Please see the CC Snaphot and Care Management Flowsheets for specific  details of this Gillian Burk care plan.   Additional details/specific concerns r/t this admission:    No additional concerns at this time .    I will follow this admission in Epic. Please feel free to contact me with questions or for further collaboration in discharge planning.    Simin Bueno RN Care Coordination   [facility]  Email: [email or web address]  Phone: [phone]

## 2023-09-18 NOTE — PROGRESS NOTES
[facility]    Medicine Progress Note - Hospitalist Service    Date of Admission:  9/15/2023    Assessment & Plan   Gillian Burk is a 89 year old female who has medical history which includes hypertension, hyperlipidemia, history of coronary artery disease, hypothyroidism, rheumatoid arthritis, anxiety, history of seizure disorder, chronic UTIs and is on Macrodantin as prophylaxis, history of CVA, TAVR, ischemic cardiomyopathy, recently diagnosed DVT of the left lower extremity and started on Xarelto.     She presents to the ED from rehab facility where she has been recovering from a recent right hip fracture secondary to fall with mildly displaced periprosthetic fracture involving the right trochanter with more fatigue. She developed dark stools and recheck hgb on 9/13 was 7. She was given 3 units PRBCs.     On early am 9/16/23 a rapid response was called d/t a clinical change of a fib with RVR and hypotension.  Transferred to the ICU after IVF bolus given and still having persistent vital sign instability.  She was given IV digoxin, started on IV amiodarone gtt and placed on vasopressors due to persistent hypoptension. Became stephen 9/17(0000) with HR in the 40-50's; around 0045 converted to NSR with HR in the 70's and normalization of BP. She was since weaned of IV vasopressor agents.     Acute on chronic anemia likely due to  GI bleed  Iron deficiency  Folate deficiency  -Baseline hemoglobin recently has been between 9-10  -Patient does seem to have lower GI bleed as she is on anticoagulation with Xarelto and has been having black stools and fecal occult was positive  -CT abdomen: sigmoid diverticulosis with active inflammation and a right hip replacement with acute intertrochanteric periprostatic fracture, compression deformities of T11, T12 and L1 are indeterminant and can be subacute or chronic and moderate left and small right pleural effusion, prominent interstitial changes in  both lungs likely represent a combination of fibrosis with superimposed edema/infection  *2 units PRBCs 9/15 and 1 unit 9/16  - GI has been consulted and is following (Dr Muñoz)  - Continue PPI  - transfuse for hgb <7  - hold Xarelto and Plavix   - avoid NSAIDs, ASA  - GI plans upper endoscopy on 9/19  - [facility]     Acute hypotension, shock - resolved  Appears to be secondary to unstable cardiac arrythmia (a fib with RVR and hypotension)  - was on IV vasopressors briefly     JULITO  Hyperkalemia-improved  Cr baseline near 0.7. Cr climbing up to 1.56 on 9/18.   - held IVF 9/18--becoming more swollen  - BMP in AM     A fib with RVR and hypotension - resolved  Stress induced CM  *Echo 9/16: EF 25% with apical akinesis and septal motion abnormalities noted to be consistent with conduction abnormalities  Was given IV digoxin and IV amiodarone in ICU  - continue telemetry  - amiodarone 200mg BID  - cardiology appreciated, no plans for invasive procedures to work-up cardiomyopathy  - will consider adding metoprolol back once BPs stable  - monitoring off IVF, may need intermittent lasix if swelling worsens      Recently diagnosed left lower extremity DVT  -Patient recently was admitted at outside hospital on 8/29/2023, diagnosed with occlusive to partially occlusive thrombus within the left iliac vein extending into the common femoral vein and proximal superficial vein  - Had been on xarelto (currently on hold d/t GI bleed)  - repeat LLE US  - will consider IVC filter pending results of endoscopy     Recently diagnosed right hip fracture secondary to fall with mildly displaced periprosthetic fracture involving the right greater trochanter  -Orthopedics was consulted at outside hospital on 8/29 and they recommended left to toe-touch weightbearing and no surgical intervention was planned     Seizure disorder  -PO keppra     History of coronary artery disease that is PCI to LAD in  2/2021  Hypertension  Hyperlipidemia  -We will hold amlodipine, lisinopril, imdur, metoprolol and atorvastatin in light of GI bleed and monitor   -holding plavix     Hypothyroidism  TSH elevated, but T4 WNL  - continue PTA synthroid 88mcg     Rheumatoid arthritis  On chronic prednisone and weekly methotrexate  - was on stress dose steroids with hydrocortisone while in ICU, weaned back to PTA prednisone on 9/17    Anxiety disorder  -We will continue the patient with PTA Celexa and avoid as needed Xanax      History of CVA in 2013  -Noted     Aortic stenosis status TAVR  -Noted    Chronic UTI  -Patient is currently on Macrodantin as prophylaxis      Coccygeal wound  WOC appreciated     CT scan finding of compression deformities of T11, T12 and L1 are indeterminant and can be subacute or chronic   -Noted-she does not complain of any back pain     GOALS OF CARE  Provider 9/17 discussed goals of care with patient's daughters.  - appreciate palliative care eval on 9/18. Patient able to make her own decisions, is a DNR/DNI, but does want to pursue endoscopic work-up.        Diet: Advance Diet as Tolerated: Regular Diet Adult  Room Service    DVT Prophylaxis: Pneumatic Compression Devices  Reyes Catheter: Not present  Lines: None     Cardiac Monitoring: ACTIVE order. Indication: h/o  coronary artery disease and DVT presenting with GI bleed  Code Status: No CPR- Do NOT Intubate      Clinically Significant Risk Factors        # Hyperkalemia: Highest K = 6.3 mmol/L in last 2 days, will monitor as appropriate       # Hypoalbuminemia: Lowest albumin = 1.7 g/dL at 9/18/2023  6:56 AM, will monitor as appropriate    # Coagulation Defect: INR = 1.26 (Ref range: 0.85 - 1.15) and/or PTT = 34 Seconds (Ref range: 22 - 38 Seconds), will monitor for bleeding     # Acute Kidney Injury, unspecified: based on a >150% or 0.3 mg/dL increase in last creatinine compared to past 90 day average, will monitor renal function    # Hypertension: Noted  "on problem list    # Chronic heart failure with reduced ejection fraction: last echo with EF <40%       # Cachexia: Estimated body mass index is 17.26 kg/m  as calculated from the following:    Height as of 9/11/23: 1.702 m (5' 7\").    Weight as of this encounter: 50 kg (110 lb 3.7 oz)., PRESENT ON ADMISSION            Disposition Plan      Expected Discharge Date: 09/20/2023      Destination: other (comment) (TCU)            Meri Lopez DO  Hospitalist Service  [facility]  Securely message with [employer] (more info)  Text page via [facility] Paging/Directory   ______________________________________________________________________    Interval History   Patient seen and examined. She is doing okay today, feels better than day prior. Daughter and grandson at bedside. No chest pain, shortness of breath currently. Does report she occasionally feels short of breath. Denies pain elsewhere. Discussed with daughter what the role of palliative consult would be and explained briefly the difference between palliative and hospice. Discussed that patient's work-up and med adjustments may occur over a series of days given how tenuous she was 9/16-9/17. Discussed patient with palliative care, Dr Pierson.    Physical Exam   Vital Signs: Temp: 97.7  F (36.5  C) Temp src: Axillary BP: 102/56 Pulse: 79   Resp: 18 SpO2: 97 % O2 Device: None (Room air)    Weight: 110 lbs 3.68 oz    Constitutional: Awake, alert, cooperative, no apparent distress. +hard of hearing  Respiratory: Clear to auscultation bilaterally, no crackles or wheezing  Cardiovascular: Regular rate and rhythm, normal S1 and S2, and no murmur noted  GI: Normal bowel sounds, soft, non-distended, non-tender  Skin/Integumen: No rashes, no cyanosis, +2 edema with weeping in lower and upper extremities.  Other:      Medical Decision Making       45 MINUTES SPENT BY ME on the date of service doing chart review, history, exam, documentation & further activities " per the note.      Data     I have personally reviewed the following data over the past 24 hrs:    15.6 (H)  \   12.5   / 279     138 109 (H) 41.9 (H) /  101 (H)   5.2 17 (L) 1.56 (H) \     ALT: 17 AST: 37 AP: 209 (H) TBILI: 0.2   ALB: 1.7 (L) TOT PROTEIN: 4.9 (L) LIPASE: N/A     TSH: 9.69 (H) T4: 0.95 A1C: N/A     Procal: N/A CRP: N/A Lactic Acid: 1.9       Imaging results reviewed over the past 24 hrs:   No results found for this or any previous visit (from the past 24 hour(s)).

## 2023-09-18 NOTE — PROGRESS NOTES
Sw:  D: Left VM with [facility] admissions coordinator, Cristian asking if patient has a bed hold in the TCU. Writer will wait to hear.    DEBBIE Zimmer, [facility]   Social Work   [facility]

## 2023-09-19 NOTE — PROGRESS NOTES
[facility]  Cardiology Progress Note  Date of Service: 09/19/2023  Date of Admission: 9/15/2023  Inpatient Cardiologist: Dr. Yulisa Miranda    Gillian Burk is a 89-year-old female with a past medical history significant for TAVR in 2016, coronary disease status post LAD stenting in February 2021 and left bundle branch block who was admitted to [facility] on 9/15/2023 with acute on chronic anemia secondary to a GI bleed in the context of Xarelto for recent DVT as well as atrial fibrillation with rapid ventricular response.    Asssessment:    Acute on chronic anemia 2/2 GI bleed while on Xarelto for recent DVT, hgb stable.  Atrial fibrillation with rapid ventricular response  Converted to NSR; no acute telemetry events overnight.  On PO Amio 200 mg twice daily.  Anticoagulation on hold in setting of #1.  PSVT, brief episode on telemetry; no recurrence.  Acute systolic cardiomyopathy, EF 25%, likely stress-induced.  CAD, Hx LAD stenting in 02/2021.  Status post TAVR in 2016.  JULITO, Cr stable at 1.32 today  Hyperkalemia, resolved.  Recent fall and right hip fracture  DNR/DNI  _____________________________________________________________    Plan:   OK from a cardiology standpoint for GI to proceed with additional GI testing.  Per note with Palliative team, patient wishes to proceed with UGI endoscopy, and it sounds like patient is still deciding on this.  Ongoing HF supportive cares, including I/O monitoring, daily weights, FR.  If patient remains asymptomatic, simply monitor hypotension and avoid IVF.  Once GI workup completed, we will optimize GDMT as hemodynamics allow.  Cardiology will continue to follow.  _____________________________________________________________    Thank you for the opportunity to participate in the care of Ms. Gillian Burk.  Please feel free to reach out with any additional questions.    Minna Benavides APRN, CNP   Nurse Practitioner  M  [facility]  Pager: [phone]  Phone: [phone]  Text Page (8am - 5pm, M-F)    Physical Exam   Temp: 97.5  F (36.4  C) Temp src: Oral BP: 106/60 Pulse: 79     SpO2: 96 % O2 Device: None (Room air)      Vitals:    09/17/23 1400 09/18/23 0500 09/19/23 0501   Weight: 49.3 kg (108 lb 11 oz) 50 kg (110 lb 3.7 oz) (P) 53 kg (116 lb 13.5 oz)     No intake/output data recorded.    Constitutional: Appears stated age, well nourished, NAD.  Neck: Supple. Carotid pulses full and equal.   Respiratory: Non-labored. Lungs clear.  Cardiovascular: RRR, normal S1, split S2. No M/G/R.  Vascular: Peripheral pulses intact.  GI: Soft, non-distended, non-tender.  Psychiatric: Affect appropriate. Mentation normal.    Data   Recent Labs   Lab 09/19/23  0557 09/18/23  2311 09/18/23  0656 09/17/23  1623 09/17/23  0929 09/17/23  0419 09/16/23  0447 09/16/23  0116 09/15/23  2152 09/15/23  1156   WBC 13.1*  --   --  15.6*  --  12.1* 8.5  --    < >  --    HGB 11.9 11.0* 12.5 14.7  --  13.7 12.8  --    < >  --    MCV 97  --   --  95  --  96 94  --    < >  --      --   --  279  --  299 427  --    < >  --    INR  --   --   --   --   --   --   --  1.26*  --  1.51*     --  138 137   < > 137 136  --    < >  --    POTASSIUM 4.4  --  5.2 5.9*   < > 6.3* 5.0  --    < >  --    CHLORIDE 113*  --  109* 107   < > 108* 108*  --    < >  --    CO2 15*  --  17* 17*   < > 15* 17*  --    < >  --    BUN 44.5*  --  41.9* 37.5*   < > 32.9* 22.8  --    < >  --    CR 1.32*  --  1.56* 1.41*   < > 1.25* 0.69  --    < >  --    ANIONGAP 10  --  12 13   < > 14 11  --    < >  --    CHEYANNE 6.9*  --  6.9* 7.4*   < > 7.5* 7.3*  --    < >  --    GLC 71  --  101* 139*   < > 128* 132*  --    < >  --    ALBUMIN  --   --  1.7* 1.7*  --  2.0* 1.9*  --   --   --    PROTTOTAL  --   --  4.9* 5.8*  --  5.8* 5.6*  --   --   --    BILITOTAL  --   --  0.2 0.3  --  0.3 0.5  --   --   --    ALKPHOS  --   --  209* 263*  --  291* 240*  --   --   --    ALT  --    --  17 21  --  25 19  --   --   --    AST  --   --  37  --   --   --  60*  --   --   --     < > = values in this interval not displayed.       Recent Labs   Lab 09/19/23  0557 09/18/23  2311 09/18/23  0656 09/17/23  1623 09/17/23  0419   WBC 13.1*  --   --  15.6* 12.1*   HGB 11.9 11.0* 12.5 14.7 13.7   HCT 35.6  --   --  41.9 37.9   MCV 97  --   --  95 96     --   --  279 299     Recent Labs   Lab 09/19/23  0557 09/18/23  0656 09/17/23  1623    138 137   POTASSIUM 4.4 5.2 5.9*   CHLORIDE 113* 109* 107   CO2 15* 17* 17*   ANIONGAP 10 12 13   GLC 71 101* 139*   BUN 44.5* 41.9* 37.5*   CR 1.32* 1.56* 1.41*   GFRESTIMATED 37* 30* 34*   CHEYANNE 6.9* 6.9* 7.4*        Patient Active Problem List   Diagnosis    Rheumatoid arthritis (H)    Hypertension goal BP (blood pressure) < 140/90    Anxiety    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    History of CVA (cerebrovascular accident) - 2013    Esophageal reflux    PAC (premature atrial contraction)    PVC's (premature ventricular contractions)    Hyperlipidemia with target LDL less than 130    Aortic valve disorder    Intermediate coronary syndrome    Transient ischemic attack (TIA), and cerebral infarction without residual deficits    Hypothyroidism due to acquired atrophy of thyroid    Aortic stenosis, severe - s/p TAVR    S/P TAVR (transcatheter aortic valve replacement)    Advanced directives, counseling/discussion    Aortic stenosis    Need for SBE (subacute bacterial endocarditis) prophylaxis    Acquired hypothyroidism    TIA (transient ischemic attack)    Non-rheumatic mitral valve stenosis - mild to moderate 11/16 echo    Coronary artery disease involving native coronary artery - NSTEMI 11/16 with moderate RCA disease treated medically    CKD (chronic kidney disease) stage 3, GFR 30-59 ml/min (H)    Elevated troponin    Iron deficiency anemia    Hypotension    GI bleed    Primary osteoarthritis of right knee    Chronic systolic congestive  heart failure (H)    Chronic stable angina (H)    Urinary incontinence without sensory awareness    Ischemic cardiomyopathy    Coronary artery disease involving native coronary artery of native heart with angina pectoris (H)    CONTRERAS (dyspnea on exertion)    CRAO (central retinal artery occlusion), right    Fibrocystic breast disease    IZABEL (generalized anxiety disorder)    Hearing loss    Herpes zoster    Meniere's disease    Migraine headache    Occipital stroke (H)    Osteoarthritis of left hip    Precancerous changes of the cervix    Squamous cell carcinoma in situ of skin    Chest pain, unspecified type    UTI (urinary tract infection)    Lightheadedness    Acute kidney failure, unspecified (H)    Leukocytosis    Abnormal gait    Encephalomalacia on imaging study-left occipital lobe    Acute deep vein thrombosis (DVT) of femoral vein of left lower extremity (H)    Generalized osteoarthritis    Closed fracture of right hip with routine healing, subsequent encounter    Anemia, unspecified type    Anemia     Medications        acetaminophen  975 mg Oral Q8H    amiodarone  200 mg Oral BID    artificial tears  1 drop Left Eye BID    atorvastatin  40 mg Oral Daily    cetirizine  10 mg Oral Daily    citalopram  10 mg Oral At Bedtime    folic acid  1 mg Oral Daily    lactobacillus rhamnosus (GG)  1 capsule Oral Daily    levETIRAcetam  500 mg Oral BID    levothyroxine  88 mcg Oral Daily    Lidocaine  1 patch Transdermal Q24H    [Held by provider] mirtazapine  30 mg Oral At Bedtime    nitroFURantoin macrocrystal-monohydrate  100 mg Oral At Bedtime    pantoprazole  40 mg Oral BID AC    predniSONE  2.5 mg Oral Daily       Data   Last 24 hours labs personally reviewed.  Echo:   Recent Results (from the past 4320 hour(s))   Echo Limited   Result Value    LVEF  20-25%    Narrative    [id number]  [id number]  [id number]  [id number]^KING^HEM     [facility]  Echocardiography Laboratory  [address] [place] [address]  [id number]     Name: LUCIO HANNON  MRN: [id number]  : 1927  Study Date: 2023 08:56 AM  Age: 89 yrs  Gender: Female  Patient [facility]: [facility]  Reason For Study: Tamponade, Shock  Ordering Physician: CHRISTINE HEM  Referring Physician: NITA DE LA CRUZ  Performed By: Pedro Serrano     BSA: 1.6 m2  Height: 66 in  Weight: 111 lb  HR: 118  BP: 103/61 mmHg  ______________________________________________________________________________  Procedure  Limited Portable Echo Adult.  ______________________________________________________________________________  Interpretation Summary     This was a stat echo in the ICU at [facility]. Technically very  difficult study.     LV size is normal however there is severe mechanical dyssynchrony. Findings  are also suggestive of potentially stress-induced cardiomyopathy. Mid to  distal portions of the left ventricle are essentially akinetic. Estimated  ejection fraction is probably around 25%. No contrast of biplane available.  Normal right ventricular size and systolic function.  Severe mitral annular calcification. Evaluation of MS inadequate on the study.  There is a bioprosthetic aortic valve per history. This is not well  visualized. No hemodynamically significant stenosis noted on that on Doppler  IVC severely dilated consistent with increased right-sided filling pressures.  The pericardial space suggests organizing pericardial effusion worse than  prior. No significant free fluid noted in the pericardium     Findings communicated to ICU attending at [facility].     ______________________________________________________________________________  Left Ventricle  The left ventricle is normal in size. The visual ejection fraction is 20-25%.  Septal motion is consistent with conduction abnormality. There is apical  akinesis.     Right Ventricle  The right ventricle is normal in size and function.     Atria  The left atrium is not well visualized. The  left atrium is mild to moderately  dilated. The right atrium is mild to moderately dilated.     Mitral Valve  There is severe mitral annular calcification. The mitral valve leaflets are  severely thickened.     Tricuspid Valve  The tricuspid valve is not well visualized, but is grossly normal. Right  ventricular systolic pressure could not be approximated due to inadequate  tricuspid regurgitation.     Aortic Valve  The aortic valve is not well visualized. No hemodynamically significant  valvular aortic stenosis. There is a bioprosthetic aortic valve.     Pulmonic Valve  The pulmonic valve is not well visualized.     Vessels  Dilation of the inferior vena cava is present with abnormal respiratory  variation in diameter. IVC diameter >2.1 cm collapsing <50% with sniff  suggests a high RA pressure estimated at 15 mmHg or greater.     Pericardium  The pericardial space suggests organizing pericardial effusion.     ______________________________________________________________________________  Doppler Measurements & Calculations  Ao V2 max: 96.1 cm/sec  Ao max P.0 mmHg  Ao V2 mean: 68.2 cm/sec  Ao mean P.1 mmHg  Ao V2 VTI: 13.5 cm     Ao acc time: 0.07 sec     ______________________________________________________________________________  Report approved by: Fahad Patterson 2023 10:24 AM

## 2023-09-19 NOTE — PROGRESS NOTES
[facility]    Medicine Progress Note - Hospitalist Service    Date of Admission:  9/15/2023    Assessment & Plan   Gillian Burk is a 89 year old female who has medical history which includes hypertension, hyperlipidemia, history of coronary artery disease, hypothyroidism, rheumatoid arthritis, anxiety, history of seizure disorder, chronic UTIs and is on Macrodantin as prophylaxis, history of CVA, TAVR, ischemic cardiomyopathy, recently diagnosed DVT of the left lower extremity and started on Xarelto.     She presents to the ED from rehab facility where she has been recovering from a recent right hip fracture secondary to fall with mildly displaced periprosthetic fracture involving the right trochanter with more fatigue. She developed dark stools and recheck hgb on 9/13 was 7. She was given 3 units PRBCs.     On early am 9/16/23 a rapid response was called d/t a clinical change of a fib with RVR and hypotension.  Transferred to the ICU after IVF bolus given and still having persistent vital sign instability.  She was given IV digoxin, started on IV amiodarone gtt and placed on vasopressors due to persistent hypoptension. Became stephen 9/17(0000) with HR in the 40-50's; around 0045 converted to NSR with HR in the 70's and normalization of BP. She was since weaned of IV vasopressor agents.     Acute on chronic anemia likely due to  GI bleed  Iron deficiency  Folate deficiency  -Baseline hemoglobin recently has been between 9-10  -Patient does seem to have lower GI bleed as she is on anticoagulation with Xarelto and has been having black stools and fecal occult was positive  -CT abdomen: sigmoid diverticulosis with active inflammation and a right hip replacement with acute intertrochanteric periprostatic fracture, compression deformities of T11, T12 and L1 are indeterminant and can be subacute or chronic and moderate left and small right pleural effusion, prominent interstitial changes in  both lungs likely represent a combination of fibrosis with superimposed edema/infection  *2 units PRBCs 9/15 and 1 unit 9/16  - GI has been consulted and is following (Dr Muñoz)  - Continue PPI  - transfuse for hgb <7  - hold Xarelto and Plavix   - avoid NSAIDs, ASA  - GI plans upper endoscopy on 9/20  - [facility]     Acute hypotension, shock - resolved  Appears to be secondary to unstable cardiac arrythmia (a fib with RVR and hypotension)  - was on IV vasopressors 9/16-9/17     JULITO  Hyperkalemia-improved  Cr baseline near 0.7. Cr climbing up to 1.56 on 9/18. Improved to 1.32 on 9/19 after IVF held  - daily BMP  - avoiding IVF     A fib with RVR and hypotension - resolved  Stress induced CM  *Echo 9/16: EF 25% with apical akinesis and septal motion abnormalities noted to be consistent with conduction abnormalities  Was given IV digoxin and IV amiodarone in ICU  - continue telemetry  - amiodarone 200mg BID  - cardiology appreciated, no plans for invasive procedures to work-up cardiomyopathy  - will consider adding metoprolol back once BPs stable  - May need intermittent lasix if swelling worsens      Recently diagnosed left lower extremity DVT  8/29/2023 OSH: occlusive to partially occlusive thrombus within the left iliac vein extending into the common femoral vein and proximal superficial vein  *9/18 LLE US: DVT in evolution with improvement in iliac/common femoral thrombus, below this level of thigh and popliteal region, thrombus absent. Layering echogenic material in bladder  - Had been on xarelto (currently on hold d/t GI bleed)  - will consider IVC filter pending results of endoscopy     Recently diagnosed right hip fracture secondary to fall with mildly displaced periprosthetic fracture involving the right greater trochanter  -Orthopedics was consulted at outside hospital on 8/29 and they recommended left to toe-touch weightbearing and no surgical intervention was  planned     Seizure disorder  -PO keppra     History of coronary artery disease that is PCI to LAD in 2/2021  Hypertension  Hyperlipidemia  -We will hold amlodipine, lisinopril, imdur, metoprolol and atorvastatin in light of GI bleed and monitor   -holding plavix     Hypothyroidism  TSH elevated, but T4 WNL  - continue PTA synthroid 88mcg    Oral thrush  Started nystatin swish and spit QID on 9/19     Rheumatoid arthritis  On chronic prednisone and weekly methotrexate  - was on stress dose steroids with hydrocortisone while in ICU, weaned back to PTA prednisone on 9/17    Anxiety disorder  -We will continue the patient with PTA Celexa and avoid as needed Xanax      History of CVA in 2013  -Noted     Aortic stenosis status TAVR  -Noted    Chronic UTI  -Patient is currently on Macrodantin as prophylaxis      Coccygeal wound  WOC appreciated     CT scan finding of compression deformities of T11, T12 and L1 are indeterminant and can be subacute or chronic   -Noted-she does not complain of any back pain     GOALS OF CARE  Provider 9/17 discussed goals of care with patient's daughters.  - appreciate palliative care eval on 9/18. Patient able to make her own decisions, is a DNR/DNI, but does want to pursue endoscopic work-up.        Diet: Advance Diet as Tolerated: Regular Diet Adult  Room Service  NPO per Anesthesia Guidelines for Procedure/Surgery Except for: Meds    DVT Prophylaxis: Pneumatic Compression Devices  Reyes Catheter: Not present  Lines: None     Cardiac Monitoring: ACTIVE order. Indication: h/o  coronary artery disease and DVT presenting with GI bleed  Code Status: No CPR- Do NOT Intubate      Clinically Significant Risk Factors              # Hypoalbuminemia: Lowest albumin = 1.7 g/dL at 9/18/2023  6:56 AM, will monitor as appropriate        # Acute Kidney Injury, unspecified: based on a >150% or 0.3 mg/dL increase in last creatinine compared to past 90 day average, will monitor renal function    #  Hypertension: Noted on problem list    # Chronic heart failure with reduced ejection fraction: last echo with EF <40%                  Disposition Plan      Expected Discharge Date: 09/20/2023      Destination: other (comment) (TCU)            Meri Lopez DO  Hospitalist Service  [facility]  Securely message with [employer] (more info)  Text page via [employer] Paging/Directory   ______________________________________________________________________    Interval History   Patient seen and examined. She looks tired today. Her appetite is poor and so is her intake. Her urine output is overall decreased. She is interested in pursuing endoscopic work-up. Discussed with daughter at bedside, we will see how the endoscopy goes and then can decide if we want an IVC filter. Did discuss that we may consider IVC filter on Friday pending how patient tolerates endoscopy.    Physical Exam   Vital Signs: Temp: 98.7  F (37.1  C) Temp src: Oral BP: 116/73 Pulse: 84   Resp: 18 SpO2: 95 % O2 Device: None (Room air)    Weight: 110 lbs 3.68 oz    Constitutional: Drowsy, cooperative, no apparent distress. +hard of hearing  Respiratory: Clear to auscultation bilaterally, no crackles or wheezing  Cardiovascular: Regular rate and rhythm, normal S1 and S2, and no murmur noted  GI: Normal bowel sounds, soft, non-distended, non-tender  Skin/Integumen: No rashes, no cyanosis, +2 edema with weeping in lower and upper extremities.  Other:      Medical Decision Making       35 MINUTES SPENT BY ME on the date of service doing chart review, history, exam, documentation & further activities per the note.      Data     I have personally reviewed the following data over the past 24 hrs:    13.1 (H)  \   11.9   / 152     138 113 (H) 44.5 (H) /  71   4.4 15 (L) 1.32 (H) \     Imaging results reviewed over the past 24 hrs:   Recent Results (from the past 24 hour(s))   US Lower Extremity Venous Duplex Left    Narrative    EXAM: US LOWER  EXTREMITY VENOUS DUPLEX LEFT  LOCATION: [facility]  DATE: 9/18/2023    INDICATION: evaluate known DVT, left iliac veins extending into the common femoral vein and proximal superficial femoral vein.  COMPARISON: 08/29/2023  TECHNIQUE: Venous Duplex ultrasound of the left lower extremity with and without compression, augmentation and duplex. Color flow and spectral Doppler with waveform analysis performed.    FINDINGS: Exam includes the common femoral, femoral, popliteal, and contralateral common femoral veins as well as segmentally visualized deep calf veins and greater saphenous vein.     LEFT: Common femoral vein has partial compressibility with improved flow in comparison the prior examination with improvement in flow in the iliac vein as well compressibility is improved. Noncompressible thigh femoral vein proximally becomes   compressible in the mid and distal portion as well as the popliteal. Visualized calf veins are compressible as well.. No superficial thrombophlebitis. Simple Baker's cyst measuring 2.7 cm x 2.9 cm x 0.8 cm Incidental layering debris within the bladder   which may represent blood.      Impression    IMPRESSION:  1.  DVT in evolution with improvement in the iliac and common femoral thrombus. Below this level of the thigh and popliteal region once again thrombus is absent.    2.  Layering echogenic material within the bladder could represent blood or debris. Correlate with urinalysis.

## 2023-09-19 NOTE — PROGRESS NOTES
Patient is A/O x 3 with some forgetfulness, vss, a-febrile, c/o pain to right hip with activity, patient has a fracture to right hip from a fall at home, Ortho saw patient, no a surgical candidate, bed rest, turn and repo in bed Q 2 hrs, patient has pressure injury to coccyx, mepilex in place, tele SR, cardiology /GI /Palliative care following.

## 2023-09-19 NOTE — PROGRESS NOTES
1900-0730   Pt is a 89 year old female who has medical history which includes hypertension, hyperlipidemia, history of coronary artery disease, hypothyroidism, rheumatoid arthritis, anxiety, history of seizure disorder, Pt is alert and oriented x 4, incontinent of bowel and bladder, pure wick in place, generalize edema. IV bolus given x 1 for low BP. Takes pills whole with apple sauce.

## 2023-09-19 NOTE — PROGRESS NOTES
[facility]  Gastroenterology Progress Note     Gillian Burk MRN# [id number]   YOB: 1927 Age: 89 year old          Assessment and Plan:     Gillian Burk is a 89 year old female who has medical history which includes hypertension, hyperlipidemia, history of coronary artery disease, hypothyroidism, rheumatoid arthritis, anxiety, history of seizure disorder, chronic UTIs and is on Macrodantin as prophylaxis, history of CVA, TAVR, ischemic cardiomyopathy, recently diagnosed DVT of the left lower extremity and is currently on 15 mg twice daily of Xarelto for 3 weeks, right hip fracture secondary to fall with mildly displaced periprosthetic fracture involving the right trochanter and presented to outside hospital with fatigue and dark stools for the past week and hemoglobin was below 7.    Anemia  Melena   Iron and folate deficiency  Baseline hemoglobin between 9-10. hemoglobin of 7 on 9/13/2023 and hemoglobin was 6.8 with massive transfusion improved to normal. Today 11.9  Occult positive stools  INR 1.26  9/16 CT scan of the abdomen at outside hospital showed sigmoid diverticulosis with active inflammation and a right hip replacement with acute intertrochanteric periprostatic fracture, compression deformities of T11, T12 and L1 are indeterminant and can be subacute or chronic and moderate left and small right pleural effusion, prominent interstitial changes in both lungs likely represent a combination of fibrosis with superimposed edema/infection  Stable today with /60 on room air and SPO2 99    -Continue patient with PPI,   - check hemoglobin every 6 hours, keep hemoglobin more than 7  - Xarelto and Plavix and no use of NSAIDs, aspirin or heparin products  - consider endoscopic procedures- patient unsure if would like to have- she is aware that in order to safely restart DAPT would need to undergo endoscopy  - has palliative care consult and at that time patient  requested workup but today she is unsure  - will tentatively make NPO at midnight    Echo noted reduced EF or 25 %  Will need cardiology clearance for EGD/colonoscopy  - appreciate cardiology consult               Interval History:     no new complaints, doing well, denies shortness of breath, denies abdominal pain, pain is controlled, and doing well; no cp, sob, n/v/d, or abd pain.              Review of Systems:     C: NEGATIVE for fever, chills, change in weight  E/M: NEGATIVE for ear, mouth and throat problems  R: NEGATIVE for significant cough or SOB  CV: NEGATIVE for chest pain, palpitations or peripheral edema             Medications:   I have reviewed this patient's current medications   acetaminophen  975 mg Oral Q8H    amiodarone  200 mg Oral BID    artificial tears  1 drop Left Eye BID    atorvastatin  40 mg Oral Daily    cetirizine  10 mg Oral Daily    citalopram  10 mg Oral At Bedtime    folic acid  1 mg Oral Daily    lactobacillus rhamnosus (GG)  1 capsule Oral Daily    levETIRAcetam  500 mg Oral BID    levothyroxine  88 mcg Oral Daily    Lidocaine  1 patch Transdermal Q24H    [Held by provider] mirtazapine  30 mg Oral At Bedtime    nitroFURantoin macrocrystal-monohydrate  100 mg Oral At Bedtime    pantoprazole  40 mg Oral BID AC    predniSONE  2.5 mg Oral Daily                  Physical Exam:   Vitals were reviewed  Vital Signs with Ranges  Temp:  [97.5  F (36.4  C)] 97.5  F (36.4  C)  Pulse:  [78-83] 79  BP: (101-106)/(59-69) 106/60  SpO2:  [96 %] 96 %  No intake/output data recorded.  Constitutional: healthy, alert, and no distress   Cardiovascular: negative, PMI normal. No lifts, heaves, or thrills. RRR. No murmurs, clicks gallops or rub  Respiratory: negative, Percussion normal. Good diaphragmatic excursion. Lungs clear  Abdomen: Abdomen soft, non-tender. BS normal. No masses, organomegaly           Data:   I reviewed the patient's new clinical lab test results.   Recent Labs   Lab Test  09/19/23  0557 09/18/23  2311 09/18/23  0656 09/17/23  1623 09/17/23  0419 09/16/23  0447 09/16/23  0116 09/15/23  2152 09/15/23  1156 09/13/23  0720 08/30/23  0540   WBC 13.1*  --   --  15.6* 12.1*   < >  --    < >  --    < > 8.9   HGB 11.9 11.0* 12.5 14.7 13.7   < >  --    < >  --    < > 8.5*   MCV 97  --   --  95 96   < >  --    < >  --    < > 90     --   --  279 299   < >  --    < >  --    < > 217   INR  --   --   --   --   --   --  1.26*  --  1.51*  --  2.13*    < > = values in this interval not displayed.       Recent Labs   Lab Test 09/19/23  0557 09/18/23  0656 09/17/23  1623   POTASSIUM 4.4 5.2 5.9*   CHLORIDE 113* 109* 107   CO2 15* 17* 17*   BUN 44.5* 41.9* 37.5*   ANIONGAP 10 12 13       Recent Labs   Lab Test 09/18/23  0656 09/17/23  1623 09/17/23  0419 09/16/23  0447 09/15/23  1136 08/29/23  2134 06/12/23  0651 05/30/23  1523 09/21/22  1451 07/13/22  1236 05/29/21  1314 05/23/21  0934 04/26/19  1152 04/26/19  1124   ALBUMIN 1.7* 1.7* 2.0* 1.9* 2.0*  --    < >  --    < >  --    < > 2.6*   < > 2.9*   BILITOTAL 0.2 0.3 0.3 0.5 0.2  --    < >  --    < >  --    < > 0.2   < > 0.4   ALT 17 21 25 19 12  --    < >  --    < >  --    < > 15   < > 18   AST 37  --   --  60* 27  --    < >  --    < >  --    < > 18   < > 21   PROTEIN  --   --   --   --   --  100*  --  30*  --  Negative   < >  --    < >  --    LIPASE  --   --   --   --   --   --   --   --   --   --   --  167  --  166    < > = values in this interval not displayed.         I reviewed the patient's new imaging results.    All laboratory data reviewed  All imaging studies reviewed by me.    Ade Fitzpatrick PA-C,  9/17/2023  [facility]  Office : [phone]  Cell: [phone] (Dr. Muñoz)  Cell: [phone] (Ade Fitzpatrick PA-C)

## 2023-09-20 NOTE — PLAN OF CARE
Orientation/Cognitive: A/Ox4, lethargic and quiet speech, pt on comfort cares  Mobility Level/Assist Equipment: Ax2 lift, turn and repo  Fall Risk (Y/N): yes  Behavior Concerns: intermittent confusion at times  Pain Management: none reported  Tele/VS/O2: Tele NS, VSS on RA  ABNL Lab/BG: hgb 12.3, WBC 15.3, procalcitonin 12.45  Diet: reg diet  Bowel/Bladder: purwick in place, incontinent of B&B  Skin Concerns: left skin tear and sacral wound covered with mepilex and cleaned, multiple mepilex on arms for drainage. Mepilexs on back bony prominences. Bruising on upper extremities, +2-3 generalized edema  Drains/Devices: foot IV SL, left arm IV SL  Tests/Procedures for next shift: Speech saw pt today, chest xray done, GI following, endoscopy cancelled   Anticipated DC date & active delays: tbd

## 2023-09-20 NOTE — PROGRESS NOTES
[facility]    Medicine Progress Note - Hospitalist Service    Date of Admission:  9/15/2023    Assessment & Plan   Gillian Burk is a 89 year old female who has medical history which includes hypertension, hyperlipidemia, history of coronary artery disease, hypothyroidism, rheumatoid arthritis, anxiety, history of seizure disorder, chronic UTIs and is on Macrodantin as prophylaxis, history of CVA, TAVR, ischemic cardiomyopathy, recently diagnosed DVT of the left lower extremity and started on Xarelto.     She presents to the ED from rehab facility where she has been recovering from a recent right hip fracture secondary to fall with mildly displaced periprosthetic fracture involving the right trochanter with more fatigue. She developed dark stools and recheck hgb on 9/13 was 7. She was given 3 units PRBCs.     On early am 9/16/23 a rapid response was called d/t a clinical change of a fib with RVR and hypotension.  Transferred to the ICU after IVF bolus given and still having persistent vital sign instability.  She was given IV digoxin, started on IV amiodarone gtt and placed on vasopressors due to persistent hypoptension. Became stephen 9/17 (0000) with HR in the 40-50's; around 0045 converted to NSR with HR in the 70's and normalization of BP. She was since weaned of IV vasopressor agents.  Had a good day on 9/18 and was planning to undergo endoscopic work-up for GI bleed, but then became more lethargic with decreased appetite, poor tolerance of liquids (coughing) and failure to thrive 9/19-9/20. Goals of care discussion ensued with two daughters (Hanna and Lauryn) and grandchildren at bedside. Decision was made to pursue comfort care on 9/20 afternoon.    End of life care  Provider 9/17 discussed goals of care with patient's daughters.  - had palliative care eval on 9/18. Patient able to make her own decisions, is a DNR/DNI, but wanted to pursue endoscopic work-up at that time.  Worsening lethargy, decreased appetite, decreased urine output on 9/19. Decreased ability to swallow safely, looked like patient was actively dying on 9/20. Goals of care discussion with daughters/granddaughters, decision made to switch to comfort care  - comfort care order set available (dilaudid, ativan, atropine)  - monitor for medication needs/symptoms over next 24 hours.  - SW reached out to [facility] to find out what they could provide from hospice care standpoint, patient would need supervised setting on discharge.  - discussed with family that hospice home ([facility]) could also be an option  - transfer up to st  as able     Acute on chronic anemia likely due to  GI bleed  Iron deficiency  Folate deficiency  Baseline Hgb 9-10. Had black stools, fecal occult + after started on xarelto for DV on 8/29..  *CT abdomen: sigmoid diverticulosis with active inflammation and a right hip replacement with acute intertrochanteric periprostatic fracture, compression deformities of T11, T12 and L1 are indeterminant and can be subacute or chronic and moderate left and small right pleural effusion, prominent interstitial changes in both lungs likely represent a combination of fibrosis with superimposed edema/infection  *2 units PRBCs 9/15 and 1 unit 9/16  - continue PPI  - GI followed, had planned endoscopy, updated regarding care transition to comfort on 9/20     Acute hypotension, shock - resolved  Appears to be secondary to unstable cardiac arrythmia (a fib with RVR and hypotension)  - was on IV vasopressors 9/16-9/17     JULITO  Hyperkalemia-improved  Cr baseline near 0.7. Cr climbing up to 1.56 on 9/18. Improved to 1.16 on 9/20 after IVF held  - no further monitoring     A fib with RVR and hypotension - resolved  Stress induced CM  *Echo 9/16: EF 25% with apical akinesis and septal motion abnormalities noted to be consistent with conduction abnormalities  Was given IV digoxin and IV amiodarone in ICU  - amiodarone 200mg  BID  - cardiology appreciated, no plan for invasive procedures to work-up cardiomyopathy--plan is now comfort care      Recently diagnosed left lower extremity DVT  8/29/2023 OSH: occlusive to partially occlusive thrombus within the left iliac vein extending into the common femoral vein and proximal superficial vein  *9/18 LLE US: DVT in evolution with improvement in iliac/common femoral thrombus, below this level of thigh and popliteal region, thrombus absent. Layering echogenic material in bladder  - Had been on xarelto, discontinued with bleed as above--no plan for IVC filter    Recently diagnosed right hip fracture secondary to fall with mildly displaced periprosthetic fracture involving the right greater trochanter  -Orthopedics at outside hospital on 8/29, recommended toe-touch weightbearing and no surgical intervention     Seizure disorder  -PO keppra continued     History of coronary artery disease that is PCI to LAD in 2/2021  Hypertension  Hyperlipidemia  -Stop PTA amlodipine, lisinopril, imdur, metoprolol, atorvastatin, plavix give     Hypothyroidism  - continue PTA synthroid 88mcg    Oral thrush  Continue nystatin swish and spit QID on 9/19 as tolerated     Rheumatoid arthritis  On chronic prednisone and weekly methotrexate  - continue PTA prednisone    Anxiety disorder  - continue PTA celexa     History of CVA in 2013  Aortic stenosis status TAVR    Chronic UTI  -Patient is currently on Macrodantin as prophylaxis      Coccygeal wound  WOC appreciated     CT scan finding of compression deformities of T11, T12 and L1 are indeterminant and can be subacute or chronic   -Noted-she does not complain of any back pain       Diet: Room Service  Regular Diet Adult    DVT Prophylaxis: Pneumatic Compression Devices  Reyes Catheter: Not present  Lines: None     Cardiac Monitoring: None  Code Status: No CPR- Do NOT Intubate      Clinically Significant Risk Factors              # Hypoalbuminemia: Lowest albumin = 1.7  g/dL at 9/18/2023  6:56 AM, will monitor as appropriate     # Thrombocytopenia: Lowest platelets = 118 in last 2 days, will monitor for bleeding       # Hypertension: Noted on problem list    # Chronic heart failure with reduced ejection fraction: last echo with EF <40%                  Disposition Plan      Expected Discharge Date: 09/21/2023      Destination: other (comment) (TCU)            Meri oLpez,   Hospitalist Service  [facility]  Securely message with [employer] (more info)  Text page via [employer] Paging/Directory   ______________________________________________________________________    Interval History   Patient seen and examined. Tired again today. Issues with coughing with drinking/eating on 9/19 evening. WBC climbing. CXR shows no pneumonia. UA not completed, making small amounts of urine still. Not eating much at all. SLP had evaluated and switched her to pureed and thickened liquids. Seen again in afternoon and breathing style appears to be more consistent with active dying process. Discussed at length with daughters/granddaughters over 3 separate bedside visits- concerns over past few days and how things have changed. My recommendation is to pursue comfort care, which had already been mentioned by cardiology. Patient wakes up intermittently, but looks exhausted. Denies discomfort.  Discussed with KIRILL ALVAREZ, RN.    Physical Exam   Vital Signs: Temp: 98  F (36.7  C) Temp src: Axillary BP: 95/55 Pulse: 91   Resp: 16 SpO2: 97 % O2 Device: None (Room air)    Weight: 110 lbs 3.68 oz    Constitutional: Lethargic, awakens intermittently and converses, cooperative, no apparent distress. +hard of hearing  Respiratory: Clear to auscultation bilaterally, no crackles or wheezing  Cardiovascular: Regular rate and rhythm, normal S1 and S2, and no murmur noted  GI: Normal bowel sounds, soft, non-distended, non-tender  Skin/Integumen: No rashes, no cyanosis, +2 edema with weeping in lower and  upper extremities.  Other:      Medical Decision Making       60 MINUTES SPENT BY ME on the date of service doing chart review, history, exam, documentation & further activities per the note.      Data     I have personally reviewed the following data over the past 24 hrs:    15.3 (H)  \   12.3   / 118 (L)     138 111 (H) 48.1 (H) /  77   4.4 17 (L) 1.16 (H) \     Procal: 12.45 (HH) CRP: N/A Lactic Acid: N/A       Imaging results reviewed over the past 24 hrs:   Recent Results (from the past 24 hour(s))   XR Chest Port 1 View    Narrative    CHEST ONE VIEW September 20, 2023 9:21 AM     HISTORY: Rule out aspiration pneumonia, frequent coughing with  food/drink.    COMPARISON: February 14, 2023.      Impression    IMPRESSION: Interstitial and/or fibrotic changes appear similar to  previous. No consolidation.    PONCHO QURESHI MD         SYSTEM ID:  [id number]

## 2023-09-20 NOTE — PROGRESS NOTES
"Clinical Swallow Evaluation (CSE):     09/20/23 1030   Appointment Info   Signing Clinician's Name / Credentials (SLP) Migdalia Andrade MS CCC-SLP   General Information   Onset of Illness/Injury or Date of Surgery 09/15/23   Referring Physician Dr. Lopez   Patient/Family Therapy Goal Statement (SLP) Did not state   Pertinent History of Current Problem   Per provider \"Gillian Burk is a 89 year old female who has medical history which includes hypertension, hyperlipidemia, history of coronary artery disease, hypothyroidism, rheumatoid arthritis, anxiety, history of seizure disorder, chronic UTIs and is on Macrodantin as prophylaxis, history of CVA, TAVR, ischemic cardiomyopathy, recently diagnosed DVT of the left lower extremity and started on Xarelto. She presents to the ED from rehab facility where she has been recovering from a recent right hip fracture secondary to fall with mildly displaced periprosthetic fracture involving the right trochanter with more fatigue. She developed dark stools and recheck hgb on 9/13 was 7. She was given 3 units PRBCs.\"     Current admission problems include: acute on chronic anemia likely d/t GI bleed (endoscopy cancelled for today), iron deficiency, folate deficiency, acute hypotension/shock, JULITO, hyperkalemia, a fib with RVR, stress induced CM, etc... see provider notes.     Coughing with PO yesterday, CxR today revealed \"Interstitial and/or fibrotic changes appear similar to previous. No consolidation.\" SLP consulted for swallow eval.     General Observations   Pt able to achieve alertness for limited PO trials though very weak and fatigued. Positioned upright/midline though pt progressively leaning head down/to left as session progressed. [place] but able to answer questions/follow directions functionally with increased volume. Dtr present.     Pain Assessment   Patient Currently in Pain No   Type of Evaluation   Type of Evaluation Swallow Evaluation   Oral Motor   Oral Musculature " unable to assess due to poor participation/comprehension   Mucosal Quality dry  (per dtr: pt has a canker sore and denture placement has been painful given same/has not been wearing them recently; pt also has thrush)   Dentition (Oral Motor)   Dentition (Oral Motor) dental appliance/dentures   Cough/Swallow/Gag Reflex (Oral Motor)   Volitional Swallow (Oral Motor) WNL   Vocal Quality/Secretion Management (Oral Motor)   Vocal Quality (Oral Motor) WNL   Secretion Management (Oral Motor) WNL   General Swallowing Observations   Past History of Dysphagia None per EMR or pt's dtr report: regular/thin diet PTA.   Current Diet/Method of Nutritional Intake (General Swallowing Observations, NIS) NPO   Swallowing Evaluation Clinical swallow evaluation   Clinical Swallow Evaluation   Feeding Assistance dependent   Clinical Swallow Evaluation Textures Trialed thin liquids;pureed   Clinical Swallow Eval: Thin Liquid Texture Trial   Mode of Presentation, Thin Liquids straw;self-fed   Volume of Liquid or Food Presented x4 single sips via straw   Oral Phase of Swallow WFL   Pharyngeal Phase of Swallow intact   Diagnostic Statement no overt clinical signs/sx aspiration noted   Clinical Swallow Evaluation: Puree Solid Texture Trial   Mode of Presentation, Puree spoon;fed by clinician   Volume of Puree Presented x3 bite   Oral Phase, Puree WFL   Pharyngeal Phase, Puree intact   Diagnostic Statement no overt clinical signs/sx aspiration noted; pt denied sticking sensation when asked   Swallowing Recommendations   Diet Consistency Recommendations pureed (level 4);thin liquids (level 0)   Supervision Level for Intake 1:1 supervision needed   Mode of Delivery Recommendations bolus size, small;food moistened;slow rate of intake   Swallowing Maneuver Recommendations alternate food and liquid intake   Monitoring/Assistance Required (Eating/Swallowing) check mouth frequently for oral residue/pocketing;cue for finger/lingual sweep if oral  pocketing present;stop eating activities when fatigue is present;monitor for cough or change in vocal quality with intake   Recommended Feeding/Eating Techniques (Swallow Eval) maintain upright sitting position for eating;maintain upright posture during/after eating for 30 minutes;minimize distractions during oral intake;moisten oral mucosa prior to intake;provide assist with feeding   Medication Administration Recommendations, Swallowing (SLP) crushed with puree and liquid wash   Instrumental Assessment Recommendations instrumental evaluation not recommended at this time   General Therapy Interventions   Planned Therapy Interventions Dysphagia Treatment   Clinical Impression   Criteria for Skilled Therapeutic Interventions Met (SLP Eval) Yes, treatment indicated   SLP Diagnosis mild oropharyngeal dysphagia   Risks & Benefits of therapy have been explained evaluation/treatment results reviewed;care plan/treatment goals reviewed;risks/benefits reviewed;current/potential barriers reviewed;participants voiced agreement with care plan;participants included;patient;daughter   Clinical Impression Comments   Clinical swallow evaluation completed with very small amounts of puree solids, thin liquids; pt with thrush, canker sore and dentures are not placed as their placement have been causing pain, pt/family have been avoiding. Pt is currently very weak and decondintioned -- dtr reports has only been taking a few bites/sips recently given same. Pt demonstrated WFL labial seal and oral containment around tsp and straw. No significant delay in oral transit or swallow initiation with notable laryngeal elevation to palpation. Pt denied sticking sensation wtih solids. No overt clinical signs/sx aspiration noted. Educated pt/dtr on current recs for puree/thin, which they are in agreement with. Educated on signs/sx dysphagia/aspiration to monitor for and liklihood of fluctuating status dependent on pt's alertness, fatigue across  larger amount of PO and will require close monitoring. Dtr verbalized understanding.     SLP Total Evaluation Time   Eval: oral/pharyngeal swallow function, clinical swallow Minutes (00345) 10   SLP Goals   Therapy Frequency (SLP Eval) 4 times/wk   SLP Predicted Duration/Target Date for Goal Attainment 09/27/23   SLP Goals Swallow   SLP: Safely tolerate diet without signs/symptoms of aspiration Minced & moist diet;Thin liquids;With use of swallow precautions;With assistance/supervision   SLP Discharge Planning   SLP Plan PO tolerance, ADAT   SLP Discharge Recommendation Transitional Care Facility   SLP Rationale for DC Rec Pt well below baseline regular/thin diet   SLP Brief overview of current status  Puree solids, thin liquids when upright, 1:1 assist, slow rate, single bites/sips, liquid wash every 2-3 bites; HOLD PO if pt fatiguing or too lethargic or if any overt difficulty with swallowing. Rec medications crushed with puree.   Total Session Time   Total Session Time (sum of timed and untimed services) 10

## 2023-09-20 NOTE — PLAN OF CARE
Pt alert and oriented to self, place. Pt follows commands. Pt daughter at bedside all night. Pt denies pain. Pt slept well during night. Pt turned side to side every two hours.pt NPO after midnight for endoscopy today. Pt incontinent of urine.

## 2023-09-20 NOTE — PROGRESS NOTES
Cardiology Progress Note  Austin Márquez MD         Assessment and Plan:        Acute anemia due to blood loss and GI bleed, anticoagulation held.  Acute on chronic LV systolic dysfunction  Paroxysmal atrial fibrillation, now in sinus rhythm on amiodarone  Failure to thrive    Discussion  Patient initially was consented for upper endoscopy.  Now she has been more tired and sleepy and having failure to thrive.  She and the family are reassessing decision to proceed with endoscopy.  At this time it has been held.  Given progressive deterioration of her general status, I had a long discussion with the patient's daughter.  Palliative care has already seen her but may be a need to come back and we discussed goals of care.  If she continues to deteriorate, inpatient hospice consult may be appropriate.    From cardiac perspective, will start low-dose beta-blocker for her cardiomyopathy his blood pressure and heart rate would allow.  Limited options given somewhat soft blood pressures    Patient is DNR and DNI.  Greater than 35 minutes was spent in evaluation and management of this patient, chart review, documentation, reviewing the labs, discussion with the patient as well as the daughter was at bedside.    We will sign off.  Recall if questions                   Interval History:     Decrease affect, sleepy          Review of Systems:     Review of systems is not applicable to this patient          Physical Exam:        Blood pressure 95/55, pulse 91, temperature 98  F (36.7  C), temperature source Axillary, resp. rate 16, weight (P) 53 kg (116 lb 13.5 oz), SpO2 97 %, not currently breastfeeding.  Vitals:    09/17/23 1400 09/18/23 0500 09/19/23 0501   Weight: 49.3 kg (108 lb 11 oz) 50 kg (110 lb 3.7 oz) (P) 53 kg (116 lb 13.5 oz)     Weights since admission  Vitals:    09/16/23 0400 09/17/23 1400 09/18/23 0500 09/19/23 0501   Weight: 50.8 kg (111 lb 15.9 oz) 49.3 kg (108 lb 11 oz) 50 kg (110 lb 3.7 oz) (P) 53 kg  (116 lb 13.5 oz)     Vital Signs with Ranges  Temp:  [97.5  F (36.4  C)-98.7  F (37.1  C)] 98  F (36.7  C)  Pulse:  [81-91] 91  Resp:  [16-18] 16  BP: ()/(54-73) 95/55  SpO2:  [95 %-98 %] 97 %  I/O's Last 24 hours  I/O last 3 completed shifts:  In: 50 [P.O.:50]  Out: 75 [Urine:75]  Net I/O since admission  09/15 0700 - 09/20 0659  In: 2238.01 [P.O.:200; I.V.:1738.01]  Out: 825 [Urine:825]  Net: 1413.01    EXAM:    Head:   atramatic     Neck:   no JVD     Cardiovascular:   regular rate and rhythm     Extremities and Back:   No edema            Medications:         acetaminophen  975 mg Oral Q8H    amiodarone  200 mg Oral BID    artificial tears  1 drop Left Eye BID    atorvastatin  40 mg Oral Daily    cetirizine  10 mg Oral Daily    citalopram  10 mg Oral At Bedtime    folic acid  1 mg Oral Daily    lactobacillus rhamnosus (GG)  1 capsule Oral Daily    levETIRAcetam  500 mg Oral BID    levothyroxine  88 mcg Oral Daily    Lidocaine  1 patch Transdermal Q24H    [Held by provider] mirtazapine  30 mg Oral At Bedtime    nitroFURantoin macrocrystal-monohydrate  100 mg Oral At Bedtime    nystatin  500,000 Units Swish & Spit 4x Daily    pantoprazole  40 mg Oral BID AC    predniSONE  2.5 mg Oral Daily            Data:      All new lab and imaging data was reviewed.   Recent Labs   Lab Test 09/20/23  0612 09/19/23  0557 09/18/23  2311 09/18/23  0656 09/17/23  1623 09/16/23  0447 09/16/23  0116 09/15/23  2152 09/15/23  1156 09/13/23  0720 08/30/23  0540   WBC 15.3* 13.1*  --   --  15.6*   < >  --    < >  --    < > 8.9   HGB 12.3 11.9 11.0*   < > 14.7   < >  --    < >  --    < > 8.5*   MCV 98 97  --   --  95   < >  --    < >  --    < > 90   * 152  --   --  279   < >  --    < >  --    < > 217   INR  --   --   --   --   --   --  1.26*  --  1.51*  --  2.13*    < > = values in this interval not displayed.      Recent Labs   Lab Test 09/20/23  0612 09/19/23  0557 09/18/23  0656    138 138   POTASSIUM 4.4 4.4 5.2    CHLORIDE 111* 113* 109*   CO2 17* 15* 17*   BUN 48.1* 44.5* 41.9*   CR 1.16* 1.32* 1.56*   ANIONGAP 10 10 12   CHEYANNE 7.1* 6.9* 6.9*   GLC 77 71 101*     Recent Labs   Lab Test 05/24/21  0829 05/24/21  0319 05/23/21  1758   TROPI 0.047* 0.050* 0.044              Imaging:   Recent Results (from the past 24 hour(s))   XR Chest Port 1 View    Narrative    CHEST ONE VIEW September 20, 2023 9:21 AM     HISTORY: Rule out aspiration pneumonia, frequent coughing with  food/drink.    COMPARISON: February 14, 2023.      Impression    IMPRESSION: Interstitial and/or fibrotic changes appear similar to  previous. No consolidation.

## 2023-09-20 NOTE — PROVIDER NOTIFICATION
MD Notification    Notified Person: MD    Notified Person Name: Dr. Lopez    Notification Date/Time: 11:00, 9/20/23    Notification Interaction: Page    Purpose of Notification: Fyi pt has a critical procalcitonin of 12.45    Orders Received:    Comments:

## 2023-09-20 NOTE — PROGRESS NOTES
Care Management Follow Up    Length of Stay (days): 5    Expected Discharge Date: 09/21/2023     Concerns to be Addressed: discharge planning  anticipate return to TCU  Patient plan of care discussed at interdisciplinary rounds: Yes    Anticipated Discharge Disposition: Transitional Care, Skilled Nursing Facility  Disposition Comments: discharge to TCU?  Anticipated Discharge Services: None  Anticipated Discharge DME: None    Patient/family educated on Medicare website which has current facility and service quality ratings: no  Education Provided on the Discharge Plan: Yes  Patient/Family in Agreement with the Plan: unable to assess    Referrals Placed by CM/SW: Post Acute Facilities  Private pay costs discussed: Not applicable    Additional Information:  Updated by  that patient may transition to comfort care and asking that writer ask [facility] if they would be able to take patient on hospice. Writer called and left a VM for Cristian in admissions.     DEBBIE Zimmer, SW   Social Work   [facility]

## 2023-09-20 NOTE — PLAN OF CARE
"[id number]: Patient alert and oriented x3, forgetful, Elem. VSS on room air, BP's soft at times. Turning patient in bed Q2 hrs, purwik in place. Wound care done for sacral wound, dressings over spine CDI. Mepi's placed on RUE where arm is weeping, Mepi placed over skin tear on RUE. Pt complained of \"stinging\" pain in mouth, white patches noted in back of throat, MD notified and nystatin swish ordered. Low urine output this shift, MD notified, encouraging PO intake of fluids, no IV fluids ordered for now but pt noted to cough after PO fluids this evening. Incontinent BM this evening. Plan for NPO at 0000 and plan for possible upper endoscopy tomorrow.      "

## 2023-09-21 NOTE — PLAN OF CARE
Goal Outcome Evaluation:    Shift Note 1830-1930      Orientation: Not assessed   Activity: A2 + lift, turn and repo   Diet/BS Checks: Regular diet, take pills whole in apple sauce  Tele: N/a    IV Access/Drains: PIV in L arm- SL    Pain Management:   Abnormal VS/Results: Deferred, on comfort cares    Bowel/Bladder:Incontinent B/B  Skin/Wounds:Scattered bruising, edematous on upper and lower extremities that weep.    Consults: KIRILL following   D/C Disposition: Pending at this time     Other Info: Pt transferred here this evening. Was able to get pt settled and repositioned. Full skin assessment was not completed.

## 2023-09-21 NOTE — PROGRESS NOTES
[facility]    Medicine Progress Note - Hospitalist Service    Date of Admission:  9/15/2023    Assessment & Plan   Gillian Burk is a 89 year old female who has medical history which includes hypertension, hyperlipidemia, history of coronary artery disease, hypothyroidism, rheumatoid arthritis, anxiety, history of seizure disorder, chronic UTIs and is on Macrodantin as prophylaxis, history of CVA, TAVR, ischemic cardiomyopathy, recently diagnosed DVT of the left lower extremity and started on Xarelto.     She presents to the ED from rehab facility where she has been recovering from a recent right hip fracture secondary to fall with mildly displaced periprosthetic fracture involving the right trochanter with more fatigue. She developed dark stools and recheck hgb on 9/13 was 7. She was given 3 units PRBCs.     On early am 9/16/23 a rapid response was called d/t a clinical change of a fib with RVR and hypotension.  Transferred to the ICU after IVF bolus given and still having persistent vital sign instability.  She was given IV digoxin, started on IV amiodarone gtt and placed on vasopressors due to persistent hypoptension. Became stephen 9/17 (0000) with HR in the 40-50's; around 0045 converted to NSR with HR in the 70's and normalization of BP. She was since weaned of IV vasopressor agents.  Had a good day on 9/18 and was planning to undergo endoscopic work-up for GI bleed, but then became more lethargic with decreased appetite, poor tolerance of liquids (coughing) and failure to thrive 9/19-9/20. Goals of care discussion ensued with two daughters (Hanna and Lauryn) and grandchildren at bedside. Decision was made to pursue comfort care on 9/20 afternoon.    End of life care  Provider 9/17 discussed goals of care with patient's daughters.  - had palliative care eval on 9/18. Patient able to make her own decisions, is a DNR/DNI, but wanted to pursue endoscopic work-up at that time.  Worsening lethargy, decreased appetite, decreased urine output on 9/19. Decreased ability to swallow safely, looked like patient was actively dying on 9/20. Goals of care discussion with daughters/granddaughters, decision made to switch to comfort care  - comfort care orders with PRN dilaudid/ativan  - plan discharge back to [facility] with enrollment with [facility] on 9/22     Acute on chronic anemia likely due to  GI bleed  Iron deficiency  Folate deficiency  Baseline Hgb 9-10. Had black stools, fecal occult + after started on xarelto for DV on 8/29..  *CT abdomen: sigmoid diverticulosis with active inflammation and a right hip replacement with acute intertrochanteric periprostatic fracture, compression deformities of T11, T12 and L1 are indeterminant and can be subacute or chronic and moderate left and small right pleural effusion, prominent interstitial changes in both lungs likely represent a combination of fibrosis with superimposed edema/infection  *2 units PRBCs 9/15 and 1 unit 9/16  - GI followed- no follow-up given comfort care transition  - continue PTA pepcid as tolerated      Acute hypotension, shock - resolved  Appears to be secondary to unstable cardiac arrythmia (a fib with RVR and hypotension)  - was on IV vasopressors 9/16-9/17     JULITO  Hyperkalemia-improved  Cr baseline near 0.7. Cr climbing up to 1.56 on 9/18. Improved to 1.16 on 9/20 after IVF held  - no further monitoring     A fib with RVR and hypotension - resolved  Stress induced CM  *Echo 9/16: EF 25% with apical akinesis and septal motion abnormalities noted to be consistent with conduction abnormalities  Was given IV digoxin and IV amiodarone in ICU  - amiodarone 200mg BID continued  - cardiology appreciated, no plan for invasive procedures to work-up cardiomyopathy--plan is now comfort care      Recently diagnosed left lower extremity DVT  8/29/2023 OSH: occlusive to partially occlusive thrombus within the left iliac vein extending into  the common femoral vein and proximal superficial vein  *9/18 LLE US: DVT in evolution with improvement in iliac/common femoral thrombus, below this level of thigh and popliteal region, thrombus absent. Layering echogenic material in bladder  - Had been on xarelto, discontinued with bleed as above--no plan for IVC filter    Recently diagnosed right hip fracture secondary to fall with mildly displaced periprosthetic fracture involving the right greater trochanter  -Orthopedics at outside hospital on 8/29, recommended toe-touch weightbearing and no surgical intervention     Seizure disorder  -PO keppra continued     History of coronary artery disease that is PCI to LAD in 2/2021  Hypertension  Hyperlipidemia  -Stop PTA amlodipine, lisinopril, imdur, metoprolol, atorvastatin, plavix given comfort care     Hypothyroidism  - continue PTA synthroid 88mcg    Oral thrush  Stop nystatin swish/spit given frequent lethargy     Rheumatoid arthritis  On chronic prednisone and weekly methotrexate  - continue PTA prednisone    Anxiety disorder  - continue PTA celexa     History of CVA in 2013  Aortic stenosis status TAVR    Chronic UTI  -Patient is currently on Macrodantin as prophylaxis, will discontinue to decrease pill burden     Coccygeal wound  WOC appreciated     CT scan finding of compression deformities of T11, T12 and L1 are indeterminant and can be subacute or chronic   -Noted- intermittent complaint of back pain       Diet: Room Service  Regular Diet Adult  Diet    DVT Prophylaxis: Pneumatic Compression Devices  Reyes Catheter: Not present  Lines: None     Cardiac Monitoring: None  Code Status: No CPR- Do NOT Intubate      Clinically Significant Risk Factors              # Hypoalbuminemia: Lowest albumin = 1.7 g/dL at 9/18/2023  6:56 AM, will monitor as appropriate     # Thrombocytopenia: Lowest platelets = 118 in last 2 days, will monitor for bleeding       # Hypertension: Noted on problem list    # Chronic heart  failure with reduced ejection fraction: last echo with EF <40%                  Disposition Plan      Expected Discharge Date: 09/22/2023,  9:10 AM  Discharge Delays: *Early Discharge Anticipated  Destination: other (comment) (TCU)            Meri Lopez DO  Hospitalist Service  [facility]  Securely message with [employer] (more info)  Text page via [facility] Paging/Directory   ______________________________________________________________________    Interval History   Patient seen and examined. She is sleepy this morning, but wakes up and intermittently participates. She reported back pain and this did respond to dilaudid. She has been tolerating being turned in bed. Numerous family members at bedside. Discussed discharge planning in afternoon. Discharging in morning on hospice    Physical Exam   Vital Signs:     BP: 100/58 Pulse: 87   Resp: 16 SpO2: 94 %      Weight: 110 lbs 3.68 oz    Constitutional: Lethargic, awakens intermittently and converses, cooperative, no apparent distress. +hard of hearing  Respiratory: Clear to auscultation bilaterally, no crackles or wheezing  Cardiovascular: Regular rate and rhythm, normal S1 and S2, and no murmur noted  GI: Normal bowel sounds, soft, non-distended, non-tender  Skin/Integumen: No rashes, no cyanosis, +2 edema with weeping in lower and upper extremities.  Other:      Medical Decision Making       35 MINUTES SPENT BY ME on the date of service doing chart review, history, exam, documentation & further activities per the note.      Data       Imaging results reviewed over the past 24 hrs:   No results found for this or any previous visit (from the past 24 hour(s)).

## 2023-09-21 NOTE — PLAN OF CARE
Speech Language Therapy Discharge Summary    Reason for therapy discharge:    Patient/family request discontinuation of services.    Progress towards therapy goal(s). See goals on Care Plan in [facility] electronic health record for goal details.  Transition to comfort care    Therapy recommendation(s):    No further therapy is recommended.  Consider liberalizing diet for comfort.

## 2023-09-21 NOTE — PLAN OF CARE
Primary Dx: Admitted with GI bleed, started comfort cares on 9/20/23    Orientation: deferred d/t comfort cares   Activity: A2 + lift, turn and repo   Diet/BS Checks: Regular diet, take pills whole in apple sauce  Tele: N/a    IV Access/Drains: PIV in L arm- SL    Pain Management: denies  Abnormal VS/Results: Deferred, on comfort cares    Bowel/Bladder: Incontinent B/B; purewick in place  Skin/Wounds:Scattered bruising, edematous on upper and lower extremities that weep.    Consults: SW following   D/C Disposition: Pending at this time     Other Info:   Patient comfortable overnight, no complaints. Likes to sip warm water.

## 2023-09-21 NOTE — PROGRESS NOTES
Care Management Follow Up    Length of Stay (days): 6    Expected Discharge Date: 09/22/2023     Concerns to be Addressed: discharge planning  anticipate return to TCU  Patient plan of care discussed at interdisciplinary rounds: Yes    Anticipated Discharge Disposition: Transitional Care, Skilled Nursing Facility  Disposition Comments: discharge to TCU?  Anticipated Discharge Services: None  Anticipated Discharge DME: None    Patient/family educated on Medicare website which has current facility and service quality ratings: no  Education Provided on the Discharge Plan: Yes  Patient/Family in Agreement with the Plan: unable to assess    Referrals Placed by CM/SW: Post Acute Facilities  Private pay costs discussed: Not applicable    Additional Information:  Writer met with patients family to discuss discharge planning. Family states that a hospice home is likely not an option. They state that they would like something more centralized to other family members but would like to see what else her [facility] could provide first as that is patients home. SW will follow back up with family later this afternoon when the other family members arrive.    Writer called patients facility [phone] and asked to speak to Kamila or Salome to see what if would look like for patient coming back to the facility. They will call writer back as they were in a meeting.  Writer spoke with Kamila a while later. Kamila states that they would be able to accommodate her back at the [facility] with the enhanced care plus program.  faxed over information to [phone] and Kamila states that she will review and get patients family a quote. Kamila states that the family really will not have to pay anything as the [facility] will reimburse for her stay so it is just a conversation that needs to be had with the family.  also received a call from Cristian at [facility] who states that they can accommodate her on hospice in the TCU.   will follow back up with family later today.     Addendum: Writer talked with family who would like to proceed in coming back to her [facility] with hospice. Family did not have a preference in hospice agency. Writer called [facility] and spoke with Ivon. Sent a referral. Patient will need a hospital bed at discharge. Writer called [facility] and set up stretcher transportation between 9:10-9:55 tomorrow morning. Writer paged MD for a 3-day supply of medications, no liquids, bubble packed to be sent with at discharge. PCS Completed and faxed for tomorrow morning.     RASHEEDA Conde

## 2023-09-21 NOTE — PROGRESS NOTES
SPIRITUAL HEALTH SERVICES - Progress Note  [facility] MedSurg/Onc 88    Saw pt Gillian Burk per phone call from RN. Gillian's family gathered at bedside. Several have travelled from out-of-state to be with her.     Patient/Family Understanding of Illness and Goals of Care - Gillian's daughter shared that Gillian and family recently made the decision to transition to comfort care and anticipate moving forward with hospice.    Gillian was resting but asked for prayer, and we all prayed together. Oriented family to Meditation [place] and Spiritual Health continued availability.    Plan of Care - Spiritual Health remains available. Please contact as needs arise.      Keysha Clements  Associate     [facility] routine referrals *[phone]  [facility] available 24/7 for emergent requests/referrals, either by paging the on-call  or by entering an ASAP/STAT consult in Epic (this will also page the on-call ).

## 2023-09-21 NOTE — PLAN OF CARE
Goal Outcome Evaluation:  Patient is comfort care, turn and reposition.   Takes pills whole with apple sauce. Continue to monitor .

## 2023-09-22 NOTE — TELEPHONE ENCOUNTER
[facility] Call Center    Phone Message    May a detailed message be left on voicemail: yes     Reason for Call: Other: Pt's daughter calling on behalf of the patient as pt is likely going to be passing away today as she is currently admitted to [facility] for end of life care . Hanna states pt was involved in a heart valve study and they were told in the event of her passing they would retrieve valve for further research. Please return call to advise and discuss.      Action Taken: Other: Cardiology    Travel Screening: Not Applicable    Thank you!  [facility]

## 2023-09-22 NOTE — PROGRESS NOTES
Nursing  0730 Pt status change  Pt reportedly last evening/night appeared more uncomfortable, with labored breathing and increased lethargy.  Was medicated  a few times last night with IV Dilaudid. At my shift start rounds found pt to be unresponsive, respirations very low at  8/minute apneic. Mouth breathes. Pt appears comfortable except for a grimace and teeth tightly clenched shut when oral care attempted. Too lethargic for safe swallowing. All oral meds held. Extremities still warm with moderate edema.  Notified KIRILL Cedeño of status change and advised her to stop 9:10am transfer. SW reached Hospitalist Dr Lopez who rounded on pt. Transfer to back to her [facility] was officially cancelled. Daughter and granddaughters are at bedside and alerting other family members of status change. Family aware of terminal status . Emotional support given to family. Continue comfort cares.      Nursing addendum:  Discomfort controlled by this shift with 0.5mg IV Dilaudid at 0823 and 1153 for labored respirations and or moaning. Resp rate remains low. 8-10 minute. Since unable to safely swallow Dr Lopez changed the Keppra to IV Ativan top prevent seizure activity.  WOC aassessed and changed sacral and the spine dressings using the small Meplix border drsgs. One dressing to the coocyx over the black pressure ulcer and 3 to the spine over the red bony prominences to coccyx. The left forearm skin tear site dressing was changed by WOB to adaptic over the tear and Optiform borer dressing to cover. RN's are NOT to use the mepilex border drsg on the forearm since it tends to stick to to the wound  Numerous family present at bedside most of shift especially all 4 of pt's kids several grandchildren.   Family fully aware that pt is in a dying process. When pt expires the family will be using mortuary [facility] [phone]

## 2023-09-22 NOTE — PLAN OF CARE
Goal Outcome Evaluation:    [id number]    Orientation: deferred to comfort care.   Activity: Assist x2 w/ lift. Turn repo Q2.  Diet/BS Checks: Regular but patient was not able to take anything oral during shift including oral meds.   Tele:  n/a  IV Access/Drains: Left PIV saline locked  Pain Management: PRN Dilaudid given x5 for comfort.  Bowel/Bladder: Incontinent of B/B. Reyes in place. Catheter care done.    Skin/Wounds: Scattered bruising, skin tear L forearm, Edema BLE and BUE with weeping. Jae pads changed frequently.   Consults: KIRILL   D/C Disposition: Possible discharge 09/22/23 to hospice.   Other Info:  Pt was unable to take scheduled oral meds this shift. Family did not want a different form of the medication at this time. Family was educated on the meds and understood. IV Dilaudid given for comfort throughout shift.

## 2023-09-22 NOTE — PROGRESS NOTES
SPIRITUAL HEALTH SERVICES - Progress Note    Oncology    Referral Source: Checked back on patient, Gillian Burk, and family again per family request.   Additional family have arrived and are gathered in Gillian's room, sharing stories and taking care of one another emotionally. At their request I offered additional prayer.    Plan: Spiritual Health remains available.      María Resendez  Chaplain Resident    [facility] routine referrals *[phone]    [facility] available 24/7 for emergent requests/referrals, either by paging the on-call  or by entering an ASAP/STAT consult in Epic (this will also page the on-call ).

## 2023-09-22 NOTE — PROGRESS NOTES
Care Management Discharge Note    Discharge Date: 09/23/2023       Discharge Disposition: Hospice     Discharge Services: None    Discharge DME: None    Discharge Transportation: agency    Private pay costs discussed: Not applicable    Does the patient's insurance plan have a 3 day qualifying hospital stay waiver?  No    PAS Confirmation Code:    Patient/family educated on Medicare website which has current facility and service quality ratings: no    Education Provided on the Discharge Plan: Yes  Persons Notified of Discharge Plans NA   Patient/Family in Agreement with the Plan: unable to assess    Handoff Referral Completed: No    Additional Information:  Patients discharge was cancelled for today. Writer checked in with family and provided them with grief support resources. Family is very appreciative of the care they have received at [facility].     RASHEEDA Conde

## 2023-09-22 NOTE — TELEPHONE ENCOUNTER
Per research team, study is complete and patient does not need valve explanted. Discussed with Hanna who verbalized understanding.     Expressed our sincerest condolences to Hanna and family.     AUTUMN AlbrightN, RN, PHN, CHFN, HNB-BC   9/22/2023 at 3:02 PM

## 2023-09-22 NOTE — PROGRESS NOTES
Care Management Follow Up    Length of Stay (days): 7    Expected Discharge Date: 09/22/2023     Concerns to be Addressed: discharge planning  anticipate return to TCU  Patient plan of care discussed at interdisciplinary rounds: Yes    Anticipated Discharge Disposition: Transitional Care, Skilled Nursing Facility  Disposition Comments: discharge to TCU?  Anticipated Discharge Services: None  Anticipated Discharge DME: None    Patient/family educated on Medicare website which has current facility and service quality ratings: no  Education Provided on the Discharge Plan: Yes  Patient/Family in Agreement with the Plan: unable to assess    Referrals Placed by CM/SW: Post Acute Facilities  Private pay costs discussed: Not applicable    Additional Information:  Writer talked to bedside RN Mariposa who states patients respirations are 8 and she is getting IV Dilaudid and does not feel patient is stable for discharge. Updated  who states we will cancel discharge for this morning. Writer cancelled transportation for this morning. Update sent to  and email sent to Claudia at the facility to update her.     RASHEEDA Conde

## 2023-09-22 NOTE — PROGRESS NOTES
[facility]    Medicine Progress Note - Hospitalist Service    Date of Admission:  9/15/2023    Assessment & Plan   Gillian Burk is a 89 year old female who has medical history which includes hypertension, hyperlipidemia, history of coronary artery disease, hypothyroidism, rheumatoid arthritis, anxiety, history of seizure disorder, chronic UTIs and is on Macrodantin as prophylaxis, history of CVA, TAVR, ischemic cardiomyopathy, recently diagnosed DVT of the left lower extremity and started on Xarelto.     She presents to the ED from rehab facility where she has been recovering from a recent right hip fracture secondary to fall with mildly displaced periprosthetic fracture involving the right trochanter with more fatigue. She developed dark stools and recheck hgb on 9/13 was 7. She was given 3 units PRBCs.     On early am 9/16/23 a rapid response was called d/t a clinical change of a fib with RVR and hypotension.  Transferred to the ICU after IVF bolus given and still having persistent vital sign instability.  She was given IV digoxin, started on IV amiodarone gtt and placed on vasopressors due to persistent hypoptension. Became stephen 9/17 (0000) with HR in the 40-50's; around 0045 converted to NSR with HR in the 70's and normalization of BP. She was since weaned of IV vasopressor agents.  Had a good day on 9/18 and was planning to undergo endoscopic work-up for GI bleed, but then became more lethargic with decreased appetite, poor tolerance of liquids (coughing) and failure to thrive 9/19-9/20. Goals of care discussion ensued with two daughters (Hanna and Lauryn) and grandchildren at bedside. Decision was made to pursue comfort care on 9/20 afternoon. Breathing began to slow, she was unable to swallow and was requiring frequent IV medications 9/21 into 9/22 and she is now expected to pass in hospital.    End of life care  Provider 9/17 discussed goals of care with patient's  daughters.  - had palliative care eval on 9/18. Patient able to make her own decisions, is a DNR/DNI, but wanted to pursue endoscopic work-up at that time. Worsening lethargy, decreased appetite, decreased urine output on 9/19. Decreased ability to swallow safely, looked like patient was actively dying on 9/20. Goals of care discussion with daughters/granddaughters, decision made to switch to comfort care  - comfort care orders with scheduled ativan and IV PRN dilaudid/ativan  - expected to pass in hospital     Acute on chronic anemia likely due to  GI bleed  Iron deficiency  Folate deficiency  Baseline Hgb 9-10. Had black stools, fecal occult + after started on xarelto for DV on 8/29..  *CT abdomen: sigmoid diverticulosis with active inflammation and a right hip replacement with acute intertrochanteric periprostatic fracture, compression deformities of T11, T12 and L1 are indeterminant and can be subacute or chronic and moderate left and small right pleural effusion, prominent interstitial changes in both lungs likely represent a combination of fibrosis with superimposed edema/infection  *2 units PRBCs 9/15 and 1 unit 9/16  - GI followed- no follow-up given comfort care transition     Acute hypotension, shock - resolved  Appears to be secondary to unstable cardiac arrythmia (a fib with RVR and hypotension)  - was on IV vasopressors 9/16-9/17     JULITO  Hyperkalemia-improved  Cr baseline near 0.7. Cr climbing up to 1.56 on 9/18. Improved to 1.16 on 9/20 after IVF held  - no further monitoring     A fib with RVR and hypotension - resolved  Stress induced CM  *Echo 9/16: EF 25% with apical akinesis and septal motion abnormalities noted to be consistent with conduction abnormalities  Was given IV digoxin and IV amiodarone in ICU  - oral amiodarone discontinued  - cardiology appreciated, no plan for invasive procedures to work-up cardiomyopathy--plan is now comfort care      Recently diagnosed left lower extremity  DVT  8/29/2023 OSH: occlusive to partially occlusive thrombus within the left iliac vein extending into the common femoral vein and proximal superficial vein  *9/18 LLE US: DVT in evolution with improvement in iliac/common femoral thrombus, below this level of thigh and popliteal region, thrombus absent. Layering echogenic material in bladder  - Had been on xarelto, discontinued with bleed as above--no plan for IVC filter    Recently diagnosed right hip fracture secondary to fall with mildly displaced periprosthetic fracture involving the right greater trochanter  -Orthopedics at outside hospital on 8/29, recommended toe-touch weightbearing and no surgical intervention     Seizure disorder  -unable to tolerate PO, instead on 0.5mg ativan q6h     History of coronary artery disease that is PCI to LAD in 2/2021  Hypertension  Hyperlipidemia  -Stop PTA amlodipine, lisinopril, imdur, metoprolol, atorvastatin, plavix given comfort care     Hypothyroidism  - stop PTA synthroid given unable to take oral    Oral thrush  Stop nystatin swish/spit given frequent lethargy     Rheumatoid arthritis  On chronic prednisone and weekly methotrexate  - stop PTA prednisone as unable to take oral    Anxiety disorder  - stop PTA celexa as unable to take oral     History of CVA in 2013  Aortic stenosis status TAVR    Chronic UTI  - discontinued macrodantin due to inability to take oral     Coccygeal wound  WOC appreciated     CT scan finding of compression deformities of T11, T12 and L1 are indeterminant and can be subacute or chronic   -Noted- intermittent complaint of back pain       Diet: Room Service  Regular Diet Adult  Diet    DVT Prophylaxis: Pneumatic Compression Devices  Reyes Catheter: PRESENT, indication: End of Life  Lines: None     Cardiac Monitoring: None  Code Status: No CPR- Do NOT Intubate      Clinically Significant Risk Factors              # Hypoalbuminemia: Lowest albumin = 1.7 g/dL at 9/18/2023  6:56 AM, will monitor  as appropriate           # Hypertension: Noted on problem list    # Chronic heart failure with reduced ejection fraction: last echo with EF <40%                  Disposition Plan      Expected Discharge Date: 09/23/2023,  9:00 AM  Discharge Delays: *Early Discharge Anticipated  Destination: other (comment) (TCU)  Discharge Comments: expected to pass in hospital          Meri Lopez DO  Hospitalist Service  [facility]  Securely message with [employer] (more info)  Text page via [employer] Paging/Directory   ______________________________________________________________________    Interval History   Patient seen and examined.Change overnight with more frequent dilaudid needed--switched to IV medications due to inability to swallow. Discharge to [facility] cancelled. Added scheduled ativan to prevent seizures. Discussed with family that it is possible that patient may die within hours. Discussed with family at bedside, RN and SW    Physical Exam   Vital Signs:       Pulse: 68   Resp: (!) 8        Weight: 110 lbs 3.68 oz    Constitutional: Lethargic, no apparent distress.  Respiratory: Clear to auscultation bilaterally, no crackles or wheezing. RR near 8  Cardiovascular: irregular, normal S1 and S2, and no murmur noted  GI: Normal bowel sounds, soft, non-distended, non-tender  Skin/Integumen: No rashes, no cyanosis, +2 edema in lower and upper extremities.  Other:  Reyes in place with light yellow urine    Medical Decision Making       35 MINUTES SPENT BY ME on the date of service doing chart review, history, exam, documentation & further activities per the note.      Data       Imaging results reviewed over the past 24 hrs:   No results found for this or any previous visit (from the past 24 hour(s)).

## 2023-09-22 NOTE — TELEPHONE ENCOUNTER
Reviewed. Patient had WILMAR valve implant in 2016. Contacted Charisse Mcintyre, RN and Jacy Martinez, RN, research coordinators. Left VM for both coordinators. Will also route this note to them for review/advisement.    AUTUMN AlbrightN, RN, PHN, CHFN, HNB-BC   9/22/2023 at 1:11 PM

## 2023-09-22 NOTE — PROGRESS NOTES
[facility]  WO Nurse Inpatient Assessment     Consulted for: Coccygeal wound    Summary: Patient now comfort cares, but nursing and family ok with [facility] assessment today while being repositioned. Wounds are stable with no acute infection and will cover for protection with Mepilex. Pt has large amounts of weeping to BL upper extremities and currently managed with chux pads.   1) Pt with unstageable pressure injury to coccyx present on admission to the hospital, surrounding erythema resolving. Area concerning for worsening due to pt limited         movement and recent hip fx.    2) Stage 1 pressure injury to Middle spine also present on admission, with a few small skin tears surrounding and requiring strict PIP    Patient History (according to provider note(s):      Gillian Burk is a 89 year old female who has medical history which includes hypertension, hyperlipidemia, history of coronary artery disease, hypothyroidism, rheumatoid arthritis, anxiety, history of seizure disorder, chronic UTIs and is on Macrodantin as prophylaxis, history of CVA, TAVR, ischemic cardiomyopathy, recently diagnosed DVT of the left lower extremity and started on Xarelto.     She presents to the ED from rehab facility where she has been recovering from a recent right hip fracture secondary to fall with mildly displaced periprosthetic fracture involving the right trochanter with more fatigue.  H    Assessment:      Areas visualized during today's visit: Focused: and Posterior surfaces     Pressure Injury Location: Coccyx    Last photo: 9/22/23 9/18/23    Wound type: Pressure Injury     Pressure Injury Stage: Unstageable, present on admission     Wound history/plan of care:   Pt with recent R hip surgery and family indicates she has been moving less and eating less. Pt has had a change in condition this am and is unresponsive with apneic mouth breathing, assessed with nursing and family at bedside     Wound  "base: 90 % eschar-darkening, 10 % slough     Palpation of the wound bed: firm      Drainage: scant     Description of drainage: serous     Measurements (length x width x depth, in cm) 1.5  x 1.7  x  0.1+ cm no changes in size 9/22     Tunneling N/A     Undermining N/A  Periwound skin: Intact and Erythema- blanchable decreased since prior assessment      Color: pink      Temperature: warm  Odor: none  Pain: denies , none  Pain intervention prior to dressing change: N/A  Treatment goal: Infection control/prevention and Soften necrotic tissue  STATUS: unchanged  Supplies ordered: at bedside      Pressure Injury Location: [facility]    Last photo: 9/22/23 9/18/23    Wound type: Pressure Injury     Pressure Injury Stage: Unstageable, present on admission     Wound history/plan of care:   Pt with recent R hip surgery and family indicates she has been moving less and eating less.     Wound base: 100% non blanchable erythema     Palpation of the wound bed: normal     Drainage: none     Description of drainage: none     Measurements (length x width x depth, in cm) approx 2.7 x 2  x  0 cm with small skin tears. erythema extending outward approx 15 x 4cm area 3 small non blanchable areas      Tunneling N/A     Undermining N/A  Periwound skin: Intact and Erythema- blanchable       Color: red      Temperature: warm  Odor: none  Pain: denies , none  Pain intervention prior to dressing change: N/A  Treatment goal: Infection control/prevention and protection  STATUS: stalled  Supplies ordered: at bedside    My PI Risk Assessment     Sensory Perception: 1 - Completely Limited     Moisture: 2 - Very moist      Activity: 1 - Bedfast      Mobility: 1 - Completely immobile      Nutrition: 1 - Very poor     Friction/Shear: 1 - Problem     TOTAL: 7      Treatment Plan:     Mid spine and coccyx: Every other day-  1. Clean wound with saline or MicroKlenz Spray, pat dry  2. Wipe / \"clean\" the surrounding periwound tissue with skin prep " "(Cavilon No Sting Skin Prep #298844) and allow to dry. This will help protect periwound and help dressing adherence  3. Press a Mepilex  Sacral Dressing (PS#[id number])  to the area, making sure to conform nicely to skin curvatures.   4. Time and date dressing change  NOTE  -Reposition pt side to side kierra when in bed, every 2 hours-get the pt way over on side to completely offload pressure. This will benefit skin and respiratory function   -Keep heels elevated and floating on pillows at all times. Try using at least 2 pillows under each calf.  -When up to the chair pt needs to fully offload every 2 hours and use a chair cushion if needed     BL arm skin tear: Every third day and PRN saturation.   If having excessive drainage ok to apply vaseline gauze and keep open on a chux  1. Cleanse with microklenz and blot dry  2. Apply oil emulsion guaze (#531852)  3. Cover with Optifoam gentle border (#001858)  4. Time/Date/Initial dressing change   *Place arrow in direction you want dressing to be removed to prevent pulling open the skin flap    Pressure Injury Prevention (PIP) Plan:  If patient is declining pressure injury prevention interventions: Explore reason why and address patient's concerns, Educate on pressure injury risk and prevention intervention(s), If patient is still declining, document \"informed refusal\" , and Ensure Care team is aware ( provider, charge nurse, etc)  Mattress: Follow bed algorithm, reassess daily and order specialty mattress, if indicated.  HOB: Maintain at or below 30 degrees, unless contraindicated  Repositioning in bed: Every 1-2 hours , Left/right positioning; avoid supine, and Raise foot of bed prior to raising head of bed, to reduce patient sliding down (shear)  Heels: Keep elevated off mattress, Pillows under calves, and Heel lift boots  Protective Dressing: Sacral Mepilex for prevention (#639808),  especially for the agitated patient   Positioning Equipment: Z-Silvestre positioner " (#810848-medium or #81087-large) to help maintain side lying position.   Chair positioning: Chair cushion (#046800)    If patient has a buttock pressure injury, or high risk for PI use chair cushion or SPS.  Moisture Management: Perineal cleansing /protection: Follow Incontinence Protocol, Avoid brief in bed, Clean and dry skin folds with bathing , Consider InterDry (#964541) between folds, and Moisturize dry skin  Under Devices: Inspect skin under all medical devices during skin inspection , Ensure tubes are stabilized without tension, and Ensure patient is not lying on medical devices or equipment when repositioned  Ask provider to discontinue device when no longer needed.      Orders: Reviewed    RECOMMEND PRIMARY TEAM ORDER: None, at this time  Education provided: importance of repositioning, plan of care, Moisture management, Hygiene, and Off-loading pressure  Discussed plan of care with: Patient, Family, and Nurse  WOC nurse follow-up plan: weekly  Notify WOC if wound(s) deteriorate.  Nursing to notify the Provider(s) and re-consult the WOC Nurse if new skin concern.    DATA:     Current support surface: Standard  Standard gel/foam mattress (IsoFlex, Atmos air, etc)  Containment of urine/stool: Incontinence Protocol, Incontinent pad in bed, and Purewick external catheter , removed brief during assessment  BMI: Body mass index is 18.3 kg/m  (pended).   Active diet order: Orders Placed This Encounter      Regular Diet Adult      Diet     Output: I/O last 3 completed shifts:  In: 160 [P.O.:160]  Out: -      Labs:   Recent Labs   Lab 09/20/23  0612 09/18/23  2311 09/18/23  0656 09/16/23  0447 09/16/23  0116   ALBUMIN  --   --  1.7*   < >  --    HGB 12.3   < > 12.5   < >  --    INR  --   --   --   --  1.26*   WBC 15.3*   < >  --    < >  --     < > = values in this interval not displayed.       Pressure injury risk assessment:   Sensory Perception: 2-->very limited  Moisture: 2-->very moist  Activity:  1-->bedfast  Mobility: 1-->completely immobile  Nutrition: 2-->probably inadequate  Friction and Shear: 1-->problem  Tevin Score: 9    Darius Bettencourt RN CWOCN  -Securely message with [employer] (more info) - can reach individually by name or search 'WOC Nurse' (Tamara) to reach all current WOCs on duty.  WOC Office Phone: [phone]

## 2023-09-22 NOTE — PLAN OF CARE
Goal Outcome Evaluation:    Shift Note [id number]      Orientation: A/Ox4   Activity: A2 + lift, turn and repo   Diet/BS Checks: Regular diet, take pills one at a time whole in apple sauce  Tele: N/a    IV Access/Drains: PIV in L arm- SL    Pain Management: PRN oral dilaudid given x3   Abnormal VS/Results: Deferred, on comfort cares    Bowel/Bladder: Incontinent B/B, has purewick, has 2 BM's this shift  Skin/Wounds:Scattered bruising, skin tear L forearm, coccyx wound, edematous on upper and lower extremities that weep.    Consults: SW following   D/C Disposition: Plan to discharge back to [facility] on hospice tomorrow.      Other Info: Wound care completed on coccyx. Mepilex's changed on back and on L forearm. Pt c/o back pain today, gave oral dilaudid x3 with good effect. Pt's BUE very moist and weeping, slava pads needing frequent changing. Pt needing breaux placed for end of life and will be discharged home with breaux in place.

## 2023-09-22 NOTE — PROGRESS NOTES
"SPIRITUAL HEALTH SERVICES - Progress Note    Oncology  Saw pt Gillian Burk and multiple family members per family request.     Gillian's daughter shared that Gillian is no longer able to be an active part of family conversations but they requested a prayer, which I provided. They expressed a readiness to \"let her go.\"     Plan of Care - Spiritual Health remains available. Please contact as needs arise.     María Resendez  Chaplain Resident    [facility] routine referrals *[phone]    [facility] available 24/7 for emergent requests/referrals, either by paging the on-call  or by entering an ASAP/STAT consult in Epic (this will also page the on-call ).   "

## 2023-09-23 ASSESSMENT — ACTIVITIES OF DAILY LIVING (ADL): ADLS_ACUITY_SCORE: 49

## 2023-09-23 NOTE — PLAN OF CARE
Orientation: THONG, obtunded  Activity: A2/lift, family refuses T/R at times  Diet/BS Checks: Regular  Tele: N/A   IV Access/Drains: L PIV SL  Pain Management: PRN dilaudid, scheduled ativan   Abnormal VS/Results: Deferred, comfort care  Bowel/Bladder: Reyes, no BM this shift  Skin/Wounds: Dressing changes performed by WOC RN  D/C Disposition: Family present at bedside throughout shift

## 2023-09-23 NOTE — PROGRESS NOTES
House [facility] Death Pronouncement    Called by nursing staff to pronounce Gillian Burk dead.    Physical Exam: Unresponsive to noxious stimuli, Spontaneous respirations absent, Breath sounds absent, Carotid pulse absent, Heart sounds absent, Pupillary light reflex absent, and Corneal blink reflex absent    Patient was pronounced dead at 11:30 PM, 2023.    Principal Problem:    Anemia       Infectious disease present?: NO    Communicable disease present? (examples: HIV, chicken pox, TB, Ebola, CJD) :  NO    Multi-drug resistant organism present? (example: MRSA): NO    Please consider an autopsy if any of the following exist:  NO Unexpected or unexplained death during or following any dental, medical, or surgical diagnostic treatment procedures.   NO Death of mother at or up to seven days after delivery.     NO All  and pediatric deaths.     NO Death where the cause is sufficiently obscure to delay completion of the death certificate.   NO Deaths in which autopsy would confirm a suspected illness/condition that would affect surviving family members or recipients of transplanted organs.     The following deaths must be reported to the 's Office:  NO A death that may be due entirely or in part to any factors other than natural disease (recent surgery, recent trauma, suspected abuse/neglect).   NO A death that may be an accident, suicide, or homicide.     NO Any sudden, unexpected death in which there is no prior history of significant heart disease or any other condition associated with sudden death.   NO A death under suspicious, unusual, or unexpected circumstances.    NO Any death which is apparently due to natural causes but in which the  does not have a personal physician familiar with the patient s medical history, social, or environmental situation or the circumstances of the terminal event.   NO Any death apparently due to Sudden Infant Death Syndrome.     YES Deaths that  occur during, in association with, or as consequences of a diagnostic, therapeutic, or anesthetic procedure.   NO Any death in which a fracture of a major bone has occurred within the past (6) six months.   NO A death of persons not seen by their physician within 120 days of demise.     NO Any death in which the  was an inmate of a public institution or was in the custody of Law Enforcement personnel.   NO  All unexpected deaths of children   NO Solid organ donors   NO Unidentified bodies   Pending Deaths of persons whose bodies are to be cremated or otherwise disposed of so that the bodies will later be unavailable for examination;   NO Deaths unattended by a physician outside of a licensed healthcare facility or licensed residential hospice program   NO Deaths occurring within 24 hours of arrival to a health care facility if death is unexpected.    NO Deaths associated with the decedent s employment.   NO Deaths attributed to acts of terrorism.   NO Any death in which there is uncertainty as to whether it is a medical examiner s care should be discussed with the medical investigator.      Death Certificate to be directed to Dr. Meri Lopez hospitalist    Body disposition: Body released to the [facility].    ADRIÁN Kim, CNP  Hospitalist-House [facility]  Hospitalist Service  Securely message with [employer] (more info)  Text page via [facility] Paging/Directory

## 2023-09-25 ENCOUNTER — PATIENT OUTREACH (OUTPATIENT)
Dept: CARE COORDINATION | Facility: CLINIC | Age: 88
End: 2023-09-25
Payer: COMMERCIAL

## 2023-09-25 NOTE — PROGRESS NOTES
Clinic Care Coordination Contact    Situation: Patient chart reviewed by care coordinator.    Background: Patient in identified status. [facility]     Assessment: Patient was admitted to the hospital and passed away while at the hospital.     Plan/Recommendations: RN CC will close primary care coordination program.     Simin Bueno RN Care Coordination   [facility]  Email: [email or web address]  Phone: [phone]

## 2023-09-27 NOTE — DISCHARGE SUMMARY
[facility]    Death Summary - Hospitalist Service     Date of Admission:  9/15/2023  Date of Death: 9/23/2023 12:40 AM  Discharging Provider: Meri Lopez,     Discharge Diagnoses   End of life care  Acute on chronic anemia likely due to  GI bleed  Iron deficiency  Folate deficiency  Acute hypotension, shock   JULITO  Hyperkalemia-improved  A fib with RVR and hypotension - resolved  Stress induced CM  Recently diagnosed left lower extremity DVT  Recently diagnosed right hip fracture secondary to fall with mildly displaced periprosthetic fracture involving the right greater trochanter  Seizure disorder  History of coronary artery disease that is PCI to LAD in 2/2021  Hypertension  Hyperlipidemia  Hypothyroidism  Oral thrush  Rheumatoid arthritis  Anxiety disorder  History of CVA in 2013  Aortic stenosis status TAVR  Chronic UTI  Coccygeal wound  CT scan finding of compression deformities of T11, T12 and L1    Cause of death: cardiorespiratory failure    Hospital Course   Gillian Burk is a 89 year old female who has medical history which includes hypertension, hyperlipidemia, history of coronary artery disease, hypothyroidism, rheumatoid arthritis, anxiety, history of seizure disorder, chronic UTIs and is on Macrodantin as prophylaxis, history of CVA, TAVR, ischemic cardiomyopathy, recently diagnosed DVT of the left lower extremity and started on Xarelto.     She presents to the ED from rehab facility where she has been recovering from a recent right hip fracture secondary to fall with mildly displaced periprosthetic fracture involving the right trochanter with more fatigue. She developed dark stools and recheck hgb on 9/13 was 7. She was given 3 units PRBCs.      On early am 9/16/23 a rapid response was called d/t a clinical change of a fib with RVR and hypotension.  Transferred to the ICU after IVF bolus given and still having persistent vital sign instability.  She was given IV  digoxin, started on IV amiodarone gtt and placed on vasopressors due to persistent hypoptension. Became stephen 9/17 (0000) with HR in the 40-50's; around 0045 converted to NSR with HR in the 70's and normalization of BP. She was since weaned of IV vasopressor agents.  Had a good day on 9/18 and was planning to undergo endoscopic work-up for GI bleed, but then became more lethargic with decreased appetite, poor tolerance of liquids (coughing) and failure to thrive 9/19-9/20. Goals of care discussion ensued with two daughters (Hanna and Lauryn) and grandchildren at bedside. Decision was made to pursue comfort care on 9/20 afternoon. Breathing began to slow, she was unable to swallow and was requiring frequent IV medications 9/21 into 9/22 and she was pronounced dead at 11:30pm on 9/22/23.      Meri Lopez, [facility]  ______________________________________________________________________      Significant Results and Procedures   Most Recent 3 CBC's:  Recent Labs   Lab Test 09/20/23  0612 09/19/23  0557 09/18/23  2311 09/18/23  0656 09/17/23  1623   WBC 15.3* 13.1*  --   --  15.6*   HGB 12.3 11.9 11.0*   < > 14.7   MCV 98 97  --   --  95   * 152  --   --  279    < > = values in this interval not displayed.     Most Recent 3 BMP's:  Recent Labs   Lab Test 09/20/23  0612 09/19/23  0557 09/18/23  0656    138 138   POTASSIUM 4.4 4.4 5.2   CHLORIDE 111* 113* 109*   CO2 17* 15* 17*   BUN 48.1* 44.5* 41.9*   CR 1.16* 1.32* 1.56*   ANIONGAP 10 10 12   CHEYANNE 7.1* 6.9* 6.9*   GLC 77 71 101*   ,   Results for orders placed or performed during the hospital encounter of 09/15/23   US Lower Extremity Venous Duplex Left    Narrative    EXAM: US LOWER EXTREMITY VENOUS DUPLEX LEFT  LOCATION: [facility]  DATE: 9/18/2023    INDICATION: evaluate known DVT, left iliac veins extending into the common femoral vein and proximal superficial femoral vein.  COMPARISON:  2023  TECHNIQUE: Venous Duplex ultrasound of the left lower extremity with and without compression, augmentation and duplex. Color flow and spectral Doppler with waveform analysis performed.    FINDINGS: Exam includes the common femoral, femoral, popliteal, and contralateral common femoral veins as well as segmentally visualized deep calf veins and greater saphenous vein.     LEFT: Common femoral vein has partial compressibility with improved flow in comparison the prior examination with improvement in flow in the iliac vein as well compressibility is improved. Noncompressible thigh femoral vein proximally becomes   compressible in the mid and distal portion as well as the popliteal. Visualized calf veins are compressible as well.. No superficial thrombophlebitis. Simple Baker's cyst measuring 2.7 cm x 2.9 cm x 0.8 cm Incidental layering debris within the bladder   which may represent blood.      Impression    IMPRESSION:  1.  DVT in evolution with improvement in the iliac and common femoral thrombus. Below this level of the thigh and popliteal region once again thrombus is absent.    2.  Layering echogenic material within the bladder could represent blood or debris. Correlate with urinalysis.   XR Chest Port 1 View    Narrative    CHEST ONE VIEW 2023 9:21 AM     HISTORY: Rule out aspiration pneumonia, frequent coughing with  food/drink.    COMPARISON: 2023.      Impression    IMPRESSION: Interstitial and/or fibrotic changes appear similar to  previous. No consolidation.    PONCHO QURESHI MD         SYSTEM ID:  [id number]   Echo Limited     Value    LVEF  20-25%    Narrative    [id number]  [id number]  [id number]  [id number]^HEM     [facility]  Echocardiography Laboratory  [address] [place] [address]     Name: LUCIO HANNON  MRN: [id number]  : 1927  Study Date: 2023 08:56 AM  Age: 89 yrs  Gender: Female  Patient Location: [facility]  Reason For  Study: Tamponade, Shock  Ordering Physician: LARISSA KING  Referring Physician: NITA DE LA CRUZ  Performed By: Pedro Serrano     BSA: 1.6 m2  Height: 66 in  Weight: 111 lb  HR: 118  BP: 103/61 mmHg  ______________________________________________________________________________  Procedure  Limited Portable Echo Adult.  ______________________________________________________________________________  Interpretation Summary     This was a stat echo in the ICU at [facility]. Technically very  difficult study.     LV size is normal however there is severe mechanical dyssynchrony. Findings  are also suggestive of potentially stress-induced cardiomyopathy. Mid to  distal portions of the left ventricle are essentially akinetic. Estimated  ejection fraction is probably around 25%. No contrast of biplane available.  Normal right ventricular size and systolic function.  Severe mitral annular calcification. Evaluation of MS inadequate on the study.  There is a bioprosthetic aortic valve per history. This is not well  visualized. No hemodynamically significant stenosis noted on that on Doppler  IVC severely dilated consistent with increased right-sided filling pressures.  The pericardial space suggests organizing pericardial effusion worse than  prior. No significant free fluid noted in the pericardium     Findings communicated to ICU attending at [facility].     ______________________________________________________________________________  Left Ventricle  The left ventricle is normal in size. The visual ejection fraction is 20-25%.  Septal motion is consistent with conduction abnormality. There is apical  akinesis.     Right Ventricle  The right ventricle is normal in size and function.     Atria  The left atrium is not well visualized. The left atrium is mild to moderately  dilated. The right atrium is mild to moderately dilated.     Mitral Valve  There is severe mitral annular calcification. The mitral  valve leaflets are  severely thickened.     Tricuspid Valve  The tricuspid valve is not well visualized, but is grossly normal. Right  ventricular systolic pressure could not be approximated due to inadequate  tricuspid regurgitation.     Aortic Valve  The aortic valve is not well visualized. No hemodynamically significant  valvular aortic stenosis. There is a bioprosthetic aortic valve.     Pulmonic Valve  The pulmonic valve is not well visualized.     Vessels  Dilation of the inferior vena cava is present with abnormal respiratory  variation in diameter. IVC diameter >2.1 cm collapsing <50% with sniff  suggests a high RA pressure estimated at 15 mmHg or greater.     Pericardium  The pericardial space suggests organizing pericardial effusion.     ______________________________________________________________________________  Doppler Measurements & Calculations  Ao V2 max: 96.1 cm/sec  Ao max P.0 mmHg  Ao V2 mean: 68.2 cm/sec  Ao mean P.1 mmHg  Ao V2 VTI: 13.5 cm     Ao acc time: 0.07 sec     ______________________________________________________________________________  Report approved by: Fahad Patterson 2023 10:24 AM           *Note: Due to a large number of results and/or encounters for the requested time period, some results have not been displayed. A complete set of results can be found in Results Review.       Consultations This Hospital Stay   GASTROENTEROLOGY IP CONSULT  WOUND OSTOMY CONTINENCE NURSE  IP CONSULT  INTENSIVIST IP CONSULT  CARDIOLOGY IP CONSULT  VASCULAR ACCESS ADULT IP CONSULT  VASCULAR ACCESS ADULT IP CONSULT  PALLIATIVE CARE ADULT IP CONSULT  VASCULAR ACCESS ADULT IP CONSULT  CARE MANAGEMENT / SOCIAL WORK IP CONSULT  SPEECH LANGUAGE PATH ADULT IP CONSULT    Primary Care Physician   Augusto Meyer    Time Spent on this Encounter   IMeri DO, personally saw the patient today and spent greater than 30 minutes discharging this patient.

## (undated) DEVICE — KIT HAND CONTROL ANGIOTOUCH ACIST 65CM AT-P65

## (undated) DEVICE — MANIFOLD KIT ANGIO AUTOMATED 014613

## (undated) DEVICE — WIRE GLIDE 0.035"X150CM VASC GR3506

## (undated) DEVICE — SLEEVE TR BAND RADIAL COMPRESSION DEVICE 24CM TRB24-REG

## (undated) DEVICE — WIRE GUIDE 0.035"X260CM SAFE-T-J EXCHANGE G00517

## (undated) DEVICE — INFL DVC KIT W/10CC NITRO IN4530

## (undated) DEVICE — CATH JACKY 5FR 3.5 CURVE 40-5023

## (undated) DEVICE — Device

## (undated) DEVICE — CATH DIAGNOSTIC RADIAL 5FR TIG 4.0

## (undated) DEVICE — INTRO GLIDESHEATH SLENDER 6FR 10X45CM 60-1060

## (undated) DEVICE — CATH BLN NC EUPHORA RX 2.5 X 8

## (undated) DEVICE — CATH LAUNCHER 6FR EBU 30 LA6EBU30

## (undated) DEVICE — CATH GUIDELINER 6FR 5571

## (undated) DEVICE — TOTE ANGIO CORP PC15AT SAN32CC83O

## (undated) DEVICE — DEFIB PRO-PADZ LVP LQD GEL ADULT 8900-2105-01

## (undated) DEVICE — GUIDEWIRE VASC 0.014INX180CM RUNTHROUGH 25-1011

## (undated) RX ORDER — HEPARIN SODIUM 200 [USP'U]/100ML
INJECTION, SOLUTION INTRAVENOUS
Status: DISPENSED
Start: 2021-02-10

## (undated) RX ORDER — NITROGLYCERIN 5 MG/ML
VIAL (ML) INTRAVENOUS
Status: DISPENSED
Start: 2021-02-10

## (undated) RX ORDER — POTASSIUM CHLORIDE 1500 MG/1
TABLET, EXTENDED RELEASE ORAL
Status: DISPENSED
Start: 2017-12-07

## (undated) RX ORDER — HEPARIN SODIUM 1000 [USP'U]/ML
INJECTION, SOLUTION INTRAVENOUS; SUBCUTANEOUS
Status: DISPENSED
Start: 2017-12-07

## (undated) RX ORDER — LIDOCAINE HYDROCHLORIDE 10 MG/ML
INJECTION, SOLUTION EPIDURAL; INFILTRATION; INTRACAUDAL; PERINEURAL
Status: DISPENSED
Start: 2017-11-16

## (undated) RX ORDER — VERAPAMIL HYDROCHLORIDE 2.5 MG/ML
INJECTION, SOLUTION INTRAVENOUS
Status: DISPENSED
Start: 2021-02-10

## (undated) RX ORDER — POTASSIUM CHLORIDE 1500 MG/1
TABLET, EXTENDED RELEASE ORAL
Status: DISPENSED
Start: 2021-02-10

## (undated) RX ORDER — VERAPAMIL HYDROCHLORIDE 2.5 MG/ML
INJECTION, SOLUTION INTRAVENOUS
Status: DISPENSED
Start: 2017-12-07

## (undated) RX ORDER — FENTANYL CITRATE 50 UG/ML
INJECTION, SOLUTION INTRAMUSCULAR; INTRAVENOUS
Status: DISPENSED
Start: 2021-02-10

## (undated) RX ORDER — CLOPIDOGREL 300 MG/1
TABLET, FILM COATED ORAL
Status: DISPENSED
Start: 2021-02-10

## (undated) RX ORDER — HEPARIN SODIUM 1000 [USP'U]/ML
INJECTION, SOLUTION INTRAVENOUS; SUBCUTANEOUS
Status: DISPENSED
Start: 2017-11-16

## (undated) RX ORDER — FENTANYL CITRATE 50 UG/ML
INJECTION, SOLUTION INTRAMUSCULAR; INTRAVENOUS
Status: DISPENSED
Start: 2017-11-16

## (undated) RX ORDER — VERAPAMIL HYDROCHLORIDE 2.5 MG/ML
INJECTION, SOLUTION INTRAVENOUS
Status: DISPENSED
Start: 2017-11-16

## (undated) RX ORDER — HEPARIN SODIUM 1000 [USP'U]/ML
INJECTION, SOLUTION INTRAVENOUS; SUBCUTANEOUS
Status: DISPENSED
Start: 2021-02-10

## (undated) RX ORDER — LIDOCAINE HYDROCHLORIDE 10 MG/ML
INJECTION, SOLUTION EPIDURAL; INFILTRATION; INTRACAUDAL; PERINEURAL
Status: DISPENSED
Start: 2021-02-10

## (undated) RX ORDER — NITROGLYCERIN 5 MG/ML
VIAL (ML) INTRAVENOUS
Status: DISPENSED
Start: 2017-11-16

## (undated) RX ORDER — LIDOCAINE HYDROCHLORIDE 10 MG/ML
INJECTION, SOLUTION EPIDURAL; INFILTRATION; INTRACAUDAL; PERINEURAL
Status: DISPENSED
Start: 2017-12-07